# Patient Record
Sex: MALE | Race: WHITE | NOT HISPANIC OR LATINO | Employment: OTHER | URBAN - NONMETROPOLITAN AREA
[De-identification: names, ages, dates, MRNs, and addresses within clinical notes are randomized per-mention and may not be internally consistent; named-entity substitution may affect disease eponyms.]

---

## 2017-02-07 ENCOUNTER — ALLSCRIPTS OFFICE VISIT (OUTPATIENT)
Dept: FAMILY MEDICINE CLINIC | Facility: CLINIC | Age: 52
End: 2017-02-07
Payer: MEDICARE

## 2017-02-07 DIAGNOSIS — E11.65 TYPE 2 DIABETES MELLITUS WITH HYPERGLYCEMIA (HCC): ICD-10-CM

## 2017-02-07 DIAGNOSIS — E11.40 TYPE 2 DIABETES MELLITUS WITH DIABETIC NEUROPATHY (HCC): ICD-10-CM

## 2017-02-07 DIAGNOSIS — T78.3XXA ANGIONEUROTIC EDEMA: ICD-10-CM

## 2017-02-07 LAB — HBA1C MFR BLD HPLC: 10.2 %

## 2017-05-06 ENCOUNTER — HOSPITAL ENCOUNTER (EMERGENCY)
Facility: HOSPITAL | Age: 52
Discharge: HOME | End: 2017-05-07
Attending: EMERGENCY MEDICINE | Admitting: EMERGENCY MEDICINE
Payer: MEDICARE

## 2017-05-06 DIAGNOSIS — L02.211 ABSCESS OF ABDOMINAL WALL: Primary | ICD-10-CM

## 2017-05-06 LAB
ACETONE SERPL-MCNC: NEGATIVE MG/DL
ALBUMIN SERPL BCP-MCNC: 3.3 G/DL (ref 3.5–5)
ALP SERPL-CCNC: 76 U/L (ref 46–116)
ALT SERPL W P-5'-P-CCNC: 30 U/L (ref 12–78)
ANION GAP SERPL CALCULATED.3IONS-SCNC: 0 MMOL/L (ref 4–13)
APTT PPP: 26 SECONDS (ref 23–35)
AST SERPL W P-5'-P-CCNC: 14 U/L (ref 5–45)
BASOPHILS # BLD AUTO: 0.06 THOUSANDS/ΜL (ref 0–0.1)
BASOPHILS NFR BLD AUTO: 1 % (ref 0–1)
BILIRUB SERPL-MCNC: 0.3 MG/DL (ref 0.2–1)
BUN SERPL-MCNC: 17 MG/DL (ref 5–25)
CALCIUM SERPL-MCNC: 8.9 MG/DL (ref 8.3–10.1)
CHLORIDE SERPL-SCNC: 94 MMOL/L (ref 100–108)
CO2 SERPL-SCNC: 29 MMOL/L (ref 21–32)
CREAT SERPL-MCNC: 1.1 MG/DL (ref 0.6–1.3)
EOSINOPHIL # BLD AUTO: 0.17 THOUSAND/ΜL (ref 0–0.61)
EOSINOPHIL NFR BLD AUTO: 2 % (ref 0–6)
ERYTHROCYTE [DISTWIDTH] IN BLOOD BY AUTOMATED COUNT: 13 % (ref 11.6–15.1)
GFR SERPL CREATININE-BSD FRML MDRD: >60 ML/MIN/1.73SQ M
GLUCOSE SERPL-MCNC: 238 MG/DL (ref 65–140)
HCT VFR BLD AUTO: 42.7 % (ref 36.5–49.3)
HGB BLD-MCNC: 14.6 G/DL (ref 12–17)
INR PPP: 0.85 (ref 0.86–1.16)
LACTATE SERPL-SCNC: 1.8 MMOL/L (ref 0.5–2)
LYMPHOCYTES # BLD AUTO: 3.25 THOUSANDS/ΜL (ref 0.6–4.47)
LYMPHOCYTES NFR BLD AUTO: 28 % (ref 14–44)
MAGNESIUM SERPL-MCNC: 1.8 MG/DL (ref 1.6–2.6)
MCH RBC QN AUTO: 29.6 PG (ref 26.8–34.3)
MCHC RBC AUTO-ENTMCNC: 34.2 G/DL (ref 31.4–37.4)
MCV RBC AUTO: 86 FL (ref 82–98)
MONOCYTES # BLD AUTO: 1.12 THOUSAND/ΜL (ref 0.17–1.22)
MONOCYTES NFR BLD AUTO: 10 % (ref 4–12)
NEUTROPHILS # BLD AUTO: 7.11 THOUSANDS/ΜL (ref 1.85–7.62)
NEUTS SEG NFR BLD AUTO: 59 % (ref 43–75)
PH BLDV: 7.41 [PH] (ref 7.3–7.4)
PLATELET # BLD AUTO: 286 THOUSANDS/UL (ref 149–390)
PMV BLD AUTO: 9.9 FL (ref 8.9–12.7)
POTASSIUM SERPL-SCNC: 3.6 MMOL/L (ref 3.5–5.3)
PROT SERPL-MCNC: 7.4 G/DL (ref 6.4–8.2)
PROTHROMBIN TIME: 11.5 SECONDS (ref 12.1–14.4)
RBC # BLD AUTO: 4.94 MILLION/UL (ref 3.88–5.62)
SODIUM SERPL-SCNC: 123 MMOL/L (ref 136–145)
WBC # BLD AUTO: 11.71 THOUSAND/UL (ref 4.31–10.16)

## 2017-05-06 PROCEDURE — 85610 PROTHROMBIN TIME: CPT | Performed by: EMERGENCY MEDICINE

## 2017-05-06 PROCEDURE — 80053 COMPREHEN METABOLIC PANEL: CPT | Performed by: EMERGENCY MEDICINE

## 2017-05-06 PROCEDURE — 36415 COLL VENOUS BLD VENIPUNCTURE: CPT | Performed by: EMERGENCY MEDICINE

## 2017-05-06 PROCEDURE — 82009 KETONE BODYS QUAL: CPT | Performed by: EMERGENCY MEDICINE

## 2017-05-06 PROCEDURE — 83605 ASSAY OF LACTIC ACID: CPT | Performed by: EMERGENCY MEDICINE

## 2017-05-06 PROCEDURE — 85730 THROMBOPLASTIN TIME PARTIAL: CPT | Performed by: EMERGENCY MEDICINE

## 2017-05-06 PROCEDURE — 82800 BLOOD PH: CPT | Performed by: EMERGENCY MEDICINE

## 2017-05-06 PROCEDURE — 83735 ASSAY OF MAGNESIUM: CPT | Performed by: EMERGENCY MEDICINE

## 2017-05-06 PROCEDURE — 85025 COMPLETE CBC W/AUTO DIFF WBC: CPT | Performed by: EMERGENCY MEDICINE

## 2017-05-06 PROCEDURE — 87040 BLOOD CULTURE FOR BACTERIA: CPT | Performed by: EMERGENCY MEDICINE

## 2017-05-06 RX ORDER — ASPIRIN 81 MG/1
81 TABLET ORAL DAILY
COMMUNITY
End: 2018-02-12 | Stop reason: SDUPTHER

## 2017-05-06 RX ORDER — LIDOCAINE 40 MG/G
CREAM TOPICAL ONCE
Status: DISCONTINUED | OUTPATIENT
Start: 2017-05-06 | End: 2017-05-07 | Stop reason: HOSPADM

## 2017-05-06 RX ORDER — ASCORBIC ACID 500 MG
500 TABLET ORAL DAILY
COMMUNITY
End: 2018-02-12 | Stop reason: SDUPTHER

## 2017-05-07 VITALS
SYSTOLIC BLOOD PRESSURE: 110 MMHG | RESPIRATION RATE: 18 BRPM | BODY MASS INDEX: 41.78 KG/M2 | TEMPERATURE: 98.6 F | HEIGHT: 64 IN | OXYGEN SATURATION: 93 % | WEIGHT: 244.71 LBS | HEART RATE: 100 BPM | DIASTOLIC BLOOD PRESSURE: 78 MMHG

## 2017-05-07 VITALS
TEMPERATURE: 99.1 F | BODY MASS INDEX: 40.97 KG/M2 | OXYGEN SATURATION: 94 % | HEART RATE: 106 BPM | HEIGHT: 64 IN | SYSTOLIC BLOOD PRESSURE: 125 MMHG | DIASTOLIC BLOOD PRESSURE: 85 MMHG | RESPIRATION RATE: 18 BRPM | WEIGHT: 240 LBS

## 2017-05-07 DIAGNOSIS — T78.3XXA ANGIOEDEMA OF LIPS: Primary | ICD-10-CM

## 2017-05-07 DIAGNOSIS — L02.211 ABDOMINAL WALL ABSCESS: ICD-10-CM

## 2017-05-07 LAB
ANION GAP SERPL CALCULATED.3IONS-SCNC: 7 MMOL/L (ref 4–13)
BASOPHILS # BLD AUTO: 0.03 THOUSANDS/ΜL (ref 0–0.1)
BASOPHILS NFR BLD AUTO: 0 % (ref 0–1)
BUN SERPL-MCNC: 15 MG/DL (ref 5–25)
CALCIUM SERPL-MCNC: 8.8 MG/DL (ref 8.3–10.1)
CHLORIDE SERPL-SCNC: 95 MMOL/L (ref 100–108)
CO2 SERPL-SCNC: 32 MMOL/L (ref 21–32)
CREAT SERPL-MCNC: 1.03 MG/DL (ref 0.6–1.3)
EOSINOPHIL # BLD AUTO: 0.24 THOUSAND/ΜL (ref 0–0.61)
EOSINOPHIL NFR BLD AUTO: 2 % (ref 0–6)
ERYTHROCYTE [DISTWIDTH] IN BLOOD BY AUTOMATED COUNT: 13.1 % (ref 11.6–15.1)
GFR SERPL CREATININE-BSD FRML MDRD: >60 ML/MIN/1.73SQ M
GLUCOSE SERPL-MCNC: 263 MG/DL (ref 65–140)
HCT VFR BLD AUTO: 43.8 % (ref 36.5–49.3)
HGB BLD-MCNC: 14.9 G/DL (ref 12–17)
LYMPHOCYTES # BLD AUTO: 2.44 THOUSANDS/ΜL (ref 0.6–4.47)
LYMPHOCYTES NFR BLD AUTO: 22 % (ref 14–44)
MAGNESIUM SERPL-MCNC: 1.8 MG/DL (ref 1.6–2.6)
MCH RBC QN AUTO: 29.4 PG (ref 26.8–34.3)
MCHC RBC AUTO-ENTMCNC: 34 G/DL (ref 31.4–37.4)
MCV RBC AUTO: 87 FL (ref 82–98)
MONOCYTES # BLD AUTO: 1.2 THOUSAND/ΜL (ref 0.17–1.22)
MONOCYTES NFR BLD AUTO: 11 % (ref 4–12)
NEUTROPHILS # BLD AUTO: 7.32 THOUSANDS/ΜL (ref 1.85–7.62)
NEUTS SEG NFR BLD AUTO: 65 % (ref 43–75)
PLATELET # BLD AUTO: 279 THOUSANDS/UL (ref 149–390)
PMV BLD AUTO: 9.6 FL (ref 8.9–12.7)
POTASSIUM SERPL-SCNC: 3.9 MMOL/L (ref 3.5–5.3)
RBC # BLD AUTO: 5.06 MILLION/UL (ref 3.88–5.62)
SODIUM SERPL-SCNC: 134 MMOL/L (ref 136–145)
WBC # BLD AUTO: 11.23 THOUSAND/UL (ref 4.31–10.16)

## 2017-05-07 PROCEDURE — 80048 BASIC METABOLIC PNL TOTAL CA: CPT | Performed by: EMERGENCY MEDICINE

## 2017-05-07 PROCEDURE — 96372 THER/PROPH/DIAG INJ SC/IM: CPT

## 2017-05-07 PROCEDURE — 96375 TX/PRO/DX INJ NEW DRUG ADDON: CPT

## 2017-05-07 PROCEDURE — 36415 COLL VENOUS BLD VENIPUNCTURE: CPT | Performed by: EMERGENCY MEDICINE

## 2017-05-07 PROCEDURE — 99283 EMERGENCY DEPT VISIT LOW MDM: CPT

## 2017-05-07 PROCEDURE — 85025 COMPLETE CBC W/AUTO DIFF WBC: CPT | Performed by: EMERGENCY MEDICINE

## 2017-05-07 PROCEDURE — 96374 THER/PROPH/DIAG INJ IV PUSH: CPT

## 2017-05-07 PROCEDURE — 83735 ASSAY OF MAGNESIUM: CPT | Performed by: EMERGENCY MEDICINE

## 2017-05-07 RX ORDER — EPINEPHRINE 0.3 MG/.3ML
INJECTION SUBCUTANEOUS
Qty: 0.6 ML | Refills: 1 | Status: SHIPPED | OUTPATIENT
Start: 2017-05-07 | End: 2018-02-12 | Stop reason: SDUPTHER

## 2017-05-07 RX ORDER — PREDNISONE 10 MG/1
50 TABLET ORAL DAILY
Qty: 20 TABLET | Refills: 0 | Status: SHIPPED | OUTPATIENT
Start: 2017-05-07 | End: 2017-05-11

## 2017-05-07 RX ORDER — METHYLPREDNISOLONE SODIUM SUCCINATE 125 MG/2ML
125 INJECTION, POWDER, LYOPHILIZED, FOR SOLUTION INTRAMUSCULAR; INTRAVENOUS ONCE
Status: COMPLETED | OUTPATIENT
Start: 2017-05-07 | End: 2017-05-07

## 2017-05-07 RX ORDER — CLINDAMYCIN PHOSPHATE 150 MG/ML
600 INJECTION, SOLUTION INTRAVENOUS EVERY 8 HOURS
Status: DISCONTINUED | OUTPATIENT
Start: 2017-05-07 | End: 2017-05-07 | Stop reason: HOSPADM

## 2017-05-07 RX ORDER — CLINDAMYCIN HYDROCHLORIDE 150 MG/1
450 CAPSULE ORAL 3 TIMES DAILY
Qty: 90 CAPSULE | Refills: 0 | Status: SHIPPED | OUTPATIENT
Start: 2017-05-07 | End: 2017-05-07

## 2017-05-07 RX ORDER — DIPHENHYDRAMINE HYDROCHLORIDE 50 MG/ML
50 INJECTION INTRAMUSCULAR; INTRAVENOUS ONCE
Status: COMPLETED | OUTPATIENT
Start: 2017-05-07 | End: 2017-05-07

## 2017-05-07 RX ORDER — SULFAMETHOXAZOLE AND TRIMETHOPRIM 800; 160 MG/1; MG/1
1 TABLET ORAL 2 TIMES DAILY
Qty: 14 TABLET | Refills: 0 | Status: SHIPPED | OUTPATIENT
Start: 2017-05-07 | End: 2017-05-14

## 2017-05-07 RX ORDER — OXYCODONE HYDROCHLORIDE 5 MG/1
2.5 TABLET ORAL EVERY 4 HOURS PRN
Qty: 8 TABLET | Refills: 0 | Status: SHIPPED | OUTPATIENT
Start: 2017-05-07 | End: 2018-02-12 | Stop reason: SDUPTHER

## 2017-05-07 RX ADMIN — METHYLPREDNISOLONE SODIUM SUCCINATE 125 MG: 125 INJECTION, POWDER, FOR SOLUTION INTRAMUSCULAR; INTRAVENOUS at 09:00

## 2017-05-07 RX ADMIN — DIPHENHYDRAMINE HYDROCHLORIDE 50 MG: 50 INJECTION, SOLUTION INTRAMUSCULAR; INTRAVENOUS at 09:02

## 2017-05-07 RX ADMIN — CLINDAMYCIN PHOSPHATE 600 MG: 150 INJECTION, SOLUTION INTRAMUSCULAR; INTRAVENOUS at 00:21

## 2017-05-07 RX ADMIN — EPINEPHRINE 0.3 MG: 1 INJECTION, SOLUTION INTRAMUSCULAR; SUBCUTANEOUS at 08:58

## 2017-05-07 RX ADMIN — FAMOTIDINE 20 MG: 10 INJECTION, SOLUTION INTRAVENOUS at 09:04

## 2017-05-12 LAB
BACTERIA BLD CULT: NORMAL
BACTERIA BLD CULT: NORMAL

## 2017-10-24 ENCOUNTER — ALLSCRIPTS OFFICE VISIT (OUTPATIENT)
Dept: FAMILY MEDICINE CLINIC | Facility: CLINIC | Age: 52
End: 2017-10-24
Payer: MEDICARE

## 2017-10-24 DIAGNOSIS — E11.65 TYPE 2 DIABETES MELLITUS WITH HYPERGLYCEMIA (HCC): ICD-10-CM

## 2017-10-24 DIAGNOSIS — I10 ESSENTIAL (PRIMARY) HYPERTENSION: ICD-10-CM

## 2017-10-24 DIAGNOSIS — E11.40 TYPE 2 DIABETES MELLITUS WITH DIABETIC NEUROPATHY (HCC): ICD-10-CM

## 2017-10-24 DIAGNOSIS — Z12.5 ENCOUNTER FOR SCREENING FOR MALIGNANT NEOPLASM OF PROSTATE: ICD-10-CM

## 2017-10-24 DIAGNOSIS — M25.50 PAIN IN JOINT: ICD-10-CM

## 2017-10-24 PROCEDURE — 83036 HEMOGLOBIN GLYCOSYLATED A1C: CPT | Performed by: FAMILY MEDICINE

## 2017-10-24 PROCEDURE — 99213 OFFICE O/P EST LOW 20 MIN: CPT | Performed by: FAMILY MEDICINE

## 2017-10-28 NOTE — PROGRESS NOTES
Assessment  1  Hypertension (401 9) (I10)   2  Screening PSA (prostate specific antigen) (V76 44) (Z12 5)   3  Type 2 diabetes, uncontrolled, with neuropathy (250 62,357 2) (E11 40,E11 65)   4  Special screening for malignant neoplasm of colon (V76 51) (Z12 11)   5  COPD, mild (496) (J44 9)   6  Flu vaccine need (V04 81) (Z23)    Plan   COPD, mild    · Albuterol Sulfate (2 5 MG/3ML) 0 083% Inhalation Nebulization Solution; USE 1  UNIT DOSE VIA NEBULIZER  4 TIMES A DAY AS NEEDED  Flu vaccine need    · Flulaval Quadrivalent 0 5 ML Intramuscular Suspension Prefilled Syringe  Type 2 diabetes, uncontrolled, with neuropathy    · Januvia 50 MG Oral Tablet; TAKE ONE TABLET BY MOUTH ONCE DAILY    Nebulizer Supplies; Status:Complete;   Done: 83VLN0150  Perform:Not Applicable; DYX:[id number];VFZRKJX; For:COPD, mild; Ordered By:Dilma Varma; Respiratory Equipment Nebulizer; Status:Complete;   Done: 57XOW4492  Perform:Not Applicable; YHE:82YLH9699;ZIXWSXV; For:COPD, mild; Ordered By:Dilma Varma; Discussion/Summary    Follow up in 3 months  The patient was counseled regarding instructions for management,-- importance of compliance with treatment  total time of encounter was 20 minutes-- and-- 10 minutes was spent counseling  Possible side effects of new medications were reviewed with the patient/guardian today  The treatment plan was reviewed with the patient/guardian  The patient/guardian understands and agrees with the treatment plan      Chief Complaint  pt is here for a diabetes check up   Patient is here today for follow up of chronic conditions described in HPI  History of Present Illness  Patient is a 47year old male who presents for diabetes check up  Patient is currently taking Metformin BID and 30 units of Lantus at bedtime  His A1C is 10 2 today  Patients reports neuropathy pain comes and goes  Patient states that he is going to begin dieting  Patient's last eye exam was 4 years ago  Review of Systems    Constitutional: No fever or chills, feels well, no tiredness, no recent weight gain or weight loss  Eyes: No complaints of eye pain, no red eyes, no discharge from eyes, no itchy eyes  ENT: sinus congestion, but-- no complaints of earache, no hearing loss, no nosebleeds, no nasal discharge, no sore throat, no hoarseness  Cardiovascular: No complaints of slow heart rate, no fast heart rate, no chest pain, no palpitations, no leg claudication, no lower extremity  Respiratory: cough, but-- No complaints of shortness of breath, no wheezing, no cough, no SOB on exertion, no orthopnea or PND  Gastrointestinal: No complaints of abdominal pain, no constipation, no nausea or vomiting, no diarrhea or bloody stools  Genitourinary: No complaints of dysuria, no incontinence, no hesitancy, no nocturia, no genital lesion, no testicular pain  Musculoskeletal: No complaints of arthralgia, no myalgias, no joint swelling or stiffness, no limb pain or swelling  Integumentary: No complaints of skin rash or skin lesions, no itching, no skin wound, no dry skin  Neurological: No compliants of headache, no confusion, no convulsions, no numbness or tingling, no dizziness or fainting, no limb weakness, no difficulty walking  Psychiatric: Is not suicidal, no sleep disturbances, no anxiety or depression, no change in personality, no emotional problems  Hematologic/Lymphatic: No complaints of swollen glands, no swollen glands in the neck, does not bleed easily, no easy bruising  Active Problems  1  Abdominal pain (789 00) (R10 9)   2  Abnormal weight gain (783 1) (R63 5)   3  Acute exacerbation of chronic low back pain (724 2,338 19,338 29) (M54 5,G89 29)   4  Acute sinusitis (461 9) (J01 90)   5  Allergic rhinitis (477 9) (J30 9)   6  Angioedema (995 1) (T78 3XXA)   7  Arthritis (716 90) (M19 90)   8  Chronic back pain (724 5,338 29) (M54 9,G89 29)   9  Chronic sinusitis (473 9) (J32 9)   10  Cigarette smoker (305 1) (F17 210)   11  COPD, mild (496) (J44 9)   12  Depression with anxiety (300 4) (F41 8)   13  Hyperlipidemia (272 4) (E78 5)   14  Hypertension (401 9) (I10)   15  Hypothyroidism (244 9) (E03 9)   16  Left ankle pain (719 47) (M25 572)   17  Morbid obesity (278 01) (E66 01)   18  Right knee pain (719 46) (M25 561)   19  Sleep apnea (780 57) (G47 30)   20  Special screening for malignant neoplasm of colon (V76 51) (Z12 11)   21  Tinnitus, left (388 30) (H93 12)   22  Type 2 diabetes, uncontrolled, with neuropathy (250 62,357 2) (E11 40,E11 65)    Past Medical History  1  History of Ankle fracture, left (824 8) (S82 892A)   2  History of Facial fracture (802 8) (S02 92XA)   3  History of Femur fracture, left (821 00) (S72 92XA)   4  History of Injury of back of thorax (959 11) (S29 9XXA)    The active problems and past medical history were reviewed and updated today  Surgical History  1  History of Knee Surgery   2  History of Sinus Surgery    The surgical history was reviewed and updated today  Family History  Mother    1  Family history of Thyroid cancer   2  Family history of Type 2 diabetes mellitus  Father    3  Family history of Esophageal cancer   4  Family history of Type 2 diabetes mellitus  Brother    5  Family history of Cancer of kidney   6  Family history of Type 2 diabetes mellitus  Maternal Grandmother    7  Family history of Type 2 diabetes mellitus  Uncle    8  Family history of Type 2 diabetes mellitus  Multiple Family Members    5  Family history of Diabetes (250 00) (E11 9)    The family history was reviewed and updated today  Social History   · Cigarette smoker (305 1) (F17 210)   · Current every day smoker (305 1) (F17 200)   · No alcohol use   · No drug use   · Occasional caffeine consumption  The social history was reviewed and updated today  Current Meds   1   Advair Diskus 250-50 MCG/DOSE Inhalation Aerosol Powder Breath Activated; INHALE 1   PUFF TWICE DAILY; Therapy: 71LXW4608 to (Last Rx:07Feb2017)  Requested for: 06OCQ7382 Ordered   2  Atorvastatin Calcium 10 MG Oral Tablet; take 1 tablet by mouth every evening for   cholesterol; Therapy: 61GIP2166 to (Aletha Fontanez)  Requested for: 23UUH8456; Last   Rx:23Jun2017 Ordered   3  DULoxetine HCl - 60 MG Oral Capsule Delayed Release Particles; TAKE 1 CAPSULE   Daily; Therapy: 09DIX4485 to (Evaluate:41Cao4750)  Requested for: 03Apr2017; Last   Rx:03Apr2017 Ordered   4  Fexofenadine HCl - 180 MG Oral Tablet; TAKE 1 TABLET DAILY AS NEEDED FOR   ALLERGIES; Therapy: 57EJS6531 to (Last Rx:11Oct2017)  Requested for: 22DAW7225 Ordered   5  Gabapentin 300 MG Oral Capsule; TAKE ONE CAPSULE BY MOUTH THREE TIMES   DAILY; Therapy: 71TSJ4991 to (Evaluate:10Nov2017)  Requested for: 84GIF7631; Last   Rx:11Oct2017 Ordered   6  HydroCHLOROthiazide 25 MG Oral Tablet; TAKE ONE TABLET BY MOUTH ONCE DAILY; Therapy: 24LKS6527 to (Evaluate:10Oct2017)  Requested for: 12Jun2017; Last   Rx:12Jun2017 Ordered   7  Lantus SoloStar 100 UNIT/ML Subcutaneous Solution Pen-injector; INJECT 40 UNIT   Bedtime; Therapy: 75ODD8915 to (Last Rx:07Feb2017)  Requested for: 54TZQ3705 Ordered   8  Levothyroxine Sodium 150 MCG Oral Tablet; TAKE ONE TABLET BY MOUTH EVERY   MORNING ON AN EMPTY STOMACH [address]; Therapy: 57WJP6351 to (Adolph Callaway)  Requested for: 30DFD5369; Last   Rx:10Oct2017 Ordered   9  Lyrica 75 MG Oral Capsule; TAKE ONE CAPSULE BY MOUTH TWICE DAILY; Therapy: 96KTA3803 to (Evaluate:10Oct2017)  Requested for: 12Jun2017; Last   Rx:12Jun2017 Ordered   10  MetFORMIN HCl - 1000 MG Oral Tablet; Take 1 tablet twice daily  Requested for:    12Jun2017; Last Rx:12Jun2017 Ordered   11  Pen Needles 3/16 31G X 5 MM Miscellaneous; USE AS DIRECTED  ONCE DAILY WITH    INSULIN PEN; Therapy: 88CCA9719 to (Evaluate:65Duo9011); Last Rx:26Jun2015 Ordered   12   TraZODone HCl - 150 MG Oral Tablet; TAKE ONE TABLET BY MOUTH AT BEDTIME; Therapy: 65BTA4366 to (Evaluate:38Qux9914)  Requested for: 12Jun2017; Last    Rx:12Jun2017 Ordered   13  Ventolin  (90 Base) MCG/ACT Inhalation Aerosol Solution; INHALE 2 PUFFS    EVERY 4 HOURS AS NEEDED; Therapy: 49Pob1534 to (Last Rx:44Gat5712)  Requested for: 04OQY2601 Ordered    The medication list was reviewed and updated today  Allergies  1  No Known Drug Allergies    Vitals  Vital Signs    Recorded: 07RNI1976 12:29PM   Temperature 96 9 F   Heart Rate 105   Respiration 20   Systolic 221   Diastolic 96   Height 5 ft 4 in   Weight 249 lb    BMI Calculated 42 74   BSA Calculated 2 15   O2 Saturation 94     Physical Exam    Constitutional   General appearance: No acute distress, well appearing and well nourished  Eyes   Pupils and irises: Equal, round and reactive to light  Ears, Nose, Mouth, and Throat   External inspection of ears and nose: Normal     Otoscopic examination: Tympanic membrance translucent with normal light reflex  Canals patent without erythema  Oropharynx: Normal with no erythema, edema, exudate or lesions  Pulmonary   Auscultation of lungs: Abnormal   diffuse rhonchi bilaterally  Cardiovascular   Auscultation of heart: Normal rate and rhythm, normal S1 and S2, without murmurs  Lymphatic   Palpation of lymph nodes in neck: No lymphadenopathy  Psychiatric   Orientation to person, place and time: Normal     Mood and affect: Normal          Results/Data  PHQ-9 Adult Depression Screening 90NAJ4696 12:52PM Brittany Newsome     Test Name Result Flag Reference   PHQ-9 Adult Depression Score 5     Over the last two weeks, how often have you been bothered by any of the following problems?   Little interest or pleasure in doing things: Several days - 1  Feeling down, depressed, or hopeless: Several days - 1  Trouble falling or staying asleep, or sleeping too much: Several days - 1  Feeling tired or having little energy: Several days - 1  Poor appetite or over eating: Several days - 1  Feeling bad about yourself - or that you are a failure or have let yourself or your family down: Not at all - 0  Trouble concentrating on things, such as reading the newspaper or watching television: Not at all - 0  Moving or speaking so slowly that other people could have noticed  Or the opposite -  being so fidgety or restless that you have been moving around a lot more than usual: Not at all - 0  Thoughts that you would be better off dead, or of hurting yourself in some way: Not at all - 0   PHQ-9 Adult Depression Screening Negative     PHQ-9 Difficulty Level Not difficult at all     PHQ-9 Severity Mild Depression         Future Appointments    Date/Time Provider Specialty Site   01/24/2018 11:30 AM Nj Carranza, [address] [place] [address]   11/07/2017 01:10 PM DA Wilson  Orthopedic Surgery  [place] [facility]      Signatures   Electronically signed by :  [facility] MARLEEN HOOVER; Oct 24 2017  1:22PM EST                       (Author)    Electronically signed by : Ade Crenshaw DO; Oct 27 2017  9:14AM EST                       (Author)

## 2018-01-12 VITALS
HEART RATE: 105 BPM | WEIGHT: 249 LBS | SYSTOLIC BLOOD PRESSURE: 130 MMHG | BODY MASS INDEX: 42.51 KG/M2 | TEMPERATURE: 96.9 F | DIASTOLIC BLOOD PRESSURE: 96 MMHG | RESPIRATION RATE: 20 BRPM | OXYGEN SATURATION: 94 % | HEIGHT: 64 IN

## 2018-01-13 VITALS
SYSTOLIC BLOOD PRESSURE: 122 MMHG | OXYGEN SATURATION: 92 % | HEART RATE: 109 BPM | DIASTOLIC BLOOD PRESSURE: 78 MMHG | BODY MASS INDEX: 41.32 KG/M2 | TEMPERATURE: 97.8 F | HEIGHT: 64 IN | RESPIRATION RATE: 20 BRPM | WEIGHT: 242 LBS

## 2018-02-11 RX ORDER — LEVOTHYROXINE SODIUM 0.15 MG/1
TABLET ORAL
Qty: 30 TABLET | Refills: 0 | Status: CANCELLED | OUTPATIENT
Start: 2018-02-11

## 2018-02-12 DIAGNOSIS — E11.65 TYPE 2 DIABETES MELLITUS WITH HYPERGLYCEMIA (HCC): ICD-10-CM

## 2018-02-12 DIAGNOSIS — T78.3XXD ANGIOEDEMA, SUBSEQUENT ENCOUNTER: Primary | ICD-10-CM

## 2018-02-12 DIAGNOSIS — I10 ESSENTIAL (PRIMARY) HYPERTENSION: ICD-10-CM

## 2018-02-12 DIAGNOSIS — Z12.5 ENCOUNTER FOR SCREENING FOR MALIGNANT NEOPLASM OF PROSTATE: ICD-10-CM

## 2018-02-12 DIAGNOSIS — E11.49 OTHER DIABETIC NEUROLOGICAL COMPLICATION ASSOCIATED WITH TYPE 2 DIABETES MELLITUS (HCC): ICD-10-CM

## 2018-02-12 DIAGNOSIS — E11.40 TYPE 2 DIABETES MELLITUS WITH DIABETIC NEUROPATHY (HCC): ICD-10-CM

## 2018-02-12 LAB
ALBUMIN SERPL BCP-MCNC: 3.5 G/DL (ref 3.5–5)
ALP SERPL-CCNC: 75 U/L (ref 46–116)
ALT SERPL W P-5'-P-CCNC: 37 U/L (ref 12–78)
ANION GAP SERPL CALCULATED.3IONS-SCNC: 7 MMOL/L (ref 4–13)
AST SERPL W P-5'-P-CCNC: 16 U/L (ref 5–45)
BASOPHILS # BLD AUTO: 0.04 THOUSANDS/ΜL (ref 0–0.1)
BASOPHILS NFR BLD AUTO: 1 % (ref 0–1)
BILIRUB SERPL-MCNC: 0.3 MG/DL (ref 0.2–1)
BUN SERPL-MCNC: 13 MG/DL (ref 5–25)
CALCIUM SERPL-MCNC: 8.6 MG/DL (ref 8.3–10.1)
CHLORIDE SERPL-SCNC: 93 MMOL/L (ref 100–108)
CHOLEST SERPL-MCNC: 244 MG/DL (ref 50–200)
CO2 SERPL-SCNC: 31 MMOL/L (ref 21–32)
CREAT SERPL-MCNC: 1.03 MG/DL (ref 0.6–1.3)
CREAT UR-MCNC: 33.3 MG/DL
EOSINOPHIL # BLD AUTO: 0.19 THOUSAND/ΜL (ref 0–0.61)
EOSINOPHIL NFR BLD AUTO: 2 % (ref 0–6)
ERYTHROCYTE [DISTWIDTH] IN BLOOD BY AUTOMATED COUNT: 13.2 % (ref 11.6–15.1)
GFR SERPL CREATININE-BSD FRML MDRD: 83 ML/MIN/1.73SQ M
GLUCOSE P FAST SERPL-MCNC: 272 MG/DL (ref 65–99)
HCT VFR BLD AUTO: 41.7 % (ref 36.5–49.3)
HDLC SERPL-MCNC: 30 MG/DL (ref 40–60)
HGB BLD-MCNC: 14.3 G/DL (ref 12–17)
LDLC SERPL DIRECT ASSAY-MCNC: 125 MG/DL (ref 0–100)
LYMPHOCYTES # BLD AUTO: 2.53 THOUSANDS/ΜL (ref 0.6–4.47)
LYMPHOCYTES NFR BLD AUTO: 29 % (ref 14–44)
MCH RBC QN AUTO: 29.7 PG (ref 26.8–34.3)
MCHC RBC AUTO-ENTMCNC: 34.3 G/DL (ref 31.4–37.4)
MCV RBC AUTO: 87 FL (ref 82–98)
MICROALBUMIN UR-MCNC: 440 MG/L (ref 0–20)
MICROALBUMIN/CREAT 24H UR: 1321 MG/G CREATININE (ref 0–30)
MONOCYTES # BLD AUTO: 1.03 THOUSAND/ΜL (ref 0.17–1.22)
MONOCYTES NFR BLD AUTO: 12 % (ref 4–12)
NEUTROPHILS # BLD AUTO: 4.93 THOUSANDS/ΜL (ref 1.85–7.62)
NEUTS SEG NFR BLD AUTO: 56 % (ref 43–75)
NRBC BLD AUTO-RTO: 0 /100 WBCS
PLATELET # BLD AUTO: 261 THOUSANDS/UL (ref 149–390)
PMV BLD AUTO: 10.2 FL (ref 8.9–12.7)
POTASSIUM SERPL-SCNC: 4.1 MMOL/L (ref 3.5–5.3)
PROT SERPL-MCNC: 7.2 G/DL (ref 6.4–8.2)
PSA SERPL-MCNC: 0.4 NG/ML (ref 0–4)
RBC # BLD AUTO: 4.82 MILLION/UL (ref 3.88–5.62)
SODIUM SERPL-SCNC: 131 MMOL/L (ref 136–145)
TRIGL SERPL-MCNC: 492 MG/DL
TSH SERPL DL<=0.05 MIU/L-ACNC: 10.4 UIU/ML (ref 0.36–3.74)
WBC # BLD AUTO: 8.75 THOUSAND/UL (ref 4.31–10.16)

## 2018-02-12 PROCEDURE — 83721 ASSAY OF BLOOD LIPOPROTEIN: CPT

## 2018-02-12 PROCEDURE — 82785 ASSAY OF IGE: CPT | Performed by: FAMILY MEDICINE

## 2018-02-12 PROCEDURE — 85025 COMPLETE CBC W/AUTO DIFF WBC: CPT | Performed by: FAMILY MEDICINE

## 2018-02-12 PROCEDURE — 86003 ALLG SPEC IGE CRUDE XTRC EA: CPT | Performed by: FAMILY MEDICINE

## 2018-02-12 PROCEDURE — G0103 PSA SCREENING: HCPCS | Performed by: NURSE PRACTITIONER

## 2018-02-12 PROCEDURE — 82570 ASSAY OF URINE CREATININE: CPT | Performed by: NURSE PRACTITIONER

## 2018-02-12 PROCEDURE — 80053 COMPREHEN METABOLIC PANEL: CPT | Performed by: NURSE PRACTITIONER

## 2018-02-12 PROCEDURE — 80061 LIPID PANEL: CPT | Performed by: NURSE PRACTITIONER

## 2018-02-12 PROCEDURE — 82043 UR ALBUMIN QUANTITATIVE: CPT | Performed by: NURSE PRACTITIONER

## 2018-02-12 PROCEDURE — 84443 ASSAY THYROID STIM HORMONE: CPT | Performed by: NURSE PRACTITIONER

## 2018-02-12 RX ORDER — DULOXETIN HYDROCHLORIDE 60 MG/1
1 CAPSULE, DELAYED RELEASE ORAL DAILY
COMMUNITY
Start: 2015-02-04 | End: 2018-02-27

## 2018-02-12 RX ORDER — ALBUTEROL SULFATE 2.5 MG/3ML
1 SOLUTION RESPIRATORY (INHALATION) 4 TIMES DAILY PRN
COMMUNITY
Start: 2017-10-24 | End: 2019-07-10 | Stop reason: SDUPTHER

## 2018-02-12 RX ORDER — PEN NEEDLE, DIABETIC 30 GX5/16"
NEEDLE, DISPOSABLE MISCELLANEOUS
COMMUNITY
Start: 2015-06-24 | End: 2019-11-27 | Stop reason: SDUPTHER

## 2018-02-12 RX ORDER — ATORVASTATIN CALCIUM 10 MG/1
1 TABLET, FILM COATED ORAL EVERY EVENING
COMMUNITY
Start: 2017-06-23 | End: 2018-02-22 | Stop reason: DRUGHIGH

## 2018-02-13 ENCOUNTER — OFFICE VISIT (OUTPATIENT)
Dept: FAMILY MEDICINE CLINIC | Facility: CLINIC | Age: 53
End: 2018-02-13
Payer: MEDICARE

## 2018-02-13 ENCOUNTER — HOSPITAL ENCOUNTER (OUTPATIENT)
Dept: ULTRASOUND IMAGING | Facility: HOSPITAL | Age: 53
Discharge: HOME | End: 2018-02-13
Payer: MEDICARE

## 2018-02-13 VITALS
DIASTOLIC BLOOD PRESSURE: 91 MMHG | WEIGHT: 260 LBS | HEIGHT: 64 IN | OXYGEN SATURATION: 99 % | BODY MASS INDEX: 44.39 KG/M2 | HEART RATE: 108 BPM | SYSTOLIC BLOOD PRESSURE: 141 MMHG

## 2018-02-13 DIAGNOSIS — Z79.4 TYPE 2 DIABETES MELLITUS WITH COMPLICATION, WITH LONG-TERM CURRENT USE OF INSULIN (HCC): ICD-10-CM

## 2018-02-13 DIAGNOSIS — E11.8 TYPE 2 DIABETES MELLITUS WITH COMPLICATION, WITH LONG-TERM CURRENT USE OF INSULIN (HCC): ICD-10-CM

## 2018-02-13 DIAGNOSIS — E03.9 HYPOTHYROIDISM, UNSPECIFIED TYPE: ICD-10-CM

## 2018-02-13 DIAGNOSIS — R60.0 LEG EDEMA, LEFT: Primary | ICD-10-CM

## 2018-02-13 DIAGNOSIS — R60.0 LEG EDEMA, LEFT: ICD-10-CM

## 2018-02-13 DIAGNOSIS — E78.5 HYPERLIPIDEMIA, UNSPECIFIED HYPERLIPIDEMIA TYPE: ICD-10-CM

## 2018-02-13 DIAGNOSIS — L03.116 CELLULITIS OF LEFT LEG WITHOUT FOOT: Primary | ICD-10-CM

## 2018-02-13 PROBLEM — J44.9 COPD, MILD (HCC): Status: ACTIVE | Noted: 2017-02-07

## 2018-02-13 PROBLEM — T78.3XXA ANGIOEDEMA: Status: ACTIVE | Noted: 2017-02-07

## 2018-02-13 LAB
A ALTERNATA IGE QN: <0.1 KUA/I
A FUMIGATUS IGE QN: <0.1 KUA/I
ALLERGEN COMMENT: ABNORMAL
BERMUDA GRASS IGE QN: <0.1 KUA/I
BOXELDER IGE QN: <0.1 KUA/I
C HERBARUM IGE QN: <0.1 KUA/I
CAT DANDER IGE QN: <0.1 KUA/I
CMN PIGWEED IGE QN: <0.1 KUA/I
COMMON RAGWEED IGE QN: <0.1 KUA/I
COTTONWOOD IGE QN: <0.1 KUA/I
D FARINAE IGE QN: <0.1 KUA/I
D PTERONYSS IGE QN: <0.1 KUA/I
DOG DANDER IGE QN: <0.1 KUA/I
LONDON PLANE IGE QN: <0.1 KUA/I
MOUSE URINE PROT IGE QN: <0.1 KUA/I
MT JUNIPER IGE QN: <0.1 KUA/I
MUGWORT IGE QN: <0.1 KUA/I
P NOTATUM IGE QN: <0.1 KUA/I
ROACH IGE QN: <0.1 KUA/I
SHEEP SORREL IGE QN: <0.1 KUA/I
SILVER BIRCH IGE QN: <0.1 KUA/I
SL AMB POCT HEMOGLOBIN AIC: 11.5
TIMOTHY IGE QN: <0.1 KUA/I
TOTAL IGE SMQN RAST: 172 KU/L (ref 0–113)
WALNUT IGE QN: <0.1 KUA/I
WHITE ASH IGE QN: <0.1 KUA/I
WHITE ELM IGE QN: <0.1 KUA/I
WHITE MULBERRY IGE QN: <0.1 KUA/I
WHITE OAK IGE QN: <0.1 KUA/I

## 2018-02-13 PROCEDURE — 83036 HEMOGLOBIN GLYCOSYLATED A1C: CPT | Performed by: FAMILY MEDICINE

## 2018-02-13 PROCEDURE — 93971 EXTREMITY STUDY: CPT

## 2018-02-13 PROCEDURE — 99213 OFFICE O/P EST LOW 20 MIN: CPT | Performed by: FAMILY MEDICINE

## 2018-02-13 RX ORDER — SULFAMETHOXAZOLE AND TRIMETHOPRIM 800; 160 MG/1; MG/1
1 TABLET ORAL EVERY 12 HOURS SCHEDULED
Qty: 20 TABLET | Refills: 0 | Status: SHIPPED | OUTPATIENT
Start: 2018-02-13 | End: 2018-02-23

## 2018-02-13 RX ORDER — LEVOTHYROXINE SODIUM 0.2 MG/1
200 TABLET ORAL
Qty: 30 TABLET | Refills: 1 | Status: SHIPPED | OUTPATIENT
Start: 2018-02-13 | End: 2018-04-16 | Stop reason: SDUPTHER

## 2018-02-13 RX ORDER — OMEGA-3-ACID ETHYL ESTERS 1 G/1
1 CAPSULE, LIQUID FILLED ORAL 2 TIMES DAILY
Qty: 60 CAPSULE | Refills: 2 | Status: SHIPPED | OUTPATIENT
Start: 2018-02-13

## 2018-02-13 NOTE — PROGRESS NOTES
Left leg edema/erythema:  - Duplex negative for DVT  - Bactrim sent to pharmacy for left leg cellulitis  Recent labs:   TSH - 10 4  - Levothyroxine increased from 150 mcg to 200 mcg  - Recheck TSH/T4 in 6 weeks  Cholesterol - Total (244) LDL direct (125) HDL (30) TG (492)  - Omega 3 sent to pharmacy  - Recheck in 3-6 months  A1C - 11 5   - Pt states he is not taking Januvia, it was too expensive

## 2018-02-13 NOTE — PROGRESS NOTES
Assessment/Plan:    No problem-specific Assessment & Plan notes found for this encounter  Diagnoses and all orders for this visit:    Leg edema, left  Comments:  STAT duplex scheduled to r/o acute DVT  Will call abx for cellulitis if duplex normal  Orders:  -     VAS lower limb venous duplex study, unilateral/limited; Future    Type 2 diabetes mellitus with complication, with long-term current use of insulin (HCC)  Comments:  A1C increased from 10 8 to 11 5  Review bloodwork and adjust medication as needed  Orders:  -     POCT hemoglobin A1c          Subjective:      Patient ID: Tiffanie Blackman is a 47 y o  male  Pt presents today for f/u  Patient reports leg leg and foot swelling, erythema, pain x 5 days  Patient also reports right foot swelling  Patient states pain is constant, nothing makes better/worse  Patient states skin has been very dry also  Patient denies chest pain, SOB, calf pain  Patient has uncontrolled type 2 Dm, A1C- 11 5 today  Patient also had blood work done  The following portions of the patient's history were reviewed and updated as appropriate: allergies, current medications, past family history, past medical history, past social history, past surgical history and problem list     Review of Systems   Constitutional: Negative  Respiratory: Negative  Cardiovascular: Negative for chest pain and palpitations  Musculoskeletal: Positive for arthralgias  Skin: Positive for color change  Neurological: Negative for dizziness, syncope and light-headedness  Psychiatric/Behavioral: Positive for sleep disturbance  Objective:    Vitals:    02/13/18 0932   BP: 141/91   Pulse: (!) 108   SpO2: 99%        Physical Exam   Constitutional: He is oriented to person, place, and time  He appears well-developed and well-nourished  No distress  Eyes: Pupils are equal, round, and reactive to light  Neck: Neck supple     Cardiovascular: Regular rhythm and normal heart sounds  Tachycardia present  Pulmonary/Chest: Effort normal and breath sounds normal    Musculoskeletal: He exhibits edema and tenderness  Edema and tenderness of left lower leg   Neurological: He is alert and oriented to person, place, and time     Skin:        Psychiatric: His behavior is normal    Flat affect

## 2018-02-16 PROCEDURE — 93971 EXTREMITY STUDY: CPT | Performed by: SURGERY

## 2018-02-22 ENCOUNTER — OFFICE VISIT (OUTPATIENT)
Dept: FAMILY MEDICINE CLINIC | Facility: CLINIC | Age: 53
End: 2018-02-22
Payer: MEDICARE

## 2018-02-22 VITALS
HEIGHT: 64 IN | BODY MASS INDEX: 44.39 KG/M2 | HEART RATE: 99 BPM | SYSTOLIC BLOOD PRESSURE: 128 MMHG | OXYGEN SATURATION: 98 % | TEMPERATURE: 96.6 F | WEIGHT: 260 LBS | RESPIRATION RATE: 18 BRPM | DIASTOLIC BLOOD PRESSURE: 82 MMHG

## 2018-02-22 DIAGNOSIS — I10 ESSENTIAL HYPERTENSION: ICD-10-CM

## 2018-02-22 DIAGNOSIS — E03.9 ACQUIRED HYPOTHYROIDISM: ICD-10-CM

## 2018-02-22 DIAGNOSIS — L03.116 LEFT LEG CELLULITIS: ICD-10-CM

## 2018-02-22 DIAGNOSIS — IMO0002 TYPE 2 DIABETES, UNCONTROLLED, WITH NEUROPATHY: ICD-10-CM

## 2018-02-22 DIAGNOSIS — E78.00 PURE HYPERCHOLESTEROLEMIA: Primary | ICD-10-CM

## 2018-02-22 PROCEDURE — 99213 OFFICE O/P EST LOW 20 MIN: CPT | Performed by: FAMILY MEDICINE

## 2018-02-22 RX ORDER — LEVOFLOXACIN 500 MG/1
500 TABLET, FILM COATED ORAL EVERY 24 HOURS
Qty: 7 TABLET | Refills: 0 | Status: SHIPPED | OUTPATIENT
Start: 2018-02-22 | End: 2018-02-28

## 2018-02-22 RX ORDER — ATORVASTATIN CALCIUM 20 MG/1
20 TABLET, FILM COATED ORAL DAILY
Qty: 30 TABLET | Refills: 1 | Status: SHIPPED | OUTPATIENT
Start: 2018-02-22 | End: 2019-06-17 | Stop reason: SDUPTHER

## 2018-02-22 RX ORDER — FENOFIBRATE 145 MG/1
145 TABLET, COATED ORAL DAILY
Qty: 30 TABLET | Refills: 1 | Status: SHIPPED | OUTPATIENT
Start: 2018-02-22 | End: 2019-07-10

## 2018-02-22 RX ORDER — PIOGLITAZONEHYDROCHLORIDE 15 MG/1
15 TABLET ORAL DAILY
Qty: 30 TABLET | Refills: 1 | Status: SHIPPED | OUTPATIENT
Start: 2018-02-22 | End: 2019-07-10

## 2018-02-22 NOTE — PROGRESS NOTES
OFFICE VISIT  Amy Pennington 47 y o  male MRN: [id number]      Assessment / Plan:  Diagnoses and all orders for this visit:    Pure hypercholesterolemia  -     fenofibrate (TRICOR) 145 mg tablet; Take 1 tablet (145 mg total) by mouth daily  -     atorvastatin (LIPITOR) 20 mg tablet; Take 1 tablet (20 mg total) by mouth daily    Essential hypertension    Acquired hypothyroidism  -     TSH, 3rd generation with T4 reflex; Future    Type 2 diabetes, uncontrolled, with neuropathy (HCC)  -     pioglitazone (ACTOS) 15 mg tablet; Take 1 tablet (15 mg total) by mouth daily    Left leg cellulitis  -     levofloxacin (LEVAQUIN) 500 mg tablet; Take 1 tablet (500 mg total) by mouth every 24 hours for 6 days    Allergy to clindamycin  Follow up tsh   Reason For Visit / Chief Complaint  Chief Complaint   Patient presents with    Follow-up     He states his feet keep swelling and are real red  HPI:  Amy Pennington is a 47 y o  male presents today for follow up  He was seen in the office one week ago, he reports having swelling into both feet, he had a duplex doppler in which was negative  He reports today the swelling has extending into thigh  He remains with swelling to both feet  He was given bactrim  He has known type 2 diabetes, htn, copd, hypothyroidism, hyperlipemia, uncontrolled  He has increased levothyroxine to 200mcg, his AIC was over 11  He is taking lantus and glucophage, he reports increase thirsts and eating more  He reports not taking his lipitor  He started fish oil       Historical Information   Past Medical History:   Diagnosis Date    Diabetes mellitus ([address] )     Disease of thyroid gland     Hyperlipidemia     Hypertension      Past Surgical History:   Procedure Laterality Date    KNEE SURGERY      SINUS SURGERY       Social History   History   Alcohol Use No     History   Drug Use No     History   Smoking Status    Current Every Day Smoker    Packs/day: 2 00   Smokeless Tobacco    Never Used Family History   Problem Relation Age of Onset    Thyroid cancer Mother     Diabetes type II Mother     Esophageal cancer Father     Diabetes type II Father     Kidney cancer Brother     Diabetes type II Brother     Diabetes type II Maternal Grandmother        Meds/Allergies   Allergies   Allergen Reactions    Ace Inhibitors Swelling     Angioedema    Clindamycin      Had angioedema episode approx 5 hr after IM administration of clindamycin  Unclear if there is definitive causal relationship         Meds:    Current Outpatient Prescriptions:     albuterol (2 5 mg/3 mL) 0 083 % nebulizer solution, Inhale 1 each 4 (four) times a day as needed, Disp: , Rfl:     atorvastatin (LIPITOR) 20 mg tablet, Take 1 tablet (20 mg total) by mouth daily, Disp: 30 tablet, Rfl: 1    DULoxetine (CYMBALTA) 60 mg delayed release capsule, Take 1 capsule by mouth daily, Disp: , Rfl:     fenofibrate (TRICOR) 145 mg tablet, Take 1 tablet (145 mg total) by mouth daily, Disp: 30 tablet, Rfl: 1    fluticasone-salmeterol (ADVAIR DISKUS) 250-50 mcg/dose inhaler, Inhale 1 puff 2 (two) times a day, Disp: , Rfl:     gabapentin (NEURONTIN) 300 mg capsule, Take 300 mg by mouth 3 (three) times a day, Disp: , Rfl:     hydrochlorothiazide (HYDRODIURIL) 25 mg tablet, Take 25 mg by mouth daily, Disp: , Rfl:     insulin glargine (LANTUS) 100 units/mL subcutaneous injection, Inject 20 Units under the skin daily at bedtime, Disp: , Rfl:     Insulin Pen Needle (PEN NEEDLES 3/16") 31G X 5 MM MISC, by Does not apply route, Disp: , Rfl:     levofloxacin (LEVAQUIN) 500 mg tablet, Take 1 tablet (500 mg total) by mouth every 24 hours for 6 days, Disp: 7 tablet, Rfl: 0    levothyroxine 200 mcg tablet, Take 1 tablet (200 mcg total) by mouth daily before breakfast, Disp: 30 tablet, Rfl: 1    metFORMIN (GLUCOPHAGE) 1000 MG tablet, Take 1,000 mg by mouth 2 (two) times a day, Disp: , Rfl:     omega-3-acid ethyl esters (LOVAZA) 1 g capsule, Take 1 capsule (1 g total) by mouth 2 (two) times a day for high cholesterol, Disp: 60 capsule, Rfl: 2    pioglitazone (ACTOS) 15 mg tablet, Take 1 tablet (15 mg total) by mouth daily, Disp: 30 tablet, Rfl: 1    pregabalin (LYRICA) 75 mg capsule, Take 75 mg by mouth 2 (two) times a day, Disp: , Rfl:     sitaGLIPtin (JANUVIA) 50 mg tablet, Take 1 tablet by mouth daily, Disp: , Rfl:     sulfamethoxazole-trimethoprim (BACTRIM DS) 800-160 mg per tablet, Take 1 tablet by mouth every 12 (twelve) hours for 10 days With food, Disp: 20 tablet, Rfl: 0    traZODone (DESYREL) 150 mg tablet, Take 150 mg by mouth daily at bedtime, Disp: , Rfl:     VENTOLIN  (90 Base) MCG/ACT inhaler, Take 2 puffs by mouth every 4 (four) hours as needed, Disp: , Rfl:       REVIEW OF SYSTEMS  A comprehensive review of systems was negative except for: Constitutional: positive for fatigue  Respiratory: positive for Symptoms; Respiratory w/o Neg: dyspnea on exertion and wheezing  Cardiovascular: positive for lower extremity edema  Integument/breast: positive for skin color change  Musculoskeletal: positive for  muscle pain and trouble walking      Current Vitals:   Blood Pressure: 128/82 (02/22/18 0904)  Pulse: 99 (02/22/18 0904)  Temperature: (!) 96 6 °F (35 9 °C) (02/22/18 0904)  Respirations: 18 (02/22/18 0904)  Height: 5' 4" (162 6 cm) (02/22/18 0904)  Weight - Scale: 118 kg (260 lb) (02/22/18 0904)  SpO2: 98 % (02/22/18 0904)  [unfilled]    PHYSICAL EXAMS:  General appearance: alert and morbidly obese  Lungs: diminished breath sounds  Heart: regular rate and rhythm, S1, S2 normal, no murmur, click, rub or gallop  Extremities: extremities normal, warm and well-perfused; no cyanosis, clubbing, or edema and left lower extremity hard, red, warm, edematous, extending from foot to knee, no open areas     Pulses: 2+ and symmetric  Skin: erythema - knee(s) left, lower leg(s) left{YES/NO:20        Follow up at this office in 5 days     Counseling / Coordination of Care  Total floor / unit time spent today 25 minutes  Greater than 50% of total time was spent with the patient and / or family counseling and / or coordination of care

## 2018-02-27 ENCOUNTER — OFFICE VISIT (OUTPATIENT)
Dept: FAMILY MEDICINE CLINIC | Facility: CLINIC | Age: 53
End: 2018-02-27
Payer: MEDICARE

## 2018-02-27 VITALS
HEART RATE: 88 BPM | OXYGEN SATURATION: 96 % | BODY MASS INDEX: 44.22 KG/M2 | DIASTOLIC BLOOD PRESSURE: 84 MMHG | HEIGHT: 64 IN | WEIGHT: 259 LBS | RESPIRATION RATE: 20 BRPM | SYSTOLIC BLOOD PRESSURE: 120 MMHG | TEMPERATURE: 96.9 F

## 2018-02-27 DIAGNOSIS — L03.116 LEFT LEG CELLULITIS: Primary | ICD-10-CM

## 2018-02-27 PROCEDURE — 96372 THER/PROPH/DIAG INJ SC/IM: CPT | Performed by: FAMILY MEDICINE

## 2018-02-27 PROCEDURE — 99213 OFFICE O/P EST LOW 20 MIN: CPT | Performed by: FAMILY MEDICINE

## 2018-02-27 RX ORDER — HYDROCHLOROTHIAZIDE 25 MG/1
25 TABLET ORAL 2 TIMES DAILY
Qty: 60 TABLET | Refills: 0 | Status: SHIPPED | OUTPATIENT
Start: 2018-02-27 | End: 2018-04-16 | Stop reason: SDUPTHER

## 2018-02-27 RX ORDER — CEFTRIAXONE 1 G/1
1000 INJECTION, POWDER, FOR SOLUTION INTRAMUSCULAR; INTRAVENOUS EVERY 24 HOURS
Status: DISCONTINUED | OUTPATIENT
Start: 2018-02-27 | End: 2018-02-27

## 2018-02-27 RX ORDER — AMOXICILLIN AND CLAVULANATE POTASSIUM 875; 125 MG/1; MG/1
1 TABLET, FILM COATED ORAL EVERY 12 HOURS SCHEDULED
Qty: 20 TABLET | Refills: 0 | Status: SHIPPED | OUTPATIENT
Start: 2018-02-27 | End: 2018-03-03 | Stop reason: HOSPADM

## 2018-02-27 RX ADMIN — CEFTRIAXONE 1000 MG: 1 INJECTION, POWDER, FOR SOLUTION INTRAMUSCULAR; INTRAVENOUS at 10:39

## 2018-02-27 NOTE — PROGRESS NOTES
OFFICE VISIT  Andrés Wheat 47 y o  male MRN: [id number]      Assessment / Plan:  Diagnoses and all orders for this visit:    Left leg cellulitis  -     hydrochlorothiazide (HYDRODIURIL) 25 mg tablet; Take 1 tablet (25 mg total) by mouth 2 (two) times a day  -     amoxicillin-clavulanate (AUGMENTIN) 875-125 mg per tablet; Take 1 tablet by mouth every 12 (twelve) hours for 10 days          Reason For Visit / Chief Complaint  Chief Complaint   Patient presents with    Cellulitis    Foot Swelling        HPI:  Andrés Wheat is a 47 y o  male presents today for follow up from cellulitis, he was started on levaquin after failed treatment of bactrim  Redness to ankle and thigh with some improvement, less tight, no further extension of redness  No fevers  Known allergy to clindamycin  He reports having no money and does not want to pay ED co pay     Historical Information   Past Medical History:   Diagnosis Date    Diabetes mellitus ([address] )     Disease of thyroid gland     Hyperlipidemia     Hypertension      Past Surgical History:   Procedure Laterality Date    KNEE SURGERY      SINUS SURGERY       Social History   History   Alcohol Use No     History   Drug Use No     History   Smoking Status    Current Every Day Smoker    Packs/day: 2 00   Smokeless Tobacco    Never Used     Family History   Problem Relation Age of Onset    Thyroid cancer Mother     Diabetes type II Mother     Esophageal cancer Father     Diabetes type II Father     Kidney cancer Brother     Diabetes type II Brother     Diabetes type II Maternal Grandmother        Meds/Allergies   Allergies   Allergen Reactions    Ace Inhibitors Swelling     Angioedema    Clindamycin      Had angioedema episode approx 5 hr after IM administration of clindamycin  Unclear if there is definitive causal relationship         Meds:    Current Outpatient Prescriptions:     albuterol (2 5 mg/3 mL) 0 083 % nebulizer solution, Inhale 1 each 4 (four) times a day as needed, Disp: , Rfl:     atorvastatin (LIPITOR) 20 mg tablet, Take 1 tablet (20 mg total) by mouth daily, Disp: 30 tablet, Rfl: 1    fenofibrate (TRICOR) 145 mg tablet, Take 1 tablet (145 mg total) by mouth daily, Disp: 30 tablet, Rfl: 1    fluticasone-salmeterol (ADVAIR DISKUS) 250-50 mcg/dose inhaler, Inhale 1 puff 2 (two) times a day, Disp: , Rfl:     gabapentin (NEURONTIN) 300 mg capsule, Take 300 mg by mouth 3 (three) times a day, Disp: , Rfl:     hydrochlorothiazide (HYDRODIURIL) 25 mg tablet, Take 1 tablet (25 mg total) by mouth 2 (two) times a day, Disp: 60 tablet, Rfl: 0    insulin glargine (LANTUS) 100 units/mL subcutaneous injection, Inject 20 Units under the skin daily at bedtime, Disp: , Rfl:     Insulin Pen Needle (PEN NEEDLES 3/16") 31G X 5 MM MISC, by Does not apply route, Disp: , Rfl:     levofloxacin (LEVAQUIN) 500 mg tablet, Take 1 tablet (500 mg total) by mouth every 24 hours for 6 days, Disp: 7 tablet, Rfl: 0    levothyroxine 200 mcg tablet, Take 1 tablet (200 mcg total) by mouth daily before breakfast, Disp: 30 tablet, Rfl: 1    metFORMIN (GLUCOPHAGE) 1000 MG tablet, Take 1,000 mg by mouth 2 (two) times a day, Disp: , Rfl:     omega-3-acid ethyl esters (LOVAZA) 1 g capsule, Take 1 capsule (1 g total) by mouth 2 (two) times a day for high cholesterol, Disp: 60 capsule, Rfl: 2    pioglitazone (ACTOS) 15 mg tablet, Take 1 tablet (15 mg total) by mouth daily, Disp: 30 tablet, Rfl: 1    pregabalin (LYRICA) 75 mg capsule, Take 75 mg by mouth 2 (two) times a day, Disp: , Rfl:     traZODone (DESYREL) 150 mg tablet, Take 150 mg by mouth daily at bedtime, Disp: , Rfl:     VENTOLIN  (90 Base) MCG/ACT inhaler, Take 2 puffs by mouth every 4 (four) hours as needed, Disp: , Rfl:     amoxicillin-clavulanate (AUGMENTIN) 875-125 mg per tablet, Take 1 tablet by mouth every 12 (twelve) hours for 10 days, Disp: 20 tablet, Rfl: 0      REVIEW OF SYSTEMS  A comprehensive review of systems was negative except for: Integument/breast: positive for skin color change      Current Vitals:   Blood Pressure: 120/84 (02/27/18 0921)  Pulse: 88 (02/27/18 0921)  Temperature: (!) 96 9 °F (36 1 °C) (02/27/18 0921)  Temp Source: Tympanic (02/27/18 0921)  Respirations: 20 (02/27/18 0921)  Height: 5' 4" (162 6 cm) (02/27/18 0921)  Weight - Scale: 117 kg (259 lb) (02/27/18 0921)  SpO2: 96 % (02/27/18 0921)  [unfilled]    PHYSICAL EXAMS:  General appearance: alert and oriented, in no acute distress  Lungs: clear to auscultation bilaterally  Heart: regular rate and rhythm, S1, S2 normal, no murmur, click, rub or gallop  Extremities: extremities normal, warm and well-perfused; no cyanosis, clubbing, or edema and edema, +1  less tom in color  Skin: Skin color, texture, turgor normal  No rashes or lesions or reddness{YES/NO:20        Follow up at this office in Friday     Counseling / Coordination of Care  Total floor / unit time spent today 20 minutes  Greater than 50% of total time was spent with the patient and / or family counseling and / or coordination of care

## 2018-03-02 ENCOUNTER — APPOINTMENT (EMERGENCY)
Dept: RADIOLOGY | Facility: HOSPITAL | Age: 53
DRG: 603 | End: 2018-03-02
Payer: MEDICARE

## 2018-03-02 ENCOUNTER — APPOINTMENT (EMERGENCY)
Dept: ULTRASOUND IMAGING | Facility: HOSPITAL | Age: 53
DRG: 603 | End: 2018-03-02
Payer: MEDICARE

## 2018-03-02 ENCOUNTER — APPOINTMENT (INPATIENT)
Dept: CT IMAGING | Facility: HOSPITAL | Age: 53
DRG: 603 | End: 2018-03-02
Payer: MEDICARE

## 2018-03-02 ENCOUNTER — HOSPITAL ENCOUNTER (INPATIENT)
Facility: HOSPITAL | Age: 53
LOS: 1 days | Discharge: HOME | DRG: 603 | End: 2018-03-03
Attending: INTERNAL MEDICINE | Admitting: INTERNAL MEDICINE
Payer: MEDICARE

## 2018-03-02 DIAGNOSIS — R79.89 ELEVATED LFTS: ICD-10-CM

## 2018-03-02 DIAGNOSIS — R79.89 ELEVATED LACTIC ACID LEVEL: ICD-10-CM

## 2018-03-02 DIAGNOSIS — L03.116 CELLULITIS OF LEFT LOWER LEG: Primary | ICD-10-CM

## 2018-03-02 PROBLEM — Z72.0 TOBACCO ABUSE: Status: ACTIVE | Noted: 2018-03-02

## 2018-03-02 LAB
ALBUMIN SERPL BCP-MCNC: 3.5 G/DL (ref 3.5–5)
ALP SERPL-CCNC: 92 U/L (ref 46–116)
ALT SERPL W P-5'-P-CCNC: 83 U/L (ref 12–78)
ANION GAP SERPL CALCULATED.3IONS-SCNC: 9 MMOL/L (ref 4–13)
APTT PPP: 25 SECONDS (ref 23–35)
AST SERPL W P-5'-P-CCNC: 56 U/L (ref 5–45)
ATRIAL RATE: 97 BPM
BASOPHILS # BLD AUTO: 0.03 THOUSANDS/ΜL (ref 0–0.1)
BASOPHILS NFR BLD AUTO: 0 % (ref 0–1)
BILIRUB SERPL-MCNC: 0.4 MG/DL (ref 0.2–1)
BUN SERPL-MCNC: 16 MG/DL (ref 5–25)
CALCIUM SERPL-MCNC: 9.3 MG/DL (ref 8.3–10.1)
CHLORIDE SERPL-SCNC: 93 MMOL/L (ref 100–108)
CO2 SERPL-SCNC: 32 MMOL/L (ref 21–32)
CREAT SERPL-MCNC: 1.09 MG/DL (ref 0.6–1.3)
EOSINOPHIL # BLD AUTO: 0.13 THOUSAND/ΜL (ref 0–0.61)
EOSINOPHIL NFR BLD AUTO: 2 % (ref 0–6)
ERYTHROCYTE [DISTWIDTH] IN BLOOD BY AUTOMATED COUNT: 13.2 % (ref 11.6–15.1)
EST. AVERAGE GLUCOSE BLD GHB EST-MCNC: 272 MG/DL
GFR SERPL CREATININE-BSD FRML MDRD: 77 ML/MIN/1.73SQ M
GLUCOSE SERPL-MCNC: 145 MG/DL (ref 65–140)
GLUCOSE SERPL-MCNC: 175 MG/DL (ref 65–140)
GLUCOSE SERPL-MCNC: 249 MG/DL (ref 65–140)
HBA1C MFR BLD: 11.1 % (ref 4.2–6.3)
HCT VFR BLD AUTO: 43.1 % (ref 36.5–49.3)
HGB BLD-MCNC: 15.3 G/DL (ref 12–17)
INR PPP: 0.86 (ref 0.86–1.16)
LACTATE SERPL-SCNC: 1.8 MMOL/L (ref 0.5–2)
LACTATE SERPL-SCNC: 2.7 MMOL/L (ref 0.5–2)
LYMPHOCYTES # BLD AUTO: 2.26 THOUSANDS/ΜL (ref 0.6–4.47)
LYMPHOCYTES NFR BLD AUTO: 28 % (ref 14–44)
MCH RBC QN AUTO: 29.9 PG (ref 26.8–34.3)
MCHC RBC AUTO-ENTMCNC: 35.5 G/DL (ref 31.4–37.4)
MCV RBC AUTO: 84 FL (ref 82–98)
MONOCYTES # BLD AUTO: 0.99 THOUSAND/ΜL (ref 0.17–1.22)
MONOCYTES NFR BLD AUTO: 12 % (ref 4–12)
NEUTROPHILS # BLD AUTO: 4.71 THOUSANDS/ΜL (ref 1.85–7.62)
NEUTS SEG NFR BLD AUTO: 58 % (ref 43–75)
NT-PROBNP SERPL-MCNC: 24 PG/ML
P AXIS: 46 DEGREES
PLATELET # BLD AUTO: 263 THOUSANDS/UL (ref 149–390)
PLATELET # BLD AUTO: 275 THOUSANDS/UL (ref 149–390)
PMV BLD AUTO: 9.6 FL (ref 8.9–12.7)
PMV BLD AUTO: 9.7 FL (ref 8.9–12.7)
POTASSIUM SERPL-SCNC: 3.9 MMOL/L (ref 3.5–5.3)
PR INTERVAL: 160 MS
PROT SERPL-MCNC: 7.7 G/DL (ref 6.4–8.2)
PROTHROMBIN TIME: 11.6 SECONDS (ref 12.1–14.4)
QRS AXIS: 21 DEGREES
QRSD INTERVAL: 82 MS
QT INTERVAL: 344 MS
QTC INTERVAL: 436 MS
RBC # BLD AUTO: 5.12 MILLION/UL (ref 3.88–5.62)
SODIUM SERPL-SCNC: 134 MMOL/L (ref 136–145)
T WAVE AXIS: 38 DEGREES
TROPONIN I SERPL-MCNC: <0.02 NG/ML
TSH SERPL DL<=0.05 MIU/L-ACNC: 2.58 UIU/ML (ref 0.36–3.74)
VENTRICULAR RATE: 97 BPM
WBC # BLD AUTO: 8.12 THOUSAND/UL (ref 4.31–10.16)

## 2018-03-02 PROCEDURE — 85025 COMPLETE CBC W/AUTO DIFF WBC: CPT | Performed by: PHYSICIAN ASSISTANT

## 2018-03-02 PROCEDURE — 80053 COMPREHEN METABOLIC PANEL: CPT | Performed by: PHYSICIAN ASSISTANT

## 2018-03-02 PROCEDURE — 93010 ELECTROCARDIOGRAM REPORT: CPT | Performed by: INTERNAL MEDICINE

## 2018-03-02 PROCEDURE — 87040 BLOOD CULTURE FOR BACTERIA: CPT | Performed by: PHYSICIAN ASSISTANT

## 2018-03-02 PROCEDURE — 99285 EMERGENCY DEPT VISIT HI MDM: CPT

## 2018-03-02 PROCEDURE — 96365 THER/PROPH/DIAG IV INF INIT: CPT

## 2018-03-02 PROCEDURE — 93971 EXTREMITY STUDY: CPT | Performed by: SURGERY

## 2018-03-02 PROCEDURE — 84484 ASSAY OF TROPONIN QUANT: CPT | Performed by: PHYSICIAN ASSISTANT

## 2018-03-02 PROCEDURE — 83605 ASSAY OF LACTIC ACID: CPT | Performed by: PHYSICIAN ASSISTANT

## 2018-03-02 PROCEDURE — 85730 THROMBOPLASTIN TIME PARTIAL: CPT | Performed by: PHYSICIAN ASSISTANT

## 2018-03-02 PROCEDURE — 85049 AUTOMATED PLATELET COUNT: CPT | Performed by: PHYSICIAN ASSISTANT

## 2018-03-02 PROCEDURE — 99223 1ST HOSP IP/OBS HIGH 75: CPT | Performed by: INTERNAL MEDICINE

## 2018-03-02 PROCEDURE — 71046 X-RAY EXAM CHEST 2 VIEWS: CPT

## 2018-03-02 PROCEDURE — 83880 ASSAY OF NATRIURETIC PEPTIDE: CPT | Performed by: PHYSICIAN ASSISTANT

## 2018-03-02 PROCEDURE — 96361 HYDRATE IV INFUSION ADD-ON: CPT

## 2018-03-02 PROCEDURE — 93971 EXTREMITY STUDY: CPT

## 2018-03-02 PROCEDURE — 73701 CT LOWER EXTREMITY W/DYE: CPT

## 2018-03-02 PROCEDURE — 93005 ELECTROCARDIOGRAM TRACING: CPT | Performed by: PHYSICIAN ASSISTANT

## 2018-03-02 PROCEDURE — 83036 HEMOGLOBIN GLYCOSYLATED A1C: CPT | Performed by: PHYSICIAN ASSISTANT

## 2018-03-02 PROCEDURE — 84443 ASSAY THYROID STIM HORMONE: CPT | Performed by: PHYSICIAN ASSISTANT

## 2018-03-02 PROCEDURE — 85610 PROTHROMBIN TIME: CPT | Performed by: PHYSICIAN ASSISTANT

## 2018-03-02 PROCEDURE — 94664 DEMO&/EVAL PT USE INHALER: CPT

## 2018-03-02 PROCEDURE — 82948 REAGENT STRIP/BLOOD GLUCOSE: CPT

## 2018-03-02 PROCEDURE — 36415 COLL VENOUS BLD VENIPUNCTURE: CPT | Performed by: PHYSICIAN ASSISTANT

## 2018-03-02 RX ORDER — ONDANSETRON 2 MG/ML
4 INJECTION INTRAMUSCULAR; INTRAVENOUS EVERY 6 HOURS PRN
Status: DISCONTINUED | OUTPATIENT
Start: 2018-03-02 | End: 2018-03-03 | Stop reason: HOSPADM

## 2018-03-02 RX ORDER — ALBUTEROL SULFATE 2.5 MG/3ML
2.5 SOLUTION RESPIRATORY (INHALATION) EVERY 6 HOURS PRN
Status: DISCONTINUED | OUTPATIENT
Start: 2018-03-02 | End: 2018-03-03 | Stop reason: HOSPADM

## 2018-03-02 RX ORDER — LEVOTHYROXINE SODIUM 0.1 MG/1
200 TABLET ORAL
Status: DISCONTINUED | OUTPATIENT
Start: 2018-03-03 | End: 2018-03-03 | Stop reason: HOSPADM

## 2018-03-02 RX ORDER — HYDROCHLOROTHIAZIDE 25 MG/1
25 TABLET ORAL 2 TIMES DAILY
Status: DISCONTINUED | OUTPATIENT
Start: 2018-03-02 | End: 2018-03-03 | Stop reason: HOSPADM

## 2018-03-02 RX ORDER — TRAZODONE HYDROCHLORIDE 50 MG/1
150 TABLET ORAL
Status: DISCONTINUED | OUTPATIENT
Start: 2018-03-02 | End: 2018-03-03 | Stop reason: HOSPADM

## 2018-03-02 RX ORDER — ATORVASTATIN CALCIUM 10 MG/1
20 TABLET, FILM COATED ORAL EVERY EVENING
Status: DISCONTINUED | OUTPATIENT
Start: 2018-03-02 | End: 2018-03-03 | Stop reason: HOSPADM

## 2018-03-02 RX ORDER — GABAPENTIN 300 MG/1
300 CAPSULE ORAL 3 TIMES DAILY
Status: DISCONTINUED | OUTPATIENT
Start: 2018-03-02 | End: 2018-03-03 | Stop reason: HOSPADM

## 2018-03-02 RX ORDER — PREGABALIN 75 MG/1
75 CAPSULE ORAL 2 TIMES DAILY
Status: DISCONTINUED | OUTPATIENT
Start: 2018-03-02 | End: 2018-03-02

## 2018-03-02 RX ORDER — SODIUM CHLORIDE 9 MG/ML
75 INJECTION, SOLUTION INTRAVENOUS CONTINUOUS
Status: DISCONTINUED | OUTPATIENT
Start: 2018-03-02 | End: 2018-03-03 | Stop reason: HOSPADM

## 2018-03-02 RX ORDER — NICOTINE 21 MG/24HR
21 PATCH, TRANSDERMAL 24 HOURS TRANSDERMAL ONCE
Status: COMPLETED | OUTPATIENT
Start: 2018-03-02 | End: 2018-03-03

## 2018-03-02 RX ORDER — INSULIN GLARGINE 100 [IU]/ML
20 INJECTION, SOLUTION SUBCUTANEOUS
Status: DISCONTINUED | OUTPATIENT
Start: 2018-03-02 | End: 2018-03-03 | Stop reason: HOSPADM

## 2018-03-02 RX ORDER — NICOTINE 21 MG/24HR
1 PATCH, TRANSDERMAL 24 HOURS TRANSDERMAL EVERY 24 HOURS
Status: DISCONTINUED | OUTPATIENT
Start: 2018-03-03 | End: 2018-03-03 | Stop reason: HOSPADM

## 2018-03-02 RX ORDER — FENOFIBRATE 145 MG/1
145 TABLET, COATED ORAL DAILY
Status: DISCONTINUED | OUTPATIENT
Start: 2018-03-03 | End: 2018-03-03 | Stop reason: HOSPADM

## 2018-03-02 RX ADMIN — CEFEPIME HYDROCHLORIDE 2000 MG: 2 INJECTION, SOLUTION INTRAVENOUS at 15:45

## 2018-03-02 RX ADMIN — GABAPENTIN 300 MG: 300 CAPSULE ORAL at 17:22

## 2018-03-02 RX ADMIN — VANCOMYCIN HYDROCHLORIDE 1750 MG: 1 INJECTION, POWDER, LYOPHILIZED, FOR SOLUTION INTRAVENOUS at 13:17

## 2018-03-02 RX ADMIN — SODIUM CHLORIDE 2540 ML: 0.9 INJECTION, SOLUTION INTRAVENOUS at 15:45

## 2018-03-02 RX ADMIN — INSULIN GLARGINE 20 UNITS: 100 INJECTION, SOLUTION SUBCUTANEOUS at 21:42

## 2018-03-02 RX ADMIN — SODIUM CHLORIDE 1000 ML: 0.9 INJECTION, SOLUTION INTRAVENOUS at 12:25

## 2018-03-02 RX ADMIN — TRAZODONE HYDROCHLORIDE 150 MG: 50 TABLET ORAL at 21:37

## 2018-03-02 RX ADMIN — ATORVASTATIN CALCIUM 20 MG: 10 TABLET, FILM COATED ORAL at 17:21

## 2018-03-02 RX ADMIN — INSULIN LISPRO 1 UNITS: 100 INJECTION, SOLUTION INTRAVENOUS; SUBCUTANEOUS at 21:40

## 2018-03-02 RX ADMIN — NICOTINE 21 MG: 21 PATCH, EXTENDED RELEASE TRANSDERMAL at 12:25

## 2018-03-02 RX ADMIN — IOHEXOL 100 ML: 350 INJECTION, SOLUTION INTRAVENOUS at 15:38

## 2018-03-02 RX ADMIN — HYDROCHLOROTHIAZIDE 25 MG: 25 TABLET ORAL at 17:21

## 2018-03-02 RX ADMIN — SODIUM CHLORIDE 75 ML/HR: 0.9 INJECTION, SOLUTION INTRAVENOUS at 17:27

## 2018-03-02 NOTE — SEPSIS NOTE
Sepsis Note   Leandro Medellin 47 y o  male MRN: [id number]  Unit/Bed#: RM02 Encounter: [id number]            Initial Sepsis Screening     [address] Name 03/02/18 1255                Is the patient's history suggestive of a new or worsening infection? (!)  Yes (Proceed)  -TB        Suspected source of infection soft tissue  -TB        Are two or more of the following signs & symptoms of infection both present and new to the patient? No  -TB        Indicate SIRS criteria Tachycardia > 90 bpm  -TB        If the answer is yes to both questions, suspicion of sepsis is present          If severe sepsis is present AND tissue hypoperfusion perists in the hour after fluid resuscitation or lactate > 4, the patient meets criteria for SEPTIC SHOCK          Are any of the following organ dysfunction criteria present within 6 hours of suspected infection and SIRS criteria that are NOT considered to be chronic conditions? (!)  Yes  -TB        Organ dysfunction Lactate > 2 0 mmol/L  -TB        Date of presentation of severe sepsis          Time of presentation of severe sepsis          Tissue hypoperfusion persists in the hour after crystalloid fluid administration, evidenced, by either:          Was hypotension present within one hour of the conclusion of crystalloid fluid administration? No  -TB        Date of presentation of septic shock          Time of presentation of septic shock            User Key  (r) = Recorded By, (t) = Taken By, (c) = Cosigned By    Initials Name Provider Type    TB Timi Emanuel PA-C Physician Assistant            Pt with known source- LLE cellulitis  SIRS: tachycardia only (may be chronic), no leukocytosis or other sirs criteria to meet *sepsis*  Pt does have L A  2 7  Will tx and monitor closely as this likely represents early sepsis  Broaden tx per SLIM to include cefipime and 30 ml/kg bolus ivf

## 2018-03-02 NOTE — H&P
History and Physical - [facility] Internal Medicine    Patient Information: Kristal Asencio 47 y o  male MRN: [id number]  Unit/Bed#: 173-74 Encounter: [id number]  Admitting Physician: Rubens Berry PA-C  PCP: Radha Damian  Date of Admission:  03/02/18    Assessment/Plan:    Hospital Problem List:     Principal Problem:    Cellulitis of left lower leg  Active Problems:    COPD, mild ([facility] )    Hypertension    Hypothyroidism    Morbid obesity ([facility] [facility] )    Type 2 diabetes, uncontrolled, with neuropathy (HCC)    Elevated LFTs    Elevated lactic acid level    Tobacco abuse      Plan for the Primary Problem(s):  · Cellulitis of left lower extremity  · The patient has been admitted to med/surg floor  Patient was give IV Cefepime and IV Vancomycin in the ER  Will continue IV Vancomycin given allergy to clindamycin  CT tibia fibula showed diffuse soft tissue swelling about the tibia/fibula without evidence of a fluid collection  No evidence of bony erosion or destruction to suggest osteomyelitis  Repeat labs in am  IV fluids  Plan for Additional Problems:   · Lactic acidosis: the patient was noted to have a lactic acid of 2 7 which resolved with IV fluid  Repeat lactic acid was 1 8  · Elevated LFT's: mildly elevated  Check acute hepatitis panel  Continue to monitor  · Hypothyroidism: check TSH  Continue levothyroxine  · DM type 2: check hemoglobin A1C  Hold metformin and Actos  Continue Lantus 20 units QHS  Insulin sliding scale  · Hypertension: continue HCTZ  · COPD without acute exacerbation: continue Advair  Respiratory protocol  · Tobacco abuse: nicotine patch  Smoking cessation counseling      VTE Prophylaxis: Enoxaparin (Lovenox)  / sequential compression device right leg only  Code Status: full code  POLST: There is no POLST form on file for this patient (pre-hospital)    Anticipated Length of Stay:  Patient will be admitted on an Inpatient basis with an anticipated length of stay of  Greater than 2 midnights  Justification for Hospital Stay: treatment of cellulitis after falling outpatient treatment    Total Time for Visit, including Counseling / Coordination of Care: 45 minutes  Greater than 50% of this total time spent on direct patient counseling and coordination of care  Chief Complaint:   Redness and swelling left leg    History of Present Illness:    Andrés Wheat is a 47 y o  male who presents with a complaint of redness, swelling and pain of left leg  The patient states this has been going on for the last 2 weeks  He's been seen by his PCP and has been on 4 different antibiotics and continues to have redness, swelling and pain  He states he was first started on Bactrim on 2/13/18, then Levaquin on 2/22/18, then given a shot of Ceftriaxone of 2/27/18 and started on Augmentin on 2/27/18  He presented to the ER today since he continues to have pain  He was noted to have a lactic acid of 2 7  Venous duplex negative for DVT  CT negative for abscess collection  He denies fevers/chills  He denies injury or trauma to leg  He denies history of cellulitis  He denies chest pain, abdominal pain, N/V/D  Review of Systems:    Review of Systems   Constitutional: Negative  HENT: Negative  Eyes: Negative  Respiratory: Negative  Cardiovascular: Positive for leg swelling  Gastrointestinal: Negative  Endocrine: Negative  Genitourinary: Negative  Musculoskeletal: Negative  Skin: Positive for color change  Allergic/Immunologic: Negative  Neurological: Negative  Hematological: Negative  Psychiatric/Behavioral: Negative          Past Medical and Surgical History:     Past Medical History:   Diagnosis Date    Diabetes mellitus ([address] )     Disease of thyroid gland     Hyperlipidemia     Hypertension        Past Surgical History:   Procedure Laterality Date    KNEE SURGERY      SINUS SURGERY         Meds/Allergies:    Prior to Admission medications    Medication Sig Start Date End Date Taking?  Authorizing Provider   albuterol (2 5 mg/3 mL) 0 083 % nebulizer solution Inhale 1 each 4 (four) times a day as needed 10/24/17   Historical Provider, MD   amoxicillin-clavulanate (AUGMENTIN) 875-125 mg per tablet Take 1 tablet by mouth every 12 (twelve) hours for 10 days 2/27/18 3/9/18  MARLEEN Benjamin   atorvastatin (LIPITOR) 20 mg tablet Take 1 tablet (20 mg total) by mouth daily 2/22/18   MARLEEN Benjamin   fenofibrate (TRICOR) 145 mg tablet Take 1 tablet (145 mg total) by mouth daily 2/22/18   MARLEEN Benjamin   fluticasone-salmeterol (ADVAIR DISKUS) 250-50 mcg/dose inhaler Inhale 1 puff 2 (two) times a day 2/7/17   Historical Provider, MD   gabapentin (NEURONTIN) 300 mg capsule Take 300 mg by mouth 3 (three) times a day 6/24/15   Historical Provider, MD   hydrochlorothiazide (HYDRODIURIL) 25 mg tablet Take 1 tablet (25 mg total) by mouth 2 (two) times a day 2/27/18   MARLEEN Benjamin   insulin glargine (LANTUS) 100 units/mL subcutaneous injection Inject 35 Units under the skin daily at bedtime   1/2/15   Historical Provider, MD   Insulin Pen Needle (PEN NEEDLES 3/16") 31G X 5 MM MISC by Does not apply route 6/24/15   Historical Provider, MD   levothyroxine 200 mcg tablet Take 1 tablet (200 mcg total) by mouth daily before breakfast 2/13/18   Lara Chin PA-C   metFORMIN (GLUCOPHAGE) 1000 MG tablet Take 1,000 mg by mouth 2 (two) times a day    Historical Provider, MD   omega-3-acid ethyl esters (LOVAZA) 1 g capsule Take 1 capsule (1 g total) by mouth 2 (two) times a day for high cholesterol 2/13/18   Lara Chin PA-C   pioglitazone (ACTOS) 15 mg tablet Take 1 tablet (15 mg total) by mouth daily 2/22/18   MARLEEN Benjamin   pregabalin (LYRICA) 75 mg capsule Take 75 mg by mouth 2 (two) times a day 9/8/16   Historical Provider, MD   traZODone (DESYREL) 150 mg tablet Take 150 mg by mouth daily at bedtime 1/2/15   Historical Provider, MD   VENTOLIN  (90 Base) MCG/ACT inhaler Take 2 puffs by mouth every 4 (four) hours as needed 1/16/18   Historical Provider, MD ARVIZU have reviewed home medications with patient personally  Allergies: Allergies   Allergen Reactions    Ace Inhibitors Swelling     Angioedema    Clindamycin      Had angioedema episode approx 5 hr after IM administration of clindamycin  Unclear if there is definitive causal relationship  Social History:     Marital Status: Single   Occupation: unknown  Patient Pre-hospital Living Situation: lives at home   Patient Pre-hospital Level of Mobility: independent  Patient Pre-hospital Diet Restrictions: diabetic  Substance Use History:   History   Alcohol Use No     History   Smoking Status    Current Every Day Smoker    Packs/day: 2 00   Smokeless Tobacco    Never Used     History   Drug Use No       Family History:    non-contributory    Physical Exam:     Vitals:   Blood Pressure: 143/93 (03/02/18 1620)  Pulse: 95 (03/02/18 1620)  Temperature: 98 °F (36 7 °C) (03/02/18 1620)  Temp Source: Temporal (03/02/18 1620)  Respirations: 20 (03/02/18 1620)  Height: 5' 4" (162 6 cm) (03/02/18 1620)  Weight - Scale: 117 kg (257 lb 15 oz) (03/02/18 1620)  SpO2: 95 % (03/02/18 1620)    Physical Exam   Constitutional: He is oriented to person, place, and time  Vital signs are normal  He appears well-developed and well-nourished  He is active and cooperative  HENT:   Head: Normocephalic and atraumatic  Cardiovascular: Normal rate, regular rhythm and normal heart sounds  Pulmonary/Chest: Effort normal and breath sounds normal  He has no wheezes  He has no rhonchi  He has no rales  Abdominal: Soft  Normal appearance and bowel sounds are normal  There is no tenderness  Neurological: He is alert and oriented to person, place, and time  No cranial nerve deficit  Skin:   Erythema noted on left lower leg from below knee down to ankle  Tenderness to palpation  Edema noted      Psychiatric: He has a normal mood and affect  His speech is normal and behavior is normal  Thought content normal  Cognition and memory are normal    Nursing note and vitals reviewed  Additional Data:     Lab Results: I have personally reviewed pertinent reports  Results from last 7 days  Lab Units 03/02/18  1126   WBC Thousand/uL 8 12   HEMOGLOBIN g/dL 15 3   HEMATOCRIT % 43 1   PLATELETS Thousands/uL 263   NEUTROS PCT % 58   LYMPHS PCT % 28   MONOS PCT % 12   EOS PCT % 2       Results from last 7 days  Lab Units 03/02/18  1126   SODIUM mmol/L 134*   POTASSIUM mmol/L 3 9   CHLORIDE mmol/L 93*   CO2 mmol/L 32   BUN mg/dL 16   CREATININE mg/dL 1 09   CALCIUM mg/dL 9 3   TOTAL PROTEIN g/dL 7 7   BILIRUBIN TOTAL mg/dL 0 40   ALK PHOS U/L 92   ALT U/L 83*   AST U/L 56*   GLUCOSE RANDOM mg/dL 249*       Results from last 7 days  Lab Units 03/02/18  1126   INR  0 86       Imaging: I have personally reviewed pertinent reports  Xr Chest 2 Views    Result Date: 3/2/2018  Narrative: CHEST INDICATION: edema COMPARISON:  September 29, 2016 EXAM PERFORMED/VIEWS:  XR CHEST PA & LATERAL Images: 2 FINDINGS: Cardiomediastinal silhouette appears unremarkable  Linear density at the left lung base is again seen, and appears unchanged  This is adjacent to some pleural thickening, which also appears unchanged  This may represent subpleural fat deposition  Callus formation of the lateral aspect of the right lower left ribs appears old, and well-healed     Impression: No acute process Workstation performed: [id number]     Ct Tibia Fibula Left W Contrast    Result Date: 3/2/2018  Narrative: CT tibia and fibula with contrast HISTORY: Cellulitis   COMPARISON: 2015, July 17 TECHNIQUE: Multiplanar CT of the tibia/fibula with contrast  This examination, like all CT scans performed in the [facility], was performed utilizing techniques to minimize radiation dose exposure, including the use of iterative reconstruction and automated exposure control  FINDINGS: Diffuse soft tissue swelling about the tibia/fibula without evidence of a fluid collection  No evidence of bony erosion or destruction to suggest osteomyelitis  Vessels appear patent although numerous collateral vessels are identified throughout the lower leg  Degenerative changes to the midfoot are unchanged since 2015  Impression: Diffuse soft tissue swelling about the tibia/fibula without evidence of a fluid collection  No evidence of bony erosion or destruction to suggest osteomyelitis  Vessels appear patent although numerous collateral vessels are identified throughout the lower leg  Degenerative changes to the midfoot are unchanged since 2015  Tricompartment mild knee degenerative changes  If there is high clinical suspicion for osteomyelitis, MRI would be more sensitive and specific  Workstation performed: [id number]     Vas Lower Limb Venous Duplex Study, Unilateral/limited    Result Date: 3/2/2018  Narrative:  [facility] REPORT CLINICAL: Indications: Patient has been experiencing left leg swelling for two weeks  Risk Factors: The patient has history of smoking (current) 3 ppd  He has no history of DVT  Clinical: Left Lower Limb There is complaint of pain, edema and tenderness  FINDINGS:  Segment  Right            Left              Impression       Impression       CFV      Normal (Patent)  Normal (Patent)     CONCLUSION: RIGHT LOWER LIMB No evidence of acute or chronic deep vein thrombosis   No evidence of superficial thrombophlebitis noted  Doppler evaluation shows a normal response to augmentation maneuvers  Popliteal, posterior tibial and anterior tibial arterial Doppler waveforms are triphasic  LEFT LOWER LIMB LIMITED Evaluation shows no evidence of thrombus in the common femoral vein  Doppler evaluation shows a normal response to augmentation maneuvers    Technical findings were given to PATSY Fulton    Vas Lower Limb Venous Duplex Study, Unilateral/limited    Result Date: 2/16/2018  Narrative:  [facility] REPORT CLINICAL: Indications: Left leg pain and edema for five days  Risk Factors: The patient has no history of DVT  FINDINGS:  Segment  Right            Left              Impression       Impression       CFV      Normal (Patent)  Normal (Patent)     CONCLUSION: Impression: RIGHT LOWER LIMB LIMITED: NORMAL Evaluation shows no evidence of thrombus in the common femoral vein  Doppler evaluation shows a normal response to augmentation maneuvers  LEFT LOWER LIMB: NORMAL No evidence of acute or chronic deep vein thrombosis No evidence of superficial thrombophlebitis noted  Doppler evaluation shows a normal response to augmentation maneuvers  Popliteal, posterior tibial and anterior tibial arterial Doppler waveforms are triphasic  Technical findings were given to [place]  SIGNATURE: Electronically Signed by: Sheila Alexander MD, RPVI on 2018-02-16 04:00:45 PM      EKG, Pathology, and Other Studies Reviewed on Admission:   · EKG: NSR with rate of 97 bpm    Allscripts / Epic Records Reviewed: Yes     ** Please Note: This note has been constructed using a voice recognition system   **

## 2018-03-02 NOTE — RESPIRATORY THERAPY NOTE
RT Protocol Note  Clive Peterson 47 y o  male MRN: [id number]  Unit/Bed#: 012-74 Encounter: [id number]    Assessment    Principal Problem:    Cellulitis of left lower leg  Active Problems:    COPD, mild (HCC)    Hypertension    Hypothyroidism    Morbid obesity ([facility] )    Type 2 diabetes, uncontrolled, with neuropathy (HCC)    Elevated LFTs    Elevated lactic acid level    Tobacco abuse      Home Pulmonary Medications:  Albuterol Neb  Advair  Ventolin Inhaler    Past Medical History:   Diagnosis Date    Diabetes mellitus ([facility] )     Disease of thyroid gland     Hyperlipidemia     Hypertension      Social History     Social History    Marital status: Single     Spouse name: N/A    Number of children: N/A    Years of education: N/A     Social History Main Topics    Smoking status: Current Every Day Smoker     Packs/day: 2 00    Smokeless tobacco: Never Used    Alcohol use No    Drug use: No    Sexual activity: Not Asked     Other Topics Concern    None     Social History Narrative    None       Subjective    Subjective Data: I do not take the Advair or treatments at home    Objective    Physical Exam:   Assessment Type: Assess only  General Appearance: Alert, Awake  Respiratory Pattern: Normal  Chest Assessment: Chest expansion symmetrical  Bilateral Breath Sounds: Diminished, Clear  Cough: Strong  O2 Device: room air    Vitals:  Blood pressure 143/93, pulse 84, temperature 98 °F (36 7 °C), temperature source Temporal, resp  rate 18, height 5' 4" (1 626 m), weight 117 kg (257 lb 15 oz), SpO2 92 %  Imaging and other studies: I have personally reviewed pertinent reports        O2 Device: room air     Plan    Respiratory Plan: Home Bronchodilator Patient pathway    Albuterol 0 083% Q6PRN shortness of breath and wheeze

## 2018-03-02 NOTE — ED PROVIDER NOTES
History  Chief Complaint   Patient presents with    Leg Swelling     Left lower leg swelling starting last week     67-year-old male past medical history of hypertension, hyperlipidemia, insulin-dependent diabetes presents to emergency department today with worsening left lower extremity cellulitis  Onset of symptoms per records are approximately three weeks ago although patient states he has only been one week  Patient was seen in his PCP office on February 13th, this was shortly after the onset of symptoms, was diagnosed with a left lower extremity cellulitis with edema, had negative Doppler study, was prescribed Bactrim  He was followed in the office on February 22nd, as he was no better after completing the course of Bactrim, his antibiotic was switched to Levaquin 500 daily, as well as the addition of HCTZ 25 mg  He was then seen in follow-up again on February 27th still no better, was switched to Augmentin, patient did also receive Rocephin 1 g x1 IM in the office  Patient had previously been refusing to come to the ED due to not wanting to have a co-pay  Overall, redness, tightness, sensation of cracking skin, that he has been trying to lotion to keep the skin less dry, and severe redness to the entire left shin, calf, foot  There is no open skin wound or sore  There are no wounds between the toes or on the sole of the foot  He has pain that he describes as a tightness  There is no redness above the knee  Patient denies fevers, chills, nausea vomiting diarrhea, cough, chest pain, shortness of breath, palpitations  He does report that at baseline the left lower extremity usually has some muscle atrophy due to a previous fracture surgery when he was much younger, so the amount of swelling he has sexually about twice the size of the usual size of the leg    Over the past two weeks he also notes some less severe but still noticeable swelling and redness to the top of the right foot but nothing on the right calf or shin  At this point, patient does have a circumferential left lower extremity cellulitis including the foot, on greater than two weeks of oral antibiotics, without improvement, and is in fact worsening, will check labs, initiate IV vancomycin, admit patient to medicine floor  We will discuss possibility of additional IV coverage for pseudomonal organisms given patient's diabetic status however there are no open wounds or ulcerations any may be appropriate for vancomycin alone, however Zosyn or Cipro could be added  History provided by:  Patient and medical records      Prior to Admission Medications   Prescriptions Last Dose Informant Patient Reported? Taking?    Insulin Pen Needle (PEN NEEDLES 3/16") 31G X 5 MM MISC   Yes No   Sig: by Does not apply route   VENTOLIN  (90 Base) MCG/ACT inhaler   Yes No   Sig: Take 2 puffs by mouth every 4 (four) hours as needed   albuterol (2 5 mg/3 mL) 0 083 % nebulizer solution   Yes No   Sig: Inhale 1 each 4 (four) times a day as needed   amoxicillin-clavulanate (AUGMENTIN) 875-125 mg per tablet   No No   Sig: Take 1 tablet by mouth every 12 (twelve) hours for 10 days   atorvastatin (LIPITOR) 20 mg tablet   No No   Sig: Take 1 tablet (20 mg total) by mouth daily   fenofibrate (TRICOR) 145 mg tablet   No No   Sig: Take 1 tablet (145 mg total) by mouth daily   fluticasone-salmeterol (ADVAIR DISKUS) 250-50 mcg/dose inhaler   Yes No   Sig: Inhale 1 puff 2 (two) times a day   gabapentin (NEURONTIN) 300 mg capsule   Yes No   Sig: Take 300 mg by mouth 3 (three) times a day   hydrochlorothiazide (HYDRODIURIL) 25 mg tablet   No No   Sig: Take 1 tablet (25 mg total) by mouth 2 (two) times a day   insulin glargine (LANTUS) 100 units/mL subcutaneous injection   Yes No   Sig: Inject 20 Units under the skin daily at bedtime   levothyroxine 200 mcg tablet   No No   Sig: Take 1 tablet (200 mcg total) by mouth daily before breakfast   metFORMIN (GLUCOPHAGE) 1000 MG tablet   Yes No   Sig: Take 1,000 mg by mouth 2 (two) times a day   omega-3-acid ethyl esters (LOVAZA) 1 g capsule   No No   Sig: Take 1 capsule (1 g total) by mouth 2 (two) times a day for high cholesterol   pioglitazone (ACTOS) 15 mg tablet   No No   Sig: Take 1 tablet (15 mg total) by mouth daily   pregabalin (LYRICA) 75 mg capsule   Yes No   Sig: Take 75 mg by mouth 2 (two) times a day   traZODone (DESYREL) 150 mg tablet   Yes No   Sig: Take 150 mg by mouth daily at bedtime      Facility-Administered Medications: None       Past Medical History:   Diagnosis Date    Diabetes mellitus ([facility] [address] )     Disease of thyroid gland     Hyperlipidemia     Hypertension        Past Surgical History:   Procedure Laterality Date    KNEE SURGERY      SINUS SURGERY         Family History   Problem Relation Age of Onset    Thyroid cancer Mother     Diabetes type II Mother     Esophageal cancer Father     Diabetes type II Father     Kidney cancer Brother     Diabetes type II Brother     Diabetes type II Maternal Grandmother      I have reviewed and agree with the history as documented  Social History   Substance Use Topics    Smoking status: Current Every Day Smoker     Packs/day: 2 00    Smokeless tobacco: Never Used    Alcohol use No        Review of Systems   Constitutional: Negative for activity change, appetite change, chills, diaphoresis, fatigue, fever and unexpected weight change  HENT: Negative for congestion, ear pain, rhinorrhea, sinus pressure, sore throat and tinnitus  Eyes: Negative for visual disturbance  Respiratory: Negative for cough, chest tightness, shortness of breath and wheezing  Cardiovascular: Positive for leg swelling  Negative for chest pain and palpitations  Gastrointestinal: Negative for abdominal pain, constipation, diarrhea, nausea and vomiting  Genitourinary: Negative for dysuria, flank pain, frequency, hematuria and urgency     Musculoskeletal: Negative for arthralgias, back pain and myalgias  Skin: Positive for rash  Negative for wound  Neurological: Negative for dizziness, tremors, syncope and headaches  Physical Exam  ED Triage Vitals [03/02/18 1038]   Temperature Pulse Respirations Blood Pressure SpO2   97 7 °F (36 5 °C) (!) 120 20 114/80 96 %      Temp Source Heart Rate Source Patient Position - Orthostatic VS BP Location FiO2 (%)   Temporal Monitor Lying Left arm --      Pain Score       No Pain           Orthostatic Vital Signs  Vitals:    03/02/18 1038 03/02/18 1300   BP: 114/80 161/95   Pulse: (!) 120 101   Patient Position - Orthostatic VS: Lying Lying       Physical Exam   Constitutional: He is oriented to person, place, and time  Vital signs are normal  He appears well-developed and well-nourished  Non-toxic appearance  No distress  obese   HENT:   Head: Normocephalic and atraumatic  Right Ear: Tympanic membrane and external ear normal    Left Ear: Tympanic membrane and external ear normal    Nose: Nose normal  No rhinorrhea  Mouth/Throat: Uvula is midline and oropharynx is clear and moist    Eyes: Conjunctivae, EOM and lids are normal  Pupils are equal, round, and reactive to light  Right eye exhibits no discharge  Left eye exhibits no discharge  Neck: Normal range of motion and full passive range of motion without pain  Neck supple  No JVD present  No thyromegaly present  Cardiovascular: Normal rate, regular rhythm, normal heart sounds and intact distal pulses  No murmur heard  Pulmonary/Chest: Effort normal and breath sounds normal  No accessory muscle usage  No tachypnea  No respiratory distress  He has no wheezes  He has no rales  He exhibits no tenderness  Abdominal: Soft  Normal appearance and bowel sounds are normal  He exhibits no distension  There is no hepatosplenomegaly  There is no tenderness  There is no rebound and no CVA tenderness  No hernia  Musculoskeletal: Normal range of motion   He exhibits edema (mild dorsal foot edema on R  diffuse edema LLE foot to knee) and tenderness  Lymphadenopathy:     He has no cervical adenopathy  Neurological: He is alert and oriented to person, place, and time  No cranial nerve deficit or sensory deficit  Skin: Skin is warm, dry and intact  Capillary refill takes less than 2 seconds  No rash noted  He is not diaphoretic  There is erythema (circumferential cellulitis LLE from dorsal foot up to but not including knee; associated edema, tenderness  FROM knee and ankle  )  No pallor  Psychiatric: He has a normal mood and affect  His speech is normal and behavior is normal    Nursing note and vitals reviewed  ED Medications  Medications   nicotine (NICODERM CQ) 21 mg/24 hr TD 24 hr patch 21 mg (21 mg Transdermal Medication Applied 3/2/18 1225)   cefepime (MAXIPIME) IVPB (premix) 2,000 mg (2,000 mg Intravenous New Bag 3/2/18 1545)   sodium chloride 0 9 % bolus 1,000 mL (0 mL Intravenous Stopped 3/2/18 1519)   vancomycin (VANCOCIN) 1,750 mg in sodium chloride 0 9 % 500 mL IVPB (0 mg/kg × 118 kg Intravenous Stopped 3/2/18 1500)   sodium chloride 0 9 % bolus 2,540 mL (2,540 mL Intravenous New Bag 3/2/18 1545)   iohexol (OMNIPAQUE) 350 MG/ML injection (MULTI-DOSE) 100 mL (100 mL Intravenous Given 3/2/18 1538)       Diagnostic Studies  Results Reviewed     Procedure Component Value Units Date/Time    Lactic acid, plasma [[id number]]  (Normal) Collected:  03/02/18 1513    Lab Status:  Final result Specimen:  Blood from Hand, Left Updated:  03/02/18 1537     LACTIC ACID 1 8 mmol/L     Narrative:         Result may be elevated if tourniquet was used during collection  Blood culture #1 [[id number]] Collected:  03/02/18 1258    Lab Status:   In process Specimen:  Blood from Arm, Left Updated:  03/02/18 1309    Comprehensive metabolic panel [[id number]]  (Abnormal) Collected:  03/02/18 1126    Lab Status:  Final result Specimen:  Blood from Arm, Right Updated:  03/02/18 1218     Sodium 134 (L) mmol/L      Potassium 3 9 mmol/L      Chloride 93 (L) mmol/L      CO2 32 mmol/L      Anion Gap 9 mmol/L      BUN 16 mg/dL      Creatinine 1 09 mg/dL      Glucose 249 (H) mg/dL      Calcium 9 3 mg/dL      AST 56 (H) U/L      ALT 83 (H) U/L      Alkaline Phosphatase 92 U/L      Total Protein 7 7 g/dL      Albumin 3 5 g/dL      Total Bilirubin 0 40 mg/dL      eGFR 77 ml/min/1 73sq m     Narrative:         National Kidney Disease Education Program recommendations are as follows:  GFR calculation is accurate only with a steady state creatinine  Chronic Kidney disease less than 60 ml/min/1 73 sq  meters  Kidney failure less than 15 ml/min/1 73 sq  meters  Protime-INR [[id number]]  (Abnormal) Collected:  03/02/18 1126    Lab Status:  Final result Specimen:  Blood from Arm, Right Updated:  03/02/18 1212     Protime 11 6 (L) seconds      INR 0 86    APTT [[id number]]  (Normal) Collected:  03/02/18 1126    Lab Status:  Final result Specimen:  Blood from Arm, Right Updated:  03/02/18 1211     PTT 25 seconds     Narrative: Therapeutic Heparin Range = 60-90 seconds    B-type natriuretic peptide [[id number]]  (Normal) Collected:  03/02/18 1126    Lab Status:  Final result Specimen:  Blood from Arm, Right Updated:  03/02/18 1156     NT-proBNP 24 pg/mL     Lactic acid, plasma [[id number]]  (Abnormal) Collected:  03/02/18 1126    Lab Status:  Final result Specimen:  Blood from Arm, Right Updated:  03/02/18 1155     LACTIC ACID 2 7 (HH) mmol/L     Narrative:         Result may be elevated if tourniquet was used during collection      Troponin I [[id number]]  (Normal) Collected:  03/02/18 1126    Lab Status:  Final result Specimen:  Blood from Arm, Right Updated:  03/02/18 1153     Troponin I <0 02 ng/mL     Narrative:         [employer] Chemistry analyzer 99% cutoff is > 0 04 ng/mL in network labs    o cTnI 99% cutoff is useful only when applied to patients in the clinical setting of myocardial ischemia  o cTnI 99% cutoff should be interpreted in the context of clinical history, ECG findings and possibly cardiac imaging to establish correct diagnosis  o cTnI 99% cutoff may be suggestive but clearly not indicative of a coronary event without the clinical setting of myocardial ischemia  CBC and differential [[id number]]  (Normal) Collected:  03/02/18 1126    Lab Status:  Final result Specimen:  Blood from Arm, Right Updated:  03/02/18 1134     WBC 8 12 Thousand/uL      RBC 5 12 Million/uL      Hemoglobin 15 3 g/dL      Hematocrit 43 1 %      MCV 84 fL      MCH 29 9 pg      MCHC 35 5 g/dL      RDW 13 2 %      MPV 9 7 fL      Platelets 950 Thousands/uL      Neutrophils Relative 58 %      Lymphocytes Relative 28 %      Monocytes Relative 12 %      Eosinophils Relative 2 %      Basophils Relative 0 %      Neutrophils Absolute 4 71 Thousands/µL      Lymphocytes Absolute 2 26 Thousands/µL      Monocytes Absolute 0 99 Thousand/µL      Eosinophils Absolute 0 13 Thousand/µL      Basophils Absolute 0 03 Thousands/µL     Blood culture #2 [[id number]] Collected:  03/02/18 1126    Lab Status: In process Specimen:  Blood from Arm, Right Updated:  03/02/18 1130                 CT tibia fibula left w contrast   Final Result by Gifty Gonzalez MD (03/02 1556)   Diffuse soft tissue swelling about the tibia/fibula without evidence of a fluid collection  No evidence of bony erosion or destruction to suggest osteomyelitis  Vessels appear patent although numerous collateral vessels are identified throughout the    lower leg  Degenerative changes to the midfoot are unchanged since 2015  Tricompartment mild knee degenerative changes  If there is high clinical suspicion for osteomyelitis, MRI would be more sensitive and specific        Workstation performed: [id number]         XR chest 2 views   Final Result by Eva Medrano MD (03/02 1256)      No acute process            Workstation performed: [id number]         VAS lower limb venous duplex study, unilateral/limited    (Results Pending)              Procedures  ECG 12 Lead Documentation  Date/Time: 3/2/2018 12:28 PM  Performed by: Bertram Cowden  Authorized by: Bertram Cowden     Indications / Diagnosis:  Edema  ECG reviewed by me, the ED Provider: yes    Patient location:  ED  Rate:     ECG rate:  97  Rhythm:     Rhythm: sinus rhythm    Ectopy:     Ectopy: none    QRS:     QRS axis:  Normal  Conduction:     Conduction: normal    ST segments:     ST segments:  Normal  T waves:     T waves: normal             Phone Contacts  ED Phone Contact    ED Course  ED Course as of Mar 02 1557   Fri Mar 02, 2018   1145 WBC: 8 12   1145 Hemoglobin: 15 3   1145 Hematocrit: 43 1   1146 Platelets: 452   1495 Troponin I: <0 02   1155 Lactic acid 2 7 critical received by me from lab pat    1214 Negative for dvt per preliminary verbal from jeyson sonographer    1225 NT-proBNP: 24   1226 PTT: 25   1226 INR: 0 86   1226 Protime: (!) 11 6   1226 Sodium: (!) 134   1226 Potassium: 3 9   1226 CO2: 32   1226 Anion Gap: 9   1226 Creatinine: 1 09   1226 Glucose: (!) 249   1226 AST: (!) 56   1226 ALT: (!) 83   1226 Alkaline Phosphatase: 92   1226 eGFR: 77   1233 ? Add cipro or zosyn in DM foot celulitis    1527 13:50  Pt accepted to [facility] dr frerell   Will add sepsis tx to include cefipime and 30 ml/kg ivf bolus, also ct the leg requested by inpatient team           HEART Risk Score    Flowsheet Row Most Recent Value   History  0 Filed at: 03/02/2018 1526   ECG  0 Filed at: 03/02/2018 1526   Age  1 Filed at: 03/02/2018 1526   Risk Factors  2 Filed at: 03/02/2018 1526   Troponin  0 Filed at: 03/02/2018 1526   Heart Score Risk Calculator   History  0 Filed at: 03/02/2018 1526   ECG  0 Filed at: 03/02/2018 1526   Age  1 Filed at: 03/02/2018 1526   Risk Factors  2 Filed at: 03/02/2018 1526   Troponin  0 Filed at: 03/02/2018 1526   HEART Score  3 Filed at: 03/02/2018 1526   HEART Score  3 Filed at: 03/02/2018 1526                      Initial Sepsis Screening     Row Name 03/02/18 1998                Is the patient's history suggestive of a new or worsening infection? (!)  Yes (Proceed)  -TB        Suspected source of infection soft tissue  -TB        Are two or more of the following signs & symptoms of infection both present and new to the patient? No  -TB        Indicate SIRS criteria Tachycardia > 90 bpm  -TB        If the answer is yes to both questions, suspicion of sepsis is present          If severe sepsis is present AND tissue hypoperfusion perists in the hour after fluid resuscitation or lactate > 4, the patient meets criteria for SEPTIC SHOCK          Are any of the following organ dysfunction criteria present within 6 hours of suspected infection and SIRS criteria that are NOT considered to be chronic conditions? (!)  Yes  -TB        Organ dysfunction Lactate > 2 0 mmol/L  -TB        Date of presentation of severe sepsis          Time of presentation of severe sepsis          Tissue hypoperfusion persists in the hour after crystalloid fluid administration, evidenced, by either:          Was hypotension present within one hour of the conclusion of crystalloid fluid administration?  No  -TB        Date of presentation of septic shock          Time of presentation of septic shock            User Key  (r) = Recorded By, (t) = Taken By, (c) = Cosigned By    Initials Name Provider Type    TB Yumiko Justice PA-C Physician Assistant                  MDM  Number of Diagnoses or Management Options  Cellulitis of left lower leg: established and worsening  Elevated lactic acid level: new and requires workup  Elevated LFTs: new and requires workup     Amount and/or Complexity of Data Reviewed  Clinical lab tests: reviewed and ordered  Tests in the radiology section of CPT®: ordered and reviewed      The patient presented with a condition in which there was a high probability of imminent or life-threatening deterioration, and critical care services (excluding separately billable procedures) totalled 30-74 minutes (early sepsis mgmt)  Disposition  Final diagnoses:   Cellulitis of left lower leg   Elevated lactic acid level   Elevated LFTs     Time reflects when diagnosis was documented in both MDM as applicable and the Disposition within this note     Time User Action Codes Description Comment    3/2/2018  1:01 PM Deboraha Alamin Add [O42 950] Cellulitis of left lower leg     3/2/2018  1:01 PM Deboraha Alamin Add [R79 89] Elevated lactic acid level     3/2/2018  1:01 PM Deboraha Alamin Add [R79 89] Elevated LFTs       ED Disposition     ED Disposition Condition Comment    Admit  Case was discussed with [facility] and the patient's admission status was agreed to be Admission Status: inpatient status to the service of Dr Rodo Yeh   Follow-up Information    None       Patient's Medications   Discharge Prescriptions    No medications on file     No discharge procedures on file      ED Provider  Electronically Signed by           Edith Burrell PA-C  03/02/18 6602

## 2018-03-03 VITALS
WEIGHT: 257.94 LBS | TEMPERATURE: 98.8 F | RESPIRATION RATE: 18 BRPM | HEIGHT: 64 IN | BODY MASS INDEX: 44.04 KG/M2 | SYSTOLIC BLOOD PRESSURE: 128 MMHG | DIASTOLIC BLOOD PRESSURE: 82 MMHG | OXYGEN SATURATION: 94 % | HEART RATE: 91 BPM

## 2018-03-03 DIAGNOSIS — G89.29 CHRONIC BILATERAL LOW BACK PAIN WITHOUT SCIATICA: Primary | ICD-10-CM

## 2018-03-03 DIAGNOSIS — M54.50 CHRONIC BILATERAL LOW BACK PAIN WITHOUT SCIATICA: Primary | ICD-10-CM

## 2018-03-03 LAB
ALBUMIN SERPL BCP-MCNC: 3.2 G/DL (ref 3.5–5)
ALP SERPL-CCNC: 119 U/L (ref 46–116)
ALT SERPL W P-5'-P-CCNC: 172 U/L (ref 12–78)
ANION GAP SERPL CALCULATED.3IONS-SCNC: 8 MMOL/L (ref 4–13)
AST SERPL W P-5'-P-CCNC: 146 U/L (ref 5–45)
BILIRUB SERPL-MCNC: 0.6 MG/DL (ref 0.2–1)
BUN SERPL-MCNC: 11 MG/DL (ref 5–25)
CALCIUM SERPL-MCNC: 8.4 MG/DL (ref 8.3–10.1)
CHLORIDE SERPL-SCNC: 100 MMOL/L (ref 100–108)
CO2 SERPL-SCNC: 30 MMOL/L (ref 21–32)
CREAT SERPL-MCNC: 0.93 MG/DL (ref 0.6–1.3)
ERYTHROCYTE [DISTWIDTH] IN BLOOD BY AUTOMATED COUNT: 13.1 % (ref 11.6–15.1)
GFR SERPL CREATININE-BSD FRML MDRD: 93 ML/MIN/1.73SQ M
GLUCOSE SERPL-MCNC: 139 MG/DL (ref 65–140)
GLUCOSE SERPL-MCNC: 155 MG/DL (ref 65–140)
GLUCOSE SERPL-MCNC: 257 MG/DL (ref 65–140)
HCT VFR BLD AUTO: 41.1 % (ref 36.5–49.3)
HGB BLD-MCNC: 14.5 G/DL (ref 12–17)
MAGNESIUM SERPL-MCNC: 1.8 MG/DL (ref 1.6–2.6)
MCH RBC QN AUTO: 30 PG (ref 26.8–34.3)
MCHC RBC AUTO-ENTMCNC: 35.3 G/DL (ref 31.4–37.4)
MCV RBC AUTO: 85 FL (ref 82–98)
PHOSPHATE SERPL-MCNC: 3.7 MG/DL (ref 2.7–4.5)
PLATELET # BLD AUTO: 256 THOUSANDS/UL (ref 149–390)
PMV BLD AUTO: 9.6 FL (ref 8.9–12.7)
POTASSIUM SERPL-SCNC: 3.7 MMOL/L (ref 3.5–5.3)
PROT SERPL-MCNC: 6.7 G/DL (ref 6.4–8.2)
RBC # BLD AUTO: 4.83 MILLION/UL (ref 3.88–5.62)
SODIUM SERPL-SCNC: 138 MMOL/L (ref 136–145)
WBC # BLD AUTO: 6.98 THOUSAND/UL (ref 4.31–10.16)

## 2018-03-03 PROCEDURE — 80053 COMPREHEN METABOLIC PANEL: CPT | Performed by: PHYSICIAN ASSISTANT

## 2018-03-03 PROCEDURE — 80074 ACUTE HEPATITIS PANEL: CPT | Performed by: PHYSICIAN ASSISTANT

## 2018-03-03 PROCEDURE — 84100 ASSAY OF PHOSPHORUS: CPT | Performed by: PHYSICIAN ASSISTANT

## 2018-03-03 PROCEDURE — 82948 REAGENT STRIP/BLOOD GLUCOSE: CPT

## 2018-03-03 PROCEDURE — 94762 N-INVAS EAR/PLS OXIMTRY CONT: CPT

## 2018-03-03 PROCEDURE — 99238 HOSP IP/OBS DSCHRG MGMT 30/<: CPT | Performed by: PHYSICIAN ASSISTANT

## 2018-03-03 PROCEDURE — 85027 COMPLETE CBC AUTOMATED: CPT | Performed by: PHYSICIAN ASSISTANT

## 2018-03-03 PROCEDURE — 94760 N-INVAS EAR/PLS OXIMETRY 1: CPT

## 2018-03-03 PROCEDURE — 83735 ASSAY OF MAGNESIUM: CPT | Performed by: PHYSICIAN ASSISTANT

## 2018-03-03 RX ORDER — GABAPENTIN 300 MG/1
CAPSULE ORAL
Qty: 90 CAPSULE | Refills: 0 | Status: SHIPPED | OUTPATIENT
Start: 2018-03-03 | End: 2018-03-05 | Stop reason: SDUPTHER

## 2018-03-03 RX ORDER — CEPHALEXIN 500 MG/1
500 CAPSULE ORAL EVERY 12 HOURS SCHEDULED
Qty: 20 CAPSULE | Refills: 0 | Status: SHIPPED | OUTPATIENT
Start: 2018-03-03 | End: 2018-03-13

## 2018-03-03 RX ADMIN — GABAPENTIN 300 MG: 300 CAPSULE ORAL at 01:50

## 2018-03-03 RX ADMIN — GABAPENTIN 300 MG: 300 CAPSULE ORAL at 08:53

## 2018-03-03 RX ADMIN — ENOXAPARIN SODIUM 40 MG: 40 INJECTION SUBCUTANEOUS at 08:54

## 2018-03-03 RX ADMIN — LEVOTHYROXINE SODIUM 200 MCG: 100 TABLET ORAL at 05:10

## 2018-03-03 RX ADMIN — FENOFIBRATE 145 MG: 145 TABLET ORAL at 08:53

## 2018-03-03 RX ADMIN — VANCOMYCIN HYDROCHLORIDE 1750 MG: 1 INJECTION, POWDER, LYOPHILIZED, FOR SOLUTION INTRAVENOUS at 12:47

## 2018-03-03 RX ADMIN — VANCOMYCIN HYDROCHLORIDE 1500 MG: 1 INJECTION, POWDER, LYOPHILIZED, FOR SOLUTION INTRAVENOUS at 00:30

## 2018-03-03 RX ADMIN — SODIUM CHLORIDE 75 ML/HR: 0.9 INJECTION, SOLUTION INTRAVENOUS at 08:04

## 2018-03-03 RX ADMIN — NICOTINE 1 PATCH: 21 PATCH, EXTENDED RELEASE TRANSDERMAL at 12:47

## 2018-03-03 RX ADMIN — HYDROCHLOROTHIAZIDE 25 MG: 25 TABLET ORAL at 08:52

## 2018-03-03 NOTE — DISCHARGE SUMMARY
Discharge Summary - [facility] Internal Medicine    Patient Information: Ovidio Butler 47 y o  male MRN: [id number]  Unit/Bed#: 993-73 Encounter: [id number]    Discharging Physician / Practitioner: Catherine Perry PA-C  PCP: Karson Lloyd  Admission Date: 3/2/2018  Discharge Date: 03/03/18    Disposition:     Home     Reason for Admission: cellulitis of left lower extremity    Discharge Diagnoses:     Principal Problem:    Cellulitis of left lower leg  Active Problems:    COPD, mild ([facility])    Hypertension    Hypothyroidism    Morbid obesity ([address] )    Type 2 diabetes, uncontrolled, with neuropathy ([facility])    Elevated LFTs    Elevated lactic acid level    Tobacco abuse  Resolved Problems:    * No resolved hospital problems  *      Consultations During Hospital Stay:  · none    Procedures Performed:     · Venous duplex:    RIGHT LOWER LIMB:   No evidence of acute or chronic deep vein thrombosis   No evidence of superficial thrombophlebitis noted  Doppler evaluation shows a normal response  to augmentation maneuvers  Popliteal, posterior tibial and anterior tibial arterial Doppler waveforms are   triphasic  LEFT LOWER LIMB LIMITED   Evaluation shows no evidence of thrombus in the common femoral vein  Doppler evaluation shows a normal response to augmentation maneuvers  · Chest x-ray: No acute process  · CT left tibia fibula: Diffuse soft tissue swelling about the tibia/fibula without evidence of a fluid collection  No evidence of bony erosion or destruction to suggest osteomyelitis  Vessels appear patent although numerous collateral vessels are identified throughout the lower leg  Degenerative changes to the midfoot are unchanged since 2015  Tricompartment mild knee degenerative changes  If there is high clinical suspicion for osteomyelitis, MRI would be more sensitive and specific      Significant Findings / Test Results:     · , , Alkaline phosphatase 119, lactic acid 2 7  · Hemoglobin A1C 11 1    Incidental Findings:   · none     Test Results Pending at Discharge (will require follow up): · Acute hepatitis panel: the patient will need to follow up with his PCP regarding these results  Outpatient Tests Requested:  · Echocardiogram, Ultrasound liver, sleep study: the patient will need to follow up with his PCP regarding these tests  Complications:  none    Hospital Course:     John Briones is a 47 y o  male patient who originally presented to the hospital on 3/2/2018 due to a complaint of redness, swelling and pain of left leg  The patient states this has been going on for the last 2 weeks  He's been seen by his PCP and has been on 4 different antibiotics and continues to have redness, swelling and pain  He states he was first started on Bactrim on 2/13/18, then Levaquin on 2/22/18, then given a shot of Ceftriaxone of 2/27/18 and started on Augmentin on 2/27/18  He presented to the ER today since he continues to have pain  He denies fevers/chills  He denies injury or trauma to leg  He denies history of cellulitis  He denies chest pain, abdominal pain, N/V/D  In the ER he was he was noted to have a lactic acid of 2 7  Venous duplex negative for DVT  CT negative for abscess collection  He was given IV Cefepime and IV Vancomycin  On admission he was continued on IV Vancomycin and IV fluids  His lactic acidosis resolved with IV fluids  A nocturnal pulse oximetry was done and the patient qualified for oxygen at night  He will need an outpatient sleep study for likely sleep apnea and an echocardiogram to check for pulmonary hypertension  He was noted to have elevated LFT's on admission which was trending up  An acute hepatitis panel was ordered and pending at discharge  He will need to follow up with his PCP regarding these results as well as continued monitoring of his LFT's and an abdominal ultrasound to better evaluate the liver   The patient's cellulitis improved quicker than expected and he did not require a 2 midnight stay  He was discharged home on Keflex and instructed to follow up with Podiatry to ensure his cellulitis continues to improve  Condition at Discharge: good     Discharge Day Visit / Exam:     * Please refer to separate progress note for these details *    Discussion with Family: n/a      Discharge instructions/Information to patient and family:   See after visit summary for information provided to patient and family  Provisions for Follow-Up Care:  See after visit summary for information related to follow-up care and any pertinent home health orders  Planned Readmission: no    Discharge Statement:  I spent 25 minutes discharging the patient  This time was spent on the day of discharge  I had direct contact with the patient on the day of discharge  Greater than 50% of the total time was spent examining patient, answering all patient questions, arranging and discussing plan of care with patient as well as directly providing post-discharge instructions  Additional time then spent on discharge activities  Discharge Medications:  See after visit summary for reconciled discharge medications provided to patient and family  ** Please Note: This note has been constructed using a voice recognition system   **

## 2018-03-03 NOTE — SOCIAL WORK
Cm met with the patient to assess the  functional status prior to the admission  Cm reviewed the discharge check list with the patient and answered any questions  The patient is aware that if they have any questions related to the d/c plan they can have cm review the plan with them at any time  Cm will continue to follow and  develop a safe plan to address all needs and concerns they the patient may have  The patient is at home alone in an apt with 27 steps to enter the home and then he is all on one floor the patient is independent at home with all adls and he has no dme at home except a nebulizer he will be d/c to home   today and he is agreeable to this plan and he wants me to make his appointments for him  I will call hm on Monday as  He will need podiatry for an ongoing lower extremity problem and he will need a sleep study  He will also need o2 at home and I called [facility] and they are aware that the patient will need o2 at night at home I sent the script and the qualifer to them and they will set up they will call the patient as he only has medicare and he will be responsible for the 20 % they will call him and I did explain this to him  I will contact the patient on Monday and give him the appointment that I made for him

## 2018-03-03 NOTE — CASE MANAGEMENT
Initial Clinical Review    Admission: Date/Time/Statement: 3/2/18 @ 1354     Orders Placed This Encounter   Procedures    Inpatient Admission (expected length of stay for this patient is greater than two midnights)     Standing Status:   Standing     Number of Occurrences:   1     Order Specific Question:   Admitting Physician     Answer:   Main Goldberg     Order Specific Question:   Level of Care     Answer:   Med Surg [16]     Order Specific Question:   Estimated length of stay     Answer:   More than 2 Midnights     Order Specific Question:   Certification     Answer:   I certify that inpatient services are medically necessary for this patient for a duration of greater than two midnights  See H&P and MD Progress Notes for additional information about the patient's course of treatment  ED: Date/Time/Mode of Arrival:   ED Arrival Information     Expected Arrival Acuity Means of Arrival Escorted By Service Admission Type    - 3/2/2018 10:32 Urgent Walk-In Self General Medicine Urgent    Arrival Complaint    Leg Swelling          Chief Complaint:   Chief Complaint   Patient presents with    Leg Swelling     Left lower leg swelling starting last week       History of Illness: 47 y o  male who presents with a complaint of redness, swelling and pain of left leg  The patient states this has been going on for the last 2 weeks  He's been seen by his PCP and has been on 4 different antibiotics and continues to have redness, swelling and pain  He states he was first started on Bactrim on 2/13/18, then Levaquin on 2/22/18, then given a shot of Ceftriaxone of 2/27/18 and started on Augmentin on 2/27/18  He presented to the ER today since he continues to have pain  He was noted to have a lactic acid of 2 7  Venous duplex negative for DVT  CT negative for abscess collection  He denies fevers/chills  He denies injury or trauma to leg  He denies history of cellulitis   He denies chest pain, abdominal pain, N/V/D    ED Vital Signs:   ED Triage Vitals [03/02/18 1038]   Temperature Pulse Respirations Blood Pressure SpO2   97 7 °F (36 5 °C) (!) 120 20 114/80 96 %      Temp Source Heart Rate Source Patient Position - Orthostatic VS BP Location FiO2 (%)   Temporal Monitor Lying Left arm --      Pain Score       No Pain        Wt Readings from Last 1 Encounters:   03/02/18 117 kg (257 lb 15 oz)       Vital Signs (abnormal):  Pulse 120 - 101  Low sat 89% room air    Abnormal Labs/Diagnostic Test Results:   Venous doppler - RIGHT LOWER LIMB  No evidence of acute or chronic deep vein thrombosis   No evidence of superficial thrombophlebitis noted  Doppler evaluation shows a normal response to augmentation maneuvers  Popliteal, posterior tibial and anterior tibial arterial Doppler waveforms are  triphasic      LEFT LOWER LIMB LIMITED  Evaluation shows no evidence of thrombus in the common femoral vein  Doppler evaluation shows a normal response to augmentation maneuvers    Na 134  Cl 93  Glucose 249  ast 56  Alt 83  Lactic acid 2 7  CT left tibia/fibula - Diffuse soft tissue swelling about the tibia/fibula without evidence of a fluid collection   No evidence of bony erosion or destruction to suggest osteomyelitis   Vessels appear patent although numerous collateral vessels are identified throughout the lower leg   Degenerative changes to the midfoot are unchanged since 2015   Tricompartment mild knee degenerative changes   If there is high clinical suspicion for osteomyelitis, MRI would be more sensitive and specific  ED Treatment:  Blood cultures     Medication Administration from 03/02/2018 1032 to 03/02/2018 1615       Date/Time Order Dose Route Action Action by Comments     03/02/2018 1225 nicotine (NICODERM CQ) 21 mg/24 hr TD 24 hr patch 21 mg 21 mg Transdermal Medication Applied Júnior Owen RN      03/02/2018 1519 sodium chloride 0 9 % bolus 1,000 mL 0 mL Intravenous Stopped Alona Lam RN 03/02/2018 1225 sodium chloride 0 9 % bolus 1,000 mL 1,000 mL Intravenous New Bag Gertie Goodell, RN      03/02/2018 1500 vancomycin (VANCOCIN) 1,750 mg in sodium chloride 0 9 % 500 mL IVPB 0 mg/kg Intravenous Stopped Gertie Goodell, RN      03/02/2018 1317 vancomycin (VANCOCIN) 1,750 mg in sodium chloride 0 9 % 500 mL IVPB 1,750 mg Intravenous [address] Gertie Goodell, RN      03/02/2018 1545 sodium chloride 0 9 % bolus 2,540 mL 2,540 mL Intravenous [address] Gertie Goodell, RN      03/02/2018 1545 cefepime (MAXIPIME) IVPB (premix) 2,000 mg 2,000 mg Intravenous [address] Gertie Goodell, RN      03/02/2018 1538 iohexol (OMNIPAQUE) 350 MG/ML injection (MULTI-DOSE) 100 mL 100 mL Intravenous Given Ruchi Monique           Past Medical/Surgical History: Active Ambulatory Problems     Diagnosis Date Noted    Allergic rhinitis 01/02/2015    Angioedema 02/07/2017    Arthritis 01/02/2015    Chronic back pain 06/24/2015    Chronic sinusitis 03/09/2015    COPD, mild ([facility] [address] ) 02/07/2017    Depression with anxiety 01/02/2015    Hyperlipidemia 02/04/2015    Hypertension 01/02/2015    Hypothyroidism 01/02/2015    Morbid obesity ([facility] [address] ) 01/02/2015    Sleep apnea 01/02/2015    Type 2 diabetes, uncontrolled, with neuropathy ([facility] [address] ) 01/02/2015    Vitamin D deficiency 07/16/2013     Resolved Ambulatory Problems     Diagnosis Date Noted    No Resolved Ambulatory Problems     Past Medical History:   Diagnosis Date    Diabetes mellitus ([facility] [address] )     Disease of thyroid gland     Hyperlipidemia     Hypertension        Admitting Diagnosis: Leg swelling [M79 89]  Elevated LFTs [R79 89]  Elevated lactic acid level [R79 89]  Cellulitis of left lower leg [L03 116]    Age/Sex: 47 y o  male    · Assessment/Plan: Cellulitis of left lower extremity  ? The patient has been admitted to med/surg floor  Patient was give IV Cefepime and IV Vancomycin in the ER  Will continue IV Vancomycin given allergy to clindamycin   CT tibia fibula showed diffuse soft tissue swelling about the tibia/fibula without evidence of a fluid collection   No evidence of bony erosion or destruction to suggest osteomyelitis  Repeat labs in am  IV fluids       Plan for Additional Problems:   · Lactic acidosis: the patient was noted to have a lactic acid of 2 7 which resolved with IV fluid  Repeat lactic acid was 1 8  · Elevated LFT's: mildly elevated  Check acute hepatitis panel  Continue to monitor  · Hypothyroidism: check TSH  Continue levothyroxine  · DM type 2: check hemoglobin A1C  Hold metformin and Actos  Continue Lantus 20 units QHS  Insulin sliding scale  · Hypertension: continue HCTZ  · COPD without acute exacerbation: continue Advair  Respiratory protocol  Tobacco abuse: nicotine patch  Smoking cessation counseling      Admission Orders:  3/2/2018  1355 INPATIENT   Scheduled Meds:   Current Facility-Administered Medications:  albuterol 2 5 mg Nebulization Q6H PRN Kristen Moore DO    atorvastatin 20 mg Oral QPM Gonzalez Hernandez PA-C    enoxaparin 40 mg Subcutaneous Daily Kris Boas, PA-C    fenofibrate 145 mg Oral Daily Krisy Boas, PA-C    fluticasone-salmeterol 1 puff Inhalation BID Kris Boas, PA-C    gabapentin 300 mg Oral TID Kris Boas, PA-C    hydrochlorothiazide 25 mg Oral BID Cory Boas, PA-C    insulin glargine 20 Units Subcutaneous HS PATSY Daugherty-C    insulin lispro 1-5 Units Subcutaneous HS PATSY Daugherty-MONIE    insulin lispro 2-12 Units Subcutaneous TID AC PATSY Daugherty-MONIE    levothyroxine 200 mcg Oral Early Morning Kris Boas, PA-C    nicotine 1 patch Transdermal Q24H Gonzalez Hernandez PA-C    ondansetron 4 mg Intravenous Q6H PRN Kris Boas, PA-C    sodium chloride 75 mL/hr Intravenous Continuous Cory Boas, PA-MONIE Last Rate: 75 mL/hr (03/03/18 0804)   traZODone 150 mg Oral HS PATSY Daugherty-MONIE    vancomycin 20 mg/kg (Adjusted) Intravenous Q12H Gonzalez Hernandez PA-C      Continuous Infusions:   sodium chloride 75 mL/hr Last Rate: 75 mL/hr (03/03/18 0804)     PRN Meds:   albuterol    ondansetron    OTHER ORDERS: fingerstick glucose qid  scds  US liver  Pulse oximetry overnight  echo

## 2018-03-03 NOTE — PLAN OF CARE

## 2018-03-04 LAB
HAV IGM SER QL: NORMAL
HBV CORE IGM SER QL: NORMAL
HBV SURFACE AG SER QL: NORMAL
HCV AB SER QL: NORMAL

## 2018-03-05 ENCOUNTER — TELEPHONE (OUTPATIENT)
Dept: SLEEP CENTER | Facility: HOSPITAL | Age: 53
End: 2018-03-05

## 2018-03-05 DIAGNOSIS — G89.29 CHRONIC BILATERAL LOW BACK PAIN WITHOUT SCIATICA: ICD-10-CM

## 2018-03-05 DIAGNOSIS — M54.50 CHRONIC BILATERAL LOW BACK PAIN WITHOUT SCIATICA: ICD-10-CM

## 2018-03-05 RX ORDER — GABAPENTIN 300 MG/1
300 CAPSULE ORAL 3 TIMES DAILY
Qty: 90 CAPSULE | Refills: 0 | Status: SHIPPED | OUTPATIENT
Start: 2018-03-05 | End: 2018-04-16 | Stop reason: SDUPTHER

## 2018-03-05 NOTE — SOCIAL WORK
I called Dr Angelo Kam office and they will call the patient with an appointment for the cellulitis of the leg I did get the patient an order for a sleep study and and Rosangela Salgado will trinidad the patient with the appointment   I got the patient an appointment with the pcp Cristino Comment this wed at 0915 am

## 2018-03-07 LAB
BACTERIA BLD CULT: NORMAL
BACTERIA BLD CULT: NORMAL

## 2018-03-08 ENCOUNTER — TELEPHONE (OUTPATIENT)
Dept: OTOLARYNGOLOGY | Facility: CLINIC | Age: 53
End: 2018-03-08

## 2018-03-08 ENCOUNTER — TRANSCRIBE ORDERS (OUTPATIENT)
Dept: OTOLARYNGOLOGY | Facility: CLINIC | Age: 53
End: 2018-03-08

## 2018-03-08 DIAGNOSIS — G47.33 OBSTRUCTIVE SLEEP APNEA: Primary | ICD-10-CM

## 2018-03-14 DIAGNOSIS — F41.8 DEPRESSION WITH ANXIETY: Primary | ICD-10-CM

## 2018-03-14 RX ORDER — TRAZODONE HYDROCHLORIDE 150 MG/1
TABLET ORAL
Qty: 30 TABLET | Refills: 3 | Status: SHIPPED | OUTPATIENT
Start: 2018-03-14 | End: 2018-08-06 | Stop reason: SDUPTHER

## 2018-03-25 ENCOUNTER — HOSPITAL ENCOUNTER (OUTPATIENT)
Dept: SLEEP CENTER | Facility: HOSPITAL | Age: 53
Discharge: HOME | End: 2018-03-25
Payer: MEDICARE

## 2018-03-25 DIAGNOSIS — G47.33 OBSTRUCTIVE SLEEP APNEA: ICD-10-CM

## 2018-03-25 PROCEDURE — 95810 POLYSOM 6/> YRS 4/> PARAM: CPT

## 2018-03-26 NOTE — PROGRESS NOTES
Sleep Study Documentation    Pre-Sleep Study       Sleep testing procedure explained to patient:YES    Patient napped prior to study:NO    Caffeine:Dayshift worker after 12PM   Caffeine use:NO    Alcohol:Dayshift workers after 5PM: Alcohol use:NO    Typical day for patient:YES       Study Documentation  Diagnostic   Snore:Severe  Supplemental O2: no    O2 flow rate (L/min) range n/a  O2 flow rate (L/min) final n/a  Minimum SaO2 70%  Baseline SaO2 92%      EKG abnormalities: yes: sinus tachycardia throughout the study      EEG abnormalities: no    Study Terminated:no    Patient classification: disabled       Post-Sleep Study    Medication used at bedtime or during sleep study:YES prescription sleep aid    Patient reports time it took to fall asleep:less than 20 minutes    Patient reports waking up during study:3 or more times  Patient reports returning to sleep without difficulty  Patient reports sleeping 4 to 6 hours without dreaming  Patient reports sleep during study:typical    Patient rated sleepiness: Not sleepy or tired    PAP treatment:no

## 2018-03-28 ENCOUNTER — OFFICE VISIT (OUTPATIENT)
Dept: FAMILY MEDICINE CLINIC | Facility: CLINIC | Age: 53
End: 2018-03-28
Payer: MEDICARE

## 2018-03-28 VITALS
BODY MASS INDEX: 42.17 KG/M2 | RESPIRATION RATE: 18 BRPM | TEMPERATURE: 97.1 F | WEIGHT: 247 LBS | HEIGHT: 64 IN | DIASTOLIC BLOOD PRESSURE: 84 MMHG | SYSTOLIC BLOOD PRESSURE: 124 MMHG

## 2018-03-28 DIAGNOSIS — R06.02 SHORTNESS OF BREATH: ICD-10-CM

## 2018-03-28 DIAGNOSIS — I10 ESSENTIAL HYPERTENSION: ICD-10-CM

## 2018-03-28 DIAGNOSIS — R79.89 ELEVATED LFTS: Primary | ICD-10-CM

## 2018-03-28 DIAGNOSIS — R60.0 LOWER EXTREMITY EDEMA: ICD-10-CM

## 2018-03-28 DIAGNOSIS — Z91.19 MEDICALLY NONCOMPLIANT: ICD-10-CM

## 2018-03-28 DIAGNOSIS — G47.33 SEVERE OBSTRUCTIVE SLEEP APNEA: ICD-10-CM

## 2018-03-28 PROCEDURE — 99213 OFFICE O/P EST LOW 20 MIN: CPT | Performed by: FAMILY MEDICINE

## 2018-03-28 RX ORDER — DULOXETIN HYDROCHLORIDE 60 MG/1
CAPSULE, DELAYED RELEASE ORAL
COMMUNITY
Start: 2018-03-14 | End: 2018-04-16 | Stop reason: SDUPTHER

## 2018-03-28 RX ORDER — AMMONIUM LACTATE 12 G/100G
LOTION TOPICAL
COMMUNITY
Start: 2018-03-15 | End: 2019-07-10

## 2018-03-28 NOTE — PROGRESS NOTES
OFFICE VISIT  Ruddy Alvarez 47 y o  male MRN: [id number]      Assessment / Plan:  Diagnoses and all orders for this visit:    Elevated LFTs  -     US liver; Future    Essential hypertension  -     Echo complete with contrast if indicated; Future    Lower extremity edema  -     Echo complete with contrast if indicated; Future    Shortness of breath  -     Echo complete with contrast if indicated; Future    Severe obstructive sleep apnea    Medically noncompliant    Discussed with patient results of testing  He declined follow up with cpap  He declined US of liver  Discussed side effects of uncontrolled medical problems  Reason For Visit / Chief Complaint  Chief Complaint   Patient presents with    Follow-up     Pateint is here for a hospital follow up, he states he had his sleep study done but no results  His AVS says he needs f/u testing  HPI:  Ruddy Alvarez is a 47 y o  male presents today for follow up from Ed  He was admitted for cellulitis to left lower leg  HE was found to have abdnormal labs  Pt has known type 2 diabetes, he has been noncomplaint with his medications  He has recently stopped smoking, sleep study results have been reviewed with the patient  Today, he is only interested in having an echo completed  Had recently restarted duloetine       Historical Information   Past Medical History:   Diagnosis Date    Diabetes mellitus ([address] )     Disease of thyroid gland     Hyperlipidemia     Hypertension      Past Surgical History:   Procedure Laterality Date    KNEE SURGERY      SINUS SURGERY       Social History   History   Alcohol Use No     History   Drug Use No     History   Smoking Status    Current Every Day Smoker    Packs/day: 2 00   Smokeless Tobacco    Never Used     Family History   Problem Relation Age of Onset    Thyroid cancer Mother     Diabetes type II Mother     Esophageal cancer Father     Diabetes type II Father     Kidney cancer Brother     Diabetes type II Brother     Diabetes type II Maternal Grandmother        Meds/Allergies   Allergies   Allergen Reactions    Ace Inhibitors Swelling     Angioedema    Clindamycin      Had angioedema episode approx 5 hr after IM administration of clindamycin  Unclear if there is definitive causal relationship   Other      Pt believes that he is allergic to peanut butter         Meds:    Current Outpatient Prescriptions:     albuterol (2 5 mg/3 mL) 0 083 % nebulizer solution, Inhale 1 each 4 (four) times a day as needed, Disp: , Rfl:     ammonium lactate (LAC-HYDRIN) 12 % lotion, , Disp: , Rfl:     atorvastatin (LIPITOR) 20 mg tablet, Take 1 tablet (20 mg total) by mouth daily, Disp: 30 tablet, Rfl: 1    DULoxetine (CYMBALTA) 60 mg delayed release capsule, , Disp: , Rfl:     fenofibrate (TRICOR) 145 mg tablet, Take 1 tablet (145 mg total) by mouth daily, Disp: 30 tablet, Rfl: 1    fluticasone-salmeterol (ADVAIR DISKUS) 250-50 mcg/dose inhaler, Inhale 1 puff 2 (two) times a day, Disp: , Rfl:     gabapentin (NEURONTIN) 300 mg capsule, Take 1 capsule (300 mg total) by mouth 3 (three) times a day, Disp: 90 capsule, Rfl: 0    hydrochlorothiazide (HYDRODIURIL) 25 mg tablet, Take 1 tablet (25 mg total) by mouth 2 (two) times a day, Disp: 60 tablet, Rfl: 0    insulin glargine (LANTUS) 100 units/mL subcutaneous injection, Inject 35 Units under the skin daily at bedtime  , Disp: , Rfl:     Insulin Pen Needle (PEN NEEDLES 3/16") 31G X 5 MM MISC, by Does not apply route, Disp: , Rfl:     levothyroxine 200 mcg tablet, Take 1 tablet (200 mcg total) by mouth daily before breakfast, Disp: 30 tablet, Rfl: 1    metFORMIN (GLUCOPHAGE) 1000 MG tablet, Take 1,000 mg by mouth 2 (two) times a day, Disp: , Rfl:     omega-3-acid ethyl esters (LOVAZA) 1 g capsule, Take 1 capsule (1 g total) by mouth 2 (two) times a day for high cholesterol, Disp: 60 capsule, Rfl: 2    pioglitazone (ACTOS) 15 mg tablet, Take 1 tablet (15 mg total) by mouth daily, Disp: 30 tablet, Rfl: 1    pregabalin (LYRICA) 75 mg capsule, Take 75 mg by mouth 2 (two) times a day, Disp: , Rfl:     traZODone (DESYREL) 150 mg tablet, TAKE ONE TABLET BY MOUTH AT BEDTIME, Disp: 30 tablet, Rfl: 3    VENTOLIN  (90 Base) MCG/ACT inhaler, Take 2 puffs by mouth every 4 (four) hours as needed, Disp: , Rfl:       REVIEW OF SYSTEMS  A comprehensive review of systems was negative except for: Integument/breast: positive for dry skin       Current Vitals:   Blood Pressure: 124/84 (03/28/18 1105)  Temperature: (!) 97 1 °F (36 2 °C) (03/28/18 1105)  Respirations: 18 (03/28/18 1105)  Height: 5' 4" (162 6 cm) (03/28/18 1105)  Weight - Scale: 112 kg (247 lb) (03/28/18 1105)  [unfilled]    PHYSICAL EXAMS:  General appearance: alert and oriented, in no acute distress  Lungs: diminished breath sounds  Heart: regular rate and rhythm, S1, S2 normal, no murmur, click, rub or gallop  Abdomen: abnormal findings:  obese  Neurologic: Grossly normal{YES/NO:20        Follow up at this office in 1 month    Counseling / Coordination of Care  Total floor / unit time spent today 20 minutes  Greater than 50% of total time was spent with the patient and / or family counseling and / or coordination of care

## 2018-04-11 ENCOUNTER — TELEPHONE (OUTPATIENT)
Dept: SLEEP CENTER | Facility: HOSPITAL | Age: 53
End: 2018-04-11

## 2018-04-16 DIAGNOSIS — F32.9 REACTIVE DEPRESSION: Primary | ICD-10-CM

## 2018-04-16 DIAGNOSIS — IMO0002 UNCONTROLLED TYPE 2 DIABETES WITH NEUROPATHY: Primary | ICD-10-CM

## 2018-04-16 DIAGNOSIS — M54.50 CHRONIC BILATERAL LOW BACK PAIN WITHOUT SCIATICA: ICD-10-CM

## 2018-04-16 DIAGNOSIS — E03.9 HYPOTHYROIDISM, UNSPECIFIED TYPE: ICD-10-CM

## 2018-04-16 DIAGNOSIS — L03.116 LEFT LEG CELLULITIS: ICD-10-CM

## 2018-04-16 DIAGNOSIS — G89.29 CHRONIC BILATERAL LOW BACK PAIN WITHOUT SCIATICA: ICD-10-CM

## 2018-04-16 RX ORDER — GABAPENTIN 300 MG/1
300 CAPSULE ORAL 3 TIMES DAILY
Qty: 90 CAPSULE | Refills: 3 | Status: SHIPPED | OUTPATIENT
Start: 2018-04-16 | End: 2018-11-02 | Stop reason: SDUPTHER

## 2018-04-16 RX ORDER — DULOXETIN HYDROCHLORIDE 60 MG/1
CAPSULE, DELAYED RELEASE ORAL
Qty: 30 CAPSULE | Refills: 3 | Status: SHIPPED | OUTPATIENT
Start: 2018-04-16 | End: 2018-08-16 | Stop reason: SDUPTHER

## 2018-04-16 RX ORDER — PREGABALIN 75 MG/1
75 CAPSULE ORAL 2 TIMES DAILY
Qty: 60 CAPSULE | Refills: 0 | Status: SHIPPED | OUTPATIENT
Start: 2018-04-16 | End: 2018-05-04 | Stop reason: SDUPTHER

## 2018-04-16 RX ORDER — HYDROCHLOROTHIAZIDE 25 MG/1
25 TABLET ORAL 2 TIMES DAILY
Qty: 60 TABLET | Refills: 3 | Status: SHIPPED | OUTPATIENT
Start: 2018-04-16 | End: 2018-08-16 | Stop reason: SDUPTHER

## 2018-04-16 RX ORDER — LEVOTHYROXINE SODIUM 0.2 MG/1
200 TABLET ORAL
Qty: 30 TABLET | Refills: 0 | Status: SHIPPED | OUTPATIENT
Start: 2018-04-16 | End: 2018-05-16 | Stop reason: SDUPTHER

## 2018-05-04 DIAGNOSIS — IMO0002 UNCONTROLLED TYPE 2 DIABETES WITH NEUROPATHY: ICD-10-CM

## 2018-05-04 RX ORDER — PREGABALIN 75 MG/1
75 CAPSULE ORAL 2 TIMES DAILY
Qty: 60 CAPSULE | Refills: 0 | Status: SHIPPED | OUTPATIENT
Start: 2018-05-04 | End: 2018-05-16 | Stop reason: SDUPTHER

## 2018-05-10 ENCOUNTER — TELEPHONE (OUTPATIENT)
Dept: SLEEP CENTER | Facility: CLINIC | Age: 53
End: 2018-05-10

## 2018-05-16 DIAGNOSIS — E03.9 HYPOTHYROIDISM, UNSPECIFIED TYPE: ICD-10-CM

## 2018-05-16 DIAGNOSIS — IMO0002 UNCONTROLLED TYPE 2 DIABETES WITH NEUROPATHY: ICD-10-CM

## 2018-05-16 RX ORDER — PREGABALIN 75 MG/1
75 CAPSULE ORAL 2 TIMES DAILY
Qty: 60 CAPSULE | Refills: 0 | Status: SHIPPED | OUTPATIENT
Start: 2018-05-16 | End: 2018-06-11 | Stop reason: SDUPTHER

## 2018-05-16 RX ORDER — LEVOTHYROXINE SODIUM 0.2 MG/1
200 TABLET ORAL
Qty: 30 TABLET | Refills: 0 | Status: SHIPPED | OUTPATIENT
Start: 2018-05-16 | End: 2018-06-15 | Stop reason: SDUPTHER

## 2018-06-01 DIAGNOSIS — Z79.4 TYPE 2 DIABETES MELLITUS WITH COMPLICATION, WITH LONG-TERM CURRENT USE OF INSULIN (HCC): Primary | ICD-10-CM

## 2018-06-01 DIAGNOSIS — E11.8 TYPE 2 DIABETES MELLITUS WITH COMPLICATION, WITH LONG-TERM CURRENT USE OF INSULIN (HCC): Primary | ICD-10-CM

## 2018-06-01 RX ORDER — INSULIN GLARGINE 100 [IU]/ML
35 INJECTION, SOLUTION SUBCUTANEOUS
Qty: 10 ML | Refills: 3 | Status: SHIPPED | OUTPATIENT
Start: 2018-06-01 | End: 2018-06-04 | Stop reason: SDUPTHER

## 2018-06-04 DIAGNOSIS — E11.8 TYPE 2 DIABETES MELLITUS WITH COMPLICATION, WITH LONG-TERM CURRENT USE OF INSULIN (HCC): ICD-10-CM

## 2018-06-04 DIAGNOSIS — Z79.4 TYPE 2 DIABETES MELLITUS WITH COMPLICATION, WITH LONG-TERM CURRENT USE OF INSULIN (HCC): ICD-10-CM

## 2018-06-04 RX ORDER — INSULIN GLARGINE 100 [IU]/ML
35 INJECTION, SOLUTION SUBCUTANEOUS
Qty: 1000 UNITS | Refills: 0 | Status: CANCELLED | OUTPATIENT
Start: 2018-06-04

## 2018-06-04 RX ORDER — INSULIN GLARGINE 100 [IU]/ML
35 INJECTION, SOLUTION SUBCUTANEOUS
Qty: 3 ML | Refills: 3 | Status: SHIPPED | OUTPATIENT
Start: 2018-06-04 | End: 2018-06-04 | Stop reason: SDUPTHER

## 2018-06-04 RX ORDER — INSULIN GLARGINE 100 [IU]/ML
35 INJECTION, SOLUTION SUBCUTANEOUS
Qty: 3 ML | Refills: 3 | Status: SHIPPED | OUTPATIENT
Start: 2018-06-04 | End: 2018-12-03 | Stop reason: SDUPTHER

## 2018-06-11 DIAGNOSIS — IMO0002 UNCONTROLLED TYPE 2 DIABETES WITH NEUROPATHY: ICD-10-CM

## 2018-06-11 RX ORDER — PREGABALIN 75 MG/1
75 CAPSULE ORAL 2 TIMES DAILY
Qty: 60 CAPSULE | Refills: 0 | Status: SHIPPED | OUTPATIENT
Start: 2018-06-11 | End: 2018-07-16 | Stop reason: SDUPTHER

## 2018-06-15 DIAGNOSIS — E11.8 TYPE 2 DIABETES MELLITUS WITH COMPLICATION, WITH LONG-TERM CURRENT USE OF INSULIN (HCC): Primary | ICD-10-CM

## 2018-06-15 DIAGNOSIS — E03.9 HYPOTHYROIDISM, UNSPECIFIED TYPE: ICD-10-CM

## 2018-06-15 DIAGNOSIS — Z79.4 TYPE 2 DIABETES MELLITUS WITH COMPLICATION, WITH LONG-TERM CURRENT USE OF INSULIN (HCC): Primary | ICD-10-CM

## 2018-06-15 RX ORDER — LEVOTHYROXINE SODIUM 0.2 MG/1
200 TABLET ORAL
Qty: 30 TABLET | Refills: 3 | Status: SHIPPED | OUTPATIENT
Start: 2018-06-15 | End: 2018-10-19 | Stop reason: SDUPTHER

## 2018-06-19 DIAGNOSIS — Z79.4 TYPE 2 DIABETES MELLITUS WITH COMPLICATION, WITH LONG-TERM CURRENT USE OF INSULIN (HCC): ICD-10-CM

## 2018-06-19 DIAGNOSIS — E11.8 TYPE 2 DIABETES MELLITUS WITH COMPLICATION, WITH LONG-TERM CURRENT USE OF INSULIN (HCC): ICD-10-CM

## 2018-07-16 DIAGNOSIS — IMO0002 UNCONTROLLED TYPE 2 DIABETES WITH NEUROPATHY: ICD-10-CM

## 2018-07-16 RX ORDER — PREGABALIN 75 MG/1
75 CAPSULE ORAL 2 TIMES DAILY
Qty: 60 CAPSULE | Refills: 0 | Status: SHIPPED | OUTPATIENT
Start: 2018-07-16 | End: 2018-10-03 | Stop reason: SDUPTHER

## 2018-08-06 DIAGNOSIS — F41.8 DEPRESSION WITH ANXIETY: ICD-10-CM

## 2018-08-06 RX ORDER — TRAZODONE HYDROCHLORIDE 150 MG/1
150 TABLET ORAL
Qty: 30 TABLET | Refills: 3 | Status: SHIPPED | OUTPATIENT
Start: 2018-08-06 | End: 2018-12-03 | Stop reason: SDUPTHER

## 2018-08-16 DIAGNOSIS — L03.116 LEFT LEG CELLULITIS: ICD-10-CM

## 2018-08-16 DIAGNOSIS — F32.9 REACTIVE DEPRESSION: ICD-10-CM

## 2018-08-16 RX ORDER — HYDROCHLOROTHIAZIDE 25 MG/1
25 TABLET ORAL 2 TIMES DAILY
Qty: 60 TABLET | Refills: 3 | Status: SHIPPED | OUTPATIENT
Start: 2018-08-16 | End: 2018-12-18 | Stop reason: SDUPTHER

## 2018-08-16 RX ORDER — DULOXETIN HYDROCHLORIDE 60 MG/1
60 CAPSULE, DELAYED RELEASE ORAL DAILY
Qty: 30 CAPSULE | Refills: 3 | Status: SHIPPED | OUTPATIENT
Start: 2018-08-16 | End: 2018-12-03 | Stop reason: SDUPTHER

## 2018-10-03 DIAGNOSIS — IMO0002 UNCONTROLLED TYPE 2 DIABETES WITH NEUROPATHY: ICD-10-CM

## 2018-10-03 RX ORDER — PREGABALIN 75 MG/1
75 CAPSULE ORAL 2 TIMES DAILY
Qty: 60 CAPSULE | Refills: 0 | Status: SHIPPED | OUTPATIENT
Start: 2018-10-03 | End: 2018-12-03 | Stop reason: SDUPTHER

## 2018-10-19 DIAGNOSIS — Z79.4 TYPE 2 DIABETES MELLITUS WITH COMPLICATION, WITH LONG-TERM CURRENT USE OF INSULIN (HCC): ICD-10-CM

## 2018-10-19 DIAGNOSIS — E11.8 TYPE 2 DIABETES MELLITUS WITH COMPLICATION, WITH LONG-TERM CURRENT USE OF INSULIN (HCC): ICD-10-CM

## 2018-10-19 DIAGNOSIS — E03.9 HYPOTHYROIDISM, UNSPECIFIED TYPE: ICD-10-CM

## 2018-10-21 RX ORDER — LEVOTHYROXINE SODIUM 0.2 MG/1
TABLET ORAL
Qty: 30 TABLET | Refills: 3 | Status: SHIPPED | OUTPATIENT
Start: 2018-10-21 | End: 2019-02-18 | Stop reason: SDUPTHER

## 2018-11-02 DIAGNOSIS — M54.50 CHRONIC BILATERAL LOW BACK PAIN WITHOUT SCIATICA: ICD-10-CM

## 2018-11-02 DIAGNOSIS — G89.29 CHRONIC BILATERAL LOW BACK PAIN WITHOUT SCIATICA: ICD-10-CM

## 2018-11-02 RX ORDER — GABAPENTIN 300 MG/1
300 CAPSULE ORAL 3 TIMES DAILY
Qty: 90 CAPSULE | Refills: 0 | Status: SHIPPED | OUTPATIENT
Start: 2018-11-02 | End: 2019-03-06 | Stop reason: SDUPTHER

## 2018-12-03 DIAGNOSIS — F32.9 REACTIVE DEPRESSION: ICD-10-CM

## 2018-12-03 DIAGNOSIS — Z79.4 TYPE 2 DIABETES MELLITUS WITH COMPLICATION, WITH LONG-TERM CURRENT USE OF INSULIN (HCC): ICD-10-CM

## 2018-12-03 DIAGNOSIS — E11.8 TYPE 2 DIABETES MELLITUS WITH COMPLICATION, WITH LONG-TERM CURRENT USE OF INSULIN (HCC): ICD-10-CM

## 2018-12-03 DIAGNOSIS — IMO0002 UNCONTROLLED TYPE 2 DIABETES WITH NEUROPATHY: ICD-10-CM

## 2018-12-03 DIAGNOSIS — F41.8 DEPRESSION WITH ANXIETY: ICD-10-CM

## 2018-12-03 RX ORDER — PREGABALIN 75 MG/1
75 CAPSULE ORAL 2 TIMES DAILY
Qty: 180 CAPSULE | Refills: 0 | Status: SHIPPED | OUTPATIENT
Start: 2018-12-03 | End: 2019-04-03 | Stop reason: SDUPTHER

## 2018-12-03 RX ORDER — INSULIN GLARGINE 100 [IU]/ML
35 INJECTION, SOLUTION SUBCUTANEOUS
Qty: 15 ML | Refills: 0 | Status: SHIPPED | OUTPATIENT
Start: 2018-12-03 | End: 2019-02-04 | Stop reason: SDUPTHER

## 2018-12-03 RX ORDER — TRAZODONE HYDROCHLORIDE 150 MG/1
150 TABLET ORAL
Qty: 90 TABLET | Refills: 0 | Status: SHIPPED | OUTPATIENT
Start: 2018-12-03 | End: 2019-03-06 | Stop reason: SDUPTHER

## 2018-12-03 RX ORDER — INSULIN GLARGINE 100 [IU]/ML
35 INJECTION, SOLUTION SUBCUTANEOUS
Qty: 3 ML | Refills: 6 | Status: SHIPPED | OUTPATIENT
Start: 2018-12-03 | End: 2018-12-03 | Stop reason: SDUPTHER

## 2018-12-03 RX ORDER — DULOXETIN HYDROCHLORIDE 60 MG/1
60 CAPSULE, DELAYED RELEASE ORAL DAILY
Qty: 90 CAPSULE | Refills: 0 | Status: SHIPPED | OUTPATIENT
Start: 2018-12-03 | End: 2019-03-06 | Stop reason: SDUPTHER

## 2018-12-18 DIAGNOSIS — L03.116 LEFT LEG CELLULITIS: ICD-10-CM

## 2018-12-18 RX ORDER — HYDROCHLOROTHIAZIDE 25 MG/1
TABLET ORAL
Qty: 60 TABLET | Refills: 3 | Status: SHIPPED | OUTPATIENT
Start: 2018-12-18 | End: 2019-02-26

## 2019-02-04 DIAGNOSIS — Z79.4 TYPE 2 DIABETES MELLITUS WITH COMPLICATION, WITH LONG-TERM CURRENT USE OF INSULIN (HCC): ICD-10-CM

## 2019-02-04 DIAGNOSIS — E11.8 TYPE 2 DIABETES MELLITUS WITH COMPLICATION, WITH LONG-TERM CURRENT USE OF INSULIN (HCC): ICD-10-CM

## 2019-02-04 RX ORDER — INSULIN GLARGINE 100 [IU]/ML
35 INJECTION, SOLUTION SUBCUTANEOUS
Qty: 15 ML | Refills: 0 | Status: SHIPPED | OUTPATIENT
Start: 2019-02-04 | End: 2019-04-03 | Stop reason: SDUPTHER

## 2019-02-18 DIAGNOSIS — E03.9 HYPOTHYROIDISM, UNSPECIFIED TYPE: ICD-10-CM

## 2019-02-18 RX ORDER — LEVOTHYROXINE SODIUM 0.2 MG/1
TABLET ORAL
Qty: 30 TABLET | Refills: 0 | Status: SHIPPED | OUTPATIENT
Start: 2019-02-18 | End: 2019-03-18 | Stop reason: SDUPTHER

## 2019-02-25 DIAGNOSIS — E11.8 TYPE 2 DIABETES MELLITUS WITH COMPLICATION, WITH LONG-TERM CURRENT USE OF INSULIN (HCC): ICD-10-CM

## 2019-02-25 DIAGNOSIS — Z79.4 TYPE 2 DIABETES MELLITUS WITH COMPLICATION, WITH LONG-TERM CURRENT USE OF INSULIN (HCC): ICD-10-CM

## 2019-02-26 ENCOUNTER — OFFICE VISIT (OUTPATIENT)
Dept: FAMILY MEDICINE CLINIC | Facility: HOME HEALTHCARE | Age: 54
End: 2019-02-26
Payer: MEDICARE

## 2019-02-26 VITALS
BODY MASS INDEX: 41.32 KG/M2 | RESPIRATION RATE: 18 BRPM | SYSTOLIC BLOOD PRESSURE: 160 MMHG | OXYGEN SATURATION: 95 % | TEMPERATURE: 98.7 F | WEIGHT: 242 LBS | DIASTOLIC BLOOD PRESSURE: 86 MMHG | HEIGHT: 64 IN | HEART RATE: 93 BPM

## 2019-02-26 DIAGNOSIS — E55.9 VITAMIN D DEFICIENCY: ICD-10-CM

## 2019-02-26 DIAGNOSIS — E66.01 MORBID (SEVERE) OBESITY DUE TO EXCESS CALORIES (HCC): ICD-10-CM

## 2019-02-26 DIAGNOSIS — IMO0002 TYPE 2 DIABETES, UNCONTROLLED, WITH NEUROPATHY: Primary | ICD-10-CM

## 2019-02-26 DIAGNOSIS — F17.200 TOBACCO USE DISORDER: ICD-10-CM

## 2019-02-26 DIAGNOSIS — E03.9 ACQUIRED HYPOTHYROIDISM: ICD-10-CM

## 2019-02-26 DIAGNOSIS — I10 ESSENTIAL HYPERTENSION: ICD-10-CM

## 2019-02-26 DIAGNOSIS — E11.8 TYPE 2 DIABETES MELLITUS WITH COMPLICATION, WITH LONG-TERM CURRENT USE OF INSULIN (HCC): ICD-10-CM

## 2019-02-26 DIAGNOSIS — J44.9 COPD, MILD (HCC): ICD-10-CM

## 2019-02-26 DIAGNOSIS — Z79.4 TYPE 2 DIABETES MELLITUS WITH COMPLICATION, WITH LONG-TERM CURRENT USE OF INSULIN (HCC): ICD-10-CM

## 2019-02-26 DIAGNOSIS — E78.00 PURE HYPERCHOLESTEROLEMIA: ICD-10-CM

## 2019-02-26 PROCEDURE — 99213 OFFICE O/P EST LOW 20 MIN: CPT | Performed by: FAMILY MEDICINE

## 2019-02-26 RX ORDER — HYDROCHLOROTHIAZIDE 25 MG/1
25 TABLET ORAL 2 TIMES DAILY
Qty: 60 TABLET | Refills: 3 | Status: SHIPPED | OUTPATIENT
Start: 2019-02-26 | End: 2019-04-26 | Stop reason: SDUPTHER

## 2019-02-26 RX ORDER — KETOCONAZOLE 20 MG/G
CREAM TOPICAL
COMMUNITY
End: 2020-04-02 | Stop reason: ALTCHOICE

## 2019-02-26 RX ORDER — NICOTINE 21 MG/24HR
1 PATCH, TRANSDERMAL 24 HOURS TRANSDERMAL EVERY 24 HOURS
Qty: 28 PATCH | Refills: 0 | Status: SHIPPED | OUTPATIENT
Start: 2019-02-26 | End: 2019-07-10

## 2019-02-26 NOTE — PROGRESS NOTES
Assessment/Plan:     Diagnoses and all orders for this visit:    Type 2 diabetes, uncontrolled, with neuropathy (HCC)  -     CBC and differential; Future  -     Comprehensive metabolic panel; Future  -     UA w Reflex to Microscopic w Reflex to Culture -Lab Collect  -     Microalbumin / creatinine urine ratio; Future  -     HEMOGLOBIN A1C W/ EAG ESTIMATION; Future    Acquired hypothyroidism  -     TSH, 3rd generation with Free T4 reflex; Future    COPD, mild (HCC)  -     VENTOLIN  (90 Base) MCG/ACT inhaler; Inhale 2 puffs every 4 (four) hours as needed for wheezing or shortness of breath    Morbid (severe) obesity due to excess calories (HCC)    Essential hypertension  -     hydrochlorothiazide (HYDRODIURIL) 25 mg tablet; Take 1 tablet (25 mg total) by mouth 2 (two) times a day    Vitamin D deficiency  -     Vitamin D Panel; Future    Pure hypercholesterolemia  -     Lipid Panel with Direct LDL reflex; Future    Tobacco use disorder  -     nicotine (NICODERM CQ) 21 mg/24 hr TD 24 hr patch; Place 1 patch on the skin every 24 hours      Discussion/Plan:  Type 2 DM/HTN/Hyperlipidemia - poorly controlled  Discussed with patient he needs to change lifestyle, eating habits, increase exercise, smoking cessation  Schedule yearly eye exam  Schedule foot exam  Monitor blood sugars three times daily  Check labs  COPD - ventolin renewed  Continue advair  Stop smoking- nicoderm patches sent to pharmacy  RTC 1 month for recheck or sooner if needed  Subjective:      Patient ID: Corbin Barber is a 46 y o  male  Chief Complaint   Patient presents with    Follow-up     Needs diabetes labs       Patient is a 46year old male who presents to the office today for follow up  He has not been seen in almost 1 year, is due for labs  He is a Type 2 diabetic, last A1C 11 1 in 3/18  His diet is poor, does not follow diet  He does not exercise, but states he is active   He is not checking sugars regularly, running in the 200s when he does check them  He states he will try to do better  He does not have eye doctor  He is established with podiatry  He did not have colonoscopy, states he will not get one  He is willing to quit smoking  The following portions of the patient's history were reviewed and updated as appropriate: allergies, current medications, past family history, past medical history, past social history, past surgical history and problem list   Patient Active Problem List   Diagnosis    Allergic rhinitis    Angioedema    Arthritis    Chronic back pain    Chronic sinusitis    [facility], [facility]    Depression with anxiety    Hyperlipidemia    Hypertension    Hypothyroidism    Morbid obesity ([facility] )    Sleep apnea    Type 2 diabetes, uncontrolled, with neuropathy ([facility] )    Vitamin D deficiency    Cellulitis of left lower leg    Elevated LFTs    Elevated lactic acid level    Tobacco abuse     Current Outpatient Medications on File Prior to Visit   Medication Sig Dispense Refill    albuterol (2 5 mg/3 mL) 0 083 % nebulizer solution Inhale 1 each 4 (four) times a day as needed      ammonium lactate (LAC-HYDRIN) 12 % lotion       atorvastatin (LIPITOR) 20 mg tablet Take 1 tablet (20 mg total) by mouth daily 30 tablet 1    DULoxetine (CYMBALTA) 60 mg delayed release capsule Take 1 capsule (60 mg total) by mouth daily 90 capsule 0    fenofibrate (TRICOR) 145 mg tablet Take 1 tablet (145 mg total) by mouth daily 30 tablet 1    fluticasone-salmeterol (ADVAIR DISKUS) 250-50 mcg/dose inhaler Inhale 1 puff 2 (two) times a day      gabapentin (NEURONTIN) 300 mg capsule Take 1 capsule (300 mg total) by mouth 3 (three) times a day 90 capsule 0    insulin glargine (LANTUS) 100 units/mL subcutaneous injection Inject 35 Units under the skin daily at bedtime Please provider pen style  Inject 35 units under the skin daily at bedtime   15 mL 0    Insulin Pen Needle (PEN NEEDLES 3/16") 31G X 5 MM MISC by Does not apply route  levothyroxine 200 mcg tablet TAKE 1 TABLET BY MOUTH ONCE DAILY BEFORE BREAKFAST 30 tablet 0    metFORMIN (GLUCOPHAGE) 1000 MG tablet Take 1 tablet (1,000 mg total) by mouth 2 (two) times a day 60 tablet 3    omega-3-acid ethyl esters (LOVAZA) 1 g capsule Take 1 capsule (1 g total) by mouth 2 (two) times a day for high cholesterol 60 capsule 2    pioglitazone (ACTOS) 15 mg tablet Take 1 tablet (15 mg total) by mouth daily 30 tablet 1    pregabalin (LYRICA) 75 mg capsule Take 1 capsule (75 mg total) by mouth 2 (two) times a day 180 capsule 0    traZODone (DESYREL) 150 mg tablet Take 1 tablet (150 mg total) by mouth daily at bedtime 90 tablet 0    [DISCONTINUED] hydrochlorothiazide (HYDRODIURIL) 25 mg tablet TAKE 1 TABLET BY MOUTH TWICE DAILY 60 tablet 3    [DISCONTINUED] VENTOLIN  (90 Base) MCG/ACT inhaler Take 2 puffs by mouth every 4 (four) hours as needed       No current facility-administered medications on file prior to visit  Review of Systems   Constitutional: Negative  Eyes: Negative for photophobia and visual disturbance  Respiratory: Negative  Cardiovascular: Negative  Gastrointestinal: Negative  Neurological: Negative  Objective:      /86   Pulse 93   Temp 98 7 °F (37 1 °C)   Resp 18   Ht 5' 4" (1 626 m)   Wt 110 kg (242 lb)   SpO2 95%   BMI 41 54 kg/m²              Physical Exam   Constitutional: He is oriented to person, place, and time  He appears well-developed and well-nourished  obese   HENT:   Head: Normocephalic and atraumatic  Mouth/Throat: Oropharynx is clear and moist    Eyes: Pupils are equal, round, and reactive to light  Conjunctivae are normal    Neck: Neck supple  Cardiovascular: Normal rate, regular rhythm, normal heart sounds and intact distal pulses  Pulmonary/Chest: Effort normal  He has no decreased breath sounds  He has wheezes  He has no rhonchi  He has no rales  Expiratory wheezes   Abdominal: Soft   Bowel sounds are normal    Musculoskeletal: He exhibits no edema  Neurological: He is alert and oriented to person, place, and time  Skin: Skin is warm and dry

## 2019-03-06 DIAGNOSIS — M54.50 CHRONIC BILATERAL LOW BACK PAIN WITHOUT SCIATICA: ICD-10-CM

## 2019-03-06 DIAGNOSIS — F32.9 REACTIVE DEPRESSION: ICD-10-CM

## 2019-03-06 DIAGNOSIS — F41.8 DEPRESSION WITH ANXIETY: ICD-10-CM

## 2019-03-06 DIAGNOSIS — G89.29 CHRONIC BILATERAL LOW BACK PAIN WITHOUT SCIATICA: ICD-10-CM

## 2019-03-06 RX ORDER — TRAZODONE HYDROCHLORIDE 150 MG/1
150 TABLET ORAL
Qty: 90 TABLET | Refills: 0 | Status: SHIPPED | OUTPATIENT
Start: 2019-03-06 | End: 2019-06-11 | Stop reason: SDUPTHER

## 2019-03-06 RX ORDER — GABAPENTIN 300 MG/1
300 CAPSULE ORAL 3 TIMES DAILY
Qty: 90 CAPSULE | Refills: 0 | Status: SHIPPED | OUTPATIENT
Start: 2019-03-06 | End: 2019-05-10 | Stop reason: SDUPTHER

## 2019-03-06 RX ORDER — DULOXETIN HYDROCHLORIDE 60 MG/1
60 CAPSULE, DELAYED RELEASE ORAL DAILY
Qty: 90 CAPSULE | Refills: 0 | Status: SHIPPED | OUTPATIENT
Start: 2019-03-06 | End: 2019-06-19 | Stop reason: SDUPTHER

## 2019-03-18 DIAGNOSIS — E03.9 HYPOTHYROIDISM, UNSPECIFIED TYPE: ICD-10-CM

## 2019-03-18 RX ORDER — LEVOTHYROXINE SODIUM 0.2 MG/1
TABLET ORAL
Qty: 30 TABLET | Refills: 3 | Status: SHIPPED | OUTPATIENT
Start: 2019-03-18 | End: 2019-05-06

## 2019-04-03 DIAGNOSIS — E11.8 TYPE 2 DIABETES MELLITUS WITH COMPLICATION, WITH LONG-TERM CURRENT USE OF INSULIN (HCC): ICD-10-CM

## 2019-04-03 DIAGNOSIS — Z79.4 TYPE 2 DIABETES MELLITUS WITH COMPLICATION, WITH LONG-TERM CURRENT USE OF INSULIN (HCC): ICD-10-CM

## 2019-04-03 DIAGNOSIS — IMO0002 UNCONTROLLED TYPE 2 DIABETES WITH NEUROPATHY: ICD-10-CM

## 2019-04-03 RX ORDER — PREGABALIN 75 MG/1
75 CAPSULE ORAL 2 TIMES DAILY
Qty: 180 CAPSULE | Refills: 0 | Status: SHIPPED | OUTPATIENT
Start: 2019-04-03 | End: 2019-05-10

## 2019-04-03 RX ORDER — INSULIN GLARGINE 100 [IU]/ML
35 INJECTION, SOLUTION SUBCUTANEOUS
Qty: 15 ML | Refills: 0 | Status: SHIPPED | OUTPATIENT
Start: 2019-04-03 | End: 2019-04-04

## 2019-04-04 DIAGNOSIS — E11.65 TYPE 2 DIABETES MELLITUS WITH HYPERGLYCEMIA, WITH LONG-TERM CURRENT USE OF INSULIN (HCC): Primary | ICD-10-CM

## 2019-04-04 DIAGNOSIS — Z79.4 TYPE 2 DIABETES MELLITUS WITH HYPERGLYCEMIA, WITH LONG-TERM CURRENT USE OF INSULIN (HCC): Primary | ICD-10-CM

## 2019-04-26 DIAGNOSIS — I10 ESSENTIAL HYPERTENSION: ICD-10-CM

## 2019-04-26 RX ORDER — HYDROCHLOROTHIAZIDE 25 MG/1
25 TABLET ORAL 2 TIMES DAILY
Qty: 60 TABLET | Refills: 3 | Status: SHIPPED | OUTPATIENT
Start: 2019-04-26 | End: 2019-08-30 | Stop reason: SDUPTHER

## 2019-04-30 DIAGNOSIS — IMO0002 TYPE 2 DIABETES, UNCONTROLLED, WITH NEUROPATHY: ICD-10-CM

## 2019-04-30 DIAGNOSIS — E55.9 VITAMIN D DEFICIENCY: ICD-10-CM

## 2019-04-30 DIAGNOSIS — E03.9 ACQUIRED HYPOTHYROIDISM: ICD-10-CM

## 2019-04-30 DIAGNOSIS — E78.00 PURE HYPERCHOLESTEROLEMIA: ICD-10-CM

## 2019-04-30 LAB
ALBUMIN SERPL BCP-MCNC: 3.6 G/DL (ref 3.5–5)
ALP SERPL-CCNC: 81 U/L (ref 46–116)
ALT SERPL W P-5'-P-CCNC: 33 U/L (ref 12–78)
ANION GAP SERPL CALCULATED.3IONS-SCNC: 8 MMOL/L (ref 4–13)
AST SERPL W P-5'-P-CCNC: 19 U/L (ref 5–45)
BASOPHILS # BLD AUTO: 0.05 THOUSANDS/ΜL (ref 0–0.1)
BASOPHILS NFR BLD AUTO: 1 % (ref 0–1)
BILIRUB SERPL-MCNC: 0.34 MG/DL (ref 0.2–1)
BUN SERPL-MCNC: 9 MG/DL (ref 5–25)
CALCIUM SERPL-MCNC: 9 MG/DL (ref 8.3–10.1)
CHLORIDE SERPL-SCNC: 100 MMOL/L (ref 100–108)
CHOLEST SERPL-MCNC: 216 MG/DL (ref 50–200)
CO2 SERPL-SCNC: 28 MMOL/L (ref 21–32)
CREAT SERPL-MCNC: 0.98 MG/DL (ref 0.6–1.3)
EOSINOPHIL # BLD AUTO: 0.14 THOUSAND/ΜL (ref 0–0.61)
EOSINOPHIL NFR BLD AUTO: 1 % (ref 0–6)
ERYTHROCYTE [DISTWIDTH] IN BLOOD BY AUTOMATED COUNT: 12.8 % (ref 11.6–15.1)
EST. AVERAGE GLUCOSE BLD GHB EST-MCNC: 206 MG/DL
GFR SERPL CREATININE-BSD FRML MDRD: 87 ML/MIN/1.73SQ M
GLUCOSE P FAST SERPL-MCNC: 174 MG/DL (ref 65–99)
HBA1C MFR BLD: 8.8 % (ref 4.2–6.3)
HCT VFR BLD AUTO: 46 % (ref 36.5–49.3)
HDLC SERPL-MCNC: 32 MG/DL (ref 40–60)
HGB BLD-MCNC: 15.2 G/DL (ref 12–17)
IMM GRANULOCYTES # BLD AUTO: 0.02 THOUSAND/UL (ref 0–0.2)
IMM GRANULOCYTES NFR BLD AUTO: 0 % (ref 0–2)
LDLC SERPL CALC-MCNC: 144 MG/DL (ref 0–100)
LYMPHOCYTES # BLD AUTO: 2.5 THOUSANDS/ΜL (ref 0.6–4.47)
LYMPHOCYTES NFR BLD AUTO: 26 % (ref 14–44)
MCH RBC QN AUTO: 29.2 PG (ref 26.8–34.3)
MCHC RBC AUTO-ENTMCNC: 33 G/DL (ref 31.4–37.4)
MCV RBC AUTO: 88 FL (ref 82–98)
MONOCYTES # BLD AUTO: 0.91 THOUSAND/ΜL (ref 0.17–1.22)
MONOCYTES NFR BLD AUTO: 9 % (ref 4–12)
NEUTROPHILS # BLD AUTO: 6.05 THOUSANDS/ΜL (ref 1.85–7.62)
NEUTS SEG NFR BLD AUTO: 63 % (ref 43–75)
NRBC BLD AUTO-RTO: 0 /100 WBCS
PLATELET # BLD AUTO: 317 THOUSANDS/UL (ref 149–390)
PMV BLD AUTO: 9.7 FL (ref 8.9–12.7)
POTASSIUM SERPL-SCNC: 4.1 MMOL/L (ref 3.5–5.3)
PROT SERPL-MCNC: 7.1 G/DL (ref 6.4–8.2)
RBC # BLD AUTO: 5.21 MILLION/UL (ref 3.88–5.62)
SODIUM SERPL-SCNC: 136 MMOL/L (ref 136–145)
T4 FREE SERPL-MCNC: 1.64 NG/DL (ref 0.76–1.46)
TRIGL SERPL-MCNC: 201 MG/DL
TSH SERPL DL<=0.05 MIU/L-ACNC: 0.04 UIU/ML (ref 0.36–3.74)
WBC # BLD AUTO: 9.67 THOUSAND/UL (ref 4.31–10.16)

## 2019-04-30 PROCEDURE — 83036 HEMOGLOBIN GLYCOSYLATED A1C: CPT | Performed by: FAMILY MEDICINE

## 2019-04-30 PROCEDURE — 84443 ASSAY THYROID STIM HORMONE: CPT | Performed by: FAMILY MEDICINE

## 2019-04-30 PROCEDURE — 80061 LIPID PANEL: CPT | Performed by: FAMILY MEDICINE

## 2019-04-30 PROCEDURE — 80053 COMPREHEN METABOLIC PANEL: CPT | Performed by: FAMILY MEDICINE

## 2019-04-30 PROCEDURE — 82306 VITAMIN D 25 HYDROXY: CPT | Performed by: FAMILY MEDICINE

## 2019-04-30 PROCEDURE — 84439 ASSAY OF FREE THYROXINE: CPT

## 2019-04-30 PROCEDURE — 85025 COMPLETE CBC W/AUTO DIFF WBC: CPT | Performed by: FAMILY MEDICINE

## 2019-05-04 LAB
25(OH)D2 SERPL-MCNC: <1 NG/ML
25(OH)D3 SERPL-MCNC: 25 NG/ML
25(OH)D3+25(OH)D2 SERPL-MCNC: 25 NG/ML

## 2019-05-06 DIAGNOSIS — E03.9 HYPOTHYROIDISM, UNSPECIFIED TYPE: ICD-10-CM

## 2019-05-06 RX ORDER — LEVOTHYROXINE SODIUM 175 UG/1
175 TABLET ORAL DAILY
Qty: 30 TABLET | Refills: 1 | Status: SHIPPED | OUTPATIENT
Start: 2019-05-06 | End: 2019-07-01 | Stop reason: SDUPTHER

## 2019-05-07 ENCOUNTER — TELEPHONE (OUTPATIENT)
Dept: FAMILY MEDICINE CLINIC | Facility: HOME HEALTHCARE | Age: 54
End: 2019-05-07

## 2019-05-10 DIAGNOSIS — M54.50 CHRONIC BILATERAL LOW BACK PAIN WITHOUT SCIATICA: ICD-10-CM

## 2019-05-10 DIAGNOSIS — G89.29 CHRONIC BILATERAL LOW BACK PAIN WITHOUT SCIATICA: ICD-10-CM

## 2019-05-10 RX ORDER — GABAPENTIN 300 MG/1
300 CAPSULE ORAL 3 TIMES DAILY
Qty: 90 CAPSULE | Refills: 0 | Status: SHIPPED | OUTPATIENT
Start: 2019-05-10 | End: 2019-06-19 | Stop reason: SDUPTHER

## 2019-05-10 RX ORDER — GABAPENTIN 300 MG/1
300 CAPSULE ORAL 3 TIMES DAILY
Qty: 90 CAPSULE | Refills: 0 | OUTPATIENT
Start: 2019-05-10

## 2019-06-11 DIAGNOSIS — F41.8 DEPRESSION WITH ANXIETY: ICD-10-CM

## 2019-06-11 RX ORDER — TRAZODONE HYDROCHLORIDE 150 MG/1
150 TABLET ORAL
Qty: 90 TABLET | Refills: 0 | Status: SHIPPED | OUTPATIENT
Start: 2019-06-11 | End: 2019-09-16 | Stop reason: SDUPTHER

## 2019-06-13 DIAGNOSIS — F41.8 DEPRESSION WITH ANXIETY: ICD-10-CM

## 2019-06-13 RX ORDER — TRAZODONE HYDROCHLORIDE 150 MG/1
TABLET ORAL
Qty: 90 TABLET | Refills: 0 | Status: SHIPPED | OUTPATIENT
Start: 2019-06-13 | End: 2019-07-10

## 2019-06-17 DIAGNOSIS — E78.00 PURE HYPERCHOLESTEROLEMIA: ICD-10-CM

## 2019-06-17 RX ORDER — ATORVASTATIN CALCIUM 20 MG/1
20 TABLET, FILM COATED ORAL DAILY
Qty: 30 TABLET | Refills: 5 | Status: SHIPPED | OUTPATIENT
Start: 2019-06-17 | End: 2019-07-10

## 2019-06-19 DIAGNOSIS — M54.50 CHRONIC BILATERAL LOW BACK PAIN WITHOUT SCIATICA: ICD-10-CM

## 2019-06-19 DIAGNOSIS — F32.9 REACTIVE DEPRESSION: ICD-10-CM

## 2019-06-19 DIAGNOSIS — G89.29 CHRONIC BILATERAL LOW BACK PAIN WITHOUT SCIATICA: ICD-10-CM

## 2019-06-19 RX ORDER — DULOXETIN HYDROCHLORIDE 60 MG/1
60 CAPSULE, DELAYED RELEASE ORAL DAILY
Qty: 90 CAPSULE | Refills: 0 | OUTPATIENT
Start: 2019-06-19

## 2019-06-19 RX ORDER — GABAPENTIN 300 MG/1
300 CAPSULE ORAL 3 TIMES DAILY
Qty: 90 CAPSULE | Refills: 0 | OUTPATIENT
Start: 2019-06-19

## 2019-06-19 RX ORDER — GABAPENTIN 300 MG/1
300 CAPSULE ORAL 3 TIMES DAILY
Qty: 90 CAPSULE | Refills: 0 | Status: SHIPPED | OUTPATIENT
Start: 2019-06-19 | End: 2019-08-14

## 2019-06-19 RX ORDER — DULOXETIN HYDROCHLORIDE 60 MG/1
60 CAPSULE, DELAYED RELEASE ORAL DAILY
Qty: 60 CAPSULE | Refills: 0 | Status: SHIPPED | OUTPATIENT
Start: 2019-06-19 | End: 2019-08-30 | Stop reason: SDUPTHER

## 2019-06-28 DIAGNOSIS — Z79.4 TYPE 2 DIABETES MELLITUS WITH COMPLICATION, WITH LONG-TERM CURRENT USE OF INSULIN (HCC): ICD-10-CM

## 2019-06-28 DIAGNOSIS — E11.8 TYPE 2 DIABETES MELLITUS WITH COMPLICATION, WITH LONG-TERM CURRENT USE OF INSULIN (HCC): ICD-10-CM

## 2019-07-01 DIAGNOSIS — E03.9 HYPOTHYROIDISM, UNSPECIFIED TYPE: ICD-10-CM

## 2019-07-02 RX ORDER — LEVOTHYROXINE SODIUM 175 UG/1
TABLET ORAL
Qty: 30 TABLET | Refills: 1 | Status: SHIPPED | OUTPATIENT
Start: 2019-07-02 | End: 2019-10-31 | Stop reason: SDUPTHER

## 2019-07-02 RX ORDER — LEVOTHYROXINE SODIUM 175 UG/1
175 TABLET ORAL DAILY
Qty: 30 TABLET | Refills: 1 | Status: SHIPPED | OUTPATIENT
Start: 2019-07-02 | End: 2019-07-10

## 2019-07-10 ENCOUNTER — TELEPHONE (OUTPATIENT)
Dept: OTHER | Facility: OTHER | Age: 54
End: 2019-07-10

## 2019-07-10 ENCOUNTER — TELEPHONE (OUTPATIENT)
Dept: FAMILY MEDICINE CLINIC | Facility: HOME HEALTHCARE | Age: 54
End: 2019-07-10

## 2019-07-10 ENCOUNTER — OFFICE VISIT (OUTPATIENT)
Dept: FAMILY MEDICINE CLINIC | Facility: HOME HEALTHCARE | Age: 54
End: 2019-07-10
Payer: MEDICARE

## 2019-07-10 VITALS
SYSTOLIC BLOOD PRESSURE: 128 MMHG | TEMPERATURE: 96.4 F | OXYGEN SATURATION: 97 % | RESPIRATION RATE: 16 BRPM | BODY MASS INDEX: 39.44 KG/M2 | WEIGHT: 231 LBS | DIASTOLIC BLOOD PRESSURE: 74 MMHG | HEART RATE: 119 BPM | HEIGHT: 64 IN

## 2019-07-10 DIAGNOSIS — Z79.4 TYPE 2 DIABETES MELLITUS WITH COMPLICATION, WITH LONG-TERM CURRENT USE OF INSULIN (HCC): Primary | ICD-10-CM

## 2019-07-10 DIAGNOSIS — E11.8 TYPE 2 DIABETES MELLITUS WITH COMPLICATION, WITH LONG-TERM CURRENT USE OF INSULIN (HCC): Primary | ICD-10-CM

## 2019-07-10 DIAGNOSIS — J44.9 COPD, MILD (HCC): ICD-10-CM

## 2019-07-10 DIAGNOSIS — I10 ESSENTIAL HYPERTENSION: ICD-10-CM

## 2019-07-10 DIAGNOSIS — H10.13 ALLERGIC CONJUNCTIVITIS OF BOTH EYES: ICD-10-CM

## 2019-07-10 LAB — SL AMB POCT HEMOGLOBIN AIC: 9.9 (ref ?–6.5)

## 2019-07-10 PROCEDURE — 83036 HEMOGLOBIN GLYCOSYLATED A1C: CPT | Performed by: FAMILY MEDICINE

## 2019-07-10 PROCEDURE — 99213 OFFICE O/P EST LOW 20 MIN: CPT | Performed by: FAMILY MEDICINE

## 2019-07-10 RX ORDER — CROMOLYN SODIUM 40 MG/ML
1 SOLUTION/ DROPS OPHTHALMIC 4 TIMES DAILY
Qty: 10 ML | Refills: 0 | Status: SHIPPED | OUTPATIENT
Start: 2019-07-10 | End: 2020-04-02 | Stop reason: ALTCHOICE

## 2019-07-10 RX ORDER — ALBUTEROL SULFATE 2.5 MG/3ML
2.5 SOLUTION RESPIRATORY (INHALATION) EVERY 4 HOURS PRN
Qty: 120 VIAL | Refills: 0 | Status: SHIPPED | OUTPATIENT
Start: 2019-07-10 | End: 2021-01-06 | Stop reason: SDUPTHER

## 2019-07-10 RX ORDER — PREGABALIN 75 MG/1
CAPSULE ORAL
COMMUNITY
End: 2019-10-01 | Stop reason: SDUPTHER

## 2019-07-10 RX ORDER — ATORVASTATIN CALCIUM 20 MG/1
20 TABLET, FILM COATED ORAL DAILY
COMMUNITY
End: 2019-09-05 | Stop reason: SDUPTHER

## 2019-07-10 NOTE — TELEPHONE ENCOUNTER
PT called stating that his Januvia costs $[phone] 10 and he cannot afford this medication  Pt was wondering if there is something else that can be called into pharmacy that is a little bit cheaper

## 2019-07-10 NOTE — PROGRESS NOTES
OFFICE VISIT  Johnson Evans 46 y o  male MRN: [id number]      Assessment / Plan:  Diagnoses and all orders for this visit:    Type 2 diabetes mellitus with complication, with long-term current use of insulin (HCC)  -     POCT hemoglobin A1c  -     sitaGLIPtin (JANUVIA) 50 mg tablet; Take 1 tablet (50 mg total) by mouth daily    Essential hypertension    Allergic conjunctivitis of both eyes  -     cromolyn (OPTICROM) 4 % ophthalmic solution; Administer 1 drop to both eyes 4 (four) times a day    COPD, mild (HCC)  -     albuterol (2 5 mg/3 mL) 0 083 % nebulizer solution; Take 1 vial (2 5 mg total) by nebulization every 4 (four) hours as needed for shortness of breath    Other orders  -     atorvastatin (LIPITOR) 20 mg tablet; Take 20 mg by mouth daily  -     pregabalin (LYRICA) 75 mg capsule; lyrica 75 mg caps      Will add januvia to regimen, discussed better adherence to diabetic diet and medication, check sugars 3x daily  advise to seek eye doctor and podiatry,   HTN- better controlled, continue hctz, smoking cessation advise, cardiac prudent diet  Copd- stable, albuterol renewed, emphasis importance of smoking cessation, declined patches    Reason For Visit / Chief Complaint  Chief Complaint   Patient presents with    Follow-up    Eye Pain        HPI:  Johnson Evans is a 46 y o  male who presents today for diabetes check up, he does not check his sugars at home, he is on oral agent and insulin, he has be off his diet and eating carbs  He has had some weight loss since feb, 10 pounds   He does not recall starting actos which was on his med list  He reports eye discharge over the last week, used otc eye drops with some relief        Historical Information   Past Medical History:   Diagnosis Date    Diabetes mellitus ([address] )     Disease of thyroid gland     Hyperlipidemia     Hypertension      Past Surgical History:   Procedure Laterality Date    KNEE SURGERY      SINUS SURGERY       Social History   Social History     Substance and Sexual Activity   Alcohol Use No     Social History     Substance and Sexual Activity   Drug Use No     Social History     Tobacco Use   Smoking Status Current Every Day Smoker    Packs/day: 2 00   Smokeless Tobacco Never Used     Family History   Problem Relation Age of Onset    Thyroid cancer Mother     Diabetes type II Mother     Esophageal cancer Father     Diabetes type II Father     Kidney cancer Brother     Diabetes type II Brother     Diabetes type II Maternal Grandmother        Meds/Allergies   Allergies   Allergen Reactions    Ace Inhibitors Swelling     Angioedema    Clindamycin      Had angioedema episode approx 5 hr after IM administration of clindamycin  Unclear if there is definitive causal relationship   Other      Pt believes that he is allergic to peanut butter         Meds:    Current Outpatient Medications:     albuterol (2 5 mg/3 mL) 0 083 % nebulizer solution, Take 1 vial (2 5 mg total) by nebulization every 4 (four) hours as needed for shortness of breath, Disp: 120 vial, Rfl: 0    atorvastatin (LIPITOR) 20 mg tablet, Take 20 mg by mouth daily, Disp: , Rfl:     DULoxetine (CYMBALTA) 60 mg delayed release capsule, Take 1 capsule (60 mg total) by mouth daily, Disp: 60 capsule, Rfl: 0    fluticasone-salmeterol (ADVAIR DISKUS) 250-50 mcg/dose inhaler, Inhale 1 puff 2 (two) times a day, Disp: , Rfl:     gabapentin (NEURONTIN) 300 mg capsule, Take 1 capsule (300 mg total) by mouth 3 (three) times a day, Disp: 90 capsule, Rfl: 0    hydrochlorothiazide (HYDRODIURIL) 25 mg tablet, Take 1 tablet (25 mg total) by mouth 2 (two) times a day, Disp: 60 tablet, Rfl: 3    insulin glargine (LANTUS SOLOSTAR) 100 units/mL injection pen, Inject 35 Units under the skin daily, Disp: 5 pen, Rfl: 1    Insulin Pen Needle (PEN NEEDLES 3/16") 31G X 5 MM MISC, by Does not apply route, Disp: , Rfl:     ketoconazole (NIZORAL) 2 % cream, ketoconazole 2 % topical cream  APPLY TO THE AFFECTED AREA(S) BY TOPICAL ROUTE ONCE DAILY, Disp: , Rfl:     levothyroxine 175 mcg tablet, TAKE 1 TABLET BY MOUTH ONCE DAILY BEFORE BREAKFAST, Disp: 30 tablet, Rfl: 1    metFORMIN (GLUCOPHAGE) 1000 MG tablet, Take 1 tablet (1,000 mg total) by mouth 2 (two) times a day, Disp: 60 tablet, Rfl: 3    omega-3-acid ethyl esters (LOVAZA) 1 g capsule, Take 1 capsule (1 g total) by mouth 2 (two) times a day for high cholesterol, Disp: 60 capsule, Rfl: 2    pregabalin (LYRICA) 75 mg capsule, lyrica 75 mg caps, Disp: , Rfl:     traZODone (DESYREL) 150 mg tablet, Take 1 tablet (150 mg total) by mouth daily at bedtime, Disp: 90 tablet, Rfl: 0    VENTOLIN  (90 Base) MCG/ACT inhaler, Inhale 2 puffs every 4 (four) hours as needed for wheezing or shortness of breath, Disp: 1 Inhaler, Rfl: 5    cromolyn (OPTICROM) 4 % ophthalmic solution, Administer 1 drop to both eyes 4 (four) times a day, Disp: 10 mL, Rfl: 0    sitaGLIPtin (JANUVIA) 50 mg tablet, Take 1 tablet (50 mg total) by mouth daily, Disp: 90 tablet, Rfl: 0      REVIEW OF SYSTEMS  Review of Systems   Constitutional: Negative for appetite change, fatigue and fever  HENT: Negative for congestion, ear discharge, ear pain and postnasal drip  Eyes: Positive for discharge  Negative for pain, redness, itching and visual disturbance  Respiratory: Negative for chest tightness, shortness of breath and wheezing  Cardiovascular: Negative for chest pain, palpitations and leg swelling  Gastrointestinal: Negative for abdominal distention, abdominal pain, blood in stool, diarrhea, nausea and vomiting  Endocrine: Negative for cold intolerance, heat intolerance, polydipsia, polyphagia and polyuria  Genitourinary: Negative for decreased urine volume, difficulty urinating, dysuria, frequency, hematuria, testicular pain and urgency  Musculoskeletal: Positive for arthralgias  Negative for back pain, myalgias, neck pain and neck stiffness     Skin: Negative for color change, pallor, rash and wound  Neurological: Negative for dizziness, light-headedness, numbness and headaches  Hematological: Negative for adenopathy  Does not bruise/bleed easily  Psychiatric/Behavioral: Negative for agitation, behavioral problems, self-injury, sleep disturbance and suicidal ideas  The patient is not nervous/anxious  Current Vitals:   Blood Pressure: 128/74 (07/10/19 1325)  Pulse: (!) 119 (07/10/19 1325)  Temperature: (!) 96 4 °F (35 8 °C) (07/10/19 1325)  Temp Source: Temporal (07/10/19 1325)  Respirations: 16 (07/10/19 1325)  Height: 5' 4" (162 6 cm) (07/10/19 1325)  Weight - Scale: 105 kg (231 lb) (07/10/19 1325)  SpO2: 97 % (07/10/19 1325)  [unfilled]    PHYSICAL EXAMS:  Physical Exam   Constitutional: He is oriented to person, place, and time  He appears well-developed and well-nourished  HENT:   Head: Normocephalic and atraumatic  Right Ear: External ear normal    Left Ear: External ear normal    Nose: Nose normal    Mouth/Throat: Oropharynx is clear and moist    Eyes: Pupils are equal, round, and reactive to light  Conjunctivae are normal  Right eye exhibits no discharge  Left eye exhibits no discharge  Neck: Normal range of motion  Neck supple  No thyromegaly present  Cardiovascular: Normal rate, regular rhythm and normal heart sounds  Pulmonary/Chest: Effort normal and breath sounds normal    Abdominal: Soft  Bowel sounds are normal  He exhibits no distension  There is no tenderness  large   Musculoskeletal: Normal range of motion  He exhibits no edema, tenderness or deformity  Neurological: He is alert and oriented to person, place, and time  Skin: Skin is warm and dry  No rash noted  No erythema  Psychiatric: He has a normal mood and affect  His behavior is normal            Follow up at this office in 3 months     Counseling / Coordination of Care  Total floor / unit time spent today 20 minutes    Greater than 50% of total time was spent with the patient and / or family counseling and / or coordination of care

## 2019-07-12 DIAGNOSIS — Z79.4 TYPE 2 DIABETES MELLITUS WITHOUT COMPLICATION, WITH LONG-TERM CURRENT USE OF INSULIN (HCC): Primary | ICD-10-CM

## 2019-07-12 DIAGNOSIS — E11.9 TYPE 2 DIABETES MELLITUS WITHOUT COMPLICATION, WITH LONG-TERM CURRENT USE OF INSULIN (HCC): Primary | ICD-10-CM

## 2019-08-14 DIAGNOSIS — M54.50 CHRONIC BILATERAL LOW BACK PAIN WITHOUT SCIATICA: ICD-10-CM

## 2019-08-14 DIAGNOSIS — G89.29 CHRONIC BILATERAL LOW BACK PAIN WITHOUT SCIATICA: ICD-10-CM

## 2019-08-15 RX ORDER — GABAPENTIN 300 MG/1
300 CAPSULE ORAL 3 TIMES DAILY
Qty: 90 CAPSULE | Refills: 0 | Status: SHIPPED | OUTPATIENT
Start: 2019-08-15 | End: 2019-10-01 | Stop reason: SDUPTHER

## 2019-08-30 DIAGNOSIS — I10 ESSENTIAL HYPERTENSION: ICD-10-CM

## 2019-08-30 DIAGNOSIS — F32.9 REACTIVE DEPRESSION: ICD-10-CM

## 2019-08-30 RX ORDER — DULOXETIN HYDROCHLORIDE 60 MG/1
60 CAPSULE, DELAYED RELEASE ORAL DAILY
Qty: 60 CAPSULE | Refills: 0 | Status: SHIPPED | OUTPATIENT
Start: 2019-08-30 | End: 2019-11-01 | Stop reason: SDUPTHER

## 2019-08-30 RX ORDER — HYDROCHLOROTHIAZIDE 25 MG/1
25 TABLET ORAL 2 TIMES DAILY
Qty: 60 TABLET | Refills: 3 | Status: SHIPPED | OUTPATIENT
Start: 2019-08-30 | End: 2020-01-02 | Stop reason: SDUPTHER

## 2019-09-05 DIAGNOSIS — E78.00 PURE HYPERCHOLESTEROLEMIA: Primary | ICD-10-CM

## 2019-09-05 RX ORDER — ATORVASTATIN CALCIUM 20 MG/1
20 TABLET, FILM COATED ORAL DAILY
Qty: 90 TABLET | Refills: 0 | Status: SHIPPED | OUTPATIENT
Start: 2019-09-05 | End: 2020-02-03 | Stop reason: SDUPTHER

## 2019-09-09 DIAGNOSIS — Z79.4 TYPE 2 DIABETES MELLITUS WITH HYPERGLYCEMIA, WITH LONG-TERM CURRENT USE OF INSULIN (HCC): ICD-10-CM

## 2019-09-09 DIAGNOSIS — E11.65 TYPE 2 DIABETES MELLITUS WITH HYPERGLYCEMIA, WITH LONG-TERM CURRENT USE OF INSULIN (HCC): ICD-10-CM

## 2019-09-16 DIAGNOSIS — F41.8 DEPRESSION WITH ANXIETY: ICD-10-CM

## 2019-09-16 RX ORDER — TRAZODONE HYDROCHLORIDE 150 MG/1
150 TABLET ORAL
Qty: 90 TABLET | Refills: 0 | Status: SHIPPED | OUTPATIENT
Start: 2019-09-16 | End: 2019-12-11 | Stop reason: SDUPTHER

## 2019-09-19 ENCOUNTER — OFFICE VISIT (OUTPATIENT)
Dept: FAMILY MEDICINE CLINIC | Facility: HOME HEALTHCARE | Age: 54
End: 2019-09-19
Payer: MEDICARE

## 2019-09-19 VITALS
WEIGHT: 231 LBS | HEIGHT: 64 IN | DIASTOLIC BLOOD PRESSURE: 92 MMHG | HEART RATE: 105 BPM | SYSTOLIC BLOOD PRESSURE: 130 MMHG | OXYGEN SATURATION: 93 % | RESPIRATION RATE: 17 BRPM | BODY MASS INDEX: 39.44 KG/M2 | TEMPERATURE: 97.8 F

## 2019-09-19 DIAGNOSIS — H10.13 ALLERGIC CONJUNCTIVITIS OF BOTH EYES: Primary | ICD-10-CM

## 2019-09-19 DIAGNOSIS — Z79.4 TYPE 2 DIABETES MELLITUS WITHOUT COMPLICATION, WITH LONG-TERM CURRENT USE OF INSULIN (HCC): ICD-10-CM

## 2019-09-19 DIAGNOSIS — E11.9 TYPE 2 DIABETES MELLITUS WITHOUT COMPLICATION, WITH LONG-TERM CURRENT USE OF INSULIN (HCC): ICD-10-CM

## 2019-09-19 DIAGNOSIS — H10.9 CONJUNCTIVITIS OF LEFT EYE, UNSPECIFIED CONJUNCTIVITIS TYPE: ICD-10-CM

## 2019-09-19 DIAGNOSIS — J30.9 ALLERGIC RHINITIS, UNSPECIFIED SEASONALITY, UNSPECIFIED TRIGGER: ICD-10-CM

## 2019-09-19 PROCEDURE — 99213 OFFICE O/P EST LOW 20 MIN: CPT | Performed by: FAMILY MEDICINE

## 2019-09-19 RX ORDER — OLOPATADINE HYDROCHLORIDE 1 MG/ML
1 SOLUTION/ DROPS OPHTHALMIC 2 TIMES DAILY
Qty: 5 ML | Refills: 2 | Status: SHIPPED | OUTPATIENT
Start: 2019-09-19 | End: 2020-04-02 | Stop reason: ALTCHOICE

## 2019-09-19 RX ORDER — LORATADINE 10 MG/1
10 TABLET ORAL DAILY
Qty: 30 TABLET | Refills: 2 | Status: SHIPPED | OUTPATIENT
Start: 2019-09-19 | End: 2020-01-02 | Stop reason: SDUPTHER

## 2019-09-19 RX ORDER — TOBRAMYCIN AND DEXAMETHASONE 3; 1 MG/ML; MG/ML
1 SUSPENSION/ DROPS OPHTHALMIC
Qty: 5 ML | Refills: 0 | Status: SHIPPED | OUTPATIENT
Start: 2019-09-19 | End: 2020-04-02 | Stop reason: ALTCHOICE

## 2019-09-19 NOTE — PROGRESS NOTES
Naman Garicaluis miguel Baltazar is a 53 y o  male who presents for evaluation of discharge, itching and tearing in Both eyes, with left eye having crusting and yellow drainage  He has noticed the above symptoms for 2 weeks  Onset was gradual  Patient denies pain and photophobia  There is a history of allergies  The following portions of the patient's history were reviewed and updated as appropriate: allergies, current medications, past family history, past medical history, past social history, past surgical history and problem list     Review of Systems  Pertinent items are noted in HPI       Objective     /92 (BP Location: Left arm, Patient Position: Sitting, Cuff Size: Large)   Pulse 105   Temp 97 8 °F (36 6 °C) (Tympanic)   Resp 17   Ht 5' 4" (1 626 m)   Wt 105 kg (231 lb)   SpO2 93%   BMI 39 65 kg/m²        General: alert and oriented, in no acute distress   Eyes:  positive findings: conjunctiva: Injected, crusting noted to lower lash line of left eye  No foreign body noted on visual exam   Vision: Not performed   Fluorescein:  not done     Assessment/Plan      Fabiola Nixon was seen today for conjunctivitis  Diagnoses and all orders for this visit:    Allergic conjunctivitis of both eyes  -     olopatadine (PATANOL) 0 1 % ophthalmic solution; Administer 1 drop to both eyes 2 (two) times a day    Type 2 diabetes mellitus without complication, with long-term [facility]  Comments:  Reports difficulty getting medications  Like to discuss alternatives  Referral to endocrine  Orders:  -     Ambulatory referral to Endocrinology; Future    Allergic rhinitis, unspecified seasonality, unspecified trigger  -     sodium chloride (OCEAN) 0 65 % nasal spray; 1 spray into each nostril as needed for rhinitis  -     loratadine (CLARITIN) 10 mg tablet;  Take 1 tablet (10 mg total) by mouth daily    Conjunctivitis of left eye, unspecified conjunctivitis type  -     tobramycin-dexamethasone (TOBRADEX) ophthalmic suspension; Administer 1 drop into the left eye every 4 (four) hours while awake for 7 days          Acute conjunctivitis  Discussed the diagnosis and proper care of conjunctivitis  Stressed household hygiene  Ophthalmic drops per orders  Antihistamines per orders  Warm compress to eye(s)  Local eye care discussed  Sherre Soulier

## 2019-09-19 NOTE — PATIENT INSTRUCTIONS
Warm/cool compresses to eyes  Avoid rubbing  Loratadine as per order  Thoroughly discussed symptoms requiring immediate follow-up  If not improving please call for appointment/referral to Ophthalmology

## 2019-10-01 DIAGNOSIS — E11.9 DIABETES MELLITUS WITHOUT COMPLICATION (HCC): Primary | ICD-10-CM

## 2019-10-01 DIAGNOSIS — M54.50 CHRONIC BILATERAL LOW BACK PAIN WITHOUT SCIATICA: ICD-10-CM

## 2019-10-01 DIAGNOSIS — G89.29 CHRONIC BILATERAL LOW BACK PAIN WITHOUT SCIATICA: ICD-10-CM

## 2019-10-01 RX ORDER — PREGABALIN 75 MG/1
75 CAPSULE ORAL 2 TIMES DAILY
Qty: 60 CAPSULE | Refills: 0 | Status: SHIPPED | OUTPATIENT
Start: 2019-10-01 | End: 2019-12-06 | Stop reason: SDUPTHER

## 2019-10-01 RX ORDER — GABAPENTIN 300 MG/1
300 CAPSULE ORAL 3 TIMES DAILY
Qty: 90 CAPSULE | Refills: 0 | Status: SHIPPED | OUTPATIENT
Start: 2019-10-01 | End: 2019-12-11 | Stop reason: SDUPTHER

## 2019-10-31 DIAGNOSIS — Z79.4 TYPE 2 DIABETES MELLITUS WITH HYPERGLYCEMIA, WITH LONG-TERM CURRENT USE OF INSULIN (HCC): ICD-10-CM

## 2019-10-31 DIAGNOSIS — E03.9 HYPOTHYROIDISM, UNSPECIFIED TYPE: ICD-10-CM

## 2019-10-31 DIAGNOSIS — Z79.4 TYPE 2 DIABETES MELLITUS WITH COMPLICATION, WITH LONG-TERM CURRENT USE OF INSULIN (HCC): ICD-10-CM

## 2019-10-31 DIAGNOSIS — E11.65 TYPE 2 DIABETES MELLITUS WITH HYPERGLYCEMIA, WITH LONG-TERM CURRENT USE OF INSULIN (HCC): ICD-10-CM

## 2019-10-31 DIAGNOSIS — E11.8 TYPE 2 DIABETES MELLITUS WITH COMPLICATION, WITH LONG-TERM CURRENT USE OF INSULIN (HCC): ICD-10-CM

## 2019-10-31 RX ORDER — LEVOTHYROXINE SODIUM 175 UG/1
175 TABLET ORAL DAILY
Qty: 90 TABLET | Refills: 0 | Status: SHIPPED | OUTPATIENT
Start: 2019-10-31 | End: 2020-02-03 | Stop reason: SDUPTHER

## 2019-11-01 DIAGNOSIS — F32.9 REACTIVE DEPRESSION: ICD-10-CM

## 2019-11-01 RX ORDER — DULOXETIN HYDROCHLORIDE 60 MG/1
CAPSULE, DELAYED RELEASE ORAL
Qty: 60 CAPSULE | Refills: 0 | Status: SHIPPED | OUTPATIENT
Start: 2019-11-01 | End: 2020-01-02 | Stop reason: SDUPTHER

## 2019-11-14 ENCOUNTER — OFFICE VISIT (OUTPATIENT)
Dept: FAMILY MEDICINE CLINIC | Facility: HOME HEALTHCARE | Age: 54
End: 2019-11-14
Payer: MEDICARE

## 2019-11-14 VITALS
BODY MASS INDEX: 40.29 KG/M2 | WEIGHT: 236 LBS | OXYGEN SATURATION: 98 % | HEIGHT: 64 IN | TEMPERATURE: 97.9 F | RESPIRATION RATE: 16 BRPM | HEART RATE: 108 BPM

## 2019-11-14 DIAGNOSIS — T78.40XA ALLERGIC REACTION, INITIAL ENCOUNTER: Primary | ICD-10-CM

## 2019-11-14 DIAGNOSIS — T78.40XS ALLERGIC REACTION, SEQUELA: Primary | ICD-10-CM

## 2019-11-14 DIAGNOSIS — J32.9 CHRONIC SINUSITIS, UNSPECIFIED LOCATION: ICD-10-CM

## 2019-11-14 PROCEDURE — 99213 OFFICE O/P EST LOW 20 MIN: CPT | Performed by: FAMILY MEDICINE

## 2019-11-14 RX ORDER — EPINEPHRINE 0.3 MG/.3ML
0.3 INJECTION SUBCUTANEOUS ONCE
Qty: 0.6 ML | Refills: 0 | Status: SHIPPED | OUTPATIENT
Start: 2019-11-14 | End: 2021-01-17

## 2019-11-14 RX ORDER — DIPHENHYDRAMINE HCL 25 MG
25 CAPSULE ORAL ONCE
Status: COMPLETED | OUTPATIENT
Start: 2019-11-14 | End: 2019-11-14

## 2019-11-14 RX ORDER — FAMOTIDINE 40 MG/1
40 TABLET, FILM COATED ORAL 2 TIMES DAILY
Qty: 6 TABLET | Refills: 0 | Status: SHIPPED | OUTPATIENT
Start: 2019-11-14 | End: 2020-04-02 | Stop reason: ALTCHOICE

## 2019-11-14 RX ADMIN — Medication 25 MG: at 13:13

## 2019-11-14 RX ADMIN — Medication 10 MG: at 12:47

## 2019-11-14 NOTE — PROGRESS NOTES
[address]       NAME: Luann Ayala is a 53 y o  male  : 1965    MRN: [id number]  DATE: 2019  TIME: 1:38 PM    Assessment and Plan   Diagnoses and all orders for this visit:    Allergic reaction, initial encounter  Comments:  Epinephrine sent over, advised if any worsening go immediately to hospital   Advised that if he has to give himself an epinephrine shot he should be seen in er  Orders:  -     famotidine (PEPCID) 40 MG tablet; Take 1 tablet (40 mg total) by mouth 2 (two) times a day for 3 days  -     dexamethasone (DECADRON) injection 10 mg  -     Ambulatory referral to Allergy; Future  -     diphenhydrAMINE (BENADRYL) capsule 25 mg    Chronic sinusitis, unspecified location  -     Ambulatory Referral to Otolaryngology; Future        No problem-specific Assessment & Plan notes found for this encounter  Patient Instructions           Chief Complaint     Chief Complaint   Patient presents with    Allergic Reaction         History of Present Illness       Ed Kortney presents today at window asking for appointment  States that he woke up this morning with allergic reaction symptoms  Does have lip swelling and facial swelling  Denies any throat tightness/scratchiness/difficulty swallowing  Denies any chest tightness shortness breath or wheezing  Has had previous reactions similar to this in the past, and has been scribed epi pens in the past as well  Reports he has never actually given himself and epinephrine injection, however has received in the emergency room setting in the past   He was given Benadryl upon arrival   He denies palpitations  Overall says lip swelling is annoying  Does not feel it has worsened at all since waking  Did decide to give Benadryl, sent prescription over for several days worth of Pepcid, EpiPen prescription sent, and 10 mg of Decadron given in office    Patient advised that if not improving or any worsening in symptoms he will need to go immediately to the emergency room via EMS services  He is agreeable to this and verbalizes understanding  Review of Systems   Review of Systems   Constitutional: Negative for chills, fatigue, fever and unexpected weight change  HENT: Negative for postnasal drip, sinus pressure and sore throat  Lip/face swelling   Eyes: Negative for discharge  Respiratory: Negative for chest tightness, shortness of breath and wheezing  Cardiovascular: Negative for chest pain  Gastrointestinal: Negative for constipation, diarrhea and vomiting  Musculoskeletal: Negative for arthralgias  Skin: Negative for rash  Neurological: Negative for dizziness and syncope           Current Medications       Current Outpatient Medications:     albuterol (2 5 mg/3 mL) 0 083 % nebulizer solution, Take 1 vial (2 5 mg total) by nebulization every 4 (four) hours as needed for shortness of breath, Disp: 120 vial, Rfl: 0    atorvastatin (LIPITOR) 20 mg tablet, Take 1 tablet (20 mg total) by mouth daily, Disp: 90 tablet, Rfl: 0    cromolyn (OPTICROM) 4 % ophthalmic solution, Administer 1 drop to both eyes 4 (four) times a day, Disp: 10 mL, Rfl: 0    DULoxetine (CYMBALTA) 60 mg delayed release capsule, TAKE 1 CAPSULE BY MOUTH ONCE DAILY, Disp: 60 capsule, Rfl: 0    Empagliflozin (JARDIANCE) 25 MG TABS, Take 1 tablet (25 mg total) by mouth every morning, Disp: 90 tablet, Rfl: 0    famotidine (PEPCID) 40 MG tablet, Take 1 tablet (40 mg total) by mouth 2 (two) times a day for 3 days, Disp: 6 tablet, Rfl: 0    fluticasone-salmeterol (ADVAIR DISKUS) 250-50 mcg/dose inhaler, Inhale 1 puff 2 (two) times a day, Disp: , Rfl:     gabapentin (NEURONTIN) 300 mg capsule, Take 1 capsule (300 mg total) by mouth 3 (three) times a day, Disp: 90 capsule, Rfl: 0    hydrochlorothiazide (HYDRODIURIL) 25 mg tablet, Take 1 tablet (25 mg total) by mouth 2 (two) times a day, Disp: 60 tablet, Rfl: 3    insulin glargine (LANTUS SOLOSTAR) 100 units/mL injection pen, Inject 35 Units under the skin daily, Disp: 5 pen, Rfl: 0    Insulin Pen Needle (PEN NEEDLES 3/16") 31G X 5 MM MISC, by Does not apply route, Disp: , Rfl:     ketoconazole (NIZORAL) 2 % cream, ketoconazole 2 % topical cream  APPLY TO THE AFFECTED AREA(S) BY TOPICAL ROUTE ONCE DAILY, Disp: , Rfl:     levothyroxine 175 mcg tablet, Take 1 tablet (175 mcg total) by mouth daily, Disp: 90 tablet, Rfl: 0    loratadine (CLARITIN) 10 mg tablet, Take 1 tablet (10 mg total) by mouth daily, Disp: 30 tablet, Rfl: 2    metFORMIN (GLUCOPHAGE) 1000 MG tablet, Take 1 tablet (1,000 mg total) by mouth 2 (two) times a day, Disp: 60 tablet, Rfl: 3    olopatadine (PATANOL) 0 1 % ophthalmic solution, Administer 1 drop to both eyes 2 (two) times a day, Disp: 5 mL, Rfl: 2    omega-3-acid ethyl esters (LOVAZA) 1 g capsule, Take 1 capsule (1 g total) by mouth 2 (two) times a day for high cholesterol, Disp: 60 capsule, Rfl: 2    pregabalin (LYRICA) 75 mg capsule, Take 1 capsule (75 mg total) by mouth 2 (two) times a day, Disp: 60 capsule, Rfl: 0    sodium chloride (OCEAN) 0 65 % nasal spray, 1 spray into each nostril as needed for rhinitis, Disp: 30 mL, Rfl: 1    tobramycin-dexamethasone (TOBRADEX) ophthalmic suspension, Administer 1 drop into the left eye every 4 (four) hours while awake for 7 days, Disp: 5 mL, Rfl: 0    traZODone (DESYREL) 150 mg tablet, Take 1 tablet (150 mg total) by mouth daily at bedtime, Disp: 90 tablet, Rfl: 0    VENTOLIN  (90 Base) MCG/ACT inhaler, Inhale 2 puffs every 4 (four) hours as needed for wheezing or shortness of breath, Disp: 1 Inhaler, Rfl: 5  No current facility-administered medications for this visit       Current Allergies     Allergies as of 11/14/2019 - Reviewed 11/14/2019   Allergen Reaction Noted    Ace inhibitors Swelling 09/29/2016    Clindamycin  05/07/2017    Other  03/02/2018            The following portions of the patient's history were reviewed and updated as appropriate: allergies, current medications, past family history, past medical history, past social history, past surgical history and problem list      Past Medical History:   Diagnosis Date    Diabetes mellitus ([address] )     Disease of thyroid gland     Hyperlipidemia     Hypertension        Past Surgical History:   Procedure Laterality Date    KNEE SURGERY      SINUS SURGERY         Family History   Problem Relation Age of Onset    Thyroid cancer Mother     Diabetes type II Mother     Esophageal cancer Father     Diabetes type II Father     Kidney cancer Brother     Diabetes type II Brother     Diabetes type II Maternal Grandmother          Medications have been verified  Objective   Pulse (!) 108   Temp 97 9 °F (36 6 °C) (Tympanic)   Resp 16   Ht 5' 4" (1 626 m)   Wt 107 kg (236 lb)   SpO2 98%   BMI 40 51 kg/m²        Physical Exam     Physical Exam   Constitutional: He is oriented to person, place, and time  He appears well-developed  No distress  HENT:   Right Ear: External ear normal    Left Ear: External ear normal    Nose: Nose normal    Mouth/Throat: Oropharynx is clear and moist and mucous membranes are normal  No posterior oropharyngeal edema or tonsillar abscesses  Eyes: Pupils are equal, round, and reactive to light  EOM are normal    Neck: Normal range of motion  Neck supple  Cardiovascular: Normal rate, regular rhythm, normal heart sounds and intact distal pulses  Pulmonary/Chest: Effort normal and breath sounds normal    Abdominal: Soft  Musculoskeletal: Normal range of motion  Lymphadenopathy:     He has no cervical adenopathy  Neurological: He is alert and oriented to person, place, and time  Skin: Skin is warm and dry  Capillary refill takes less than 2 seconds  Psychiatric: He has a normal mood and affect  Nursing note and vitals reviewed

## 2019-11-14 NOTE — PATIENT INSTRUCTIONS
Thoroughly reviewed emergent symptoms requiring immediate follow-up  Benadryl as per package directions  Pepcid as per directions for next couple days  Epinephrine pen prescription sent  Follow-up with allergy testing and ENT

## 2019-11-27 ENCOUNTER — OFFICE VISIT (OUTPATIENT)
Dept: FAMILY MEDICINE CLINIC | Facility: HOME HEALTHCARE | Age: 54
End: 2019-11-27
Payer: MEDICARE

## 2019-11-27 VITALS
HEART RATE: 110 BPM | RESPIRATION RATE: 18 BRPM | HEIGHT: 64 IN | BODY MASS INDEX: 40.12 KG/M2 | OXYGEN SATURATION: 93 % | DIASTOLIC BLOOD PRESSURE: 105 MMHG | WEIGHT: 235 LBS | TEMPERATURE: 98.1 F | SYSTOLIC BLOOD PRESSURE: 173 MMHG

## 2019-11-27 DIAGNOSIS — Z13.228 SCREENING FOR METABOLIC DISORDER: ICD-10-CM

## 2019-11-27 DIAGNOSIS — IMO0002 TYPE 2 DIABETES, UNCONTROLLED, WITH NEUROPATHY: Primary | ICD-10-CM

## 2019-11-27 DIAGNOSIS — I10 HYPERTENSION, UNSPECIFIED TYPE: ICD-10-CM

## 2019-11-27 DIAGNOSIS — E11.65 TYPE 2 DIABETES MELLITUS WITH HYPERGLYCEMIA, WITH LONG-TERM CURRENT USE OF INSULIN (HCC): ICD-10-CM

## 2019-11-27 DIAGNOSIS — Z79.4 TYPE 2 DIABETES MELLITUS WITH HYPERGLYCEMIA, WITH LONG-TERM CURRENT USE OF INSULIN (HCC): ICD-10-CM

## 2019-11-27 LAB — SL AMB POCT HEMOGLOBIN AIC: 11 (ref ?–6.5)

## 2019-11-27 PROCEDURE — 99213 OFFICE O/P EST LOW 20 MIN: CPT | Performed by: FAMILY MEDICINE

## 2019-11-27 PROCEDURE — 83036 HEMOGLOBIN GLYCOSYLATED A1C: CPT | Performed by: FAMILY MEDICINE

## 2019-11-27 RX ORDER — INSULIN LISPRO 100 [IU]/ML
5 INJECTION, SOLUTION INTRAVENOUS; SUBCUTANEOUS
Qty: 2 PEN | Refills: 0 | Status: SHIPPED | OUTPATIENT
Start: 2019-11-27 | End: 2019-11-27

## 2019-11-27 RX ORDER — PEN NEEDLE, DIABETIC 30 GX5/16"
NEEDLE, DISPOSABLE MISCELLANEOUS 4 TIMES DAILY
Qty: 300 EACH | Refills: 3 | Status: SHIPPED | OUTPATIENT
Start: 2019-11-27 | End: 2020-04-03 | Stop reason: SDUPTHER

## 2019-11-27 RX ORDER — PEN NEEDLE, DIABETIC 30 GX5/16"
NEEDLE, DISPOSABLE MISCELLANEOUS 3 TIMES DAILY
Qty: 100 EACH | Refills: 2 | Status: SHIPPED | OUTPATIENT
Start: 2019-11-27 | End: 2019-11-27

## 2019-11-27 RX ORDER — INSULIN LISPRO 100 [IU]/ML
5 INJECTION, SOLUTION INTRAVENOUS; SUBCUTANEOUS
Qty: 2 PEN | Refills: 0 | Status: SHIPPED | OUTPATIENT
Start: 2019-11-27 | End: 2020-04-02

## 2019-11-27 NOTE — PROGRESS NOTES
[address]       NAME: Goldie Lombard is a 53 y o  male  : 1965    MRN: [id number]  DATE: 2019  TIME: 10:57 AM    Assessment and Plan   Diagnoses and all orders for this visit:    Type 2 diabetes, uncontrolled, with neuropathy ([facility] [address] )  Comments:  Increase Lantus to 50 units nightly, start short-acting insulin at 5 units 3 times a day with meals  Check blood sugars, return in 2 weeks with log to assess e  Orders:  -     POCT hemoglobin A1c  -     Discontinue: insulin lispro (HUMALOG KWIKPEN) 100 units/mL injection pen; Inject 5 Units under the skin 3 (three) times a day with meals  -     Discontinue: Insulin Pen Needle (PEN NEEDLES 3/16") 31G X 5 MM MISC; by Does not apply route 3 (three) times a day  -     insulin lispro (HUMALOG KWIKPEN) 100 units/mL injection pen; Inject 5 Units under the skin 3 (three) times a day with meals  -     Insulin Pen Needle (PEN NEEDLES 3/16") 31G X 5 MM MISC; by Other route 4 (four) times a day    Type 2 diabetes mellitus with hyperglycemia, with long-term current use of insulin (HCC)  -     insulin glargine (LANTUS SOLOSTAR) 100 units/mL injection pen; Inject 50 Units under the skin daily    Screening for metabolic disorder  -     CBC and differential; Future  -     Comprehensive metabolic panel; Future  -     Lipid Panel with Direct LDL reflex; Future  -     TSH, 3rd generation with Free T4 reflex; Future    Hypertension, unspecified type  Comments:  Likely related to ingestion of over-the-counter cold medicine containing phenylephrine, strongly discouraged from using this medication in the future  No problem-specific Assessment & Plan notes found for this encounter  Patient Instructions           Chief Complaint     Chief Complaint   Patient presents with    Follow-up    Diabetes         History of Present Illness       Frank Hopkins is here for routine diabetes check up  A1c has increased from previous check, is now 11   Patient admits he does not check his sugars at home, he is on oral agent and insulin  Admits that he has been eating mostly junk food such as cupcakes and pretzels  He reports "I know it to do, I just need to get myself to do it " Declines nutrition referral   Current every day smoker, declines smoking cessation  Blood pressure is elevated this visit, patient reports he took over-the-counter cold medication an hour prior to arrival   Denies any symptoms of chest pain/tightness/headache/changes in vision  Advised patient to discontinue taking over-the-counter medications as they may exacerbate hypertension  Will recheck at next visit in 2 weeks  Review of Systems   Review of Systems   Constitutional: Negative for chills, fatigue, fever and unexpected weight change  HENT: Positive for congestion and postnasal drip  Negative for sinus pressure and sore throat  Eyes: Negative for discharge  Respiratory: Negative for chest tightness, shortness of breath and wheezing  Cardiovascular: Negative for chest pain  Gastrointestinal: Negative for constipation, diarrhea and vomiting  Musculoskeletal: Negative for arthralgias  Skin: Negative for rash  Neurological: Negative for dizziness and syncope  Psychiatric/Behavioral: Negative            Current Medications       Current Outpatient Medications:     albuterol (2 5 mg/3 mL) 0 083 % nebulizer solution, Take 1 vial (2 5 mg total) by nebulization every 4 (four) hours as needed for shortness of breath, Disp: 120 vial, Rfl: 0    atorvastatin (LIPITOR) 20 mg tablet, Take 1 tablet (20 mg total) by mouth daily, Disp: 90 tablet, Rfl: 0    cromolyn (OPTICROM) 4 % ophthalmic solution, Administer 1 drop to both eyes 4 (four) times a day, Disp: 10 mL, Rfl: 0    DULoxetine (CYMBALTA) 60 mg delayed release capsule, TAKE 1 CAPSULE BY MOUTH ONCE DAILY, Disp: 60 capsule, Rfl: 0    EPINEPHrine (EPIPEN) 0 3 mg/0 3 mL SOAJ, Inject 0 3 mL (0 3 mg total) into a muscle once for 1 dose, Disp: 0 6 mL, Rfl: 0    famotidine (PEPCID) 40 MG tablet, Take 1 tablet (40 mg total) by mouth 2 (two) times a day for 3 days, Disp: 6 tablet, Rfl: 0    fluticasone-salmeterol (ADVAIR DISKUS) 250-50 mcg/dose inhaler, Inhale 1 puff 2 (two) times a day, Disp: , Rfl:     gabapentin (NEURONTIN) 300 mg capsule, Take 1 capsule (300 mg total) by mouth 3 (three) times a day, Disp: 90 capsule, Rfl: 0    hydrochlorothiazide (HYDRODIURIL) 25 mg tablet, Take 1 tablet (25 mg total) by mouth 2 (two) times a day, Disp: 60 tablet, Rfl: 3    insulin glargine (LANTUS SOLOSTAR) 100 units/mL injection pen, Inject 50 Units under the skin daily, Disp: 5 pen, Rfl: 0    insulin lispro (HUMALOG KWIKPEN) 100 units/mL injection pen, Inject 5 Units under the skin 3 (three) times a day with meals, Disp: 2 pen, Rfl: 0    Insulin Pen Needle (PEN NEEDLES 3/16") 31G X 5 MM MISC, by Other route 4 (four) times a day, Disp: 300 each, Rfl: 3    ketoconazole (NIZORAL) 2 % cream, ketoconazole 2 % topical cream  APPLY TO THE AFFECTED AREA(S) BY TOPICAL ROUTE ONCE DAILY, Disp: , Rfl:     levothyroxine 175 mcg tablet, Take 1 tablet (175 mcg total) by mouth daily, Disp: 90 tablet, Rfl: 0    loratadine (CLARITIN) 10 mg tablet, Take 1 tablet (10 mg total) by mouth daily, Disp: 30 tablet, Rfl: 2    metFORMIN (GLUCOPHAGE) 1000 MG tablet, Take 1 tablet (1,000 mg total) by mouth 2 (two) times a day, Disp: 60 tablet, Rfl: 3    olopatadine (PATANOL) 0 1 % ophthalmic solution, Administer 1 drop to both eyes 2 (two) times a day, Disp: 5 mL, Rfl: 2    omega-3-acid ethyl esters (LOVAZA) 1 g capsule, Take 1 capsule (1 g total) by mouth 2 (two) times a day for high cholesterol, Disp: 60 capsule, Rfl: 2    pregabalin (LYRICA) 75 mg capsule, Take 1 capsule (75 mg total) by mouth 2 (two) times a day, Disp: 60 capsule, Rfl: 0    sodium chloride (OCEAN) 0 65 % nasal spray, 1 spray into each nostril as needed for rhinitis, Disp: 30 mL, Rfl: 1   tobramycin-dexamethasone (TOBRADEX) ophthalmic suspension, Administer 1 drop into the left eye every 4 (four) hours while awake for 7 days, Disp: 5 mL, Rfl: 0    traZODone (DESYREL) 150 mg tablet, Take 1 tablet (150 mg total) by mouth daily at bedtime, Disp: 90 tablet, Rfl: 0    VENTOLIN  (90 Base) MCG/ACT inhaler, Inhale 2 puffs every 4 (four) hours as needed for wheezing or shortness of breath, Disp: 1 Inhaler, Rfl: 5    Current Allergies     Allergies as of 11/27/2019 - Reviewed 11/27/2019   Allergen Reaction Noted    Ace inhibitors Swelling 09/29/2016    Clindamycin  05/07/2017    Other  03/02/2018            The following portions of the patient's history were reviewed and updated as appropriate: allergies, current medications, past family history, past medical history, past social history, past surgical history and problem list      Past Medical History:   Diagnosis Date    Diabetes mellitus ([facility] [address] )     Disease of thyroid gland     Hyperlipidemia     Hypertension        Past Surgical History:   Procedure Laterality Date    KNEE SURGERY      SINUS SURGERY         Family History   Problem Relation Age of Onset    Thyroid cancer Mother     Diabetes type II Mother     Esophageal cancer Father     Diabetes type II Father     Kidney cancer Brother     Diabetes type II Brother     Diabetes type II Maternal Grandmother          Medications have been verified  Objective   BP (!) 173/105 (BP Location: Left arm, Patient Position: Sitting, Cuff Size: Standard)   Pulse (!) 110   Temp 98 1 °F (36 7 °C) (Temporal)   Resp 18   Ht 5' 4" (1 626 m)   Wt 107 kg (235 lb)   SpO2 93%   BMI 40 34 kg/m²        Physical Exam     Physical Exam   Constitutional: He is oriented to person, place, and time  He appears well-developed  No distress     HENT:   Right Ear: External ear normal    Left Ear: External ear normal    Nose: Nose normal    Mouth/Throat: Oropharynx is clear and moist    Eyes: Pupils are equal, round, and reactive to light  EOM are normal    Neck: Normal range of motion  Neck supple  Cardiovascular: Normal rate, regular rhythm, normal heart sounds and intact distal pulses  Pulmonary/Chest: Effort normal and breath sounds normal    Abdominal: Soft  Musculoskeletal: Normal range of motion  Lymphadenopathy:     He has no cervical adenopathy  Neurological: He is alert and oriented to person, place, and time  Skin: Skin is warm and dry  Capillary refill takes less than 2 seconds  Psychiatric: He has a normal mood and affect  Nursing note and vitals reviewed

## 2019-11-27 NOTE — PATIENT INSTRUCTIONS
Smoking cessation advised  Reduction of personal exposure to occupation dusts, fumes, and gases  Reduction to indoor and outdoor air pollutants  Advised influenza vaccine  Pneumococcal vaccine advised  Where a medical alert identification to carry  Check your feet each day for sores, make sure your socks and shoes fit correctly  Did not trim year nails  Established with a podiatrist   Maintain a healthy weight, this can help you control her diabetes  Follow a proper meal plan  Keep track of her carbohydrates, eat low-fat foods, low-salt, high-fiber foods, limit alcohol  Exercise can help her blood sugar level study, decrease her risk of heart disease, and help you lose weight  Smoking cessation advised if applicable  Good blood pressure control is recommended  A normal blood pressure is 119/78 or lower  In neural blood pressure can help prevent certain complications from diabetes  Recommend yearly eye exams to assess for retinopathy  Consider vaccination against flu, pneumonia, and hepatitis  Check blood pressure regularly  Purchase home monitoring of blood pressure device if insurance does not cover  Eat a healthy diet, reviewed DASH diet  Do not add salt salt to food  Reduce saturated fats  Encourage physical activity, 30 minutes daily  Limit alcohol use  Smoking cessation if applicable  Stress reduction

## 2019-12-06 DIAGNOSIS — E11.9 DIABETES MELLITUS WITHOUT COMPLICATION (HCC): ICD-10-CM

## 2019-12-06 RX ORDER — PREGABALIN 75 MG/1
CAPSULE ORAL
Qty: 60 CAPSULE | Refills: 0 | Status: SHIPPED | OUTPATIENT
Start: 2019-12-06 | End: 2020-02-03 | Stop reason: SDUPTHER

## 2019-12-06 RX ORDER — PREGABALIN 75 MG/1
75 CAPSULE ORAL 2 TIMES DAILY
Qty: 60 CAPSULE | Refills: 0 | Status: SHIPPED | OUTPATIENT
Start: 2019-12-06 | End: 2019-12-06 | Stop reason: SDUPTHER

## 2019-12-11 DIAGNOSIS — M54.50 CHRONIC BILATERAL LOW BACK PAIN WITHOUT SCIATICA: ICD-10-CM

## 2019-12-11 DIAGNOSIS — F41.8 DEPRESSION WITH ANXIETY: ICD-10-CM

## 2019-12-11 DIAGNOSIS — G89.29 CHRONIC BILATERAL LOW BACK PAIN WITHOUT SCIATICA: ICD-10-CM

## 2019-12-11 RX ORDER — TRAZODONE HYDROCHLORIDE 150 MG/1
150 TABLET ORAL
Qty: 90 TABLET | Refills: 0 | Status: SHIPPED | OUTPATIENT
Start: 2019-12-11 | End: 2020-03-16 | Stop reason: SDUPTHER

## 2019-12-11 RX ORDER — GABAPENTIN 300 MG/1
300 CAPSULE ORAL 3 TIMES DAILY
Qty: 90 CAPSULE | Refills: 0 | Status: SHIPPED | OUTPATIENT
Start: 2019-12-11 | End: 2020-02-05 | Stop reason: SDUPTHER

## 2020-01-02 DIAGNOSIS — F32.9 REACTIVE DEPRESSION: ICD-10-CM

## 2020-01-02 DIAGNOSIS — J30.9 ALLERGIC RHINITIS, UNSPECIFIED SEASONALITY, UNSPECIFIED TRIGGER: ICD-10-CM

## 2020-01-02 DIAGNOSIS — I10 ESSENTIAL HYPERTENSION: ICD-10-CM

## 2020-01-02 RX ORDER — DULOXETIN HYDROCHLORIDE 60 MG/1
60 CAPSULE, DELAYED RELEASE ORAL DAILY
Qty: 60 CAPSULE | Refills: 0 | Status: SHIPPED | OUTPATIENT
Start: 2020-01-02 | End: 2020-03-04 | Stop reason: SDUPTHER

## 2020-01-02 RX ORDER — LORATADINE 10 MG/1
10 TABLET ORAL DAILY
Qty: 30 TABLET | Refills: 2 | Status: SHIPPED | OUTPATIENT
Start: 2020-01-02 | End: 2021-01-28 | Stop reason: ALTCHOICE

## 2020-01-02 RX ORDER — HYDROCHLOROTHIAZIDE 25 MG/1
25 TABLET ORAL 2 TIMES DAILY
Qty: 60 TABLET | Refills: 3 | Status: SHIPPED | OUTPATIENT
Start: 2020-01-02 | End: 2020-05-08 | Stop reason: SDUPTHER

## 2020-01-03 DIAGNOSIS — Z79.4 TYPE 2 DIABETES MELLITUS WITH HYPERGLYCEMIA, WITH LONG-TERM CURRENT USE OF INSULIN (HCC): ICD-10-CM

## 2020-01-03 DIAGNOSIS — E11.65 TYPE 2 DIABETES MELLITUS WITH HYPERGLYCEMIA, WITH LONG-TERM CURRENT USE OF INSULIN (HCC): ICD-10-CM

## 2020-01-03 RX ORDER — INSULIN GLARGINE 100 [IU]/ML
INJECTION, SOLUTION SUBCUTANEOUS
Qty: 15 ML | Refills: 0 | Status: SHIPPED | OUTPATIENT
Start: 2020-01-03 | End: 2020-02-05 | Stop reason: SDUPTHER

## 2020-02-03 DIAGNOSIS — E11.9 DIABETES MELLITUS WITHOUT COMPLICATION (HCC): ICD-10-CM

## 2020-02-03 DIAGNOSIS — E78.00 PURE HYPERCHOLESTEROLEMIA: ICD-10-CM

## 2020-02-03 DIAGNOSIS — E03.9 HYPOTHYROIDISM, UNSPECIFIED TYPE: ICD-10-CM

## 2020-02-03 RX ORDER — ATORVASTATIN CALCIUM 20 MG/1
20 TABLET, FILM COATED ORAL DAILY
Qty: 90 TABLET | Refills: 0 | Status: SHIPPED | OUTPATIENT
Start: 2020-02-03 | End: 2020-05-08 | Stop reason: SDUPTHER

## 2020-02-03 RX ORDER — LEVOTHYROXINE SODIUM 175 UG/1
175 TABLET ORAL DAILY
Qty: 90 TABLET | Refills: 0 | Status: SHIPPED | OUTPATIENT
Start: 2020-02-03 | End: 2020-04-20 | Stop reason: SDUPTHER

## 2020-02-03 RX ORDER — PREGABALIN 75 MG/1
75 CAPSULE ORAL DAILY
Qty: 90 CAPSULE | Refills: 0 | Status: SHIPPED | OUTPATIENT
Start: 2020-02-03 | End: 2020-04-02 | Stop reason: SDUPTHER

## 2020-02-03 RX ORDER — ATORVASTATIN CALCIUM 20 MG/1
20 TABLET, FILM COATED ORAL DAILY
Qty: 90 TABLET | Refills: 0 | Status: CANCELLED | OUTPATIENT
Start: 2020-02-03

## 2020-02-05 DIAGNOSIS — Z79.4 TYPE 2 DIABETES MELLITUS WITH HYPERGLYCEMIA, WITH LONG-TERM CURRENT USE OF INSULIN (HCC): ICD-10-CM

## 2020-02-05 DIAGNOSIS — G89.29 CHRONIC BILATERAL LOW BACK PAIN WITHOUT SCIATICA: ICD-10-CM

## 2020-02-05 DIAGNOSIS — E11.65 TYPE 2 DIABETES MELLITUS WITH HYPERGLYCEMIA, WITH LONG-TERM CURRENT USE OF INSULIN (HCC): ICD-10-CM

## 2020-02-05 DIAGNOSIS — M54.50 CHRONIC BILATERAL LOW BACK PAIN WITHOUT SCIATICA: ICD-10-CM

## 2020-02-05 RX ORDER — GABAPENTIN 300 MG/1
300 CAPSULE ORAL 3 TIMES DAILY
Qty: 90 CAPSULE | Refills: 0 | Status: SHIPPED | OUTPATIENT
Start: 2020-02-05 | End: 2020-04-03

## 2020-02-13 ENCOUNTER — TELEPHONE (OUTPATIENT)
Dept: FAMILY MEDICINE CLINIC | Facility: CLINIC | Age: 55
End: 2020-02-13

## 2020-02-13 NOTE — TELEPHONE ENCOUNTER
Left message on patients machine to see if she has an eye doctor   Want to get patients most recent diabetic eye exam

## 2020-03-04 DIAGNOSIS — Z79.4 TYPE 2 DIABETES MELLITUS WITH COMPLICATION, WITH LONG-TERM CURRENT USE OF INSULIN (HCC): ICD-10-CM

## 2020-03-04 DIAGNOSIS — E11.8 TYPE 2 DIABETES MELLITUS WITH COMPLICATION, WITH LONG-TERM CURRENT USE OF INSULIN (HCC): ICD-10-CM

## 2020-03-04 DIAGNOSIS — F32.9 REACTIVE DEPRESSION: ICD-10-CM

## 2020-03-04 RX ORDER — DULOXETIN HYDROCHLORIDE 60 MG/1
60 CAPSULE, DELAYED RELEASE ORAL DAILY
Qty: 60 CAPSULE | Refills: 0 | Status: SHIPPED | OUTPATIENT
Start: 2020-03-04 | End: 2020-05-08 | Stop reason: SDUPTHER

## 2020-03-12 DIAGNOSIS — J44.9 COPD, MILD (HCC): ICD-10-CM

## 2020-03-16 DIAGNOSIS — E11.8 TYPE 2 DIABETES MELLITUS WITH COMPLICATION, WITH LONG-TERM CURRENT USE OF INSULIN (HCC): ICD-10-CM

## 2020-03-16 DIAGNOSIS — F41.8 DEPRESSION WITH ANXIETY: ICD-10-CM

## 2020-03-16 DIAGNOSIS — Z79.4 TYPE 2 DIABETES MELLITUS WITH COMPLICATION, WITH LONG-TERM CURRENT USE OF INSULIN (HCC): ICD-10-CM

## 2020-03-16 DIAGNOSIS — Z11.4 SCREENING FOR HIV WITHOUT PRESENCE OF RISK FACTORS: Primary | ICD-10-CM

## 2020-03-16 DIAGNOSIS — F41.8 DEPRESSION WITH ANXIETY: Primary | ICD-10-CM

## 2020-03-16 RX ORDER — TRAZODONE HYDROCHLORIDE 150 MG/1
150 TABLET ORAL
Qty: 30 TABLET | Refills: 0 | Status: SHIPPED | OUTPATIENT
Start: 2020-03-16 | End: 2020-04-20 | Stop reason: SDUPTHER

## 2020-03-16 NOTE — TELEPHONE ENCOUNTER
Pt asked for 90day supply    He is diabetic, not seen since 11/2019 and his A1c was 11 then    Pt admits doesn't check sugars regularly    I did schedule an appt for him    I asked that he try to get his BW done before this appt so he can review at that visit  Please review labs that are already ordered and add on whatever you would like to add

## 2020-04-02 ENCOUNTER — TELEMEDICINE (OUTPATIENT)
Dept: FAMILY MEDICINE CLINIC | Facility: HOME HEALTHCARE | Age: 55
End: 2020-04-02
Payer: MEDICARE

## 2020-04-02 DIAGNOSIS — E11.9 DIABETES MELLITUS WITHOUT COMPLICATION (HCC): ICD-10-CM

## 2020-04-02 DIAGNOSIS — IMO0002 TYPE 2 DIABETES, UNCONTROLLED, WITH NEUROPATHY: ICD-10-CM

## 2020-04-02 DIAGNOSIS — M25.551 RIGHT HIP PAIN: Primary | ICD-10-CM

## 2020-04-02 DIAGNOSIS — Z79.4 TYPE 2 DIABETES MELLITUS WITH HYPERGLYCEMIA, WITH LONG-TERM CURRENT USE OF INSULIN (HCC): ICD-10-CM

## 2020-04-02 DIAGNOSIS — E11.65 TYPE 2 DIABETES MELLITUS WITH HYPERGLYCEMIA, WITH LONG-TERM CURRENT USE OF INSULIN (HCC): ICD-10-CM

## 2020-04-02 PROCEDURE — G0071 COMM SVCS BY RHC/FQHC 5 MIN: HCPCS | Performed by: FAMILY MEDICINE

## 2020-04-02 RX ORDER — INSULIN LISPRO 100 [IU]/ML
7 INJECTION, SOLUTION INTRAVENOUS; SUBCUTANEOUS
Qty: 3 PEN | Refills: 0 | Status: SHIPPED | OUTPATIENT
Start: 2020-04-02 | End: 2021-04-15 | Stop reason: HOSPADM

## 2020-04-02 RX ORDER — ASPIRIN 81 MG/1
TABLET, CHEWABLE ORAL
COMMUNITY
Start: 2006-03-14

## 2020-04-02 RX ORDER — PREGABALIN 75 MG/1
75 CAPSULE ORAL 2 TIMES DAILY
Qty: 60 CAPSULE | Refills: 2 | Status: SHIPPED | OUTPATIENT
Start: 2020-04-02 | End: 2020-06-23 | Stop reason: SDUPTHER

## 2020-04-03 ENCOUNTER — HOSPITAL ENCOUNTER (OUTPATIENT)
Dept: RADIOLOGY | Facility: HOSPITAL | Age: 55
Discharge: HOME | End: 2020-04-03
Payer: MEDICARE

## 2020-04-03 ENCOUNTER — APPOINTMENT (OUTPATIENT)
Dept: LAB | Facility: HOSPITAL | Age: 55
End: 2020-04-03
Payer: MEDICARE

## 2020-04-03 DIAGNOSIS — M25.551 RIGHT HIP PAIN: ICD-10-CM

## 2020-04-03 DIAGNOSIS — G89.29 CHRONIC BILATERAL LOW BACK PAIN WITHOUT SCIATICA: ICD-10-CM

## 2020-04-03 DIAGNOSIS — M54.50 CHRONIC BILATERAL LOW BACK PAIN WITHOUT SCIATICA: ICD-10-CM

## 2020-04-03 DIAGNOSIS — IMO0002 TYPE 2 DIABETES, UNCONTROLLED, WITH NEUROPATHY: ICD-10-CM

## 2020-04-03 LAB
ALBUMIN SERPL BCP-MCNC: 3.4 G/DL (ref 3.5–5)
ALP SERPL-CCNC: 73 U/L (ref 46–116)
ALT SERPL W P-5'-P-CCNC: 34 U/L (ref 12–78)
ANION GAP SERPL CALCULATED.3IONS-SCNC: 8 MMOL/L (ref 4–13)
AST SERPL W P-5'-P-CCNC: 19 U/L (ref 5–45)
BASOPHILS # BLD AUTO: 0.06 THOUSANDS/ΜL (ref 0–0.1)
BASOPHILS NFR BLD AUTO: 1 % (ref 0–1)
BILIRUB SERPL-MCNC: 0.5 MG/DL (ref 0.2–1)
BUN SERPL-MCNC: 15 MG/DL (ref 5–25)
CALCIUM SERPL-MCNC: 8.7 MG/DL (ref 8.3–10.1)
CHLORIDE SERPL-SCNC: 93 MMOL/L (ref 100–108)
CHOLEST SERPL-MCNC: 159 MG/DL (ref 50–200)
CO2 SERPL-SCNC: 28 MMOL/L (ref 21–32)
CREAT SERPL-MCNC: 1.05 MG/DL (ref 0.6–1.3)
EOSINOPHIL # BLD AUTO: 0.22 THOUSAND/ΜL (ref 0–0.61)
EOSINOPHIL NFR BLD AUTO: 2 % (ref 0–6)
ERYTHROCYTE [DISTWIDTH] IN BLOOD BY AUTOMATED COUNT: 12.9 % (ref 11.6–15.1)
EST. AVERAGE GLUCOSE BLD GHB EST-MCNC: 235 MG/DL
GFR SERPL CREATININE-BSD FRML MDRD: 80 ML/MIN/1.73SQ M
GLUCOSE SERPL-MCNC: 171 MG/DL (ref 65–140)
HBA1C MFR BLD: 9.8 %
HCT VFR BLD AUTO: 42.4 % (ref 36.5–49.3)
HDLC SERPL-MCNC: 35 MG/DL
HGB BLD-MCNC: 14.2 G/DL (ref 12–17)
IMM GRANULOCYTES # BLD AUTO: 0.05 THOUSAND/UL (ref 0–0.2)
IMM GRANULOCYTES NFR BLD AUTO: 1 % (ref 0–2)
LDLC SERPL CALC-MCNC: 98 MG/DL (ref 0–100)
LYMPHOCYTES # BLD AUTO: 2.62 THOUSANDS/ΜL (ref 0.6–4.47)
LYMPHOCYTES NFR BLD AUTO: 24 % (ref 14–44)
MCH RBC QN AUTO: 29.4 PG (ref 26.8–34.3)
MCHC RBC AUTO-ENTMCNC: 33.5 G/DL (ref 31.4–37.4)
MCV RBC AUTO: 88 FL (ref 82–98)
MONOCYTES # BLD AUTO: 1.29 THOUSAND/ΜL (ref 0.17–1.22)
MONOCYTES NFR BLD AUTO: 12 % (ref 4–12)
NEUTROPHILS # BLD AUTO: 6.62 THOUSANDS/ΜL (ref 1.85–7.62)
NEUTS SEG NFR BLD AUTO: 60 % (ref 43–75)
NRBC BLD AUTO-RTO: 0 /100 WBCS
PLATELET # BLD AUTO: 270 THOUSANDS/UL (ref 149–390)
PMV BLD AUTO: 9.3 FL (ref 8.9–12.7)
POTASSIUM SERPL-SCNC: 3.3 MMOL/L (ref 3.5–5.3)
PROT SERPL-MCNC: 7.8 G/DL (ref 6.4–8.2)
RBC # BLD AUTO: 4.83 MILLION/UL (ref 3.88–5.62)
SODIUM SERPL-SCNC: 129 MMOL/L (ref 136–145)
TRIGL SERPL-MCNC: 129 MG/DL
WBC # BLD AUTO: 10.86 THOUSAND/UL (ref 4.31–10.16)

## 2020-04-03 PROCEDURE — 73502 X-RAY EXAM HIP UNI 2-3 VIEWS: CPT

## 2020-04-03 PROCEDURE — 36415 COLL VENOUS BLD VENIPUNCTURE: CPT

## 2020-04-03 PROCEDURE — 83036 HEMOGLOBIN GLYCOSYLATED A1C: CPT

## 2020-04-03 PROCEDURE — 80053 COMPREHEN METABOLIC PANEL: CPT

## 2020-04-03 PROCEDURE — 85025 COMPLETE CBC W/AUTO DIFF WBC: CPT

## 2020-04-03 PROCEDURE — 80061 LIPID PANEL: CPT

## 2020-04-03 RX ORDER — GABAPENTIN 300 MG/1
CAPSULE ORAL
Qty: 90 CAPSULE | Refills: 0 | Status: SHIPPED | OUTPATIENT
Start: 2020-04-03 | End: 2020-05-21

## 2020-04-03 RX ORDER — PEN NEEDLE, DIABETIC 30 GX5/16"
NEEDLE, DISPOSABLE MISCELLANEOUS 4 TIMES DAILY
Qty: 300 EACH | Refills: 3 | Status: SHIPPED | OUTPATIENT
Start: 2020-04-03 | End: 2021-08-12 | Stop reason: SDUPTHER

## 2020-04-06 DIAGNOSIS — Z79.4 TYPE 2 DIABETES MELLITUS WITH HYPERGLYCEMIA, WITH LONG-TERM CURRENT USE OF INSULIN (HCC): ICD-10-CM

## 2020-04-06 DIAGNOSIS — E11.65 TYPE 2 DIABETES MELLITUS WITH HYPERGLYCEMIA, WITH LONG-TERM CURRENT USE OF INSULIN (HCC): ICD-10-CM

## 2020-04-08 ENCOUNTER — TELEPHONE (OUTPATIENT)
Dept: FAMILY MEDICINE CLINIC | Facility: CLINIC | Age: 55
End: 2020-04-08

## 2020-04-13 ENCOUNTER — TELEPHONE (OUTPATIENT)
Dept: FAMILY MEDICINE CLINIC | Facility: CLINIC | Age: 55
End: 2020-04-13

## 2020-04-20 DIAGNOSIS — E03.9 HYPOTHYROIDISM, UNSPECIFIED TYPE: ICD-10-CM

## 2020-04-20 DIAGNOSIS — F41.8 DEPRESSION WITH ANXIETY: ICD-10-CM

## 2020-04-20 RX ORDER — LEVOTHYROXINE SODIUM 175 UG/1
175 TABLET ORAL DAILY
Qty: 90 TABLET | Refills: 0 | Status: SHIPPED | OUTPATIENT
Start: 2020-04-20 | End: 2020-04-28 | Stop reason: SDUPTHER

## 2020-04-20 RX ORDER — TRAZODONE HYDROCHLORIDE 150 MG/1
150 TABLET ORAL
Qty: 90 TABLET | Refills: 0 | Status: SHIPPED | OUTPATIENT
Start: 2020-04-20 | End: 2020-07-24 | Stop reason: SDUPTHER

## 2020-04-28 DIAGNOSIS — E03.9 HYPOTHYROIDISM, UNSPECIFIED TYPE: ICD-10-CM

## 2020-04-28 RX ORDER — LEVOTHYROXINE SODIUM 175 UG/1
175 TABLET ORAL DAILY
Qty: 90 TABLET | Refills: 0 | Status: SHIPPED | OUTPATIENT
Start: 2020-04-28 | End: 2020-07-22 | Stop reason: SDUPTHER

## 2020-04-30 ENCOUNTER — TELEMEDICINE (OUTPATIENT)
Dept: FAMILY MEDICINE CLINIC | Facility: HOME HEALTHCARE | Age: 55
End: 2020-04-30
Payer: MEDICARE

## 2020-04-30 DIAGNOSIS — IMO0002 TYPE 2 DIABETES, UNCONTROLLED, WITH NEUROPATHY: Primary | ICD-10-CM

## 2020-04-30 DIAGNOSIS — Z91.19 NONCOMPLIANCE WITH DIABETES TREATMENT: ICD-10-CM

## 2020-04-30 DIAGNOSIS — M19.90 ARTHRITIS: ICD-10-CM

## 2020-04-30 PROBLEM — Z91.199 NONCOMPLIANCE WITH DIABETES TREATMENT: Status: ACTIVE | Noted: 2020-04-30

## 2020-04-30 PROCEDURE — G0071 COMM SVCS BY RHC/FQHC 5 MIN: HCPCS | Performed by: NURSE PRACTITIONER

## 2020-05-08 DIAGNOSIS — E78.00 PURE HYPERCHOLESTEROLEMIA: ICD-10-CM

## 2020-05-08 DIAGNOSIS — I10 ESSENTIAL HYPERTENSION: ICD-10-CM

## 2020-05-08 DIAGNOSIS — F32.9 REACTIVE DEPRESSION: ICD-10-CM

## 2020-05-08 RX ORDER — DULOXETIN HYDROCHLORIDE 60 MG/1
60 CAPSULE, DELAYED RELEASE ORAL DAILY
Qty: 60 CAPSULE | Refills: 0 | Status: SHIPPED | OUTPATIENT
Start: 2020-05-08 | End: 2020-07-09 | Stop reason: SDUPTHER

## 2020-05-08 RX ORDER — HYDROCHLOROTHIAZIDE 25 MG/1
25 TABLET ORAL 2 TIMES DAILY
Qty: 60 TABLET | Refills: 3 | Status: SHIPPED | OUTPATIENT
Start: 2020-05-08 | End: 2020-09-16 | Stop reason: SDUPTHER

## 2020-05-08 RX ORDER — ATORVASTATIN CALCIUM 20 MG/1
20 TABLET, FILM COATED ORAL DAILY
Qty: 90 TABLET | Refills: 0 | Status: SHIPPED | OUTPATIENT
Start: 2020-05-08 | End: 2020-07-22 | Stop reason: SDUPTHER

## 2020-05-12 DIAGNOSIS — Z79.4 TYPE 2 DIABETES MELLITUS WITH HYPERGLYCEMIA, WITH LONG-TERM CURRENT USE OF INSULIN (HCC): ICD-10-CM

## 2020-05-12 DIAGNOSIS — E11.65 TYPE 2 DIABETES MELLITUS WITH HYPERGLYCEMIA, WITH LONG-TERM CURRENT USE OF INSULIN (HCC): ICD-10-CM

## 2020-05-19 DIAGNOSIS — Z79.4 TYPE 2 DIABETES MELLITUS WITH COMPLICATION, WITH LONG-TERM CURRENT USE OF INSULIN (HCC): ICD-10-CM

## 2020-05-19 DIAGNOSIS — E11.8 TYPE 2 DIABETES MELLITUS WITH COMPLICATION, WITH LONG-TERM CURRENT USE OF INSULIN (HCC): ICD-10-CM

## 2020-05-21 DIAGNOSIS — G89.29 CHRONIC BILATERAL LOW BACK PAIN WITHOUT SCIATICA: ICD-10-CM

## 2020-05-21 DIAGNOSIS — M54.50 CHRONIC BILATERAL LOW BACK PAIN WITHOUT SCIATICA: ICD-10-CM

## 2020-05-21 RX ORDER — GABAPENTIN 300 MG/1
CAPSULE ORAL
Qty: 90 CAPSULE | Refills: 0 | Status: SHIPPED | OUTPATIENT
Start: 2020-05-21 | End: 2020-07-09 | Stop reason: SDUPTHER

## 2020-06-23 DIAGNOSIS — E11.9 DIABETES MELLITUS WITHOUT COMPLICATION (HCC): ICD-10-CM

## 2020-06-23 RX ORDER — PREGABALIN 75 MG/1
75 CAPSULE ORAL 2 TIMES DAILY
Qty: 60 CAPSULE | Refills: 2 | Status: SHIPPED | OUTPATIENT
Start: 2020-06-23 | End: 2020-07-22 | Stop reason: SDUPTHER

## 2020-07-09 DIAGNOSIS — M54.50 CHRONIC BILATERAL LOW BACK PAIN WITHOUT SCIATICA: ICD-10-CM

## 2020-07-09 DIAGNOSIS — G89.29 CHRONIC BILATERAL LOW BACK PAIN WITHOUT SCIATICA: ICD-10-CM

## 2020-07-09 DIAGNOSIS — J30.9 ALLERGIC RHINITIS, UNSPECIFIED SEASONALITY, UNSPECIFIED TRIGGER: ICD-10-CM

## 2020-07-09 DIAGNOSIS — F32.9 REACTIVE DEPRESSION: ICD-10-CM

## 2020-07-10 RX ORDER — GABAPENTIN 300 MG/1
300 CAPSULE ORAL 3 TIMES DAILY
Qty: 90 CAPSULE | Refills: 0 | Status: SHIPPED | OUTPATIENT
Start: 2020-07-10 | End: 2020-08-07 | Stop reason: SDUPTHER

## 2020-07-10 RX ORDER — DULOXETIN HYDROCHLORIDE 60 MG/1
60 CAPSULE, DELAYED RELEASE ORAL DAILY
Qty: 60 CAPSULE | Refills: 0 | Status: SHIPPED | OUTPATIENT
Start: 2020-07-10 | End: 2020-09-10 | Stop reason: SDUPTHER

## 2020-07-22 DIAGNOSIS — E03.9 HYPOTHYROIDISM, UNSPECIFIED TYPE: ICD-10-CM

## 2020-07-22 DIAGNOSIS — E11.9 DIABETES MELLITUS WITHOUT COMPLICATION (HCC): ICD-10-CM

## 2020-07-22 DIAGNOSIS — E78.00 PURE HYPERCHOLESTEROLEMIA: ICD-10-CM

## 2020-07-22 RX ORDER — PREGABALIN 75 MG/1
75 CAPSULE ORAL 2 TIMES DAILY
Qty: 60 CAPSULE | Refills: 2 | Status: SHIPPED | OUTPATIENT
Start: 2020-07-22 | End: 2021-01-04

## 2020-07-22 RX ORDER — ATORVASTATIN CALCIUM 20 MG/1
20 TABLET, FILM COATED ORAL DAILY
Qty: 90 TABLET | Refills: 0 | Status: SHIPPED | OUTPATIENT
Start: 2020-07-22 | End: 2020-11-29

## 2020-07-22 RX ORDER — LEVOTHYROXINE SODIUM 175 UG/1
175 TABLET ORAL DAILY
Qty: 90 TABLET | Refills: 0 | Status: SHIPPED | OUTPATIENT
Start: 2020-07-22 | End: 2020-10-14 | Stop reason: SDUPTHER

## 2020-07-23 DIAGNOSIS — F41.8 DEPRESSION WITH ANXIETY: ICD-10-CM

## 2020-07-24 DIAGNOSIS — E78.00 PURE HYPERCHOLESTEROLEMIA: ICD-10-CM

## 2020-07-24 DIAGNOSIS — F41.8 DEPRESSION WITH ANXIETY: ICD-10-CM

## 2020-07-24 RX ORDER — TRAZODONE HYDROCHLORIDE 150 MG/1
150 TABLET ORAL
Qty: 90 TABLET | Refills: 0 | Status: SHIPPED | OUTPATIENT
Start: 2020-07-24 | End: 2021-01-28 | Stop reason: SDUPTHER

## 2020-07-27 RX ORDER — TRAZODONE HYDROCHLORIDE 150 MG/1
TABLET ORAL
Qty: 90 TABLET | Refills: 0 | Status: SHIPPED | OUTPATIENT
Start: 2020-07-27 | End: 2021-02-03 | Stop reason: SDUPTHER

## 2020-08-07 DIAGNOSIS — Z79.4 TYPE 2 DIABETES MELLITUS WITH HYPERGLYCEMIA, WITH LONG-TERM CURRENT USE OF INSULIN (HCC): ICD-10-CM

## 2020-08-07 DIAGNOSIS — M54.50 CHRONIC BILATERAL LOW BACK PAIN WITHOUT SCIATICA: ICD-10-CM

## 2020-08-07 DIAGNOSIS — E11.65 TYPE 2 DIABETES MELLITUS WITH HYPERGLYCEMIA, WITH LONG-TERM CURRENT USE OF INSULIN (HCC): ICD-10-CM

## 2020-08-07 DIAGNOSIS — G89.29 CHRONIC BILATERAL LOW BACK PAIN WITHOUT SCIATICA: ICD-10-CM

## 2020-08-07 RX ORDER — INSULIN GLARGINE 100 [IU]/ML
60 INJECTION, SOLUTION SUBCUTANEOUS DAILY
Qty: 15 ML | Refills: 2 | Status: SHIPPED | OUTPATIENT
Start: 2020-08-07 | End: 2021-02-04

## 2020-08-07 RX ORDER — GABAPENTIN 300 MG/1
300 CAPSULE ORAL 3 TIMES DAILY
Qty: 90 CAPSULE | Refills: 0 | Status: SHIPPED | OUTPATIENT
Start: 2020-08-07 | End: 2020-09-21

## 2020-09-10 DIAGNOSIS — F32.9 REACTIVE DEPRESSION: ICD-10-CM

## 2020-09-11 RX ORDER — DULOXETIN HYDROCHLORIDE 60 MG/1
60 CAPSULE, DELAYED RELEASE ORAL DAILY
Qty: 60 CAPSULE | Refills: 0 | Status: SHIPPED | OUTPATIENT
Start: 2020-09-11 | End: 2020-11-29

## 2020-09-16 DIAGNOSIS — I10 ESSENTIAL HYPERTENSION: ICD-10-CM

## 2020-09-16 RX ORDER — HYDROCHLOROTHIAZIDE 25 MG/1
25 TABLET ORAL 2 TIMES DAILY
Qty: 60 TABLET | Refills: 3 | Status: SHIPPED | OUTPATIENT
Start: 2020-09-16 | End: 2021-01-21

## 2020-09-21 DIAGNOSIS — G89.29 CHRONIC BILATERAL LOW BACK PAIN WITHOUT SCIATICA: ICD-10-CM

## 2020-09-21 DIAGNOSIS — M54.50 CHRONIC BILATERAL LOW BACK PAIN WITHOUT SCIATICA: ICD-10-CM

## 2020-09-21 RX ORDER — GABAPENTIN 300 MG/1
CAPSULE ORAL
Qty: 90 CAPSULE | Refills: 0 | Status: SHIPPED | OUTPATIENT
Start: 2020-09-21 | End: 2020-12-03

## 2020-09-23 ENCOUNTER — TELEPHONE (OUTPATIENT)
Dept: FAMILY MEDICINE CLINIC | Facility: HOME HEALTHCARE | Age: 55
End: 2020-09-23

## 2020-10-14 DIAGNOSIS — E03.9 HYPOTHYROIDISM, UNSPECIFIED TYPE: ICD-10-CM

## 2020-10-14 DIAGNOSIS — J44.9 COPD, MILD (HCC): ICD-10-CM

## 2020-10-14 RX ORDER — LEVOTHYROXINE SODIUM 175 UG/1
175 TABLET ORAL DAILY
Qty: 90 TABLET | Refills: 0 | Status: SHIPPED | OUTPATIENT
Start: 2020-10-14 | End: 2021-03-25

## 2020-11-29 DIAGNOSIS — E78.00 PURE HYPERCHOLESTEROLEMIA: ICD-10-CM

## 2020-11-29 DIAGNOSIS — F32.9 REACTIVE DEPRESSION: ICD-10-CM

## 2020-11-29 RX ORDER — ATORVASTATIN CALCIUM 20 MG/1
TABLET, FILM COATED ORAL
Qty: 90 TABLET | Refills: 0 | Status: SHIPPED | OUTPATIENT
Start: 2020-11-29 | End: 2021-03-11

## 2020-11-29 RX ORDER — DULOXETIN HYDROCHLORIDE 60 MG/1
CAPSULE, DELAYED RELEASE ORAL
Qty: 60 CAPSULE | Refills: 0 | Status: SHIPPED | OUTPATIENT
Start: 2020-11-29 | End: 2021-02-03 | Stop reason: SDUPTHER

## 2020-11-30 DIAGNOSIS — M54.50 CHRONIC BILATERAL LOW BACK PAIN WITHOUT SCIATICA: ICD-10-CM

## 2020-11-30 DIAGNOSIS — G89.29 CHRONIC BILATERAL LOW BACK PAIN WITHOUT SCIATICA: ICD-10-CM

## 2020-11-30 RX ORDER — GABAPENTIN 300 MG/1
CAPSULE ORAL
Qty: 90 CAPSULE | Refills: 0 | OUTPATIENT
Start: 2020-11-30

## 2020-12-03 DIAGNOSIS — M54.50 CHRONIC BILATERAL LOW BACK PAIN WITHOUT SCIATICA: ICD-10-CM

## 2020-12-03 DIAGNOSIS — G89.29 CHRONIC BILATERAL LOW BACK PAIN WITHOUT SCIATICA: ICD-10-CM

## 2020-12-03 RX ORDER — GABAPENTIN 300 MG/1
CAPSULE ORAL
Qty: 90 CAPSULE | Refills: 0 | Status: SHIPPED | OUTPATIENT
Start: 2020-12-03 | End: 2021-01-04 | Stop reason: ALTCHOICE

## 2020-12-31 DIAGNOSIS — Z79.4 TYPE 2 DIABETES MELLITUS WITH COMPLICATION, WITH LONG-TERM CURRENT USE OF INSULIN (HCC): ICD-10-CM

## 2020-12-31 DIAGNOSIS — E11.9 DIABETES MELLITUS WITHOUT COMPLICATION (HCC): ICD-10-CM

## 2020-12-31 DIAGNOSIS — E11.8 TYPE 2 DIABETES MELLITUS WITH COMPLICATION, WITH LONG-TERM CURRENT USE OF INSULIN (HCC): ICD-10-CM

## 2021-01-04 RX ORDER — PREGABALIN 75 MG/1
CAPSULE ORAL
Qty: 60 CAPSULE | Refills: 0 | Status: SHIPPED | OUTPATIENT
Start: 2021-01-04 | End: 2021-01-28 | Stop reason: ALTCHOICE

## 2021-01-06 DIAGNOSIS — J44.9 COPD, MILD (HCC): ICD-10-CM

## 2021-01-07 RX ORDER — ALBUTEROL SULFATE 2.5 MG/3ML
2.5 SOLUTION RESPIRATORY (INHALATION) EVERY 4 HOURS PRN
Qty: 120 VIAL | Refills: 0 | Status: SHIPPED | OUTPATIENT
Start: 2021-01-07 | End: 2021-01-28 | Stop reason: ALTCHOICE

## 2021-01-15 DIAGNOSIS — T78.40XS ALLERGIC REACTION, SEQUELA: ICD-10-CM

## 2021-01-17 RX ORDER — EPINEPHRINE 0.3 MG/.3ML
INJECTION SUBCUTANEOUS
Qty: 2 EACH | Refills: 0 | Status: SHIPPED | OUTPATIENT
Start: 2021-01-17 | End: 2021-05-12 | Stop reason: SDUPTHER

## 2021-01-20 DIAGNOSIS — I10 ESSENTIAL HYPERTENSION: ICD-10-CM

## 2021-01-21 RX ORDER — HYDROCHLOROTHIAZIDE 25 MG/1
TABLET ORAL
Qty: 60 TABLET | Refills: 0 | Status: SHIPPED | OUTPATIENT
Start: 2021-01-21 | End: 2021-02-24

## 2021-01-28 ENCOUNTER — TELEMEDICINE (OUTPATIENT)
Dept: FAMILY MEDICINE CLINIC | Facility: HOME HEALTHCARE | Age: 56
End: 2021-01-28
Payer: MEDICARE

## 2021-01-28 DIAGNOSIS — F41.8 DEPRESSION WITH ANXIETY: ICD-10-CM

## 2021-01-28 DIAGNOSIS — E11.9 DIABETES MELLITUS WITHOUT COMPLICATION (HCC): ICD-10-CM

## 2021-01-28 DIAGNOSIS — IMO0002 TYPE 2 DIABETES, UNCONTROLLED, WITH NEUROPATHY: Primary | ICD-10-CM

## 2021-01-28 DIAGNOSIS — E11.8 TYPE 2 DIABETES MELLITUS WITH COMPLICATION, WITH LONG-TERM CURRENT USE OF INSULIN (HCC): ICD-10-CM

## 2021-01-28 DIAGNOSIS — E03.9 ACQUIRED HYPOTHYROIDISM: ICD-10-CM

## 2021-01-28 DIAGNOSIS — E55.9 VITAMIN D DEFICIENCY: ICD-10-CM

## 2021-01-28 DIAGNOSIS — Z79.4 TYPE 2 DIABETES MELLITUS WITH COMPLICATION, WITH LONG-TERM CURRENT USE OF INSULIN (HCC): ICD-10-CM

## 2021-01-28 DIAGNOSIS — F32.9 REACTIVE DEPRESSION: ICD-10-CM

## 2021-01-28 DIAGNOSIS — E78.00 PURE HYPERCHOLESTEROLEMIA: ICD-10-CM

## 2021-01-28 PROCEDURE — G0071 COMM SVCS BY RHC/FQHC 5 MIN: HCPCS | Performed by: FAMILY MEDICINE

## 2021-01-28 RX ORDER — GABAPENTIN 300 MG/1
300 CAPSULE ORAL 3 TIMES DAILY
Qty: 90 CAPSULE | Refills: 1 | Status: SHIPPED | OUTPATIENT
Start: 2021-01-28 | End: 2021-06-08

## 2021-01-28 NOTE — PROGRESS NOTES
Virtual Brief Visit    Assessment/Plan:    Problem List Items Addressed This Visit        Endocrine    Hypothyroidism    Relevant Orders    TSH, 3rd generation with Free T4 reflex    Type 2 diabetes, uncontrolled, with neuropathy (HCC) - Primary    Relevant Medications    gabapentin (NEURONTIN) 300 mg capsule    Other Relevant Orders    HEMOGLOBIN A1C W/ EAG ESTIMATION    Microalbumin / creatinine urine ratio    Comprehensive metabolic panel       Other    Hyperlipidemia    Relevant Orders    Lipid Panel with Direct LDL reflex    Vitamin D deficiency    Relevant Orders    Vitamin D 25 hydroxy      Other Visit Diagnoses     Diabetes mellitus without complication ([facility] [address] )            -reviewed in detail with patient he cannot take both lyrica and gabapentin  If he would like to restart gabapentin we can d/c lyrica  There is room for titration on his gabapentin as he is taking 300mg TID  Reviewed this with patient and he is agreeable to trying this regimen  PDMP reviewed, pt last lyrica refill 1/4  Advised he will not receive future refills on this medication  He verbalized understanding  -lab work as ordered  reviewed with patient importance of compliance with DM regimen in control of neuropathy as he is historically noncompliant  Last A1C in April 2020 was 9 8    -patient to complete blood work and RTC for DM follow up/review labs         Reason for visit is   Chief Complaint   Patient presents with    Virtual Brief Visit        Encounter provider Russ Zepeda PA-C    Provider located at [address] [address]  [phone]    Recent Visits  No visits were found meeting these conditions     Showing recent visits within past 7 days and meeting all other requirements     Today's Visits  Date Type Provider Dept   01/28/21 Telemedicine Tyra Hoskins PA-C [facility] [address] today's visits and meeting all other requirements     Future Appointments  No visits were found meeting these conditions  Showing future appointments within next 150 days and meeting all other requirements        After connecting through telephone, the patient was identified by name and date of birth  Lay Cardoso was informed that this is a telemedicine visit and that the visit is being conducted through telephone  My office door was closed  No one else was in the room  He acknowledged consent and understanding of privacy and security of the platform  The patient has agreed to participate and understands he can discontinue the visit at any time  Patient is aware this is a billable service  Subjective    Lay Cardoso is a 63 y o  male who presents via telemedicine to discuss his neuropathy management  Patient states he was previously taking gabapentin 300 mg TID and recently ran out of refills  He is additionally taking lyrica 75mg BID  He has known uncontrolled DM with neuropathy and is historically non complaint with his treatment regimen         HPI     Past Medical History:   Diagnosis Date    Diabetes mellitus ([address] )     Disease of thyroid gland     Hyperlipidemia     Hypertension        Past Surgical History:   Procedure Laterality Date    KNEE SURGERY      SINUS SURGERY         Current Outpatient Medications   Medication Sig Dispense Refill    Ascorbic Acid (VITAMIN C PO) Take 1 tablet by mouth daily      aspirin 81 mg chewable tablet Take one tablet by mouth daily      atorvastatin (LIPITOR) 20 mg tablet Take 1 tablet by mouth once daily 90 tablet 0    CINNAMON PO Take 1 tablet by mouth daily      DULoxetine (CYMBALTA) 60 mg delayed release capsule Take 1 capsule by mouth once daily 60 capsule 0    EPINEPHrine (EPIPEN) 0 3 mg/0 3 mL SOAJ INJECT 0 3ML INTO A MUSCLE ONCE FOR 1 DOSE 2 each 0    hydrochlorothiazide (HYDRODIURIL) 25 mg tablet Take 1 tablet by mouth twice daily 60 tablet 0    insulin glargine (Lantus SoloStar) 100 units/mL injection pen Inject 60 Units under the skin daily 15 mL 2    Insulin Pen Needle (PEN NEEDLES 3/16") 31G X 5 MM MISC by Other route 4 (four) times a day 300 each 3    levothyroxine 175 mcg tablet Take 1 tablet (175 mcg total) by mouth daily 90 tablet 0    metFORMIN (GLUCOPHAGE) 1000 MG tablet Take 1 tablet by mouth twice daily 60 tablet 0    omega-3-acid ethyl esters (LOVAZA) 1 g capsule Take 1 capsule (1 g total) by mouth 2 (two) times a day for high cholesterol 60 capsule 2    other medication, see sig, Medication/product name: Moringa    Strength: 1000mcg    Sig (include dose, route, frequency): daily for joint pains per pt (OTC)      sodium chloride (OCEAN) 0 65 % nasal spray 1 spray into each nostril as needed for rhinitis 30 mL 1    traZODone (DESYREL) 150 mg tablet TAKE 1 TABLET BY MOUTH ONCE DAILY AT BEDTIME 90 tablet 0    Ventolin  (90 Base) MCG/ACT inhaler Inhale 2 puffs every 4 (four) hours as needed for wheezing or shortness of breath 1 Inhaler 5    gabapentin (NEURONTIN) 300 mg capsule Take 1 capsule (300 mg total) by mouth 3 (three) times a day 90 capsule 1    insulin lispro (HumaLOG KwikPen) 100 units/mL injection pen Inject 7 Units under the skin 3 (three) times a day with meals (Patient not taking: Reported on 1/28/2021) 3 pen 0     No current facility-administered medications for this visit  Allergies   Allergen Reactions    Ace Inhibitors Swelling     Angioedema    Clindamycin      Had angioedema episode approx 5 hr after IM administration of clindamycin  Unclear if there is definitive causal relationship  Review of Systems   Constitutional: Negative  HENT: Negative  Eyes: Negative  Respiratory: Negative  Cardiovascular: Negative  Gastrointestinal: Negative  Endocrine: Negative  Genitourinary: Negative  Musculoskeletal: Positive for arthralgias and myalgias  Neuropathy   Skin: Negative  Allergic/Immunologic: Negative      Neurological: Negative  Hematological: Negative  Psychiatric/Behavioral: Negative  There were no vitals filed for this visit  I spent 10 minutes with patient today in which greater than 50% of the time was spent in counseling/coordination of care regarding assessment and plan of care    Bucio Proc  Ying Ceasar 1 acknowledges that he has consented to an online visit or consultation  He understands that the online visit is based solely on information provided by him, and that, in the absence of a face-to-face physical evaluation by the physician, the diagnosis he receives is both limited and provisional in terms of accuracy and completeness  This is not intended to replace a full medical face-to-face evaluation by the physician  Álvaro Miranda understands and accepts these terms  It was my intent to perform this visit via video technology but the patient was not able to do a video connection so the visit was completed via audio telephone only

## 2021-02-03 DIAGNOSIS — E11.8 TYPE 2 DIABETES MELLITUS WITH COMPLICATION, WITH LONG-TERM CURRENT USE OF INSULIN (HCC): ICD-10-CM

## 2021-02-03 DIAGNOSIS — F41.8 DEPRESSION WITH ANXIETY: ICD-10-CM

## 2021-02-03 DIAGNOSIS — Z79.4 TYPE 2 DIABETES MELLITUS WITH COMPLICATION, WITH LONG-TERM CURRENT USE OF INSULIN (HCC): ICD-10-CM

## 2021-02-03 DIAGNOSIS — F32.9 REACTIVE DEPRESSION: ICD-10-CM

## 2021-02-03 RX ORDER — DULOXETIN HYDROCHLORIDE 60 MG/1
60 CAPSULE, DELAYED RELEASE ORAL DAILY
Qty: 60 CAPSULE | Refills: 0 | Status: SHIPPED | OUTPATIENT
Start: 2021-02-03 | End: 2021-03-31

## 2021-02-04 DIAGNOSIS — E11.65 TYPE 2 DIABETES MELLITUS WITH HYPERGLYCEMIA, WITH LONG-TERM CURRENT USE OF INSULIN (HCC): ICD-10-CM

## 2021-02-04 DIAGNOSIS — Z79.4 TYPE 2 DIABETES MELLITUS WITH HYPERGLYCEMIA, WITH LONG-TERM CURRENT USE OF INSULIN (HCC): ICD-10-CM

## 2021-02-04 RX ORDER — TRAZODONE HYDROCHLORIDE 150 MG/1
150 TABLET ORAL
Qty: 90 TABLET | Refills: 0 | Status: SHIPPED | OUTPATIENT
Start: 2021-02-04 | End: 2021-05-17

## 2021-02-04 RX ORDER — INSULIN GLARGINE 100 [IU]/ML
INJECTION, SOLUTION SUBCUTANEOUS
Qty: 15 ML | Refills: 0 | Status: SHIPPED | OUTPATIENT
Start: 2021-02-04 | End: 2021-03-11

## 2021-02-05 DIAGNOSIS — E78.00 PURE HYPERCHOLESTEROLEMIA: ICD-10-CM

## 2021-02-05 DIAGNOSIS — F41.8 DEPRESSION WITH ANXIETY: ICD-10-CM

## 2021-02-05 DIAGNOSIS — IMO0002 TYPE 2 DIABETES, UNCONTROLLED, WITH NEUROPATHY: ICD-10-CM

## 2021-02-05 DIAGNOSIS — E03.9 ACQUIRED HYPOTHYROIDISM: ICD-10-CM

## 2021-02-05 DIAGNOSIS — E55.9 VITAMIN D DEFICIENCY: ICD-10-CM

## 2021-02-05 LAB
25(OH)D3 SERPL-MCNC: 22 NG/ML (ref 30–100)
ALBUMIN SERPL BCP-MCNC: 3.7 G/DL (ref 3.5–5)
ALP SERPL-CCNC: 94 U/L (ref 46–116)
ALT SERPL W P-5'-P-CCNC: 42 U/L (ref 12–78)
ANION GAP SERPL CALCULATED.3IONS-SCNC: 7 MMOL/L (ref 4–13)
AST SERPL W P-5'-P-CCNC: 23 U/L (ref 5–45)
BILIRUB SERPL-MCNC: 0.47 MG/DL (ref 0.2–1)
BUN SERPL-MCNC: 13 MG/DL (ref 5–25)
CALCIUM SERPL-MCNC: 9.2 MG/DL (ref 8.3–10.1)
CHLORIDE SERPL-SCNC: 93 MMOL/L (ref 100–108)
CHOLEST SERPL-MCNC: 169 MG/DL (ref 50–200)
CO2 SERPL-SCNC: 30 MMOL/L (ref 21–32)
CREAT SERPL-MCNC: 1.12 MG/DL (ref 0.6–1.3)
CREAT UR-MCNC: 64.6 MG/DL
EST. AVERAGE GLUCOSE BLD GHB EST-MCNC: 289 MG/DL
GFR SERPL CREATININE-BSD FRML MDRD: 73 ML/MIN/1.73SQ M
GLUCOSE P FAST SERPL-MCNC: 288 MG/DL (ref 65–99)
HBA1C MFR BLD: 11.7 %
HDLC SERPL-MCNC: 32 MG/DL
LDLC SERPL CALC-MCNC: 103 MG/DL (ref 0–100)
MICROALBUMIN UR-MCNC: 505 MG/L (ref 0–20)
MICROALBUMIN/CREAT 24H UR: 782 MG/G CREATININE (ref 0–30)
POTASSIUM SERPL-SCNC: 3.5 MMOL/L (ref 3.5–5.3)
PROT SERPL-MCNC: 7.7 G/DL (ref 6.4–8.2)
SODIUM SERPL-SCNC: 130 MMOL/L (ref 136–145)
T4 FREE SERPL-MCNC: 1.65 NG/DL (ref 0.76–1.46)
TRIGL SERPL-MCNC: 171 MG/DL
TSH SERPL DL<=0.05 MIU/L-ACNC: 0.14 UIU/ML (ref 0.36–3.74)

## 2021-02-05 PROCEDURE — 82570 ASSAY OF URINE CREATININE: CPT | Performed by: PHYSICIAN ASSISTANT

## 2021-02-05 PROCEDURE — 83036 HEMOGLOBIN GLYCOSYLATED A1C: CPT | Performed by: PHYSICIAN ASSISTANT

## 2021-02-05 PROCEDURE — 82306 VITAMIN D 25 HYDROXY: CPT | Performed by: PHYSICIAN ASSISTANT

## 2021-02-05 PROCEDURE — 84439 ASSAY OF FREE THYROXINE: CPT

## 2021-02-05 PROCEDURE — 84443 ASSAY THYROID STIM HORMONE: CPT | Performed by: PHYSICIAN ASSISTANT

## 2021-02-05 PROCEDURE — 80061 LIPID PANEL: CPT | Performed by: PHYSICIAN ASSISTANT

## 2021-02-05 PROCEDURE — 80053 COMPREHEN METABOLIC PANEL: CPT | Performed by: PHYSICIAN ASSISTANT

## 2021-02-05 PROCEDURE — 82043 UR ALBUMIN QUANTITATIVE: CPT | Performed by: PHYSICIAN ASSISTANT

## 2021-02-05 RX ORDER — DULOXETIN HYDROCHLORIDE 60 MG/1
CAPSULE, DELAYED RELEASE ORAL
Qty: 60 CAPSULE | Refills: 0 | OUTPATIENT
Start: 2021-02-05

## 2021-02-05 RX ORDER — TRAZODONE HYDROCHLORIDE 150 MG/1
TABLET ORAL
Qty: 90 TABLET | Refills: 0 | OUTPATIENT
Start: 2021-02-05

## 2021-02-05 RX ORDER — TRAZODONE HYDROCHLORIDE 150 MG/1
150 TABLET ORAL
Qty: 90 TABLET | Refills: 0 | OUTPATIENT
Start: 2021-02-05

## 2021-02-24 DIAGNOSIS — Z79.4 TYPE 2 DIABETES MELLITUS WITH COMPLICATION, WITH LONG-TERM CURRENT USE OF INSULIN (HCC): ICD-10-CM

## 2021-02-24 DIAGNOSIS — I10 ESSENTIAL HYPERTENSION: ICD-10-CM

## 2021-02-24 DIAGNOSIS — E11.8 TYPE 2 DIABETES MELLITUS WITH COMPLICATION, WITH LONG-TERM CURRENT USE OF INSULIN (HCC): ICD-10-CM

## 2021-02-24 RX ORDER — HYDROCHLOROTHIAZIDE 25 MG/1
TABLET ORAL
Qty: 60 TABLET | Refills: 5 | Status: SHIPPED | OUTPATIENT
Start: 2021-02-24 | End: 2021-04-15 | Stop reason: HOSPADM

## 2021-03-11 DIAGNOSIS — E78.00 PURE HYPERCHOLESTEROLEMIA: ICD-10-CM

## 2021-03-11 DIAGNOSIS — Z79.4 TYPE 2 DIABETES MELLITUS WITH HYPERGLYCEMIA, WITH LONG-TERM CURRENT USE OF INSULIN (HCC): ICD-10-CM

## 2021-03-11 DIAGNOSIS — E11.65 TYPE 2 DIABETES MELLITUS WITH HYPERGLYCEMIA, WITH LONG-TERM CURRENT USE OF INSULIN (HCC): ICD-10-CM

## 2021-03-11 RX ORDER — INSULIN GLARGINE 100 [IU]/ML
INJECTION, SOLUTION SUBCUTANEOUS
Qty: 15 ML | Refills: 0 | Status: SHIPPED | OUTPATIENT
Start: 2021-03-11 | End: 2021-04-26

## 2021-03-11 RX ORDER — ATORVASTATIN CALCIUM 20 MG/1
TABLET, FILM COATED ORAL
Qty: 90 TABLET | Refills: 0 | Status: SHIPPED | OUTPATIENT
Start: 2021-03-11 | End: 2021-07-01

## 2021-03-15 DIAGNOSIS — Z13.5 SCREENING FOR DIABETIC RETINOPATHY: Primary | ICD-10-CM

## 2021-03-25 DIAGNOSIS — E03.9 HYPOTHYROIDISM, UNSPECIFIED TYPE: ICD-10-CM

## 2021-03-25 RX ORDER — LEVOTHYROXINE SODIUM 175 UG/1
TABLET ORAL
Qty: 30 TABLET | Refills: 0 | Status: SHIPPED | OUTPATIENT
Start: 2021-03-25 | End: 2021-05-12 | Stop reason: SDUPTHER

## 2021-03-25 NOTE — TELEPHONE ENCOUNTER
Filled for 1 month, however, patient needs an appointment to review recently labs and will need a repeat TSH as his last level was low

## 2021-03-31 DIAGNOSIS — F32.9 REACTIVE DEPRESSION: ICD-10-CM

## 2021-03-31 RX ORDER — DULOXETIN HYDROCHLORIDE 60 MG/1
CAPSULE, DELAYED RELEASE ORAL
Qty: 90 CAPSULE | Refills: 0 | Status: SHIPPED | OUTPATIENT
Start: 2021-03-31 | End: 2021-07-07

## 2021-04-07 ENCOUNTER — HOSPITAL ENCOUNTER (INPATIENT)
Facility: HOSPITAL | Age: 56
LOS: 1 days | DRG: 915 | End: 2021-04-07
Attending: EMERGENCY MEDICINE | Admitting: INTERNAL MEDICINE
Payer: MEDICARE

## 2021-04-07 ENCOUNTER — ANESTHESIA EVENT (INPATIENT)
Dept: ANESTHESIOLOGY | Facility: HOSPITAL | Age: 56
DRG: 915 | End: 2021-04-07
Payer: MEDICARE

## 2021-04-07 ENCOUNTER — ANESTHESIA (INPATIENT)
Dept: PERIOP | Facility: HOSPITAL | Age: 56
DRG: 915 | End: 2021-04-07
Payer: MEDICARE

## 2021-04-07 ENCOUNTER — HOSPITAL ENCOUNTER (INPATIENT)
Facility: HOSPITAL | Age: 56
LOS: 8 days | Discharge: NURSING, REHAB OR LONG-TERM CARE | DRG: 915 | End: 2021-04-15
Attending: ANESTHESIOLOGY | Admitting: ANESTHESIOLOGY
Payer: MEDICARE

## 2021-04-07 ENCOUNTER — APPOINTMENT (INPATIENT)
Dept: RADIOLOGY | Facility: HOSPITAL | Age: 56
DRG: 915 | End: 2021-04-07
Payer: MEDICARE

## 2021-04-07 ENCOUNTER — ANESTHESIA EVENT (INPATIENT)
Dept: PERIOP | Facility: HOSPITAL | Age: 56
DRG: 915 | End: 2021-04-07
Payer: MEDICARE

## 2021-04-07 ENCOUNTER — ANESTHESIA (INPATIENT)
Dept: ANESTHESIOLOGY | Facility: HOSPITAL | Age: 56
DRG: 915 | End: 2021-04-07
Payer: MEDICARE

## 2021-04-07 VITALS
HEIGHT: 64 IN | HEART RATE: 121 BPM | DIASTOLIC BLOOD PRESSURE: 88 MMHG | WEIGHT: 250 LBS | RESPIRATION RATE: 24 BRPM | OXYGEN SATURATION: 95 % | TEMPERATURE: 96.7 F | SYSTOLIC BLOOD PRESSURE: 146 MMHG | BODY MASS INDEX: 42.68 KG/M2

## 2021-04-07 DIAGNOSIS — T78.3XXA ANGIOEDEMA, INITIAL ENCOUNTER: Primary | ICD-10-CM

## 2021-04-07 DIAGNOSIS — T78.3XXA ANGIOEDEMA, INITIAL ENCOUNTER: ICD-10-CM

## 2021-04-07 DIAGNOSIS — G47.33 OBSTRUCTIVE SLEEP APNEA: ICD-10-CM

## 2021-04-07 DIAGNOSIS — IMO0002 TYPE 2 DIABETES, UNCONTROLLED, WITH NEUROPATHY: ICD-10-CM

## 2021-04-07 DIAGNOSIS — K59.00 CONSTIPATION: ICD-10-CM

## 2021-04-07 DIAGNOSIS — I10 HYPERTENSION, UNSPECIFIED TYPE: ICD-10-CM

## 2021-04-07 DIAGNOSIS — R33.9 URINARY RETENTION: ICD-10-CM

## 2021-04-07 DIAGNOSIS — Z72.0 TOBACCO ABUSE: ICD-10-CM

## 2021-04-07 DIAGNOSIS — F41.8 DEPRESSION WITH ANXIETY: Primary | ICD-10-CM

## 2021-04-07 DIAGNOSIS — J96.01 ACUTE RESPIRATORY FAILURE WITH HYPOXIA (HCC): ICD-10-CM

## 2021-04-07 PROBLEM — R25.3 MUSCLE TWITCHING: Status: ACTIVE | Noted: 2021-04-07

## 2021-04-07 PROBLEM — E11.65 TYPE 2 DIABETES MELLITUS WITH HYPERGLYCEMIA, WITH LONG-TERM CURRENT USE OF INSULIN (HCC): Status: ACTIVE | Noted: 2021-04-07

## 2021-04-07 PROBLEM — Z79.4 TYPE 2 DIABETES MELLITUS WITH HYPERGLYCEMIA, WITH LONG-TERM CURRENT USE OF INSULIN (HCC): Status: ACTIVE | Noted: 2021-04-07

## 2021-04-07 PROBLEM — J98.11 ATELECTASIS: Status: ACTIVE | Noted: 2021-04-07

## 2021-04-07 PROBLEM — R04.0 EPISTAXIS: Status: ACTIVE | Noted: 2021-04-07

## 2021-04-07 LAB
ABO GROUP BLD: NORMAL
ALBUMIN SERPL BCP-MCNC: 3.2 G/DL (ref 3.5–5)
ALP SERPL-CCNC: 78 U/L (ref 46–116)
ALT SERPL W P-5'-P-CCNC: 45 U/L (ref 12–78)
ANION GAP SERPL CALCULATED.3IONS-SCNC: 7 MMOL/L (ref 4–13)
ANION GAP SERPL CALCULATED.3IONS-SCNC: 7 MMOL/L (ref 4–13)
ARTERIAL PATENCY WRIST A: YES
ARTERIAL PATENCY WRIST A: YES
AST SERPL W P-5'-P-CCNC: 21 U/L (ref 5–45)
BASE EXCESS BLDA CALC-SCNC: -2.6 MMOL/L
BASE EXCESS BLDA CALC-SCNC: 1.2 MMOL/L
BASOPHILS # BLD AUTO: 0.06 THOUSANDS/ΜL (ref 0–0.1)
BASOPHILS NFR BLD AUTO: 1 % (ref 0–1)
BILIRUB SERPL-MCNC: 0.45 MG/DL (ref 0.2–1)
BLD GP AB SCN SERPL QL: NEGATIVE
BODY TEMPERATURE: 97.6 DEGREES FEHRENHEIT
BUN SERPL-MCNC: 14 MG/DL (ref 5–25)
BUN SERPL-MCNC: 16 MG/DL (ref 5–25)
C4 SERPL-MCNC: 18 MG/DL (ref 10–40)
CALCIUM ALBUM COR SERPL-MCNC: 9.2 MG/DL (ref 8.3–10.1)
CALCIUM SERPL-MCNC: 8.6 MG/DL (ref 8.3–10.1)
CALCIUM SERPL-MCNC: 9.3 MG/DL (ref 8.3–10.1)
CHLORIDE SERPL-SCNC: 94 MMOL/L (ref 100–108)
CHLORIDE SERPL-SCNC: 96 MMOL/L (ref 100–108)
CO2 SERPL-SCNC: 30 MMOL/L (ref 21–32)
CO2 SERPL-SCNC: 31 MMOL/L (ref 21–32)
CREAT SERPL-MCNC: 1.16 MG/DL (ref 0.6–1.3)
CREAT SERPL-MCNC: 1.45 MG/DL (ref 0.6–1.3)
CRP SERPL QL: 4.3 MG/L
EOSINOPHIL # BLD AUTO: 0.15 THOUSAND/ΜL (ref 0–0.61)
EOSINOPHIL NFR BLD AUTO: 2 % (ref 0–6)
ERYTHROCYTE [DISTWIDTH] IN BLOOD BY AUTOMATED COUNT: 13.3 % (ref 11.6–15.1)
ERYTHROCYTE [SEDIMENTATION RATE] IN BLOOD: 27 MM/HOUR (ref 0–19)
FLUAV RNA RESP QL NAA+PROBE: NEGATIVE
FLUBV RNA RESP QL NAA+PROBE: NEGATIVE
GFR SERPL CREATININE-BSD FRML MDRD: 53 ML/MIN/1.73SQ M
GFR SERPL CREATININE-BSD FRML MDRD: 70 ML/MIN/1.73SQ M
GLUCOSE SERPL-MCNC: 173 MG/DL (ref 65–140)
GLUCOSE SERPL-MCNC: 236 MG/DL (ref 65–140)
GLUCOSE SERPL-MCNC: 292 MG/DL (ref 65–140)
GLUCOSE SERPL-MCNC: 320 MG/DL (ref 65–140)
GLUCOSE SERPL-MCNC: 321 MG/DL (ref 65–140)
GLUCOSE SERPL-MCNC: 337 MG/DL (ref 65–140)
GLUCOSE SERPL-MCNC: 350 MG/DL (ref 65–140)
GLUCOSE SERPL-MCNC: 373 MG/DL (ref 65–140)
GLUCOSE SERPL-MCNC: 395 MG/DL (ref 65–140)
HCO3 BLDA-SCNC: 26.6 MMOL/L (ref 22–28)
HCO3 BLDA-SCNC: 28.9 MMOL/L (ref 22–28)
HCT VFR BLD AUTO: 44.5 % (ref 36.5–49.3)
HCT VFR BLD AUTO: 46.9 % (ref 36.5–49.3)
HCT VFR BLD AUTO: 47.4 % (ref 36.5–49.3)
HGB BLD-MCNC: 14.6 G/DL (ref 12–17)
HGB BLD-MCNC: 15.4 G/DL (ref 12–17)
HGB BLD-MCNC: 15.5 G/DL (ref 12–17)
HOROWITZ INDEX BLDA+IHG-RTO: 100 MM[HG]
HOROWITZ INDEX BLDA+IHG-RTO: 100 MM[HG]
I-TIME: 1
IMM GRANULOCYTES # BLD AUTO: 0.04 THOUSAND/UL (ref 0–0.2)
IMM GRANULOCYTES NFR BLD AUTO: 0 % (ref 0–2)
INR PPP: 0.87 (ref 0.84–1.19)
LACTATE SERPL-SCNC: 2.6 MMOL/L (ref 0.5–2)
LACTATE SERPL-SCNC: 2.8 MMOL/L (ref 0.5–2)
LACTATE SERPL-SCNC: 3.3 MMOL/L (ref 0.5–2)
LYMPHOCYTES # BLD AUTO: 2.32 THOUSANDS/ΜL (ref 0.6–4.47)
LYMPHOCYTES NFR BLD AUTO: 25 % (ref 14–44)
MAGNESIUM SERPL-MCNC: 1.9 MG/DL (ref 1.6–2.6)
MAGNESIUM SERPL-MCNC: 1.9 MG/DL (ref 1.6–2.6)
MCH RBC QN AUTO: 28.6 PG (ref 26.8–34.3)
MCHC RBC AUTO-ENTMCNC: 33 G/DL (ref 31.4–37.4)
MCV RBC AUTO: 87 FL (ref 82–98)
MONOCYTES # BLD AUTO: 1.05 THOUSAND/ΜL (ref 0.17–1.22)
MONOCYTES NFR BLD AUTO: 11 % (ref 4–12)
NEUTROPHILS # BLD AUTO: 5.79 THOUSANDS/ΜL (ref 1.85–7.62)
NEUTS SEG NFR BLD AUTO: 61 % (ref 43–75)
NRBC BLD AUTO-RTO: 0 /100 WBCS
O2 CT BLDA-SCNC: 19.7 ML/DL (ref 16–23)
O2 CT BLDA-SCNC: 20.2 ML/DL (ref 16–23)
OXYHGB MFR BLDA: 86.1 % (ref 94–97)
OXYHGB MFR BLDA: 94.5 % (ref 94–97)
PCO2 BLDA: 44.8 MM HG (ref 36–44)
PCO2 BLDA: 83 MM HG (ref 36–44)
PCO2 TEMP ADJ BLDA: 43.6 MM HG (ref 36–44)
PEEP RESPIRATORY: 10 CM[H2O]
PEEP RESPIRATORY: 6 CM[H2O]
PH BLD: 7.4 [PH] (ref 7.35–7.45)
PH BLDA: 7.16 [PH] (ref 7.35–7.45)
PH BLDA: 7.39 [PH] (ref 7.35–7.45)
PHOSPHATE SERPL-MCNC: 2.8 MG/DL (ref 2.7–4.5)
PLATELET # BLD AUTO: 279 THOUSANDS/UL (ref 149–390)
PMV BLD AUTO: 9.3 FL (ref 8.9–12.7)
PO2 BLD: 86.3 MM HG (ref 75–129)
PO2 BLDA: 71.1 MM HG (ref 75–129)
PO2 BLDA: 89.6 MM HG (ref 75–129)
POTASSIUM SERPL-SCNC: 3.9 MMOL/L (ref 3.5–5.3)
POTASSIUM SERPL-SCNC: 5.5 MMOL/L (ref 3.5–5.3)
PROCALCITONIN SERPL-MCNC: 0.06 NG/ML
PROCALCITONIN SERPL-MCNC: 0.06 NG/ML
PROT SERPL-MCNC: 6.8 G/DL (ref 6.4–8.2)
PROTHROMBIN TIME: 11.6 SECONDS (ref 11.6–14.5)
RBC # BLD AUTO: 5.42 MILLION/UL (ref 3.88–5.62)
RH BLD: POSITIVE
RSV RNA RESP QL NAA+PROBE: NEGATIVE
SARS-COV-2 RNA RESP QL NAA+PROBE: NEGATIVE
SODIUM SERPL-SCNC: 132 MMOL/L (ref 136–145)
SODIUM SERPL-SCNC: 133 MMOL/L (ref 136–145)
SPECIMEN EXPIRATION DATE: NORMAL
SPECIMEN SOURCE: ABNORMAL
SPECIMEN SOURCE: ABNORMAL
TROPONIN I SERPL-MCNC: <0.02 NG/ML
TROPONIN I SERPL-MCNC: <0.02 NG/ML
VENT AC: 18
VENT AC: 24
VENT- AC: AC
VENT- AC: AC
VT SETTING VENT: 400 ML
VT SETTING VENT: 480 ML
WBC # BLD AUTO: 9.41 THOUSAND/UL (ref 4.31–10.16)

## 2021-04-07 PROCEDURE — 0CJS8ZZ INSPECTION OF LARYNX, VIA NATURAL OR ARTIFICIAL OPENING ENDOSCOPIC: ICD-10-PCS | Performed by: ANESTHESIOLOGY

## 2021-04-07 PROCEDURE — 87081 CULTURE SCREEN ONLY: CPT | Performed by: NURSE PRACTITIONER

## 2021-04-07 PROCEDURE — 99291 CRITICAL CARE FIRST HOUR: CPT | Performed by: NURSE PRACTITIONER

## 2021-04-07 PROCEDURE — 30233K1 TRANSFUSION OF NONAUTOLOGOUS FROZEN PLASMA INTO PERIPHERAL VEIN, PERCUTANEOUS APPROACH: ICD-10-PCS | Performed by: ANESTHESIOLOGY

## 2021-04-07 PROCEDURE — 86140 C-REACTIVE PROTEIN: CPT | Performed by: NURSE PRACTITIONER

## 2021-04-07 PROCEDURE — 99292 CRITICAL CARE ADDL 30 MIN: CPT | Performed by: ANESTHESIOLOGY

## 2021-04-07 PROCEDURE — 30233K1 TRANSFUSION OF NONAUTOLOGOUS FROZEN PLASMA INTO PERIPHERAL VEIN, PERCUTANEOUS APPROACH: ICD-10-PCS | Performed by: EMERGENCY MEDICINE

## 2021-04-07 PROCEDURE — 83605 ASSAY OF LACTIC ACID: CPT | Performed by: INTERNAL MEDICINE

## 2021-04-07 PROCEDURE — 94760 N-INVAS EAR/PLS OXIMETRY 1: CPT

## 2021-04-07 PROCEDURE — 84484 ASSAY OF TROPONIN QUANT: CPT | Performed by: INTERNAL MEDICINE

## 2021-04-07 PROCEDURE — 99285 EMERGENCY DEPT VISIT HI MDM: CPT | Performed by: EMERGENCY MEDICINE

## 2021-04-07 PROCEDURE — 99223 1ST HOSP IP/OBS HIGH 75: CPT | Performed by: INTERNAL MEDICINE

## 2021-04-07 PROCEDURE — 82948 REAGENT STRIP/BLOOD GLUCOSE: CPT

## 2021-04-07 PROCEDURE — 85652 RBC SED RATE AUTOMATED: CPT | Performed by: NURSE PRACTITIONER

## 2021-04-07 PROCEDURE — 83735 ASSAY OF MAGNESIUM: CPT | Performed by: EMERGENCY MEDICINE

## 2021-04-07 PROCEDURE — 84145 PROCALCITONIN (PCT): CPT | Performed by: INTERNAL MEDICINE

## 2021-04-07 PROCEDURE — 71045 X-RAY EXAM CHEST 1 VIEW: CPT

## 2021-04-07 PROCEDURE — 86901 BLOOD TYPING SEROLOGIC RH(D): CPT | Performed by: EMERGENCY MEDICINE

## 2021-04-07 PROCEDURE — 86901 BLOOD TYPING SEROLOGIC RH(D): CPT | Performed by: NURSE PRACTITIONER

## 2021-04-07 PROCEDURE — 84145 PROCALCITONIN (PCT): CPT | Performed by: NURSE PRACTITIONER

## 2021-04-07 PROCEDURE — 82805 BLOOD GASES W/O2 SATURATION: CPT | Performed by: NURSE PRACTITIONER

## 2021-04-07 PROCEDURE — 36600 WITHDRAWAL OF ARTERIAL BLOOD: CPT

## 2021-04-07 PROCEDURE — 86160 COMPLEMENT ANTIGEN: CPT | Performed by: NURSE PRACTITIONER

## 2021-04-07 PROCEDURE — 84484 ASSAY OF TROPONIN QUANT: CPT | Performed by: NURSE PRACTITIONER

## 2021-04-07 PROCEDURE — 86850 RBC ANTIBODY SCREEN: CPT | Performed by: EMERGENCY MEDICINE

## 2021-04-07 PROCEDURE — 99236 HOSP IP/OBS SAME DATE HI 85: CPT | Performed by: INTERNAL MEDICINE

## 2021-04-07 PROCEDURE — 86900 BLOOD TYPING SEROLOGIC ABO: CPT | Performed by: NURSE PRACTITIONER

## 2021-04-07 PROCEDURE — 94003 VENT MGMT INPAT SUBQ DAY: CPT

## 2021-04-07 PROCEDURE — 85025 COMPLETE CBC W/AUTO DIFF WBC: CPT | Performed by: EMERGENCY MEDICINE

## 2021-04-07 PROCEDURE — 85018 HEMOGLOBIN: CPT | Performed by: INTERNAL MEDICINE

## 2021-04-07 PROCEDURE — 0BH18EZ INSERTION OF ENDOTRACHEAL AIRWAY INTO TRACHEA, VIA NATURAL OR ARTIFICIAL OPENING ENDOSCOPIC: ICD-10-PCS | Performed by: ANESTHESIOLOGY

## 2021-04-07 PROCEDURE — 5A1945Z RESPIRATORY VENTILATION, 24-96 CONSECUTIVE HOURS: ICD-10-PCS | Performed by: ANESTHESIOLOGY

## 2021-04-07 PROCEDURE — 82805 BLOOD GASES W/O2 SATURATION: CPT | Performed by: INTERNAL MEDICINE

## 2021-04-07 PROCEDURE — 85014 HEMATOCRIT: CPT | Performed by: INTERNAL MEDICINE

## 2021-04-07 PROCEDURE — 86900 BLOOD TYPING SEROLOGIC ABO: CPT | Performed by: EMERGENCY MEDICINE

## 2021-04-07 PROCEDURE — 85014 HEMATOCRIT: CPT | Performed by: NURSE PRACTITIONER

## 2021-04-07 PROCEDURE — 94002 VENT MGMT INPAT INIT DAY: CPT

## 2021-04-07 PROCEDURE — 36415 COLL VENOUS BLD VENIPUNCTURE: CPT | Performed by: EMERGENCY MEDICINE

## 2021-04-07 PROCEDURE — 5A1935Z RESPIRATORY VENTILATION, LESS THAN 24 CONSECUTIVE HOURS: ICD-10-PCS | Performed by: EMERGENCY MEDICINE

## 2021-04-07 PROCEDURE — 84100 ASSAY OF PHOSPHORUS: CPT | Performed by: NURSE PRACTITIONER

## 2021-04-07 PROCEDURE — 99291 CRITICAL CARE FIRST HOUR: CPT

## 2021-04-07 PROCEDURE — 80053 COMPREHEN METABOLIC PANEL: CPT | Performed by: NURSE PRACTITIONER

## 2021-04-07 PROCEDURE — 83735 ASSAY OF MAGNESIUM: CPT | Performed by: NURSE PRACTITIONER

## 2021-04-07 PROCEDURE — 83605 ASSAY OF LACTIC ACID: CPT | Performed by: NURSE PRACTITIONER

## 2021-04-07 PROCEDURE — 96374 THER/PROPH/DIAG INJ IV PUSH: CPT

## 2021-04-07 PROCEDURE — 86161 COMPLEMENT/FUNCTION ACTIVITY: CPT | Performed by: NURSE PRACTITIONER

## 2021-04-07 PROCEDURE — 85610 PROTHROMBIN TIME: CPT | Performed by: EMERGENCY MEDICINE

## 2021-04-07 PROCEDURE — 94150 VITAL CAPACITY TEST: CPT

## 2021-04-07 PROCEDURE — 85018 HEMOGLOBIN: CPT | Performed by: NURSE PRACTITIONER

## 2021-04-07 PROCEDURE — P9017 PLASMA 1 DONOR FRZ W/IN 8 HR: HCPCS

## 2021-04-07 PROCEDURE — 80048 BASIC METABOLIC PNL TOTAL CA: CPT | Performed by: EMERGENCY MEDICINE

## 2021-04-07 PROCEDURE — 99291 CRITICAL CARE FIRST HOUR: CPT | Performed by: INTERNAL MEDICINE

## 2021-04-07 PROCEDURE — 0241U HB NFCT DS VIR RESP RNA 4 TRGT: CPT | Performed by: INTERNAL MEDICINE

## 2021-04-07 RX ORDER — METHYLPREDNISOLONE SODIUM SUCCINATE 40 MG/ML
40 INJECTION, POWDER, LYOPHILIZED, FOR SOLUTION INTRAMUSCULAR; INTRAVENOUS EVERY 8 HOURS SCHEDULED
Status: CANCELLED | OUTPATIENT
Start: 2021-04-07

## 2021-04-07 RX ORDER — FENTANYL CITRATE 50 UG/ML
50 INJECTION, SOLUTION INTRAMUSCULAR; INTRAVENOUS EVERY 2 HOUR PRN
Status: DISCONTINUED | OUTPATIENT
Start: 2021-04-07 | End: 2021-04-11

## 2021-04-07 RX ORDER — METHYLPREDNISOLONE SODIUM SUCCINATE 40 MG/ML
40 INJECTION, POWDER, LYOPHILIZED, FOR SOLUTION INTRAMUSCULAR; INTRAVENOUS EVERY 8 HOURS SCHEDULED
Status: DISCONTINUED | OUTPATIENT
Start: 2021-04-07 | End: 2021-04-10

## 2021-04-07 RX ORDER — PROPOFOL 10 MG/ML
5-50 INJECTION, EMULSION INTRAVENOUS
Status: CANCELLED | OUTPATIENT
Start: 2021-04-07

## 2021-04-07 RX ORDER — FENTANYL CITRATE-0.9 % NACL/PF 10 MCG/ML
50 PLASTIC BAG, INJECTION (ML) INTRAVENOUS CONTINUOUS
Status: DISCONTINUED | OUTPATIENT
Start: 2021-04-07 | End: 2021-04-07

## 2021-04-07 RX ORDER — SODIUM CHLORIDE 9 MG/ML
50 INJECTION, SOLUTION INTRAVENOUS CONTINUOUS
Status: DISCONTINUED | OUTPATIENT
Start: 2021-04-07 | End: 2021-04-07

## 2021-04-07 RX ORDER — FENTANYL CITRATE-0.9 % NACL/PF 10 MCG/ML
50 PLASTIC BAG, INJECTION (ML) INTRAVENOUS CONTINUOUS
Status: DISCONTINUED | OUTPATIENT
Start: 2021-04-07 | End: 2021-04-07 | Stop reason: HOSPADM

## 2021-04-07 RX ORDER — CHLORHEXIDINE GLUCONATE 0.12 MG/ML
15 RINSE ORAL EVERY 12 HOURS SCHEDULED
Status: DISCONTINUED | OUTPATIENT
Start: 2021-04-07 | End: 2021-04-07 | Stop reason: SDUPTHER

## 2021-04-07 RX ORDER — FENTANYL CITRATE-0.9 % NACL/PF 10 MCG/ML
PLASTIC BAG, INJECTION (ML) INTRAVENOUS
Status: DISCONTINUED
Start: 2021-04-07 | End: 2021-04-07 | Stop reason: HOSPADM

## 2021-04-07 RX ORDER — METHYLPREDNISOLONE SODIUM SUCCINATE 40 MG/ML
40 INJECTION, POWDER, LYOPHILIZED, FOR SOLUTION INTRAMUSCULAR; INTRAVENOUS EVERY 8 HOURS SCHEDULED
Status: DISCONTINUED | OUTPATIENT
Start: 2021-04-07 | End: 2021-04-07 | Stop reason: HOSPADM

## 2021-04-07 RX ORDER — CHLORHEXIDINE GLUCONATE 0.12 MG/ML
15 RINSE ORAL EVERY 12 HOURS SCHEDULED
Status: DISCONTINUED | OUTPATIENT
Start: 2021-04-07 | End: 2021-04-07 | Stop reason: HOSPADM

## 2021-04-07 RX ORDER — LIDOCAINE HYDROCHLORIDE 40 MG/ML
INJECTION, SOLUTION RETROBULBAR; TOPICAL AS NEEDED
Status: DISCONTINUED | OUTPATIENT
Start: 2021-04-07 | End: 2021-04-07

## 2021-04-07 RX ORDER — SODIUM CHLORIDE 9 MG/ML
50 INJECTION, SOLUTION INTRAVENOUS CONTINUOUS
Status: CANCELLED | OUTPATIENT
Start: 2021-04-07

## 2021-04-07 RX ORDER — DEXMEDETOMIDINE HYDROCHLORIDE 100 UG/ML
INJECTION, SOLUTION INTRAVENOUS AS NEEDED
Status: DISCONTINUED | OUTPATIENT
Start: 2021-04-07 | End: 2021-04-07

## 2021-04-07 RX ORDER — INSULIN GLARGINE 100 [IU]/ML
30 INJECTION, SOLUTION SUBCUTANEOUS EVERY MORNING
Status: DISCONTINUED | OUTPATIENT
Start: 2021-04-08 | End: 2021-04-07

## 2021-04-07 RX ORDER — ROCURONIUM BROMIDE 10 MG/ML
INJECTION, SOLUTION INTRAVENOUS AS NEEDED
Status: DISCONTINUED | OUTPATIENT
Start: 2021-04-07 | End: 2021-04-07

## 2021-04-07 RX ORDER — CHLORHEXIDINE GLUCONATE 0.12 MG/ML
15 RINSE ORAL EVERY 12 HOURS SCHEDULED
Status: DISCONTINUED | OUTPATIENT
Start: 2021-04-07 | End: 2021-04-11

## 2021-04-07 RX ORDER — PROPOFOL 10 MG/ML
INJECTION, EMULSION INTRAVENOUS AS NEEDED
Status: DISCONTINUED | OUTPATIENT
Start: 2021-04-07 | End: 2021-04-07

## 2021-04-07 RX ORDER — FENTANYL CITRATE-0.9 % NACL/PF 10 MCG/ML
50 PLASTIC BAG, INJECTION (ML) INTRAVENOUS CONTINUOUS
Status: CANCELLED | OUTPATIENT
Start: 2021-04-07

## 2021-04-07 RX ORDER — LORAZEPAM 2 MG/ML
2 INJECTION INTRAMUSCULAR EVERY 4 HOURS PRN
Status: DISCONTINUED | OUTPATIENT
Start: 2021-04-07 | End: 2021-04-07 | Stop reason: HOSPADM

## 2021-04-07 RX ORDER — SODIUM CHLORIDE 9 MG/ML
50 INJECTION, SOLUTION INTRAVENOUS CONTINUOUS
Status: DISCONTINUED | OUTPATIENT
Start: 2021-04-07 | End: 2021-04-07 | Stop reason: HOSPADM

## 2021-04-07 RX ORDER — CHLORHEXIDINE GLUCONATE 0.12 MG/ML
15 RINSE ORAL EVERY 12 HOURS SCHEDULED
Status: CANCELLED | OUTPATIENT
Start: 2021-04-07

## 2021-04-07 RX ORDER — SODIUM CHLORIDE, SODIUM LACTATE, POTASSIUM CHLORIDE, CALCIUM CHLORIDE 600; 310; 30; 20 MG/100ML; MG/100ML; MG/100ML; MG/100ML
INJECTION, SOLUTION INTRAVENOUS CONTINUOUS PRN
Status: DISCONTINUED | OUTPATIENT
Start: 2021-04-07 | End: 2021-04-07

## 2021-04-07 RX ORDER — DEXTROSE MONOHYDRATE 25 G/50ML
25 INJECTION, SOLUTION INTRAVENOUS DAILY PRN
Status: CANCELLED | OUTPATIENT
Start: 2021-04-07

## 2021-04-07 RX ORDER — DIPHENHYDRAMINE HYDROCHLORIDE 50 MG/ML
50 INJECTION INTRAMUSCULAR; INTRAVENOUS EVERY 6 HOURS
Status: DISCONTINUED | OUTPATIENT
Start: 2021-04-07 | End: 2021-04-12

## 2021-04-07 RX ORDER — PROPOFOL 10 MG/ML
5-50 INJECTION, EMULSION INTRAVENOUS
Status: DISCONTINUED | OUTPATIENT
Start: 2021-04-07 | End: 2021-04-10

## 2021-04-07 RX ORDER — PROPOFOL 10 MG/ML
5-70 INJECTION, EMULSION INTRAVENOUS
Status: DISCONTINUED | OUTPATIENT
Start: 2021-04-07 | End: 2021-04-07

## 2021-04-07 RX ORDER — MIDAZOLAM HYDROCHLORIDE 2 MG/2ML
INJECTION, SOLUTION INTRAMUSCULAR; INTRAVENOUS AS NEEDED
Status: DISCONTINUED | OUTPATIENT
Start: 2021-04-07 | End: 2021-04-07

## 2021-04-07 RX ORDER — METOPROLOL TARTRATE 5 MG/5ML
INJECTION INTRAVENOUS AS NEEDED
Status: DISCONTINUED | OUTPATIENT
Start: 2021-04-07 | End: 2021-04-07

## 2021-04-07 RX ORDER — DEXTROSE MONOHYDRATE 25 G/50ML
25 INJECTION, SOLUTION INTRAVENOUS DAILY PRN
Status: DISCONTINUED | OUTPATIENT
Start: 2021-04-07 | End: 2021-04-07 | Stop reason: HOSPADM

## 2021-04-07 RX ORDER — INSULIN GLARGINE 100 [IU]/ML
30 INJECTION, SOLUTION SUBCUTANEOUS EVERY MORNING
Status: DISCONTINUED | OUTPATIENT
Start: 2021-04-07 | End: 2021-04-07 | Stop reason: HOSPADM

## 2021-04-07 RX ORDER — PROPOFOL 10 MG/ML
INJECTION, EMULSION INTRAVENOUS
Status: COMPLETED
Start: 2021-04-07 | End: 2021-04-07

## 2021-04-07 RX ORDER — ATORVASTATIN CALCIUM 20 MG/1
20 TABLET, FILM COATED ORAL
Status: DISCONTINUED | OUTPATIENT
Start: 2021-04-07 | End: 2021-04-11

## 2021-04-07 RX ORDER — PROPOFOL 10 MG/ML
5-50 INJECTION, EMULSION INTRAVENOUS
Status: DISCONTINUED | OUTPATIENT
Start: 2021-04-07 | End: 2021-04-07

## 2021-04-07 RX ORDER — LORAZEPAM 2 MG/ML
2 INJECTION INTRAMUSCULAR EVERY 4 HOURS PRN
Status: CANCELLED | OUTPATIENT
Start: 2021-04-07

## 2021-04-07 RX ORDER — LORAZEPAM 2 MG/ML
1 INJECTION INTRAMUSCULAR EVERY 4 HOURS PRN
Status: DISCONTINUED | OUTPATIENT
Start: 2021-04-07 | End: 2021-04-07

## 2021-04-07 RX ORDER — INSULIN GLARGINE 100 [IU]/ML
30 INJECTION, SOLUTION SUBCUTANEOUS EVERY MORNING
Status: CANCELLED | OUTPATIENT
Start: 2021-04-07

## 2021-04-07 RX ORDER — DEXAMETHASONE SODIUM PHOSPHATE 10 MG/ML
10 INJECTION, SOLUTION INTRAMUSCULAR; INTRAVENOUS ONCE
Status: COMPLETED | OUTPATIENT
Start: 2021-04-07 | End: 2021-04-07

## 2021-04-07 RX ORDER — PROPOFOL 10 MG/ML
5-50 INJECTION, EMULSION INTRAVENOUS
Status: DISCONTINUED | OUTPATIENT
Start: 2021-04-07 | End: 2021-04-07 | Stop reason: HOSPADM

## 2021-04-07 RX ADMIN — DEXMEDETOMIDINE HYDROCHLORIDE 0.5 MCG/KG/HR: 100 INJECTION, SOLUTION INTRAVENOUS at 11:54

## 2021-04-07 RX ADMIN — PROPOFOL 50 MCG/KG/MIN: 10 INJECTION, EMULSION INTRAVENOUS at 15:44

## 2021-04-07 RX ADMIN — CHLORHEXIDINE GLUCONATE 0.12% ORAL RINSE 15 ML: 1.2 LIQUID ORAL at 21:29

## 2021-04-07 RX ADMIN — ROCURONIUM BROMIDE 50 MG: 50 INJECTION, SOLUTION INTRAVENOUS at 07:47

## 2021-04-07 RX ADMIN — INSULIN GLARGINE 30 UNITS: 100 INJECTION, SOLUTION SUBCUTANEOUS at 10:15

## 2021-04-07 RX ADMIN — LIDOCAINE HYDROCHLORIDE 3 ML: 40 INJECTION, SOLUTION RETROBULBAR; TOPICAL at 07:21

## 2021-04-07 RX ADMIN — MIDAZOLAM HYDROCHLORIDE 2 MG: 1 INJECTION, SOLUTION INTRAMUSCULAR; INTRAVENOUS at 07:39

## 2021-04-07 RX ADMIN — INSULIN LISPRO 5 UNITS: 100 INJECTION, SOLUTION INTRAVENOUS; SUBCUTANEOUS at 09:15

## 2021-04-07 RX ADMIN — METOPROLOL TARTRATE 2 MG: 1 INJECTION, SOLUTION INTRAVENOUS at 07:48

## 2021-04-07 RX ADMIN — DEXMEDETOMIDINE 8 MCG: 100 INJECTION, SOLUTION, CONCENTRATE INTRAVENOUS at 07:30

## 2021-04-07 RX ADMIN — DIPHENHYDRAMINE HYDROCHLORIDE 50 MG: 50 INJECTION, SOLUTION INTRAMUSCULAR; INTRAVENOUS at 12:05

## 2021-04-07 RX ADMIN — SODIUM CHLORIDE 8 UNITS/HR: 9 INJECTION, SOLUTION INTRAVENOUS at 16:19

## 2021-04-07 RX ADMIN — PROPOFOL 10 MCG/KG/MIN: 10 INJECTION, EMULSION INTRAVENOUS at 08:30

## 2021-04-07 RX ADMIN — TRANEXAMIC ACID 1000 MG: 1 INJECTION, SOLUTION INTRAVENOUS at 12:39

## 2021-04-07 RX ADMIN — SODIUM CHLORIDE 125 ML/HR: 0.9 INJECTION, SOLUTION INTRAVENOUS at 08:55

## 2021-04-07 RX ADMIN — FENTANYL CITRATE 50 MCG/HR: 50 INJECTION, SOLUTION INTRAMUSCULAR; INTRAVENOUS at 09:24

## 2021-04-07 RX ADMIN — PROPOFOL 100 MG: 10 INJECTION, EMULSION INTRAVENOUS at 07:35

## 2021-04-07 RX ADMIN — DEXAMETHASONE SODIUM PHOSPHATE 10 MG: 10 INJECTION, SOLUTION INTRAMUSCULAR; INTRAVENOUS at 06:51

## 2021-04-07 RX ADMIN — METHYLPREDNISOLONE SODIUM SUCCINATE 40 MG: 40 INJECTION, POWDER, FOR SOLUTION INTRAMUSCULAR; INTRAVENOUS at 21:29

## 2021-04-07 RX ADMIN — LORAZEPAM 1 MG: 2 INJECTION, SOLUTION INTRAMUSCULAR; INTRAVENOUS at 08:30

## 2021-04-07 RX ADMIN — PROPOFOL 50 MCG/KG/MIN: 10 INJECTION, EMULSION INTRAVENOUS at 22:27

## 2021-04-07 RX ADMIN — PROPOFOL 50 MCG/KG/MIN: 10 INJECTION, EMULSION INTRAVENOUS at 09:40

## 2021-04-07 RX ADMIN — MIDAZOLAM HYDROCHLORIDE 2 MG: 1 INJECTION, SOLUTION INTRAMUSCULAR; INTRAVENOUS at 07:40

## 2021-04-07 RX ADMIN — LIDOCAINE HYDROCHLORIDE 3 ML: 40 INJECTION, SOLUTION RETROBULBAR; TOPICAL at 07:08

## 2021-04-07 RX ADMIN — FAMOTIDINE 20 MG: 10 INJECTION INTRAVENOUS at 09:15

## 2021-04-07 RX ADMIN — MIDAZOLAM HYDROCHLORIDE 2 MG: 1 INJECTION, SOLUTION INTRAMUSCULAR; INTRAVENOUS at 07:13

## 2021-04-07 RX ADMIN — DEXMEDETOMIDINE HYDROCHLORIDE 0.7 MCG/KG/HR: 100 INJECTION, SOLUTION INTRAVENOUS at 21:29

## 2021-04-07 RX ADMIN — LIDOCAINE HYDROCHLORIDE 3 ML: 40 INJECTION, SOLUTION RETROBULBAR; TOPICAL at 07:24

## 2021-04-07 RX ADMIN — PROPOFOL 50 MG: 10 INJECTION, EMULSION INTRAVENOUS at 08:05

## 2021-04-07 RX ADMIN — PROPOFOL 70 MCG/KG/MIN: 10 INJECTION, EMULSION INTRAVENOUS at 12:05

## 2021-04-07 RX ADMIN — DIPHENHYDRAMINE HYDROCHLORIDE 50 MG: 50 INJECTION, SOLUTION INTRAMUSCULAR; INTRAVENOUS at 18:00

## 2021-04-07 RX ADMIN — PROPOFOL 45 MCG/KG/MIN: 10 INJECTION, EMULSION INTRAVENOUS at 19:18

## 2021-04-07 RX ADMIN — ATORVASTATIN CALCIUM 20 MG: 20 TABLET, FILM COATED ORAL at 16:04

## 2021-04-07 RX ADMIN — SODIUM CHLORIDE, SODIUM LACTATE, POTASSIUM CHLORIDE, AND CALCIUM CHLORIDE: .6; .31; .03; .02 INJECTION, SOLUTION INTRAVENOUS at 07:10

## 2021-04-07 RX ADMIN — PROPOFOL 50 MG: 10 INJECTION, EMULSION INTRAVENOUS at 07:40

## 2021-04-07 RX ADMIN — SODIUM CHLORIDE 50 ML/HR: 0.9 INJECTION, SOLUTION INTRAVENOUS at 12:09

## 2021-04-07 RX ADMIN — PROPOFOL 60 MCG/KG/MIN: 10 INJECTION, EMULSION INTRAVENOUS at 13:20

## 2021-04-07 RX ADMIN — INSULIN LISPRO 6 UNITS: 100 INJECTION, SOLUTION INTRAVENOUS; SUBCUTANEOUS at 13:21

## 2021-04-07 RX ADMIN — METHYLPREDNISOLONE SODIUM SUCCINATE 40 MG: 40 INJECTION, POWDER, FOR SOLUTION INTRAMUSCULAR; INTRAVENOUS at 13:22

## 2021-04-07 RX ADMIN — FENTANYL CITRATE 50 MCG: 50 INJECTION INTRAMUSCULAR; INTRAVENOUS at 12:02

## 2021-04-07 RX ADMIN — ROCURONIUM BROMIDE 20 MG: 50 INJECTION, SOLUTION INTRAVENOUS at 07:40

## 2021-04-07 RX ADMIN — DIPHENHYDRAMINE HYDROCHLORIDE 50 MG: 50 INJECTION, SOLUTION INTRAMUSCULAR; INTRAVENOUS at 23:53

## 2021-04-07 RX ADMIN — DEXMEDETOMIDINE HYDROCHLORIDE 0.7 MCG/KG/HR: 100 INJECTION, SOLUTION INTRAVENOUS at 17:09

## 2021-04-07 RX ADMIN — LIDOCAINE HYDROCHLORIDE 3 ML: 40 INJECTION, SOLUTION RETROBULBAR; TOPICAL at 07:05

## 2021-04-07 RX ADMIN — FAMOTIDINE 20 MG: 10 INJECTION INTRAVENOUS at 21:29

## 2021-04-07 RX ADMIN — ENOXAPARIN SODIUM 40 MG: 40 INJECTION SUBCUTANEOUS at 21:29

## 2021-04-07 RX ADMIN — CHLORHEXIDINE GLUCONATE 0.12% ORAL RINSE 15 ML: 1.2 LIQUID ORAL at 09:15

## 2021-04-07 NOTE — ASSESSMENT & PLAN NOTE
· BP on arrival noted at 103/69 in setting of intubation/sedation medications  · Monitor per unit protocol  · Consider PRN antihypertensives if becomes hypertensive  · Avoid ACE-I given angioedema

## 2021-04-07 NOTE — ASSESSMENT & PLAN NOTE
· Unclear etiology  Initially with angioedema episode 2016 attributed to ACE, but ongoing, intermittent episodes of angioedema in [id number]  Was recommended to follow up with immunology/allergist but was lost to follow-up  · Now presenting this AM with worsening tongue edema since 0300  Self administered Epi-pen and benadryl without relief  Presented to [facility] ED early this AM with worsening tongue edema/protrusion from mouth  · Required intubation and mechanical ventilation in the operating room by Anesthesia for airway protection and hypoxia 4/7  Transferred to [address] [place] [address] for ICU mgt  · Vent settings: 24/480/100%/10  Titrate FiO2 for goal SpO2 >90%  Propofol and precedex with goal RASS -2  · Received 1 Unit of FFP at [facility], 2nd unit ordered   · Will give 1G Tranexamic acid now and monitor for effect  Consider 2nd dose this evening    · Received dexamethasone 10 mg IV x 1 dose in the emergency department  · Continue methylprednisolone 40 mg IV every 8 hours  · Famotidine 20 mg IV ever 12 hours  · Diphenhydramine 50mg q6h  · NSS IV fluids at 50 ml/hr  · Check a C1 esterase inhibitor level, C4, CRP, ESR  · Outpatient Allergy/Immunology evaluation on discharge  · Consult ENT given severity of angioedema

## 2021-04-07 NOTE — ED NOTES
Patient transported to OR at this time with myself and Bryn Swan RN   I called and left a voicemail for patient's emergency contact per his request       Curt Marlow RN  04/07/21 5226

## 2021-04-07 NOTE — ANESTHESIA PREPROCEDURE EVALUATION
Procedure:  TRACHEOSTOMY (N/A Throat)    Relevant Problems   CARDIO   (+) Hyperlipidemia   (+) Hypertension      ENDO   (+) Hypothyroidism   (+) Type 2 diabetes, uncontrolled, with neuropathy (HCC)      MUSCULOSKELETAL   (+) Arthritis   (+) Chronic back pain      NEURO/PSYCH   (+) Chronic back pain   (+) Depression with anxiety      PULMONARY   (+) Acute respiratory failure with hypoxia (HCC)   (+) COPD, mild (HCC)   (+) Obstructive sleep apnea   (+) Sleep apnea             Anesthesia Plan  ASA Score- 4 Emergent    Anesthesia Type- IV sedation with anesthesia with ASA Monitors  Additional Monitors:   Airway Plan:           Plan Factors-    Induction-     Postoperative Plan-     Informed Consent-           Pt in extremis with severe angioedema  No way to obtain history from pt  History as per chart  This will be a high risk intubation  Have surgeon available  Will trynasal itst, then oral   High risk of death, CVA, MI  This pre op form is not really applicable to what we are doing

## 2021-04-07 NOTE — ASSESSMENT & PLAN NOTE
· When patient is extubated, utilize BIPAP QHS and anytime while sleeping  · Needs a follow-up sleep study for CPAP/BIPAP titration after discharge

## 2021-04-07 NOTE — RESPIRATORY THERAPY NOTE
04/07/21 0952   Vent Information   Vent type     Vent Mode AC/VC   SpO2 92 %   AC/VC Settings   Resp Rate (BPM) 24 BPM   Vt (mL) 480 mL   FIO2 (%) 100 %   PEEP (cmH2O) 6 cmH2O   Flow Pattern (LPM) 75 L/min   Trigger Sensitivity Flow (lpm) 3 %   AC/VC Actuals   Resp Rate (BPM) 24 BPM   VT (mL) 532   MV 12 5   MAP (cmH2O) 14 cmH2O   Peak Pressure (cmH2O) 39 cmH2O   I/E Ratio (Obs) 1:2 6   Heater Temperature (Obs) 98 6 °F (37 °C)   AC/VC Alarms   High Peak Pressure (cmH2O) 50   High Resp Rate (BPM) 40 BPM   High MV (L/min) 18 L/min   Low MV (L/min) 8 L/min   Vt High (mL) 1000 mL   Vt Low (mL) 380 mL   AC/VC Apnea Settings   Resp Rate (BPM) 24 BPM   VT (mL) 480 mL   FIO2 (%) 100 %   Apnea Time (s) 20 S   Apnea Flow (L/min) 75 L/min

## 2021-04-07 NOTE — ASSESSMENT & PLAN NOTE
Lab Results   Component Value Date    HGBA1C 11 7 (H) 02/05/2021       Recent Labs     04/07/21  0912   POCGLU 320*       Blood Sugar Average: Last 72 hrs:       · History of non-compliance with diabetic regimen  See above A1c from 2/21  · Takes lantus 60 daily per home dose   Will provide 30u lantus HS given NPO status  · SSI q6h  · Monitor BG

## 2021-04-07 NOTE — ASSESSMENT & PLAN NOTE
· Follow up with nutritional counseling when appropriate   Previously declined  · Likely contributing to MARSHA

## 2021-04-07 NOTE — ASSESSMENT & PLAN NOTE
-unclear etiology  Initial episode 2016 attributed to ACE, but ongoing, intermittent episodes [id number] despite discontinuation  -c/w prednisone 40 mg / d  Standard steroid taper in the outpatient setting 40 mg PO prednisone daily for 3 days, 30 mg x 3 days, 20 mg x 3 days, 10 mg x 3 days  -On Famotidine 20 PO/ d continue   -Diphenhydramine 25 mg PO Q 6 h wean to Q 12 hr    -C1 esterase inhibitor level, C4, CRP, normal  -will need w/u in OP setting once off of steroids w/ allergy panel   -stable for d/c  Patient is stable for D/C from a Pulmonary standpoint  Pulmonary Signing off  Please call with questions, concerns, or need for reevaluation      Patient should follow up within one week of D/C

## 2021-04-07 NOTE — PLAN OF CARE
Problem: RESPIRATORY - ADULT  Goal: Achieves optimal ventilation and oxygenation  Description: INTERVENTIONS:  - Assess for changes in respiratory status  - Assess for changes in mentation and behavior  - Position to facilitate oxygenation and minimize respiratory effort  - Oxygen administered by appropriate delivery if ordered  - Initiate smoking cessation education as indicated    - Assess the need for suctioning and aspirate as needed  - Assess and instruct to report SOB or any respiratory difficulty  - Respiratory Therapy support as indicated  Outcome: Progressing

## 2021-04-07 NOTE — ASSESSMENT & PLAN NOTE
· Required intubation and mechanical ventilation in the operating room by Anesthesia for airway protection and hypoxia  · Was experiencing generalized body twitching prior to being fully sedate  · Utilize an IV propofol drip/infusion as well as an IV fentanyl drip/infusion  · Utilize PRN IV ativan

## 2021-04-07 NOTE — ASSESSMENT & PLAN NOTE
Hx of MARSHA Severe  Historical Non compliance w/ CPAP  Pt reports he is ready and willing to repeat PSG and retry utilization of CPAP

## 2021-04-07 NOTE — ED PROVIDER NOTES
History  Chief Complaint   Patient presents with    Tongue Swelling - Major     Patient woke up at 0300 with swollen tongue  He used his epi pen and took benadryl and went back to sleep  He woke up and swelling was worse  Has hx of same  This 63 year male presents with massive tongue angioedema  He presented from home; he had developed some left-sided tongue swelling at about 0300 this morning for which he took Benadryl  He had been in his normal state of health prior to this  He reports that he then fell asleep and awoke shortly before 0600 with swelling of the entire left side of his tongue  Over the subsequent 1/2 hour, the entirety of his tongue became swollen to the point where it began protruding from his mouth  He is not able to swallow his own saliva  He is barely able to phonate  He administered an EpiPen to himself and came to the emergency department  He has a longstanding history of recurrent angioedema episodes since at least 2016 has been seen in this emergency department several times including by this physician  The patient does not recall any known allergic exposures that would account for symptoms  These episodes were initially thought to be related to an Ace inhibitor which she had been taking during the first episode; it was stopped after that point but he continued to have episodes of angioedema  He has never had episodes until approximately 2016  Ultimately the nature of these recurrent episodes is unclear  He was recommended several times to follow with immunology/allergy but he does not appear to have done so  I was called to the room to see the patient immediately upon arrival  He has massive angioedema with his tongue protruding from his mouth  He is barely able to phonate  His airway is patent at present but is immediately threatened given the severity of his tongue swelling and the fact that has progressively worsened over the past hour   I was able to establish that he does not appear to be having anaphylaxis and that administration of further anaphylaxis treatment medications would not be expected to improve his symptoms  He had already administered himself epinephrine with no improvement  IV access was established  Verbal consent for blood product administration was obtained from the patient  2 units of FFP were emergently prepared  In the interval, I contacted Anesthesia as well for evaluation of the patient at bedside for emergent endotracheal intubation  I was concerned that his airway would occlude prior to FFP taking effect  Anesthesia CRNA and attending evaluated the patient and in joint discussion, it was determined that the most appropriate intervention would be to take the patient to the OR for fiberoptic intubation with subsequent transfer to the ICU  General surgery will be available during this procedure in case the patient needed surgical airway  Patient was transferred from the emergency department expeditiously to accomplish this  I subsequently discussed with Dr Corine Spencer of [facility] via [facility] Text to admit the patient to the ICU post-procedure  History provided by:  Medical records and patient      Prior to Admission Medications   Prescriptions Last Dose Informant Patient Reported? Taking?    Ascorbic Acid (VITAMIN C PO)   Yes No   Sig: Take 1 tablet by mouth daily   CINNAMON PO   Yes No   Sig: Take 1 tablet by mouth daily   DULoxetine (CYMBALTA) 60 mg delayed release capsule   No No   Sig: Take 1 capsule by mouth once daily   EPINEPHrine (EPIPEN) 0 3 mg/0 3 mL SOAJ   No No   Sig: INJECT 0 3ML INTO A MUSCLE ONCE FOR 1 DOSE   Euthyrox 175 MCG tablet   No No   Sig: Take 1 tablet by mouth once daily   Insulin Pen Needle (PEN NEEDLES 3/16") 31G X 5 MM MISC   No No   Sig: by Other route 4 (four) times a day   Lantus SoloStar 100 units/mL injection pen   No No   Sig: INJECT 60 UNITS SUBCUTANEOUSLY ONCE DAILY   Ventolin  (90 Base) MCG/ACT inhaler No No   Sig: Inhale 2 puffs every 4 (four) hours as needed for wheezing or shortness of breath   aspirin 81 mg chewable tablet   Yes No   Sig: Take one tablet by mouth daily   atorvastatin (LIPITOR) 20 mg tablet   No No   Sig: Take 1 tablet by mouth once daily   gabapentin (NEURONTIN) 300 mg capsule   No No   Sig: Take 1 capsule (300 mg total) by mouth 3 (three) times a day   hydrochlorothiazide (HYDRODIURIL) 25 mg tablet   No No   Sig: Take 1 tablet by mouth twice daily   insulin lispro (HumaLOG KwikPen) 100 units/mL injection pen   No No   Sig: Inject 7 Units under the skin 3 (three) times a day with meals   Patient not taking: Reported on 2021   metFORMIN (GLUCOPHAGE) 1000 MG tablet   No No   Sig: Take 1 tablet by mouth twice daily   omega-3-acid ethyl esters (LOVAZA) 1 g capsule   No No   Sig: Take 1 capsule (1 g total) by mouth 2 (two) times a day for high cholesterol   other medication, see sig,   Yes No   Sig: Medication/product name: Moringa    Strength: 1000mcg    Sig (include dose, route, frequency): daily for joint pains per pt (OTC)   sodium chloride (OCEAN) 0 65 % nasal spray   No No   Si spray into each nostril as needed for rhinitis   traZODone (DESYREL) 150 mg tablet   No No   Sig: Take 1 tablet (150 mg total) by mouth daily at bedtime      Facility-Administered Medications: None       Past Medical History:   Diagnosis Date    Diabetes mellitus ([address] )     Disease of thyroid gland     Hyperlipidemia     Hypertension        Past Surgical History:   Procedure Laterality Date    KNEE SURGERY      SINUS SURGERY         Family History   Problem Relation Age of Onset    Thyroid cancer Mother     Diabetes type II Mother     Esophageal cancer Father     Diabetes type II Father     Kidney cancer Brother     Diabetes type II Brother     Diabetes type II Maternal Grandmother      I have reviewed and agree with the history as documented      E-Cigarette/Vaping    E-Cigarette Use Never User E-Cigarette/Vaping Substances    Nicotine No     THC No     CBD No     Flavoring No     Other No     Unknown No      Social History     Tobacco Use    Smoking status: Current Every Day Smoker     Packs/day: 2 00    Smokeless tobacco: Never Used   Substance Use Topics    Alcohol use: No    Drug use: No       Review of Systems   Unable to perform ROS: Other (Extremely limited verbal communication secondary to patient's tongue swelling)       Physical Exam  Physical Exam  Vitals signs and nursing note reviewed  Constitutional:       General: He is awake  He is in acute distress  Appearance: Normal appearance  He is well-developed  HENT:      Head: Normocephalic and atraumatic  Right Ear: Hearing and external ear normal       Left Ear: Hearing and external ear normal       Mouth/Throat:      Comments: The tongue is massively swollen and protruding beyond the lips by approximately 3 cm  Patient is able to phonate partially around the tongue  No intraoral structures are visible apart from the very anterior hard palate when patient is attempting maximal mouth opening  Neck:      Trachea: Trachea and phonation normal       Comments: No stridor  No neck swelling, erythema, or skin changes  Cardiovascular:      Rate and Rhythm: Regular rhythm  Tachycardia present  Pulses:           Radial pulses are 2+ on the right side and 2+ on the left side  Dorsalis pedis pulses are 2+ on the right side and 2+ on the left side  Posterior tibial pulses are 2+ on the right side and 2+ on the left side  Heart sounds: Normal heart sounds, S1 normal and S2 normal  No murmur  No friction rub  No gallop  Pulmonary:      Effort: Pulmonary effort is normal  No respiratory distress  Breath sounds: Normal breath sounds  No stridor  No decreased breath sounds, wheezing, rhonchi or rales  Abdominal:      Tenderness: There is no abdominal tenderness  There is no guarding or rebound  Skin:     General: Skin is warm and dry  Comments: There is no urticaria or rash appreciated   Neurological:      Mental Status: He is alert and oriented to person, place, and time  GCS: GCS eye subscore is 4  GCS verbal subscore is 5  GCS motor subscore is 6  Cranial Nerves: No cranial nerve deficit  Sensory: No sensory deficit  Motor: No abnormal muscle tone  Comments: PERRLA; EOMI  Sensation intact to light touch over face in V1-V3 distribution bilaterally  Facial expressions symmetric  Tongue/uvula midline  Shoulder shrug equal bilaterally  Strength 5/5 in UE/LE bilaterally  Sensation intact to light touch in UE/LE bilaterally           Vital Signs  ED Triage Vitals [04/07/21 0640]   Temperature Pulse Respirations Blood Pressure SpO2   97 5 °F (36 4 °C) (!) 120 20 (!) 175/87 (!) 87 %      Temp Source Heart Rate Source Patient Position - Orthostatic VS BP Location FiO2 (%)   Temporal Monitor Lying Left arm --      Pain Score       --           Vitals:    04/07/21 0640 04/07/21 0729   BP: (!) 175/87 149/78   Pulse: (!) 120 (!) 110   Patient Position - Orthostatic VS: Lying Lying         Visual Acuity      ED Medications  Medications   dexamethasone (PF) (DECADRON) injection 10 mg (10 mg Intravenous Given 4/7/21 0651)       Diagnostic Studies  Results Reviewed     Procedure Component Value Units Date/Time    COVID19, Influenza A/B, RSV PCR, [facility] [[id number]]     Lab Status: No result Specimen: Nasopharyngeal Swab     Basic metabolic panel [[id number]]  (Abnormal) Collected: 04/07/21 0645    Lab Status: Final result Specimen: Blood from Arm, Right Updated: 04/07/21 0711     Sodium 132 mmol/L      Potassium 3 9 mmol/L      Chloride 94 mmol/L      CO2 31 mmol/L      ANION GAP 7 mmol/L      BUN 14 mg/dL      Creatinine 1 16 mg/dL      Glucose 395 mg/dL      Calcium 9 3 mg/dL      eGFR 70 ml/min/1 73sq m     Narrative:      [place] guidelines for Chronic Kidney Disease (CKD):     Stage 1 with normal or high GFR (GFR > 90 mL/min/1 73 square meters)    Stage 2 Mild CKD (GFR = 60-89 mL/min/1 73 square meters)    Stage 3A Moderate CKD (GFR = 45-59 mL/min/1 73 square meters)    Stage 3B Moderate CKD (GFR = 30-44 mL/min/1 73 square meters)    Stage 4 Severe CKD (GFR = 15-29 mL/min/1 73 square meters)    Stage 5 End Stage CKD (GFR <15 mL/min/1 73 square meters)  Note: GFR calculation is accurate only with a steady state creatinine    Magnesium [[id number]]  (Normal) Collected: 04/07/21 0645    Lab Status: Final result Specimen: Blood from Arm, Right Updated: 04/07/21 0711     Magnesium 1 9 mg/dL     Protime-INR [[id number]]  (Normal) Collected: 04/07/21 0645    Lab Status: Final result Specimen: Blood from Arm, Right Updated: 04/07/21 0702     Protime 11 6 seconds      INR 0 87    CBC and differential [[id number]] Collected: 04/07/21 0645    Lab Status: Final result Specimen: Blood from Arm, Right Updated: 04/07/21 0653     WBC 9 41 Thousand/uL      RBC 5 42 Million/uL      Hemoglobin 15 5 g/dL      Hematocrit 46 9 %      MCV 87 fL      MCH 28 6 pg      MCHC 33 0 g/dL      RDW 13 3 %      MPV 9 3 fL      Platelets 333 Thousands/uL      nRBC 0 /100 WBCs      Neutrophils Relative 61 %      Immat GRANS % 0 %      Lymphocytes Relative 25 %      Monocytes Relative 11 %      Eosinophils Relative 2 %      Basophils Relative 1 %      Neutrophils Absolute 5 79 Thousands/µL      Immature Grans Absolute 0 04 Thousand/uL      Lymphocytes Absolute 2 32 Thousands/µL      Monocytes Absolute 1 05 Thousand/µL      Eosinophils Absolute 0 15 Thousand/µL      Basophils Absolute 0 06 Thousands/µL                  No orders to display              Procedures  Procedures         ED Course           MDM    Disposition  Final diagnoses:   Angioedema, initial encounter   Acute respiratory failure with hypoxia ([facility] [address] )     Time reflects when diagnosis was documented in both MDM as applicable and the Disposition within this note     Time User Action Codes Description Comment    4/7/2021  7:21 AM Andrés Tinoco Add [place] Negus  3XXA] Angioedema, initial encounter     4/7/2021  7:21 AM Andrés Tinoco Modify [place] Negus  3XXA] Angioedema, initial encounter     4/7/2021  7:21 AM Andrés Tinoco Add [J96 01] Acute respiratory failure with hypoxia [facility])       ED Disposition     None      Follow-up Information    None         Current Discharge Medication List      CONTINUE these medications which have NOT CHANGED    Details   Ascorbic Acid (VITAMIN C PO) Take 1 tablet by mouth daily      aspirin 81 mg chewable tablet Take one tablet by mouth daily      atorvastatin (LIPITOR) 20 mg tablet Take 1 tablet by mouth once daily  Qty: 90 tablet, Refills: 0    Associated Diagnoses: Pure hypercholesterolemia      CINNAMON PO Take 1 tablet by mouth daily      DULoxetine (CYMBALTA) 60 mg delayed release capsule Take 1 capsule by mouth once daily  Qty: 90 capsule, Refills: 0    Associated Diagnoses: Reactive depression      EPINEPHrine (EPIPEN) 0 3 mg/0 3 mL SOAJ INJECT 0 3ML INTO A MUSCLE ONCE FOR 1 DOSE  Qty: 2 each, Refills: 0    Associated Diagnoses:  Allergic reaction, sequela      Euthyrox 175 MCG tablet Take 1 tablet by mouth once daily  Qty: 30 tablet, Refills: 0    Associated Diagnoses: Hypothyroidism, unspecified type      gabapentin (NEURONTIN) 300 mg capsule Take 1 capsule (300 mg total) by mouth 3 (three) times a day  Qty: 90 capsule, Refills: 1    Associated Diagnoses: Type 2 diabetes, uncontrolled, with neuropathy (HCC)      hydrochlorothiazide (HYDRODIURIL) 25 mg tablet Take 1 tablet by mouth twice daily  Qty: 60 tablet, Refills: 5    Associated Diagnoses: Essential hypertension      insulin lispro (HumaLOG KwikPen) 100 units/mL injection pen Inject 7 Units under the skin 3 (three) times a day with meals  Qty: 3 pen, Refills: 0    Associated Diagnoses: Type 2 diabetes, uncontrolled, with neuropathy (HCC)      Insulin Pen Needle (PEN NEEDLES 3/16") 31G X 5 MM MISC by Other route 4 (four) times a day  Qty: 300 each, Refills: 3    Associated Diagnoses: Type 2 diabetes, uncontrolled, with neuropathy ([facility])      Lantus SoloStar 100 units/mL injection pen INJECT 60 UNITS SUBCUTANEOUSLY ONCE DAILY  Qty: 15 mL, Refills: 0    Associated Diagnoses: Type 2 diabetes mellitus with hyperglycemia, with long-term current use of insulin ([facility])      metFORMIN (GLUCOPHAGE) 1000 MG tablet Take 1 tablet by mouth twice daily  Qty: 60 tablet, Refills: 5    Associated Diagnoses: Type 2 diabetes mellitus with complication, with long-term current use of insulin ([facility])      omega-3-acid ethyl esters (LOVAZA) 1 g capsule Take 1 capsule (1 g total) by mouth 2 (two) times a day for high cholesterol  Qty: 60 capsule, Refills: 2    Associated Diagnoses: Hyperlipidemia, unspecified hyperlipidemia type      other medication, see sig, Medication/product name: Moringa    Strength: 1000mcg    Sig (include dose, route, frequency): daily for joint pains per pt (OTC)      sodium chloride (OCEAN) 0 65 % nasal spray 1 spray into each nostril as needed for rhinitis  Qty: 30 mL, Refills: 1    Associated Diagnoses: Allergic rhinitis, unspecified seasonality, unspecified trigger      traZODone (DESYREL) 150 mg tablet Take 1 tablet (150 mg total) by mouth daily at bedtime  Qty: 90 tablet, Refills: 0    Associated Diagnoses: Depression with anxiety      Ventolin  (90 Base) MCG/ACT inhaler Inhale 2 puffs every 4 (four) hours as needed for wheezing or shortness of breath  Qty: 1 Inhaler, Refills: 5    Comments: Substitution to a formulary equivalent within the same pharmaceutical class is authorized  Associated Diagnoses: COPD, mild ([facility] [address] )           No discharge procedures on file      PDMP Review       Value Time User    PDMP Reviewed  Yes 1/4/2021  2:51 PM Karson Lauren [facility] Provider  Electronically Signed by           Grayson Stephens DO  04/07/21 7807

## 2021-04-07 NOTE — ASSESSMENT & PLAN NOTE
· Admit to ICU level of care  · Required intubation and mechanical ventilation in the operating room by Anesthesia for airway protection and hypoxia  · Transfuse 1 Unit of FFP (fresh frozen plasma)  · Received dexamethasone 10 mg IV x 1 dose in the emergency department  · Treat with methylprednisolone 40 mg IV every 8 hours  · Utilize famotidine 20 mg IV ever 12 hours  · Utilize NSS IV fluids at 50 ml/hr  · Check a C1 esterase inhibitor level  · Outpatient Allergy/Immunology evaluation    The patient requires transfer to a higher level of care with 24-hour Anesthesia coverage as well as ENT coverage in case the patient's loses his current airway  The patient will be transferred to [address] [place] [address] on the service of Dr Vitaliy Gonzales ([phone] Attending Physician)

## 2021-04-07 NOTE — ED NOTES
Seen by anesthesiology along with attending, pt agreeable to intubation to maintain airway  Taken off floor by RN and anesthesiology to OR for procedure        Francesca Garcia RN  04/07/21 3387

## 2021-04-07 NOTE — H&P
[facility] [address]  H&P- Alpa Cruz 1965, 63 y o  male MRN: [id number]  Unit/Bed#: 406-01 Encounter: [id number]  Primary Care Provider: MARLEEN Baeza   Date and time admitted to hospital: 4/7/2021  6:33 AM    * Angioedema  Assessment & Plan  · Admit to ICU level of care  · Required intubation and mechanical ventilation in the operating room by Anesthesia for airway protection and hypoxia  · Transfuse 1 Unit of FFP (fresh frozen plasma)  · Received dexamethasone 10 mg IV x 1 dose in the emergency department  · Treat with methylprednisolone 40 mg IV every 8 hours  · Utilize famotidine 20 mg IV ever 12 hours  · Utilize NSS IV fluids at 50 ml/hr  · Check a C1 esterase inhibitor level  · Outpatient Allergy/Immunology evaluation    The patient requires transfer to a higher level of care with 24-hour Anesthesia coverage as well as ENT coverage in case the patient's loses his current airway  The patient will be transferred to [address] [place] [address] on the service of Dr Camden Holt ([phone] Attending Physician)      Acute respiratory failure with hypoxia (HCC)  Assessment & Plan  · Required intubation and mechanical ventilation in the operating room by Anesthesia for airway protection and hypoxia  · Was experiencing generalized body twitching prior to being fully sedate  · Utilize an IV propofol drip/infusion as well as an IV fentanyl drip/infusion  · Utilize PRN IV ativan    Type 2 diabetes mellitus with hyperglycemia, with long-term current use of insulin [facility])  Assessment & Plan  Lab Results   Component Value Date    HGBA1C 11 7 (H) 02/05/2021       Recent Labs     04/07/21  0912   POCGLU 320*       Blood Sugar Average: Last 72 hrs:  (P) 320     · Utilize lantus 30 Units SQ Qdaily, which is a decreased dose for the patient while he is NPO  · Hypoglycemia protocol  · Insulin sliding scale with blood glucose monitoring every 6 hours    Muscle twitching  Assessment & Plan  · Concerning for hypoxia  · Possible hypoxic brain injury  · Will require a Neurology work-up after transfer    Obstructive sleep apnea  Assessment & Plan  · When patient is extubated, utilize BIPAP QHS and anytime while sleeping  · Needs a follow-up sleep study for CPAP/BIPAP titration after discharge    Epistaxis  Assessment & Plan  · Consult ENT after transfer to evaluate the epistaxis    Vitamin D deficiency  Assessment & Plan  · Check a vitamin D 25-OH level      VTE Prophylaxis: Enoxaparin (Lovenox) 40 mg SQ every 12 hours based on the patient's BMI to start in the evening if the epistaxis resolves/ sequential compression device   Code Status: Level 1-Full Code    Discussion with family: I attempted to call the patient's emergency contact, Eitan Buckner, multiple times with no answer and left a voicemail requesting a return phone call  Anticipated Length of Stay:  The patient will be admitted on an Inpatient basis with an anticipated length of stay of greater than 2 midnights  Justification for Hospital Stay:  The patient requires mechanical ventilation, IV methylprednisolone treatment, and an FFP (fresh frozen plasma) transfusion  Chief Complaint:  Tongue swelling    History of Present Illness:    Marlene Gordon is a 63 y o  male who presents to the emergency department with the complaint of severe tongue swelling  The patient has a history of angioedema in the past   The patient developed the acute onset of severe tongue swelling around 0300 AM this morning  The tongue swelling worsened to the point were he could not swallow his own saliva  The patient then developed shortness of breath at rest   The symptoms were severe in intensity and constant in nature  The patient took a benadryl and administered an EpiPen to himself prior to presenting to the emergency department  Nothing seemed to improve his symptoms    The patient was taken emergently to the operating room for intubation by Anesthesia  Review of Systems:    Review of Systems:  Per HPI, all other systems have been reviewed and were negative  Past Medical and Surgical History:     Past Medical History:   Diagnosis Date    Diabetes mellitus ([address] )     Disease of thyroid gland     Hyperlipidemia     Hypertension        Past Surgical History:   Procedure Laterality Date    KNEE SURGERY      SINUS SURGERY         Meds/Allergies:    Prior to Admission medications    Medication Sig Start Date End Date Taking?  Authorizing Provider   Ascorbic Acid (VITAMIN C PO) Take 1 tablet by mouth daily    Historical Provider, MD   aspirin 81 mg chewable tablet Take one tablet by mouth daily 3/14/06   Historical Provider, MD   atorvastatin (LIPITOR) 20 mg tablet Take 1 tablet by mouth once daily 3/11/21   Terry Gurrola PA-C   CINNAMON PO Take 1 tablet by mouth daily    Historical Provider, MD   DULoxetine (CYMBALTA) 60 mg delayed release capsule Take 1 capsule by mouth once daily 3/31/21   Terry Gurrola PA-C   EPINEPHrine (EPIPEN) 0 3 mg/0 3 mL SOAJ INJECT 0 3ML INTO A MUSCLE ONCE FOR 1 DOSE 1/17/21   MARLEEN Dobbs   Euthyrox 175 MCG tablet Take 1 tablet by mouth once daily 3/25/21   Terry Gurrola PA-C   gabapentin (NEURONTIN) 300 mg capsule Take 1 capsule (300 mg total) by mouth 3 (three) times a day 1/28/21 2/27/21  Tyra Hoskins PA-C   hydrochlorothiazide (HYDRODIURIL) 25 mg tablet Take 1 tablet by mouth twice daily 2/24/21   Terry Gurrola PA-C   insulin lispro (HumaLOG KwikPen) 100 units/mL injection pen Inject 7 Units under the skin 3 (three) times a day with meals  Patient not taking: Reported on 1/28/2021 4/2/20 1/28/21  MARLEEN Dobbs   Insulin Pen Needle (PEN NEEDLES 3/16") 31G X 5 MM MISC by Other route 4 (four) times a day 4/3/20   MARLEEN Dobbs   Lantus SoloStar 100 units/mL injection pen INJECT 60 UNITS SUBCUTANEOUSLY ONCE DAILY 3/11/21   Terry Gurrola PA-C   metFORMIN (GLUCOPHAGE) 1000 MG tablet Take 1 tablet by mouth twice daily 2/24/21   Arsh Guardado PA-C   omega-3-acid ethyl esters (LOVAZA) 1 g capsule Take 1 capsule (1 g total) by mouth 2 (two) times a day for high cholesterol 2/13/18   Harley Angeles PA-C   other medication, see sig, Medication/product name: Moringa    Strength: 1000mcg    Sig (include dose, route, frequency): daily for joint pains per pt (OTC)    Historical Provider, MD   sodium chloride (OCEAN) 0 65 % nasal spray 1 spray into each nostril as needed for rhinitis 9/19/19   MARLEEN Brown   traZODone (DESYREL) 150 mg tablet Take 1 tablet (150 mg total) by mouth daily at bedtime 2/4/21   Arsh Guardado PA-C   Ventolin  (84 Base) MCG/ACT inhaler Inhale 2 puffs every 4 (four) hours as needed for wheezing or shortness of breath 1/7/21   MARLEEN Brown     I have reviewed home medications with patient personally  Allergies: Allergies   Allergen Reactions    Ace Inhibitors Swelling     Angioedema    Clindamycin      Had angioedema episode approx 5 hr after IM administration of clindamycin  Unclear if there is definitive causal relationship         Social History:     Marital Status: Single     Substance Use History:   Social History     Substance and Sexual Activity   Alcohol Use No     Social History     Tobacco Use   Smoking Status Current Every Day Smoker    Packs/day: 2 00   Smokeless Tobacco Never Used     Social History     Substance and Sexual Activity   Drug Use No       Family History:    non-contributory    Physical Exam:     Vitals:   Blood Pressure: 144/75 (04/07/21 0956)  Pulse: (!) 119 (04/07/21 0956)  Temperature: (!) 96 7 °F (35 9 °C) (04/07/21 0956)  Temp Source: Temporal (04/07/21 0857)  Respirations: (!) 24 (04/07/21 0956)  Height: 5' 4" (162 6 cm) (04/07/21 0857)  Weight - Scale: 113 kg (250 lb) (04/07/21 0857)  SpO2: 95 % (04/07/21 0956)    Physical Exam  General:  Generalized muscle twitching, Sedated and intubated on the mechanical ventilator  HEENT:  NC/AT, mucous membranes moist, Bloody drainage from the OG tube, Massive tongue swelling protruding from his mouth  Neck:  Supple, No JVP elevation  CV:  + S1, + S2, Tachycardic, Regular rhythm  Pulm:  Lung fields are CTA bilaterally  Abd:  Soft, Non-tender, Non-distended  Ext:  No clubbing/cyanosis/edema  Skin:  No rashes  Neuro:  Sedated and intubated on the mechanical ventilator  Psych:  Sedated and intubated on the mechanical ventilator      Additional Data:     Lab Results: I have personally reviewed pertinent reports  Results from last 7 days   Lab Units 04/07/21  0645   WBC Thousand/uL 9 41   HEMOGLOBIN g/dL 15 5   HEMATOCRIT % 46 9   PLATELETS Thousands/uL 279   NEUTROS PCT % 61   LYMPHS PCT % 25   MONOS PCT % 11   EOS PCT % 2     Results from last 7 days   Lab Units 04/07/21  0645   SODIUM mmol/L 132*   POTASSIUM mmol/L 3 9   CHLORIDE mmol/L 94*   CO2 mmol/L 31   BUN mg/dL 14   CREATININE mg/dL 1 16   ANION GAP mmol/L 7   CALCIUM mg/dL 9 3   GLUCOSE RANDOM mg/dL 395*     Results from last 7 days   Lab Units 04/07/21  0645   INR  0 87     Results from last 7 days   Lab Units 04/07/21  0912   POC GLUCOSE mg/dl 320*               Imaging: I have personally reviewed pertinent reports  XR chest portable   Final Result by Migue Reardon MD (04/07 2391)      ET tube 3 cm above the guille      Low lung volumes with bibasilar atelectasis  Workstation performed: [id number]             [facility] Reviewed: Yes     ** Please Note: This note has been constructed using a voice recognition system   **

## 2021-04-07 NOTE — QUICK NOTE
Attempted to call patient's emergency contact Gurpreet Chong for update  Message left on cell phone #[phone] with call back number for update

## 2021-04-07 NOTE — CONSULTS
Consultation - Pulmonary Medicine   David Barrera 63 y o  male MRN: [id number]  Unit/Bed#: 406-01 Encounter: [id number]      Assessment/Plan:    1  Acute hypoxic respiratory failure   1  Requiring emergent intubation for airway protection  2  ABG reviewed reveals respiratory acidosis 7 15/83/71/28- increase mechanical ventilator settings to AC//24/100%/6  3  Maintain SpO2 greater than 88%  4  Pulmonary toilet: suction prn, oral care prn  2  Angioedema   1  Received Decadron 10 mg IV once in the ED and emergently intubation for airway protection  2  Transfuse 1 unit FFP   3  Continue IV fluids per primary team   4  Agree with famotidine 20 mg IV q12hrs and Solumedrol 40 mg IV q8hrs   5  Add H1 blocker after transfer   6  Due to severity of angioedema, recommend transfer to higher tertiary facility with pulm/CC coverage 24/7   3  MARSHA  1  Noted   2  Continue mechanical ventilation for now  4  Epistaxis   1  Recommend ENT evaluation after transfer     History of Present Illness   Physician Requesting Consult: Matheus Prakash DO  Reason for Consult / Principal Problem: angioedema  Hx and PE limited by: n/a  Chief Complaint: tongue swelling  HPI: David Barrera is a 63 y o   male who presented to [facility] with complaints of tongue swelling  Most history of obtained via chart review and discussion with colleagues  Patient's past medical history positive for diabetes mellitus type 2, hypertension, hyperlipidemia and recurrent angioedema dating as far back as 2016  Initially thought to be caused by Lisinopril in 2016 but it has recurred despite abstinence from this mediation  Per chart review, patient was in his normal state of health yesterday  This morning around 3:00 a m  he woke up and noticed left side of his tongue swelling  He took Benadryl and went back to sleep    He awoke again at 6:00 a m  in the morning and notice that his swelling has gotten worse to the point where was involving the entirety of his tongue  He was not able to swallow his own saliva and he could barely phonate  He administered his own EpiPen and came to the emergency department for further evaluation  Upon evaluation, patient's tongue was noted to be massively swollen and protruding beyond his lips  He was noted be hypoxic with SpO2 of 87% improved on 3 L nasal cannula however given severity of angioedema decision was made to intubate in the OR with anesthesia for airway protection  Intubation proved to be difficult with unsuccessful nasopharyngeal video laryngoscopy approaches  Pulmonary was consulted to help manage  At time of evaluation, patient is seen bucking the vent  He has profound tongue swelling and epistaxis present now  ABG obtained and vent setting adjusted   He is being transferred at 10:30 AM     Inpatient consult to Pulmonology  Consult performed by: Cliff Feng PA-C  Consult ordered by: Marissa Martinez DO          Review of Systems   Unable to perform ROS: Intubated       Historical Information   Past Medical History:   Diagnosis Date    Diabetes mellitus ([address] )     Disease of thyroid gland     Hyperlipidemia     Hypertension      Past Surgical History:   Procedure Laterality Date    KNEE SURGERY      SINUS SURGERY       Social History   Social History     Substance and Sexual Activity   Alcohol Use No     Social History     Substance and Sexual Activity   Drug Use No     Social History     Tobacco Use   Smoking Status Current Every Day Smoker    Packs/day: 2 00   Smokeless Tobacco Never Used     E-Cigarette/Vaping    E-Cigarette Use Never User      E-Cigarette/Vaping Substances    Nicotine No     THC No     CBD No     Flavoring No     Other No     Unknown No      Occupational History: unable to obtain    Family History:   Family History   Problem Relation Age of Onset    Thyroid cancer Mother     Diabetes type II Mother     Esophageal cancer Father     Diabetes type II Father     Kidney cancer Brother     Diabetes type II Brother     Diabetes type II Maternal Grandmother        Meds/Allergies   all current active meds have been reviewed, pertinent pulmonary meds have been reviewed and current meds:   Current Facility-Administered Medications   Medication Dose Route Frequency    chlorhexidine (PERIDEX) 0 12 % oral rinse 15 mL  15 mL Swish & Spit Q12H [address]    dextrose 50 % IV solution 25 mL  25 mL Intravenous Daily PRN    enoxaparin (LOVENOX) subcutaneous injection 40 mg  40 mg Subcutaneous Q12H AURE    famotidine (PEPCID) injection 20 mg  20 mg Intravenous Q12H [address]    fentaNYL 1000 mcg in sodium chloride 0 9% 100mL infusion  50 mcg/hr Intravenous Continuous    insulin glargine (LANTUS) subcutaneous injection 30 Units 0 3 mL  30 Units Subcutaneous QAM    insulin lispro (HumaLOG) 100 units/mL subcutaneous injection 1-6 Units  1-6 Units Subcutaneous Q6H [address]    LORazepam (ATIVAN) injection 2 mg  2 mg Intravenous Q4H PRN    methylPREDNISolone sodium succinate (Solu-MEDROL) injection 40 mg  40 mg Intravenous Q8H [address]    propofol (DIPRIVAN) 1000 mg in 100 mL infusion (premix)  5-50 mcg/kg/min Intravenous Titrated    sodium chloride 0 9 % infusion  50 mL/hr Intravenous Continuous       Allergies   Allergen Reactions    Ace Inhibitors Swelling     Angioedema    Clindamycin      Had angioedema episode approx 5 hr after IM administration of clindamycin  Unclear if there is definitive causal relationship  Objective   Vitals: Blood pressure 146/85, pulse (!) 119, temperature (!) 96 9 °F (36 1 °C), resp  rate (!) 24, height 5' 4" (1 626 m), weight 113 kg (250 lb), SpO2 95 %  AC/VC 24/480/100/6,Body mass index is 42 91 kg/m²      Intake/Output Summary (Last 24 hours) at 4/7/2021 1043  Last data filed at 4/7/2021 0850  Gross per 24 hour   Intake 400 ml   Output --   Net 400 ml     Invasive Devices     Peripheral Intravenous Line            Peripheral IV 04/07/21 Left Antecubital less than 1 day    Peripheral IV 04/07/21 Right Antecubital less than 1 day          Airway            ETT  Cuffed;ANNY 7 mm less than 1 day                Physical Exam  Vitals signs and nursing note reviewed  Constitutional:       General: He is not in acute distress  Appearance: Normal appearance  He is obese  HENT:      Head: Normocephalic and atraumatic  Right Ear: External ear normal       Left Ear: External ear normal       Nose:      Right Nostril: Epistaxis present  Left Nostril: Epistaxis present  Mouth/Throat:      Mouth: Mucous membranes are moist  Angioedema present  Pharynx: Oropharynx is clear  Comments: Massive edematous tongue protruding beyond lip approximately 3 cm  Eyes:      Extraocular Movements: Extraocular movements intact  Conjunctiva/sclera: Conjunctivae normal       Pupils: Pupils are equal, round, and reactive to light  Neck:      Musculoskeletal: Normal range of motion and neck supple  No muscular tenderness  Cardiovascular:      Rate and Rhythm: Normal rate and regular rhythm  Pulses: Normal pulses  Heart sounds: No murmur  Pulmonary:      Effort: Pulmonary effort is normal       Breath sounds: No wheezing, rhonchi or rales  Abdominal:      General: Bowel sounds are normal       Palpations: Abdomen is soft  There is no mass  Tenderness: There is no abdominal tenderness  Hernia: No hernia is present  Musculoskeletal: Normal range of motion  General: No tenderness or deformity  Right lower leg: No edema  Left lower leg: No edema  Skin:     General: Skin is warm and dry  Neurological:      General: No focal deficit present  Mental Status: He is alert and oriented to person, place, and time  Mental status is at baseline  Psychiatric:         Mood and Affect: Mood normal          Behavior: Behavior normal          Thought Content:  Thought content normal          Judgment: Judgment normal  Lab Results:   I have personally reviewed pertinent lab results  , ABG:   Lab Results   Component Value Date    PHART 7 159 (LL) 04/07/2021    KIL8YQD 83 0 (HH) 04/07/2021    PO2ART 71 1 (L) 04/07/2021    VEM5UYR 28 9 (H) 04/07/2021    BEART -2 6 04/07/2021    SOURCE Radial, Right 04/07/2021   , BNP: No results found for: BNP, CBC:   Lab Results   Component Value Date    WBC 9 41 04/07/2021    HGB 15 4 04/07/2021    HCT 47 4 04/07/2021    MCV 87 04/07/2021     04/07/2021    MCH 28 6 04/07/2021    MCHC 33 0 04/07/2021    RDW 13 3 04/07/2021    MPV 9 3 04/07/2021    NRBC 0 04/07/2021   , CMP:   Lab Results   Component Value Date    SODIUM 132 (L) 04/07/2021    K 3 9 04/07/2021    CL 94 (L) 04/07/2021    CO2 31 04/07/2021    BUN 14 04/07/2021    CREATININE 1 16 04/07/2021    CALCIUM 9 3 04/07/2021    EGFR 70 04/07/2021   , PT/INR:   Lab Results   Component Value Date    INR 0 87 04/07/2021   , Troponin: No results found for: TROPONINI    Imaging Studies: I have personally reviewed pertinent reports  and I have personally reviewed pertinent films in PACS     CXR   04/07/2021   ETT 3 cm above guille  Low lung volumes with bibasilar atelectasis  EKG, Pathology, and Other Studies: I have personally reviewed pertinent reports  Pulmonary Results (PFTs, PSG): I have personally reviewed pertinent reports  Diagnostic polysomnogram 03/26/2018     IMPRESSION:   1  Severe obstructive sleep apnea   2  Severe oxygen desaturations   3  Sleep-related hypoxia   4  Sleep maintenance insomnia       VTE Prophylaxis: Enoxaparin (Lovenox)    Code Status: Level 1 - Full Code      Portions of the record may have been created with voice recognition software  Occasional wrong word or "sound a like" substitutions may have occurred due to the inherent limitations of voice recognition software  Read the chart carefully and recognize, using context, where substitutions have occurred

## 2021-04-07 NOTE — ED NOTES
Lab made aware of order for FFP  Stated will call when ready as it has to be thawed        Wayne Mascorro RN  04/07/21 9981

## 2021-04-07 NOTE — ASSESSMENT & PLAN NOTE
· Concerning for hypoxia  · Possible hypoxic brain injury  · Will require a Neurology work-up after transfer

## 2021-04-07 NOTE — ASSESSMENT & PLAN NOTE
· Hold antidepressants given tentative respiratory status and IV sedation  · Restart home trazodone and Cymbalta upon extubation

## 2021-04-07 NOTE — ASSESSMENT & PLAN NOTE
· Records show 2PPD cigarette use   Will provide nicotine patch when extubated  · Tobacco cessation upon extubation

## 2021-04-07 NOTE — PLAN OF CARE
Problem: Prexisting or High Potential for Compromised Skin Integrity  Goal: Skin integrity is maintained or improved  Description: INTERVENTIONS:  - Identify patients at risk for skin breakdown  - Assess and monitor skin integrity  - Assess and monitor nutrition and hydration status  - Monitor labs   - Assess for incontinence   - Turn and reposition patient  - Assist with mobility/ambulation  - Relieve pressure over bony prominences  - Avoid friction and shearing  - Provide appropriate hygiene as needed including keeping skin clean and dry  - Evaluate need for skin moisturizer/barrier cream  - Collaborate with interdisciplinary team   - Patient/family teaching  - Consider wound care consult   Outcome: Completed     Problem: CARDIOVASCULAR - ADULT  Goal: Maintains optimal cardiac output and hemodynamic stability  Description: INTERVENTIONS:  - Monitor I/O, vital signs and rhythm  - Monitor for S/S and trends of decreased cardiac output  - Administer and titrate ordered vasoactive medications to optimize hemodynamic stability  - Assess quality of pulses, skin color and temperature  - Assess for signs of decreased coronary artery perfusion  - Instruct patient to report change in severity of symptoms  Outcome: Progressing  Goal: Absence of cardiac dysrhythmias or at baseline rhythm  Description: INTERVENTIONS:  - Continuous cardiac monitoring, vital signs, obtain 12 lead EKG if ordered  - Administer antiarrhythmic and heart rate control medications as ordered  - Monitor electrolytes and administer replacement therapy as ordered  Outcome: Progressing     Problem: RESPIRATORY - ADULT  Goal: Achieves optimal ventilation and oxygenation  Description: INTERVENTIONS:  - Assess for changes in respiratory status  - Assess for changes in mentation and behavior  - Position to facilitate oxygenation and minimize respiratory effort  - Oxygen administered by appropriate delivery if ordered  - Initiate smoking cessation education as indicated  - Encourage broncho-pulmonary hygiene including cough, deep breathe, Incentive Spirometry  - Assess the need for suctioning and aspirate as needed  - Assess and instruct to report SOB or any respiratory difficulty  - Respiratory Therapy support as indicated  Outcome: Progressing     Problem: GASTROINTESTINAL - ADULT  Goal: Minimal or absence of nausea and/or vomiting  Description: INTERVENTIONS:  - Administer IV fluids if ordered to ensure adequate hydration  - Maintain NPO status until nausea and vomiting are resolved  - Nasogastric tube if ordered  - Administer ordered antiemetic medications as needed  - Provide nonpharmacologic comfort measures as appropriate  - Advance diet as tolerated, if ordered  - Consider nutrition services referral to assist patient with adequate nutrition and appropriate food choices  Outcome: Progressing  Goal: Maintains or returns to baseline bowel function  Description: INTERVENTIONS:  - Assess bowel function  - Encourage oral fluids to ensure adequate hydration  - Administer IV fluids if ordered to ensure adequate hydration  - Administer ordered medications as needed  - Encourage mobilization and activity  - Consider nutritional services referral to assist patient with adequate nutrition and appropriate food choices  Outcome: Progressing  Goal: Maintains adequate nutritional intake  Description: INTERVENTIONS:  - Monitor percentage of each meal consumed  - Identify factors contributing to decreased intake, treat as appropriate  - Assist with meals as needed  - Monitor I&O, weight, and lab values if indicated  - Obtain nutrition services referral as needed  Outcome: Progressing  Goal: Establish and maintain optimal ostomy function  Description: INTERVENTIONS:  - Assess bowel function  - Encourage oral fluids to ensure adequate hydration  - Administer IV fluids if ordered to ensure adequate hydration   - Administer ordered medications as needed  - Encourage mobilization and activity  - Nutrition services referral to assist patient with appropriate food choices  - Assess stoma site  - Consider wound care consult   Outcome: Progressing     Problem: GENITOURINARY - ADULT  Goal: Maintains or returns to baseline urinary function  Description: INTERVENTIONS:  - Assess urinary function  - Encourage oral fluids to ensure adequate hydration if ordered  - Administer IV fluids as ordered to ensure adequate hydration  - Administer ordered medications as needed  - Offer frequent toileting  - Follow urinary retention protocol if ordered  Outcome: Progressing  Goal: Absence of urinary retention  Description: INTERVENTIONS:  - Assess patients ability to void and empty bladder  - Monitor I/O  - Bladder scan as needed  - Discuss with physician/AP medications to alleviate retention as needed  - Discuss catheterization for long term situations as appropriate  Outcome: Progressing  Goal: Urinary catheter remains patent  Description: INTERVENTIONS:  - Assess patency of urinary catheter  - If patient has a chronic robertson, consider changing catheter if non-functioning  - Follow guidelines for intermittent irrigation of non-functioning urinary catheter  Outcome: Progressing     Problem: SKIN/TISSUE INTEGRITY - ADULT  Goal: Skin integrity remains intact  Description: INTERVENTIONS  - Identify patients at risk for skin breakdown  - Assess and monitor skin integrity  - Assess and monitor nutrition and hydration status  - Monitor labs (i e  albumin)  - Assess for incontinence   - Turn and reposition patient  - Assist with mobility/ambulation  - Relieve pressure over bony prominences  - Avoid friction and shearing  - Provide appropriate hygiene as needed including keeping skin clean and dry  - Evaluate need for skin moisturizer/barrier cream  - Collaborate with interdisciplinary team (i e  Nutrition, Rehabilitation, etc )   - Patient/family teaching  Outcome: Progressing  Goal: Incision(s), wounds(s) or drain site(s) healing without S/S of infection  Description: INTERVENTIONS  - Assess and document risk factors for skin impairment   - Assess and document dressing, incision, wound bed, drain sites and surrounding tissue  - Consider nutrition services referral as needed  - Oral mucous membranes remain intact  - Provide patient/ family education  Outcome: Progressing  Goal: Oral mucous membranes remain intact  Description: INTERVENTIONS  - Assess oral mucosa and hygiene practices  - Implement preventative oral hygiene regimen  - Implement oral medicated treatments as ordered  - Initiate Nutrition services referral as needed  Outcome: Progressing     Problem: SAFETY,RESTRAINT: NV/NON-SELF DESTRUCTIVE BEHAVIOR  Goal: Remains free of harm/injury (restraint for non violent/non self-detsructive behavior)  Description: INTERVENTIONS:  - Instruct patient/family regarding restraint use   - Assess and monitor physiologic and psychological status   - Provide interventions and comfort measures to meet assessed patient needs   - Identify and implement measures to help patient regain control  - Assess readiness for release of restraint   Outcome: Progressing  Goal: Returns to optimal restraint-free functioning  Description: INTERVENTIONS:  - Assess the patient's behavior and symptoms that indicate continued need for restraint  - Identify and implement measures to help patient regain control  - Assess readiness for release of restraint   Outcome: Progressing

## 2021-04-07 NOTE — ASSESSMENT & PLAN NOTE
· Utilize a nicotine patch and give smoking cessation counseling to the patient when he is extubated

## 2021-04-07 NOTE — EMTALA/ACUTE CARE TRANSFER
[address] CRITICAL CARE UNIT  [address] [facility] [address]  Dept: [phone]      ACUTE CARE TRANSFER CONSENT    NAME Iona Bryant                                         1965                              MRN [id number]    I have been informed of my rights regarding examination, treatment, and transfer   by Dr Eva Thomason DO    Benefits:  Transfer to a higher level of care where Anesthesia coverage is available 24 hours a day and ENT coverage is also available in case the patient loses his airway    Risks:  [employer] transportation risk      Consent for Transfer:  I acknowledge that my medical condition has been evaluated and explained to me by the treating physician or other qualified medical person and/or my attending physician, who has recommended that I be transferred to the service of Dr Primitivo Saucedo ([address] Attending Physician) at [address] [place] [address]  The above potential benefits of such transfer, the potential risks associated with such transfer, and the probable risks of not being transferred have been explained to me, and I fully understand them  The doctor has explained that, in my case, the benefits of transfer outweigh the risks  I agree to be transferred  I authorize the performance of emergency medical procedures and treatments upon me in both transit and upon arrival at the receiving facility  Additionally, I authorize the release of any and all medical records to the receiving facility and request they be transported with me, if possible  I understand that the safest mode of transportation during a medical emergency is an ambulance and that the Hospital advocates the use of this mode of transport   Risks of traveling to the receiving facility by car, including absence of medical control, life sustaining equipment, such as oxygen, and medical personnel has been explained to me and I fully understand them     ([facility])  [  ]  I consent to the stated transfer and to be transported by ambulance/helicopter  [  ]  I consent to the stated transfer, but refuse transportation by ambulance and accept full responsibility for my transportation by car  I understand the risks of non-ambulance transfers and I exonerate the Hospital and its staff from any deterioration in my condition that results from this refusal     X___________________________________________    DATE  21  TIME________  Signature of patient or legally responsible individual signing on patient behalf           RELATIONSHIP TO PATIENT_________________________          Provider Certification    NAME Martha Brooks                                         1965                              MRN [id number]    A medical screening exam was performed on the above named patient    Based on the examination:    Condition Necessitating Transfer:  Severe angioedema with acute hypoxemic respiratory failure requiring transfer to a higher level of care where Anesthesia coverage is available 24 hours a day and ENT coverage is also available in case the patient loses his airway    Patient Condition:  Critical    Reason for Transfer:  Severe angioedema with acute hypoxemic respiratory failure requiring transfer to a higher level of care where Anesthesia coverage is available 24 hours a day and ENT coverage is also available in case the patient loses his airway    Transfer Requirements: Facility-[facility] [address]   · Space available and qualified personnel available for treatment as acknowledged by    · Agreed to accept transfer and to provide appropriate medical treatment as acknowledged by          · Appropriate medical records of the examination and treatment of the patient are provided at the time of transfer   [address] _______  · Transfer will be performed by qualified personnel from    and appropriate transfer equipment as required, including the use of necessary and appropriate life support measures  Provider Certification: I have examined the patient and explained the following risks and benefits of being transferred/refusing transfer to the patient/family:         Based on these reasonable risks and benefits to the patient and/or the unborn child(chata), and based upon the information available at the time of the patients examination, I certify that the medical benefits reasonably to be expected from the provision of appropriate medical treatments at another medical facility outweigh the increasing risks, if any, to the individuals medical condition, and in the case of labor to the unborn child, from effecting the transfer      X____________________________________________ DATE 04/07/21        TIME_______      ORIGINAL - SEND TO MEDICAL RECORDS   COPY - SEND WITH PATIENT DURING TRANSFER

## 2021-04-07 NOTE — ASSESSMENT & PLAN NOTE
Lab Results   Component Value Date    HGBA1C 11 7 (H) 02/05/2021       Recent Labs     04/07/21  0912   POCGLU 320*       Blood Sugar Average: Last 72 hrs:  (P) 320     · Utilize lantus 30 Units SQ Qdaily, which is a decreased dose for the patient while he is NPO  · Hypoglycemia protocol  · Insulin sliding scale with blood glucose monitoring every 6 hours

## 2021-04-07 NOTE — ASSESSMENT & PLAN NOTE
· Prior sleep study showing severe MARSHA  Lost to follow up  · Currently intubated     · Will require sleep study for titration of positive pressure ventilation OP  · Body habitus likely contributing

## 2021-04-07 NOTE — CONSULTS
Consultation - Pulmonary Medicine  Xena Bailey 63 y o  male MRN: [id number]  Unit/Bed#: ICU 11 Encounter: [id number]  Code Status: Level 1 - Full Code    Assessment/Plan:  * Angioedema  Assessment & Plan  -unclear etiology  Initial episode 2016 attributed to ACE, but ongoing, intermittent episodes 5714-4328 despite discontinuation  -Recommended f/u w/immunology/allergist but lost to follow-up  Outpt allergy/immunology evaluation on discharge   -Currently intubated with mechanical ventilation, ICU management  -Vent settings: currently 24/400/80%/10  Titrate FiO2 for goal SpO2 >90%  Propofol and precedex with goal RASS -2  -received 1 Unit FFP at [facility], 2nd unit ordered  -Tranexamic acid 1 G given w/consideration for 2nd dose this evening  -C/w methylprednisolone 40 mg IV q 8 hrs  -On Famotidine 20 mg IV q 12 hours  -Diphenhydramine 50mg q6h  -NSS IV fluids at 50 ml/hr  -C1 esterase inhibitor level, C4, CRP, ESR ordered & pending  -will otherwise need further allergy testing in OP setting  -ENT consulted        Tobacco use  Assessment & Plan  -Records of 2 PPD cigarette use  -Provide nicotine patch & encourage tobacco cessation upon extubation    Obstructive sleep apnea  Assessment & Plan  Hx of MARSHA Severe  Will address issues of f/u and compliance once extubated     Acute respiratory failure with hypoxia (HCC)  Assessment & Plan  2/2 Angioedema v  Anaphylaxis  Currently on 80% 02  ICU team managing and weaning as appropriate  Titrate for 02 sat > 90%  Maintain deeper RASS given high oxygen requirements      Sleep apnea  Assessment & Plan  -Prior sleep study showing severe MARSHA, lost to follow up   -Currently intubated   Will require sleep study and outpt f/u for PAP therapy  -body habitus likely contributor         Thank you for this consultation; we will be happy to follow with you     ______________________________________________________________________      History of Present Illness   Physician Requesting Consult: Mike Mcintyre MD  Reason for Consult / Principal Problem: angioedema and acute respiratory failrue  HX and PE limited by: intubation and sedation    HPI:  Arlen Stanford is a 63 y o  male  has a past medical history of Diabetes mellitus ([address] ), Disease of thyroid gland, Hyperlipidemia, and Hypertension  who presents with angioedema of unknown origin  Per chart, pt awoke 0300 this AM with L sided tongue swelling  Self-administered Epi-pen, took benadryl, went back to sleep  Tongue swelling worse upon awakening around 0600 and continued to worsen over next half hour to point where it protruded from his mouth  He presented to [facility] ED when he was no longer able to swallow his own saliva and barely able to phonate  Due to tongue swelling/angioedema, was intubated in OR at [facility] using fiberoptics w/ 7 0 ETT, and transferred to [facility] for ICU mgt  Currently on ventilator w/propofol and precedex drips  Received dexamethasone and 1 U FFP prior to transfer  Currently on solumedrol 40 mg IV q8h, diphenhydramine 50mg q6h and famotidine 20mg q12h  Per records, Pt has longstanding hx of recurrent angioedema since 2016 and has been seen in ED multiple times for this problem  No known allergic exposures accounting for his symptoms  Initially was thought to be from ACE inhibitor, but episodes persisted despite discontinuation  Alcira Sprinkles remains unclear  Was recommended to f/u with immunology/allergist but doesn't appear to have done so  Pulmonology consulted for angioedema and acute respiratory failure and likelihood of hereditary angioedema and need for close follow up  Smoking history: records show 2 ppd cigarette use  Occupational history: deferred  Environmental History: deferred  Travel history: noncontributory   Respiratory History: hx angioedema, MARSHA   Oxygen Therapy: Currently intubated  Otherwise no known hx of home oxygen therapy     PAP Therapy: hx of MARSHA, lost to follow-up  DME Company: deferred  Rx Insurance: Medicare A and B  PFT: N/A not found on file  PSG: prior sleep study showing severe MARSHA  Lost to follow up    3/26/2018 PSG showed severe sleep apnea  Had AHI of 34 9 events/hour of sleep  The mean oxygen saturation throughout the study was 85 5%  Impression of:   1  Severe obstructive sleep apnea   2  Severe oxygen desaturations   3  Sleep-related hypoxia   4  Sleep maintenance insomnia     Labs Reviewed: yes  Imaging Reviewed:yes  XR chest portable 4/7 post-intubation CXR: ET tube 3 cm above guille  Low lung volumes with bibasilar atelectasis  Echo Reviewed: none on file, records indicate ECHO ordered 3/2018 but not completed  Consults Reviewed: yes  Collaborative Discussion: w/ ICU team     Review of Systems   Unable to perform ROS: Intubated       Past Medical/Surgical History  Past Medical History:   Diagnosis Date    Diabetes mellitus ([address] )     Disease of thyroid gland     Hyperlipidemia     Hypertension      Past Surgical History:   Procedure Laterality Date    KNEE SURGERY      SINUS SURGERY         Social History  Social History     Substance and Sexual Activity   Alcohol Use Not Currently     Social History     Substance and Sexual Activity   Drug Use No     Social History     Tobacco Use   Smoking Status Current Every Day Smoker    Packs/day: 2 00   Smokeless Tobacco Never Used       Family History  Family History   Problem Relation Age of Onset    Thyroid cancer Mother     Diabetes type II Mother     Esophageal cancer Father     Diabetes type II Father     Kidney cancer Brother     Diabetes type II Brother     Diabetes type II Maternal Grandmother        Allergies  Allergies   Allergen Reactions    Ace Inhibitors Swelling     Angioedema    Clindamycin      Had angioedema episode approx 5 hr after IM administration of clindamycin  Unclear if there is definitive causal relationship         Home Meds:   Medications Prior to Admission   Medication Sig Dispense Refill Last Dose    Ascorbic Acid (VITAMIN C PO) Take 1 tablet by mouth daily       aspirin 81 mg chewable tablet Take one tablet by mouth daily       atorvastatin (LIPITOR) 20 mg tablet Take 1 tablet by mouth once daily 90 tablet 0     CINNAMON PO Take 1 tablet by mouth daily       DULoxetine (CYMBALTA) 60 mg delayed release capsule Take 1 capsule by mouth once daily 90 capsule 0     EPINEPHrine (EPIPEN) 0 3 mg/0 3 mL SOAJ INJECT 0 3ML INTO A MUSCLE ONCE FOR 1 DOSE 2 each 0     Euthyrox 175 MCG tablet Take 1 tablet by mouth once daily 30 tablet 0     gabapentin (NEURONTIN) 300 mg capsule Take 1 capsule (300 mg total) by mouth 3 (three) times a day 90 capsule 1     hydrochlorothiazide (HYDRODIURIL) 25 mg tablet Take 1 tablet by mouth twice daily 60 tablet 5     insulin lispro (HumaLOG KwikPen) 100 units/mL injection pen Inject 7 Units under the skin 3 (three) times a day with meals (Patient not taking: Reported on 1/28/2021) 3 pen 0     Insulin Pen Needle (PEN NEEDLES 3/16") 31G X 5 MM MISC by Other route 4 (four) times a day 300 each 3     Lantus SoloStar 100 units/mL injection pen INJECT 60 UNITS SUBCUTANEOUSLY ONCE DAILY 15 mL 0     metFORMIN (GLUCOPHAGE) 1000 MG tablet Take 1 tablet by mouth twice daily 60 tablet 5     omega-3-acid ethyl esters (LOVAZA) 1 g capsule Take 1 capsule (1 g total) by mouth 2 (two) times a day for high cholesterol 60 capsule 2     other medication, see sig, Medication/product name: Moringa    Strength: 1000mcg    Sig (include dose, route, frequency): daily for joint pains per pt (OTC)       sodium chloride (OCEAN) 0 65 % nasal spray 1 spray into each nostril as needed for rhinitis 30 mL 1     traZODone (DESYREL) 150 mg tablet Take 1 tablet (150 mg total) by mouth daily at bedtime 90 tablet 0     Ventolin  (90 Base) MCG/ACT inhaler Inhale 2 puffs every 4 (four) hours as needed for wheezing or shortness of breath 1 Inhaler 5        Current Meds: Scheduled Meds:  Current Facility-Administered Medications   Medication Dose Route Frequency Provider Last Rate    atorvastatin  20 mg Oral Daily With Dinner MARLEEN Holden      chlorhexidine  15 mL Swish & Spit Q12H [facility] MRALEEN Holden      dexmedetomidine  0 1-1 5 mcg/kg/hr Intravenous Titrated MARLEEN Holden 0 5 mcg/kg/hr (21 1154)    diphenhydrAMINE  50 mg Intravenous Q6H MARLEEN Holden      enoxaparin  40 mg Subcutaneous Q12H [facility] MARLEEN Holden      famotidine  20 mg Intravenous Q12H [facility] MARLEEN Holden      fentanyl citrate (PF)  50 mcg Intravenous Q2H PRN MARLEEN Holden      [START ON 2021] insulin glargine  30 Units Subcutaneous QAM MARLEEN Holden      insulin lispro  1-6 Units Subcutaneous Q6H [facility] MARLEEN Holden      [START ON 2021] levothyroxine  175 mcg Oral Daily MARLEEN Holden      methylPREDNISolone sodium succinate  40 mg Intravenous Q8H [facility] MARLEEN Holden      propofol  5-70 mcg/kg/min Intravenous Titrated MARLEEN Holden 50 mcg/kg/min (21 1400)     PRN Meds:fentanyl citrate (PF), 50 mcg, Q2H PRN        ____________________________________________________________________    Objective   Vitals:   Temp:  [96 4 °F (35 8 °C)-97 9 °F (36 6 °C)] 97 5 °F (36 4 °C)  HR:  [] 82  Resp:  [18-34] 24  BP: (103-230)/() 132/85  FiO2 (%):  [100] 100  Weight (last 2 days)     Date/Time   Weight    21 1137   115 (252 43)            Vitals:    21 1300 21 1330 21 1400 21 1430   BP: 118/80 129/85 132/86 132/85   BP Location:       Pulse: 78 78 79 82   Resp: (!) 24 (!) 24 (!) 24 (!) 24   Temp:    97 5 °F (36 4 °C)   TempSrc:    Temporal   SpO2: 97% 98% 97% 97%   Weight:       Height:         Temp (24hrs), Av °F (36 1 °C), Min:96 4 °F (35 8 °C), Max:97 9 °F (36 6 °C)  Current: Temperature: 97 5 °F (36 4 °C)        SpO2: SpO2: 95 %, SpO2 Activity: SpO2 Activity: At Rest, SpO2 Device: O2 Device: Ventilator    IV Infusions:   dexmedetomidine, 0 1-1 5 mcg/kg/hr, Last Rate: 0 5 mcg/kg/hr (04/07/21 1154)  propofol, 5-70 mcg/kg/min, Last Rate: 50 mcg/kg/min (04/07/21 1400)        Nutrition:        Diet Orders   (From admission, onward)             Start     Ordered    04/07/21 1137  Diet NPO  Diet effective now     Question Answer Comment   Diet Type NPO    RD to adjust diet per protocol? No        04/07/21 1136                Ins/Outs:   I/O       04/05 0701 - 04/06 0700 04/06 0701 - 04/07 0700 04/07 0701 - 04/08 0700    I V  (mL/kg)   131 7 (1 2)    IV Piggyback   100    Total Intake(mL/kg)   231 7 (2)    Urine (mL/kg/hr)   575    Total Output   575    Net   -343  3                 Lines/Drains:  Invasive Devices     Peripheral Intravenous Line            Peripheral IV 04/07/21 Left Antecubital less than 1 day    Peripheral IV 04/07/21 Left Forearm less than 1 day    Peripheral IV 04/07/21 Right Antecubital less than 1 day          Drain            NG/OG/Enteral Tube Orogastric 14 Fr Center mouth less than 1 day    Urethral Catheter Latex 16 Fr  less than 1 day          Airway            ETT  Cuffed;ANNY 7 mm less than 1 day              ___________________________________________________________________    Physical Exam  Constitutional:       Appearance: He is well-developed  He is obese  He is ill-appearing  Interventions: He is sedated and intubated  HENT:      Head: Normocephalic and atraumatic  Jaw: Swelling present  Comments: ETT in place, Pt sedated  Nose:      Right Nostril: Epistaxis present  Comments: Dried blood from epistaxis secondary to intubation  Marked angioedema  Tongue swollen, protruding from mouth, unable to visualize pharynx  Mouth/Throat:      Pharynx: No oropharyngeal exudate  Neck:      Musculoskeletal: Normal range of motion and neck supple  Thyroid: No thyromegaly  Vascular: No JVD  Trachea: No tracheal deviation  Cardiovascular:      Heart sounds: No murmur  No friction rub  No gallop  Pulmonary:      Effort: Pulmonary effort is normal  He is intubated  Breath sounds: Normal breath sounds  No stridor  No wheezing or rales  Comments: On AC ventilator  RR 24, tidal volume 400, PEP 10, oxygen 80%  96% spO2   Exam limited to anterior  Chest:      Chest wall: No tenderness  Abdominal:      General: There is no distension  Tenderness: There is no abdominal tenderness  There is no guarding  Genitourinary:     Comments: Mckeon in place, draining clear yellow urine  Musculoskeletal: Normal range of motion  Comments: Chronic venous stasis changes/brawny edema LE b/l   Skin:     General: Skin is warm and dry  Findings: No erythema or rash     Neurological:      Comments: Sedated       ___________________________________________________________________    Invasive/non-invasive ventilation settings   Respiratory    Lab Data (Last 4 hours)      04/07 1218            pH, Arterial       7 391           pCO2, Arterial       44 8           pO2, Arterial       89 6           HCO3, Arterial       26 6           Base Excess, Arterial       1 2                O2/Vent Data     None                Laboratory and Diagnostics:  Results from last 7 days   Lab Units 04/07/21  1227 04/07/21  1023 04/07/21  0645   WBC Thousand/uL  --   --  9 41   HEMOGLOBIN g/dL 14 6 15 4 15 5   HEMATOCRIT % 44 5 47 4 46 9   PLATELETS Thousands/uL  --   --  279   NEUTROS PCT %  --   --  61   MONOS PCT %  --   --  11     Results from last 7 days   Lab Units 04/07/21  1228 04/07/21  0645   SODIUM mmol/L 133* 132*   POTASSIUM mmol/L 5 5* 3 9   CHLORIDE mmol/L 96* 94*   CO2 mmol/L 30 31   ANION GAP mmol/L 7 7   BUN mg/dL 16 14   CREATININE mg/dL 1 45* 1 16   CALCIUM mg/dL 8 6 9 3   GLUCOSE RANDOM mg/dL 373* 395*   ALT U/L 45  --    AST U/L 21  --    ALK PHOS U/L 78  --    ALBUMIN g/dL 3 2*  --    TOTAL BILIRUBIN mg/dL 0 45  --      Results from last 7 days   Lab Units 04/07/21  1228 04/07/21  0645   MAGNESIUM mg/dL 1 9 1 9   PHOSPHORUS mg/dL 2 8  --       Results from last 7 days   Lab Units 04/07/21  0645   INR  0 87      Results from last 7 days   Lab Units 04/07/21  1227 04/07/21  1023   TROPONIN I ng/mL <0 02 <0 02     Results from last 7 days   Lab Units 04/07/21  1227 04/07/21  1023   LACTIC ACID mmol/L 2 8* 3 3*     ABG:  Results from last 7 days   Lab Units 04/07/21  1218   PH ART  7 391   PCO2 ART mm Hg 44 8*   PO2 ART mm Hg 89 6   HCO3 ART mmol/L 26 6   BASE EXC ART mmol/L 1 2   ABG SOURCE  Radial, Right     VBG:  Results from last 7 days   Lab Units 04/07/21  1218   ABG SOURCE  Radial, Right           Micro        Imaging:   XR chest portable ICU    (Results Pending)         Micro:   Lab Results   Component Value Date    BLOODCX No Growth After 5 Days  03/02/2018    BLOODCX No Growth After 5 Days  03/02/2018    BLOODCX No Growth After 5 Days   05/06/2017        ____________________________________________________________________

## 2021-04-07 NOTE — PLAN OF CARE
Problem: RESPIRATORY - ADULT  Goal: Achieves optimal ventilation and oxygenation  Description: INTERVENTIONS:  - Assess for changes in respiratory status  - Assess for changes in mentation and behavior  - Position to facilitate oxygenation and minimize respiratory effort  - Oxygen administered by appropriate delivery if ordered  - Initiate smoking cessation education as indicated  - Encourage broncho-pulmonary hygiene including cough, deep breathe, Incentive Spirometry  - Assess the need for suctioning and aspirate as needed  - Assess and instruct to report SOB or any respiratory difficulty  - Respiratory Therapy support as indicated  Outcome: Progressing     Problem: GENITOURINARY - ADULT  Goal: Maintains or returns to baseline urinary function  Description: INTERVENTIONS:  - Assess urinary function  - Encourage oral fluids to ensure adequate hydration if ordered  - Administer IV fluids as ordered to ensure adequate hydration  - Administer ordered medications as needed  - Offer frequent toileting  - Follow urinary retention protocol if ordered  Outcome: Progressing  Goal: Absence of urinary retention  Description: INTERVENTIONS:  - Assess patients ability to void and empty bladder  - Monitor I/O  - Bladder scan as needed  - Discuss with physician/AP medications to alleviate retention as needed  - Discuss catheterization for long term situations as appropriate  Outcome: Progressing  Goal: Urinary catheter remains patent  Description: INTERVENTIONS:  - Assess patency of urinary catheter  - If patient has a chronic robertson, consider changing catheter if non-functioning  - Follow guidelines for intermittent irrigation of non-functioning urinary catheter  Outcome: Progressing     Problem: METABOLIC, FLUID AND ELECTROLYTES - ADULT  Goal: Electrolytes maintained within normal limits  Description: INTERVENTIONS:  - Monitor labs and assess patient for signs and symptoms of electrolyte imbalances  - Administer electrolyte replacement as ordered  - Monitor response to electrolyte replacements, including repeat lab results as appropriate  - Instruct patient on fluid and nutrition as appropriate  Outcome: Progressing     Problem: SKIN/TISSUE INTEGRITY - ADULT  Goal: Skin integrity remains intact  Description: INTERVENTIONS  - Identify patients at risk for skin breakdown  - Assess and monitor skin integrity  - Assess and monitor nutrition and hydration status  - Monitor labs (i e  albumin)  - Assess for incontinence   - Turn and reposition patient  - Assist with mobility/ambulation  - Relieve pressure over bony prominences  - Avoid friction and shearing  - Provide appropriate hygiene as needed including keeping skin clean and dry  - Evaluate need for skin moisturizer/barrier cream  - Collaborate with interdisciplinary team (i e  Nutrition, Rehabilitation, etc )   - Patient/family teaching  Outcome: Progressing  Goal: Oral mucous membranes remain intact  Description: INTERVENTIONS  - Assess oral mucosa and hygiene practices  - Implement preventative oral hygiene regimen  - Implement oral medicated treatments as ordered  - Initiate Nutrition services referral as needed  Outcome: Progressing     Problem: SAFETY,RESTRAINT: NV/NON-SELF DESTRUCTIVE BEHAVIOR  Goal: Remains free of harm/injury (restraint for non violent/non self-detsructive behavior)  Description: INTERVENTIONS:  - Instruct patient/family regarding restraint use   - Assess and monitor physiologic and psychological status   - Provide interventions and comfort measures to meet assessed patient needs   - Identify and implement measures to help patient regain control  - Assess readiness for release of restraint   Outcome: Progressing  Goal: Returns to optimal restraint-free functioning  Description: INTERVENTIONS:  - Assess the patient's behavior and symptoms that indicate continued need for restraint  - Identify and implement measures to help patient regain control  - Assess readiness for release of restraint   Outcome: Progressing   Care plan ongoing

## 2021-04-07 NOTE — ASSESSMENT & PLAN NOTE
· Required intubation and mechanical ventilation in the operating room by Anesthesia for airway protection and hypoxia  · Transfuse 1 Unit of FFP (fresh frozen plasma)  · Received dexamethasone 10 mg IV x 1 dose in the emergency department  · Treat with methylprednisolone 40 mg IV every 8 hours  · Utilize famotidine 20 mg IV ever 12 hours  · Utilize NSS IV fluids at 50 ml/hr  · Check a C1 esterase inhibitor level  · Outpatient Allergy/Immunology evaluation    The patient requires transfer to a higher level of care with 24-hour Anesthesia coverage as well as ENT coverage in case the patient's loses his current airway  The patient will be transferred to [address] [place] [address] on the service of Dr Julieth Clement ([address] Attending Physician)

## 2021-04-07 NOTE — ANESTHESIA POSTPROCEDURE EVALUATION
Post-Op Assessment Note    CV Status:  Stable           Comments: Successfully intubatd  Pt had no surgery        No complications documented      BP      Temp      Pulse    Resp      SpO2

## 2021-04-07 NOTE — RESPIRATORY THERAPY NOTE
RT Ventilator Management Note  Arlen Stanford 63 y o  male MRN: [id number]  Unit/Bed#: 406-01 Encounter: [id number]      Daily Screen       4/7/2021  0830             Patient safety screen outcome[de-identified]  Failed    Not Ready for Weaning due to[de-identified]  FiO2 >60%; Underline problem not resolved            Physical Exam:   Assessment Type: Assess only  General Appearance: Sedated  Respiratory Pattern: Assisted  Chest Assessment: Chest expansion symmetrical  Bilateral Breath Sounds: Coarse  O2 Device: Ventilator    Patient transported from OR, Intubated  04/07/21 0830   Vent Information   Vent type     Vent Mode AC/VC   $ Vent Charge-INITIAL Yes   Ventilator Start Yes   $ Pulse Oximetry Spot Check Charge Completed   SpO2 95 %   AC/VC Settings   Resp Rate (BPM) 18 BPM   Vt (mL) 400 mL   FIO2 (%) 100 %   PEEP (cmH2O) 6 cmH2O   Flow Pattern (LPM) 60 L/min   Humidification Heater   Heater Temperature (Set) 98 6 °F (37 °C)   AC/VC Actuals   Resp Rate (BPM) 27 BPM   VT (mL) 420   MV 10 6   MAP (cmH2O) 13 cmH2O   Peak Pressure (cmH2O) 33 cmH2O   I/E Ratio (Obs) 1:2   Heater Temperature (Obs) 98 6 °F (37 °C)   Static Compliance (mL/cmH20) 29 mL/cmH2O   Plateau Pressure (cm H2O) 19 cm H2O   AC/VC Alarms   High Peak Pressure (cmH2O) 50   High Resp Rate (BPM) 40 BPM   Low MV (L/min) 5 L/min   Vt High (mL) 1000 mL   Vt Low (mL) 300 mL   AC/VC Apnea Settings   Resp Rate (BPM) 18 BPM   VT (mL) 400 mL   FIO2 (%) 100 %   Apnea Time (s) 20 S   Apnea Flow (L/min) 60 L/min   Maintenance   Resuscitation bag with peep valve at bedside Yes   Daily Screen   Patient safety screen outcome: Failed   Not Ready for Weaning due to: FiO2 >60%; Underline problem not resolved   ETT  Cuffed;ANNY 7 mm   Placement Date/Time: 04/07/21 (c) 0734   Mask Ventilation: (c) Mask ventilation not attempted (0)  Preoxygenated: Yes  Technique: Direct laryngoscopy;awake;bougie  Type: Cuffed;ANNY  Tube Size: 7 mm  Laryngoscope: (c)   Location: Oral  Placement Loc Sood       Secured at (cm) 24   Measured from [facility] by Commercial tube cabrales   HI-LO Suction  Continuous low suction   HI-LO Secretions Blood tinged   HI-LO Intervention Patent

## 2021-04-07 NOTE — H&P
Angiemarilynn 44  H&P- Elvin Rojoan 1965, 63 y o  male MRN: [id number]  Unit/Bed#: ICU 11 Encounter: [id number]  Primary Care Provider: MARLEEN Nunes   Date and time admitted to hospital: 4/7/2021 11:24 AM    * Angioedema  Assessment & Plan  · Unclear etiology  Initially with angioedema episode 2016 attributed to ACE, but ongoing, intermittent episodes of angioedema in [id number]  Was recommended to follow up with immunology/allergist but was lost to follow-up  · Now presenting this AM with worsening tongue edema since 0300  Self administered Epi-pen and benadryl without relief  Presented to [facility] ED early this AM with worsening tongue edema/protrusion from mouth  · Required intubation and mechanical ventilation in the operating room by Anesthesia for airway protection and hypoxia 4/7  Transferred to [address] [place] [address] for ICU mgt  · Vent settings: 24/480/100%/10  Titrate FiO2 for goal SpO2 >90%  Propofol and precedex with goal RASS -2  · Received 1 Unit of FFP at [facility], 2nd unit ordered   · Will give 1G Tranexamic acid now and monitor for effect  Consider 2nd dose this evening  · Received dexamethasone 10 mg IV x 1 dose in the emergency department  · Continue methylprednisolone 40 mg IV every 8 hours  · Famotidine 20 mg IV ever 12 hours  · Diphenhydramine 50mg q6h  · NSS IV fluids at 50 ml/hr  · Check a C1 esterase inhibitor level, C4, CRP, ESR  · Outpatient Allergy/Immunology evaluation on discharge  · Consult ENT given severity of angioedema       Type 2 diabetes, uncontrolled, with neuropathy [facility])  Assessment & Plan  Lab Results   Component Value Date    HGBA1C 11 7 (H) 02/05/2021       Recent Labs     04/07/21  0912   POCGLU 320*       Blood Sugar Average: Last 72 hrs:       · History of non-compliance with diabetic regimen  See above A1c from 2/21  · Takes lantus 60 daily per home dose   Will provide 30u lantus HS given NPO status  · SSI q6h  · Monitor BG    Sleep apnea  Assessment & Plan  · Prior sleep study showing severe MARSHA  Lost to follow up  · Currently intubated  · Will require sleep study for titration of positive pressure ventilation OP  · Body habitus likely contributing    Hypertension  Assessment & Plan  · BP on arrival noted at 103/69 in setting of intubation/sedation medications  · Monitor per unit protocol  · Consider PRN antihypertensives if becomes hypertensive  · Avoid ACE-I given angioedema    Hypothyroidism  Assessment & Plan  · Last TSH 0 139 2/25/21  · Continue levothyroxine 175 mcg daily    Morbid obesity ([address] )  Assessment & Plan  · Follow up with nutritional counseling when appropriate  Previously declined  · Likely contributing to MARSHA    Hyperlipidemia  Assessment & Plan  · Continue home statin    Depression with anxiety  Assessment & Plan  · Hold antidepressants given tentative respiratory status and IV sedation  · Restart home trazodone and Cymbalta upon extubation    Tobacco use  Assessment & Plan  · Records show 2PPD cigarette use  Will provide nicotine patch when extubated  · Tobacco cessation upon extubation    -------------------------------------------------------------------------------------------------------------  Chief Complaint: Tongue swelling    History of Present Illness   HX and PE limited by: Intubation  Linnette Lund is a 63 y o  male who presents with angioedema of unknown origin  Per chart, patient awoke at 0300 this AM with left sided tongue swelling  He self-administered his Epi-pen and took benadryl at that time, then went back to sleep  Tongue swelling was worse upon awakening around 0600 and continued to worsen over the next half hour to the point where it protruded from his mouth  He presented to [facility] ED when he was no longer able to swallow his own saliva and was barely able to phonate       Due to the massive tongue swelling/angioedema, he was intubated in the OR at [facility] using fiberoptics, and was transferred to [facility] for ICU mgt  He is currently maintained on the ventilator with propofol and precedex drips infusing  He received dexamethasone 10mg IV x1, and 1 U FFP prior to transfer  We will continue with a second unit of FFP currently, as well as solumedrol 40mg IV q8h, diphenhydramine 50mg q6h and famotidine 20mg q12h  Will also provide 1G tranexamic acid x1 and monitor over the next 24 hours for resolution of his angioedema  Per records, Mr Elier Parra has a longstanding history of recurrent angioedema since 2016 and has been seen in the ED multiple times for this problem  No allergic exposures accounting for his symptoms  Initially were thought to be from an ACE inhibitor, but episodes persisted despite discontinuation of his ACE-I  The nature of these angioedema episodes remains unclear  Was recommended to f/u with immunology/allergist but does not appear to have done so  History obtained from chart review  -------------------------------------------------------------------------------------------------------------  Dispo: Admit to Critical Care     Code Status: Level 1 - Full Code  --------------------------------------------------------------------------------------------------------------  Review of Systems   Unable to perform ROS: Intubated       Review of systems was unable to be performed secondary to intubation    Physical Exam  Vitals signs and nursing note reviewed  Constitutional:       General: He is not in acute distress  Appearance: He is obese  He is ill-appearing  He is not diaphoretic  Interventions: He is sedated and intubated  Comments: ETT in place, sedated on propofol and precedex   HENT:      Head: Normocephalic and atraumatic  Right Ear: External ear normal       Left Ear: External ear normal       Nose:      Right Nostril: Epistaxis (dried blood noted externally 2/2 attempted nasal intubation  ) present  Mouth/Throat:      Mouth: Mucous membranes are dry        Comments: Tongue protruding from mouth/edematous  Unable to visualize oral cavity 2/2 tongue edema  Eyes:      General: Lids are normal  No scleral icterus  Pupils: Pupils are equal, round, and reactive to light  Cardiovascular:      Rate and Rhythm: Normal rate and regular rhythm  Pulses: Normal pulses  Radial pulses are 2+ on the right side and 2+ on the left side  Dorsalis pedis pulses are 2+ on the right side and 2+ on the left side  Heart sounds: Normal heart sounds  No murmur  Pulmonary:      Effort: Pulmonary effort is normal  No respiratory distress  He is intubated  Breath sounds: No wheezing or rhonchi  Abdominal:      General: Abdomen is protuberant  Palpations: Abdomen is soft  Tenderness: There is no abdominal tenderness  There is no guarding  Genitourinary:     Comments: Clear yellow urine from robertson catheter  Musculoskeletal:      Right lower leg: No edema  Left lower leg: No edema  Skin:     General: Skin is warm and dry  Psychiatric:      Comments: Remains sedated and intubated       --------------------------------------------------------------------------------------------------------------  Vitals:   Vitals:    04/07/21 1100 04/07/21 1137 04/07/21 1200 04/07/21 1230   BP:   103/69 107/72   BP Location:   Left arm    Pulse: 97  104 82   Resp: (!) 24  (!) 24 (!) 24   Temp:   97 9 °F (36 6 °C)    TempSrc:   Temporal    SpO2: 94%  91% 97%   Weight:  115 kg (252 lb 6 8 oz)       Temp  Min: 96 4 °F (35 8 °C)  Max: 97 9 °F (36 6 °C)  IBW: -88 kg     Body mass index is 43 33 kg/m²      Laboratory and Diagnostics:  Results from last 7 days   Lab Units 04/07/21  1227 04/07/21  1023 04/07/21  0645   WBC Thousand/uL  --   --  9 41   HEMOGLOBIN g/dL 14 6 15 4 15 5   HEMATOCRIT % 44 5 47 4 46 9   PLATELETS Thousands/uL  --   --  279   NEUTROS PCT %  --   --  61   MONOS PCT %  --   --  11     Results from last 7 days   Lab Units 04/07/21  0645   SODIUM mmol/L 132* POTASSIUM mmol/L 3 9   CHLORIDE mmol/L 94*   CO2 mmol/L 31   ANION GAP mmol/L 7   BUN mg/dL 14   CREATININE mg/dL 1 16   CALCIUM mg/dL 9 3   GLUCOSE RANDOM mg/dL 395*     Results from last 7 days   Lab Units 04/07/21  0645   MAGNESIUM mg/dL 1 9      Results from last 7 days   Lab Units 04/07/21  0645   INR  0 87      Results from last 7 days   Lab Units 04/07/21  1023   TROPONIN I ng/mL <0 02     Results from last 7 days   Lab Units 04/07/21  1023   LACTIC ACID mmol/L 3 3*     ABG:  Results from last 7 days   Lab Units 04/07/21  1218   PH ART  7 391   PCO2 ART mm Hg 44 8*   PO2 ART mm Hg 89 6   HCO3 ART mmol/L 26 6   BASE EXC ART mmol/L 1 2   ABG SOURCE  Radial, Right     VBG:  Results from last 7 days   Lab Units 04/07/21  1218   ABG SOURCE  Radial, Right           Micro:        EKG: NSR on monitor  Imaging: I have personally reviewed pertinent reports     and I have personally reviewed pertinent films in PACS      Historical Information   Past Medical History:   Diagnosis Date    Diabetes mellitus ([address] )     Disease of thyroid gland     Hyperlipidemia     Hypertension      Past Surgical History:   Procedure Laterality Date    KNEE SURGERY      SINUS SURGERY       Social History   Social History     Substance and Sexual Activity   Alcohol Use No     Social History     Substance and Sexual Activity   Drug Use No     Social History     Tobacco Use   Smoking Status Current Every Day Smoker    Packs/day: 2 00   Smokeless Tobacco Never Used     Exercise History: JACLYN  Family History:   Family History   Problem Relation Age of Onset    Thyroid cancer Mother     Diabetes type II Mother     Esophageal cancer Father     Diabetes type II Father     Kidney cancer Brother     Diabetes type II Brother     Diabetes type II Maternal Grandmother      I have reviewed this patient's family history and commented on sigificant items within the HPI      Medications:  Current Facility-Administered Medications   Medication Dose Route Frequency    atorvastatin (LIPITOR) tablet 20 mg  20 mg Oral Daily With Dinner    chlorhexidine (PERIDEX) 0 12 % oral rinse 15 mL  15 mL Swish & Spit Q12H [facility]    dexmedetomidine (PRECEDEX) 400 mcg in sodium chloride 0 9 % 100 mL infusion  0 1-1 5 mcg/kg/hr Intravenous Titrated    diphenhydrAMINE (BENADRYL) injection 50 mg  50 mg Intravenous Q6H    enoxaparin (LOVENOX) subcutaneous injection 40 mg  40 mg Subcutaneous Q12H [facility]    famotidine (PEPCID) injection 20 mg  20 mg Intravenous Q12H [facility]    fentanyl citrate (PF) 100 MCG/2ML 50 mcg  50 mcg Intravenous Q2H PRN    [START ON 4/8/2021] insulin glargine (LANTUS) subcutaneous injection 30 Units 0 3 mL  30 Units Subcutaneous QAM    insulin lispro (HumaLOG) 100 units/mL subcutaneous injection 1-6 Units  1-6 Units Subcutaneous Q6H [facility]    [START ON 4/8/2021] levothyroxine tablet 175 mcg  175 mcg Oral Daily    methylPREDNISolone sodium succinate (Solu-MEDROL) injection 40 mg  40 mg Intravenous Q8H [facility]    propofol (DIPRIVAN) 1000 mg in 100 mL infusion (premix)  5-70 mcg/kg/min Intravenous Titrated    sodium chloride 0 9 % infusion  50 mL/hr Intravenous Continuous    tranexamic Acid 1,000 mg in sodium chloride 0 9 % 100 mL IVPB Loading Dose  1,000 mg Intravenous Once     Home medications:  Prior to Admission Medications   Prescriptions Last Dose Informant Patient Reported? Taking?    Ascorbic Acid (VITAMIN C PO)   Yes No   Sig: Take 1 tablet by mouth daily   CINNAMON PO   Yes No   Sig: Take 1 tablet by mouth daily   DULoxetine (CYMBALTA) 60 mg delayed release capsule   No No   Sig: Take 1 capsule by mouth once daily   EPINEPHrine (EPIPEN) 0 3 mg/0 3 mL SOAJ   No No   Sig: INJECT 0 3ML INTO A MUSCLE ONCE FOR 1 DOSE   Euthyrox 175 MCG tablet   No No   Sig: Take 1 tablet by mouth once daily   Insulin Pen Needle (PEN NEEDLES 3/16") 31G X 5 MM MISC   No No   Sig: by Other route 4 (four) times a day   Lantus SoloStar 100 units/mL injection pen   No No   Sig: INJECT 60 UNITS SUBCUTANEOUSLY ONCE DAILY   Ventolin  (90 Base) MCG/ACT inhaler   No No   Sig: Inhale 2 puffs every 4 (four) hours as needed for wheezing or shortness of breath   aspirin 81 mg chewable tablet   Yes No   Sig: Take one tablet by mouth daily   atorvastatin (LIPITOR) 20 mg tablet   No No   Sig: Take 1 tablet by mouth once daily   gabapentin (NEURONTIN) 300 mg capsule   No No   Sig: Take 1 capsule (300 mg total) by mouth 3 (three) times a day   hydrochlorothiazide (HYDRODIURIL) 25 mg tablet   No No   Sig: Take 1 tablet by mouth twice daily   insulin lispro (HumaLOG KwikPen) 100 units/mL injection pen   No No   Sig: Inject 7 Units under the skin 3 (three) times a day with meals   Patient not taking: Reported on 2021   metFORMIN (GLUCOPHAGE) 1000 MG tablet   No No   Sig: Take 1 tablet by mouth twice daily   omega-3-acid ethyl esters (LOVAZA) 1 g capsule   No No   Sig: Take 1 capsule (1 g total) by mouth 2 (two) times a day for high cholesterol   other medication, see sig,   Yes No   Sig: Medication/product name: Moringa    Strength: 1000mcg    Sig (include dose, route, frequency): daily for joint pains per pt (OTC)   sodium chloride (OCEAN) 0 65 % nasal spray   No No   Si spray into each nostril as needed for rhinitis   traZODone (DESYREL) 150 mg tablet   No No   Sig: Take 1 tablet (150 mg total) by mouth daily at bedtime      Facility-Administered Medications: None     Allergies: Allergies   Allergen Reactions    Ace Inhibitors Swelling     Angioedema    Clindamycin      Had angioedema episode approx 5 hr after IM administration of clindamycin   Unclear if there is definitive causal relationship        ------------------------------------------------------------------------------------------------------------  Advance Directive and Living Will:      Power of :    POLST: ------------------------------------------------------------------------------------------------------------  Anticipated Length of Stay is > 2 midnights    Care Time Delivered:   Upon my evaluation, this patient had a high probability of imminent or life-threatening deterioration due to severe angioedema, which required my direct attention, intervention, and personal management  I have personally provided 30 minutes (1145 to 1215) of critical care time, exclusive of procedures, teaching, family meetings, and any prior time recorded by providers other than myself  MARLEEN Tripp        Portions of the record may have been created with voice recognition software  Occasional wrong word or "sound a like" substitutions may have occurred due to the inherent limitations of voice recognition software    Read the chart carefully and recognize, using context, where substitutions have occurred

## 2021-04-07 NOTE — ANESTHESIA PROCEDURE NOTES
Emergent Intubation  Date/Time: 4/7/2021 7:34 AM  Urgency: emergent    Difficult airway    General Information and Staff    Patient location during procedure: OR  Anesthesiologist: Loco Vinson MD  Resident/CRNA: Astrid Fang CRNA    Consent for Airway (if performed for an anesthetic, see related documentation for consents)  Patient identity confirmed: arm band and verbally with patient  Consent: The procedure was performed in an emergent situation  Verbal consent not obtained  Written consent not obtained  Risks and benefits: risks, benefits and alternatives were not discussed      Indications and Patient Condition  Indications for airway management: airway protection and respiratory distress (Angioadema)  Spontaneous ventilation: present  Preoxygenated: yes  Patient position: sniffing  MILS maintained throughout  Mask difficulty assessment: 0 - not attempted (Pt sppontaneously breathing throughout Intubation process)    Final Airway Details  Final airway type: endotracheal airway      Successful airway: cuffed, ETT and ANNY tube    Successful intubation technique: awake intubation, bougie and direct laryngoscopy  Facilitating devices/methods: Bougie  Endotracheal tube insertion site: oral  Blade type: Wood 4   Blade size: #4  ETT size (mm): 7 0  Placement verified by: chest auscultation and capnometry   Measured from: lips  Number of attempts at approach: 2  Ventilation between attempts: spontaneous  Number of other approaches attempted: 1    Other Attempts  Unsuccessful attempted airways: nasopharyngeal  Unsuccessful attempted endotracheal techniques: video laryngoscopy    Additional Comments  Attempted nasal intubation with fiberoptic scope by Dr Raquel Quiñonesr  Unsuccessful but could visualize VC moving  Wood 4 placed and able to see VC while pt spontaneously breathing  Bougie was able to be inserted through VC and ETT placed over bougie into trachea    After verification with positive ETCO2 tube secured and pt sedated

## 2021-04-07 NOTE — RESPIRATORY THERAPY NOTE
Portable chest xray performed  ET tube in R mainstem  Tube withdraw to 24 at the "lip"  Tongue too large to get a teeth measurement  Tube secured with commercial tube cabrales

## 2021-04-07 NOTE — NURSING NOTE
Patient received from the OR  Transferred to ICU bed  Connected to ICU monitor  Sinus Tachycardia on monitor  Patient paralyzed  Oral  ETT #7 noted  Large protruding tongue  X-ray done and ETT was withdrawn to 24 cm at the lip line  X-ray repeated and ETT noted by anesthesia to be in proper placement  attempted to place 16 fr OGT without success  Patient started on propofol  At [facility] [address] patient started to wake up  Restless in bed  Nor following commands with jerky movements  Dr Judi De León at bedside Ativan 1 mg was given at 0830 and was instructed to increase dipravan to 50 mcg/kg/min  Fentanyl ordered and pulmonology also saw patient

## 2021-04-07 NOTE — ASSESSMENT & PLAN NOTE
-Prior sleep study showing severe MARSHA, lost to follow up   -Currently intubated   Will require sleep study and outpt f/u for PAP therapy  -body habitus likely contributor

## 2021-04-07 NOTE — RESPIRATORY THERAPY NOTE
Called to the OR to standby for emergent intubation due to angioedema  Patient intubated with size 7 0 tube by Gaye Pinto CRNA  Attempt was made for nasal intubation by Anesthesia however unsuccessful with large amount of bleeding  Tube secured and patient being prepared for transport to unit

## 2021-04-07 NOTE — DISCHARGE SUMMARY
[facility] [address] [place]  Discharge- John Sine 1965, 63 y o  male MRN: [id number]  Unit/Bed#: 406-01 Encounter: [id number]  Primary Care Provider: MARLEEN Manjarrez   Date and time admitted to hospital: 4/7/2021  6:33 AM    * Angioedema  Assessment & Plan  · Required intubation and mechanical ventilation in the operating room by Anesthesia for airway protection and hypoxia  · Transfuse 1 Unit of FFP (fresh frozen plasma)  · Received dexamethasone 10 mg IV x 1 dose in the emergency department  · Treat with methylprednisolone 40 mg IV every 8 hours  · Utilize famotidine 20 mg IV ever 12 hours  · Utilize NSS IV fluids at 50 ml/hr  · Check a C1 esterase inhibitor level  · Outpatient Allergy/Immunology evaluation    The patient requires transfer to a higher level of care with 24-hour Anesthesia coverage as well as ENT coverage in case the patient's loses his current airway  The patient will be transferred to [address] [place] [address] on the service of Dr Acosta Uriostegui ([phone] Attending Physician)      Acute respiratory failure with hypoxia (HCC)  Assessment & Plan  · Required intubation and mechanical ventilation in the operating room by Anesthesia for airway protection and hypoxia  · Was experiencing generalized body twitching prior to being fully sedate  · Utilize an IV propofol drip/infusion as well as an IV fentanyl drip/infusion  · Utilize PRN IV ativan    Muscle twitching  Assessment & Plan  · Concerning for hypoxia  · Possible hypoxic brain injury  · Will require a Neurology work-up after transfer    Epistaxis  Assessment & Plan  · Consult ENT after transfer to evaluate the epistaxis    Atelectasis  Assessment & Plan  · Respiratory protocol  · Incentive spirometry 10 times per hour when extubated    Tobacco use  Assessment & Plan  · Utilize a nicotine patch and give smoking cessation counseling to the patient when he is extubated    Type 2 diabetes mellitus with hyperglycemia, with long-term current use of insulin (HCC)  Assessment & Plan  Lab Results   Component Value Date    HGBA1C 11 7 (H) 02/05/2021       Recent Labs     04/07/21  0912   POCGLU 320*       Blood Sugar Average: Last 72 hrs:  (P) 320     · Utilize lantus 30 Units SQ Qdaily, which is a decreased dose for the patient while he is NPO  · Hypoglycemia protocol  · Insulin sliding scale with blood glucose monitoring every 6 hours    Obstructive sleep apnea  Assessment & Plan  · When patient is extubated, utilize BIPAP QHS and anytime while sleeping  · Needs a follow-up sleep study for CPAP/BIPAP titration after discharge    Vitamin D deficiency  Assessment & Plan  · Check a vitamin D 25-OH level    Discharging Physician / Practitioner: Cipriano Swan DO  PCP: Karson Rivera  Admission Date:   Admission Orders (From admission, onward)     Ordered        04/07/21 0711  Inpatient Admission  Once                   Discharge Date/Transfer Date: 04/07/21    Consultations During Hospital Stay:  · Anesthesia  · Pulmonology/Critical Care Medicine    Procedures Performed:   · Intubation by Anesthesia in the operating room    Significant Findings / Test Results:   Xr Chest Portable    Result Date: 4/7/2021  Impression: ET tube 3 cm above the guille Low lung volumes with bibasilar atelectasis    Workstation performed: [id number]     Results from last 7 days   Lab Units 04/07/21  0645   WBC Thousand/uL 9 41   HEMOGLOBIN g/dL 15 5   HEMATOCRIT % 46 9   PLATELETS Thousands/uL 279     Results from last 7 days   Lab Units 04/07/21  0645   SODIUM mmol/L 132*   POTASSIUM mmol/L 3 9   CHLORIDE mmol/L 94*   CO2 mmol/L 31   BUN mg/dL 14   CREATININE mg/dL 1 16   CALCIUM mg/dL 9 3     Results from last 7 days   Lab Units 04/07/21  0645   MAGNESIUM mg/dL 1 9     Microbiology Results (last 21 days)    Collected Updated Procedure Result Status Patient Facility Result Comment    04/07/2021 7438 04/07/2021 4177 COVID[facility], [facility]/B[id number]    Nasopharyngeal from Nose    Final result [address]  This test has been authorized by FDA under an EUA (Emergency Use Assay) for use by authorized laboratories  Doris Montero caution and judgement should be used with the interpretation of these results with consideration of the clinical impression and other laboratory testing   Testing reported as "Positive" or "Negative" has been proven to be accurate according to standard laboratory validation requirements   All testing is performed with control materials showing appropriate reactivity at standard intervals  Component Value   SARS-COV-2 Negative   INFLU A PCR Negative   INFLU B PCR Negative   RSV PCR Negative                    Complications:  None    Reason for Admission:  Severe angioedema with acute hypoxemic respiratory failure    Hospital Course:     Elvin Eason is a 63 y o  male patient who originally presented to the hospital on 4/7/2021 due to severe tongue swelling  Please see above list of diagnoses and related plan for additional information  Condition at Discharge: critical     Discharge Day Visit / Exam:     * Please refer to separate progress note for these details *    Discussion with Family: I called the patient's emergency contact, Jasmin Sun, multiple times without an answer and left a voicemail requesting a return phone call  Discharge instructions/Information to patient and family:  See after visit summary for information provided to patient and family  Provisions for Follow-Up Care:  See after visit summary for information related to follow-up care and any pertinent home health orders  Disposition:  The patient requires transfer to a higher level of care with 24-hour Anesthesia coverage as well as ENT coverage in case the patient's loses his current airway    The patient will be transferred to [address] [place] [address] on the service of Dr Alireza Neff (Critical Care Attending Physician)  Discharge Statement:  I spent 60 minutes discharging the patient  This time was spent on the day of discharge  I had direct contact with the patient on the day of discharge  Greater than 50% of the total time was spent examining patient, answering all patient questions, arranging and discussing plan of care with patient as well as directly providing post-discharge instructions  Additional time then spent on discharge activities  Discharge Medications:  See after visit summary for reconciled discharge medications provided to patient and family        ** Please Note: This note has been constructed using a voice recognition system **

## 2021-04-07 NOTE — ED NOTES
Patient 87% on room air  Placed on 3L by nasal cannula  Increased saturation  Patients tongue largely extending past lips  Difficult for him to speak  Breathing through his nose        Wm Watson RN  04/07/21 6773

## 2021-04-08 LAB
25(OH)D3 SERPL-MCNC: 21.1 NG/ML (ref 30–100)
ABO GROUP BLD BPU: NORMAL
ABO GROUP BLD BPU: NORMAL
ANION GAP SERPL CALCULATED.3IONS-SCNC: 10 MMOL/L (ref 4–13)
BASOPHILS # BLD AUTO: 0.02 THOUSANDS/ΜL (ref 0–0.1)
BASOPHILS NFR BLD AUTO: 0 % (ref 0–1)
BPU ID: NORMAL
BPU ID: NORMAL
BUN SERPL-MCNC: 17 MG/DL (ref 5–25)
CALCIUM SERPL-MCNC: 8.6 MG/DL (ref 8.3–10.1)
CHLORIDE SERPL-SCNC: 99 MMOL/L (ref 100–108)
CK MB SERPL-MCNC: 1.1 % (ref 0–2.5)
CK MB SERPL-MCNC: 2 NG/ML (ref 0–5)
CK SERPL-CCNC: 175 U/L (ref 39–308)
CO2 SERPL-SCNC: 28 MMOL/L (ref 21–32)
CREAT SERPL-MCNC: 1.19 MG/DL (ref 0.6–1.3)
EOSINOPHIL # BLD AUTO: 0 THOUSAND/ΜL (ref 0–0.61)
EOSINOPHIL NFR BLD AUTO: 0 % (ref 0–6)
ERYTHROCYTE [DISTWIDTH] IN BLOOD BY AUTOMATED COUNT: 13.3 % (ref 11.6–15.1)
GFR SERPL CREATININE-BSD FRML MDRD: 68 ML/MIN/1.73SQ M
GLUCOSE SERPL-MCNC: 109 MG/DL (ref 65–140)
GLUCOSE SERPL-MCNC: 110 MG/DL (ref 65–140)
GLUCOSE SERPL-MCNC: 115 MG/DL (ref 65–140)
GLUCOSE SERPL-MCNC: 115 MG/DL (ref 65–140)
GLUCOSE SERPL-MCNC: 116 MG/DL (ref 65–140)
GLUCOSE SERPL-MCNC: 116 MG/DL (ref 65–140)
GLUCOSE SERPL-MCNC: 146 MG/DL (ref 65–140)
GLUCOSE SERPL-MCNC: 148 MG/DL (ref 65–140)
GLUCOSE SERPL-MCNC: 149 MG/DL (ref 65–140)
GLUCOSE SERPL-MCNC: 149 MG/DL (ref 65–140)
GLUCOSE SERPL-MCNC: 167 MG/DL (ref 65–140)
GLUCOSE SERPL-MCNC: 171 MG/DL (ref 65–140)
GLUCOSE SERPL-MCNC: 205 MG/DL (ref 65–140)
HCT VFR BLD AUTO: 44.4 % (ref 36.5–49.3)
HGB BLD-MCNC: 14.7 G/DL (ref 12–17)
IMM GRANULOCYTES # BLD AUTO: 0.04 THOUSAND/UL (ref 0–0.2)
IMM GRANULOCYTES NFR BLD AUTO: 0 % (ref 0–2)
LACTATE SERPL-SCNC: 1.4 MMOL/L (ref 0.5–2)
LYMPHOCYTES # BLD AUTO: 1.31 THOUSANDS/ΜL (ref 0.6–4.47)
LYMPHOCYTES NFR BLD AUTO: 9 % (ref 14–44)
MAGNESIUM SERPL-MCNC: 2 MG/DL (ref 1.6–2.6)
MCH RBC QN AUTO: 28.5 PG (ref 26.8–34.3)
MCHC RBC AUTO-ENTMCNC: 33.1 G/DL (ref 31.4–37.4)
MCV RBC AUTO: 86 FL (ref 82–98)
MONOCYTES # BLD AUTO: 1.1 THOUSAND/ΜL (ref 0.17–1.22)
MONOCYTES NFR BLD AUTO: 8 % (ref 4–12)
MRSA NOSE QL CULT: NORMAL
NEUTROPHILS # BLD AUTO: 11.45 THOUSANDS/ΜL (ref 1.85–7.62)
NEUTS SEG NFR BLD AUTO: 83 % (ref 43–75)
NRBC BLD AUTO-RTO: 0 /100 WBCS
PHOSPHATE SERPL-MCNC: 4.5 MG/DL (ref 2.7–4.5)
PLATELET # BLD AUTO: 238 THOUSANDS/UL (ref 149–390)
PMV BLD AUTO: 9.5 FL (ref 8.9–12.7)
POTASSIUM SERPL-SCNC: 3.6 MMOL/L (ref 3.5–5.3)
PROCALCITONIN SERPL-MCNC: <0.05 NG/ML
RBC # BLD AUTO: 5.16 MILLION/UL (ref 3.88–5.62)
SODIUM SERPL-SCNC: 137 MMOL/L (ref 136–145)
TROPONIN I SERPL-MCNC: <0.02 NG/ML
UNIT DISPENSE STATUS: NORMAL
UNIT DISPENSE STATUS: NORMAL
UNIT PRODUCT CODE: NORMAL
UNIT PRODUCT CODE: NORMAL
UNIT RH: NORMAL
UNIT RH: NORMAL
WBC # BLD AUTO: 13.92 THOUSAND/UL (ref 4.31–10.16)

## 2021-04-08 PROCEDURE — P9017 PLASMA 1 DONOR FRZ W/IN 8 HR: HCPCS

## 2021-04-08 PROCEDURE — 94150 VITAL CAPACITY TEST: CPT

## 2021-04-08 PROCEDURE — 82553 CREATINE MB FRACTION: CPT | Performed by: NURSE PRACTITIONER

## 2021-04-08 PROCEDURE — 30233K1 TRANSFUSION OF NONAUTOLOGOUS FROZEN PLASMA INTO PERIPHERAL VEIN, PERCUTANEOUS APPROACH: ICD-10-PCS | Performed by: ANESTHESIOLOGY

## 2021-04-08 PROCEDURE — 83520 IMMUNOASSAY QUANT NOS NONAB: CPT | Performed by: ANESTHESIOLOGY

## 2021-04-08 PROCEDURE — 99291 CRITICAL CARE FIRST HOUR: CPT | Performed by: ANESTHESIOLOGY

## 2021-04-08 PROCEDURE — 83735 ASSAY OF MAGNESIUM: CPT | Performed by: NURSE PRACTITIONER

## 2021-04-08 PROCEDURE — 83605 ASSAY OF LACTIC ACID: CPT | Performed by: NURSE PRACTITIONER

## 2021-04-08 PROCEDURE — 80048 BASIC METABOLIC PNL TOTAL CA: CPT | Performed by: NURSE PRACTITIONER

## 2021-04-08 PROCEDURE — 84145 PROCALCITONIN (PCT): CPT | Performed by: NURSE PRACTITIONER

## 2021-04-08 PROCEDURE — 82306 VITAMIN D 25 HYDROXY: CPT | Performed by: NURSE PRACTITIONER

## 2021-04-08 PROCEDURE — 85025 COMPLETE CBC W/AUTO DIFF WBC: CPT | Performed by: NURSE PRACTITIONER

## 2021-04-08 PROCEDURE — 94003 VENT MGMT INPAT SUBQ DAY: CPT

## 2021-04-08 PROCEDURE — 82948 REAGENT STRIP/BLOOD GLUCOSE: CPT

## 2021-04-08 PROCEDURE — 84100 ASSAY OF PHOSPHORUS: CPT | Performed by: NURSE PRACTITIONER

## 2021-04-08 PROCEDURE — 82550 ASSAY OF CK (CPK): CPT | Performed by: NURSE PRACTITIONER

## 2021-04-08 PROCEDURE — 84484 ASSAY OF TROPONIN QUANT: CPT | Performed by: NURSE PRACTITIONER

## 2021-04-08 PROCEDURE — 94760 N-INVAS EAR/PLS OXIMETRY 1: CPT

## 2021-04-08 RX ORDER — POTASSIUM CHLORIDE 20MEQ/15ML
40 LIQUID (ML) ORAL ONCE
Status: COMPLETED | OUTPATIENT
Start: 2021-04-08 | End: 2021-04-08

## 2021-04-08 RX ORDER — FUROSEMIDE 10 MG/ML
20 INJECTION INTRAMUSCULAR; INTRAVENOUS ONCE
Status: COMPLETED | OUTPATIENT
Start: 2021-04-08 | End: 2021-04-08

## 2021-04-08 RX ORDER — MAGNESIUM SULFATE HEPTAHYDRATE 40 MG/ML
2 INJECTION, SOLUTION INTRAVENOUS ONCE
Status: COMPLETED | OUTPATIENT
Start: 2021-04-08 | End: 2021-04-08

## 2021-04-08 RX ADMIN — ENOXAPARIN SODIUM 40 MG: 40 INJECTION SUBCUTANEOUS at 21:44

## 2021-04-08 RX ADMIN — POTASSIUM CHLORIDE 40 MEQ: 20 SOLUTION ORAL at 08:47

## 2021-04-08 RX ADMIN — DEXMEDETOMIDINE HYDROCHLORIDE 0.7 MCG/KG/HR: 100 INJECTION, SOLUTION INTRAVENOUS at 15:49

## 2021-04-08 RX ADMIN — DEXMEDETOMIDINE HYDROCHLORIDE 0.7 MCG/KG/HR: 100 INJECTION, SOLUTION INTRAVENOUS at 21:44

## 2021-04-08 RX ADMIN — ENOXAPARIN SODIUM 40 MG: 40 INJECTION SUBCUTANEOUS at 08:53

## 2021-04-08 RX ADMIN — METHYLPREDNISOLONE SODIUM SUCCINATE 40 MG: 40 INJECTION, POWDER, FOR SOLUTION INTRAMUSCULAR; INTRAVENOUS at 05:07

## 2021-04-08 RX ADMIN — METHYLPREDNISOLONE SODIUM SUCCINATE 40 MG: 40 INJECTION, POWDER, FOR SOLUTION INTRAMUSCULAR; INTRAVENOUS at 14:35

## 2021-04-08 RX ADMIN — PROPOFOL 50 MCG/KG/MIN: 10 INJECTION, EMULSION INTRAVENOUS at 06:34

## 2021-04-08 RX ADMIN — DIPHENHYDRAMINE HYDROCHLORIDE 50 MG: 50 INJECTION, SOLUTION INTRAMUSCULAR; INTRAVENOUS at 05:07

## 2021-04-08 RX ADMIN — MAGNESIUM SULFATE HEPTAHYDRATE 2 G: 40 INJECTION, SOLUTION INTRAVENOUS at 05:10

## 2021-04-08 RX ADMIN — DIPHENHYDRAMINE HYDROCHLORIDE 50 MG: 50 INJECTION, SOLUTION INTRAMUSCULAR; INTRAVENOUS at 23:51

## 2021-04-08 RX ADMIN — PROPOFOL 50 MCG/KG/MIN: 10 INJECTION, EMULSION INTRAVENOUS at 01:14

## 2021-04-08 RX ADMIN — DEXMEDETOMIDINE HYDROCHLORIDE 0.7 MCG/KG/HR: 100 INJECTION, SOLUTION INTRAVENOUS at 09:40

## 2021-04-08 RX ADMIN — PROPOFOL 45 MCG/KG/MIN: 10 INJECTION, EMULSION INTRAVENOUS at 09:41

## 2021-04-08 RX ADMIN — FAMOTIDINE 20 MG: 10 INJECTION INTRAVENOUS at 21:44

## 2021-04-08 RX ADMIN — FUROSEMIDE 20 MG: 10 INJECTION, SOLUTION INTRAMUSCULAR; INTRAVENOUS at 12:35

## 2021-04-08 RX ADMIN — PROPOFOL 50 MCG/KG/MIN: 10 INJECTION, EMULSION INTRAVENOUS at 20:01

## 2021-04-08 RX ADMIN — ATORVASTATIN CALCIUM 20 MG: 20 TABLET, FILM COATED ORAL at 16:37

## 2021-04-08 RX ADMIN — LEVOTHYROXINE SODIUM 175 MCG: 150 TABLET ORAL at 05:07

## 2021-04-08 RX ADMIN — CHLORHEXIDINE GLUCONATE 0.12% ORAL RINSE 15 ML: 1.2 LIQUID ORAL at 08:50

## 2021-04-08 RX ADMIN — DIPHENHYDRAMINE HYDROCHLORIDE 50 MG: 50 INJECTION, SOLUTION INTRAMUSCULAR; INTRAVENOUS at 12:37

## 2021-04-08 RX ADMIN — SODIUM CHLORIDE 4 UNITS/HR: 9 INJECTION, SOLUTION INTRAVENOUS at 04:04

## 2021-04-08 RX ADMIN — DEXMEDETOMIDINE HYDROCHLORIDE 0.7 MCG/KG/HR: 100 INJECTION, SOLUTION INTRAVENOUS at 04:03

## 2021-04-08 RX ADMIN — FENTANYL CITRATE 50 MCG: 50 INJECTION INTRAMUSCULAR; INTRAVENOUS at 03:31

## 2021-04-08 RX ADMIN — METHYLPREDNISOLONE SODIUM SUCCINATE 40 MG: 40 INJECTION, POWDER, FOR SOLUTION INTRAMUSCULAR; INTRAVENOUS at 21:44

## 2021-04-08 RX ADMIN — PROPOFOL 50 MCG/KG/MIN: 10 INJECTION, EMULSION INTRAVENOUS at 22:58

## 2021-04-08 RX ADMIN — CHLORHEXIDINE GLUCONATE 0.12% ORAL RINSE 15 ML: 1.2 LIQUID ORAL at 21:44

## 2021-04-08 RX ADMIN — FAMOTIDINE 20 MG: 10 INJECTION INTRAVENOUS at 08:50

## 2021-04-08 RX ADMIN — PROPOFOL 50 MCG/KG/MIN: 10 INJECTION, EMULSION INTRAVENOUS at 04:02

## 2021-04-08 RX ADMIN — DIPHENHYDRAMINE HYDROCHLORIDE 50 MG: 50 INJECTION, SOLUTION INTRAMUSCULAR; INTRAVENOUS at 18:02

## 2021-04-08 RX ADMIN — PROPOFOL 40 MCG/KG/MIN: 10 INJECTION, EMULSION INTRAVENOUS at 13:54

## 2021-04-08 RX ADMIN — PROPOFOL 40 MCG/KG/MIN: 10 INJECTION, EMULSION INTRAVENOUS at 16:54

## 2021-04-08 NOTE — ASSESSMENT & PLAN NOTE
Lab Results   Component Value Date    HGBA1C 11 7 (H) 02/05/2021       Recent Labs     04/08/21  0601 04/08/21  0758 04/08/21  1005 04/08/21  1155   POCGLU 167* 146* 116 149*       Blood Sugar Average: Last 72 hrs:  (P) 083 [id number]     · History of non-compliance with diabetic regimen  See above A1c from 2/21  · Takes lantus 60 daily per home dose  BG initially elevated to 300's and initiated on insulin drip  · Remains controlled on insulin drip   Will transition to long acting and SSI today  · Monitor BG per protocol

## 2021-04-08 NOTE — ASSESSMENT & PLAN NOTE
· Intubated for airway protection 2/2 severe tongue swelling and angioedema  · Hypoxemia and respiratory acidosis noted on first ABG shortly after intubation  · Repeat ABG 4/7 7 40/40/89/26 on 24/480/100%/10P  FiO2 now down to 70% and maintaining O2 saturations >92%  · Per chart, history of mild COPD though no formal PFT's noted  Add PRN neb tx's if wheezing  None audible on exam    · CXR showing low lung volumes with probable CHF  · IVF's discontinued  Consider dose of lasix today as patient received 4 units FFP  · Wean FiO2 for O2 sat >90%  · Cont   pulmonary hygiene

## 2021-04-08 NOTE — ASSESSMENT & PLAN NOTE
Lab Results   Component Value Date    HGBA1C 11 7 (H) 02/05/2021       Recent Labs     04/07/21  2203 04/08/21  0002 04/08/21  0201 04/08/21  0601   POCGLU 173* 148* 171* 167*       Blood Sugar Average: Last 72 hrs:  (P) 493 [id number]     · History of non-compliance with diabetic regimen  See above A1c from 2/21  · Takes lantus 60 daily per home dose  BG elevated yesterday on 30u lantus and SSI    · Remains on insulin drip  · Transition to long acting and SSI today if stable on drip  · Monitor BG per protocol

## 2021-04-08 NOTE — PROGRESS NOTES
[address]  Progress Note - [place] 1965, 63 y o  male MRN: [id number]  Unit/Bed#: ICU 11 Encounter: [id number]  Primary Care Provider: MARLEEN Dobbs   Date and time admitted to hospital: 4/7/2021 11:24 AM    * Angioedema  Assessment & Plan  · Unclear etiology  Initially with angioedema episode 2016 attributed to ACE, but ongoing, intermittent episodes of angioedema in 0759-6206  Was recommended to follow up with immunology/allergist but was lost to follow-up  · Now presenting this AM with worsening tongue edema since 0300  Self administered Epi-pen and benadryl without relief  Presented to [facility] ED early this AM with worsening tongue edema/protrusion from mouth  · Required intubation and mechanical ventilation in the operating room by Anesthesia for airway protection and hypoxia 4/7  Transferred to [address] [place] [address] for ICU mgt  · Vent settings: 24/480/70%/10  Titrate FiO2 for goal SpO2 >90%  Propofol and precedex with goal RASS -2  · Received 2U FFP 4/7   · Will give 1G Tranexamic acid now and monitor for effect  Consider 2nd dose this evening  · Received dexamethasone 10 mg IV x 1 dose in the emergency department  · Continue methylprednisolone 40 mg IV every 8 hours, famotidine 20mg IV q12, diphenhydramine 50mg q6h  · C4 normal at 18  CRP 4 3, Sed rate 27  · C1 esterase inhibitor level pending  · Will require outpatient Allergy/Immunology evaluation on discharge  · Consult ENT given severity of angioedema       Acute respiratory failure with hypoxia (HCC)  Assessment & Plan  · Intubated for airway protection 2/2 severe tongue swelling and angioedema  · Hypoxemia and respiratory acidosis noted on first ABG shortly after intubation  · Repeat ABG 4/7 7 40/40/89/26 on 24/480/100%/10P  FiO2 now down to 70% and maintaining O2 saturations >92%  · Per chart, history of mild COPD though no formal PFT's noted  Add PRN neb tx's if wheezing   None audible on exam    · CXR showing low lung volumes with probable CHF  · IVF's discontinued  Consider dose of lasix today   · Wean FiO2 for O2 sat >90%  · Cont  pulmonary hygiene    Type 2 diabetes, uncontrolled, with neuropathy [facility])  Assessment & Plan  Lab Results   Component Value Date    HGBA1C 11 7 (H) 02/05/2021       Recent Labs     04/08/21  0601 04/08/21  0758 04/08/21  1005 04/08/21  1155   POCGLU 167* 146* 116 149*       Blood Sugar Average: Last 72 hrs:  (P) 047 [id number]     · History of non-compliance with diabetic regimen  See above A1c from 2/21  · Takes lantus 60 daily per home dose  BG elevated yesterday on 30u lantus and SSI  · Remains on insulin drip  · Transition to long acting and SSI today if stable on drip  · Monitor BG per protocol    Hypertension  Assessment & Plan  · BP on arrival noted at 103/69 in setting of intubation/sedation medications  · Monitor per unit protocol  · Consider PRN antihypertensives if becomes hypertensive  · Avoid ACE-I given angioedema    Hypothyroidism  Assessment & Plan  · Last TSH 0 139 2/25/21  · Continue levothyroxine 175 mcg daily    Morbid obesity ([address] )  Assessment & Plan  · Follow up with nutritional counseling when appropriate  Previously declined  · Likely contributing to MARSHA    Obstructive sleep apnea  Assessment & Plan  · Prior sleep study showing severe MARSHA  Lost to follow up  · Currently intubated   · Will require sleep study for titration of positive pressure ventilation OP  · Body habitus likely contributing    Hyperlipidemia  Assessment & Plan  · Continue home statin    Depression with anxiety  Assessment & Plan  · Hold antidepressants given tentative respiratory status and IV sedation  · Restart home trazodone and Cymbalta upon extubation    Tobacco use  Assessment & Plan  · Records show 2PPD cigarette use   Will provide nicotine patch when extubated  · Tobacco cessation upon extubation      ----------------------------------------------------------------------------------------  HPI/24hr events: Remained HD stable overnight  ETT in place  Monitor showing 1 brief, self limiting run of a wide complex tachycardia for approximately 4 seconds  Unclear if SVT with aberrancy vs  VT  Currently NSR  Electrolytes repleted  Minimal interval improvement in tongue swelling as compared to yesterday  Disposition: Continue Critical Care   Code Status: Level 1 - Full Code  ---------------------------------------------------------------------------------------  SUBJECTIVE  JACLYN    Review of Systems   Unable to perform ROS: Intubated     Review of systems was unable to be performed secondary to ETT  ---------------------------------------------------------------------------------------  OBJECTIVE    Vitals   Vitals:    21 1000 21 1100 21 1137 21 1200   BP: 117/77 119/79     BP Location:       Pulse: 75 73 74    Resp: (!) 24 (!) 25 (!) 24    Temp:    98 9 °F (37 2 °C)   TempSrc:    Temporal   SpO2: 98% 98% 97%    Weight:       Height:         Temp (24hrs), Av 8 °F (36 6 °C), Min:96 6 °F (35 9 °C), Max:98 9 °F (37 2 °C)  Current: Temperature: 98 9 °F (37 2 °C)          Respiratory:  SpO2: SpO2: 97 %, SpO2 Activity: SpO2 Activity: At Rest, SpO2 Device: O2 Device: Ventilator       Invasive/non-invasive ventilation settings   Respiratory    Lab Data (Last 4 hours)    None         O2/Vent Data (Last 4 hours)    None                Physical Exam  Vitals signs and nursing note reviewed  Constitutional:       General: He is not in acute distress  Appearance: He is ill-appearing  He is not diaphoretic  Interventions: He is sedated and intubated  HENT:      Head: Normocephalic and atraumatic        Right Ear: External ear normal       Left Ear: External ear normal       Mouth/Throat:      Mouth: Mucous membranes are moist       Comments: Tongue protruding from mouth, minimal improvement from yesterday  Eyes:      General: Lids are normal  No scleral icterus  Pupils: Pupils are equal, round, and reactive to light  Cardiovascular:      Rate and Rhythm: Normal rate and regular rhythm  Pulses: Normal pulses  Radial pulses are 2+ on the right side and 2+ on the left side  Dorsalis pedis pulses are 2+ on the right side and 2+ on the left side  Heart sounds: Normal heart sounds  No murmur  Pulmonary:      Effort: He is intubated  Breath sounds: Rhonchi present  Comments: Coarse breath sounds throughout, diminished at bases  Abdominal:      General: Bowel sounds are normal       Palpations: Abdomen is soft  Tenderness: There is no guarding  Genitourinary:     Comments: Mckeon catheter in place, draining yellow urine with white/pale pink sediment coating tubing  Musculoskeletal:      Right lower leg: No edema  Left lower leg: No edema  Skin:     General: Skin is warm and dry     Psychiatric:      Comments: Sedated on propofol and precedex with goal RASS -2 given severe angioedema and difficult intubation         Laboratory and Diagnostics:  Results from last 7 days   Lab Units 04/08/21  0434 04/07/21  1227 04/07/21  1023 04/07/21  0645   WBC Thousand/uL 13 92*  --   --  9 41   HEMOGLOBIN g/dL 14 7 14 6 15 4 15 5   HEMATOCRIT % 44 4 44 5 47 4 46 9   PLATELETS Thousands/uL 238  --   --  279   NEUTROS PCT % 83*  --   --  61   MONOS PCT % 8  --   --  11     Results from last 7 days   Lab Units 04/08/21  0434 04/07/21  1228 04/07/21  0645   SODIUM mmol/L 137 133* 132*   POTASSIUM mmol/L 3 6 5 5* 3 9   CHLORIDE mmol/L 99* 96* 94*   CO2 mmol/L 28 30 31   ANION GAP mmol/L 10 7 7   BUN mg/dL 17 16 14   CREATININE mg/dL 1 19 1 45* 1 16   CALCIUM mg/dL 8 6 8 6 9 3   GLUCOSE RANDOM mg/dL 205* 373* 395*   ALT U/L  --  45  --    AST U/L  --  21  --    ALK PHOS U/L  --  78  --    ALBUMIN g/dL  --  3 2*  --    TOTAL BILIRUBIN mg/dL  --  0 45  -- Results from last 7 days   Lab Units 04/08/21  0434 04/07/21  1228 04/07/21  0645   MAGNESIUM mg/dL 2 0 1 9 1 9   PHOSPHORUS mg/dL 4 5 2 8  --       Results from last 7 days   Lab Units 04/07/21  0645   INR  0 87      Results from last 7 days   Lab Units 04/08/21  0434 04/07/21  1227 04/07/21  1023   TROPONIN I ng/mL <0 02 <0 02 <0 02     Results from last 7 days   Lab Units 04/08/21  0434 04/07/21  1507 04/07/21  1227 04/07/21  1023   LACTIC ACID mmol/L 1 4 2 6* 2 8* 3 3*     ABG:  Results from last 7 days   Lab Units 04/07/21  1218   PH ART  7 391   PCO2 ART mm Hg 44 8*   PO2 ART mm Hg 89 6   HCO3 ART mmol/L 26 6   BASE EXC ART mmol/L 1 2   ABG SOURCE  Radial, Right     VBG:  Results from last 7 days   Lab Units 04/07/21  1218   ABG SOURCE  Radial, Right     Results from last 7 days   Lab Units 04/08/21  0434 04/07/21  1228 04/07/21  1023   PROCALCITONIN ng/ml <0 05 0 06 0 06       Micro        EKG: NSR on monitor  1 run self limiting wide complex tachycardia for 4 seconds overnight  Imaging: I have personally reviewed pertinent reports  and I have personally reviewed pertinent films in PACS    Intake and Output  I/O       04/06 0701 - 04/07 0700 04/07 0701 - 04/08 0700 04/08 0701 - 04/09 0700    I V  (mL/kg)  472 2 (4 2)     Blood  336     IV Piggyback  100     Total Intake(mL/kg)  908 2 (8 1)     Urine (mL/kg/hr)  1585     Total Output  1585     Net  -676 8                  Height and Weights   Height: 5' 4" (162 6 cm)  IBW: 59 2 kg  Body mass index is 42 38 kg/m²    Weight (last 2 days)     Date/Time   Weight    04/08/21 0501   112 (246 92)    04/07/21 1137   115 (252 43)                Nutrition       Diet Orders   (From admission, onward)             Start     Ordered    04/08/21 0837  Room Service  Once     Question:  Type of Service  Answer:  Room Service - Appropriate with Assistance    04/08/21 0836    04/07/21 1137  Diet NPO  Diet effective now     Question Answer Comment   Diet Type NPO    RD to adjust diet per protocol? No        04/07/21 1136                  Active Medications  Scheduled Meds:  Current Facility-Administered Medications   Medication Dose Route Frequency Provider Last Rate    atorvastatin  20 mg Oral Daily With Dinner Fernando Scott, MARLEEN      chlorhexidine  15 mL Swish & Spit Q12H [address] Marvin Sleight, BRADYNP      dexmedetomidine  0 1-1 5 mcg/kg/hr Intravenous Titrated Marvin Sleight, CRNP 0 7 mcg/kg/hr (04/08/21 0940)    diphenhydrAMINE  50 mg Intravenous Q6H Marvin Sleight, BRADYNP      enoxaparin  40 mg Subcutaneous Q12H [address] Marvin Sleight, CRNP      famotidine  20 mg Intravenous Q12H [address] Marvin Sleight, BRADYNP      fentanyl citrate (PF)  50 mcg Intravenous Q2H PRN Fernando Alirezaight, MARLEEN      insulin regular (HumuLIN R,NovoLIN R) infusion  0 3-21 Units/hr Intravenous Titrated Marvin Sleight, BRADYNP 6 Units/hr (04/08/21 1204)    levothyroxine  175 mcg Oral Daily Marvin Sleight, MARLEEN      methylPREDNISolone sodium succinate  40 mg Intravenous Q8H [address] Marvin Sleight, BRADYNP      propofol  5-50 mcg/kg/min Intravenous Titrated Marvin Sleight, BRADYNP 40 mcg/kg/min (04/08/21 1007)     Continuous Infusions:  dexmedetomidine, 0 1-1 5 mcg/kg/hr, Last Rate: 0 7 mcg/kg/hr (04/08/21 0940)  insulin regular (HumuLIN R,NovoLIN R) infusion, 0 3-21 Units/hr, Last Rate: 6 Units/hr (04/08/21 1204)  propofol, 5-50 mcg/kg/min, Last Rate: 40 mcg/kg/min (04/08/21 1007)      PRN Meds:   fentanyl citrate (PF), 50 mcg, Q2H PRN        Invasive Devices Review  Invasive Devices     Peripheral Intravenous Line            Peripheral IV 04/07/21 Left Antecubital 1 day    Peripheral IV 04/07/21 Right Antecubital 1 day    Peripheral IV 04/07/21 Left Forearm less than 1 day          Drain            NG/OG/Enteral Tube Orogastric 14 Fr Center mouth 1 day    Urethral Catheter Latex 16 Fr  1 day          Airway            ETT  Cuffed;ANNY 7 mm 1 day                Rationale for remaining devices: PIV's for med administration   ETT for airway protection  Mckeon catheter for close I&O   ---------------------------------------------------------------------------------------  Advance Directive and Living Will:      Power of :    POLST:    ---------------------------------------------------------------------------------------  Care Time Delivered:   No Critical Care time spent       [facility]      Portions of the record may have been created with voice recognition software  Occasional wrong word or "sound a like" substitutions may have occurred due to the inherent limitations of voice recognition software    Read the chart carefully and recognize, using context, where substitutions have occurred

## 2021-04-08 NOTE — QUICK NOTE
Brother Jose Manuel Becerra updated via phone regarding patient's overall medical condition  All questions were sufficiently answered to his satisfaction  Blood consent also obtained via phone by Dr Saman De León MD for FFP

## 2021-04-08 NOTE — PROGRESS NOTES
Recommend TF of Jevity 1 2 at goal rate of 45 mL/hour for a total volume of 1080 mL  This will provide 1296 kcals (2011 kcals with current propofol), 60 grams protein and 872 mL free water  Recommend flushes of 150 mL q 4 hours for a total fluid intake of 1772 mL

## 2021-04-08 NOTE — CASE MANAGEMENT
LOS 1 DAY  RISK OF UNPLANNED READMISSION SCORE 12  30 DAY READMISSION:  NO  Patient was a discharge/re-admit  Per chart review, patient is a transfer from [facility] via [facility] due to requiring higher LOC due to requiring anesthesia and ENT coverage  BUNDLE: No    Patient is currently on ventilator at this time  CM attempted phone call to patient's emergency contact, Kelechi Harris  No answer  CM left  requesting CB to complete assessment  CM will continue to follow

## 2021-04-08 NOTE — ASSESSMENT & PLAN NOTE
· Unclear etiology  Initially with angioedema episode 2016 attributed to ACE, but ongoing, intermittent episodes of angioedema in 7736-0386  Was recommended to follow up with immunology/allergist but was lost to follow-up  · Now presenting this AM with worsening tongue edema since 0300  Self administered Epi-pen and benadryl without relief  Presented to [facility] ED early this AM with worsening tongue edema/protrusion from mouth  · Required intubation and mechanical ventilation in the operating room by Anesthesia for airway protection and hypoxia 4/7  Transferred to [facility] for ICU mgt  · Vent settings: 24/480/70%/10  Titrate FiO2 for goal SpO2 >90%  Propofol and precedex with goal RASS -2  · Received 2U FFP 4/7 and 2U FFP this AM  · Will give 1G Tranexamic acid now and monitor for effect  Consider 2nd dose this evening  · Received dexamethasone 10 mg IV x 1 dose in the emergency department  · Continue methylprednisolone 40 mg IV every 8 hours, famotidine 20mg IV q12, diphenhydramine 50mg q6h  · C4 normal at 18   CRP 4 3, Sed rate 27  · C1 esterase inhibitor level pending  · Will require outpatient Allergy/Immunology evaluation on discharge  · Consult ENT given severity of angioedema

## 2021-04-08 NOTE — ASSESSMENT & PLAN NOTE
· Intubated for airway protection 2/2 severe tongue swelling and angioedema  · Hypoxemia and respiratory acidosis noted on first ABG shortly after intubation  · Repeat ABG 4/7 7 40/40/89/26 on 24/480/100%/10P  FiO2 now down to 70% and maintaining O2 saturations >92%  · Per chart, history of mild COPD though no formal PFT's noted  Add PRN neb tx's if wheezing  None audible on exam    · CXR showing low lung volumes with probable CHF  · IVF's discontinued  Will provide additional lasix 40mg IVP today   · Wean FiO2 for O2 sat >90%  · Cont   pulmonary hygiene

## 2021-04-08 NOTE — ASSESSMENT & PLAN NOTE
· BP on arrival noted at 103/69 in setting of intubation/sedation medications  · Now more stable with few readings in 160's  · Will add daily amlodipine 5mg to start, titrate up daily as needed  · Avoid ACE-I given angioedema

## 2021-04-08 NOTE — ASSESSMENT & PLAN NOTE
· Unclear etiology  Initially with angioedema episode 2016 attributed to ACE, but ongoing, intermittent episodes of angioedema in 4584-2325  Was recommended to follow up with immunology/allergist but was lost to follow-up  · Now presenting this AM with worsening tongue edema since 0300  Self administered Epi-pen and benadryl without relief  Presented to [facility] ED early this AM with worsening tongue edema/protrusion from mouth  · Required intubation and mechanical ventilation in the operating room by Anesthesia for airway protection and hypoxia 4/7  Transferred to [address] [place] [address] for ICU mgt  · Vent settings: 24/480/50%/10  Titrate FiO2 for goal SpO2 >90%  Propofol and precedex with goal RASS -2  · Received 2U FFP 4/7 and another 2u FFP 4/8  · Received 1G Tranexamic acid 4/6 and monitor for effect  · Received dexamethasone 10 mg IV x 1 dose in the emergency department  · Continue methylprednisolone 40 mg IV every 8 hours, famotidine 20mg IV q12, diphenhydramine 50mg q6h  · C4 normal at 18  CRP 4 3, Sed rate 27  · C1 esterase inhibitor level pending  · Will require outpatient Allergy/Immunology evaluation on discharge  · Consult ENT given severity of angioedema   Will also reach out to allergist today given minimal improvement in tongue edema

## 2021-04-09 LAB
ABO GROUP BLD BPU: NORMAL
ABO GROUP BLD BPU: NORMAL
ANION GAP SERPL CALCULATED.3IONS-SCNC: 10 MMOL/L (ref 4–13)
BASOPHILS # BLD AUTO: 0.02 THOUSANDS/ΜL (ref 0–0.1)
BASOPHILS NFR BLD AUTO: 0 % (ref 0–1)
BPU ID: NORMAL
BPU ID: NORMAL
BUN SERPL-MCNC: 25 MG/DL (ref 5–25)
C1INH ACT/NOR SERPL: 95 %MEAN NORMAL
C1INH SERPL-MCNC: 30 MG/DL (ref 21–39)
CALCIUM SERPL-MCNC: 8.1 MG/DL (ref 8.3–10.1)
CHLORIDE SERPL-SCNC: 102 MMOL/L (ref 100–108)
CO2 SERPL-SCNC: 26 MMOL/L (ref 21–32)
CREAT SERPL-MCNC: 1.14 MG/DL (ref 0.6–1.3)
EOSINOPHIL # BLD AUTO: 0 THOUSAND/ΜL (ref 0–0.61)
EOSINOPHIL NFR BLD AUTO: 0 % (ref 0–6)
ERYTHROCYTE [DISTWIDTH] IN BLOOD BY AUTOMATED COUNT: 13.8 % (ref 11.6–15.1)
GFR SERPL CREATININE-BSD FRML MDRD: 71 ML/MIN/1.73SQ M
GLUCOSE SERPL-MCNC: 105 MG/DL (ref 65–140)
GLUCOSE SERPL-MCNC: 116 MG/DL (ref 65–140)
GLUCOSE SERPL-MCNC: 127 MG/DL (ref 65–140)
GLUCOSE SERPL-MCNC: 134 MG/DL (ref 65–140)
GLUCOSE SERPL-MCNC: 138 MG/DL (ref 65–140)
GLUCOSE SERPL-MCNC: 153 MG/DL (ref 65–140)
GLUCOSE SERPL-MCNC: 154 MG/DL (ref 65–140)
GLUCOSE SERPL-MCNC: 165 MG/DL (ref 65–140)
GLUCOSE SERPL-MCNC: 166 MG/DL (ref 65–140)
GLUCOSE SERPL-MCNC: 169 MG/DL (ref 65–140)
GLUCOSE SERPL-MCNC: 188 MG/DL (ref 65–140)
GLUCOSE SERPL-MCNC: 189 MG/DL (ref 65–140)
GLUCOSE SERPL-MCNC: 194 MG/DL (ref 65–140)
HCT VFR BLD AUTO: 44.7 % (ref 36.5–49.3)
HGB BLD-MCNC: 14.6 G/DL (ref 12–17)
IMM GRANULOCYTES # BLD AUTO: 0.05 THOUSAND/UL (ref 0–0.2)
IMM GRANULOCYTES NFR BLD AUTO: 1 % (ref 0–2)
LYMPHOCYTES # BLD AUTO: 1.47 THOUSANDS/ΜL (ref 0.6–4.47)
LYMPHOCYTES NFR BLD AUTO: 14 % (ref 14–44)
MAGNESIUM SERPL-MCNC: 2.4 MG/DL (ref 1.6–2.6)
MCH RBC QN AUTO: 28.9 PG (ref 26.8–34.3)
MCHC RBC AUTO-ENTMCNC: 32.7 G/DL (ref 31.4–37.4)
MCV RBC AUTO: 89 FL (ref 82–98)
MONOCYTES # BLD AUTO: 0.9 THOUSAND/ΜL (ref 0.17–1.22)
MONOCYTES NFR BLD AUTO: 8 % (ref 4–12)
NEUTROPHILS # BLD AUTO: 8.26 THOUSANDS/ΜL (ref 1.85–7.62)
NEUTS SEG NFR BLD AUTO: 77 % (ref 43–75)
NRBC BLD AUTO-RTO: 0 /100 WBCS
PHOSPHATE SERPL-MCNC: 5.2 MG/DL (ref 2.7–4.5)
PLATELET # BLD AUTO: 221 THOUSANDS/UL (ref 149–390)
PMV BLD AUTO: 9.4 FL (ref 8.9–12.7)
POTASSIUM SERPL-SCNC: 3.8 MMOL/L (ref 3.5–5.3)
RBC # BLD AUTO: 5.05 MILLION/UL (ref 3.88–5.62)
SODIUM SERPL-SCNC: 138 MMOL/L (ref 136–145)
UNIT DISPENSE STATUS: NORMAL
UNIT DISPENSE STATUS: NORMAL
UNIT PRODUCT CODE: NORMAL
UNIT PRODUCT CODE: NORMAL
UNIT RH: NORMAL
UNIT RH: NORMAL
WBC # BLD AUTO: 10.7 THOUSAND/UL (ref 4.31–10.16)

## 2021-04-09 PROCEDURE — 94760 N-INVAS EAR/PLS OXIMETRY 1: CPT

## 2021-04-09 PROCEDURE — 99222 1ST HOSP IP/OBS MODERATE 55: CPT | Performed by: OTOLARYNGOLOGY

## 2021-04-09 PROCEDURE — 84100 ASSAY OF PHOSPHORUS: CPT | Performed by: NURSE PRACTITIONER

## 2021-04-09 PROCEDURE — 85025 COMPLETE CBC W/AUTO DIFF WBC: CPT | Performed by: NURSE PRACTITIONER

## 2021-04-09 PROCEDURE — 94003 VENT MGMT INPAT SUBQ DAY: CPT

## 2021-04-09 PROCEDURE — 80048 BASIC METABOLIC PNL TOTAL CA: CPT | Performed by: NURSE PRACTITIONER

## 2021-04-09 PROCEDURE — 82948 REAGENT STRIP/BLOOD GLUCOSE: CPT

## 2021-04-09 PROCEDURE — 83735 ASSAY OF MAGNESIUM: CPT | Performed by: NURSE PRACTITIONER

## 2021-04-09 PROCEDURE — NC001 PR NO CHARGE: Performed by: NURSE PRACTITIONER

## 2021-04-09 PROCEDURE — 99291 CRITICAL CARE FIRST HOUR: CPT | Performed by: ANESTHESIOLOGY

## 2021-04-09 RX ORDER — FUROSEMIDE 10 MG/ML
40 INJECTION INTRAMUSCULAR; INTRAVENOUS ONCE
Status: COMPLETED | OUTPATIENT
Start: 2021-04-09 | End: 2021-04-09

## 2021-04-09 RX ORDER — ICATIBANT 30 MG/3ML
30 INJECTION, SOLUTION SUBCUTANEOUS ONCE
Status: COMPLETED | OUTPATIENT
Start: 2021-04-10 | End: 2021-04-10

## 2021-04-09 RX ORDER — AMLODIPINE BESYLATE 5 MG/1
5 TABLET ORAL DAILY
Status: DISCONTINUED | OUTPATIENT
Start: 2021-04-09 | End: 2021-04-11

## 2021-04-09 RX ORDER — INSULIN GLARGINE 100 [IU]/ML
60 INJECTION, SOLUTION SUBCUTANEOUS EVERY MORNING
Status: DISCONTINUED | OUTPATIENT
Start: 2021-04-09 | End: 2021-04-15 | Stop reason: HOSPADM

## 2021-04-09 RX ORDER — HYDRALAZINE HYDROCHLORIDE 20 MG/ML
5 INJECTION INTRAMUSCULAR; INTRAVENOUS EVERY 8 HOURS PRN
Status: DISCONTINUED | OUTPATIENT
Start: 2021-04-09 | End: 2021-04-15 | Stop reason: HOSPADM

## 2021-04-09 RX ADMIN — PROPOFOL 50 MCG/KG/MIN: 10 INJECTION, EMULSION INTRAVENOUS at 13:45

## 2021-04-09 RX ADMIN — DEXMEDETOMIDINE HYDROCHLORIDE 0.9 MCG/KG/HR: 100 INJECTION, SOLUTION INTRAVENOUS at 14:15

## 2021-04-09 RX ADMIN — DEXMEDETOMIDINE HYDROCHLORIDE 0.9 MCG/KG/HR: 100 INJECTION, SOLUTION INTRAVENOUS at 18:40

## 2021-04-09 RX ADMIN — PROPOFOL 50 MCG/KG/MIN: 10 INJECTION, EMULSION INTRAVENOUS at 18:40

## 2021-04-09 RX ADMIN — FAMOTIDINE 20 MG: 10 INJECTION INTRAVENOUS at 21:37

## 2021-04-09 RX ADMIN — AMLODIPINE BESYLATE 5 MG: 5 TABLET ORAL at 11:15

## 2021-04-09 RX ADMIN — SODIUM CHLORIDE 3 UNITS/HR: 9 INJECTION, SOLUTION INTRAVENOUS at 12:00

## 2021-04-09 RX ADMIN — CHLORHEXIDINE GLUCONATE 0.12% ORAL RINSE 15 ML: 1.2 LIQUID ORAL at 09:18

## 2021-04-09 RX ADMIN — PROPOFOL 50 MCG/KG/MIN: 10 INJECTION, EMULSION INTRAVENOUS at 07:39

## 2021-04-09 RX ADMIN — DIPHENHYDRAMINE HYDROCHLORIDE 50 MG: 50 INJECTION, SOLUTION INTRAMUSCULAR; INTRAVENOUS at 05:51

## 2021-04-09 RX ADMIN — PROPOFOL 50 MCG/KG/MIN: 10 INJECTION, EMULSION INTRAVENOUS at 15:55

## 2021-04-09 RX ADMIN — METHYLPREDNISOLONE SODIUM SUCCINATE 40 MG: 40 INJECTION, POWDER, FOR SOLUTION INTRAMUSCULAR; INTRAVENOUS at 13:55

## 2021-04-09 RX ADMIN — PROPOFOL 50 MCG/KG/MIN: 10 INJECTION, EMULSION INTRAVENOUS at 02:00

## 2021-04-09 RX ADMIN — DEXMEDETOMIDINE HYDROCHLORIDE 0.9 MCG/KG/HR: 100 INJECTION, SOLUTION INTRAVENOUS at 09:56

## 2021-04-09 RX ADMIN — DIPHENHYDRAMINE HYDROCHLORIDE 50 MG: 50 INJECTION, SOLUTION INTRAMUSCULAR; INTRAVENOUS at 23:07

## 2021-04-09 RX ADMIN — ENOXAPARIN SODIUM 40 MG: 40 INJECTION SUBCUTANEOUS at 09:18

## 2021-04-09 RX ADMIN — SODIUM CHLORIDE 3 UNITS/HR: 9 INJECTION, SOLUTION INTRAVENOUS at 10:01

## 2021-04-09 RX ADMIN — LEVOTHYROXINE SODIUM 175 MCG: 150 TABLET ORAL at 05:51

## 2021-04-09 RX ADMIN — DIPHENHYDRAMINE HYDROCHLORIDE 50 MG: 50 INJECTION, SOLUTION INTRAMUSCULAR; INTRAVENOUS at 12:10

## 2021-04-09 RX ADMIN — PROPOFOL 50 MCG/KG/MIN: 10 INJECTION, EMULSION INTRAVENOUS at 21:37

## 2021-04-09 RX ADMIN — DIPHENHYDRAMINE HYDROCHLORIDE 50 MG: 50 INJECTION, SOLUTION INTRAMUSCULAR; INTRAVENOUS at 17:52

## 2021-04-09 RX ADMIN — INSULIN GLARGINE 60 UNITS: 100 INJECTION, SOLUTION SUBCUTANEOUS at 11:10

## 2021-04-09 RX ADMIN — FAMOTIDINE 20 MG: 10 INJECTION INTRAVENOUS at 09:18

## 2021-04-09 RX ADMIN — PROPOFOL 50 MCG/KG/MIN: 10 INJECTION, EMULSION INTRAVENOUS at 04:35

## 2021-04-09 RX ADMIN — DEXMEDETOMIDINE HYDROCHLORIDE 0.9 MCG/KG/HR: 100 INJECTION, SOLUTION INTRAVENOUS at 23:07

## 2021-04-09 RX ADMIN — DEXMEDETOMIDINE HYDROCHLORIDE 0.7 MCG/KG/HR: 100 INJECTION, SOLUTION INTRAVENOUS at 03:46

## 2021-04-09 RX ADMIN — ENOXAPARIN SODIUM 40 MG: 40 INJECTION SUBCUTANEOUS at 21:37

## 2021-04-09 RX ADMIN — PROPOFOL 50 MCG/KG/MIN: 10 INJECTION, EMULSION INTRAVENOUS at 10:31

## 2021-04-09 RX ADMIN — SODIUM CHLORIDE 3 UNITS/HR: 9 INJECTION, SOLUTION INTRAVENOUS at 18:00

## 2021-04-09 RX ADMIN — ATORVASTATIN CALCIUM 20 MG: 20 TABLET, FILM COATED ORAL at 17:52

## 2021-04-09 RX ADMIN — METHYLPREDNISOLONE SODIUM SUCCINATE 40 MG: 40 INJECTION, POWDER, FOR SOLUTION INTRAMUSCULAR; INTRAVENOUS at 21:37

## 2021-04-09 RX ADMIN — CHLORHEXIDINE GLUCONATE 0.12% ORAL RINSE 15 ML: 1.2 LIQUID ORAL at 21:37

## 2021-04-09 RX ADMIN — FUROSEMIDE 40 MG: 10 INJECTION, SOLUTION INTRAMUSCULAR; INTRAVENOUS at 11:11

## 2021-04-09 RX ADMIN — METHYLPREDNISOLONE SODIUM SUCCINATE 40 MG: 40 INJECTION, POWDER, FOR SOLUTION INTRAMUSCULAR; INTRAVENOUS at 05:51

## 2021-04-09 NOTE — PROGRESS NOTES
Consulted with allergist Dr Erasmo Coreas regarding patient's recurrent episodes of angioedema with unknown etiology  Reviewed HPI, past events and past treatments  Discussed patient's significant tongue swelling now >48 hours and remaining on vent/sedation as he is not able to be safely extubated  Also reviewed current medication orders  At this point we have seen minimal improvement in the patient's tongue swelling, which precludes extubation and necessitates further length of stay in the ICU  After consultation, we believe it is prudent to pursue a subcutaneous dose of Icatibant 30mg and monitor for improvement  Will also recommend the patient begin Cetirizine 20mg PO BID upon discharge and follow up as soon as possible with an allergist to further investigate the etiology of his severe angioedema

## 2021-04-09 NOTE — CASE MANAGEMENT
LOS 2 DAYS  RISK OF UNPLANNED READMISSION SCORE 14  30 DAY READMISSION: NO  Patient was a discharge/re-admit  Per chart review, patient is a transfer from [facility] due to requiring higher LOC due to requiring anesthesia and ENT coverage  BUNDLE: NO    CM received phone call from patient's brother regarding patient  Brother reports that patient lives alone in a 2nd floor apartment with 2 full flights of [place] - no elevator access in building  Patient was not using any DME PTA and was independent with most ADLs per brother  No Hx VNA, STR, or MH identified  Brother reports that patient is a current smoker, brother states that patients smokes 1-2 PPD  No other SA identified  PCP: Giovanny Friedman    [facility]: [facility], david barriers identified to obtaining Rx from that location  No POA identified at this time  Brother states that he is the only family left at this time and would make decisions in the event patient would be unable to do so  Brother reports that patient is on SSD and that due to patient's condition, plan would be for patient to move in with him when he is discharged from the hospital  Brother stating that he may be able to provide transportation for patient home when he is discharged from the hospital     CM department will continue to follow

## 2021-04-09 NOTE — ASSESSMENT & PLAN NOTE
· Unclear etiology  Initially with angioedema episode 2016 attributed to ACE, but ongoing, intermittent episodes of angioedema in 1201-8093  Was recommended to follow up with immunology/allergist but was lost to follow-up  · Now presenting this AM with worsening tongue edema since 0300  Self administered Epi-pen and benadryl without relief  Presented to [facility] ED early this AM with worsening tongue edema/protrusion from mouth  · Required intubation and mechanical ventilation in the operating room by Anesthesia for airway protection and hypoxia 4/7  Transferred to [address] [place] [address] for ICU mgt  · Vent settings: 24/480/50%/10  Titrate FiO2 for goal SpO2 >90%  Propofol and precedex with goal RASS -2  · Received 2U FFP 4/7 and another 2u FFP 4/8  · Received 1G Tranexamic acid 4/6 and monitor for effect  · Received dexamethasone 10 mg IV x 1 dose in the emergency department  · Continue methylprednisolone 40 mg IV every 8 hours, famotidine 20mg IV q12, diphenhydramine 50mg q6h  · C4 normal at 18  CRP 4 3, Sed rate 27  · C1 esterase inhibitor level 30  · Will require outpatient Allergy/Immunology evaluation on discharge  · Consult ENT given severity of angioedema   Will also reach out to allergist today given minimal improvement in tongue edema  · Discussing Icatibant administration if angioedema is not improving

## 2021-04-09 NOTE — CONSULTS
Otolaryngology (ENT) Consultation Note     Amy Alert  [id number]  1965       Chief Complaint: Angioedema    History of Present Illness: 13-year-old man who ENT consult and for angioedema  The patient has had recurrent episodes of angioedema in the past   However, this episode has been the most serious  He initially presented to [address] [place] [address],[address] about 2 days ago, with another episode of angioedema  He was intubated for airway protection, and transferred to [address] [place] [address] for further ICU management  Since in the ICU, he is been getting treatment for angioedema including administration of steroids  Although his tongue appears much better today, it is still swollen, and he is not ready for extubation  ENT is consulted is for assistance in airway management  Allergies   Allergen Reactions    Ace Inhibitors Swelling     Angioedema    Clindamycin      Had angioedema episode approx 5 hr after IM administration of clindamycin  Unclear if there is definitive causal relationship         Past Medical History:   Diagnosis Date    Diabetes mellitus ([facility] [address] )     Disease of thyroid gland     Hyperlipidemia     Hypertension        Past Surgical History:   Procedure Laterality Date    KNEE SURGERY      SINUS SURGERY         Social History     Socioeconomic History    Marital status: Single     Spouse name: Not on file    Number of children: Not on file    Years of education: Not on file    Highest education level: Not on file   Occupational History    Not on file   Social Needs    Financial resource strain: Not on file    Food insecurity     Worry: Not on file     Inability: Not on file    Transportation needs     Medical: Not on file     Non-medical: Not on file   Tobacco Use    Smoking status: Current Every Day Smoker     Packs/day: 2 00    Smokeless tobacco: Never Used   Substance and Sexual Activity    Alcohol use: Not Currently    Drug use: No    Sexual activity: Not on file Lifestyle    Physical activity     Days per week: Not on file     Minutes per session: Not on file    Stress: Not on file   Relationships    Social connections     Talks on phone: Not on file     Gets together: Not on file     Attends Taoist service: Not on file     Active member of club or organization: Not on file     Attends meetings of clubs or organizations: Not on file     Relationship status: Not on file    Intimate partner violence     Fear of current or ex partner: Not on file     Emotionally abused: Not on file     Physically abused: Not on file     Forced sexual activity: Not on file   Other Topics Concern    Not on file   Social History Narrative    Not on file       Family History   Problem Relation Age of Onset    Thyroid cancer Mother     Diabetes type II Mother     Esophageal cancer Father     Diabetes type II Father     Kidney cancer Brother     Diabetes type II Brother     Diabetes type II Maternal Grandmother        No current facility-administered medications on file prior to encounter        Current Outpatient Medications on File Prior to Encounter   Medication Sig Dispense Refill    Ascorbic Acid (VITAMIN C PO) Take 1 tablet by mouth daily      aspirin 81 mg chewable tablet Take one tablet by mouth daily      atorvastatin (LIPITOR) 20 mg tablet Take 1 tablet by mouth once daily 90 tablet 0    CINNAMON PO Take 1 tablet by mouth daily      DULoxetine (CYMBALTA) 60 mg delayed release capsule Take 1 capsule by mouth once daily 90 capsule 0    EPINEPHrine (EPIPEN) 0 3 mg/0 3 mL SOAJ INJECT 0 3ML INTO A MUSCLE ONCE FOR 1 DOSE 2 each 0    Euthyrox 175 MCG tablet Take 1 tablet by mouth once daily 30 tablet 0    gabapentin (NEURONTIN) 300 mg capsule Take 1 capsule (300 mg total) by mouth 3 (three) times a day 90 capsule 1    hydrochlorothiazide (HYDRODIURIL) 25 mg tablet Take 1 tablet by mouth twice daily 60 tablet 5    insulin lispro (HumaLOG KwikPen) 100 units/mL injection pen Inject 7 Units under the skin 3 (three) times a day with meals (Patient not taking: Reported on 1/28/2021) 3 pen 0    Insulin Pen Needle (PEN NEEDLES 3/16") 31G X 5 MM MISC by Other route 4 (four) times a day 300 each 3    Lantus SoloStar 100 units/mL injection pen INJECT 60 UNITS SUBCUTANEOUSLY ONCE DAILY 15 mL 0    metFORMIN (GLUCOPHAGE) 1000 MG tablet Take 1 tablet by mouth twice daily 60 tablet 5    omega-3-acid ethyl esters (LOVAZA) 1 g capsule Take 1 capsule (1 g total) by mouth 2 (two) times a day for high cholesterol 60 capsule 2    other medication, see sig, Medication/product name: Moringa    Strength: 1000mcg    Sig (include dose, route, frequency): daily for joint pains per pt (OTC)      sodium chloride (OCEAN) 0 65 % nasal spray 1 spray into each nostril as needed for rhinitis 30 mL 1    traZODone (DESYREL) 150 mg tablet Take 1 tablet (150 mg total) by mouth daily at bedtime 90 tablet 0    Ventolin  (90 Base) MCG/ACT inhaler Inhale 2 puffs every 4 (four) hours as needed for wheezing or shortness of breath 1 Inhaler 5       Review of Systems:  10-point ROS performed  Patient denies fevers or chills  All other systems reviewed and found to be negative unless otherwise noted in the HPI  Vitals:    04/09/21 1520   BP:    Pulse:    Resp:    Temp: 97 8 °F (36 6 °C)   SpO2: 96%       General Appearance: Intubated, sedated    Head: Normocephalic, atraumatic  Face: Symmetric without obvious lesions  Eyes: Conjunctiva clear  Ears: Pinna normal shape and position  Nose: External pyramid midline  Patient with endotracheal tube as well as orogastric tube present  Tongue is swollen, but soft  It is protruding out the mouth somewhat, although I can push it back in  Difficult to see into the posterior oral cavity  Neck: No cervical lymphadenopathy or masses appreciated    Skin: Skin warm and dry  Neurological: Cranial nerves II to XII are intact      Respiratory: No stridor or labored breathing  Cardiovascular: Good perfusion of the upper extremities  No cyanosis of the fingers or hands  Psychiatric: Alert and oriented  Assessment: Angioedema  Plan:  Continue care per critical care team   He is on steroids  They have also contacted allergy  He certainly needs to follow-up with allergy/immunology upon discharge  I did discussed the case with the critical care attending  At this point, it appears that they may be ready for extubation either on Sunday or Monday  ENT is aware of the patient and will offer assistance if needed      Chuck Sharpe MD  Otolaryngology - Head & Neck Surgery  [facility]

## 2021-04-09 NOTE — PROGRESS NOTES
Uriel 44  Progress Note - Gabby Strange 1965, 63 y o  male MRN: [id number]  Unit/Bed#: ICU 11 Encounter: [id number]  Primary Care Provider: MARLEEN Palafox   Date and time admitted to hospital: 4/7/2021 11:24 AM    * Angioedema  Assessment & Plan  · Unclear etiology  Initially with angioedema episode 2016 attributed to ACE, but ongoing, intermittent episodes of angioedema in [id number]  Was recommended to follow up with immunology/allergist but was lost to follow-up  · Now presenting this AM with worsening tongue edema since 0300  Self administered Epi-pen and benadryl without relief  Presented to [facility] ED early this AM with worsening tongue edema/protrusion from mouth  · Required intubation and mechanical ventilation in the operating room by Anesthesia for airway protection and hypoxia 4/7  Transferred to [address] [place] [address] for ICU mgt  · Vent settings: 24/480/50%/10  Titrate FiO2 for goal SpO2 >90%  Propofol and precedex with goal RASS -2  · Received 2U FFP 4/7 and another 2u FFP 4/8  · Received 1G Tranexamic acid 4/6 and monitor for effect  · Received dexamethasone 10 mg IV x 1 dose in the emergency department  · Continue methylprednisolone 40 mg IV every 8 hours, famotidine 20mg IV q12, diphenhydramine 50mg q6h  · C4 normal at 18  CRP 4 3, Sed rate 27  · C1 esterase inhibitor level pending  · Will require outpatient Allergy/Immunology evaluation on discharge  · Consult ENT given severity of angioedema  Will also reach out to allergist today given minimal improvement in tongue edema       Acute respiratory failure with hypoxia (HCC)  Assessment & Plan  · Intubated for airway protection 2/2 severe tongue swelling and angioedema  · Hypoxemia and respiratory acidosis noted on first ABG shortly after intubation  · Repeat ABG 4/7 7 40/40/89/26 on 24/480/100%/10P  FiO2 now down to 70% and maintaining O2 saturations >92%  · Per chart, history of mild COPD though no formal PFT's noted   Add PRN neb tx's if wheezing  None audible on exam    · CXR showing low lung volumes with probable CHF  · IVF's discontinued  Will provide additional lasix 40mg IVP today   · Wean FiO2 for O2 sat >90%  · Cont  pulmonary hygiene    Type 2 diabetes, uncontrolled, with neuropathy [facility])  Assessment & Plan  Lab Results   Component Value Date    HGBA1C 11 7 (H) 02/05/2021       Recent Labs     04/08/21  0601 04/08/21  0758 04/08/21  1005 04/08/21  1155   POCGLU 167* 146* 116 149*       Blood Sugar Average: Last 72 hrs:  (P) 806 [id number]     · History of non-compliance with diabetic regimen  See above A1c from 2/21  · Takes lantus 60 daily per home dose  BG initially elevated to 300's and initiated on insulin drip  · Remains controlled on insulin drip  Will transition to long acting and SSI today  · Monitor BG per protocol    Hypertension  Assessment & Plan  · BP on arrival noted at 103/69 in setting of intubation/sedation medications  · Now more stable with few readings in 160's  · Will add daily amlodipine 5mg to start, titrate up daily as needed  · Avoid ACE-I given angioedema    Hypothyroidism  Assessment & Plan  · Last TSH 0 139 2/25/21  · Continue levothyroxine 175 mcg daily    Morbid obesity ([address] )  Assessment & Plan  · Follow up with nutritional counseling when appropriate  Previously declined  · Likely contributing to MARSHA    Obstructive sleep apnea  Assessment & Plan  · Prior sleep study showing severe MARSHA  Lost to follow up  · Currently intubated   · Will require sleep study for titration of positive pressure ventilation OP  · Body habitus likely contributing    Hyperlipidemia  Assessment & Plan  · Continue home statin    Depression with anxiety  Assessment & Plan  · Hold antidepressants given tentative respiratory status and IV sedation  · Restart home trazodone and Cymbalta upon extubation    Tobacco use  Assessment & Plan  · Records show 2PPD cigarette use   Will provide nicotine patch when extubated  · Tobacco cessation upon extubation      ----------------------------------------------------------------------------------------  HPI/24hr events: Remains sedated on vent for airway protection  Tongue with mild improvement in swelling but still protruding from oral cavity  No other signs of facial swelling  BP mildly hypertensive as compared with yesterday  No other acute changes overnight  Disposition: Continue Critical Care   Code Status: Level 1 - Full Code  ---------------------------------------------------------------------------------------  SUBJECTIVE  JACLYN 2/2 ETT    Review of Systems   Unable to perform ROS: Intubated     Review of systems was unable to be performed secondary to intubation  ---------------------------------------------------------------------------------------  OBJECTIVE    Vitals   Vitals:    21 1000 21 1115 21 1142 21 1156   BP: 151/94 157/96     BP Location:       Pulse: 79      Resp: (!) 27      Temp:    (!) 97 1 °F (36 2 °C)   TempSrc:    Temporal   SpO2: 96%  98%    Weight:       Height:         Temp (24hrs), Av 8 °F (36 6 °C), Min:97 1 °F (36 2 °C), Max:98 4 °F (36 9 °C)  Current: Temperature: (!) 97 1 °F (36 2 °C)          Respiratory:  SpO2: SpO2: 98 %, SpO2 Activity: SpO2 Activity: At Rest, SpO2 Device: O2 Device: Ventilator       Invasive/non-invasive ventilation settings   Respiratory    Lab Data (Last 4 hours)    None         O2/Vent Data (Last 4 hours)    None                Physical Exam  Vitals signs and nursing note reviewed  Constitutional:       General: He is not in acute distress  Appearance: He is obese  He is ill-appearing  He is not diaphoretic  Interventions: He is sedated and intubated  Comments: Sedated and intubated on propofol and precedex   HENT:      Head: Normocephalic        Right Ear: External ear normal       Left Ear: External ear normal       Nose:      Comments: Dried blood noted externally  mild epistaxis post attempted nasal intubation  No dwayne bleeding  Mouth/Throat:      Mouth: Mucous membranes are moist    Eyes:      General: Lids are normal       Pupils: Pupils are equal, round, and reactive to light  Cardiovascular:      Rate and Rhythm: Normal rate and regular rhythm  Pulses: Normal pulses  Radial pulses are 2+ on the right side and 2+ on the left side  Dorsalis pedis pulses are 2+ on the right side and 2+ on the left side  Heart sounds: Normal heart sounds  No murmur  Pulmonary:      Effort: Pulmonary effort is normal  He is intubated  Breath sounds: Rhonchi (suctioned for thin, white secretions in moderate amounts) present  Abdominal:      General: Abdomen is protuberant  Bowel sounds are normal       Palpations: Abdomen is soft  Tenderness: There is no guarding  Comments: OGT in place   Genitourinary:     Comments: Clear yellow urine noted in robertson tubing  Musculoskeletal:      Right lower leg: No edema  Left lower leg: No edema  Skin:     General: Skin is warm and dry  Neurological:      GCS: GCS eye subscore is 1  GCS verbal subscore is 1  GCS motor subscore is 4  Comments: Minimally withdraws/attempts to localize with ETT suctioning    No response to peripheral painful stimulus         Laboratory and Diagnostics:  Results from last 7 days   Lab Units 04/09/21  0521 04/08/21  0434 04/07/21  1227 04/07/21  1023 04/07/21  0645   WBC Thousand/uL 10 70* 13 92*  --   --  9 41   HEMOGLOBIN g/dL 14 6 14 7 14 6 15 4 15 5   HEMATOCRIT % 44 7 44 4 44 5 47 4 46 9   PLATELETS Thousands/uL 221 238  --   --  279   NEUTROS PCT % 77* 83*  --   --  61   MONOS PCT % 8 8  --   --  11     Results from last 7 days   Lab Units 04/09/21  0521 04/08/21  0434 04/07/21  1228 04/07/21  0645   SODIUM mmol/L 138 137 133* 132*   POTASSIUM mmol/L 3 8 3 6 5 5* 3 9   CHLORIDE mmol/L 102 99* 96* 94*   CO2 mmol/L 26 28 30 31   ANION GAP mmol/L 10 10 7 7   BUN mg/dL 25 17 16 14   CREATININE mg/dL 1 14 1 19 1 45* 1 16   CALCIUM mg/dL 8 1* 8 6 8 6 9 3   GLUCOSE RANDOM mg/dL 134 205* 373* 395*   ALT U/L  --   --  45  --    AST U/L  --   --  21  --    ALK PHOS U/L  --   --  78  --    ALBUMIN g/dL  --   --  3 2*  --    TOTAL BILIRUBIN mg/dL  --   --  0 45  --      Results from last 7 days   Lab Units 04/09/21  0521 04/08/21  0434 04/07/21  1228 04/07/21  0645   MAGNESIUM mg/dL 2 4 2 0 1 9 1 9   PHOSPHORUS mg/dL 5 2* 4 5 2 8  --       Results from last 7 days   Lab Units 04/07/21  0645   INR  0 87      Results from last 7 days   Lab Units 04/08/21  0434 04/07/21  1227 04/07/21  1023   TROPONIN I ng/mL <0 02 <0 02 <0 02     Results from last 7 days   Lab Units 04/08/21  0434 04/07/21  1507 04/07/21  1227 04/07/21  1023   LACTIC ACID mmol/L 1 4 2 6* 2 8* 3 3*     ABG:  Results from last 7 days   Lab Units 04/07/21  1218   PH ART  7 391   PCO2 ART mm Hg 44 8*   PO2 ART mm Hg 89 6   HCO3 ART mmol/L 26 6   BASE EXC ART mmol/L 1 2   ABG SOURCE  Radial, Right     VBG:  Results from last 7 days   Lab Units 04/07/21  1218   ABG SOURCE  Radial, Right     Results from last 7 days   Lab Units 04/08/21  0434 04/07/21  1228 04/07/21  1023   PROCALCITONIN ng/ml <0 05 0 06 0 06       Micro  Results from last 7 days   Lab Units 04/07/21  1136   MRSA CULTURE ONLY  No Methicillin Resistant Staphlyococcus aureus (MRSA) isolated       EKG: NSR on monitor  Imaging: I have personally reviewed pertinent reports  I have personally reviewed pertinent films in PACS and no new imaging      Intake and Output  I/O       04/07 0701 - 04/08 0700 04/08 0701 - 04/09 0700 04/09 0701 - 04/10 0700    I V  (mL/kg) 1305 3 (11 7) 1420 8 (12 5)     Blood 336 558     NG/GT 30 60 20    IV Piggyback 100      Total Intake(mL/kg) 1771 3 (15 8) 2038 8 (17 9) 20 (0 2)    Urine (mL/kg/hr) 1585 1215 (0 4) 560 (0 8)    Emesis/NG output 200 145     Total Output 1785 1360 560    Net -13 8 +678 8 -540                 Height and Weights   Height: 5' 4" (162 6 cm)  IBW: 59 2 kg  Body mass index is 43 14 kg/m²  Weight (last 2 days)     Date/Time   Weight    04/09/21 0551   114 (251 32)    04/08/21 0501   112 (246 92)    04/07/21 1137   115 (252 43)                Nutrition       Diet Orders   (From admission, onward)             Start     Ordered    04/09/21 1059  Diet Enteral/Parenteral; Tube Feeding No Oral Diet; Jevity 1 2 Klever; Continuous; 45; 150; Water; Every 4 hours  Diet effective now     Question Answer Comment   Diet Type Enteral/Parenteral    Enteral/Parenteral Tube Feeding No Oral Diet    Tube Feeding Formula: Jevity 1 2 Klever    Bolus/Cyclic/Continuous Continuous    Tube Feeding Goal Rate (mL/hr): 45    Tube Feeding flush (mL): 150    Water Flush type: Water    Water flush frequency: Every 4 hours    RD to adjust diet per protocol?  Yes        04/09/21 1059    04/08/21 0837  Room Service  Once     Question:  Type of Service  Answer:  Room Service - Appropriate with Assistance    04/08/21 0836                  Active Medications  Scheduled Meds:  Current Facility-Administered Medications   Medication Dose Route Frequency Provider Last Rate    amLODIPine  5 mg Oral Daily Luzmaria Jetty, CRNP      atorvastatin  20 mg Oral Daily With Dinner Luzmaria Jetty, CRNP      chlorhexidine  15 mL Swish & Spit Q12H [address] Luzmaria Jetty, CRNP      dexmedetomidine  0 1-1 5 mcg/kg/hr Intravenous Titrated Luzmaria Jetty, CRNP 0 9 mcg/kg/hr (04/09/21 0956)    diphenhydrAMINE  50 mg Intravenous Q6H Luzmaria Jetty, CRNP      enoxaparin  40 mg Subcutaneous Q12H [address] Luzmaria Jetty, CRNP      famotidine  20 mg Intravenous Q12H [address] Luzmaria Jetty, CRNP      fentanyl citrate (PF)  50 mcg Intravenous Q2H PRN Luzmaria Jetty, CRNP      hydrALAZINE  5 mg Intravenous Q8H PRN Luzmaria Jetty, CRNP      insulin glargine  60 Units Subcutaneous QAM Luzmaria Jetty, CRNP      insulin lispro  1-6 Units Subcutaneous Q6H [address] Luzmaria Jetty, CRNP      insulin regular (HumuLIN R,NovoLIN R) infusion  0 3-21 Units/hr Intravenous Titrated MARLEEN Barnard 3 Units/hr (04/09/21 1200)    levothyroxine  175 mcg Oral Daily MARLEEN Barnard      methylPREDNISolone sodium succinate  40 mg Intravenous Q8H [address] Cintia Colin, CRNP      propofol  5-50 mcg/kg/min Intravenous Titrated BRADY BarnardNP 50 mcg/kg/min (04/09/21 1031)     Continuous Infusions:  dexmedetomidine, 0 1-1 5 mcg/kg/hr, Last Rate: 0 9 mcg/kg/hr (04/09/21 0956)  insulin regular (HumuLIN R,NovoLIN R) infusion, 0 3-21 Units/hr, Last Rate: 3 Units/hr (04/09/21 1200)  propofol, 5-50 mcg/kg/min, Last Rate: 50 mcg/kg/min (04/09/21 1031)      PRN Meds:   fentanyl citrate (PF), 50 mcg, Q2H PRN  hydrALAZINE, 5 mg, Q8H PRN        Invasive Devices Review  Invasive Devices     Peripheral Intravenous Line            Peripheral IV 04/07/21 Left Antecubital 2 days    Peripheral IV 04/07/21 Right Antecubital 2 days    Peripheral IV 04/08/21 Right Wrist less than 1 day          Drain            NG/OG/Enteral Tube Orogastric 14 Fr Center mouth 2 days    Urethral Catheter Latex 16 Fr  2 days          Airway            ETT  Cuffed;ANNY 7 mm 2 days                Rationale for remaining devices: Critical illness/airway management/Nutrition  ---------------------------------------------------------------------------------------  Advance Directive and Living Will:      Power of :    POLST:    ---------------------------------------------------------------------------------------  Care Time Delivered:   No Critical Care time spent       [facility]      Portions of the record may have been created with voice recognition software  Occasional wrong word or "sound a like" substitutions may have occurred due to the inherent limitations of voice recognition software    Read the chart carefully and recognize, using context, where substitutions have occurred

## 2021-04-09 NOTE — QUICK NOTE
Updated patient's brother Francoise Armijo regarding overall medical condition  Aware that the patient's swelling is only mildly improved and that he remains sedated on the ventilator currently  All questions were sufficiently answered to his satisfaction

## 2021-04-10 ENCOUNTER — APPOINTMENT (INPATIENT)
Dept: RADIOLOGY | Facility: HOSPITAL | Age: 56
DRG: 915 | End: 2021-04-10
Payer: MEDICARE

## 2021-04-10 LAB
ANION GAP SERPL CALCULATED.3IONS-SCNC: 10 MMOL/L (ref 4–13)
BASOPHILS # BLD AUTO: 0.02 THOUSANDS/ΜL (ref 0–0.1)
BASOPHILS NFR BLD AUTO: 0 % (ref 0–1)
BUN SERPL-MCNC: 29 MG/DL (ref 5–25)
CALCIUM SERPL-MCNC: 8.3 MG/DL (ref 8.3–10.1)
CHLORIDE SERPL-SCNC: 104 MMOL/L (ref 100–108)
CK SERPL-CCNC: 159 U/L (ref 39–308)
CO2 SERPL-SCNC: 28 MMOL/L (ref 21–32)
CREAT SERPL-MCNC: 1.16 MG/DL (ref 0.6–1.3)
EOSINOPHIL # BLD AUTO: 0 THOUSAND/ΜL (ref 0–0.61)
EOSINOPHIL NFR BLD AUTO: 0 % (ref 0–6)
ERYTHROCYTE [DISTWIDTH] IN BLOOD BY AUTOMATED COUNT: 13.8 % (ref 11.6–15.1)
GFR SERPL CREATININE-BSD FRML MDRD: 70 ML/MIN/1.73SQ M
GLUCOSE SERPL-MCNC: 136 MG/DL (ref 65–140)
GLUCOSE SERPL-MCNC: 139 MG/DL (ref 65–140)
GLUCOSE SERPL-MCNC: 146 MG/DL (ref 65–140)
GLUCOSE SERPL-MCNC: 148 MG/DL (ref 65–140)
GLUCOSE SERPL-MCNC: 151 MG/DL (ref 65–140)
GLUCOSE SERPL-MCNC: 165 MG/DL (ref 65–140)
GLUCOSE SERPL-MCNC: 167 MG/DL (ref 65–140)
GLUCOSE SERPL-MCNC: 179 MG/DL (ref 65–140)
GLUCOSE SERPL-MCNC: 189 MG/DL (ref 65–140)
GLUCOSE SERPL-MCNC: 192 MG/DL (ref 65–140)
GLUCOSE SERPL-MCNC: 198 MG/DL (ref 65–140)
GLUCOSE SERPL-MCNC: 208 MG/DL (ref 65–140)
GLUCOSE SERPL-MCNC: 273 MG/DL (ref 65–140)
HCT VFR BLD AUTO: 47.9 % (ref 36.5–49.3)
HGB BLD-MCNC: 15.1 G/DL (ref 12–17)
IMM GRANULOCYTES # BLD AUTO: 0.05 THOUSAND/UL (ref 0–0.2)
IMM GRANULOCYTES NFR BLD AUTO: 0 % (ref 0–2)
LYMPHOCYTES # BLD AUTO: 1.28 THOUSANDS/ΜL (ref 0.6–4.47)
LYMPHOCYTES NFR BLD AUTO: 11 % (ref 14–44)
MAGNESIUM SERPL-MCNC: 2.5 MG/DL (ref 1.6–2.6)
MCH RBC QN AUTO: 28.2 PG (ref 26.8–34.3)
MCHC RBC AUTO-ENTMCNC: 31.5 G/DL (ref 31.4–37.4)
MCV RBC AUTO: 90 FL (ref 82–98)
MONOCYTES # BLD AUTO: 1.51 THOUSAND/ΜL (ref 0.17–1.22)
MONOCYTES NFR BLD AUTO: 13 % (ref 4–12)
NEUTROPHILS # BLD AUTO: 8.41 THOUSANDS/ΜL (ref 1.85–7.62)
NEUTS SEG NFR BLD AUTO: 76 % (ref 43–75)
NRBC BLD AUTO-RTO: 0 /100 WBCS
PHOSPHATE SERPL-MCNC: 4.4 MG/DL (ref 2.7–4.5)
PLATELET # BLD AUTO: 188 THOUSANDS/UL (ref 149–390)
PMV BLD AUTO: 9.6 FL (ref 8.9–12.7)
POTASSIUM SERPL-SCNC: 3.7 MMOL/L (ref 3.5–5.3)
RBC # BLD AUTO: 5.35 MILLION/UL (ref 3.88–5.62)
SODIUM SERPL-SCNC: 142 MMOL/L (ref 136–145)
TRIGL SERPL-MCNC: 388 MG/DL
WBC # BLD AUTO: 11.27 THOUSAND/UL (ref 4.31–10.16)

## 2021-04-10 PROCEDURE — 82550 ASSAY OF CK (CPK): CPT | Performed by: NURSE PRACTITIONER

## 2021-04-10 PROCEDURE — 99291 CRITICAL CARE FIRST HOUR: CPT | Performed by: PHYSICIAN ASSISTANT

## 2021-04-10 PROCEDURE — 94150 VITAL CAPACITY TEST: CPT

## 2021-04-10 PROCEDURE — 80048 BASIC METABOLIC PNL TOTAL CA: CPT | Performed by: NURSE PRACTITIONER

## 2021-04-10 PROCEDURE — 71045 X-RAY EXAM CHEST 1 VIEW: CPT

## 2021-04-10 PROCEDURE — 84100 ASSAY OF PHOSPHORUS: CPT | Performed by: NURSE PRACTITIONER

## 2021-04-10 PROCEDURE — 84478 ASSAY OF TRIGLYCERIDES: CPT | Performed by: NURSE PRACTITIONER

## 2021-04-10 PROCEDURE — 85025 COMPLETE CBC W/AUTO DIFF WBC: CPT | Performed by: NURSE PRACTITIONER

## 2021-04-10 PROCEDURE — 83735 ASSAY OF MAGNESIUM: CPT | Performed by: NURSE PRACTITIONER

## 2021-04-10 PROCEDURE — 94003 VENT MGMT INPAT SUBQ DAY: CPT

## 2021-04-10 PROCEDURE — 94760 N-INVAS EAR/PLS OXIMETRY 1: CPT

## 2021-04-10 PROCEDURE — 82948 REAGENT STRIP/BLOOD GLUCOSE: CPT

## 2021-04-10 PROCEDURE — 99292 CRITICAL CARE ADDL 30 MIN: CPT | Performed by: ANESTHESIOLOGY

## 2021-04-10 RX ORDER — POTASSIUM CHLORIDE 20MEQ/15ML
20 LIQUID (ML) ORAL ONCE
Status: COMPLETED | OUTPATIENT
Start: 2021-04-10 | End: 2021-04-10

## 2021-04-10 RX ORDER — FENTANYL CITRATE-0.9 % NACL/PF 10 MCG/ML
50 PLASTIC BAG, INJECTION (ML) INTRAVENOUS CONTINUOUS
Status: DISCONTINUED | OUTPATIENT
Start: 2021-04-10 | End: 2021-04-11

## 2021-04-10 RX ORDER — PROPOFOL 10 MG/ML
5-50 INJECTION, EMULSION INTRAVENOUS
Status: DISCONTINUED | OUTPATIENT
Start: 2021-04-10 | End: 2021-04-11

## 2021-04-10 RX ORDER — METHYLPREDNISOLONE SODIUM SUCCINATE 40 MG/ML
20 INJECTION, POWDER, LYOPHILIZED, FOR SOLUTION INTRAMUSCULAR; INTRAVENOUS EVERY 8 HOURS SCHEDULED
Status: DISCONTINUED | OUTPATIENT
Start: 2021-04-10 | End: 2021-04-12

## 2021-04-10 RX ADMIN — INSULIN GLARGINE 60 UNITS: 100 INJECTION, SOLUTION SUBCUTANEOUS at 09:10

## 2021-04-10 RX ADMIN — DEXMEDETOMIDINE HYDROCHLORIDE 1.5 MCG/KG/HR: 100 INJECTION, SOLUTION INTRAVENOUS at 17:24

## 2021-04-10 RX ADMIN — PROPOFOL 50 MCG/KG/MIN: 10 INJECTION, EMULSION INTRAVENOUS at 14:17

## 2021-04-10 RX ADMIN — ATORVASTATIN CALCIUM 20 MG: 20 TABLET, FILM COATED ORAL at 17:08

## 2021-04-10 RX ADMIN — DEXMEDETOMIDINE HYDROCHLORIDE 1.5 MCG/KG/HR: 100 INJECTION, SOLUTION INTRAVENOUS at 15:00

## 2021-04-10 RX ADMIN — FAMOTIDINE 20 MG: 10 INJECTION INTRAVENOUS at 09:04

## 2021-04-10 RX ADMIN — CHLORHEXIDINE GLUCONATE 0.12% ORAL RINSE 15 ML: 1.2 LIQUID ORAL at 21:26

## 2021-04-10 RX ADMIN — DEXMEDETOMIDINE HYDROCHLORIDE 1.5 MCG/KG/HR: 100 INJECTION, SOLUTION INTRAVENOUS at 12:43

## 2021-04-10 RX ADMIN — METHYLPREDNISOLONE SODIUM SUCCINATE 40 MG: 40 INJECTION, POWDER, FOR SOLUTION INTRAMUSCULAR; INTRAVENOUS at 05:30

## 2021-04-10 RX ADMIN — FENTANYL CITRATE 50 MCG: 50 INJECTION INTRAMUSCULAR; INTRAVENOUS at 05:13

## 2021-04-10 RX ADMIN — DEXMEDETOMIDINE HYDROCHLORIDE 1.5 MCG/KG/HR: 100 INJECTION, SOLUTION INTRAVENOUS at 07:09

## 2021-04-10 RX ADMIN — LEVOTHYROXINE SODIUM 175 MCG: 150 TABLET ORAL at 05:30

## 2021-04-10 RX ADMIN — PROPOFOL 50 MCG/KG/MIN: 10 INJECTION, EMULSION INTRAVENOUS at 17:08

## 2021-04-10 RX ADMIN — ICATIBANT 3 ML: 10 INJECTION, SOLUTION SUBCUTANEOUS at 12:00

## 2021-04-10 RX ADMIN — HYDRALAZINE HYDROCHLORIDE 5 MG: 20 INJECTION INTRAMUSCULAR; INTRAVENOUS at 09:07

## 2021-04-10 RX ADMIN — PROPOFOL 50 MCG/KG/MIN: 10 INJECTION, EMULSION INTRAVENOUS at 22:49

## 2021-04-10 RX ADMIN — FAMOTIDINE 20 MG: 10 INJECTION INTRAVENOUS at 21:26

## 2021-04-10 RX ADMIN — METHYLPREDNISOLONE SODIUM SUCCINATE 20 MG: 40 INJECTION, POWDER, FOR SOLUTION INTRAMUSCULAR; INTRAVENOUS at 21:27

## 2021-04-10 RX ADMIN — POTASSIUM CHLORIDE 20 MEQ: 20 SOLUTION ORAL at 09:03

## 2021-04-10 RX ADMIN — PROPOFOL 50 MCG/KG/MIN: 10 INJECTION, EMULSION INTRAVENOUS at 00:26

## 2021-04-10 RX ADMIN — DEXMEDETOMIDINE HYDROCHLORIDE 1.5 MCG/KG/HR: 100 INJECTION, SOLUTION INTRAVENOUS at 22:48

## 2021-04-10 RX ADMIN — PROPOFOL 50 MCG/KG/MIN: 10 INJECTION, EMULSION INTRAVENOUS at 11:05

## 2021-04-10 RX ADMIN — ENOXAPARIN SODIUM 40 MG: 40 INJECTION SUBCUTANEOUS at 21:27

## 2021-04-10 RX ADMIN — AMLODIPINE BESYLATE 5 MG: 5 TABLET ORAL at 09:04

## 2021-04-10 RX ADMIN — DEXMEDETOMIDINE HYDROCHLORIDE 1.5 MCG/KG/HR: 100 INJECTION, SOLUTION INTRAVENOUS at 09:52

## 2021-04-10 RX ADMIN — PROPOFOL 50 MCG/KG/MIN: 10 INJECTION, EMULSION INTRAVENOUS at 03:27

## 2021-04-10 RX ADMIN — PROPOFOL 50 MCG/KG/MIN: 10 INJECTION, EMULSION INTRAVENOUS at 05:29

## 2021-04-10 RX ADMIN — Medication 50 MCG/HR: at 17:08

## 2021-04-10 RX ADMIN — PROPOFOL 50 MCG/KG/MIN: 10 INJECTION, EMULSION INTRAVENOUS at 17:25

## 2021-04-10 RX ADMIN — DIPHENHYDRAMINE HYDROCHLORIDE 50 MG: 50 INJECTION, SOLUTION INTRAMUSCULAR; INTRAVENOUS at 17:08

## 2021-04-10 RX ADMIN — PROPOFOL 50 MCG/KG/MIN: 10 INJECTION, EMULSION INTRAVENOUS at 20:14

## 2021-04-10 RX ADMIN — METHYLPREDNISOLONE SODIUM SUCCINATE 40 MG: 40 INJECTION, POWDER, FOR SOLUTION INTRAMUSCULAR; INTRAVENOUS at 13:44

## 2021-04-10 RX ADMIN — CHLORHEXIDINE GLUCONATE 0.12% ORAL RINSE 15 ML: 1.2 LIQUID ORAL at 09:04

## 2021-04-10 RX ADMIN — DEXMEDETOMIDINE HYDROCHLORIDE 0.9 MCG/KG/HR: 100 INJECTION, SOLUTION INTRAVENOUS at 03:26

## 2021-04-10 RX ADMIN — DIPHENHYDRAMINE HYDROCHLORIDE 50 MG: 50 INJECTION, SOLUTION INTRAMUSCULAR; INTRAVENOUS at 05:30

## 2021-04-10 RX ADMIN — PROPOFOL 50 MCG/KG/MIN: 10 INJECTION, EMULSION INTRAVENOUS at 08:40

## 2021-04-10 RX ADMIN — DIPHENHYDRAMINE HYDROCHLORIDE 50 MG: 50 INJECTION, SOLUTION INTRAMUSCULAR; INTRAVENOUS at 23:17

## 2021-04-10 RX ADMIN — DIPHENHYDRAMINE HYDROCHLORIDE 50 MG: 50 INJECTION, SOLUTION INTRAMUSCULAR; INTRAVENOUS at 11:06

## 2021-04-10 RX ADMIN — ENOXAPARIN SODIUM 40 MG: 40 INJECTION SUBCUTANEOUS at 09:04

## 2021-04-10 RX ADMIN — DEXMEDETOMIDINE HYDROCHLORIDE 1.5 MCG/KG/HR: 100 INJECTION, SOLUTION INTRAVENOUS at 20:14

## 2021-04-10 NOTE — ASSESSMENT & PLAN NOTE
· Intubated for airway protection 2/2 severe tongue swelling and angioedema  · Hypoxemia and respiratory acidosis noted on first ABG shortly after intubation  · Repeat ABG 4/7 7 40/40/89/26 on 24/480/100%/10P  FiO2 now down to 70% and maintaining O2 saturations >92%  · Per chart, history of mild COPD though no formal PFT's noted  Add PRN neb tx's if wheezing  None audible on exam    · CXR showing low lung volumes with probable CHF  · Diurese as tolerated  · Wean FiO2 for O2 sat >90%  · Cont   pulmonary hygiene

## 2021-04-10 NOTE — ASSESSMENT & PLAN NOTE
Lab Results   Component Value Date    HGBA1C 11 7 (H) 02/05/2021       Recent Labs     04/09/21  1411 04/09/21  1554 04/09/21  1808 04/09/21 1953   POCGLU 188* 154* 169* 165*       Blood Sugar Average: Last 72 hrs:  (P) 171 [id number]     · History of non-compliance with diabetic regimen  See above A1c from 2/21  · Takes lantus 60 daily per home dose  BG initially elevated to 300's and initiated on insulin drip  · Remains controlled on insulin drip   Will transition to long acting and SSI today  · Monitor BG per protocol

## 2021-04-10 NOTE — PROGRESS NOTES
Patient with a very strong cough  Was able to cough out ETT to the 20cm all  Respiratory and PATSY Strickland paged to bedside  Able to advance ETT back to 23cm all  Tidal volumes now good  Precedex increased to 1 5 to keep sedated and decrease coughing frequency  PRN fentanyl also given

## 2021-04-10 NOTE — PROGRESS NOTES
[address] 44  Progress Note - Martha Half 1965, 63 y o  male MRN: [id number]  Unit/Bed#: ICU 11 Encounter: [id number]  Primary Care Provider: Aliene Lennox, CRNP   Date and time admitted to hospital: 4/7/2021 11:24 AM    * Angioedema  Assessment & Plan  · Unclear etiology  Initially with angioedema episode 2016 attributed to ACE, but ongoing, intermittent episodes of angioedema in [id number]  Was recommended to follow up with immunology/allergist but was lost to follow-up  · Now presenting this AM with worsening tongue edema since 0300  Self administered Epi-pen and benadryl without relief  Presented to [facility] ED early this AM with worsening tongue edema/protrusion from mouth  · Required intubation and mechanical ventilation in the operating room by Anesthesia for airway protection and hypoxia 4/7  Transferred to [address] [place] [address] for ICU mgt  · Vent settings: 24/480/50%/10  Titrate FiO2 for goal SpO2 >90%  Propofol and precedex with goal RASS -2  · Received 2U FFP 4/7 and another 2u FFP 4/8  · Received 1G Tranexamic acid 4/6 and monitor for effect  · Received dexamethasone 10 mg IV x 1 dose in the emergency department  · Continue methylprednisolone 40 mg IV every 8 hours, famotidine 20mg IV q12, diphenhydramine 50mg q6h  · C4 normal at 18  CRP 4 3, Sed rate 27  · C1 esterase inhibitor level 30  · Will require outpatient Allergy/Immunology evaluation on discharge  · Consult ENT given severity of angioedema  Will also reach out to allergist today given minimal improvement in tongue edema  · Discussing Icatibant administration if angioedema is not improving        Acute respiratory failure with hypoxia (HCC)  Assessment & Plan  · Intubated for airway protection 2/2 severe tongue swelling and angioedema  · Hypoxemia and respiratory acidosis noted on first ABG shortly after intubation  · Repeat ABG 4/7 7 40/40/89/26 on 24/480/100%/10P   FiO2 now down to 70% and maintaining O2 saturations >92%  · Per chart, history of mild COPD though no formal PFT's noted  Add PRN neb tx's if wheezing  None audible on exam    · CXR showing low lung volumes with probable CHF  · Diurese as tolerated  · Wean FiO2 for O2 sat >90%  · Cont  pulmonary hygiene    Type 2 diabetes, uncontrolled, with neuropathy [facility])  Assessment & Plan  Lab Results   Component Value Date    HGBA1C 11 7 (H) 02/05/2021       Recent Labs     04/09/21  1411 04/09/21  1554 04/09/21  1808 04/09/21  1953   POCGLU 188* 154* 169* 165*       Blood Sugar Average: Last 72 hrs:  (P) 171 [id number]     · History of non-compliance with diabetic regimen  See above A1c from 2/21  · Takes lantus 60 daily per home dose  BG initially elevated to 300's and initiated on insulin drip  · Remains controlled on insulin drip  Will transition to long acting and SSI today  · Monitor BG per protocol    Hypertension  Assessment & Plan  · BP on arrival noted at 103/69 in setting of intubation/sedation medications  · Now more stable with few readings in 160's  · Will add daily amlodipine 5mg to start, titrate up daily as needed  · Avoid ACE-I given angioedema    Hypothyroidism  Assessment & Plan  · Last TSH 0 139 2/25/21  · Continue levothyroxine 175 mcg daily    Morbid obesity ([address] )  Assessment & Plan  · Follow up with nutritional counseling when appropriate  Previously declined  · Likely contributing to MARSHA    Obstructive sleep apnea  Assessment & Plan  · Prior sleep study showing severe MARSHA  Lost to follow up    · Currently intubated   · Will require sleep study for titration of positive pressure ventilation OP  · Body habitus likely contributing    Hyperlipidemia  Assessment & Plan  · Continue home statin    Depression with anxiety  Assessment & Plan  · Hold antidepressants given tentative respiratory status and IV sedation  · Restart home trazodone and Cymbalta upon extubation    ----------------------------------------------------------------------------------------  HPI/24hr events: 05:00 pt coughing hard and started having low Vt  ETT advanced 3cm and CXR performed after, ETT still remains high situated between the clavicles  Disposition: Continue Critical Care   Code Status: Level 1 - Full Code  ---------------------------------------------------------------------------------------  SUBJECTIVE      Review of Systems   Unable to perform ROS: Intubated     Review of systems was unable to be performed secondary to intubated  ---------------------------------------------------------------------------------------  OBJECTIVE    Vitals   Vitals:    04/10/21 0100 04/10/21 0200 04/10/21 0300 04/10/21 0400   BP: 151/89 145/86 126/79 134/78   Pulse: 72 73 73 73   Resp: (!) 26 (!) 27 (!) 25 (!) 26   Temp:    98 8 °F (37 1 °C)   TempSrc:    Temporal   SpO2: 95% 97% 96% 94%   Weight:       Height:         Temp (24hrs), Av 7 °F (36 5 °C), Min:97 °F (36 1 °C), Max:98 8 °F (37 1 °C)  Current: Temperature: 98 8 °F (37 1 °C)          Respiratory:  SpO2: SpO2: 94 %       Invasive/non-invasive ventilation settings   Respiratory    Lab Data (Last 4 hours)    None         O2/Vent Data (Last 4 hours)    None                Physical Exam  Vitals signs and nursing note reviewed  Constitutional:       General: He is not in acute distress  Appearance: Normal appearance  He is obese  He is not ill-appearing or toxic-appearing  HENT:      Head: Normocephalic and atraumatic  Nose: Nose normal       Mouth/Throat:      Mouth: Mucous membranes are moist       Pharynx: Oropharynx is clear  Comments: ETT  Tongue swollen, does not extend past teeth  Eyes:      Conjunctiva/sclera: Conjunctivae normal       Pupils: Pupils are equal, round, and reactive to light  Neck:      Musculoskeletal: Neck supple  Cardiovascular:      Rate and Rhythm: Normal rate and regular rhythm        Pulses: Normal pulses  Heart sounds: Normal heart sounds  Pulmonary:      Effort: Pulmonary effort is normal  No respiratory distress  Breath sounds: Normal breath sounds  No wheezing  Abdominal:      General: Bowel sounds are normal  There is no distension  Palpations: Abdomen is soft  Tenderness: There is no abdominal tenderness  Musculoskeletal:      Right lower leg: No edema  Left lower leg: No edema  Skin:     General: Skin is warm and dry  Capillary Refill: Capillary refill takes less than 2 seconds  Neurological:      General: No focal deficit present        Comments: Intubated/sedated         Laboratory and Diagnostics:  Results from last 7 days   Lab Units 04/09/21 0521 04/08/21 0434 04/07/21  1227 04/07/21  1023 04/07/21  0645   WBC Thousand/uL 10 70* 13 92*  --   --  9 41   HEMOGLOBIN g/dL 14 6 14 7 14 6 15 4 15 5   HEMATOCRIT % 44 7 44 4 44 5 47 4 46 9   PLATELETS Thousands/uL 221 238  --   --  279   NEUTROS PCT % 77* 83*  --   --  61   MONOS PCT % 8 8  --   --  11     Results from last 7 days   Lab Units 04/09/21 0521 04/08/21 0434 04/07/21  1228 04/07/21  0645   SODIUM mmol/L 138 137 133* 132*   POTASSIUM mmol/L 3 8 3 6 5 5* 3 9   CHLORIDE mmol/L 102 99* 96* 94*   CO2 mmol/L 26 28 30 31   ANION GAP mmol/L 10 10 7 7   BUN mg/dL 25 17 16 14   CREATININE mg/dL 1 14 1 19 1 45* 1 16   CALCIUM mg/dL 8 1* 8 6 8 6 9 3   GLUCOSE RANDOM mg/dL 134 205* 373* 395*   ALT U/L  --   --  45  --    AST U/L  --   --  21  --    ALK PHOS U/L  --   --  78  --    ALBUMIN g/dL  --   --  3 2*  --    TOTAL BILIRUBIN mg/dL  --   --  0 45  --      Results from last 7 days   Lab Units 04/09/21 0521 04/08/21 0434 04/07/21  1228 04/07/21  0645   MAGNESIUM mg/dL 2 4 2 0 1 9 1 9   PHOSPHORUS mg/dL 5 2* 4 5 2 8  --       Results from last 7 days   Lab Units 04/07/21  0645   INR  0 87      Results from last 7 days   Lab Units 04/08/21 0434 04/07/21  1227 04/07/21  1023   TROPONIN I ng/mL <0 02 <0 02 <0 02     Results from last 7 days   Lab Units 04/08/21  0434 04/07/21  1507 04/07/21  1227 04/07/21  1023   LACTIC ACID mmol/L 1 4 2 6* 2 8* 3 3*     ABG:  Results from last 7 days   Lab Units 04/07/21  1218   PH ART  7 391   PCO2 ART mm Hg 44 8*   PO2 ART mm Hg 89 6   HCO3 ART mmol/L 26 6   BASE EXC ART mmol/L 1 2   ABG SOURCE  Radial, Right     VBG:  Results from last 7 days   Lab Units 04/07/21  1218   ABG SOURCE  Radial, Right     Results from last 7 days   Lab Units 04/08/21  0434 04/07/21  1228 04/07/21  1023   PROCALCITONIN ng/ml <0 05 0 06 0 06       Micro  Results from last 7 days   Lab Units 04/07/21  1136   MRSA CULTURE ONLY  No Methicillin Resistant Staphlyococcus aureus (MRSA) isolated       EKG: NSR  Imaging: I have personally reviewed pertinent reports  and I have personally reviewed pertinent films in PACS    Intake and Output  I/O       04/08 0701 - 04/09 0700 04/09 0701 - 04/10 0700    I V  (mL/kg) 1420 8 (12 5) 881 8 (7 7)    Blood 558     NG/GT 60 265    IV Piggyback      Feedings  140    Total Intake(mL/kg) 2038 8 (17 9) 1286 8 (11 3)    Urine (mL/kg/hr) 1215 (0 4) 2250 (1 5)    Emesis/NG output 145 75    Total Output 1360 2325    Net +678 8 -1038 2                Height and Weights   Height: 5' 4" (162 6 cm)  IBW: 59 2 kg  Body mass index is 43 14 kg/m²  Weight (last 2 days)     Date/Time   Weight    04/09/21 0551   114 (251 32)    04/08/21 0501   112 (246 92)                Nutrition       Diet Orders   (From admission, onward)             Start     Ordered    04/09/21 1059  Diet Enteral/Parenteral; Tube Feeding No Oral Diet; Jevity 1 2 Klever; Continuous; 45; 150; Water; Every 4 hours  Diet effective now     Question Answer Comment   Diet Type Enteral/Parenteral    Enteral/Parenteral Tube Feeding No Oral Diet    Tube Feeding Formula: Jevity 1 2 Klever    Bolus/Cyclic/Continuous Continuous    Tube Feeding Goal Rate (mL/hr): 45    Tube Feeding flush (mL): 150    Water Flush type:  Water    Water flush frequency: Every 4 hours    RD to adjust diet per protocol?  Yes        04/09/21 1059    04/08/21 0837  Room Service  Once     Question:  Type of Service  Answer:  Room Service - Appropriate with Assistance    04/08/21 0836                  Active Medications  Scheduled Meds:  Current Facility-Administered Medications   Medication Dose Route Frequency Provider Last Rate    amLODIPine  5 mg Oral Daily Eliverto Forget, CRNP      atorvastatin  20 mg Oral Daily With Dinner Eliverto Forget, CRNP      chlorhexidine  15 mL Swish & Spit Q12H [address] Eliverto Forget, CRNP      dexmedetomidine  0 1-1 5 mcg/kg/hr Intravenous Titrated Eliverto Forget, CRNP 0 9 mcg/kg/hr (04/10/21 0326)    diphenhydrAMINE  50 mg Intravenous Q6H Eliverto Forget, CRNP      enoxaparin  40 mg Subcutaneous Q12H [address] Eliverto Forget, CRNP      famotidine  20 mg Intravenous Q12H [address] Eliverto Forget, CRNP      fentanyl citrate (PF)  50 mcg Intravenous Q2H PRN Eliverto Forget, CRNP      hydrALAZINE  5 mg Intravenous Q8H PRN Eliverto Forget, CRNP      Icatibant Acetate  30 mg Subcutaneous Once Eliverto Forget, CRNP      insulin glargine  60 Units Subcutaneous QAM Eliverto Forget, CRNP      insulin regular (HumuLIN R,NovoLIN R) infusion  0 3-21 Units/hr Intravenous Titrated Eliverto Forget, CRNP 6 Units/hr (04/10/21 0153)    levothyroxine  175 mcg Oral Daily Eliverto Forget, CRNP      methylPREDNISolone sodium succinate  40 mg Intravenous Q8H [address] Eliverto Forget, CRNP      propofol  5-50 mcg/kg/min Intravenous Titrated Eliverto Forget, CRNP 50 mcg/kg/min (04/10/21 0327)     Continuous Infusions:  dexmedetomidine, 0 1-1 5 mcg/kg/hr, Last Rate: 0 9 mcg/kg/hr (04/10/21 0326)  insulin regular (HumuLIN R,NovoLIN R) infusion, 0 3-21 Units/hr, Last Rate: 6 Units/hr (04/10/21 0153)  propofol, 5-50 mcg/kg/min, Last Rate: 50 mcg/kg/min (04/10/21 0327)      PRN Meds:   fentanyl citrate (PF), 50 mcg, Q2H PRN  hydrALAZINE, 5 mg, Q8H PRN        Invasive Devices Review  Invasive Devices     Peripheral Intravenous Line            Peripheral IV 04/07/21 Right Antecubital 2 days    Peripheral IV 04/08/21 Right Wrist 1 day    Peripheral IV 04/09/21 Left; Lower Arm less than 1 day          Drain            NG/OG/Enteral Tube Orogastric 14 Fr Center mouth 2 days    Urethral Catheter Latex 16 Fr  2 days          Airway            ETT  Cuffed;ANNY 7 mm 2 days                Rationale for remaining devices: ailyn d/t inability to participate  ---------------------------------------------------------------------------------------  Advance Directive and Living Will:      Power of :    POLST:    ---------------------------------------------------------------------------------------  Care Time Delivered:   Upon my evaluation, this patient had a high probability of imminent or life-threatening deterioration due to respiratory failure, which required my direct attention, intervention, and personal management  I have personally provided 60 minutes (00:00 to 01:00) of critical care time, exclusive of procedures, teaching, family meetings, and any prior time recorded by providers other than myself  Nuzhat Ray PA-C      Portions of the record may have been created with voice recognition software  Occasional wrong word or "sound a like" substitutions may have occurred due to the inherent limitations of voice recognition software    Read the chart carefully and recognize, using context, where substitutions have occurred

## 2021-04-11 PROBLEM — J96.01 ACUTE RESPIRATORY FAILURE WITH HYPOXIA (HCC): Status: RESOLVED | Noted: 2021-04-07 | Resolved: 2021-04-11

## 2021-04-11 LAB
ANION GAP SERPL CALCULATED.3IONS-SCNC: 8 MMOL/L (ref 4–13)
BUN SERPL-MCNC: 23 MG/DL (ref 5–25)
CALCIUM SERPL-MCNC: 8.1 MG/DL (ref 8.3–10.1)
CHLORIDE SERPL-SCNC: 105 MMOL/L (ref 100–108)
CO2 SERPL-SCNC: 26 MMOL/L (ref 21–32)
CREAT SERPL-MCNC: 0.89 MG/DL (ref 0.6–1.3)
ERYTHROCYTE [DISTWIDTH] IN BLOOD BY AUTOMATED COUNT: 13.7 % (ref 11.6–15.1)
GFR SERPL CREATININE-BSD FRML MDRD: 96 ML/MIN/1.73SQ M
GLUCOSE SERPL-MCNC: 106 MG/DL (ref 65–140)
GLUCOSE SERPL-MCNC: 120 MG/DL (ref 65–140)
GLUCOSE SERPL-MCNC: 132 MG/DL (ref 65–140)
GLUCOSE SERPL-MCNC: 133 MG/DL (ref 65–140)
GLUCOSE SERPL-MCNC: 142 MG/DL (ref 65–140)
GLUCOSE SERPL-MCNC: 156 MG/DL (ref 65–140)
GLUCOSE SERPL-MCNC: 161 MG/DL (ref 65–140)
GLUCOSE SERPL-MCNC: 180 MG/DL (ref 65–140)
GLUCOSE SERPL-MCNC: 181 MG/DL (ref 65–140)
GLUCOSE SERPL-MCNC: 194 MG/DL (ref 65–140)
GLUCOSE SERPL-MCNC: 96 MG/DL (ref 65–140)
GLUCOSE SERPL-MCNC: 98 MG/DL (ref 65–140)
HCT VFR BLD AUTO: 45.2 % (ref 36.5–49.3)
HGB BLD-MCNC: 14.4 G/DL (ref 12–17)
MAGNESIUM SERPL-MCNC: 2.2 MG/DL (ref 1.6–2.6)
MCH RBC QN AUTO: 28.4 PG (ref 26.8–34.3)
MCHC RBC AUTO-ENTMCNC: 31.9 G/DL (ref 31.4–37.4)
MCV RBC AUTO: 89 FL (ref 82–98)
PLATELET # BLD AUTO: 157 THOUSANDS/UL (ref 149–390)
PMV BLD AUTO: 9.5 FL (ref 8.9–12.7)
POTASSIUM SERPL-SCNC: 3.9 MMOL/L (ref 3.5–5.3)
RBC # BLD AUTO: 5.07 MILLION/UL (ref 3.88–5.62)
SODIUM SERPL-SCNC: 139 MMOL/L (ref 136–145)
TRIGL SERPL-MCNC: 284 MG/DL
TRYPTASE SERPL-MCNC: 4.2 UG/L (ref 2.2–13.2)
WBC # BLD AUTO: 12.86 THOUSAND/UL (ref 4.31–10.16)

## 2021-04-11 PROCEDURE — 80048 BASIC METABOLIC PNL TOTAL CA: CPT | Performed by: NURSE PRACTITIONER

## 2021-04-11 PROCEDURE — 83735 ASSAY OF MAGNESIUM: CPT | Performed by: NURSE PRACTITIONER

## 2021-04-11 PROCEDURE — 94760 N-INVAS EAR/PLS OXIMETRY 1: CPT

## 2021-04-11 PROCEDURE — 82948 REAGENT STRIP/BLOOD GLUCOSE: CPT

## 2021-04-11 PROCEDURE — 99291 CRITICAL CARE FIRST HOUR: CPT | Performed by: ANESTHESIOLOGY

## 2021-04-11 PROCEDURE — 85027 COMPLETE CBC AUTOMATED: CPT | Performed by: NURSE PRACTITIONER

## 2021-04-11 PROCEDURE — 94003 VENT MGMT INPAT SUBQ DAY: CPT

## 2021-04-11 PROCEDURE — 84478 ASSAY OF TRIGLYCERIDES: CPT | Performed by: NURSE PRACTITIONER

## 2021-04-11 RX ORDER — NICOTINE 21 MG/24HR
21 PATCH, TRANSDERMAL 24 HOURS TRANSDERMAL DAILY
Status: DISCONTINUED | OUTPATIENT
Start: 2021-04-12 | End: 2021-04-15 | Stop reason: HOSPADM

## 2021-04-11 RX ORDER — DULOXETIN HYDROCHLORIDE 60 MG/1
60 CAPSULE, DELAYED RELEASE ORAL DAILY
Status: DISCONTINUED | OUTPATIENT
Start: 2021-04-12 | End: 2021-04-15 | Stop reason: HOSPADM

## 2021-04-11 RX ORDER — ATORVASTATIN CALCIUM 40 MG/1
40 TABLET, FILM COATED ORAL
Status: DISCONTINUED | OUTPATIENT
Start: 2021-04-11 | End: 2021-04-15 | Stop reason: HOSPADM

## 2021-04-11 RX ORDER — KETOROLAC TROMETHAMINE 30 MG/ML
30 INJECTION, SOLUTION INTRAMUSCULAR; INTRAVENOUS ONCE
Status: COMPLETED | OUTPATIENT
Start: 2021-04-11 | End: 2021-04-11

## 2021-04-11 RX ORDER — FUROSEMIDE 10 MG/ML
40 INJECTION INTRAMUSCULAR; INTRAVENOUS ONCE
Status: COMPLETED | OUTPATIENT
Start: 2021-04-11 | End: 2021-04-11

## 2021-04-11 RX ORDER — PROPOFOL 10 MG/ML
5-50 INJECTION, EMULSION INTRAVENOUS
Status: DISCONTINUED | OUTPATIENT
Start: 2021-04-11 | End: 2021-04-11

## 2021-04-11 RX ADMIN — METHYLPREDNISOLONE SODIUM SUCCINATE 20 MG: 40 INJECTION, POWDER, FOR SOLUTION INTRAMUSCULAR; INTRAVENOUS at 05:51

## 2021-04-11 RX ADMIN — DEXMEDETOMIDINE HYDROCHLORIDE 1.5 MCG/KG/HR: 100 INJECTION, SOLUTION INTRAVENOUS at 04:04

## 2021-04-11 RX ADMIN — FAMOTIDINE 20 MG: 10 INJECTION INTRAVENOUS at 08:09

## 2021-04-11 RX ADMIN — DIPHENHYDRAMINE HYDROCHLORIDE 50 MG: 50 INJECTION, SOLUTION INTRAMUSCULAR; INTRAVENOUS at 23:52

## 2021-04-11 RX ADMIN — KETOROLAC TROMETHAMINE 30 MG: 30 INJECTION, SOLUTION INTRAMUSCULAR at 21:52

## 2021-04-11 RX ADMIN — ENOXAPARIN SODIUM 40 MG: 40 INJECTION SUBCUTANEOUS at 21:52

## 2021-04-11 RX ADMIN — METHYLPREDNISOLONE SODIUM SUCCINATE 20 MG: 40 INJECTION, POWDER, FOR SOLUTION INTRAMUSCULAR; INTRAVENOUS at 21:52

## 2021-04-11 RX ADMIN — DIPHENHYDRAMINE HYDROCHLORIDE 50 MG: 50 INJECTION, SOLUTION INTRAMUSCULAR; INTRAVENOUS at 05:51

## 2021-04-11 RX ADMIN — FUROSEMIDE 40 MG: 10 INJECTION, SOLUTION INTRAMUSCULAR; INTRAVENOUS at 21:52

## 2021-04-11 RX ADMIN — SODIUM CHLORIDE 6 UNITS/HR: 9 INJECTION, SOLUTION INTRAVENOUS at 05:19

## 2021-04-11 RX ADMIN — LEVOTHYROXINE SODIUM 175 MCG: 150 TABLET ORAL at 05:51

## 2021-04-11 RX ADMIN — DEXMEDETOMIDINE HYDROCHLORIDE 1.5 MCG/KG/HR: 100 INJECTION, SOLUTION INTRAVENOUS at 09:46

## 2021-04-11 RX ADMIN — DEXMEDETOMIDINE HYDROCHLORIDE 1.5 MCG/KG/HR: 100 INJECTION, SOLUTION INTRAVENOUS at 06:39

## 2021-04-11 RX ADMIN — DIPHENHYDRAMINE HYDROCHLORIDE 50 MG: 50 INJECTION, SOLUTION INTRAMUSCULAR; INTRAVENOUS at 12:02

## 2021-04-11 RX ADMIN — PROPOFOL 40 MCG/KG/MIN: 10 INJECTION, EMULSION INTRAVENOUS at 07:45

## 2021-04-11 RX ADMIN — DEXMEDETOMIDINE HYDROCHLORIDE 1.5 MCG/KG/HR: 100 INJECTION, SOLUTION INTRAVENOUS at 01:39

## 2021-04-11 RX ADMIN — INSULIN GLARGINE 60 UNITS: 100 INJECTION, SOLUTION SUBCUTANEOUS at 08:02

## 2021-04-11 RX ADMIN — CHLORHEXIDINE GLUCONATE 0.12% ORAL RINSE 15 ML: 1.2 LIQUID ORAL at 08:03

## 2021-04-11 RX ADMIN — PROPOFOL 50 MCG/KG/MIN: 10 INJECTION, EMULSION INTRAVENOUS at 04:48

## 2021-04-11 RX ADMIN — FAMOTIDINE 20 MG: 10 INJECTION INTRAVENOUS at 21:52

## 2021-04-11 RX ADMIN — ENOXAPARIN SODIUM 40 MG: 40 INJECTION SUBCUTANEOUS at 08:17

## 2021-04-11 RX ADMIN — PROPOFOL 50 MCG/KG/MIN: 10 INJECTION, EMULSION INTRAVENOUS at 01:40

## 2021-04-11 RX ADMIN — DIPHENHYDRAMINE HYDROCHLORIDE 50 MG: 50 INJECTION, SOLUTION INTRAMUSCULAR; INTRAVENOUS at 17:50

## 2021-04-11 RX ADMIN — METHYLPREDNISOLONE SODIUM SUCCINATE 20 MG: 40 INJECTION, POWDER, FOR SOLUTION INTRAMUSCULAR; INTRAVENOUS at 14:03

## 2021-04-11 RX ADMIN — AMLODIPINE BESYLATE 5 MG: 5 TABLET ORAL at 08:03

## 2021-04-11 NOTE — PROGRESS NOTES
Uriel 44  Progress Note - Juan Benitez 1965, 63 y o  male MRN: [id number]  Unit/Bed#: ICU 11 Encounter: [id number]  Primary Care Provider: MARLEEN Ha   Date and time admitted to hospital: 4/7/2021 11:24 AM    * Angioedema  Assessment & Plan  · Unclear etiology  Initially with angioedema episode 2016 attributed to ACE, but ongoing, intermittent episodes of angioedema in 0599-7768  Was recommended to follow up with immunology/allergist but was lost to follow-up  · Now presenting this AM with worsening tongue edema since 0300  Self administered Epi-pen and benadryl without relief  Presented to [facility] ED early this AM with worsening tongue edema/protrusion from mouth  · Required intubation and mechanical ventilation in the operating room by Anesthesia for airway protection and hypoxia 4/7  Transferred to [address] [place] [address] for ICU mgt  · Vent settings: 24/480/50%/10  Titrate FiO2 for goal SpO2 >90%  Propofol and precedex with goal RASS -2  · Received 2U FFP 4/7 and another 2u FFP 4/8  · Received 1G Tranexamic acid 4/6 and monitor for effect  · Received dexamethasone 10 mg IV x 1 dose in the emergency department  · Continue methylprednisolone 40 mg IV every 8 hours, famotidine 20mg IV q12, diphenhydramine 50mg q6h  · Given Icatibant x1 on 4/10, consider second dose if swelling persists  · C4 normal at 18  CRP 4 3, Sed rate 27  · C1 esterase inhibitor level 30  · Will require outpatient Allergy/Immunology evaluation on discharge  · Consult ENT given severity of angioedema  Will also reach out to allergist today given minimal improvement in tongue edema       Acute respiratory failure with hypoxia (HCC)  Assessment & Plan  · Intubated for airway protection 2/2 severe tongue swelling and angioedema  · Hypoxemia and respiratory acidosis noted on first ABG shortly after intubation  · Per chart, history of mild COPD though no formal PFT's noted  Add PRN neb tx's if wheezing   None audible on exam    · CXR showing low lung volumes with probable CHF  · Diurese as tolerated  · Continue mechanical ventilation  · Current settings: 18/500/40/8  · Continue to wean as tolerated  · Daily SBT and SAT  · Wean FiO2 for O2 sat >90%  · Cont  pulmonary hygiene  · Sedation:  · Propofol  · Precedex  · Fentanyl  · prns    Hypertension  Assessment & Plan  · BP on arrival noted at 103/69 in setting of intubation/sedation medications  · Now more stable with few readings in 160's  · Continue amlodipine 5mg   · Avoid ACE-I given angioedema    Type 2 diabetes, uncontrolled, with neuropathy [facility])  Assessment & Plan  Lab Results   Component Value Date    HGBA1C 11 7 (H) 02/05/2021       Recent Labs     04/10/21  1449 04/10/21  1544 04/10/21  1827 04/10/21  2012   POCGLU 151* 139 208* 165*       Blood Sugar Average: Last 72 hrs:  (P) 973 [id number]     · History of non-compliance with diabetic regimen  See above A1c from 2/21  · Continue Lantus 60u daily and insulin gtt while on steroid, once stable consider transitioning to sq  · Monitor BG per protocol    Hypothyroidism  Assessment & Plan  · Last TSH 0 139 2/25/21  · Continue levothyroxine 175 mcg daily    Morbid obesity ([address] )  Assessment & Plan  · Follow up with nutritional counseling when appropriate  Previously declined  · Likely contributing to MARSHA    Obstructive sleep apnea  Assessment & Plan  · Prior sleep study showing severe MARSHA  Lost to follow up  · Currently intubated   · Will require sleep study for titration of positive pressure ventilation OP  · Body habitus likely contributing    Depression with anxiety  Assessment & Plan  · Hold antidepressants given tentative respiratory status and IV sedation  · Restart home trazodone and Cymbalta upon extubation      ----------------------------------------------------------------------------------------  HPI/24hr events: no overnight events      Disposition: Continue Critical Care   Code Status: Level 1 - Full Code  ---------------------------------------------------------------------------------------  SUBJECTIVE      Review of Systems   Unable to perform ROS: Intubated     Review of systems was unable to be performed secondary to intubated  ---------------------------------------------------------------------------------------  OBJECTIVE    Vitals   Vitals:    04/10/21 2200 04/10/21 2300 04/10/21 2355 21 0000   BP: 153/89 157/90  132/85   Pulse: 73 76  79   Resp: (!) 27 (!) 27  (!) 40   Temp:    (!) 96 8 °F (36 °C)   TempSrc:    Temporal   SpO2: 93% 93% 96% 96%   Weight:       Height:         Temp (24hrs), Av 3 °F (36 3 °C), Min:96 7 °F (35 9 °C), Max:98 8 °F (37 1 °C)  Current: Temperature: (!) 96 8 °F (36 °C)          Respiratory:  SpO2: SpO2: 96 %       Invasive/non-invasive ventilation settings   Respiratory    Lab Data (Last 4 hours)    None         O2/Vent Data (Last 4 hours)    None                Physical Exam  Vitals signs and nursing note reviewed  Constitutional:       General: He is not in acute distress  Appearance: He is obese  He is not ill-appearing or toxic-appearing  HENT:      Head: Normocephalic and atraumatic  Nose: Nose normal       Mouth/Throat:      Mouth: Mucous membranes are moist       Pharynx: Oropharynx is clear  Comments: ETT  Tongue swelling significantly improved  Eyes:      Conjunctiva/sclera: Conjunctivae normal       Pupils: Pupils are equal, round, and reactive to light  Neck:      Musculoskeletal: Neck supple  Cardiovascular:      Rate and Rhythm: Normal rate and regular rhythm  Pulses: Normal pulses  Heart sounds: Normal heart sounds  Pulmonary:      Effort: Pulmonary effort is normal  No respiratory distress  Breath sounds: Normal breath sounds  No wheezing or rhonchi  Abdominal:      General: Abdomen is flat  Bowel sounds are normal  There is no distension  Palpations: Abdomen is soft  Tenderness:  There is no abdominal tenderness  Musculoskeletal:      Right lower leg: No edema  Left lower leg: No edema  Skin:     General: Skin is warm and dry  Capillary Refill: Capillary refill takes less than 2 seconds  Neurological:      General: No focal deficit present        Comments: intuabted/sedated         Laboratory and Diagnostics:  Results from last 7 days   Lab Units 04/10/21  0529 04/09/21  0521 04/08/21  0434 04/07/21  1227 04/07/21  1023 04/07/21  0645   WBC Thousand/uL 11 27* 10 70* 13 92*  --   --  9 41   HEMOGLOBIN g/dL 15 1 14 6 14 7 14 6 15 4 15 5   HEMATOCRIT % 47 9 44 7 44 4 44 5 47 4 46 9   PLATELETS Thousands/uL 188 221 238  --   --  279   NEUTROS PCT % 76* 77* 83*  --   --  61   MONOS PCT % 13* 8 8  --   --  11     Results from last 7 days   Lab Units 04/10/21  0529 04/09/21  0521 04/08/21  0434 04/07/21  1228 04/07/21  0645   SODIUM mmol/L 142 138 137 133* 132*   POTASSIUM mmol/L 3 7 3 8 3 6 5 5* 3 9   CHLORIDE mmol/L 104 102 99* 96* 94*   CO2 mmol/L 28 26 28 30 31   ANION GAP mmol/L 10 10 10 7 7   BUN mg/dL 29* 25 17 16 14   CREATININE mg/dL 1 16 1 14 1 19 1 45* 1 16   CALCIUM mg/dL 8 3 8 1* 8 6 8 6 9 3   GLUCOSE RANDOM mg/dL 189* 134 205* 373* 395*   ALT U/L  --   --   --  45  --    AST U/L  --   --   --  21  --    ALK PHOS U/L  --   --   --  78  --    ALBUMIN g/dL  --   --   --  3 2*  --    TOTAL BILIRUBIN mg/dL  --   --   --  0 45  --      Results from last 7 days   Lab Units 04/10/21  0529 04/09/21  0521 04/08/21  0434 04/07/21  1228 04/07/21  0645   MAGNESIUM mg/dL 2 5 2 4 2 0 1 9 1 9   PHOSPHORUS mg/dL 4 4 5 2* 4 5 2 8  --       Results from last 7 days   Lab Units 04/07/21  0645   INR  0 87      Results from last 7 days   Lab Units 04/08/21  0434 04/07/21  1227 04/07/21  1023   TROPONIN I ng/mL <0 02 <0 02 <0 02     Results from last 7 days   Lab Units 04/08/21  0434 04/07/21  1507 04/07/21  1227 04/07/21  1023   LACTIC ACID mmol/L 1 4 2 6* 2 8* 3 3*     ABG:  Results from last 7 days   Lab Units 04/07/21  1218   PH ART  7 391   PCO2 ART mm Hg 44 8*   PO2 ART mm Hg 89 6   HCO3 ART mmol/L 26 6   BASE EXC ART mmol/L 1 2   ABG SOURCE  Radial, Right     VBG:  Results from last 7 days   Lab Units 04/07/21  1218   ABG SOURCE  Radial, Right     Results from last 7 days   Lab Units 04/08/21  0434 04/07/21  1228 04/07/21  1023   PROCALCITONIN ng/ml <0 05 0 06 0 06       Micro  Results from last 7 days   Lab Units 04/07/21  1136   MRSA CULTURE ONLY  No Methicillin Resistant Staphlyococcus aureus (MRSA) isolated       EKG: NSR  Imaging: I have personally reviewed pertinent reports  and I have personally reviewed pertinent films in PACS    Intake and Output  I/O       04/09 0701 - 04/10 0700 04/10 0701 - 04/11 0700    I V  (mL/kg) 1662 2 (15 1) 996 3 (9 1)    Blood      NG/ 525    Feedings 665 630    Total Intake(mL/kg) 3042 2 (27 7) 2151 3 (19 6)    Urine (mL/kg/hr) 2825 (1 1) 1250 (0 8)    Emesis/NG output 75     Total Output 2900 1250    Net +142 2 +901 3                Height and Weights   Height: 5' 4" (162 6 cm)  IBW: 59 2 kg  Body mass index is 41 63 kg/m²  Weight (last 2 days)     Date/Time   Weight    04/10/21 0600   110 (242 51)    04/09/21 0551   114 (251 32)                Nutrition       Diet Orders   (From admission, onward)             Start     Ordered    04/09/21 1059  Diet Enteral/Parenteral; Tube Feeding No Oral Diet; Jevity 1 2 Klever; Continuous; 45; 150; Water; Every 4 hours  Diet effective now     Question Answer Comment   Diet Type Enteral/Parenteral    Enteral/Parenteral Tube Feeding No Oral Diet    Tube Feeding Formula: Jevity 1 2 Klever    Bolus/Cyclic/Continuous Continuous    Tube Feeding Goal Rate (mL/hr): 45    Tube Feeding flush (mL): 150    Water Flush type: Water    Water flush frequency: Every 4 hours    RD to adjust diet per protocol?  Yes        04/09/21 1059    04/08/21 0837  Room Service  Once     Question:  Type of Service  Answer:  Room Service - Appropriate with Assistance 04/08/21 0836                  Active Medications  Scheduled Meds:  Current Facility-Administered Medications   Medication Dose Route Frequency Provider Last Rate    amLODIPine  5 mg Oral Daily Sharonkelli Garcia, CRNP      atorvastatin  20 mg Oral Daily With Dinner Bolucikelli Garcia, CRNP      chlorhexidine  15 mL Swish & Spit Q12H [address] Wilelmenia Rincon, CRNP      dexmedetomidine  0 1-1 5 mcg/kg/hr Intravenous Titrated Zay, PA-C 1 5 mcg/kg/hr (04/10/21 2248)    diphenhydrAMINE  50 mg Intravenous Q6H Sharonia Rincon, CRNP      enoxaparin  40 mg Subcutaneous Q12H [address] Wilelmenia Rincon, CRNP      famotidine  20 mg Intravenous Q12H [address] Wilelmenia Rincon, CRNP      fentaNYL  50 mcg/hr Intravenous Continuous Sarah Spironello V, CRNP 50 mcg/hr (04/10/21 1708)    fentanyl citrate (PF)  50 mcg Intravenous Q2H PRN Sharonia Rincon, CRNP      hydrALAZINE  5 mg Intravenous Q8H PRN Sharonia Rincon, CRNP      insulin glargine  60 Units Subcutaneous QAM Juhi Rincon, CRNP      insulin regular (HumuLIN R,NovoLIN R) infusion  0 3-21 Units/hr Intravenous Titrated Sharonkelli Garcia, CRNP 6 Units/hr (04/10/21 2013)    levothyroxine  175 mcg Oral Daily Bolucikelli Garcia, CRNP      methylPREDNISolone sodium succinate  20 mg Intravenous Q8H [address] Sarah Spironello V, CRNP      propofol  5-50 mcg/kg/min Intravenous Titrated Zay, PA-C 50 mcg/kg/min (04/10/21 2249)     Continuous Infusions:  dexmedetomidine, 0 1-1 5 mcg/kg/hr, Last Rate: 1 5 mcg/kg/hr (04/10/21 2248)  fentaNYL, 50 mcg/hr, Last Rate: 50 mcg/hr (04/10/21 1708)  insulin regular (HumuLIN R,NovoLIN R) infusion, 0 3-21 Units/hr, Last Rate: 6 Units/hr (04/10/21 2013)  propofol, 5-50 mcg/kg/min, Last Rate: 50 mcg/kg/min (04/10/21 3750)      PRN Meds:   fentanyl citrate (PF), 50 mcg, Q2H PRN  hydrALAZINE, 5 mg, Q8H PRN        Invasive Devices Review  Invasive Devices     Peripheral Intravenous Line            Peripheral IV 04/07/21 Right Antecubital 3 days Peripheral IV 04/08/21 Right Wrist 2 days    Peripheral IV 04/09/21 Left; Lower Arm 1 day          Drain            NG/OG/Enteral Tube Orogastric 14 Fr Center mouth 3 days    Urethral Catheter Latex 16 Fr  3 days          Airway            ETT  Cuffed;ANNY 7 mm 3 days                Rationale for remaining devices: n/a  ---------------------------------------------------------------------------------------  Advance Directive and Living Will:      Power of :    POLST:    ---------------------------------------------------------------------------------------  Care Time Delivered:   No Critical Care time spent       Yvonne Buck PA-C      Portions of the record may have been created with voice recognition software  Occasional wrong word or "sound a like" substitutions may have occurred due to the inherent limitations of voice recognition software    Read the chart carefully and recognize, using context, where substitutions have occurred

## 2021-04-11 NOTE — ASSESSMENT & PLAN NOTE
· BP on arrival noted at 103/69 in setting of intubation/sedation medications  · Now more stable with few readings in 160's  · Continue amlodipine 5mg   · Avoid ACE-I given angioedema

## 2021-04-11 NOTE — ASSESSMENT & PLAN NOTE
· Unclear etiology  Initially with angioedema episode 2016 attributed to ACE, but ongoing, intermittent episodes of angioedema in 0766-2702  Was recommended to follow up with immunology/allergist but was lost to follow-up  · Now presenting this AM with worsening tongue edema since 0300  Self administered Epi-pen and benadryl without relief  Presented to [facility] ED early this AM with worsening tongue edema/protrusion from mouth  · Required intubation and mechanical ventilation in the operating room by Anesthesia for airway protection and hypoxia 4/7  Transferred to [address] [place] [address] for ICU mgt  · Vent settings: 24/480/50%/10  Titrate FiO2 for goal SpO2 >90%  Propofol and precedex with goal RASS -2  · Received 2U FFP 4/7 and another 2u FFP 4/8  · Received 1G Tranexamic acid 4/6 and monitor for effect  · Received dexamethasone 10 mg IV x 1 dose in the emergency department  · Continue methylprednisolone 40 mg IV every 8 hours, famotidine 20mg IV q12, diphenhydramine 50mg q6h  · Given Icatibant x1 on 4/10, consider second dose if swelling persists  · C4 normal at 18  CRP 4 3, Sed rate 27  · C1 esterase inhibitor level 30  · Will require outpatient Allergy/Immunology evaluation on discharge  · Consult ENT given severity of angioedema   Will also reach out to allergist today given minimal improvement in tongue edema

## 2021-04-11 NOTE — ASSESSMENT & PLAN NOTE
Lab Results   Component Value Date    HGBA1C 11 7 (H) 02/05/2021       Recent Labs     04/10/21  1449 04/10/21  1544 04/10/21  1827 04/10/21  2012   POCGLU 151* 139 208* 165*       Blood Sugar Average: Last 72 hrs:  (P) 388 [id number]     · History of non-compliance with diabetic regimen   See above A1c from 2/21  · Continue Lantus 60u daily and insulin gtt while on steroid, once stable consider transitioning to sq  · Monitor BG per protocol

## 2021-04-11 NOTE — ASSESSMENT & PLAN NOTE
· Intubated for airway protection 2/2 severe tongue swelling and angioedema  · Hypoxemia and respiratory acidosis noted on first ABG shortly after intubation  · Per chart, history of mild COPD though no formal PFT's noted  Add PRN neb tx's if wheezing  None audible on exam    · CXR showing low lung volumes with probable CHF  · Diurese as tolerated  · Continue mechanical ventilation  · Current settings: 18/500/40/8  · Continue to wean as tolerated  · Daily SBT and SAT  · Wean FiO2 for O2 sat >90%  · Cont   pulmonary hygiene  · Sedation:  · Propofol  · Precedex  · Fentanyl  · prns

## 2021-04-12 LAB
ANION GAP SERPL CALCULATED.3IONS-SCNC: 9 MMOL/L (ref 4–13)
BUN SERPL-MCNC: 32 MG/DL (ref 5–25)
CALCIUM SERPL-MCNC: 8.3 MG/DL (ref 8.3–10.1)
CHLORIDE SERPL-SCNC: 106 MMOL/L (ref 100–108)
CO2 SERPL-SCNC: 27 MMOL/L (ref 21–32)
CREAT SERPL-MCNC: 1.03 MG/DL (ref 0.6–1.3)
ERYTHROCYTE [DISTWIDTH] IN BLOOD BY AUTOMATED COUNT: 13.8 % (ref 11.6–15.1)
GFR SERPL CREATININE-BSD FRML MDRD: 81 ML/MIN/1.73SQ M
GLUCOSE SERPL-MCNC: 142 MG/DL (ref 65–140)
GLUCOSE SERPL-MCNC: 163 MG/DL (ref 65–140)
GLUCOSE SERPL-MCNC: 171 MG/DL (ref 65–140)
GLUCOSE SERPL-MCNC: 229 MG/DL (ref 65–140)
HCT VFR BLD AUTO: 46.5 % (ref 36.5–49.3)
HGB BLD-MCNC: 14.8 G/DL (ref 12–17)
MAGNESIUM SERPL-MCNC: 2.6 MG/DL (ref 1.6–2.6)
MCH RBC QN AUTO: 28.2 PG (ref 26.8–34.3)
MCHC RBC AUTO-ENTMCNC: 31.8 G/DL (ref 31.4–37.4)
MCV RBC AUTO: 89 FL (ref 82–98)
PLATELET # BLD AUTO: 180 THOUSANDS/UL (ref 149–390)
PMV BLD AUTO: 9.8 FL (ref 8.9–12.7)
POTASSIUM SERPL-SCNC: 3.7 MMOL/L (ref 3.5–5.3)
RBC # BLD AUTO: 5.24 MILLION/UL (ref 3.88–5.62)
SODIUM SERPL-SCNC: 142 MMOL/L (ref 136–145)
WBC # BLD AUTO: 11.87 THOUSAND/UL (ref 4.31–10.16)

## 2021-04-12 PROCEDURE — 80048 BASIC METABOLIC PNL TOTAL CA: CPT | Performed by: NURSE PRACTITIONER

## 2021-04-12 PROCEDURE — 83735 ASSAY OF MAGNESIUM: CPT | Performed by: NURSE PRACTITIONER

## 2021-04-12 PROCEDURE — 85027 COMPLETE CBC AUTOMATED: CPT | Performed by: NURSE PRACTITIONER

## 2021-04-12 PROCEDURE — 99291 CRITICAL CARE FIRST HOUR: CPT | Performed by: INTERNAL MEDICINE

## 2021-04-12 PROCEDURE — 82948 REAGENT STRIP/BLOOD GLUCOSE: CPT

## 2021-04-12 PROCEDURE — 92610 EVALUATE SWALLOWING FUNCTION: CPT

## 2021-04-12 RX ORDER — POTASSIUM CHLORIDE 14.9 MG/ML
20 INJECTION INTRAVENOUS ONCE
Status: COMPLETED | OUTPATIENT
Start: 2021-04-12 | End: 2021-04-12

## 2021-04-12 RX ORDER — DIPHENHYDRAMINE HCL 25 MG
25 TABLET ORAL EVERY 6 HOURS SCHEDULED
Status: DISCONTINUED | OUTPATIENT
Start: 2021-04-12 | End: 2021-04-13

## 2021-04-12 RX ORDER — CARVEDILOL 6.25 MG/1
6.25 TABLET ORAL 2 TIMES DAILY WITH MEALS
Status: DISCONTINUED | OUTPATIENT
Start: 2021-04-12 | End: 2021-04-15 | Stop reason: HOSPADM

## 2021-04-12 RX ORDER — FAMOTIDINE 20 MG/1
20 TABLET, FILM COATED ORAL 2 TIMES DAILY
Status: DISCONTINUED | OUTPATIENT
Start: 2021-04-12 | End: 2021-04-15 | Stop reason: HOSPADM

## 2021-04-12 RX ORDER — PREDNISONE 20 MG/1
40 TABLET ORAL DAILY
Status: DISCONTINUED | OUTPATIENT
Start: 2021-04-13 | End: 2021-04-15 | Stop reason: HOSPADM

## 2021-04-12 RX ORDER — TAMSULOSIN HYDROCHLORIDE 0.4 MG/1
0.4 CAPSULE ORAL
Status: DISCONTINUED | OUTPATIENT
Start: 2021-04-12 | End: 2021-04-15 | Stop reason: HOSPADM

## 2021-04-12 RX ADMIN — INSULIN LISPRO 1 UNITS: 100 INJECTION, SOLUTION INTRAVENOUS; SUBCUTANEOUS at 06:25

## 2021-04-12 RX ADMIN — METHYLPREDNISOLONE SODIUM SUCCINATE 20 MG: 40 INJECTION, POWDER, FOR SOLUTION INTRAMUSCULAR; INTRAVENOUS at 05:41

## 2021-04-12 RX ADMIN — DIPHENHYDRAMINE HYDROCHLORIDE 50 MG: 50 INJECTION, SOLUTION INTRAMUSCULAR; INTRAVENOUS at 05:41

## 2021-04-12 RX ADMIN — TAMSULOSIN HYDROCHLORIDE 0.4 MG: 0.4 CAPSULE ORAL at 12:22

## 2021-04-12 RX ADMIN — POTASSIUM CHLORIDE 20 MEQ: 14.9 INJECTION, SOLUTION INTRAVENOUS at 09:17

## 2021-04-12 RX ADMIN — DIPHENHYDRAMINE HYDROCHLORIDE 25 MG: 25 TABLET ORAL at 17:04

## 2021-04-12 RX ADMIN — CARVEDILOL 6.25 MG: 6.25 TABLET, FILM COATED ORAL at 12:19

## 2021-04-12 RX ADMIN — FAMOTIDINE 20 MG: 20 TABLET ORAL at 17:07

## 2021-04-12 RX ADMIN — INSULIN GLARGINE 60 UNITS: 100 INJECTION, SOLUTION SUBCUTANEOUS at 10:02

## 2021-04-12 RX ADMIN — ATORVASTATIN CALCIUM 40 MG: 40 TABLET, FILM COATED ORAL at 16:52

## 2021-04-12 RX ADMIN — INSULIN LISPRO 1 UNITS: 100 INJECTION, SOLUTION INTRAVENOUS; SUBCUTANEOUS at 16:51

## 2021-04-12 RX ADMIN — NICOTINE 21 MG: 21 PATCH, EXTENDED RELEASE TRANSDERMAL at 09:21

## 2021-04-12 RX ADMIN — ENOXAPARIN SODIUM 40 MG: 40 INJECTION SUBCUTANEOUS at 09:20

## 2021-04-12 RX ADMIN — ENOXAPARIN SODIUM 40 MG: 40 INJECTION SUBCUTANEOUS at 20:38

## 2021-04-12 RX ADMIN — FAMOTIDINE 20 MG: 10 INJECTION INTRAVENOUS at 08:58

## 2021-04-12 RX ADMIN — DIPHENHYDRAMINE HYDROCHLORIDE 50 MG: 50 INJECTION, SOLUTION INTRAMUSCULAR; INTRAVENOUS at 12:17

## 2021-04-12 RX ADMIN — CARVEDILOL 6.25 MG: 6.25 TABLET, FILM COATED ORAL at 16:52

## 2021-04-12 RX ADMIN — INSULIN LISPRO 2 UNITS: 100 INJECTION, SOLUTION INTRAVENOUS; SUBCUTANEOUS at 12:05

## 2021-04-12 NOTE — ASSESSMENT & PLAN NOTE
· Intubated for airway protection 2/2 severe tongue swelling and angioedema  · Hypoxemia and respiratory acidosis noted on first ABG shortly after intubation  · Per chart, history of mild COPD though no formal PFT's noted  Add PRN neb tx's if wheezing   None audible on exam    · CXR showing low lung volumes with probable CHF  · 4/11 Patient successfully extubated  · Continue supplemental o2  · Currently requiring 6L NC  · Continue to wean as tolerated  · continue diuresis as tolerated

## 2021-04-12 NOTE — ASSESSMENT & PLAN NOTE
Lab Results   Component Value Date    HGBA1C 11 7 (H) 02/05/2021       Recent Labs     04/11/21  1200 04/11/21  1358 04/11/21  1604 04/11/21  1752   POCGLU 120 96 98 133       Blood Sugar Average: Last 72 hrs:  (P) 151  [id number]     · History of non-compliance with diabetic regimen   See above A1c from 2/21  · Continue Lantus 60u daily and iss

## 2021-04-12 NOTE — ASSESSMENT & PLAN NOTE
· Unclear etiology  Initially with angioedema episode 2016 attributed to ACE, but ongoing, intermittent episodes of angioedema in 3625-0243  Was recommended to follow up with immunology/allergist but was lost to follow-up  · Now presenting this AM with worsening tongue edema since 0300  Self administered Epi-pen and benadryl without relief  Presented to [facility] ED early this AM with worsening tongue edema/protrusion from mouth  · Required intubation and mechanical ventilation in the operating room by Anesthesia for airway protection and hypoxia 4/7  Transferred to \[facility]\"" for ICU mgt  · Received 2U FFP 4/7 and another 2u FFP 4/8  · Received 1G Tranexamic acid 4/6 and monitor for effect  · Received dexamethasone 10 mg IV x 1 dose in the emergency department  · Continue methylprednisolone 40 mg IV every 8 hours, famotidine 20mg IV q12, diphenhydramine 50mg q6h  · Given Icatibant x1 on 4/10, consider second dose if swelling persists  · C4 normal at 18   CRP 4 3, Sed rate 27  · C1 esterase inhibitor level 30  · Will require outpatient Allergy/Immunology evaluation on discharge  · ENT follows

## 2021-04-12 NOTE — PROGRESS NOTES
Uriel [address]  Progress Note - Mayito Mata 1965, 63 y o  male MRN: [id number]  Unit/Bed#: ICU 11 Encounter: [id number]  Primary Care Provider: MARLEEN Jerry   Date and time admitted to hospital: 4/7/2021 11:24 AM    * Angioedema  Assessment & Plan  · Unclear etiology  Initially with angioedema episode 2016 attributed to ACE, but ongoing, intermittent episodes of angioedema in 2934-4525  Was recommended to follow up with immunology/allergist but was lost to follow-up  · Now presenting this AM with worsening tongue edema since 0300  Self administered Epi-pen and benadryl without relief  Presented to [facility] ED early this AM with worsening tongue edema/protrusion from mouth  · Required intubation and mechanical ventilation in the operating room by Anesthesia for airway protection and hypoxia 4/7  Transferred to [facility] for ICU mgt  · Received 2U FFP 4/7 and another 2u FFP 4/8  · Received 1G Tranexamic acid 4/6 and monitor for effect  · Received dexamethasone 10 mg IV x 1 dose in the emergency department  · Continue methylprednisolone 40 mg IV every 8 hours, famotidine 20mg IV q12, diphenhydramine 50mg q6h  · Given Icatibant x1 on 4/10, consider second dose if swelling persists  · C4 normal at 18  CRP 4 3, Sed rate 27  · C1 esterase inhibitor level 30  · Will require outpatient Allergy/Immunology evaluation on discharge  · ENT follows       Acute respiratory failure with hypoxia (HCC)  Assessment & Plan  · Intubated for airway protection 2/2 severe tongue swelling and angioedema  · Hypoxemia and respiratory acidosis noted on first ABG shortly after intubation  · Per chart, history of mild COPD though no formal PFT's noted  Add PRN neb tx's if wheezing   None audible on exam    · CXR showing low lung volumes with probable CHF  · 4/11 Patient successfully extubated  · Continue supplemental o2  · Currently requiring 6L NC  · Continue to wean as tolerated  · continue diuresis as tolerated    Hypertension  Assessment & Plan  · BP on arrival noted at 103/69 in setting of intubation/sedation medications  · Now more stable with few readings in 160's  · Continue amlodipine 5mg   · Avoid ACE-I given angioedema    Type 2 diabetes, uncontrolled, with neuropathy [facility])  Assessment & Plan  Lab Results   Component Value Date    HGBA1C 11 7 (H) 02/05/2021       Recent Labs     04/11/21  1200 04/11/21  1358 04/11/21  1604 04/11/21  1752   POCGLU 120 96 98 133       Blood Sugar Average: Last 72 hrs:  (P) 151  [id number]     · History of non-compliance with diabetic regimen  See above A1c from 2/21  · Continue Lantus 60u daily and iss    Hypothyroidism  Assessment & Plan  · Last TSH 0 139 2/25/21  · Continue levothyroxine 175 mcg daily    Morbid obesity ([address] )  Assessment & Plan  · Follow up with nutritional counseling when appropriate  Previously declined  · Likely contributing to MARSHA    Obstructive sleep apnea  Assessment & Plan  · Prior sleep study showing severe MARSHA  Lost to follow up  · Will require sleep study for titration of positive pressure ventilation OP  · Body habitus likely contributing    Hyperlipidemia  Assessment & Plan  · Continue home statin    Depression with anxiety  Assessment & Plan  · Restart home Cymbalta     Tobacco use  Assessment & Plan  · Records show 2PPD cigarette use  · Nicoderm patch  · Tobacco cessation       ----------------------------------------------------------------------------------------  HPI/24hr events: No overnight events, pt remained of 6L NC through the night  Disposition: Continue Stepdown Level 1 level of care   Code Status: Level 1 - Full Code  ---------------------------------------------------------------------------------------  SUBJECTIVE      Review of Systems   Respiratory: Negative for chest tightness and shortness of breath  All other systems reviewed and are negative      Review of systems was reviewed and negative unless stated above in HPI/24-hour events   ---------------------------------------------------------------------------------------  OBJECTIVE    Vitals   Vitals:    21 2200 21 0000 21 0200 21 0400   BP: 159/81 155/77 158/76 146/79   BP Location:       Pulse: 104 95 92 90   Resp: (!) 33 21 (!) 26 (!) 23   Temp:  97 6 °F (36 4 °C)  (!) 97 2 °F (36 2 °C)   TempSrc:  Temporal  Temporal   SpO2: 95% 94% 94% 93%   Weight:       Height:         Temp (24hrs), Av 1 °F (36 7 °C), Min:97 2 °F (36 2 °C), Max:98 8 °F (37 1 °C)  Current: Temperature: (!) 97 2 °F (36 2 °C)          Respiratory:  SpO2: SpO2: 93 %  Nasal Cannula O2 Flow Rate (L/min): 3 L/min    Invasive/non-invasive ventilation settings   Respiratory    Lab Data (Last 4 hours)    None         O2/Vent Data (Last 4 hours)    None                Physical Exam  Vitals signs and nursing note reviewed  Constitutional:       General: He is not in acute distress  Appearance: Normal appearance  He is obese  He is not ill-appearing, toxic-appearing or diaphoretic  HENT:      Head: Normocephalic and atraumatic  Nose: Nose normal       Mouth/Throat:      Mouth: Mucous membranes are moist       Pharynx: Oropharynx is clear  Eyes:      Conjunctiva/sclera: Conjunctivae normal       Pupils: Pupils are equal, round, and reactive to light  Neck:      Musculoskeletal: Neck supple  Cardiovascular:      Rate and Rhythm: Normal rate and regular rhythm  Pulses: Normal pulses  Heart sounds: Normal heart sounds  Pulmonary:      Effort: Pulmonary effort is normal  No respiratory distress  Breath sounds: Normal breath sounds  No stridor  No wheezing or rhonchi  Abdominal:      General: Bowel sounds are normal       Palpations: Abdomen is soft  Genitourinary:     Comments: ailyn  Musculoskeletal:      Right lower leg: No edema  Left lower leg: No edema  Skin:     General: Skin is warm and dry        Capillary Refill: Capillary refill takes less than 2 seconds  Neurological:      General: No focal deficit present  Mental Status: He is alert and oriented to person, place, and time  Mental status is at baseline           Laboratory and Diagnostics:  Results from last 7 days   Lab Units 04/11/21  0523 04/10/21  0529 04/09/21  0521 04/08/21  0434 04/07/21  1227 04/07/21  1023 04/07/21  0645   WBC Thousand/uL 12 86* 11 27* 10 70* 13 92*  --   --  9 41   HEMOGLOBIN g/dL 14 4 15 1 14 6 14 7 14 6 15 4 15 5   HEMATOCRIT % 45 2 47 9 44 7 44 4 44 5 47 4 46 9   PLATELETS Thousands/uL 157 188 221 238  --   --  279   NEUTROS PCT %  --  76* 77* 83*  --   --  61   MONOS PCT %  --  13* 8 8  --   --  11     Results from last 7 days   Lab Units 04/11/21  0523 04/10/21  0529 04/09/21  0521 04/08/21  0434 04/07/21  1228 04/07/21  0645   SODIUM mmol/L 139 142 138 137 133* 132*   POTASSIUM mmol/L 3 9 3 7 3 8 3 6 5 5* 3 9   CHLORIDE mmol/L 105 104 102 99* 96* 94*   CO2 mmol/L 26 28 26 28 30 31   ANION GAP mmol/L 8 10 10 10 7 7   BUN mg/dL 23 29* 25 17 16 14   CREATININE mg/dL 0 89 1 16 1 14 1 19 1 45* 1 16   CALCIUM mg/dL 8 1* 8 3 8 1* 8 6 8 6 9 3   GLUCOSE RANDOM mg/dL 180* 189* 134 205* 373* 395*   ALT U/L  --   --   --   --  45  --    AST U/L  --   --   --   --  21  --    ALK PHOS U/L  --   --   --   --  78  --    ALBUMIN g/dL  --   --   --   --  3 2*  --    TOTAL BILIRUBIN mg/dL  --   --   --   --  0 45  --      Results from last 7 days   Lab Units 04/11/21  0523 04/10/21  0529 04/09/21  0521 04/08/21  0434 04/07/21  1228 04/07/21  0645   MAGNESIUM mg/dL 2 2 2 5 2 4 2 0 1 9 1 9   PHOSPHORUS mg/dL  --  4 4 5 2* 4 5 2 8  --       Results from last 7 days   Lab Units 04/07/21  0645   INR  0 87      Results from last 7 days   Lab Units 04/08/21  0434 04/07/21  1227 04/07/21  1023   TROPONIN I ng/mL <0 02 <0 02 <0 02     Results from last 7 days   Lab Units 04/08/21  0434 04/07/21  1507 04/07/21  1227 04/07/21  1023   LACTIC ACID mmol/L 1 4 2 6* 2 8* 3 3* ABG:  Results from last 7 days   Lab Units 04/07/21  1218   PH ART  7 391   PCO2 ART mm Hg 44 8*   PO2 ART mm Hg 89 6   HCO3 ART mmol/L 26 6   BASE EXC ART mmol/L 1 2   ABG SOURCE  Radial, Right     VBG:  Results from last 7 days   Lab Units 04/07/21  1218   ABG SOURCE  Radial, Right     Results from last 7 days   Lab Units 04/08/21  0434 04/07/21  1228 04/07/21  1023   PROCALCITONIN ng/ml <0 05 0 06 0 06       Micro  Results from last 7 days   Lab Units 04/07/21  1136   MRSA CULTURE ONLY  No Methicillin Resistant Staphlyococcus aureus (MRSA) isolated       EKG: NSR  Imaging: I have personally reviewed pertinent reports  and I have personally reviewed pertinent films in PACS    Intake and Output  I/O       04/10 0701 - 04/11 0700 04/11 0701 - 04/12 0700    I V  (mL/kg) 2053 (18 3) 440 (3 9)    NG/ 95    Feedings 1080 90    Total Intake(mL/kg) 4033 (36) 625 (5 6)    Urine (mL/kg/hr) 1950 (0 7) 750 (0 5)    Emesis/NG output      Stool 0 0    Total Output 1950 750    Net +2083 -125          Unmeasured Stool Occurrence 1 x 1 x          Height and Weights   Height: 5' 4" (162 6 cm)  IBW: 59 2 kg  Body mass index is 42 38 kg/m²  Weight (last 2 days)     Date/Time   Weight    04/11/21 0551   112 (246 92)    04/10/21 0600   110 (242 51)                Nutrition       Diet Orders   (From admission, onward)             Start     Ordered    04/11/21 0844  Diet NPO  Diet effective now     Question Answer Comment   Diet Type NPO    RD to adjust diet per protocol?  No        04/11/21 0843    04/08/21 0837  Room Service  Once     Question:  Type of Service  Answer:  Room Service - Appropriate with Assistance    04/08/21 0836                  Active Medications  Scheduled Meds:  Current Facility-Administered Medications   Medication Dose Route Frequency Provider Last Rate    atorvastatin  40 mg Oral Daily With Zhanzuo MARLEEN BULLARD      diphenhydrAMINE  50 [facility]venous [facility]      DULoxetine  60 mg Oral Daily VERONIKA Collazo      enoxaparin  40 mg Subcutaneous Q12H [facility] MARLEEN Amato      famotidine  20 mg Intravenous Q12H [facility] MARLEEN Amato      hydrALAZINE  5 mg Intravenous Q8H PRN MARLEEN Amato      insulin glargine  60 Units Subcutaneous QAM MARLEEN Amato      insulin lispro  1-6 Units Subcutaneous Q6H [facility] MARLEEN Leonard      levothyroxine  175 mcg Oral Daily MARLEEN Amato      methylPREDNISolone sodium succinate  20 mg Intravenous Q8H [facility] MARLEEN Leonrad      nicotine  21 mg Transdermal Daily VERONIKA Collazo       Continuous Infusions:     PRN Meds:   hydrALAZINE, 5 mg, Q8H PRN        Invasive Devices Review  Invasive Devices     Peripheral Intravenous Line            Peripheral IV 04/08/21 Right Wrist 3 days    Peripheral IV 04/09/21 Left; Lower Arm 2 days          Drain            Urethral Catheter Latex 16 Fr  4 days                Rationale for remaining devices: d/c robertson  ---------------------------------------------------------------------------------------  Advance Directive and Living Will:      Power of :    POLST:    ---------------------------------------------------------------------------------------  Care Time Delivered:   No Critical Care time spent       VERONIKA Collazo      Portions of the record may have been created with voice recognition software  Occasional wrong word or "sound a like" substitutions may have occurred due to the inherent limitations of voice recognition software    Read the chart carefully and recognize, using context, where substitutions have occurred

## 2021-04-12 NOTE — SPEECH THERAPY NOTE
Speech-Language Pathology Bedside Swallow Evaluation      Patient Name: Leandro Medellin    Today's Date: 4/12/2021     Problem List  Principal Problem:    Angioedema  Active Problems:    Depression with anxiety    Hyperlipidemia    Hypertension    Hypothyroidism    Morbid obesity ([facility] [address] )    Type 2 diabetes, uncontrolled, with neuropathy ([address] )    Acute respiratory failure with hypoxia (HCC)    Obstructive sleep apnea    Tobacco use      Past Medical History  Past Medical History:   Diagnosis Date    Diabetes mellitus ([facility] [address] )     Disease of thyroid gland     Hyperlipidemia     Hypertension        Past Surgical History  Past Surgical History:   Procedure Laterality Date    KNEE SURGERY      SINUS SURGERY         Summary   Pt presented with s/s resolving angioedema and no significant dysphonia (despite intubation x4 days) but pt edentulous and without his dentures  He tolerated puree and thick liquids with no s/s dysphagia but evidenced occasional cough with large sips of thin liquid (s/s resolved with small controlled sips)  We discussed available diet levels and pt desired to continue with puree at this time (pt not comfortable attempting any "soft" to chew foods this am)    Recommended Diet: puree/level 1 diet and thin liquids   Recommended Form of Meds: please consider cutting or crushing large pills and placing in puree for maximized safety at this time   Aspiration precautions and swallowing strategies: full upright position and SMALL sips        Current Medical Status  Leandro Medellin is a 62 yo N c a PMH of DM ([facility] [address] ), Disease of thyroid gland, htn  Hld, and obesity who presented to [facility] 4/7 with significant angioedema of unknown origin  He was intubated for airway protection and transferred to [facility] for airway management  He was extubated 4/11  SLP Swallowing Evaluation ordered at this time      Current Precautions:  Fall    Allergies:  No known food allergies (+ACE inhibitor allergy)    Past medical history:  Please see H&P for details    Special Studies:  Most recent CXR (4/10): Life-support tubes as above  Some patchy left basilar density may represent atelectasis or infiltrate  Blunted left costophrenic angle    Social/Education/Vocational Hx:  Pt lives alone in an apt in [address]   Current Risks for Dysphagia & Aspiration: recent intubation  Current Diet: NPO   Baseline Diet: regular diet and thin liquids (has and wears dentures, applies"alot if fixodent otherwise they don't stay in place"        Baseline Assessment   Behavior/Cognition: alert, O to self, place [place] [employer]"), tearful ("This was scary  I don't want that trouble again" and "I'm going to quit smoking")  Support offered  Speech/Language Status: able to participate in basic conversation and able to follow commands  Patient Positioning: upright in bed  Pain Status/Interventions/Response to Interventions: No report of or nonverbal indications of pain  Swallow Mechanism Exam  Facial: symmetrical  Labial: WFL  Lingual: WFL movement (?slightly enlarged)  Velum: symmetrical  Mandible: adequate ROM  Dentition: edentulous and dentures not found in pt's belongings  Vocal quality:clear/adequate   Volitional Cough: strong/productive   Respiratory Status: on 3L O2 with baseline O2 sat of 95%    Consistencies Assessed and Performance   Materials administered included puree, nectar thick and thin liquid    Oral Stage: WFL c limited consistencies  Mastication was adequate with the materials administered today  Bolus formation and transfer were functional with no significant oral residue noted  No overt s/s reduced oral control  Pharyngeal Stage: suspect mild impairment  Swallow Mechanics:  Swallowing initiation appeared prompt  Laryngeal rise was palpated and judged to be within functional limits    No coughing, throat clearing, change in vocal quality or respiratory status noted with applesauce, nectar thick juice of small controlled sips of thin (episodic cough noted on large sips)    Esophageal Concerns: none reported    Strategies and Efficacy: trained small sips and pt able to return demo same  Summary and Recommendations (see above)    Results Reviewed with: patient and RN     Treatment Recommended: yes     Frequency of treatment: 3x weekly    Patient Stated Goal:  "Don't want to have that trouble again (referring to breathing 2* angioedema involving tongue)    Dysphagia LTG  -Patient will demonstrate safe and effective oral intake (without overt s/s significant oral/pharyngeal dysphagia including s/s penetration or aspiration) for the highest appropriate diet level  Short Term Goals:  -Pt will tolerate Dysphagia 1/pureed diet and thin liquid with no significant s/s oral or pharyngeal dysphagia across 24-48 hrs     --Patient will tolerate trials of upgraded food texture with no significant s/s of oral or pharyngeal dysphagia including aspiration across 1-3 diagnostic sessions     -Patient will utilize trained strategies (eg   controlled bite/sip siz, prep set) with 95% accuracy to eliminate overt s/s penetration/a cospiration with the least restrictive food/liquid consistencies    Thank you for this referral   Please do not hesitate to contact me with any questions or concerns      [facility]   Inpatient Speech Pathologist  [place] License [id number]  [place] License [id number]  Available via [facility] Text

## 2021-04-12 NOTE — ASSESSMENT & PLAN NOTE
· Prior sleep study showing severe MARSHA  Lost to follow up    · Will require sleep study for titration of positive pressure ventilation OP  · Body habitus likely contributing

## 2021-04-12 NOTE — PLAN OF CARE
Summary   Pt presented with s/s resolving angioedema and no significant dysphonia (despite intubation x4 days) but pt edentulous and without his dentures  He tolerated puree and thick liquids with no s/s dysphagia but evidenced occasional cough with large sips of thin liquid (s/s resolved with small controlled sips)   We discussed available diet levels and pt desired to continue with puree at this time (pt not comfortable attempting any "soft" to chew foods this am)    Recommended Diet: puree/level 1 diet and thin liquids   Recommended Form of Meds: please consider cutting or crushing large pills and placing in puree for maximized safety at this time   Aspiration precautions and swallowing strategies: full upright position and SMALL sips

## 2021-04-13 PROBLEM — R33.9 URINARY RETENTION: Status: ACTIVE | Noted: 2021-04-13

## 2021-04-13 LAB
ANION GAP SERPL CALCULATED.3IONS-SCNC: 8 MMOL/L (ref 4–13)
ATRIAL RATE: 91 BPM
BUN SERPL-MCNC: 26 MG/DL (ref 5–25)
CALCIUM SERPL-MCNC: 8.3 MG/DL (ref 8.3–10.1)
CHLORIDE SERPL-SCNC: 106 MMOL/L (ref 100–108)
CO2 SERPL-SCNC: 28 MMOL/L (ref 21–32)
CREAT SERPL-MCNC: 0.78 MG/DL (ref 0.6–1.3)
ERYTHROCYTE [DISTWIDTH] IN BLOOD BY AUTOMATED COUNT: 13.6 % (ref 11.6–15.1)
GFR SERPL CREATININE-BSD FRML MDRD: 101 ML/MIN/1.73SQ M
GLUCOSE SERPL-MCNC: 111 MG/DL (ref 65–140)
GLUCOSE SERPL-MCNC: 119 MG/DL (ref 65–140)
GLUCOSE SERPL-MCNC: 134 MG/DL (ref 65–140)
GLUCOSE SERPL-MCNC: 173 MG/DL (ref 65–140)
GLUCOSE SERPL-MCNC: 194 MG/DL (ref 65–140)
HCT VFR BLD AUTO: 47 % (ref 36.5–49.3)
HGB BLD-MCNC: 15 G/DL (ref 12–17)
MAGNESIUM SERPL-MCNC: 2.2 MG/DL (ref 1.6–2.6)
MCH RBC QN AUTO: 28.4 PG (ref 26.8–34.3)
MCHC RBC AUTO-ENTMCNC: 31.9 G/DL (ref 31.4–37.4)
MCV RBC AUTO: 89 FL (ref 82–98)
P AXIS: 56 DEGREES
PLATELET # BLD AUTO: 188 THOUSANDS/UL (ref 149–390)
PMV BLD AUTO: 10.4 FL (ref 8.9–12.7)
POTASSIUM SERPL-SCNC: 3.6 MMOL/L (ref 3.5–5.3)
PR INTERVAL: 154 MS
QRS AXIS: 24 DEGREES
QRSD INTERVAL: 78 MS
QT INTERVAL: 372 MS
QTC INTERVAL: 457 MS
RBC # BLD AUTO: 5.29 MILLION/UL (ref 3.88–5.62)
SODIUM SERPL-SCNC: 142 MMOL/L (ref 136–145)
T WAVE AXIS: 16 DEGREES
VENTRICULAR RATE: 91 BPM
WBC # BLD AUTO: 11.62 THOUSAND/UL (ref 4.31–10.16)

## 2021-04-13 PROCEDURE — 92526 ORAL FUNCTION THERAPY: CPT

## 2021-04-13 PROCEDURE — 99232 SBSQ HOSP IP/OBS MODERATE 35: CPT | Performed by: INTERNAL MEDICINE

## 2021-04-13 PROCEDURE — 85027 COMPLETE CBC AUTOMATED: CPT | Performed by: NURSE PRACTITIONER

## 2021-04-13 PROCEDURE — 93005 ELECTROCARDIOGRAM TRACING: CPT

## 2021-04-13 PROCEDURE — 82948 REAGENT STRIP/BLOOD GLUCOSE: CPT

## 2021-04-13 PROCEDURE — 83735 ASSAY OF MAGNESIUM: CPT | Performed by: NURSE PRACTITIONER

## 2021-04-13 PROCEDURE — 0T9B70Z DRAINAGE OF BLADDER WITH DRAINAGE DEVICE, VIA NATURAL OR ARTIFICIAL OPENING: ICD-10-PCS | Performed by: INTERNAL MEDICINE

## 2021-04-13 PROCEDURE — 93010 ELECTROCARDIOGRAM REPORT: CPT | Performed by: INTERNAL MEDICINE

## 2021-04-13 PROCEDURE — 80048 BASIC METABOLIC PNL TOTAL CA: CPT | Performed by: NURSE PRACTITIONER

## 2021-04-13 RX ORDER — DOCUSATE SODIUM 100 MG/1
100 CAPSULE, LIQUID FILLED ORAL 2 TIMES DAILY
Status: DISCONTINUED | OUTPATIENT
Start: 2021-04-13 | End: 2021-04-15 | Stop reason: HOSPADM

## 2021-04-13 RX ORDER — MAGNESIUM HYDROXIDE/ALUMINUM HYDROXICE/SIMETHICONE 120; 1200; 1200 MG/30ML; MG/30ML; MG/30ML
30 SUSPENSION ORAL EVERY 4 HOURS PRN
Status: DISCONTINUED | OUTPATIENT
Start: 2021-04-13 | End: 2021-04-15 | Stop reason: HOSPADM

## 2021-04-13 RX ORDER — POLYETHYLENE GLYCOL 3350 17 G/17G
17 POWDER, FOR SOLUTION ORAL DAILY
Status: DISCONTINUED | OUTPATIENT
Start: 2021-04-13 | End: 2021-04-15 | Stop reason: HOSPADM

## 2021-04-13 RX ORDER — DIPHENHYDRAMINE HCL 25 MG
25 TABLET ORAL EVERY 8 HOURS SCHEDULED
Status: DISCONTINUED | OUTPATIENT
Start: 2021-04-13 | End: 2021-04-15 | Stop reason: HOSPADM

## 2021-04-13 RX ORDER — SENNOSIDES 8.6 MG
1 TABLET ORAL
Status: DISCONTINUED | OUTPATIENT
Start: 2021-04-13 | End: 2021-04-15 | Stop reason: HOSPADM

## 2021-04-13 RX ORDER — LANOLIN ALCOHOL/MO/W.PET/CERES
3 CREAM (GRAM) TOPICAL
Status: DISCONTINUED | OUTPATIENT
Start: 2021-04-13 | End: 2021-04-15 | Stop reason: HOSPADM

## 2021-04-13 RX ADMIN — ENOXAPARIN SODIUM 40 MG: 40 INJECTION SUBCUTANEOUS at 21:06

## 2021-04-13 RX ADMIN — CARVEDILOL 6.25 MG: 6.25 TABLET, FILM COATED ORAL at 09:22

## 2021-04-13 RX ADMIN — LEVOTHYROXINE SODIUM 175 MCG: 150 TABLET ORAL at 06:41

## 2021-04-13 RX ADMIN — POLYETHYLENE GLYCOL 3350 17 G: 17 POWDER, FOR SOLUTION ORAL at 10:55

## 2021-04-13 RX ADMIN — ENOXAPARIN SODIUM 40 MG: 40 INJECTION SUBCUTANEOUS at 09:22

## 2021-04-13 RX ADMIN — ATORVASTATIN CALCIUM 40 MG: 40 TABLET, FILM COATED ORAL at 16:20

## 2021-04-13 RX ADMIN — TAMSULOSIN HYDROCHLORIDE 0.4 MG: 0.4 CAPSULE ORAL at 16:20

## 2021-04-13 RX ADMIN — SENNOSIDES 8.6 MG: 8.6 TABLET, FILM COATED ORAL at 21:06

## 2021-04-13 RX ADMIN — INSULIN LISPRO 2 UNITS: 100 INJECTION, SOLUTION INTRAVENOUS; SUBCUTANEOUS at 11:27

## 2021-04-13 RX ADMIN — DOCUSATE SODIUM 100 MG: 100 CAPSULE, LIQUID FILLED ORAL at 10:55

## 2021-04-13 RX ADMIN — INSULIN GLARGINE 60 UNITS: 100 INJECTION, SOLUTION SUBCUTANEOUS at 09:21

## 2021-04-13 RX ADMIN — INSULIN LISPRO 1 UNITS: 100 INJECTION, SOLUTION INTRAVENOUS; SUBCUTANEOUS at 16:21

## 2021-04-13 RX ADMIN — DIPHENHYDRAMINE HYDROCHLORIDE 25 MG: 25 TABLET ORAL at 15:03

## 2021-04-13 RX ADMIN — DIPHENHYDRAMINE HYDROCHLORIDE 25 MG: 25 TABLET ORAL at 00:31

## 2021-04-13 RX ADMIN — PREDNISONE 40 MG: 20 TABLET ORAL at 09:22

## 2021-04-13 RX ADMIN — FAMOTIDINE 20 MG: 20 TABLET ORAL at 18:06

## 2021-04-13 RX ADMIN — DIPHENHYDRAMINE HYDROCHLORIDE 25 MG: 25 TABLET ORAL at 06:41

## 2021-04-13 RX ADMIN — NICOTINE 21 MG: 21 PATCH, EXTENDED RELEASE TRANSDERMAL at 09:23

## 2021-04-13 RX ADMIN — CARVEDILOL 6.25 MG: 6.25 TABLET, FILM COATED ORAL at 16:24

## 2021-04-13 RX ADMIN — HYDRALAZINE HYDROCHLORIDE 5 MG: 20 INJECTION INTRAMUSCULAR; INTRAVENOUS at 15:03

## 2021-04-13 RX ADMIN — MELATONIN TAB 3 MG 3 MG: 3 TAB at 22:11

## 2021-04-13 RX ADMIN — DOCUSATE SODIUM 100 MG: 100 CAPSULE, LIQUID FILLED ORAL at 18:06

## 2021-04-13 RX ADMIN — ALUMINA, MAGNESIA, AND SIMETHICONE ORAL SUSPENSION REGULAR STRENGTH 30 ML: 1200; 1200; 120 SUSPENSION ORAL at 04:22

## 2021-04-13 RX ADMIN — DIPHENHYDRAMINE HYDROCHLORIDE 25 MG: 25 TABLET ORAL at 21:06

## 2021-04-13 RX ADMIN — DULOXETINE HYDROCHLORIDE 60 MG: 60 CAPSULE, DELAYED RELEASE ORAL at 09:22

## 2021-04-13 RX ADMIN — FAMOTIDINE 20 MG: 20 TABLET ORAL at 09:22

## 2021-04-13 NOTE — ASSESSMENT & PLAN NOTE
Lab Results   Component Value Date    HGBA1C 11 7 (H) 02/05/2021       Recent Labs     04/12/21  1612 04/12/21  2113 04/13/21  0715 04/13/21  1112   POCGLU 171* 142* 111 194*       Blood Sugar Average: Last 72 hrs:  (P) 159 [id number]   Patient is on Lantus 60 units daily, Humalog sliding scale with Accu-Cheks q a c   And HS  Blood sugars acceptable

## 2021-04-13 NOTE — CASE MANAGEMENT
CM met with patient and discussed dc planning and  The benefits of ST SNF at discharge  Patient was agreeable and was provided with choice  He says he has no preference but would like to be near home and his family  Patient is from [place]  He says CM can speak with his brother  IMM was alos discussed and he verbally states understanding  CM then called patients brother Tigre Mayes [phone] [facility] () and discussed the above  He is also agreeable to  SNF and would like patient to stay in the [address] area  [facility] [address] [facility]  Anticipated dc is tomorrow  CM to follow

## 2021-04-13 NOTE — SPEECH THERAPY NOTE
SLP Progress Note    Patient Name: Jim Nielsen  Today's Date: 4/13/2021     Problem List  Principal Problem:    Angioedema  Active Problems:    Depression with anxiety    Hyperlipidemia    Hypertension    Hypothyroidism    Morbid obesity ([facility] [address] )    Type 2 diabetes, uncontrolled, with neuropathy ([facility] [address] )    Acute respiratory failure with hypoxia (HCC)    Obstructive sleep apnea    Tobacco use    Subjective:  "I just don't have an appetite  And, the thought of some foods makes me feel sick "    Objective:  Pt was seen for diagnostic dysphagia therapy to ensure tolerance of current diet (puree/thin liquid) and to assess potential for effective management of  Upgraded food texture  Pt was alert and positioned upright  Pt was assessed with puree (pureed fruits, mashed potatoes), soft food (peaches, thin well cut turkey with mashed pot), and thin liquid  No dentures available  S/S food AVERSION (c/o inability to chew and swallow both mashed potatoes and soft turkey-->gagged and spit out same but with encouragement, pt demo'd ability to manage both)  Bolus formation and transfer varied (at times effective, at other pt pt c/o "can't" then spit out food)  Swallow initiation was prompt  Laryngeal rise was good by palpation  No coughing, throat clearing, c/o stasis, multiple swallows, change in vocal quality noted c po intake today  Assessment:  No s/s pharyngeal dysphagia  But pt with c/o reduced appetite and demo'd some s/s food aversion but was able to demo oral/pharygneal tolerance of some soft foods and has dentures at home  Plan/Recommendations:  Advance diet as desired  Consider offering supplement  Ok to d/c home on diet as desired (place dentures in for meals)  No additional SLP recommendations at this time  Thank you for this referral   Please do not hesitate to contact me with any questions or concerns      [place] [address] [phone]  Available via [employer]

## 2021-04-13 NOTE — ASSESSMENT & PLAN NOTE
Unclear etiology of angioedema  Patient had initial episode in 2016 attributed to Ace inhibitor use  Patient intermittent episodes in 2016 and 17 despite discontinuation  Patient needed intubation with mechanical ventilation in the operating room by anesthesia for airway protection and hypoxemia  Patient received Decadron 10 milligram IV in the emergency room and was continued on Solu-Medrol 40 milligram q 8 hours along with Pepcid 20 milligram IV q 12 hours  Patient received 2 units of FFP and 4 7 and another 2 units FFP and for a  Patient received 1 gram transient ischemic acid on 04/06  Patient received Icadibant x1 dose on 04/10  C4 level was normal at 18, CRP 4 3, sed rate 27   C1 esterase inhibitor level was 30  Outpatient follow-up with allergy recommended  Patient to be transitioned to prednisone taper

## 2021-04-13 NOTE — PROGRESS NOTES
Uriel 44  Progress Note - Jimmy Villaseñor 1965, 63 y o  male MRN: [id number]  Unit/Bed#: [address] [place] [address] Encounter: [id number]  Primary Care Provider: MARLEEN Lang   Date and time admitted to hospital: 4/7/2021 11:24 AM    * Angioedema  Assessment & Plan  Unclear etiology of angioedema  Patient had initial episode in 2016 attributed to Ace inhibitor use  Patient intermittent episodes in 2016 and 17 despite discontinuation  Patient needed intubation with mechanical ventilation in the operating room by anesthesia for airway protection and hypoxemia  Patient received Decadron 10 milligram IV in the emergency room and was continued on Solu-Medrol 40 milligram q 8 hours along with Pepcid 20 milligram IV q 12 hours  Patient received 2 units of FFP and 4 7 and another 2 units FFP and for a  Patient received 1 gram transient ischemic acid on 04/06  Patient received Icadibant x1 dose on 04/10  C4 level was normal at 18, CRP 4 3, sed rate 27  C1 esterase inhibitor level was 30  Outpatient follow-up with allergy recommended  Patient to be transitioned to prednisone taper    Acute respiratory failure with hypoxia [facility])  Assessment & Plan  Secondary to angioedema  Per chart review patient has history of mild COPD  Patient initially was intubated in the ED and was extubated on 04/11  Patient is currently stable on 2 liters oxygen  Type 2 diabetes, uncontrolled, with neuropathy [facility])  Assessment & Plan  Lab Results   Component Value Date    HGBA1C 11 7 (H) 02/05/2021       Recent Labs     04/12/21  1612 04/12/21  2113 04/13/21  0715 04/13/21  1112   POCGLU 171* 142* 111 194*       Blood Sugar Average: Last 72 hrs:  (P) 159 [id number]   Patient is on Lantus 60 units daily, Humalog sliding scale with Accu-Cheks q a c   And HS  Blood sugars acceptable    Urinary retention  Assessment & Plan  Patient needed straight catheterization x3 following which Mckeon was placed today  Continue Flomax 0 4 milligram p o  Daily  Patient did not have a good bowel movement since admission and patient is started on Colace, MiraLax and Senokot    Tobacco use  Assessment & Plan  Nicotine patch ordered    Obstructive sleep apnea  Assessment & Plan  Prior sleep study showing severe obstructive sleep apnea  Patient will need repeat sleep study as outpatient    Morbid obesity ([facility] )  Assessment & Plan  Diet and lifestyle modification    Hypothyroidism  Assessment & Plan  Continue levothyroxine 175 microgram p o  Daily    Hypertension  Assessment & Plan  Patient is on Coreg 6 25 milligram p o  B i d  Hydrochlorothiazide has been on hold    Hyperlipidemia  Assessment & Plan  Continue Lipitor    Depression with anxiety  Assessment & Plan  Continue Cymbalta      VTE Pharmacologic Prophylaxis:   Pharmacologic: Enoxaparin (Lovenox)  Mechanical VTE Prophylaxis in Place: Yes    Patient Centered Rounds: I have performed bedside rounds with nursing staff today  Discussions with Specialists or Other Care Team Provider: Yes Jl Edmonds    Education and Discussions with Family / Patient: yes    Time Spent for Care: 45 minutes  More than 50% of total time spent on counseling and coordination of care as described above  Current Length of Stay: 6 day(s)    Current Patient Status: Inpatient   Certification Statement: The patient will continue to require additional inpatient hospital stay due to Acute respiratory failure, angioedema    Discharge Plan / Estimated Discharge Date:  Pending course in 1-2 days      Code Status: Level 1 - Full Code      Subjective:   Patient reports some postnasal drip with throat clearing this morning otherwise feeling much better  Patient denies any shortness of breath, chest pain, abdominal pain, nausea or vomiting  Patient did not have a bowel movement in few days  As per RN patient needed straight catheterization x3 due to urinary retention      Objective:     Vitals:   Temp (24hrs), Av 1 °F (36 7 °C), Min:97 8 °F (36 6 °C), Max:98 7 °F (37 1 °C)    Temp:  [97 8 °F (36 6 °C)-98 7 °F (37 1 °C)] 98 °F (36 7 °C)  HR:  [85-99] 92  Resp:  [17-25] 22  BP: (131-189)/(83-96) 189/95  SpO2:  [93 %-96 %] 94 %  Body mass index is 40 53 kg/m²  Input and Output Summary (last 24 hours): Intake/Output Summary (Last 24 hours) at 4/13/2021 1550  Last data filed at 4/13/2021 0101  Gross per 24 hour   Intake 460 ml   Output 950 ml   Net -490 ml       Physical Exam:     Physical Exam  Constitutional:       General: He is not in acute distress  HENT:      Head: Normocephalic and atraumatic  Eyes:      Conjunctiva/sclera: Conjunctivae normal       Pupils: Pupils are equal, round, and reactive to light  Neck:      Musculoskeletal: Normal range of motion and neck supple  Cardiovascular:      Rate and Rhythm: Normal rate and regular rhythm  Heart sounds: Normal heart sounds  Pulmonary:      Effort: Pulmonary effort is normal  No respiratory distress  Breath sounds: No wheezing, rhonchi or rales  Chest:      Chest wall: No tenderness  Abdominal:      General: Bowel sounds are normal  There is no distension  Palpations: Abdomen is soft  Tenderness: There is no abdominal tenderness  There is no guarding or rebound  Skin:     General: Skin is warm and dry  Findings: No rash  Neurological:      Mental Status: He is alert  Cranial Nerves: No cranial nerve deficit  Additional Data:     Labs:    Results from last 7 days   Lab Units 04/13/21  0641  04/10/21  0529   WBC Thousand/uL 11 62*   < > 11 27*   HEMOGLOBIN g/dL 15 0   < > 15 1   HEMATOCRIT % 47 0   < > 47 9   PLATELETS Thousands/uL 188   < > 188   NEUTROS PCT %  --   --  76*   LYMPHS PCT %  --   --  11*   MONOS PCT %  --   --  13*   EOS PCT %  --   --  0    < > = values in this interval not displayed       Results from last 7 days   Lab Units 04/13/21  0641  04/07/21  1228   POTASSIUM mmol/L 3 6   < > 5 5* CHLORIDE mmol/L 106   < > 96*   CO2 mmol/L 28   < > 30   BUN mg/dL 26*   < > 16   CREATININE mg/dL 0 78   < > 1 45*   CALCIUM mg/dL 8 3   < > 8 6   ALK PHOS U/L  --   --  78   ALT U/L  --   --  45   AST U/L  --   --  21    < > = values in this interval not displayed  Results from last 7 days   Lab Units 04/07/21  0645   INR  0 87       * I Have Reviewed All Lab Data Listed Above  * Additional Pertinent Lab Tests Reviewed:  Gin 65 Admission Reviewed        Recent Cultures (last 7 days):           Last 24 Hours Medication List:   Current Facility-Administered Medications   Medication Dose Route Frequency Provider Last Rate    aluminum-magnesium hydroxide-simethicone  30 mL Oral Q4H PRN Cheraw Liane, CRNP      atorvastatin  40 mg Oral Daily With Janace Evans Lesandra KrisMARLEEN      carvedilol  6 25 mg Oral BID With Meals Cheraw Liane, CRNP      diphenhydrAMINE  25 mg Oral [place], [place]      docusate sodium  100 mg Oral BID Tony June MD      DULoxetine  60 mg Oral Daily Cheraw Liane, BRADYNP      enoxaparin  40 mg Subcutaneous Q12H [address] Cheraw Liane, CRNP      famotidine  20 mg Oral BID Cheraw Liane, CRNP      hydrALAZINE  5 mg Intravenous Q8H PRN Cheraw Liane, CRNP      insulin glargine  60 Units Subcutaneous QAM Cheraw Liane, CRNP      insulin lispro  1-6 Units Subcutaneous TID AC Cheraw Liane, CRNP      insulin lispro  1-6 Units Subcutaneous HS Cheraw Liane, CRNP      levothyroxine  175 mcg Oral Daily Cheraw Liane, CRNP      nicotine  21 mg Transdermal Daily Cheraw Liane, CRNP      polyethylene glycol  17 g Oral Daily Tony June MD      predniSONE  40 mg Oral Daily Cheraw Liane, MARLEEN      senna  1 tablet Oral HS Tony June MD      tamsulosin  0 4 mg Oral Daily With Dinner Cheraw Liane, BRADYNP          Today, Patient Was Seen By: Tony June, MD    ** Please Note: Dragon 360 Dictation voice to text software may have been used in the creation of this document   **

## 2021-04-13 NOTE — PROGRESS NOTES
Progress Note - Pulmonary   Xena Crooked 63 y o  male MRN: [id number]  Unit/Bed#: [address] [place] [address] Encounter: [id number]  Code Status: Level 1 - Full Cailin Quiñonez is a 63 y o  Past Medical History:   Diagnosis Date    Angioedema     [facility]     Disease of thyroid gland     Hyperlipidemia     Hypertension     Morbid obesity ([facility] )     MARSHA (obstructive sleep apnea)        Assessment/Plan:   * Angioedema  Assessment & Plan  -unclear etiology  Initial episode 2016 attributed to ACE, but ongoing, intermittent episodes [id number] despite discontinuation  -c/w prednisone 40 mg / d  Standard steroid taper in the outpatient setting 40 mg PO prednisone daily for 3 days, 30 mg x 3 days, 20 mg x 3 days, 10 mg x 3 days  -On Famotidine 20 PO/ d continue   -Diphenhydramine 25 mg PO Q 6 h wean to Q 12 hr    -C1 esterase inhibitor level, C4, CRP, normal  -will need w/u in OP setting once off of steroids w/ allergy panel   -stable for d/c  Patient is stable for D/C from a Pulmonary standpoint  Pulmonary Signing off  Please call with questions, concerns, or need for reevaluation  Patient should follow up within one week of D/C             Tobacco use  Assessment & Plan  Continue NRT into OP setting  Referral to TCP on D/C     Obstructive sleep apnea  Assessment & Plan  Hx of MARSHA Severe  Historical Non compliance w/ CPAP  Pt reports he is ready and willing to repeat PSG and retry utilization of CPAP     Acute respiratory failure with hypoxia ([facility] )  Assessment & Plan  95 % on 2 L NC  Wean off of 02       Morbid obesity ([facility] )  Assessment & Plan  BMI 40 5   Contributor to MARSHA / respiratory failure  Recommend weight loss     Sleep apnea-resolved as of 4/8/2021  Assessment & Plan  -Prior sleep study showing severe MARSHA, lost to follow up   -Currently intubated   Will require sleep study and outpt f/u for PAP therapy  -body habitus likely contributor      _________________________________________________    Subjective: Pt seen and examined at bedside    Chief Complaint: "I think I should try getting on that CPAP again "    Labs Reviewed: yes   Imaging Reviewed: yes  Echo Reviewed: NA   Tele Events:     Vitals:   Temp:  [97 8 °F (36 6 °C)-98 7 °F (37 1 °C)] 98 7 °F (37 1 °C)  HR:  [85-99] 96  Resp:  [17-26] 23  BP: (131-193)/(83-97) 160/95  Weight (last 2 days)     Date/Time   Weight    21 0600   107 (236 11)    21 0550   111 (245 59)    21 0551   112 (246 92)            Vitals:    21 0236 21 0423 21 0600 21 0911   BP: 131/89 156/88  160/95   BP Location: Right arm Left arm  Left arm   Pulse: 95 92  96   Resp: 22 (!) 24  (!) 23   Temp: 98 3 °F (36 8 °C)   98 7 °F (37 1 °C)   TempSrc: Oral   Oral   SpO2: 94% 93%  93%   Weight:   107 kg (236 lb 1 8 oz)    Height:         Temp (24hrs), Av 2 °F (36 8 °C), Min:97 8 °F (36 6 °C), Max:98 7 °F (37 1 °C)  Current: Temperature: 98 7 °F (37 1 °C)        SpO2: SpO2: 93 %, SpO2 Activity: SpO2 Activity: At Rest, SpO2 Device: O2 Device: Nasal cannula      IV Infusions:       Nutrition:        Diet Orders   (From admission, onward)             Start     Ordered    21 0910  Diet Dysphagia/Modified Consistency; Dysphagia 1-Pureed; Thin Liquid; Consistent Carbohydrate Diet Level 2 (5 carb servings/75 grams CHO/meal)  Diet effective now     Question Answer Comment   Diet Type Dysphagia/Modified Consistency    Dysphagia/Modified Consistency Dysphagia 1-Pureed    Liquid Modifier Thin Liquid    Other Restriction(s): Consistent Carbohydrate Diet Level 2 (5 carb servings/75 grams CHO/meal)    RD to adjust diet per protocol?  Yes        21 0910    21 0837  Room Service  Once     Question:  Type of Service  Answer:  Room Service - Appropriate with Assistance    21 0836                Ins/Outs:   I/O       701 -  -  0701 - 04/14 0700    P  O   840     I V  (mL/kg) 560 (5) 100 (0 9)     NG/GT 95      IV Piggyback  100     Feedings 90      Total Intake(mL/kg) 745 (6 7) 1040 (9 7)     Urine (mL/kg/hr) 2600 (1) 1450 (0 6)     Stool 0      Total Output 2600 1450     Net -1855 -410            Unmeasured Stool Occurrence 1 x            Lines/Drains:  Invasive Devices     Peripheral Intravenous Line            Peripheral IV 04/13/21 Right Forearm less than 1 day                 Active medications:  Scheduled Meds:  Current Facility-Administered Medications   Medication Dose Route Frequency Provider Last Rate    aluminum-magnesium hydroxide-simethicone  30 mL Oral Q4H PRN Patricia Almeida, CRNP      atorvastatin  40 mg Oral Daily With Rejeana Rockers Rise Round, CRNP      carvedilol  6 25 mg Oral BID With Meals Patricia Almeida, BRADYNP      diphenhydrAMINE  25 mg Oral Q6H [address] Patricia Almeida, CRNP      docusate sodium  100 mg Oral BID David Nickerson MD      DULoxetine  60 mg Oral Daily Patricia Almeida, CRNP      enoxaparin  40 mg Subcutaneous Q12H [address] Patricia Crocketthal, CRNP      famotidine  20 mg Oral BID Patricia Almeida, CRNP      hydrALAZINE  5 mg Intravenous Q8H PRN Patricia Almeida, CRNP      insulin glargine  60 Units Subcutaneous QAM Jayy Toña Rise Round, CRNP      insulin lispro  1-6 Units Subcutaneous TID AC Patricia Almeida, CRNP      insulin lispro  1-6 Units Subcutaneous HS Patricia Almeida, CRNP      levothyroxine  175 mcg Oral Daily Patricia Almeida, [address]      nicotine  21 mg Transdermal Daily Patricia Almeida, CRNP      polyethylene glycol  17 g Oral Daily David Nickerson MD      predniSONE  40 mg Oral Daily Patricia Almeida, BRADYNP      tamsulosin  0 4 mg Oral Daily With Dinner Jayy Toña Rise Round, CRNP       PRN Meds:aluminum-magnesium hydroxide-simethicone, 30 mL, Q4H PRN  hydrALAZINE, 5 mg, Q8H PRN      ____________________________________________________________________    Physical Exam  Constitutional:       Appearance: He is well-developed  He is morbidly obese  HENT:      Head: Normocephalic and atraumatic  Jaw: No swelling  Eyes:      Conjunctiva/sclera: Conjunctivae normal       Pupils: Pupils are equal, round, and reactive to light  Neck:      Musculoskeletal: Normal range of motion and neck supple  Cardiovascular:      Rate and Rhythm: Normal rate and regular rhythm  Heart sounds: Normal heart sounds  No murmur  No friction rub  No gallop  Pulmonary:      Effort: Pulmonary effort is normal  No respiratory distress  Breath sounds: Normal breath sounds  No wheezing or rales  Comments: Currently on 2 L NC   Chest:      Chest wall: No tenderness  Abdominal:      General: Bowel sounds are normal       Palpations: Abdomen is soft  Musculoskeletal: Normal range of motion  Right lower leg: No edema  Left lower leg: No edema  Skin:     General: Skin is warm and dry  Neurological:      Mental Status: He is alert and oriented to person, place, and time         ____________________________________________________________________    Invasive/non-invasive ventilation settings   Respiratory    Lab Data (Last 4 hours)    None         O2/Vent Data (Last 4 hours)    None                Laboratory and Diagnostics:  Results from last 7 days   Lab Units 04/13/21  0641 04/12/21  0540 04/11/21  0523 04/10/21  0529 04/09/21  0521 04/08/21  0434 04/07/21  1227  04/07/21  0645   WBC Thousand/uL 11 62* 11 87* 12 86* 11 27* 10 70* 13 92*  --   --  9 41   HEMOGLOBIN g/dL 15 0 14 8 14 4 15 1 14 6 14 7 14 6   < > 15 5   HEMATOCRIT % 47 0 46 5 45 2 47 9 44 7 44 4 44 5   < > 46 9   PLATELETS Thousands/uL 188 180 157 188 221 238  --   --  279   NEUTROS PCT %  --   --   --  76* 77* 83*  --   --  61   MONOS PCT %  --   --   --  13* 8 8  --   --  11    < > = values in this interval not displayed       Results from last 7 days   Lab Units 04/13/21  0641 04/12/21  0540 04/11/21  0523 04/10/21  0529 04/09/21  0521 04/08/21  0434 04/07/21  1228   SODIUM mmol/L 142 142 139 142 138 137 133*   POTASSIUM mmol/L 3 6 3 7 3 9 3 7 3 8 3 6 5 5*   CHLORIDE mmol/L 106 106 105 104 102 99* 96*   CO2 mmol/L 28 27 26 28 26 28 30   ANION GAP mmol/L 8 9 8 10 10 10 7   BUN mg/dL 26* 32* 23 29* 25 17 16   CREATININE mg/dL 0 78 1 03 0 89 1 16 1 14 1 19 1 45*   CALCIUM mg/dL 8 3 8 3 8 1* 8 3 8 1* 8 6 8 6   GLUCOSE RANDOM mg/dL 119 163* 180* 189* 134 205* 373*   ALT U/L  --   --   --   --   --   --  45   AST U/L  --   --   --   --   --   --  21   ALK PHOS U/L  --   --   --   --   --   --  78   ALBUMIN g/dL  --   --   --   --   --   --  3 2*   TOTAL BILIRUBIN mg/dL  --   --   --   --   --   --  0 45     Results from last 7 days   Lab Units 04/13/21  0641 04/12/21  0540 04/11/21  0523 04/10/21  0529 04/09/21  0521 04/08/21  0434 04/07/21  1228   MAGNESIUM mg/dL 2 2 2 6 2 2 2 5 2 4 2 0 1 9   PHOSPHORUS mg/dL  --   --   --  4 4 5 2* 4 5 2 8      Results from last 7 days   Lab Units 04/07/21  0645   INR  0 87      Results from last 7 days   Lab Units 04/08/21  0434 04/07/21  1227 04/07/21  1023   TROPONIN I ng/mL <0 02 <0 02 <0 02     Results from last 7 days   Lab Units 04/08/21  0434 04/07/21  1507 04/07/21  1227 04/07/21  1023   LACTIC ACID mmol/L 1 4 2 6* 2 8* 3 3*     ABG:  Results from last 7 days   Lab Units 04/07/21  1218   PH ART  7 391   PCO2 ART mm Hg 44 8*   PO2 ART mm Hg 89 6   HCO3 ART mmol/L 26 6   BASE EXC ART mmol/L 1 2   ABG SOURCE  Radial, Right     VBG:  Results from last 7 days   Lab Units 04/07/21  1218   ABG SOURCE  Radial, Right     Results from last 7 days   Lab Units 04/08/21  0434 04/07/21  1228 04/07/21  1023   PROCALCITONIN ng/ml <0 05 0 06 0 06       Micro  Results from last 7 days   Lab Units 04/07/21  1136   MRSA CULTURE ONLY  No Methicillin Resistant Staphlyococcus aureus (MRSA) isolated Imaging:   XR chest portable ICU   Final Result by Prabhu Ni DO (04/10 2040)      Life-support tubes as above  Some patchy left basilar density may represent atelectasis or infiltrate  Blunted left costophrenic angle  Workstation performed: [id number]         XR chest portable ICU   Final Result by Heriberto Valadez MD (04/10 5618)      The tip of the endotracheal tube projects 4 1 cm above the guille  Improved bilateral lower lung zone opacities, likely atelectasis  Workstation performed: [id number]         XR chest portable ICU   Final Result by Gerry Ruiz MD (04/07 8165)      Low lung volumes with probable CHF  Endotracheal tube is 1 cm above the guille and could be pulled back 1 cm  This was discussed with PATSY Mcclendon in the ICU at 3:00 PM         Workstation performed: [id number]             Micro:   Lab Results   Component Value Date    BLOODCX No Growth After 5 Days  03/02/2018    BLOODCX No Growth After 5 Days  03/02/2018    BLOODCX No Growth After 5 Days   05/06/2017    MRSACULTURE  04/07/2021     No Methicillin Resistant Staphlyococcus aureus (MRSA) isolated            Invalid input(s): Colby Chance

## 2021-04-13 NOTE — ASSESSMENT & PLAN NOTE
Secondary to angioedema  Per chart review patient has history of mild COPD  Patient initially was intubated in the ED and was extubated on 04/11  Patient is currently stable on 2 liters oxygen

## 2021-04-13 NOTE — PLAN OF CARE
Assessment:  No s/s pharyngeal dysphagia  But pt with c/o reduced appetite and demo'd some s/s food aversion but was able to demo oral/pharygneal tolerance of some soft foods and has dentures at home  Plan/Recommendations:  Advance diet as desired  Consider offering supplement  Ok to d/c home on diet as desired (place dentures in for meals)  No additional SLP recommendations at this time

## 2021-04-13 NOTE — ASSESSMENT & PLAN NOTE
Prior sleep study showing severe obstructive sleep apnea  Patient will need repeat sleep study as outpatient

## 2021-04-13 NOTE — PLAN OF CARE
Problem: RESPIRATORY - ADULT  Goal: Achieves optimal ventilation and oxygenation  Description: INTERVENTIONS:  - Assess for changes in respiratory status  - Assess for changes in mentation and behavior  - Position to facilitate oxygenation and minimize respiratory effort  - Oxygen administered by appropriate delivery if ordered  - Initiate smoking cessation education as indicated  - Encourage broncho-pulmonary hygiene including cough, deep breathe, Incentive Spirometry  - Assess the need for suctioning and aspirate as needed  - Assess and instruct to report SOB or any respiratory difficulty  - Respiratory Therapy support as indicated  Outcome: Progressing     Problem: GENITOURINARY - ADULT  Goal: Maintains or returns to baseline urinary function  Description: INTERVENTIONS:  - Assess urinary function  - Encourage oral fluids to ensure adequate hydration if ordered  - Administer IV fluids as ordered to ensure adequate hydration  - Administer ordered medications as needed  - Offer frequent toileting  - Follow urinary retention protocol if ordered  Outcome: Progressing  Goal: Absence of urinary retention  Description: INTERVENTIONS:  - Assess patients ability to void and empty bladder  - Monitor I/O  - Bladder scan as needed  - Discuss with physician/AP medications to alleviate retention as needed  - Discuss catheterization for long term situations as appropriate  Outcome: Progressing     Problem: METABOLIC, FLUID AND ELECTROLYTES - ADULT  Goal: Electrolytes maintained within normal limits  Description: INTERVENTIONS:  - Monitor labs and assess patient for signs and symptoms of electrolyte imbalances  - Administer electrolyte replacement as ordered  - Monitor response to electrolyte replacements, including repeat lab results as appropriate  - Instruct patient on fluid and nutrition as appropriate  Outcome: Progressing     Problem: SKIN/TISSUE INTEGRITY - ADULT  Goal: Skin integrity remains intact  Description: INTERVENTIONS  - Identify patients at risk for skin breakdown  - Assess and monitor skin integrity  - Assess and monitor nutrition and hydration status  - Monitor labs (i e  albumin)  - Assess for incontinence   - Turn and reposition patient  - Assist with mobility/ambulation  - Relieve pressure over bony prominences  - Avoid friction and shearing  - Provide appropriate hygiene as needed including keeping skin clean and dry  - Evaluate need for skin moisturizer/barrier cream  - Collaborate with interdisciplinary team (i e  Nutrition, Rehabilitation, etc )   - Patient/family teaching  Outcome: Progressing  Goal: Oral mucous membranes remain intact  Description: INTERVENTIONS  - Assess oral mucosa and hygiene practices  - Implement preventative oral hygiene regimen  - Implement oral medicated treatments as ordered  - Initiate Nutrition services referral as needed  Outcome: Progressing     Problem: Prexisting or High Potential for Compromised Skin Integrity  Goal: Skin integrity is maintained or improved  Description: INTERVENTIONS:  - Identify patients at risk for skin breakdown  - Assess and monitor skin integrity  - Assess and monitor nutrition and hydration status  - Monitor labs   - Assess for incontinence   - Turn and reposition patient  - Assist with mobility/ambulation  - Relieve pressure over bony prominences  - Avoid friction and shearing  - Provide appropriate hygiene as needed including keeping skin clean and dry  - Evaluate need for skin moisturizer/barrier cream  - Collaborate with interdisciplinary team   - Patient/family teaching  - Consider wound care consult   Outcome: Progressing     Problem: Potential for Falls  Goal: Patient will remain free of falls  Description: INTERVENTIONS:  - Assess patient frequently for physical needs  -  Identify cognitive and physical deficits and behaviors that affect risk of falls    -  [place] fall precautions as indicated by assessment   - Educate patient/family on patient safety including physical limitations  - Instruct patient to call for assistance with activity based on assessment  - Modify environment to reduce risk of injury  - Consider OT/PT consult to assist with strengthening/mobility  Outcome: Progressing     Problem: Nutrition/Hydration-ADULT  Goal: Nutrient/Hydration intake appropriate for improving, restoring or maintaining nutritional needs  Description: Monitor and assess patient's nutrition/hydration status for malnutrition  Collaborate with interdisciplinary team and initiate plan and interventions as ordered  Monitor patient's weight and dietary intake as ordered or per policy  Utilize nutrition screening tool and intervene as necessary  Determine patient's food preferences and provide high-protein, high-caloric foods as appropriate       INTERVENTIONS:  - Monitor oral intake, urinary output, labs, and treatment plans  - Assess nutrition and hydration status and recommend course of action  - Evaluate amount of meals eaten  - Assist patient with eating if necessary   - Allow adequate time for meals  - Recommend/ encourage appropriate diets, oral nutritional supplements, and vitamin/mineral supplements  - Order, calculate, and assess calorie counts as needed  - Recommend, monitor, and adjust tube feedings and TPN/PPN based on assessed needs  - Assess need for intravenous fluids  - Provide specific nutrition/hydration education as appropriate  - Include patient/family/caregiver in decisions related to nutrition  Outcome: Progressing

## 2021-04-13 NOTE — ASSESSMENT & PLAN NOTE
Patient needed straight catheterization x3 following which Mckeon was placed today  Continue Flomax 0 4 milligram p o   Daily  Patient did not have a good bowel movement since admission and patient is started on Colace, MiraLax and Senokot 32

## 2021-04-14 PROBLEM — K59.00 CONSTIPATION: Status: ACTIVE | Noted: 2021-04-14

## 2021-04-14 LAB
ANION GAP SERPL CALCULATED.3IONS-SCNC: 8 MMOL/L (ref 4–13)
BASOPHILS # BLD AUTO: 0.03 THOUSANDS/ΜL (ref 0–0.1)
BASOPHILS NFR BLD AUTO: 0 % (ref 0–1)
BUN SERPL-MCNC: 28 MG/DL (ref 5–25)
CALCIUM SERPL-MCNC: 8.4 MG/DL (ref 8.3–10.1)
CHLORIDE SERPL-SCNC: 104 MMOL/L (ref 100–108)
CO2 SERPL-SCNC: 28 MMOL/L (ref 21–32)
CREAT SERPL-MCNC: 0.87 MG/DL (ref 0.6–1.3)
EOSINOPHIL # BLD AUTO: 0.16 THOUSAND/ΜL (ref 0–0.61)
EOSINOPHIL NFR BLD AUTO: 1 % (ref 0–6)
ERYTHROCYTE [DISTWIDTH] IN BLOOD BY AUTOMATED COUNT: 13.2 % (ref 11.6–15.1)
GFR SERPL CREATININE-BSD FRML MDRD: 96 ML/MIN/1.73SQ M
GLUCOSE SERPL-MCNC: 139 MG/DL (ref 65–140)
GLUCOSE SERPL-MCNC: 159 MG/DL (ref 65–140)
GLUCOSE SERPL-MCNC: 75 MG/DL (ref 65–140)
GLUCOSE SERPL-MCNC: 81 MG/DL (ref 65–140)
GLUCOSE SERPL-MCNC: 97 MG/DL (ref 65–140)
HCT VFR BLD AUTO: 46.3 % (ref 36.5–49.3)
HGB BLD-MCNC: 14.7 G/DL (ref 12–17)
IMM GRANULOCYTES # BLD AUTO: 0.06 THOUSAND/UL (ref 0–0.2)
IMM GRANULOCYTES NFR BLD AUTO: 1 % (ref 0–2)
LYMPHOCYTES # BLD AUTO: 2.73 THOUSANDS/ΜL (ref 0.6–4.47)
LYMPHOCYTES NFR BLD AUTO: 22 % (ref 14–44)
MCH RBC QN AUTO: 28.1 PG (ref 26.8–34.3)
MCHC RBC AUTO-ENTMCNC: 31.7 G/DL (ref 31.4–37.4)
MCV RBC AUTO: 89 FL (ref 82–98)
MONOCYTES # BLD AUTO: 1.54 THOUSAND/ΜL (ref 0.17–1.22)
MONOCYTES NFR BLD AUTO: 12 % (ref 4–12)
NEUTROPHILS # BLD AUTO: 7.9 THOUSANDS/ΜL (ref 1.85–7.62)
NEUTS SEG NFR BLD AUTO: 64 % (ref 43–75)
NRBC BLD AUTO-RTO: 0 /100 WBCS
PLATELET # BLD AUTO: 230 THOUSANDS/UL (ref 149–390)
PMV BLD AUTO: 10 FL (ref 8.9–12.7)
POTASSIUM SERPL-SCNC: 3.6 MMOL/L (ref 3.5–5.3)
RBC # BLD AUTO: 5.23 MILLION/UL (ref 3.88–5.62)
SODIUM SERPL-SCNC: 140 MMOL/L (ref 136–145)
WBC # BLD AUTO: 12.42 THOUSAND/UL (ref 4.31–10.16)

## 2021-04-14 PROCEDURE — 80048 BASIC METABOLIC PNL TOTAL CA: CPT | Performed by: INTERNAL MEDICINE

## 2021-04-14 PROCEDURE — 97167 OT EVAL HIGH COMPLEX 60 MIN: CPT

## 2021-04-14 PROCEDURE — 97110 THERAPEUTIC EXERCISES: CPT

## 2021-04-14 PROCEDURE — 82948 REAGENT STRIP/BLOOD GLUCOSE: CPT

## 2021-04-14 PROCEDURE — 99232 SBSQ HOSP IP/OBS MODERATE 35: CPT | Performed by: INTERNAL MEDICINE

## 2021-04-14 PROCEDURE — 97163 PT EVAL HIGH COMPLEX 45 MIN: CPT

## 2021-04-14 PROCEDURE — 85025 COMPLETE CBC W/AUTO DIFF WBC: CPT | Performed by: INTERNAL MEDICINE

## 2021-04-14 RX ORDER — MAGNESIUM CARB/ALUMINUM HYDROX 105-160MG
148 TABLET,CHEWABLE ORAL ONCE
Status: COMPLETED | OUTPATIENT
Start: 2021-04-14 | End: 2021-04-14

## 2021-04-14 RX ADMIN — DULOXETINE HYDROCHLORIDE 60 MG: 60 CAPSULE, DELAYED RELEASE ORAL at 08:08

## 2021-04-14 RX ADMIN — LEVOTHYROXINE SODIUM 175 MCG: 150 TABLET ORAL at 06:45

## 2021-04-14 RX ADMIN — ATORVASTATIN CALCIUM 40 MG: 40 TABLET, FILM COATED ORAL at 16:37

## 2021-04-14 RX ADMIN — MAGNESIUM CITRATE 148 ML: 1.75 LIQUID ORAL at 12:00

## 2021-04-14 RX ADMIN — CARVEDILOL 6.25 MG: 6.25 TABLET, FILM COATED ORAL at 16:37

## 2021-04-14 RX ADMIN — FAMOTIDINE 20 MG: 20 TABLET ORAL at 17:10

## 2021-04-14 RX ADMIN — DIPHENHYDRAMINE HYDROCHLORIDE 25 MG: 25 TABLET ORAL at 06:44

## 2021-04-14 RX ADMIN — POLYETHYLENE GLYCOL 3350 17 G: 17 POWDER, FOR SOLUTION ORAL at 08:00

## 2021-04-14 RX ADMIN — ENOXAPARIN SODIUM 40 MG: 40 INJECTION SUBCUTANEOUS at 08:08

## 2021-04-14 RX ADMIN — FAMOTIDINE 20 MG: 20 TABLET ORAL at 08:01

## 2021-04-14 RX ADMIN — NICOTINE 21 MG: 21 PATCH, EXTENDED RELEASE TRANSDERMAL at 08:09

## 2021-04-14 RX ADMIN — INSULIN LISPRO 1 UNITS: 100 INJECTION, SOLUTION INTRAVENOUS; SUBCUTANEOUS at 11:45

## 2021-04-14 RX ADMIN — ENOXAPARIN SODIUM 40 MG: 40 INJECTION SUBCUTANEOUS at 21:38

## 2021-04-14 RX ADMIN — DOCUSATE SODIUM 100 MG: 100 CAPSULE, LIQUID FILLED ORAL at 08:08

## 2021-04-14 RX ADMIN — DIPHENHYDRAMINE HYDROCHLORIDE 25 MG: 25 TABLET ORAL at 14:36

## 2021-04-14 RX ADMIN — MELATONIN TAB 3 MG 3 MG: 3 TAB at 21:38

## 2021-04-14 RX ADMIN — DOCUSATE SODIUM 100 MG: 100 CAPSULE, LIQUID FILLED ORAL at 17:10

## 2021-04-14 RX ADMIN — DIPHENHYDRAMINE HYDROCHLORIDE 25 MG: 25 TABLET ORAL at 21:38

## 2021-04-14 RX ADMIN — CARVEDILOL 6.25 MG: 6.25 TABLET, FILM COATED ORAL at 07:54

## 2021-04-14 RX ADMIN — TAMSULOSIN HYDROCHLORIDE 0.4 MG: 0.4 CAPSULE ORAL at 16:37

## 2021-04-14 RX ADMIN — PREDNISONE 40 MG: 20 TABLET ORAL at 08:02

## 2021-04-14 RX ADMIN — INSULIN GLARGINE 60 UNITS: 100 INJECTION, SOLUTION SUBCUTANEOUS at 08:09

## 2021-04-14 NOTE — CASE MANAGEMENT
CM received notice from Lauryn Macario in the rehab department that the PT/OT notes were updated in the system  CM informed Humberto Waller at [facility] via [facility] that the updated clinicals were sent and this point DEWAYNE is awaiting the final determination for placement

## 2021-04-14 NOTE — ASSESSMENT & PLAN NOTE
Secondary to angioedema  Per chart review patient has history of mild COPD  Patient initially was intubated in the ED and was extubated on 04/11  Patient is currently stable on room air

## 2021-04-14 NOTE — PROGRESS NOTES
Uriel 44  Progress Note - [place] 1965, 63 y o  male MRN: [id number]  Unit/Bed#: [address] [place] [address] Encounter: [id number]  Primary Care Provider: MARLEEN Dobbs   Date and time admitted to hospital: 4/7/2021 11:24 AM    * Angioedema  Assessment & Plan  Unclear etiology of angioedema  Patient had initial episode in 2016 attributed to Ace inhibitor use  Patient intermittent episodes in 2016 and 17 despite discontinuation  Patient needed intubation with mechanical ventilation in the operating room by anesthesia for airway protection and hypoxemia  Patient received Decadron 10 milligram IV in the emergency room and was continued on Solu-Medrol 40 milligram q 8 hours along with Pepcid 20 milligram IV q 12 hours  Patient received 2 units of FFP and 4 7 and another 2 units FFP and for a  Patient received 1 gram transexemic acid on 04/06  Patient received Icadibant x1 dose on 04/10  C4 level was normal at 18, CRP 4 3, sed rate 27  C1 esterase inhibitor level was 30  Outpatient follow-up with allergy recommended  Patient is currently on prednisone taper    Acute respiratory failure with hypoxia [facility])  Assessment & Plan  Secondary to angioedema  Per chart review patient has history of mild COPD  Patient initially was intubated in the ED and was extubated on 04/11  Patient is currently stable on room air    Type 2 diabetes, uncontrolled, with neuropathy [facility])  Assessment & Plan  Lab Results   Component Value Date    HGBA1C 11 7 (H) 02/05/2021       Recent Labs     04/13/21  2040 04/14/21  0723 04/14/21  1156 04/14/21  1636   POCGLU 134 97 159* 139       Blood Sugar Average: Last 72 hrs:  (P) 146  3   Patient is on Lantus 60 units daily, Humalog sliding scale with Accu-Cheks q a c  And HS  Blood sugars acceptable    Urinary retention  Assessment & Plan  Patient needed straight catheterization x3 following which Mckeon was placed today  Continue Flomax 0 4 milligram p o   Daily  Patient did not have a good bowel movement since admission and patient is started on Colace, MiraLax and Senokot with which he had good bowel movement  Will discontinue Mckeon in a m  With voiding trial    Constipation  Assessment & Plan  Patient was placed on bowel regimen with Colace, MiraLax and senna yesterday  Patient also received magnesium sulfate and had a large bowel movement today  Continue bowel regimen    Tobacco use  Assessment & Plan  Nicotine patch ordered    Obstructive sleep apnea  Assessment & Plan  Prior sleep study showing severe obstructive sleep apnea  Patient will need repeat sleep study as outpatient    Morbid obesity ([facility] [address] )  Assessment & Plan  Diet and lifestyle modification    Hypothyroidism  Assessment & Plan  Continue levothyroxine 175 microgram p o  Daily    Hypertension  Assessment & Plan  Patient is on Coreg 6 25 milligram p o  B i d  Hydrochlorothiazide has been on hold    Hyperlipidemia  Assessment & Plan  Continue Lipitor    Depression with anxiety  Assessment & Plan  Continue Cymbalta        VTE Pharmacologic Prophylaxis:   Pharmacologic: Enoxaparin (Lovenox)  Mechanical VTE Prophylaxis in Place: Yes    Patient Centered Rounds: I have performed bedside rounds with nursing staff today  Discussions with Specialists or Other Care Team Provider: No    Education and Discussions with Family / Patient: *yes    Time Spent for Care: 45 minutes  More than 50% of total time spent on counseling and coordination of care as described above  Current Length of Stay: 7 day(s)    Current Patient Status: Inpatient   Certification Statement: The patient will continue to require additional inpatient hospital stay due to Angioedema, constipation    Discharge Plan:  Short-term rehabilitation    Code Status: Level 1 - Full Code      Subjective:   Patient denies any shortness of breath, abdominal pain, nausea or vomiting    Patient still did not have a bowel movement this morning but later had a large bowel movement after magnesium citrate  Objective:     Vitals:   Temp (24hrs), Av 8 °F (36 6 °C), Min:97 5 °F (36 4 °C), Max:97 9 °F (36 6 °C)    Temp:  [97 5 °F (36 4 °C)-97 9 °F (36 6 °C)] 97 9 °F (36 6 °C)  HR:  [84-86] 86  Resp:  [18-22] 18  BP: (134-158)/(85-93) 134/90  SpO2:  [93 %-94 %] 93 %  Body mass index is 40 68 kg/m²  Input and Output Summary (last 24 hours): Intake/Output Summary (Last 24 hours) at 2021 1814  Last data filed at 2021 1501  Gross per 24 hour   Intake --   Output 1300 ml   Net -1300 ml       Physical Exam:     Physical Exam  Constitutional:       General: He is not in acute distress  HENT:      Head: Normocephalic and atraumatic  Eyes:      Conjunctiva/sclera: Conjunctivae normal       Pupils: Pupils are equal, round, and reactive to light  Neck:      Musculoskeletal: Normal range of motion and neck supple  Cardiovascular:      Rate and Rhythm: Normal rate and regular rhythm  Heart sounds: Normal heart sounds  Pulmonary:      Effort: Pulmonary effort is normal  No respiratory distress  Breath sounds: No wheezing, rhonchi or rales  Chest:      Chest wall: No tenderness  Abdominal:      General: Bowel sounds are normal  There is no distension  Palpations: Abdomen is soft  Tenderness: There is no abdominal tenderness  There is no guarding or rebound  Skin:     General: Skin is warm and dry  Findings: No rash  Neurological:      Mental Status: He is alert  Cranial Nerves: No cranial nerve deficit           Additional Data:     Labs:    Results from last 7 days   Lab Units 21  0612   WBC Thousand/uL 12 42*   HEMOGLOBIN g/dL 14 7   HEMATOCRIT % 46 3   PLATELETS Thousands/uL 230   NEUTROS PCT % 64   LYMPHS PCT % 22   MONOS PCT % 12   EOS PCT % 1     Results from last 7 days   Lab Units 21  0612   POTASSIUM mmol/L 3 6   CHLORIDE mmol/L 104   CO2 mmol/L 28   BUN mg/dL 28*   CREATININE mg/dL 0 87   CALCIUM mg/dL 8 4 * I Have Reviewed All Lab Data Listed Above  * Additional Pertinent Lab Tests Reviewed: Gin 66 Admission Reviewed        Recent Cultures (last 7 days):           Last 24 Hours Medication List:   Current Facility-Administered Medications   Medication Dose Route Frequency Provider Last Rate    aluminum-magnesium hydroxide-simethicone  30 mL Oral Q4H PRN MARLEEN Baumann      atorvastatin  40 mg Oral Daily With Rheta MARLEEN Walters      carvedilol  6 25 mg Oral BID With Meals MARLEEN Baumann      diphenhydrAMINE  25 mg Oral [facility] [place]      docusate sodium  100 mg Oral BID Greg Hirsch MD      DULoxetine  60 mg Oral Daily MARLEEN Baumann      enoxaparin  40 mg Subcutaneous Q12H [address] MARLEEN Baumann      famotidine  20 mg Oral BID MARLEEN Baumann      hydrALAZINE  5 mg Intravenous Q8H PRN MARLEEN Baumann      insulin glargine  60 Units Subcutaneous QAM MARLEEN Dsouza      insulin lispro  1-6 Units Subcutaneous TID AC MARLEEN Baumann      insulin lispro  1-6 Units Subcutaneous HS MARLEEN Baumann      levothyroxine  175 mcg Oral Daily MARLEEN Baumann      melatonin  3 mg Oral HS MARLEEN Baumann      nicotine  21 mg Transdermal Daily MARLEEN Baumann      polyethylene glycol  17 g Oral Daily Greg Hirsch MD      predniSONE  40 mg Oral Daily MARLEEN Baumann      senna  1 tablet Oral HS Greg Hirsch MD      tamsulosin  0 4 mg Oral Daily With Dinner MARLEEN Baumann          Today, Patient Was Seen By: Greg Hirsch MD    ** Please Note: Dictation voice to text software may have been used in the creation of this document   **

## 2021-04-14 NOTE — ASSESSMENT & PLAN NOTE
Lab Results   Component Value Date    HGBA1C 11 7 (H) 02/05/2021       Recent Labs     04/13/21  2040 04/14/21  0723 04/14/21  1156 04/14/21  1636   POCGLU 134 97 159* 139       Blood Sugar Average: Last 72 hrs:  (P) 146  3   Patient is on Lantus 60 units daily, Humalog sliding scale with Accu-Cheks q a c   And HS  Blood sugars acceptable

## 2021-04-14 NOTE — PHYSICAL THERAPY NOTE
PT EVALUATION     04/14/21 1420   Note Type   Note type Evaluation   Pain Assessment   Pain Assessment Tool Cid-Baker FACES   Cid-Baker FACES Pain Rating 2  (Mckeon catheter site)   Home Living   Type of [address] [facility] [address] level;Stairs to enter with rails  (20 stairs to enter)   Home Equipment Cane   Additional Comments Independent with or without cane prior to admission   Prior Function   Level of [place] Independent with ADLs and functional mobility   Lives With Alone   Receives Help From Family;Friend(s)   ADL Assistance Independent   IADLs Needs assistance   Comments Patient states typically independent with ADLs 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  Patient does admit to limited endurance with activity prior   Restrictions/Precautions   Other Precautions Chair Alarm; Bed Alarm;O2;Fall Risk   General   Additional Pertinent History Chart reviewed, patient admitted with angioedema    Patient now presents as generally weak deconditioned and requiring increased assist with walker for transfers and do very limited distances   Family/Caregiver Present No   Cognition   Overall Cognitive Status WFL   Arousal/Participation Cooperative   Orientation Level Oriented X4   Following Commands Follows multistep commands with increased time or repetition   Comments Patient distracted at times   RLE Assessment   RLE Assessment   (ROM WFL, strength 3+/5)   LLE Assessment   LLE Assessment   (ROM WFL, strength 3+/5)   Coordination   Movements are Fluid and Coordinated 0   Bed Mobility   Supine to Sit 4  Minimal assistance   Additional items Assist x 1;Verbal cues;LE management   Sit to Supine 3  Moderate assistance   Additional items Assist x 1;Verbal cues;LE management   Transfers   Sit to Stand 4  Minimal assistance   Additional items Assist x 1   Stand to Sit 4  Minimal assistance   Additional items Assist x 1;Verbal cues   Ambulation/Elevation   Gait Assistance 3 Moderate assist   Additional items Assist x 1;Verbal cues; Tactile cues   Assistive Device Rolling walker   Distance 5 ft with shuffling type gait pattern loss of balance laterally at times and narrow base of support  Patient also with shortness of breath and fatigue with limited distances requesting sitting resting period   Balance   Static Sitting Fair   Dynamic Sitting Fair -   Static Standing Fair -   Dynamic Standing Poor +   Ambulatory Poor +   Activity Tolerance   Activity Tolerance Patient limited by fatigue   Nurse Made Aware Yes   Assessment   Prognosis Good   Problem List Decreased strength;Decreased range of motion;Decreased endurance; Impaired balance;Decreased mobility; Decreased coordination;Pain   Assessment Patient seen for Physical Therapy evaluation  Patient admitted with Angioedema  Comorbidities affecting patient's physical performance include:COPD, MARSHA (non-compliant with CPAP), class 3 obesity, DM-II, hypothyroidism and recurrent angioedema (first episode in September 2016 at which time his ACE-I was discontinued)   Personal factors affecting patient at time of initial evaluation include: lives in two story house, inability to ambulate household distances, inability to navigate community distances, inability to navigate level surfaces without external assistance, inability to perform dynamic tasks in community, limited home support, inability to perform physical activity, inability to perform ADLS and inability to perform IADLS   Prior to admission, patient was independent with functional mobility with cane, independent with ADLS, requiring assist for IADLS, ambulating household distance and ambulating community distances    Please find objective findings from Physical Therapy assessment regarding body systems outlined above with impairments and limitations including weakness, decreased ROM, impaired balance, decreased endurance, impaired coordination, gait deviations, pain, decreased activity tolerance, decreased functional mobility tolerance, fall risk and SOB upon exertion  The Barthel Index was used as a functional outcome tool presenting with a score of 35 today indicating marked limitations of functional mobility and ADLS  Patient's clinical presentation is currently unstable/unpredictable as seen in patient's presentation of vital sign response, changing level of pain, increased fall risk, new onset of impairment of functional mobility, decreased endurance and new onset of weakness  Pt would benefit from continued Physical Therapy treatment to address deficits as defined above and maximize level of functional mobility  As demonstrated by objective findings, the assigned level of complexity for this evaluation is high  The patient's [facility] Basic Mobility Inpatient Short Form Raw Score is 13, Standardized Score is 33 99  A standardized score less than 42 9 suggests the patient may benefit from discharge to post-acute rehabilitation services  Please also refer to the recommendation of the Physical Therapist for safe discharge planning  Goals   Patient Goals To get stronger and return home   STG Expiration Date 04/21/21   Short Term Goal #1 Transfers and gait with roller walker with Min assist of 1   Short Term Goal #2 Gait endurance to 60 ft, strength bilateral lower extremities 4-/5 all to meet patient goal of improved strength and return home   LTG Expiration Date 04/28/21   Long Term Goal #1 Transfers and gait with roller walker independently   Long Term Goal #2 Gait endurance to functional household distances   Plan   Treatment/Interventions ADL retraining;Functional transfer training;LE strengthening/ROM; Elevations; Therapeutic exercise; Endurance training;Patient/family training;Equipment eval/education; Bed mobility;Gait training; Compensatory technique education   PT Frequency 5x/wk   Recommendation   PT Discharge Recommendation Post acute rehabilitation services   [facility] Basic Mobility Inpatient   Turning in Bed Without Bedrails 3   Lying on Back to Sitting on Edge of Flat Bed 3   Moving Bed to Chair 2   Standing Up From Chair 2   Walk in Room 2   Climb 3-5 Stairs 1   Basic Mobility Inpatient Raw Score 13   Basic Mobility Standardized Score 33 99   Barthel Index   Feeding 10   Bathing 0   Grooming Score 0   Dressing Score 5   Bladder Score 0   Bowels Score 10   Toilet Use Score 5   Transfers (Bed/Chair) Score 5   Mobility (Level Surface) Score 0   Stairs Score 0   Barthel Index Score 28   Licensure   [place] License Number  Vahid Duque PT [id number]     PT TREATMENT  Time In:1405  Time Out:1420  Total Time: 15min      S:  Patient with pain in Mckeon catheter site, nurse aware  O: -bilateral lower extremity exercise completed for strengthening sitting on bed edge without lumbar support:  Long arc quads ankle pumps, hip flexion all 10 repetitions each    All activity completed for core strengthening as well  A:  Patient cooperative motivated and will benefit from continued PT with progression as tolerated  P:  STR  Ceferino Rice, PT

## 2021-04-14 NOTE — CASE MANAGEMENT
CM reviewed the case and noted that out of the 8 facilities that were initiated in [address], 6 have declined to accept the Pt with the exception of [address] and TCU and [address]  CM updated the referral and forwarded the newest clinicals  CM received notice via TT from Paulino Tristan from [facility] informing that PT notes are needed before a final decision can be made  CM outreached Amelia Koenig from PT to inform  CM will con't to F/U through D/C

## 2021-04-14 NOTE — ASSESSMENT & PLAN NOTE
Unclear etiology of angioedema  Patient had initial episode in 2016 attributed to Ace inhibitor use  Patient intermittent episodes in 2016 and 17 despite discontinuation  Patient needed intubation with mechanical ventilation in the operating room by anesthesia for airway protection and hypoxemia  Patient received Decadron 10 milligram IV in the emergency room and was continued on Solu-Medrol 40 milligram q 8 hours along with Pepcid 20 milligram IV q 12 hours  Patient received 2 units of FFP and 4 7 and another 2 units FFP and for a  Patient received 1 gram transexemic acid on 04/06  Patient received Icadibant x1 dose on 04/10  C4 level was normal at 18, CRP 4 3, sed rate 27   C1 esterase inhibitor level was 30  Outpatient follow-up with allergy recommended  Patient is currently on prednisone taper

## 2021-04-14 NOTE — OCCUPATIONAL THERAPY NOTE
OT EVALUATION     04/14/21 1505   Note Type   Note type Evaluation   Restrictions/Precautions   Other Precautions Bed Alarm; Chair Alarm; Fall Risk;O2   Pain Assessment   Pain Assessment Tool Cid-Baker FACES   Cid-Baker FACES Pain Rating 2   Pain Location/Orientation   (catheter site )   Home Living   Type of Home Apartment   Home Layout One level  (20 [address])   Bathroom Shower/Tub Tub/shower unit   Home Equipment Cane   Prior Function   Level of [place] Independent with ADLs and functional mobility   Lives With Alone   Receives Help From Family;Friend(s)   ADL Assistance Independent   IADLs Needs assistance   ADL   Eating Assistance 7  Independent   Grooming Assistance 5  Supervision/Setup   UB Bathing Assistance 5  Supervision/Setup   LB Bathing Assistance 3  Moderate Assistance   UB Dressing Assistance 5  Supervision/Setup   LB Dressing Assistance 3  Moderate Assistance   Toileting Assistance  3  Moderate Assistance   Bed Mobility   Supine to Sit 3  Moderate assistance   Sit to Supine 3  Moderate assistance   Transfers   Sit to Stand 4  Minimal assistance  (per PT pt declined OOB as he was fatigued from PT)   Functional Mobility   Functional Mobility 3  Moderate assistance   Additional Comments per PT with RW few steps, pt declined due to fatigue and catheter pain, PT saw patient right before OT    Additional items Rolling walker   Balance   Static Sitting Fair   Dynamic Sitting Fair -   Static Standing Fair -   Dynamic Standing Poor +   Activity Tolerance   Activity Tolerance Patient limited by fatigue   Nurse Made Aware yes   RUE Assessment   RUE Assessment WFL  (grossly 4-/5 MMT)   LUE Assessment   LUE Assessment WFL  (grossly 4-/5 MMT)   Cognition   Overall Cognitive Status WFL   Arousal/Participation Cooperative   Attention Within functional limits   Orientation Level Oriented X4   Following Commands Follows multistep commands with increased time or repetition   Assessment   Limitation Decreased ADL status; Decreased UE strength;Decreased Safe judgement during ADL;Decreased endurance;Decreased self-care trans;Decreased high-level ADLs  (decresaed balance and mobility )   Prognosis Good   Assessment Patient evaluated by Occupational Therapy  Patient admitted with Angioedema  The patients occupational profile, medical and therapy history includes a extensive additional review of physical, cognitive, or psychosocial history related to current functional performance  Comorbidities affecting functional mobility and ADLS include: COPD, diabetes, hypothyroid and obesity  Prior to admission, patient was independent with functional mobility with cane, independent with ADLS and independent with IADLS  The evaluation identifies the following performance deficits: weakness, impaired balance, decreased endurance, increased fall risk, new onset of impairment of functional mobility, decreased ADLS, decreased IADLS, decreased activity tolerance, decreased safety awareness, impaired judgement, SOB upon exertion and decreased strength, that result in activity limitations and/or participation restrictions  This evaluation requires clinical decision making of high complexity, because the patient presents with comorbidites that affect occupational performance and required significant modification of tasks or assistance with consideration of multiple treatment options  The Barthel Index was used as a functional outcome tool presenting with a score of 35, indicating marked limitations of functional mobility and ADLS  The patient's raw score on the -PAC Daily Activity inpatient short form is 17, standardized score is 37 26, less than 39 4  Patients at this level are likely to benefit from DC to post-acute rehabilitation services  Please refer to the recommendation of the Occupational Therapist for safe DC planning   Patient will benefit from skilled Occupational Therapy services to address above deficits and facilitate a safe return to prior level of function  Goals   Patient Goals to get stronger and go home    STG Time Frame   (1-7 days)   Short Term Goal  Goals established to promote patient goal of going home and getting stronger:  Patient will increase standing tolerance to 3 minutes during ADL task to decrease assistance level and decrease fall risk; Patient will increase bed mobility to min assist in preparation for ADLS and transfers; Patient will increase functional mobility to and from bathroom with rolling walker with min assist to increase performance with ADLS and to use a toilet; Patient will tolerate 10 minutes of UE ROM/strengthening to increase general activity tolerance and performance in ADLS/IADLS; Patient will improve functional activity tolerance to 10 minutes of sustained functional tasks to increase participation in basic self-care and decrease assistance level;  Patient will be able to to verbalize understanding and perform energy conservation/proper body mechanics during ADLS and functional mobility at least 75% of the time with minimal cueing to decrease signs of fatigue and increase stamina to return to prior level of function; Patient will increase dynamic sitting balance to fair to improve the ability to sit at edge of bed or on a chair for ADLS;  Patient will increase dynamic standing balance to fair- to improve postural stability and decrease fall risk during standing ADLS and transfers  LTG Time Frame   (8-14 days)   Long Term Goal Goals established to promote patient goal of going home and getting stronger:  Patient will increase standing tolerance to 6 minutes during ADL task to decrease assistance level and decrease fall risk; Patient will increase bed mobility to supervision in preparation for ADLS and transfers;  Patient will increase functional mobility to and from bathroom with rolling walker with supervision to increase performance with ADLS and to use a toilet; Patient will tolerate 20 minutes of UE ROM/strengthening to increase general activity tolerance and performance in ADLS/IADLS; Patient will improve functional activity tolerance to 20 minutes of sustained functional tasks to increase participation in basic self-care and decrease assistance level;  Patient will be able to to verbalize understanding and perform energy conservation/proper body mechanics during ADLS and functional mobility at least 90% of the time without cueing to decrease signs of fatigue and increase stamina to return to prior level of function; Patient will increase dynamic sitting balance to fair+ to improve the ability to sit at edge of bed or on a chair for ADLS;  Patient will increase dynamic standing balance to fair to improve postural stability and decrease fall risk during standing ADLS and transfers  Functional Transfer Goals   Pt Will Perform All Functional Transfers   (STG min assist LTG supervision )   ADL Goals   Pt Will Perform Grooming Standing at sink  (STG supervision LTG independent )   Pt Will Perform Bathing   (STG min assist LTG supervision )   Pt Will Perform UE Dressing   (STG supervision LTG independent )   Pt Will Perform LE Dressing   (STG min assist LTG supervision )   Pt Will Perform Toileting   (STG min assist LTG supervision )   Plan   Treatment Interventions ADL retraining;Functional transfer training;UE strengthening/ROM; Endurance training;Patient/family training;Equipment evaluation/education; Activityengagement; Compensatory technique education   Goal Expiration Date 04/28/21   OT Frequency 3-5x/wk   Recommendation   OT Discharge Recommendation Post acute rehabilitation services   AM-PAC Daily Activity Inpatient   Lower Body Dressing 2   Bathing 2   Toileting 2   Upper Body Dressing 3   Grooming 4   Eating 4   Daily Activity Raw Score 17   Daily Activity Standardized Score (Calc for Raw Score >=11) 37 26   AM-PAC Applied Cognition Inpatient   Following a Speech/Presentation 4 Understanding Ordinary Conversation 4   Taking Medications 4   Remembering Where Things Are Placed or Put Away 4   Remembering List of 4-5 Errands 4   Taking Care of Complicated Tasks 4   Applied Cognition Raw Score 24   Applied Cognition Standardized Score 62 21   Barthel Index   Feeding 10   Bathing 0   Grooming Score 0   Dressing Score 5   Bladder Score 0   Bowels Score 10   Toilet Use Score 5   Transfers (Bed/Chair) Score 5   Mobility (Level Surface) Score 0   Stairs Score 0   Barthel Index Score 35   Licensure   [place] License Number  Debby Medal Luite Sebastien 86 OTR/L [id number]

## 2021-04-14 NOTE — ASSESSMENT & PLAN NOTE
Patient was placed on bowel regimen with Colace, MiraLax and senna yesterday  Patient also received magnesium sulfate and had a large bowel movement today  Continue bowel regimen

## 2021-04-14 NOTE — ASSESSMENT & PLAN NOTE
Patient needed straight catheterization x3 following which Mckeon was placed today  Continue Flomax 0 4 milligram p o  Daily  Patient did not have a good bowel movement since admission and patient is started on Colace, MiraLax and Senokot with which he had good bowel movement  Will discontinue Mckeon in a m   With voiding trial

## 2021-04-14 NOTE — CASE MANAGEMENT
CM revisited the case and F/U with PT/OT about their assessment  According to Severiano Watson the Pt was assessed and once she processes his assessment she will inform the CM and the completed PT/OT notes will be sent to [facility] which is the Pt's preference facility

## 2021-04-14 NOTE — PLAN OF CARE
Problem: RESPIRATORY - ADULT  Goal: Achieves optimal ventilation and oxygenation  Description: INTERVENTIONS:  - Assess for changes in respiratory status  - Assess for changes in mentation and behavior  - Position to facilitate oxygenation and minimize respiratory effort  - Oxygen administered by appropriate delivery if ordered  - Initiate smoking cessation education as indicated  - Encourage broncho-pulmonary hygiene including cough, deep breathe, Incentive Spirometry  - Assess the need for suctioning and aspirate as needed  - Assess and instruct to report SOB or any respiratory difficulty  - Respiratory Therapy support as indicated  Outcome: Progressing     Problem: GENITOURINARY - ADULT  Goal: Maintains or returns to baseline urinary function  Description: INTERVENTIONS:  - Assess urinary function  - Encourage oral fluids to ensure adequate hydration if ordered  - Administer IV fluids as ordered to ensure adequate hydration  - Administer ordered medications as needed  - Offer frequent toileting  - Follow urinary retention protocol if ordered  Outcome: Progressing  Goal: Absence of urinary retention  Description: INTERVENTIONS:  - Assess patients ability to void and empty bladder  - Monitor I/O  - Bladder scan as needed  - Discuss with physician/AP medications to alleviate retention as needed  - Discuss catheterization for long term situations as appropriate  Outcome: Progressing     Problem: METABOLIC, FLUID AND ELECTROLYTES - ADULT  Goal: Electrolytes maintained within normal limits  Description: INTERVENTIONS:  - Monitor labs and assess patient for signs and symptoms of electrolyte imbalances  - Administer electrolyte replacement as ordered  - Monitor response to electrolyte replacements, including repeat lab results as appropriate  - Instruct patient on fluid and nutrition as appropriate  Outcome: Progressing     Problem: SKIN/TISSUE INTEGRITY - ADULT  Goal: Skin integrity remains intact  Description: INTERVENTIONS  - Identify patients at risk for skin breakdown  - Assess and monitor skin integrity  - Assess and monitor nutrition and hydration status  - Monitor labs (i e  albumin)  - Assess for incontinence   - Turn and reposition patient  - Assist with mobility/ambulation  - Relieve pressure over bony prominences  - Avoid friction and shearing  - Provide appropriate hygiene as needed including keeping skin clean and dry  - Evaluate need for skin moisturizer/barrier cream  - Collaborate with interdisciplinary team (i e  Nutrition, Rehabilitation, etc )   - Patient/family teaching  Outcome: Progressing  Goal: Oral mucous membranes remain intact  Description: INTERVENTIONS  - Assess oral mucosa and hygiene practices  - Implement preventative oral hygiene regimen  - Implement oral medicated treatments as ordered  - Initiate Nutrition services referral as needed  Outcome: Progressing     Problem: Prexisting or High Potential for Compromised Skin Integrity  Goal: Skin integrity is maintained or improved  Description: INTERVENTIONS:  - Identify patients at risk for skin breakdown  - Assess and monitor skin integrity  - Assess and monitor nutrition and hydration status  - Monitor labs   - Assess for incontinence   - Turn and reposition patient  - Assist with mobility/ambulation  - Relieve pressure over bony prominences  - Avoid friction and shearing  - Provide appropriate hygiene as needed including keeping skin clean and dry  - Evaluate need for skin moisturizer/barrier cream  - Collaborate with interdisciplinary team   - Patient/family teaching  - Consider wound care consult   Outcome: Progressing     Problem: Potential for Falls  Goal: Patient will remain free of falls  Description: INTERVENTIONS:  - Assess patient frequently for physical needs  -  Identify cognitive and physical deficits and behaviors that affect risk of falls    -  [place] fall precautions as indicated by assessment   - Educate patient/family on patient safety including physical limitations  - Instruct patient to call for assistance with activity based on assessment  - Modify environment to reduce risk of injury  - Consider OT/PT consult to assist with strengthening/mobility  Outcome: Progressing     Problem: Nutrition/Hydration-ADULT  Goal: Nutrient/Hydration intake appropriate for improving, restoring or maintaining nutritional needs  Description: Monitor and assess patient's nutrition/hydration status for malnutrition  Collaborate with interdisciplinary team and initiate plan and interventions as ordered  Monitor patient's weight and dietary intake as ordered or per policy  Utilize nutrition screening tool and intervene as necessary  Determine patient's food preferences and provide high-protein, high-caloric foods as appropriate       INTERVENTIONS:  - Monitor oral intake, urinary output, labs, and treatment plans  - Assess nutrition and hydration status and recommend course of action  - Evaluate amount of meals eaten  - Assist patient with eating if necessary   - Allow adequate time for meals  - Recommend/ encourage appropriate diets, oral nutritional supplements, and vitamin/mineral supplements  - Order, calculate, and assess calorie counts as needed  - Recommend, monitor, and adjust tube feedings and TPN/PPN based on assessed needs  - Assess need for intravenous fluids  - Provide specific nutrition/hydration education as appropriate  - Include patient/family/caregiver in decisions related to nutrition  Outcome: Progressing

## 2021-04-15 ENCOUNTER — HOSPITAL ENCOUNTER (INPATIENT)
Facility: HOSPITAL | Age: 56
LOS: 6 days | Discharge: HOME | DRG: 189 | End: 2021-04-21
Attending: INTERNAL MEDICINE | Admitting: INTERNAL MEDICINE
Payer: MEDICARE

## 2021-04-15 VITALS
OXYGEN SATURATION: 93 % | DIASTOLIC BLOOD PRESSURE: 80 MMHG | SYSTOLIC BLOOD PRESSURE: 137 MMHG | RESPIRATION RATE: 20 BRPM | HEIGHT: 64 IN | HEART RATE: 89 BPM | WEIGHT: 243.17 LBS | TEMPERATURE: 99 F | BODY MASS INDEX: 41.51 KG/M2

## 2021-04-15 DIAGNOSIS — T78.3XXA ANGIOEDEMA, INITIAL ENCOUNTER: Primary | ICD-10-CM

## 2021-04-15 DIAGNOSIS — J44.9 COPD, MILD (HCC): ICD-10-CM

## 2021-04-15 DIAGNOSIS — T78.3XXD ANGIOEDEMA, SUBSEQUENT ENCOUNTER: ICD-10-CM

## 2021-04-15 LAB
FLUAV RNA RESP QL NAA+PROBE: NEGATIVE
FLUBV RNA RESP QL NAA+PROBE: NEGATIVE
GLUCOSE SERPL-MCNC: 109 MG/DL (ref 65–140)
GLUCOSE SERPL-MCNC: 116 MG/DL (ref 65–140)
GLUCOSE SERPL-MCNC: 120 MG/DL (ref 65–140)
GLUCOSE SERPL-MCNC: 65 MG/DL (ref 65–140)
GLUCOSE SERPL-MCNC: 76 MG/DL (ref 65–140)
RSV RNA RESP QL NAA+PROBE: NEGATIVE
SARS-COV-2 RNA RESP QL NAA+PROBE: NEGATIVE

## 2021-04-15 PROCEDURE — 82948 REAGENT STRIP/BLOOD GLUCOSE: CPT

## 2021-04-15 PROCEDURE — 99239 HOSP IP/OBS DSCHRG MGMT >30: CPT | Performed by: INTERNAL MEDICINE

## 2021-04-15 PROCEDURE — 0241U HB NFCT DS VIR RESP RNA 4 TRGT: CPT | Performed by: INTERNAL MEDICINE

## 2021-04-15 RX ORDER — LANOLIN ALCOHOL/MO/W.PET/CERES
3 CREAM (GRAM) TOPICAL
Refills: 0
Start: 2021-04-15 | End: 2021-05-06 | Stop reason: ALTCHOICE

## 2021-04-15 RX ORDER — INSULIN GLARGINE 100 [IU]/ML
60 INJECTION, SOLUTION SUBCUTANEOUS EVERY MORNING
Status: DISCONTINUED | OUTPATIENT
Start: 2021-04-15 | End: 2021-04-16

## 2021-04-15 RX ORDER — TAMSULOSIN HYDROCHLORIDE 0.4 MG/1
0.4 CAPSULE ORAL
Refills: 0
Start: 2021-04-15 | End: 2021-05-06 | Stop reason: ALTCHOICE

## 2021-04-15 RX ORDER — POLYETHYLENE GLYCOL 3350 17 G/17G
17 POWDER, FOR SOLUTION ORAL DAILY
Refills: 0
Start: 2021-04-16 | End: 2021-05-06 | Stop reason: ALTCHOICE

## 2021-04-15 RX ORDER — NICOTINE 21 MG/24HR
21 PATCH, TRANSDERMAL 24 HOURS TRANSDERMAL DAILY
Status: DISCONTINUED | OUTPATIENT
Start: 2021-04-16 | End: 2021-04-21 | Stop reason: HOSPADM

## 2021-04-15 RX ORDER — SENNOSIDES 8.6 MG
8.6 TABLET ORAL
Refills: 0
Start: 2021-04-15 | End: 2021-05-06 | Stop reason: ALTCHOICE

## 2021-04-15 RX ORDER — HYDRALAZINE HYDROCHLORIDE 20 MG/ML
5 INJECTION INTRAMUSCULAR; INTRAVENOUS EVERY 8 HOURS PRN
Status: DISCONTINUED | OUTPATIENT
Start: 2021-04-15 | End: 2021-04-17

## 2021-04-15 RX ORDER — DOCUSATE SODIUM 100 MG/1
100 CAPSULE, LIQUID FILLED ORAL 2 TIMES DAILY
Qty: 10 CAPSULE | Refills: 0
Start: 2021-04-15 | End: 2021-07-13 | Stop reason: ALTCHOICE

## 2021-04-15 RX ORDER — PREDNISONE 20 MG/1
40 TABLET ORAL DAILY
Status: COMPLETED | OUTPATIENT
Start: 2021-04-16 | End: 2021-04-16

## 2021-04-15 RX ORDER — TAMSULOSIN HYDROCHLORIDE 0.4 MG/1
0.4 CAPSULE ORAL
Status: DISCONTINUED | OUTPATIENT
Start: 2021-04-15 | End: 2021-04-21 | Stop reason: HOSPADM

## 2021-04-15 RX ORDER — DIPHENHYDRAMINE HCL 25 MG
25 TABLET ORAL EVERY 8 HOURS SCHEDULED
Status: DISCONTINUED | OUTPATIENT
Start: 2021-04-15 | End: 2021-04-20

## 2021-04-15 RX ORDER — DULOXETIN HYDROCHLORIDE 60 MG/1
60 CAPSULE, DELAYED RELEASE ORAL DAILY
Status: DISCONTINUED | OUTPATIENT
Start: 2021-04-16 | End: 2021-04-21 | Stop reason: HOSPADM

## 2021-04-15 RX ORDER — MAGNESIUM HYDROXIDE/ALUMINUM HYDROXICE/SIMETHICONE 120; 1200; 1200 MG/30ML; MG/30ML; MG/30ML
30 SUSPENSION ORAL EVERY 4 HOURS PRN
Status: DISCONTINUED | OUTPATIENT
Start: 2021-04-15 | End: 2021-04-21 | Stop reason: HOSPADM

## 2021-04-15 RX ORDER — LANOLIN ALCOHOL/MO/W.PET/CERES
3 CREAM (GRAM) TOPICAL
Status: DISCONTINUED | OUTPATIENT
Start: 2021-04-15 | End: 2021-04-21 | Stop reason: HOSPADM

## 2021-04-15 RX ORDER — POLYETHYLENE GLYCOL 3350 17 G/17G
17 POWDER, FOR SOLUTION ORAL DAILY
Status: DISCONTINUED | OUTPATIENT
Start: 2021-04-16 | End: 2021-04-21 | Stop reason: HOSPADM

## 2021-04-15 RX ORDER — SENNOSIDES 8.6 MG
1 TABLET ORAL
Status: DISCONTINUED | OUTPATIENT
Start: 2021-04-15 | End: 2021-04-21 | Stop reason: HOSPADM

## 2021-04-15 RX ORDER — FAMOTIDINE 20 MG/1
20 TABLET, FILM COATED ORAL 2 TIMES DAILY
Status: DISCONTINUED | OUTPATIENT
Start: 2021-04-15 | End: 2021-04-21 | Stop reason: HOSPADM

## 2021-04-15 RX ORDER — NICOTINE 21 MG/24HR
1 PATCH, TRANSDERMAL 24 HOURS TRANSDERMAL DAILY
Qty: 28 PATCH | Refills: 0
Start: 2021-04-16 | End: 2021-06-16

## 2021-04-15 RX ORDER — ATORVASTATIN CALCIUM 40 MG/1
40 TABLET, FILM COATED ORAL
Status: DISCONTINUED | OUTPATIENT
Start: 2021-04-15 | End: 2021-04-21 | Stop reason: HOSPADM

## 2021-04-15 RX ORDER — DOCUSATE SODIUM 100 MG/1
100 CAPSULE, LIQUID FILLED ORAL 2 TIMES DAILY
Status: DISCONTINUED | OUTPATIENT
Start: 2021-04-15 | End: 2021-04-21 | Stop reason: HOSPADM

## 2021-04-15 RX ORDER — PREDNISONE 10 MG/1
TABLET ORAL
Qty: 18 TABLET | Refills: 0
Start: 2021-04-15 | End: 2021-04-21 | Stop reason: HOSPADM

## 2021-04-15 RX ORDER — FAMOTIDINE 20 MG/1
20 TABLET, FILM COATED ORAL 2 TIMES DAILY
Refills: 0
Start: 2021-04-15 | End: 2021-06-16

## 2021-04-15 RX ORDER — CARVEDILOL 6.25 MG/1
6.25 TABLET ORAL 2 TIMES DAILY WITH MEALS
Status: DISCONTINUED | OUTPATIENT
Start: 2021-04-15 | End: 2021-04-21 | Stop reason: HOSPADM

## 2021-04-15 RX ORDER — CARVEDILOL 6.25 MG/1
6.25 TABLET ORAL 2 TIMES DAILY WITH MEALS
Refills: 0
Start: 2021-04-15 | End: 2021-05-06 | Stop reason: ALTCHOICE

## 2021-04-15 RX ADMIN — DIPHENHYDRAMINE HYDROCHLORIDE 25 MG: 25 TABLET ORAL at 06:00

## 2021-04-15 RX ADMIN — DIPHENHYDRAMINE HCL 25 MG: 25 TABLET ORAL at 14:52

## 2021-04-15 RX ADMIN — NICOTINE 21 MG: 21 PATCH, EXTENDED RELEASE TRANSDERMAL at 08:29

## 2021-04-15 RX ADMIN — FAMOTIDINE 20 MG: 20 TABLET ORAL at 18:00

## 2021-04-15 RX ADMIN — TUBERCULIN PURIFIED PROTEIN DERIVATIVE 5 UNITS: 5 INJECTION INTRADERMAL at 14:56

## 2021-04-15 RX ADMIN — DIPHENHYDRAMINE HCL 25 MG: 25 TABLET ORAL at 22:17

## 2021-04-15 RX ADMIN — SENNOSIDES 8.6 MG: 8.6 TABLET, FILM COATED ORAL at 22:17

## 2021-04-15 RX ADMIN — ENOXAPARIN SODIUM 40 MG: 40 INJECTION SUBCUTANEOUS at 08:29

## 2021-04-15 RX ADMIN — CARVEDILOL 6.25 MG: 6.25 TABLET, FILM COATED ORAL at 15:53

## 2021-04-15 RX ADMIN — ENOXAPARIN SODIUM 40 MG: 40 INJECTION SUBCUTANEOUS at 20:48

## 2021-04-15 RX ADMIN — DULOXETINE HYDROCHLORIDE 60 MG: 60 CAPSULE, DELAYED RELEASE ORAL at 08:29

## 2021-04-15 RX ADMIN — INSULIN GLARGINE 60 UNITS: 100 INJECTION, SOLUTION SUBCUTANEOUS at 14:52

## 2021-04-15 RX ADMIN — PREDNISONE 40 MG: 20 TABLET ORAL at 08:29

## 2021-04-15 RX ADMIN — ATORVASTATIN CALCIUM 40 MG: 40 TABLET, FILM COATED ORAL at 15:53

## 2021-04-15 RX ADMIN — LEVOTHYROXINE SODIUM 175 MCG: 150 TABLET ORAL at 06:00

## 2021-04-15 RX ADMIN — MELATONIN TAB 3 MG 3 MG: 3 TAB at 22:17

## 2021-04-15 RX ADMIN — FAMOTIDINE 20 MG: 20 TABLET ORAL at 08:30

## 2021-04-15 RX ADMIN — DOCUSATE SODIUM 100 MG: 100 CAPSULE, LIQUID FILLED ORAL at 18:00

## 2021-04-15 RX ADMIN — INSULIN GLARGINE 60 UNITS: 100 INJECTION, SOLUTION SUBCUTANEOUS at 08:30

## 2021-04-15 RX ADMIN — TAMSULOSIN HYDROCHLORIDE 0.4 MG: 0.4 CAPSULE ORAL at 15:53

## 2021-04-15 RX ADMIN — CARVEDILOL 6.25 MG: 6.25 TABLET, FILM COATED ORAL at 08:30

## 2021-04-15 RX ADMIN — DOCUSATE SODIUM 100 MG: 100 CAPSULE, LIQUID FILLED ORAL at 08:30

## 2021-04-15 NOTE — NURSING NOTE
IV removed  Bleeding stopped with dressing  Patient aware of transfer status  No questions at this time  Report called into [facility] at [facility]  RN aware that robertson was pulled at 0600  Call back number provided  Blood sugar checked at this time  No coverage needed   Patient transported with stretcher and transport team

## 2021-04-15 NOTE — DISCHARGE INSTRUCTIONS
Patient will need follow-up with an allergist as soon as possible  Angioedema   WHAT YOU NEED TO KNOW:   Angioedema is sudden swelling caused by fluid that collects in deep layers of the skin  Swelling occurs most often on the face, lips, tongue, or throat, but it can happen anywhere on the body  Your risk for angioedema increases if you have food or insect allergies or a family history of angioedema  Emotional stress, autoimmune disorders, and medicines, such as ACE inhibitors, also increase your risk  Your symptoms may be mild, or you may develop anaphylaxis  Anaphylaxis is a sudden, life-threatening reaction that needs immediate treatment  DISCHARGE INSTRUCTIONS:   Call 911 for signs or symptoms of anaphylaxis,  such as trouble breathing, swelling in your mouth or throat, or wheezing  You may also have itching, a rash, hives, or feel like you are going to faint  Return to the emergency department if:   · You have sudden behavior changes or irritability  · You are dizzy and your heart is beating faster than usual     Contact your healthcare provider if:   · Your swelling does not improve, even after you take your medicines  · You have questions or concerns about your condition or care  Medicines:   · Antihistamines  decrease mild symptoms such as itching or a rash  · Epinephrine  is used to treat severe allergic reactions such as anaphylaxis  · Steroids: This medicine may be given to decrease redness, pain, and swelling  · Take your medicine as directed  Contact your healthcare provider if you think your medicine is not helping or if you have side effects  Tell him of her if you are allergic to any medicine  Keep a list of the medicines, vitamins, and herbs you take  Include the amounts, and when and why you take them  Bring the list or the pill bottles to follow-up visits  Carry your medicine list with you in case of an emergency      Steps to take for signs or symptoms of anaphylaxis:   · Immediately  give 1 shot of epinephrine only into the outer thigh muscle  · Leave the shot in place  as directed  Your healthcare provider may recommend you leave it in place for up to 10 seconds before you remove it  This helps make sure all of the epinephrine is delivered  · Call 911 and go to the emergency department,  even if the shot improved symptoms  Do not drive yourself  Bring the used epinephrine shot with you  Safety precautions to take if you are at risk for anaphylaxis:   · Keep 2 shots of epinephrine with you at all times  You may need a second shot, because epinephrine only works for about 20 minutes and symptoms may return  Your healthcare provider can show you and family members how to give the shot  Check the expiration date every month and replace it before it expires  · Create an action plan  Your healthcare provider can help you create a written plan that explains the allergy and an emergency plan to treat a reaction  The plan explains when to give a second epinephrine shot if symptoms return or do not improve after the first  Give copies of the action plan and emergency instructions to family members, work and school staff, and  providers  Show them how to give a shot of epinephrine  · Be careful when you exercise  If you have had exercise-induced anaphylaxis, do not exercise right after you eat  Stop exercising right away if you start to develop any signs or symptoms of anaphylaxis  You may first feel tired, warm, or have itchy skin  Hives, swelling, and severe breathing problems may develop if you continue to exercise  · Carry medical alert identification  Wear medical alert jewelry or carry a card that explains the allergy  Ask your healthcare provider where to get these items  · Keep a symptom diary  Include information about how often your symptoms occur, how long they last, and if they are mild or severe   Also keep information on what you ate, what happened, or which medicines you took before the swelling started  · Avoid triggers  Triggers include foods, medicines, and other things that you know cause symptoms  You may need to see a specialist, such as an allergist or dietitian, to learn what to avoid  Follow up with your healthcare provider as directed:  Write down your questions so you remember to ask them during your visits  © Copyright [address] Information is for End User's use only and may not be sold, redistributed or otherwise used for commercial purposes  All illustrations and images included in CareNotes® are the copyrighted property of [employer]  or [address] [place], [place] [address]   The above information is an  only  It is not intended as medical advice for individual conditions or treatments  Talk to your doctor, nurse or pharmacist before following any medical regimen to see if it is safe and effective for you

## 2021-04-15 NOTE — ASSESSMENT & PLAN NOTE
Unclear etiology of angioedema  Patient had initial episode in 2016 attributed to Ace inhibitor use  Patient intermittent episodes in 2016 and 17 despite discontinuation  Patient needed intubation with mechanical ventilation in the operating room by anesthesia for airway protection and hypoxemia  Patient received Decadron 10 milligram IV in the emergency room and was continued on Solu-Medrol 40 milligram q 8 hours along with Pepcid 20 milligram IV q 12 hours  Patient received 2 units of FFP and 4 7 and another 2 units FFP and for a  Patient received 1 gram transexemic acid on 04/06  Patient received Icadibant x1 dose on 04/10  C4 level was normal at 18, CRP 4 3, sed rate 27   C1 esterase inhibitor level was 30  Outpatient follow-up with allergy recommended  Patient will continue prednisone taper as outpatient

## 2021-04-15 NOTE — ASSESSMENT & PLAN NOTE
Patient needed straight catheterization x3 following which Mckeon was placed today  Continue Flomax 0 4 milligram p o   Daily  Patient did not have a good bowel movement since admission and patient is started on Colace, MiraLax and Senokot with which he had good bowel movement  Mckeon was discontinued this morning and patient did urinate little bit before discharge  Might need to monitor urine output and postvoid residuals at the nursing home

## 2021-04-15 NOTE — CASE MANAGEMENT
CM met with patient and made him aware of dc to [facility] today  IMM was discussed and he is agreeable to dc  He states he can tolerate [facility] transport and is agreeable  CM confirmed with patient nurse Erin Godoy that [facility] transport can be set up  IMM was discussed and patient is agreeable for dc today  Patients brother Karuna So will be dropping off patients dentures at [facility] today

## 2021-04-15 NOTE — DISCHARGE SUMMARY
Uriel 44  Discharge- Bailey Lindsay 1965, 63 y o  male MRN: [id number]  Unit/Bed#: [address] [place] [address] Encounter: [id number]  Primary Care Provider: MARLEEN Cooper   Date and time admitted to hospital: 4/7/2021 11:24 AM    * Angioedema  Assessment & Plan  Unclear etiology of angioedema  Patient had initial episode in 2016 attributed to Ace inhibitor use  Patient intermittent episodes in 2016 and 17 despite discontinuation  Patient needed intubation with mechanical ventilation in the operating room by anesthesia for airway protection and hypoxemia  Patient received Decadron 10 milligram IV in the emergency room and was continued on Solu-Medrol 40 milligram q 8 hours along with Pepcid 20 milligram IV q 12 hours  Patient received 2 units of FFP and 4 7 and another 2 units FFP and for a  Patient received 1 gram transexemic acid on 04/06  Patient received Icadibant x1 dose on 04/10  C4 level was normal at 18, CRP 4 3, sed rate 27  C1 esterase inhibitor level was 30  Outpatient follow-up with allergy recommended  Patient will continue prednisone taper as outpatient    Acute respiratory failure with hypoxia [facility])  Assessment & Plan  Secondary to angioedema  Per chart review patient has history of mild COPD  Patient initially was intubated in the ED and was extubated on 04/11  Patient is currently stable on 2 liters oxygen    Type 2 diabetes, uncontrolled, with neuropathy [facility])  Assessment & Plan  Lab Results   Component Value Date    HGBA1C 11 7 (H) 02/05/2021       Recent Labs     04/14/21  2144 04/15/21  0744 04/15/21  0824 04/15/21  1109   POCGLU 75 65 116 120       Blood Sugar Average: Last 72 hrs:  (P) 137 5   Patient is on Lantus 60 units daily, Humalog sliding scale with Accu-Cheks q a c  And HS  Blood sugars acceptable    Urinary retention  Assessment & Plan  Patient needed straight catheterization x3 following which Mckeon was placed today  Continue Flomax 0 4 milligram p o  Daily  Patient did not have a good bowel movement since admission and patient is started on Colace, MiraLax and Senokot with which he had good bowel movement  Mckeon was discontinued this morning and patient did urinate little bit before discharge  Might need to monitor urine output and postvoid residuals at the nursing home    Constipation  Assessment & Plan  Patient was placed on bowel regimen with Colace, MiraLax and senna yesterday  Patient also received magnesium sulfate and had 2 large bowel movements yesterday  Continue bowel regimen    Tobacco use  Assessment & Plan  Nicotine patch ordered    Obstructive sleep apnea  Assessment & Plan  Prior sleep study showing severe obstructive sleep apnea  Patient will need repeat sleep study as outpatient    Morbid obesity ([facility] [address] )  Assessment & Plan  Diet and lifestyle modification    Hypothyroidism  Assessment & Plan  Continue levothyroxine 175 microgram p o   Daily    Hypertension  Assessment & Plan  Patient is on Coreg 6 25 milligram p o  B i d   Patient will also benefit from adding small dose Ace inhibitor given his history of diabetes  Continue to hold hydrochlorothiazide    Hyperlipidemia  Assessment & Plan  Continue Lipitor      Discharging Physician / Practitioner: Tre Perez MD  PCP: Magnus Vásquez, [address] [place] [address]  Admission Date:   Admission Orders (From admission, onward)     Ordered        04/07/21 1136  Inpatient Admission  Once                   Discharge Date: 04/15/21    Resolved Problems  Date Reviewed: 4/15/2021          Resolved    Sleep apnea 4/8/2021     Resolved by  [facility], [address] [place] [address] Stay:  · Critical care/pulmonology     Outpatient Tests Requested:  · Follow-up with allergy/immunology    Complications:  None    Reason for Admission:  Shortness of breath    Hospital Course:     Amy Pennington is a 63 y o  male patient with past medical history of hypertension, COPD, obstructive sleep apnea, obesity, diabetes, hypothyroidism, recurrent angioedema who originally presented to the hospital on 4/7/2021 to [facility] with tongue swelling  Patient was taking to the operating room and was intubated and was transferred to [place] [address] ICU  Patient was given IV steroids, FFP additional dose of FFP while in 1 hospital   Patient also given 1 gram of TXA  Patient was continued on Solu-Medrol 40 milligram IV q 8 hours  Patient also received  Icatibant during hospitalization  Eventually steroids were tapered patient was extubated  Patient continued to improve  Patient also developed urinary retention during the hospital stay and was started on Flomax  Patient had significant constipation which resolved with bowel regimen  Mckeon was removed and patient could urinate little bit before discharge  Patient was seen by physical therapy and will be discharged to short-term rehabilitation for continued PT  Please see above list of diagnoses and related plan for additional information  Condition at Discharge: stable     Discharge Day Visit / Exam:     Subjective:  Patient complains of pain when he takes a deep breath  Denies any shortness of breath abdominal pain, nausea or vomiting  Patient had 2 large bowel movements yesterday  Mckeon catheter was removed earlier this morning and patient urinated a small amount and bladder scan showed minimal urine  Vitals: Blood Pressure: 137/80 (04/15/21 0827)  Pulse: 89 (04/15/21 0827)  Temperature: 99 °F (37 2 °C) (04/15/21 0827)  Temp Source: Oral (04/15/21 0827)  Respirations: 20 (04/15/21 0827)  Height: 5' 4" (162 6 cm) (04/07/21 1137)  Weight - Scale: 110 kg (243 lb 2 7 oz) (04/15/21 0548)  SpO2: 93 % (04/15/21 0827)  Exam:   Physical Exam  Constitutional:       Appearance: Normal appearance  HENT:      Head: Normocephalic and atraumatic  Eyes:      Extraocular Movements: Extraocular movements intact  Pupils: Pupils are equal, round, and reactive to light     Neck: Musculoskeletal: Normal range of motion and neck supple  Cardiovascular:      Rate and Rhythm: Normal rate and regular rhythm  Heart sounds: No murmur  No gallop  Pulmonary:      Effort: Pulmonary effort is normal       Breath sounds: Normal breath sounds  Abdominal:      General: Bowel sounds are normal       Palpations: Abdomen is soft  Tenderness: There is no abdominal tenderness  Musculoskeletal: Normal range of motion  General: No swelling or deformity  Skin:     General: Skin is warm and dry  Neurological:      General: No focal deficit present  Mental Status: He is alert  Discussion with Family: No    Discharge instructions/Information to patient and family:   See after visit summary for information provided to patient and family  Provisions for Follow-Up Care:  See after visit summary for information related to follow-up care and any pertinent home health orders  Disposition:     [facility] [address]    Planned Readmission: No     Discharge Statement:  I spent 45 minutes discharging the patient  This time was spent on the day of discharge  I had direct contact with the patient on the day of discharge  Greater than 50% of the total time was spent examining patient, answering all patient questions, arranging and discussing plan of care with patient as well as directly providing post-discharge instructions  Additional time then spent on discharge activities  Discharge Medications:  See after visit summary for reconciled discharge medications provided to patient and family        ** Please Note: This note has been constructed using a voice recognition system **

## 2021-04-15 NOTE — ASSESSMENT & PLAN NOTE
Patient is on Coreg 6 25 milligram p o  B i d   Patient will also benefit from adding small dose Ace inhibitor given his history of diabetes  Continue to hold hydrochlorothiazide

## 2021-04-15 NOTE — CASE MANAGEMENT
DEWAYNE spoke with Dr Nany Johnston and patient is for dc today  DEWAYNE spoke with Azucena Mon [phone] at [facility] and patient is accepted at their facility today  They are requesting a repeat COVID test and CM requested MD order  She is requesting transport before 1:30 PM or after 4PM and no later than 6:30 PM     Transport request submitted via [facility]  Cm to follow

## 2021-04-15 NOTE — ASSESSMENT & PLAN NOTE
Lab Results   Component Value Date    HGBA1C 11 7 (H) 02/05/2021       Recent Labs     04/14/21  2144 04/15/21  0744 04/15/21  0824 04/15/21  1109   POCGLU 75 65 116 120       Blood Sugar Average: Last 72 hrs:  (P) 137 5   Patient is on Lantus 60 units daily, Humalog sliding scale with Accu-Cheks q a c   And HS  Blood sugars acceptable

## 2021-04-15 NOTE — NURSING NOTE
Patient arrived to unit 1320 on gurney and 2L O2 NC  Pt able to stand pivot to bed  Pt reported he would like to be OOB in chair  Pt requested to remove NC  Pox done with NC after ambulating 91%  At rest in chair x 10 minutes 93% RA  Reports that brother Camila Thao is coming to p/u his belongings, including cash and c  c  Items listed in navigator  Requests to have "normal food" for meals  Advised pt when brother brings his dentures he can be taken on soft food diet

## 2021-04-15 NOTE — PLAN OF CARE
Problem: RESPIRATORY - ADULT  Goal: Achieves optimal ventilation and oxygenation  Description: INTERVENTIONS:  - Assess for changes in respiratory status  - Assess for changes in mentation and behavior  - Position to facilitate oxygenation and minimize respiratory effort  - Oxygen administered by appropriate delivery if ordered  - Initiate smoking cessation education as indicated  - Encourage broncho-pulmonary hygiene including cough, deep breathe, Incentive Spirometry  - Assess the need for suctioning and aspirate as needed  - Assess and instruct to report SOB or any respiratory difficulty  - Respiratory Therapy support as indicated  Outcome: Progressing     Problem: GENITOURINARY - ADULT  Goal: Maintains or returns to baseline urinary function  Description: INTERVENTIONS:  - Assess urinary function  - Encourage oral fluids to ensure adequate hydration if ordered  - Administer IV fluids as ordered to ensure adequate hydration  - Administer ordered medications as needed  - Offer frequent toileting  - Follow urinary retention protocol if ordered  Outcome: Progressing  Goal: Absence of urinary retention  Description: INTERVENTIONS:  - Assess patients ability to void and empty bladder  - Monitor I/O  - Bladder scan as needed  - Discuss with physician/AP medications to alleviate retention as needed  - Discuss catheterization for long term situations as appropriate  Outcome: Progressing     Problem: METABOLIC, FLUID AND ELECTROLYTES - ADULT  Goal: Electrolytes maintained within normal limits  Description: INTERVENTIONS:  - Monitor labs and assess patient for signs and symptoms of electrolyte imbalances  - Administer electrolyte replacement as ordered  - Monitor response to electrolyte replacements, including repeat lab results as appropriate  - Instruct patient on fluid and nutrition as appropriate  Outcome: Progressing     Problem: SKIN/TISSUE INTEGRITY - ADULT  Goal: Skin integrity remains intact  Description: INTERVENTIONS  - Identify patients at risk for skin breakdown  - Assess and monitor skin integrity  - Assess and monitor nutrition and hydration status  - Monitor labs (i e  albumin)  - Assess for incontinence   - Turn and reposition patient  - Assist with mobility/ambulation  - Relieve pressure over bony prominences  - Avoid friction and shearing  - Provide appropriate hygiene as needed including keeping skin clean and dry  - Evaluate need for skin moisturizer/barrier cream  - Collaborate with interdisciplinary team (i e  Nutrition, Rehabilitation, etc )   - Patient/family teaching  Outcome: Progressing  Goal: Oral mucous membranes remain intact  Description: INTERVENTIONS  - Assess oral mucosa and hygiene practices  - Implement preventative oral hygiene regimen  - Implement oral medicated treatments as ordered  - Initiate Nutrition services referral as needed  Outcome: Progressing     Problem: Prexisting or High Potential for Compromised Skin Integrity  Goal: Skin integrity is maintained or improved  Description: INTERVENTIONS:  - Identify patients at risk for skin breakdown  - Assess and monitor skin integrity  - Assess and monitor nutrition and hydration status  - Monitor labs   - Assess for incontinence   - Turn and reposition patient  - Assist with mobility/ambulation  - Relieve pressure over bony prominences  - Avoid friction and shearing  - Provide appropriate hygiene as needed including keeping skin clean and dry  - Evaluate need for skin moisturizer/barrier cream  - Collaborate with interdisciplinary team   - Patient/family teaching  - Consider wound care consult   Outcome: Progressing     Problem: Potential for Falls  Goal: Patient will remain free of falls  Description: INTERVENTIONS:  - Assess patient frequently for physical needs  -  Identify cognitive and physical deficits and behaviors that affect risk of falls    -  Hannah fall precautions as indicated by assessment   - Educate patient/family on patient safety including physical limitations  - Instruct patient to call for assistance with activity based on assessment  - Modify environment to reduce risk of injury  - Consider OT/PT consult to assist with strengthening/mobility  Outcome: Progressing     Problem: Nutrition/Hydration-ADULT  Goal: Nutrient/Hydration intake appropriate for improving, restoring or maintaining nutritional needs  Description: Monitor and assess patient's nutrition/hydration status for malnutrition  Collaborate with interdisciplinary team and initiate plan and interventions as ordered  Monitor patient's weight and dietary intake as ordered or per policy  Utilize nutrition screening tool and intervene as necessary  Determine patient's food preferences and provide high-protein, high-caloric foods as appropriate       INTERVENTIONS:  - Monitor oral intake, urinary output, labs, and treatment plans  - Assess nutrition and hydration status and recommend course of action  - Evaluate amount of meals eaten  - Assist patient with eating if necessary   - Allow adequate time for meals  - Recommend/ encourage appropriate diets, oral nutritional supplements, and vitamin/mineral supplements  - Order, calculate, and assess calorie counts as needed  - Recommend, monitor, and adjust tube feedings and TPN/PPN based on assessed needs  - Assess need for intravenous fluids  - Provide specific nutrition/hydration education as appropriate  - Include patient/family/caregiver in decisions related to nutrition  Outcome: Progressing

## 2021-04-15 NOTE — PLAN OF CARE
Problem: Prexisting or High Potential for Compromised Skin Integrity  Goal: Skin integrity is maintained or improved  Description: INTERVENTIONS:  - Identify patients at risk for skin breakdown  - Assess and monitor skin integrity  - Assess and monitor nutrition and hydration status  - Monitor labs   - Assess for incontinence   - Turn and reposition patient  - Assist with mobility/ambulation  - Relieve pressure over bony prominences  - Avoid friction and shearing  - Provide appropriate hygiene as needed including keeping skin clean and dry  - Evaluate need for skin moisturizer/barrier cream  - Collaborate with interdisciplinary team   - Patient/family teaching  - Consider wound care consult   Outcome: Progressing     Problem: Potential for Falls  Goal: Patient will remain free of falls  Description: INTERVENTIONS:  - Assess patient frequently for physical needs  -  Identify cognitive and physical deficits and behaviors that affect risk of falls    -  Hawkins fall precautions as indicated by assessment   - Educate patient/family on patient safety including physical limitations  - Instruct patient to call for assistance with activity based on assessment  - Modify environment to reduce risk of injury  - Consider OT/PT consult to assist with strengthening/mobility  Outcome: Progressing

## 2021-04-15 NOTE — ASSESSMENT & PLAN NOTE
Patient was placed on bowel regimen with Colace, MiraLax and senna yesterday  Patient also received magnesium sulfate and had 2 large bowel movements yesterday  Continue bowel regimen

## 2021-04-15 NOTE — CASE MANAGEMENT
SW met with patient to review admission paperwork and assessment  Patient presented as alert during conversation  Patient reported that he lives alone in a 2nd floor apartment with 0 steps to enter the home  Patient reportedly has 20 steps to 2nd floor apartment  Patient reportedly independent with ADLS PTA  Patient denied having hx of [facility] services  Patient's pharmacy of choice is the [facility] in [place]  Patient is aware of [facility] meeting and agreeable with SW speaking to his brother about meeting  SW spoke with patient's brother in regards to [facility] meeting and to confirm dc plan to his home when cleared  Per patient's brother Keegan Choudhury, patient will either dc to his home that is a 2 story home with 1 step to enter the home and a 1st floor set up  Or patient will dc to his nephews home that is also 2 story home with 3 steps to enter and 12 steps to 2nd floor bedroom/bathroom  Keegan Choudhury will be available for meeting on Monday at [address] [place] [address] to schedule meeting  SW will follow

## 2021-04-15 NOTE — CASE MANAGEMENT
CM called and spoke with patients brother Jacqueline Cobb and made him aware of dc today  IMM was discussed and he is agreeable for dc today

## 2021-04-15 NOTE — CASE MANAGEMENT
CM received call back from [facility] at [employer] [facility] [employer] and they cannot take patient via wc due to O2 ands unable to regulate  CM put in a new request for BLS transport  Patient has an 1130 AM  with [facility]  Dr Doris Correa, patient and patients nurse Prince Meraz made aware  CM called patients brother and made him aware as well as his brother Xi Mathis

## 2021-04-16 LAB
GLUCOSE SERPL-MCNC: 203 MG/DL (ref 65–140)
GLUCOSE SERPL-MCNC: 206 MG/DL (ref 65–140)
GLUCOSE SERPL-MCNC: 237 MG/DL (ref 65–140)
GLUCOSE SERPL-MCNC: 48 MG/DL (ref 65–140)
GLUCOSE SERPL-MCNC: 70 MG/DL (ref 65–140)
GLUCOSE SERPL-MCNC: 99 MG/DL (ref 65–140)

## 2021-04-16 PROCEDURE — 82948 REAGENT STRIP/BLOOD GLUCOSE: CPT

## 2021-04-16 PROCEDURE — 97163 PT EVAL HIGH COMPLEX 45 MIN: CPT

## 2021-04-16 PROCEDURE — 97535 SELF CARE MNGMENT TRAINING: CPT

## 2021-04-16 PROCEDURE — 99306 1ST NF CARE HIGH MDM 50: CPT | Performed by: INTERNAL MEDICINE

## 2021-04-16 PROCEDURE — 97116 GAIT TRAINING THERAPY: CPT

## 2021-04-16 PROCEDURE — 97167 OT EVAL HIGH COMPLEX 60 MIN: CPT

## 2021-04-16 RX ORDER — PREDNISONE 10 MG/1
10 TABLET ORAL DAILY
Status: DISCONTINUED | OUTPATIENT
Start: 2021-04-23 | End: 2021-04-21 | Stop reason: HOSPADM

## 2021-04-16 RX ORDER — FLUTICASONE FUROATE AND VILANTEROL 100; 25 UG/1; UG/1
1 POWDER RESPIRATORY (INHALATION) DAILY
Status: DISCONTINUED | OUTPATIENT
Start: 2021-04-17 | End: 2021-04-21 | Stop reason: HOSPADM

## 2021-04-16 RX ORDER — INSULIN GLARGINE 100 [IU]/ML
40 INJECTION, SOLUTION SUBCUTANEOUS
Status: DISCONTINUED | OUTPATIENT
Start: 2021-04-16 | End: 2021-04-21 | Stop reason: HOSPADM

## 2021-04-16 RX ORDER — ALBUTEROL SULFATE 90 UG/1
2 AEROSOL, METERED RESPIRATORY (INHALATION) EVERY 4 HOURS PRN
Status: DISCONTINUED | OUTPATIENT
Start: 2021-04-16 | End: 2021-04-21 | Stop reason: HOSPADM

## 2021-04-16 RX ORDER — PREDNISONE 20 MG/1
20 TABLET ORAL DAILY
Status: DISCONTINUED | OUTPATIENT
Start: 2021-04-20 | End: 2021-04-21 | Stop reason: HOSPADM

## 2021-04-16 RX ORDER — INSULIN GLARGINE 100 [IU]/ML
50 INJECTION, SOLUTION SUBCUTANEOUS
Status: DISCONTINUED | OUTPATIENT
Start: 2021-04-16 | End: 2021-04-16

## 2021-04-16 RX ADMIN — MELATONIN TAB 3 MG 3 MG: 3 TAB at 21:08

## 2021-04-16 RX ADMIN — TAMSULOSIN HYDROCHLORIDE 0.4 MG: 0.4 CAPSULE ORAL at 16:13

## 2021-04-16 RX ADMIN — INSULIN GLARGINE 40 UNITS: 100 INJECTION, SOLUTION SUBCUTANEOUS at 21:07

## 2021-04-16 RX ADMIN — CARVEDILOL 6.25 MG: 6.25 TABLET, FILM COATED ORAL at 08:34

## 2021-04-16 RX ADMIN — ENOXAPARIN SODIUM 40 MG: 40 INJECTION SUBCUTANEOUS at 08:34

## 2021-04-16 RX ADMIN — CARVEDILOL 6.25 MG: 6.25 TABLET, FILM COATED ORAL at 16:13

## 2021-04-16 RX ADMIN — PREDNISONE 40 MG: 20 TABLET ORAL at 08:34

## 2021-04-16 RX ADMIN — NICOTINE 21 MG: 21 PATCH, EXTENDED RELEASE TRANSDERMAL at 08:35

## 2021-04-16 RX ADMIN — DOCUSATE SODIUM 100 MG: 100 CAPSULE, LIQUID FILLED ORAL at 08:34

## 2021-04-16 RX ADMIN — FAMOTIDINE 20 MG: 20 TABLET ORAL at 08:34

## 2021-04-16 RX ADMIN — DULOXETINE 60 MG: 60 CAPSULE, DELAYED RELEASE ORAL at 08:34

## 2021-04-16 RX ADMIN — INSULIN LISPRO 2 UNITS: 100 INJECTION, SOLUTION INTRAVENOUS; SUBCUTANEOUS at 21:08

## 2021-04-16 RX ADMIN — ENOXAPARIN SODIUM 40 MG: 40 INJECTION SUBCUTANEOUS at 21:08

## 2021-04-16 RX ADMIN — DIPHENHYDRAMINE HCL 25 MG: 25 TABLET ORAL at 06:06

## 2021-04-16 RX ADMIN — FAMOTIDINE 20 MG: 20 TABLET ORAL at 17:47

## 2021-04-16 RX ADMIN — DOCUSATE SODIUM 100 MG: 100 CAPSULE, LIQUID FILLED ORAL at 17:47

## 2021-04-16 RX ADMIN — POLYETHYLENE GLYCOL 3350 17 G: 17 POWDER, FOR SOLUTION ORAL at 08:36

## 2021-04-16 RX ADMIN — DIPHENHYDRAMINE HCL 25 MG: 25 TABLET ORAL at 21:08

## 2021-04-16 RX ADMIN — LEVOTHYROXINE SODIUM 175 MCG: 0.15 TABLET ORAL at 06:05

## 2021-04-16 RX ADMIN — DIPHENHYDRAMINE HCL 25 MG: 25 TABLET ORAL at 15:00

## 2021-04-16 RX ADMIN — ATORVASTATIN CALCIUM 40 MG: 40 TABLET, FILM COATED ORAL at 16:13

## 2021-04-16 RX ADMIN — SENNOSIDES 8.6 MG: 8.6 TABLET, FILM COATED ORAL at 21:08

## 2021-04-16 NOTE — PHYSICAL THERAPY NOTE
Physical Therapy Evaluation    Patient's Name: Danielle Petty    Admitting Diagnosis  Acquired angioedema [T78  3XXA]    Problem List  Patient Active Problem List   Diagnosis    Allergic rhinitis    Angioedema    Arthritis    Chronic back pain    Chronic sinusitis    COPD, mild ([facility]    Depression with anxiety    Hyperlipidemia    Hypertension    Hypothyroidism    Morbid obesity ([facility]    Type 2 diabetes, uncontrolled, with neuropathy ([facility] )    Vitamin D deficiency    Cellulitis of left lower leg    Elevated LFTs    Elevated lactic acid level    Tobacco abuse    Noncompliance with diabetes treatment    Acute respiratory failure with hypoxia (HCC)    Epistaxis    Obstructive sleep apnea    Muscle twitching    Type 2 diabetes mellitus with hyperglycemia, with long-term current use of insulin (HCC)    Tobacco use    Atelectasis    Urinary retention    Constipation       Past Medical History  Past Medical History:   Diagnosis Date    Angioedema     Diabetes mellitus ([facility]     Disease of thyroid gland     Hyperlipidemia     Hypertension     Morbid obesity ([facility] )     MARSHA (obstructive sleep apnea)        Past Surgical History  Past Surgical History:   Procedure Laterality Date    KNEE SURGERY      SINUS SURGERY         Recent Imaging  No orders to display       Recent Vital Signs  Vitals:    04/15/21 2333 04/15/21 2335 04/16/21 0716 04/16/21 1531   BP: 137/77  153/84 162/99   BP Location: Left arm  Right arm Right arm   Pulse: 89  87 87   Resp: 20  18 21   Temp: 98 2 °F (36 8 °C)  97 7 °F (36 5 °C) (!) 96 7 °F (35 9 °C)   TempSrc: Temporal  Temporal Temporal   SpO2: (!) 88% 94% 91% 92%   Weight:       Height:           Standardized Assessments  [facility] 6-Click: 23/56    Home Evaluation (virtual) and Family Training  will address as appropriate       04/16/21 1400   PT Last Visit   PT Visit Date 04/16/21   Note Type   Note type Evaluation   Pain Assessment   Pain Assessment Tool Pain Assessment not indicated - pt denies pain  (some discomfort when coughing)   Pain Score No Pain   Cid-Baker FACES Pain Rating 0   Pain Rating: FLACC (Rest) - Face 0   Pain Rating: FLACC (Rest) - Legs 0   Pain Rating: FLACC (Rest) - Activity 0   Pain Rating: FLACC (Rest) - Cry 0   Pain Rating: FLACC (Rest) - Consolability 0   Score: FLACC (Rest) 0   Home Living   Type of Home Apartment   Home Layout One level;Stairs to enter with rails  ([address] floor apartment)   Bathroom Shower/Tub Tub/shower unit   Bathroom Toilet Standard   Bathroom Accessibility Accessible   Home Equipment Cane   Prior Function   Level of [place] Independent with ADLs and functional mobility   Lives With Alone   Receives Help From Family   ADL Assistance Independent   IADLs Needs assistance   Comments ind with ADLs, will be moving out of home soon to move in with nephew   Restrictions/Precautions   Weight Bearing Precautions Per Order No   Other Precautions O2;Fall Risk   General   Family/Caregiver Present No   Cognition   Overall Cognitive Status WFL   Arousal/Participation Cooperative   Attention Within functional limits   Orientation Level Oriented X4  (Simultaneous filing   User may not have seen previous data )   Memory Within functional limits   Following Commands Follows all commands and directions without difficulty   Strength RLE   R Hip Flexion 4/5   R Knee Flexion 4/5   R Knee Extension 4/5   R Ankle Dorsiflexion 4/5   R Ankle Plantar Flexion 3/5   Strength LLE   L Hip Flexion 4/5   L Knee Flexion 4/5   L Knee Extension 4/5   L Ankle Dorsiflexion 4/5   L Ankle Plantar Flexion 3/5   Coordination   Movements are Fluid and Coordinated 1   Sensation WFL   Light Touch   RLE Light Touch Impaired   RLE Light Touch Comments tingling due to neuropathy   LLE Light Touch Impaired   LLE Light Touch Comments tingling due to neuropathy   Bed Mobility   Supine to Sit 5  Supervision   Additional items Increased time required;Verbal cues   Sit to Supine 5  Supervision   Additional items Increased time required;Verbal cues   Transfers   Sit to Stand 5  Supervision   Additional items Increased time required;Verbal cues   Stand to Sit 5  Supervision   Additional items Increased time required;Verbal cues   Ambulation/Elevation   Gait pattern Excessively slow;Decreased foot clearance; Wide LENY   Gait Assistance 5  Supervision   Additional items Verbal cues   Assistive Device None   Distance 20ft   Balance   Static Sitting Good   Dynamic Sitting Fair +   Static Standing Fair   Dynamic Standing Fair   Ambulatory Fair   Endurance Deficit   Endurance Deficit Yes   Endurance Deficit Description SOB/LAMB    Activity Tolerance   Activity Tolerance Patient limited by fatigue   Medical Staff Made Aware spoke to CM   Nurse Made Aware spoke to RN   Assessment   Prognosis Good   Problem List Decreased strength;Decreased range of motion;Decreased endurance; Impaired balance;Decreased mobility; Decreased coordination;Pain   Barriers to Discharge Inaccessible home environment   Goals   Patient Goals to get stronger and go home   STG Expiration Date 04/23/21   Short Term Goal #1 see grid   PT Treatment Day 1   Plan   Treatment/Interventions Functional transfer training;LE strengthening/ROM; Elevations; Therapeutic exercise; Endurance training;Patient/family training;Equipment eval/education; Bed mobility;Gait training;Spoke to nursing;Spoke to case management;OT;ADL retraining   PT Frequency   (5-7x/wk)   Recommendation   PT Discharge Recommendation Home with outpatient rehabilitation  (when rehab goals met)   Equipment Recommended Chris Fontanez [phone]   Turning in Bed Without Bedrails 4   Lying on Back to Sitting on Edge of Flat Bed 3   Moving Bed to Chair 3   Standing Up From Chair 3   Walk in Room 3   Climb 3-5 Stairs 3   Basic Mobility Inpatient Raw Score 19   Basic Mobility Standardized Score 42 48       Assessment Amy Pennington is a 63 y o  male admitted to [facility] [place] on 4/15/2021 for rehabilitation after hospitalization for angioedema  Pt  has a past medical history of Angioedema, Diabetes mellitus ([facility] [facility] ), Disease of thyroid gland, Hyperlipidemia, Hypertension, Morbid obesity ([facility] [facility] ), and MARSHA (obstructive sleep apnea)    PT was consulted and pt was seen on 4/16/2021 for mobility assessment and d/c planning  Pt presents seated in recliner alert and agreeable to therapy  He has weakness in BLE due to deconditioning  Reports decreased sensation to BLE due to neuropathy  Pt resting O2 saturation between 90-94%, with activity he ranges from 88-92% with spot checks  HR also elevated after ambulation/stairs  Does not want to use O2  He does have limited endurance due to SOB/LAMB with household distance ambulation  Utilizes RW for longer distances to conserve energy, goal is to ambulate without RW  Most SOB with completion on stairs  Seated rest breaks required throughout treatment  He reports that he feels off balance, did appear mildly unsteady when moving short distances without a device, fair balance with RW  Pt is currently functioning at a supervision assistance x1 level for bed mobility, supervision assistance x1 level for transfers, supervision assistance x1 level for ambulation with Rolling Walker and no assistive device, and supervision assistance x1 for elevations  Pt will benefit from continued skilled IP PT to address the above mentioned impairments  in order to maximize recovery and increase functional independence when completing mobility and ADLs  Currently PT recommendations for DME include RW when needed for longer distances, possibly trial SPC  At this time PT recommendations for d/c are home with OP PT when rehab goals met        PT GG Scores  Roll left and right:  4) Supervision or touching) verbal cues and/or touching, steadying, contact guard      Sit to Lyin) Supervision or touching) verbal cues and/or touching, steadying, contact guard      Lying to Sit EOB:  4) Supervision or touching) verbal cues and/or touching, steadying, contact guard      Sit to Stand:  4) Supervision or touching) verbal cues and/or touching, steadying, contact guard      Chair/Bed to chair Transfer:  4) Supervision or touching) verbal cues and/or touching, steadying, contact guard      Toilet Transfer:  4) Supervision or touching) verbal cues and/or touching, steadying, contact guard      Walk 10ft 4) Supervision or touching) verbal cues and/or touching, steadying, contact guard      Walk 50ft with 2 turns 4) Supervision or touching) verbal cues and/or touching, steadying, contact guard      Walk 150ft 88) Not attempted due to medical condition or safety concerns     Wheel 50ft with 2 turns 9) Not Applicable) pt did not complete PTA     Wheel [id number] 9) Not Applicable) pt did not complete PTA     Car transfer      Walk 10ft over uneven ground 4) Supervision or touching) verbal cues and/or touching, steadying, contact guard      1 step 4) Supervision or touching) verbal cues and/or touching, steadying, contact guard      4 steps 4) Supervision or touching) verbal cues and/or touching, steadying, contact guard      12 steps 88) Not attempted due to medical condition or safety concerns     Pick object from floor 88) Not attempted due to medical condition or safety concerns         Physical Therapy Treatment                                                           (40 minutes)                              21 1415   Transfers   Sit to Stand 5  Supervision   Additional items Increased time required   Stand to Sit 5  Supervision   Additional items Increased time required   Toilet transfer 5  Supervision   Additional items Increased time required;Verbal cues;Standard toilet   Ambulation/Elevation   Gait pattern Excessively slow;Decreased foot clearance; Wide LENY   Gait Assistance 5  Supervision   Additional items Verbal cues   Assistive Device Rolling walker;None   Distance 20ftx2 none; [id number] with RW; uses RW for long distance due to decreased endurance SOB; 30ft over carpet with RW   Stair Management Assistance 5  Supervision   Additional items Assist x 1;Verbal cues; Increased time required   Stair Management Technique Two rails; Step to pattern; Foreward   Number of Stairs 4       Goals STG achieved within 1 week Performance at Initial Evaluation (4/16/2021) Last date completed     Bed mobility skills including rolling and repositioning to prevent skin breakdown and decrease caregiver burden  independent     supervision assistance x1     4/16/2021       Supine to sit transitions to increase ease of transfer, allow pt to get out of bed, and decrease caregiver burden       independent     supervision assistance x1     4/16/2021     Sit to supine transitions to increase ease of transfer, allow pt to get back into bed, and decrease caregiver burden       independent     supervision assistance x1     4/16/2021     Functional transfers to facilitate safe return to previous living environment      modified independent assistance   supervision assistance x1   4/16/2021     Ambulation with least restrictive AD; including at least 2 turns for safe household distance ambulation       modified independent assistance     supervision assistance x1     4/16/2021     Ascend/descend a full flight of 20 steps with handrail, with device prn, for safe access to previous living environment       modified independent assistance     supervision assistance x1     4/16/2021     Ascend/descend a curb step, using appropriate AD and method, no handrails, for safe access to previous living environment       modified independent assistance           Improve standing functional balance to allow for pt to  object from floor          modified independent assistance James De Anda PT, DPT

## 2021-04-16 NOTE — PROGRESS NOTES
Medication Regimen Review (MRR)    To promote positive health outcomes and reduce adverse consequences the patient's medication therapy has been reviewed by a pharmacist for the following potential problems:   1   documented indication and therapeutic benefits  2   appropriate dose, frequency, route, and duration of therapy  3   medication interactions, side effects, and allergies  4   medication or transcription errors  Medications are also reviewed for appropriate monitoring, duplicate therapy, and dose reduction  Based on the review please see the following recommendations  Patient information:    The patient is 63 y o  admitted for Rehab post extubation of mechanical ventilation  Wt Readings from Last 1 Encounters:   04/15/21 105 kg (232 lb 2 3 oz)       Lab Results   Component Value Date    GLUCOSE 130 01/29/2015    CALCIUM 8 4 04/14/2021     01/29/2015    K 3 6 04/14/2021    CO2 28 04/14/2021     04/14/2021    BUN 28 (H) 04/14/2021    CREATININE 0 87 04/14/2021         Recommendations: There are no recommendations from the pharmacy department at this time      Naseem Esteves, Pharmacist  [phone]

## 2021-04-16 NOTE — PLAN OF CARE
Problem: OCCUPATIONAL THERAPY ADULT  Goal: Performs self-care activities at highest level of function for planned discharge setting  See evaluation for individualized goals  Description: Treatment Interventions: ADL retraining, Functional transfer training, UE strengthening/ROM, Endurance training, Continued evaluation          See flowsheet documentation for full assessment, interventions and recommendations  Note: Limitation: Decreased ADL status, Decreased UE strength, Decreased endurance, Decreased high-level ADLs  Prognosis: Good  Assessment: Pt is a 63 y o  male seen for OT evaluation s/p admit to [facility] on 4/15/2021 w/ angioedema  See above for extensive list of comorbidities affecting Pt's functional performance at time of assessment  Personal factors affecting Pt at time of IE include:limited home support, difficulty performing ADLS, difficulty performing IADLS , health management  and environment  Prior to admission, Pt was living alone, did not use AD, was independent with ADLs  Upon evaluation: Pt requires supervision for transfers including toilet transfer  Pt requires cues for pacing with functional ambulation, tendency to move quickly/impulsively resulting in decreased standing balance  Pt requires Makeda to doff/don shoes, does report he typically sits at EOB and props LEs up on bed to complete this task  The following deficits impact occupational performance: weakness, decreased strength, decreased balance, decreased tolerance and decreased safety awareness  Pt to benefit from continued skilled OT services while in the hospital to address deficits as defined above and maximize level of functional independence w ADL's and functional mobility  Occupational performance areas to address include: grooming, bathing/shower, toilet hygiene, dressing, health maintenance, functional mobility and clothing management   From OT standpoint, recommendation at time of d/c would be return to previous environment with social support         OT Discharge Recommendation: No rehabilitation needs

## 2021-04-16 NOTE — H&P
[facility]    H&P- Buel Car 1965, 63 y o  male MRN: [id number]  Unit/Bed#: -01 Encounter: [id number]  Primary Care Provider: MARLEEN Andrade   Date and time admitted to hospital: 4/15/2021 12:54 PM    Tobacco use  Assessment & Plan  Nicotine patch ordered    Type 2 diabetes mellitus with hyperglycemia, with long-term current use of insulin [facility])  Assessment & Plan    Lab Results   Component Value Date    HGBA1C 11 7 (H) 02/05/2021       Hemoglobin A1c is uncontrolled  Pre-hospital Lantus 60 units hs, metformin as well as sliding scale insulin at home  Will reduce Lantus to 40 units daily due to hypoglycemic episodes and restart Metformin 1000 mg BID  Continue insulin sliding scale    Acute respiratory failure with hypoxia [facility])  Assessment & Plan  Secondary to angioedema  Per chart review patient has history of mild COPD  Patient initially was intubated in the ED and was extubated on 04/11  Patient has been intermittently on 0-2 L and drops O2 when ambulating    Vitamin D deficiency  Assessment & Plan  Current vitamin-D level is 21  Start Vitamin D supplementations 1000 units daily  Hypertension  Assessment & Plan  Patient is on Coreg 6 25 milligram p o  B i d  Depression with anxiety  Assessment & Plan  Continue Cymbalta    Angioedema  Assessment & Plan  Unclear etiology of angioedema  Patient had initial episode in 2016 attributed to Ace inhibitor use  Patient intermittent episodes in 2016 and 17 despite discontinuation  Patient needed intubation with mechanical ventilation in the operating room by anesthesia for airway protection and hypoxemia  Patient received Decadron 10 milligram IV in the emergency room and was continued on Solu-Medrol 40 milligram q 8 hours along with Pepcid 20 milligram IV q 12 hours  C4 level was normal at 18, CRP 4 3, sed rate 27   C1 esterase inhibitor level was 30  · Outpatient follow-up with allergy recommended  · Patient will continue prednisone taper as outpatient  · Will start 30 mg for 3 days then 20 mg for 3 days then 10 mg for 3 days and then stop  * COPD, mild ([address] )  Assessment & Plan  Patient with likely underlying COPD however official PFTs on file per chart review  Patient will continue albuterol inhalers, continue Tessalon [place]  Patient is on steroids taper following angioedema and intubation  Continue to monitor respiratory status  Would recommend home ambulatory test prior to discharge      VTE Prophylaxis: Enoxaparin (Lovenox)  / sequential compression device   Code Status:  Level 1  POLST: POLST form is not discussed and not completed at this time  Discussion with family:  None    Anticipated Length of Stay:  Patient will be admitted on an SNF Short Term Inpatient basis with an anticipated length of stay of more than 2 midnights  Justification for Hospital Stay:  Rehab    Total Time for Visit, including Counseling / Coordination of Care: 45 minutes  Greater than 50% of this total time spent on direct patient counseling and coordination of care  Chief Complaint:   Ambulatory dysfunction    History of Present Illness:    Emi Kirkland is a 63 y o  male with past medical history of hypertension, obstructive sleep apnea, diabetes mellitus, hypothyroidism, Recurrent angioedema, everyday smoker with likely underlying COPD who presents with following a recent admission for tongue swelling and subsequently intubation with transfer to the ICU  He was given intravenous , Solu-Medrol, Icatibant, and was successfully extubated  Steroids were beginning to be tapered off during that hospitalization  He had complication of urinary retention following his hospitalization requiring initiation of Flomax     Following ICU course patient was evaluated by PT and OT and recommended short-term rehabilitation for improving gait and ambulatory dysfunction and increase endurance    Patient currently says that he has been having difficulty breathing at night  He says he gets into coughing fits  He also says his pulse ox dropped when he walks  Review of Systems:    Review of Systems   Constitutional: Positive for fatigue  Negative for chills and fever  HENT: Negative for ear pain and sore throat  Eyes: Negative for pain and visual disturbance  Respiratory: Positive for cough, choking and shortness of breath  Cardiovascular: Negative for chest pain and palpitations  Gastrointestinal: Negative for abdominal pain and vomiting  Genitourinary: Negative for dysuria and hematuria  Musculoskeletal: Positive for gait problem  Negative for arthralgias and back pain  Skin: Negative for color change and rash  Neurological: Positive for weakness  Negative for seizures and syncope  All other systems reviewed and are negative  Past Medical and Surgical History:     Past Medical History:   Diagnosis Date    Angioedema     Diabetes mellitus ([facility] )     Disease of thyroid gland     Hyperlipidemia     Hypertension     Morbid obesity ([facility] )     MARSHA (obstructive sleep apnea)        Past Surgical History:   Procedure Laterality Date    KNEE SURGERY      SINUS SURGERY         Meds/Allergies:    Prior to Admission medications    Medication Sig Start Date End Date Taking?  Authorizing Provider   Ascorbic Acid (VITAMIN C PO) Take 1 tablet by mouth daily   Yes Historical Provider, MD   aspirin 81 mg chewable tablet Take one tablet by mouth daily 3/14/06  Yes Historical Provider, MD   atorvastatin (LIPITOR) 20 mg tablet Take 1 tablet by mouth once daily 3/11/21  Yes Felix Au PA-C   carvedilol (COREG) 6 25 mg tablet Take 1 tablet (6 25 mg total) by mouth 2 (two) times a day with meals 4/15/21  Yes Mikayla Cummings MD   docusate sodium (COLACE) 100 mg capsule Take 1 capsule (100 mg total) by mouth 2 (two) times a day 4/15/21  Yes Mikayla Cummings MD   DULoxetine (CYMBALTA) 60 mg delayed release capsule Take 1 capsule by mouth once daily 3/31/21  Yes Chandler Car PA-C   famotidine (PEPCID) 20 mg tablet Take 1 tablet (20 mg total) by mouth 2 (two) times a day 4/15/21  Yes Lane Noyola MD   insulin lispro (HumaLOG) 100 units/mL injection Inject 1-6 Units under the skin 3 (three) times a day before meals 4/15/21  Yes Lane Noyola MD   insulin lispro (HumaLOG) 100 units/mL injection Inject 1-6 Units under the skin daily at bedtime 4/15/21  Yes Lane Noyola MD   Insulin Pen Needle (PEN NEEDLES 3/16") 31G X 5 MM MISC by Other route 4 (four) times a day 4/3/20  Yes MARLEEN Paulino   Lantus SoloStar 100 units/mL injection pen INJECT 60 UNITS SUBCUTANEOUSLY ONCE DAILY 3/11/21  Yes Chandler Car PA-C   melatonin 3 mg Take 1 tablet (3 mg total) by mouth daily at bedtime 4/15/21  Yes Lane Noyola MD   metFORMIN (GLUCOPHAGE) 1000 MG tablet Take 1 tablet by mouth twice daily 2/24/21  Yes Chandler Car PA-C   omega-3-acid ethyl esters (LOVAZA) 1 g capsule Take 1 capsule (1 g total) by mouth 2 (two) times a day for high cholesterol 2/13/18  Yes Cindy Watson PA-C   polyethylene glycol (MIRALAX) 17 g packet Take 17 g by mouth daily 4/16/21  Yes Lane Noyola MD   predniSONE 10 mg tablet 30 mg by mouth daily for 3 days, then 20 mg by mouth daily for 3 days, then 10 mg by mouth daily for 3 days, then stop 4/15/21  Yes Lane Noyola MD   senna (SENOKOT) 8 6 mg Take 1 tablet (8 6 mg total) by mouth daily at bedtime 4/15/21  Yes Lane Noyola MD   sodium chloride (OCEAN) 0 65 % nasal spray 1 spray into each nostril as needed for rhinitis 9/19/19  Yes MARLEEN Paulino   tamsulosin (FLOMAX) 0 4 mg Take 1 capsule (0 4 mg total) by mouth daily with dinner 4/15/21  Yes Lane Noyola MD   EPINEPHrine (EPIPEN) 0 3 mg/0 3 mL SOAJ INJECT 0 3ML INTO A MUSCLE ONCE FOR 1 DOSE 1/17/21   MARLEEN Paulino   Euthyrox 175 MCG tablet Take 1 tablet by mouth once daily 3/25/21   Azul Alex PA-C   gabapentin (NEURONTIN) 300 mg capsule Take 1 capsule (300 mg total) by mouth 3 (three) times a day 1/28/21 2/27/21  Tyra Hoskins PA-C   nicotine (NICODERM CQ) 21 mg/24 hr TD 24 hr patch Place 1 patch on the skin daily 4/16/21   Lokesh Mcginnis MD   traZODone (DESYREL) 150 mg tablet Take 1 tablet (150 mg total) by mouth daily at bedtime 2/4/21   Azul Alex PA-C     I have reviewed home medications using allscripts  Allergies: Allergies   Allergen Reactions    Ace Inhibitors Swelling     Angioedema    Clindamycin      Had angioedema episode approx 5 hr after IM administration of clindamycin  Unclear if there is definitive causal relationship         Social History:     Marital Status: Single   Occupation: none  Patient Pre-hospital Living Situation:  Lives alone  Patient Pre-hospital Level of Mobility:  Dependent  Patient Pre-hospital Diet Restrictions:  Diabetic  Substance Use History:   Social History     Substance and Sexual Activity   Alcohol Use Not Currently    Alcohol/week: 3 0 standard drinks    Types: 2 Cans of beer, 1 Shots of liquor per week    Frequency: Monthly or less    Drinks per session: 1 or 2    Binge frequency: Less than monthly    Comment: seldom     Social History     Tobacco Use   Smoking Status Current Every Day Smoker    Packs/day: 2 00    Years: 70 00    Pack years: 140 00    Start date: 4/15/1942   Smokeless Tobacco Never Used   Tobacco Comment    states read to quit prior to admit     Social History     Substance and Sexual Activity   Drug Use No       Family History:    Family History   Problem Relation Age of Onset    Thyroid cancer Mother     Diabetes type II Mother     Esophageal cancer Father     Diabetes type II Father     Kidney cancer Brother     Diabetes type II Brother     Diabetes type II Maternal Grandmother        Physical Exam:     Vitals:   Blood Pressure: 152/91 (04/17/21 1542)  Pulse: 85 (04/17/21 1542)  Temperature: 97 9 °F (36 6 °C) (04/17/21 1542)  Temp Source: Temporal (04/17/21 1542)  Respirations: 20 (04/17/21 1542)  Height: 5' 4" (162 6 cm) (04/15/21 1300)  Weight - Scale: 105 kg (232 lb 2 3 oz) (04/15/21 1300)  SpO2: 91 % (04/17/21 1542)    Physical Exam  Constitutional:       General: He is not in acute distress  Appearance: Normal appearance  HENT:      Head: Normocephalic and atraumatic  Eyes:      General:         Right eye: No discharge  Left eye: No discharge  Cardiovascular:      Rate and Rhythm: Normal rate and regular rhythm  Pulses: Normal pulses  Heart sounds: No murmur  Pulmonary:      Effort: Pulmonary effort is normal  No respiratory distress  Breath sounds: Normal breath sounds  No wheezing  Musculoskeletal:      Right lower leg: No edema  Left lower leg: No edema  Skin:     General: Skin is warm and dry  Neurological:      General: No focal deficit present  Mental Status: He is alert and oriented to person, place, and time  Mental status is at baseline  Psychiatric:         Mood and Affect: Mood normal              Additional Data:     Lab Results: I have personally reviewed pertinent reports        Results from last 7 days   Lab Units 04/14/21  0612   WBC Thousand/uL 12 42*   HEMOGLOBIN g/dL 14 7   HEMATOCRIT % 46 3   PLATELETS Thousands/uL 230   NEUTROS PCT % 64   LYMPHS PCT % 22   MONOS PCT % 12   EOS PCT % 1     Results from last 7 days   Lab Units 04/14/21  0612   SODIUM mmol/L 140   POTASSIUM mmol/L 3 6   CHLORIDE mmol/L 104   CO2 mmol/L 28   BUN mg/dL 28*   CREATININE mg/dL 0 87   ANION GAP mmol/L 8   CALCIUM mg/dL 8 4   GLUCOSE RANDOM mg/dL 81         Results from last 7 days   Lab Units 04/17/21  1631 04/17/21  1123 04/17/21  0617 04/16/21  2023 04/16/21  1629 04/16/21  1142 04/16/21  0642 04/16/21  0621 04/16/21  0607 04/15/21  2051 04/15/21  1552 04/15/21  1109   POC GLUCOSE mg/dl 243* 182* 119 203* 237* 206* 99 70 48* 76 109 120               Imaging: I have personally reviewed pertinent reports  No orders to display       EKG, Pathology, and Other Studies Reviewed on Admission:   EKG: none    Allscripts / Epic Records Reviewed: Yes     ** Please Note: This note has been constructed using a voice recognition system   **

## 2021-04-16 NOTE — OCCUPATIONAL THERAPY NOTE
Occupational Therapy Evaluation & Treatment Note      Patient Name: Ruddy Alvarez  [id number] Date: 4/16/2021  Problem List  Active Problems:    * No active hospital problems  *    Past Medical History  Past Medical History:   Diagnosis Date    Angioedema     [facility] mellitus ([facility] )     Disease of thyroid gland     Hyperlipidemia     Hypertension     Morbid obesity ([facility] )     MARSHA (obstructive sleep apnea)      Past Surgical History  Past Surgical History:   Procedure Laterality Date    KNEE SURGERY      SINUS SURGERY             04/16/21 1102   OT Last Visit   OT Visit Date 04/16/21   Note Type   Note type Evaluation   Restrictions/Precautions   Weight Bearing Precautions Per Order No   Other Precautions O2   Pain Assessment   Pain Assessment Tool Pain Assessment not indicated - pt denies pain   Home Living   Type of [address] One level;Stairs to enter with rails  ([address] ([address]nd floor apartment))   Bathroom Shower/Tub Tub/shower unit   Bathroom Toilet Standard   Bathroom Accessibility [place]   Prior Function   Level of [place] Independent with ADLs and functional mobility   Lives With Alone   Receives Help From Family   ADL Assistance Independent   IADLs Needs assistance   Comments Pt reports being independent with ADLs,    Subjective   Subjective "I hope I can go home soon"   ADL   Eating Assistance 7  Independent   Grooming Assistance 7  [address] [facility] 5  Supervision/Setup   LB Bathing Assistance 5  Supervision/Setup   [address] 5  Supervision/Setup   LB Dressing Assistance 4  Minimal Assistance   LB Dressing Deficit Thread RLE into pants; Thread LLE into pants;Pull up over hips;Don/doff R shoe;Don/doff L shoe   Toileting Assistance  5  Supervision/Setup   Toileting Deficit Clothing management up;Clothing management down;Perineal hygiene   Bed Mobility   Additional Comments Pt received OOB in chair      Transfers   Sit to Stand 5  Supervision   Additional items Verbal cues   Stand to Sit 5  Supervision   Additional items Verbal cues   Toilet transfer 5  Supervision   Additional items Verbal cues   Functional Mobility   Functional Mobility 5  Supervision   Additional Comments household distances    Additional items   (no AD )   Balance   Static Sitting Good   Dynamic Sitting Fair +   Static Standing Fair   Dynamic Standing Fair   Ambulatory Fair   Activity Tolerance   Activity Tolerance Patient tolerated treatment well   RUE Assessment   RUE Assessment WFL   LUE Assessment   LUE Assessment WFL   Hand Function   Gross Motor Coordination Functional   Fine Motor Coordination Functional   Sensation   Light Touch No apparent deficits   Proprioception   Proprioception No apparent deficits   Cognition   Overall Cognitive Status WFL   Arousal/Participation Alert; Cooperative   Attention Within functional limits   Orientation Level Oriented X4   Memory Within functional limits   Following Commands Follows all commands and directions without difficulty   Assessment   Limitation Decreased ADL status; Decreased UE strength;Decreased endurance;Decreased high-level ADLs   Prognosis Good   Assessment   Pt is a 63 y o  male seen for OT evaluation s/p admit to [facility] on 4/15/2021 w/ angioedema  See above for extensive list of comorbidities affecting Pt's functional performance at time of assessment  Personal factors affecting Pt at time of IE include:limited home support, difficulty performing ADLS, difficulty performing IADLS , health management  and environment  Prior to admission, Pt was living alone, did not use AD, was independent with ADLs  Upon evaluation: Pt requires supervision for transfers including toilet transfer  Pt requires cues for pacing with functional ambulation, tendency to move quickly/impulsively resulting in decreased standing balance   Pt requires Makeda to doff/don shoes, does report he typically sits at EOB and props LEs up on bed to complete this task  The following deficits impact occupational performance: weakness, decreased strength, decreased balance, decreased tolerance and decreased safety awareness  Pt to benefit from continued skilled OT services while in the hospital to address deficits as defined above and maximize level of functional independence w ADL's and functional mobility  Occupational performance areas to address include: grooming, bathing/shower, toilet hygiene, dressing, health maintenance, functional mobility and clothing management  From OT standpoint, recommendation at time of d/c would be return to previous environment with social support  Goals   STG Time Frame   (1 week)   Plan   Treatment Interventions ADL retraining;Functional transfer training;UE strengthening/ROM; Endurance training;Continued evaluation   Goal Expiration Date 04/23/21   OT Treatment Day 1   OT Frequency   (5-7x/week)   Additional Treatment Session   Start Time 3728   End Time 1200   Treatment Assessment Pt seen for OT txt following initial evaluation  Pt completed ambulation in room with RW and without AD  Pt educated re: pacing with ambulation  Pt completed item retrieval from waist level and without AD, no LOB  Pt is limited by decreased endurance and activity tolerance, educated re: compensatory breathing strategies and encouraged use of incentive spirometer  Pt would benefit from continued OT services to further address deficits with standing balance, activity tolerance, and maximize functional independence with ADLs and IADLs      Recommendation   OT Discharge Recommendation No rehabilitation needs       Goals STG achieved within 2 weeks Performance at Initial Evaluation (04/16) Current Performance (last date completed)   Grooming while standing at sink [place] Supervision standing    ADL transfers  [place]line supervision    Bathroom mobility  [place] supervision    UB ADL  [place] supervision    LB ADL, AE PRN Marline Makeda    Toileting/clothing management and hygiene [place] supervision    Dynamic standing balance Fair+ Fair    Increase standing tolerance for inc'd safety with standing purposeful tasks > 10 minutes ~ 3 minutes    Participate in therex 1-3x/week for inc'd overall stamina/activity tolerance for purposeful tasks To complete     Tub/shower transfer  [place]     Kitchen mobility for inc'd independence and safety negotiating kitchen environment Marline     Bed mobility with HOB flat  Marlien/I

## 2021-04-16 NOTE — NURSING NOTE
Abdomen was firm and distended, patient pivoted to commode and voided 550 ml orange color urine with a small BM at 2350  Bladder scan PVR was 149 ml, abdomen remains large and distended, but is soft now  He is passing flatus  Pulse ox on room air was 88%, placed on 2L nasal O2, then 94%  Does incentive spirometry to 1750 ml level, has an occasional moist non productive cough

## 2021-04-16 NOTE — PLAN OF CARE
Problem: PHYSICAL THERAPY ADULT  Goal: Performs mobility at highest level of function for planned discharge setting  See evaluation for individualized goals  Description: Treatment/Interventions: Functional transfer training, LE strengthening/ROM, Elevations, Therapeutic exercise, Endurance training, Patient/family training, Equipment eval/education, Bed mobility, Gait training, Spoke to nursing, Spoke to case management, OT, ADL retraining  Equipment Recommended: Single Digits       See flowsheet documentation for full assessment, interventions and recommendations  Outcome: Progressing  Note: Prognosis: Good  Problem List: Decreased strength, Decreased range of motion, Decreased endurance, Impaired balance, Decreased mobility, Decreased coordination, Pain     Barriers to Discharge: Inaccessible home environment        PT Discharge Recommendation: Home with outpatient rehabilitation(when rehab goals met)          See flowsheet documentation for full assessment

## 2021-04-17 LAB
GLUCOSE SERPL-MCNC: 119 MG/DL (ref 65–140)
GLUCOSE SERPL-MCNC: 182 MG/DL (ref 65–140)
GLUCOSE SERPL-MCNC: 229 MG/DL (ref 65–140)
GLUCOSE SERPL-MCNC: 243 MG/DL (ref 65–140)

## 2021-04-17 PROCEDURE — 82948 REAGENT STRIP/BLOOD GLUCOSE: CPT

## 2021-04-17 PROCEDURE — 97116 GAIT TRAINING THERAPY: CPT

## 2021-04-17 PROCEDURE — 97110 THERAPEUTIC EXERCISES: CPT

## 2021-04-17 PROCEDURE — 97530 THERAPEUTIC ACTIVITIES: CPT

## 2021-04-17 PROCEDURE — 97535 SELF CARE MNGMENT TRAINING: CPT

## 2021-04-17 RX ORDER — METHOCARBAMOL 500 MG/1
500 TABLET, FILM COATED ORAL EVERY 6 HOURS PRN
Status: DISCONTINUED | OUTPATIENT
Start: 2021-04-17 | End: 2021-04-21 | Stop reason: HOSPADM

## 2021-04-17 RX ORDER — MELATONIN
1000 DAILY
Status: DISCONTINUED | OUTPATIENT
Start: 2021-04-18 | End: 2021-04-21 | Stop reason: HOSPADM

## 2021-04-17 RX ORDER — BENZONATATE 100 MG/1
100 CAPSULE ORAL 3 TIMES DAILY PRN
Status: DISCONTINUED | OUTPATIENT
Start: 2021-04-17 | End: 2021-04-21 | Stop reason: HOSPADM

## 2021-04-17 RX ADMIN — PREDNISONE 30 MG: 20 TABLET ORAL at 08:47

## 2021-04-17 RX ADMIN — METFORMIN HYDROCHLORIDE 1000 MG: 500 TABLET ORAL at 16:58

## 2021-04-17 RX ADMIN — POLYETHYLENE GLYCOL 3350 17 G: 17 POWDER, FOR SOLUTION ORAL at 08:47

## 2021-04-17 RX ADMIN — INSULIN LISPRO 2 UNITS: 100 INJECTION, SOLUTION INTRAVENOUS; SUBCUTANEOUS at 21:18

## 2021-04-17 RX ADMIN — INSULIN GLARGINE 40 UNITS: 100 INJECTION, SOLUTION SUBCUTANEOUS at 21:18

## 2021-04-17 RX ADMIN — DIPHENHYDRAMINE HCL 25 MG: 25 TABLET ORAL at 16:03

## 2021-04-17 RX ADMIN — DULOXETINE 60 MG: 60 CAPSULE, DELAYED RELEASE ORAL at 08:49

## 2021-04-17 RX ADMIN — CARVEDILOL 6.25 MG: 6.25 TABLET, FILM COATED ORAL at 08:46

## 2021-04-17 RX ADMIN — ENOXAPARIN SODIUM 40 MG: 40 INJECTION SUBCUTANEOUS at 21:17

## 2021-04-17 RX ADMIN — FLUTICASONE FUROATE AND VILANTEROL TRIFENATATE 1 PUFF: 100; 25 POWDER RESPIRATORY (INHALATION) at 16:03

## 2021-04-17 RX ADMIN — DIPHENHYDRAMINE HCL 25 MG: 25 TABLET ORAL at 05:33

## 2021-04-17 RX ADMIN — SENNOSIDES 8.6 MG: 8.6 TABLET, FILM COATED ORAL at 21:17

## 2021-04-17 RX ADMIN — DIPHENHYDRAMINE HCL 25 MG: 25 TABLET ORAL at 21:17

## 2021-04-17 RX ADMIN — BENZONATATE 100 MG: 100 CAPSULE ORAL at 00:23

## 2021-04-17 RX ADMIN — ATORVASTATIN CALCIUM 40 MG: 40 TABLET, FILM COATED ORAL at 16:03

## 2021-04-17 RX ADMIN — LEVOTHYROXINE SODIUM 175 MCG: 0.15 TABLET ORAL at 05:33

## 2021-04-17 RX ADMIN — CARVEDILOL 6.25 MG: 6.25 TABLET, FILM COATED ORAL at 16:03

## 2021-04-17 RX ADMIN — DOCUSATE SODIUM 100 MG: 100 CAPSULE, LIQUID FILLED ORAL at 08:46

## 2021-04-17 RX ADMIN — TAMSULOSIN HYDROCHLORIDE 0.4 MG: 0.4 CAPSULE ORAL at 16:03

## 2021-04-17 RX ADMIN — MELATONIN TAB 3 MG 3 MG: 3 TAB at 21:17

## 2021-04-17 RX ADMIN — FAMOTIDINE 20 MG: 20 TABLET ORAL at 08:46

## 2021-04-17 RX ADMIN — FAMOTIDINE 20 MG: 20 TABLET ORAL at 17:28

## 2021-04-17 RX ADMIN — NICOTINE 21 MG: 21 PATCH, EXTENDED RELEASE TRANSDERMAL at 08:47

## 2021-04-17 RX ADMIN — ENOXAPARIN SODIUM 40 MG: 40 INJECTION SUBCUTANEOUS at 08:46

## 2021-04-17 RX ADMIN — DOCUSATE SODIUM 100 MG: 100 CAPSULE, LIQUID FILLED ORAL at 17:28

## 2021-04-17 RX ADMIN — METHOCARBAMOL TABLETS 500 MG: 500 TABLET, COATED ORAL at 00:23

## 2021-04-17 NOTE — NURSING NOTE
4/16/21 2030- awake and alert, sitting on the side of bed, RA sat 93%, slightly SOB on exertion, occasional productive cough noted, with small whitish phlegm noted  Bedtime care done, reminded to call if needed  0002- heard coughing, complained of pain below left breast when coughing, '" I think ribs are broken"  Vital signs taken and recorded  Called PA on call and ordered robaxin and tessalon perles and was given  0040- starting to feel better, 0100- helped went to bed, and fell asleep  Checked at interval, no more complaints of pain, still with on and off pain  Needs attended

## 2021-04-17 NOTE — PLAN OF CARE
Problem: PHYSICAL THERAPY ADULT  Goal: Performs mobility at highest level of function for planned discharge setting  See evaluation for individualized goals  Description: Treatment/Interventions: Functional transfer training, LE strengthening/ROM, Elevations, Therapeutic exercise, Endurance training, Patient/family training, Equipment eval/education, Bed mobility, Gait training, Spoke to nursing, Spoke to case management, OT, ADL retraining  Equipment Recommended: Rocio Sharif       See flowsheet documentation for full assessment, interventions and recommendations  Outcome: Progressing  Note: Prognosis: Good  Problem List: Decreased strength, Decreased range of motion, Decreased endurance, Impaired balance, Decreased coordination, Decreased mobility, Impaired judgement, Decreased safety awareness, Impaired vision, Impaired hearing, Orthopedic restrictions, Decreased skin integrity, Obesity  Assessment: Pt agreeable to participate in theract and gait training; during this afternoon's PT tx session  Upon entering bedroom , pt was seated at EOB on room air  Pt reported that he has not been on Oxygen since yesterday  Initiated elevation training in  stairwell and also practiced curb steps with RW  Pt reported that when he goes home he has no plans to use a RW on the curb steps; therefore trialed curb step without an AD  Pt appeared slightly unsteady without an AD on full flight of steps and curb step  Next visit, pt would benefit from  trialing a cane during all elevation training  Pt reported that he has been ambulating unsupervised  in his bedroom to and from the window, several time a day with no AD  This PT had pt ambulate 50 feet with no AD; then 50 feet with a SPC  Pt appeared steadier with use of SPC for shorter distances and RW for longer distances at this time  Pt reported that he will ring call bell for assistance from now on   Most likely will issue an Independent sign to ambulate to/from bathroom on Monday (4/19/21) if able to safely manage bathroom door  During challenging activities (ie  Elevation training on steps): SPO2 levels with significant tilt into the low 80's  After a few seconds of deep breathing; O2 levels returned to functional levels  Barriers to Discharge: Decreased caregiver support, Inaccessible home environment(Lives alone; [address])        PT Discharge Recommendation: Home with outpatient rehabilitation          See flowsheet documentation for full assessment

## 2021-04-17 NOTE — PLAN OF CARE
Problem: OCCUPATIONAL THERAPY ADULT  Goal: Performs self-care activities at highest level of function for planned discharge setting  See evaluation for individualized goals  Description: Treatment Interventions: ADL retraining, Functional transfer training, UE strengthening/ROM, Endurance training, Continued evaluation          See flowsheet documentation for full assessment, interventions and recommendations  Outcome: Progressing  Note: Limitation: Decreased ADL status, Decreased UE strength, Decreased endurance, Decreased high-level ADLs  Prognosis: Good    Pt making progress toward OT goals  Improvement noted in most areas compared to initial eval  Completed ADL seated on EOB w/ (I)/MI and increased time due to fatigue  SPO2 81% RA  Educated pt on PLB and sats recovered quickly to 93% Ra  Pt not wearing oxygen  States he only had it on briefly last night  Transfers w/ MI without AD  Required (S) for ambulation due to fatigue levels  Tends to reach for furniture and doesn't want to use RW  Reports he plans to stay w/ nephew upon discharge w/ s/u as follows: Room on 2nd fl of house; 4 URBANO; 12 Steps to 2nd fl  Intends on being (I) w/ IADLS  Continue OT to achieve goals and maximize function  Remains in recliner w/ all needs at end of session        OT Discharge Recommendation: No rehabilitation needs

## 2021-04-17 NOTE — ASSESSMENT & PLAN NOTE
Patient with likely underlying COPD however official PFTs on file per chart review  Patient will continue albuterol inhalers, continue TesKolton Nevarez  Patient is on steroids taper following angioedema and intubation  Continue to monitor respiratory status    Would recommend home ambulatory test prior to discharge

## 2021-04-17 NOTE — ASSESSMENT & PLAN NOTE
Lab Results   Component Value Date    HGBA1C 11 7 (H) 02/05/2021       Hemoglobin A1c is uncontrolled  Pre-hospital Lantus 60 units hs, metformin as well as sliding scale insulin at home    Will reduce Lantus to 40 units daily due to hypoglycemic episodes and restart Metformin 1000 mg BID  Continue insulin sliding scale

## 2021-04-17 NOTE — ASSESSMENT & PLAN NOTE
Unclear etiology of angioedema  Patient had initial episode in 2016 attributed to Ace inhibitor use  Patient intermittent episodes in 2016 and 17 despite discontinuation  Patient needed intubation with mechanical ventilation in the operating room by anesthesia for airway protection and hypoxemia  Patient received Decadron 10 milligram IV in the emergency room and was continued on Solu-Medrol 40 milligram q 8 hours along with Pepcid 20 milligram IV q 12 hours  C4 level was normal at 18, CRP 4 3, sed rate 27  C1 esterase inhibitor level was 30  · Outpatient follow-up with allergy recommended  · Patient will continue prednisone taper as outpatient  · Will start 40 mg for 3 days, then 30 mg for 3 days then 20 mg for 3 days then 10 mg for 3 days and then stop

## 2021-04-17 NOTE — ASSESSMENT & PLAN NOTE
Secondary to angioedema  Per chart review patient has history of mild COPD  Patient initially was intubated in the ED and was extubated on 04/11  Patient has been intermittently on 0-2 L

## 2021-04-17 NOTE — PHYSICAL THERAPY NOTE
Physical Therapy Daily Treatment Note       04/17/21  70 minutes (14:04 to 15:14)     PT Last Visit   PT Visit Date 04/17/21   Note Type   Note Type Treatment   Pain Assessment   Pain Assessment Tool Pain Assessment not indicated - pt denies pain   Pain Score No Pain   Restrictions/Precautions   Other Precautions Visual impairment;Hard of hearing; Fall Risk   General   Chart Reviewed Yes   Family/Caregiver Present No   Cognition   Arousal/Participation Cooperative   Attention Attends with cues to redirect   Memory Within functional limits   Following Commands Follows one step commands without difficulty   Bed Mobility   Rolling R 6  Modified independent   Additional items Increased time required   Rolling L 6  Modified independent   Additional items Increased time required   Supine to Sit 6  Modified independent   Additional items Increased time required   Sit to Supine 6  Modified independent   Additional items Increased time required   Additional Comments Bed Mobility: HOB flat, no rail  (Reports sleeping in a recliner at home)   Transfers   Sit to Stand 6  Modified independent  (Occ hand placement cues; but appears steady)   Additional items Increased time required  (Occ hand placement cues when using RW; but appears steady)   Stand to Sit 6  Modified independent  (Occ hand placement cues when using RW; but appears steady)   Additional items Increased time required  (Controlled descent onto all surfaces)   Stand pivot 6  Modified independent  (SPT with no AD, RW, SPC)   Additional items Increased time required  (Occ cues for turn completion with RW)   Additional Comments Transfers: EOB, recliner, unsecured cahir, w/c, high back chair   Ambulation/Elevation   Gait pattern Improper Weight shift  (Slow mandi, no LOB)   Gait Assistance   (See distance section for details)   Additional items   (Decreased arm swing with no AD and SPC)   Assistive Device   (RW vs SPC vs No AD)   Distance Amb with RW for 165 feet, 265 feet-> negotiated 7 turns/obstacles during 265 feet gait trial and Supervision  Ambulated 25 feet with RW, SPC and No AD MOD I   (Amb with SPC and no AD 50 feet; with S)   Stair Management Assistance   (11 + 11 steps (in stairwell)-> Close S)   Additional items Increased time required;Verbal cues  (Cues for step to patterning)   Stair Management Technique Step to pattern; Foreward; One rail R  (Slightly unsteady with descension)   Number of Stairs   (R hand on RHR up; declined 2 hands on 1 rail)   Curbs 1 + 1 steps with RW and S -> cues for procedure   (1 +1 steps with no AD CG A; + unsteadiness)   Balance   Static Sitting Good   Dynamic Sitting Fair +   Static Standing   (F/F+ no AD; F+ w/ SPC, F +/G- with RW)   Dynamic Standing   (F with no AD; F/F+ with SPC,  F +with RW)    F+ w/ RW->picked a paper towel off floor w/ reacher MOD I     Ambulatory   (F with no AD; F/F+ with SPC,  F +with RW)   Endurance Deficit   Endurance Deficit Yes  (Provided with monitored rest breaks prn, between activities)   Endurance Deficit Description + SOB and decreased SPO2 levels with challenging activities   Activity Tolerance   Activity Tolerance Patient limited by fatigue  (Req increased recovery time between activities)   Assessment   Prognosis Good   Problem List Decreased strength;Decreased range of motion;Decreased endurance; Impaired balance;Decreased coordination;Decreased mobility; Impaired judgement;Decreased safety awareness; Impaired vision; Impaired hearing;Orthopedic restrictions;Decreased skin integrity;Obesity   Assessment Pt agreeable to participate in theract and gait training; during this afternoon's PT tx session  Upon entering bedroom , pt was seated at EOB on room air  Pt reported that he has not been on Oxygen since yesterday  Initiated elevation training in  stairwell and also practiced curb steps with RW   Pt reported that when he goes home he has no plans to use a RW on the curb steps; therefore trialed curb step without an AD  Pt appeared slightly unsteady without an AD on full flight of steps and curb step  Next visit, pt would benefit from  trialing a cane during all elevation training  Pt reported that he has been ambulating unsupervised  in his bedroom to and from the window, several time a day with no AD  This PT had pt ambulate 50 feet with no AD; then 50 feet with a SPC  Pt appeared steadier with use of SPC for shorter distances and RW for longer distances at this time  Pt reported that he will ring call bell for assistance from now on  Most likely will issue an Independent sign to ambulate to/from bathroom on Monday (4/19/21) if able to safely manage bathroom door  During challenging activities (ie  Elevation training on steps): SPO2 levels with significant tilt into the low 80's  After a few seconds of deep breathing; O2 levels returned to functional levels  Vitals  Seated at rest (on room air): /96,  HR 86, SPO2 (RA) 94%     After negotiated 11 steps, RHR up, on room air (seated) HR 99, SPO2 (RA) 83%-> increased to 91 % in 20 seconds of pursed lip breathing     After ambulating with a RW for 265 feet on 2 lpm O2 (seated): , SPO2 (2lpm) 94%        Barriers to Discharge Decreased caregiver support; Inaccessible home environment  (Lives alone; 20 [address] apt)   Goals   Patient Goals   (" Go home soon; Monday maybe")   STG Expiration Date 04/23/21   Short Term Goal #1 See grid   PT Treatment Day 2   Plan   Treatment/Interventions ADL retraining;Functional transfer training;LE strengthening/ROM; Elevations; Therapeutic exercise; Endurance training;Cognitive reorientation;Patient/family training;Equipment eval/education; Bed mobility;Gait training; Compensatory technique education;Continued evaluation;Spoke to MD;Spoke to nursing;Spoke to case management;Spoke to advanced practitioner;OT;Family   Progress Progressing toward goals   PT Frequency   (5 to 7x/week)   Recommendation   PT Discharge Recommendation Home with outpatient rehabilitation   Equipment Recommended   (TBD)           Goals STG achieved within 1 week Performance at Initial Evaluation (4/16/2021) Last date completed      Bed mobility skills including rolling and repositioning to prevent skin breakdown and decrease caregiver burden            independent       supervision assistance x1       4/17         Supine to sit transitions to increase ease of transfer, allow pt to get out of bed, and decrease caregiver burden          independent       supervision assistance x1       4/17      Sit to supine transitions to increase ease of transfer, allow pt to get back into bed, and decrease caregiver burden          independent       supervision assistance x1       4/17      Functional transfers to facilitate safe return to previous living environment        modified independent assistance    supervision assistance x1    4/17      Ambulation with least restrictive AD; including at least 2 turns for safe household distance ambulation          modified independent assistance       supervision assistance x1       4/17      Ascend/descend a full flight of 20 steps with handrail, with device prn, for safe access to previous living environment     4/17/21-> Reports Right handrail up on 20 indoor steps             modified independent assistance       supervision assistance x1       4/17      Ascend/descend a curb step, using appropriate AD and method, no handrails, for safe access to previous living environment          modified independent assistance               4/17       Improve standing functional balance to allow for pt to  object from floor            modified independent assistance            4/17

## 2021-04-17 NOTE — OCCUPATIONAL THERAPY NOTE
Occupational Therapy Treatment Note      TIME: 9:05-9:45 (40 mins)  VITALS: SPO2 81% RA; Increased 93% RA w/ PLB and breaks  PAIN: Chest discomfort from coughing; 5/10  COGNITION: WFL  PRECAUTIONS: O2- inconsistent     EXPIRATION DATE:  4/23/21  TREATMENT DAY: 2  DISCHARGE RECOMMENDATION:  No rehab needs  ADDITIONAL COMMENTS:  Pt states he has mucus that loosening and moving around but isn't able to expel       ASSESSMENT: Pt making progress toward OT goals  Improvement noted in most areas compared to initial eval  Completed ADL seated on EOB w/ (I)/MI and increased time due to fatigue  SPO2 81% RA  Educated pt on PLB and sats recovered quickly to 93% Ra  Pt not wearing oxygen  States he only had it on briefly last night  Transfers w/ MI without AD  Required (S) for ambulation due to fatigue levels  Tends to reach for furniture and doesn't want to use RW  Reports he plans to stay w/ nephew upon discharge w/ s/u as follows: Room on 2nd fl of house; 4 URBAN[address]; 12 Steps to 2nd fl  Intends on being (I) w/ IADLS  Continue OT to achieve goals and maximize function  Remains in recliner w/ all needs at end of session         Goals STG achieved within 2 weeks Performance at Initial Evaluation (04/16) Current Performance (last date completed)   Grooming while standing at sink Mar[place] Supervision standing 4/17- (S) brush teeth      ADL transfers  [place] supervision 4/17- MI STS and SPT without AD; (S) amb without AD      Bathroom mobility  [place] supervision 4/17- (S) without AD      UB ADL  Marline supervision  4/17- (I)     LB ADL, AE PRN Marline [employer]  4/17- MI      Toileting/clothing management and hygiene Marline supervision  4/17- MI    Dynamic standing balance Fair+ Fair  4/17- F     Increase standing tolerance for inc'd safety with standing purposeful tasks > 10 minutes ~ 3 minutes  4/17- 5 mins   Participate in therex 1-3x/week for inc'd overall stamina/activity tolerance for purposeful tasks To complete    4/17- UE ROM 2 planes w/ incorporated breathing x 10 reps     Tub/shower transfer  [facility] mobility for inc'd independence and safety negotiating kitchen environment Marline       Bed mobility with HOB flat  Marline/I   4/17- (S) w/ cues not to hold breath       Hungary Ondich MS, OTR/L

## 2021-04-18 LAB
FLUAV RNA RESP QL NAA+PROBE: NEGATIVE
FLUBV RNA RESP QL NAA+PROBE: NEGATIVE
GLUCOSE SERPL-MCNC: 114 MG/DL (ref 65–140)
GLUCOSE SERPL-MCNC: 140 MG/DL (ref 65–140)
GLUCOSE SERPL-MCNC: 209 MG/DL (ref 65–140)
GLUCOSE SERPL-MCNC: 210 MG/DL (ref 65–140)
GLUCOSE SERPL-MCNC: 228 MG/DL (ref 65–140)
RSV RNA RESP QL NAA+PROBE: NEGATIVE
SARS-COV-2 RNA RESP QL NAA+PROBE: NEGATIVE

## 2021-04-18 PROCEDURE — 82948 REAGENT STRIP/BLOOD GLUCOSE: CPT

## 2021-04-18 PROCEDURE — 0241U HB NFCT DS VIR RESP RNA 4 TRGT: CPT | Performed by: INTERNAL MEDICINE

## 2021-04-18 RX ADMIN — MELATONIN TAB 3 MG 3 MG: 3 TAB at 21:26

## 2021-04-18 RX ADMIN — INSULIN GLARGINE 40 UNITS: 100 INJECTION, SOLUTION SUBCUTANEOUS at 21:27

## 2021-04-18 RX ADMIN — METFORMIN HYDROCHLORIDE 1000 MG: 500 TABLET ORAL at 09:13

## 2021-04-18 RX ADMIN — DOCUSATE SODIUM 100 MG: 100 CAPSULE, LIQUID FILLED ORAL at 17:17

## 2021-04-18 RX ADMIN — DIPHENHYDRAMINE HCL 25 MG: 25 TABLET ORAL at 05:37

## 2021-04-18 RX ADMIN — FLUTICASONE FUROATE AND VILANTEROL TRIFENATATE 1 PUFF: 100; 25 POWDER RESPIRATORY (INHALATION) at 09:13

## 2021-04-18 RX ADMIN — DIPHENHYDRAMINE HCL 25 MG: 25 TABLET ORAL at 17:17

## 2021-04-18 RX ADMIN — FAMOTIDINE 20 MG: 20 TABLET ORAL at 17:17

## 2021-04-18 RX ADMIN — METFORMIN HYDROCHLORIDE 1000 MG: 500 TABLET ORAL at 17:17

## 2021-04-18 RX ADMIN — DIPHENHYDRAMINE HCL 25 MG: 25 TABLET ORAL at 21:27

## 2021-04-18 RX ADMIN — PREDNISONE 30 MG: 20 TABLET ORAL at 09:13

## 2021-04-18 RX ADMIN — CHOLECALCIFEROL (VITAMIN D3) 1000 UNITS: 25 TAB ORAL at 09:14

## 2021-04-18 RX ADMIN — CARVEDILOL 6.25 MG: 6.25 TABLET, FILM COATED ORAL at 09:14

## 2021-04-18 RX ADMIN — DOCUSATE SODIUM 100 MG: 100 CAPSULE, LIQUID FILLED ORAL at 09:14

## 2021-04-18 RX ADMIN — TAMSULOSIN HYDROCHLORIDE 0.4 MG: 0.4 CAPSULE ORAL at 17:17

## 2021-04-18 RX ADMIN — NICOTINE 21 MG: 21 PATCH, EXTENDED RELEASE TRANSDERMAL at 09:33

## 2021-04-18 RX ADMIN — ATORVASTATIN CALCIUM 40 MG: 40 TABLET, FILM COATED ORAL at 17:17

## 2021-04-18 RX ADMIN — ENOXAPARIN SODIUM 40 MG: 40 INJECTION SUBCUTANEOUS at 21:26

## 2021-04-18 RX ADMIN — DULOXETINE 60 MG: 60 CAPSULE, DELAYED RELEASE ORAL at 09:14

## 2021-04-18 RX ADMIN — ENOXAPARIN SODIUM 40 MG: 40 INJECTION SUBCUTANEOUS at 09:13

## 2021-04-18 RX ADMIN — CARVEDILOL 6.25 MG: 6.25 TABLET, FILM COATED ORAL at 17:18

## 2021-04-18 RX ADMIN — BENZONATATE 100 MG: 100 CAPSULE ORAL at 21:27

## 2021-04-18 RX ADMIN — INSULIN LISPRO 2 UNITS: 100 INJECTION, SOLUTION INTRAVENOUS; SUBCUTANEOUS at 21:31

## 2021-04-18 RX ADMIN — SENNOSIDES 8.6 MG: 8.6 TABLET, FILM COATED ORAL at 21:27

## 2021-04-18 RX ADMIN — FAMOTIDINE 20 MG: 20 TABLET ORAL at 09:14

## 2021-04-18 RX ADMIN — LEVOTHYROXINE SODIUM 175 MCG: 0.15 TABLET ORAL at 05:37

## 2021-04-18 RX ADMIN — POLYETHYLENE GLYCOL 3350 17 G: 17 POWDER, FOR SOLUTION ORAL at 09:14

## 2021-04-19 LAB
GLUCOSE SERPL-MCNC: 129 MG/DL (ref 65–140)
GLUCOSE SERPL-MCNC: 156 MG/DL (ref 65–140)
GLUCOSE SERPL-MCNC: 200 MG/DL (ref 65–140)
GLUCOSE SERPL-MCNC: 210 MG/DL (ref 65–140)

## 2021-04-19 PROCEDURE — 97530 THERAPEUTIC ACTIVITIES: CPT

## 2021-04-19 PROCEDURE — 97116 GAIT TRAINING THERAPY: CPT

## 2021-04-19 PROCEDURE — 82948 REAGENT STRIP/BLOOD GLUCOSE: CPT

## 2021-04-19 RX ADMIN — NICOTINE 21 MG: 21 PATCH, EXTENDED RELEASE TRANSDERMAL at 08:53

## 2021-04-19 RX ADMIN — METFORMIN HYDROCHLORIDE 1000 MG: 500 TABLET ORAL at 08:51

## 2021-04-19 RX ADMIN — CARVEDILOL 6.25 MG: 6.25 TABLET, FILM COATED ORAL at 16:50

## 2021-04-19 RX ADMIN — FAMOTIDINE 20 MG: 20 TABLET ORAL at 08:51

## 2021-04-19 RX ADMIN — METFORMIN HYDROCHLORIDE 1000 MG: 500 TABLET ORAL at 16:50

## 2021-04-19 RX ADMIN — DIPHENHYDRAMINE HCL 25 MG: 25 TABLET ORAL at 21:42

## 2021-04-19 RX ADMIN — FLUTICASONE FUROATE AND VILANTEROL TRIFENATATE 1 PUFF: 100; 25 POWDER RESPIRATORY (INHALATION) at 08:52

## 2021-04-19 RX ADMIN — TAMSULOSIN HYDROCHLORIDE 0.4 MG: 0.4 CAPSULE ORAL at 16:50

## 2021-04-19 RX ADMIN — ATORVASTATIN CALCIUM 40 MG: 40 TABLET, FILM COATED ORAL at 16:50

## 2021-04-19 RX ADMIN — ENOXAPARIN SODIUM 40 MG: 40 INJECTION SUBCUTANEOUS at 21:36

## 2021-04-19 RX ADMIN — DIPHENHYDRAMINE HCL 25 MG: 25 TABLET ORAL at 15:33

## 2021-04-19 RX ADMIN — CHOLECALCIFEROL (VITAMIN D3) 1000 UNITS: 25 TAB ORAL at 08:51

## 2021-04-19 RX ADMIN — INSULIN LISPRO 2 UNITS: 100 INJECTION, SOLUTION INTRAVENOUS; SUBCUTANEOUS at 21:39

## 2021-04-19 RX ADMIN — LEVOTHYROXINE SODIUM 175 MCG: 0.15 TABLET ORAL at 05:52

## 2021-04-19 RX ADMIN — ENOXAPARIN SODIUM 40 MG: 40 INJECTION SUBCUTANEOUS at 08:50

## 2021-04-19 RX ADMIN — DIPHENHYDRAMINE HCL 25 MG: 25 TABLET ORAL at 05:52

## 2021-04-19 RX ADMIN — MELATONIN TAB 3 MG 3 MG: 3 TAB at 21:42

## 2021-04-19 RX ADMIN — INSULIN LISPRO 2 UNITS: 100 INJECTION, SOLUTION INTRAVENOUS; SUBCUTANEOUS at 16:51

## 2021-04-19 RX ADMIN — DULOXETINE 60 MG: 60 CAPSULE, DELAYED RELEASE ORAL at 08:51

## 2021-04-19 RX ADMIN — DOCUSATE SODIUM 100 MG: 100 CAPSULE, LIQUID FILLED ORAL at 08:51

## 2021-04-19 RX ADMIN — INSULIN GLARGINE 40 UNITS: 100 INJECTION, SOLUTION SUBCUTANEOUS at 21:38

## 2021-04-19 RX ADMIN — CARVEDILOL 6.25 MG: 6.25 TABLET, FILM COATED ORAL at 08:51

## 2021-04-19 RX ADMIN — DOCUSATE SODIUM 100 MG: 100 CAPSULE, LIQUID FILLED ORAL at 16:50

## 2021-04-19 RX ADMIN — PREDNISONE 30 MG: 20 TABLET ORAL at 08:51

## 2021-04-19 RX ADMIN — FAMOTIDINE 20 MG: 20 TABLET ORAL at 16:50

## 2021-04-19 NOTE — PLAN OF CARE
Problem: OCCUPATIONAL THERAPY ADULT  Goal: Performs self-care activities at highest level of function for planned discharge setting  See evaluation for individualized goals  Description: Treatment Interventions: ADL retraining, Functional transfer training, UE strengthening/ROM, Endurance training, Continued evaluation          See flowsheet documentation for full assessment, interventions and recommendations     Outcome: Progressing  Note: Limitation: Decreased ADL status, Decreased UE strength, Decreased endurance, Decreased high-level ADLs  Prognosis: Good  Assessment: see note      OT Discharge Recommendation: No rehabilitation needs

## 2021-04-19 NOTE — PLAN OF CARE
Problem: PHYSICAL THERAPY ADULT  Goal: Performs mobility at highest level of function for planned discharge setting  See evaluation for individualized goals  Description: Treatment/Interventions: Functional transfer training, LE strengthening/ROM, Elevations, Therapeutic exercise, Endurance training, Patient/family training, Equipment eval/education, Bed mobility, Gait training, Spoke to nursing, Spoke to case management, OT, ADL retraining  Equipment Recommended: Viki Burdick       See flowsheet documentation for full assessment, interventions and recommendations  Outcome: Progressing  Note: Prognosis: Good  Problem List: Decreased strength, Decreased endurance, Impaired balance, Decreased mobility, Impaired vision, Obesity, Pain, Orthopedic restrictions  Assessment: Pt agreeable to participate in theract, gait training, and Care Conference; during this morning's PT tx session  Pt with excellent progress in skilled PT  Improvement assessed with bed mobility, functional strength, balance, activity tolerance, gait, all elevations, and SPO2 levels on room air  Pt now advanced from ambulating longer distances with a RW to a  SPC  Initiated elevation training on FF of steps with a SPC  All STG's now achieved  As discussed at [facility], anticipate discharge to home this Wednesday, 4/21/21  Barriers to Discharge: Decreased caregiver support, Inaccessible home environment(Lives alone)        PT Discharge Recommendation: Home with outpatient rehabilitation          See flowsheet documentation for full assessment

## 2021-04-19 NOTE — PHYSICAL THERAPY NOTE
Physical Therapy Daily Treatment Note         04/19/21     71 minutes (9:26 to 10:37)    Treatment Session: 38 minutes (9:26 to 10:04)     Care Conference: 17 minutes (10:04 to 10:21)    Treatment Session: 16  minutes (10:21 to 10:37)             PT Last Visit   PT Visit Date 04/19/21   Note Type   Note Type Treatment   Pain Assessment   Pain Assessment Tool Cid-Baker FACES   Icd-Baker FACES Pain Rating 6   Pain Location/Orientation   (R knee and L ankle)   Pain Onset/Description Frequency: Intermittent; Descriptor: Aching  ("Chronic arthritic pain from the weather")   Restrictions/Precautions   Weight Bearing Precautions Per Order No   Other Precautions Pain   General   Chart Reviewed Yes   Family/Caregiver Present Yes  (Brother on speaker phone for For [address])   Cognition   Overall Cognitive Status WFL   Arousal/Participation Cooperative   Attention Within functional limits   Memory Within functional limits   Following Commands Follows all commands and directions without difficulty   Bed Mobility   Rolling R 7  Independent   Rolling L 7  Independent   Supine to Sit 7  Independent   Sit to Supine 7  Independent   Additional Comments HOB falt, no rail, decreased time and effort   Transfers   Sit to Stand 6  Modified independent   Additional items Increased time required   Stand to Sit 6  Modified independent   Additional items Increased time required   Stand pivot 6  Modified independent  (SPT with and w/o SPC)   Additional items Increased time required  (Safe and steady)   Toilet transfer 6  Modified independent  (Issued I sign to amb to bathoom and in bedroom w/o Ad)   Additional items Standard toilet  (I toileting activities)   Car transfer 6  Modified independent  (Removed foot board on bed to simulate car transfer )   Additional items Increased time required  (Managed BLE's over simulated car wall w/o difficulty)   Additional Comments Transfers: EOB, recliner, toilet, w/c, unsecured chair Ambulation/Elevation   Gait pattern Improper Weight shift; Antalgic  (Limping ( +LLD-> LLE shorter than R))   Gait Assistance 6  Modified independent   Additional items   (Decreased arm swing during all gait trials)   Assistive Device Straight cane;None   Distance Amb with  feet; then 430 feet-> negotiated 8 turns/obstacles in pathway MOD I  (No AD: 25 ' x 3; 76' negotiated 4 turns w/ MOD I )   Stair Management Assistance 6  Modified independent   Additional items Increased time required  (Cues for step to; for up and down)   Stair Management Technique Step to pattern; Alternating pattern; Foreward; With cane; One rail R   Number of Stairs 22  (Step over step (up); step to (down))   Curbs 1 + 1 steps with SPC MOD I   (Safe and steady)   Balance   Static Sitting Normal   Dynamic Sitting Good   Static Standing Good  ( with SPC)   Dynamic Standing   (Good (-) w/ SPC; F + w/o AD)   Ambulatory   (Good (-) w/ SPC; F + w/o AD)   Higher level balance Side stepping  (Good (-) w/ SPC; F + w/o AD)   Higher level balance rep range to the L and to the R  (F +w/o AD: picked objects off floor)   Endurance Deficit   Endurance Deficit Yes  (Provided with monitored rest breaks prn, between activities)   Activity Tolerance   Activity Tolerance Patient tolerated treatment well;Patient limited by fatigue   Assessment   Prognosis Good   Problem List Decreased strength;Decreased endurance; Impaired balance;Decreased mobility; Impaired vision;Obesity;Pain;Orthopedic restrictions   Assessment Pt agreeable to participate in theract, gait training, and Care Conference; during this morning's PT tx session  Pt with excellent progress in skilled PT  Improvement assessed with bed mobility, functional strength, balance, activity tolerance, gait, all elevations, and SPO2 levels on room air  Pt now advanced from ambulating longer distances with a RW to a  SPC  Initiated elevation training on FF of steps with a SPC  All STG's now achieved   As discussed at [facility], anticipate discharge to home this Wednesday, 4/21/21  Barriers to Discharge Decreased caregiver support; Inaccessible home environment  (Lives alone)   Goals   Patient Goals   ("I'd like to go home by Wednesday")   STG Expiration Date 04/23/21   Short Term Goal #1 See grid   PT Treatment Day 3   Plan   Treatment/Interventions ADL retraining;Functional transfer training;LE strengthening/ROM; Elevations; Therapeutic exercise; Endurance training;Cognitive reorientation;Patient/family training;Equipment eval/education; Bed mobility;Gait training; Compensatory technique education;Continued evaluation;Spoke to MD;Spoke to nursing;Family;Spoke to case management;Spoke to advanced practitioner;OT   Progress Improving as expected   PT Frequency   (5 to 7x/week)   Recommendation   PT Discharge Recommendation Home with outpatient rehabilitation   Equipment Recommended   (Appears to have all necessary DME)           Vitals  Seated at rest on Room air: /72, HR 92, SPO2 92%    After negotiating 22 steps with RHR up and SPC (seated): 150/80; , SPO2 (RA) 92%    After ambulating with a SPC for 430 feet (seated): 138/76; , SPO2 (RA) 94%      _______________________________________________________________          Care Conference: 17 minutes (10:04 to 10:21): Pt and his brother (via speaker phone) and SW all in attendance for meeting  Physical Therapy: Reviewed functional status from PT IE , discussed STG's established on PT IE , progression and achievement of these goals in prep for discharge  See flowsheet above for details  Recommend 2 more PT tx sessions for consistent achievement of STG's  Recommend Outpatient therapy at discharge  Anticipated discharge date is this Wednesday, 4/21/21   Brother Lisa Walker can transport at 3:00 PM

## 2021-04-19 NOTE — OCCUPATIONAL THERAPY NOTE
Occupational Therapy Treatment Note    Name:  Alpa Cruz   MRN:   [id number]  Age:     63 y o  Patient Active Problem List   Diagnosis    Allergic rhinitis    Angioedema    Arthritis    Chronic back pain    Chronic sinusitis    COPD, mild ([place] [address] )    Depression with anxiety    Hyperlipidemia    Hypertension    Hypothyroidism    Morbid obesity ([place] [address] )    Type 2 diabetes, uncontrolled, with neuropathy ([place] [address] )    Vitamin D deficiency    Cellulitis of left lower leg    Elevated LFTs    Elevated lactic acid level    Tobacco abuse    Noncompliance with diabetes treatment    Acute respiratory failure with hypoxia ([place] [address] )    Epistaxis    Obstructive sleep apnea    Muscle twitching    Type 2 diabetes mellitus with hyperglycemia, with long-term current use of insulin ([facility])    Tobacco use    Atelectasis    Urinary retention    Constipation     Acquired angioedema [T78  3XXA]      Subjective/Goals:  "get ride of the smoking & a little more active"    Vitals: 142/93 BP, 95HR, 92%O2    Pain: no pain     Treatment Time: (7172-3621) 54 minutes    Cognition: WFL    Precautions: +O2    EVALUATION information:   OT Goal expiration date: 4/23/2021   Treatment day: 3   Discharge recommendation: home with services    HOME SET UP:  o Apartment- 1 level + stairs to enter (20)  o DME: cane  o Tub/shower unit  o Lives alone with assist from family     Patient education: fall prevention, safety and energy conservation     Additional comments: Pt is a 63 y o  male seen for OT evaluation s/p admit to [facility] on 4/15/2021 w/ angioedema  Assessment/Additional session details: Patient seen this date for OT with focus on goals as set by OTR    Patient agreeable to skilled OT session with focus on ADL's (bathing, dressing, toileting), Transfers (STS, SPT), Standing tolerance/balance, Strength/ROM/HEP, Tub/shower transfers, Home management, Activity tolerance, Education on safety, fall prevention and energy conservation techniques, Item retrieval/safe transport with DME ed and Bathroom/kitchen/home mobility  Barriers to treatment include fatigue, shortess of breath, home environment (management in/out or throughout home), decreased strength/coordination, balance , activity tolerance and standing tolerance  Patient educated on safe functional transfers techniques, the appropriate use of AE/DME to improve functional performance, and activity modification techniques for energy conservation  Plans for d/c are home with home OT and family support  Patient is making gains toward OT goals with continued OT recommended at this time to max tolerance, safety and function for appropriate d/c planning    At end of session patient remains in room seated at recliner with all needs within reach        Goals STG achieved within 2 weeks Performance at Initial Evaluation (04/16) Current Performance (last date completed)   Grooming while standing at sink Yoni  4/19 Supervision standing 4/19- (MI)      ADL transfers  Yoni  4/19 supervision 4/19 MI  4/17- MI STS and SPT without AD; (S) amb without AD      Bathroom mobility  Yoni  4/19 supervision 4/19 MI  4/17- (S) without AD      UB ADL  Yoni  4/17, 4/19 supervision  4/19- (I)      LB ADL, AE PRN Yoni  4/17, 4/19 Makeda  4/19- MI       Toileting/clothing management and hygiene Yoni  4/17, 4/19 supervision  4/19- MI    Dynamic standing balance Fair+  4/19 Fair  4/19- F+      Increase standing tolerance for inc'd safety with standing purposeful tasks > 10 minutes  4/19 ~ 3 minutes 4/19 MI   4/17- 5 mins   Participate in therex 1-3x/week for inc'd overall stamina/activity tolerance for purposeful tasks To complete    4/17- UE ROM 2 planes w/ incorporated breathing x 10 reps      Tub/shower transfer  Yoni  4/19 4/19 MI   Kitchen mobility for inc'd independence and safety negotiating kitchen environment Yoni  4/19 4/19 MI   Bed mobility with HOB flat  Yoni/I  4/19 4/19 MI  4/17- (S) w/ cues not to hold breath        Earle Healy  4/19/2021

## 2021-04-20 LAB
GLUCOSE SERPL-MCNC: 207 MG/DL (ref 65–140)
GLUCOSE SERPL-MCNC: 83 MG/DL (ref 65–140)
GLUCOSE SERPL-MCNC: 85 MG/DL (ref 65–140)

## 2021-04-20 PROCEDURE — 82948 REAGENT STRIP/BLOOD GLUCOSE: CPT

## 2021-04-20 PROCEDURE — 97535 SELF CARE MNGMENT TRAINING: CPT

## 2021-04-20 PROCEDURE — 97530 THERAPEUTIC ACTIVITIES: CPT

## 2021-04-20 PROCEDURE — 97116 GAIT TRAINING THERAPY: CPT

## 2021-04-20 PROCEDURE — 97110 THERAPEUTIC EXERCISES: CPT

## 2021-04-20 RX ORDER — EPINEPHRINE 1 MG/ML
INJECTION, SOLUTION, CONCENTRATE INTRAVENOUS
Status: COMPLETED
Start: 2021-04-20 | End: 2021-04-20

## 2021-04-20 RX ORDER — HYDROXYZINE HYDROCHLORIDE 25 MG/1
25 TABLET, FILM COATED ORAL EVERY 6 HOURS PRN
Status: DISCONTINUED | OUTPATIENT
Start: 2021-04-20 | End: 2021-04-21 | Stop reason: HOSPADM

## 2021-04-20 RX ORDER — ACETAMINOPHEN 325 MG/1
650 TABLET ORAL EVERY 4 HOURS PRN
Status: DISCONTINUED | OUTPATIENT
Start: 2021-04-20 | End: 2021-04-21 | Stop reason: HOSPADM

## 2021-04-20 RX ORDER — ACETAMINOPHEN 160 MG/5ML
650 SUSPENSION, ORAL (FINAL DOSE FORM) ORAL EVERY 4 HOURS PRN
Status: DISCONTINUED | OUTPATIENT
Start: 2021-04-20 | End: 2021-04-20 | Stop reason: CLARIF

## 2021-04-20 RX ADMIN — POLYETHYLENE GLYCOL 3350 17 G: 17 POWDER, FOR SOLUTION ORAL at 09:25

## 2021-04-20 RX ADMIN — ENOXAPARIN SODIUM 40 MG: 40 INJECTION SUBCUTANEOUS at 20:04

## 2021-04-20 RX ADMIN — METFORMIN HYDROCHLORIDE 1000 MG: 500 TABLET ORAL at 07:43

## 2021-04-20 RX ADMIN — METFORMIN HYDROCHLORIDE 1000 MG: 500 TABLET ORAL at 16:14

## 2021-04-20 RX ADMIN — DULOXETINE 60 MG: 60 CAPSULE, DELAYED RELEASE ORAL at 09:20

## 2021-04-20 RX ADMIN — METHOCARBAMOL TABLETS 500 MG: 500 TABLET, COATED ORAL at 05:03

## 2021-04-20 RX ADMIN — FAMOTIDINE 20 MG: 20 TABLET ORAL at 17:48

## 2021-04-20 RX ADMIN — CHOLECALCIFEROL (VITAMIN D3) 1000 UNITS: 25 TAB ORAL at 09:20

## 2021-04-20 RX ADMIN — CARVEDILOL 6.25 MG: 6.25 TABLET, FILM COATED ORAL at 07:43

## 2021-04-20 RX ADMIN — CARVEDILOL 6.25 MG: 6.25 TABLET, FILM COATED ORAL at 16:14

## 2021-04-20 RX ADMIN — ATORVASTATIN CALCIUM 40 MG: 40 TABLET, FILM COATED ORAL at 16:13

## 2021-04-20 RX ADMIN — DOCUSATE SODIUM 100 MG: 100 CAPSULE, LIQUID FILLED ORAL at 09:21

## 2021-04-20 RX ADMIN — HYDROXYZINE HYDROCHLORIDE 25 MG: 25 TABLET ORAL at 20:04

## 2021-04-20 RX ADMIN — NICOTINE 21 MG: 21 PATCH, EXTENDED RELEASE TRANSDERMAL at 09:23

## 2021-04-20 RX ADMIN — ACETAMINOPHEN 650 MG: 650 SUSPENSION ORAL at 05:46

## 2021-04-20 RX ADMIN — HYDROXYZINE HYDROCHLORIDE 25 MG: 25 TABLET ORAL at 13:10

## 2021-04-20 RX ADMIN — LEVOTHYROXINE SODIUM 175 MCG: 0.15 TABLET ORAL at 05:03

## 2021-04-20 RX ADMIN — FAMOTIDINE 20 MG: 20 TABLET ORAL at 09:20

## 2021-04-20 RX ADMIN — PREDNISONE 20 MG: 20 TABLET ORAL at 09:20

## 2021-04-20 RX ADMIN — MELATONIN TAB 3 MG 3 MG: 3 TAB at 21:12

## 2021-04-20 RX ADMIN — EPINEPHRINE: 1 INJECTION, SOLUTION, CONCENTRATE INTRAVENOUS at 15:21

## 2021-04-20 RX ADMIN — ENOXAPARIN SODIUM 40 MG: 40 INJECTION SUBCUTANEOUS at 09:22

## 2021-04-20 RX ADMIN — INSULIN GLARGINE 40 UNITS: 100 INJECTION, SOLUTION SUBCUTANEOUS at 21:11

## 2021-04-20 RX ADMIN — FLUTICASONE FUROATE AND VILANTEROL TRIFENATATE 1 PUFF: 100; 25 POWDER RESPIRATORY (INHALATION) at 09:35

## 2021-04-20 RX ADMIN — TAMSULOSIN HYDROCHLORIDE 0.4 MG: 0.4 CAPSULE ORAL at 16:14

## 2021-04-20 NOTE — NURSING NOTE
2160 Patient notified me of swelling to mouth area  Immediately notice Right side of upper and lower lips swelling and right side cheek  Listen to lungs, clear, no SOB, no Resp distress noted  Speech clear, patient has no difficulty of swallowing  Call down to pharmacy for allergic medication  Patient can't take  Benadryl due to red dye  MD ordered PRN Atarx 25 mg PO  Will continue to monitor patient  Notified Doctor, will be up to see patient today

## 2021-04-20 NOTE — OCCUPATIONAL THERAPY NOTE
Occupational Therapy Treatment Note     Name:  David Barrera   MRN:   [id number]  Age:     63 y o  Patient Active Problem List   Diagnosis    Allergic rhinitis    Angioedema    Arthritis    Chronic back pain    Chronic sinusitis    COPD, mild ([place] [address] )    Depression with anxiety    Hyperlipidemia    Hypertension    Hypothyroidism    [facility] )    Type 2 diabetes, uncontrolled, with neuropathy ([place] [address] )    Vitamin D deficiency    Cellulitis of left lower leg    Elevated LFTs    Elevated lactic acid level    Tobacco abuse    Noncompliance with diabetes treatment    Acute respiratory failure with hypoxia ([place] [address] )    Epistaxis    Obstructive sleep apnea    Muscle twitching    Type 2 diabetes mellitus with hyperglycemia, with long-term current use of insulin ([facility])    Tobacco use    Atelectasis    Urinary retention    Constipation      Acquired angioedema [T78  3XXA]        Subjective/Goals:  "I have this swelling now-- I can;t win"     Vitals: 144/86BP, 89HR, 95%O2 on RA     Pain: standard pains as normal        Treatment Time:  (2142-2149) 40 minutes     Cognition: WFL     EVALUATION information:  · OT Goal expiration date: 4/23/2021  · Treatment day: 4  · Discharge recommendation: home with services   · HOME SET UP:  ? Apartment- 1 level + stairs to enter (20)  ? DME: cane  ? Tub/shower unit  ? Lives alone with assist from family      Patient education: fall prevention, safety and energy conservation      Additional comments: Pt is a 55 y  o  male seen for OT evaluation s/p admit to [address] 4/15/2021 w/ angioedema       Assessment/Additional session details:  Patient seen this date for OT with focus on goals as set by OTR    Patient agreeable to skilled OT session with focus on ADL's (bathing, dressing, toileting), Transfers (STS, SPT), Standing tolerance/balance, Strength/ROM/HEP, Tub/shower transfers, Activity tolerance, Education on safety, fall prevention and energy conservation techniques, Item retrieval/safe transport with DME ed and Bathroom/kitchen/home mobility  Barriers to treatment include fatigue, medical complexities, balance  and activity tolerance  Patient educated on safe functional transfers techniques, the appropriate use of AE/DME to improve functional performance, and activity modification techniques for energy conservation  Plans for d/c are home with home OT and family support  At end of session patient remains in room seated at recliner with all needs within reach      · Patient does have an allergic reaction to something at this time and therefore  present to assess during treatment-- nursing did clear for session as tolerated and to just assess for symptoms of SOB, increased swelling etc        Goals STG achieved within 2 weeks Performance at Initial Evaluation (04/16) Current Performance (last date completed)   Grooming while standing at sink Marline  4/19, 4/20 Supervision standing 4/20- (MI)      ADL transfers  Marline  4/19, 4/20 supervision 4/20 MI  4/17- MI STS and SPT without AD; (S) amb without AD      Bathroom mobility  Marline  4/19, 4/20 supervision 4/20 MI  4/17- (S) without AD      UB ADL  Marline  4/17, 4/19, 4/20 supervision  4/20 (I)      LB ADL, AE PRN Marline  4/17, 4/19, 4/20 Makeda  4/20- MI       Toileting/clothing management and hygiene Marline  4/20 4/17, 4/19 supervision  4/20- MI    Dynamic standing balance Fair+  4/19, 4/20 Fair  4/20- F+      Increase standing tolerance for inc'd safety with standing purposeful tasks > 10 minutes  4/19 ~ 3 minutes 4/19 11 25 minutes  4/17- 5 mins   Participate in therex 1-3x/week for inc'd overall stamina/activity tolerance for purposeful tasks To complete  4/17, 4/20 4/20 3# hand weight 2x10   4/17- UE ROM 2 planes w/ incorporated breathing x 10 reps      Tub/shower transfer  Marline  4/19, 4/20 4/20 MI   Kitchen mobility for inc'd independence and safety negotiating kitchen environment Marline  4/19, 4/20 4/20 MI   Bed mobility with HOB flat  Yoni/I  4/19, 4/20 4/20 MI  4/17- (S) w/ cues not to hold breath       Marilin Black  4/20/2021

## 2021-04-20 NOTE — NURSING NOTE
Patient's vitals taken  /82, Resp 20, HR 95, SPO2 96%  No s/s SOB, swelling still present  Patient's speech slurred, swelling continue to be  present

## 2021-04-20 NOTE — PLAN OF CARE
Problem: PHYSICAL THERAPY ADULT  Goal: Performs mobility at highest level of function for planned discharge setting  See evaluation for individualized goals  Description: Treatment/Interventions: Functional transfer training, LE strengthening/ROM, Elevations, Therapeutic exercise, Endurance training, Patient/family training, Equipment eval/education, Bed mobility, Gait training, Spoke to nursing, Spoke to case management, OT, ADL retraining  Equipment Recommended: [employer] Cranker       See flowsheet documentation for full assessment, interventions and recommendations  Outcome: Progressing  Note: Prognosis: Good  Problem List: Decreased strength, Decreased endurance, Impaired balance, Decreased mobility, Impaired vision, Obesity, Pain, Orthopedic restrictions  Assessment: Pt seen for PT treatmetn session  Pt agreeable to PT  Pts gait is steady w/ SPC, and w/o AD  Pt did well w/ all transfers  Also did well on stairs  Pt is meeting goals  Barriers to Discharge: Decreased caregiver support, Inaccessible home environment        PT Discharge Recommendation: Home with outpatient rehabilitation          See flowsheet documentation for full assessment

## 2021-04-20 NOTE — CASE MANAGEMENT
SHAYAN continues to work on [employer]  SHAYAN informed that patient is not cleared for dc at this time due to having another allergic reaction  At this time, belief that patient may be allergic to Zinc  SHAYAN contacted patient's brother to inform him about delay in discharge  Patient's brother expressed his understanding  SHAYAN stated that more updates to come  SHAYAN will follow

## 2021-04-20 NOTE — PHYSICAL THERAPY NOTE
48 minute treatment       04/20/21 0956   PT Last Visit   PT Visit Date 04/19/21   Note Type   Note Type Treatment   Pain Assessment   Pain Assessment Tool 0-10   Pain Score 5   Pain Location/Orientation Orientation: Right;Location: Knee;Orientation: Left; Location: Foot   Pain Onset/Description Onset: Ongoing;Frequency: Constant/Continuous; Descriptor: Aching  (pain from "old injuries")   Restrictions/Precautions   Weight Bearing Precautions Per Order No   Other Precautions Pain   General   Chart Reviewed Yes   Response to Previous Treatment Patient with no complaints from previous session  Family/Caregiver Present No   Cognition   Overall Cognitive Status WFL   Following Commands Follows all commands and directions without difficulty   Bed Mobility   Rolling R 7  Independent   Rolling L 7  Independent   Supine to Sit 7  Independent   Sit to Supine 7  Independent   Additional Comments all w/ flat bed no rail   Transfers   Sit to Stand 6  Modified independent   Stand to Sit 6  Modified independent   Stand pivot 6  Modified independent   Additional items   (w/ and w/o SPC)   Car transfer 6  Modified independent   Additional items   (simulated car transfer)   Ambulation/Elevation   Gait pattern Antalgic; Improper Weight shift   Gait Assistance 6  Modified independent   Assistive Device SPC; None   Distance 150' w/o AD; 450' and 600' w/ [address]   Stair Management Assistance 6  Modified independent   Stair Management Technique Alternating pattern; One rail R   Number of Stairs 22   Curbs 1+1 curb steps w/ SPC   Balance   Ambulatory Good  (w/ SPC)   Endurance Deficit   Endurance Deficit Yes   Endurance Deficit Description SOB present   Activity Tolerance   Activity Tolerance Patient tolerated treatment well   Exercises   Knee AROM Long Arc Quad Sitting;10 reps;AROM; Bilateral   Marching Standing;20 reps;AROM; Bilateral   Balance training  walked w/ SPC across uneven carpeted surface w/ SPC w/ mod I; picked up 2# weight from floor w/ mod I   Assessment   Prognosis Good   Problem List Decreased strength;Decreased endurance; Impaired balance;Decreased mobility; Impaired vision;Obesity;Pain;Orthopedic restrictions   Assessment Pt seen for PT treatmetn session  Pt agreeable to PT  Pts gait is steady w/ SPC, and w/o AD  Pt did well w/ all transfers  Also did well on stairs  Pt is meeting goals  Barriers to Discharge Decreased caregiver support; Inaccessible home environment   Goals   Patient Goals go home   STG Expiration Date 04/23/21   PT Treatment Day 4   Plan   Treatment/Interventions   (COntinue epr plan of care)   Progress Improving as expected   PT Frequency   (5-7x/week)     Goals STG achieved within 1 week Performance at Initial Evaluation (4/16/2021) Last date completed      Bed mobility skills including rolling and repositioning to prevent skin breakdown and decrease caregiver burden            independent       supervision assistance x1       4/20         Supine to sit transitions to increase ease of transfer, allow pt to get out of bed, and decrease caregiver burden          independent       supervision assistance x1       4/20      Sit to supine transitions to increase ease of transfer, allow pt to get back into bed, and decrease caregiver burden          independent       supervision assistance x1       4/20      Functional transfers to facilitate safe return to previous living environment        modified independent assistance    supervision assistance x1    4/20      Ambulation with least restrictive AD; including at least 2 turns for safe household distance ambulation          modified independent assistance       supervision assistance x1       4/20      Ascend/descend a full flight of 20 steps with handrail, with device prn, for safe access to previous living environment     4/17/21-> Reports Right handrail up on 20 indoor steps             modified independent assistance       supervision assistance x1       4/20    Ascend/descend a curb step, using appropriate AD and method, no handrails, for safe access to previous living environment          modified independent assistance               4/20      Improve standing functional balance to allow for pt to  object from floor            modified independent assistance            4/20     Nichelle Jaffe, PTA

## 2021-04-20 NOTE — NURSING NOTE
Rash persists entire back, itchy and red, although patient did not want to take benadryl caplets this morning  He states he likes it in capsule form, checked with pharmacy, only have caplets

## 2021-04-21 VITALS
HEART RATE: 96 BPM | DIASTOLIC BLOOD PRESSURE: 59 MMHG | RESPIRATION RATE: 17 BRPM | BODY MASS INDEX: 39.63 KG/M2 | SYSTOLIC BLOOD PRESSURE: 135 MMHG | WEIGHT: 232.14 LBS | TEMPERATURE: 97.3 F | HEIGHT: 64 IN | OXYGEN SATURATION: 95 %

## 2021-04-21 LAB
GLUCOSE SERPL-MCNC: 101 MG/DL (ref 65–140)
GLUCOSE SERPL-MCNC: 106 MG/DL (ref 65–140)
GLUCOSE SERPL-MCNC: 130 MG/DL (ref 65–140)

## 2021-04-21 PROCEDURE — 99315 NF DSCHRG MGMT 30 MIN/LESS: CPT | Performed by: INTERNAL MEDICINE

## 2021-04-21 PROCEDURE — 97530 THERAPEUTIC ACTIVITIES: CPT

## 2021-04-21 PROCEDURE — 97116 GAIT TRAINING THERAPY: CPT

## 2021-04-21 PROCEDURE — 82948 REAGENT STRIP/BLOOD GLUCOSE: CPT

## 2021-04-21 RX ORDER — FLUTICASONE FUROATE AND VILANTEROL 100; 25 UG/1; UG/1
1 POWDER RESPIRATORY (INHALATION) DAILY
Qty: 1 INHALER | Refills: 0 | Status: SHIPPED | OUTPATIENT
Start: 2021-04-22

## 2021-04-21 RX ORDER — ALBUTEROL SULFATE 90 UG/1
2 AEROSOL, METERED RESPIRATORY (INHALATION) EVERY 4 HOURS PRN
Qty: 1 INHALER | Refills: 0 | Status: SHIPPED | OUTPATIENT
Start: 2021-04-21 | End: 2021-12-06

## 2021-04-21 RX ORDER — HYDROXYZINE HYDROCHLORIDE 25 MG/1
25 TABLET, FILM COATED ORAL EVERY 6 HOURS PRN
Qty: 30 TABLET | Refills: 0 | Status: SHIPPED | OUTPATIENT
Start: 2021-04-21

## 2021-04-21 RX ORDER — PREDNISONE 20 MG/1
20 TABLET ORAL DAILY
Qty: 1 TABLET | Refills: 0 | Status: SHIPPED | OUTPATIENT
Start: 2021-04-22 | End: 2021-04-23

## 2021-04-21 RX ORDER — PREDNISONE 10 MG/1
10 TABLET ORAL DAILY
Qty: 3 TABLET | Refills: 0 | Status: SHIPPED | OUTPATIENT
Start: 2021-04-23 | End: 2021-04-26

## 2021-04-21 RX ADMIN — CHOLECALCIFEROL (VITAMIN D3) 1000 UNITS: 25 TAB ORAL at 08:04

## 2021-04-21 RX ADMIN — ENOXAPARIN SODIUM 40 MG: 40 INJECTION SUBCUTANEOUS at 08:03

## 2021-04-21 RX ADMIN — METFORMIN HYDROCHLORIDE 1000 MG: 500 TABLET ORAL at 08:03

## 2021-04-21 RX ADMIN — CARVEDILOL 6.25 MG: 6.25 TABLET, FILM COATED ORAL at 08:04

## 2021-04-21 RX ADMIN — FLUTICASONE FUROATE AND VILANTEROL TRIFENATATE 1 PUFF: 100; 25 POWDER RESPIRATORY (INHALATION) at 08:04

## 2021-04-21 RX ADMIN — NICOTINE 21 MG: 21 PATCH, EXTENDED RELEASE TRANSDERMAL at 08:05

## 2021-04-21 RX ADMIN — FAMOTIDINE 20 MG: 20 TABLET ORAL at 08:03

## 2021-04-21 RX ADMIN — PREDNISONE 20 MG: 20 TABLET ORAL at 08:07

## 2021-04-21 RX ADMIN — DULOXETINE 60 MG: 60 CAPSULE, DELAYED RELEASE ORAL at 08:04

## 2021-04-21 RX ADMIN — LEVOTHYROXINE SODIUM 175 MCG: 0.15 TABLET ORAL at 05:58

## 2021-04-21 NOTE — OCCUPATIONAL THERAPY NOTE
OCCUPATIONAL THERAPY TREATMENT AND DISCHARGE SUMMARY    TIME: 7257-9149 23 min   VITALS: stable   PAIN: no pain   COGNITION: WFL  PRECAUTIONS: none     EXPIRATION DATE: 4/23/2021  TREATMENT DAY: 5    Per discussion with nursing, Pt completed ADL independently today  ASSESSMENT:    Pt pleasant and cooperative during session  Pt awaiting medical clearance from MD   Simulated daily routine with mobility household distances, tub txfr, multiple STS from household surfaces, and item retrieval  Consistently MI with all transfers and mobility using no AD  At length, discussed progression in OT while on TCF and achievement of POC goals  Pt made significant gains in OT since initial evaluation  Pt educated on  safe functional transfer techniques with appropriate body mechanics, fall prevention and safe discharge planning/ safety at home   Pt ready for d/c when medically able  Pt seated in recliner with call bell in reach to conclude session  DISPOSITION: Home with no needs     AE/DME RECOMMENDATIONS: none needed       DISCHARGE SUMMARY: Pt discharged from OT caseload effective 4/21/2021 with anticipated d/c home today pending medical clearance  Participated in a total of 5 OT sessions  Pt achieved 12/12 goals per POC  Pt is safe to return home  No additional OT needs, Pt denies questions/concerns for OT at this time  No further concerns noted       Goals STG achieved within 2 weeks Performance at Initial Evaluation (04/16) Current Performance (last date completed)   Grooming while standing at sink Marline  MET Supervision standing 4/20- (MI)      ADL transfers  Marline  MET supervision 4/21 MI  4/17- MI STS and SPT without AD; (S) amb without AD      Bathroom mobility  Marline  MET supervision 4/21 MI  4/17- (S) without AD      UB ADL  Marline  MET supervision  4/20 (I)      LB ADL, AE PRN Marline  MET Makeda  4/20- MI       Toileting/clothing management and hygiene Marline  MET supervision  4/20- MI    Dynamic standing balance Fair+  MET Fair 4/21 F+      Increase standing tolerance for inc'd safety with standing purposeful tasks > 10 minutes  MET ~ 3 minutes 4/21 >10 min   4/19 11 25 minutes  4/17- 5 mins   Participate in therex 1-3x/week for inc'd overall stamina/activity tolerance for purposeful tasks To complete  MET   4/20 3# hand weight 2x10   4/17- UE ROM 2 planes w/ incorporated breathing x 10 reps      Tub/shower transfer  Yoni  MET   4/21 MI   Kitchen mobility for inc'd independence and safety negotiating kitchen environment Yoni  MET   4/21 MI   Bed mobility with HOB flat  Yoni/I  MET   4/21 MI  4/17- (S) w/ cues not to hold breath             Dandre Weber, MS, OTR/L

## 2021-04-21 NOTE — DISCHARGE SUMMARY
[facility]  Discharge- Shannon Police 1965, 63 y o  male MRN: [id number]  Unit/Bed#: -01 Encounter: [id number]  Primary Care Provider: MARLEEN Garcia   Date and time admitted to hospital: 4/15/2021 12:54 PM    * COPD, mild [facility])  Assessment & Plan  Patient with likely underlying COPD however official PFTs on file per chart review  Patient will continue albuterol inhalers, continue Tessalon [place]  Patient is on steroids taper following angioedema and intubation  Continue to monitor respiratory status  Progressing well with PT/OT  Tobacco use  Assessment & Plan  Nicotine patch ordered    Type 2 diabetes mellitus with hyperglycemia, with long-term current use of insulin (HCC)  Assessment & Plan    Lab Results   Component Value Date    HGBA1C 11 7 (H) 02/05/2021       Hemoglobin A1c is uncontrolled  Pre-hospital Lantus 60 units hs, metformin as well as sliding scale insulin at home  Adjusted Lantus to 40 units daily due to hypoglycemic episodes and continue Metformin 1000 mg BID  Continue insulin sliding scale    Acute respiratory failure with hypoxia [facility])  Assessment & Plan  Secondary to angioedema  Per chart review patient has history of mild COPD  Patient initially was intubated in the ED and was extubated on 04/11  Resolved  Vitamin D deficiency  Assessment & Plan  Current vitamin-D level is 21  May take OTC vitamin D    Hypertension  Assessment & Plan  Patient is on Coreg 6 25 milligram p o  B i d  To[place] Sou[place]       Depression with anxiety  Assessment & Plan  Continue Cymbalta    Angioedema  Assessment & Plan  Unclear etiology of angioedema  Patient had initial episode in 2016 attributed to Ace inhibitor use    Patient intermittent episodes in 2016 and 17 despite discontinuation  Patient needed intubation with mechanical ventilation in the operating room by anesthesia for airway protection and hypoxemia  Patient received Decadron 10 milligram IV in the emergency room and was continued on Solu-Medrol 40 milligram q 8 hours along with Pepcid 20 milligram IV q 12 hours  C4 level was normal at 18, CRP 4 3, sed rate 27  C1 esterase inhibitor level was 30  · Outpatient follow-up with allergy recommended  · Patient will continue prednisone taper as outpatient  · Will start 40 mg for 3 days, then 30 mg for 3 days then 20 mg for 3 days then 10 mg for 3 days and then stop  · Required Epi IM for lip swelling on 4/20/21 after using Zinc containing denture paste  · Will be referred to Allergy upon discharge  · DC home today with outpatient therapy  Discharging Physician / Practitioner: Tigre Barrios MD  PCP: Stevenson De  Admission Date:   Admission Orders (From admission, onward)     Ordered        04/18/21 0719  Admit Patient to  Once                   Discharge Date: 04/21/21    Resolved Problems  Date Reviewed: 4/21/2021    None          Consultations During Hospital Stay:  · None    Procedures Performed:   · None    Significant Findings / Test Results:   · None    Incidental Findings:   · None     Test Results Pending at Discharge (will require follow up): · None     Outpatient Tests Requested:  · None    Complications:  None    Reason for Admission: Ambulatory dysfunction    Hospital Course:     Karolyn Esparza is a 63 y o  male patient who originally presented to the hospital on 4/15/2021 due to weakness and decrease endurance  He progressed well with skilled therapy PT/OT  He will be discharged home with outpatient therapy  He will be referred to allergy specialist given significant anaphylaxis  All questions and concerns addressed  Please see above list of diagnoses and related plan for additional information  Condition at Discharge: good     Discharge Day Visit / Exam:     Subjective:  Patient seen and examined at bedside  He looks comfortable  Well dressed ready to go home       Vitals: Blood Pressure: 135/59 (04/21/21 0801)  Pulse: 96 (04/21/21 0801)  Temperature: (!) 97 3 °F (36 3 °C) (04/21/21 0801)  Temp Source: Temporal (04/21/21 0801)  Respirations: 17 (04/21/21 0801)  Height: 5' 4" (162 6 cm) (04/15/21 1300)  Weight - Scale: 105 kg (232 lb 2 3 oz) (04/15/21 1300)  SpO2: 95 % (04/21/21 0801)  Exam:   Physical Exam  Vitals signs reviewed  Constitutional:       General: He is not in acute distress  Appearance: He is obese  He is not ill-appearing  HENT:      Nose: No rhinorrhea  Eyes:      General:         Right eye: No discharge  Left eye: No discharge  Cardiovascular:      Rate and Rhythm: Normal rate and regular rhythm  Heart sounds: Normal heart sounds  No murmur  Pulmonary:      Effort: Pulmonary effort is normal       Breath sounds: Normal breath sounds  No wheezing  Abdominal:      General: Bowel sounds are normal  There is no distension  Palpations: Abdomen is soft  Tenderness: There is no abdominal tenderness  Neurological:      Mental Status: He is alert and oriented to person, place, and time  Psychiatric:         Behavior: Behavior normal          Discussion with Family: Discussed with patient  Discharge instructions/Information to patient and family:   See after visit summary for information provided to patient and family  Provisions for Follow-Up Care:  See after visit summary for information related to follow-up care and any pertinent home health orders  Disposition:     Home with VNA Services (Reminder: Complete face to face encounter)    For Discharges to [facility] SNF:   · Not Applicable to this Patient - Not Applicable to this Patient    Planned Readmission: None  Discharge Statement:  I spent 17 minutes discharging the patient  This time was spent on the day of discharge  I had direct contact with the patient on the day of discharge   Greater than 50% of the total time was spent examining patient, answering all patient questions, arranging and discussing plan of care with patient as well as directly providing post-discharge instructions  Additional time then spent on discharge activities  Discharge Medications:  See after visit summary for reconciled discharge medications provided to patient and family        ** Please Note: This note has been constructed using a voice recognition system **

## 2021-04-21 NOTE — ASSESSMENT & PLAN NOTE
Lab Results   Component Value Date    HGBA1C 11 7 (H) 02/05/2021       Hemoglobin A1c is uncontrolled  Pre-hospital Lantus 60 units hs, metformin as well as sliding scale insulin at home    Adjusted Lantus to 40 units daily due to hypoglycemic episodes and continue Metformin 1000 mg BID  Continue insulin sliding scale

## 2021-04-21 NOTE — ASSESSMENT & PLAN NOTE
Unclear etiology of angioedema  Patient had initial episode in 2016 attributed to Ace inhibitor use  Patient intermittent episodes in 2016 and 17 despite discontinuation  Patient needed intubation with mechanical ventilation in the operating room by anesthesia for airway protection and hypoxemia  Patient received Decadron 10 milligram IV in the emergency room and was continued on Solu-Medrol 40 milligram q 8 hours along with Pepcid 20 milligram IV q 12 hours  C4 level was normal at 18, CRP 4 3, sed rate 27  C1 esterase inhibitor level was 30  · Outpatient follow-up with allergy recommended  · Patient will continue prednisone taper as outpatient  · Will start 40 mg for 3 days, then 30 mg for 3 days then 20 mg for 3 days then 10 mg for 3 days and then stop  · Required Epi IM for lip swelling on 4/20/21 after using Zinc containing denture paste  · Will be referred to Allergy upon discharge  · DC home today with outpatient therapy

## 2021-04-21 NOTE — NURSING NOTE
The pt was transferred to the lobby via w/c for  p/u by his brother  All personal possessions taken by pt  The pt denied pain

## 2021-04-21 NOTE — PLAN OF CARE
Problem: OCCUPATIONAL THERAPY ADULT  Goal: Performs self-care activities at highest level of function for planned discharge setting  See evaluation for individualized goals  Description: Treatment Interventions: ADL retraining, Functional transfer training, UE strengthening/ROM, Endurance training, Continued evaluation          See flowsheet documentation for full assessment, interventions and recommendations     Outcome: Completed  Note: Limitation: Decreased ADL status, Decreased UE strength, Decreased endurance, Decreased high-level ADLs  Prognosis: Good  Assessment: see note      OT Discharge Recommendation: No rehabilitation needs

## 2021-04-21 NOTE — ASSESSMENT & PLAN NOTE
Patient with likely underlying COPD however official PFTs on file per chart review  Patient will continue albuterol inhalers, continue Tessalon [place]  Patient is on steroids taper following angioedema and intubation  Continue to monitor respiratory status  Progressing well with PT/OT

## 2021-04-21 NOTE — DISCHARGE INSTRUCTIONS
Meal Planning with Diabetes Exchanges   AMBULATORY CARE:   Diabetes exchanges  are servings of food that contain similar amounts of carbohydrate, fat, protein, and calories within a food group  The exchanges can be used to develop a healthy meal plan that helps to keep your blood sugar within the recommended levels  A meal plan with the right amount of carbohydrates is especially important  Your blood sugar naturally rises after you eat carbohydrates  Too many carbohydrates in 1 meal or snack can raise your blood sugar level  Carbohydrates are found in starches, fruit, milk, yogurt, and sweets  Call your doctor if:   · You have high blood sugar levels during a certain time of day, or almost all of the time  · You often have low blood sugar levels  · You have questions or concerns about your condition or care  Create a meal plan with exchanges:  A dietitian will work with you to develop a healthy meal plan that is right for you  This meal plan will include the amount of exchanges you can have from each food group throughout the day  Follow your meal plan by keeping track of the amount of exchanges you eat for each meal and snack  Your meal plan will be based on your age, weight, blood sugar levels, medicine, and activity level  Starch food group exchanges:  Each exchange below contains about 15 grams of carbohydrate , 3 grams of protein, 1 gram of fat, and 80 calories  · 1 ounce of white, whole wheat or rye bread (1 slice)    · 1 ounce of bagel (about ¼ of a bagel)    · 1 6-inch flour or corn tortilla or 1 4-inch pancake (about ¼ inch thick)    · ?  cup of cooked pasta or rice    · ¾ cup of dry, ready-to-eat cereal with no sugar added     · ½ cup of cooked cereal, such as oatmeal    · 3 sheri cracker squares or 8 animal crackers    · 6 saltine-type crackers or     · 3 cups of popcorn or ¾ ounce of pretzels     · Starchy vegetables and cooked legumes:      ? ½ cup of corn, green peas, sweet potatoes, or mashed potatoes     ? ¼ of a large baked potato     ? 1 cup of acorn, butternut squash, or pumpkin     ? ½ cup of beans, lentils, or peas (such as paz, kidney, or black-eyed)    ? ? cup of lima beans    Fruit group exchanges:  Each exchange contains about 15 grams of carbohydrate  and 60 calories  · 1 small (4 ounce) apple, banana orange, or nectarine    · ½ cup of canned or fresh fruit    · ½ cup (4 ounces) of unsweetened fruit juice    · 2 tablespoons of dried fruit    Milk group exchanges:  Each exchange contains about 12 grams of carbohydrate  and 8 grams of protein  The amount of fat and calories in each serving depends on the type of milk (such as whole, low-fat, or fat-free)  · 1 cup fat-free or low-fat milk    · ¾ cup of plain, nonfat yogurt    · 1 cup fat-free, flavored yogurt with artificial (no calorie) sweetener    Non-starchy vegetable group exchanges:  Each exchange contains about 5 grams of carbohydrate , 2 grams of protein, and 25 calories  Examples include beets, broccoli, cabbage, carrots, cauliflower, cucumber, mushrooms, tomatoes, and zucchini  · ½ cup of cooked vegetables or 1 cup of raw vegetables     · ½ cup of vegetable juice    Meat and meat substitute group exchanges:  Each exchange of a lean meat  listed below contains about 7 grams of protein, 0 to 3 grams of fat, and 45 calories  The meat and meat substitutes food group does not contain any carbohydrates  Medium and high-fat meats have more calories  · 1 ounce of chicken or turkey without skin, or 1 ounce of fish (not breaded or fried)     · 1 ounce of lean beef, pork, or lamb     · 1-inch cube or 1 ounce of low-fat cheese     · 2 egg whites or ¼ cup of egg substitute     · ½ cup of tofu    Sweets, desserts, and other carbohydrate group exchanges:   · Sweets and other desserts:  Each exchange has about 15 grams of carbohydrate   ? 1 ounce of raymundo food cake or 2-inch square cake (unfrosted)    ? 2 small cookies     ?  ½ cup of sugar-free, fat-free ice cream    ? 1 tablespoon of syrup, jam, jelly, table sugar, or honey    · Combination foods:     ? 1 cup of an entrée, such as lasagna, spaghetti with meatballs, macaroni and cheese, and chili with beans (each serving counts as 2 carbohydrate exchanges )     ? 1 cup of tomato or vegetable beef soup (each serving counts as 1 carbohydrate exchange )    Fat group exchanges:  Each exchange contains 5 grams of fat and 45 calories  · 1 teaspoon of oil (such as canola, olive, or corn oil)     · 6 almonds or cashews, 10 peanuts, or 4 pecan halves     · 2 tablespoons of avocado     · ½ tablespoon of peanut butter     · 1 teaspoon of regular margarine or 2 teaspoons of low-fat margarine     · 1 teaspoon of regular butter or 1 tablespoon of low-fat butter     · 1 teaspoon of regular mayonnaise or 1 tablespoon of low-fat mayonnaise     · 1 tablespoon of regular salad dressing or 2 tablespoons of low-fat salad dressing    Free foods: The foods on this list are called free foods because they have very few calories  Free foods usually do not increase your blood sugar if you limit them  · 1 tablespoon of catsup or taco sauce     · ¼ cup of salsa     · 2 tablespoons of sugar-free syrup or 2 teaspoons of light jam or jelly     · 1 tablespoon of fat-free salad dressing     · 4 tablespoons of fat-free margarine or fat-free mayonnaise     · Sugar-free drinks: diet soda, sugar-free drink mixes, or mineral water     · Low-sodium bouillon or fat-free broth     · Mustard     · Seasonings such as spices, herbs, and garlic     · Sugar-free gelatin without added fruit    Other healthy nutrition guidelines:   · Limit drinks with sugar substitutes  Your dietitian or healthcare provider will encourage you to drink water  Water helps your kidneys to function properly  Ask how much water you should drink every day  · Eat more fiber    Choose foods that are good sources of fiber, such as fruits, vegetables, and whole grains  Cereals that contain 5 or more grams of fiber per serving are good sources of fiber  Legumes such as garbanzo, paz beans, kidney beans, and lentils are also good sources  · Limit fat  Ask your dietitian or healthcare provider how much fat you should eat each day  Choose foods low in fat, saturated fat, trans fat, and cholesterol  Examples include turkey or chicken without the skin, fish, lean cuts of meat, and beans  Low-fat dairy foods, such as low-fat or fat-free milk and low-fat yogurt are also good choices  Omega-3 fatty acids are healthy fats that are found in canola oil, soybean oil and fatty fish  [place], albacore tuna, and sardines are good sources of omega 3 fatty acids  Eat 2 servings of these types of fish each week  Do not eat fried fish  · Limit sugar  Sugar and sweets must be counted toward the carbohydrate exchanges that you can have within your meal plan  Limit sugar and sweets because they are usually also high in calories and fat  Eat smaller portions of sweets by sharing a dessert or asking for a child-size portion at a restaurant  · Limit sodium  (salt) to about 2,300 mg per day  You may need to eat even less sodium if you have certain medical conditions  Foods high in sodium include soy sauce, potato chips, and soup  · Limit alcohol  Ask your healthcare provider if it is safe for you to drink alcohol  If alcohol is safe for you to have, eat a meal when you drink alcohol  If you drink alcohol on an empty stomach, your blood sugar may drop to a low level  Women 21 years or older and men 72 years or older should limit alcohol to 1 drink a day  Men aged 24 to 59 years should limit alcohol to 2 drinks a day  A drink of alcohol is 5 ounces of wine, 12 ounces of beer, or 1½ ounces of liquor  Other ways to manage your diabetes:   · Control your blood sugar level  Test your blood sugar level regularly and keep a record of the results   Ask your healthcare provider when and how often to test your blood sugar  You may need to check your blood sugar level at least 3 times each day  · Talk to your healthcare provider about your weight  Ask if you need to lose weight, and how much you need to lose  If you are overweight, you may need to make other changes to lose weight  Ask your healthcare provider to help you create a weight loss program      · Get regular physical activity  Physical activity can help decrease your blood sugar level  It can also help to decrease your risk for heart disease and help you lose weight  Adults should have moderate intensity physical activity for at least 150 minutes every week  Spread the amount of activity over at least 3 days a week  Do not skip more than 2 days in a row  Children should get at least 60 minutes of moderate physical activity on most days of the week  Examples of moderate physical activity include brisk walking, running, and swimming  Do not sit for longer than 30 minutes  Work with your healthcare provider to create a plan for physical activity  © Copyright [employer] Information is for End User's use only and may not be sold, redistributed or otherwise used for commercial purposes  All illustrations and images included in CareNotes® are the copyrighted property of A D A M , Inc  or [facility]  The above information is an  only  It is not intended as medical advice for individual conditions or treatments  Talk to your doctor, nurse or pharmacist before following any medical regimen to see if it is safe and effective for you  Angioedema   AMBULATORY CARE:   Angioedema  is sudden swelling caused by fluid that collects in deep layers of the skin  Swelling occurs most often on the face, lips, tongue, or throat, but it can happen anywhere in the body  Common signs and symptoms:  Skin swelling may be the only symptom  Swelling may be on one or both sides of the affected area   You may also have any of the following:  · Pain and burning in the swollen area     · Hives or an itchy rash    · A cough, wheezing, and shortness of breath    · Irritated eyes and nose    · Abdominal pain    Call 911 for signs or symptoms of anaphylaxis,  such as trouble breathing, swelling in your mouth or throat, or wheezing  You may also have itching, a rash, hives, or feel like you are going to faint  Seek care immediately if:   · You have sudden behavior changes or irritability  · You are dizzy and your heart is beating faster than usual     Contact your healthcare provider if:   · Your swelling does not improve, even after you take your medicines  · You have questions or concerns about your condition or care  Steps to take for signs or symptoms of anaphylaxis:   · Immediately  give 1 shot of epinephrine only into the outer thigh muscle  · Leave the shot in place  as directed  Your healthcare provider may recommend you leave it in place for up to 10 seconds before you remove it  This helps make sure all of the epinephrine is delivered  · Call 911 and go to the emergency department,  even if the shot improved symptoms  Do not drive yourself  Bring the used epinephrine shot with you  Treatment:  Angioedema usually goes away within 3 days without treatment, but it may come back  You may need any of the following:  · Antihistamines  decrease symptoms such as itching or a rash  · Epinephrine  is medicine used to treat severe allergic reactions such as anaphylaxis  · Steroids: This medicine may be given to decrease inflammation  Safety precautions to take if you are at risk for anaphylaxis:   · Keep 2 shots of epinephrine with you at all times  You may need a second shot, because epinephrine only works for about 20 minutes and symptoms may return  Your healthcare provider can show you and family members how to give the shot   Check the expiration date every month and replace it before it expires  · Create an action plan  Your healthcare provider can help you create a written plan that explains the allergy and an emergency plan to treat a reaction  The plan explains when to give a second epinephrine shot if symptoms return or do not improve after the first  Give copies of the action plan and emergency instructions to family members, work and school staff, and  providers  Show them how to give a shot of epinephrine  · Be careful when you exercise  If you have had exercise-induced anaphylaxis, do not exercise right after you eat  Stop exercising right away if you start to develop any signs or symptoms of anaphylaxis  You may first feel tired, warm, or have itchy skin  Hives, swelling, and severe breathing problems may develop if you continue to exercise  · Carry medical alert identification  Wear medical alert jewelry or carry a card that explains the allergy  Ask your healthcare provider where to get these items  · Keep a symptom diary  Include information on how often symptoms occur, how long they last, and if they are mild or severe  Also keep information on what you ate, what happened, or which medicines you took before the swelling started  · Avoid triggers  Triggers include foods, medicines, and other items that you know cause symptoms  You may need to see a specialist, such as an allergist or dietitian, to learn what to avoid  Follow up with your healthcare provider as directed:  Write down your questions so you remember to ask them during your visits  © Copyright [address] Information is for End User's use only and may not be sold, redistributed or otherwise used for commercial purposes  All illustrations and images included in CareNotes® are the copyrighted property of [employer] [facility] [employer]  or [facility] Damian Lindsay   The above information is an  only  It is not intended as medical advice for individual conditions or treatments   Talk to your doctor, nurse or pharmacist before following any medical regimen to see if it is safe and effective for you  Anaphylaxis   WHAT YOU NEED TO KNOW:   Anaphylaxis is a life-threatening allergic reaction that must be treated immediately  Your risk for anaphylaxis increases if you have asthma that is severe or not controlled  Medical conditions such as heart disease can also increase your risk  It is important to be prepared if you are at risk for anaphylaxis  Your symptoms can be worse each time you are exposed to the trigger  DISCHARGE INSTRUCTIONS:   Steps to take for signs or symptoms of anaphylaxis:   · Immediately give 1 shot of epinephrine  only into the outer thigh muscle  Even if your allergic reaction seems mild, it can quickly become anaphylaxis  This may happen even if you had a mild reaction to the allergen in the past  Each exposure can cause a different reaction  Watch for signs and symptoms of anaphylaxis every time you are exposed to a trigger  Be ready to give a shot of epinephrine  It is okay to inject epinephrine through clothing  Just be careful to avoid seams, zippers, or other parts that can prevent the needle from entering your skin  · Leave the shot in place  as directed  Your healthcare provider may recommend you leave it in place for up to 10 seconds before you remove it  This helps make sure all of the epinephrine is delivered  · Call 911 and go to the emergency department,  even if the shot improved symptoms  Do not drive yourself  Bring the used epinephrine shot with you  Call 911 for any of the following:   · You have a skin rash, hives, swelling, or itching  · You have trouble breathing, shortness of breath, wheezing, or coughing  · Your throat tightens or your lips or tongue swell  · You have difficulty swallowing or speaking  · You are dizzy, lightheaded, confused, or feel like you are going to faint      · You have nausea, diarrhea, or abdominal cramps, or you are vomiting  Return to the emergency department if:   · Signs or symptoms of anaphylaxis return  Contact your healthcare provider if:   · You have questions or concerns about your condition or care  Medicines:   · Epinephrine  is medicine used to treat severe allergic reactions such as anaphylaxis  It is given as a shot into the outer thigh muscle  · Medicines  such as antihistamines, steroids, and bronchodilators decrease inflammation, open airways, and make breathing easier  · Take your medicine as directed  Contact your healthcare provider if you think your medicine is not helping or if you have side effects  Tell him or her if you are allergic to any medicine  Keep a list of the medicines, vitamins, and herbs you take  Include the amounts, and when and why you take them  Bring the list or the pill bottles to follow-up visits  Carry your medicine list with you in case of an emergency  Follow up with your healthcare provider as directed: Allergy testing may reveal allergies that can trigger anaphylaxis  Write down your questions so you remember to ask them during your visits  Safety precautions:   · Keep 2 shots of epinephrine with you at all times  You may need a second shot, because epinephrine only works for about 20 minutes and symptoms may return  Your healthcare provider can show you and family members how to give the shot  Check the expiration date every month and replace it before it expires  · Create an action plan  Your healthcare provider can help you create a written plan that explains the allergy and an emergency plan to treat a reaction  The plan explains when to give a second epinephrine shot if symptoms return or do not improve after the first  Give copies of the action plan and emergency instructions to family members, and work or school staff  Show them how to give a shot of epinephrine in case you are not able to give it to yourself      · Be careful when you exercise  If you have had exercise-induced anaphylaxis, do not exercise right after you eat  Stop exercising right away if you start to develop any signs or symptoms of anaphylaxis  You may first feel tired, warm, or have itchy skin  Hives, swelling, and severe breathing problems may develop if you continue to exercise  · Carry medical alert identification  Wear medical alert jewelry or carry a card that explains the allergy  Ask your healthcare provider where to get these items  · Identify and avoid known triggers  Read food labels for ingredients  Look for triggers in your environment  · Ask about treatments to prevent anaphylaxis  You may need allergy shots or other medicines to treat allergies  © Copyright [address] Information is for End User's use only and may not be sold, redistributed or otherwise used for commercial purposes  All illustrations and images included in CareNotes® are the copyrighted property of [employer]  or Jannet Lindsay   The above information is an  only  It is not intended as medical advice for individual conditions or treatments  Talk to your doctor, nurse or pharmacist before following any medical regimen to see if it is safe and effective for you

## 2021-04-21 NOTE — PROGRESS NOTES
RECREATIONAL THERAPY PARTICIPATION LOG      ACTIVITY:    GAMES:        BINGO:        MUSIC STIM:        ARTS & CRAFTS:        EXERCISE:        CLUBS & MEETING:        SOCIALS: Resident received a 1:1 Recreational Therapy greeting in the hallway while he was ambulating without an assistive walker and while he was being escorted through the hallway with our Occupational Therapy staff member  SPIRITUAL:        INDEPENDENT:        1:1:    Resident received a 1:1 Recreational Therapy greeting and visit, telephonically, just prior to lunch  Resident participated in the Preferences for Customary Routine and Activities assessment  Resident shared that he prefers to wear his own clothing while he is here with us  This is important to him  It is very important for him to take care of his personal belongings or things while he is here in the Transitional Care Unit with us  It is very important for resident to choose to bathe by showering and this is what he is looking forward to doing when he is at home again  It is not very important for him to to have snacks available between meals while he is here in our Transitional Care Unit  Resident explained that he has been pushed to have bedtime snacks for the sake of his blood sugar  It is important to resident to have his Brother involved in  Discussions about his care while he is here with us  Resident is looking forward to being D/C today and visiting his Nephew, again, everyday in his Nephew's home and to visit with his Nephew's [employer] dog, whom resident calls his santos  Resident considers phone privacy to be not very important; accommodations that can be made for him if he is ever a patient/resident again after the [place] [place] were explained to him, such as the accommodation of pulling the curtain between him and a future hospital or care facility roommate or having someone escort him to a private room for a telephone conversation        It is important for resident to have a place to lock his things to keep them safe while he is here in the Transitional Care Unit  Resident declined to have the newspaper delivered to him, as he is disgusted with the news after watching it or reading it for only a few seconds  He believes that the news is so one-sided  Resident values the 1:1 Caregiving interactions that he has had with us in the Transitional Care Unit  Resident complimented all of the girls and alll of the nurses as being very nice, very caring, and supportive of him  Resident considers going outside to get fresh air when the weather is good to be very important to him  This is evident in his leisure pursuit of fishing  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  It is very important to him to have fishing as his hobby  He is looking forward to getting outside to go [employer] once he is at home again  Resident communicated that he uses small Night crawlers as worms and spinners as his bait  Resident was encouraged with his sportsman endeavor  Resident identifies with being [facility], although he does not consider himself to be very [place] and he is not interested in having our Staff  contact a [place] for him as an outreach to him; resident learned that the [employer] must have an outreach to help their members at home  JAKY Wise

## 2021-04-21 NOTE — CASE MANAGEMENT
SHAYAN was questioned about patient's dc by team members  SHAYAN then contacted MD to inquire if patient continues further observation from incident yesterday  SHAYAN informed that MD is clearing patient for dc today  No the further monitoring at this time  Patient was informed that he would need to follow up with an allergist as an outpatient  SHAYAN contacted patient's brother to discuss  Brother remains in agreement with picking patient up today at 3pm  SHAYAN will inform team  SW informed MD that outpatient PT/OT script needed for patient's dc  NO DME recommended for dc today  [address] and LTC letter placed in patient's chart and discharge folder  SHAYAN reached out to patient's pharmacy and confirmed that patient co-pay for all new medication will be $89 32  SHAYAN will inform patient  Prior to patient's discharge  SHAYAN informed patient the cost of his medications that are located at [address]  Patient stated his understanding  Patient's brother arrived to transport patient home

## 2021-04-21 NOTE — PHYSICAL THERAPY NOTE
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")   General   Chart Reviewed Yes   Response to Previous Treatment Patient with no complaints from previous session  Family/Caregiver Present No   Cognition   Overall Cognitive Status WFL   Arousal/Participation Cooperative   Attention Within functional limits   Orientation Level Oriented X4   Memory Within functional limits   Following Commands Follows all commands and directions without difficulty   Bed Mobility   Rolling R 7  Independent   Rolling L 7  Independent   Supine to Sit 7  Independent   Sit to Supine 7  Independent   Additional Comments HOB flat, no rail, decreased time and effort   Transfers   Sit to Stand 7  Independent   Additional items   (Decreased time and effort)   Stand to Sit 7  Independent   Additional items   (Decreased time and effort)   Stand pivot   (SPT no AD I, SPT with SPC MOD I)   Additional items   (Safe and steady)   Toilet transfer 7  Independent  (Sit <->stand I, SPT with no AD I)   Additional items Standard toilet  (Safe and Steady)   Car transfer 6  Modified independent  (Simulated on bed)   Additional items   (Managed BLE's over car wall w/o difficulty)   Additional Comments Transfers: EOB, recliner, toilet, unsecured chair  (Performed with and without SPC)   Ambulation/Elevation   Gait pattern Antalgic; Improper Weight shift  (Bilateral feet everted; adequate foot clearance with sneaker)   Gait Assistance 6  Modified independent   Additional items   (Limping ( +LLD -> LLE shorter than R))   Assistive Device SPC; None   Distance Amb with  feet, then 600 feet -> negotiated 8 turns/obstacles in pathway MOD I  (No AD: 25' x 2 50',150', negotiated 4 turns w/ MOD I )    Spoke with interdisciplinary team and determined it was essential for pt to leave bedroom,  to perform elevation training; in prep for safe discharge home alone  Stair Management Assistance 6  Modified independent   Additional items   (Decreased time; + SOB and some fatigue)   Stair Management Technique Alternating pattern; One rail R;Foreward; With cane   Number of Stairs 22  (Chair placed on top step step to rest)   Curbs 1 + 1 steps with; then without SPC MOD I   (Safe and steady)   Balance   Static Sitting Normal   Dynamic Sitting   (Good +)   Static Standing   (G+ w/SPC; G w/ no AD)   Dynamic Standing   (G w/ SPC; G- w/ no AD)   Ambulatory   (G w/ SPC; G- w/ no AD)   Higher level balance Side stepping  (G w/ SPC; G- w/ no AD)   Higher level balance rep range   (G w no AD, G- w/ no AD): picked objects off floor)   Endurance Deficit   Endurance Deficit Yes   Endurance Deficit Description Provided with monitored rest breaks prn, between activities   Activity Tolerance   Activity Tolerance Patient tolerated treatment well   Assessment   Prognosis Good   Problem List Decreased range of motion;Decreased strength;Decreased endurance; Impaired balance;Decreased mobility;Pain;Orthopedic restrictions; Obesity; Impaired sensation; Impaired hearing; Impaired vision   Assessment Pt is discharged from skilled PT caseload effective today, 4/21/21  Pt with initial plans for discharge to home alone today   Plans may now be cancelled; due to having another allergic reaction yesterday; to what is believed may be Zinc  Since  4/16/21 PT Evaluation, pt was seen for 5/5 skilled PT tx sessions for therex, theract, and gait/elevation training  Pt with excellent  progress in skilled PT, this past week  Improvement assessed with: SPO2 levels on room air, functional strength, balance, activity tolerance, safety, bed mobility, transfers, and gait/elevations  All STG's consistently achieved  Please see grid below and flowsheet, for details on pt's functional mobility status at discharge  Barriers to Discharge Decreased caregiver support; Inaccessible home environment  (Lives alone)   Goals   Patient Goals " Take better care of my health"   STG Expiration Date 04/23/21   Short Term Goal #1 See grid   PT Treatment Day 5   Plan   Treatment/Interventions ADL retraining;Functional transfer training;Elevations;LE strengthening/ROM; Therapeutic exercise; Endurance training;Equipment eval/education;Patient/family training;Cognitive reorientation;Gait training;Bed mobility; Compensatory technique education;Continued evaluation;Spoke to MD;Spoke to nursing;OT;Spoke to advanced practitioner;Spoke to case management; Family   Progress Discontinue PT   PT Frequency   (5 to 7x/week)         Goals STG achieved within 1 week Performance at Initial Evaluation (4/16/2021) Last date completed      Bed mobility skills including rolling and repositioning to prevent skin breakdown and decrease caregiver burden            Independent    ACHIEVED       supervision assistance x1       4/21         Supine to sit transitions to increase ease of transfer, allow pt to get out of bed, and decrease caregiver burden          Independent    ACHIEVED       supervision assistance x1       4/21         Sit to supine transitions to increase ease of transfer, allow pt to get back into bed, and decrease caregiver burden          Independent    ACHIEVED       supervision assistance x1       4/21         Functional transfers to facilitate safe return to previous living environment        modified independent assistance    ACHIEVED      supervision assistance x1    4/21      Ambulation with least restrictive AD; including at least 2 turns for safe household distance ambulation          modified independent assistance    ACHIEVED       supervision assistance x1       4/21         Ascend/descend a full flight of 20 steps with handrail, with device prn, for safe access to previous living environment     4/17/21-> Reports Right handrail up on 20 indoor steps             modified independent assistance    ACHIEVED       supervision assistance x1       4/21         Ascend/descend a curb step, using appropriate AD and method, no handrails, for safe access to previous living environment          modified independent assistance    ACHIEVED               4/21      Improve standing functional balance to allow for pt to  object from floor            modified independent assistance    ACHIEVED                4/21            Vitals     After ambulating with a SPC for 150 feet f/b negotiating 22 steps with RHR up and SPC (seated): 160/88; HR 97, SPO2 (RA) 95,     After ambulating with a SPC for 600 feet (seated): 159/85, , SPO2 (RA) 95%

## 2021-04-21 NOTE — ASSESSMENT & PLAN NOTE
Secondary to angioedema  Per chart review patient has history of mild COPD  Patient initially was intubated in the ED and was extubated on 04/11  Resolved

## 2021-04-21 NOTE — PLAN OF CARE
Problem: PHYSICAL THERAPY ADULT  Goal: Performs mobility at highest level of function for planned discharge setting  See evaluation for individualized goals  Description: Treatment/Interventions: Functional transfer training, LE strengthening/ROM, Elevations, Therapeutic exercise, Endurance training, Patient/family training, Equipment eval/education, Bed mobility, Gait training, Spoke to nursing, Spoke to case management, OT, ADL retraining  Equipment Recommended: Анна Daniels       See flowsheet documentation for full assessment, interventions and recommendations  Outcome: Adequate for Discharge  Note: Prognosis: Good  Problem List: Decreased range of motion, Decreased strength, Decreased endurance, Impaired balance, Decreased mobility, Pain, Orthopedic restrictions, Obesity, Impaired sensation, Impaired hearing, Impaired vision  Assessment: Pt is discharged from skilled PT caseload effective today, 4/21/21  Pt with initial plans for discharge to home alone today  Plans may now be cancelled; due to having another allergic reaction yesterday; to what is believed may be Zinc  Since  4/16/21 PT Evaluation, pt was seen for 5/5 skilled PT tx sessions for therex, theract, and gait/elevation training  Pt with excellent  progress in skilled PT, this past week  Improvement assessed with: SPO2 levels on room air, functional strength, balance, activity tolerance, safety, bed mobility, transfers, and gait/elevations  All STG's consistently achieved  Please see grid below and flowsheet, for details on pt's functional mobility status at discharge  Barriers to Discharge: Decreased caregiver support, Inaccessible home environment(Lives alone)        PT Discharge Recommendation: Home with outpatient rehabilitation          See flowsheet documentation for full assessment

## 2021-04-24 DIAGNOSIS — E11.65 TYPE 2 DIABETES MELLITUS WITH HYPERGLYCEMIA, WITH LONG-TERM CURRENT USE OF INSULIN (HCC): ICD-10-CM

## 2021-04-24 DIAGNOSIS — Z79.4 TYPE 2 DIABETES MELLITUS WITH HYPERGLYCEMIA, WITH LONG-TERM CURRENT USE OF INSULIN (HCC): ICD-10-CM

## 2021-04-26 ENCOUNTER — TRANSITIONAL CARE MANAGEMENT (OUTPATIENT)
Dept: FAMILY MEDICINE CLINIC | Facility: HOME HEALTHCARE | Age: 56
End: 2021-04-26

## 2021-04-26 RX ORDER — INSULIN GLARGINE 100 [IU]/ML
INJECTION, SOLUTION SUBCUTANEOUS
Qty: 15 ML | Refills: 0 | Status: SHIPPED | OUTPATIENT
Start: 2021-04-26 | End: 2021-05-26

## 2021-05-01 NOTE — CASE MANAGEMENT
The admission date and time was entered incorrectly  The correct admission date and time is 4/15/21 at 1254

## 2021-05-03 ENCOUNTER — TRANSCRIBE ORDERS (OUTPATIENT)
Dept: ADMINISTRATIVE | Facility: HOSPITAL | Age: 56
End: 2021-05-03

## 2021-05-03 ENCOUNTER — APPOINTMENT (OUTPATIENT)
Dept: LAB | Facility: HOSPITAL | Age: 56
End: 2021-05-03
Payer: MEDICARE

## 2021-05-03 DIAGNOSIS — T78.3XXS GIANT URTICARIA, SEQUELA: Primary | ICD-10-CM

## 2021-05-03 DIAGNOSIS — T78.3XXS GIANT URTICARIA, SEQUELA: ICD-10-CM

## 2021-05-03 LAB
CRP SERPL QL: 22.5 MG/L
ERYTHROCYTE [SEDIMENTATION RATE] IN BLOOD: 61 MM/HOUR (ref 0–19)

## 2021-05-03 PROCEDURE — 86038 ANTINUCLEAR ANTIBODIES: CPT

## 2021-05-03 PROCEDURE — 36415 COLL VENOUS BLD VENIPUNCTURE: CPT

## 2021-05-03 PROCEDURE — 86140 C-REACTIVE PROTEIN: CPT

## 2021-05-03 PROCEDURE — 85652 RBC SED RATE AUTOMATED: CPT

## 2021-05-03 PROCEDURE — 86160 COMPLEMENT ANTIGEN: CPT

## 2021-05-05 LAB — RYE IGE QN: NEGATIVE

## 2021-05-06 ENCOUNTER — EVALUATION (OUTPATIENT)
Dept: PHYSICAL THERAPY | Facility: HOME HEALTHCARE | Age: 56
End: 2021-05-06
Payer: MEDICARE

## 2021-05-06 ENCOUNTER — OFFICE VISIT (OUTPATIENT)
Dept: FAMILY MEDICINE CLINIC | Facility: HOME HEALTHCARE | Age: 56
End: 2021-05-06
Payer: MEDICARE

## 2021-05-06 VITALS
TEMPERATURE: 98.7 F | OXYGEN SATURATION: 93 % | RESPIRATION RATE: 20 BRPM | SYSTOLIC BLOOD PRESSURE: 138 MMHG | WEIGHT: 236.4 LBS | DIASTOLIC BLOOD PRESSURE: 76 MMHG | BODY MASS INDEX: 40.58 KG/M2 | HEART RATE: 111 BPM

## 2021-05-06 DIAGNOSIS — M25.551 CHRONIC PAIN OF RIGHT HIP: ICD-10-CM

## 2021-05-06 DIAGNOSIS — G89.29 CHRONIC BILATERAL LOW BACK PAIN WITHOUT SCIATICA: Primary | ICD-10-CM

## 2021-05-06 DIAGNOSIS — E11.65 TYPE 2 DIABETES MELLITUS WITH HYPERGLYCEMIA, WITH LONG-TERM CURRENT USE OF INSULIN (HCC): Primary | ICD-10-CM

## 2021-05-06 DIAGNOSIS — J44.9 COPD, SEVERITY TO BE DETERMINED (HCC): ICD-10-CM

## 2021-05-06 DIAGNOSIS — M54.50 CHRONIC BILATERAL LOW BACK PAIN WITHOUT SCIATICA: Primary | ICD-10-CM

## 2021-05-06 DIAGNOSIS — M25.551 RIGHT HIP PAIN: ICD-10-CM

## 2021-05-06 DIAGNOSIS — M25.561 RIGHT KNEE PAIN, UNSPECIFIED CHRONICITY: ICD-10-CM

## 2021-05-06 DIAGNOSIS — M25.561 CHRONIC PAIN OF RIGHT KNEE: ICD-10-CM

## 2021-05-06 DIAGNOSIS — G89.29 CHRONIC PAIN OF RIGHT HIP: ICD-10-CM

## 2021-05-06 DIAGNOSIS — F17.210 CONTINUOUS DEPENDENCE ON CIGARETTE SMOKING: ICD-10-CM

## 2021-05-06 DIAGNOSIS — G89.29 CHRONIC PAIN OF RIGHT KNEE: ICD-10-CM

## 2021-05-06 DIAGNOSIS — Z79.4 TYPE 2 DIABETES MELLITUS WITH HYPERGLYCEMIA, WITH LONG-TERM CURRENT USE OF INSULIN (HCC): Primary | ICD-10-CM

## 2021-05-06 DIAGNOSIS — Z91.19 NONCOMPLIANCE WITH DIABETES TREATMENT: ICD-10-CM

## 2021-05-06 DIAGNOSIS — B37.0 ORAL THRUSH: ICD-10-CM

## 2021-05-06 LAB — SL AMB POCT HEMOGLOBIN AIC: 11.6 (ref ?–6.5)

## 2021-05-06 PROCEDURE — 99214 OFFICE O/P EST MOD 30 MIN: CPT | Performed by: FAMILY MEDICINE

## 2021-05-06 PROCEDURE — 97163 PT EVAL HIGH COMPLEX 45 MIN: CPT | Performed by: PHYSICAL THERAPIST

## 2021-05-06 PROCEDURE — 97110 THERAPEUTIC EXERCISES: CPT | Performed by: PHYSICAL THERAPIST

## 2021-05-06 RX ORDER — CLOTRIMAZOLE 10 MG/1
10 LOZENGE ORAL; TOPICAL
Qty: 35 TABLET | Refills: 0 | Status: SHIPPED | OUTPATIENT
Start: 2021-05-06 | End: 2021-05-13

## 2021-05-06 NOTE — PROGRESS NOTES
PT Evaluation     Today's date: 2021  Patient name: John Briones  : 1965  MRN: [id number]  Referring provider: Ilya Driscoll  Dx:   Encounter Diagnosis     ICD-10-CM    1  Chronic bilateral low back pain without sciatica  M54 5     G89 29    2  Right hip pain  M25 551    3  Right knee pain, unspecified chronicity  M25 561                   Assessment  Assessment details: Pt John Briones is a 63 y o  who presents to OPPT with s/s consistent with chronic pain in multiple areas  Pt reporting pain in his lower back, R hip, R knee, L ankle, and rib cage  He presents with mild ROM deficits and strength deficits throughout  Decreased postural awareness, limited soft tissue mobility, and significant decline in endurance also primary contributing factors to pain  Pt self reporting that he is very sedentary recently and knows that sitting around is not helping him right now  He fatigues very quickly with household activities, ambulation, and stair negotiation  Pt would benefit from skilled therapy services to address outlined impairments, work towards goals, and restore pts PLOF  Thank you!    Impairments: abnormal gait, abnormal or restricted ROM, abnormal movement, activity intolerance, difficulty understanding, impaired balance, impaired physical strength, lacks appropriate home exercise program, pain with function, safety issue, weight-bearing intolerance and poor posture   Other impairment: decreased endurance   Understanding of Dx/Px/POC: good   Prognosis: good    Goals  STGs to be achieved in 4 weeks:  -Pt to demonstrate reduced subjective pain rating "at worst" by at least 2-3 points from Initial Eval to allow for reduced pain with ADLs and improved functional activity tolerance    -Pt to demonstrate R LE ROM WFL in order to maximize joint mobility and function and allow for progression of exercise program and achievement of goals    -Pt to demonstrate increased MMT of R LE by at least 1/2 grade in order to improve safety and stability with ADLs and functional mobility  LTGs to be achieved upon discharge:   -Pt will be I with HEP in order to continue to improve quality of life and independence and reduce risk for re-injury    -Pt to demonstrate return to activities of daily living without limiations or restrictions    -Pt will return to ambulation >60 minutes to help facilitate return to community activities independently    -Pt to demonstrate improved function as noted by achieving or exceeding predicted score on FOTO outcomes assessment tool  Plan  Patient would benefit from: skilled physical therapy  Planned modality interventions: cryotherapy  Planned therapy interventions: abdominal trunk stabilization, manual therapy, neuromuscular re-education, patient education, postural training, balance, home exercise program, gait training, functional ROM exercises, flexibility, therapeutic activities and therapeutic exercise  Frequency: 2x week  Duration in weeks: 4  Plan of Care beginning date: 2021  Plan of Care expiration date: 2021  Treatment plan discussed with: patient        Subjective Evaluation    History of Present Illness  Mechanism of injury: Pt reporting that recently he spent 9 days in the hospital due to angio edema  He followed up by going to rehab at [facility] and spent about 6 days there  He followed up with PCP who feels he would benefit from more therapy to address continued pain in the R LE and work on his endurance     Quality of life: good    Pain  At best pain ratin  At worst pain ratin  Quality: dull ache and sharp  Relieving factors: medications  Aggravating factors: sitting and stair climbing    Social Support  Steps to enter house: yes  Lives in: apartment      Diagnostic Tests  X-ray: normal  Treatments  Previous treatment: physical therapy  Current treatment: physical therapy  Discharged from (in last 30 days): inpatient hospitalization  Patient Goals  Patient goals for therapy: increased motion, improved balance, decreased pain, increased strength and independence with ADLs/IADLs          Objective     Palpation   Left   Tenderness of the erector spinae and lumbar paraspinals  Right   Tenderness of the erector spinae and lumbar paraspinals  Tenderness     Right Hip   Tenderness in the sacroiliac joint  No tenderness in the greater trochanter  Neurological Testing     Sensation     Hip   Left Hip   Intact: light touch    Right Hip   Intact: light touch    Knee   Left Knee   Intact: light touch    Right Knee   Intact: light touch     Active Range of Motion     Lumbar   Flexion:  with pain Restriction level: moderate  Extension:  with pain Restriction level: maximal  Left lateral flexion:  with pain Restriction level: moderate  Right lateral flexion:  with pain Restriction level: moderate  Left Hip   Flexion: 100 degrees   Abduction: 30 degrees     Right Hip   Flexion: 90 degrees   Abduction: 20 degrees   Left Knee   Normal active range of motion    Right Knee   Normal active range of motion    Strength/Myotome Testing     Left Hip   Planes of Motion   Flexion: 4  Abduction: 4  Adduction: 4    Right Hip   Planes of Motion   Flexion: 3+  Abduction: 3+  Adduction: 3+    Left Knee   Flexion: 4+  Extension: 4+    Right Knee   Flexion: 4-  Extension: 4-    Tests     Lumbar     Left   Positive passive SLR  Negative crossed SLR  Right   Positive passive SLR  Negative crossed SLR  Ambulation     Ambulation: Level Surfaces     Additional Level Surfaces Ambulation Details  Increased pain with prolonged ambulation     Ambulation: Stairs   Pattern: non-reciprocal  Railings: one rail  Pattern: non-reciprocal  Railings: one rail    Observational Gait   Gait: antalgic and asymmetric   Decreased walking speed and stride length                  Re-eval Date: 6/6/21    Date 5/6       Visit Count 1/10       FOTO Completed             Precautions: SOB Manuals 5/6       *Monitor SpO2  during TE 93%                               Neuro Re-Ed         MTP/LTP        Palloff press         TA         TA + OH lifts                                 Ther Ex        NuStep  L1 10 minutes        UBE retro         Standing hip flex/abd/ext        Standing marches        HR/TR        Squats        Step-ups         PPT        Hip add        SLR        S/L SLR        Clamshells        Bridges                 Ther Activity                        Gait Training                        Modalities

## 2021-05-06 NOTE — PROGRESS NOTES
[address]       NAME: Vesta Love is a 63 y o  male  : 1965    MRN: [id number]  DATE: May 6, 2021  TIME: 12:58 PM    Assessment and Plan   Diagnoses and all orders for this visit:    Type 2 diabetes mellitus with hyperglycemia, with long-term current use of insulin ([address] )  -     POCT hemoglobin A1c  -     Ambulatory referral to Endocrinology; Future  -     insulin lispro (HumaLOG) 100 units/mL injection; Inject 10 Units under the skin 3 (three) times a day with meals    Continuous dependence on cigarette smoking  -     CT lung screening program; Future  -     Complete PFT with post bronchodilator; Future    COPD, severity to be determined ([facility] )  -     Complete PFT with post bronchodilator; Future    Oral thrush  -     clotrimazole (MYCELEX) 10 mg petra; Take 1 tablet (10 mg total) by mouth 5 (five) times a day for 7 days    Chronic pain of right knee  -     Ambulatory referral to Physical Therapy; Future    Chronic pain of right hip  -     Ambulatory referral to Physical Therapy; Future    Noncompliance with diabetes treatment        No problem-specific Assessment & Plan notes found for this encounter  Chief Complaint     Chief Complaint   Patient presents with    Follow-up    Diabetes         History of Present Illness       HPI    58-year-old male presents today for follow-up visit for his diabetes  Today's A1c elevated 11 6  Patient states not watching his diet at all and eating  Box[employer] [place]  Currently on metformin 1000 mg b i d , lispro t i d  with meals was taking 7 units, and Lantus 60 units once daily  Patient has not follow with Endocrinology  patient continues to smoke cigarettes  Was told he had diabetes but however never had any pulmonary function testing  Nazia Erwin Recently hospitalized for anaphylactic reaction/angioedema  Unclear what he was allergic to however is following with allergist in [address]  Patient did develop thrush due to steroids  States was treated however unclear which medication but still having persistent thrush  Patient also complaining of chronic right knee and hip pain  States was supposed to follow-up with orthopedics in the past however failed to do so  Review of Systems   Review of Systems    Positive dyspnea with exertion  Positive chronic right knee and hip pain  Denies any fevers, chills, chest pain, cough, lower extremity edema, calf pain, abdominal pain, nausea, vomiting, diarrhea    All other systems negative    Current Medications       Current Outpatient Medications:     albuterol (PROVENTIL HFA,VENTOLIN HFA) 90 mcg/act inhaler, Inhale 2 puffs every 4 (four) hours as needed for wheezing, Disp: 1 Inhaler, Rfl: 0    Ascorbic Acid (VITAMIN C PO), Take 1 tablet by mouth daily, Disp: , Rfl:     aspirin 81 mg chewable tablet, Take one tablet by mouth daily, Disp: , Rfl:     atorvastatin (LIPITOR) 20 mg tablet, Take 1 tablet by mouth once daily, Disp: 90 tablet, Rfl: 0    docusate sodium (COLACE) 100 mg capsule, Take 1 capsule (100 mg total) by mouth 2 (two) times a day, Disp: 10 capsule, Rfl: 0    DULoxetine (CYMBALTA) 60 mg delayed release capsule, Take 1 capsule by mouth once daily, Disp: 90 capsule, Rfl: 0    EPINEPHrine (EPIPEN) 0 3 mg/0 3 mL SOAJ, INJECT 0 3ML INTO A MUSCLE ONCE FOR 1 DOSE, Disp: 2 each, Rfl: 0    Euthyrox 175 MCG tablet, Take 1 tablet by mouth once daily, Disp: 30 tablet, Rfl: 0    famotidine (PEPCID) 20 mg tablet, Take 1 tablet (20 mg total) by mouth 2 (two) times a day, Disp:  , Rfl: 0    fluticasone-vilanterol (BREO ELLIPTA) 100-25 mcg/inh inhaler, Inhale 1 puff daily Rinse mouth after use , Disp: 1 Inhaler, Rfl: 0    gabapentin (NEURONTIN) 300 mg capsule, Take 1 capsule (300 mg total) by mouth 3 (three) times a day, Disp: 90 capsule, Rfl: 1    hydrOXYzine HCL (ATARAX) 25 mg tablet, Take 1 tablet (25 mg total) by mouth every 6 (six) hours as needed for itching or allergies, Disp: 30 tablet, Rfl: 0    insulin lispro (HumaLOG) 100 units/mL injection, Inject 10 Units under the skin 3 (three) times a day with meals, Disp: 27 mL, Rfl: 0    Insulin Pen Needle (PEN NEEDLES 3/16") 31G X 5 MM MISC, by Other route 4 (four) times a day, Disp: 300 each, Rfl: 3    Lantus SoloStar 100 units/mL injection pen, INJECT 60 UNITS SUBCUTANEOUSLY ONCE DAILY, Disp: 15 mL, Rfl: 0    metFORMIN (GLUCOPHAGE) 1000 MG tablet, Take 1 tablet by mouth twice daily, Disp: 60 tablet, Rfl: 5    nicotine (NICODERM CQ) 21 mg/24 hr TD 24 hr patch, Place 1 patch on the skin daily, Disp: 28 patch, Rfl: 0    omega-3-acid ethyl esters (LOVAZA) 1 g capsule, Take 1 capsule (1 g total) by mouth 2 (two) times a day for high cholesterol, Disp: 60 capsule, Rfl: 2    sodium chloride (OCEAN) 0 65 % nasal spray, 1 spray into each nostril as needed for rhinitis, Disp: 30 mL, Rfl: 1    traZODone (DESYREL) 150 mg tablet, Take 1 tablet (150 mg total) by mouth daily at bedtime, Disp: 90 tablet, Rfl: 0    clotrimazole (MYCELEX) 10 mg petra, Take 1 tablet (10 mg total) by mouth 5 (five) times a day for 7 days, Disp: 35 tablet, Rfl: 0    Current Allergies     Allergies as of 05/06/2021 - Reviewed 05/06/2021   Allergen Reaction Noted    Ace inhibitors Swelling 09/29/2016    Zinc Tongue Swelling 04/20/2021    Clindamycin  05/07/2017            The following portions of the patient's history were reviewed and updated as appropriate: allergies, current medications, past family history, past medical history, past social history, past surgical history and problem list      Past Medical History:   Diagnosis Date    Angioedema     Diabetes mellitus ([address] )     Disease of thyroid gland     Hyperlipidemia     Hypertension     Morbid obesity ([address] )     MARSHA (obstructive sleep apnea)        Past Surgical History:   Procedure Laterality Date    KNEE SURGERY      SINUS SURGERY         Family History   Problem Relation Age of Onset    Thyroid cancer Mother     Diabetes type II Mother     Esophageal cancer Father     Diabetes type II Father     Kidney cancer Brother     Diabetes type II Brother     Diabetes type II Maternal Grandmother          Medications have been verified  Objective   /76   Pulse (!) 111   Temp 98 7 °F (37 1 °C)   Resp 20   Wt 107 kg (236 lb 6 4 oz)   SpO2 93%   BMI 40 58 kg/m²        Physical Exam     Physical Exam  Constitutional:       General: He is not in acute distress  Appearance: He is obese  He is not ill-appearing, toxic-appearing or diaphoretic  HENT:      Mouth/Throat:      Mouth: Mucous membranes are moist       Comments: Positive oral/pharyngeal thrush  Eyes:      General: No scleral icterus  Conjunctiva/sclera: Conjunctivae normal    Cardiovascular:      Rate and Rhythm: Normal rate and regular rhythm  Heart sounds: Normal heart sounds  Pulmonary:      Effort: Pulmonary effort is normal       Comments: Diminished breath sounds throughout  Abdominal:      General: Bowel sounds are normal       Tenderness: There is no abdominal tenderness  Musculoskeletal:      Comments: Trace bilateral lower extremity edema  Neurological:      General: No focal deficit present  Mental Status: He is alert     Psychiatric:         Mood and Affect: Mood normal

## 2021-05-06 NOTE — PROGRESS NOTES
Patient's shoes and socks removed  Right Foot/Ankle   Right Foot Inspection  Skin Exam: skin normal, skin intact, dry skin, callus and callus no warmth, no erythema, no maceration, no abnormal color, no pre-ulcer and no ulcer                          Toe Exam: ROM and strength within normal limits  Sensory       Monofilament testing: intact  Vascular  Capillary refills: < 3 seconds      Left Foot/Ankle  Left Foot Inspection  Skin Exam: skin normal, skin intact, dry skin and callusno warmth, no erythema, no maceration, normal color, no pre-ulcer and no ulcer                         Toe Exam: ROM and strength within normal limits                   Sensory       Monofilament: intact  Vascular  Capillary refills: < 3 seconds    Assign Risk Category:  No deformity present; No loss of protective sensation;  No weak pulses       Risk: 0

## 2021-05-10 ENCOUNTER — OFFICE VISIT (OUTPATIENT)
Dept: PHYSICAL THERAPY | Facility: HOME HEALTHCARE | Age: 56
End: 2021-05-10
Payer: MEDICARE

## 2021-05-10 DIAGNOSIS — G89.29 CHRONIC BILATERAL LOW BACK PAIN WITHOUT SCIATICA: Primary | ICD-10-CM

## 2021-05-10 DIAGNOSIS — M54.50 CHRONIC BILATERAL LOW BACK PAIN WITHOUT SCIATICA: Primary | ICD-10-CM

## 2021-05-10 PROCEDURE — 97110 THERAPEUTIC EXERCISES: CPT

## 2021-05-10 NOTE — PROGRESS NOTES
Daily Note     Today's date: 5/10/2021  Patient name: Shelby Navarro  : 1965  MRN: [id number]  Referring provider: Herson Lugo PA-C  Dx: No diagnosis found  Start Time: 1000          Subjective: I am having a hard time with my breathing and I have a sore throat  My R hip also bothers me today  Objective: See treatment diary below    Assessment: Pt with poor endurance 2* breatihng difficulty  Pt required freq rest and use of inhaler 1 time after standing ex  Pt was able to complete TE in seated position with less difficulty in breathing  VC's for correct form and for proper breathing t/o session  Patient would benefit from continued PT    Plan: Continue per plan of care        Re-eval Date: 21    Date 5/6 5-10-21      Visit Count 1/10 1/10      FOTO Completed             Precautions: SOB        Manuals 5/6 5-10-21      *Monitor SpO2  during TE 93% During TE   Pre ex 95%  After NuStep 96%  During TE  97%  At end  98%                Neuro Re-Ed         MTP/LTP  Seated 1 x 10 ea      Palloff press         TA   Seated 3' 1 x 10      TA + OH lifts   Seated 1 x 15              Ther Ex        NuStep  L1 10 minutes  L 1  10'      UBE retro         Standing hip flex/abd/ext  Flex 1 x 10 Jamie      Standing marches  1 x 10      HR/TR  Declined      Squats  1 x 10      Step-ups         PPT        Hip add  Seated 1 x 10      SLR        S/L SLR        Clamshells        Bridges                 Ther Activity                        Gait Training                        Modalities

## 2021-05-12 DIAGNOSIS — T78.40XS ALLERGIC REACTION, SEQUELA: ICD-10-CM

## 2021-05-12 DIAGNOSIS — E03.9 HYPOTHYROIDISM, UNSPECIFIED TYPE: ICD-10-CM

## 2021-05-12 LAB — MISCELLANEOUS LAB TEST RESULT: NORMAL

## 2021-05-12 RX ORDER — EPINEPHRINE 0.3 MG/.3ML
0.3 INJECTION SUBCUTANEOUS ONCE
Qty: 2 EACH | Refills: 0 | Status: SHIPPED | OUTPATIENT
Start: 2021-05-12 | End: 2021-07-16 | Stop reason: SDUPTHER

## 2021-05-12 RX ORDER — LEVOTHYROXINE SODIUM 175 UG/1
175 TABLET ORAL DAILY
Qty: 30 TABLET | Refills: 0 | Status: SHIPPED | OUTPATIENT
Start: 2021-05-12 | End: 2021-05-26 | Stop reason: SDUPTHER

## 2021-05-13 ENCOUNTER — APPOINTMENT (OUTPATIENT)
Dept: PHYSICAL THERAPY | Facility: HOME HEALTHCARE | Age: 56
End: 2021-05-13
Payer: MEDICARE

## 2021-05-17 ENCOUNTER — OFFICE VISIT (OUTPATIENT)
Dept: PHYSICAL THERAPY | Facility: HOME HEALTHCARE | Age: 56
End: 2021-05-17
Payer: MEDICARE

## 2021-05-17 DIAGNOSIS — F41.8 DEPRESSION WITH ANXIETY: ICD-10-CM

## 2021-05-17 DIAGNOSIS — G89.29 CHRONIC BILATERAL LOW BACK PAIN WITHOUT SCIATICA: Primary | ICD-10-CM

## 2021-05-17 DIAGNOSIS — M54.50 CHRONIC BILATERAL LOW BACK PAIN WITHOUT SCIATICA: Primary | ICD-10-CM

## 2021-05-17 PROCEDURE — 97110 THERAPEUTIC EXERCISES: CPT

## 2021-05-17 RX ORDER — TRAZODONE HYDROCHLORIDE 150 MG/1
TABLET ORAL
Qty: 90 TABLET | Refills: 0 | Status: SHIPPED | OUTPATIENT
Start: 2021-05-17 | End: 2021-08-20 | Stop reason: SDUPTHER

## 2021-05-17 NOTE — PROGRESS NOTES
Daily Note     Today's date: 2021  Patient name: Xena Bailey  : 1965  MRN: [id number]  Referring provider: Karol Nix PA-C  Dx: No diagnosis found  Subjective: I'm ok and felt fine after last visit  Objective: See treatment diary below    Assessment: Tolerated treatment well  Pt did c/o L hip pain during wt bearing ex but able to complete standing ex without the need for a seated rest  Pt with fatigue reported and declined additional TE today  Patient would benefit from continued PT    Plan: Continue per plan of care        Re-eval Date: 21    Date 5/6 5-10-21 5-17-21     Visit Count 1/10 2/10 3/10     FOTO Completed             Precautions: SOB        Manuals 5/6 5-10-21 5-17-21     *Monitor SpO2  during TE 93% During TE   Pre ex 95%  After NuStep 96%  During TE  97%  At end  98%   During TE  Pre ex 96%  After NuStep 96%  During TE 96%  At end 97%             Neuro Re-Ed         MTP/LTP  Seated Green  1 x 10 ea Seated Green  1 x 15 ea     Palloff press    Pt declined   NV       TA   Seated 3" 1 x 10 Seated 3"  1 x 10     TA + OH lifts   Seated 1 x 15 Seated 1 x 15             Ther Ex        NuStep  L1 10 minutes  L 1  10' L 1  10'     UBE retro         Standing hip flex/abd/ext  Flex 1 x 10 Jamie 1 x 10 ea Jamie     Standing marches  1 x 10 1 x 10     HR/TR  Declined      Squats  1 x 10 1 x 10     Step-ups         PPT        Hip add  Seated 1 x 10 Seated 1 x 10     SLR        S/L SLR        Clamshells        Bridges                 Ther Activity                        Gait Training                        Modalities

## 2021-05-18 ENCOUNTER — TELEPHONE (OUTPATIENT)
Dept: FAMILY MEDICINE CLINIC | Facility: HOME HEALTHCARE | Age: 56
End: 2021-05-18

## 2021-05-18 DIAGNOSIS — I10 ESSENTIAL HYPERTENSION: Primary | ICD-10-CM

## 2021-05-18 RX ORDER — HYDROCHLOROTHIAZIDE 25 MG/1
25 TABLET ORAL 2 TIMES DAILY
Qty: 60 TABLET | Refills: 2 | Status: SHIPPED | OUTPATIENT
Start: 2021-05-18 | End: 2021-08-16 | Stop reason: SDUPTHER

## 2021-05-18 NOTE — TELEPHONE ENCOUNTER
Looks like it was accidentally cancelled when he was discharged from the hospital in April  New script sent

## 2021-05-18 NOTE — TELEPHONE ENCOUNTER
Patient called for a refill of the hydrochlorothiazide 20mg, he said that you made a mistake and cancelled it  Please advise

## 2021-05-20 ENCOUNTER — HOSPITAL ENCOUNTER (OUTPATIENT)
Dept: PULMONOLOGY | Facility: HOSPITAL | Age: 56
Discharge: HOME | End: 2021-05-20
Payer: MEDICARE

## 2021-05-20 ENCOUNTER — HOSPITAL ENCOUNTER (OUTPATIENT)
Dept: CT IMAGING | Facility: HOSPITAL | Age: 56
Discharge: HOME | End: 2021-05-20
Payer: MEDICARE

## 2021-05-20 DIAGNOSIS — J44.9 COPD, SEVERITY TO BE DETERMINED (HCC): ICD-10-CM

## 2021-05-20 DIAGNOSIS — F17.210 CONTINUOUS DEPENDENCE ON CIGARETTE SMOKING: ICD-10-CM

## 2021-05-20 PROCEDURE — 71271 CT THORAX LUNG CANCER SCR C-: CPT

## 2021-05-20 PROCEDURE — 94727 GAS DIL/WSHOT DETER LNG VOL: CPT | Performed by: INTERNAL MEDICINE

## 2021-05-20 PROCEDURE — 94760 N-INVAS EAR/PLS OXIMETRY 1: CPT

## 2021-05-20 PROCEDURE — 94729 DIFFUSING CAPACITY: CPT | Performed by: INTERNAL MEDICINE

## 2021-05-20 PROCEDURE — 94727 GAS DIL/WSHOT DETER LNG VOL: CPT

## 2021-05-20 PROCEDURE — 94060 EVALUATION OF WHEEZING: CPT

## 2021-05-20 PROCEDURE — 94060 EVALUATION OF WHEEZING: CPT | Performed by: INTERNAL MEDICINE

## 2021-05-20 PROCEDURE — 94729 DIFFUSING CAPACITY: CPT

## 2021-05-20 RX ORDER — ALBUTEROL SULFATE 2.5 MG/3ML
2.5 SOLUTION RESPIRATORY (INHALATION) ONCE AS NEEDED
Status: COMPLETED | OUTPATIENT
Start: 2021-05-20 | End: 2021-05-20

## 2021-05-20 RX ADMIN — ALBUTEROL SULFATE 2.5 MG: 2.5 SOLUTION RESPIRATORY (INHALATION) at 10:41

## 2021-05-21 ENCOUNTER — APPOINTMENT (OUTPATIENT)
Dept: PHYSICAL THERAPY | Facility: HOME HEALTHCARE | Age: 56
End: 2021-05-21
Payer: MEDICARE

## 2021-05-24 ENCOUNTER — OFFICE VISIT (OUTPATIENT)
Dept: PHYSICAL THERAPY | Facility: HOME HEALTHCARE | Age: 56
End: 2021-05-24
Payer: MEDICARE

## 2021-05-24 DIAGNOSIS — M25.551 RIGHT HIP PAIN: ICD-10-CM

## 2021-05-24 DIAGNOSIS — G89.29 CHRONIC BILATERAL LOW BACK PAIN WITHOUT SCIATICA: Primary | ICD-10-CM

## 2021-05-24 DIAGNOSIS — M54.50 CHRONIC BILATERAL LOW BACK PAIN WITHOUT SCIATICA: Primary | ICD-10-CM

## 2021-05-24 DIAGNOSIS — M25.561 RIGHT KNEE PAIN, UNSPECIFIED CHRONICITY: ICD-10-CM

## 2021-05-24 PROCEDURE — 97110 THERAPEUTIC EXERCISES: CPT

## 2021-05-24 PROCEDURE — 97112 NEUROMUSCULAR REEDUCATION: CPT

## 2021-05-24 NOTE — PROGRESS NOTES
Daily Note     Today's date: 2021  Patient name: Danielle Petty  : 1965  MRN: [id number]  Referring provider: Ovidio Merlin  Dx:   Encounter Diagnosis     ICD-10-CM    1  Chronic bilateral low back pain without sciatica  M54 5     G89 29    2  Right hip pain  M25 551    3  Right knee pain, unspecified chronicity  M25 561               Subjective: Pt reports he has pain all over his body today  Objective: See treatment diary below      Assessment: Tolerated treatment fair  Verbal cues needed t/o exercise to perform correctly  Fatigue noted with exercise  Progressed to palloff press today  No increased pain reported t/o session  Patient would benefit from continued PT      Plan: Continue per plan of care        Re-eval Date: 21    Date 5/6 5-10-21 5-17-21 5/24/21    Visit Count 10 210 3/10 4/10    FOTO Completed             Precautions: SOB        Manuals 5/6 5-10-21 5-17-21 5/24/21    *Monitor SpO2  during TE 93% During TE   Pre ex 95%  After NuStep 96%  During TE  97%  At end  98%   During TE  Pre ex 96%  After NuStep 96%  During TE 96%  At end 97% Pre ex 93%  After Nustep 93%  During TE 94%  At end 95%            Neuro Re-Ed         MTP/LTP  Seated Green  1 x 10 ea Seated Green  1 x 15 ea Seated green   1 x 15 ea     Palloff press    Pt declined   NV   Seated red R/L  5" 1 x 10     TA   Seated 3" 1 x 10 Seated 3"  1 x 10 Seated 3"   1 x 10     TA + OH lifts   Seated 1 x 15 Seated 1 x 15 Seated 1 x 15             Ther Ex        NuStep  L1 10 minutes  L 1  10' L 1  10' L1  10 min     UBE retro         Standing hip flex/abd/ext  Flex 1 x 10 Jamie 1 x 10 ea Jamie 1 x 10 ea Jamie     Standing marches  1 x 10 1 x 10 1 x 10     HR/TR  Declined      Squats  1 x 10 1 x 10 1 x 10     Step-ups     C Fwd 1 x 10 Jamie    PPT        Hip add  Seated 1 x 10 Seated 1 x 10 Seated 1 x 15     SLR        S/L SLR        Clamshells        Bridges                 Ther Activity                        Gait Training Modalities

## 2021-05-26 DIAGNOSIS — E11.65 TYPE 2 DIABETES MELLITUS WITH HYPERGLYCEMIA, WITH LONG-TERM CURRENT USE OF INSULIN (HCC): ICD-10-CM

## 2021-05-26 DIAGNOSIS — Z79.4 TYPE 2 DIABETES MELLITUS WITH HYPERGLYCEMIA, WITH LONG-TERM CURRENT USE OF INSULIN (HCC): ICD-10-CM

## 2021-05-26 DIAGNOSIS — E03.9 HYPOTHYROIDISM, UNSPECIFIED TYPE: ICD-10-CM

## 2021-05-26 RX ORDER — LEVOTHYROXINE SODIUM 175 UG/1
TABLET ORAL
Qty: 30 TABLET | Refills: 0 | Status: SHIPPED | OUTPATIENT
Start: 2021-05-26 | End: 2021-07-07

## 2021-05-26 RX ORDER — INSULIN GLARGINE 100 [IU]/ML
INJECTION, SOLUTION SUBCUTANEOUS
Qty: 15 ML | Refills: 2 | Status: SHIPPED | OUTPATIENT
Start: 2021-05-26 | End: 2021-08-12 | Stop reason: SDUPTHER

## 2021-05-27 ENCOUNTER — OFFICE VISIT (OUTPATIENT)
Dept: PHYSICAL THERAPY | Facility: HOME HEALTHCARE | Age: 56
End: 2021-05-27
Payer: MEDICARE

## 2021-05-27 DIAGNOSIS — M25.561 RIGHT KNEE PAIN, UNSPECIFIED CHRONICITY: ICD-10-CM

## 2021-05-27 DIAGNOSIS — G89.29 CHRONIC BILATERAL LOW BACK PAIN WITHOUT SCIATICA: Primary | ICD-10-CM

## 2021-05-27 DIAGNOSIS — M25.551 RIGHT HIP PAIN: ICD-10-CM

## 2021-05-27 DIAGNOSIS — M54.50 CHRONIC BILATERAL LOW BACK PAIN WITHOUT SCIATICA: Primary | ICD-10-CM

## 2021-05-27 PROCEDURE — 97112 NEUROMUSCULAR REEDUCATION: CPT

## 2021-05-27 PROCEDURE — 97110 THERAPEUTIC EXERCISES: CPT

## 2021-05-27 NOTE — PROGRESS NOTES
Daily Note     Today's date: 2021  Patient name: Martha Brooks  : 1965  MRN: [id number]  Referring provider: Carlos Manuel Broderick  Dx:   Encounter Diagnosis     ICD-10-CM    1  Chronic bilateral low back pain without sciatica  M54 5     G89 29    2  Right hip pain  M25 551    3  Right knee pain, unspecified chronicity  M25 561               Subjective: Pt reports he hurts all over his body today  Objective: See treatment diary below      Assessment: Tolerated treatment fair  Some verbal cues needed to perform exercises correctly  Progressed to increased reps with hip adduction, TA, TA + OH lifts and MTP/LTP  No increased pain reported t/o session  Patient would benefit from continued PT      Plan: Continue per plan of care        Re-eval Date: 21    Date 5/6 5-10-21 5-17-21 5/24/21 5/27/21   Visit Count 10 2/10 310 4/10 5/10   FOTO Completed             Precautions: SOB        Manuals 5/6 5-10-21 5-17-21 5/24/21 5/27/21   *Monitor SpO2  during TE 93% During TE   Pre ex 95%  After NuStep 96%  During TE  97%  At end  98%   During TE  Pre ex 96%  After NuStep 96%  During TE 96%  At end 97% Pre ex 93%  After Nustep 93%  During TE 94%  At end 95% Pre ex 93%  After Nustep 96%  During TE 94%  At end  94%           Neuro Re-Ed         MTP/LTP  Seated Green  1 x 10 ea Seated Green  1 x 15 ea Seated green   1 x 15 ea  Seated green  1 x 15    Palloff press    Pt declined   NV   Seated red R/L  5" 1 x 10  Seated Red R/L  5" 1 x 10    TA   Seated 3" 1 x 10 Seated 3"  1 x 10 Seated 3"   1 x 10  Seated 3"  1 x 20    TA + OH lifts   Seated 1 x 15 Seated 1 x 15 Seated 1 x 15  Seated 1 x 20            Ther Ex        NuStep  L1 10 minutes  L 1  10' L 1  10' L1  10 min  L 1  10 min   UBE retro         Standing hip flex/abd/ext  Flex 1 x 10 Jamie 1 x 10 ea Jamie 1 x 10 ea Jamie  1 x 10 ea Jamie   Standing marches  1 x 10 1 x 10 1 x 10  1 x 10    HR/TR  Declined      Squats  1 x 10 1 x 10 1 x 10  1 x 10    Step-ups C Fwd 1 x 10 Jamie C Fwd 1 x 10 Jamie   PPT        Hip add  Seated 1 x 10 Seated 1 x 10 Seated 1 x 15  Seated 1 x 20   SLR        S/L SLR        [facility]        Bridges                 Ther Activity                        Gait Training                        Modalities

## 2021-06-01 NOTE — RESULT ENCOUNTER NOTE
Results of CT lung screening reviewed  Patient has appointment with me on June 4th and will review results with patient at that time

## 2021-06-01 NOTE — RESULT ENCOUNTER NOTE
Results reviewed  Patient has appointment with me on June 4, 2021  Will review results with patient at that time

## 2021-06-03 ENCOUNTER — APPOINTMENT (OUTPATIENT)
Dept: PHYSICAL THERAPY | Facility: HOME HEALTHCARE | Age: 56
End: 2021-06-03
Payer: MEDICARE

## 2021-06-03 ENCOUNTER — OFFICE VISIT (OUTPATIENT)
Dept: PHYSICAL THERAPY | Facility: HOME HEALTHCARE | Age: 56
End: 2021-06-03
Payer: MEDICARE

## 2021-06-03 DIAGNOSIS — M54.50 CHRONIC BILATERAL LOW BACK PAIN WITHOUT SCIATICA: Primary | ICD-10-CM

## 2021-06-03 DIAGNOSIS — G89.29 CHRONIC BILATERAL LOW BACK PAIN WITHOUT SCIATICA: Primary | ICD-10-CM

## 2021-06-03 DIAGNOSIS — M25.551 RIGHT HIP PAIN: ICD-10-CM

## 2021-06-03 DIAGNOSIS — M25.561 RIGHT KNEE PAIN, UNSPECIFIED CHRONICITY: ICD-10-CM

## 2021-06-03 PROCEDURE — 97112 NEUROMUSCULAR REEDUCATION: CPT | Performed by: PHYSICAL THERAPIST

## 2021-06-03 PROCEDURE — 97110 THERAPEUTIC EXERCISES: CPT | Performed by: PHYSICAL THERAPIST

## 2021-06-03 NOTE — PROGRESS NOTES
Daily Note     Today's date: 6/3/2021  Patient name: Juan Benitez  : 1965  MRN: [id number]  Referring provider: Patricio Swenson  Dx:   Encounter Diagnosis     ICD-10-CM    1  Chronic bilateral low back pain without sciatica  M54 5     G89 29    2  Right hip pain  M25 551    3  Right knee pain, unspecified chronicity  M25 561                   Subjective: Pt reporting that he has been feeling good lately  Objective: See treatment diary below      Assessment: Pt with good tolerance to program; he continues to fatigue with standing activities but SpO2 maintained > 90% throughout session  Lower back pain during standing is primary limiting factor  PT to continue with progression as tolerable  Plan: Continue per plan of care        Re-eval Date: 21    Date 6/3    5/27/21   Visit Count 6/10    5/10   FOTO              Precautions: SOB        Manuals 6/3    5/27/21   *Monitor SpO2 Pre 93%  After NuStep 97%  At end 96%    Pre ex 93%  After Nustep 96%  During TE 94%  At end  94%           Neuro Re-Ed         MTP/LTP Seated Green x 15 ea     Seated green  1 x 15    Palloff press  Seated Red R/L   5" x 10     Seated Red R/L  5" 1 x 10    TA      Seated 3"  1 x 20    TA + OH lifts  Seated x 20     Seated 1 x 20            Ther Ex        NuStep  L2 10 minutes     L 1  10 min   UBE retro         Standing hip flex/abd/ext 1 x 10 ea     1 x 10 ea Jamie   Standing marches 1 x 10     1 x 10    HR/TR        Squats 1 x 10     1 x 10    Step-ups  C Fwd 1 x 15     C Fwd 1 x 10 Jamie   PPT        Hip add Seated 1 x 20     Seated 1 x 20   SLR        S/L SLR        Clamshells        Bridges                 Ther Activity                        Gait Training                        Modalities

## 2021-06-07 ENCOUNTER — OFFICE VISIT (OUTPATIENT)
Dept: PHYSICAL THERAPY | Facility: HOME HEALTHCARE | Age: 56
End: 2021-06-07
Payer: MEDICARE

## 2021-06-07 DIAGNOSIS — M54.50 CHRONIC BILATERAL LOW BACK PAIN WITHOUT SCIATICA: Primary | ICD-10-CM

## 2021-06-07 DIAGNOSIS — G89.29 CHRONIC BILATERAL LOW BACK PAIN WITHOUT SCIATICA: Primary | ICD-10-CM

## 2021-06-07 DIAGNOSIS — M25.561 RIGHT KNEE PAIN, UNSPECIFIED CHRONICITY: ICD-10-CM

## 2021-06-07 DIAGNOSIS — M25.551 RIGHT HIP PAIN: ICD-10-CM

## 2021-06-07 PROCEDURE — 97112 NEUROMUSCULAR REEDUCATION: CPT

## 2021-06-07 PROCEDURE — 97110 THERAPEUTIC EXERCISES: CPT

## 2021-06-07 NOTE — PROGRESS NOTES
Daily Note     Today's date: 2021  Patient name: Alpa Cruz  : 1965  MRN: [id number]  Referring provider: Alan Tony  Dx:   Encounter Diagnosis     ICD-10-CM    1  Chronic bilateral low back pain without sciatica  M54 5     G89 29    2  Right hip pain  M25 551    3  Right knee pain, unspecified chronicity  M25 561                   Subjective: Pt reports he has a lot of pain in his hips today  Objective: See treatment diary below      Assessment: Tolerated treatment fairly well  Some verbal cues needed t/o exercise to perform correctly  Progressed to blue theraband with MTP/LTP and green theraband with palloff press  Increased reps to fifteen with squats, marches and standing SLR three way  Patient would benefit from continued PT      Plan: Continue per plan of care        Re-eval Date: 21    Date 6/3 6/7/21   5/27/21   Visit Count 6/10 7/10   5/10   FOTO              Precautions: SOB        Manuals 6/3 6/7/21   5/27/21   *Monitor SpO2 Pre 93%  After NuStep 97%  At end 96% Pre  95%  After Nustep 93%  At end 94%   Pre ex 93%  After Nustep 96%  During TE 94%  At end  94%           Neuro Re-Ed         MTP/LTP Seated Green x 15 ea  Seated Blue  1 x 15 ea    Seated green  1 x 15    Palloff press  Seated Red R/L   5" x 10  Seated Green  R/L  5" x 10    Seated Red R/L  5" 1 x 10    TA      Seated 3"  1 x 20    TA + OH lifts  Seated x 20  Seated x 20    Seated 1 x 20            Ther Ex        NuStep  L2 10 minutes  L2  10 min    L 1  10 min   UBE retro         Standing hip flex/abd/ext 1 x 10 ea  1 x 15 ea    1 x 10 ea Jamie   Standing marches 1 x 10  1 x 15    1 x 10    HR/TR        Squats 1 x 10   1 x 15    1 x 10    Step-ups  C Fwd 1 x 15  C Fwd 1 x 15    C Fwd 1 x 10 Jamie   PPT        Hip add Seated 1 x 20  Seated 1 x 20    Seated 1 x 20   SLR        S/L SLR        Clamshells        Bridges                 Ther Activity                        Gait Training Modalities

## 2021-06-08 DIAGNOSIS — IMO0002 TYPE 2 DIABETES, UNCONTROLLED, WITH NEUROPATHY: ICD-10-CM

## 2021-06-08 RX ORDER — GABAPENTIN 300 MG/1
CAPSULE ORAL
Qty: 90 CAPSULE | Refills: 0 | Status: SHIPPED | OUTPATIENT
Start: 2021-06-08 | End: 2021-07-13 | Stop reason: SDUPTHER

## 2021-06-10 ENCOUNTER — EVALUATION (OUTPATIENT)
Dept: PHYSICAL THERAPY | Facility: HOME HEALTHCARE | Age: 56
End: 2021-06-10
Payer: MEDICARE

## 2021-06-10 DIAGNOSIS — M25.561 RIGHT KNEE PAIN, UNSPECIFIED CHRONICITY: ICD-10-CM

## 2021-06-10 DIAGNOSIS — IMO0002 TYPE 2 DIABETES, UNCONTROLLED, WITH NEUROPATHY: ICD-10-CM

## 2021-06-10 DIAGNOSIS — G89.29 CHRONIC BILATERAL LOW BACK PAIN WITHOUT SCIATICA: Primary | ICD-10-CM

## 2021-06-10 DIAGNOSIS — M54.50 CHRONIC BILATERAL LOW BACK PAIN WITHOUT SCIATICA: Primary | ICD-10-CM

## 2021-06-10 DIAGNOSIS — M25.551 RIGHT HIP PAIN: ICD-10-CM

## 2021-06-10 PROCEDURE — 97110 THERAPEUTIC EXERCISES: CPT

## 2021-06-10 PROCEDURE — 97112 NEUROMUSCULAR REEDUCATION: CPT

## 2021-06-10 NOTE — PROGRESS NOTES
Daily Note     Today's date: 6/10/2021  Patient name: Stephan Zelaya  : 1965  MRN: [id number]  Referring provider: Angelica Madera  Dx:   Encounter Diagnosis     ICD-10-CM    1  Chronic bilateral low back pain without sciatica  M54 5     G89 29    2  Right hip pain  M25 551    3  Right knee pain, unspecified chronicity  M25 561                   Subjective: Pt reports he hurts all over today  Objective: See treatment diary below      Assessment: Tolerated treatment fair  No increased pain reported t/o session  See DC summary for details and assessment  Plan: Pt to be DC from PT        Re-eval Date: 21    Date 6/3 6/7/21 6/10/21  5/27/21   Visit Count 6/10 7/10 8/10   5/10   FOTO              Precautions: SOB        Manuals 6/3 6/7/21 6/10/21  5/27/21   *Monitor SpO2 Pre 93%  After NuStep 97%  At end 96% Pre  95%  After Nustep 93%  At end 94% Pre 93%  After Nustep 93%  At end 94%    Pre ex 93%  After Nustep 96%  During TE 94%  At end  94%           Neuro Re-Ed         MTP/LTP Seated Green x 15 ea  Seated Blue  1 x 15 ea  Seated Blue   1 x 15 ea   Seated green  1 x 15    Palloff press  Seated Red R/L   5" x 10  Seated Green  R/L  5" x 10  Seated Green   R/L 5" x 10   Seated Red R/L  5" 1 x 10    TA      Seated 3"  1 x 20    TA + OH lifts  Seated x 20  Seated x 20  Seated x 20  Seated 1 x 20            Ther Ex        NuStep  L2 10 minutes  L2  10 min  L2  10 min   L 1  10 min   UBE retro         Standing hip flex/abd/ext 1 x 10 ea  1 x 15 ea  1 x 15 ea   1 x 10 ea Jamie   Standing marches 1 x 10  1 x 15  1 x 15   1 x 10    HR/TR        Squats 1 x 10   1 x 15  1 x 15   1 x 10    Step-ups  C Fwd 1 x 15  C Fwd 1 x 15  C Fwd 1 x 15   C Fwd 1 x 10 Jamie   PPT        Hip add Seated 1 x 20  Seated 1 x 20  Seated 1 x 20   Seated 1 x 20   SLR        S/L SLR        Clamshells        Bridges                 Ther Activity                        Gait Training                        Modalities

## 2021-06-11 NOTE — PROGRESS NOTES
PT Discharge    Today's date: 2021  Patient name: Bailey Lindsay  : 1965  MRN: [id number]  Referring provider: Adelina Perez  Dx:   Encounter Diagnosis     ICD-10-CM    1  Chronic bilateral low back pain without sciatica  M54 5     G89 29    2  Right hip pain  M25 551    3  Right knee pain, unspecified chronicity  M25 561        Start Time: 1045  Stop Time: 1125  Total time in clinic (min): 40 minutes    Assessment  Assessment details: The patient had his PT IE on 21 and he was seen in PT for a total of 8 visits with his last treatment on 6/10/21  He wishes to be D/C from PT  Unable to assess his current status as formal re-eval was not completed at last appointment, refer to his PT IE for his final assessment  Unable to assess progress towards his goals as re-eval was not completed  Unable to keep patient on hold, D/C PT  Impairments: abnormal gait, abnormal or restricted ROM, abnormal movement, activity intolerance, difficulty understanding, impaired balance, impaired physical strength, lacks appropriate home exercise program, pain with function, safety issue, weight-bearing intolerance and poor posture   Other impairment: decreased endurance   Understanding of Dx/Px/POC: good   Prognosis: good    Goals  STGs to be achieved in 4 weeks:  -Pt to demonstrate reduced subjective pain rating "at worst" by at least 2-3 points from Initial Eval to allow for reduced pain with ADLs and improved functional activity tolerance    -Pt to demonstrate R LE ROM WFL in order to maximize joint mobility and function and allow for progression of exercise program and achievement of goals    -Pt to demonstrate increased MMT of R LE by at least 1/2 grade in order to improve safety and stability with ADLs and functional mobility       LTGs to be achieved upon discharge:   -Pt will be I with HEP in order to continue to improve quality of life and independence and reduce risk for re-injury    -Pt to demonstrate return to activities of daily living without limiations or restrictions    -Pt will return to ambulation >60 minutes to help facilitate return to community activities independently    -Pt to demonstrate improved function as noted by achieving or exceeding predicted score on FOTO outcomes assessment tool  Plan  Plan details: Unable to keep patient on hold, D/C PT  Planned modality interventions: cryotherapy  Planned therapy interventions: abdominal trunk stabilization, manual therapy, neuromuscular re-education, patient education, postural training, balance, home exercise program, gait training, functional ROM exercises, flexibility, therapeutic activities and therapeutic exercise        Subjective Evaluation    History of Present Illness  Mechanism of injury: Pt reporting that recently he spent 9 days in the hospital due to angio edema  He followed up by going to rehab at [facility] and spent about 6 days there  He followed up with PCP who feels he would benefit from more therapy to address continued pain in the R LE and work on his endurance  Quality of life: good    Pain  At best pain ratin  At worst pain ratin  Quality: dull ache and sharp  Relieving factors: medications  Aggravating factors: sitting and stair climbing    Social Support  Steps to enter house: yes  Lives in: apartment      Diagnostic Tests  X-ray: normal  Treatments  Previous treatment: physical therapy  Current treatment: physical therapy  Discharged from (in last 30 days): inpatient hospitalization  Patient Goals  Patient goals for therapy: increased motion, improved balance, decreased pain, increased strength and independence with ADLs/IADLs          Objective     Palpation   Left   Tenderness of the erector spinae and lumbar paraspinals  Right   Tenderness of the erector spinae and lumbar paraspinals  Tenderness     Right Hip   Tenderness in the sacroiliac joint  No tenderness in the greater trochanter  Neurological Testing     Sensation     Hip   Left Hip   Intact: light touch    Right Hip   Intact: light touch    Knee   Left Knee   Intact: light touch    Right Knee   Intact: light touch     Active Range of Motion     Lumbar   Flexion:  with pain Restriction level: moderate  Extension:  with pain Restriction level: maximal  Left lateral flexion:  with pain Restriction level: moderate  Right lateral flexion:  with pain Restriction level: moderate  Left Hip   Flexion: 100 degrees   Abduction: 30 degrees     Right Hip   Flexion: 90 degrees   Abduction: 20 degrees   Left Knee   Normal active range of motion    Right Knee   Normal active range of motion    Strength/Myotome Testing     Left Hip   Planes of Motion   Flexion: 4  Abduction: 4  Adduction: 4    Right Hip   Planes of Motion   Flexion: 3+  Abduction: 3+  Adduction: 3+    Left Knee   Flexion: 4+  Extension: 4+    Right Knee   Flexion: 4-  Extension: 4-    Tests     Lumbar     Left   Positive passive SLR  Negative crossed SLR  Right   Positive passive SLR  Negative crossed SLR  Ambulation     Ambulation: Level Surfaces     Additional Level Surfaces Ambulation Details  Increased pain with prolonged ambulation     Ambulation: Stairs   Pattern: non-reciprocal  Railings: one rail  Pattern: non-reciprocal  Railings: one rail    Observational Gait   Gait: antalgic and asymmetric   Decreased walking speed and stride length

## 2021-06-15 ENCOUNTER — APPOINTMENT (OUTPATIENT)
Dept: PHYSICAL THERAPY | Facility: HOME HEALTHCARE | Age: 56
End: 2021-06-15
Payer: MEDICARE

## 2021-06-16 ENCOUNTER — TELEPHONE (OUTPATIENT)
Dept: ENDOCRINOLOGY | Facility: CLINIC | Age: 56
End: 2021-06-16

## 2021-06-16 ENCOUNTER — CONSULT (OUTPATIENT)
Dept: ENDOCRINOLOGY | Facility: CLINIC | Age: 56
End: 2021-06-16
Payer: MEDICARE

## 2021-06-16 VITALS
HEART RATE: 105 BPM | SYSTOLIC BLOOD PRESSURE: 158 MMHG | WEIGHT: 248.4 LBS | DIASTOLIC BLOOD PRESSURE: 70 MMHG | OXYGEN SATURATION: 96 % | HEIGHT: 64 IN | BODY MASS INDEX: 42.41 KG/M2

## 2021-06-16 DIAGNOSIS — I10 ESSENTIAL HYPERTENSION: ICD-10-CM

## 2021-06-16 DIAGNOSIS — E03.9 ACQUIRED HYPOTHYROIDISM: ICD-10-CM

## 2021-06-16 DIAGNOSIS — IMO0002 TYPE 2 DIABETES, UNCONTROLLED, WITH NEUROPATHY: Primary | ICD-10-CM

## 2021-06-16 DIAGNOSIS — Z79.4 TYPE 2 DIABETES MELLITUS WITH HYPERGLYCEMIA, WITH LONG-TERM CURRENT USE OF INSULIN (HCC): ICD-10-CM

## 2021-06-16 DIAGNOSIS — E66.01 MORBID OBESITY (HCC): ICD-10-CM

## 2021-06-16 DIAGNOSIS — E11.65 TYPE 2 DIABETES MELLITUS WITH HYPERGLYCEMIA, WITH LONG-TERM CURRENT USE OF INSULIN (HCC): ICD-10-CM

## 2021-06-16 DIAGNOSIS — E55.9 VITAMIN D DEFICIENCY: ICD-10-CM

## 2021-06-16 DIAGNOSIS — G47.33 OBSTRUCTIVE SLEEP APNEA: ICD-10-CM

## 2021-06-16 DIAGNOSIS — IMO0002 TYPE 2 DIABETES, UNCONTROLLED, WITH NEUROPATHY: ICD-10-CM

## 2021-06-16 PROBLEM — M25.579 ANKLE PAIN: Status: ACTIVE | Noted: 2017-10-17

## 2021-06-16 PROCEDURE — 99204 OFFICE O/P NEW MOD 45 MIN: CPT | Performed by: NURSE PRACTITIONER

## 2021-06-16 NOTE — PROGRESS NOTES
New Patient Progress Note      Chief Complaint   Patient presents with    Diabetes Type 2        History of Present Illness:   Mary Jay is a 63 y o  male with HTN, HLD, hypothyroidism, and a long history of uncontrolled T2DM presenting to the office today to establish care  Component      Latest Ref Rng & Units 4/3/2020 2/5/2021 5/6/2021   Hemoglobin A1C      6 5 9 8 (H) 11 7 (H) 11 6 (A)        Current regimen:   Metformin 1,000 mg BID  Humalog 15-15-15  Lantus 60    Type: T2DM    Onset: approximately 17 years, through routine blood work    + family history, m/p grandmothers, mother    Medications: Insulin    Blood Sugars: Has been checking 3-4x daily  Avg was 150-200    Reports polydipsia, polyuria, and blurred vision with hyperglycemia  Denies hypoglycemia awareness  Meter: Relion    Diet: "My diet is terrible  I will diet for a while then binge "  Sleepwalks and eats  Skips BF all the time "because if I do eat it, I over eat it "     Exercise: Rarely, just finished PT after hospitalization for severe angioedema; Patient has COPD    Influenza vaccine: Last in 2013    Pneumovax: UTD    COVID-19: UTD    Ophthalmology: "Last time was bout 7 years ago"; referral given    Podiatry/Foot care: 6/15/21- in [place]; denies neuropathy; shot of cortisone in L ankle q 2 years    Dentist: Mary Tobar, he is taking 20 mg of atorvastatin daily  He denies myalgias  For HTN, he is taking HCTZ 25 mg BID  He denies HA  For hypothyroidism, he is taking 175 mcg of Euthyrox daily  He reports fatigue  Denies constipation, cold intolerance, and hair loss       Patient Active Problem List   Diagnosis    Allergic rhinitis    Angioedema    Arthritis    Chronic back pain    Chronic sinusitis    COPD, severity to be determined ([facility] [address] [facility]    Depression with anxiety    Hyperlipidemia    Hypertension    Hypothyroidism    Morbid obesity ([facility] [address] [facility]    Type 2 diabetes, uncontrolled, with neuropathy ([facility] [address] [facility]    Vitamin D deficiency    Cellulitis of left lower leg    Elevated LFTs    Elevated lactic acid level    Tobacco abuse    Noncompliance with diabetes treatment    Acute respiratory failure with hypoxia (HCC)    Epistaxis    Obstructive sleep apnea    Muscle twitching    Type 2 diabetes mellitus with hyperglycemia, with long-term current use of insulin (HCC)    Tobacco use    Atelectasis    Urinary retention    Constipation    Continuous dependence on cigarette smoking    Ankle pain      Past Medical History:   Diagnosis Date    Angioedema     Diabetes mellitus ([facility] [address] [facility]     Disease of thyroid gland     Hyperlipidemia     Hypertension     Morbid obesity ([facility] [address] [facility]     MARSHA (obstructive sleep apnea)       Past Surgical History:   Procedure Laterality Date    KNEE SURGERY      SINUS SURGERY        Family History   Problem Relation Age of Onset    Thyroid cancer Mother     Diabetes type II Mother     Esophageal cancer Father     Diabetes type II Father     Kidney cancer Brother     Diabetes type II Brother     Diabetes type II Maternal Grandmother      Social History     Tobacco Use    Smoking status: Current Every Day Smoker     Packs/day: 2 00     Years: 70 00     Pack years: 140 00     Start date: 4/15/1942    Smokeless tobacco: Never Used    Tobacco comment: states read to quit prior to admit   Substance Use Topics    Alcohol use: Not Currently     Alcohol/week: 3 0 standard drinks     Types: 2 Cans of beer, 1 Shots of liquor per week     Comment: seldom     Allergies   Allergen Reactions    Ace Inhibitors Swelling     Angioedema    Zinc Tongue Swelling    Clindamycin      Had angioedema episode approx 5 hr after IM administration of clindamycin  Unclear if there is definitive causal relationship           Current Outpatient Medications:     albuterol (PROVENTIL HFA,VENTOLIN HFA) 90 mcg/act inhaler, Inhale 2 puffs every 4 (four) hours as needed for wheezing, Disp: 1 Inhaler, Rfl: 0    Ascorbic Acid (VITAMIN C PO), Take 1 tablet by mouth daily, Disp: , Rfl:     aspirin 81 mg chewable tablet, Take one tablet by mouth daily, Disp: , Rfl:     atorvastatin (LIPITOR) 20 mg tablet, Take 1 tablet by mouth once daily, Disp: 90 tablet, Rfl: 0    docusate sodium (COLACE) 100 mg capsule, Take 1 capsule (100 mg total) by mouth 2 (two) times a day, Disp: 10 capsule, Rfl: 0    DULoxetine (CYMBALTA) 60 mg delayed release capsule, Take 1 capsule by mouth once daily, Disp: 90 capsule, Rfl: 0    fluticasone-vilanterol (BREO ELLIPTA) 100-25 mcg/inh inhaler, Inhale 1 puff daily Rinse mouth after use , Disp: 1 Inhaler, Rfl: 0    gabapentin (NEURONTIN) 300 mg capsule, TAKE 1 CAPSULE BY MOUTH THREE TIMES DAILY, Disp: 90 capsule, Rfl: 0    hydrochlorothiazide (HYDRODIURIL) 25 mg tablet, Take 1 tablet (25 mg total) by mouth 2 (two) times a day, Disp: 60 tablet, Rfl: 2    hydrOXYzine HCL (ATARAX) 25 mg tablet, Take 1 tablet (25 mg total) by mouth every 6 (six) hours as needed for itching or allergies, Disp: 30 tablet, Rfl: 0    insulin lispro (HumaLOG) 100 units/mL injection, Inject 15 Units under the skin 3 (three) times a day with meals, Disp: 40 5 mL, Rfl: 0    Insulin Pen Needle (PEN NEEDLES 3/16") 31G X 5 MM MISC, by Other route 4 (four) times a day, Disp: 300 each, Rfl: 3    Lantus SoloStar 100 units/mL injection pen, INJECT 60 UNITS SUBCUTANEOUSLY ONCE DAILY, Disp: 15 mL, Rfl: 2    metFORMIN (GLUCOPHAGE) 1000 MG tablet, Take 1 tablet by mouth twice daily, Disp: 60 tablet, Rfl: 5    omega-3-acid ethyl esters (LOVAZA) 1 g capsule, Take 1 capsule (1 g total) by mouth 2 (two) times a day for high cholesterol, Disp: 60 capsule, Rfl: 2    sodium chloride (OCEAN) 0 65 % nasal spray, 1 spray into each nostril as needed for rhinitis, Disp: 30 mL, Rfl: 1    traZODone (DESYREL) 150 mg tablet, TAKE 1 TABLET BY MOUTH ONCE DAILY AT BEDTIME, Disp: 90 tablet, Rfl: 0    EPINEPHrine (EPIPEN) 0 3 mg/0 3 mL SOAJ, Inject 0 3 mL (0 3 mg total) into a muscle once for 1 dose, Disp: 2 each, Rfl: 0    Euthyrox 175 MCG tablet, Take 1 tablet by mouth once daily, Disp: 30 tablet, Rfl: 0    Semaglutide,0 25 or 0 5MG/DOS, 2 MG/1 5ML SOPN, Inject 0 25 mg once weekly for 4 weeks then increase to 0 5 mg once weekly  , Disp: 3 mL, Rfl: 1    Review of Systems   Constitutional: Negative for activity change, appetite change, fatigue and unexpected weight change  HENT: Positive for voice change (s/p intubation, hoarse)  Negative for dental problem, sore throat and trouble swallowing  Eyes: Positive for visual disturbance (with hyperglycemia)  Respiratory: Positive for cough, chest tightness and shortness of breath  Cardiovascular: Negative for chest pain, palpitations and leg swelling  Gastrointestinal: Negative for constipation, diarrhea, nausea and vomiting  Endocrine: Positive for polydipsia and polyuria  Negative for cold intolerance, heat intolerance and polyphagia  Genitourinary: Positive for frequency  Musculoskeletal: Positive for arthralgias and back pain  Negative for gait problem, joint swelling and myalgias  Skin: Negative for wound  Allergic/Immunologic: Positive for environmental allergies  Negative for food allergies  Neurological: Negative for dizziness, weakness, light-headedness, numbness and headaches  Hematological: Does not bruise/bleed easily  Psychiatric/Behavioral: Positive for dysphoric mood and sleep disturbance  Negative for decreased concentration  The patient is nervous/anxious  Physical Exam:  Body mass index is 42 64 kg/m²  /70 (BP Location: Right arm, Cuff Size: Adult)   Pulse 105   Ht 5' 4" (1 626 m)   Wt 113 kg (248 lb 6 4 oz)   SpO2 96%   BMI 42 64 kg/m²    Wt Readings from Last 3 Encounters:   06/16/21 113 kg (248 lb 6 4 oz)   05/06/21 107 kg (236 lb 6 4 oz)   04/15/21 105 kg (232 lb 2 3 oz)       Physical Exam  Vitals reviewed     Constitutional:       General: He is not in acute distress  Appearance: He is well-developed  He is obese  He is not ill-appearing  HENT:      Head: Normocephalic and atraumatic  Comments: Mask in place  Eyes:      Pupils: Pupils are equal, round, and reactive to light  Neck:      Thyroid: No thyromegaly  Cardiovascular:      Rate and Rhythm: Normal rate and regular rhythm  Pulses: Normal pulses  Heart sounds: Normal heart sounds  Pulmonary:      Effort: Pulmonary effort is normal  No respiratory distress  Breath sounds: Wheezing (expiratory) and rhonchi present  Abdominal:      General: Bowel sounds are normal  There is no distension  Palpations: Abdomen is soft  Tenderness: There is no abdominal tenderness  Comments: Central obesity   Musculoskeletal:      Cervical back: Normal range of motion and neck supple  Right lower leg: No edema  Left lower leg: No edema  Lymphadenopathy:      Cervical: No cervical adenopathy  Skin:     General: Skin is warm and dry  Capillary Refill: Capillary refill takes less than 2 seconds  Neurological:      Mental Status: He is alert and oriented to person, place, and time        Gait: Gait normal    Psychiatric:         Mood and Affect: Mood normal          Behavior: Behavior normal          Labs:   Lab Results   Component Value Date    HGBA1C 11 6 (A) 05/06/2021    HGBA1C 11 7 (H) 02/05/2021    HGBA1C 9 8 (H) 04/03/2020     Lab Results   Component Value Date    CREATININE 0 87 04/14/2021    CREATININE 0 78 04/13/2021    CREATININE 1 03 04/12/2021    BUN 28 (H) 04/14/2021     01/29/2015    K 3 6 04/14/2021     04/14/2021    CO2 28 04/14/2021     eGFR   Date Value Ref Range Status   04/14/2021 96 ml/min/1 73sq m Final     Lab Results   Component Value Date    CHOL 250 01/29/2015    HDL 32 (L) 02/05/2021    TRIG 284 (H) 04/11/2021     Lab Results   Component Value Date    ALT 45 04/07/2021    AST 21 04/07/2021    ALKPHOS 78 04/07/2021    BILITOT 0 3 01/29/2015     Lab Results   Component Value Date    ANT1BAVYMWKE 0 139 (L) 02/05/2021    WKL2UQMSNCQC 0 039 (L) 04/30/2019    PYM4GYMUFZHI 2 584 03/02/2018     Lab Results   Component Value Date    FREET4 1 65 (H) 02/05/2021       Impression & Plan:    Problem List Items Addressed This Visit        Endocrine    Hypothyroidism     Check TSH and free T4  Relevant Orders    TSH, 3rd generation Lab Collect    T4, free Lab Collect    Type 2 diabetes, uncontrolled, with neuropathy ([facility] [address] ) - Primary     Patient is severely uncontrolled  Counseled on pathophysiology of diabetes  He is aware that his diet presents a significant barrier to glucose control  Referral for diabetes education given  Strongly encouraged him to make an appointment for MNT  Due to lack of data, no changes made to current insulin regimen  Start ozempic  Patient will significantly benefit from increased glucose control, weight loss, and added CV protection  Reviewed mechanism of action  Reviewed potential side effects including n/v/GI upset  Instructed patient to test and record blood sugars 4x daily  The patient is a candidate for CGM use: Indications: Diabetes Type 2  The patient is treated with 3 or more injections of insulin daily  The patients performs SMBG at least 4 times daily   SMBG data is being used to make adjustments to the insulin regimen      Lab Results   Component Value Date    HGBA1C 11 6 (A) 05/06/2021            Relevant Medications    Semaglutide,0 25 or 0 5MG/DOS, 2 MG/1 5ML SOPN    insulin lispro (HumaLOG) 100 units/mL injection    Other Relevant Orders    Ambulatory referral to Diabetic Education    Ambulatory Referral to Ophthalmology    HEMOGLOBIN A1C W/ EAG ESTIMATION Lab Collect    Lipid panel Lab Collect Lab Collect    Comprehensive metabolic panel Lab Collect    Microalbumin / creatinine urine ratio Lab Collect    Type 2 diabetes mellitus with hyperglycemia, with long-term current use of insulin (HCC)    Relevant Medications    Semaglutide,0 25 or 0 5MG/DOS, 2 MG/1 5ML SOPN    insulin lispro (HumaLOG) 100 units/mL injection       Respiratory    Obstructive sleep apnea     Counseled on the relationship between untreated MARSHA and weight gain, HTN, and hyperglycemia  Recommend sleep study and CPAP  Cardiovascular and Mediastinum    Hypertension     BP elevated at 158/70  Continue current regimen  Focus on diet modification, weight loss, and smoking cessation  Other    Morbid obesity ([address] )     Start ozempic  Meet with RD  Resume healthy diet  Relevant Medications    Semaglutide,0 25 or 0 5MG/DOS, 2 MG/1 5ML SOPN    Vitamin D deficiency     Recommend OTC Vit D 2,000 iu  Orders Placed This Encounter   Procedures    HEMOGLOBIN A1C W/ EAG ESTIMATION Lab Collect     Standing Status:   Future     Standing Expiration Date:   9/16/2022    Lipid panel Lab Collect Lab Collect     This is a patient instruction: This test requires patient fasting for 10-12 hours or longer  Drinking of black coffee or black tea is acceptable  Standing Status:   Future     Standing Expiration Date:   9/16/2022    Comprehensive metabolic panel Lab Collect     This is a patient instruction: Patient fasting for 8 hours or longer recommended  Standing Status:   Future     Standing Expiration Date:   9/16/2022    Microalbumin / creatinine urine ratio Lab Collect     Standing Status:   Future     Standing Expiration Date:   9/16/2022    TSH, 3rd generation Lab Collect     This is a patient instruction: This test is non-fasting  Please drink two glasses of water morning of bloodwork          Standing Status:   Future     Standing Expiration Date:   9/16/2022    T4, free Lab Collect     Standing Status:   Future     Standing Expiration Date:   9/16/2022    Ambulatory referral to Diabetic Education     Standing Status:   Future     Standing Expiration Date:   6/16/2022     Referral Priority:   Routine Referral Type:   Consult - AMB     Referral Reason:   Specialty Services Required     Requested Specialty:   Diabetes Services     Number of Visits Requested:   1     Expiration Date:   6/16/2022    Ambulatory Referral to Ophthalmology     Standing Status:   Future     Standing Expiration Date:   6/16/2022     Referral Priority:   Routine     Referral Type:   Consult - AMB     Referral Reason:   Specialty Services Required     Referred to Provider:   Megan Cordero MD     Requested Specialty:   Ophthalmology     Number of Visits Requested:   1     Expiration Date:   6/16/2022       Patient Instructions   1  Start ozempic; Inject 0 25 mg once weekly for 4 weeks then increase to 0 5 mg once weekly  2  Make an appointment to meet with Ivania hurtado, our dietician  3  Test your blood sugar 4x daily for the next two months and then I can get you a continuous glucose monitor like Freestyle Funmi or a Dexcom  Discussed with the patient and all questioned fully answered  He will call me if any problems arise  Follow-up appointment in 3 months       Counseled patient on diagnostic results, prognosis, risk and benefit of treatment options, instruction for management, importance of treatment compliance, Risk  factor reduction and impressions    MARLEEN Eaton

## 2021-06-16 NOTE — ASSESSMENT & PLAN NOTE
Start ozempic  Meet with RD  Resume healthy diet  Positioning (Leave Blank If You Do Not Want): The patient was placed in a comfortable position exposing the surgical site.

## 2021-06-16 NOTE — TELEPHONE ENCOUNTER
Pt called the office today after his visit and notes the medication is too expensive  Notes cost to him would be $420   Asking for another recommendation  (semaglutide)

## 2021-06-16 NOTE — ASSESSMENT & PLAN NOTE
Counseled on the relationship between untreated MARSHA and weight gain, HTN, and hyperglycemia  Recommend sleep study and CPAP

## 2021-06-16 NOTE — PATIENT INSTRUCTIONS
1  Start ozempic; Inject 0 25 mg once weekly for 4 weeks then increase to 0 5 mg once weekly  2  Make an appointment to meet with Mg Lennon, our dietician  3  Test your blood sugar 4x daily for the next two months and then I can get you a continuous glucose monitor like Freestyle Funmi or a Dexcom

## 2021-06-16 NOTE — ASSESSMENT & PLAN NOTE
Patient is severely uncontrolled  Counseled on pathophysiology of diabetes  He is aware that his diet presents a significant barrier to glucose control  Referral for diabetes education given  Strongly encouraged him to make an appointment for MNT  Due to lack of data, no changes made to current insulin regimen  Start ozempic  Patient will significantly benefit from increased glucose control, weight loss, and added CV protection  Reviewed mechanism of action  Reviewed potential side effects including n/v/GI upset  Instructed patient to test and record blood sugars 4x daily  The patient is a candidate for CGM use: Indications: Diabetes Type 2  The patient is treated with 3 or more injections of insulin daily  The patients performs SMBG at least 4 times daily   SMBG data is being used to make adjustments to the insulin regimen      Lab Results   Component Value Date    HGBA1C 11 6 (A) 05/06/2021

## 2021-06-16 NOTE — ASSESSMENT & PLAN NOTE
BP elevated at 158/70  Continue current regimen  Focus on diet modification, weight loss, and smoking cessation

## 2021-06-17 ENCOUNTER — APPOINTMENT (OUTPATIENT)
Dept: PHYSICAL THERAPY | Facility: HOME HEALTHCARE | Age: 56
End: 2021-06-17
Payer: MEDICARE

## 2021-06-22 ENCOUNTER — APPOINTMENT (OUTPATIENT)
Dept: PHYSICAL THERAPY | Facility: HOME HEALTHCARE | Age: 56
End: 2021-06-22
Payer: MEDICARE

## 2021-06-22 NOTE — TELEPHONE ENCOUNTER
Let's try Jardiance  According to [facility], the generic will be covered  Please remind patient that he must remain well hydrated on this medication  It will help with blood glucose reduction  It has been shown to help a little bit with weight loss  It is a pill he needs to take every day  It doesn't matter whether he takes it in the morning or at night  Thanks

## 2021-06-22 NOTE — TELEPHONE ENCOUNTER
Called PTs prescription benefit plan - talked to [facility] and they stated the following:  Ozempic -  90 day supply would be $608 32 with copay  For quantity of 1 at a retail pharmacy it would be $226 27  Trulicity -   Is a covered medication  90 day supply would be $603 15 with copay  For quantity of 1 at a retail pharmacy it would be $224 35  Victoza -   Is a covered medication  90 day supply would be $725 94 with copay  For quantity of 1 at a retail pharmacy it would be $269 99  Will make the provider aware

## 2021-06-23 RX ORDER — INSULIN LISPRO 100 [IU]/ML
INJECTION, SOLUTION INTRAVENOUS; SUBCUTANEOUS
Qty: 15 ML | Refills: 0 | OUTPATIENT
Start: 2021-06-23

## 2021-06-24 ENCOUNTER — APPOINTMENT (OUTPATIENT)
Dept: PHYSICAL THERAPY | Facility: HOME HEALTHCARE | Age: 56
End: 2021-06-24
Payer: MEDICARE

## 2021-06-29 ENCOUNTER — APPOINTMENT (OUTPATIENT)
Dept: PHYSICAL THERAPY | Facility: HOME HEALTHCARE | Age: 56
End: 2021-06-29
Payer: MEDICARE

## 2021-07-01 DIAGNOSIS — E78.00 PURE HYPERCHOLESTEROLEMIA: ICD-10-CM

## 2021-07-01 RX ORDER — ATORVASTATIN CALCIUM 20 MG/1
TABLET, FILM COATED ORAL
Qty: 90 TABLET | Refills: 0 | Status: SHIPPED | OUTPATIENT
Start: 2021-07-01 | End: 2021-10-01

## 2021-07-07 DIAGNOSIS — F32.9 REACTIVE DEPRESSION: ICD-10-CM

## 2021-07-07 RX ORDER — DULOXETIN HYDROCHLORIDE 60 MG/1
CAPSULE, DELAYED RELEASE ORAL
Qty: 90 CAPSULE | Refills: 0 | Status: SHIPPED | OUTPATIENT
Start: 2021-07-07 | End: 2021-11-08 | Stop reason: SDUPTHER

## 2021-07-13 ENCOUNTER — OFFICE VISIT (OUTPATIENT)
Dept: FAMILY MEDICINE CLINIC | Facility: HOME HEALTHCARE | Age: 56
End: 2021-07-13
Payer: MEDICARE

## 2021-07-13 VITALS
TEMPERATURE: 98.8 F | HEIGHT: 64 IN | DIASTOLIC BLOOD PRESSURE: 82 MMHG | HEART RATE: 100 BPM | RESPIRATION RATE: 18 BRPM | SYSTOLIC BLOOD PRESSURE: 134 MMHG | BODY MASS INDEX: 43.29 KG/M2 | WEIGHT: 253.6 LBS | OXYGEN SATURATION: 96 %

## 2021-07-13 DIAGNOSIS — M25.561 CHRONIC PAIN OF RIGHT KNEE: ICD-10-CM

## 2021-07-13 DIAGNOSIS — F33.9 DEPRESSION, RECURRENT (HCC): ICD-10-CM

## 2021-07-13 DIAGNOSIS — G47.33 OBSTRUCTIVE SLEEP APNEA: ICD-10-CM

## 2021-07-13 DIAGNOSIS — F17.210 CIGARETTE NICOTINE DEPENDENCE WITHOUT COMPLICATION: ICD-10-CM

## 2021-07-13 DIAGNOSIS — Z13.5 SCREENING FOR DIABETIC RETINOPATHY: Primary | ICD-10-CM

## 2021-07-13 DIAGNOSIS — Z23 ENCOUNTER FOR IMMUNIZATION: ICD-10-CM

## 2021-07-13 DIAGNOSIS — F17.200 TOBACCO DEPENDENCY: ICD-10-CM

## 2021-07-13 DIAGNOSIS — Z12.11 SCREENING FOR COLORECTAL CANCER: ICD-10-CM

## 2021-07-13 DIAGNOSIS — M25.551 CHRONIC PAIN OF RIGHT HIP: ICD-10-CM

## 2021-07-13 DIAGNOSIS — G89.29 CHRONIC PAIN OF RIGHT KNEE: ICD-10-CM

## 2021-07-13 DIAGNOSIS — IMO0002 TYPE 2 DIABETES, UNCONTROLLED, WITH NEUROPATHY: ICD-10-CM

## 2021-07-13 DIAGNOSIS — G89.29 CHRONIC PAIN OF RIGHT HIP: ICD-10-CM

## 2021-07-13 DIAGNOSIS — Z11.4 SCREENING FOR HIV (HUMAN IMMUNODEFICIENCY VIRUS): ICD-10-CM

## 2021-07-13 DIAGNOSIS — Z12.12 SCREENING FOR COLORECTAL CANCER: ICD-10-CM

## 2021-07-13 PROCEDURE — 99213 OFFICE O/P EST LOW 20 MIN: CPT | Performed by: FAMILY MEDICINE

## 2021-07-13 RX ORDER — GABAPENTIN 300 MG/1
300 CAPSULE ORAL 3 TIMES DAILY
Qty: 90 CAPSULE | Refills: 5 | Status: SHIPPED | OUTPATIENT
Start: 2021-07-13 | End: 2021-09-10

## 2021-07-13 RX ORDER — INSULIN LISPRO 100 [IU]/ML
15 INJECTION, SOLUTION INTRAVENOUS; SUBCUTANEOUS
COMMUNITY
End: 2021-08-16 | Stop reason: SDUPTHER

## 2021-07-13 RX ORDER — GABAPENTIN 300 MG/1
300 CAPSULE ORAL 3 TIMES DAILY
Qty: 90 CAPSULE | Refills: 0 | Status: SHIPPED | OUTPATIENT
Start: 2021-07-13 | End: 2021-07-13 | Stop reason: SDUPTHER

## 2021-07-13 RX ORDER — VARENICLINE TARTRATE 1 MG/1
1 TABLET, FILM COATED ORAL 2 TIMES DAILY
Qty: 60 TABLET | Refills: 5 | Status: SHIPPED | OUTPATIENT
Start: 2021-07-13 | End: 2022-04-11 | Stop reason: ALTCHOICE

## 2021-07-13 RX ORDER — LORATADINE 10 MG/1
20 TABLET ORAL 2 TIMES DAILY
COMMUNITY
Start: 2021-06-22 | End: 2022-05-03

## 2021-07-13 RX ORDER — FLUTICASONE PROPIONATE 50 MCG
SPRAY, SUSPENSION (ML) NASAL
COMMUNITY
Start: 2021-06-23 | End: 2022-05-03 | Stop reason: SDUPTHER

## 2021-07-13 NOTE — PATIENT INSTRUCTIONS
Medicare Preventive Visit Patient Instructions  Thank you for completing your Welcome to Medicare Visit or Medicare Annual Wellness Visit today  Your next wellness visit will be due in one year (7/14/2022)  The screening/preventive services that you may require over the next 5-10 years are detailed below  Some tests may not apply to you based off risk factors and/or age  Screening tests ordered at today's visit but not completed yet may show as past due  Also, please note that scanned in results may not display below  Preventive Screenings:  Service Recommendations Previous Testing/Comments   Colorectal Cancer Screening  · Colonoscopy    · Fecal Occult Blood Test (FOBT)/Fecal Immunochemical Test (FIT)  · Fecal DNA/Cologuard Test  · Flexible Sigmoidoscopy Age: 54-65 years old   Colonoscopy: every 10 years (May be performed more frequently if at higher risk)  OR  FOBT/FIT: every 1 year  OR  Cologuard: every 3 years  OR  Sigmoidoscopy: every 5 years  Screening may be recommended earlier than age 48 if at higher risk for colorectal cancer  Also, an individualized decision between you and your healthcare provider will decide whether screening between the ages of 74-80 would be appropriate   Colonoscopy: Not on file  FOBT/FIT: Not on file  Cologuard: Not on file  Sigmoidoscopy: Not on file          Prostate Cancer Screening Individualized decision between patient and health care provider in men between ages of 53-78   Medicare will cover every 12 months beginning on the day after your 50th birthday PSA: 0 4 ng/mL           Hepatitis C Screening Once for adults born between 1945 and 1965  More frequently in patients at high risk for Hepatitis C Hep C Antibody: 03/03/2018    Screening Current   Diabetes Screening 1-2 times per year if you're at risk for diabetes or have pre-diabetes Fasting glucose: 288 mg/dL   A1C: 11 6    Screening Not Indicated  History Diabetes   Cholesterol Screening Once every 5 years if you don't have a lipid disorder  May order more often based on risk factors  Lipid panel: 02/05/2021    Screening Not Indicated  History Lipid Disorder      Other Preventive Screenings Covered by Medicare:  1  Abdominal Aortic Aneurysm (AAA) Screening: covered once if your at risk  You're considered to be at risk if you have a family history of AAA or a male between the age of 73-68 who smoking at least 100 cigarettes in your lifetime  2  Lung Cancer Screening: covers low dose CT scan once per year if you meet all of the following conditions: (1) Age 50-69; (2) No signs or symptoms of lung cancer; (3) Current smoker or have quit smoking within the last 15 years; (4) You have a tobacco smoking history of at least 30 pack years (packs per day x number of years you smoked); (5) You get a written order from a healthcare provider  3  Glaucoma Screening: covered annually if you're considered high risk: (1) You have diabetes OR (2) Family history of glaucoma OR (3)  aged 48 and older OR (3)  American aged 72 and older  3  Osteoporosis Screening: covered every 2 years if you meet one of the following conditions: (1) Have a vertebral abnormality; (2) On glucocorticoid therapy for more than 3 months; (3) Have primary hyperparathyroidism; (4) On osteoporosis medications and need to assess response to drug therapy  5  HIV Screening: covered annually if you're between the age of 12-76  Also covered annually if you are younger than 13 and older than 72 with risk factors for HIV infection  For pregnant patients, it is covered up to 3 times per pregnancy      Immunizations:  Immunization Recommendations   Influenza Vaccine Annual influenza vaccination during flu season is recommended for all persons aged >= 6 months who do not have contraindications   Pneumococcal Vaccine (Prevnar and Pneumovax)  * Prevnar = PCV13  * Pneumovax = PPSV23 Adults 25-60 years old: 1-3 doses may be recommended based on certain risk factors  Adults 72 years old: Prevnar (PCV13) vaccine recommended followed by Pneumovax (PPSV23) vaccine  If already received PPSV23 since turning 65, then PCV13 recommended at least one year after PPSV23 dose  Hepatitis B Vaccine 3 dose series if at intermediate or high risk (ex: diabetes, end stage renal disease, liver disease)   Tetanus (Td) Vaccine - COST NOT COVERED BY MEDICARE PART B Following completion of primary series, a booster dose should be given every 10 years to maintain immunity against tetanus  Td may also be given as tetanus wound prophylaxis  Tdap Vaccine - COST NOT COVERED BY MEDICARE PART B Recommended at least once for all adults  For pregnant patients, recommended with each pregnancy  Shingles Vaccine (Shingrix) - COST NOT COVERED BY MEDICARE PART B  2 shot series recommended in those aged 48 and above     Health Maintenance Due:      Topic Date Due    HIV Screening  Never done    Colorectal Cancer Screening  Never done    Lung Cancer Screening  05/20/2022    Hepatitis C Screening  Completed     Immunizations Due:      Topic Date Due    DTaP,Tdap,and Td Vaccines (1 - Tdap) 09/10/1985    Influenza Vaccine (1) 09/01/2021     Advance Directives   What are advance directives? Advance directives are legal documents that state your wishes and plans for medical care  These plans are made ahead of time in case you lose your ability to make decisions for yourself  Advance directives can apply to any medical decision, such as the treatments you want, and if you want to donate organs  What are the types of advance directives? There are many types of advance directives, and each state has rules about how to use them  You may choose a combination of any of the following:  · Living will: This is a written record of the treatment you want  You can also choose which treatments you do not want, which to limit, and which to stop at a certain time   This includes surgery, medicine, IV fluid, and tube feedings  · Durable power of  for healthcare [facility]): This is a written record that states who you want to make healthcare choices for you when you are unable to make them for yourself  This person, called a proxy, is usually a family member or a friend  You may choose more than 1 proxy  · Do not resuscitate (DNR) order:  A DNR order is used in case your heart stops beating or you stop breathing  It is a request not to have certain forms of treatment, such as CPR  A DNR order may be included in other types of advance directives  · Medical directive: This covers the care that you want if you are in a coma, near death, or unable to make decisions for yourself  You can list the treatments you want for each condition  Treatment may include pain medicine, surgery, blood transfusions, dialysis, IV or tube feedings, and a ventilator (breathing machine)  · Values history: This document has questions about your views, beliefs, and how you feel and think about life  This information can help others choose the care that you would choose  Why are advance directives important? An advance directive helps you control your care  Although spoken wishes may be used, it is better to have your wishes written down  Spoken wishes can be misunderstood, or not followed  Treatments may be given even if you do not want them  An advance directive may make it easier for your family to make difficult choices about your care  Cigarette Smoking and Your Health   Risks to your health if you smoke:  Nicotine and other chemicals found in tobacco damage every cell in your body  Even if you are a light smoker, you have an increased risk for cancer, heart disease, and lung disease  If you are pregnant or have diabetes, smoking increases your risk for complications  Benefits to your health if you stop smoking:   · You decrease respiratory symptoms such as coughing, wheezing, and shortness of breath     · You reduce your risk for cancers of the lung, mouth, throat, kidney, bladder, pancreas, stomach, and cervix  If you already have cancer, you increase the benefits of chemotherapy  You also reduce your risk for cancer returning or a second cancer from developing  · You reduce your risk for heart disease, blood clots, heart attack, and stroke  · You reduce your risk for lung infections, and diseases such as pneumonia, asthma, chronic bronchitis, and emphysema  · Your circulation improves  More oxygen can be delivered to your body  If you have diabetes, you lower your risk for complications, such as kidney, artery, and eye diseases  You also lower your risk for nerve damage  Nerve damage can lead to amputations, poor vision, and blindness  · You improve your body's ability to heal and to fight infections  For more information and support to stop smoking:   · [employer]  Phone: 3- 084 - [phone]  Web Address: [place]  Weight Management   Why it is important to manage your weight:  Being overweight increases your risk of health conditions such as heart disease, high blood pressure, type 2 diabetes, and certain types of cancer  It can also increase your risk for osteoarthritis, sleep apnea, and other respiratory problems  Aim for a slow, steady weight loss  Even a small amount of weight loss can lower your risk of health problems  How to lose weight safely:  A safe and healthy way to lose weight is to eat fewer calories and get regular exercise  You can lose up about 1 pound a week by decreasing the number of calories you eat by 500 calories each day  Healthy meal plan for weight management:  A healthy meal plan includes a variety of foods, contains fewer calories, and helps you stay healthy  A healthy meal plan includes the following:  · Eat whole-grain foods more often  A healthy meal plan should contain fiber  Fiber is the part of grains, fruits, and vegetables that is not broken down by your body   Whole-grain foods are healthy and provide extra fiber in your diet  Some examples of whole-grain foods are whole-wheat breads and pastas, oatmeal, brown rice, and bulgur  · Eat a variety of vegetables every day  Include dark, leafy greens such as spinach, kale, terrance greens, and mustard greens  Eat yellow and orange vegetables such as carrots, sweet potatoes, and winter squash  · Eat a variety of fruits every day  Choose fresh or canned fruit (canned in its own juice or light syrup) instead of juice  Fruit juice has very little or no fiber  · Eat low-fat dairy foods  Drink fat-free (skim) milk or 1% milk  Eat fat-free yogurt and low-fat cottage cheese  Try low-fat cheeses such as mozzarella and other reduced-fat cheeses  · Choose meat and other protein foods that are low in fat  Choose beans or other legumes such as split peas or lentils  Choose fish, skinless poultry (chicken or turkey), or lean cuts of red meat (beef or pork)  Before you cook meat or poultry, cut off any visible fat  · Use less fat and oil  Try baking foods instead of frying them  Add less fat, such as margarine, sour cream, regular salad dressing and mayonnaise to foods  Eat fewer high-fat foods  Some examples of high-fat foods include french fries, doughnuts, ice cream, and cakes  · Eat fewer sweets  Limit foods and drinks that are high in sugar  This includes candy, cookies, regular soda, and sweetened drinks  Exercise:  Exercise at least 30 minutes per day on most days of the week  Some examples of exercise include walking, biking, dancing, and swimming  You can also fit in more physical activity by taking the stairs instead of the elevator or parking farther away from stores  Ask your healthcare provider about the best exercise plan for you  © Copyright [employer] 2018 Information is for End User's use only and may not be sold, redistributed or otherwise used for commercial purposes   All illustrations and images included in CareNotes® are the copyrighted property of [employer]  or [address] [place], [place] [address]buddy judi

## 2021-07-13 NOTE — PROGRESS NOTES
Assessment and Plan:     Problem List Items Addressed This Visit        Endocrine    Type 2 diabetes, uncontrolled, with neuropathy (HCC)    Relevant Medications    insulin lispro (HumaLOG KwikPen) 100 units/mL injection pen    gabapentin (NEURONTIN) 300 mg capsule       Respiratory    Obstructive sleep apnea       Other    Depression, recurrent (HCC)    Relevant Medications    varenicline (CHANTIX) 1 mg tablet      Other Visit Diagnoses     Screening for diabetic retinopathy    -  Primary    Screening for HIV (human immunodeficiency virus)        Relevant Orders    HIV 1/2 Antigen/Antibody (4th Generation) w Reflex [facility]    Encounter for immunization        Screening for colorectal cancer        Relevant Orders    Ambulatory referral to Gastroenterology    Chronic pain of right knee        Relevant Orders    Ambulatory referral to Orthopedic Surgery    Chronic pain of right hip        Relevant Orders    Ambulatory referral to Orthopedic Surgery    Cigarette nicotine dependence without complication        Relevant Medications    varenicline (CHANTIX) 1 mg tablet    Tobacco dependency        Relevant Medications    varenicline (CHANTIX) 1 mg tablet        Patient agreeable to starting Chantix for smoking cessation  Patient states he is ready to quit and patient was counseled on medication and side effects  Patient to follow-up with me in 1 month or sooner if needed to evaluate smoking cessation progress  Colorectal screening/colonoscopy referral placed  Patient states his brother did have some type of "colon mass" patient not candidate for Cologuard  Patient with chronic right-sided knee and hip pain for years  Would like referral to orthopedics  Will continue to follow with Endocrinology  Patient has pending lab work ordered from Endocrinology  Patient still with some depression symptoms however declined behavior health referral   Denies any suicidal ideation or thoughts of hurting others      BMI Counseling: Body mass index is 43 53 kg/m²  The BMI is above normal  Nutrition recommendations include decreasing portion sizes, encouraging healthy choices of fruits and vegetables, decreasing fast food intake, consuming healthier snacks, limiting drinks that contain sugar, moderation in carbohydrate intake, increasing intake of lean protein, reducing intake of saturated and trans fat and reducing intake of cholesterol  Exercise recommendations include exercising 3-5 times per week and strength training exercises  No pharmacotherapy was ordered  Tobacco Cessation Counseling: Tobacco cessation counseling was provided  The patient is sincerely urged to quit consumption of tobacco  He is ready to quit tobacco  Medication options and side effects of medication discussed  Patient agreed to medication  Varenicline (chantix) was prescribed  Preventive health issues were discussed with patient, and age appropriate screening tests were ordered as noted in patient's After Visit Summary  Personalized health advice and appropriate referrals for health education or preventive services given if needed, as noted in patient's After Visit Summary       History of Present Illness:     Patient presents for Medicare Annual Wellness visit    Patient Care Team:  Irma Matthew PA-C as PCP - General (Family Medicine)  [facility] Practitioner (Endocrinology)     Problem List:     Patient Active Problem List   Diagnosis    Allergic rhinitis    Angioedema    Arthritis    Chronic back pain    Chronic sinusitis    COPD, severity to be determined ([address] )    Depression with anxiety    Hyperlipidemia    Hypertension    Hypothyroidism    Morbid obesity ([address] )    Type 2 diabetes, uncontrolled, with neuropathy ([address] )    Vitamin D deficiency    Cellulitis of left lower leg    Elevated LFTs    Elevated lactic acid level    Tobacco abuse    Noncompliance with diabetes treatment    Acute respiratory failure with hypoxia (HCC)    Epistaxis    Obstructive sleep apnea    Muscle twitching    Type 2 diabetes mellitus with hyperglycemia, with long-term current use of insulin (HCC)    Tobacco use    Atelectasis    Urinary retention    Constipation    Continuous dependence on cigarette smoking    Ankle pain    Depression, recurrent (HCC)      Past Medical and Surgical History:     Past Medical History:   Diagnosis Date    Angioedema     [facility] mellitus [facility]     Disease of thyroid gland     Hyperlipidemia     Hypertension     Morbid obesity [facility]     MARSHA (obstructive sleep apnea)      Past Surgical History:   Procedure Laterality Date    KNEE SURGERY      SINUS SURGERY        Family History:     Family History   Problem Relation Age of Onset    Thyroid cancer Mother     Diabetes type II Mother     Esophageal cancer Father     Diabetes type II Father     Kidney cancer Brother     Diabetes type II Brother     Diabetes type II Maternal Grandmother       Social History:     Social History     Socioeconomic History    Marital status: Single     Spouse name: None    Number of children: None    Years of education: None    Highest education level: None   Occupational History    None   Tobacco Use    Smoking status: Current Every Day Smoker     Packs/day: 2 00     Years: 70 00     Pack years: 140 00     Start date: 4/15/1942    Smokeless tobacco: Never Used    Tobacco comment: states read to quit prior to admit   Vaping Use    Vaping Use: Never used   Substance and Sexual Activity    Alcohol use: Not Currently     Alcohol/week: 3 0 standard drinks     Types: 2 Cans of beer, 1 Shots of liquor per week     Comment: seldom    Drug use: No    Sexual activity: Not Currently   Other Topics Concern    None   Social History Narrative    None     Social Determinants of Health     Financial Resource Strain:     Difficulty of Paying Living Expenses:    Food Insecurity:     Worried About Running Out of Food in the Last Year:    [address] in the Last Year:    Transportation Needs:     Lack of Transportation (Medical):  Lack of Transportation (Non-Medical):    Physical Activity:     Days of Exercise per Week:     Minutes of Exercise per Session:    Stress:     Feeling of Stress :    Social Connections:     Frequency of Communication with Friends and Family:     Frequency of Social Gatherings with Friends and Family:     Attends Muslim Services:     Active Member of Clubs or Organizations:     Attends Club or Organization Meetings:     Marital Status:    Intimate Partner Violence:     Fear of Current or Ex-Partner:     Emotionally Abused:     Physically Abused:     Sexually Abused:       Medications and Allergies:     Current Outpatient Medications   Medication Sig Dispense Refill    albuterol (PROVENTIL HFA,VENTOLIN HFA) 90 mcg/act inhaler Inhale 2 puffs every 4 (four) hours as needed for wheezing 1 Inhaler 0    Ascorbic Acid (VITAMIN C PO) Take 1 tablet by mouth daily      aspirin 81 mg chewable tablet Take one tablet by mouth daily      atorvastatin (LIPITOR) 20 mg tablet Take 1 tablet by mouth once daily 90 tablet 0    DULoxetine (CYMBALTA) 60 mg delayed release capsule Take 1 capsule by mouth once daily 90 capsule 0    Empagliflozin 25 MG TABS Take 1 tablet (25 mg total) by mouth every morning 30 tablet 2    EPINEPHrine (EPIPEN) 0 3 mg/0 3 mL SOAJ Inject 0 3 mL (0 3 mg total) into a muscle once for 1 dose 2 each 0    Euthyrox 175 MCG tablet Take 1 tablet by mouth once daily 30 tablet 5    fluticasone (FLONASE) 50 mcg/act nasal spray USE 2 SPRAY(S) IN EACH NOSTRIL ONCE DAILY      fluticasone-vilanterol (BREO ELLIPTA) 100-25 mcg/inh inhaler Inhale 1 puff daily Rinse mouth after use   1 Inhaler 0    gabapentin (NEURONTIN) 300 mg capsule Take 1 capsule (300 mg total) by mouth 3 (three) times a day 90 capsule 5    hydrochlorothiazide (HYDRODIURIL) 25 mg tablet Take 1 tablet (25 mg total) by mouth 2 (two) times a day 60 tablet 2    hydrOXYzine HCL (ATARAX) 25 mg tablet Take 1 tablet (25 mg total) by mouth every 6 (six) hours as needed for itching or allergies 30 tablet 0    insulin lispro (HumaLOG KwikPen) 100 units/mL injection pen Inject 15 Units under the skin 3 (three) times a day with meals      Insulin Pen Needle (PEN NEEDLES 3/16") 31G X 5 MM MISC by Other route 4 (four) times a day 300 each 3    Lantus SoloStar 100 units/mL injection pen INJECT 60 UNITS SUBCUTANEOUSLY ONCE DAILY 15 mL 2    loratadine (CLARITIN) 10 mg tablet Take 20 mg by mouth daily      metFORMIN (GLUCOPHAGE) 1000 MG tablet Take 1 tablet by mouth twice daily 60 tablet 5    omega-3-acid ethyl esters (LOVAZA) 1 g capsule Take 1 capsule (1 g total) by mouth 2 (two) times a day for high cholesterol 60 capsule 2    sodium chloride (OCEAN) 0 65 % nasal spray 1 spray into each nostril as needed for rhinitis 30 mL 1    traZODone (DESYREL) 150 mg tablet TAKE 1 TABLET BY MOUTH ONCE DAILY AT BEDTIME 90 tablet 0    insulin lispro (HumaLOG) 100 units/mL injection Inject 15 Units under the skin 3 (three) times a day with meals 40 5 mL 0    varenicline (CHANTIX) 1 mg tablet Take 1 tablet (1 mg total) by mouth 2 (two) times a day 60 tablet 5     No current facility-administered medications for this visit  Allergies   Allergen Reactions    Ace Inhibitors Swelling     Angioedema    Other Anaphylaxis     Pt believes that he is allergic to peanut butter  Also, seasonal allergies    Zinc Tongue Swelling    Clindamycin      Had angioedema episode approx 5 hr after IM administration of clindamycin  Unclear if there is definitive causal relationship        Immunizations:     Immunization History   Administered Date(s) Administered    INFLUENZA 09/16/2011, 10/29/2013    Pneumococcal Polysaccharide PPV23 09/16/2011, 02/27/2019    SARS-CoV-2 / COVID-19 mRNA IM (Moderna) 05/18/2021, 06/15/2021    Td (adult), Unspecified 09/11/2011    Tdap 09/11/2011    Tuberculin Skin Test-PPD Intradermal 04/15/2021      Health Maintenance:         Topic Date Due    HIV Screening  Never done    Colorectal Cancer Screening  Never done    Lung Cancer Screening  05/20/2022    Hepatitis C Screening  Completed         Topic Date Due    DTaP,Tdap,and Td Vaccines (1 - Tdap) 09/10/1985    Influenza Vaccine (1) 09/01/2021      Medicare Health Risk Assessment:     /82   Pulse 100   Temp 98 8 °F (37 1 °C) (Tympanic)   Resp 18   Ht 5' 4" (1 626 m)   Wt 115 kg (253 lb 9 6 oz)   SpO2 96%   BMI 43 53 kg/m²      Last Medicare Wellness visit information reviewed, patient interviewed and updates made to the record today  Health Risk Assessment:   Patient rates overall health as poor  Patient feels that their physical health rating is much worse  Patient is dissatisfied with their life  Eyesight was rated as same  Hearing was rated as same  Patient feels that their emotional and mental health rating is slightly worse  Patients states they are never, rarely angry  Patient states they are always unusually tired/fatigued  Pain experienced in the last 7 days has been a lot  Patient's pain rating has been 8/10  Patient states that he has experienced weight loss or gain in last 6 months  Patient on antidepressants  Patient not interested in seeing behavior health at this time  Patient denies any suicidal ideation or thoughts of hurting others  Depression Screening:   PHQ-2 Score: 6  PHQ-9 Score: 18      Fall Risk Screening: In the past year, patient has experienced: history of falling in past year    Number of falls: 1  Injured during fall?: Yes    Feels unsteady when standing or walking?: Yes    Worried about falling?: Yes      Home Safety:  Patient has trouble with stairs inside or outside of their home   Patient has working smoke alarms Home safety hazards include: household clutter, uneven floors and poor household lighting  Pt unsure if he has a CO monitor  Pt also sleep-walks which has caused injury in the past     Nutrition:   Current diet is Regular  Tries to diet but "it doesn't happen"    Medications:   Patient is currently taking over-the-counter supplements  OTC medications include: see medication list  Patient is able to manage medications  Activities of Daily Living (ADLs)/Instrumental Activities of Daily Living (IADLs):   Walk and transfer into and out of bed and chair?: Yes  Dress and groom yourself?: Yes    Bathe or shower yourself?: Yes    Feed yourself? Yes  Do your laundry/housekeeping?: Yes  Manage your money, pay your bills and track your expenses?: Yes  Make your own meals?: Yes    Do your own shopping?: Yes    Durable Medical Equipment Suppliers  unsure of his DME suppliers name    Previous Hospitalizations:   Any hospitalizations or ED visits within the last 12 months?: Yes    How many hospitalizations have you had in the last year?: 1-2    Advance Care Planning:   Living will: No    Durable POA for healthcare: No    Advanced directive: No    Five wishes given: Yes      PREVENTIVE SCREENINGS      Cardiovascular Screening:    General: Screening Not Indicated and History Lipid Disorder      Diabetes Screening:     General: Screening Not Indicated and History Diabetes      Abdominal Aortic Aneurysm (AAA) Screening:    Risk factors include: tobacco use        Lung Cancer Screening:     General: Screening Current      Hepatitis C Screening:    General: Screening Current    Screening, Brief Intervention, and Referral to Treatment (SBIRT)    Screening  Typical number of drinks in a day: 0  Typical number of drinks in a week: 0  Interpretation: Low risk drinking behavior      Single Item Drug Screening:  How often have you used an illegal drug (including marijuana) or a prescription medication for non-medical reasons in the past year? never    Single Item Drug Screen Score: 0  Interpretation: Negative screen for possible drug use disorder      Debbie Hamlin PA-C

## 2021-07-15 ENCOUNTER — TELEPHONE (OUTPATIENT)
Dept: ENDOCRINOLOGY | Facility: CLINIC | Age: 56
End: 2021-07-15

## 2021-07-15 NOTE — TELEPHONE ENCOUNTER
Received [facility] paperwork from the patient for Ozempic - provider section needed to be filled out  Filled out and faxed back today 7/15/2021

## 2021-07-16 DIAGNOSIS — T78.40XS ALLERGIC REACTION, SEQUELA: ICD-10-CM

## 2021-07-16 RX ORDER — EPINEPHRINE 0.3 MG/.3ML
0.3 INJECTION SUBCUTANEOUS ONCE
Qty: 2 EACH | Refills: 0 | Status: SHIPPED | OUTPATIENT
Start: 2021-07-16 | End: 2021-12-03

## 2021-07-20 ENCOUNTER — TELEPHONE (OUTPATIENT)
Dept: GASTROENTEROLOGY | Facility: CLINIC | Age: 56
End: 2021-07-20

## 2021-07-29 ENCOUNTER — OFFICE VISIT (OUTPATIENT)
Dept: OBGYN CLINIC | Facility: CLINIC | Age: 56
End: 2021-07-29
Payer: MEDICARE

## 2021-07-29 ENCOUNTER — APPOINTMENT (OUTPATIENT)
Dept: RADIOLOGY | Facility: CLINIC | Age: 56
End: 2021-07-29
Payer: MEDICARE

## 2021-07-29 VITALS
HEART RATE: 101 BPM | WEIGHT: 245 LBS | DIASTOLIC BLOOD PRESSURE: 93 MMHG | HEIGHT: 64 IN | BODY MASS INDEX: 41.83 KG/M2 | SYSTOLIC BLOOD PRESSURE: 141 MMHG

## 2021-07-29 DIAGNOSIS — M25.561 CHRONIC PAIN OF RIGHT KNEE: ICD-10-CM

## 2021-07-29 DIAGNOSIS — M25.551 GREATER TROCHANTERIC PAIN SYNDROME OF RIGHT LOWER EXTREMITY: Primary | ICD-10-CM

## 2021-07-29 DIAGNOSIS — G89.29 CHRONIC PAIN OF RIGHT KNEE: ICD-10-CM

## 2021-07-29 DIAGNOSIS — M25.551 CHRONIC PAIN OF RIGHT HIP: ICD-10-CM

## 2021-07-29 DIAGNOSIS — G89.29 CHRONIC PAIN OF RIGHT HIP: ICD-10-CM

## 2021-07-29 DIAGNOSIS — M17.11 PRIMARY OSTEOARTHRITIS OF RIGHT KNEE: ICD-10-CM

## 2021-07-29 PROCEDURE — 73562 X-RAY EXAM OF KNEE 3: CPT

## 2021-07-29 PROCEDURE — 99204 OFFICE O/P NEW MOD 45 MIN: CPT | Performed by: FAMILY MEDICINE

## 2021-07-29 PROCEDURE — 20610 DRAIN/INJ JOINT/BURSA W/O US: CPT | Performed by: FAMILY MEDICINE

## 2021-07-29 RX ORDER — LIDOCAINE HYDROCHLORIDE 10 MG/ML
4 INJECTION, SOLUTION INFILTRATION; PERINEURAL
Status: COMPLETED | OUTPATIENT
Start: 2021-07-29 | End: 2021-07-29

## 2021-07-29 RX ORDER — METHYLPREDNISOLONE ACETATE 40 MG/ML
1 INJECTION, SUSPENSION INTRA-ARTICULAR; INTRALESIONAL; INTRAMUSCULAR; SOFT TISSUE
Status: COMPLETED | OUTPATIENT
Start: 2021-07-29 | End: 2021-07-29

## 2021-07-29 RX ADMIN — METHYLPREDNISOLONE ACETATE 1 ML: 40 INJECTION, SUSPENSION INTRA-ARTICULAR; INTRALESIONAL; INTRAMUSCULAR; SOFT TISSUE at 15:40

## 2021-07-29 RX ADMIN — LIDOCAINE HYDROCHLORIDE 4 ML: 10 INJECTION, SOLUTION INFILTRATION; PERINEURAL at 15:40

## 2021-07-29 NOTE — PROGRESS NOTES
[facility] SPECIAL[facility] [place] [address]  [facility] [address]  [phone]      Chief Complaint:  Chief Complaint   Patient presents with    Right Hip - Pain    Right Knee - Pain       Vitals:  /93   Pulse 101   Ht 5' 4" (1 626 m)   Wt 111 kg (245 lb)   BMI 42 05 kg/m²     The following portions of the patient's history were reviewed and updated as appropriate: allergies, current medications, past family history, past medical history, past social history, past surgical history, and problem list       Subjective:   Patient ID: Brandan Mccauley is a 63 y o  male  Here c/o R hip and R knee pain  R knee pain worse than R hip    R knee pain- pain for about 2 years  Hx of ACL repair 40 years ago  Hurts to walk  Denies swelling/giving out/locking  Constant sharp pain  Taking gabapentin  He is on disability    R hip -fell a few weeks ago on lower back  Pain for months to years  RIGHT HIP     INDICATION:   M25 [id number]: Pain in right hip  History of trauma to the left femur      COMPARISON:  None     VIEWS:  XR HIP/PELV 2-3 VWS RIGHT W PELVIS IF PERFORMED         FINDINGS:     There is no acute fracture or dislocation      There is mild bilateral hip joint space narrowing  There is chronic appearing deformity of the visualized left proximal femoral shaft, likely sequela the patient's reported history of prior trauma      No lytic or blastic osseous lesion      Soft tissues are unremarkable      The visualized lumbar spine is unremarkable      IMPRESSION:     No acute fracture or dislocation identified  Mild bilateral hip joint space narrowing      Chronic deformity of the left proximal femur likely sequelae of the patient's reported history of prior trauma          Review of Systems   Constitutional: Negative for fatigue and fever  Respiratory: Negative for shortness of breath  Cardiovascular: Negative for chest pain     Gastrointestinal: Negative for abdominal pain and nausea  Genitourinary: Negative for dysuria  Musculoskeletal: Positive for arthralgias and gait problem  Skin: Negative for rash and wound  Neurological: Negative for weakness and headaches  Objective:  Right Knee Exam     Tenderness   The patient is experiencing tenderness in the medial joint line  Range of Motion   The patient has normal right knee ROM  Tests   Shana:  Medial - negative Lateral - negative  Varus: negative Valgus: negative    Other   Swelling: none  Effusion: no effusion present      Right Hip Exam     Tenderness   The patient is experiencing tenderness in the greater trochanter and posterior  Range of Motion   The patient has normal right hip ROM  Muscle Strength   The patient has normal right hip strength  Tests   NAHUM: positive          Observations     Right Knee   Negative for effusion  Physical Exam  Vitals and nursing note reviewed  Constitutional:       Appearance: Normal appearance  He is well-developed  HENT:      Head: Normocephalic  Mouth/Throat:      Mouth: Mucous membranes are moist    Eyes:      Extraocular Movements: Extraocular movements intact  Cardiovascular:      Rate and Rhythm: Normal rate and regular rhythm  Heart sounds: Normal heart sounds  Pulmonary:      Effort: Pulmonary effort is normal       Breath sounds: Normal breath sounds  Abdominal:      General: Bowel sounds are normal       Palpations: Abdomen is soft  Musculoskeletal:         General: Tenderness present  Normal range of motion  Cervical back: Normal range of motion  Right knee: No effusion  Instability Tests: Medial Shana test negative and lateral Shana test negative  Skin:     General: Skin is warm and dry  Neurological:      General: No focal deficit present  Mental Status: He is alert and oriented to person, place, and time     Psychiatric:         Mood and Affect: Mood normal  Behavior: Behavior normal          Thought Content: Thought content normal      Large joint arthrocentesis: R knee  Universal Protocol:  Consent: Verbal consent obtained  Risks and benefits: risks, benefits and alternatives were discussed  Consent given by: patient  Time out: Immediately prior to procedure a "time out" was called to verify the correct patient, procedure, equipment, support staff and site/side marked as required  Timeout called at: 7/29/2021 3:55 PM   Site marked: the operative site was marked  Supporting Documentation  Indications: pain   Procedure Details  Location: knee - R knee  Preparation: Patient was prepped and draped in the usual sterile fashion  Needle size: 25 G  Ultrasound guidance: no  Approach: anterolateral  Medications administered: 4 mL lidocaine 1 %; 1 mL methylPREDNISolone acetate 40 mg/mL    Patient tolerance: patient tolerated the procedure well with no immediate complications  Dressing:  Sterile dressing applied            I have personally reviewed pertinent films in PACS and my interpretation is XR- R hip- mild DJD, XR R knee- mod DJD  Assessment/Plan:  Assessment/Plan   Diagnoses and all orders for this visit:    Greater trochanteric pain syndrome of right lower extremity  -     Ambulatory referral to Physical Therapy; Future    Chronic pain of right knee  -     Ambulatory referral to Orthopedic Surgery  -     XR knee 3 vw right non injury; Future  -     Ambulatory referral to Physical Therapy; Future    Chronic pain of right hip  -     Ambulatory referral to Orthopedic Surgery  -     Ambulatory referral to Physical Therapy; Future    Other orders  -     Large joint arthrocentesis: R knee        Return in about 6 weeks (around 9/9/2021) for Artemio Alves MD

## 2021-07-29 NOTE — PATIENT INSTRUCTIONS
F/u 6 wks  Begin physical therapy  Icing/heat/OTC pain meds as needed  Home exercises  Knee Exercises   AMBULATORY CARE:   What you need to know about knee exercises:  Knee exercises help strengthen the muscles around your knee  Strong muscles can help reduce pain and decrease your risk of future injury  Knee exercises also help you heal after an injury or surgery  · Start slow  These are beginning exercises  Ask your healthcare provider if you need to see a physical therapist for more advanced exercises  As you get stronger, you may be able to do more sets of each exercise or add weights  · Stop if you feel pain  It is normal to feel some discomfort at first  Regular exercise will help decrease your discomfort over time  · Do the exercises on both legs  Do this so both knees remain strong  · Warm up before you do knee exercises  Walk or ride a stationary bike for 5 or 10 minutes to warm your muscles  How to perform knee stretches safely:  Always stretch before you do strengthening exercises  Do these stretching exercises again after you do the strengthening exercises  Do these stretches 4 or 5 days a week, or as directed  · Standing calf stretch: Face a wall and place both palms flat on the wall, or hold the back of a chair for balance  Keep a slight bend in your knees  Take a big step backward with one leg  Keep your other leg directly under you  Keep both heels flat and press your hips forward  Hold the stretch for 30 seconds, and then relax for 30 seconds  Switch legs  Repeat 2 or 3 times on each leg  · Standing quadriceps stretch:  Stand and place one hand against a wall or hold the back of a chair for balance  With your weight on one leg, bend your other leg and grab your ankle  Bring your heel toward your buttocks  Hold the stretch for 30 to 60 seconds  Switch legs  Repeat 2 or 3 times on each leg           · Sitting hamstring stretch:  Sit with both legs straight in front of you  Do not point or flex your toes  Place your palms on the floor and slide your hands forward until you feel the stretch  Do not round your back  Hold the stretch for 30 seconds  Repeat 2 or 3 times  How to perform knee strengthening exercises safely:  Do these exercises 4 or 5 days a week, or as directed  · Standing half squats:  Stand with your feet shoulder-width apart  Lean your back against a wall or hold the back of a chair for balance, if needed  Slowly sit down about 10 inches, as if you are going to sit in a chair  Your body weight should be mostly over your heels  Hold the squat for 5 seconds, then rise to a standing position  Do 3 sets of 10 squats to strengthen your buttocks and thighs  · Standing hamstring curls: Face a wall and place both palms flat on the wall, or hold the back of a chair for balance  With your weight on one leg, lift your other foot as close to your buttocks as you can  Hold for 5 seconds and then lower your leg  Do 2 sets of 10 curls on each leg  This exercise strengthens the muscles in the back of your thigh  · Standing calf raises:  Face a wall and place both palms flat on the wall, or hold the back of a chair for balance  Stand up straight, and do not lean  Place all your weight on one leg by lifting the other foot off the floor  Raise the heel of the foot that is on the floor as high as you can and then lower it  Do 2 sets of 10 calf raises on each leg to strengthen your calf muscles  · Straight leg lifts:  Lie on your stomach with straight legs  Fold your arms in front of you and rest your head in your arms  Tighten your leg muscles and raise one leg as high as you can  Hold for 5 seconds, then lower your leg  Do 2 sets of 10 lifts on each leg to strengthen your buttocks  · Sitting leg lifts:  Sit in a chair  Slowly straighten and raise one leg  Squeeze your thigh muscles and hold for 5 seconds  Relax and return your foot to the floor  Do 2 sets of 10 lifts on each leg  This helps strengthen the muscles in the front of your thigh  Contact your healthcare provider if:   · You have new pain or your pain becomes worse  · You have questions or concerns about your condition or care  © Copyright [employer] 2021 Information is for End User's use only and may not be sold, redistributed or otherwise used for commercial purposes  All illustrations and images included in CareNotes® are the copyrighted property of [employer]  or Jannet Hernández  The above information is an  only  It is not intended as medical advice for individual conditions or treatments  Talk to your doctor, nurse or pharmacist before following any medical regimen to see if it is safe and effective for you

## 2021-08-12 DIAGNOSIS — IMO0002 TYPE 2 DIABETES, UNCONTROLLED, WITH NEUROPATHY: ICD-10-CM

## 2021-08-12 DIAGNOSIS — E11.65 TYPE 2 DIABETES MELLITUS WITH HYPERGLYCEMIA, WITH LONG-TERM CURRENT USE OF INSULIN (HCC): ICD-10-CM

## 2021-08-12 DIAGNOSIS — Z79.4 TYPE 2 DIABETES MELLITUS WITH HYPERGLYCEMIA, WITH LONG-TERM CURRENT USE OF INSULIN (HCC): ICD-10-CM

## 2021-08-12 RX ORDER — PEN NEEDLE, DIABETIC 30 GX5/16"
NEEDLE, DISPOSABLE MISCELLANEOUS 4 TIMES DAILY
Qty: 300 EACH | Refills: 3 | Status: SHIPPED | OUTPATIENT
Start: 2021-08-12

## 2021-08-12 RX ORDER — INSULIN GLARGINE 100 [IU]/ML
60 INJECTION, SOLUTION SUBCUTANEOUS DAILY
Qty: 18 ML | Refills: 5 | Status: SHIPPED | OUTPATIENT
Start: 2021-08-12 | End: 2022-02-21 | Stop reason: SDUPTHER

## 2021-08-13 NOTE — TELEPHONE ENCOUNTER
Called and inquired about the status of the patients application  Stated that they need the patient to call and confirm the insurance Medicare that they listed on the application  Phone number for patient to call is [phone]  Will provide the patient with this information

## 2021-08-16 DIAGNOSIS — Z79.4 TYPE 2 DIABETES MELLITUS WITH HYPERGLYCEMIA, WITH LONG-TERM CURRENT USE OF INSULIN (HCC): Primary | ICD-10-CM

## 2021-08-16 DIAGNOSIS — I10 ESSENTIAL HYPERTENSION: ICD-10-CM

## 2021-08-16 DIAGNOSIS — E11.65 TYPE 2 DIABETES MELLITUS WITH HYPERGLYCEMIA, WITH LONG-TERM CURRENT USE OF INSULIN (HCC): Primary | ICD-10-CM

## 2021-08-16 RX ORDER — HYDROCHLOROTHIAZIDE 25 MG/1
25 TABLET ORAL 2 TIMES DAILY
Qty: 60 TABLET | Refills: 2 | Status: SHIPPED | OUTPATIENT
Start: 2021-08-16 | End: 2021-11-22 | Stop reason: SDUPTHER

## 2021-08-16 RX ORDER — INSULIN LISPRO 100 [IU]/ML
15 INJECTION, SOLUTION INTRAVENOUS; SUBCUTANEOUS
Qty: 15 ML | Refills: 2 | Status: SHIPPED | OUTPATIENT
Start: 2021-08-16 | End: 2022-05-02

## 2021-08-20 DIAGNOSIS — F41.8 DEPRESSION WITH ANXIETY: ICD-10-CM

## 2021-08-20 RX ORDER — TRAZODONE HYDROCHLORIDE 150 MG/1
150 TABLET ORAL
Qty: 90 TABLET | Refills: 0 | Status: SHIPPED | OUTPATIENT
Start: 2021-08-20 | End: 2021-11-08 | Stop reason: SDUPTHER

## 2021-09-10 DIAGNOSIS — IMO0002 TYPE 2 DIABETES, UNCONTROLLED, WITH NEUROPATHY: ICD-10-CM

## 2021-09-10 RX ORDER — GABAPENTIN 300 MG/1
CAPSULE ORAL
Qty: 90 CAPSULE | Refills: 0 | Status: SHIPPED | OUTPATIENT
Start: 2021-09-10 | End: 2021-11-08

## 2021-09-15 DIAGNOSIS — E11.8 TYPE 2 DIABETES MELLITUS WITH COMPLICATION, WITH LONG-TERM CURRENT USE OF INSULIN (HCC): ICD-10-CM

## 2021-09-15 DIAGNOSIS — Z79.4 TYPE 2 DIABETES MELLITUS WITH COMPLICATION, WITH LONG-TERM CURRENT USE OF INSULIN (HCC): ICD-10-CM

## 2021-10-01 DIAGNOSIS — E78.00 PURE HYPERCHOLESTEROLEMIA: ICD-10-CM

## 2021-10-01 RX ORDER — ATORVASTATIN CALCIUM 20 MG/1
TABLET, FILM COATED ORAL
Qty: 90 TABLET | Refills: 0 | Status: SHIPPED | OUTPATIENT
Start: 2021-10-01 | End: 2022-01-04 | Stop reason: SDUPTHER

## 2021-11-08 DIAGNOSIS — F32.9 REACTIVE DEPRESSION: ICD-10-CM

## 2021-11-08 DIAGNOSIS — F41.8 DEPRESSION WITH ANXIETY: ICD-10-CM

## 2021-11-08 DIAGNOSIS — IMO0002 TYPE 2 DIABETES, UNCONTROLLED, WITH NEUROPATHY: ICD-10-CM

## 2021-11-08 RX ORDER — GABAPENTIN 300 MG/1
300 CAPSULE ORAL 3 TIMES DAILY
Qty: 90 CAPSULE | Refills: 0 | OUTPATIENT
Start: 2021-11-08

## 2021-11-08 RX ORDER — DULOXETIN HYDROCHLORIDE 60 MG/1
60 CAPSULE, DELAYED RELEASE ORAL DAILY
Qty: 90 CAPSULE | Refills: 1 | Status: SHIPPED | OUTPATIENT
Start: 2021-11-08 | End: 2022-07-05 | Stop reason: SDUPTHER

## 2021-11-08 RX ORDER — TRAZODONE HYDROCHLORIDE 150 MG/1
150 TABLET ORAL
Qty: 90 TABLET | Refills: 1 | Status: SHIPPED | OUTPATIENT
Start: 2021-11-08 | End: 2021-11-22 | Stop reason: SDUPTHER

## 2021-11-22 DIAGNOSIS — F41.8 DEPRESSION WITH ANXIETY: ICD-10-CM

## 2021-11-22 DIAGNOSIS — I10 ESSENTIAL HYPERTENSION: ICD-10-CM

## 2021-11-22 RX ORDER — HYDROCHLOROTHIAZIDE 25 MG/1
25 TABLET ORAL 2 TIMES DAILY
Qty: 60 TABLET | Refills: 2 | Status: SHIPPED | OUTPATIENT
Start: 2021-11-22 | End: 2022-02-14

## 2021-11-22 RX ORDER — TRAZODONE HYDROCHLORIDE 150 MG/1
150 TABLET ORAL
Qty: 90 TABLET | Refills: 1 | Status: SHIPPED | OUTPATIENT
Start: 2021-11-22 | End: 2022-06-06

## 2021-12-03 DIAGNOSIS — T78.40XS ALLERGIC REACTION, SEQUELA: ICD-10-CM

## 2021-12-03 RX ORDER — EPINEPHRINE 0.3 MG/.3ML
INJECTION SUBCUTANEOUS
Qty: 2 EACH | Refills: 0 | Status: SHIPPED | OUTPATIENT
Start: 2021-12-03

## 2021-12-09 ENCOUNTER — OFFICE VISIT (OUTPATIENT)
Dept: FAMILY MEDICINE CLINIC | Facility: HOME HEALTHCARE | Age: 56
End: 2021-12-09
Payer: MEDICARE

## 2021-12-09 ENCOUNTER — HOSPITAL ENCOUNTER (OUTPATIENT)
Dept: NON INVASIVE DIAGNOSTICS | Facility: HOSPITAL | Age: 56
Discharge: HOME | End: 2021-12-09
Payer: MEDICARE

## 2021-12-09 VITALS
WEIGHT: 264.4 LBS | TEMPERATURE: 97.9 F | DIASTOLIC BLOOD PRESSURE: 88 MMHG | RESPIRATION RATE: 16 BRPM | HEART RATE: 104 BPM | BODY MASS INDEX: 45.38 KG/M2 | OXYGEN SATURATION: 93 % | SYSTOLIC BLOOD PRESSURE: 154 MMHG

## 2021-12-09 DIAGNOSIS — L03.116 CELLULITIS OF LEFT LOWER EXTREMITY: ICD-10-CM

## 2021-12-09 DIAGNOSIS — M79.89 SWELLING OF CALF: ICD-10-CM

## 2021-12-09 DIAGNOSIS — M79.662 PAIN OF LEFT CALF: ICD-10-CM

## 2021-12-09 DIAGNOSIS — E11.65 TYPE 2 DIABETES MELLITUS WITH HYPERGLYCEMIA, WITH LONG-TERM CURRENT USE OF INSULIN (HCC): Primary | ICD-10-CM

## 2021-12-09 DIAGNOSIS — Z79.4 TYPE 2 DIABETES MELLITUS WITH HYPERGLYCEMIA, WITH LONG-TERM CURRENT USE OF INSULIN (HCC): Primary | ICD-10-CM

## 2021-12-09 PROCEDURE — 93971 EXTREMITY STUDY: CPT | Performed by: SURGERY

## 2021-12-09 PROCEDURE — 93971 EXTREMITY STUDY: CPT

## 2021-12-09 PROCEDURE — 99213 OFFICE O/P EST LOW 20 MIN: CPT | Performed by: FAMILY MEDICINE

## 2021-12-09 RX ORDER — CEPHALEXIN 500 MG/1
500 CAPSULE ORAL EVERY 6 HOURS SCHEDULED
Qty: 40 CAPSULE | Refills: 0 | Status: SHIPPED | OUTPATIENT
Start: 2021-12-09 | End: 2021-12-19

## 2021-12-16 ENCOUNTER — OFFICE VISIT (OUTPATIENT)
Dept: URGENT CARE | Facility: CLINIC | Age: 56
End: 2021-12-16
Payer: MEDICARE

## 2021-12-16 VITALS
TEMPERATURE: 99 F | WEIGHT: 269 LBS | HEART RATE: 111 BPM | RESPIRATION RATE: 18 BRPM | BODY MASS INDEX: 45.93 KG/M2 | HEIGHT: 64 IN | OXYGEN SATURATION: 94 %

## 2021-12-16 DIAGNOSIS — L03.116 CELLULITIS OF LEFT LOWER EXTREMITY: Primary | ICD-10-CM

## 2021-12-16 DIAGNOSIS — R73.9 HYPERGLYCEMIA: ICD-10-CM

## 2021-12-16 LAB — GLUCOSE SERPL-MCNC: 176 MG/DL (ref 65–140)

## 2021-12-16 PROCEDURE — 99213 OFFICE O/P EST LOW 20 MIN: CPT

## 2021-12-16 PROCEDURE — G0463 HOSPITAL OUTPT CLINIC VISIT: HCPCS

## 2021-12-16 PROCEDURE — 82948 REAGENT STRIP/BLOOD GLUCOSE: CPT

## 2021-12-17 ENCOUNTER — HOSPITAL ENCOUNTER (EMERGENCY)
Facility: HOSPITAL | Age: 56
Discharge: LEFT BEFORE DISCHARGE | End: 2021-12-17
Attending: EMERGENCY MEDICINE | Admitting: EMERGENCY MEDICINE
Payer: MEDICARE

## 2021-12-17 VITALS
OXYGEN SATURATION: 92 % | BODY MASS INDEX: 45.92 KG/M2 | SYSTOLIC BLOOD PRESSURE: 139 MMHG | HEIGHT: 64 IN | TEMPERATURE: 97.9 F | WEIGHT: 268.96 LBS | HEART RATE: 97 BPM | RESPIRATION RATE: 20 BRPM | DIASTOLIC BLOOD PRESSURE: 78 MMHG

## 2021-12-17 DIAGNOSIS — L03.116 LEFT LEG CELLULITIS: Primary | ICD-10-CM

## 2021-12-17 LAB
ALBUMIN SERPL BCP-MCNC: 3.6 G/DL (ref 3.5–5)
ALP SERPL-CCNC: 94 U/L (ref 46–116)
ALT SERPL W P-5'-P-CCNC: 41 U/L (ref 12–78)
ANION GAP SERPL CALCULATED.3IONS-SCNC: 7 MMOL/L (ref 4–13)
AST SERPL W P-5'-P-CCNC: 21 U/L (ref 5–45)
BASOPHILS # BLD AUTO: 0.06 THOUSANDS/ΜL (ref 0–0.1)
BASOPHILS NFR BLD AUTO: 1 % (ref 0–1)
BILIRUB SERPL-MCNC: 0.41 MG/DL (ref 0.2–1)
BUN SERPL-MCNC: 14 MG/DL (ref 5–25)
CALCIUM SERPL-MCNC: 9 MG/DL (ref 8.3–10.1)
CHLORIDE SERPL-SCNC: 92 MMOL/L (ref 100–108)
CO2 SERPL-SCNC: 33 MMOL/L (ref 21–32)
CREAT SERPL-MCNC: 1.13 MG/DL (ref 0.6–1.3)
EOSINOPHIL # BLD AUTO: 0.13 THOUSAND/ΜL (ref 0–0.61)
EOSINOPHIL NFR BLD AUTO: 1 % (ref 0–6)
ERYTHROCYTE [DISTWIDTH] IN BLOOD BY AUTOMATED COUNT: 14.1 % (ref 11.6–15.1)
GFR SERPL CREATININE-BSD FRML MDRD: 71 ML/MIN/1.73SQ M
GLUCOSE SERPL-MCNC: 225 MG/DL (ref 65–140)
HCT VFR BLD AUTO: 43.2 % (ref 36.5–49.3)
HGB BLD-MCNC: 14.2 G/DL (ref 12–17)
IMM GRANULOCYTES # BLD AUTO: 0.04 THOUSAND/UL (ref 0–0.2)
IMM GRANULOCYTES NFR BLD AUTO: 0 % (ref 0–2)
LACTATE SERPL-SCNC: 1.8 MMOL/L (ref 0.5–2)
LACTATE SERPL-SCNC: 2.2 MMOL/L (ref 0.5–2)
LYMPHOCYTES # BLD AUTO: 2.22 THOUSANDS/ΜL (ref 0.6–4.47)
LYMPHOCYTES NFR BLD AUTO: 23 % (ref 14–44)
MCH RBC QN AUTO: 27.6 PG (ref 26.8–34.3)
MCHC RBC AUTO-ENTMCNC: 32.9 G/DL (ref 31.4–37.4)
MCV RBC AUTO: 84 FL (ref 82–98)
MONOCYTES # BLD AUTO: 0.99 THOUSAND/ΜL (ref 0.17–1.22)
MONOCYTES NFR BLD AUTO: 10 % (ref 4–12)
NEUTROPHILS # BLD AUTO: 6.12 THOUSANDS/ΜL (ref 1.85–7.62)
NEUTS SEG NFR BLD AUTO: 65 % (ref 43–75)
NRBC BLD AUTO-RTO: 0 /100 WBCS
PLATELET # BLD AUTO: 343 THOUSANDS/UL (ref 149–390)
PMV BLD AUTO: 9.1 FL (ref 8.9–12.7)
POTASSIUM SERPL-SCNC: 3.6 MMOL/L (ref 3.5–5.3)
PROT SERPL-MCNC: 8.2 G/DL (ref 6.4–8.2)
RBC # BLD AUTO: 5.15 MILLION/UL (ref 3.88–5.62)
SODIUM SERPL-SCNC: 132 MMOL/L (ref 136–145)
WBC # BLD AUTO: 9.56 THOUSAND/UL (ref 4.31–10.16)

## 2021-12-17 PROCEDURE — 80053 COMPREHEN METABOLIC PANEL: CPT | Performed by: PHYSICIAN ASSISTANT

## 2021-12-17 PROCEDURE — 96366 THER/PROPH/DIAG IV INF ADDON: CPT

## 2021-12-17 PROCEDURE — 83605 ASSAY OF LACTIC ACID: CPT | Performed by: PHYSICIAN ASSISTANT

## 2021-12-17 PROCEDURE — 96365 THER/PROPH/DIAG IV INF INIT: CPT

## 2021-12-17 PROCEDURE — 87040 BLOOD CULTURE FOR BACTERIA: CPT | Performed by: PHYSICIAN ASSISTANT

## 2021-12-17 PROCEDURE — 99284 EMERGENCY DEPT VISIT MOD MDM: CPT | Performed by: PHYSICIAN ASSISTANT

## 2021-12-17 PROCEDURE — 99283 EMERGENCY DEPT VISIT LOW MDM: CPT

## 2021-12-17 PROCEDURE — 96367 TX/PROPH/DG ADDL SEQ IV INF: CPT

## 2021-12-17 PROCEDURE — 36415 COLL VENOUS BLD VENIPUNCTURE: CPT | Performed by: PHYSICIAN ASSISTANT

## 2021-12-17 PROCEDURE — 85025 COMPLETE CBC W/AUTO DIFF WBC: CPT | Performed by: PHYSICIAN ASSISTANT

## 2021-12-17 RX ORDER — NICOTINE 21 MG/24HR
21 PATCH, TRANSDERMAL 24 HOURS TRANSDERMAL ONCE
Status: DISCONTINUED | OUTPATIENT
Start: 2021-12-17 | End: 2021-12-17 | Stop reason: HOSPADM

## 2021-12-17 RX ORDER — DOXYCYCLINE 100 MG/1
100 TABLET ORAL 2 TIMES DAILY
Qty: 14 TABLET | Refills: 0 | Status: SHIPPED | OUTPATIENT
Start: 2021-12-17 | End: 2021-12-24

## 2021-12-17 RX ORDER — CEFEPIME HYDROCHLORIDE 2 G/50ML
2000 INJECTION, SOLUTION INTRAVENOUS ONCE
Status: COMPLETED | OUTPATIENT
Start: 2021-12-17 | End: 2021-12-17

## 2021-12-17 RX ADMIN — VANCOMYCIN HYDROCHLORIDE 1750 MG: 1 INJECTION, POWDER, LYOPHILIZED, FOR SOLUTION INTRAVENOUS at 13:26

## 2021-12-17 RX ADMIN — NICOTINE 21 MG: 21 PATCH, EXTENDED RELEASE TRANSDERMAL at 13:47

## 2021-12-17 RX ADMIN — CEFEPIME HYDROCHLORIDE 2000 MG: 2 INJECTION, SOLUTION INTRAVENOUS at 12:31

## 2021-12-22 LAB
BACTERIA BLD CULT: NORMAL
BACTERIA BLD CULT: NORMAL

## 2022-01-03 DIAGNOSIS — E03.9 HYPOTHYROIDISM, UNSPECIFIED TYPE: ICD-10-CM

## 2022-01-03 RX ORDER — LEVOTHYROXINE SODIUM 175 UG/1
175 TABLET ORAL DAILY
Qty: 30 TABLET | Refills: 5 | Status: SHIPPED | OUTPATIENT
Start: 2022-01-03 | End: 2022-06-17

## 2022-01-04 DIAGNOSIS — E78.00 PURE HYPERCHOLESTEROLEMIA: ICD-10-CM

## 2022-01-04 RX ORDER — ATORVASTATIN CALCIUM 20 MG/1
TABLET, FILM COATED ORAL
Qty: 90 TABLET | Refills: 0 | Status: SHIPPED | OUTPATIENT
Start: 2022-01-04 | End: 2022-04-29

## 2022-01-06 DIAGNOSIS — IMO0002 TYPE 2 DIABETES, UNCONTROLLED, WITH NEUROPATHY: ICD-10-CM

## 2022-01-06 RX ORDER — GABAPENTIN 300 MG/1
300 CAPSULE ORAL 3 TIMES DAILY
Qty: 90 CAPSULE | Refills: 1 | Status: SHIPPED | OUTPATIENT
Start: 2022-01-06 | End: 2022-04-11 | Stop reason: SDUPTHER

## 2022-01-10 PROCEDURE — 99285 EMERGENCY DEPT VISIT HI MDM: CPT

## 2022-01-11 ENCOUNTER — APPOINTMENT (INPATIENT)
Dept: NON INVASIVE DIAGNOSTICS | Facility: HOSPITAL | Age: 57
DRG: 638 | End: 2022-01-11
Payer: MEDICARE

## 2022-01-11 ENCOUNTER — APPOINTMENT (EMERGENCY)
Dept: RADIOLOGY | Facility: HOSPITAL | Age: 57
DRG: 638 | End: 2022-01-11
Payer: MEDICARE

## 2022-01-11 ENCOUNTER — HOSPITAL ENCOUNTER (INPATIENT)
Facility: HOSPITAL | Age: 57
LOS: 3 days | Discharge: HOME | DRG: 638 | End: 2022-01-14
Attending: EMERGENCY MEDICINE | Admitting: FAMILY MEDICINE
Payer: MEDICARE

## 2022-01-11 DIAGNOSIS — M25.579 ANKLE PAIN: ICD-10-CM

## 2022-01-11 DIAGNOSIS — L03.116 CELLULITIS OF LEFT LOWER EXTREMITY: Primary | ICD-10-CM

## 2022-01-11 DIAGNOSIS — G47.33 OBSTRUCTIVE SLEEP APNEA: ICD-10-CM

## 2022-01-11 LAB
ALBUMIN SERPL BCP-MCNC: 2.9 G/DL (ref 3.5–5)
ALBUMIN SERPL BCP-MCNC: 3.1 G/DL (ref 3.5–5)
ALP SERPL-CCNC: 74 U/L (ref 46–116)
ALP SERPL-CCNC: 83 U/L (ref 46–116)
ALT SERPL W P-5'-P-CCNC: 30 U/L (ref 12–78)
ALT SERPL W P-5'-P-CCNC: 30 U/L (ref 12–78)
ANION GAP SERPL CALCULATED.3IONS-SCNC: 7 MMOL/L (ref 4–13)
ANION GAP SERPL CALCULATED.3IONS-SCNC: 9 MMOL/L (ref 4–13)
APTT PPP: 28 SECONDS (ref 23–37)
AST SERPL W P-5'-P-CCNC: 22 U/L (ref 5–45)
AST SERPL W P-5'-P-CCNC: 23 U/L (ref 5–45)
BASOPHILS # BLD AUTO: 0.04 THOUSANDS/ΜL (ref 0–0.1)
BASOPHILS # BLD AUTO: 0.05 THOUSANDS/ΜL (ref 0–0.1)
BASOPHILS NFR BLD AUTO: 0 % (ref 0–1)
BASOPHILS NFR BLD AUTO: 1 % (ref 0–1)
BILIRUB SERPL-MCNC: 0.24 MG/DL (ref 0.2–1)
BILIRUB SERPL-MCNC: 0.4 MG/DL (ref 0.2–1)
BUN SERPL-MCNC: 12 MG/DL (ref 5–25)
BUN SERPL-MCNC: 13 MG/DL (ref 5–25)
CALCIUM ALBUM COR SERPL-MCNC: 9.1 MG/DL (ref 8.3–10.1)
CALCIUM ALBUM COR SERPL-MCNC: 9.6 MG/DL (ref 8.3–10.1)
CALCIUM SERPL-MCNC: 8.2 MG/DL (ref 8.3–10.1)
CALCIUM SERPL-MCNC: 8.9 MG/DL (ref 8.3–10.1)
CHLORIDE SERPL-SCNC: 92 MMOL/L (ref 100–108)
CHLORIDE SERPL-SCNC: 93 MMOL/L (ref 100–108)
CO2 SERPL-SCNC: 31 MMOL/L (ref 21–32)
CO2 SERPL-SCNC: 33 MMOL/L (ref 21–32)
CREAT SERPL-MCNC: 1.09 MG/DL (ref 0.6–1.3)
CREAT SERPL-MCNC: 1.28 MG/DL (ref 0.6–1.3)
EOSINOPHIL # BLD AUTO: 0.18 THOUSAND/ΜL (ref 0–0.61)
EOSINOPHIL # BLD AUTO: 0.21 THOUSAND/ΜL (ref 0–0.61)
EOSINOPHIL NFR BLD AUTO: 2 % (ref 0–6)
EOSINOPHIL NFR BLD AUTO: 2 % (ref 0–6)
ERYTHROCYTE [DISTWIDTH] IN BLOOD BY AUTOMATED COUNT: 13.7 % (ref 11.6–15.1)
ERYTHROCYTE [DISTWIDTH] IN BLOOD BY AUTOMATED COUNT: 13.8 % (ref 11.6–15.1)
EST. AVERAGE GLUCOSE BLD GHB EST-MCNC: 226 MG/DL
GFR SERPL CREATININE-BSD FRML MDRD: 61 ML/MIN/1.73SQ M
GFR SERPL CREATININE-BSD FRML MDRD: 74 ML/MIN/1.73SQ M
GLUCOSE SERPL-MCNC: 124 MG/DL (ref 65–140)
GLUCOSE SERPL-MCNC: 138 MG/DL (ref 65–140)
GLUCOSE SERPL-MCNC: 149 MG/DL (ref 65–140)
GLUCOSE SERPL-MCNC: 154 MG/DL (ref 65–140)
GLUCOSE SERPL-MCNC: 205 MG/DL (ref 65–140)
GLUCOSE SERPL-MCNC: 227 MG/DL (ref 65–140)
GLUCOSE SERPL-MCNC: 274 MG/DL (ref 65–140)
HBA1C MFR BLD: 9.5 %
HCT VFR BLD AUTO: 37.9 % (ref 36.5–49.3)
HCT VFR BLD AUTO: 38.8 % (ref 36.5–49.3)
HGB BLD-MCNC: 12.6 G/DL (ref 12–17)
HGB BLD-MCNC: 12.7 G/DL (ref 12–17)
IMM GRANULOCYTES # BLD AUTO: 0.03 THOUSAND/UL (ref 0–0.2)
IMM GRANULOCYTES # BLD AUTO: 0.03 THOUSAND/UL (ref 0–0.2)
IMM GRANULOCYTES NFR BLD AUTO: 0 % (ref 0–2)
IMM GRANULOCYTES NFR BLD AUTO: 0 % (ref 0–2)
INR PPP: 0.86 (ref 0.84–1.19)
LACTATE SERPL-SCNC: 1.8 MMOL/L (ref 0.5–2)
LYMPHOCYTES # BLD AUTO: 1.09 THOUSANDS/ΜL (ref 0.6–4.47)
LYMPHOCYTES # BLD AUTO: 2.08 THOUSANDS/ΜL (ref 0.6–4.47)
LYMPHOCYTES NFR BLD AUTO: 10 % (ref 14–44)
LYMPHOCYTES NFR BLD AUTO: 22 % (ref 14–44)
MAGNESIUM SERPL-MCNC: 1.7 MG/DL (ref 1.6–2.6)
MCH RBC QN AUTO: 27.2 PG (ref 26.8–34.3)
MCH RBC QN AUTO: 28 PG (ref 26.8–34.3)
MCHC RBC AUTO-ENTMCNC: 32.5 G/DL (ref 31.4–37.4)
MCHC RBC AUTO-ENTMCNC: 33.5 G/DL (ref 31.4–37.4)
MCV RBC AUTO: 84 FL (ref 82–98)
MCV RBC AUTO: 84 FL (ref 82–98)
MONOCYTES # BLD AUTO: 0.64 THOUSAND/ΜL (ref 0.17–1.22)
MONOCYTES # BLD AUTO: 0.99 THOUSAND/ΜL (ref 0.17–1.22)
MONOCYTES NFR BLD AUTO: 11 % (ref 4–12)
MONOCYTES NFR BLD AUTO: 6 % (ref 4–12)
NEUTROPHILS # BLD AUTO: 6.08 THOUSANDS/ΜL (ref 1.85–7.62)
NEUTROPHILS # BLD AUTO: 8.57 THOUSANDS/ΜL (ref 1.85–7.62)
NEUTS SEG NFR BLD AUTO: 64 % (ref 43–75)
NEUTS SEG NFR BLD AUTO: 82 % (ref 43–75)
NRBC BLD AUTO-RTO: 0 /100 WBCS
NRBC BLD AUTO-RTO: 0 /100 WBCS
PHOSPHATE SERPL-MCNC: 3.9 MG/DL (ref 2.7–4.5)
PLATELET # BLD AUTO: 332 THOUSANDS/UL (ref 149–390)
PLATELET # BLD AUTO: 345 THOUSANDS/UL (ref 149–390)
PMV BLD AUTO: 8.5 FL (ref 8.9–12.7)
PMV BLD AUTO: 8.5 FL (ref 8.9–12.7)
POTASSIUM SERPL-SCNC: 3.7 MMOL/L (ref 3.5–5.3)
POTASSIUM SERPL-SCNC: 4 MMOL/L (ref 3.5–5.3)
PROCALCITONIN SERPL-MCNC: 0.06 NG/ML
PROT SERPL-MCNC: 7.2 G/DL (ref 6.4–8.2)
PROT SERPL-MCNC: 7.2 G/DL (ref 6.4–8.2)
PROTHROMBIN TIME: 11.3 SECONDS (ref 11.6–14.5)
RBC # BLD AUTO: 4.53 MILLION/UL (ref 3.88–5.62)
RBC # BLD AUTO: 4.63 MILLION/UL (ref 3.88–5.62)
SODIUM SERPL-SCNC: 132 MMOL/L (ref 136–145)
SODIUM SERPL-SCNC: 133 MMOL/L (ref 136–145)
T4 FREE SERPL-MCNC: 1.01 NG/DL (ref 0.76–1.46)
TSH SERPL DL<=0.05 MIU/L-ACNC: 8.98 UIU/ML (ref 0.36–3.74)
WBC # BLD AUTO: 10.55 THOUSAND/UL (ref 4.31–10.16)
WBC # BLD AUTO: 9.44 THOUSAND/UL (ref 4.31–10.16)

## 2022-01-11 PROCEDURE — 85730 THROMBOPLASTIN TIME PARTIAL: CPT | Performed by: EMERGENCY MEDICINE

## 2022-01-11 PROCEDURE — 93971 EXTREMITY STUDY: CPT | Performed by: SURGERY

## 2022-01-11 PROCEDURE — 36415 COLL VENOUS BLD VENIPUNCTURE: CPT | Performed by: FAMILY MEDICINE

## 2022-01-11 PROCEDURE — 83605 ASSAY OF LACTIC ACID: CPT | Performed by: EMERGENCY MEDICINE

## 2022-01-11 PROCEDURE — 84145 PROCALCITONIN (PCT): CPT | Performed by: EMERGENCY MEDICINE

## 2022-01-11 PROCEDURE — 94640 AIRWAY INHALATION TREATMENT: CPT

## 2022-01-11 PROCEDURE — 82948 REAGENT STRIP/BLOOD GLUCOSE: CPT

## 2022-01-11 PROCEDURE — 99222 1ST HOSP IP/OBS MODERATE 55: CPT | Performed by: ORTHOPAEDIC SURGERY

## 2022-01-11 PROCEDURE — 80053 COMPREHEN METABOLIC PANEL: CPT | Performed by: EMERGENCY MEDICINE

## 2022-01-11 PROCEDURE — 93971 EXTREMITY STUDY: CPT

## 2022-01-11 PROCEDURE — 84100 ASSAY OF PHOSPHORUS: CPT | Performed by: FAMILY MEDICINE

## 2022-01-11 PROCEDURE — 87081 CULTURE SCREEN ONLY: CPT | Performed by: FAMILY MEDICINE

## 2022-01-11 PROCEDURE — 97163 PT EVAL HIGH COMPLEX 45 MIN: CPT

## 2022-01-11 PROCEDURE — 85025 COMPLETE CBC W/AUTO DIFF WBC: CPT | Performed by: EMERGENCY MEDICINE

## 2022-01-11 PROCEDURE — 96365 THER/PROPH/DIAG IV INF INIT: CPT

## 2022-01-11 PROCEDURE — 87040 BLOOD CULTURE FOR BACTERIA: CPT | Performed by: EMERGENCY MEDICINE

## 2022-01-11 PROCEDURE — 73610 X-RAY EXAM OF ANKLE: CPT

## 2022-01-11 PROCEDURE — 85025 COMPLETE CBC W/AUTO DIFF WBC: CPT | Performed by: FAMILY MEDICINE

## 2022-01-11 PROCEDURE — 99285 EMERGENCY DEPT VISIT HI MDM: CPT | Performed by: EMERGENCY MEDICINE

## 2022-01-11 PROCEDURE — 80053 COMPREHEN METABOLIC PANEL: CPT | Performed by: FAMILY MEDICINE

## 2022-01-11 PROCEDURE — 84439 ASSAY OF FREE THYROXINE: CPT | Performed by: FAMILY MEDICINE

## 2022-01-11 PROCEDURE — 96375 TX/PRO/DX INJ NEW DRUG ADDON: CPT

## 2022-01-11 PROCEDURE — 83735 ASSAY OF MAGNESIUM: CPT | Performed by: FAMILY MEDICINE

## 2022-01-11 PROCEDURE — 97167 OT EVAL HIGH COMPLEX 60 MIN: CPT

## 2022-01-11 PROCEDURE — 73564 X-RAY EXAM KNEE 4 OR MORE: CPT

## 2022-01-11 PROCEDURE — 85610 PROTHROMBIN TIME: CPT | Performed by: EMERGENCY MEDICINE

## 2022-01-11 PROCEDURE — 99223 1ST HOSP IP/OBS HIGH 75: CPT | Performed by: FAMILY MEDICINE

## 2022-01-11 PROCEDURE — 84443 ASSAY THYROID STIM HORMONE: CPT | Performed by: FAMILY MEDICINE

## 2022-01-11 PROCEDURE — 83036 HEMOGLOBIN GLYCOSYLATED A1C: CPT | Performed by: FAMILY MEDICINE

## 2022-01-11 RX ORDER — DULOXETIN HYDROCHLORIDE 30 MG/1
60 CAPSULE, DELAYED RELEASE ORAL DAILY
Status: DISCONTINUED | OUTPATIENT
Start: 2022-01-11 | End: 2022-01-14 | Stop reason: HOSPADM

## 2022-01-11 RX ORDER — CHLORAL HYDRATE 500 MG
1000 CAPSULE ORAL 2 TIMES DAILY
Status: DISCONTINUED | OUTPATIENT
Start: 2022-01-11 | End: 2022-01-14 | Stop reason: HOSPADM

## 2022-01-11 RX ORDER — ONDANSETRON 2 MG/ML
4 INJECTION INTRAMUSCULAR; INTRAVENOUS EVERY 4 HOURS PRN
Status: DISCONTINUED | OUTPATIENT
Start: 2022-01-11 | End: 2022-01-14 | Stop reason: HOSPADM

## 2022-01-11 RX ORDER — FUROSEMIDE 10 MG/ML
40 INJECTION INTRAMUSCULAR; INTRAVENOUS ONCE
Status: COMPLETED | OUTPATIENT
Start: 2022-01-11 | End: 2022-01-11

## 2022-01-11 RX ORDER — LORATADINE 10 MG/1
20 TABLET ORAL 2 TIMES DAILY
Status: DISCONTINUED | OUTPATIENT
Start: 2022-01-11 | End: 2022-01-11

## 2022-01-11 RX ORDER — CEFTRIAXONE 2 G/50ML
2000 INJECTION, SOLUTION INTRAVENOUS EVERY 24 HOURS
Status: DISCONTINUED | OUTPATIENT
Start: 2022-01-11 | End: 2022-01-14 | Stop reason: HOSPADM

## 2022-01-11 RX ORDER — ALBUTEROL SULFATE 2.5 MG/3ML
2.5 SOLUTION RESPIRATORY (INHALATION) EVERY 4 HOURS PRN
Status: DISCONTINUED | OUTPATIENT
Start: 2022-01-11 | End: 2022-01-11

## 2022-01-11 RX ORDER — HYDROCHLOROTHIAZIDE 25 MG/1
25 TABLET ORAL 2 TIMES DAILY
Status: DISCONTINUED | OUTPATIENT
Start: 2022-01-11 | End: 2022-01-11

## 2022-01-11 RX ORDER — ATORVASTATIN CALCIUM 10 MG/1
20 TABLET, FILM COATED ORAL EVERY EVENING
Status: DISCONTINUED | OUTPATIENT
Start: 2022-01-11 | End: 2022-01-14 | Stop reason: HOSPADM

## 2022-01-11 RX ORDER — NICOTINE 21 MG/24HR
1 PATCH, TRANSDERMAL 24 HOURS TRANSDERMAL DAILY
Status: DISCONTINUED | OUTPATIENT
Start: 2022-01-12 | End: 2022-01-14 | Stop reason: HOSPADM

## 2022-01-11 RX ORDER — HYDROXYZINE HYDROCHLORIDE 25 MG/1
25 TABLET, FILM COATED ORAL EVERY 6 HOURS PRN
Status: DISCONTINUED | OUTPATIENT
Start: 2022-01-11 | End: 2022-01-14 | Stop reason: HOSPADM

## 2022-01-11 RX ORDER — CEFTRIAXONE 1 G/50ML
1000 INJECTION, SOLUTION INTRAVENOUS EVERY 24 HOURS
Status: DISCONTINUED | OUTPATIENT
Start: 2022-01-11 | End: 2022-01-11

## 2022-01-11 RX ORDER — NICOTINE 21 MG/24HR
14 PATCH, TRANSDERMAL 24 HOURS TRANSDERMAL ONCE
Status: COMPLETED | OUTPATIENT
Start: 2022-01-11 | End: 2022-01-12

## 2022-01-11 RX ORDER — HEPARIN SODIUM 5000 [USP'U]/ML
5000 INJECTION, SOLUTION INTRAVENOUS; SUBCUTANEOUS EVERY 8 HOURS SCHEDULED
Status: DISCONTINUED | OUTPATIENT
Start: 2022-01-11 | End: 2022-01-14 | Stop reason: HOSPADM

## 2022-01-11 RX ORDER — LEVOTHYROXINE SODIUM 175 UG/1
175 TABLET ORAL
Status: DISCONTINUED | OUTPATIENT
Start: 2022-01-11 | End: 2022-01-14 | Stop reason: HOSPADM

## 2022-01-11 RX ORDER — CEFTRIAXONE 1 G/50ML
1000 INJECTION, SOLUTION INTRAVENOUS ONCE
Status: COMPLETED | OUTPATIENT
Start: 2022-01-11 | End: 2022-01-11

## 2022-01-11 RX ORDER — INSULIN GLARGINE 100 [IU]/ML
65 INJECTION, SOLUTION SUBCUTANEOUS
Status: DISCONTINUED | OUTPATIENT
Start: 2022-01-11 | End: 2022-01-14 | Stop reason: HOSPADM

## 2022-01-11 RX ORDER — ALBUTEROL SULFATE 90 UG/1
2 AEROSOL, METERED RESPIRATORY (INHALATION) EVERY 4 HOURS PRN
Status: DISCONTINUED | OUTPATIENT
Start: 2022-01-11 | End: 2022-01-14 | Stop reason: HOSPADM

## 2022-01-11 RX ORDER — LORATADINE 10 MG/1
10 TABLET ORAL DAILY
Status: DISCONTINUED | OUTPATIENT
Start: 2022-01-12 | End: 2022-01-14 | Stop reason: HOSPADM

## 2022-01-11 RX ORDER — DOCUSATE SODIUM 100 MG/1
100 CAPSULE, LIQUID FILLED ORAL 2 TIMES DAILY PRN
Status: DISCONTINUED | OUTPATIENT
Start: 2022-01-11 | End: 2022-01-14 | Stop reason: HOSPADM

## 2022-01-11 RX ORDER — MAGNESIUM HYDROXIDE/ALUMINUM HYDROXICE/SIMETHICONE 120; 1200; 1200 MG/30ML; MG/30ML; MG/30ML
30 SUSPENSION ORAL EVERY 6 HOURS PRN
Status: DISCONTINUED | OUTPATIENT
Start: 2022-01-11 | End: 2022-01-14 | Stop reason: HOSPADM

## 2022-01-11 RX ORDER — ACETAMINOPHEN 325 MG/1
650 TABLET ORAL EVERY 6 HOURS PRN
Status: DISCONTINUED | OUTPATIENT
Start: 2022-01-11 | End: 2022-01-14 | Stop reason: HOSPADM

## 2022-01-11 RX ORDER — FLUTICASONE PROPIONATE 50 MCG
2 SPRAY, SUSPENSION (ML) NASAL DAILY
Status: DISCONTINUED | OUTPATIENT
Start: 2022-01-11 | End: 2022-01-14 | Stop reason: HOSPADM

## 2022-01-11 RX ORDER — ASPIRIN 81 MG/1
81 TABLET ORAL DAILY
Status: DISCONTINUED | OUTPATIENT
Start: 2022-01-11 | End: 2022-01-14 | Stop reason: HOSPADM

## 2022-01-11 RX ORDER — TRAZODONE HYDROCHLORIDE 50 MG/1
150 TABLET ORAL
Status: DISCONTINUED | OUTPATIENT
Start: 2022-01-11 | End: 2022-01-14 | Stop reason: HOSPADM

## 2022-01-11 RX ORDER — ECHINACEA PURPUREA EXTRACT 125 MG
1 TABLET ORAL
Status: DISCONTINUED | OUTPATIENT
Start: 2022-01-11 | End: 2022-01-14 | Stop reason: HOSPADM

## 2022-01-11 RX ADMIN — HEPARIN SODIUM 5000 UNITS: 5000 INJECTION INTRAVENOUS; SUBCUTANEOUS at 22:08

## 2022-01-11 RX ADMIN — CEFTRIAXONE 2000 MG: 2 INJECTION, SOLUTION INTRAVENOUS at 22:08

## 2022-01-11 RX ADMIN — FUROSEMIDE 40 MG: 10 INJECTION, SOLUTION INTRAMUSCULAR; INTRAVENOUS at 11:28

## 2022-01-11 RX ADMIN — VANCOMYCIN HYDROCHLORIDE 1250 MG: 500 INJECTION, POWDER, LYOPHILIZED, FOR SOLUTION INTRAVENOUS at 02:13

## 2022-01-11 RX ADMIN — HEPARIN SODIUM 5000 UNITS: 5000 INJECTION INTRAVENOUS; SUBCUTANEOUS at 13:06

## 2022-01-11 RX ADMIN — NICOTINE 14 MG: 14 PATCH, EXTENDED RELEASE TRANSDERMAL at 03:07

## 2022-01-11 RX ADMIN — LEVOTHYROXINE SODIUM 175 MCG: 175 TABLET ORAL at 11:33

## 2022-01-11 RX ADMIN — HEPARIN SODIUM 5000 UNITS: 5000 INJECTION INTRAVENOUS; SUBCUTANEOUS at 07:43

## 2022-01-11 RX ADMIN — SODIUM CHLORIDE 1000 ML: 0.9 INJECTION, SOLUTION INTRAVENOUS at 01:31

## 2022-01-11 RX ADMIN — DULOXETINE HYDROCHLORIDE 60 MG: 30 CAPSULE, DELAYED RELEASE ORAL at 09:30

## 2022-01-11 RX ADMIN — FLUTICASONE PROPIONATE 2 SPRAY: 50 SPRAY, METERED NASAL at 09:30

## 2022-01-11 RX ADMIN — OMEGA-3 FATTY ACIDS CAP 1000 MG 1000 MG: 1000 CAP at 17:24

## 2022-01-11 RX ADMIN — METFORMIN HYDROCHLORIDE 1000 MG: 500 TABLET ORAL at 09:30

## 2022-01-11 RX ADMIN — INSULIN LISPRO 6 UNITS: 100 INJECTION, SOLUTION INTRAVENOUS; SUBCUTANEOUS at 11:28

## 2022-01-11 RX ADMIN — TRAZODONE HYDROCHLORIDE 150 MG: 50 TABLET ORAL at 22:08

## 2022-01-11 RX ADMIN — ALBUTEROL SULFATE 5 MG: 2.5 SOLUTION RESPIRATORY (INHALATION) at 03:08

## 2022-01-11 RX ADMIN — ATORVASTATIN CALCIUM 20 MG: 10 TABLET, FILM COATED ORAL at 17:24

## 2022-01-11 RX ADMIN — INSULIN LISPRO 2 UNITS: 100 INJECTION, SOLUTION INTRAVENOUS; SUBCUTANEOUS at 07:43

## 2022-01-11 RX ADMIN — INSULIN GLARGINE 65 UNITS: 100 INJECTION, SOLUTION SUBCUTANEOUS at 22:09

## 2022-01-11 RX ADMIN — CEFTRIAXONE 1000 MG: 1 INJECTION, SOLUTION INTRAVENOUS at 01:31

## 2022-01-11 RX ADMIN — OMEGA-3 FATTY ACIDS CAP 1000 MG 1000 MG: 1000 CAP at 09:30

## 2022-01-11 RX ADMIN — IPRATROPIUM BROMIDE 0.5 MG: 0.5 SOLUTION RESPIRATORY (INHALATION) at 03:08

## 2022-01-11 RX ADMIN — ASPIRIN 81 MG: 81 TABLET, COATED ORAL at 09:30

## 2022-01-11 NOTE — QUICK NOTE
Patient seen and examined  H&P , [place] reviewed    Will DC HCTZ and utilize IV lasix  Increase rocephin to 2 g daily  Xray reviewed, with possible tibular fracture, will consult ortho for input

## 2022-01-11 NOTE — PLAN OF CARE
Problem: PHYSICAL THERAPY ADULT  Goal: Performs mobility at highest level of function for planned discharge setting  See evaluation for individualized goals  Description: Treatment/Interventions: Functional transfer training,LE strengthening/ROM,Elevations,Therapeutic exercise,Endurance training,Bed mobility,Gait training          See flowsheet documentation for full assessment, interventions and recommendations  Note: Prognosis: Good  Problem List: Decreased strength,Decreased endurance,Impaired balance,Decreased mobility,Pain,Obesity  Assessment: Patient is a 61 y o  male evaluated by Physical Therapy s/p admit to [address] [place] [address],[address] on 1/11/2022 with admitting diagnosis of: Ankle pain, Leg swelling, Cellulitis of left lower extremity, and principal problem of: Cellulitis of left lower leg  PT was consulted to assess patient's functional mobility and discharge needs  Ordered are PT Evaluation and treatment with activity level of: up with assistance  Comorbidities affecting patient's physical performance at time of assessment include: HLD, DM, HTN, hypothyroidism  Personal factors affecting the patient at time of IE include: ambulating with assistive device, step(s) to enter home, inability to navigate community distances, history of fall(s), inability/difficulty performing IADLs and inability/difficulty performing ADLs  Please locate objective findings from PT assessment regarding body systems outlined above  Upon evaluation, pt able to perform all functional mobility with SUP, SPC, and increased time  Pt able to ambulate 125' prior to requiring seated rest break d/t fatigue and SOB  Pt reports recent use of SPC and did use during session, however at times held cane in air instead of using for stability; no LOB experienced  The patient's AM-PAC Basic Mobility Inpatient Short Form Raw Score is 21  A Raw score of greater than 16 suggests the patient may benefit from discharge to home   Please also refer to the recommendation of the Physical Therapist for safe discharge planning  Co treatment with OT secondary to complex medical condition of pt, possible A of 2 required to achieve and maintain transitional movements, requiring the need of skilled therapeutic intervention of 2 therapists to achieve delivery of services  Pt will benefit from continued PT intervention during LOS to address current deficits, increase LOF, and facilitate safe d/c to next level of care when medically appropriate  D/c recommendation at this time is home with home health rehabilitation  PT Discharge Recommendation: Home with home health rehabilitation          See flowsheet documentation for full assessment

## 2022-01-11 NOTE — CASE MANAGEMENT
Case Management Assessment & Discharge Planning Note    Patient name Shannon Police  Location /[facility] 80 MRN [id number]  : 1965 Date 2022       Current Admission Date: 2022  Current Admission Diagnosis:Cellulitis of left lower leg   Patient Active Problem List    Diagnosis Date Noted    Depression, recurrent ([place] [address] ) 2021    Continuous dependence on cigarette smoking     Constipation 2021    Urinary retention 2021    Acute respiratory failure with hypoxia ([place] [address] ) 2021    Epistaxis 2021    Obstructive sleep apnea 2021    Muscle twitching 2021    Type 2 diabetes mellitus with hyperglycemia, with long-term current use of insulin ([place] [address] ) 2021    Tobacco use 2021    Atelectasis 2021    Noncompliance with diabetes treatment 2020    Cellulitis of left lower leg 2018    Elevated LFTs 2018    Elevated lactic acid level 2018    Tobacco abuse 2018    Ankle pain 10/17/2017    Angioedema 2017    COPD, severity to be determined ([place] [address] ) 2017    Chronic back pain 2015    Chronic sinusitis 2015    Mixed hyperlipidemia 2015    Allergic rhinitis 2015    Arthritis 2015    Depression with anxiety 2015    Primary hypertension 2015    Acquired hypothyroidism 2015    Morbid obesity with BMI of 45 0-49 9, adult ([place] [address] ) 2015    Type 2 diabetes, uncontrolled, with neuropathy ([place] [address] ) 2015    Vitamin D deficiency 2013      LOS (days): 0  Geometric Mean LOS (GMLOS) (days):   Days to GMLOS:     OBJECTIVE:    Risk of Unplanned Readmission Score: 17         Current admission status: Inpatient       Preferred Pharmacy:   [facility] - [address] [facility], [address]    [address] [place] [address] Road [facility]  Phone: [phone] Fax: [phone]    Primary Care Provider: Cece Gomez PA-C    Primary Insurance: MEDICARE  Secondary Insurance:     ASSESSMENT:  Active Health Care Agents    There are no active Health Care Agents on file  Advance Directives  Does patient have a [address]?: No  Was patient offered paperwork?: No (declines)  Does patient currently have a Health Care decision maker?: No  Does patient have Advance Directives?: No  Was patient offered paperwork?: No (declines)         Readmission Root Cause  30 Day Readmission: No    Patient Information  Admitted from[de-identified] Home  Mental Status: Alert  During Assessment patient was accompanied by: Not accompanied during assessment  Assessment information provided by[de-identified] Patient  Support Systems: Family members  [place] of Residence: [address] do you live in?: [address] [facility] entry access options   Select all that apply : Stairs  Number of steps to enter home : One Flight (23)  Type of Current Residence: Apartment (in an apartment above a [employer] shop)  Upon entering residence, is there a bedroom on the main floor (no further steps)?: Yes  Upon entering residence, is there a bathroom on the main floor (no further steps)?: Yes  In the last 12 months, was there a time when you were not able to pay the mortgage or rent on time?: No  In the last 12 months, was there a time when you did not have a steady place to sleep or slept in a shelter (including now)?: No  Homeless/housing insecurity resource given?: N/A  Living Arrangements: Lives Alone    Activities of Daily Living Prior to Admission  Functional Status: Independent  Completes ADLs independently?: Yes  Ambulates independently?: Yes  Does patient use assisted devices?: Yes  Assisted Devices (DME) used: Straight Cane  Does patient currently own DME?: Yes  What DME does the patient currently own?: Straight Cane  Does patient have a history of Outpatient Therapy (PT/OT)?: No  Does the patient have a history of Short-Term Rehab?: No  Does patient have a history of HHC?: No  Does patient currently have Priyanka Arteaga?: No         Patient Information Continued  Does patient have prescription coverage?: Yes  Within the past 12 months, you worried that your food would run out before you got the money to buy more : Never true  Within the past 12 months, the food you bought just didnt last and you didnt have money to get more : Never true  Food insecurity resource given?: N/A  Does patient receive dialysis treatments?: No  Does patient have a history of substance abuse?: No  Does patient have a history of Mental Health Diagnosis?: No    PHQ 2/9 Screening   Reviewed PHQ 2/9 Depression Screening Score?: No    Means of Transportation  Means of Transport to Appts[de-identified] Drives Self  In the past 12 months, has lack of transportation kept you from medical appointments or from getting medications?: No  In the past 12 months, has lack of transportation kept you from meetings, work, or from getting things needed for daily living?: No  Was application for public transport provided?: N/A        DISCHARGE DETAILS:    Discharge planning discussed with[de-identified] patient        CM contacted family/caregiver?: No- see comments (pt is alert, independent and declines)  Were Treatment Team discharge recommendations reviewed with patient/caregiver?: Yes  Did patient/caregiver verbalize understanding of patient care needs?: Yes  Were patient/caregiver advised of the risks associated with not following Treatment Team discharge recommendations?: Yes         [address]         Is the patient interested in [place] [address] at discharge?: No    DME Referral Provided  Referral made for DME?: No       Discharge Destination Plan[de-identified] Home  Transport at Discharge : Self (pt has his car here)      Plans at this time are home on dc with OP follow up  Pt to drive himself home on dc  CM will follow and assist in dc planning

## 2022-01-11 NOTE — CONSULTS
Orthopedics   Shannon Police 61 y o  male MRN: [id number]  Unit/Bed#: [facility] 80      Chief Complaint:   left lower extremity pain swelling and redness 1 month    HPI:   61 y  o male complaining of left lower extremity erythema and pain  Patient allowed pain swelling redness in the left calf foot and thigh a month ago  Patient has been taking antibiotics on and off and has been noncompliant diabetic  Patient admitted with increasing swelling pain and redness and Orthopedics consulted  Patient had similar episode 3 years ago which resolved  No history of any trauma  Patient complains of discomfort in the left calf and left thigh      Review Of Systems:   · Skin: Normal  · Neuro: See HPI  · Musculoskeletal: See HPI  · 14 point review of systems negative except as stated above     Past Medical History:   Past Medical History:   Diagnosis Date    Acquired hypothyroidism 1/2/2015    Angioedema     Diabetes mellitus ([facility] )     Disease of thyroid gland     Hyperlipidemia     Hypertension     Mixed hyperlipidemia 2/4/2015    Morbid obesity ([facility] )     Morbid obesity with BMI of 45 0-49 9, adult ([facility] ) 1/2/2015    MARSHA (obstructive sleep apnea)     Primary hypertension 1/2/2015    Transitioned From: Benign essential hypertension       Past Surgical History:   Past Surgical History:   Procedure Laterality Date    KNEE SURGERY      SINUS SURGERY         Family History:  Family history reviewed and non-contributory  Family History   Problem Relation Age of Onset    Thyroid cancer Mother     Diabetes type II Mother     Esophageal cancer Father     Diabetes type II Father     Kidney cancer Brother     Diabetes type II Brother     Diabetes type II Maternal Grandmother        Social History:  Social History     Socioeconomic History    Marital status: Single     Spouse name: None    Number of children: None    Years of education: None    Highest education level: None   Occupational History    None   Tobacco Use  Smoking status: Current Every Day Smoker     Packs/day: 2 00     Years: 70 00     Pack years: 140 00     Start date: 4/15/1942    Smokeless tobacco: Never Used    Tobacco comment: states read to quit prior to admit   Vaping Use    Vaping Use: Never used   Substance and Sexual Activity    Alcohol use: Not Currently     Alcohol/week: 3 0 standard drinks     Types: 2 Cans of beer, 1 Shots of liquor per week     Comment: seldom    Drug use: No    Sexual activity: Not Currently   Other Topics Concern    None   Social History Narrative    None     Social Determinants of Health     Financial Resource Strain: Not on file   Food Insecurity: No Food Insecurity    Worried About Running Out of Food in the Last Year: Never true    Ham of Food in the Last Year: Never true   Transportation Needs: No Transportation Needs    Lack of Transportation (Medical): No    Lack of Transportation (Non-Medical): No   Physical Activity: Not on file   Stress: Not on file   Social Connections: Not on file   Intimate Partner Violence: Not on file   Housing Stability: Unknown    Unable to Pay for Housing in the Last Year: No    Number of Places Lived in the Last Year: Not on file    Unstable Housing in the Last Year: No       Allergies: Allergies   Allergen Reactions    Ace Inhibitors Swelling     Angioedema    Other Anaphylaxis     Pt believes that he is allergic to peanut butter  Also, seasonal allergies    Zinc Tongue Swelling    Clindamycin      Had angioedema episode approx 5 hr after IM administration of clindamycin  Unclear if there is definitive causal relationship             Labs:  0   Lab Value Date/Time    HCT 38 8 01/11/2022 0657    HCT 37 9 01/11/2022 0130    HCT 43 2 12/17/2021 1222    HCT 42 9 01/29/2015 1305    HGB 12 6 01/11/2022 0657    HGB 12 7 01/11/2022 0130    HGB 14 2 12/17/2021 1222    HGB 14 9 01/29/2015 1305    INR 0 86 01/11/2022 0130    WBC 10 55 (H) 01/11/2022 0657    WBC 9 44 01/11/2022 0130    WBC 9 56 12/17/2021 1222    WBC 9 99 01/29/2015 1305    ESR 61 (H) 05/03/2021 0823    CRP 22 5 (H) 05/03/2021 0823       Meds:    Current Facility-Administered Medications:     acetaminophen (TYLENOL) tablet 650 mg, 650 mg, Oral, Q6H PRN, Dyan Castaneda MD    albuterol (PROVENTIL HFA,VENTOLIN HFA) inhaler 2 puff, 2 puff, Inhalation, Q4H PRN, Dyan Castaneda MD    aluminum-magnesium hydroxide-simethicone (MYLANTA) oral suspension 30 mL, 30 mL, Oral, Q6H PRN, Dyan Castaneda MD    aspirin (ECOTRIN LOW STRENGTH) EC tablet 81 mg, 81 mg, Oral, Daily, Dyan Castaneda MD, 81 mg at 01/11/22 0930    atorvastatin (LIPITOR) tablet 20 mg, 20 mg, Oral, QPM, Dyan Castaneda MD    cefTRIAXone (ROCEPHIN) IVPB (premix in dextrose) 2,000 mg 50 mL, 2,000 mg, Intravenous, Q24H, Hazem Sailaja Knowles MD    docusate sodium (COLACE) capsule 100 mg, 100 mg, Oral, BID PRN, Dyan Castaneda MD    DULoxetine (CYMBALTA) delayed release capsule 60 mg, 60 mg, Oral, Daily, Dyan Castaneda MD, 60 mg at 01/11/22 0930    fish oil capsule 1,000 mg, 1,000 mg, Oral, BID, Dyan Castaneda MD, 1,000 mg at 01/11/22 0930    fluticasone (FLONASE) 50 mcg/act nasal spray 2 spray, 2 spray, Each Nare, Daily, Dyan Castaneda MD, 2 spray at 01/11/22 0930    heparin (porcine) subcutaneous injection 5,000 Units, 5,000 Units, Subcutaneous, Q8H [address], Dyan Castaneda MD, 5,000 Units at 01/11/22 1306    hydrOXYzine HCL (ATARAX) tablet 25 mg, 25 mg, Oral, Q6H PRN, Dyan Castaneda MD    insulin glargine (LANTUS) subcutaneous injection 65 Units 0 65 mL, 65 Units, Subcutaneous, HS, Dyan Castaneda MD    insulin lispro (HumaLOG) 100 units/mL subcutaneous injection 2-12 Units, 2-12 Units, Subcutaneous, TID AC, 6 Units at 01/11/22 1128 **AND** Fingerstick Glucose (POCT), , , TID JAMARI, Dyan Castaneda MD    insulin lispro (HumaLOG) 100 units/mL subcutaneous injection 2-12 Units, 2-12 Units, Subcutaneous, HS, Gianna Flores MD    levothyroxine tablet 175 mcg, 175 mcg, Oral, Early Morning, Gianna Flores MD, 175 mcg at 01/11/22 1133    loratadine (CLARITIN) tablet 20 mg, 20 mg, Oral, BID, Gianna Flores MD    [START ON 1/12/2022] nicotine (NICODERM CQ) 14 mg/24hr TD 24 hr patch 1 patch, 1 patch, Transdermal, Daily, Gianna Flores MD    nicotine (NICODERM CQ) 14 mg/24hr TD 24 hr patch 14 mg, 14 mg, Transdermal, Once, Triage Protocol Emergency, MD, 14 mg at 01/11/22 0307    ondansetron (ZOFRAN) injection 4 mg, 4 mg, Intravenous, Q4H PRN, Gianna Flores MD    sodium chloride (OCEAN) 0 65 % nasal spray 1 spray, 1 spray, Each Nare, Q1H PRN, Gianna Flores MD    traZODone (DESYREL) tablet 150 mg, 150 mg, Oral, HS, Gianna Flores MD    Blood Culture:   Lab Results   Component Value Date    BLOODCX Received in Microbiology Lab  Culture in Progress  01/11/2022    BLOODCX Received in Microbiology Lab  Culture in Progress  01/11/2022       Wound Culture:   No results found for: WOUNDCULT    Ins and Outs:  I/O last 24 hours: In: 1050 [IV Piggyback:1050]  Out: 500 [Urine:500]          Physical Exam:   /74 (BP Location: Right arm)   Pulse 64   Temp 97 9 °F (36 6 °C) (Tympanic)   Resp 20   Ht 5' 4" (1 626 m)   Wt 119 kg (262 lb 5 6 oz)   SpO2 91%   BMI 45 03 kg/m²   Gen: Alert and oriented to person, place, time  HEENT: EOMI, eyes clear, moist mucus membranes, hearing intact  Respiratory: Bilateral chest rise  No audible wheezing found  Cardiovascular: Regular Rate and Rhythm  Abdomen: soft nontender/nondistended  · Musculoskeletal: left Lower Extremity  · Skin: Erythema over entire left lower extremity see pictures for details  · Painfree range of motion of of left knee left ankle left hip  · Sensation intact L1-S1  · 5/5 motor to L1-S1                Radiology:   I personally reviewed the films    X-rays of left ankle show no fracture dislocation        Assessment:  61 y  o male with  left lower extremity cellulitis  Plan:   · Cont  abx per primary team  · Nonweight bearing to left lower extremity  · Heat to affected area  · Analgesics for pain  · Body mass index is 45 03 kg/m²  moderately obese  Recommend nutrition    · Dispo: Ortho will follow   · Recommend Infectious Disease and General surgery consult  · Would recommend getting a CT scan of the left lower extremity with contrast      Gadiel Fisher MD

## 2022-01-11 NOTE — ASSESSMENT & PLAN NOTE
Lab Results   Component Value Date    HGBA1C 11 6 (A) 05/06/2021       Recent Labs     01/11/22  0733   POCGLU 154*       Blood Sugar Average: Last 72 hrs:  (P) 154     Poorly controlled, continue basal/bolus regimen and refer to outpatient endo on dc

## 2022-01-11 NOTE — H&P
H&P Exam - Ovidio Butler 61 y o  male MRN: [id number]    Unit/Bed#: RM24 Encounter: [id number]      Assessment/Plan       * Cellulitis of left lower leg  Assessment & Plan  Left leg cellulitis involving almost entire lower extremity  Patient not septic at time of admission  Had US negative for DVT when cellulitis started in December; will repeat given worsening appearance  Vancomycin and ceftriaxone started in ER; will continue same pending clinical course  Type 2 diabetes mellitus with hyperglycemia, with long-term current use of insulin [facility])  Assessment & Plan  Lab Results   Component Value Date    HGBA1C 11 6 (A) 05/06/2021       Recent Labs     01/11/22  0733   POCGLU 154*       Blood Sugar Average: Last 72 hrs:  (P) 154     Last hemoglobin A1c 11 6 in 2020; recheck A1c with morning labs  Treated as outpatient with Lantus 65 units nightly, humalog 15 units with meals, and metformin 1000 mg BID  Hold oral diabetes medications during hospitalization  Initiate insulin protocol  Obstructive sleep apnea  Assessment & Plan  Chronic condition  Patient intolerant of CPAP  Continuous pulse oximetry and supplemental oxygen ordered  Tobacco abuse  Assessment & Plan  Chronic condition  Encouraged cessation; patient declines assistance at this time  Offer nicotine patch during admission  Morbid obesity with BMI of 45 0-49 9, adult (HCC)  Assessment & Plan  BMI 46 2  Consider dietitian consult  Encourage lifestyle modifications  Acquired hypothyroidism  Assessment & Plan  Chronic condition  Continue outpatient medication regimen  Primary hypertension  Assessment & Plan  Chronic condition  Continue outpatient medication regimen  Mixed hyperlipidemia  Assessment & Plan  Chronic condition  Continue outpatient medication regimen  COPD, severity to be determined [facility])  Assessment & Plan  Chronic condition    Patient has multiple medications prescribed, but only uses albuterol occasionally as needed  Patient normally does not use supplemental oxygen as outpatient  No evidence of acute exacerbation  Continue outpatient pulmonary regimen  Allergic rhinitis  Assessment & Plan  Chronic condition  Continue outpatient medication regimen  History of Present Illness   HPI:  Danielle Petty is a 61 y o  male who presents with left leg swelling and redness x1 month  Symptoms started about December 6  Had US negative for DVT on December 9 and started antibiotics for cellulitis  Had partial improvement, but came to ER December 17 due to symptoms worsening again  Left AMA after 1 dose of IV antibiotics, was given course of PO antibiotics  He reports symptoms improved significantly but never fully resolved  Returning to ER again because entire leg is red and swollen, and he has difficulty walking  No fever, chills, sweats, or other acute complaints  PCP: Aric Lennon PA-C    Review of Systems   All other systems reviewed and are negative        Historical Information   Past Medical History:   Diagnosis Date    Angioedema     Diabetes mellitus ([facility]     Disease of thyroid gland     Hyperlipidemia     Hypertension     Morbid obesity ([facility]     MARSHA (obstructive sleep apnea)      Past Surgical History:   Procedure Laterality Date    KNEE SURGERY      SINUS SURGERY       Social History   Social History     Substance and Sexual Activity   Alcohol Use Not Currently    Alcohol/week: 3 0 standard drinks    Types: 2 Cans of beer, 1 Shots of liquor per week    Comment: seldom     Social History     Substance and Sexual Activity   Drug Use No     Social History     Tobacco Use   Smoking Status Current Every Day Smoker    Packs/day: 2 00    Years: 70 00    Pack years: 140 00    Start date: 4/15/1942   Smokeless Tobacco Never Used   Tobacco Comment    states read to quit prior to admit     E-Cigarette/Vaping    E-Cigarette Use Never User     Cartridges/Day 0     Comments 0 E-Cigarette/Vaping Substances    Nicotine No     THC No     CBD No     Flavoring No     Other No     Unknown No      Family History:   Family History   Problem Relation Age of Onset    Thyroid cancer Mother     Diabetes type II Mother     Esophageal cancer Father     Diabetes type II Father     Kidney cancer Brother     Diabetes type II Brother     Diabetes type II Maternal Grandmother        Meds/Allergies   all medications and allergies reviewed  Allergies   Allergen Reactions    Ace Inhibitors Swelling     Angioedema    Other Anaphylaxis     Pt believes that he is allergic to peanut butter  Also, seasonal allergies    Zinc Tongue Swelling    Clindamycin      Had angioedema episode approx 5 hr after IM administration of clindamycin  Unclear if there is definitive causal relationship  Objective   Vitals: Blood pressure 151/74, pulse 64, temperature 97 9 °F (36 6 °C), temperature source Tympanic, resp  rate 20, SpO2 93 %  Intake/Output Summary (Last 24 hours) at 1/11/2022 0443  Last data filed at 1/11/2022 0432  Gross per 24 hour   Intake 1050 ml   Output --   Net 1050 ml       Invasive Devices  Report    Peripheral Intravenous Line            Peripheral IV 01/11/22 Right Forearm <1 day                Physical Exam  Vitals and nursing note reviewed  Constitutional:       General: He is not in acute distress  Appearance: He is well-developed  He is obese  He is ill-appearing  He is not toxic-appearing or diaphoretic  Comments: Morbidly obese, chronically ill-appearing male sitting in bedside chair  Alert and conversant, no significant distress  HENT:      Head: Normocephalic and atraumatic  Right Ear: External ear normal       Left Ear: External ear normal       Nose: Nose normal  No congestion or rhinorrhea  Mouth/Throat:      Mouth: Mucous membranes are moist       Pharynx: Oropharynx is clear  No oropharyngeal exudate or posterior oropharyngeal erythema  Eyes:      General: No scleral icterus  Right eye: No discharge  Left eye: No discharge  Conjunctiva/sclera: Conjunctivae normal       Pupils: Pupils are equal, round, and reactive to light  Neck:      Thyroid: No thyromegaly  Vascular: No JVD  Trachea: No tracheal deviation  Cardiovascular:      Rate and Rhythm: Normal rate and regular rhythm  Pulses: Normal pulses  Heart sounds: Normal heart sounds  No murmur heard  No friction rub  No gallop  Pulmonary:      Effort: Pulmonary effort is normal  No respiratory distress  Breath sounds: Normal breath sounds  No stridor  No decreased breath sounds, wheezing, rhonchi or rales  Chest:      Chest wall: No tenderness  Abdominal:      General: Bowel sounds are normal  There is no distension  Palpations: Abdomen is soft  There is no mass  Tenderness: There is no abdominal tenderness  There is no right CVA tenderness, left CVA tenderness, guarding or rebound  Musculoskeletal:         General: No swelling, tenderness, deformity or signs of injury  Cervical back: Neck supple  No muscular tenderness  Right lower leg: No edema  Left lower leg: No edema  Lymphadenopathy:      Cervical: No cervical adenopathy  Skin:     General: Skin is warm and dry  Capillary Refill: Capillary refill takes less than 2 seconds  Coloration: Skin is not jaundiced or pale  Findings: Erythema present  No bruising, lesion or rash  Comments: Entire left lower extremity erythematous, mildly swollen, indurated, and warm to touch  Neurological:      General: No focal deficit present  Mental Status: He is alert and oriented to person, place, and time  GCS: GCS eye subscore is 4  GCS verbal subscore is 5  GCS motor subscore is 6  Cranial Nerves: No dysarthria or facial asymmetry  Sensory: No sensory deficit  Motor: No weakness, tremor, atrophy or abnormal muscle tone  Psychiatric:         Attention and Perception: Attention and perception normal          Mood and Affect: Mood normal  Affect is labile  Speech: Speech normal          Behavior: Behavior normal          Thought Content: Thought content normal          Cognition and Memory: Cognition and memory normal          Judgment: Judgment is impulsive  Lab Results: I have personally reviewed pertinent reports  Imaging: I have personally reviewed pertinent reports  and I have personally reviewed pertinent films in PACS  EKG, Pathology, and Other Studies: I have personally reviewed pertinent reports  and I have personally reviewed pertinent films in PACS    Code Status: Level 1 - Full Code  Advance Directive and Living Will:      Power of :    POLST:      Counseling / Coordination of Care  Total floor / unit time spent today 50 minutes  Greater than 50% of total time was spent with the patient and / or family counseling and / or coordination of care  A description of the counseling / coordination of care:  Greater than 25 minutes spent with this patient discussing diagnosis, prognosis, and plan of care

## 2022-01-11 NOTE — ED PROVIDER NOTES
History  Chief Complaint   Patient presents with    Leg Swelling     Left leg swelling due to cellulitis, states seen here recently for same and signed out  ongoing x3 weeks  also states he fell yesterday and twisted L ankle  This is a 70-year-old male with history of COPD, diabetes, hypertension, hyperlipidemia who presents with left lower extremity swelling  Patient states that the issue has been ongoing over the past month or so  He was seen in our emergency department on 12/17/21  It was advised that he be admitted for left lower extremity cellulitis  However, patient left against medical advice  He did complete a course of p o  Antibiotics  States that he believes the p o  Antibiotics did help a little bit  However, the swelling and erythema continues to worsen  Patient states that he did have a mechanical fall yesterday onto his bilateral knees  States that he twisted his left ankle  Denies fever/chills, nausea/vomiting, lightheadedness/dizziness, numbness/weakness, headache, change in vision, URI symptoms, neck pain, chest pain, palpitations, shortness of breath, cough, back pain, flank pain, abdominal pain, diarrhea, hematochezia, melena, dysuria, hematuria  On review of records, the patient did have a left lower extremity duplex performed on 12/9/21 which was negative  Prior to Admission Medications   Prescriptions Last Dose Informant Patient Reported? Taking?    Ascorbic Acid (VITAMIN C PO)   Yes No   Sig: Take 1 tablet by mouth daily   DULoxetine (CYMBALTA) 60 mg delayed release capsule   No No   Sig: Take 1 capsule (60 mg total) by mouth daily   EPINEPHrine (EPIPEN) 0 3 mg/0 3 mL SOAJ   No No   Sig: INJECT 0 3ML INTO A MUSCLE ONCE FOR 1 DOSE   Empagliflozin 25 MG TABS   No No   Sig: Take 1 tablet (25 mg total) by mouth every morning   Insulin Pen Needle (Pen Needles 3/16") 31G X 5 MM MISC   No No   Sig: Use 4 (four) times a day   Ventolin  (90 Base) MCG/ACT inhaler   No No Sig:  INHALE 2 PUFFS BY MOUTH EVERY 4 HOURS AS NEEDED FOR WHEEZING OR SHORTNESS OF BREATH   aspirin 81 mg chewable tablet   Yes No   Sig: Take one tablet by mouth daily   atorvastatin (LIPITOR) 20 mg tablet   No No   Sig: Take 1 tablet by mouth once daily   fluticasone (FLONASE) 50 mcg/act nasal spray   Yes No   Sig: USE 2 SPRAY(S) IN EACH NOSTRIL ONCE DAILY   fluticasone-vilanterol (BREO ELLIPTA) 100-25 mcg/inh inhaler   No No   Sig: Inhale 1 puff daily Rinse mouth after use    gabapentin (NEURONTIN) 300 mg capsule   No No   Sig: Take 1 capsule (300 mg total) by mouth 3 (three) times a day   hydrOXYzine HCL (ATARAX) 25 mg tablet   No No   Sig: Take 1 tablet (25 mg total) by mouth every 6 (six) hours as needed for itching or allergies   hydrochlorothiazide (HYDRODIURIL) 25 mg tablet   No No   Sig: Take 1 tablet (25 mg total) by mouth 2 (two) times a day   insulin glargine (Lantus SoloStar) 100 units/mL injection pen   No No   Sig: Inject 60 Units under the skin daily   insulin lispro (HumaLOG KwikPen) 100 units/mL injection pen   No No   Sig: Inject 15 Units under the skin 3 (three) times a day with meals   insulin lispro (HumaLOG) 100 units/mL injection   No No   Sig: Inject 15 Units under the skin 3 (three) times a day with meals   levothyroxine (Euthyrox) 175 mcg tablet   No No   Sig: Take 1 tablet (175 mcg total) by mouth daily   loratadine (CLARITIN) 10 mg tablet   Yes No   Sig: Take 20 mg by mouth daily   metFORMIN (GLUCOPHAGE) 1000 MG tablet   No No   Sig: Take 1 tablet (1,000 mg total) by mouth 2 (two) times a day   omega-3-acid ethyl esters (LOVAZA) 1 g capsule   No No   Sig: Take 1 capsule (1 g total) by mouth 2 (two) times a day for high cholesterol   sodium chloride (OCEAN) 0 65 % nasal spray   No No   Si spray into each nostril as needed for rhinitis   traZODone (DESYREL) 150 mg tablet   No No   Sig: Take 1 tablet (150 mg total) by mouth daily at bedtime   varenicline (CHANTIX) 1 mg tablet   No No Sig: Take 1 tablet (1 mg total) by mouth 2 (two) times a day   Patient not taking: Reported on 12/9/2021       Facility-Administered Medications: None       Past Medical History:   Diagnosis Date    Angioedema     [facility]     Disease of thyroid gland     Hyperlipidemia     Hypertension     [facility]     MARSHA (obstructive sleep apnea)        Past Surgical History:   Procedure Laterality Date    KNEE SURGERY      SINUS SURGERY         Family History   Problem Relation Age of Onset    Thyroid cancer Mother     Diabetes type II Mother     Esophageal cancer Father     Diabetes type II Father     Kidney cancer Brother     Diabetes type II Brother     Diabetes type II Maternal Grandmother      I have reviewed and agree with the history as documented  E-Cigarette/Vaping    E-Cigarette Use Never User     Cartridges/Day 0     Comments 0      E-Cigarette/Vaping Substances    Nicotine No     THC No     CBD No     Flavoring No     Other No     Unknown No      Social History     Tobacco Use    Smoking status: Current Every Day Smoker     Packs/day: 2 00     Years: 70 00     Pack years: 140 00     Start date: 4/15/1942    Smokeless tobacco: Never Used    Tobacco comment: states read to quit prior to admit   Vaping Use    Vaping Use: Never used   Substance Use Topics    Alcohol use: Not Currently     Alcohol/week: 3 0 standard drinks     Types: 2 Cans of beer, 1 Shots of liquor per week     Comment: seldom    Drug use: No       Review of Systems   Constitutional: Negative for chills, fatigue and fever  HENT: Negative for rhinorrhea, sore throat and trouble swallowing  Eyes: Negative for photophobia and visual disturbance  Respiratory: Negative for cough, chest tightness and shortness of breath  Cardiovascular: Negative for chest pain, palpitations and leg swelling  Gastrointestinal: Negative for abdominal pain, blood in stool, diarrhea, nausea and vomiting  Endocrine: Negative for polyuria  Genitourinary: Negative for dysuria, flank pain and hematuria  Musculoskeletal: Positive for arthralgias  Negative for back pain and neck pain  Skin: Negative for color change and rash  Allergic/Immunologic: Negative for immunocompromised state  Neurological: Negative for dizziness, weakness, light-headedness, numbness and headaches  All other systems reviewed and are negative  Physical Exam  Physical Exam  Constitutional:       General: He is not in acute distress  Appearance: Normal appearance  He is well-developed  He is morbidly obese  Comments: Chronically ill-appearing  HENT:      Mouth/Throat:      Pharynx: Uvula midline  Eyes:      General: Lids are normal       Conjunctiva/sclera: Conjunctivae normal       Pupils: Pupils are equal, round, and reactive to light  Neck:      Thyroid: No thyroid mass or thyromegaly  Trachea: Trachea normal    Cardiovascular:      Rate and Rhythm: Regular rhythm  Tachycardia present  Heart sounds: Normal heart sounds  No murmur heard  Pulmonary:      Effort: Pulmonary effort is normal       Breath sounds: Wheezing present  Abdominal:      General: Bowel sounds are normal       Palpations: Abdomen is soft  Tenderness: There is no abdominal tenderness  There is no guarding or rebound  Negative signs include Salter's sign  Musculoskeletal:      Comments: Extensive cellulitis of the left lower extremity from the proximal left thigh into the left foot  No open wounds  Skin:     General: Skin is warm and dry  Neurological:      Mental Status: He is alert  Psychiatric:         Speech: Speech normal          Behavior: Behavior normal  Behavior is cooperative  Thought Content:  Thought content normal          Vital Signs  ED Triage Vitals [01/11/22 0017]   Temperature Pulse Respirations Blood Pressure SpO2   97 9 °F (36 6 °C) (!) 106 20 (!) 178/74 93 %      Temp Source Heart Rate Source Patient Position - Orthostatic VS BP Location FiO2 (%)   Tympanic Monitor Sitting Right arm --      Pain Score       10 - Worst Possible Pain           Vitals:    01/11/22 0017 01/11/22 0100   BP: (!) 178/74 151/74   Pulse: (!) 106 64   Patient Position - Orthostatic VS: Sitting Sitting         Visual Acuity      ED Medications  Medications   sodium chloride 0 9 % bolus 1,000 mL (1,000 mL Intravenous New Bag 1/11/22 0131)   vancomycin (VANCOCIN) 1250 mg in sodium chloride 0 9% 250 mL IVPB (1,250 mg Intravenous New Bag 1/11/22 0213)   nicotine (NICODERM CQ) 14 mg/24hr TD 24 hr patch 14 mg (has no administration in time range)   albuterol inhalation solution 5 mg (has no administration in time range)   ipratropium (ATROVENT) 0 02 % inhalation solution 0 5 mg (has no administration in time range)   cefTRIAXone (ROCEPHIN) IVPB (premix in dextrose) 1,000 mg 50 mL (0 mg Intravenous Stopped 1/11/22 0213)       Diagnostic Studies  Results Reviewed     Procedure Component Value Units Date/Time    Protime-INR [[id number]]  (Abnormal) Collected: 01/11/22 0130    Lab Status: Final result Specimen: Blood from Arm, Right Updated: 01/11/22 0208     Protime 11 3 seconds      INR 0 86    APTT [[id number]]  (Normal) Collected: 01/11/22 0130    Lab Status: Final result Specimen: Blood from Arm, Right Updated: 01/11/22 0208     PTT 28 seconds     Lactic Acid [[id number]]  (Normal) Collected: 01/11/22 0130    Lab Status: Final result Specimen: Blood from Arm, Right Updated: 01/11/22 0159     LACTIC ACID 1 8 mmol/L     Narrative:      Result may be elevated if tourniquet was used during collection      Comprehensive metabolic panel [[id number]]  (Abnormal) Collected: 01/11/22 0130    Lab Status: Final result Specimen: Blood from Arm, Right Updated: 01/11/22 0157     Sodium 132 mmol/L      Potassium 4 0 mmol/L      Chloride 92 mmol/L      CO2 33 mmol/L      ANION GAP 7 mmol/L      BUN 13 mg/dL      Creatinine 1 28 mg/dL      Glucose 227 mg/dL      Calcium 8 9 mg/dL      Corrected Calcium 9 6 mg/dL      AST 22 U/L      ALT 30 U/L      Alkaline Phosphatase 83 U/L      Total Protein 7 2 g/dL      Albumin 3 1 g/dL      Total Bilirubin 0 24 mg/dL      eGFR 61 ml/min/1 73sq m     Narrative:      [place] guidelines for Chronic Kidney Disease (CKD):     Stage 1 with normal or high GFR (GFR > 90 mL/min/1 73 square meters)    Stage 2 Mild CKD (GFR = 60-89 mL/min/1 73 square meters)    Stage 3A Moderate CKD (GFR = 45-59 mL/min/1 73 square meters)    Stage 3B Moderate CKD (GFR = 30-44 mL/min/1 73 square meters)    Stage 4 Severe CKD (GFR = 15-29 mL/min/1 73 square meters)    Stage 5 End Stage CKD (GFR <15 mL/min/1 73 square meters)  Note: GFR calculation is accurate only with a steady state creatinine    CBC and differential [[id number]]  (Abnormal) Collected: 01/11/22 0130    Lab Status: Final result Specimen: Blood from Arm, Right Updated: 01/11/22 0142     WBC 9 44 Thousand/uL      RBC 4 53 Million/uL      Hemoglobin 12 7 g/dL      Hematocrit 37 9 %      MCV 84 fL      MCH 28 0 pg      MCHC 33 5 g/dL      RDW 13 8 %      MPV 8 5 fL      Platelets 936 Thousands/uL      nRBC 0 /100 WBCs      Neutrophils Relative 64 %      Immat GRANS % 0 %      Lymphocytes Relative 22 %      Monocytes Relative 11 %      Eosinophils Relative 2 %      Basophils Relative 1 %      Neutrophils Absolute 6 08 Thousands/µL      Immature Grans Absolute 0 03 Thousand/uL      Lymphocytes Absolute 2 08 Thousands/µL      Monocytes Absolute 0 99 Thousand/µL      Eosinophils Absolute 0 21 Thousand/µL      Basophils Absolute 0 05 Thousands/µL     Blood culture #1 [[id number]] Collected: 01/11/22 0130    Lab Status: In process Specimen: Blood from Arm, Right Updated: 01/11/22 0140    Blood culture #2 [[id number]] Collected: 01/11/22 0130    Lab Status:  In process Specimen: Blood from Arm, Left Updated: 01/11/22 0140    Procalcitonin with AM Reflex [[id number]] Collected: 01/11/22 0130    Lab Status: In process Specimen: Blood from Arm, Right Updated: 01/11/22 0139                 XR ankle 3+ views LEFT   ED Interpretation by Judy Pan MD (01/11 0224)   No acute osseous abnormality as interpreted by myself  XR knee 4+ views left injury   ED Interpretation by Judy Pan MD (01/11 0224)   No acute osseous abnormality as interpreted by myself  XR knee 4+ views Right injury   ED Interpretation by Judy Pan MD (01/11 0224)   No acute osseous abnormality as interpreted by myself  Procedures  Procedures         ED Course                                             MDM  Number of Diagnoses or Management Options  Cellulitis of left lower extremity  Diagnosis management comments: Will check labs, blood cultures  X-rays of the bilateral lower extremities  IV antibiotics  Recommend admission  Patient is agreeable  Disposition  Final diagnoses:   Cellulitis of left lower extremity     Time reflects when diagnosis was documented in both MDM as applicable and the Disposition within this note     Time User Action Codes Description Comment    1/11/2022  2:25 AM Gustavo Jorge Add [[id number]] Cellulitis of left lower extremity       ED Disposition     ED Disposition Condition Date/Time Comment    Admit Stable Tue Jan 11, 2022  2:25 AM         Follow-up Information    None         Patient's Medications   Discharge Prescriptions    No medications on file       No discharge procedures on file      PDMP Review       Value Time User    PDMP Reviewed  Yes 1/4/2021  2:51 PM Karson Crowley          ED Provider  Electronically Signed by           Judy Pan MD  01/11/22 1850

## 2022-01-11 NOTE — ASSESSMENT & PLAN NOTE
Was seen by Orthopedic surgery due to abnormal x-ray  Will obtain CT of the lower extremity  Continue with ceftriaxone  Blood cultures negative for 24 hours  MRSA culture pending

## 2022-01-11 NOTE — ASSESSMENT & PLAN NOTE
Chronic condition  Patient intolerant of CPAP  Continuous pulse oximetry and supplemental oxygen ordered

## 2022-01-11 NOTE — PLAN OF CARE
Problem: Potential for Falls  Goal: Patient will remain free of falls  Description: INTERVENTIONS:  - Educate patient/family on patient safety including physical limitations  - Instruct patient to call for assistance with activity   - Consult OT/PT to assist with strengthening/mobility   - Keep Call bell within reach  - Keep bed low and locked with side rails adjusted as appropriate  - Keep care items and personal belongings within reach  - Initiate and maintain comfort rounds  - Make Fall Risk Sign visible to staff  - Offer Toileting every 1-2 Hours, in advance of need  - Initiate/Maintain bed and chair alarm  - Obtain necessary fall risk management equipment: fall socks  - Apply yellow socks and bracelet for high fall risk patients  - Consider moving patient to room near nurses station  Outcome: Progressing     Problem: PAIN - ADULT  Goal: Verbalizes/displays adequate comfort level or baseline comfort level  Description: Interventions:  - Encourage patient to monitor pain and request assistance  - Assess pain using appropriate pain scale  - Administer analgesics based on type and severity of pain and evaluate response  - Implement non-pharmacological measures as appropriate and evaluate response  - Consider cultural and social influences on pain and pain management  - Notify physician/advanced practitioner if interventions unsuccessful or patient reports new pain  Outcome: Progressing     Problem: INFECTION - ADULT  Goal: Absence or prevention of progression during hospitalization  Description: INTERVENTIONS:  - Assess and monitor for signs and symptoms of infection  - Monitor lab/diagnostic results  - Monitor all insertion sites, i e  indwelling lines, tubes, and drains  - Monitor endotracheal if appropriate and nasal secretions for changes in amount and color  - [place] appropriate cooling/warming therapies per order  - Administer medications as ordered  - Instruct and encourage patient and family to use good hand hygiene technique  - Identify and instruct in appropriate isolation precautions for identified infection/condition  Outcome: Progressing     Problem: SAFETY ADULT  Goal: Patient will remain free of falls  Description: INTERVENTIONS:  - Educate patient/family on patient safety including physical limitations  - Instruct patient to call for assistance with activity   - Consult OT/PT to assist with strengthening/mobility   - Keep Call bell within reach  - Keep bed low and locked with side rails adjusted as appropriate  - Keep care items and personal belongings within reach  - Initiate and maintain comfort rounds  - Make Fall Risk Sign visible to staff  - Offer Toileting every 1-2 Hours, in advance of need  - Initiate/Maintain bed and chair alarm  - Obtain necessary fall risk management equipment: fall socks  - Apply yellow socks and bracelet for high fall risk patients  - Consider moving patient to room near nurses station  Outcome: Progressing  Goal: Maintain or return to baseline ADL function  Description: INTERVENTIONS:  -  Assess patient's ability to carry out ADLs; assess patient's baseline for ADL function and identify physical deficits which impact ability to perform ADLs (bathing, care of mouth/teeth, toileting, grooming, dressing, etc )  - Assess/evaluate cause of self-care deficits   - Assess range of motion  - Assess patient's mobility; develop plan if impaired  - Assess patient's need for assistive devices and provide as appropriate  - Encourage maximum independence but intervene and supervise when necessary  - Involve family in performance of ADLs  - Assess for home care needs following discharge   - Consider OT consult to assist with ADL evaluation and planning for discharge  - Provide patient education as appropriate  Outcome: Progressing  Goal: Maintains/Returns to pre admission functional level  Description: INTERVENTIONS:  - Perform BMAT or MOVE assessment daily    - Set and communicate daily mobility goal to care team and patient/family/caregiver  - Collaborate with rehabilitation services on mobility goals if consulted  - Perform Range of Motion 3-4 times a day  - Reposition patient every 1-2 hours  - Dangle patient 3-4 times a day  - Stand patient 3-4 times a day  - Ambulate patient 3-4 times a day  - Out of bed to chair 3-4 times a day   - Out of bed for meals 3-4 times a day  - Out of bed for toileting  - Record patient progress and toleration of activity level   Outcome: Progressing     Problem: DISCHARGE PLANNING  Goal: Discharge to home or other facility with appropriate resources  Description: INTERVENTIONS:  - Identify barriers to discharge w/patient and caregiver  - Arrange for needed discharge resources and transportation as appropriate  - Identify discharge learning needs (meds, wound care, etc )  - Arrange for interpretive services to assist at discharge as needed  - Refer to Case Management Department for coordinating discharge planning if the patient needs post-hospital services based on physician/advanced practitioner order or complex needs related to functional status, cognitive ability, or social support system  Outcome: Progressing     Problem: Knowledge Deficit  Goal: Patient/family/caregiver demonstrates understanding of disease process, treatment plan, medications, and discharge instructions  Description: Complete learning assessment and assess knowledge base    Interventions:  - Provide teaching at level of understanding  - Provide teaching via preferred learning methods  Outcome: Progressing     Problem: MUSCULOSKELETAL - ADULT  Goal: Maintain or return mobility to safest level of function  Description: INTERVENTIONS:  - Assess patient's ability to carry out ADLs; assess patient's baseline for ADL function and identify physical deficits which impact ability to perform ADLs (bathing, care of mouth/teeth, toileting, grooming, dressing, etc )  - Assess/evaluate cause of self-care deficits   - Assess range of motion  - Assess patient's mobility  - Assess patient's need for assistive devices and provide as appropriate  - Encourage maximum independence but intervene and supervise when necessary  - Involve family in performance of ADLs  - Assess for home care needs following discharge   - Consider OT consult to assist with ADL evaluation and planning for discharge  - Provide patient education as appropriate  Outcome: Progressing  Goal: Maintain proper alignment of affected body part  Description: INTERVENTIONS:  - Support, maintain and protect limb and body alignment  - Provide patient/ family with appropriate education  Outcome: Progressing

## 2022-01-11 NOTE — OCCUPATIONAL THERAPY NOTE
Occupational Therapy Evaluation     Patient Name: Marlene Gordon  Today's Date: 1/11/2022  Problem List  Principal Problem:    Cellulitis of left lower leg  Active Problems:    Allergic rhinitis    COPD, severity to be determined ([facility] )    Mixed hyperlipidemia    Primary hypertension    Acquired hypothyroidism    Morbid obesity with BMI of 45 0-49 9, adult ([facility] )    Tobacco abuse    Obstructive sleep apnea    Type 2 diabetes mellitus with hyperglycemia, with long-term current use of insulin [facility])    Past Medical History  Past Medical History:   Diagnosis Date    Acquired hypothyroidism 1/2/2015    Angioedema     Diabetes mellitus ([place] [address] )     Disease of thyroid gland     Hyperlipidemia     Hypertension     Mixed hyperlipidemia 2/4/2015    Morbid obesity ([place] [address] )     Morbid obesity with BMI of 45 0-49 9, adult ([place] [address] ) 1/2/2015    MARSHA (obstructive sleep apnea)     Primary hypertension 1/2/2015    Transitioned From: Benign essential hypertension     Past Surgical History  Past Surgical History:   Procedure Laterality Date    KNEE SURGERY      SINUS SURGERY               01/11/22 1025   OT Last Visit   OT Visit Date 01/11/22   Note Type   Note type Evaluation   Restrictions/Precautions   Weight Bearing Precautions Per Order No   Pain Assessment   Pain Assessment Tool 0-10   Pain Score 5   Pain Location/Orientation Orientation: Left; Other (Comment)  (ankle)   Home Living   Type of [address] level;Performs ADLs on one level; Able to live on main level with bedroom/bathroom; Other (Comment); Stairs to enter with rails  ([address] HR)   Bathroom Shower/Tub Tub/shower unit   Bathroom Toilet Standard   Bathroom Accessibility Accessible   Home Equipment Cane   Additional Comments pt reports use of SPC at baseline during mobility   Prior Function   Level of Street Independent with ADLs and functional mobility   Lives With Alone   ADL Assistance Independent   IADLs Independent   Falls in the last 6 months 1 to 4   Vocational On disability   Comments pt is (I) with driving    Psychosocial   Psychosocial (WDL) WDL   Subjective   Subjective "I fell, and that was horrible"   ADL   Where Assessed Chair   Grooming Assistance 7  Independent   LB Dressing Assistance 5  Supervision/Setup   LB Dressing Deficit Increased time to complete   Toileting Assistance  5  Supervision/Setup   Toileting Deficit Increased time to complete   Additional Comments pt dons pants seated in chair; increased time and SOB after donning pants   Bed Mobility   Additional Comments pt seated in chair at start and end of session; pt on RA during session with mild SOB after mobility due to endurance deficit   Transfers   Sit to Stand 5  Supervision   Additional items Increased time required;Verbal cues   Stand to Sit 5  Supervision   Additional items Increased time required;Verbal cues   Additional Comments pt performs functional transfers with [employer] during mobility; pt with no significant LOB, mild instability initially; stands at toilet to urinate with no LOB   Functional Mobility   Functional Mobility 5  Supervision   Additional Comments pt performed functinal mobility in hallway ~125ft with no significant LOB; pt with significant endurance deficits   Additional items SPC   Balance   Static Sitting Good   Dynamic Sitting Good   Static Standing Fair   Dynamic Standing Fair   Ambulatory Fair -   Activity Tolerance   Activity Tolerance Patient limited by fatigue   RUE Assessment   RUE Assessment WFL   LUE Assessment   LUE Assessment WFL   Hand Function   Gross Motor Coordination Functional   Fine Motor Coordination Functional   Sensation   Light Touch No apparent deficits   Sharp/Dull No apparent deficits   Cognition   Overall Cognitive Status WFL   Arousal/Participation Alert   Attention Within functional limits   Orientation Level Oriented X4   Memory Within functional limits   Following Commands Follows all commands and directions without difficulty   Assessment   Limitation Decreased ADL status; Decreased UE strength;Decreased Safe judgement during ADL;Decreased endurance;Decreased self-care trans;Decreased high-level ADLs   Assessment Pt is a 61 y o  male seen for OT evaluation s/p admit to [facility] on 1/11/2022 w/ Cellulitis of left lower leg  Comorbidities affecting pt's functional performance at time of assessment include: hyperlipidemia, DM, HTN, thyroid gland, angioedema, morbid obesity  Personal factors affecting pt at time of IE include:steps to enter environment, limited home support, difficulty performing ADLS, difficulty performing IADLS , limited insight into deficits, decreased initiation and engagement  and health management   Prior to admission, pt was (I) with ADLs and IADLs with use of SPC during mobility  Upon evaluation: Pt requires (S) level with SPC during mobiity 2* the following deficits impacting occupational performance: weakness, decreased strength, decreased balance, decreased tolerance, impaired initiation, decreased safety awareness and increased pain  Pt to benefit from continued skilled OT tx while in the hospital to address deficits as defined above and maximize level of functional independence w ADL's and functional mobility  Occupational Performance areas to address include: grooming, bathing/shower, toilet hygiene, dressing, functional mobility, community mobility and clothing management  The patient's raw score on the AM-PAC Daily Activity inpatient short form is 21, standardized score is 44 27, greater than 39 4  Patients at this level are likely to benefit from discharge to home  Please refer to the recommendation of the Occupational Therapist for safe discharge planning  Co treatment with PT secondary to complex medical condition of pt, possible A of 2 required to achieve and maintain transitional movements, requiring the need of skilled therapeutic intervention of 2 therapists to achieve delivery of services  Goals   Patient Goals to go home    Short Term Goal  pt will perform UE strengthening exercises    Long Term Goal #1 pt will demonstrate toilet transfers and hygiene at (I) level    Long Term Goal #2 pt will demonstrate functional mobility with SPC at mod (I) level    Long Term Goal pt will demonstrate UB/LB bathing and grooming tasks at min (A) level    Plan   Treatment Interventions ADL retraining;Functional transfer training;UE strengthening/ROM; Endurance training;Patient/family training;Equipment evaluation/education; Activityengagement   Goal Expiration Date 01/25/22   OT Frequency 3-5x/wk   Recommendation   OT Discharge Recommendation Home with home health rehabilitation   AM-PAC Daily Activity Inpatient   Lower Body Dressing 3   Bathing 3   Toileting 3   Upper Body Dressing 4   Grooming 4   Eating 4   Daily Activity Raw Score 21   Daily Activity Standardized Score (Calc for Raw Score >=11) 44 27   AM-PAC Applied Cognition Inpatient   Following a Speech/Presentation 4   Understanding Ordinary Conversation 4   Taking Medications 4   Remembering Where Things Are Placed or Put Away 3   Remembering List of 4-5 Errands 3   Taking Care of Complicated Tasks 3   Applied Cognition Raw Score 21   Applied Cognition Standardized Score 44 3     Pt will benefit from continued OT services in order to maximize (I) c ADL performance, FM c SPC, and improve overall endurance/strength required to complete functional tasks in preparation for d/c  Pt left seated in chair at end of session; all needs within reach; all lines intact

## 2022-01-11 NOTE — PLAN OF CARE
Problem: OCCUPATIONAL THERAPY ADULT  Goal: Performs self-care activities at highest level of function for planned discharge setting  See evaluation for individualized goals  Description: Treatment Interventions: ADL retraining,Functional transfer training,UE strengthening/ROM,Endurance training,Patient/family training,Equipment evaluation/education,Activityengagement          See flowsheet documentation for full assessment, interventions and recommendations  Note: Limitation: Decreased ADL status,Decreased UE strength,Decreased Safe judgement during ADL,Decreased endurance,Decreased self-care trans,Decreased high-level ADLs     Assessment: Pt is a 61 y o  male seen for OT evaluation s/p admit to [facility] on 1/11/2022 w/ Cellulitis of left lower leg  Comorbidities affecting pt's functional performance at time of assessment include: hyperlipidemia, DM, HTN, thyroid gland, angioedema, morbid obesity  Personal factors affecting pt at time of IE include:steps to enter environment, limited home support, difficulty performing ADLS, difficulty performing IADLS , limited insight into deficits, decreased initiation and engagement  and health management   Prior to admission, pt was (I) with ADLs and IADLs with use of SPC during mobility  Upon evaluation: Pt requires (S) level with SPC during mobiity 2* the following deficits impacting occupational performance: weakness, decreased strength, decreased balance, decreased tolerance, impaired initiation, decreased safety awareness and increased pain  Pt to benefit from continued skilled OT tx while in the hospital to address deficits as defined above and maximize level of functional independence w ADL's and functional mobility  Occupational Performance areas to address include: grooming, bathing/shower, toilet hygiene, dressing, functional mobility, community mobility and clothing management   The patient's raw score on the AM-PAC Daily Activity inpatient short form is 21, standardized score is 44 27, greater than 39 4  Patients at this level are likely to benefit from discharge to home  Please refer to the recommendation of the Occupational Therapist for safe discharge planning  Co treatment with PT secondary to complex medical condition of pt, possible A of 2 required to achieve and maintain transitional movements, requiring the need of skilled therapeutic intervention of 2 therapists to achieve delivery of services        OT Discharge Recommendation: Home with home health rehabilitation

## 2022-01-11 NOTE — RESPIRATORY THERAPY NOTE
A Catheter 6fr 145d Pgtl Crv 110cm 6 Sh Radopq Braid was used  RT Protocol Note  Kristal Asencio 61 y o  male MRN: [id number]  Unit/Bed#: RM24 Encounter: [id number]    Assessment    Principal Problem:    Cellulitis of left lower leg  Active Problems:    Allergic rhinitis    COPD, severity to be determined ([place] [address] )    Mixed hyperlipidemia    Primary hypertension    Acquired hypothyroidism    Morbid obesity with BMI of 45 0-49 9, adult ([place] [address] )    Tobacco abuse    Obstructive sleep apnea    Type 2 diabetes mellitus with hyperglycemia, with long-term current use of insulin ([facility])      Home Pulmonary Medications:  Prn Ventolin  I do not think he uses his BreoEllipta   He does not use oxygen or pap therapy       Past Medical History:   Diagnosis Date    Acquired hypothyroidism 1/2/2015    Angioedema     Diabetes mellitus ([place] [address] )     Disease of thyroid gland     Hyperlipidemia     Hypertension     Mixed hyperlipidemia 2/4/2015    Morbid obesity ([place] [address] )     Morbid obesity with BMI of 45 0-49 9, adult ([place] [address] ) 1/2/2015    MARSHA (obstructive sleep apnea)     Primary hypertension 1/2/2015    Transitioned From: Benign essential hypertension     Social History     Socioeconomic History    Marital status: Single     Spouse name: None    Number of children: None    Years of education: None    Highest education level: None   Occupational History    None   Tobacco Use    Smoking status: Current Every Day Smoker     Packs/day: 2 00     Years: 70 00     Pack years: 140 00     Start date: 4/15/1942    Smokeless tobacco: Never Used    Tobacco comment: states read to quit prior to admit   Vaping Use    Vaping Use: Never used   Substance and Sexual Activity    Alcohol use: Not Currently     Alcohol/week: 3 0 standard drinks     Types: 2 Cans of beer, 1 Shots of liquor per week     Comment: seldom    Drug use: No    Sexual activity: Not Currently   Other Topics Concern    None   Social History Narrative    None     Social Determinants of Health     Financial Resource Strain: Not on file   Food Insecurity: Not on file   Transportation Needs: Not on file   Physical Activity: Not on file   Stress: Not on file   Social Connections: Not on file   Intimate Partner Violence: Not on file   Housing Stability: Not on file       Subjective         Objective    Physical Exam:   Assessment Type: Assess only  General Appearance: Alert,Awake  Respiratory Pattern: Dyspnea with exertion  Chest Assessment: Chest expansion symmetrical,Trachea midline  Bilateral Breath Sounds: Diminished  O2 Device: room air    Vitals:  Blood pressure 151/74, pulse 64, temperature 97 9 °F (36 6 °C), temperature source Tympanic, resp  rate 20, SpO2 91 %  Imaging and other studies: I have personally reviewed pertinent reports  O2 Device: room air     Plan    Respiratory Plan: Home Bronchodilator Patient pathway        Resp Comments: Patient received in his room, patient is walking around on room air, he is not in respiratory distress  Patient does not use oxygen or pap therapy  He did go to get tested for MARSHA but stated he will never go back or wear that mask  He uses prn Ventolin   He is ordered Breo, but I do not believe he uses it

## 2022-01-11 NOTE — PHYSICAL THERAPY NOTE
Physical Therapy Evaluation    Patient Name: Juan Benitez    JTULK'W Date: 1/11/2022     Problem List  Principal Problem:    Cellulitis of left lower leg  Active Problems:    Allergic rhinitis    COPD, severity to be determined ([place] [id number] )    Mixed hyperlipidemia    Primary hypertension    Acquired hypothyroidism    Morbid obesity with BMI of 45 0-49 9, adult ([place] [id number] )    Tobacco abuse    Obstructive sleep apnea    Type 2 diabetes mellitus with hyperglycemia, with long-term current use of insulin [facility])       Past Medical History  Past Medical History:   Diagnosis Date    Acquired hypothyroidism 1/2/2015    Angioedema     Diabetes mellitus ([place] [id number] )     Disease of thyroid gland     Hyperlipidemia     Hypertension     Mixed hyperlipidemia 2/4/2015    Morbid obesity ([place] [id number] )     Morbid obesity with BMI of 45 0-49 9, adult ([place] [id number] ) 1/2/2015    MARSHA (obstructive sleep apnea)     Primary hypertension 1/2/2015    Transitioned From: Benign essential hypertension        Past Surgical History  Past Surgical History:   Procedure Laterality Date    KNEE SURGERY      SINUS SURGERY             01/11/22 1026   PT Last Visit   PT Visit Date 01/11/22   Note Type   Note type Evaluation   Pain Assessment   Pain Assessment Tool 0-10   Pain Score 5   Pain Location/Orientation Orientation: Left; Location: Leg   Restrictions/Precautions   Weight Bearing Precautions Per Order No   Other Precautions Pain; Fall Risk   Home Living   Type of [address] level;Stairs to enter with rails  (20 [address] c HR)   Bathroom Shower/Tub Tub/shower unit   [address]   Additional Comments pt reports recent use of SPC   Prior Function   Level of [place] Independent with ADLs and functional mobility; Needs assistance with IADLs   Lives With Alone   Receives Help From Family   ADL Assistance Independent   IADLs Needs assistance   Falls in the last 6 months 1 to 4   Comments (+) driving   General   Family/Caregiver Present No   Cognition   Overall Cognitive Status WFL   Arousal/Participation Alert   Orientation Level Oriented X4   Following Commands Follows all commands and directions without difficulty   Subjective   Subjective "I had a fall"   RLE Assessment   RLE Assessment WFL   LLE Assessment   LLE Assessment WFL   Bed Mobility   Additional Comments pt OOB at start/end of session   Transfers   Sit to Stand 5  Supervision   Additional items Increased time required;Verbal cues   Stand to Sit 5  Supervision   Additional items Increased time required;Verbal cues   Additional Comments SPC used   Ambulation/Elevation   Gait pattern Short stride;Decreased foot clearance; Wide LENY; Improper Weight shift   Gait Assistance 5  Supervision   Additional items Verbal cues   Assistive Device [facility]   Distance 125'   Balance   Static Sitting Good   Dynamic Sitting Good   Static Standing Fair +   Dynamic Standing Fair   Ambulatory Fair   Endurance Deficit   Endurance Deficit Yes   Endurance Deficit Description pt very fatigued with ambulation   Activity Tolerance   Activity Tolerance Patient limited by pain; Patient limited by fatigue   Assessment   Prognosis Good   Problem List Decreased strength;Decreased endurance; Impaired balance;Decreased mobility;Pain;Obesity   Assessment Patient is a 61 y o  male evaluated by Physical Therapy s/p admit to [address] [place] [address],[address] on 1/11/2022 with admitting diagnosis of: Ankle pain, Leg swelling, Cellulitis of left lower extremity, and principal problem of: Cellulitis of left lower leg  PT was consulted to assess patient's functional mobility and discharge needs  Ordered are PT Evaluation and treatment with activity level of: up with assistance  Comorbidities affecting patient's physical performance at time of assessment include: HLD, DM, HTN, hypothyroidism   Personal factors affecting the patient at time of IE include: ambulating with assistive device, step(s) to enter home, inability to navigate community distances, history of fall(s), inability/difficulty performing IADLs and inability/difficulty performing ADLs  Please locate objective findings from PT assessment regarding body systems outlined above  Upon evaluation, pt able to perform all functional mobility with SUP, SPC, and increased time  Pt able to ambulate 125' prior to requiring seated rest break d/t fatigue and SOB  Pt reports recent use of SPC and did use during session, however at times held cane in air instead of using for stability; no LOB experienced  The patient's AM-PAC Basic Mobility Inpatient Short Form Raw Score is 21  A Raw score of greater than 16 suggests the patient may benefit from discharge to home  Please also refer to the recommendation of the Physical Therapist for safe discharge planning  Co treatment with OT secondary to complex medical condition of pt, possible A of 2 required to achieve and maintain transitional movements, requiring the need of skilled therapeutic intervention of 2 therapists to achieve delivery of services  Pt will benefit from continued PT intervention during LOS to address current deficits, increase LOF, and facilitate safe d/c to next level of care when medically appropriate  D/c recommendation at this time is home with home health rehabilitation  Goals   Patient Goals to have less pain   Short Term Goal #1 Pt will participate in B LE strengthening exercises to facilitate improved functional mobility  Short Term Goal #2 Pt will perform all functional transfers and bed mobility mod(I) with good safety awareness  LTG Expiration Date 01/25/22   Long Term Goal #1 Pt will ambulate 250' mod(I) with SPC while maintaining good functional dynamic balance  Long Term Goal #2 Pt will ascend/descend a FFOS with HR and SUP to reflect the ability to safely enter his apartment     Plan   Treatment/Interventions Functional transfer training;LE strengthening/ROM; Elevations; Therapeutic exercise; Endurance training;Bed mobility;Gait training   PT Frequency 3-5x/wk   Recommendation   PT Discharge Recommendation Home with home health rehabilitation   AM-PAC Basic Mobility Inpatient   Turning in Bed Without Bedrails 3   Lying on Back to Sitting on Edge of Flat Bed 3   Moving Bed to Chair 4   Standing Up From Chair 4   Walk in Room 4   Climb 3-5 Stairs 3   Basic Mobility Inpatient Raw Score 21   Basic Mobility Standardized Score 45 55   Highest Level Of Mobility   -HL Goal 6: Walk 10 steps or more   JH-HL Highest Level of Mobility 7: Walk 25 feet or more   JH-HLM Goal Achieved Yes   End of Consult   Patient Position at End of Consult Bedside chair; All needs within reach

## 2022-01-12 ENCOUNTER — APPOINTMENT (INPATIENT)
Dept: CT IMAGING | Facility: HOSPITAL | Age: 57
DRG: 638 | End: 2022-01-12
Payer: MEDICARE

## 2022-01-12 LAB
ANION GAP SERPL CALCULATED.3IONS-SCNC: 6 MMOL/L (ref 4–13)
BASOPHILS # BLD AUTO: 0.04 THOUSANDS/ΜL (ref 0–0.1)
BASOPHILS NFR BLD AUTO: 1 % (ref 0–1)
BUN SERPL-MCNC: 12 MG/DL (ref 5–25)
CALCIUM SERPL-MCNC: 9.1 MG/DL (ref 8.3–10.1)
CHLORIDE SERPL-SCNC: 94 MMOL/L (ref 100–108)
CO2 SERPL-SCNC: 34 MMOL/L (ref 21–32)
CREAT SERPL-MCNC: 1.03 MG/DL (ref 0.6–1.3)
EOSINOPHIL # BLD AUTO: 0.25 THOUSAND/ΜL (ref 0–0.61)
EOSINOPHIL NFR BLD AUTO: 3 % (ref 0–6)
ERYTHROCYTE [DISTWIDTH] IN BLOOD BY AUTOMATED COUNT: 13.7 % (ref 11.6–15.1)
GFR SERPL CREATININE-BSD FRML MDRD: 80 ML/MIN/1.73SQ M
GLUCOSE SERPL-MCNC: 133 MG/DL (ref 65–140)
GLUCOSE SERPL-MCNC: 179 MG/DL (ref 65–140)
GLUCOSE SERPL-MCNC: 246 MG/DL (ref 65–140)
GLUCOSE SERPL-MCNC: 75 MG/DL (ref 65–140)
GLUCOSE SERPL-MCNC: 81 MG/DL (ref 65–140)
HCT VFR BLD AUTO: 38.7 % (ref 36.5–49.3)
HGB BLD-MCNC: 12.7 G/DL (ref 12–17)
IMM GRANULOCYTES # BLD AUTO: 0.02 THOUSAND/UL (ref 0–0.2)
IMM GRANULOCYTES NFR BLD AUTO: 0 % (ref 0–2)
LYMPHOCYTES # BLD AUTO: 1.73 THOUSANDS/ΜL (ref 0.6–4.47)
LYMPHOCYTES NFR BLD AUTO: 21 % (ref 14–44)
MCH RBC QN AUTO: 27.6 PG (ref 26.8–34.3)
MCHC RBC AUTO-ENTMCNC: 32.8 G/DL (ref 31.4–37.4)
MCV RBC AUTO: 84 FL (ref 82–98)
MONOCYTES # BLD AUTO: 0.79 THOUSAND/ΜL (ref 0.17–1.22)
MONOCYTES NFR BLD AUTO: 10 % (ref 4–12)
NEUTROPHILS # BLD AUTO: 5.25 THOUSANDS/ΜL (ref 1.85–7.62)
NEUTS SEG NFR BLD AUTO: 65 % (ref 43–75)
NRBC BLD AUTO-RTO: 0 /100 WBCS
PLATELET # BLD AUTO: 338 THOUSANDS/UL (ref 149–390)
PMV BLD AUTO: 8.4 FL (ref 8.9–12.7)
POTASSIUM SERPL-SCNC: 3.5 MMOL/L (ref 3.5–5.3)
PROCALCITONIN SERPL-MCNC: 0.14 NG/ML
RBC # BLD AUTO: 4.6 MILLION/UL (ref 3.88–5.62)
SODIUM SERPL-SCNC: 134 MMOL/L (ref 136–145)
WBC # BLD AUTO: 8.08 THOUSAND/UL (ref 4.31–10.16)

## 2022-01-12 PROCEDURE — 85025 COMPLETE CBC W/AUTO DIFF WBC: CPT | Performed by: FAMILY MEDICINE

## 2022-01-12 PROCEDURE — 73701 CT LOWER EXTREMITY W/DYE: CPT

## 2022-01-12 PROCEDURE — G0427 INPT/ED TELECONSULT70: HCPCS | Performed by: INTERNAL MEDICINE

## 2022-01-12 PROCEDURE — 82948 REAGENT STRIP/BLOOD GLUCOSE: CPT

## 2022-01-12 PROCEDURE — 80048 BASIC METABOLIC PNL TOTAL CA: CPT | Performed by: FAMILY MEDICINE

## 2022-01-12 PROCEDURE — NC001 PR NO CHARGE: Performed by: ORTHOPAEDIC SURGERY

## 2022-01-12 PROCEDURE — G1004 CDSM NDSC: HCPCS

## 2022-01-12 PROCEDURE — 84145 PROCALCITONIN (PCT): CPT | Performed by: EMERGENCY MEDICINE

## 2022-01-12 PROCEDURE — 99232 SBSQ HOSP IP/OBS MODERATE 35: CPT | Performed by: FAMILY MEDICINE

## 2022-01-12 RX ORDER — FUROSEMIDE 10 MG/ML
40 INJECTION INTRAMUSCULAR; INTRAVENOUS ONCE
Status: COMPLETED | OUTPATIENT
Start: 2022-01-12 | End: 2022-01-12

## 2022-01-12 RX ADMIN — INSULIN LISPRO 2 UNITS: 100 INJECTION, SOLUTION INTRAVENOUS; SUBCUTANEOUS at 11:51

## 2022-01-12 RX ADMIN — LORATADINE 10 MG: 10 TABLET ORAL at 08:06

## 2022-01-12 RX ADMIN — ATORVASTATIN CALCIUM 20 MG: 10 TABLET, FILM COATED ORAL at 17:18

## 2022-01-12 RX ADMIN — DULOXETINE HYDROCHLORIDE 60 MG: 30 CAPSULE, DELAYED RELEASE ORAL at 08:06

## 2022-01-12 RX ADMIN — NICOTINE 1 PATCH: 14 PATCH, EXTENDED RELEASE TRANSDERMAL at 05:28

## 2022-01-12 RX ADMIN — FUROSEMIDE 40 MG: 10 INJECTION, SOLUTION INTRAVENOUS at 11:51

## 2022-01-12 RX ADMIN — ASPIRIN 81 MG: 81 TABLET, COATED ORAL at 08:06

## 2022-01-12 RX ADMIN — INSULIN GLARGINE 65 UNITS: 100 INJECTION, SOLUTION SUBCUTANEOUS at 21:44

## 2022-01-12 RX ADMIN — OMEGA-3 FATTY ACIDS CAP 1000 MG 1000 MG: 1000 CAP at 08:06

## 2022-01-12 RX ADMIN — LEVOTHYROXINE SODIUM 175 MCG: 175 TABLET ORAL at 05:28

## 2022-01-12 RX ADMIN — HEPARIN SODIUM 5000 UNITS: 5000 INJECTION INTRAVENOUS; SUBCUTANEOUS at 21:44

## 2022-01-12 RX ADMIN — HEPARIN SODIUM 5000 UNITS: 5000 INJECTION INTRAVENOUS; SUBCUTANEOUS at 13:34

## 2022-01-12 RX ADMIN — TRAZODONE HYDROCHLORIDE 150 MG: 50 TABLET ORAL at 21:44

## 2022-01-12 RX ADMIN — INSULIN LISPRO 4 UNITS: 100 INJECTION, SOLUTION INTRAVENOUS; SUBCUTANEOUS at 17:21

## 2022-01-12 RX ADMIN — CEFTRIAXONE 2000 MG: 2 INJECTION, SOLUTION INTRAVENOUS at 21:44

## 2022-01-12 RX ADMIN — HEPARIN SODIUM 5000 UNITS: 5000 INJECTION INTRAVENOUS; SUBCUTANEOUS at 05:28

## 2022-01-12 RX ADMIN — OMEGA-3 FATTY ACIDS CAP 1000 MG 1000 MG: 1000 CAP at 17:18

## 2022-01-12 NOTE — PROGRESS NOTES
[address] [facility] [address]  Progress Note - Bailey Lindsay 1965, 61 y o  male MRN: [id number]  Unit/Bed#:  Encounter: [id number]  Primary Care Provider: Jaymie Carrington PA-C   Date and time admitted to hospital: 1/11/2022 12:48 AM    * Cellulitis of left lower leg  Assessment & Plan  Was seen by Orthopedic surgery due to abnormal x-ray  Will obtain CT of the lower extremity  Continue with ceftriaxone  Blood cultures negative for 24 hours  MRSA culture pending    Type 2 diabetes mellitus with hyperglycemia, with long-term current use of insulin ([facility])  Assessment & Plan  Lab Results   Component Value Date    HGBA1C 11 6 (A) 05/06/2021       Recent Labs     01/11/22  0733   POCGLU 154*       Blood Sugar Average: Last 72 hrs:  (P) 154     Poorly controlled, continue basal/bolus regimen and refer to outpatient endo on dc       Obstructive sleep apnea  Assessment & Plan  Chronic condition  Patient intolerant of CPAP  Continuous pulse oximetry and supplemental oxygen ordered  Tobacco abuse  Assessment & Plan  Chronic condition  Encouraged cessation; patient declines assistance at this time  Offer nicotine patch during admission  Morbid obesity with BMI of 45 0-49 9, adult ([facility])  Assessment & Plan  BMI 46 2  Consider dietitian consult  Encourage lifestyle modifications  Acquired hypothyroidism  Assessment & Plan  Synthroid 175 mcg p o  Daily      Primary hypertension  Assessment & Plan  Chronic condition  Continue outpatient medication regimen  Mixed hyperlipidemia  Assessment & Plan  Lipitor 20 mg HS  Lovaza 1g TID     COPD, severity to be determined [facility])  Assessment & Plan  Not on maintenance inhalers  Outpatient Pulmonary follow-up    Allergic rhinitis  Assessment & Plan  Chronic condition  Continue outpatient medication regimen          DC  TBD   Progress Note - Bailey Lindsay 61 y o  male MRN: [id number]    Unit/Bed#:  Encounter: [id number]        Subjective: Patient seen and examined, feels same about yesterday, however pruritis improved     Objective:     Vitals:   Vitals:    01/12/22 0700   BP: 137/65   Pulse: 86   Resp: 19   Temp: (!) 97 3 °F (36 3 °C)   SpO2: 94%     Body mass index is 45 03 kg/m²      Intake/Output Summary (Last 24 hours) at 1/12/2022 1017  Last data filed at 1/11/2022 1659  Gross per 24 hour   Intake 360 ml   Output --   Net 360 ml       Physical Exam:   /65 (BP Location: Right arm)   Pulse 86   Temp (!) 97 3 °F (36 3 °C) (Temporal)   Resp 19   Ht 5' 4" (1 626 m)   Wt 119 kg (262 lb 5 6 oz)   SpO2 94%   BMI 45 03 kg/m²   General appearance: alert and oriented, in no acute distress  Head: Normocephalic, without obvious abnormality, atraumatic  Lungs: clear to auscultation bilaterally  Heart: regular rate and rhythm, S1, S2 normal, no murmur, click, rub or gallop  Abdomen: soft, non-tender; bowel sounds normal; no masses,  no organomegaly  Extremities: area of erythema shows slight improvement, persistent b/l edema  Pulses: 2+ and symmetric  Skin: Skin color, texture, turgor normal  No rashes or lesions  Neurologic: Grossly normal     Invasive Devices  Report    Peripheral Intravenous Line            Peripheral IV 01/11/22 Right Forearm 1 day                Results from last 7 days   Lab Units 01/12/22  0537 01/11/22  0657 01/11/22  0130   WBC Thousand/uL 8 08 10 55* 9 44   HEMOGLOBIN g/dL 12 7 12 6 12 7   HEMATOCRIT % 38 7 38 8 37 9   PLATELETS Thousands/uL 338 332 345       Results from last 7 days   Lab Units 01/12/22  0537 01/11/22  0657 01/11/22  0130   POTASSIUM mmol/L 3 5 3 7 4 0   CHLORIDE mmol/L 94* 93* 92*   CO2 mmol/L 34* 31 33*   BUN mg/dL 12 12 13   CREATININE mg/dL 1 03 1 09 1 28   CALCIUM mg/dL 9 1 8 2* 8 9   ALK PHOS U/L  --  74 83   ALT U/L  --  30 30   AST U/L  --  23 22       Medication Administration - last 24 hours from 01/11/2022 1017 to 01/12/2022 1017       Date/Time Order Dose Route Action Action by 01/12/2022 0444 nicotine (NICODERM CQ) 14 mg/24hr TD 24 hr patch 14 mg 14 mg Transdermal Patch Removed Richelle Llamas RN     01/12/2022 0806 aspirin (ECOTRIN LOW STRENGTH) EC tablet 81 mg 81 mg Oral Given Richelle Llamas RN     01/11/2022 1724 atorvastatin (LIPITOR) tablet 20 mg 20 mg Oral Given Hector Ireland RN     01/12/2022 0806 DULoxetine (CYMBALTA) delayed release capsule 60 mg 60 mg Oral Given Richelle Llamas RN     01/12/2022 0806 fluticasone (FLONASE) 50 mcg/act nasal spray 2 spray 2 spray Each Nare Refused Richelle Llamas RN     01/12/2022 0528 levothyroxine tablet 175 mcg 175 mcg Oral Given Richelle Llamas RN     01/11/2022 1133 levothyroxine tablet 175 mcg 175 mcg Oral Given Fabiana Berg RN     01/12/2022 0806 fish oil capsule 1,000 mg 1,000 mg Oral Given Richelle Llamas RN     01/11/2022 1724 fish oil capsule 1,000 mg 1,000 mg Oral Given Hector Ireland RN     01/11/2022 2208 traZODone (DESYREL) tablet 150 mg 150 mg Oral Given Nathaniel Harden RN     01/12/2022 0528 nicotine (NICODERM CQ) 14 mg/24hr TD 24 hr patch 1 patch 1 patch Transdermal Medication Applied Richelle Llamas RN     01/12/2022 0528 heparin (porcine) subcutaneous injection 5,000 Units 5,000 Units Subcutaneous Given Richelle Llamas RN     01/11/2022 2208 heparin (porcine) subcutaneous injection 5,000 Units 5,000 Units Subcutaneous Given Nathaniel Harden RN     01/11/2022 1306 heparin (porcine) subcutaneous injection 5,000 Units 5,000 Units Subcutaneous Given Richelle Llamas RN     01/11/2022 2209 insulin glargine (LANTUS) subcutaneous injection 65 Units 0 65 mL 65 Units Subcutaneous Given Nathaniel Harden RN     01/12/2022 0737 insulin lispro (HumaLOG) 100 units/mL subcutaneous injection 2-12 Units 2 Units Subcutaneous Not Given Richelle Llamas RN     01/11/2022 1633 insulin lispro (HumaLOG) 100 units/mL subcutaneous injection 2-12 Units 2 Units Subcutaneous Not Given Hector Ireland RN     01/11/2022 1128 insulin lispro (HumaLOG) 100 units/mL subcutaneous injection 2-12 Units 6 Units Subcutaneous Given Charley Joshi RN     01/11/2022 2208 insulin lispro (HumaLOG) 100 units/mL subcutaneous injection 2-12 Units 2 Units Subcutaneous Not Given Daniel Merino RN     01/11/2022 1128 furosemide (LASIX) injection 40 mg 40 mg Intravenous Given Charley Joshi RN     01/11/2022 2208 cefTRIAXone (ROCEPHIN) IVPB (premix in dextrose) 2,000 mg 50 mL 2,000 mg Intravenous New [facility] Daniel Merino RN     01/12/2022 0806 loratadine (CLARITIN) tablet 10 mg 10 mg Oral Given Carey Cruz RN            Lab, Imaging and other studies: I have personally reviewed pertinent reports      VTE Pharmacologic Prophylaxis: Heparin  VTE Mechanical Prophylaxis: sequential compression device     Kaylin Westfall MD  1/12/2022,10:17 AM

## 2022-01-12 NOTE — PLAN OF CARE
Problem: Potential for Falls  Goal: Patient will remain free of falls  Description: INTERVENTIONS:  - Educate patient/family on patient safety including physical limitations  - Instruct patient to call for assistance with activity   - Consult OT/PT to assist with strengthening/mobility   - Keep Call bell within reach  - Keep bed low and locked with side rails adjusted as appropriate  - Keep care items and personal belongings within reach  - Initiate and maintain comfort rounds  - Make Fall Risk Sign visible to staff  - Offer Toileting every 1-2 Hours, in advance of need  - Initiate/Maintain bed and chair alarm  - Obtain necessary fall risk management equipment: fall socks  - Apply yellow socks and bracelet for high fall risk patients  - Consider moving patient to room near nurses station  Outcome: Progressing     Problem: PAIN - ADULT  Goal: Verbalizes/displays adequate comfort level or baseline comfort level  Description: Interventions:  - Encourage patient to monitor pain and request assistance  - Assess pain using appropriate pain scale  - Administer analgesics based on type and severity of pain and evaluate response  - Implement non-pharmacological measures as appropriate and evaluate response  - Consider cultural and social influences on pain and pain management  - Notify physician/advanced practitioner if interventions unsuccessful or patient reports new pain  Outcome: Progressing     Problem: INFECTION - ADULT  Goal: Absence or prevention of progression during hospitalization  Description: INTERVENTIONS:  - Assess and monitor for signs and symptoms of infection  - Monitor lab/diagnostic results  - Monitor all insertion sites, i e  indwelling lines, tubes, and drains  - Monitor endotracheal if appropriate and nasal secretions for changes in amount and color  - [place] appropriate cooling/warming therapies per order  - Administer medications as ordered  - Instruct and encourage patient and family to use good hand hygiene technique  - Identify and instruct in appropriate isolation precautions for identified infection/condition  Outcome: Progressing     Problem: SAFETY ADULT  Goal: Patient will remain free of falls  Description: INTERVENTIONS:  - Educate patient/family on patient safety including physical limitations  - Instruct patient to call for assistance with activity   - Consult OT/PT to assist with strengthening/mobility   - Keep Call bell within reach  - Keep bed low and locked with side rails adjusted as appropriate  - Keep care items and personal belongings within reach  - Initiate and maintain comfort rounds  - Make Fall Risk Sign visible to staff  - Offer Toileting every 1-2 Hours, in advance of need  - Initiate/Maintain bed and chair alarm  - Obtain necessary fall risk management equipment: fall socks  - Apply yellow socks and bracelet for high fall risk patients  - Consider moving patient to room near nurses station  Outcome: Progressing  Goal: Maintain or return to baseline ADL function  Description: INTERVENTIONS:  -  Assess patient's ability to carry out ADLs; assess patient's baseline for ADL function and identify physical deficits which impact ability to perform ADLs (bathing, care of mouth/teeth, toileting, grooming, dressing, etc )  - Assess/evaluate cause of self-care deficits   - Assess range of motion  - Assess patient's mobility; develop plan if impaired  - Assess patient's need for assistive devices and provide as appropriate  - Encourage maximum independence but intervene and supervise when necessary  - Involve family in performance of ADLs  - Assess for home care needs following discharge   - Consider OT consult to assist with ADL evaluation and planning for discharge  - Provide patient education as appropriate  Outcome: Progressing  Goal: Maintains/Returns to pre admission functional level  Description: INTERVENTIONS:  - Perform BMAT or MOVE assessment daily    - Set and communicate daily mobility goal to care team and patient/family/caregiver  - Collaborate with rehabilitation services on mobility goals if consulted  - Perform Range of Motion 3-4 times a day  - Reposition patient every 1-2 hours  - Dangle patient 3-4 times a day  - Stand patient 3-4 times a day  - Ambulate patient 3-4 times a day  - Out of bed to chair 3-4 times a day   - Out of bed for meals 3-4 times a day  - Out of bed for toileting  - Record patient progress and toleration of activity level   Outcome: Progressing     Problem: DISCHARGE PLANNING  Goal: Discharge to home or other facility with appropriate resources  Description: INTERVENTIONS:  - Identify barriers to discharge w/patient and caregiver  - Arrange for needed discharge resources and transportation as appropriate  - Identify discharge learning needs (meds, wound care, etc )  - Arrange for interpretive services to assist at discharge as needed  - Refer to Case Management Department for coordinating discharge planning if the patient needs post-hospital services based on physician/advanced practitioner order or complex needs related to functional status, cognitive ability, or social support system  Outcome: Progressing     Problem: Knowledge Deficit  Goal: Patient/family/caregiver demonstrates understanding of disease process, treatment plan, medications, and discharge instructions  Description: Complete learning assessment and assess knowledge base    Interventions:  - Provide teaching at level of understanding  - Provide teaching via preferred learning methods  Outcome: Progressing     Problem: MUSCULOSKELETAL - ADULT  Goal: Maintain or return mobility to safest level of function  Description: INTERVENTIONS:  - Assess patient's ability to carry out ADLs; assess patient's baseline for ADL function and identify physical deficits which impact ability to perform ADLs (bathing, care of mouth/teeth, toileting, grooming, dressing, etc )  - Assess/evaluate cause of self-care deficits   - Assess range of motion  - Assess patient's mobility  - Assess patient's need for assistive devices and provide as appropriate  - Encourage maximum independence but intervene and supervise when necessary  - Involve family in performance of ADLs  - Assess for home care needs following discharge   - Consider OT consult to assist with ADL evaluation and planning for discharge  - Provide patient education as appropriate  Outcome: Progressing  Goal: Maintain proper alignment of affected body part  Description: INTERVENTIONS:  - Support, maintain and protect limb and body alignment  - Provide patient/ family with appropriate education  Outcome: Progressing

## 2022-01-12 NOTE — CONSULTS
Consultation - Infectious Disease   Shannon Police 61 y o  male MRN: [id number]  Unit/Bed#: [facility] Encounter: [id number]      Inpatient consult to Infectious Diseases  Consult performed by: Nadja Billy MD  Consult ordered by: Jamison Krabbe, MD            REQUIRED DOCUMENTATION:     1  This service was provided via Telemedicine  2  Provider located at [facility]  3  TeleMed provider: Nadja Billy MD   4  Identify all parties in room with patient during tele consult:RN  5  After connecting through [employer], patient was identified by name and date of birth and assistant checked wristband  Patient was then informed that this was a Telemedicine visit and that the exam was being conducted confidentially over secure lines  My office door was closed  No one else was in the room  Patient acknowledged consent and understanding of privacy and security of the Telemedicine visit, and gave us permission to have the assistant stay in the room in order to assist with the history and to conduct the exam   I informed the patient that I have reviewed their record in Epic and presented the opportunity for them to ask any questions regarding the visit today  The patient agreed to participate  Assessment/Recommendations     1   Left leg cellulitis  - in the setting of poorly controlled diabetes, morbid obesity, tobacco use and possible chronic venous insufficiency  - lack of significant improvement on Keflex and doxycycline as outpatient likely due to underlying venous edema  - no risk factors for MRSA, suspect strep and/or gram negative cellulitis  - afebrile without leukocytosis    · Continue ceftriaxone  · Await CT of the left leg  · FU blood cultures and MRSA nasal culture  · Continue serial extremity exams    Discussed lower extremity skin care, management of venous edema with oral diuretics, compression stockings, limb elevation  Recommend weight loss and cessation of alcohol  Discussed natural history of cellulitis and discussed with patient that he is at risk for recurrent cellulitis if underlying risk factors cannot be modified    2  Recent fall, L ankle pain    · Await CT to rule out associated L ankle fracture  · Additional management per ortho    3  Type 2 diabetes, morbid obesity, tobacco use  - Risk factor for infection    · Recommend strict glycemic control   · Recommend lifestyle modification, weight loss as outpatient  · Recommend tobacco cessation    Will follow  Thank you for involving me in the care of your patient  Please call with questions, change in clinical status or if tests recommended above are abnormal      Discussed with the primary service  History     Reason for Consult:  Cellulitis  HPI: David Barrera is a 61y o  year old male with poorly controlled type 2 diabetes and a previous history of cellulitis  He presented to the ER on 01/11 with over 3 weeks of left leg swelling, redness,  He was treated with oral Keflex on 12/9 a continued to have worsening symptoms and was seen in the ER on 12/17  He was advised hospitalization but he declined and he left AMA and was prescribed oral doxy  Symptoms persisted but he had a mechanical fall on 1/10 and twisted his left ankle that prompted a return to the ER  In the ER, vitals were notable for tachycardia   Labs were notable for l normal white count  EKG noted no acute ST changes  XR foot and duplex was negative  CT leg is pending  He was started on ceftriaxone after receiving a dose of vanc  He has been afebrile without leukocytosis and notes improvement in leg pain and swelling  Denies nausea, vomiting, diarrhea or rash and is tolerating antibiotics well  Infectious disease is being consulted for diagnostic work up and antibiotic management  Review of Systems  Pertinent positives and negatives as noted in HPI  Rest complete 12 point system-based review of systems is otherwise negative      PAST MEDICAL HISTORY:  Past Medical History:   Diagnosis Date    Acquired hypothyroidism 2015    Angioedema     Diabetes mellitus ([facility] )     Disease of thyroid gland     Hyperlipidemia     Hypertension     Mixed hyperlipidemia 2015    Morbid obesity ([facility] )     Morbid obesity with BMI of 45 0-49 9, adult ([facility] ) 2015    MARSHA (obstructive sleep apnea)     Primary hypertension 2015    Transitioned From: Benign essential hypertension     Past Surgical History:   Procedure Laterality Date    KNEE SURGERY      SINUS SURGERY         FAMILY HISTORY:  Non-contributory    SOCIAL HISTORY:  Social History   Single  Social History     Substance and Sexual Activity   Alcohol Use Not Currently    Alcohol/week: 3 0 standard drinks    Types: 2 Cans of beer, 1 Shots of liquor per week    Comment: seldom     Social History     Substance and Sexual Activity   Drug Use No     Social History     Tobacco Use   Smoking Status Current Every Day Smoker    Packs/day: 2 00    Years: 70 00    Pack years: 140 00    Start date: 4[birth year]/1942   Smokeless Tobacco Never Used   Tobacco Comment    states read to quit prior to admit       ALLERGIES:  Allergies   Allergen Reactions    Ace Inhibitors Swelling     Angioedema    Other Anaphylaxis     Pt believes that he is allergic to peanut butter  Also, seasonal allergies    Zinc Tongue Swelling    Clindamycin      Had angioedema episode approx 5 hr after IM administration of clindamycin  Unclear if there is definitive causal relationship  MEDICATIONS:  All current active medications have been reviewed      Physical Exam     Temp:  [97 3 °F (36 3 °C)-98 3 °F (36 8 °C)] 97 3 °F (36 3 °C)  HR:  [] 86  Resp:  [15-19] 19  BP: (133-137)/(53-80) 137/65  SpO2:  [94 %-95 %] 94 %  Temp (24hrs), Av 6 °F (36 4 °C), Min:97 3 °F (36 3 °C), Max:98 3 °F (36 8 °C)  Current: Temperature: (!) 97 3 °F (36 3 °C)    Intake/Output Summary (Last 24 hours) at 2022 1022  Last data filed at 2022 1658  Gross per 24 hour   Intake 360 ml   Output --   Net 360 ml         Physical exam findings reported by bedside and primary medical team staff    General Appearance:  Appearing well, nontoxic, and in no distress, appears stated age   Head:  Normocephalic, without obvious abnormality, atraumatic   Eyes:  PERRL, conjunctiva pink and sclera anicteric, both eyes   Nose: Nares normal, mucosa normal, no drainage   Throat: Oropharynx moist without lesions; lips, mucosa, and tongue normal; teeth and gums normal   Neck: Supple, symmetrical, trachea midline, no adenopathy, no tenderness/mass/nodules   Back:   Symmetric, no curvature, ROM normal, no CVA tenderness   Lungs:   Clear to auscultation bilaterally, no audible wheezes, rhonchi and rales, respirations unlabored   Chest Wall:  No tenderness or deformity   Heart:  Regular rate and rhythm, S1, S2 normal, no murmur, rub or gallop   Abdomen:   Soft, non-tender, non-distended, positive bowel sounds, no masses, no organomegaly    No CVA tenderness   Extremities: Extremities normal, atraumatic, no cyanosis, clubbing or edema   Skin: Skin color, texture, turgor normal, no rashes or lesions  No draining wounds noted  Lymph nodes: Cervical, supraclavicular, and axillary nodes normal   Neurologic: Alert and oriented times 3, extremity strength 5/5 and symmetric       Invasive Devices:   Peripheral IV 01/11/22 Right Forearm (Active)   Site Assessment WDL; Clean;Dry; Intact 01/11/22 0130   Dressing Type Transparent 01/11/22 0130   Line Status Blood return noted; Flushed;Saline locked 01/11/22 0130   Dressing Status Clean;Dry; Intact 01/11/22 0130       Labs, Imaging, & Other Studies     Lab Results:    I have personally reviewed pertinent labs      Results from last 7 days   Lab Units 01/12/22  0537 01/11/22  0657 01/11/22  0130   WBC Thousand/uL 8 08 10 55* 9 44   HEMOGLOBIN g/dL 12 7 12 6 12 7   PLATELETS Thousands/uL 338 332 345     Results from last 7 days   Lab Units 01/12/22  0537 01/11/22  0657 01/11/22  0657 01/11/22  0130 01/11/22  0130   POTASSIUM mmol/L 3 5   < > 3 7   < > 4 0   CHLORIDE mmol/L 94*   < > 93*   < > 92*   CO2 mmol/L 34*   < > 31   < > 33*   BUN mg/dL 12   < > 12   < > 13   CREATININE mg/dL 1 03   < > 1 09   < > 1 28   EGFR ml/min/1 73sq m 80   < > 74   < > 61   CALCIUM mg/dL 9 1   < > 8 2*   < > 8 9   AST U/L  --   --  23  --  22   ALT U/L  --   --  30   < > 30   ALK PHOS U/L  --   --  74   < > 83    < > = values in this interval not displayed  Results from last 7 days   Lab Units 01/11/22  0130   BLOOD CULTURE  No Growth at 24 hrs  No Growth at 24 hrs  Imaging Studies:   I have personally reviewed pertinent imaging study reports and images in PACS  EKG, Pathology, and Other Studies:   I have personally reviewed pertinent reports  Counseling/Coordination of care: Total 70 minutes communication with the patient via telehealth  Labs, medical tests and imaging studies were independently and extensively reviewed by me as noted above in HPI and old records were obtained and summarized as noted above in HPI  My recommendations were discussed with the patient in detail who verbalized understanding

## 2022-01-12 NOTE — PLAN OF CARE
Problem: Potential for Falls  Goal: Patient will remain free of falls  Description: INTERVENTIONS:  - Educate patient/family on patient safety including physical limitations  - Instruct patient to call for assistance with activity   - Consult OT/PT to assist with strengthening/mobility   - Keep Call bell within reach  - Keep bed low and locked with side rails adjusted as appropriate  - Keep care items and personal belongings within reach  - Initiate and maintain comfort rounds  - Make Fall Risk Sign visible to staff  - Offer Toileting every 1-2 Hours, in advance of need  - Initiate/Maintain bed and chair alarm  - Obtain necessary fall risk management equipment: fall socks  - Apply yellow socks and bracelet for high fall risk patients  - Consider moving patient to room near nurses station  Outcome: Progressing     Problem: PAIN - ADULT  Goal: Verbalizes/displays adequate comfort level or baseline comfort level  Description: Interventions:  - Encourage patient to monitor pain and request assistance  - Assess pain using appropriate pain scale  - Administer analgesics based on type and severity of pain and evaluate response  - Implement non-pharmacological measures as appropriate and evaluate response  - Consider cultural and social influences on pain and pain management  - Notify physician/advanced practitioner if interventions unsuccessful or patient reports new pain  Outcome: Progressing     Problem: INFECTION - ADULT  Goal: Absence or prevention of progression during hospitalization  Description: INTERVENTIONS:  - Assess and monitor for signs and symptoms of infection  - Monitor lab/diagnostic results  - Monitor all insertion sites, i e  indwelling lines, tubes, and drains  - Monitor endotracheal if appropriate and nasal secretions for changes in amount and color  - [place] appropriate cooling/warming therapies per order  - Administer medications as ordered  - Instruct and encourage patient and family to use good hand hygiene technique  - Identify and instruct in appropriate isolation precautions for identified infection/condition  Outcome: Progressing     Problem: SAFETY ADULT  Goal: Patient will remain free of falls  Description: INTERVENTIONS:  - Educate patient/family on patient safety including physical limitations  - Instruct patient to call for assistance with activity   - Consult OT/PT to assist with strengthening/mobility   - Keep Call bell within reach  - Keep bed low and locked with side rails adjusted as appropriate  - Keep care items and personal belongings within reach  - Initiate and maintain comfort rounds  - Make Fall Risk Sign visible to staff  - Offer Toileting every 1-2 Hours, in advance of need  - Initiate/Maintain bed and chair alarm  - Obtain necessary fall risk management equipment: fall socks  - Apply yellow socks and bracelet for high fall risk patients  - Consider moving patient to room near nurses station  Outcome: Progressing  Goal: Maintain or return to baseline ADL function  Description: INTERVENTIONS:  -  Assess patient's ability to carry out ADLs; assess patient's baseline for ADL function and identify physical deficits which impact ability to perform ADLs (bathing, care of mouth/teeth, toileting, grooming, dressing, etc )  - Assess/evaluate cause of self-care deficits   - Assess range of motion  - Assess patient's mobility; develop plan if impaired  - Assess patient's need for assistive devices and provide as appropriate  - Encourage maximum independence but intervene and supervise when necessary  - Involve family in performance of ADLs  - Assess for home care needs following discharge   - Consider OT consult to assist with ADL evaluation and planning for discharge  - Provide patient education as appropriate  Outcome: Progressing  Goal: Maintains/Returns to pre admission functional level  Description: INTERVENTIONS:  - Perform BMAT or MOVE assessment daily    - Set and communicate daily mobility goal to care team and patient/family/caregiver  - Collaborate with rehabilitation services on mobility goals if consulted  - Perform Range of Motion 3-4 times a day  - Reposition patient every 1-2 hours  - Dangle patient 3-4 times a day  - Stand patient 3-4 times a day  - Ambulate patient 3-4 times a day  - Out of bed to chair 3-4 times a day   - Out of bed for meals 3-4 times a day  - Out of bed for toileting  - Record patient progress and toleration of activity level   Outcome: Progressing     Problem: DISCHARGE PLANNING  Goal: Discharge to home or other facility with appropriate resources  Description: INTERVENTIONS:  - Identify barriers to discharge w/patient and caregiver  - Arrange for needed discharge resources and transportation as appropriate  - Identify discharge learning needs (meds, wound care, etc )  - Arrange for interpretive services to assist at discharge as needed  - Refer to Case Management Department for coordinating discharge planning if the patient needs post-hospital services based on physician/advanced practitioner order or complex needs related to functional status, cognitive ability, or social support system  Outcome: Progressing     Problem: Knowledge Deficit  Goal: Patient/family/caregiver demonstrates understanding of disease process, treatment plan, medications, and discharge instructions  Description: Complete learning assessment and assess knowledge base    Interventions:  - Provide teaching at level of understanding  - Provide teaching via preferred learning methods  Outcome: Progressing     Problem: MUSCULOSKELETAL - ADULT  Goal: Maintain or return mobility to safest level of function  Description: INTERVENTIONS:  - Assess patient's ability to carry out ADLs; assess patient's baseline for ADL function and identify physical deficits which impact ability to perform ADLs (bathing, care of mouth/teeth, toileting, grooming, dressing, etc )  - Assess/evaluate cause of self-care deficits   - Assess range of motion  - Assess patient's mobility  - Assess patient's need for assistive devices and provide as appropriate  - Encourage maximum independence but intervene and supervise when necessary  - Involve family in performance of ADLs  - Assess for home care needs following discharge   - Consider OT consult to assist with ADL evaluation and planning for discharge  - Provide patient education as appropriate  Outcome: Progressing  Goal: Maintain proper alignment of affected body part  Description: INTERVENTIONS:  - Support, maintain and protect limb and body alignment  - Provide patient/ family with appropriate education  Outcome: Progressing

## 2022-01-12 NOTE — PROGRESS NOTES
CT scan shows only cellulitis no bony pathology or abscess  Will sign off  Pl call if services needed again

## 2022-01-13 LAB
ALBUMIN SERPL BCP-MCNC: 3 G/DL (ref 3.5–5)
ALP SERPL-CCNC: 73 U/L (ref 46–116)
ALT SERPL W P-5'-P-CCNC: 30 U/L (ref 12–78)
ANION GAP SERPL CALCULATED.3IONS-SCNC: 7 MMOL/L (ref 4–13)
AST SERPL W P-5'-P-CCNC: 24 U/L (ref 5–45)
BASOPHILS # BLD AUTO: 0.03 THOUSANDS/ΜL (ref 0–0.1)
BASOPHILS NFR BLD AUTO: 0 % (ref 0–1)
BILIRUB SERPL-MCNC: 0.29 MG/DL (ref 0.2–1)
BUN SERPL-MCNC: 13 MG/DL (ref 5–25)
CALCIUM ALBUM COR SERPL-MCNC: 9.6 MG/DL (ref 8.3–10.1)
CALCIUM SERPL-MCNC: 8.8 MG/DL (ref 8.3–10.1)
CHLORIDE SERPL-SCNC: 97 MMOL/L (ref 100–108)
CO2 SERPL-SCNC: 31 MMOL/L (ref 21–32)
CREAT SERPL-MCNC: 1.1 MG/DL (ref 0.6–1.3)
EOSINOPHIL # BLD AUTO: 0.27 THOUSAND/ΜL (ref 0–0.61)
EOSINOPHIL NFR BLD AUTO: 4 % (ref 0–6)
ERYTHROCYTE [DISTWIDTH] IN BLOOD BY AUTOMATED COUNT: 13.9 % (ref 11.6–15.1)
GFR SERPL CREATININE-BSD FRML MDRD: 74 ML/MIN/1.73SQ M
GLUCOSE SERPL-MCNC: 100 MG/DL (ref 65–140)
GLUCOSE SERPL-MCNC: 169 MG/DL (ref 65–140)
GLUCOSE SERPL-MCNC: 172 MG/DL (ref 65–140)
GLUCOSE SERPL-MCNC: 182 MG/DL (ref 65–140)
GLUCOSE SERPL-MCNC: 99 MG/DL (ref 65–140)
HCT VFR BLD AUTO: 38.2 % (ref 36.5–49.3)
HGB BLD-MCNC: 12.4 G/DL (ref 12–17)
IMM GRANULOCYTES # BLD AUTO: 0.03 THOUSAND/UL (ref 0–0.2)
IMM GRANULOCYTES NFR BLD AUTO: 0 % (ref 0–2)
LYMPHOCYTES # BLD AUTO: 1.95 THOUSANDS/ΜL (ref 0.6–4.47)
LYMPHOCYTES NFR BLD AUTO: 25 % (ref 14–44)
MCH RBC QN AUTO: 27 PG (ref 26.8–34.3)
MCHC RBC AUTO-ENTMCNC: 32.5 G/DL (ref 31.4–37.4)
MCV RBC AUTO: 83 FL (ref 82–98)
MONOCYTES # BLD AUTO: 0.86 THOUSAND/ΜL (ref 0.17–1.22)
MONOCYTES NFR BLD AUTO: 11 % (ref 4–12)
MRSA NOSE QL CULT: NORMAL
NEUTROPHILS # BLD AUTO: 4.55 THOUSANDS/ΜL (ref 1.85–7.62)
NEUTS SEG NFR BLD AUTO: 60 % (ref 43–75)
NRBC BLD AUTO-RTO: 0 /100 WBCS
PLATELET # BLD AUTO: 392 THOUSANDS/UL (ref 149–390)
PMV BLD AUTO: 8.9 FL (ref 8.9–12.7)
POTASSIUM SERPL-SCNC: 3.7 MMOL/L (ref 3.5–5.3)
PROT SERPL-MCNC: 7.4 G/DL (ref 6.4–8.2)
RBC # BLD AUTO: 4.59 MILLION/UL (ref 3.88–5.62)
SODIUM SERPL-SCNC: 135 MMOL/L (ref 136–145)
WBC # BLD AUTO: 7.69 THOUSAND/UL (ref 4.31–10.16)

## 2022-01-13 PROCEDURE — G0408 INPT/TELE FOLLOW UP 35: HCPCS | Performed by: INTERNAL MEDICINE

## 2022-01-13 PROCEDURE — 82948 REAGENT STRIP/BLOOD GLUCOSE: CPT

## 2022-01-13 PROCEDURE — 85025 COMPLETE CBC W/AUTO DIFF WBC: CPT | Performed by: FAMILY MEDICINE

## 2022-01-13 PROCEDURE — 99232 SBSQ HOSP IP/OBS MODERATE 35: CPT | Performed by: FAMILY MEDICINE

## 2022-01-13 PROCEDURE — 80053 COMPREHEN METABOLIC PANEL: CPT | Performed by: FAMILY MEDICINE

## 2022-01-13 RX ORDER — FUROSEMIDE 10 MG/ML
40 INJECTION INTRAMUSCULAR; INTRAVENOUS DAILY
Status: DISCONTINUED | OUTPATIENT
Start: 2022-01-13 | End: 2022-01-14 | Stop reason: HOSPADM

## 2022-01-13 RX ADMIN — INSULIN LISPRO 2 UNITS: 100 INJECTION, SOLUTION INTRAVENOUS; SUBCUTANEOUS at 11:51

## 2022-01-13 RX ADMIN — ATORVASTATIN CALCIUM 20 MG: 10 TABLET, FILM COATED ORAL at 17:50

## 2022-01-13 RX ADMIN — ASPIRIN 81 MG: 81 TABLET, COATED ORAL at 08:10

## 2022-01-13 RX ADMIN — DULOXETINE HYDROCHLORIDE 60 MG: 30 CAPSULE, DELAYED RELEASE ORAL at 08:10

## 2022-01-13 RX ADMIN — ACETAMINOPHEN 650 MG: 325 TABLET, FILM COATED ORAL at 21:55

## 2022-01-13 RX ADMIN — CEFTRIAXONE 2000 MG: 2 INJECTION, SOLUTION INTRAVENOUS at 21:48

## 2022-01-13 RX ADMIN — TRAZODONE HYDROCHLORIDE 150 MG: 50 TABLET ORAL at 21:45

## 2022-01-13 RX ADMIN — OMEGA-3 FATTY ACIDS CAP 1000 MG 1000 MG: 1000 CAP at 08:10

## 2022-01-13 RX ADMIN — LEVOTHYROXINE SODIUM 175 MCG: 175 TABLET ORAL at 05:10

## 2022-01-13 RX ADMIN — HEPARIN SODIUM 5000 UNITS: 5000 INJECTION INTRAVENOUS; SUBCUTANEOUS at 14:10

## 2022-01-13 RX ADMIN — FUROSEMIDE 40 MG: 10 INJECTION, SOLUTION INTRAMUSCULAR; INTRAVENOUS at 12:26

## 2022-01-13 RX ADMIN — INSULIN GLARGINE 65 UNITS: 100 INJECTION, SOLUTION SUBCUTANEOUS at 21:45

## 2022-01-13 RX ADMIN — INSULIN LISPRO 2 UNITS: 100 INJECTION, SOLUTION INTRAVENOUS; SUBCUTANEOUS at 16:08

## 2022-01-13 RX ADMIN — INSULIN LISPRO 2 UNITS: 100 INJECTION, SOLUTION INTRAVENOUS; SUBCUTANEOUS at 21:46

## 2022-01-13 RX ADMIN — NICOTINE 1 PATCH: 14 PATCH, EXTENDED RELEASE TRANSDERMAL at 08:10

## 2022-01-13 RX ADMIN — OMEGA-3 FATTY ACIDS CAP 1000 MG 1000 MG: 1000 CAP at 17:50

## 2022-01-13 RX ADMIN — LORATADINE 10 MG: 10 TABLET ORAL at 08:10

## 2022-01-13 RX ADMIN — HEPARIN SODIUM 5000 UNITS: 5000 INJECTION INTRAVENOUS; SUBCUTANEOUS at 05:11

## 2022-01-13 RX ADMIN — HEPARIN SODIUM 5000 UNITS: 5000 INJECTION INTRAVENOUS; SUBCUTANEOUS at 21:45

## 2022-01-13 NOTE — PLAN OF CARE
Problem: Potential for Falls  Goal: Patient will remain free of falls  Description: INTERVENTIONS:  - Educate patient/family on patient safety including physical limitations  - Instruct patient to call for assistance with activity   - Consult OT/PT to assist with strengthening/mobility   - Keep Call bell within reach  - Keep bed low and locked with side rails adjusted as appropriate  - Keep care items and personal belongings within reach  - Initiate and maintain comfort rounds  - Make Fall Risk Sign visible to staff  - Offer Toileting every 1-2 Hours, in advance of need  - Initiate/Maintain bed and chair alarm  - Obtain necessary fall risk management equipment: fall socks  - Apply yellow socks and bracelet for high fall risk patients  - Consider moving patient to room near nurses station  Outcome: Progressing     Problem: PAIN - ADULT  Goal: Verbalizes/displays adequate comfort level or baseline comfort level  Description: Interventions:  - Encourage patient to monitor pain and request assistance  - Assess pain using appropriate pain scale  - Administer analgesics based on type and severity of pain and evaluate response  - Implement non-pharmacological measures as appropriate and evaluate response  - Consider cultural and social influences on pain and pain management  - Notify physician/advanced practitioner if interventions unsuccessful or patient reports new pain  Outcome: Progressing     Problem: INFECTION - ADULT  Goal: Absence or prevention of progression during hospitalization  Description: INTERVENTIONS:  - Assess and monitor for signs and symptoms of infection  - Monitor lab/diagnostic results  - Monitor all insertion sites, i e  indwelling lines, tubes, and drains  - Monitor endotracheal if appropriate and nasal secretions for changes in amount and color  - [place] appropriate cooling/warming therapies per order  - Administer medications as ordered  - Instruct and encourage patient and family to use good hand hygiene technique  - Identify and instruct in appropriate isolation precautions for identified infection/condition  Outcome: Progressing     Problem: SAFETY ADULT  Goal: Patient will remain free of falls  Description: INTERVENTIONS:  - Educate patient/family on patient safety including physical limitations  - Instruct patient to call for assistance with activity   - Consult OT/PT to assist with strengthening/mobility   - Keep Call bell within reach  - Keep bed low and locked with side rails adjusted as appropriate  - Keep care items and personal belongings within reach  - Initiate and maintain comfort rounds  - Make Fall Risk Sign visible to staff  - Offer Toileting every 1-2 Hours, in advance of need  - Initiate/Maintain bed and chair alarm  - Obtain necessary fall risk management equipment: fall socks  - Apply yellow socks and bracelet for high fall risk patients  - Consider moving patient to room near nurses station  Outcome: Progressing  Goal: Maintain or return to baseline ADL function  Description: INTERVENTIONS:  -  Assess patient's ability to carry out ADLs; assess patient's baseline for ADL function and identify physical deficits which impact ability to perform ADLs (bathing, care of mouth/teeth, toileting, grooming, dressing, etc )  - Assess/evaluate cause of self-care deficits   - Assess range of motion  - Assess patient's mobility; develop plan if impaired  - Assess patient's need for assistive devices and provide as appropriate  - Encourage maximum independence but intervene and supervise when necessary  - Involve family in performance of ADLs  - Assess for home care needs following discharge   - Consider OT consult to assist with ADL evaluation and planning for discharge  - Provide patient education as appropriate  Outcome: Progressing  Goal: Maintains/Returns to pre admission functional level  Description: INTERVENTIONS:  - Perform BMAT or MOVE assessment daily    - Set and communicate daily mobility goal to care team and patient/family/caregiver  - Collaborate with rehabilitation services on mobility goals if consulted  - Perform Range of Motion 3-4 times a day  - Reposition patient every 1-2 hours  - Dangle patient 3-4 times a day  - Stand patient 3-4 times a day  - Ambulate patient 3-4 times a day  - Out of bed to chair 3-4 times a day   - Out of bed for meals 3-4 times a day  - Out of bed for toileting  - Record patient progress and toleration of activity level   Outcome: Progressing     Problem: DISCHARGE PLANNING  Goal: Discharge to home or other facility with appropriate resources  Description: INTERVENTIONS:  - Identify barriers to discharge w/patient and caregiver  - Arrange for needed discharge resources and transportation as appropriate  - Identify discharge learning needs (meds, wound care, etc )  - Arrange for interpretive services to assist at discharge as needed  - Refer to Case Management Department for coordinating discharge planning if the patient needs post-hospital services based on physician/advanced practitioner order or complex needs related to functional status, cognitive ability, or social support system  Outcome: Progressing     Problem: Knowledge Deficit  Goal: Patient/family/caregiver demonstrates understanding of disease process, treatment plan, medications, and discharge instructions  Description: Complete learning assessment and assess knowledge base    Interventions:  - Provide teaching at level of understanding  - Provide teaching via preferred learning methods  Outcome: Progressing     Problem: MUSCULOSKELETAL - ADULT  Goal: Maintain or return mobility to safest level of function  Description: INTERVENTIONS:  - Assess patient's ability to carry out ADLs; assess patient's baseline for ADL function and identify physical deficits which impact ability to perform ADLs (bathing, care of mouth/teeth, toileting, grooming, dressing, etc )  - Assess/evaluate cause of self-care deficits   - Assess range of motion  - Assess patient's mobility  - Assess patient's need for assistive devices and provide as appropriate  - Encourage maximum independence but intervene and supervise when necessary  - Involve family in performance of ADLs  - Assess for home care needs following discharge   - Consider OT consult to assist with ADL evaluation and planning for discharge  - Provide patient education as appropriate  Outcome: Progressing  Goal: Maintain proper alignment of affected body part  Description: INTERVENTIONS:  - Support, maintain and protect limb and body alignment  - Provide patient/ family with appropriate education  Outcome: Progressing     Problem: SKIN/TISSUE INTEGRITY - ADULT  Goal: Skin Integrity remains intact(Skin Breakdown Prevention)  Description: Assess:  -Perform Munir assessment every shift  -Clean and moisturize skin every shift  -Inspect skin when repositioning, toileting, and assisting with ADLS  -Assess under medical devices  -Assess extremities for adequate circulation and sensation     Bed Management:  -Have minimal linens on bed & keep smooth, unwrinkled  -Change linens as needed when moist or perspiring  -Avoid sitting or lying in one position for more than 2 hours while in bed  -Keep HOB at 30 degrees     Toileting:  -Offer bedside commode  -Assess for incontinence every hour  -Use incontinent care products after each incontinent episode    Activity:  -Mobilize patient 3 times a day  -Encourage activity and walks on unit  -Encourage or provide ROM exercises   -Turn and reposition patient every 2 Hours  -Use appropriate equipment to lift or move patient in bed  -Instruct/ Assist with weight shifting every hour when out of bed in chair  -Consider limitation of chair time 2 hour intervals    Skin Care:  -Avoid use of baby powder, tape, friction and shearing, hot water or constrictive clothing  -Relieve pressure over bony prominences  -Do not massage red bony areas    Next Steps:  -Teach patient strategies to minimize risks   -Consider consults to  interdisciplinary teams  Outcome: Progressing

## 2022-01-13 NOTE — ASSESSMENT & PLAN NOTE
Lab Results   Component Value Date    HGBA1C 9 5 (H) 01/11/2022       Recent Labs     01/12/22  1104 01/12/22  1539 01/12/22 2053 01/13/22  0712   POCGLU 179* 246* 133 100       Blood Sugar Average: Last 72 hrs:  (P) 164 5     Poorly controlled, continue basal/bolus regimen and refer to outpatient endo on dc

## 2022-01-13 NOTE — PROGRESS NOTES
[address] [facility]  Progress Note - Corbin Barber 1965, 61 y o  male MRN: [id number]  Unit/Bed#: 402-01 Encounter: [id number]  Primary Care Provider: Jess Kenny PA-C   Date and time admitted to hospital: 1/11/2022 12:48 AM    * Cellulitis of left lower leg  Assessment & Plan  Was seen by Orthopedic surgery due to abnormal x-ray  CT reviewed   Continue with ceftriaxone  Blood cultures negative for 24 hours  MRSA culture pending    Type 2 diabetes mellitus with hyperglycemia, with long-term current use of insulin [facility])  Assessment & Plan  Lab Results   Component Value Date    HGBA1C 9 5 (H) 01/11/2022       Recent Labs     01/12/22  1104 01/12/22  1539 01/12/22 2053 01/13/22  0712   POCGLU 179* 246* 133 100       Blood Sugar Average: Last 72 hrs:  (P) 164 5     Poorly controlled, continue basal/bolus regimen and refer to outpatient endo on dc       Acquired hypothyroidism  Assessment & Plan  Synthroid 175 mcg p o  Daily      Mixed hyperlipidemia  Assessment & Plan  Lipitor 20 mg HS  Lovaza 1g TID     COPD, severity to be determined [facility])  Assessment & Plan  Not on maintenance inhalers  Outpatient Pulmonary follow-up        Progress Note - Corbin Barber 61 y o  male MRN: [id number]    Unit/Bed#: 350-72 Encounter: [id number]        Subjective:     Patient seen and examined at bedside      Objective:     Vitals:   Vitals:    01/13/22 0550   BP: 137/82   Pulse: 94   Resp: 22   Temp: 98 1 °F (36 7 °C)   SpO2: 95%     Body mass index is 45 03 kg/m²      Intake/Output Summary (Last 24 hours) at 1/13/2022 0902  Last data filed at 1/13/2022 0839  Gross per 24 hour   Intake 780 ml   Output --   Net 780 ml       Physical Exam:   /82 (BP Location: Right arm)   Pulse 94   Temp 98 1 °F (36 7 °C) (Oral)   Resp 22   Ht 5' 4" (1 626 m)   Wt 119 kg (262 lb 5 6 oz)   SpO2 95%   BMI 45 03 kg/m²   General appearance: alert and oriented, in no acute distress  Head: Normocephalic, without obvious abnormality, atraumatic  Lungs: clear to auscultation bilaterally  Heart: regular rate and rhythm, S1, S2 normal, no murmur, click, rub or gallop  Abdomen: soft, non-tender; bowel sounds normal; no masses,  no organomegaly  Extremities: erythema improving along with edema  Pulses: 2+ and symmetric  Neurologic: Grossly normal     Invasive Devices  Report    Peripheral Intravenous Line            Peripheral IV 01/12/22 Dorsal (posterior); Proximal;Right Forearm <1 day                Results from last 7 days   Lab Units 01/13/22  0420 01/12/22  0537 01/11/22  0657   WBC Thousand/uL 7 69 8 08 10 55*   HEMOGLOBIN g/dL 12 4 12 7 12 6   HEMATOCRIT % 38 2 38 7 38 8   PLATELETS Thousands/uL 392* 338 332       Results from last 7 days   Lab Units 01/13/22  0420 01/12/22  0537 01/11/22  0657 01/11/22  0130 01/11/22  0130   POTASSIUM mmol/L 3 7 3 5 3 7   < > 4 0   CHLORIDE mmol/L 97* 94* 93*   < > 92*   CO2 mmol/L 31 34* 31   < > 33*   BUN mg/dL 13 12 12   < > 13   CREATININE mg/dL 1 10 1 03 1 09   < > 1 28   CALCIUM mg/dL 8 8 9 1 8 2*   < > 8 9   ALK PHOS U/L 73  --  74  --  83   ALT U/L 30  --  30  --  30   AST U/L 24  --  23  --  22    < > = values in this interval not displayed         Medication Administration - last 24 hours from 01/12/2022 0902 to 01/13/2022 0902       Date/Time Order Dose Route Action Action by     01/13/2022 0810 aspirin (ECOTRIN LOW STRENGTH) EC tablet 81 mg 81 mg Oral Given Juan R Scott, POLLY     02/24/8695 0070 atorvastatin (LIPITOR) tablet 20 mg 20 mg Oral Given Fer Mcgill RN     01/13/2022 0810 DULoxetine (CYMBALTA) delayed release capsule 60 mg 60 mg Oral Given Juan R Scott, SIERRAN     24/85/6753 3709 fluticasone (FLONASE) 50 mcg/act nasal spray 2 spray 2 spray Each Nare Not Given Juan R Scott, POLLY     37/32/9212 3149 levothyroxine tablet 175 mcg 175 mcg Oral Given Merrick Vergara RN     01/13/2022 0810 fish oil capsule 1,000 mg 1,000 mg Oral Given Juan R Scott LPN     60/86/8862 1718 fish oil capsule 1,000 mg 1,000 mg Oral Given Aparna Betts, JESSIE     01/12/2022 2144 traZODone (DESYREL) tablet 150 mg 150 mg Oral Given Juanjose Ramirez RN     01/13/2022 0810 nicotine (NICODERM CQ) 14 mg/24hr TD 24 hr patch 1 patch 1 patch Transdermal Medication Applied [place] Anne, SIERRAN     71/56/0152 0383 nicotine (NICODERM CQ) 14 mg/24hr TD 24 hr patch 1 patch 1 patch Transdermal Patch Removed Juanjose Ramirez, JESSIE     01/13/2022 0511 heparin (porcine) subcutaneous injection 5,000 Units 5,000 Units Subcutaneous Given Juanjose Ramirez, JESSIE     01/12/2022 2144 heparin (porcine) subcutaneous injection 5,000 Units 5,000 Units Subcutaneous Given Juanjose Ramirez, JESSIE     01/12/2022 1334 heparin (porcine) subcutaneous injection 5,000 Units 5,000 Units Subcutaneous Given Godfrey Etienne RN     01/12/2022 2144 insulin glargine (LANTUS) subcutaneous injection 65 Units 0 65 mL 65 Units Subcutaneous Given Juanjose Ramirez RN     01/13/2022 0806 insulin lispro (HumaLOG) 100 units/mL subcutaneous injection 2-12 Units 2 Units Subcutaneous Not Given Shonda Rodríguez, POLLY     14/77/6297 5999 insulin lispro (HumaLOG) 100 units/mL subcutaneous injection 2-12 Units 4 Units Subcutaneous Given Aparna Betts RN     01/12/2022 1151 insulin lispro (HumaLOG) 100 units/mL subcutaneous injection 2-12 Units 2 Units Subcutaneous Given Godfrey Etienne RN     01/12/2022 2137 insulin lispro (HumaLOG) 100 units/mL subcutaneous injection 2-12 Units 2 Units Subcutaneous Not Given Juanjose Ramirez RN     01/12/2022 2144 cefTRIAXone (ROCEPHIN) IVPB (premix in dextrose) 2,000 mg 50 mL 2,000 mg Intravenous New Bag Juanjose Ramirez RN     01/13/2022 0810 loratadine (CLARITIN) tablet 10 mg 10 mg Oral Given Shonda Rodríguez, SIERRAN     61/99/9338 4880 furosemide (LASIX) injection 40 mg 40 mg Intravenous Given Godfrey Etienne RN            Lab, Imaging and other studies: I have personally reviewed pertinent reports      VTE Pharmacologic Prophylaxis: Enoxaparin (Lovenox)  VTE Mechanical Prophylaxis: sequential compression device     Kati Garzon MD  1/13/2022,9:02 AM

## 2022-01-13 NOTE — PROGRESS NOTES
Progress Note - Infectious Disease   Danielle Petty 61 y o  male MRN: [id number]  Unit/Bed#: 639-60 Encounter: [id number]        REQUIRED DOCUMENTATION:     1  This service was provided via Telemedicine  2  Provider located at [facility]  3  TeleMed provider: Kathy Nix MD   4  Identify all parties in room with patient during tele consult:RN  5  After connecting through [employer], patient was identified by name and date of birth and assistant checked wristband  Patient was then informed that this was a Telemedicine visit and that the exam was being conducted confidentially over secure lines  My office door was closed  No one else was in the room  Patient acknowledged consent and understanding of privacy and security of the Telemedicine visit, and gave us permission to have the assistant stay in the room in order to assist with the history and to conduct the exam   I informed the patient that I have reviewed their record in Epic and presented the opportunity for them to ask any questions regarding the visit today  The patient agreed to participate  Assessment/Recommendations     1   Left leg cellulitis  - in the setting of poorly controlled diabetes, morbid obesity, tobacco use and possible chronic venous insufficiency  - lack of significant improvement on Keflex and doxycycline as outpatient likely due to underlying venous edema  - no risk factors for MRSA, suspect strep and/or gram negative cellulitis  - CT without abscess  - Clinically slow to improve due to suspect venous edema, less likely antibiotic failure  - afebrile without leukocytosis     · Continue ceftriaxone  · Continue serial extremity exams    · Recommend trial of diuretics and emphasized importance of coompliance with limb elevation  · Discussed lower extremity skin care, use of compression stockings  · Recommend weight loss and cessation of alcohol  · Discussed natural history of cellulitis and discussed with patient that he is at risk for recurrent cellulitis if underlying risk factors cannot be modified     2  Recent fall, L ankle pain     · Management per ortho     3  Type 2 diabetes- poorly controlled  - Risk factor for infection     · Recommend strict glycemic control     4  Morbid obesity, tobacco use  - Risk factor for infection     · Recommend lifestyle modification, weight loss as outpatient  · Recommend tobacco cessation    Discussed with the primary service  History       Subjective: The patient has no complaints, slept poorly last night, still notes pain and swelling in left leg  Denies fevers, chills, or sweats  Denies nausea, vomiting, or diarrhea  Antibiotics:  Ceftriaxone D4      Physical Exam     Temp:  [98 1 °F (36 7 °C)-98 3 °F (36 8 °C)] 98 1 °F (36 7 °C)  HR:  [94-99] 94  Resp:  [17-22] 22  BP: (137-144)/(82-86) 137/82  SpO2:  [89 %-95 %] 95 %  Temp (24hrs), Av 2 °F (36 8 °C), Min:98 1 °F (36 7 °C), Max:98 3 °F (36 8 °C)  Current: Temperature: 98 1 °F (36 7 °C)    Intake/Output Summary (Last 24 hours) at 2022 3814  Last data filed at 2022 9882  Gross per 24 hour   Intake 780 ml   Output --   Net 780 ml       Physical exam findings reported by bedside and primary medical team staff      General Appearance:  Appearing chronically ill, nontoxic, and in no distress, appears stated age   Throat: Oropharynx moist without lesions; lips, mucosa, and tongue normal; teeth and gums normal   Lungs:   Clear to auscultation bilaterally, no audible wheezes, rhonchi and rales, respirations unlabored   Heart:  Regular rate and rhythm, S1, S2 normal, no murmur, rub or gallop   Abdomen:   Soft, non-tender, non-distended, positive bowel sounds, no masses, no organomegaly    No CVA tenderness   Extremities: LLE 2+ edema   Skin: Persistent erythema left leg   Neurologic: Alert and oriented times 3         Invasive Devices:   Peripheral IV 22 Dorsal (posterior); Proximal;Right Forearm (Active)   Site Assessment WDL 01/13/22 0730   Dressing Type Transparent 01/13/22 0730   Line Status Flushed;Saline locked 01/13/22 0730   Dressing Status Clean;Dry; Intact 01/13/22 0730       Labs, Imaging, & Other Studies     Lab Results:    I have personally reviewed pertinent labs  Results from last 7 days   Lab Units 01/13/22  0420 01/12/22  0537 01/11/22  0657   WBC Thousand/uL 7 69 8 08 10 55*   HEMOGLOBIN g/dL 12 4 12 7 12 6   PLATELETS Thousands/uL 392* 338 332     Results from last 7 days   Lab Units 01/13/22  0420 01/12/22  0537 01/11/22  0657 01/11/22  0130 01/11/22  0130   POTASSIUM mmol/L 3 7   < > 3 7   < > 4 0   CHLORIDE mmol/L 97*   < > 93*   < > 92*   CO2 mmol/L 31   < > 31   < > 33*   BUN mg/dL 13   < > 12   < > 13   CREATININE mg/dL 1 10   < > 1 09   < > 1 28   EGFR ml/min/1 73sq m 74   < > 74   < > 61   CALCIUM mg/dL 8 8   < > 8 2*   < > 8 9   AST U/L 24  --  23  --  22   ALT U/L 30  --  30   < > 30   ALK PHOS U/L 73  --  74   < > 83    < > = values in this interval not displayed  Results from last 7 days   Lab Units 01/11/22  0130   BLOOD CULTURE  No Growth at 48 hrs  No Growth at 48 hrs  Imaging Studies:   I have personally reviewed pertinent imaging study reports and images in PACS  EKG, Pathology, and Other Studies:   I have personally reviewed pertinent reports  Counseling/Coordination of care: Total 35 minutes communication with the patient via telehealth  Labs, medical tests and imaging studies were independently reviewed by me as noted above

## 2022-01-13 NOTE — ASSESSMENT & PLAN NOTE
Was seen by Orthopedic surgery due to abnormal x-ray  CT reviewed   Continue with ceftriaxone  Blood cultures negative for 24 hours  MRSA culture pending

## 2022-01-13 NOTE — PLAN OF CARE
Problem: Potential for Falls  Goal: Patient will remain free of falls  Description: INTERVENTIONS:  - Educate patient/family on patient safety including physical limitations  - Instruct patient to call for assistance with activity   - Consult OT/PT to assist with strengthening/mobility   - Keep Call bell within reach  - Keep bed low and locked with side rails adjusted as appropriate  - Keep care items and personal belongings within reach  - Initiate and maintain comfort rounds  - Make Fall Risk Sign visible to staff  - Offer Toileting every 1-2 Hours, in advance of need  - Initiate/Maintain bed and chair alarm  - Obtain necessary fall risk management equipment: fall socks  - Apply yellow socks and bracelet for high fall risk patients  - Consider moving patient to room near nurses station  Outcome: Progressing     Problem: PAIN - ADULT  Goal: Verbalizes/displays adequate comfort level or baseline comfort level  Description: Interventions:  - Encourage patient to monitor pain and request assistance  - Assess pain using appropriate pain scale  - Administer analgesics based on type and severity of pain and evaluate response  - Implement non-pharmacological measures as appropriate and evaluate response  - Consider cultural and social influences on pain and pain management  - Notify physician/advanced practitioner if interventions unsuccessful or patient reports new pain  Outcome: Progressing     Problem: INFECTION - ADULT  Goal: Absence or prevention of progression during hospitalization  Description: INTERVENTIONS:  - Assess and monitor for signs and symptoms of infection  - Monitor lab/diagnostic results  - Monitor all insertion sites, i e  indwelling lines, tubes, and drains  - Monitor endotracheal if appropriate and nasal secretions for changes in amount and color  - [place] appropriate cooling/warming therapies per order  - Administer medications as ordered  - Instruct and encourage patient and family to use good hand hygiene technique  - Identify and instruct in appropriate isolation precautions for identified infection/condition  Outcome: Progressing     Problem: SAFETY ADULT  Goal: Patient will remain free of falls  Description: INTERVENTIONS:  - Educate patient/family on patient safety including physical limitations  - Instruct patient to call for assistance with activity   - Consult OT/PT to assist with strengthening/mobility   - Keep Call bell within reach  - Keep bed low and locked with side rails adjusted as appropriate  - Keep care items and personal belongings within reach  - Initiate and maintain comfort rounds  - Make Fall Risk Sign visible to staff  - Offer Toileting every 1-2 Hours, in advance of need  - Initiate/Maintain bed and chair alarm  - Obtain necessary fall risk management equipment: fall socks  - Apply yellow socks and bracelet for high fall risk patients  - Consider moving patient to room near nurses station  Outcome: Progressing  Goal: Maintain or return to baseline ADL function  Description: INTERVENTIONS:  -  Assess patient's ability to carry out ADLs; assess patient's baseline for ADL function and identify physical deficits which impact ability to perform ADLs (bathing, care of mouth/teeth, toileting, grooming, dressing, etc )  - Assess/evaluate cause of self-care deficits   - Assess range of motion  - Assess patient's mobility; develop plan if impaired  - Assess patient's need for assistive devices and provide as appropriate  - Encourage maximum independence but intervene and supervise when necessary  - Involve family in performance of ADLs  - Assess for home care needs following discharge   - Consider OT consult to assist with ADL evaluation and planning for discharge  - Provide patient education as appropriate  Outcome: Progressing  Goal: Maintains/Returns to pre admission functional level  Description: INTERVENTIONS:  - Perform BMAT or MOVE assessment daily    - Set and communicate daily mobility goal to care team and patient/family/caregiver  - Collaborate with rehabilitation services on mobility goals if consulted  - Perform Range of Motion 3-4 times a day  - Reposition patient every 1-2 hours  - Dangle patient 3-4 times a day  - Stand patient 3-4 times a day  - Ambulate patient 3-4 times a day  - Out of bed to chair 3-4 times a day   - Out of bed for meals 3-4 times a day  - Out of bed for toileting  - Record patient progress and toleration of activity level   Outcome: Progressing     Problem: DISCHARGE PLANNING  Goal: Discharge to home or other facility with appropriate resources  Description: INTERVENTIONS:  - Identify barriers to discharge w/patient and caregiver  - Arrange for needed discharge resources and transportation as appropriate  - Identify discharge learning needs (meds, wound care, etc )  - Arrange for interpretive services to assist at discharge as needed  - Refer to Case Management Department for coordinating discharge planning if the patient needs post-hospital services based on physician/advanced practitioner order or complex needs related to functional status, cognitive ability, or social support system  Outcome: Progressing     Problem: Knowledge Deficit  Goal: Patient/family/caregiver demonstrates understanding of disease process, treatment plan, medications, and discharge instructions  Description: Complete learning assessment and assess knowledge base    Interventions:  - Provide teaching at level of understanding  - Provide teaching via preferred learning methods  Outcome: Progressing     Problem: MUSCULOSKELETAL - ADULT  Goal: Maintain or return mobility to safest level of function  Description: INTERVENTIONS:  - Assess patient's ability to carry out ADLs; assess patient's baseline for ADL function and identify physical deficits which impact ability to perform ADLs (bathing, care of mouth/teeth, toileting, grooming, dressing, etc )  - Assess/evaluate cause of self-care deficits   - Assess range of motion  - Assess patient's mobility  - Assess patient's need for assistive devices and provide as appropriate  - Encourage maximum independence but intervene and supervise when necessary  - Involve family in performance of ADLs  - Assess for home care needs following discharge   - Consider OT consult to assist with ADL evaluation and planning for discharge  - Provide patient education as appropriate  Outcome: Progressing  Goal: Maintain proper alignment of affected body part  Description: INTERVENTIONS:  - Support, maintain and protect limb and body alignment  - Provide patient/ family with appropriate education  Outcome: Progressing     Problem: SKIN/TISSUE INTEGRITY - ADULT  Goal: Skin Integrity remains intact(Skin Breakdown Prevention)  Description: Assess:  -Perform Munir assessment every shift  -Clean and moisturize skin every shift  -Inspect skin when repositioning, toileting, and assisting with ADLS  -Assess under medical devices such as Masimo every shift  -Assess extremities for adequate circulation and sensation     Bed Management:  -Have minimal linens on bed & keep smooth, unwrinkled  -Change linens as needed when moist or perspiring  -Avoid sitting or lying in one position for more than 2 hours while in bed  -Keep HOB at 30 degrees     Toileting:  -Offer bedside commode  -Assess for incontinence every 2 hours  -Use incontinent care products after each incontinent episode such as foam, wipes    Activity:  -Mobilize patient 3-4 times a day  -Encourage activity and walks on unit  -Encourage or provide ROM exercises   -Turn and reposition patient every 2 Hours  -Use appropriate equipment to lift or move patient in bed  -Instruct/ Assist with weight shifting every 2 when out of bed in chair  -Consider limitation of chair time 2 hour intervals    Skin Care:  -Avoid use of baby powder, tape, friction and shearing, hot water or constrictive clothing  -Relieve pressure over bony prominences using repositioning  -Do not massage red bony areas    Next Steps:  -Teach patient strategies to minimize risks such as repositioning   -Consider consults to  interdisciplinary teams such as PT/OT  Outcome: Progressing

## 2022-01-14 VITALS
BODY MASS INDEX: 44.79 KG/M2 | DIASTOLIC BLOOD PRESSURE: 81 MMHG | WEIGHT: 262.35 LBS | HEART RATE: 92 BPM | RESPIRATION RATE: 18 BRPM | TEMPERATURE: 97.6 F | HEIGHT: 64 IN | SYSTOLIC BLOOD PRESSURE: 141 MMHG | OXYGEN SATURATION: 89 %

## 2022-01-14 LAB
ALBUMIN SERPL BCP-MCNC: 3.1 G/DL (ref 3.5–5)
ALP SERPL-CCNC: 76 U/L (ref 46–116)
ALT SERPL W P-5'-P-CCNC: 34 U/L (ref 12–78)
ANION GAP SERPL CALCULATED.3IONS-SCNC: 6 MMOL/L (ref 4–13)
AST SERPL W P-5'-P-CCNC: 26 U/L (ref 5–45)
BASOPHILS # BLD AUTO: 0.05 THOUSANDS/ΜL (ref 0–0.1)
BASOPHILS NFR BLD AUTO: 1 % (ref 0–1)
BILIRUB SERPL-MCNC: 0.34 MG/DL (ref 0.2–1)
BUN SERPL-MCNC: 15 MG/DL (ref 5–25)
CALCIUM ALBUM COR SERPL-MCNC: 9.3 MG/DL (ref 8.3–10.1)
CALCIUM SERPL-MCNC: 8.6 MG/DL (ref 8.3–10.1)
CHLORIDE SERPL-SCNC: 98 MMOL/L (ref 100–108)
CO2 SERPL-SCNC: 34 MMOL/L (ref 21–32)
CREAT SERPL-MCNC: 1.09 MG/DL (ref 0.6–1.3)
EOSINOPHIL # BLD AUTO: 0.29 THOUSAND/ΜL (ref 0–0.61)
EOSINOPHIL NFR BLD AUTO: 4 % (ref 0–6)
ERYTHROCYTE [DISTWIDTH] IN BLOOD BY AUTOMATED COUNT: 13.6 % (ref 11.6–15.1)
GFR SERPL CREATININE-BSD FRML MDRD: 74 ML/MIN/1.73SQ M
GLUCOSE SERPL-MCNC: 61 MG/DL (ref 65–140)
GLUCOSE SERPL-MCNC: 72 MG/DL (ref 65–140)
HCT VFR BLD AUTO: 40.3 % (ref 36.5–49.3)
HGB BLD-MCNC: 12.9 G/DL (ref 12–17)
IMM GRANULOCYTES # BLD AUTO: 0.03 THOUSAND/UL (ref 0–0.2)
IMM GRANULOCYTES NFR BLD AUTO: 0 % (ref 0–2)
LYMPHOCYTES # BLD AUTO: 2.22 THOUSANDS/ΜL (ref 0.6–4.47)
LYMPHOCYTES NFR BLD AUTO: 30 % (ref 14–44)
MCH RBC QN AUTO: 27.3 PG (ref 26.8–34.3)
MCHC RBC AUTO-ENTMCNC: 32 G/DL (ref 31.4–37.4)
MCV RBC AUTO: 85 FL (ref 82–98)
MONOCYTES # BLD AUTO: 0.98 THOUSAND/ΜL (ref 0.17–1.22)
MONOCYTES NFR BLD AUTO: 13 % (ref 4–12)
NEUTROPHILS # BLD AUTO: 3.96 THOUSANDS/ΜL (ref 1.85–7.62)
NEUTS SEG NFR BLD AUTO: 52 % (ref 43–75)
NRBC BLD AUTO-RTO: 0 /100 WBCS
PLATELET # BLD AUTO: 375 THOUSANDS/UL (ref 149–390)
PMV BLD AUTO: 8.8 FL (ref 8.9–12.7)
POTASSIUM SERPL-SCNC: 3.7 MMOL/L (ref 3.5–5.3)
PROT SERPL-MCNC: 7.4 G/DL (ref 6.4–8.2)
RBC # BLD AUTO: 4.73 MILLION/UL (ref 3.88–5.62)
SODIUM SERPL-SCNC: 138 MMOL/L (ref 136–145)
WBC # BLD AUTO: 7.53 THOUSAND/UL (ref 4.31–10.16)

## 2022-01-14 PROCEDURE — 99239 HOSP IP/OBS DSCHRG MGMT >30: CPT | Performed by: FAMILY MEDICINE

## 2022-01-14 PROCEDURE — 97116 GAIT TRAINING THERAPY: CPT

## 2022-01-14 PROCEDURE — 85025 COMPLETE CBC W/AUTO DIFF WBC: CPT | Performed by: FAMILY MEDICINE

## 2022-01-14 PROCEDURE — 80053 COMPREHEN METABOLIC PANEL: CPT | Performed by: FAMILY MEDICINE

## 2022-01-14 PROCEDURE — 82948 REAGENT STRIP/BLOOD GLUCOSE: CPT

## 2022-01-14 RX ORDER — CEFUROXIME AXETIL 500 MG/1
500 TABLET ORAL EVERY 12 HOURS SCHEDULED
Qty: 10 TABLET | Refills: 0 | Status: SHIPPED | OUTPATIENT
Start: 2022-01-14 | End: 2022-01-19

## 2022-01-14 RX ORDER — AMMONIUM LACTATE 12 G/100G
LOTION TOPICAL 2 TIMES DAILY PRN
Qty: 400 G | Refills: 0 | Status: SHIPPED | OUTPATIENT
Start: 2022-01-14 | End: 2022-04-11 | Stop reason: SDUPTHER

## 2022-01-14 RX ADMIN — LEVOTHYROXINE SODIUM 175 MCG: 175 TABLET ORAL at 05:58

## 2022-01-14 RX ADMIN — FLUTICASONE PROPIONATE 2 SPRAY: 50 SPRAY, METERED NASAL at 09:04

## 2022-01-14 RX ADMIN — FUROSEMIDE 40 MG: 10 INJECTION, SOLUTION INTRAMUSCULAR; INTRAVENOUS at 08:46

## 2022-01-14 RX ADMIN — LORATADINE 10 MG: 10 TABLET ORAL at 08:47

## 2022-01-14 RX ADMIN — DULOXETINE HYDROCHLORIDE 60 MG: 30 CAPSULE, DELAYED RELEASE ORAL at 08:46

## 2022-01-14 RX ADMIN — NICOTINE 1 PATCH: 14 PATCH, EXTENDED RELEASE TRANSDERMAL at 08:46

## 2022-01-14 RX ADMIN — HEPARIN SODIUM 5000 UNITS: 5000 INJECTION INTRAVENOUS; SUBCUTANEOUS at 05:58

## 2022-01-14 RX ADMIN — OMEGA-3 FATTY ACIDS CAP 1000 MG 1000 MG: 1000 CAP at 08:46

## 2022-01-14 RX ADMIN — ASPIRIN 81 MG: 81 TABLET, COATED ORAL at 08:46

## 2022-01-14 NOTE — PLAN OF CARE
Problem: Potential for Falls  Goal: Patient will remain free of falls  Description: INTERVENTIONS:  - Educate patient/family on patient safety including physical limitations  - Instruct patient to call for assistance with activity   - Consult OT/PT to assist with strengthening/mobility   - Keep Call bell within reach  - Keep bed low and locked with side rails adjusted as appropriate  - Keep care items and personal belongings within reach  - Initiate and maintain comfort rounds  - Make Fall Risk Sign visible to staff  - Offer Toileting every 1-2 Hours, in advance of need  - Initiate/Maintain bed and chair alarm  - Obtain necessary fall risk management equipment: fall socks  - Apply yellow socks and bracelet for high fall risk patients  - Consider moving patient to room near nurses station  Outcome: Progressing     Problem: PAIN - ADULT  Goal: Verbalizes/displays adequate comfort level or baseline comfort level  Description: Interventions:  - Encourage patient to monitor pain and request assistance  - Assess pain using appropriate pain scale  - Administer analgesics based on type and severity of pain and evaluate response  - Implement non-pharmacological measures as appropriate and evaluate response  - Consider cultural and social influences on pain and pain management  - Notify physician/advanced practitioner if interventions unsuccessful or patient reports new pain  Outcome: Progressing     Problem: INFECTION - ADULT  Goal: Absence or prevention of progression during hospitalization  Description: INTERVENTIONS:  - Assess and monitor for signs and symptoms of infection  - Monitor lab/diagnostic results  - Monitor all insertion sites, i e  indwelling lines, tubes, and drains  - Monitor endotracheal if appropriate and nasal secretions for changes in amount and color  - [place] appropriate cooling/warming therapies per order  - Administer medications as ordered  - Instruct and encourage patient and family to use good hand hygiene technique  - Identify and instruct in appropriate isolation precautions for identified infection/condition  Outcome: Progressing     Problem: SAFETY ADULT  Goal: Patient will remain free of falls  Description: INTERVENTIONS:  - Educate patient/family on patient safety including physical limitations  - Instruct patient to call for assistance with activity   - Consult OT/PT to assist with strengthening/mobility   - Keep Call bell within reach  - Keep bed low and locked with side rails adjusted as appropriate  - Keep care items and personal belongings within reach  - Initiate and maintain comfort rounds  - Make Fall Risk Sign visible to staff  - Offer Toileting every 1-2 Hours, in advance of need  - Initiate/Maintain bed and chair alarm  - Obtain necessary fall risk management equipment: fall socks  - Apply yellow socks and bracelet for high fall risk patients  - Consider moving patient to room near nurses station  Outcome: Progressing  Goal: Maintain or return to baseline ADL function  Description: INTERVENTIONS:  -  Assess patient's ability to carry out ADLs; assess patient's baseline for ADL function and identify physical deficits which impact ability to perform ADLs (bathing, care of mouth/teeth, toileting, grooming, dressing, etc )  - Assess/evaluate cause of self-care deficits   - Assess range of motion  - Assess patient's mobility; develop plan if impaired  - Assess patient's need for assistive devices and provide as appropriate  - Encourage maximum independence but intervene and supervise when necessary  - Involve family in performance of ADLs  - Assess for home care needs following discharge   - Consider OT consult to assist with ADL evaluation and planning for discharge  - Provide patient education as appropriate  Outcome: Progressing  Goal: Maintains/Returns to pre admission functional level  Description: INTERVENTIONS:  - Perform BMAT or MOVE assessment daily    - Set and communicate daily mobility goal to care team and patient/family/caregiver  - Collaborate with rehabilitation services on mobility goals if consulted  - Perform Range of Motion 3-4 times a day  - Reposition patient every 1-2 hours  - Dangle patient 3-4 times a day  - Stand patient 3-4 times a day  - Ambulate patient 3-4 times a day  - Out of bed to chair 3-4 times a day   - Out of bed for meals 3-4 times a day  - Out of bed for toileting  - Record patient progress and toleration of activity level   Outcome: Progressing     Problem: DISCHARGE PLANNING  Goal: Discharge to home or other facility with appropriate resources  Description: INTERVENTIONS:  - Identify barriers to discharge w/patient and caregiver  - Arrange for needed discharge resources and transportation as appropriate  - Identify discharge learning needs (meds, wound care, etc )  - Arrange for interpretive services to assist at discharge as needed  - Refer to Case Management Department for coordinating discharge planning if the patient needs post-hospital services based on physician/advanced practitioner order or complex needs related to functional status, cognitive ability, or social support system  Outcome: Progressing     Problem: Knowledge Deficit  Goal: Patient/family/caregiver demonstrates understanding of disease process, treatment plan, medications, and discharge instructions  Description: Complete learning assessment and assess knowledge base    Interventions:  - Provide teaching at level of understanding  - Provide teaching via preferred learning methods  Outcome: Progressing     Problem: MUSCULOSKELETAL - ADULT  Goal: Maintain or return mobility to safest level of function  Description: INTERVENTIONS:  - Assess patient's ability to carry out ADLs; assess patient's baseline for ADL function and identify physical deficits which impact ability to perform ADLs (bathing, care of mouth/teeth, toileting, grooming, dressing, etc )  - Assess/evaluate cause of self-care deficits   - Assess range of motion  - Assess patient's mobility  - Assess patient's need for assistive devices and provide as appropriate  - Encourage maximum independence but intervene and supervise when necessary  - Involve family in performance of ADLs  - Assess for home care needs following discharge   - Consider OT consult to assist with ADL evaluation and planning for discharge  - Provide patient education as appropriate  Outcome: Progressing  Goal: Maintain proper alignment of affected body part  Description: INTERVENTIONS:  - Support, maintain and protect limb and body alignment  - Provide patient/ family with appropriate education  Outcome: Progressing     Problem: SKIN/TISSUE INTEGRITY - ADULT  Goal: Skin Integrity remains intact(Skin Breakdown Prevention)  Description: Assess:    -Assess extremities for adequate circulation and sensation     Bed Management:  -Have minimal linens on bed & keep smooth, unwrinkled  -Change linens as needed when moist or perspiring    -Keep HOB at 30 degrees     Toileting:  -Offer bedside commode          Skin Care:  -Avoid use of baby powder, tape, friction and shearing, hot water or constrictive clothing    -Do not massage red bony areas    Outcome: Progressing

## 2022-01-14 NOTE — DISCHARGE SUMMARY
Discharge Summary - Corbin Barber 61 y o  male MRN: [id number]    Unit/Bed#: 104-54 Encounter: [id number]    Admission Date:   Admission Orders (From admission, onward)     Ordered        01/11/22 0227  Inpatient Admission  Once                        Admitting Diagnosis: Ankle pain [M25 579]  Leg swelling [M79 89]  Cellulitis of left lower extremity [L03 116]    HPI: Corbin Barber is a 61 y o  male who presents with left leg swelling and redness x1 month  Symptoms started about December 6  Had US negative for DVT on December 9 and started antibiotics for cellulitis  Had partial improvement, but came to ER December 17 due to symptoms worsening again  Left AMA after 1 dose of IV antibiotics, was given course of PO antibiotics  He reports symptoms improved significantly but never fully resolved  Returning to ER again because entire leg is red and swollen, and he has difficulty walking  No fever, chills, sweats, or other acute complaints  Procedures Performed: No orders of the defined types were placed in this encounter  Summary of Hospital Course:     Left lower leg cellulitis    Patient presents to the emergency room with complaint of left leg swelling and erythema, ultrasound was negative for DVT and he was started on ceftriaxone  He was seen in consultation by Infectious Disease  Received 3 days of IV Lasix to help with lower extremity swelling  Will be discharged on Ceftin to complete 8 days of antibiotics  Will follow-up with his PCP and Podiatry within 1 week of discharge      Suspected obstructive sleep apnea:  Patient had episodes of nocturnal hypoxia, states he thinks he has sleep apnea, refused CPAP in the past   Referral to the Sleep Medicine was placed prior to discharge his PCP was notified    Poorly controlled type 2 diabetes mellitus:  I instructed the patient establish care with endocrinology on discharge    COPD:  Patient continues to smoke, given he has sleep apnea, poorly controlled diabetes I counseled him extensively on the importance of smoke cessation    Significant Findings, Care, Treatment and Services Provided:     CT    Extensive cellulitis throughout the imaged left lower extremity   No drainable abscess  No CT evidence of osteomyelitis or a necrotizing deep soft tissue infection  Complications: none    Discharge Diagnosis: see above    Medical Problems             Resolved Problems  Date Reviewed: 1/11/2022    None                Condition at Discharge: good         Discharge instructions/Information to patient and family:   See after visit summary for information provided to patient and family  Provisions for Follow-Up Care:  See after visit summary for information related to follow-up care and any pertinent home health orders  PCP: Samantha Wilson PA-C    Disposition: Home    Planned Readmission: No      Discharge Statement   I spent 40 minutes discharging the patient  This time was spent on the day of discharge  I had direct contact with the patient on the day of discharge  Additional documentation is required if more than 30 minutes were spent on discharge  Discharge Medications:  See after visit summary for reconciled discharge medications provided to patient and family

## 2022-01-14 NOTE — PHYSICAL THERAPY NOTE
PHYSICAL THERAPY NOTE          Patient Name: Martha Brooks  [id number] Date: 1/14/2022 01/14/22 3793   Note Type   Note Type Treatment   Restrictions/Precautions   Weight Bearing Precautions Per Order No   Other Precautions Pain; Fall Risk   Cognition   Overall Cognitive Status WFL   Following Commands Follows all commands and directions without difficulty   Subjective   Subjective Reports he has burning pain LLE  Transfers   Sit to Stand 7  Independent   Stand to Sit 7  Independent   Stand pivot 7  Independent   Ambulation/Elevation   Gait pattern   (Short stride; Decreased foot clearance; Wide LENY; Improper W)   Gait Assistance 5  Supervision   Distance 100' in room declines geller and stairs   Ambulation/Elevation Additional Comments verbal instruction for stairs as pt refused  Balance   Static Sitting Good   Dynamic Sitting Good   Static Standing Fair   Dynamic Standing Fair   Ambulatory Fair   Endurance Deficit   Endurance Deficit Yes   Activity Tolerance   Activity Tolerance Patient limited by pain   Assessment   Prognosis Good   Problem List Impaired balance;Decreased mobility; Decreased strength;Decreased endurance   Assessment Pt  seen for PT treatment session this date with interventions consisting of transfers and  gait training w/ emphasis on improving pt's ability to ambulate  Pt  Currently performing  tx and ambulation at ( I/SUP) x 1 level of function  Verbal instruction stairs as pt refused  Voiced understanding  Please also refer to physical therapy recommendation for safe DC planning  In comparison to previous session, Pt  With improvements in activity tolerance  Pt is in need of continued activity in PT to improve strength balance endurance mobility transfers and ambulation with return to maximize LOF   From PT/mobility standpoint, recommendation at time of d/c would be home health rehab  in order to promote return to PLOF and independence  The patient's AM-PAC Basic Mobility Inpatient Short Form Raw Score is 22  A Raw score of greater than 16 suggests the patient may benefit from discharge to home  Goals   LTG Expiration Date 01/25/22   Plan   Treatment/Interventions Elevations; Endurance training;Gait training;LE strengthening/ROM   Progress Progressing toward goals   Recommendation   PT Discharge Recommendation Home with home health rehabilitation   AM-PAC Basic Mobility Inpatient   Turning in Bed Without Bedrails 4   Lying on Back to Sitting on Edge of Flat Bed 3   Moving Bed to Chair 4   Standing Up From Chair 4   Walk in Room 4   Climb 3-5 Stairs 3   Basic Mobility Inpatient Raw Score 22   Basic Mobility Standardized Score 47 4   Highest Level Of Mobility   JH-HLM Goal 7: Walk 25 feet or more   JH-HLM Highest Level of Mobility 7: Walk 25 feet or more   JH-HLM Goal Achieved Yes   Education   Education Provided Mobility training   Patient Demonstrates verbal understanding   End of Consult   Patient Position at End of Consult Seated edge of bed; All needs within reach

## 2022-01-14 NOTE — PLAN OF CARE
Problem: PHYSICAL THERAPY ADULT  Goal: Performs mobility at highest level of function for planned discharge setting  See evaluation for individualized goals  Description: Treatment/Interventions: Functional transfer training,LE strengthening/ROM,Elevations,Therapeutic exercise,Endurance training,Bed mobility,Gait training          See flowsheet documentation for full assessment, interventions and recommendations  Outcome: Progressing  Note: Prognosis: Good  Problem List: Impaired balance,Decreased mobility,Decreased strength,Decreased endurance  Assessment: Pt  seen for PT treatment session this date with interventions consisting of transfers and  gait training w/ emphasis on improving pt's ability to ambulate  Pt  Currently performing  tx and ambulation at ( I/SUP) x 1 level of function  Verbal instruction stairs as pt refused  Voiced understanding  Please also refer to physical therapy recommendation for safe DC planning  In comparison to previous session, Pt  With improvements in activity tolerance  Pt is in need of continued activity in PT to improve strength balance endurance mobility transfers and ambulation with return to maximize LOF  From PT/mobility standpoint, recommendation at time of d/c would be home health rehab  in order to promote return to PLOF and independence  The patient's AM-PAC Basic Mobility Inpatient Short Form Raw Score is 22  A Raw score of greater than 16 suggests the patient may benefit from discharge to home  PT Discharge Recommendation: Home with home health rehabilitation          See flowsheet documentation for full assessment

## 2022-01-16 LAB
BACTERIA BLD CULT: NORMAL
BACTERIA BLD CULT: NORMAL

## 2022-02-21 DIAGNOSIS — E11.65 TYPE 2 DIABETES MELLITUS WITH HYPERGLYCEMIA, WITH LONG-TERM CURRENT USE OF INSULIN (HCC): ICD-10-CM

## 2022-02-21 DIAGNOSIS — Z79.4 TYPE 2 DIABETES MELLITUS WITH HYPERGLYCEMIA, WITH LONG-TERM CURRENT USE OF INSULIN (HCC): ICD-10-CM

## 2022-02-21 RX ORDER — INSULIN GLARGINE 100 [IU]/ML
60 INJECTION, SOLUTION SUBCUTANEOUS DAILY
Qty: 18 ML | Refills: 5 | Status: SHIPPED | OUTPATIENT
Start: 2022-02-21 | End: 2022-08-20

## 2022-03-03 ENCOUNTER — APPOINTMENT (EMERGENCY)
Dept: RADIOLOGY | Facility: HOSPITAL | Age: 57
End: 2022-03-03
Payer: MEDICARE

## 2022-03-03 ENCOUNTER — HOSPITAL ENCOUNTER (EMERGENCY)
Facility: HOSPITAL | Age: 57
Discharge: HOME | End: 2022-03-03
Attending: EMERGENCY MEDICINE | Admitting: EMERGENCY MEDICINE
Payer: MEDICARE

## 2022-03-03 ENCOUNTER — APPOINTMENT (EMERGENCY)
Dept: CT IMAGING | Facility: HOSPITAL | Age: 57
End: 2022-03-03
Payer: MEDICARE

## 2022-03-03 VITALS
HEART RATE: 105 BPM | RESPIRATION RATE: 18 BRPM | OXYGEN SATURATION: 94 % | BODY MASS INDEX: 45.94 KG/M2 | WEIGHT: 267.64 LBS | SYSTOLIC BLOOD PRESSURE: 143 MMHG | DIASTOLIC BLOOD PRESSURE: 73 MMHG | TEMPERATURE: 98.3 F

## 2022-03-03 DIAGNOSIS — S29.012A STRAIN OF THORACIC SPINE: ICD-10-CM

## 2022-03-03 DIAGNOSIS — S83.91XA RIGHT KNEE SPRAIN: ICD-10-CM

## 2022-03-03 DIAGNOSIS — S50.311A ABRASION OF RIGHT ELBOW, INITIAL ENCOUNTER: ICD-10-CM
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
## 2022-03-03 DIAGNOSIS — S16.1XXA STRAIN OF NECK MUSCLE, INITIAL ENCOUNTER: ICD-10-CM

## 2022-03-03 DIAGNOSIS — M75.100 ROTATOR CUFF TEAR: ICD-10-CM

## 2022-03-03 DIAGNOSIS — S43.401A SPRAIN OF RIGHT SHOULDER: ICD-10-CM

## 2022-03-03 DIAGNOSIS — E87.1 HYPONATREMIA: ICD-10-CM

## 2022-03-03 LAB
ALBUMIN SERPL BCP-MCNC: 3.4 G/DL (ref 3.5–5)
ALP SERPL-CCNC: 76 U/L (ref 46–116)
ALT SERPL W P-5'-P-CCNC: 26 U/L (ref 12–78)
ANION GAP SERPL CALCULATED.3IONS-SCNC: 6 MMOL/L (ref 4–13)
AST SERPL W P-5'-P-CCNC: 19 U/L (ref 5–45)
BASOPHILS # BLD AUTO: 0.06 THOUSANDS/ΜL (ref 0–0.1)
BASOPHILS NFR BLD AUTO: 1 % (ref 0–1)
BILIRUB SERPL-MCNC: 0.35 MG/DL (ref 0.2–1)
BUN SERPL-MCNC: 16 MG/DL (ref 5–25)
CALCIUM ALBUM COR SERPL-MCNC: 9.2 MG/DL (ref 8.3–10.1)
CALCIUM SERPL-MCNC: 8.7 MG/DL (ref 8.3–10.1)
CHLORIDE SERPL-SCNC: 92 MMOL/L (ref 100–108)
CO2 SERPL-SCNC: 32 MMOL/L (ref 21–32)
CREAT SERPL-MCNC: 1.08 MG/DL (ref 0.6–1.3)
EOSINOPHIL # BLD AUTO: 0.13 THOUSAND/ΜL (ref 0–0.61)
EOSINOPHIL NFR BLD AUTO: 1 % (ref 0–6)
ERYTHROCYTE [DISTWIDTH] IN BLOOD BY AUTOMATED COUNT: 14 % (ref 11.6–15.1)
GFR SERPL CREATININE-BSD FRML MDRD: 75 ML/MIN/1.73SQ M
GLUCOSE SERPL-MCNC: 136 MG/DL (ref 65–140)
HCT VFR BLD AUTO: 36.9 % (ref 36.5–49.3)
HGB BLD-MCNC: 12.3 G/DL (ref 12–17)
IMM GRANULOCYTES # BLD AUTO: 0.03 THOUSAND/UL (ref 0–0.2)
IMM GRANULOCYTES NFR BLD AUTO: 0 % (ref 0–2)
LYMPHOCYTES # BLD AUTO: 2.11 THOUSANDS/ΜL (ref 0.6–4.47)
LYMPHOCYTES NFR BLD AUTO: 19 % (ref 14–44)
MCH RBC QN AUTO: 27.4 PG (ref 26.8–34.3)
MCHC RBC AUTO-ENTMCNC: 33.3 G/DL (ref 31.4–37.4)
MCV RBC AUTO: 82 FL (ref 82–98)
MONOCYTES # BLD AUTO: 1.43 THOUSAND/ΜL (ref 0.17–1.22)
MONOCYTES NFR BLD AUTO: 13 % (ref 4–12)
NEUTROPHILS # BLD AUTO: 7.14 THOUSANDS/ΜL (ref 1.85–7.62)
NEUTS SEG NFR BLD AUTO: 66 % (ref 43–75)
NRBC BLD AUTO-RTO: 0 /100 WBCS
PLATELET # BLD AUTO: 322 THOUSANDS/UL (ref 149–390)
PMV BLD AUTO: 8.6 FL (ref 8.9–12.7)
POTASSIUM SERPL-SCNC: 3.7 MMOL/L (ref 3.5–5.3)
PROT SERPL-MCNC: 7.7 G/DL (ref 6.4–8.2)
RBC # BLD AUTO: 4.49 MILLION/UL (ref 3.88–5.62)
SODIUM SERPL-SCNC: 130 MMOL/L (ref 136–145)
WBC # BLD AUTO: 10.9 THOUSAND/UL (ref 4.31–10.16)

## 2022-03-03 PROCEDURE — 72125 CT NECK SPINE W/O DYE: CPT

## 2022-03-03 PROCEDURE — 36415 COLL VENOUS BLD VENIPUNCTURE: CPT | Performed by: EMERGENCY MEDICINE

## 2022-03-03 PROCEDURE — 72128 CT CHEST SPINE W/O DYE: CPT

## 2022-03-03 PROCEDURE — 73030 X-RAY EXAM OF SHOULDER: CPT

## 2022-03-03 PROCEDURE — 99284 EMERGENCY DEPT VISIT MOD MDM: CPT | Performed by: EMERGENCY MEDICINE

## 2022-03-03 PROCEDURE — 93005 ELECTROCARDIOGRAM TRACING: CPT

## 2022-03-03 PROCEDURE — 73080 X-RAY EXAM OF ELBOW: CPT

## 2022-03-03 PROCEDURE — 73564 X-RAY EXAM KNEE 4 OR MORE: CPT

## 2022-03-03 PROCEDURE — 80053 COMPREHEN METABOLIC PANEL: CPT | Performed by: EMERGENCY MEDICINE

## 2022-03-03 PROCEDURE — 71045 X-RAY EXAM CHEST 1 VIEW: CPT

## 2022-03-03 PROCEDURE — 85025 COMPLETE CBC W/AUTO DIFF WBC: CPT | Performed by: EMERGENCY MEDICINE

## 2022-03-03 PROCEDURE — 99285 EMERGENCY DEPT VISIT HI MDM: CPT

## 2022-03-03 RX ORDER — OXYCODONE HYDROCHLORIDE AND ACETAMINOPHEN 5; 325 MG/1; MG/1
1 TABLET ORAL ONCE
Status: COMPLETED | OUTPATIENT
Start: 2022-03-03 | End: 2022-03-03

## 2022-03-03 RX ADMIN — OXYCODONE HYDROCHLORIDE AND ACETAMINOPHEN 1 TABLET: 5; 325 TABLET ORAL at 14:01

## 2022-03-03 NOTE — ED PROVIDER NOTES
History  Chief Complaint   Patient presents with    Trauma     Fall at home, states "I woke up standing and fell  This happened twice in the past week " pain to R side   Fall     Patient is a 50-year-old male  He frequently wakes up from sleep and finds himself standing somewhere  This happened today  When he woke up he ended up falling  He did not feel weak or dizzy  He denied any chest pain or palpitations  Denies striking his head  He ended up injuring his right knee, his right elbow, in his right shoulder  His tetanus vaccination is up-to-date  He does have some neck pain but he reports that this is from a fall previously  He denies any other injuries  He denies any new abdominal pain or trouble breathing  He denies any other extremity trauma  He denies any focal motor or sensory complaints  He does have chronic low back pain  He also reports having chronic right knee pain from an ACL problem  Prior to Admission Medications   Prescriptions Last Dose Informant Patient Reported? Taking?    Ascorbic Acid (VITAMIN C PO)   Yes No   Sig: Take 1 tablet by mouth daily   DULoxetine (CYMBALTA) 60 mg delayed release capsule   No No   Sig: Take 1 capsule (60 mg total) by mouth daily   EPINEPHrine (EPIPEN) 0 3 mg/0 3 mL SOAJ   No No   Sig: INJECT 0 3ML INTO A MUSCLE ONCE FOR 1 DOSE   Empagliflozin 25 MG TABS   No No   Sig: Take 1 tablet (25 mg total) by mouth every morning   Patient not taking: Reported on 1/11/2022    Insulin Pen Needle (Pen Needles 3/16") 31G X 5 MM MISC   No No   Sig: Use 4 (four) times a day   Ventolin  (90 Base) MCG/ACT inhaler   No No   Sig: INHALE 2 PUFFS BY MOUTH EVERY 4 HOURS AS NEEDED FOR WHEEZING OR SHORTNESS OF BREATH   ammonium lactate (LAC-HYDRIN) 12 % lotion   No No   Sig: Apply topically 2 (two) times a day as needed for dry skin   aspirin 81 mg chewable tablet   Yes No   Sig: Take one tablet by mouth daily   atorvastatin (LIPITOR) 20 mg tablet   No No   Sig: Take 1 tablet by mouth once daily   fluticasone (FLONASE) 50 mcg/act nasal spray   Yes No   Sig: USE 2 SPRAY(S) IN EACH NOSTRIL ONCE DAILY   fluticasone-vilanterol (BREO ELLIPTA) 100-25 mcg/inh inhaler   No No   Sig: Inhale 1 puff daily Rinse mouth after use    gabapentin (NEURONTIN) 300 mg capsule   No No   Sig: Take 1 capsule (300 mg total) by mouth 3 (three) times a day   hydrOXYzine HCL (ATARAX) 25 mg tablet   No No   Sig: Take 1 tablet (25 mg total) by mouth every 6 (six) hours as needed for itching or allergies   hydrochlorothiazide (HYDRODIURIL) 25 mg tablet   No No   Sig: Take 1 tablet by mouth twice daily   insulin glargine (Lantus SoloStar) 100 units/mL injection pen   No No   Sig: Inject 60 Units under the skin daily   insulin lispro (HumaLOG KwikPen) 100 units/mL injection pen   No No   Sig: Inject 15 Units under the skin 3 (three) times a day with meals   insulin lispro (HumaLOG) 100 units/mL injection   No No   Sig: Inject 15 Units under the skin 3 (three) times a day with meals   levothyroxine (Euthyrox) 175 mcg tablet   No No   Sig: Take 1 tablet (175 mcg total) by mouth daily   loratadine (CLARITIN) 10 mg tablet   Yes No   Sig: Take 20 mg by mouth 2 (two) times a day     metFORMIN (GLUCOPHAGE) 1000 MG tablet   No No   Sig: Take 1 tablet (1,000 mg total) by mouth 2 (two) times a day   omega-3-acid ethyl esters (LOVAZA) 1 g capsule   No No   Sig: Take 1 capsule (1 g total) by mouth 2 (two) times a day for high cholesterol   sodium chloride (OCEAN) 0 65 % nasal spray   No No   Si spray into each nostril as needed for rhinitis   traZODone (DESYREL) 150 mg tablet   No No   Sig: Take 1 tablet (150 mg total) by mouth daily at bedtime   varenicline (CHANTIX) 1 mg tablet   No No   Sig: Take 1 tablet (1 mg total) by mouth 2 (two) times a day   Patient not taking: Reported on 2021       Facility-Administered Medications: None       Past Medical History:   Diagnosis Date    Acquired hypothyroidism 1/2/2015    Angioedema     Diabetes mellitus ([facility] )     Disease of thyroid gland     Hyperlipidemia     Hypertension     Mixed hyperlipidemia 2/4/2015    Morbid obesity ([facility] )     Morbid obesity with BMI of 45 0-49 9, adult ([facility] ) 1/2/2015    MARSHA (obstructive sleep apnea)     Primary hypertension 1/2/2015    Transitioned From: Benign essential hypertension       Past Surgical History:   Procedure Laterality Date    KNEE SURGERY      SINUS SURGERY         Family History   Problem Relation Age of Onset    Thyroid cancer Mother     Diabetes type II Mother     Esophageal cancer Father     Diabetes type II Father     Kidney cancer Brother     Diabetes type II Brother     Diabetes type II Maternal Grandmother      I have reviewed and agree with the history as documented  E-Cigarette/Vaping    E-Cigarette Use Never User     Cartridges/Day 0     Comments 0      E-Cigarette/Vaping Substances    Nicotine No     THC No     CBD No     Flavoring No     Other No     Unknown No      Social History     Tobacco Use    Smoking status: Current Every Day Smoker     Packs/day: 2 00     Years: 70 00     Pack years: 140 00     Start date: 4/15/1942    Smokeless tobacco: Never Used    Tobacco comment: states read to quit prior to admit   Vaping Use    Vaping Use: Never used   Substance Use Topics    Alcohol use: Not Currently     Alcohol/week: 3 0 standard drinks     Types: 2 Cans of beer, 1 Shots of liquor per week     Comment: seldom    Drug use: No       Review of Systems   Constitutional: Negative for chills and fever  HENT: Negative for rhinorrhea and sore throat  Eyes: Negative for pain, redness and visual disturbance  Respiratory: Positive for shortness of breath  Negative for cough  Patient has chronic shortness of breath   Cardiovascular: Negative for chest pain and leg swelling  Gastrointestinal: Positive for abdominal pain  Negative for diarrhea and vomiting          Patient has some chronic abdominal pain  Endocrine: Negative for polydipsia and polyuria  Genitourinary: Negative for dysuria, frequency and hematuria  Musculoskeletal: Positive for back pain and neck pain  Skin: Negative for rash and wound  Allergic/Immunologic: Negative for immunocompromised state  Neurological: Negative for weakness, numbness and headaches  Psychiatric/Behavioral: Negative for hallucinations and suicidal ideas  All other systems reviewed and are negative  Physical Exam  Physical Exam  Vitals reviewed  Constitutional:       General: He is not in acute distress  Appearance: He is obese  HENT:      Head: Normocephalic and atraumatic  Mouth/Throat:      Mouth: Mucous membranes are moist    Eyes:      Extraocular Movements: Extraocular movements intact  Pupils: Pupils are equal, round, and reactive to light  Neck:      Comments: There is some lower cervical tenderness  No step-off or crepitus  Cardiovascular:      Rate and Rhythm: Normal rate and regular rhythm  Pulses: Normal pulses  Heart sounds: Normal heart sounds  No murmur heard  No gallop  Pulmonary:      Effort: Pulmonary effort is normal  No respiratory distress  Breath sounds: Normal breath sounds  No stridor  No wheezing, rhonchi or rales  Chest:      Chest wall: No tenderness  Abdominal:      General: Bowel sounds are normal  There is no distension  Palpations: Abdomen is soft  Tenderness: There is no abdominal tenderness  Hernia: A hernia is present  Musculoskeletal:         General: No deformity  Cervical back: Neck supple  Comments: There is tenderness to the right anterior glenohumeral joint  There is some tenderness to the right olecranon  There is an abrasion there  No radial head tenderness  There is some tenderness to the medial joint space of the left knee  No effusion  Otherwise extremities are atraumatic    All extremities are neurovascularly intact  There is an abrasion and some tenderness to the upper thoracic spine  Skin:     General: Skin is warm and dry  Neurological:      Mental Status: He is alert and oriented to person, place, and time  GCS: GCS eye subscore is 4  GCS verbal subscore is 5  GCS motor subscore is 6  Sensory: Sensation is intact  Motor: Motor function is intact     Psychiatric:         Mood and Affect: Mood normal          Behavior: Behavior normal          Vital Signs  ED Triage Vitals [03/03/22 1344]   Temperature Pulse Respirations Blood Pressure SpO2   98 3 °F (36 8 °C) 99 20 167/87 92 %      Temp Source Heart Rate Source Patient Position - Orthostatic VS BP Location FiO2 (%)   Temporal Monitor Lying Left arm --      Pain Score       9           Vitals:    03/03/22 1344 03/03/22 1445   BP: 167/87 143/73   Pulse: 99 105   Patient Position - Orthostatic VS: Lying          Visual Acuity  Visual Acuity      Most Recent Value   L Pupil Size (mm) 3   R Pupil Size (mm) 3          ED Medications  Medications   oxyCODONE-acetaminophen (PERCOCET) 5-325 mg per tablet 1 tablet (1 tablet Oral Given 3/3/22 1401)       Diagnostic Studies  Results Reviewed     Procedure Component Value Units Date/Time    Comprehensive metabolic panel [[id number]]  (Abnormal) Collected: 03/03/22 1443    Lab Status: Final result Specimen: Blood from Arm, Right Updated: 03/03/22 1512     Sodium 130 mmol/L      Potassium 3 7 mmol/L      Chloride 92 mmol/L      CO2 32 mmol/L      ANION GAP 6 mmol/L      BUN 16 mg/dL      Creatinine 1 08 mg/dL      Glucose 136 mg/dL      Calcium 8 7 mg/dL      Corrected Calcium 9 2 mg/dL      AST 19 U/L      ALT 26 U/L      Alkaline Phosphatase 76 U/L      Total Protein 7 7 g/dL      Albumin 3 4 g/dL      Total Bilirubin 0 35 mg/dL      eGFR 75 ml/min/1 73sq m     Narrative:      Mare guidelines for Chronic Kidney Disease (CKD):     Stage 1 with normal or high GFR (GFR > 90 mL/min/1 73 square meters)    Stage 2 Mild CKD (GFR = 60-89 mL/min/1 73 square meters)    Stage 3A Moderate CKD (GFR = 45-59 mL/min/1 73 square meters)    Stage 3B Moderate CKD (GFR = 30-44 mL/min/1 73 square meters)    Stage 4 Severe CKD (GFR = 15-29 mL/min/1 73 square meters)    Stage 5 End Stage CKD (GFR <15 mL/min/1 73 square meters)  Note: GFR calculation is accurate only with a steady state creatinine    CBC and differential [[id number]]  (Abnormal) Collected: 03/03/22 1443    Lab Status: Final result Specimen: Blood from Arm, Right Updated: 03/03/22 1450     WBC 10 90 Thousand/uL      RBC 4 49 Million/uL      Hemoglobin 12 3 g/dL      Hematocrit 36 9 %      MCV 82 fL      MCH 27 4 pg      MCHC 33 3 g/dL      RDW 14 0 %      MPV 8 6 fL      Platelets 460 Thousands/uL      nRBC 0 /100 WBCs      Neutrophils Relative 66 %      Immat GRANS % 0 %      Lymphocytes Relative 19 %      Monocytes Relative 13 %      Eosinophils Relative 1 %      Basophils Relative 1 %      Neutrophils Absolute 7 14 Thousands/µL      Immature Grans Absolute 0 03 Thousand/uL      Lymphocytes Absolute 2 11 Thousands/µL      Monocytes Absolute 1 43 Thousand/µL      Eosinophils Absolute 0 13 Thousand/µL      Basophils Absolute 0 06 Thousands/µL                  XR knee 4+ views Right injury   ED Interpretation by Mariya Prather MD (03/03 1508)   No fracture or dislocation  DJD  XR shoulder 2+ views RIGHT   ED Interpretation by Mariya Prather MD (03/03 1508)   No fracture or dislocation  DJD      XR chest 1 view portable   ED Interpretation by Mariya Prather MD (03/03 1507)   Chronic interstitial changes      XR elbow 3+ vw right   ED Interpretation by Mariya Prather MD (03/03 1512)   No fracture or dislocation      CT cervical spine without contrast   Final Result by Gene Jalene Collet, DO (03/03 1444)      No acute osseous abnormality  Cervical degenerative change including moderate left foraminal narrowing at C3-4  Workstation performed: [id number]         CT thoracic spine without contrast   Final Result by Jarett Soto DO (03/03 1446)      No acute osseous abnormality  Anterior and lateral endplate osteophyte formation within the mid thoracic spine without encroachment upon the canal or neural foramen  Workstation performed: [id number]                    Procedures  ECG 12 Lead Documentation Only    Date/Time: 3/3/2022 2:05 PM  Performed by: Vasu Bingham MD  Authorized by: Vasu Bingham MD     ECG reviewed by me, the ED Provider: yes    Patient location:  ED  Comments:      Sinus tachycardia at 107 beats per minute  No acute ischemic ST or T-wave changes  No ectopy  ED Course                               SBIRT 22yo+      Most Recent Value   SBIRT (22 yo +)    In order to provide better care to our patients, we are screening all of our patients for alcohol and drug use  Would it be okay to ask you these screening questions? Yes Filed at: 03/03/2022 1454   Initial Alcohol Screen: US AUDIT-C     1  How often do you have a drink containing alcohol? 0 Filed at: 03/03/2022 1454   2  How many drinks containing alcohol do you have on a typical day you are drinking? 0 Filed at: 03/03/2022 1454   3a  Male UNDER 65: How often do you have five or more drinks on one occasion? 0 Filed at: 03/03/2022 1454   3b  FEMALE Any Age, or MALE 65+: How often do you have 4 or more drinks on one occassion? 0 Filed at: 03/03/2022 1454   Audit-C Score 0 Filed at: 03/03/2022 1454   SHIVANI: How many times in the past year have you    Used an illegal drug or used a prescription medication for non-medical reasons? Never Filed at: 03/03/2022 1454                    MDM  Number of Diagnoses or Management Options  Diagnosis management comments: Imaging was negative for acute traumatic injuries  Patient was tachycardic in the emergency room  He was tachycardic during his prior ED visit    There is no fever  Patient denies any signs or symptoms of no infection  No new cough  No increase in shortness of breath  Patient does have chronic shortness of breath  He does have chronic erythema and dryness to his left shin  It is not any worse than usual   I do not feel patient is septic at this time  It did not see any pneumonia on his chest x-ray  Patient is appropriate for discharge and outpatient management  Amount and/or Complexity of Data Reviewed  Clinical lab tests: ordered and reviewed  Tests in the radiology section of CPT®: reviewed and ordered  Independent visualization of images, tracings, or specimens: yes        Disposition  Final diagnoses:   Fall from standing, initial encounter   Strain of neck muscle, initial encounter   Strain of thoracic spine   Sprain of right shoulder   Abrasion of right elbow, initial encounter   Right knee sprain   Hyponatremia     Time reflects when diagnosis was documented in both MDM as applicable and the Disposition within this note     Time User Action Codes Description Comment    3/3/2022  3:14 PM Vashti Franc Add [C61  XXXA] Fall from standing, initial encounter     3/3/2022  3:14 PM Vashti Franc Add [S16  1XXA] Strain of neck muscle, initial encounter     3/3/2022  3:15 PM Vashti Franc Add [P00 440S] Strain of thoracic region, initial encounter     3/3/2022  3:15 PM Toya Waite [L31 322P] Strain of thoracic region, initial encounter     3/3/2022  3:15 PM Vashti Franc Add [S07 511A] Strain of thoracic spine     3/3/2022  3:15 PM Vashti Franc Add [T54 555J] Sprain of right shoulder     3/3/2022  3:15 PM Vashti Franc Add [S50 311A] Abrasion of right elbow, initial encounter     3/3/2022  3:15 PM Vashti Franc Add [S90 15JL] Right knee sprain     3/3/2022  3:15 PM Vashti Franc Add [E87 1] Hyponatremia       ED Disposition     ED Disposition Condition Date/Time Comment    Discharge Stable Thu Mar 3, 2022  3:14 PM Rudi Welch Curvin Cousin discharge to home/self care  Follow-up Information     Follow up With Specialties Details Why Contact Info Additional [address], PA-C Physician Assistant, Family Medicine In 2 days  [facility] [address]  [address] [facility] [address]  [address] [place] [address] Road [id number] [phone]       [address] [facility] [address] [facility] In 1 week  [address] [id number] [address] [facility] Yifan Felicita [address] [facility], [address] [place] [address], [address]          Patient's Medications   Discharge Prescriptions    No medications on file       No discharge procedures on file      PDMP Review       Value Time User    PDMP Reviewed  Yes 1/4/2021  2:51 PM Lachelle Galan, [facility] Provider  Electronically Signed by           Ana María Hewitt MD  03/03/22 6007

## 2022-03-04 LAB
ATRIAL RATE: 107 BPM
P AXIS: 58 DEGREES
PR INTERVAL: 160 MS
QRS AXIS: 60 DEGREES
QRSD INTERVAL: 82 MS
QT INTERVAL: 340 MS
QTC INTERVAL: 453 MS
T WAVE AXIS: 39 DEGREES
VENTRICULAR RATE: 107 BPM

## 2022-03-04 PROCEDURE — 93010 ELECTROCARDIOGRAM REPORT: CPT | Performed by: INTERNAL MEDICINE

## 2022-03-05 NOTE — TELEPHONE ENCOUNTER
LMOM for pt   Pt is due for routine follow up RE: DM  RT Inhaler-Nebulizer Bronchodilator Protocol Note    There is a bronchodilator order in the chart from a provider indicating to follow the RT Bronchodilator Protocol and there is an Initiate RT Inhaler-Nebulizer Bronchodilator Protocol order as well (see protocol at bottom of note). CXR Findings:  No results found. The findings from the last RT Protocol Assessment were as follows:   History Pulmonary Disease: Chronic pulmonary disease  Respiratory Pattern: Regular pattern and RR 12-20 bpm  Breath Sounds: Clear breath sounds  Cough: Strong, spontaneous, non-productive  Indication for Bronchodilator Therapy: On home bronchodilators  Bronchodilator Assessment Score: 2    Aerosolized bronchodilator medication orders have been revised according to the RT Inhaler-Nebulizer Bronchodilator Protocol below. Respiratory Therapist to perform RT Therapy Protocol Assessment initially then follow the protocol. Repeat RT Therapy Protocol Assessment PRN for score 0-3 or on second treatment, BID, and PRN for scores above 3. No Indications - adjust the frequency to every 6 hours PRN wheezing or bronchospasm, if no treatments needed after 48 hours then discontinue using Per Protocol order mode. If indication present, adjust the RT bronchodilator orders based on the Bronchodilator Assessment Score as indicated below. Use Inhaler orders unless patient has one or more of the following: on home nebulizer, not able to hold breath for 10 seconds, is not alert and oriented, cannot activate and use MDI correctly, or respiratory rate 25 breaths per minute or more, then use the equivalent nebulizer order(s) with same Frequency and PRN reasons based on the score. If a patient is on this medication at home then do not decrease Frequency below that used at home.     0-3 - enter or revise RT bronchodilator order(s) to equivalent RT Bronchodilator order with Frequency of every 4 hours PRN for wheezing or increased work of

## 2022-03-16 DIAGNOSIS — E11.8 TYPE 2 DIABETES MELLITUS WITH COMPLICATION, WITH LONG-TERM CURRENT USE OF INSULIN (HCC): ICD-10-CM

## 2022-03-16 DIAGNOSIS — Z79.4 TYPE 2 DIABETES MELLITUS WITH COMPLICATION, WITH LONG-TERM CURRENT USE OF INSULIN (HCC): ICD-10-CM

## 2022-04-11 ENCOUNTER — OFFICE VISIT (OUTPATIENT)
Dept: FAMILY MEDICINE CLINIC | Facility: HOME HEALTHCARE | Age: 57
End: 2022-04-11
Payer: MEDICARE

## 2022-04-11 VITALS
OXYGEN SATURATION: 91 % | HEIGHT: 64 IN | DIASTOLIC BLOOD PRESSURE: 70 MMHG | BODY MASS INDEX: 44.9 KG/M2 | HEART RATE: 106 BPM | RESPIRATION RATE: 16 BRPM | TEMPERATURE: 96.8 F | SYSTOLIC BLOOD PRESSURE: 144 MMHG | WEIGHT: 263 LBS

## 2022-04-11 DIAGNOSIS — Z79.4 TYPE 2 DIABETES MELLITUS WITH HYPERGLYCEMIA, WITH LONG-TERM CURRENT USE OF INSULIN (HCC): ICD-10-CM

## 2022-04-11 DIAGNOSIS — I73.9 PERIPHERAL VASCULAR DISEASE (HCC): Primary | ICD-10-CM

## 2022-04-11 DIAGNOSIS — D72.829 LEUKOCYTOSIS, UNSPECIFIED TYPE: ICD-10-CM

## 2022-04-11 DIAGNOSIS — G89.4 CHRONIC PAIN SYNDROME: ICD-10-CM

## 2022-04-11 DIAGNOSIS — E87.1 HYPONATREMIA: ICD-10-CM

## 2022-04-11 DIAGNOSIS — L03.116 CELLULITIS OF LEFT LOWER EXTREMITY: ICD-10-CM

## 2022-04-11 DIAGNOSIS — G62.9 NEUROPATHY: ICD-10-CM

## 2022-04-11 DIAGNOSIS — E78.00 PURE HYPERCHOLESTEROLEMIA: ICD-10-CM

## 2022-04-11 DIAGNOSIS — E03.9 HYPOTHYROIDISM, UNSPECIFIED TYPE: ICD-10-CM

## 2022-04-11 DIAGNOSIS — L03.119 CELLULITIS OF LOWER EXTREMITY, UNSPECIFIED LATERALITY: ICD-10-CM

## 2022-04-11 DIAGNOSIS — E11.65 TYPE 2 DIABETES MELLITUS WITH HYPERGLYCEMIA, WITH LONG-TERM CURRENT USE OF INSULIN (HCC): ICD-10-CM

## 2022-04-11 PROCEDURE — 99214 OFFICE O/P EST MOD 30 MIN: CPT | Performed by: FAMILY MEDICINE

## 2022-04-11 RX ORDER — CEPHALEXIN 500 MG/1
500 CAPSULE ORAL EVERY 8 HOURS SCHEDULED
Qty: 21 CAPSULE | Refills: 0 | Status: SHIPPED | OUTPATIENT
Start: 2022-04-11 | End: 2022-04-18

## 2022-04-11 RX ORDER — AMMONIUM LACTATE 12 G/100G
LOTION TOPICAL 2 TIMES DAILY PRN
Qty: 400 G | Refills: 0 | Status: SHIPPED | OUTPATIENT
Start: 2022-04-11

## 2022-04-11 RX ORDER — PREGABALIN 25 MG/1
25 CAPSULE ORAL 2 TIMES DAILY
Qty: 60 CAPSULE | Refills: 5 | Status: SHIPPED | OUTPATIENT
Start: 2022-04-11

## 2022-04-11 NOTE — PROGRESS NOTES
[address]       NAME: Brenda Villarreal is a 61 y o  male  : 1965    MRN: [id number]  DATE: 2022  TIME: 8:39 AM    Assessment and Plan   Diagnoses and all orders for this visit:    Peripheral vascular disease [facility])  -     Ambulatory Referral to Vascular Surgery; Future    Chronic pain syndrome  -     pregabalin (LYRICA) 25 mg capsule; Take 1 capsule (25 mg total) by mouth 2 (two) times a day  -     Ambulatory Referral to Pain Management; Future    Neuropathy  -     pregabalin (LYRICA) 25 mg capsule; Take 1 capsule (25 mg total) by mouth 2 (two) times a day    Hyponatremia  -     Comprehensive metabolic panel    Leukocytosis, unspecified type  -     CBC and differential    Type 2 diabetes mellitus with hyperglycemia, with long-term current use of insulin ([facility] [address] )  -     Ambulatory Referral to Endocrinology; Future    Cellulitis of lower extremity, unspecified laterality  -     cephalexin (KEFLEX) 500 mg capsule; Take 1 capsule (500 mg total) by mouth every 8 (eight) hours for 7 days    Hypothyroidism, unspecified type  -     TSH, 3rd generation with Free T4 reflex; Future    Pure hypercholesterolemia  -     Lipid Panel with Direct LDL reflex; Future    Cellulitis of left lower extremity  -     ammonium lactate (LAC-HYDRIN) 12 % lotion; Apply topically 2 (two) times a day as needed for dry skin        Patient will follow up in 2-3 weeks to review lab work and reassess patient has multiple complaints  Smoking cessation discussed and encouraged  Patient requesting to be placed on Lyrica in place of Neurontin  Thought medication work better for his chronic pain syndrome  pain management referral   Patient agrees to reestablish care with Endocrinology  Patient with venous stasis changes and frequent claudication  Vascular surgery consult    Patient instructed to call me with any questions or concerns    Chief Complaint     Chief Complaint   Patient presents with    Pain     would like to restart lyrica         History of Present Illness       HPI   50-year-old male presents today for complaint of chronic pain and neuropathy  Patient states gabapentin is no longer helping him  Patient states in the past was on Lyrica which seem to help better  Patient would like to be restarted on Lyrica  Patient with chronic venous stasis changes and what appears to be recurrent cellulitis both lower extremities  Will treat with Keflex and referred to vascular surgery  Smoking cessation discussed and encouraged  Patient would like to see pain management for chronic pain of lower extremities shoulders and neuropathy  Patient will follow-up in approximately 2 3 weeks for reassessment of his multiple  Complaints  Review of Systems   Review of Systems   as per HPI  All other systems negative      Current Medications       Current Outpatient Medications:     ammonium lactate (LAC-HYDRIN) 12 % lotion, Apply topically 2 (two) times a day as needed for dry skin, Disp: 400 g, Rfl: 0    Ascorbic Acid (VITAMIN C PO), Take 1 tablet by mouth daily, Disp: , Rfl:     aspirin 81 mg chewable tablet, Take one tablet by mouth daily, Disp: , Rfl:     atorvastatin (LIPITOR) 20 mg tablet, Take 1 tablet by mouth once daily, Disp: 90 tablet, Rfl: 0    DULoxetine (CYMBALTA) 60 mg delayed release capsule, Take 1 capsule (60 mg total) by mouth daily, Disp: 90 capsule, Rfl: 1    EPINEPHrine (EPIPEN) 0 3 mg/0 3 mL SOAJ, INJECT 0 3ML INTO A MUSCLE ONCE FOR 1 DOSE, Disp: 2 each, Rfl: 0    fluticasone (FLONASE) 50 mcg/act nasal spray, USE 2 SPRAY(S) IN EACH NOSTRIL ONCE DAILY, Disp: , Rfl:     fluticasone-vilanterol (BREO ELLIPTA) 100-25 mcg/inh inhaler, Inhale 1 puff daily Rinse mouth after use , Disp: 1 Inhaler, Rfl: 0    hydrochlorothiazide (HYDRODIURIL) 25 mg tablet, Take 1 tablet by mouth twice daily, Disp: 60 tablet, Rfl: 5    hydrOXYzine HCL (ATARAX) 25 mg tablet, Take 1 tablet (25 mg total) by mouth every 6 (six) hours as needed for itching or allergies, Disp: 30 tablet, Rfl: 0    insulin glargine (Lantus SoloStar) 100 units/mL injection pen, Inject 60 Units under the skin daily, Disp: 18 mL, Rfl: 5    insulin lispro (HumaLOG KwikPen) 100 units/mL injection pen, Inject 15 Units under the skin 3 (three) times a day with meals, Disp: 15 mL, Rfl: 2    insulin lispro (HumaLOG) 100 units/mL injection, Inject 15 Units under the skin 3 (three) times a day with meals, Disp: 40 5 mL, Rfl: 0    Insulin Pen Needle (Pen Needles 3/16") 31G X 5 MM MISC, Use 4 (four) times a day, Disp: 300 each, Rfl: 3    levothyroxine (Euthyrox) 175 mcg tablet, Take 1 tablet (175 mcg total) by mouth daily, Disp: 30 tablet, Rfl: 5    loratadine (CLARITIN) 10 mg tablet, Take 20 mg by mouth 2 (two) times a day  , Disp: , Rfl:     metFORMIN (GLUCOPHAGE) 1000 MG tablet, Take 1 tablet by mouth twice daily, Disp: 60 tablet, Rfl: 5    omega-3-acid ethyl esters (LOVAZA) 1 g capsule, Take 1 capsule (1 g total) by mouth 2 (two) times a day for high cholesterol, Disp: 60 capsule, Rfl: 2    sodium chloride (OCEAN) 0 65 % nasal spray, 1 spray into each nostril as needed for rhinitis, Disp: 30 mL, Rfl: 1    traZODone (DESYREL) 150 mg tablet, Take 1 tablet (150 mg total) by mouth daily at bedtime, Disp: 90 tablet, Rfl: 1    Ventolin  (90 Base) MCG/ACT inhaler, INHALE 2 PUFFS BY MOUTH EVERY 4 HOURS AS NEEDED FOR WHEEZING OR SHORTNESS OF BREATH, Disp: 18 g, Rfl: 5    cephalexin (KEFLEX) 500 mg capsule, Take 1 capsule (500 mg total) by mouth every 8 (eight) hours for 7 days, Disp: 21 capsule, Rfl: 0    pregabalin (LYRICA) 25 mg capsule, Take 1 capsule (25 mg total) by mouth 2 (two) times a day, Disp: 60 capsule, Rfl: 5    Current Allergies     Allergies as of 04/11/2022 - Reviewed 04/11/2022   Allergen Reaction Noted    Ace inhibitors Swelling 09/29/2016    Other Anaphylaxis 03/02/2018    Zinc Tongue Swelling 04/20/2021    Clindamycin  05/07/2017 The following portions of the patient's history were reviewed and updated as appropriate: allergies, current medications, past family history, past medical history, past social history, past surgical history and problem list      Past Medical History:   Diagnosis Date    Acquired hypothyroidism 1/2/2015    Angioedema     Diabetes mellitus ([facility] )     Disease of thyroid gland     Hyperlipidemia     Hypertension     Mixed hyperlipidemia 2/4/2015    Morbid obesity ([facility] )     Morbid obesity with BMI of 45 0-49 9, adult ([facility] ) 1/2/2015    MARSHA (obstructive sleep apnea)     Primary hypertension 1/2/2015    Transitioned From: Benign essential hypertension       Past Surgical History:   Procedure Laterality Date    KNEE SURGERY      SINUS SURGERY         Family History   Problem Relation Age of Onset    Thyroid cancer Mother     Diabetes type II Mother     Esophageal cancer Father     Diabetes type II Father     Kidney cancer Brother     Diabetes type II Brother     Diabetes type II Maternal Grandmother          Medications have been verified  Objective   /70   Pulse (!) 106   Temp (!) 96 8 °F (36 °C)   Resp 16   Ht 5' 4" (1 626 m)   Wt 119 kg (263 lb)   SpO2 91%   BMI 45 14 kg/m²        Physical Exam     Physical Exam  Vitals and nursing note reviewed  Constitutional:       General: He is not in acute distress  Appearance: He is not ill-appearing, toxic-appearing or diaphoretic  HENT:      Head: Normocephalic  Mouth/Throat:      Mouth: Mucous membranes are moist       Pharynx: Oropharynx is clear  Eyes:      General: No scleral icterus  Conjunctiva/sclera: Conjunctivae normal    Cardiovascular:      Rate and Rhythm: Regular rhythm  Tachycardia present  Pulmonary:      Effort: Pulmonary effort is normal       Breath sounds: Normal breath sounds  Abdominal:      Palpations: Abdomen is soft  Tenderness: There is no abdominal tenderness  Musculoskeletal:      Cervical back: Neck supple  Right lower leg: Edema present  Left lower leg: Edema present  Lymphadenopathy:      Cervical: No cervical adenopathy  Skin:     Capillary Refill: Capillary refill takes less than 2 seconds  Findings: Erythema ( bilateral lower extremity ankle through mid calf ) present  Neurological:      Mental Status: He is alert and oriented to person, place, and time     Psychiatric:         Mood and Affect: Mood normal

## 2022-04-29 DIAGNOSIS — Z79.4 TYPE 2 DIABETES MELLITUS WITH HYPERGLYCEMIA, WITH LONG-TERM CURRENT USE OF INSULIN (HCC): ICD-10-CM

## 2022-04-29 DIAGNOSIS — E78.00 PURE HYPERCHOLESTEROLEMIA: ICD-10-CM

## 2022-04-29 DIAGNOSIS — E11.65 TYPE 2 DIABETES MELLITUS WITH HYPERGLYCEMIA, WITH LONG-TERM CURRENT USE OF INSULIN (HCC): ICD-10-CM

## 2022-04-29 RX ORDER — ATORVASTATIN CALCIUM 20 MG/1
TABLET, FILM COATED ORAL
Qty: 90 TABLET | Refills: 3 | Status: SHIPPED | OUTPATIENT
Start: 2022-04-29

## 2022-05-02 RX ORDER — INSULIN LISPRO 100 [IU]/ML
INJECTION, SOLUTION INTRAVENOUS; SUBCUTANEOUS
Qty: 15 ML | Refills: 5 | Status: SHIPPED | OUTPATIENT
Start: 2022-05-02

## 2022-05-03 ENCOUNTER — OFFICE VISIT (OUTPATIENT)
Dept: FAMILY MEDICINE CLINIC | Facility: HOME HEALTHCARE | Age: 57
End: 2022-05-03
Payer: MEDICARE

## 2022-05-03 VITALS
WEIGHT: 258 LBS | HEIGHT: 64 IN | BODY MASS INDEX: 44.05 KG/M2 | RESPIRATION RATE: 16 BRPM | TEMPERATURE: 98.6 F | HEART RATE: 104 BPM | OXYGEN SATURATION: 95 % | DIASTOLIC BLOOD PRESSURE: 74 MMHG | SYSTOLIC BLOOD PRESSURE: 128 MMHG

## 2022-05-03 DIAGNOSIS — M50.30 DDD (DEGENERATIVE DISC DISEASE), CERVICAL: ICD-10-CM

## 2022-05-03 DIAGNOSIS — J30.2 SEASONAL ALLERGIES: ICD-10-CM

## 2022-05-03 DIAGNOSIS — R26.89 BALANCE PROBLEMS: ICD-10-CM

## 2022-05-03 DIAGNOSIS — M19.011 PRIMARY OSTEOARTHRITIS OF RIGHT SHOULDER: Primary | ICD-10-CM

## 2022-05-03 DIAGNOSIS — M62.81 MUSCLE WEAKNESS OF LOWER EXTREMITY: ICD-10-CM

## 2022-05-03 LAB
ALBUMIN SERPL BCP-MCNC: 3.3 G/DL (ref 3.5–5)
ALP SERPL-CCNC: 86 U/L (ref 46–116)
ALT SERPL W P-5'-P-CCNC: 27 U/L (ref 12–78)
ANION GAP SERPL CALCULATED.3IONS-SCNC: 1 MMOL/L (ref 4–13)
AST SERPL W P-5'-P-CCNC: 21 U/L (ref 5–45)
BASOPHILS # BLD AUTO: 0.05 THOUSANDS/ΜL (ref 0–0.1)
BASOPHILS NFR BLD AUTO: 1 % (ref 0–1)
BILIRUB SERPL-MCNC: 0.34 MG/DL (ref 0.2–1)
BUN SERPL-MCNC: 15 MG/DL (ref 5–25)
CALCIUM ALBUM COR SERPL-MCNC: 10.1 MG/DL (ref 8.3–10.1)
CALCIUM SERPL-MCNC: 9.5 MG/DL (ref 8.3–10.1)
CHLORIDE SERPL-SCNC: 95 MMOL/L (ref 100–108)
CO2 SERPL-SCNC: 35 MMOL/L (ref 21–32)
CREAT SERPL-MCNC: 1.07 MG/DL (ref 0.6–1.3)
EOSINOPHIL # BLD AUTO: 0.19 THOUSAND/ΜL (ref 0–0.61)
EOSINOPHIL NFR BLD AUTO: 2 % (ref 0–6)
ERYTHROCYTE [DISTWIDTH] IN BLOOD BY AUTOMATED COUNT: 14.8 % (ref 11.6–15.1)
GFR SERPL CREATININE-BSD FRML MDRD: 76 ML/MIN/1.73SQ M
GLUCOSE P FAST SERPL-MCNC: 165 MG/DL (ref 65–99)
HCT VFR BLD AUTO: 41.2 % (ref 36.5–49.3)
HGB BLD-MCNC: 12.9 G/DL (ref 12–17)
IMM GRANULOCYTES # BLD AUTO: 0.05 THOUSAND/UL (ref 0–0.2)
IMM GRANULOCYTES NFR BLD AUTO: 1 % (ref 0–2)
LYMPHOCYTES # BLD AUTO: 2.09 THOUSANDS/ΜL (ref 0.6–4.47)
LYMPHOCYTES NFR BLD AUTO: 21 % (ref 14–44)
MCH RBC QN AUTO: 26 PG (ref 26.8–34.3)
MCHC RBC AUTO-ENTMCNC: 31.3 G/DL (ref 31.4–37.4)
MCV RBC AUTO: 83 FL (ref 82–98)
MONOCYTES # BLD AUTO: 1.27 THOUSAND/ΜL (ref 0.17–1.22)
MONOCYTES NFR BLD AUTO: 13 % (ref 4–12)
NEUTROPHILS # BLD AUTO: 6.16 THOUSANDS/ΜL (ref 1.85–7.62)
NEUTS SEG NFR BLD AUTO: 62 % (ref 43–75)
NRBC BLD AUTO-RTO: 0 /100 WBCS
PLATELET # BLD AUTO: 386 THOUSANDS/UL (ref 149–390)
PMV BLD AUTO: 9.1 FL (ref 8.9–12.7)
POTASSIUM SERPL-SCNC: 4 MMOL/L (ref 3.5–5.3)
PROT SERPL-MCNC: 7.9 G/DL (ref 6.4–8.2)
RBC # BLD AUTO: 4.96 MILLION/UL (ref 3.88–5.62)
SODIUM SERPL-SCNC: 131 MMOL/L (ref 136–145)
WBC # BLD AUTO: 9.81 THOUSAND/UL (ref 4.31–10.16)

## 2022-05-03 PROCEDURE — 85025 COMPLETE CBC W/AUTO DIFF WBC: CPT | Performed by: PHYSICIAN ASSISTANT

## 2022-05-03 PROCEDURE — 80053 COMPREHEN METABOLIC PANEL: CPT | Performed by: PHYSICIAN ASSISTANT

## 2022-05-03 PROCEDURE — 87389 HIV-1 AG W/HIV-1&-2 AB AG IA: CPT | Performed by: PHYSICIAN ASSISTANT

## 2022-05-03 PROCEDURE — 80061 LIPID PANEL: CPT | Performed by: PHYSICIAN ASSISTANT

## 2022-05-03 PROCEDURE — 84443 ASSAY THYROID STIM HORMONE: CPT | Performed by: PHYSICIAN ASSISTANT

## 2022-05-03 PROCEDURE — 99213 OFFICE O/P EST LOW 20 MIN: CPT | Performed by: FAMILY MEDICINE

## 2022-05-03 RX ORDER — LORATADINE 10 MG/1
10 TABLET ORAL DAILY
COMMUNITY

## 2022-05-03 RX ORDER — FLUTICASONE PROPIONATE 50 MCG
2 SPRAY, SUSPENSION (ML) NASAL DAILY
Qty: 16 G | Refills: 11 | Status: SHIPPED | OUTPATIENT
Start: 2022-05-03

## 2022-05-03 NOTE — PROGRESS NOTES
[address]       NAME: Sagar Wheeler is a 61 y o  male  : 1965    MRN: [id number]  DATE: May 3, 2022  TIME: 9:58 AM    Assessment and Plan   Diagnoses and all orders for this visit:    Primary osteoarthritis of right shoulder  -     Ambulatory Referral to Orthopedic Surgery; Future    Seasonal allergies  -     fluticasone (FLONASE) 50 mcg/act nasal spray; 2 sprays into each nostril daily    DDD (degenerative disc disease), cervical  -     Ambulatory Referral to Orthopedic Surgery; Future    Balance problems  -     Ambulatory Referral to Physical Therapy; Future    Muscle weakness of lower extremity  -     Ambulatory Referral to Physical Therapy; Future    Other orders  -     loratadine (CLARITIN) 10 mg tablet; Take 10 mg by mouth daily        Patient here today for right shoulder pain and decreased range of motion  Was evaluated in the emergency room recently with x-ray showing significant arthritis  Emergency department did place referral for orthopedic surgery however patient did not follow-up  Referral for orthopedics once again placed for shoulder arthritis/ pain and cervical degenerative disc disease  With chronic pain  Patient also complaining of significant lower extremity deconditioning and weakness  Patient states has had a couple falls over the past month  Referral to physical therapy for evaluation  Follow-up in 3 months or sooner if needed  Patient instructed to call me with questions concerns  Chief Complaint     Chief Complaint   Patient presents with    Follow-up     patient states that he has no muscle tone,     Allergies         History of Present Illness       HPI    58-year-old male here today to have blood drawn  Will call patient with results  Patient while here for blood drawl states has been having some right shoulder pain which was recently evaluated in ED    An x-ray reviewed showing osteoarthritis and referral was placed to orthopedic surgery however patient did not follow-up CT  Patient also complaining of lower extremity weakness and deconditioning  Review of Systems   Review of Systems   Constitutional: Negative for chills and fever  HENT: Negative  Respiratory: Negative  Cardiovascular: Negative  Gastrointestinal: Negative  Musculoskeletal: Positive for arthralgias and neck pain  Skin: Negative for rash  Neurological: Negative  Hematological: Negative  Psychiatric/Behavioral: Negative  All other systems reviewed and are negative          Current Medications       Current Outpatient Medications:     ammonium lactate (LAC-HYDRIN) 12 % lotion, Apply topically 2 (two) times a day as needed for dry skin, Disp: 400 g, Rfl: 0    Ascorbic Acid (VITAMIN C PO), Take 1 tablet by mouth daily, Disp: , Rfl:     aspirin 81 mg chewable tablet, Take one tablet by mouth daily, Disp: , Rfl:     atorvastatin (LIPITOR) 20 mg tablet, Take 1 tablet by mouth once daily, Disp: 90 tablet, Rfl: 3    DULoxetine (CYMBALTA) 60 mg delayed release capsule, Take 1 capsule (60 mg total) by mouth daily, Disp: 90 capsule, Rfl: 1    EPINEPHrine (EPIPEN) 0 3 mg/0 3 mL SOAJ, INJECT 0 3ML INTO A MUSCLE ONCE FOR 1 DOSE, Disp: 2 each, Rfl: 0    fluticasone (FLONASE) 50 mcg/act nasal spray, 2 sprays into each nostril daily, Disp: 16 g, Rfl: 11    fluticasone-vilanterol (BREO ELLIPTA) 100-25 mcg/inh inhaler, Inhale 1 puff daily Rinse mouth after use , Disp: 1 Inhaler, Rfl: 0    HumaLOG KwikPen 100 units/mL injection pen, INJECT 15 UNITS SUBCUTANEOUSLY THREE TIMES DAILY WITH MEALS, Disp: 15 mL, Rfl: 5    hydrochlorothiazide (HYDRODIURIL) 25 mg tablet, Take 1 tablet by mouth twice daily, Disp: 60 tablet, Rfl: 5    hydrOXYzine HCL (ATARAX) 25 mg tablet, Take 1 tablet (25 mg total) by mouth every 6 (six) hours as needed for itching or allergies, Disp: 30 tablet, Rfl: 0    insulin glargine (Lantus SoloStar) 100 units/mL injection pen, Inject 60 Units under the skin daily, Disp: 18 mL, Rfl: 5    insulin lispro (HumaLOG) 100 units/mL injection, Inject 15 Units under the skin 3 (three) times a day with meals, Disp: 40 5 mL, Rfl: 0    Insulin Pen Needle (Pen Needles 3/16") 31G X 5 MM MISC, Use 4 (four) times a day, Disp: 300 each, Rfl: 3    levothyroxine (Euthyrox) 175 mcg tablet, Take 1 tablet (175 mcg total) by mouth daily, Disp: 30 tablet, Rfl: 5    loratadine (CLARITIN) 10 mg tablet, Take 10 mg by mouth daily, Disp: , Rfl:     metFORMIN (GLUCOPHAGE) 1000 MG tablet, Take 1 tablet by mouth twice daily, Disp: 60 tablet, Rfl: 5    omega-3-acid ethyl esters (LOVAZA) 1 g capsule, Take 1 capsule (1 g total) by mouth 2 (two) times a day for high cholesterol, Disp: 60 capsule, Rfl: 2    pregabalin (LYRICA) 25 mg capsule, Take 1 capsule (25 mg total) by mouth 2 (two) times a day, Disp: 60 capsule, Rfl: 5    sodium chloride (OCEAN) 0 65 % nasal spray, 1 spray into each nostril as needed for rhinitis, Disp: 30 mL, Rfl: 1    traZODone (DESYREL) 150 mg tablet, Take 1 tablet (150 mg total) by mouth daily at bedtime, Disp: 90 tablet, Rfl: 1    Ventolin  (90 Base) MCG/ACT inhaler, INHALE 2 PUFFS BY MOUTH EVERY 4 HOURS AS NEEDED FOR WHEEZING OR SHORTNESS OF BREATH, Disp: 18 g, Rfl: 5    Current Allergies     Allergies as of 05/03/2022 - Reviewed 05/03/2022   Allergen Reaction Noted    Ace inhibitors Swelling 09/29/2016    Other Anaphylaxis 03/02/2018    Zinc Tongue Swelling 04/20/2021    Clindamycin  05/07/2017            The following portions of the patient's history were reviewed and updated as appropriate: allergies, current medications, past family history, past medical history, past social history, past surgical history and problem list      Past Medical History:   Diagnosis Date    Acquired hypothyroidism 1/2/2015    Angioedema     [facility] mellitus ([facility] [address] )     Disease of thyroid gland     Hyperlipidemia     Hypertension     Mixed hyperlipidemia 2/4/2015    Morbid obesity ([facility] [address] )     Morbid obesity with BMI of 45 0-49 9, adult ([facility] ) 1/2/2015    MARSHA (obstructive sleep apnea)     Primary hypertension 1/2/2015    Transitioned From: Benign essential hypertension       Past Surgical History:   Procedure Laterality Date    KNEE SURGERY      SINUS SURGERY         Family History   Problem Relation Age of Onset    Thyroid cancer Mother     Diabetes type II Mother     Esophageal cancer Father     Diabetes type II Father     Kidney cancer Brother     Diabetes type II Brother     Diabetes type II Maternal Grandmother          Medications have been verified  Objective   /74   Pulse 104   Temp 98 6 °F (37 °C)   Resp 16   Ht 5' 4" (1 626 m)   Wt 117 kg (258 lb)   SpO2 95%   BMI 44 29 kg/m²        Physical Exam     Physical Exam  Constitutional:       General: He is not in acute distress  Appearance: He is obese  He is not ill-appearing, toxic-appearing or diaphoretic  Cardiovascular:      Rate and Rhythm: Normal rate and regular rhythm  Heart sounds: Normal heart sounds  Pulmonary:      Effort: Pulmonary effort is normal       Breath sounds: Normal breath sounds  Abdominal:      General: Bowel sounds are normal       Tenderness: There is no abdominal tenderness  Musculoskeletal:      Right shoulder: Decreased range of motion  Comments: Trace bilateral lower extremity edema   Neurological:      Mental Status: He is alert and oriented to person, place, and time     Psychiatric:         Mood and Affect: Mood normal

## 2022-05-04 ENCOUNTER — CONSULT (OUTPATIENT)
Dept: VASCULAR SURGERY | Facility: HOSPITAL | Age: 57
End: 2022-05-04
Payer: MEDICARE

## 2022-05-04 VITALS
WEIGHT: 260 LBS | HEART RATE: 102 BPM | DIASTOLIC BLOOD PRESSURE: 82 MMHG | HEIGHT: 64 IN | TEMPERATURE: 98.5 F | BODY MASS INDEX: 44.39 KG/M2 | SYSTOLIC BLOOD PRESSURE: 142 MMHG

## 2022-05-04 DIAGNOSIS — L03.116 BILATERAL LOWER LEG CELLULITIS: ICD-10-CM

## 2022-05-04 DIAGNOSIS — I73.9 PERIPHERAL VASCULAR DISEASE (HCC): Primary | ICD-10-CM

## 2022-05-04 DIAGNOSIS — L03.115 BILATERAL LOWER LEG CELLULITIS: ICD-10-CM

## 2022-05-04 PROCEDURE — 99203 OFFICE O/P NEW LOW 30 MIN: CPT | Performed by: NURSE PRACTITIONER

## 2022-05-04 RX ORDER — CEPHALEXIN 500 MG/1
500 CAPSULE ORAL EVERY 8 HOURS SCHEDULED
Qty: 21 CAPSULE | Refills: 0 | Status: SHIPPED | OUTPATIENT
Start: 2022-05-04 | End: 2022-05-11

## 2022-05-04 NOTE — PATIENT INSTRUCTIONS
Peripheral Vascular Disease   AMBULATORY CARE:   Peripheral vascular disease (PVD)  is a condition that causes decreased blood flow to your limbs because of blocked blood vessels  The blockage is usually caused by material such as cholesterol or a blood clot that sticks to the blood vessels and makes them narrow  Common symptoms include the following:   · Painful cramps in your hip, thigh, or calf muscles, especially after you walk or climb stairs    · Burning pain in your hands, fingers, feet, or toes    · Shiny, tight, cold skin, and uneven hair growth on your skin    · A change in your skin color    · Sores on your skin that do not heal    · Weakness or numbness of your hands or feet    Call your local emergency number (911 in the [address] [facility]) for any of the following:   · You have any of the following signs of a heart attack:      ? Squeezing, pressure, or pain in your chest    ? You may  also have any of the following:     § Discomfort or pain in your back, neck, jaw, stomach, or arm    § Shortness of breath    § Nausea or vomiting    § Lightheadedness or a sudden cold sweat    · You have any of the following signs of a stroke:      ? Numbness or drooping on one side of your face     ? Weakness in an arm or leg    ? Confusion or difficulty speaking    ? Dizziness, a severe headache, or vision loss    Seek care immediately if:   · Your arm or leg feels warm, tender, and painful  It may look swollen and red  · You have leg pain that does not go away with rest     · You have dark areas on the skin of your legs  · You cannot see out of one or both eyes  Call your doctor if:   · Your signs and symptoms get worse or do not get better, even after treatment  · You have a sore or ulcer that is not healing or gets worse  · You have questions or concerns about your condition or care      Treatment for PVD  may include any of the following:  · Medicines  may be given to help decrease your cholesterol level, open blood vessels, or prevent blood clots  · Procedures  may be used to open blocked blood vessels  Metal or plastic stents (tubes) may be put in where the artery was blocked to keep it open  Surgery may be used to place a new blood vessel near the blocked vessel  Surgery may be used to replace the area of the blood vessel  Manage PVD:   · Do not smoke  Nicotine and other chemicals in cigarettes and cigars can damage your blood vessels  Ask your healthcare provider for information if you currently smoke and need help to quit  E-cigarettes or smokeless tobacco still contain nicotine  Talk to your healthcare provider before you use these products  · Exercise as directed  Your healthcare provider can help you create a safe exercise plan  He or she may recommend walking  Walking is a low-impact way to exercise and increase your blood flow  Stop and rest if you have pain in your legs  · Care for your feet  Look closely at your feet every day  Check for cracks or sores  Wash your feet daily with mild soap and dry them well  Do not walk barefoot in case you step on a hard or sharp object  · Change your sleep position  You may have pain in your legs or feet when you sleep  Raise the head of your bed 4 inches, or use pillows to prop your upper body higher than your legs  This may help more blood go to your feet, decreasing pain  · Protect and cushion your feet and hands  If you have ulcers on your feet, you may need to wear bandages with heel pads  You may also wear foam rubber booties  Hand or foot warmers may decrease pain in your hands or feet  Prevent PVD:   · Eat a variety of healthy foods  Healthy foods include fruits, vegetables, whole-grain breads, low-fat dairy products, beans, lean meats, and fish  Ask if you need to be on a special diet  · Maintain a healthy weight  Ask your healthcare provider what a healthy weight is for you   Ask him or her to help you create a weight loss plan if you are overweight  · Manage diabetes  Keep your blood sugar level in the range recommended by your healthcare provider  Check your blood sugar level as often as directed  Ask your provider if you should make nutrition, exercise, or medicine changes  Follow up with your doctor as directed:  Write down your questions so you remember to ask them during your visits  © Copyright [facility] 2022 Information is for End User's use only and may not be sold, redistributed or otherwise used for commercial purposes  All illustrations and images included in CareNotes® are the copyrighted property of [employer]  or [facility] Damian Lindsay   The above information is an  only  It is not intended as medical advice for individual conditions or treatments  Talk to your doctor, nurse or pharmacist before following any medical regimen to see if it is safe and effective for you

## 2022-05-04 NOTE — ASSESSMENT & PLAN NOTE
· Cellulitis B/L LE  · Keflex 500 mg TID x7 days  · Instructed patient to report to ED if he develops F/C/NS, chest pain, difficulty breathing, and/or SOB

## 2022-05-04 NOTE — ASSESSMENT & PLAN NOTE
61 y o  male patient w/ a PMH significant for HLD, MARSHA, COPD, recurrent LE cellulitis presents to the vascular office today w/ c/o B/L LE pain and swelling  Imaging   LEV duplex (1/11/22) demonstrates no evidence of acute and/or chronic DVT, no evidence of superficial thrombophlebitis  Recommendations   Recurrent cellulitis 2/2 probable venous insufficiency compounded by probable arterial disease (reporting <1 block claudication type symptoms)   Exam is notable for cellulitis of the B/L LE and venous stasis changes as well as diminished B/L LE distal pulses (doppler signals present DP and PT bilaterally)  Extremities are warm to touch, motor/sensory intact   We discussed the pathophysiology of arterial occlusive disease as well as venous disease, available treatment options and indications for treatment   Will order a course of Keflex 500 mg TID x7 days for treatment of his cellulitis   Will order WILLIAM duplex to evaluate for significant arterial disease   Continue medical optimization  Currently on ASA and statin  Goal hgb A1c<7 0, LDL<100, BP <140/90   Recommend walking program, ambulating at least 30 minutes 3-5 times weekly   Recommend moisturization of the lower extremities, avoidance of injury and close observation of bilateral feet for any new wounds   Recommend light compression with Tubigrip (provided in office today) and elevation of the B/L LE to reduce swelling       Follow-up post WILLIAM duplex to review

## 2022-05-04 NOTE — PROGRESS NOTES
Assessment/Plan:    Peripheral vascular disease ([facility] [address] )  61 y o  male patient w/ a PMH significant for HLD, MARSHA, COPD, recurrent LE cellulitis presents to the vascular office today w/ c/o B/L LE pain and swelling  Imaging   LEV duplex (1/11/22) demonstrates no evidence of acute and/or chronic DVT, no evidence of superficial thrombophlebitis  Recommendations   Recurrent cellulitis 2/2 probable venous insufficiency compounded by probable arterial disease (reporting <1 block claudication type symptoms)   Exam is notable for cellulitis of the B/L LE and venous stasis changes as well as diminished B/L LE distal pulses (doppler signals present DP and PT bilaterally)  Extremities are warm to touch, motor/sensory intact   We discussed the pathophysiology of arterial occlusive disease as well as venous disease, available treatment options and indications for treatment   Will order a course of Keflex 500 mg TID x7 days for treatment of his cellulitis   Will order WILLIAM duplex to evaluate for significant arterial disease   Continue medical optimization  Currently on ASA and statin  Goal hgb A1c<7 0, LDL<100, BP <140/90   Recommend walking program, ambulating at least 30 minutes 3-5 times weekly   Recommend moisturization of the lower extremities, avoidance of injury and close observation of bilateral feet for any new wounds   Recommend light compression with Tubigrip (provided in office today) and elevation of the B/L LE to reduce swelling       Follow-up post WILLIAM duplex to review      Bilateral lower leg cellulitis  · Cellulitis B/L LE  · Keflex 500 mg TID x7 days  · Instructed patient to report to ED if he develops F/C/NS, chest pain, difficulty breathing, and/or SOB       Diagnoses and all orders for this visit:    Peripheral vascular disease ([facility] )  -     Ambulatory Referral to Vascular Surgery  -     VAS lower limb arterial duplex, complete bilateral; Future    Bilateral lower leg cellulitis  - cephalexin (KEFLEX) 500 mg capsule; Take 1 capsule (500 mg total) by mouth every 8 (eight) hours for 7 days          Subjective:      Patient ID: Marco A Lara is a 61 y o  male  HPI:     61 y o  male with a past medical history significant for  HLD, MARSHA, COPD, recurrent LE cellulitis presents to the vascular office today w/ c/o B/L LE pain and swelling  Pt presents venous stasis changes/ recurrent cellulitis  Pt reports b/l leg swelling/ discoloration  This is chronic and has happened several times over the last year  Patient also reports claudication type symptoms- he describes burning pain in the B/L LE when ambulating approximately 1 block  He reports symptoms alleviate with sitting  He denies clear rest pain but states that his legs feel "jumpy" overnight  Pt denies wearing compression  Pt denies open wounds or ulcers  Denies F/C/NS  Pt takes ASA and atorvastatin  He smokes 2 ppd  Review of Systems   Constitutional: Negative  HENT: Negative  Eyes: Negative  Respiratory: Negative  Cardiovascular: Positive for leg swelling  Gastrointestinal: Negative  Endocrine: Negative  Genitourinary: Negative  Musculoskeletal: Negative  Skin: Positive for color change  Allergic/Immunologic: Negative  Neurological: Negative  Hematological: Negative  Psychiatric/Behavioral: Negative  The following portions of the patient's history were reviewed and updated as appropriate: allergies, current medications, past family history, past medical history, past social history, past surgical history, and problem list     I have reviewed and made appropriate changes to the review of systems input by the medical assistant      Vitals:    05/04/22 1603   BP: 142/82   BP Location: Right arm   Patient Position: Sitting   Cuff Size: Standard   Pulse: 102   Temp: 98 5 °F (36 9 °C)   TempSrc: Tympanic   Weight: 118 kg (260 lb)   Height: 5' 4" (1 626 m)       Patient Active Problem List Diagnosis    Allergic rhinitis    Angioedema    Arthritis    Chronic back pain    Chronic sinusitis    COPD, severity to be determined ([facility] [address] )    Depression with anxiety    Mixed hyperlipidemia    Primary hypertension    Acquired hypothyroidism    Morbid obesity with BMI of 45 0-49 9, adult (HCC)    Type 2 diabetes, uncontrolled, with neuropathy    Vitamin D deficiency    Bilateral lower leg cellulitis    Elevated LFTs    Elevated lactic acid level    Tobacco abuse    Noncompliance with diabetes treatment    Acute respiratory failure with hypoxia (HCC)    Epistaxis    Obstructive sleep apnea    Muscle twitching    Type 2 diabetes mellitus with hyperglycemia, with long-term current use of insulin (HCC)    Tobacco use    Atelectasis    Urinary retention    Constipation    Continuous dependence on cigarette smoking    Ankle pain    Depression, recurrent (HCC)    Peripheral vascular disease (HCC)       Past Surgical History:   Procedure Laterality Date    KNEE SURGERY      SINUS SURGERY         Family History   Problem Relation Age of Onset    Thyroid cancer Mother     Diabetes type II Mother     Esophageal cancer Father     Diabetes type II Father     Kidney cancer Brother     Diabetes type II Brother     Diabetes type II Maternal Grandmother        Social History     Socioeconomic History    Marital status: Single     Spouse name: Not on file    Number of children: Not on file    Years of education: Not on file    Highest education level: Not on file   Occupational History    Not on file   Tobacco Use    Smoking status: Current Every Day Smoker     Packs/day: 2 00     Years: 70 00     Pack years: 140 00     Start date: 4/15/1942    Smokeless tobacco: Never Used    Tobacco comment: states read to quit prior to admit   Vaping Use    Vaping Use: Never used   Substance and Sexual Activity    Alcohol use: Not Currently     Alcohol/week: 3 0 standard drinks     Types: 2 Cans of beer, 1 Shots of liquor per week     Comment: seldom    Drug use: No    Sexual activity: Not Currently   Other Topics Concern    Not on file   Social History Narrative    Not on file     Social Determinants of Health     Financial Resource Strain: Not on file   Food Insecurity: No Food Insecurity    Worried About Running Out of Food in the Last Year: Never true    Ham of Food in the Last Year: Never true   Transportation Needs: No Transportation Needs    Lack of Transportation (Medical): No    Lack of Transportation (Non-Medical): No   Physical Activity: Not on file   Stress: Not on file   Social Connections: Not on file   Intimate Partner Violence: Not on file   Housing Stability: Unknown    Unable to Pay for Housing in the Last Year: No    Number of Places Lived in the Last Year: Not on file    Unstable Housing in the Last Year: No       Allergies   Allergen Reactions    Ace Inhibitors Swelling     Angioedema    Other Anaphylaxis     Pt believes that he is allergic to peanut butter  Also, seasonal allergies    Zinc Tongue Swelling    Clindamycin      Had angioedema episode approx 5 hr after IM administration of clindamycin  Unclear if there is definitive causal relationship           Current Outpatient Medications:     ammonium lactate (LAC-HYDRIN) 12 % lotion, Apply topically 2 (two) times a day as needed for dry skin, Disp: 400 g, Rfl: 0    Ascorbic Acid (VITAMIN C PO), Take 1 tablet by mouth daily, Disp: , Rfl:     aspirin 81 mg chewable tablet, Take one tablet by mouth daily, Disp: , Rfl:     atorvastatin (LIPITOR) 20 mg tablet, Take 1 tablet by mouth once daily, Disp: 90 tablet, Rfl: 3    EPINEPHrine (EPIPEN) 0 3 mg/0 3 mL SOAJ, INJECT 0 3ML INTO A MUSCLE ONCE FOR 1 DOSE, Disp: 2 each, Rfl: 0    fluticasone (FLONASE) 50 mcg/act nasal spray, 2 sprays into each nostril daily, Disp: 16 g, Rfl: 11    fluticasone-vilanterol (BREO ELLIPTA) 100-25 mcg/inh inhaler, Inhale 1 puff daily Rinse mouth after use , Disp: 1 Inhaler, Rfl: 0    HumaLOG KwikPen 100 units/mL injection pen, INJECT 15 UNITS SUBCUTANEOUSLY THREE TIMES DAILY WITH MEALS, Disp: 15 mL, Rfl: 5    hydrochlorothiazide (HYDRODIURIL) 25 mg tablet, Take 1 tablet by mouth twice daily, Disp: 60 tablet, Rfl: 5    hydrOXYzine HCL (ATARAX) 25 mg tablet, Take 1 tablet (25 mg total) by mouth every 6 (six) hours as needed for itching or allergies, Disp: 30 tablet, Rfl: 0    insulin glargine (Lantus SoloStar) 100 units/mL injection pen, Inject 60 Units under the skin daily, Disp: 18 mL, Rfl: 5    insulin lispro (HumaLOG) 100 units/mL injection, Inject 15 Units under the skin 3 (three) times a day with meals, Disp: 40 5 mL, Rfl: 0    Insulin Pen Needle (Pen Needles 3/16") 31G X 5 MM MISC, Use 4 (four) times a day, Disp: 300 each, Rfl: 3    levothyroxine (Euthyrox) 175 mcg tablet, Take 1 tablet (175 mcg total) by mouth daily, Disp: 30 tablet, Rfl: 5    loratadine (CLARITIN) 10 mg tablet, Take 10 mg by mouth daily, Disp: , Rfl:     metFORMIN (GLUCOPHAGE) 1000 MG tablet, Take 1 tablet by mouth twice daily, Disp: 60 tablet, Rfl: 5    omega-3-acid ethyl esters (LOVAZA) 1 g capsule, Take 1 capsule (1 g total) by mouth 2 (two) times a day for high cholesterol, Disp: 60 capsule, Rfl: 2    pregabalin (LYRICA) 25 mg capsule, Take 1 capsule (25 mg total) by mouth 2 (two) times a day, Disp: 60 capsule, Rfl: 5    sodium chloride (OCEAN) 0 65 % nasal spray, 1 spray into each nostril as needed for rhinitis, Disp: 30 mL, Rfl: 1    traZODone (DESYREL) 150 mg tablet, Take 1 tablet (150 mg total) by mouth daily at bedtime, Disp: 90 tablet, Rfl: 1    Ventolin  (90 Base) MCG/ACT inhaler, INHALE 2 PUFFS BY MOUTH EVERY 4 HOURS AS NEEDED FOR WHEEZING OR SHORTNESS OF BREATH, Disp: 18 g, Rfl: 5    cephalexin (KEFLEX) 500 mg capsule, Take 1 capsule (500 mg total) by mouth every 8 (eight) hours for 7 days, Disp: 21 capsule, Rfl: 0    DULoxetine (CYMBALTA) 60 mg delayed release capsule, Take 1 capsule (60 mg total) by mouth daily (Patient not taking: Reported on 2022 ), Disp: 90 capsule, Rfl: 1      Objective:    /82 (BP Location: Right arm, Patient Position: Sitting, Cuff Size: Standard)   Pulse 102   Temp 98 5 °F (36 9 °C) (Tympanic)   Ht 5' 4" (1 626 m)   Wt 118 kg (260 lb)   BMI 44 63 kg/m²        Physical Exam  Vitals reviewed  Constitutional:       General: He is not in acute distress  Appearance: He is obese  HENT:      Head: Normocephalic  Mouth/Throat:      Mouth: Mucous membranes are moist    Eyes:      Extraocular Movements: Extraocular movements intact  Pupils: Pupils are equal, round, and reactive to light  Cardiovascular:      Rate and Rhythm: Normal rate and regular rhythm  Pulses:           Dorsalis pedis pulses are detected w/ Doppler on the right side and detected w/ Doppler on the left side  Posterior tibial pulses are detected w/ Doppler on the right side and detected w/ Doppler on the left side  Pulmonary:      Effort: Pulmonary effort is normal  No respiratory distress  Musculoskeletal:         General: Normal range of motion  Right lower le+ Pitting Edema present  Left lower le+ Pitting Edema present  Skin:     General: Skin is warm and dry  Capillary Refill: Capillary refill takes less than 2 seconds  Findings: Erythema (Distal B/L LE) present  No lesion, rash or wound  Neurological:      General: No focal deficit present  Mental Status: He is alert and oriented to person, place, and time  Cranial Nerves: Cranial nerves are intact  Motor: Motor function is intact  Gait: Gait is intact     Psychiatric:         Mood and Affect: Mood normal          Behavior: Behavior normal                Zaina Maker, Karson Hallman   Vascular Surgery  2022 4:34 PM

## 2022-05-05 ENCOUNTER — OFFICE VISIT (OUTPATIENT)
Dept: OBGYN CLINIC | Facility: CLINIC | Age: 57
End: 2022-05-05
Payer: MEDICARE

## 2022-05-05 VITALS
HEIGHT: 64 IN | SYSTOLIC BLOOD PRESSURE: 126 MMHG | DIASTOLIC BLOOD PRESSURE: 79 MMHG | WEIGHT: 259 LBS | TEMPERATURE: 98.6 F | HEART RATE: 111 BPM | BODY MASS INDEX: 44.22 KG/M2

## 2022-05-05 DIAGNOSIS — M54.2 CERVICALGIA: ICD-10-CM

## 2022-05-05 DIAGNOSIS — S43.421A SPRAIN OF RIGHT ROTATOR CUFF CAPSULE, INITIAL ENCOUNTER: Primary | ICD-10-CM

## 2022-05-05 DIAGNOSIS — M50.30 DDD (DEGENERATIVE DISC DISEASE), CERVICAL: ICD-10-CM

## 2022-05-05 DIAGNOSIS — M19.011 PRIMARY OSTEOARTHRITIS OF RIGHT SHOULDER: ICD-10-CM

## 2022-05-05 DIAGNOSIS — Z12.11 ENCOUNTER FOR SCREENING COLONOSCOPY: ICD-10-CM

## 2022-05-05 DIAGNOSIS — M54.12 CERVICAL RADICULOPATHY: ICD-10-CM

## 2022-05-05 PROCEDURE — 20611 DRAIN/INJ JOINT/BURSA W/US: CPT | Performed by: FAMILY MEDICINE

## 2022-05-05 PROCEDURE — 99214 OFFICE O/P EST MOD 30 MIN: CPT | Performed by: FAMILY MEDICINE

## 2022-05-05 RX ORDER — LIDOCAINE HYDROCHLORIDE 10 MG/ML
4 INJECTION, SOLUTION INFILTRATION; PERINEURAL
Status: COMPLETED | OUTPATIENT
Start: 2022-05-05 | End: 2022-05-05

## 2022-05-05 RX ORDER — TRIAMCINOLONE ACETONIDE 40 MG/ML
40 INJECTION, SUSPENSION INTRA-ARTICULAR; INTRAMUSCULAR
Status: COMPLETED | OUTPATIENT
Start: 2022-05-05 | End: 2022-05-05

## 2022-05-05 RX ADMIN — LIDOCAINE HYDROCHLORIDE 4 ML: 10 INJECTION, SOLUTION INFILTRATION; PERINEURAL at 16:41

## 2022-05-05 RX ADMIN — TRIAMCINOLONE ACETONIDE 40 MG: 40 INJECTION, SUSPENSION INTRA-ARTICULAR; INTRAMUSCULAR at 16:41

## 2022-05-05 NOTE — PROGRESS NOTES
[facility] [place] [id number]  [facility] [id number] [phone]      Chief Complaint:  Chief Complaint   Patient presents with    Right Shoulder - Pain    Neck - Pain       Vitals:  /79 (BP Location: Left arm, Patient Position: Sitting, Cuff Size: Large)   Pulse (!) 111   Temp 98 6 °F (37 °C) (Tympanic)   Ht 5' 4" (1 626 m)   Wt 117 kg (259 lb)   BMI 44 46 kg/m²     The following portions of the patient's history were reviewed and updated as appropriate: allergies, current medications, past family history, past medical history, past social history, past surgical history, and problem list       Subjective:   Patient ID: Vinod Cosby is a 61 y o  male  Here c/o R shoulder pain/chronic neck pain  1)  R shoulder pain  Cant lift arm above shoulder/across  Stabbing pain  Laweramasha Mares about 2 months  Seen in ER  XR done  Landed on his R shoulder a few weeks after sleep walking  Pain wakes him up  Stabbing pain  Dec ROM      RIGHT SHOULDER     INDICATION:   Trauma      COMPARISON:  None     VIEWS:  XR SHOULDER 2+ VW RIGHT        FINDINGS:     There is no acute fracture or dislocation  High riding humeral head with narrowing of acromiohumeral interval, suggestive of rotator cuff tear      Mild glenohumeral and moderate acromioclavicular osteoarthritis      No lytic or blastic osseous lesion      Soft tissues are unremarkable      IMPRESSION:     No acute osseous abnormality      Mild glenohumeral and moderate acromioclavicular osteoarthritis      High riding humeral head with narrowing of acromiohumeral interval, suggestive of rotator cuff tear  2) chronic neck pain  N/t down R arms intermittent  Review of Systems   Constitutional: Negative for fatigue and fever  Respiratory: Negative for shortness of breath  Cardiovascular: Negative for chest pain  Gastrointestinal: Negative for abdominal pain and nausea     Genitourinary: Negative for dysuria  Musculoskeletal: Positive for arthralgias and neck pain  Skin: Negative for rash and wound  Neurological: Negative for weakness and headaches  Objective:  Back Exam     Tenderness   The patient is experiencing tenderness in the cervical     Range of Motion   The patient has normal back ROM  Comments:  Pain with flex/ext/rot L/lat flex L  Neg spurling      Right Shoulder Exam     Tenderness   The patient is experiencing tenderness in the biceps tendon and acromion  Range of Motion   Active abduction:  120 abnormal   Passive abduction:  160 abnormal   Extension: normal   External rotation: normal   Forward flexion:  130 abnormal   Internal rotation 0 degrees: normal   Internal rotation 90 degrees: normal     Muscle Strength   The patient has normal right shoulder strength  Abduction: 4/5     Tests   Willingham test: positive    Comments:  Pos empty can/push off test              Physical Exam  Vitals and nursing note reviewed  Constitutional:       Appearance: Normal appearance  He is well-developed  HENT:      Head: Normocephalic  Mouth/Throat:      Mouth: Mucous membranes are moist    Eyes:      Extraocular Movements: Extraocular movements intact  Cardiovascular:      Rate and Rhythm: Normal rate and regular rhythm  Heart sounds: Normal heart sounds  Pulmonary:      Effort: Pulmonary effort is normal       Breath sounds: Normal breath sounds  Abdominal:      General: Bowel sounds are normal       Palpations: Abdomen is soft  Musculoskeletal:      Cervical back: Normal range of motion  Skin:     General: Skin is warm and dry  Neurological:      General: No focal deficit present  Mental Status: He is alert and oriented to person, place, and time  Psychiatric:         Mood and Affect: Mood normal          Behavior: Behavior normal          Thought Content:  Thought content normal        Large joint arthrocentesis: R subacromial bursa  [place] Protocol:  Consent: Verbal consent obtained  Risks and benefits: risks, benefits and alternatives were discussed  Consent given by: patient  Time out: Immediately prior to procedure a "time out" was called to verify the correct patient, procedure, equipment, support staff and site/side marked as required  Timeout called at: 5/5/2022 4:36 PM   Site marked: the operative site was marked  Supporting Documentation  Indications: pain   Procedure Details  Location: shoulder - R subacromial bursa  Preparation: Patient was prepped and draped in the usual sterile fashion  Needle size: 25 G  Ultrasound guidance: yes  Approach: posterolateral  Medications administered: 4 mL lidocaine 1 %; 40 mg triamcinolone acetonide 40 mg/mL    Patient tolerance: patient tolerated the procedure well with no immediate complications  Dressing:  Sterile dressing applied        I have personally reviewed pertinent films in PACS and my interpretation is XR-  R shoulder- mod AC joint arthritis  no fx  Assessment/Plan:  Assessment/Plan   Diagnoses and all orders for this visit:    Sprain of right rotator cuff capsule, initial encounter  -     Ambulatory Referral to Physical Therapy; Future    Primary osteoarthritis of right shoulder  -     Ambulatory Referral to Orthopedic Surgery  -     Ambulatory Referral to Physical Therapy; Future    DDD (degenerative disc disease), cervical  -     Ambulatory Referral to Orthopedic Surgery  -     Ambulatory Referral to Physical Therapy; Future    Encounter for screening colonoscopy  -     Ambulatory referral for colon cancer education; Future    Cervicalgia  -     Ambulatory Referral to Physical Therapy; Future    Cervical radiculopathy  -     Ambulatory Referral to Physical Therapy; Future    Other orders  -     Large joint arthrocentesis        Return in about 6 weeks (around 6/16/2022) for Recheck       Brinda Neville MD

## 2022-05-05 NOTE — LETTER
May 5, 2022     [address]  [address]  [address]    Patient: Curt Gongora   YOB: 1965   Date of Visit: 5/5/2022       Dear Dr Casie Navarro: Thank you for referring Curt Gongora to me for evaluation  Below are my notes for this consultation  If you have questions, please do not hesitate to call me  I look forward to following your patient along with you  Sincerely,        Jamil Lewis MD        CC: No Recipients  Jamil Lewis MD  5/5/2022  4:39 PM  Incomplete  [facility] [place]  [address] [address] [facility] [address]  [facility] [address]  [phone]  [phone]      Chief Complaint:  Chief Complaint   Patient presents with    Right Shoulder - Pain    Neck - Pain       Vitals:  /79 (BP Location: Left arm, Patient Position: Sitting, Cuff Size: Large)   Pulse (!) 111   Temp 98 6 °F (37 °C) (Tympanic)   Ht 5' 4" (1 626 m)   Wt 117 kg (259 lb)   BMI 44 46 kg/m²     The following portions of the patient's history were reviewed and updated as appropriate: allergies, current medications, past family history, past medical history, past social history, past surgical history, and problem list       Subjective:   Patient ID: Curt Gongora is a 61 y o  male  Here c/o R shoulder pain/chronic neck pain  1)  R shoulder pain  Cant lift arm above shoulder/across  Stabbing pain  Altamease Conquest about 2 months  Seen in ER  XR done  Landed on his R shoulder a few weeks after sleep walking  Pain wakes him up  Stabbing pain  Dec ROM      RIGHT SHOULDER     INDICATION:   Trauma      COMPARISON:  None     VIEWS:  XR SHOULDER 2+ VW RIGHT        FINDINGS:     There is no acute fracture or dislocation    High riding humeral head with narrowing of acromiohumeral interval, suggestive of rotator cuff tear      Mild glenohumeral and moderate acromioclavicular osteoarthritis      No lytic or blastic osseous lesion      Soft tissues are unremarkable      IMPRESSION:     No acute osseous abnormality      Mild glenohumeral and moderate acromioclavicular osteoarthritis      High riding humeral head with narrowing of acromiohumeral interval, suggestive of rotator cuff tear  2) chronic neck pain  N/t down R arms intermittent  Review of Systems   Constitutional: Negative for fatigue and fever  Respiratory: Negative for shortness of breath  Cardiovascular: Negative for chest pain  Gastrointestinal: Negative for abdominal pain and nausea  Genitourinary: Negative for dysuria  Musculoskeletal: Positive for arthralgias and neck pain  Skin: Negative for rash and wound  Neurological: Negative for weakness and headaches  Objective:  Back Exam     Tenderness   The patient is experiencing tenderness in the cervical     Range of Motion   The patient has normal back ROM  Comments:  Pain with flex/ext/rot L/lat flex L  Neg spurling      Right Shoulder Exam     Tenderness   The patient is experiencing tenderness in the biceps tendon and acromion  Range of Motion   Active abduction:  120 abnormal   Passive abduction:  160 abnormal   Extension: normal   External rotation: normal   Forward flexion:  130 abnormal   Internal rotation 0 degrees: normal   Internal rotation 90 degrees: normal     Muscle Strength   The patient has normal right shoulder strength  Abduction: 4/5     Tests   Willingham test: positive    Comments:  Pos empty can/push off test              Physical Exam  Vitals and nursing note reviewed  Constitutional:       Appearance: Normal appearance  He is well-developed  HENT:      Head: Normocephalic  Mouth/Throat:      Mouth: Mucous membranes are moist    Eyes:      Extraocular Movements: Extraocular movements intact  Cardiovascular:      Rate and Rhythm: Normal rate and regular rhythm  Heart sounds: Normal heart sounds     Pulmonary:      Effort: Pulmonary effort is normal       Breath sounds: Normal breath sounds  Abdominal:      General: Bowel sounds are normal       Palpations: Abdomen is soft  Musculoskeletal:      Cervical back: Normal range of motion  Skin:     General: Skin is warm and dry  Neurological:      General: No focal deficit present  Mental Status: He is alert and oriented to person, place, and time  Psychiatric:         Mood and Affect: Mood normal          Behavior: Behavior normal          Thought Content: Thought content normal        Large joint arthrocentesis: R subacromial bursa  [place] Protocol:  Consent: Verbal consent obtained  Risks and benefits: risks, benefits and alternatives were discussed  Consent given by: patient  Time out: Immediately prior to procedure a "time out" was called to verify the correct patient, procedure, equipment, support staff and site/side marked as required  Timeout called at: 5/5/2022 4:36 PM   Site marked: the operative site was marked  Supporting Documentation  Indications: pain   Procedure Details  Location: shoulder - R subacromial bursa  Preparation: Patient was prepped and draped in the usual sterile fashion  Needle size: 25 G  Ultrasound guidance: yes  Approach: posterolateral  Medications administered: 4 mL lidocaine 1 %; 40 mg triamcinolone acetonide 40 mg/mL    Patient tolerance: patient tolerated the procedure well with no immediate complications  Dressing:  Sterile dressing applied        I have personally reviewed pertinent films in PACS and my interpretation is XR-  R shoulder- mod AC joint arthritis  no fx  Assessment/Plan:  Assessment/Plan   Diagnoses and all orders for this visit:    Encounter for screening colonoscopy  -     Ambulatory referral for colon cancer education;  Future    Sprain of right rotator cuff capsule, initial encounter    Primary osteoarthritis of right shoulder  -     Ambulatory Referral to Orthopedic Surgery    DDD (degenerative disc disease), cervical  -     Ambulatory Referral to Orthopedic Surgery    Other orders  -     Large joint arthrocentesis        No follow-ups on file       Yo Ray MD

## 2022-05-05 NOTE — PATIENT INSTRUCTIONS
F/u 6 wks  Begin physical therapy  Home exercises  Icing/heat/OTC pain meds as needed  Neck Exercises   AMBULATORY CARE:   Neck exercises  help reduce neck pain, and improve neck movement and strength  Neck exercises also help prevent long-term neck problems  What you need to know about neck exercises:   · Do the exercises every day,  or as often as directed by your healthcare provider  · Move slowly, gently, and smoothly  Avoid fast or jerky motions  · Stand and sit the way your healthcare provider shows you  Good posture may reduce your neck pain  Check your posture often, even when you are not doing your neck exercises  How to perform neck exercises safely:   · Exercise position:  You may sit or stand while you do neck exercises  Face forward  Your shoulders should be straight and relaxed, with a good posture  · Head tilts, forward and back:  Gently bow your head and try to touch your chin to your chest  Your healthcare provider may tell you to push on the back of your neck to help bow your head  Raise your chin back to the starting position  Tilt your head back as far as possible so you are looking up at the ceiling  Your healthcare provider may tell you to lift your chin to help tilt your head back  Return your head to the starting position  · Head tilts, side to side:  Tilt your head, bringing your ear toward your shoulder  Then tilt your head toward the other shoulder  · Head turns:  Turn your head to look over your shoulder  Tilt your chin down and try to touch it to your shoulder  Do not raise your shoulder to your chin  Face forward again  Do the same on the other side  · Head rolls:  Slowly bring your chin toward your chest  Next, roll your head to the right  Your ear should be positioned over your shoulder  Hold this position for 5 seconds  Roll your head back toward your chest and to the left into the same position  Hold for 5 seconds   Gently roll your head back and around in a clockwise [place] 3 times  Next, move your head in the reverse direction (counterclockwise) in a [place] 3 times  Do not shrug your shoulders upwards while you do this exercise  Contact your healthcare provider if:   · Your pain does not get better, or gets worse  · You have questions or concerns about your condition, care, or exercise program     © Copyright [employer] 2022 Information is for End User's use only and may not be sold, redistributed or otherwise used for commercial purposes  All illustrations and images included in CareNotes® are the copyrighted property of A D A M , Inc  or [employer]   The above information is an  only  It is not intended as medical advice for individual conditions or treatments  Talk to your doctor, nurse or pharmacist before following any medical regimen to see if it is safe and effective for you  Exercises for Internal and External Shoulder Rotation   WHAT YOU NEED TO KNOW:   Exercises for internal shoulder rotation work the muscles in your chest and front of your shoulder  Exercises for external shoulder rotation work the muscles in the back of your shoulder and upper back  DISCHARGE INSTRUCTIONS:   Contact your healthcare provider if:   · You have sharp or worsening pain during exercise or at rest     · You have questions or concerns about your shoulder exercises  Before you exercise:  Warm up and stretch before you exercise  Walk or ride a stationary bike for 5 to 10 minutes to help you warm up  Stretching helps increase range of motion  It may also decrease muscle soreness and help prevent another injury  Your healthcare provider will tell you which of the following stretches to do:  · Crossover arm stretch:  Relax your shoulders  Hold your upper arm with the opposite hand  Pull your arm across your chest until you feel a stretch  Hold the stretch for 30 seconds  Return to the starting position  · Shoulder flexion stretch:  Stand facing a wall  Slowly walk your fingers up the wall until you feel a stretch  Hold the stretch for 30 seconds  Return to the starting position  · Sleeper stretch:  Lie on your injured side on a firm, flat surface  Bend the elbow of your injured arm 90° with your hand facing up  Use your arm that is not injured to slowly push your injured arm down  Stop when you feel a stretch at the back of your injured shoulder  Hold the stretch for 30 seconds  Slowly return to the starting position  How to exercise with a weight:  Your healthcare provider will tell you how much weight to exercise with  · Shoulder internal rotation:  Sit in a chair  Place a rolled up towel between your elbow and your side  Bend your elbow to 90°  Gently squeeze the towel with your elbow to prevent it from falling out  Hold the weight with your thumb pointing up  Slowly move the weight across your chest  Stop when your hand reaches your opposite arm  Hold this position for as many seconds as directed  Slowly return to the starting position  · Shoulder external rotation:  Lie on your side with your injured shoulder facing up  Bend your elbow 90°  Place a rolled up towel between your elbow and your side  Hold a weight in your hand  Gently squeeze the towel with your elbow to prevent it from falling out  Slowly rotate your arm outward, but keep your elbow bent  Stop when you feel a stretch  Hold this position for 30 seconds or as directed  Slowly return to the starting position  How to exercise with an exercise band:   · Shoulder internal rotation:  Tie one end of the exercise band to a heavy, secure object  Sit in a chair  Place a rolled up towel between your elbow and your side  Bend your elbow to 90°  Gently squeeze the towel with your elbow to prevent it from falling out  Slowly pull the band across your chest  Stop when your hand reaches your opposite arm   Hold this position for as many seconds as directed  Slowly return to the starting position  · Shoulder external rotation:  Hold one end of the exercise band on the side that is not injured  Place a rolled up towel between your elbow and your side  Bend your elbow 90°  Squeeze the towel with your elbow  Grab the end of the band and slowly turn your arm outward, but keep your elbow bent  Stop when you feel a stretch  Hold this position for 30 seconds or as directed  Slowly return to the starting position  Follow up with your physical therapist as directed:  Write down your questions so you remember to ask them at your visits  © Copyright [employer] 2022 Information is for End User's use only and may not be sold, redistributed or otherwise used for commercial purposes  All illustrations and images included in CareNotes® are the copyrighted property of [employer]  or Jannet Hernández  The above information is an  only  It is not intended as medical advice for individual conditions or treatments  Talk to your doctor, nurse or pharmacist before following any medical regimen to see if it is safe and effective for you

## 2022-06-06 DIAGNOSIS — F41.8 DEPRESSION WITH ANXIETY: ICD-10-CM

## 2022-06-06 RX ORDER — TRAZODONE HYDROCHLORIDE 150 MG/1
TABLET ORAL
Qty: 90 TABLET | Refills: 0 | Status: SHIPPED | OUTPATIENT
Start: 2022-06-06

## 2022-06-17 ENCOUNTER — OFFICE VISIT (OUTPATIENT)
Dept: OBGYN CLINIC | Facility: CLINIC | Age: 57
End: 2022-06-17
Payer: MEDICARE

## 2022-06-17 VITALS
SYSTOLIC BLOOD PRESSURE: 161 MMHG | BODY MASS INDEX: 43.36 KG/M2 | HEIGHT: 64 IN | DIASTOLIC BLOOD PRESSURE: 84 MMHG | HEART RATE: 106 BPM | WEIGHT: 254 LBS

## 2022-06-17 DIAGNOSIS — E03.9 HYPOTHYROIDISM, UNSPECIFIED TYPE: ICD-10-CM

## 2022-06-17 DIAGNOSIS — S49.91XD SHOULDER INJURY, RIGHT, SUBSEQUENT ENCOUNTER: Primary | ICD-10-CM

## 2022-06-17 DIAGNOSIS — R29.898 WEAKNESS OF RIGHT UPPER EXTREMITY: ICD-10-CM

## 2022-06-17 PROCEDURE — 99214 OFFICE O/P EST MOD 30 MIN: CPT | Performed by: FAMILY MEDICINE

## 2022-06-17 RX ORDER — LEVOTHYROXINE SODIUM 175 UG/1
TABLET ORAL
Qty: 90 TABLET | Refills: 3 | Status: SHIPPED | OUTPATIENT
Start: 2022-06-17 | End: 2022-06-20 | Stop reason: SDUPTHER

## 2022-06-17 NOTE — PATIENT INSTRUCTIONS
F/u after MRI  MRI-  R shoulder pain/injury/weakness  XR- possible rotator cuff tear  Icing/heat/OTC pain meds as needed

## 2022-06-17 NOTE — PROGRESS NOTES
[facility] [place]  [address]  [facility] [id number] [phone]      Chief Complaint:  Chief Complaint   Patient presents with    Right Shoulder - Follow-up       Vitals:  /84   Pulse (!) 106   Ht 5' 4" (1 626 m)   Wt 115 kg (254 lb)   BMI 43 60 kg/m²     The following portions of the patient's history were reviewed and updated as appropriate: allergies, current medications, past family history, past medical history, past social history, past surgical history, and problem list       Subjective:   Patient ID: Peyton Easton is a 61 y o  male  Here for f/u R shoulder pain/chronic neck pain    1)  R shoulder pain  Cant lift arm above shoulder/across  Stabbing pain  Didn't go to PT  Injection helped- less pain  Stabbing pain  Dec ROM      2) chronic neck pain  N/t down R arms intermittent  Review of Systems   Constitutional: Negative for fatigue and fever  Respiratory: Negative for shortness of breath  Cardiovascular: Negative for chest pain  Gastrointestinal: Negative for abdominal pain and nausea  Genitourinary: Negative for dysuria  Musculoskeletal: Positive for arthralgias and neck pain  Skin: Negative for rash and wound  Neurological: Negative for weakness and headaches  Objective:  Right Shoulder Exam     Range of Motion   Active abduction:  90 abnormal   Passive abduction: abnormal   Extension: normal   External rotation: normal   Forward flexion:  150 abnormal     Muscle Strength   Abduction: 2/5   Internal rotation: 5/5   External rotation: 5/5   Supraspinatus: 2/5   Subscapularis: 5/5   Biceps: 5/5     Tests   Willingham test: positive    Comments:  Pos empty can/push off test            Physical Exam  Constitutional:       Appearance: Normal appearance  He is normal weight  Eyes:      Extraocular Movements: Extraocular movements intact     Pulmonary:      Effort: Pulmonary effort is normal  Musculoskeletal:      Cervical back: Normal range of motion  Skin:     General: Skin is warm and dry  Neurological:      General: No focal deficit present  Mental Status: He is alert and oriented to person, place, and time  Mental status is at baseline  Psychiatric:         Mood and Affect: Mood normal          Behavior: Behavior normal          Thought Content: Thought content normal          Judgment: Judgment normal                Assessment/Plan:  Assessment/Plan   There are no diagnoses linked to this encounter  No follow-ups on file       Neeru Mackay MD

## 2022-06-20 DIAGNOSIS — E03.9 HYPOTHYROIDISM, UNSPECIFIED TYPE: ICD-10-CM

## 2022-06-20 RX ORDER — LEVOTHYROXINE SODIUM 175 UG/1
175 TABLET ORAL DAILY
Qty: 90 TABLET | Refills: 3 | Status: SHIPPED | OUTPATIENT
Start: 2022-06-20

## 2022-06-24 ENCOUNTER — HOSPITAL ENCOUNTER (OUTPATIENT)
Dept: MRI IMAGING | Facility: HOSPITAL | Age: 57
Discharge: HOME | End: 2022-06-24
Attending: FAMILY MEDICINE
Payer: MEDICARE

## 2022-06-24 DIAGNOSIS — S49.91XD SHOULDER INJURY, RIGHT, SUBSEQUENT ENCOUNTER: ICD-10-CM

## 2022-06-24 DIAGNOSIS — R29.898 WEAKNESS OF RIGHT UPPER EXTREMITY: ICD-10-CM

## 2022-06-24 PROCEDURE — G1004 CDSM NDSC: HCPCS

## 2022-06-24 PROCEDURE — 73221 MRI JOINT UPR EXTREM W/O DYE: CPT

## 2022-06-28 DIAGNOSIS — Z12.2 ENCOUNTER FOR SCREENING FOR LUNG CANCER: ICD-10-CM

## 2022-06-28 DIAGNOSIS — F17.210 CONTINUOUS DEPENDENCE ON CIGARETTE SMOKING: Primary | ICD-10-CM

## 2022-07-05 DIAGNOSIS — F32.9 REACTIVE DEPRESSION: ICD-10-CM

## 2022-07-05 NOTE — TELEPHONE ENCOUNTER
Medication Refill Request     Name duloxetine   Dose/Frequency 60mg daily   Quantity 90  Verified pharmacy   [x]  Verified ordering Provider   [x]  Verified enough for 3 days  [x]

## 2022-07-06 RX ORDER — DULOXETIN HYDROCHLORIDE 60 MG/1
60 CAPSULE, DELAYED RELEASE ORAL DAILY
Qty: 90 CAPSULE | Refills: 1 | Status: SHIPPED | OUTPATIENT
Start: 2022-07-06

## 2022-07-14 ENCOUNTER — OFFICE VISIT (OUTPATIENT)
Dept: OBGYN CLINIC | Facility: CLINIC | Age: 57
End: 2022-07-14
Payer: MEDICARE

## 2022-07-14 VITALS
SYSTOLIC BLOOD PRESSURE: 145 MMHG | BODY MASS INDEX: 43.87 KG/M2 | DIASTOLIC BLOOD PRESSURE: 89 MMHG | HEART RATE: 118 BPM | WEIGHT: 257 LBS | HEIGHT: 64 IN

## 2022-07-14 DIAGNOSIS — M75.121 COMPLETE TEAR OF RIGHT ROTATOR CUFF, UNSPECIFIED WHETHER TRAUMATIC: Primary | ICD-10-CM

## 2022-07-14 DIAGNOSIS — S49.91XD SHOULDER INJURY, RIGHT, SUBSEQUENT ENCOUNTER: ICD-10-CM

## 2022-07-14 DIAGNOSIS — S46.211A TRAUMATIC PARTIAL TEAR OF BICEPS TENDON, RIGHT, INITIAL ENCOUNTER: ICD-10-CM

## 2022-07-14 PROCEDURE — 20611 DRAIN/INJ JOINT/BURSA W/US: CPT | Performed by: FAMILY MEDICINE

## 2022-07-14 PROCEDURE — 99214 OFFICE O/P EST MOD 30 MIN: CPT | Performed by: FAMILY MEDICINE

## 2022-07-14 RX ORDER — METHYLPREDNISOLONE ACETATE 40 MG/ML
1 INJECTION, SUSPENSION INTRA-ARTICULAR; INTRALESIONAL; INTRAMUSCULAR; SOFT TISSUE
Status: COMPLETED | OUTPATIENT
Start: 2022-07-14 | End: 2022-07-14

## 2022-07-14 RX ORDER — LIDOCAINE HYDROCHLORIDE 10 MG/ML
4 INJECTION, SOLUTION INFILTRATION; PERINEURAL
Status: COMPLETED | OUTPATIENT
Start: 2022-07-14 | End: 2022-07-14

## 2022-07-14 RX ADMIN — LIDOCAINE HYDROCHLORIDE 4 ML: 10 INJECTION, SOLUTION INFILTRATION; PERINEURAL at 12:48

## 2022-07-14 RX ADMIN — METHYLPREDNISOLONE ACETATE 1 ML: 40 INJECTION, SUSPENSION INTRA-ARTICULAR; INTRALESIONAL; INTRAMUSCULAR; SOFT TISSUE at 12:48

## 2022-07-14 NOTE — PROGRESS NOTES
[facility] [place]  [address]  [facility] [id number] [phone]      Chief Complaint:  Chief Complaint   Patient presents with    Right Shoulder - Follow-up       Vitals:  /89   Pulse (!) 118   Ht 5' 4" (1 626 m)   Wt 117 kg (257 lb)   BMI 44 11 kg/m²     The following portions of the patient's history were reviewed and updated as appropriate: allergies, current medications, past family history, past medical history, past social history, past surgical history, and problem list       Subjective:   Patient ID: Shelby Navarro is a 61 y o  male  Here for f/u   R shoulder pain/MRI  He can lift arm above shoulder/across but with pain  Stabbing pain      MRI SHOULDER RIGHT WO CONTRAST     INDICATION:   S49  [id number]: Unspecified injury of right shoulder and upper arm, subsequent encounter  R29 898: Other symptoms and signs involving the musculoskeletal system      COMPARISON:  Correlation is made with prior radiograph dated 3/3/2022      TECHNIQUE:   The following MR sequences were obtained of the right shoulder: Localizer, axial GRE/PD fat sat, oblique coronal T2 fat sat, oblique sagittal T1/T2 fat sat  Imaging performed on 1 5T MRI   Gadolinium was not used      FINDINGS:     SUBCUTANEOUS TISSUES: Normal     JOINT EFFUSION: There is a small glenohumeral joint effusion      ACROMION PROCESS: Normal      ROTATOR CUFF: Massive full-thickness full width tears of the supraspinatus and infraspinatus tendons are seen with retracted tendon fibers to the level of the glenoid    There is moderate fatty atrophy of the supraspinatus and infraspinatus muscles more   prominent within the infraspinatus muscle      SUBACROMIAL/SUBDELTOID BURSA: Fluid communicates with the glenohumeral joint       LONG HEAD OF BICEPS TENDON: Not visualized and likely ruptured      GLENOID LABRUM: Diffuse labral degeneration      GLENOHUMERAL JOINT: There is superior subluxation of the humeral head with respect to the glenoid secondary to the rotator cuff tear      ACROMIOCLAVICULAR JOINT:  There is moderate osteoarthritis      BONES: Intraosseous ganglia within the greater tuberosity  No acute fracture      IMPRESSION:     Massive rotator cuff tear with full thickness full width tears of the supraspinatus and infraspinatus tendons with retracted tendon fibers to the level of the glenoid      Moderate fatty atrophy of the supraspinatus and infraspinatus muscles as above      Rupture of the long head of the biceps tendon      Diffuse labral degeneration      Moderate acromioclavicular joint osteoarthrosis            2) chronic neck pain  N/t down R arms intermittent  Review of Systems   Constitutional: Negative for fatigue and fever  Respiratory: Negative for shortness of breath  Cardiovascular: Negative for chest pain  Gastrointestinal: Negative for abdominal pain and nausea  Genitourinary: Negative for dysuria  Musculoskeletal: Positive for arthralgias and neck pain  Skin: Negative for rash and wound  Neurological: Negative for weakness and headaches  Objective:  Right Shoulder Exam     Tenderness   The patient is experiencing tenderness in the biceps tendon and acromion  Range of Motion   The patient has normal right shoulder ROM  Muscle Strength   Abduction: 4/5   Internal rotation: 5/5   External rotation: 5/5   Supraspinatus: 4/5   Subscapularis: 5/5   Biceps: 5/5     Tests   Willingham test: positive  Impingement: positive    Comments:  Pos empty can/push off test            Physical Exam  Constitutional:       Appearance: Normal appearance  He is normal weight  Eyes:      Extraocular Movements: Extraocular movements intact  Pulmonary:      Effort: Pulmonary effort is normal    Musculoskeletal:      Cervical back: Normal range of motion  Skin:     General: Skin is warm and dry  Neurological:      General: No focal deficit present        Mental Status: He is alert and oriented to person, place, and time  Mental status is at baseline  Psychiatric:         Mood and Affect: Mood normal          Behavior: Behavior normal          Thought Content: Thought content normal          Judgment: Judgment normal      Large joint arthrocentesis: R subacromial bursa  Universal Protocol:  Consent: Verbal consent obtained  Risks and benefits: risks, benefits and alternatives were discussed  Consent given by: patient  Time out: Immediately prior to procedure a "time out" was called to verify the correct patient, procedure, equipment, support staff and site/side marked as required  Timeout called at: 7/14/2022 12:48 PM   Site marked: the operative site was marked  Supporting Documentation  Indications: pain   Procedure Details  Location: shoulder - R subacromial bursa  Preparation: Patient was prepped and draped in the usual sterile fashion  Needle size: 25 G  Ultrasound guidance: yes  Approach: posterolateral  Medications administered: 4 mL lidocaine 1 %; 1 mL methylPREDNISolone acetate 40 mg/mL    Patient tolerance: patient tolerated the procedure well with no immediate complications  Dressing:  Sterile dressing applied        I have personally reviewed pertinent films in PACS and my interpretation is MRI R shoulder-  full thickness tear supraspinatus/infraspinatus tendon with moderate fatty atrocphy and of muscles  rupture of longe head of biceps         Assessment/Plan:  Assessment/Plan   There are no diagnoses linked to this encounter  No follow-ups on file       Kaitlin Shoemaker MD

## 2022-08-16 DIAGNOSIS — I10 ESSENTIAL HYPERTENSION: ICD-10-CM

## 2022-08-16 RX ORDER — HYDROCHLOROTHIAZIDE 25 MG/1
TABLET ORAL
Qty: 60 TABLET | Refills: 0 | Status: SHIPPED | OUTPATIENT
Start: 2022-08-16 | End: 2022-09-21 | Stop reason: SDUPTHER

## 2022-08-29 DIAGNOSIS — Z79.4 TYPE 2 DIABETES MELLITUS WITH HYPERGLYCEMIA, WITH LONG-TERM CURRENT USE OF INSULIN (HCC): ICD-10-CM

## 2022-08-29 DIAGNOSIS — E11.65 TYPE 2 DIABETES MELLITUS WITH HYPERGLYCEMIA, WITH LONG-TERM CURRENT USE OF INSULIN (HCC): ICD-10-CM

## 2022-08-29 DIAGNOSIS — F41.8 DEPRESSION WITH ANXIETY: ICD-10-CM

## 2022-08-29 RX ORDER — INSULIN GLARGINE 100 [IU]/ML
60 INJECTION, SOLUTION SUBCUTANEOUS DAILY
Qty: 18 ML | Refills: 5 | Status: SHIPPED | OUTPATIENT
Start: 2022-08-29 | End: 2023-02-25

## 2022-08-29 RX ORDER — TRAZODONE HYDROCHLORIDE 150 MG/1
150 TABLET ORAL
Qty: 90 TABLET | Refills: 0 | Status: SHIPPED | OUTPATIENT
Start: 2022-08-29

## 2022-09-21 DIAGNOSIS — Z79.4 TYPE 2 DIABETES MELLITUS WITH COMPLICATION, WITH LONG-TERM CURRENT USE OF INSULIN (HCC): ICD-10-CM

## 2022-09-21 DIAGNOSIS — E11.8 TYPE 2 DIABETES MELLITUS WITH COMPLICATION, WITH LONG-TERM CURRENT USE OF INSULIN (HCC): ICD-10-CM

## 2022-09-21 DIAGNOSIS — I10 ESSENTIAL HYPERTENSION: ICD-10-CM

## 2022-09-21 RX ORDER — HYDROCHLOROTHIAZIDE 25 MG/1
25 TABLET ORAL 2 TIMES DAILY
Qty: 60 TABLET | Refills: 2 | Status: SHIPPED | OUTPATIENT
Start: 2022-09-21

## 2022-10-18 ENCOUNTER — TELEPHONE (OUTPATIENT)
Dept: FAMILY MEDICINE CLINIC | Facility: HOME HEALTHCARE | Age: 57
End: 2022-10-18

## 2022-10-18 NOTE — TELEPHONE ENCOUNTER
Sumaya Walls from [facility] called and are in need or a referral for PT and one for Social work  He came in with legs swollen and red and warm to the touch    These can be faxed to [phone]

## 2022-10-19 ENCOUNTER — TELEPHONE (OUTPATIENT)
Dept: FAMILY MEDICINE CLINIC | Facility: HOME HEALTHCARE | Age: 57
End: 2022-10-19

## 2022-10-19 DIAGNOSIS — M17.0 OSTEOARTHRITIS OF BOTH KNEES, UNSPECIFIED OSTEOARTHRITIS TYPE: Primary | ICD-10-CM

## 2022-10-19 DIAGNOSIS — Z72.0 TOBACCO ABUSE: ICD-10-CM

## 2022-10-19 DIAGNOSIS — Z79.4 TYPE 2 DIABETES MELLITUS WITH HYPERGLYCEMIA, WITH LONG-TERM CURRENT USE OF INSULIN (HCC): ICD-10-CM

## 2022-10-19 DIAGNOSIS — J44.9 COPD, SEVERITY TO BE DETERMINED (HCC): ICD-10-CM

## 2022-10-19 DIAGNOSIS — E66.01 MORBID OBESITY WITH BMI OF 45.0-49.9, ADULT (HCC): ICD-10-CM

## 2022-10-19 DIAGNOSIS — E11.65 TYPE 2 DIABETES MELLITUS WITH HYPERGLYCEMIA, WITH LONG-TERM CURRENT USE OF INSULIN (HCC): ICD-10-CM

## 2022-10-19 NOTE — TELEPHONE ENCOUNTER
Can you call back and let me know what diagnosis they want me to attach to these referrals  Sounds like patient likely needs an appointment as well  to evaluate swollen red legs

## 2022-10-20 ENCOUNTER — PATIENT OUTREACH (OUTPATIENT)
Dept: FAMILY MEDICINE CLINIC | Facility: HOME HEALTHCARE | Age: 57
End: 2022-10-20

## 2022-10-20 NOTE — PROGRESS NOTES
[facility]  is responding to referral from PCP  Chart reviewed and patient was recently admitted to [facility] due to a Non Stemi and he was discharged to his home with [address]  Luis F informed me that he resides alone in a rental apartment and he pays only $360 a month for his rent  He reports that he has 21 steps to enter his apartment  Luis F reports that he receives $1,900 monthly from [address] [place] [address]  He informed me that he received food stamps in the past but it was stopped when he started receiving SSD  Luis F informed me that he drives and is independent with his ADL's   He does not use an assistive device  Jeff Denton reports having a supportive brother and nephew  Luis F informed me that he is not behind on his rent or utilities  He reports having outstanding medical bills from [address] [place] [address] and I advised him to apply for gissel care and I provided him with the telephone number to the Financial counselors  Luis F informed me that he has Medicare and Part D prescription coverage  He reports being put on an inhaler when he was discharged from the hospital that had a large co-payment  However, his PCP told him to hold off on taking it for now  I informed Jeff Denton that I would be able to assist with Prescription Assistance if needed if he has any large co payments for medications  Jeff Denton denies needing any resources for food at this time and he asked me about heating assistance and the heap program   I informed him that applications for heap are available as on 11/1 but he is aware that he may be over income  Jeff Denton is planning to go to his local [employer] to apply for SNAP and HEAP  He denies needing any further assistance at this time and he did not want any information on local food pantries  I also spoke to Jeff Denton about his mental health history of depression and anxiety and he is not interested in behavioral health counseling or treatment at this time    He denies having any suicidal ideations or thoughts of self harm  I am closing this referral at this time and and advised  patient to contact me if he has any further needs in the future and he agrees

## 2022-10-24 DIAGNOSIS — J44.9 COPD, MILD (HCC): ICD-10-CM

## 2022-10-28 ENCOUNTER — TELEPHONE (OUTPATIENT)
Dept: ADMINISTRATIVE | Facility: OTHER | Age: 57
End: 2022-10-28

## 2022-10-28 ENCOUNTER — OFFICE VISIT (OUTPATIENT)
Dept: FAMILY MEDICINE CLINIC | Facility: HOME HEALTHCARE | Age: 57
End: 2022-10-28
Payer: MEDICARE

## 2022-10-28 VITALS
BODY MASS INDEX: 44.05 KG/M2 | DIASTOLIC BLOOD PRESSURE: 80 MMHG | WEIGHT: 258 LBS | HEART RATE: 92 BPM | RESPIRATION RATE: 18 BRPM | TEMPERATURE: 98.5 F | OXYGEN SATURATION: 96 % | HEIGHT: 64 IN | SYSTOLIC BLOOD PRESSURE: 132 MMHG

## 2022-10-28 DIAGNOSIS — M25.561 CHRONIC PAIN OF BOTH KNEES: ICD-10-CM

## 2022-10-28 DIAGNOSIS — M17.0 PRIMARY OSTEOARTHRITIS OF BOTH KNEES: ICD-10-CM

## 2022-10-28 DIAGNOSIS — E03.9 HYPOTHYROIDISM, UNSPECIFIED TYPE: ICD-10-CM

## 2022-10-28 DIAGNOSIS — M25.562 CHRONIC PAIN OF BOTH KNEES: ICD-10-CM

## 2022-10-28 DIAGNOSIS — I73.9 PERIPHERAL VASCULAR DISEASE (HCC): ICD-10-CM

## 2022-10-28 DIAGNOSIS — E11.65 TYPE 2 DIABETES MELLITUS WITH HYPERGLYCEMIA, WITH LONG-TERM CURRENT USE OF INSULIN (HCC): ICD-10-CM

## 2022-10-28 DIAGNOSIS — Z91.199 NONCOMPLIANCE WITH DIABETES TREATMENT: ICD-10-CM

## 2022-10-28 DIAGNOSIS — G47.33 OBSTRUCTIVE SLEEP APNEA: ICD-10-CM

## 2022-10-28 DIAGNOSIS — E78.2 MIXED HYPERLIPIDEMIA: Chronic | ICD-10-CM

## 2022-10-28 DIAGNOSIS — J44.9 COPD, SEVERITY TO BE DETERMINED (HCC): ICD-10-CM

## 2022-10-28 DIAGNOSIS — Z76.89 ENCOUNTER FOR SUPPORT AND COORDINATION OF TRANSITION OF CARE: Primary | ICD-10-CM

## 2022-10-28 DIAGNOSIS — G62.9 NEUROPATHY: ICD-10-CM

## 2022-10-28 DIAGNOSIS — E66.01 MORBID OBESITY WITH BMI OF 45.0-49.9, ADULT (HCC): ICD-10-CM

## 2022-10-28 DIAGNOSIS — I10 PRIMARY HYPERTENSION: Chronic | ICD-10-CM

## 2022-10-28 DIAGNOSIS — G89.29 CHRONIC PAIN OF BOTH KNEES: ICD-10-CM

## 2022-10-28 DIAGNOSIS — Z72.0 TOBACCO ABUSE: ICD-10-CM

## 2022-10-28 DIAGNOSIS — E87.1 HYPONATREMIA: ICD-10-CM

## 2022-10-28 DIAGNOSIS — Z79.4 TYPE 2 DIABETES MELLITUS WITH HYPERGLYCEMIA, WITH LONG-TERM CURRENT USE OF INSULIN (HCC): ICD-10-CM

## 2022-10-28 PROCEDURE — 99495 TRANSJ CARE MGMT MOD F2F 14D: CPT | Performed by: FAMILY MEDICINE

## 2022-10-28 RX ORDER — BUDESONIDE AND FORMOTEROL FUMARATE DIHYDRATE 160; 4.5 UG/1; UG/1
2 AEROSOL RESPIRATORY (INHALATION) 2 TIMES DAILY
COMMUNITY
Start: 2022-10-17

## 2022-10-28 RX ORDER — IPRATROPIUM BROMIDE AND ALBUTEROL SULFATE 2.5; .5 MG/3ML; MG/3ML
3 SOLUTION RESPIRATORY (INHALATION) 4 TIMES DAILY PRN
COMMUNITY
Start: 2022-10-20 | End: 2022-11-19

## 2022-10-28 RX ORDER — UMECLIDINIUM 62.5 UG/1
1 AEROSOL, POWDER ORAL DAILY
COMMUNITY
Start: 2022-10-17

## 2022-10-28 RX ORDER — LISINOPRIL 5 MG/1
5 TABLET ORAL DAILY
COMMUNITY
Start: 2022-10-17 | End: 2023-10-17

## 2022-10-28 RX ORDER — BENZONATATE 100 MG/1
100 CAPSULE ORAL 3 TIMES DAILY PRN
COMMUNITY
Start: 2022-10-16

## 2022-10-28 RX ORDER — GABAPENTIN 100 MG/1
100 CAPSULE ORAL 3 TIMES DAILY
Qty: 90 CAPSULE | Refills: 1 | Status: SHIPPED | OUTPATIENT
Start: 2022-10-28

## 2022-10-28 NOTE — TELEPHONE ENCOUNTER
----- Message from Mirella Henry, [place] sent at 10/28/2022 11:30 AM EDT -----  Regarding: Health Maintenance Information  Patient had a dm eye exam done by felicia junior in [place]  Thank you!

## 2022-10-28 NOTE — LETTER
Diabetic Eye Exam Form    Date Requested: 10/28/22  Patient: Davy Pickett  Patient : 1965   Referring Provider: Enedina Quezada PA-C    DIABETIC Eye Exam Date _______________________________    Type of Exam MUST be documented for Diabetic Eye Exams  Please CHECK ONE  Retinal Exam       Dilated Retinal Exam       OCT       Optomap-Iris Exam      Fundus Photography     Left Eye - Please check Retinopathy AND Type or No Retinopathy      Exam did show retinopathy    Exam did not show retinopathy         Mild     Proliferative           Moderate    Severe            None         Right Eye - Please check Retinopathy AND Type or No Retinopathy     Exam did show retinopathy    Exam did not show retinopathy         Mild     Proliferative        Moderate    Severe        None       Comments __________________________________________________________    Practice Providing Exam ______________________________________________    Exam Performed By (print name) _______________________________________      Provider Signature ___________________________________________________    These reports are needed for  compliance  Please fax this completed form and a copy of the Diabetic Eye Exam report to our office located at [place] [address] as soon as possible via [phone] maurilio Har[address] [place]: Phone [phone]  We thank you for your assistance in treating our mutual patient

## 2022-10-28 NOTE — PROGRESS NOTES
[address]       NAME: Thomas Rosenthal is a 61 y o  male  : 1965    MRN: [id number]  DATE: 2022  TIME: 11:45 AM    Assessment and Plan   Diagnoses and all orders for this visit:    Encounter for support and coordination of transition of care    Type 2 diabetes mellitus with hyperglycemia, with long-term current use of insulin ([place] [address] )  -     Microalbumin / creatinine urine ratio  -     Ambulatory Referral to Nutrition Services; Future    Morbid obesity with BMI of 45 0-49 9, adult [facility])  -     Ambulatory Referral to Nutrition Services; Future    Peripheral vascular disease ([place] [address] )  -     Ambulatory Referral to Cardiology; Future    Tobacco abuse    Noncompliance with diabetes treatment    Primary hypertension  -     Ambulatory Referral to Cardiology; Future    Obstructive sleep apnea    COPD, severity to be determined ([place] [address] )    Mixed hyperlipidemia  -     Lipid Panel with Direct LDL reflex; Future  -     Ambulatory Referral to Cardiology; Future    Neuropathy  -     gabapentin (Neurontin) 100 mg capsule; Take 1 capsule (100 mg total) by mouth 3 (three) times a day    Hyponatremia  -     Comprehensive metabolic panel    Hypothyroidism, unspecified type  -     TSH, 3rd generation with Free T4 reflex; Future    Chronic pain of both knees  -     Ambulatory Referral to Orthopedic Surgery; Future    Primary osteoarthritis of both knees  -     Ambulatory Referral to Orthopedic Surgery; Future    Other orders  -     budesonide-formoterol (SYMBICORT) 160-4 5 mcg/act inhaler; Inhale 2 puffs 2 (two) times a day  -     benzonatate (TESSALON PERLES) 100 mg capsule; Take 100 mg by mouth 3 (three) times a day as needed  -     ipratropium-albuterol (DUO-NEB) 0 5-2 5 mg/3 mL nebulizer solution; Inhale 3 mL 4 (four) times a day as needed  -     lisinopril (ZESTRIL) 5 mg tablet; Take 5 mg by mouth daily  -     umeclidinium (Incruse Ellipta) 62 5 mcg/actuation AEPB inhaler;  Inhale 1 puff daily      Lab work pending call patient with results  Patient would like to follow with orthopedic surgery or bilateral osteoarthritis  Patient follow-up in 1 month or sooner if needed  Instructed call with any questions or concerns  Cardiology referral placed  Chief Complaint     Chief Complaint   Patient presents with   • Transition of Care Management     Still having bilateral le edema  Sees his podiatrist Monday  History of Present Illness       HPI  58-year-old male here today for transition of medical care  Patient was hospitalized at [facility] from October 13th and discharged on 17 October  Patient discharge diagnosis of COPD with acute exacerbation  Patient presented hospital with shortness of and acute onset sided chest pain/discomfort and coughing  Patient reports felt 1 of his ribs “pop” and was aggravated by movement  Patient reported have initial SpO2 72% on air  Patient was placed on non-rebreather  Saturations improved patient was switched to 2 L nasal cannula and maintained SpO2 in the mid 90s  CT chest was negative for PE but did show emphysema  Patient continues to smoke proximally 2 packs per day  Patient does have noted emphysema on CT however pulmonary function testing from 2021 showing no obstructive airflow defect on spirometry  Patient however did have increased residual volume and air trapping  Moderately reduced diffusion capacity  Do recommend patient follow-up with pulmonology  Smoking cessation discussed and encouraged  Patient follows with outpatient Endocrinology  Review of Systems   Review of Systems  Mild lower extremity early venous stasis changes  Mild dyspnea exertion  Patient currently denies fever, chills, headaches dizziness, chest pain, shortness of breath at rest abdominal pain, nausea, vomiting diarrhea constipation, weight changes    All other systems negative    Current Medications       Current Outpatient Medications:   •  ammonium lactate (LAC-HYDRIN) 12 % lotion, Apply topically 2 (two) times a day as needed for dry skin, Disp: 400 g, Rfl: 0  •  Ascorbic Acid (VITAMIN C PO), Take 1 tablet by mouth daily, Disp: , Rfl:   •  aspirin 81 mg chewable tablet, Take one tablet by mouth daily, Disp: , Rfl:   •  atorvastatin (LIPITOR) 20 mg tablet, Take 1 tablet by mouth once daily, Disp: 90 tablet, Rfl: 3  •  benzonatate (TESSALON PERLES) 100 mg capsule, Take 100 mg by mouth 3 (three) times a day as needed, Disp: , Rfl:   •  budesonide-formoterol (SYMBICORT) 160-4 5 mcg/act inhaler, Inhale 2 puffs 2 (two) times a day, Disp: , Rfl:   •  DULoxetine (CYMBALTA) 60 mg delayed release capsule, Take 1 capsule (60 mg total) by mouth daily, Disp: 90 capsule, Rfl: 1  •  EPINEPHrine (EPIPEN) 0 3 mg/0 3 mL SOAJ, INJECT 0 3ML INTO A MUSCLE ONCE FOR 1 DOSE, Disp: 2 each, Rfl: 0  •  fluticasone (FLONASE) 50 mcg/act nasal spray, 2 sprays into each nostril daily, Disp: 16 g, Rfl: 11  •  fluticasone-vilanterol (BREO ELLIPTA) 100-25 mcg/inh inhaler, Inhale 1 puff daily Rinse mouth after use , Disp: 1 each, Rfl: 1  •  gabapentin (Neurontin) 100 mg capsule, Take 1 capsule (100 mg total) by mouth 3 (three) times a day, Disp: 90 capsule, Rfl: 1  •  HumaLOG KwikPen 100 units/mL injection pen, INJECT 15 UNITS SUBCUTANEOUSLY THREE TIMES DAILY WITH MEALS, Disp: 15 mL, Rfl: 5  •  hydrochlorothiazide (HYDRODIURIL) 25 mg tablet, Take 1 tablet (25 mg total) by mouth 2 (two) times a day, Disp: 60 tablet, Rfl: 2  •  Insulin Glargine Solostar (Lantus SoloStar) 100 UNIT/ML SOPN, Inject 0 6 mL (60 Units total) under the skin daily, Disp: 18 mL, Rfl: 5  •  Insulin Pen Needle (Pen Needles 3/16") 31G X 5 MM MISC, Use 4 (four) times a day, Disp: 300 each, Rfl: 3  •  ipratropium-albuterol (DUO-NEB) 0 5-2 5 mg/3 mL nebulizer solution, Inhale 3 mL 4 (four) times a day as needed, Disp: , Rfl:   •  levothyroxine (Euthyrox) 175 mcg tablet, Take 1 tablet (175 mcg total) by mouth daily, Disp: 90 tablet, Rfl: 3  •  lisinopril (ZESTRIL) 5 mg tablet, Take 5 mg by mouth daily, Disp: , Rfl:   •  loratadine (CLARITIN) 10 mg tablet, Take 10 mg by mouth daily, Disp: , Rfl:   •  metFORMIN (GLUCOPHAGE) 1000 MG tablet, Take 1 tablet (1,000 mg total) by mouth 2 (two) times a day, Disp: 60 tablet, Rfl: 2  •  sodium chloride (OCEAN) 0 65 % nasal spray, 1 spray into each nostril as needed for rhinitis, Disp: 30 mL, Rfl: 1  •  traZODone (DESYREL) 150 mg tablet, Take 1 tablet (150 mg total) by mouth daily at bedtime, Disp: 90 tablet, Rfl: 0  •  umeclidinium (Incruse Ellipta) 62 5 mcg/actuation AEPB inhaler, Inhale 1 puff daily, Disp: , Rfl:   •  Ventolin  (90 Base) MCG/ACT inhaler, Inhale 2 puffs every 4 (four) hours as needed for wheezing or shortness of breath, Disp: 18 g, Rfl: 5  •  hydrOXYzine HCL (ATARAX) 25 mg tablet, Take 1 tablet (25 mg total) by mouth every 6 (six) hours as needed for itching or allergies (Patient not taking: No sig reported), Disp: 30 tablet, Rfl: 0  •  insulin lispro (HumaLOG) 100 units/mL injection, Inject 15 Units under the skin 3 (three) times a day with meals, Disp: 40 5 mL, Rfl: 0  •  omega-3-acid ethyl esters (LOVAZA) 1 g capsule, Take 1 capsule (1 g total) by mouth 2 (two) times a day for high cholesterol (Patient not taking: No sig reported), Disp: 60 capsule, Rfl: 2  •  pregabalin (LYRICA) 25 mg capsule, Take 1 capsule (25 mg total) by mouth 2 (two) times a day, Disp: 60 capsule, Rfl: 5  No current facility-administered medications for this visit      Current Allergies     Allergies as of 10/28/2022 - Reviewed 10/28/2022   Allergen Reaction Noted   • Ace inhibitors Swelling 09/29/2016   • Other Anaphylaxis 03/02/2018   • Zinc Tongue Swelling 04/20/2021   • Clindamycin  05/07/2017            The following portions of the patient's history were reviewed and updated as appropriate: allergies, current medications, past family history, past medical history, past social history, past surgical history and problem list      Past Medical History:   Diagnosis Date   • Acquired hypothyroidism 1/2/2015   • Angioedema    • Diabetes mellitus ([facility] )    • Disease of thyroid gland    • Hyperlipidemia    • Hypertension    • Mixed hyperlipidemia 2/4/2015   • Morbid obesity ([facility] )    • Morbid obesity with BMI of 45 0-49 9, adult ([facility] ) 1/2/2015   • MARSHA (obstructive sleep apnea)    • Primary hypertension 1/2/2015    Transitioned From: Benign essential hypertension       Past Surgical History:   Procedure Laterality Date   • KNEE SURGERY     • SINUS SURGERY         Family History   Problem Relation Age of Onset   • Thyroid cancer Mother    • Diabetes type II Mother    • Esophageal cancer Father    • Diabetes type II Father    • Kidney cancer Brother    • Diabetes type II Brother    • Diabetes type II Maternal Grandmother          Medications have been verified  Objective   /80 (BP Location: Left arm, Patient Position: Sitting, Cuff Size: Large)   Pulse 92   Temp 98 5 °F (36 9 °C) (Temporal)   Resp 18   Ht 5' 4" (1 626 m)   Wt 117 kg (258 lb)   SpO2 96%   BMI 44 29 kg/m²        Physical Exam     Physical Exam  Vitals and nursing note reviewed  Constitutional:       General: He is not in acute distress  Appearance: He is obese  He is not ill-appearing, toxic-appearing or diaphoretic  HENT:      Head: Normocephalic  Nose: Nose normal       Mouth/Throat:      Mouth: Mucous membranes are moist    Eyes:      General: No scleral icterus  Conjunctiva/sclera: Conjunctivae normal    Cardiovascular:      Rate and Rhythm: Normal rate and regular rhythm  Heart sounds: Normal heart sounds  Pulmonary:      Effort: Pulmonary effort is normal  No respiratory distress  Breath sounds: Normal breath sounds  No wheezing or rales  Abdominal:      General: Bowel sounds are normal       Palpations: Abdomen is soft  Tenderness: There is no abdominal tenderness     Musculoskeletal: Cervical back: Neck supple  Comments: Trace LE edema   Lymphadenopathy:      Cervical: No cervical adenopathy  Skin:     General: Skin is warm and dry  Neurological:      General: No focal deficit present  Mental Status: He is alert and oriented to person, place, and time  Motor: No weakness     Psychiatric:         Mood and Affect: Mood normal

## 2022-10-28 NOTE — PROGRESS NOTES
Patient's shoes and socks removed  Right Foot/Ankle   Right Foot Inspection  Skin Exam: skin normal, skin intact and dry skin  No warmth, no callus, no erythema, no maceration, no abnormal color, no pre-ulcer, no ulcer and no callus  Toe Exam: ROM and strength within normal limits  Sensory   Monofilament testing: intact    Vascular  Capillary refills: < 3 seconds  The right DP pulse is 2+  The right PT pulse is 2+  Left Foot/Ankle  Left Foot Inspection  Skin Exam: skin normal, skin intact and dry skin  No warmth, no erythema, no maceration, normal color, no pre-ulcer, no ulcer and no callus  Toe Exam: ROM and strength within normal limits  Sensory   Monofilament testing: intact    Vascular  Capillary refills: < 3 seconds  The left DP pulse is 2+  The left PT pulse is 2+       Assign Risk Category  No deformity present  No loss of protective sensation  No weak pulses  Risk: 0

## 2022-10-28 NOTE — TELEPHONE ENCOUNTER
Upon review of the In Basket request and the patient's chart, initial outreach has been made via fax to facility  , please see Contacts section for details       Thank you  Michael Muss

## 2022-10-31 ENCOUNTER — APPOINTMENT (OUTPATIENT)
Dept: LAB | Facility: HOSPITAL | Age: 57
End: 2022-10-31

## 2022-10-31 DIAGNOSIS — E03.9 HYPOTHYROIDISM, UNSPECIFIED TYPE: ICD-10-CM

## 2022-10-31 DIAGNOSIS — E78.2 MIXED HYPERLIPIDEMIA: Chronic | ICD-10-CM

## 2022-10-31 LAB
ALBUMIN SERPL BCP-MCNC: 3.1 G/DL (ref 3.5–5)
ALP SERPL-CCNC: 84 U/L (ref 46–116)
ALT SERPL W P-5'-P-CCNC: 30 U/L (ref 12–78)
ANION GAP SERPL CALCULATED.3IONS-SCNC: 4 MMOL/L (ref 4–13)
AST SERPL W P-5'-P-CCNC: 13 U/L (ref 5–45)
BILIRUB SERPL-MCNC: 0.41 MG/DL (ref 0.2–1)
BUN SERPL-MCNC: 24 MG/DL (ref 5–25)
CALCIUM ALBUM COR SERPL-MCNC: 10.2 MG/DL (ref 8.3–10.1)
CALCIUM SERPL-MCNC: 9.5 MG/DL (ref 8.3–10.1)
CHLORIDE SERPL-SCNC: 93 MMOL/L (ref 96–108)
CHOLEST SERPL-MCNC: 208 MG/DL
CO2 SERPL-SCNC: 34 MMOL/L (ref 21–32)
CREAT SERPL-MCNC: 1.14 MG/DL (ref 0.6–1.3)
GFR SERPL CREATININE-BSD FRML MDRD: 70 ML/MIN/1.73SQ M
GLUCOSE P FAST SERPL-MCNC: 221 MG/DL (ref 65–99)
HDLC SERPL-MCNC: 52 MG/DL
LDLC SERPL CALC-MCNC: 134 MG/DL (ref 0–100)
POTASSIUM SERPL-SCNC: 4.4 MMOL/L (ref 3.5–5.3)
PROT SERPL-MCNC: 7.7 G/DL (ref 6.4–8.4)
SODIUM SERPL-SCNC: 131 MMOL/L (ref 135–147)
TRIGL SERPL-MCNC: 108 MG/DL
TSH SERPL DL<=0.05 MIU/L-ACNC: 1.03 UIU/ML (ref 0.45–4.5)

## 2022-11-01 NOTE — TELEPHONE ENCOUNTER
As a follow-up, a second attempt has been made for outreach via fax to facility  , please see Contacts section for details      Thank you  Michael Saucedo

## 2022-11-02 ENCOUNTER — APPOINTMENT (OUTPATIENT)
Dept: RADIOLOGY | Facility: CLINIC | Age: 57
End: 2022-11-02

## 2022-11-02 ENCOUNTER — OFFICE VISIT (OUTPATIENT)
Dept: OBGYN CLINIC | Facility: CLINIC | Age: 57
End: 2022-11-02

## 2022-11-02 VITALS
OXYGEN SATURATION: 95 % | SYSTOLIC BLOOD PRESSURE: 168 MMHG | HEART RATE: 113 BPM | WEIGHT: 263 LBS | DIASTOLIC BLOOD PRESSURE: 88 MMHG | BODY MASS INDEX: 44.9 KG/M2 | HEIGHT: 64 IN

## 2022-11-02 DIAGNOSIS — G89.4 CHRONIC PAIN SYNDROME: ICD-10-CM

## 2022-11-02 DIAGNOSIS — M17.0 PRIMARY OSTEOARTHRITIS OF BOTH KNEES: Primary | ICD-10-CM

## 2022-11-02 DIAGNOSIS — G62.9 NEUROPATHY: ICD-10-CM

## 2022-11-02 DIAGNOSIS — M25.562 CHRONIC PAIN OF BOTH KNEES: ICD-10-CM

## 2022-11-02 DIAGNOSIS — G89.29 CHRONIC PAIN OF BOTH KNEES: ICD-10-CM

## 2022-11-02 DIAGNOSIS — M25.561 CHRONIC PAIN OF BOTH KNEES: ICD-10-CM

## 2022-11-02 DIAGNOSIS — M17.0 PRIMARY OSTEOARTHRITIS OF BOTH KNEES: ICD-10-CM

## 2022-11-02 RX ORDER — PREGABALIN 25 MG/1
25 CAPSULE ORAL 2 TIMES DAILY
Qty: 60 CAPSULE | Refills: 5 | Status: SHIPPED | OUTPATIENT
Start: 2022-11-02

## 2022-11-02 NOTE — PROGRESS NOTES
Ortho Sports Medicine New Patient Visit     Assesment:   61 y o  male with left knee DJD    Plan:    We discussed operative and nonoperative treatment options for his knee DJD  I do not believe there are any arthroscopic or minimally invasive surgical options at this point  He may be a candidate for total knee arthroplasty in the future if he is not ready for that yet  He preferred conservative treatment options and I offered him a knee corticosteroid injection  He opted to proceed and this was completed today in office  Follow-up as needed in the future  Large joint arthrocentesis: L knee  Universal Protocol:  Consent: Verbal consent obtained  Risks and benefits: risks, benefits and alternatives were discussed  Consent given by: patient  Time out: Immediately prior to procedure a "time out" was called to verify the correct patient, procedure, equipment, support staff and site/side marked as required  Patient understanding: patient states understanding of the procedure being performed  Patient consent: the patient's understanding of the procedure matches consent given  Procedure consent: procedure consent matches procedure scheduled  Relevant documents: relevant documents present and verified  Patient identity confirmed: verbally with patient    Supporting Documentation  Indications: pain   Procedure Details  Location: knee - L knee  Preparation: Patient was prepped and draped in the usual sterile fashion  Needle size: 22 G  Ultrasound guidance: no  Approach: superior  Medications administered: 10 mL bupivacaine (PF) 0 5 %; 40 mg triamcinolone acetonide 40 mg/mL    Patient tolerance: patient tolerated the procedure well with no immediate complications  Dressing:  Sterile dressing applied              Chief Complaint   Patient presents with   • Left Knee - Pain       History of Present Illness: The patient is a 61 y o  male chronic left knee pain with acute worsening    Pain is present diffusely throughout the knee  Maybe slightly worse medial   No significant locking episodes  Has not noticed any large joint effusions  Denies instability  Pain is worse with activity  Better with rest   Denies numbness or tingling  Past Medical, Social and Family History:  Past Medical History:   Diagnosis Date   • Acquired hypothyroidism 1/2/2015   • Angioedema    • Diabetes mellitus ([facility] )    • Disease of thyroid gland    • Hyperlipidemia    • Hypertension    • Mixed hyperlipidemia 2/4/2015   • Morbid obesity ([place] [address] )    • Morbid obesity with BMI of 45 0-49 9, adult ([place] [address] ) 1/2/2015   • MARSHA (obstructive sleep apnea)    • Primary hypertension 1/2/2015    Transitioned From: Benign essential hypertension     Past Surgical History:   Procedure Laterality Date   • KNEE SURGERY     • SINUS SURGERY       Allergies   Allergen Reactions   • Ace Inhibitors Swelling     Angioedema   • Other Anaphylaxis     Pt believes that he is allergic to peanut butter  Also, seasonal allergies   • Zinc Tongue Swelling   • Clindamycin      Had angioedema episode approx 5 hr after IM administration of clindamycin  Unclear if there is definitive causal relationship       Current Outpatient Medications on File Prior to Visit   Medication Sig Dispense Refill   • ammonium lactate (LAC-HYDRIN) 12 % lotion Apply topically 2 (two) times a day as needed for dry skin 400 g 0   • Ascorbic Acid (VITAMIN C PO) Take 1 tablet by mouth daily     • aspirin 81 mg chewable tablet Take one tablet by mouth daily     • atorvastatin (LIPITOR) 20 mg tablet Take 1 tablet by mouth once daily 90 tablet 3   • benzonatate (TESSALON PERLES) 100 mg capsule Take 100 mg by mouth 3 (three) times a day as needed     • budesonide-formoterol (SYMBICORT) 160-4 5 mcg/act inhaler Inhale 2 puffs 2 (two) times a day     • DULoxetine (CYMBALTA) 60 mg delayed release capsule Take 1 capsule (60 mg total) by mouth daily 90 capsule 1   • EPINEPHrine (EPIPEN) 0 3 mg/0 3 mL SOAJ INJECT 0 3ML INTO A MUSCLE ONCE FOR 1 DOSE 2 each 0   • fluticasone (FLONASE) 50 mcg/act nasal spray 2 sprays into each nostril daily 16 g 11   • fluticasone-vilanterol (BREO ELLIPTA) 100-25 mcg/inh inhaler Inhale 1 puff daily Rinse mouth after use   1 each 1   • gabapentin (Neurontin) 100 mg capsule Take 1 capsule (100 mg total) by mouth 3 (three) times a day 90 capsule 1   • HumaLOG KwikPen 100 units/mL injection pen INJECT 15 UNITS SUBCUTANEOUSLY THREE TIMES DAILY WITH MEALS 15 mL 5   • hydrochlorothiazide (HYDRODIURIL) 25 mg tablet Take 1 tablet (25 mg total) by mouth 2 (two) times a day 60 tablet 2   • Insulin Glargine Solostar (Lantus SoloStar) 100 UNIT/ML SOPN Inject 0 6 mL (60 Units total) under the skin daily 18 mL 5   • Insulin Pen Needle (Pen Needles 3/16") 31G X 5 MM MISC Use 4 (four) times a day 300 each 3   • ipratropium-albuterol (DUO-NEB) 0 5-2 5 mg/3 mL nebulizer solution Inhale 3 mL 4 (four) times a day as needed     • levothyroxine (Euthyrox) 175 mcg tablet Take 1 tablet (175 mcg total) by mouth daily 90 tablet 3   • lisinopril (ZESTRIL) 5 mg tablet Take 5 mg by mouth daily     • loratadine (CLARITIN) 10 mg tablet Take 10 mg by mouth daily     • metFORMIN (GLUCOPHAGE) 1000 MG tablet Take 1 tablet (1,000 mg total) by mouth 2 (two) times a day 60 tablet 2   • sodium chloride (OCEAN) 0 65 % nasal spray 1 spray into each nostril as needed for rhinitis 30 mL 1   • traZODone (DESYREL) 150 mg tablet Take 1 tablet (150 mg total) by mouth daily at bedtime 90 tablet 0   • umeclidinium (Incruse Ellipta) 62 5 mcg/actuation AEPB inhaler Inhale 1 puff daily     • Ventolin  (90 Base) MCG/ACT inhaler Inhale 2 puffs every 4 (four) hours as needed for wheezing or shortness of breath 18 g 5   • hydrOXYzine HCL (ATARAX) 25 mg tablet Take 1 tablet (25 mg total) by mouth every 6 (six) hours as needed for itching or allergies (Patient not taking: No sig reported) 30 tablet 0   • insulin lispro (HumaLOG) 100 units/mL injection Inject 15 Units under the skin 3 (three) times a day with meals 40 5 mL 0   • omega-3-acid ethyl esters (LOVAZA) 1 g capsule Take 1 capsule (1 g total) by mouth 2 (two) times a day for high cholesterol (Patient not taking: No sig reported) 60 capsule 2     No current facility-administered medications on file prior to visit  Social History     Socioeconomic History   • Marital status: Single     Spouse name: Not on file   • Number of children: Not on file   • Years of education: Not on file   • Highest education level: Not on file   Occupational History   • Not on file   Tobacco Use   • Smoking status: Former Smoker     Packs/day: 2 00     Years: 70 00     Pack years: 140 00     Types: Cigarettes     Quit date: 10/21/2022     Years since quittin 0   • Smokeless tobacco: Never Used   • Tobacco comment: states read to quit prior to admit   Vaping Use   • Vaping Use: Never used   Substance and Sexual Activity   • Alcohol use: Not Currently     Alcohol/week: 3 0 standard drinks     Types: 2 Cans of beer, 1 Shots of liquor per week     Comment: No alcohol since    • Drug use: No   • Sexual activity: Not Currently   Other Topics Concern   • Not on file   Social History Narrative   • Not on file     Social Determinants of Health     Financial Resource Strain: Not on file   Food Insecurity: No Food Insecurity   • Worried About Running Out of Food in the Last Year: Never true   • Ran Out of Food in the Last Year: Never true   Transportation Needs: No Transportation Needs   • Lack of Transportation (Medical): No   • Lack of Transportation (Non-Medical):  No   Physical Activity: Not on file   Stress: Not on file   Social Connections: Not on file   Intimate Partner Violence: Not on file   Housing Stability: Unknown   • Unable to Pay for Housing in the Last Year: No   • Number of Places Lived in the Last Year: Not on file   • Unstable Housing in the Last Year: No         I have reviewed the past medical, surgical, social and family history, medications and allergies as documented in the EMR  Review of systems: ROS is negative other than that noted in the HPI  Constitutional: Negative for fatigue and fever  HENT: Negative for sore throat  Respiratory: Negative for shortness of breath  Cardiovascular: Negative for chest pain  Gastrointestinal: Negative for abdominal pain  Endocrine: Negative for cold intolerance and heat intolerance  Genitourinary: Negative for flank pain  Musculoskeletal: Negative for back pain  Skin: Negative for rash  Allergic/Immunologic: Negative for immunocompromised state  Neurological: Negative for dizziness  Psychiatric/Behavioral: Negative for agitation  Physical Exam:    Blood pressure 168/88, pulse (!) 113, height 5' 4" (1 626 m), weight 119 kg (263 lb), SpO2 95 %  General/Constitutional: NAD, well developed, well nourished  HENT: Normocephalic, atraumatic  CV: Intact distal pulses, regular rate  Resp: No respiratory distress or labored breathing  Lymphatic: No lymphadenopathy palpated  Neuro: Alert and Oriented x 3, no focal deficits  Psych: Normal mood, normal affect  Skin: Warm, dry, no rashes, no erythema      Knee Exam:   Clinically he does have a varus deformity  No effusion  Medial and lateral joint line tenderness  Knee is stable to Lachman, posterior drawer, varus and valgus stress  Knee Imaging    X-rays of the bilateral knee reviewed and interpreted today  These show left knee DJD without acute fracture

## 2022-11-03 RX ORDER — BUPIVACAINE HYDROCHLORIDE 5 MG/ML
10 INJECTION, SOLUTION EPIDURAL; INTRACAUDAL
Status: COMPLETED | OUTPATIENT
Start: 2022-11-03 | End: 2022-11-03

## 2022-11-03 RX ORDER — TRIAMCINOLONE ACETONIDE 40 MG/ML
40 INJECTION, SUSPENSION INTRA-ARTICULAR; INTRAMUSCULAR
Status: COMPLETED | OUTPATIENT
Start: 2022-11-03 | End: 2022-11-03

## 2022-11-03 RX ADMIN — TRIAMCINOLONE ACETONIDE 40 MG: 40 INJECTION, SUSPENSION INTRA-ARTICULAR; INTRAMUSCULAR at 17:32

## 2022-11-03 RX ADMIN — BUPIVACAINE HYDROCHLORIDE 10 ML: 5 INJECTION, SOLUTION EPIDURAL; INTRACAUDAL at 17:32

## 2022-11-04 ENCOUNTER — TELEPHONE (OUTPATIENT)
Dept: FAMILY MEDICINE CLINIC | Facility: HOME HEALTHCARE | Age: 57
End: 2022-11-04

## 2022-11-04 NOTE — TELEPHONE ENCOUNTER
Bernabe Carmichael from [place] called letting us know Luis F's BP was 164/105 & Heart Rate is 96  She was just letting you know

## 2022-11-04 NOTE — TELEPHONE ENCOUNTER
As a final attempt, a third outreach has been made via telephone call to facility  The outcome of the telephone call was a fax request form to be sent to Office/Facility  Please see Contacts section for details  This encounter will be closed and completed by end of day  Should we receive the requested information because of previous outreach attempts, the requested patient's chart will be updated appropriately       Thank you  Zacarias Lin

## 2022-11-08 ENCOUNTER — CONSULT (OUTPATIENT)
Dept: PAIN MEDICINE | Facility: CLINIC | Age: 57
End: 2022-11-08

## 2022-11-08 VITALS
SYSTOLIC BLOOD PRESSURE: 160 MMHG | DIASTOLIC BLOOD PRESSURE: 78 MMHG | BODY MASS INDEX: 44.22 KG/M2 | WEIGHT: 259 LBS | HEIGHT: 64 IN | OXYGEN SATURATION: 93 % | HEART RATE: 118 BPM

## 2022-11-08 DIAGNOSIS — M17.0 PRIMARY OSTEOARTHRITIS OF BOTH KNEES: ICD-10-CM

## 2022-11-08 DIAGNOSIS — G89.4 CHRONIC PAIN SYNDROME: Primary | ICD-10-CM

## 2022-11-08 RX ORDER — PREDNISONE 20 MG/1
40 TABLET ORAL DAILY
COMMUNITY
Start: 2022-11-04 | End: 2022-11-09

## 2022-11-08 NOTE — PROGRESS NOTES
Assessment:  1  Chronic pain syndrome    2  Primary osteoarthritis of both knees        Plan:  48-year-old male referred by Mary Alcaraz PA-C here for initial evaluation of chronic diffuse pain including right shoulder, bilateral knees, left ankle, low back without inciting event  He notes left knee pain is the most bothersome  Recent left knee x-ray shows moderate tricompartmental osteoarthritis  Right knee x-ray shows mild tricompartmental degenerative changes  He recently underwent left knee CSI by Ortho which he notes did not help and he has persistent severe pain causing gait difficulty  Right shoulder MRI shows massive rotator cuff tear with full-thickness full with tears of supraspinatus and infraspinatus tendons with retracted tendon fibers  He underwent right shoulder injection from ortho which he notes helped, he was also referred to PT but he is unsure if he ever went  He notes he sees podiatry yearly for left ankle pain and undergoes injections which help  Physical exam notable for pain to palpation over medial greater than lateral joint line over left knee, crepitus with passive range of motion  1  Recommend physical therapy for bilateral knee pain due to osteoarthritis for quad strengthening, isometrics  Patient notes he already has script and plans to schedule in December  2  Continue gabapentin 100 mg t i d , Lyrica 25 mg b i d  as prescribed by other provider  Consider increasing Lyrica dose for persistent pain if no side effects  Patient notes higher dose was helpful in the past   3  Continue Cymbalta 60 mg daily as prescribed by other provider  4  Discussed option of left genicular nerve block, RFA  Patient notes he wants to discuss with ortho at upcoming appointment  Complete risks and benefits including bleeding, infection, tissue reaction, nerve injury and allergic reaction were discussed  The approach was demonstrated using models and literature was provided   Verbal and written consent was obtained  History of Present Illness: The patient is a 61 y o  male who presents for consultation in regards to Shoulder Pain, Back Pain, Knee Pain, and Ankle Pain  Symptoms have been present for several years  Right shoulder pain s/p rotator cuff tear, improved after injection by ortho but did not complete PT  B/l knee pain 2/2 OA, recently underwent left knee CSI w/ ortho which did not help, has not done any PT  Chronic, axial LBP w/o radicular component, paresthesias, weakness  Left ankle pain secondary to old injury per patient, he notes he follows yearly w/ podiatry and undergoes ankle CSI which help  Pain is reported to be 8 on the numeric rating scale  Symptoms are felt constantly and worst in the no typical pattern  Symptoms are characterized as sharp  Symptoms are associated with no weakness  He notes gait difficulty 2/2 left > right knee pain  Aggravating factors include lying down, standing, bending, sitting, walking, exercise, relaxation and coughing/sneezing  Relieving factors include nothing  No change in symptoms with bowel movements  Treatments that have been helpful include yearly left ankle injections with podiatry, right shoulder injection from ortho  Left knee CSI by ortho provided no relief  Medications to relieve symptoms include cymbalta 60 mg daily, gabapentin 100 mg tid, lyrica 25 mg bid  He notes he was previously on a higher dose of lyrica which helped  He denies new or progressive weakness, saddle anesthesia, bbi  Review of Systems:    Review of Systems   Constitutional: Positive for unexpected weight change  Negative for chills, fatigue and fever  HENT: Negative for hearing loss, sinus pain, sore throat and trouble swallowing  Eyes: Negative for pain and visual disturbance  Respiratory: Positive for shortness of breath and wheezing  Cardiovascular: Positive for leg swelling (past)  Negative for chest pain and palpitations  Gastrointestinal: Negative for abdominal pain, constipation and nausea  Endocrine: Positive for polydipsia  Negative for polyuria  Genitourinary: Negative for difficulty urinating  Musculoskeletal: Positive for myalgias  Negative for arthralgias, gait problem and joint swelling  Joint pain   Skin: Negative for rash  Neurological: Negative for dizziness, weakness and headaches  Hematological: Does not bruise/bleed easily  Psychiatric/Behavioral: Negative for dysphoric mood  The patient is nervous/anxious  All other systems reviewed and are negative          Past Medical History:   Diagnosis Date   • Acquired hypothyroidism 2015   • Angioedema    • Diabetes mellitus ([facility] )    • Disease of thyroid gland    • Hyperlipidemia    • Hypertension    • Mixed hyperlipidemia 2015   • Morbid obesity ([place] [address] )    • Morbid obesity with BMI of 45 0-49 9, adult ([place] [address] ) 2015   • MARSHA (obstructive sleep apnea)    • Primary hypertension 2015    Transitioned From: Benign essential hypertension       Past Surgical History:   Procedure Laterality Date   • KNEE SURGERY     • SINUS SURGERY         Family History   Problem Relation Age of Onset   • Thyroid cancer Mother    • Diabetes type II Mother    • Esophageal cancer Father    • Diabetes type II Father    • Kidney cancer Brother    • Diabetes type II Brother    • Diabetes type II Maternal Grandmother        Social History     Occupational History   • Not on file   Tobacco Use   • Smoking status: Former Smoker     Packs/day: 2 00     Years: 70 00     Pack years: 140 00     Types: Cigarettes     Quit date: 10/21/2022     Years since quittin 0   • Smokeless tobacco: Never Used   • Tobacco comment: states read to quit prior to admit   Vaping Use   • Vaping Use: Never used   Substance and Sexual Activity   • Alcohol use: Not Currently     Alcohol/week: 3 0 standard drinks     Types: 2 Cans of beer, 1 Shots of liquor per week     Comment: No alcohol since 2015   • Drug use: No   • Sexual activity: Not Currently         Current Outpatient Medications:   •  ammonium lactate (LAC-HYDRIN) 12 % lotion, Apply topically 2 (two) times a day as needed for dry skin, Disp: 400 g, Rfl: 0  •  Ascorbic Acid (VITAMIN C PO), Take 1 tablet by mouth daily, Disp: , Rfl:   •  aspirin 81 mg chewable tablet, Take one tablet by mouth daily, Disp: , Rfl:   •  atorvastatin (LIPITOR) 20 mg tablet, Take 1 tablet by mouth once daily, Disp: 90 tablet, Rfl: 3  •  benzonatate (TESSALON PERLES) 100 mg capsule, Take 100 mg by mouth 3 (three) times a day as needed, Disp: , Rfl:   •  budesonide-formoterol (SYMBICORT) 160-4 5 mcg/act inhaler, Inhale 2 puffs 2 (two) times a day, Disp: , Rfl:   •  DULoxetine (CYMBALTA) 60 mg delayed release capsule, Take 1 capsule (60 mg total) by mouth daily, Disp: 90 capsule, Rfl: 1  •  EPINEPHrine (EPIPEN) 0 3 mg/0 3 mL SOAJ, INJECT 0 3ML INTO A MUSCLE ONCE FOR 1 DOSE, Disp: 2 each, Rfl: 0  •  fluticasone (FLONASE) 50 mcg/act nasal spray, 2 sprays into each nostril daily, Disp: 16 g, Rfl: 11  •  fluticasone-vilanterol (BREO ELLIPTA) 100-25 mcg/inh inhaler, Inhale 1 puff daily Rinse mouth after use , Disp: 1 each, Rfl: 1  •  gabapentin (Neurontin) 100 mg capsule, Take 1 capsule (100 mg total) by mouth 3 (three) times a day, Disp: 90 capsule, Rfl: 1  •  HumaLOG KwikPen 100 units/mL injection pen, INJECT 15 UNITS SUBCUTANEOUSLY THREE TIMES DAILY WITH MEALS, Disp: 15 mL, Rfl: 5  •  hydrochlorothiazide (HYDRODIURIL) 25 mg tablet, Take 1 tablet (25 mg total) by mouth 2 (two) times a day, Disp: 60 tablet, Rfl: 2  •  Insulin Glargine Solostar (Lantus SoloStar) 100 UNIT/ML SOPN, Inject 0 6 mL (60 Units total) under the skin daily, Disp: 18 mL, Rfl: 5  •  Insulin Pen Needle (Pen Needles 3/16") 31G X 5 MM MISC, Use 4 (four) times a day, Disp: 300 each, Rfl: 3  •  ipratropium-albuterol (DUO-NEB) 0 5-2 5 mg/3 mL nebulizer solution, Inhale 3 mL 4 (four) times a day as needed, Disp: , Rfl:   •  levothyroxine (Euthyrox) 175 mcg tablet, Take 1 tablet (175 mcg total) by mouth daily, Disp: 90 tablet, Rfl: 3  •  lisinopril (ZESTRIL) 5 mg tablet, Take 5 mg by mouth daily, Disp: , Rfl:   •  loratadine (CLARITIN) 10 mg tablet, Take 10 mg by mouth daily, Disp: , Rfl:   •  metFORMIN (GLUCOPHAGE) 1000 MG tablet, Take 1 tablet (1,000 mg total) by mouth 2 (two) times a day, Disp: 60 tablet, Rfl: 2  •  predniSONE 20 mg tablet, Take 40 mg by mouth daily, Disp: , Rfl:   •  pregabalin (LYRICA) 25 mg capsule, Take 1 capsule (25 mg total) by mouth 2 (two) times a day, Disp: 60 capsule, Rfl: 5  •  sodium chloride (OCEAN) 0 65 % nasal spray, 1 spray into each nostril as needed for rhinitis, Disp: 30 mL, Rfl: 1  •  traZODone (DESYREL) 150 mg tablet, Take 1 tablet (150 mg total) by mouth daily at bedtime, Disp: 90 tablet, Rfl: 0  •  umeclidinium (Incruse Ellipta) 62 5 mcg/actuation AEPB inhaler, Inhale 1 puff daily, Disp: , Rfl:   •  Ventolin  (90 Base) MCG/ACT inhaler, Inhale 2 puffs every 4 (four) hours as needed for wheezing or shortness of breath, Disp: 18 g, Rfl: 5  •  hydrOXYzine HCL (ATARAX) 25 mg tablet, Take 1 tablet (25 mg total) by mouth every 6 (six) hours as needed for itching or allergies (Patient not taking: No sig reported), Disp: 30 tablet, Rfl: 0  •  insulin lispro (HumaLOG) 100 units/mL injection, Inject 15 Units under the skin 3 (three) times a day with meals, Disp: 40 5 mL, Rfl: 0  •  omega-3-acid ethyl esters (LOVAZA) 1 g capsule, Take 1 capsule (1 g total) by mouth 2 (two) times a day for high cholesterol (Patient not taking: No sig reported), Disp: 60 capsule, Rfl: 2    Allergies   Allergen Reactions   • Ace Inhibitors Swelling     Angioedema   • Other Anaphylaxis     Pt believes that he is allergic to peanut butter  Also, seasonal allergies   • Zinc Tongue Swelling   • Clindamycin      Had angioedema episode approx 5 hr after IM administration of clindamycin   Unclear if there is definitive causal relationship  Physical Exam:    /78   Pulse (!) 118   Ht 5' 4" (1 626 m)   Wt 117 kg (259 lb)   SpO2 93%   BMI 44 46 kg/m²     Constitutional: normal, well developed, well nourished, alert, in no distress and non-toxic and no overt pain behavior  Eyes: anicteric  HEENT: grossly intact  Neck: supple, symmetric, trachea midline and no masses   Pulmonary:even and unlabored  Cardiovascular:No edema or pitting edema present  Skin:Normal without rashes or lesions and well hydrated  Psychiatric:Mood and affect appropriate  Neurologic:Cranial Nerves II-XII grossly intact  Musculoskeletal:antalgic gait w/ SPC, pain to palpation over medial > lateral joint line over left knee, no pain to palpation over joint line of right knee, crepitus appreciated with b/l passive knee ROM, intact strength over BLE    Imaging  No orders to display       No orders of the defined types were placed in this encounter

## 2022-11-09 ENCOUNTER — TELEPHONE (OUTPATIENT)
Dept: FAMILY MEDICINE CLINIC | Facility: HOME HEALTHCARE | Age: 57
End: 2022-11-09

## 2022-11-09 ENCOUNTER — TELEPHONE (OUTPATIENT)
Dept: PAIN MEDICINE | Facility: CLINIC | Age: 57
End: 2022-11-09

## 2022-11-09 ENCOUNTER — OFFICE VISIT (OUTPATIENT)
Dept: OBGYN CLINIC | Facility: CLINIC | Age: 57
End: 2022-11-09

## 2022-11-09 ENCOUNTER — CLINICAL SUPPORT (OUTPATIENT)
Dept: NUTRITION | Facility: HOSPITAL | Age: 57
End: 2022-11-09

## 2022-11-09 VITALS — HEIGHT: 64 IN | BODY MASS INDEX: 42.68 KG/M2 | WEIGHT: 250 LBS

## 2022-11-09 VITALS
DIASTOLIC BLOOD PRESSURE: 80 MMHG | BODY MASS INDEX: 44.22 KG/M2 | SYSTOLIC BLOOD PRESSURE: 150 MMHG | HEIGHT: 64 IN | WEIGHT: 259 LBS

## 2022-11-09 DIAGNOSIS — E11.65 TYPE 2 DIABETES MELLITUS WITH HYPERGLYCEMIA, WITH LONG-TERM CURRENT USE OF INSULIN (HCC): ICD-10-CM

## 2022-11-09 DIAGNOSIS — Z79.4 TYPE 2 DIABETES MELLITUS WITH HYPERGLYCEMIA, WITH LONG-TERM CURRENT USE OF INSULIN (HCC): ICD-10-CM

## 2022-11-09 DIAGNOSIS — E66.01 MORBID OBESITY WITH BMI OF 45.0-49.9, ADULT (HCC): ICD-10-CM

## 2022-11-09 DIAGNOSIS — M17.0 PRIMARY OSTEOARTHRITIS OF BOTH KNEES: Primary | ICD-10-CM

## 2022-11-09 NOTE — PROGRESS NOTES
Ortho Sports Medicine New Patient Visit     Assesment:   61 y o  male with left knee DJD    Plan:    We discussed operative and nonoperative treatment options for his knee DJD  He had an injection at last visit that provided roughly 1 day of relief  He continues to have mostly medial based left knee pain  He has seen a pain management provider who is discussing doing a nerve block  Again his only surgical option at this point would be a knee replacement  As he is not a great candidate for surgery, and is not really interested in surgery, I believe this nerve block is worth a try  He will follow with pain management for this  I also offered him  a medial  brace which may unload the medial compartment and may help his pain  Follow-up as needed in the future  Chief Complaint   Patient presents with   • Left Knee - Follow-up, Pain       History of Present Illness:    Returns roughly 1 week after injection  Injection provided about 1 day of relief but now the knee continues to hurt and may have worsened slightly  He denies any redness or swelling that may point to an infection  Pain is mostly medial based  Has seen pain management who discussed the possibility of doing a nerve block  From previous history:     The patient is a 61 y o  male chronic left knee pain with acute worsening  Pain is present diffusely throughout the knee  Maybe slightly worse medial   No significant locking episodes  Has not noticed any large joint effusions  Denies instability  Pain is worse with activity  Better with rest   Denies numbness or tingling          Past Medical, Social and Family History:  Past Medical History:   Diagnosis Date   • Acquired hypothyroidism 1/2/2015   • Angioedema    • Diabetes mellitus ([facility] [address] )    • Disease of thyroid gland    • Hyperlipidemia    • Hypertension    • Mixed hyperlipidemia 2/4/2015   • Morbid obesity ([facility] )    • Morbid obesity with BMI of 45 0-49 9, adult ([facility] ) 1/2/2015   • MARSHA (obstructive sleep apnea)    • Primary hypertension 1/2/2015    Transitioned From: Benign essential hypertension     Past Surgical History:   Procedure Laterality Date   • KNEE SURGERY     • SINUS SURGERY       Allergies   Allergen Reactions   • Ace Inhibitors Swelling     Angioedema   • Other Anaphylaxis     Pt believes that he is allergic to peanut butter  Also, seasonal allergies   • Zinc Tongue Swelling   • Clindamycin      Had angioedema episode approx 5 hr after IM administration of clindamycin  Unclear if there is definitive causal relationship  Current Outpatient Medications on File Prior to Visit   Medication Sig Dispense Refill   • ammonium lactate (LAC-HYDRIN) 12 % lotion Apply topically 2 (two) times a day as needed for dry skin 400 g 0   • Ascorbic Acid (VITAMIN C PO) Take 1 tablet by mouth daily     • aspirin 81 mg chewable tablet Take one tablet by mouth daily     • atorvastatin (LIPITOR) 20 mg tablet Take 1 tablet by mouth once daily 90 tablet 3   • benzonatate (TESSALON PERLES) 100 mg capsule Take 100 mg by mouth 3 (three) times a day as needed     • budesonide-formoterol (SYMBICORT) 160-4 5 mcg/act inhaler Inhale 2 puffs 2 (two) times a day     • DULoxetine (CYMBALTA) 60 mg delayed release capsule Take 1 capsule (60 mg total) by mouth daily 90 capsule 1   • EPINEPHrine (EPIPEN) 0 3 mg/0 3 mL SOAJ INJECT 0 3ML INTO A MUSCLE ONCE FOR 1 DOSE 2 each 0   • fluticasone (FLONASE) 50 mcg/act nasal spray 2 sprays into each nostril daily 16 g 11   • fluticasone-vilanterol (BREO ELLIPTA) 100-25 mcg/inh inhaler Inhale 1 puff daily Rinse mouth after use   1 each 1   • gabapentin (Neurontin) 100 mg capsule Take 1 capsule (100 mg total) by mouth 3 (three) times a day 90 capsule 1   • HumaLOG KwikPen 100 units/mL injection pen INJECT 15 UNITS SUBCUTANEOUSLY THREE TIMES DAILY WITH MEALS 15 mL 5   • hydrochlorothiazide (HYDRODIURIL) 25 mg tablet Take 1 tablet (25 mg total) by mouth 2 (two) times a day 60 tablet 2   • Insulin Glargine Solostar (Lantus SoloStar) 100 UNIT/ML SOPN Inject 0 6 mL (60 Units total) under the skin daily 18 mL 5   • Insulin Pen Needle (Pen Needles 3/16") 31G X 5 MM MISC Use 4 (four) times a day 300 each 3   • ipratropium-albuterol (DUO-NEB) 0 5-2 5 mg/3 mL nebulizer solution Inhale 3 mL 4 (four) times a day as needed     • levothyroxine (Euthyrox) 175 mcg tablet Take 1 tablet (175 mcg total) by mouth daily 90 tablet 3   • lisinopril (ZESTRIL) 5 mg tablet Take 5 mg by mouth daily     • loratadine (CLARITIN) 10 mg tablet Take 10 mg by mouth daily     • metFORMIN (GLUCOPHAGE) 1000 MG tablet Take 1 tablet (1,000 mg total) by mouth 2 (two) times a day 60 tablet 2   • predniSONE 20 mg tablet Take 40 mg by mouth daily     • pregabalin (LYRICA) 25 mg capsule Take 1 capsule (25 mg total) by mouth 2 (two) times a day 60 capsule 5   • sodium chloride (OCEAN) 0 65 % nasal spray 1 spray into each nostril as needed for rhinitis 30 mL 1   • traZODone (DESYREL) 150 mg tablet Take 1 tablet (150 mg total) by mouth daily at bedtime 90 tablet 0   • umeclidinium (Incruse Ellipta) 62 5 mcg/actuation AEPB inhaler Inhale 1 puff daily     • Ventolin  (90 Base) MCG/ACT inhaler Inhale 2 puffs every 4 (four) hours as needed for wheezing or shortness of breath 18 g 5   • hydrOXYzine HCL (ATARAX) 25 mg tablet Take 1 tablet (25 mg total) by mouth every 6 (six) hours as needed for itching or allergies (Patient not taking: No sig reported) 30 tablet 0   • insulin lispro (HumaLOG) 100 units/mL injection Inject 15 Units under the skin 3 (three) times a day with meals 40 5 mL 0   • omega-3-acid ethyl esters (LOVAZA) 1 g capsule Take 1 capsule (1 g total) by mouth 2 (two) times a day for high cholesterol (Patient not taking: No sig reported) 60 capsule 2     No current facility-administered medications on file prior to visit       Social History     Socioeconomic History   • Marital status: Single     Spouse name: Not on file   • Number of children: Not on file   • Years of education: Not on file   • Highest education level: Not on file   Occupational History   • Not on file   Tobacco Use   • Smoking status: Former Smoker     Packs/day: 2 00     Years: 70 00     Pack years: 140 00     Types: Cigarettes     Quit date: 10/21/2022     Years since quittin 0   • Smokeless tobacco: Never Used   • Tobacco comment: states read to quit prior to admit   Vaping Use   • Vaping Use: Never used   Substance and Sexual Activity   • Alcohol use: Not Currently     Alcohol/week: 3 0 standard drinks     Types: 2 Cans of beer, 1 Shots of liquor per week     Comment: No alcohol since    • Drug use: No   • Sexual activity: Not Currently   Other Topics Concern   • Not on file   Social History Narrative   • Not on file     Social Determinants of Health     Financial Resource Strain: Not on file   Food Insecurity: No Food Insecurity   • Worried About Running Out of Food in the Last Year: Never true   • Ran Out of Food in the Last Year: Never true   Transportation Needs: No Transportation Needs   • Lack of Transportation (Medical): No   • Lack of Transportation (Non-Medical): No   Physical Activity: Not on file   Stress: Not on file   Social Connections: Not on file   Intimate Partner Violence: Not on file   Housing Stability: Unknown   • Unable to Pay for Housing in the Last Year: No   • Number of Places Lived in the Last Year: Not on file   • Unstable Housing in the Last Year: No         I have reviewed the past medical, surgical, social and family history, medications and allergies as documented in the EMR  Review of systems: ROS is negative other than that noted in the HPI  Constitutional: Negative for fatigue and fever  HENT: Negative for sore throat  Respiratory: Negative for shortness of breath  Cardiovascular: Negative for chest pain  Gastrointestinal: Negative for abdominal pain     Endocrine: Negative for cold intolerance and heat intolerance  Genitourinary: Negative for flank pain  Musculoskeletal: Negative for back pain  Skin: Negative for rash  Allergic/Immunologic: Negative for immunocompromised state  Neurological: Negative for dizziness  Psychiatric/Behavioral: Negative for agitation  Physical Exam:    Blood pressure 150/80, height 5' 4" (1 626 m), weight 117 kg (259 lb)  General/Constitutional: NAD, well developed, well nourished  HENT: Normocephalic, atraumatic  CV: Intact distal pulses, regular rate  Resp: No respiratory distress or labored breathing  Lymphatic: No lymphadenopathy palpated  Neuro: Alert and Oriented x 3, no focal deficits  Psych: Normal mood, normal affect  Skin: Warm, dry, no rashes, no erythema      Knee Exam:   Clinically he does have a varus deformity  No effusion  Medial and lateral joint line tenderness  Knee is stable to Lachman, posterior drawer, varus and valgus stress  Knee Imaging    X-rays of the bilateral knee reviewed and interpreted today  These show left knee DJD without acute fracture

## 2022-11-09 NOTE — PROGRESS NOTES
Initial Nutrition Assessment Form    Patient Name: Sagar Wheeler    YOB: 1965    Sex: Male     Assessment Date: 11/9/2022  Start Time: 1 pm Stop Time: 2 pm Total Minutes: 60     Data:  Present at session: self   Parent/Patient Concerns: "I need to lose weight and get my blood sugar in control"   Medical Dx/Reason for Referral: E11 65, Z79 4 (ICD-10-CM) - Type 2 diabetes mellitus with hyperglycemia, with long-term current use of insulin ([facility])   E66 01, Z68 42 (ICD-10-CM) - Morbid obesity with BMI of 45 0-49 9, adult ([facility] )        Past Medical History:   Diagnosis Date   • Acquired hypothyroidism 1/2/2015   • Angioedema    • Diabetes mellitus ([place] [address] )    • Disease of thyroid gland    • Hyperlipidemia    • Hypertension    • Mixed hyperlipidemia 2/4/2015   • Morbid obesity ([place] [address] )    • Morbid obesity with BMI of 45 0-49 9, adult ([place] [address] ) 1/2/2015   • MARSHA (obstructive sleep apnea)    • Primary hypertension 1/2/2015    Transitioned From: Benign essential hypertension       Current Outpatient Medications   Medication Sig Dispense Refill   • ammonium lactate (LAC-HYDRIN) 12 % lotion Apply topically 2 (two) times a day as needed for dry skin 400 g 0   • Ascorbic Acid (VITAMIN C PO) Take 1 tablet by mouth daily     • aspirin 81 mg chewable tablet Take one tablet by mouth daily     • atorvastatin (LIPITOR) 20 mg tablet Take 1 tablet by mouth once daily 90 tablet 3   • benzonatate (TESSALON PERLES) 100 mg capsule Take 100 mg by mouth 3 (three) times a day as needed     • budesonide-formoterol (SYMBICORT) 160-4 5 mcg/act inhaler Inhale 2 puffs 2 (two) times a day     • DULoxetine (CYMBALTA) 60 mg delayed release capsule Take 1 capsule (60 mg total) by mouth daily 90 capsule 1   • EPINEPHrine (EPIPEN) 0 3 mg/0 3 mL SOAJ INJECT 0 3ML INTO A MUSCLE ONCE FOR 1 DOSE 2 each 0   • fluticasone (FLONASE) 50 mcg/act nasal spray 2 sprays into each nostril daily 16 g 11   • fluticasone-vilanterol (BREO ELLIPTA) 100-25 mcg/inh inhaler Inhale 1 puff daily Rinse mouth after use   1 each 1   • gabapentin (Neurontin) 100 mg capsule Take 1 capsule (100 mg total) by mouth 3 (three) times a day 90 capsule 1   • HumaLOG KwikPen 100 units/mL injection pen INJECT 15 UNITS SUBCUTANEOUSLY THREE TIMES DAILY WITH MEALS 15 mL 5   • hydrochlorothiazide (HYDRODIURIL) 25 mg tablet Take 1 tablet (25 mg total) by mouth 2 (two) times a day 60 tablet 2   • hydrOXYzine HCL (ATARAX) 25 mg tablet Take 1 tablet (25 mg total) by mouth every 6 (six) hours as needed for itching or allergies (Patient not taking: No sig reported) 30 tablet 0   • Insulin Glargine Solostar (Lantus SoloStar) 100 UNIT/ML SOPN Inject 0 6 mL (60 Units total) under the skin daily 18 mL 5   • insulin lispro (HumaLOG) 100 units/mL injection Inject 15 Units under the skin 3 (three) times a day with meals 40 5 mL 0   • Insulin Pen Needle (Pen Needles 3/16") 31G X 5 MM MISC Use 4 (four) times a day 300 each 3   • ipratropium-albuterol (DUO-NEB) 0 5-2 5 mg/3 mL nebulizer solution Inhale 3 mL 4 (four) times a day as needed     • levothyroxine (Euthyrox) 175 mcg tablet Take 1 tablet (175 mcg total) by mouth daily 90 tablet 3   • lisinopril (ZESTRIL) 5 mg tablet Take 5 mg by mouth daily     • loratadine (CLARITIN) 10 mg tablet Take 10 mg by mouth daily     • metFORMIN (GLUCOPHAGE) 1000 MG tablet Take 1 tablet (1,000 mg total) by mouth 2 (two) times a day 60 tablet 2   • omega-3-acid ethyl esters (LOVAZA) 1 g capsule Take 1 capsule (1 g total) by mouth 2 (two) times a day for high cholesterol (Patient not taking: No sig reported) 60 capsule 2   • predniSONE 20 mg tablet Take 40 mg by mouth daily     • pregabalin (LYRICA) 25 mg capsule Take 1 capsule (25 mg total) by mouth 2 (two) times a day 60 capsule 5   • sodium chloride (OCEAN) 0 65 % nasal spray 1 spray into each nostril as needed for rhinitis 30 mL 1   • traZODone (DESYREL) 150 mg tablet Take 1 tablet (150 mg total) by mouth daily at bedtime 90 tablet 0 • umeclidinium (Incruse Ellipta) 62 5 mcg/actuation AEPB inhaler Inhale 1 puff daily     • Ventolin  (90 Base) MCG/ACT inhaler Inhale 2 puffs every 4 (four) hours as needed for wheezing or shortness of breath 18 g 5     No current facility-administered medications for this visit  Additional Meds/Supplements: Medications reviewed   Special Learning Needs: none   Height: HC Readings from Last 5 Encounters:   No data found for [facility]       Weight: Wt Readings from Last 10 Encounters:   22 117 kg (259 lb)   22 117 kg (259 lb)   22 119 kg (263 lb)   10/28/22 117 kg (258 lb)   22 117 kg (257 lb)   22 115 kg (254 lb)   22 117 kg (259 lb)   22 118 kg (260 lb)   22 117 kg (258 lb)   22 119 kg (263 lb)     Estimated body mass index is 44 46 kg/m² as calculated from the following:    Height as of an earlier encounter on 22: 5' 4" (1 626 m)  Weight as of an earlier encounter on 22: 117 kg (259 lb)  Recent Weight Change: []Yes     [x]No  Amount: Weight stable per patient      Energy Needs: 1800 calories/225 g carbs per day (30 [place]/kg IBW)   Allergies   Allergen Reactions   • Ace Inhibitors Swelling     Angioedema   • Other Anaphylaxis     Pt believes that he is allergic to peanut butter  Also, seasonal allergies   • Zinc Tongue Swelling   • Clindamycin      Had angioedema episode approx 5 hr after IM administration of clindamycin  Unclear if there is definitive causal relationship         Social History     Substance and Sexual Activity   Alcohol Use Not Currently   • Alcohol/week: 3 0 standard drinks   • Types: 2 Cans of beer, 1 Shots of liquor per week    Comment: No alcohol since        Social History     Tobacco Use   Smoking Status Former Smoker   • Packs/day: 2 00   • Years: 70 00   • Pack years: 140 00   • Types: Cigarettes   • Quit date: 10/21/2022   • Years since quittin 0   Smokeless Tobacco Never Used   Tobacco Comment    states read to quit prior to admit       Who shops? patient   Who cooks? patient   Exercise: Very limited due to bum knee and walking with cane   Prior Counseling?  [x]Yes     []No  When: Years ago     Why: diabetes        Diet Hx:  Breakfast: Diet:  Egg omelet with cheese, meat and vegetables if patient has on hand and whole wheat toast or oatmeal with 1 pack of apple and cinnamon mixed in; coffee,decaf with powder creamer and sugar sub, no juice; if out for doctor's appointment, will eat at [employer]   Lunch: Salad but was really out of control with eating junk food including hogies, pizza, chips, pretzels, chocolate         Dinner: Cooked meal but just recently started; lean protein not breaded, mostly rice but was eating baked potatoes or pasta, vegetable, brussel sprouts, green beans, cauliflower, limits corn; lots of salads recently; water         Snacks:   HS - was big on chocolate, cookies (almost whole pack) but now about 4, sugar free jello        Nutrition Diagnosis:   Excess carbohydrate intake  related to Physiological causes requiring modified carbohydrate intake (i e  diabetes mellitus) as  evidenced by Hemoglobin A1C >6%       Medical Nutrition Therapy Intervention:  [x]Individualized Meal Plan:  Modified carb diet reviewed with patient for improved blood sugars and weight loss [x]Understanding Lab Values:  Reviewed lipid profile and A1c numbers and goal   []Basic Pathophysiology of Disease []Food/Medication Interactions   []Food Diary [x]Exercise: discussed benefit of exercise and encouraged patient to remain as active as possible   [x]Lifestyle/Behavior Modification Techniques:  Encouraged patient to use plate method for portions and consider measuring cereals and snack food per serving size []Medication, Mechanism of Action   [x]Label Reading:  Reviewed label for serving size, calories, total carbs and added sugars [x]Self Blood Glucose Monitoring: patient recently started testing blood sugars on routine basis  He tries to test BID, fasting and at bedtime   [x]Weight/BMI Goals:  1# wt loss per week until goal weight achieved; patient would like to weigh 180# [x]Other - patient quit smoking about 3 weeks ago and is worried about gaining more weight  He is trying to make dietary changes and is making progress  Other Notes/Assessment:       Comprehension: []Excellent  []Very Good  [x]Good  []Fair   []Poor    Receptivity: []Excellent  []Very Good  [x]Good  []Fair   []Poor    Expected Compliance: []Excellent  []Very Good  [x]Good  []Fair   []Poor        Goals:  1  Limit carb intake <225 g per day   2  Weight loss 1# per week   3  Use my plate for dinner meal to gauge portions       No follow-ups on file  RD contact information provided to schedule      Labs:  CMP  Lab Results   Component Value Date     01/29/2015    K 4 4 10/31/2022    CL 93 (L) 10/31/2022    CO2 34 (H) 10/31/2022    ANIONGAP 7 01/29/2015    BUN 24 10/31/2022    CREATININE 1 14 10/31/2022    GLUCOSE 130 01/29/2015    GLUF 221 (H) 10/31/2022    CALCIUM 9 5 10/31/2022    CORRECTEDCA 10 2 (H) 10/31/2022    AST 13 10/31/2022    ALT 30 10/31/2022    ALKPHOS 84 10/31/2022    PROT 7 8 01/29/2015    BILITOT 0 3 01/29/2015    EGFR 70 10/31/2022       BMP  Lab Results   Component Value Date    GLUCOSE 130 01/29/2015    CALCIUM 9 5 10/31/2022     01/29/2015    K 4 4 10/31/2022    CO2 34 (H) 10/31/2022    CL 93 (L) 10/31/2022    BUN 24 10/31/2022    CREATININE 1 14 10/31/2022       Lipids  Lab Results   Component Value Date    CHOL 250 01/29/2015     Lab Results   Component Value Date    HDL 52 10/31/2022    HDL 30 (L) 05/03/2022    HDL 32 (L) 02/05/2021     Lab Results   Component Value Date    LDLCALC 134 (H) 10/31/2022    LDLCALC 123 (H) 05/03/2022    LDLCALC 103 (H) 02/05/2021     Lab Results   Component Value Date    TRIG 108 10/31/2022    TRIG 224 (H) 05/03/2022    TRIG 284 (H) 04/11/2021     No results found for: CHOLHDL    Hemoglobin A1C  Lab Results   Component Value Date    HGBA1C 9 9 (H) 10/14/2022       Fasting Glucose  Lab Results   Component Value Date    GLUF 221 (H) 10/31/2022       Insulin     Thyroid  No results found for: TSH, F1YCTKW, C5ZEPJH, THYROIDAB    Hepatic Function Panel  Lab Results   Component Value Date    ALT 30 10/31/2022    AST 13 10/31/2022    ALKPHOS 84 10/31/2022    BILITOT 0 3 01/29/2015       Celiac Disease Antibody Panel  No results found for: ENDOMYSIAL IGA, GLIADIN IGA, GLIADIN IGG, IGA, TISSUE TRANSGLUT AB, TTG IGA   Iron  No results found for: IRON, TIBC, FERRITIN         Lelo Fonder, [address]  [address] [facility] [address]

## 2022-11-09 NOTE — TELEPHONE ENCOUNTER
Good Morning Dr Peewee Crum,    Mr  Lee Micronesian saw Dr Kali Anderson this morning and he discussed the genicular nerve block with him  The patient would like to proceed with the procedure  Are you okay with me ordering this?     Thanks,    Lannie Meigs

## 2022-11-09 NOTE — TELEPHONE ENCOUNTER
----- Message from Tommy Zheng PA-C sent at 11/7/2022 10:40 AM EST -----   Please have patient come in for blood pressure check and reviews lab work

## 2022-11-10 ENCOUNTER — TELEPHONE (OUTPATIENT)
Dept: FAMILY MEDICINE CLINIC | Facility: HOME HEALTHCARE | Age: 57
End: 2022-11-10

## 2022-11-10 NOTE — TELEPHONE ENCOUNTER
[address] called stated patient's blood pressure is 180/95, heart rate 100  Scheduled him an appointment for 11/14/2022

## 2022-11-14 ENCOUNTER — EVALUATION (OUTPATIENT)
Dept: PHYSICAL THERAPY | Facility: HOME HEALTHCARE | Age: 57
End: 2022-11-14

## 2022-11-14 DIAGNOSIS — M17.0 OSTEOARTHRITIS OF BOTH KNEES, UNSPECIFIED OSTEOARTHRITIS TYPE: Primary | ICD-10-CM

## 2022-11-14 DIAGNOSIS — E66.01 MORBID OBESITY WITH BMI OF 45.0-49.9, ADULT (HCC): ICD-10-CM

## 2022-11-14 NOTE — PROGRESS NOTES
PT Evaluation     Today's date: 2022  Patient name: Marlene Gordon  : 1965  MRN: [id number]  Referring provider: Ignacio Haji  Dx:   Encounter Diagnosis     ICD-10-CM    1  Osteoarthritis of both knees, unspecified osteoarthritis type  M17 0 Ambulatory Referral to Physical Therapy   2  Morbid obesity with BMI of 45 0-49 9, adult ([facility])  E66 01 Ambulatory Referral to Physical Therapy    Z68 42        Start Time: 1435  Stop Time: 1510  Total time in clinic (min): 35 minutes    Assessment  Assessment details: Pt is a 62 y/o male presenting to OPPT with chronic bilateral knee pain, L>R, consistent with diagnosis of bilateral knee OA  Pt is presenting with significant pain levels, ROM deficits, strength deficits, and decreased functional mobility, which is affecting his ambulatory tolerance and ability to perform ADLs  Pt is currently independent for ambulation with a SPC, but demonstrates a significant antalgic gait pattern and gait dysfunction  Pt requires skilled PT in order to improve in pain, strength, ROM, functional mobility, quality of life, and return to PLOF  Thank you! Impairments: abnormal gait, abnormal or restricted ROM, abnormal movement, activity intolerance, impaired physical strength, lacks appropriate home exercise program, pain with function, weight-bearing intolerance and poor body mechanics  Understanding of Dx/Px/POC: good   Prognosis: good    Goals  STG: 3-4 weeks  Pt will be independent with HEP in order to continue to progress outside of PT treatment sessions  Pt will improve in quality of life as demonstrated by worst pain levels of 5/10 or less  Pt will improve in functional mobility as demonstrated by irving knee ROM of 0-120 deg or greater  LT-8 weeks  Pt will improve in quality of life as demonstrated by worst pain levels of 2/10 or less  Pt will improve in functional mobility as demonstrated by strength levels of 4+/5 or greater    Pt will improve in functional mobility as demonstrated by bilateral knee ROM WFL  Pt will improve in functional mobility as demonstrated by ability to perform full ADLs without any limitations due to pain  Plan  Patient would benefit from: skilled physical therapy  Planned modality interventions: cryotherapy  Planned therapy interventions: body mechanics training, flexibility, functional ROM exercises, gait training, home exercise program, joint mobilization, manual therapy, massage, neuromuscular re-education, patient education, postural training, strengthening, stretching, therapeutic activities and therapeutic exercise  Frequency: 2x week  Duration in weeks: 4  Plan of Care beginning date: 2022  Plan of Care expiration date: 2022  Treatment plan discussed with: patient        Subjective Evaluation    History of Present Illness  Mechanism of injury: Pt is presenting to OPPT with chronic anterior/medial bilateral knee pain, L>R  Pt reports that he gets pain in the front of his knee, but also has a lot of pain on the inside of his knee  Pt reports that he has arthritis all over his body  He reports that he received an injection last week, but that it did not help  Pt received an offloading brace to help with the arthritis, but that the brace has been sliding down has not been very helpful  Pt reports that both of his knees grind and pop  Pt reports that he will be receiving a nerve block in his knee next week  Pt currently ambulates independently with SPC    Pain  Current pain ratin  At best pain ratin  At worst pain rating: 10  Quality: sharp (Stabbing)  Relieving factors: relaxation, rest and support  Aggravating factors: standing, walking and stair climbing    Social Support  Steps to enter house: yes  Stairs in house: yes (21)   Lives in: apartment      Diagnostic Tests  X-ray: abnormal (Moderate tricompartmental osteoarthritis of the left knee as evidenced by joint space narrowing, osteophyte formation and subchondral sclerosis  Mild tricompartmental degenerative changes of the right knee )  Treatments  Current treatment: injection treatment  Patient Goals  Patient goals for therapy: decreased pain, increased motion, increased strength and independence with ADLs/IADLs  Patient goal: To get on the bike and try to build some muscle in his knees and loosen them up  Objective     Tenderness   Left Knee   Tenderness in the medial joint line  Right Knee   Tenderness in the medial joint line  Neurological Testing     Sensation     Knee   Left Knee   Intact: light touch    Right Knee   Intact: light touch     Passive Range of Motion   Left Hip   Flexion: 95 degrees     Right Hip   Flexion: 90 degrees   Left Knee   Flexion: 114 degrees with pain  Extension: -1 degrees     Right Knee   Flexion: 117 degrees with pain  Extension: 0 degrees     Additional Passive Range of Motion Details  Jamie glute tightness    Strength/Myotome Testing     Left Hip   Planes of Motion   Flexion: 4+  Abduction: 4  Adduction: 5    Right Hip   Planes of Motion   Flexion: 4+  Abduction: 4  Adduction: 5    Left Knee   Flexion: 4  Extension: 4    Right Knee   Flexion: 4  Extension: 4    Left Ankle/Foot   Dorsiflexion: 4+    Right Ankle/Foot   Dorsiflexion: 4+    Tests     Left Hip   90/90 SLR: Negative  Right Hip   90/90 SLR: Positive  Ambulation     Ambulation: Level Surfaces   Ambulation with assistive device: independent    Additional Level Surfaces Ambulation Details  SPC    Ambulation: Stairs   Ascend stairs: independent  Pattern: non-reciprocal  Railings: one rail  Descend stairs: independent  Pattern: non-reciprocal  Railings: one rail  Curbs: independent    Observational Gait   Gait: antalgic   Decreased walking speed, stride length, left stance time, left swing time, left step length and right step length                Precautions: None    Re-eval Date: 12/14/2022      Manuals 11/14            Jamie knee PROM 8'            Jamie calf, hs, glute stretch                                       Neuro Re-Ed             Balance as appropriate             Amb w/o AD                                       Ther Ex             Nustep             Quad set HEP            Heel slide HEP            LAQ HEP            HS curls             SLR             Bridges HEP            Clamshells             S/L hip abd             Standing hip flex/ext/abd             Squat             Leg press             LF hip abd/add             Ther Activity             Step up/down                          Gait Training                                       Modalities             CP

## 2022-11-15 ENCOUNTER — CONSULT (OUTPATIENT)
Dept: CARDIOLOGY CLINIC | Facility: HOSPITAL | Age: 57
End: 2022-11-15

## 2022-11-15 VITALS
WEIGHT: 260 LBS | BODY MASS INDEX: 44.39 KG/M2 | HEIGHT: 64 IN | DIASTOLIC BLOOD PRESSURE: 70 MMHG | SYSTOLIC BLOOD PRESSURE: 128 MMHG | HEART RATE: 92 BPM

## 2022-11-15 DIAGNOSIS — R06.09 DYSPNEA ON EXERTION: ICD-10-CM

## 2022-11-15 DIAGNOSIS — Z72.0 TOBACCO ABUSE: ICD-10-CM

## 2022-11-15 DIAGNOSIS — E78.5 DYSLIPIDEMIA: ICD-10-CM

## 2022-11-15 DIAGNOSIS — I10 BENIGN ESSENTIAL HYPERTENSION: ICD-10-CM

## 2022-11-15 DIAGNOSIS — E66.01 MORBID OBESITY WITH BMI OF 45.0-49.9, ADULT (HCC): Chronic | ICD-10-CM

## 2022-11-15 DIAGNOSIS — G47.33 OBSTRUCTIVE SLEEP APNEA: ICD-10-CM

## 2022-11-15 DIAGNOSIS — I25.10 CORONARY ARTERY CALCIFICATION SEEN ON CAT SCAN: ICD-10-CM

## 2022-11-15 RX ORDER — ROSUVASTATIN CALCIUM 40 MG/1
40 TABLET, COATED ORAL DAILY
Qty: 90 TABLET | Refills: 3 | Status: SHIPPED | OUTPATIENT
Start: 2022-11-15

## 2022-11-15 NOTE — PROGRESS NOTES
Cardiology Consultation     Xena Bailey  [id number]  1965  [address] [place] [address] [facility] [address] [id number]      1  Dyspnea on exertion  NM myocardial perfusion spect (rx stress and/or rest)   2  Coronary artery calcification seen on CAT scan     3  Benign essential hypertension  Ambulatory Referral to Cardiology   4  Dyslipidemia  rosuvastatin (CRESTOR) 40 MG tablet    Lipid Panel With Direct LDL    Comprehensive metabolic panel   5  Tobacco abuse     6  Obstructive sleep apnea     7  Morbid obesity with BMI of 45 0-49 9, adult [facility])         Discussion/Summary:  Mr Pj Rachel is a pleasant 65-year-old gentleman who presents to the office today for the evaluation of shortness of breath, hypertension and dyslipidemia  He has shortness of breath on exertion  He does have underlying COPD  Nonetheless he has multiple risk factors for coronary artery disease and coronary artery calcifications noted on a CT scan done for noncardiac reasons  I have asked that he undergo a nuclear stress test for further evaluation  He cannot exercise on the treadmill due to issues with his knees hence a pharmacologic test has been requested  He had a recent echocardiogram which was a technically difficult study but unremarkable  His blood pressure is well controlled in the office today on his current antihypertensive medication regimen  Angioedema is listed in his chart from ACE-I inhibitors although he states he had angioedema when he was not taking the medication  He saw an allergist in the past for this issue  He does report elevated blood pressure readings at home  He is due to see his primary care provider in the near future  I have asked that he bring his home monitoring device with him to that visit to ensure it is providing accurate readings    He will report readings to the office for my review in a few weeks  He has known severe obstructive sleep apnea which is untreated and a contributor to his hypertension  He is scheduled for a home sleep study as well  A low-salt diet was reinforced  His most recent lipids reveal suboptimal numbers  He will transition his atorvastatin to rosuvastatin 40 mg daily and I will assess his lipids in a few months  Due to symptoms of claudication he is scheduled to undergo an arterial duplex per our vascular surgery team     Continued smoking cessation is imperative and was discussed  I will see him back in the office in a few months for re-evaluation  History of Present Illness:  Mr Elias Eldridge is a pleasant 79-year-old gentleman who presents to the office today for the evaluation of shortness of breath, hypertension and dyslipidemia  He was recently evaluated at [facility] with shortness of breath, cough and chest pain after coughing  He was hypoxic when EMS arrived necessitating a non-rebreather  He was transitioned to nasal cannula  He was treated for COPD exacerbation with oral steroids, inhalers, antibiotics and nebulizer treatments  He underwent an echocardiogram which was a technically difficult study but ultimately unremarkable  He was discharged on supplemental oxygen and did follow-up with pulmonary upon discharge  He underwent a 6 minute walk test at that visit  He did not qualify for oxygen and this was discontinued  He continues with shortness of breath with exertion  He has 21 steps into his apartment  He does become short of breath with ascending these  He feels his heart racing in doing so  He denies any exertional chest pain or pressure  He denies any signs or symptoms of congestive heart failure including lower extremity edema, paroxysmal nocturnal dyspnea, orthopnea, acute weight gain or increasing abdominal girth  Outside of exertion he denies any palpitations  He denies syncope or presyncope    He does report burning discomfort in his lower extremities with walking about one block  He does have a long history of tobacco abuse  He states that he quit smoking three weeks ago but smoked up to three packs a day for 40 years  He also had a history of alcohol abuse  He states that he would drink 12 beers and shots of liquor on almost a daily basis  He quit drinking about 7 to 8 years ago  He carries a diagnosis of CPAP  He is not compliant with CPAP      Patient Active Problem List   Diagnosis   • Allergic rhinitis   • Angioedema   • Arthritis   • Chronic back pain   • Chronic sinusitis   • COPD, severity to be determined ([place] [address] )   • Depression with anxiety   • Dyslipidemia   • Benign essential hypertension   • Acquired hypothyroidism   • Morbid obesity with BMI of 45 0-49 9, adult ([facility])   • Type 2 diabetes, uncontrolled, with neuropathy   • Vitamin D deficiency   • Bilateral lower leg cellulitis   • Elevated LFTs   • Elevated lactic acid level   • Tobacco abuse   • Noncompliance with diabetes treatment   • Acute respiratory failure with hypoxia ([facility])   • Epistaxis   • Obstructive sleep apnea   • Muscle twitching   • Type 2 diabetes mellitus with hyperglycemia, with long-term current use of insulin ([facility])   • Tobacco use   • Atelectasis   • Urinary retention   • Constipation   • Continuous dependence on cigarette smoking   • Ankle pain   • Depression, recurrent ([facility])   • Peripheral vascular disease ([place] [address] )     Past Medical History:   Diagnosis Date   • Acquired hypothyroidism 1/2/2015   • Angioedema    • Diabetes mellitus ([place] [address] )    • Disease of thyroid gland    • Hyperlipidemia    • Hypertension    • Mixed hyperlipidemia 2/4/2015   • Morbid obesity ([place] [address] )    • Morbid obesity with BMI of 45 0-49 9, adult ([place] [address] ) 1/2/2015   • MARSHA (obstructive sleep apnea)    • Primary hypertension 1/2/2015    Transitioned From: Benign essential hypertension     Social History     Socioeconomic History   • Marital status: Single Spouse name: Not on file   • Number of children: Not on file   • Years of education: Not on file   • Highest education level: Not on file   Occupational History   • Not on file   Tobacco Use   • Smoking status: Former Smoker     Packs/day: 2 00     Years: 70 00     Pack years: 140 00     Types: Cigarettes     Quit date: 10/21/2022     Years since quittin 0   • Smokeless tobacco: Never Used   • Tobacco comment: states read to quit prior to admit   Vaping Use   • Vaping Use: Never used   Substance and Sexual Activity   • Alcohol use: Not Currently     Alcohol/week: 3 0 standard drinks     Types: 2 Cans of beer, 1 Shots of liquor per week     Comment: No alcohol since    • Drug use: No   • Sexual activity: Not Currently   Other Topics Concern   • Not on file   Social History Narrative   • Not on file     Social Determinants of Health     Financial Resource Strain: Not on file   Food Insecurity: No Food Insecurity   • Worried About Running Out of Food in the Last Year: Never true   • Ran Out of Food in the Last Year: Never true   Transportation Needs: No Transportation Needs   • Lack of Transportation (Medical): No   • Lack of Transportation (Non-Medical):  No   Physical Activity: Not on file   Stress: Not on file   Social Connections: Not on file   Intimate Partner Violence: Not on file   Housing Stability: Unknown   • Unable to Pay for Housing in the Last Year: No   • Number of Places Lived in the Last Year: Not on file   • Unstable Housing in the Last Year: No      Family History   Problem Relation Age of Onset   • Thyroid cancer Mother    • Diabetes type II Mother    • Esophageal cancer Father    • Kidney cancer Brother    • Diabetes type II Brother    • Diabetes type II Maternal Grandmother      Past Surgical History:   Procedure Laterality Date   • KNEE SURGERY     • SINUS SURGERY         Current Outpatient Medications:   •  ammonium lactate (LAC-HYDRIN) 12 % lotion, Apply topically 2 (two) times a day as needed for dry skin, Disp: 400 g, Rfl: 0  •  Ascorbic Acid (VITAMIN C PO), Take 1 tablet by mouth daily, Disp: , Rfl:   •  aspirin 81 mg chewable tablet, Take one tablet by mouth daily, Disp: , Rfl:   •  benzonatate (TESSALON PERLES) 100 mg capsule, Take 100 mg by mouth 3 (three) times a day as needed, Disp: , Rfl:   •  DULoxetine (CYMBALTA) 60 mg delayed release capsule, Take 1 capsule (60 mg total) by mouth daily, Disp: 90 capsule, Rfl: 1  •  EPINEPHrine (EPIPEN) 0 3 mg/0 3 mL SOAJ, INJECT 0 3ML INTO A MUSCLE ONCE FOR 1 DOSE, Disp: 2 each, Rfl: 0  •  fluticasone-vilanterol (BREO ELLIPTA) 100-25 mcg/inh inhaler, Inhale 1 puff daily Rinse mouth after use , Disp: 1 each, Rfl: 1  •  gabapentin (Neurontin) 100 mg capsule, Take 1 capsule (100 mg total) by mouth 3 (three) times a day, Disp: 90 capsule, Rfl: 1  •  HumaLOG KwikPen 100 units/mL injection pen, INJECT 15 UNITS SUBCUTANEOUSLY THREE TIMES DAILY WITH MEALS, Disp: 15 mL, Rfl: 5  •  hydrochlorothiazide (HYDRODIURIL) 25 mg tablet, Take 1 tablet (25 mg total) by mouth 2 (two) times a day, Disp: 60 tablet, Rfl: 2  •  Insulin Glargine Solostar (Lantus SoloStar) 100 UNIT/ML SOPN, Inject 0 6 mL (60 Units total) under the skin daily, Disp: 18 mL, Rfl: 5  •  Insulin Pen Needle (Pen Needles 3/16") 31G X 5 MM MISC, Use 4 (four) times a day, Disp: 300 each, Rfl: 3  •  levothyroxine (Euthyrox) 175 mcg tablet, Take 1 tablet (175 mcg total) by mouth daily, Disp: 90 tablet, Rfl: 3  •  lisinopril (ZESTRIL) 5 mg tablet, Take 5 mg by mouth daily, Disp: , Rfl:   •  loratadine (CLARITIN) 10 mg tablet, Take 10 mg by mouth daily, Disp: , Rfl:   •  metFORMIN (GLUCOPHAGE) 1000 MG tablet, Take 1 tablet (1,000 mg total) by mouth 2 (two) times a day, Disp: 60 tablet, Rfl: 2  •  omega-3-acid ethyl esters (LOVAZA) 1 g capsule, Take 1 capsule (1 g total) by mouth 2 (two) times a day for high cholesterol, Disp: 60 capsule, Rfl: 2  •  pregabalin (LYRICA) 25 mg capsule, Take 1 capsule (25 mg total) by mouth 2 (two) times a day, Disp: 60 capsule, Rfl: 5  •  rosuvastatin (CRESTOR) 40 MG tablet, Take 1 tablet (40 mg total) by mouth daily, Disp: 90 tablet, Rfl: 3  •  sodium chloride (OCEAN) 0 65 % nasal spray, 1 spray into each nostril as needed for rhinitis, Disp: 30 mL, Rfl: 1  •  traZODone (DESYREL) 150 mg tablet, Take 1 tablet (150 mg total) by mouth daily at bedtime, Disp: 90 tablet, Rfl: 0  •  Ventolin  (90 Base) MCG/ACT inhaler, Inhale 2 puffs every 4 (four) hours as needed for wheezing or shortness of breath, Disp: 18 g, Rfl: 5  •  budesonide-formoterol (SYMBICORT) 160-4 5 mcg/act inhaler, Inhale 2 puffs 2 (two) times a day (Patient not taking: Reported on 11/15/2022), Disp: , Rfl:   •  fluticasone (FLONASE) 50 mcg/act nasal spray, 2 sprays into each nostril daily (Patient not taking: Reported on 11/15/2022), Disp: 16 g, Rfl: 11  •  hydrOXYzine HCL (ATARAX) 25 mg tablet, Take 1 tablet (25 mg total) by mouth every 6 (six) hours as needed for itching or allergies (Patient not taking: No sig reported), Disp: 30 tablet, Rfl: 0  •  insulin lispro (HumaLOG) 100 units/mL injection, Inject 15 Units under the skin 3 (three) times a day with meals (Patient not taking: Reported on 11/15/2022), Disp: 40 5 mL, Rfl: 0  •  ipratropium-albuterol (DUO-NEB) 0 5-2 5 mg/3 mL nebulizer solution, Inhale 3 mL 4 (four) times a day as needed (Patient not taking: Reported on 11/15/2022), Disp: , Rfl:   •  umeclidinium (Incruse Ellipta) 62 5 mcg/actuation AEPB inhaler, Inhale 1 puff daily (Patient not taking: Reported on 11/15/2022), Disp: , Rfl:   Allergies   Allergen Reactions   • Ace Inhibitors Swelling     Angioedema   • Other Anaphylaxis     Pt believes that he is allergic to peanut butter  Also, seasonal allergies   • Zinc Tongue Swelling   • Clindamycin      Had angioedema episode approx 5 hr after IM administration of clindamycin  Unclear if there is definitive causal relationship         Imaging: XR knee 4+ vw left injury    Result Date: 11/6/2022  Narrative: LEFT KNEE INDICATION:   M25 561: Pain in right knee M25 562: Pain in left knee G89 29: Other chronic pain M17 0: Bilateral primary osteoarthritis of knee  COMPARISON:  Right knee radiograph 7/29/2021, right knee radiograph 3/3/2022, left knee radiograph on 1122 VIEWS:  XR KNEE 4+ VW LEFT INJURY, XR KNEE 4+ VW RIGHT INJURY FINDINGS: There is no acute fracture or dislocation  Stable chronic appearing cortical thickening of the left femoral diaphysis partially imaged  There is no joint effusion  Moderate tricompartmental osteoarthritis of the left knee as evidenced by joint space narrowing, osteophyte formation and subchondral sclerosis  Mild tricompartmental degenerative changes of the right knee  No lytic or blastic osseous lesion  Soft tissues are unremarkable  Impression: No acute osseous abnormality  Degenerative changes as described  Workstation performed: [id number]     XR knee 4+ vw right injury    Result Date: 11/6/2022  Narrative: LEFT KNEE INDICATION:   M25 561: Pain in right knee M25 562: Pain in left knee G89 29: Other chronic pain M17 0: Bilateral primary osteoarthritis of knee  COMPARISON:  Right knee radiograph 7/29/2021, right knee radiograph 3/3/2022, left knee radiograph on 1122 VIEWS:  XR KNEE 4+ VW LEFT INJURY, XR KNEE 4+ VW RIGHT INJURY FINDINGS: There is no acute fracture or dislocation  Stable chronic appearing cortical thickening of the left femoral diaphysis partially imaged  There is no joint effusion  Moderate tricompartmental osteoarthritis of the left knee as evidenced by joint space narrowing, osteophyte formation and subchondral sclerosis  Mild tricompartmental degenerative changes of the right knee  No lytic or blastic osseous lesion  Soft tissues are unremarkable  Impression: No acute osseous abnormality  Degenerative changes as described   Workstation performed: [id number]       Review of Systems:  Review of Systems Respiratory: Positive for shortness of breath and wheezing  Negative for chest tightness  Musculoskeletal: Positive for arthralgias and back pain  All other systems reviewed and are negative          Vitals:    11/15/22 1241 11/15/22 1347   BP: 144/70 128/70   Pulse: 92    Weight: 118 kg (260 lb)    Height: 5' 4" (1 626 m)      Vitals:    11/15/22 1241   Weight: 118 kg (260 lb)     Height: 5' 4" (162 6 cm)     Physical Exam:  General appearance:  Appears stated age, alert, well appearing and in no distress  HEENT:  PERRLA, EOMI, no scleral icterus, no conjunctival pallor  NECK:  Supple, No elevated JVP, no thyromegaly, no carotid bruits  HEART:  Regular rate and rhythm, normal S1/S2, no S3/S4, no murmur or rub  LUNGS:  Coarse breath sounds with scattered an extra [place] wheezes bilaterally  ABDOMEN:  Soft, non-tender, positive bowel sounds, no rebound or guarding, no organomegaly   EXTREMITIES:  No edema with chronic venous stasis changes  VASCULAR:  Diminished posterior tibial pulses bilaterally   SKIN: No lesions or rashes on exposed skin  NEURO:  CN II-XII intact, no focal deficits

## 2022-11-16 ENCOUNTER — HOSPITAL ENCOUNTER (OUTPATIENT)
Dept: NON INVASIVE DIAGNOSTICS | Facility: HOSPITAL | Age: 57
Discharge: HOME | End: 2022-11-16

## 2022-11-16 DIAGNOSIS — I73.9 PERIPHERAL VASCULAR DISEASE (HCC): ICD-10-CM

## 2022-11-18 ENCOUNTER — OFFICE VISIT (OUTPATIENT)
Dept: FAMILY MEDICINE CLINIC | Facility: HOME HEALTHCARE | Age: 57
End: 2022-11-18

## 2022-11-18 VITALS
SYSTOLIC BLOOD PRESSURE: 134 MMHG | RESPIRATION RATE: 18 BRPM | OXYGEN SATURATION: 94 % | HEIGHT: 64 IN | TEMPERATURE: 98.3 F | HEART RATE: 98 BPM | DIASTOLIC BLOOD PRESSURE: 74 MMHG | BODY MASS INDEX: 42.99 KG/M2 | WEIGHT: 251.8 LBS

## 2022-11-18 DIAGNOSIS — R05.9 COUGH, UNSPECIFIED TYPE: ICD-10-CM

## 2022-11-18 DIAGNOSIS — E11.8 TYPE 2 DIABETES MELLITUS WITH COMPLICATION, WITH LONG-TERM CURRENT USE OF INSULIN (HCC): ICD-10-CM

## 2022-11-18 DIAGNOSIS — I10 PRIMARY HYPERTENSION: ICD-10-CM

## 2022-11-18 DIAGNOSIS — Z01.30 BP CHECK: Primary | ICD-10-CM

## 2022-11-18 DIAGNOSIS — Z79.4 TYPE 2 DIABETES MELLITUS WITH COMPLICATION, WITH LONG-TERM CURRENT USE OF INSULIN (HCC): ICD-10-CM

## 2022-11-18 RX ORDER — CHLORAL HYDRATE 500 MG
1000 CAPSULE ORAL 2 TIMES DAILY
COMMUNITY

## 2022-11-18 RX ORDER — BENZONATATE 100 MG/1
100 CAPSULE ORAL 3 TIMES DAILY PRN
Qty: 20 CAPSULE | Refills: 0 | Status: SHIPPED | OUTPATIENT
Start: 2022-11-18

## 2022-11-18 NOTE — PROGRESS NOTES
[address]       NAME: Martha Brooks is a 61 y o  male  : 1965    MRN: [id number]  DATE: 2022  TIME: 1:49 PM    Assessment and Plan   Diagnoses and all orders for this visit:    BP check    Cough, unspecified type  -     benzonatate (TESSALON PERLES) 100 mg capsule; Take 1 capsule (100 mg total) by mouth 3 (three) times a day as needed for cough    Type 2 diabetes mellitus with complication, with long-term current use of insulin (HCC)  -     metFORMIN (GLUCOPHAGE) 1000 MG tablet; Take 1 tablet (1,000 mg total) by mouth 2 (two) times a day    Primary hypertension    Other orders  -     Omega-3 Fatty Acids (fish oil) 1,000 mg; Take 1,000 mg by mouth 2 (two) times a day  -     Glucosamine-Chondroitin 250-200 MG TABS; Take 2 tablets by mouth 2 (two) times a day          History of Present Illness       HPI   79-year-old male here today for blood pressure check  Patient was recently seen by Cardiology and currently on lisinopril and hydrochlorothiazide  Blood pressure has improved  Patient was supposed to bring log of blood pressure checks at home however forgot  Will continue  Lisinopril 5 mg daily as well as hydrochlorothiazide 25 mg  Will continue follow Cardiology  Review of Systems   Review of Systems    Knee pain   currently denies fevers, chills, headache, dizziness, shortness of breath, chest pain, abdominal pain, weakness or dizziness      All other systems 90    Current Medications       Current Outpatient Medications:   •  ammonium lactate (LAC-HYDRIN) 12 % lotion, Apply topically 2 (two) times a day as needed for dry skin, Disp: 400 g, Rfl: 0  •  Ascorbic Acid (VITAMIN C PO), Take 1 tablet by mouth daily, Disp: , Rfl:   •  aspirin 81 mg chewable tablet, Take one tablet by mouth daily, Disp: , Rfl:   •  benzonatate (TESSALON PERLES) 100 mg capsule, Take 1 capsule (100 mg total) by mouth 3 (three) times a day as needed for cough, Disp: 20 capsule, Rfl: 0  •  DULoxetine (CYMBALTA) 60 mg delayed release capsule, Take 1 capsule (60 mg total) by mouth daily, Disp: 90 capsule, Rfl: 1  •  EPINEPHrine (EPIPEN) 0 3 mg/0 3 mL SOAJ, INJECT 0 3ML INTO A MUSCLE ONCE FOR 1 DOSE, Disp: 2 each, Rfl: 0  •  gabapentin (Neurontin) 100 mg capsule, Take 1 capsule (100 mg total) by mouth 3 (three) times a day, Disp: 90 capsule, Rfl: 1  •  Glucosamine-Chondroitin 250-200 MG TABS, Take 2 tablets by mouth 2 (two) times a day, Disp: , Rfl:   •  HumaLOG KwikPen 100 units/mL injection pen, INJECT 15 UNITS SUBCUTANEOUSLY THREE TIMES DAILY WITH MEALS, Disp: 15 mL, Rfl: 5  •  hydrochlorothiazide (HYDRODIURIL) 25 mg tablet, Take 1 tablet (25 mg total) by mouth 2 (two) times a day, Disp: 60 tablet, Rfl: 2  •  Insulin Glargine Solostar (Lantus SoloStar) 100 UNIT/ML SOPN, Inject 0 6 mL (60 Units total) under the skin daily, Disp: 18 mL, Rfl: 5  •  Insulin Pen Needle (Pen Needles 3/16") 31G X 5 MM MISC, Use 4 (four) times a day, Disp: 300 each, Rfl: 3  •  levothyroxine (Euthyrox) 175 mcg tablet, Take 1 tablet (175 mcg total) by mouth daily, Disp: 90 tablet, Rfl: 3  •  lisinopril (ZESTRIL) 5 mg tablet, Take 5 mg by mouth daily, Disp: , Rfl:   •  loratadine (CLARITIN) 10 mg tablet, Take 10 mg by mouth daily, Disp: , Rfl:   •  metFORMIN (GLUCOPHAGE) 1000 MG tablet, Take 1 tablet (1,000 mg total) by mouth 2 (two) times a day, Disp: 180 tablet, Rfl: 0  •  Omega-3 Fatty Acids (fish oil) 1,000 mg, Take 1,000 mg by mouth 2 (two) times a day, Disp: , Rfl:   •  pregabalin (LYRICA) 25 mg capsule, Take 1 capsule (25 mg total) by mouth 2 (two) times a day, Disp: 60 capsule, Rfl: 5  •  rosuvastatin (CRESTOR) 40 MG tablet, Take 1 tablet (40 mg total) by mouth daily, Disp: 90 tablet, Rfl: 3  •  sodium chloride (OCEAN) 0 65 % nasal spray, 1 spray into each nostril as needed for rhinitis, Disp: 30 mL, Rfl: 1  •  traZODone (DESYREL) 150 mg tablet, Take 1 tablet (150 mg total) by mouth daily at bedtime, Disp: 90 tablet, Rfl: 0  • Ventolin  (90 Base) MCG/ACT inhaler, Inhale 2 puffs every 4 (four) hours as needed for wheezing or shortness of breath, Disp: 18 g, Rfl: 5  •  budesonide-formoterol (SYMBICORT) 160-4 5 mcg/act inhaler, Inhale 2 puffs 2 (two) times a day (Patient not taking: Reported on 11/15/2022), Disp: , Rfl:   •  fluticasone (FLONASE) 50 mcg/act nasal spray, 2 sprays into each nostril daily (Patient not taking: Reported on 11/15/2022), Disp: 16 g, Rfl: 11  •  fluticasone-vilanterol (BREO ELLIPTA) 100-25 mcg/inh inhaler, Inhale 1 puff daily Rinse mouth after use , Disp: 1 each, Rfl: 1  •  hydrOXYzine HCL (ATARAX) 25 mg tablet, Take 1 tablet (25 mg total) by mouth every 6 (six) hours as needed for itching or allergies (Patient not taking: No sig reported), Disp: 30 tablet, Rfl: 0  •  insulin lispro (HumaLOG) 100 units/mL injection, Inject 15 Units under the skin 3 (three) times a day with meals (Patient not taking: Reported on 11/15/2022), Disp: 40 5 mL, Rfl: 0  •  ipratropium-albuterol (DUO-NEB) 0 5-2 5 mg/3 mL nebulizer solution, Inhale 3 mL 4 (four) times a day as needed (Patient not taking: Reported on 11/15/2022), Disp: , Rfl:   •  omega-3-acid ethyl esters (LOVAZA) 1 g capsule, Take 1 capsule (1 g total) by mouth 2 (two) times a day for high cholesterol (Patient not taking: Reported on 11/18/2022), Disp: 60 capsule, Rfl: 2  •  umeclidinium (Incruse Ellipta) 62 5 mcg/actuation AEPB inhaler, Inhale 1 puff daily (Patient not taking: Reported on 11/15/2022), Disp: , Rfl:     Current Allergies     Allergies as of 11/18/2022 - Reviewed 11/18/2022   Allergen Reaction Noted   • Ace inhibitors Swelling 09/29/2016   • Other Anaphylaxis 03/02/2018   • Zinc Tongue Swelling 04/20/2021   • Clindamycin Edema 05/07/2017            The following portions of the patient's history were reviewed and updated as appropriate: allergies, current medications, past family history, past medical history, past social history, past surgical history and problem list      Past Medical History:   Diagnosis Date   • Acquired hypothyroidism 1/2/2015   • Angioedema    • Diabetes mellitus ([facility] )    • Disease of thyroid gland    • Hyperlipidemia    • Hypertension    • Mixed hyperlipidemia 2/4/2015   • Morbid obesity ([facility] )    • Morbid obesity with BMI of 45 0-49 9, adult ([facility] ) 1/2/2015   • MARSHA (obstructive sleep apnea)    • Primary hypertension 1/2/2015    Transitioned From: Benign essential hypertension       Past Surgical History:   Procedure Laterality Date   • KNEE SURGERY     • SINUS SURGERY         Family History   Problem Relation Age of Onset   • Thyroid cancer Mother    • Diabetes type II Mother    • Esophageal cancer Father    • Kidney cancer Brother    • Diabetes type II Brother    • Diabetes type II Maternal Grandmother          Medications have been verified  Objective   /74 (BP Location: Left arm, Patient Position: Sitting, Cuff Size: Large)   Pulse 98   Temp 98 3 °F (36 8 °C) (Temporal)   Resp 18   Ht 5' 4" (1 626 m)   Wt 114 kg (251 lb 12 8 oz)   SpO2 94%   BMI 43 22 kg/m²        Physical Exam     Physical Exam  Vitals and nursing note reviewed  Constitutional:       General: He is not in acute distress  Appearance: He is obese  He is not ill-appearing, toxic-appearing or diaphoretic  HENT:      Head: Normocephalic  Cardiovascular:      Rate and Rhythm: Normal rate and regular rhythm  Heart sounds: Normal heart sounds  Pulmonary:      Effort: Pulmonary effort is normal       Breath sounds: Normal breath sounds  Abdominal:      Tenderness: There is no abdominal tenderness  Musculoskeletal:      Right lower leg: Edema present  Left lower leg: Edema present  Neurological:      Mental Status: He is alert and oriented to person, place, and time     Psychiatric:         Mood and Affect: Mood normal

## 2022-11-23 ENCOUNTER — HOSPITAL ENCOUNTER (OUTPATIENT)
Dept: RADIOLOGY | Facility: HOSPITAL | Age: 57
Discharge: HOME | End: 2022-11-23
Attending: STUDENT IN AN ORGANIZED HEALTH CARE EDUCATION/TRAINING PROGRAM

## 2022-11-23 VITALS
TEMPERATURE: 98.7 F | DIASTOLIC BLOOD PRESSURE: 85 MMHG | HEART RATE: 100 BPM | SYSTOLIC BLOOD PRESSURE: 128 MMHG | OXYGEN SATURATION: 95 % | RESPIRATION RATE: 20 BRPM

## 2022-11-23 DIAGNOSIS — M17.0 PRIMARY OSTEOARTHRITIS OF BOTH KNEES: ICD-10-CM

## 2022-11-23 RX ORDER — LIDOCAINE HYDROCHLORIDE 10 MG/ML
3 INJECTION, SOLUTION EPIDURAL; INFILTRATION; INTRACAUDAL; PERINEURAL ONCE
Status: COMPLETED | OUTPATIENT
Start: 2022-11-23 | End: 2022-11-23

## 2022-11-23 RX ORDER — BUPIVACAINE HYDROCHLORIDE 5 MG/ML
2.25 INJECTION, SOLUTION EPIDURAL; INTRACAUDAL ONCE
Status: DISCONTINUED | OUTPATIENT
Start: 2022-11-23 | End: 2022-11-23

## 2022-11-23 RX ADMIN — Medication 1.5 ML: at 09:49

## 2022-11-23 RX ADMIN — LIDOCAINE HYDROCHLORIDE 3 ML: 10 INJECTION, SOLUTION EPIDURAL; INFILTRATION; INTRACAUDAL; PERINEURAL at 09:43

## 2022-11-23 NOTE — DISCHARGE INSTRUCTIONS

## 2022-11-23 NOTE — H&P
History of Present Illness: The patient is a 61 y o  male who presents with complaints of knee pain      Past Medical History:   Diagnosis Date   • Acquired hypothyroidism 1/2/2015   • Angioedema    • Diabetes mellitus ([place] [address] )    • Disease of thyroid gland    • Hyperlipidemia    • Hypertension    • Mixed hyperlipidemia 2/4/2015   • Morbid obesity ([facility] )    • Morbid obesity with BMI of 45 0-49 9, adult ([place] [address] ) 1/2/2015   • MARSHA (obstructive sleep apnea)    • Primary hypertension 1/2/2015    Transitioned From: Benign essential hypertension       Past Surgical History:   Procedure Laterality Date   • KNEE SURGERY     • SINUS SURGERY           Current Outpatient Medications:   •  ammonium lactate (LAC-HYDRIN) 12 % lotion, Apply topically 2 (two) times a day as needed for dry skin, Disp: 400 g, Rfl: 0  •  Ascorbic Acid (VITAMIN C PO), Take 1 tablet by mouth daily, Disp: , Rfl:   •  aspirin 81 mg chewable tablet, Take one tablet by mouth daily, Disp: , Rfl:   •  benzonatate (TESSALON PERLES) 100 mg capsule, Take 1 capsule (100 mg total) by mouth 3 (three) times a day as needed for cough, Disp: 20 capsule, Rfl: 0  •  budesonide-formoterol (SYMBICORT) 160-4 5 mcg/act inhaler, Inhale 2 puffs 2 (two) times a day (Patient not taking: Reported on 11/15/2022), Disp: , Rfl:   •  DULoxetine (CYMBALTA) 60 mg delayed release capsule, Take 1 capsule (60 mg total) by mouth daily, Disp: 90 capsule, Rfl: 1  •  EPINEPHrine (EPIPEN) 0 3 mg/0 3 mL SOAJ, INJECT 0 3ML INTO A MUSCLE ONCE FOR 1 DOSE, Disp: 2 each, Rfl: 0  •  fluticasone (FLONASE) 50 mcg/act nasal spray, 2 sprays into each nostril daily (Patient not taking: Reported on 11/15/2022), Disp: 16 g, Rfl: 11  •  fluticasone-vilanterol (BREO ELLIPTA) 100-25 mcg/inh inhaler, Inhale 1 puff daily Rinse mouth after use , Disp: 1 each, Rfl: 1  •  gabapentin (Neurontin) 100 mg capsule, Take 1 capsule (100 mg total) by mouth 3 (three) times a day, Disp: 90 capsule, Rfl: 1  •  Glucosamine-Chondroitin 250-200 MG TABS, Take 2 tablets by mouth 2 (two) times a day, Disp: , Rfl:   •  HumaLOG KwikPen 100 units/mL injection pen, INJECT 15 UNITS SUBCUTANEOUSLY THREE TIMES DAILY WITH MEALS, Disp: 15 mL, Rfl: 5  •  hydrochlorothiazide (HYDRODIURIL) 25 mg tablet, Take 1 tablet (25 mg total) by mouth 2 (two) times a day, Disp: 60 tablet, Rfl: 2  •  hydrOXYzine HCL (ATARAX) 25 mg tablet, Take 1 tablet (25 mg total) by mouth every 6 (six) hours as needed for itching or allergies (Patient not taking: No sig reported), Disp: 30 tablet, Rfl: 0  •  Insulin Glargine Solostar (Lantus SoloStar) 100 UNIT/ML SOPN, Inject 0 6 mL (60 Units total) under the skin daily, Disp: 18 mL, Rfl: 5  •  insulin lispro (HumaLOG) 100 units/mL injection, Inject 15 Units under the skin 3 (three) times a day with meals (Patient not taking: Reported on 11/15/2022), Disp: 40 5 mL, Rfl: 0  •  Insulin Pen Needle (Pen Needles 3/16") 31G X 5 MM MISC, Use 4 (four) times a day, Disp: 300 each, Rfl: 3  •  levothyroxine (Euthyrox) 175 mcg tablet, Take 1 tablet (175 mcg total) by mouth daily, Disp: 90 tablet, Rfl: 3  •  lisinopril (ZESTRIL) 5 mg tablet, Take 5 mg by mouth daily, Disp: , Rfl:   •  loratadine (CLARITIN) 10 mg tablet, Take 10 mg by mouth daily, Disp: , Rfl:   •  metFORMIN (GLUCOPHAGE) 1000 MG tablet, Take 1 tablet (1,000 mg total) by mouth 2 (two) times a day, Disp: 180 tablet, Rfl: 0  •  Omega-3 Fatty Acids (fish oil) 1,000 mg, Take 1,000 mg by mouth 2 (two) times a day, Disp: , Rfl:   •  omega-3-acid ethyl esters (LOVAZA) 1 g capsule, Take 1 capsule (1 g total) by mouth 2 (two) times a day for high cholesterol (Patient not taking: Reported on 11/18/2022), Disp: 60 capsule, Rfl: 2  •  pregabalin (LYRICA) 25 mg capsule, Take 1 capsule (25 mg total) by mouth 2 (two) times a day, Disp: 60 capsule, Rfl: 5  •  rosuvastatin (CRESTOR) 40 MG tablet, Take 1 tablet (40 mg total) by mouth daily, Disp: 90 tablet, Rfl: 3  •  sodium chloride (OCEAN) 0 65 % nasal spray, 1 spray into each nostril as needed for rhinitis, Disp: 30 mL, Rfl: 1  •  traZODone (DESYREL) 150 mg tablet, Take 1 tablet (150 mg total) by mouth daily at bedtime, Disp: 90 tablet, Rfl: 0  •  umeclidinium (Incruse Ellipta) 62 5 mcg/actuation AEPB inhaler, Inhale 1 puff daily (Patient not taking: Reported on 11/15/2022), Disp: , Rfl:   •  Ventolin  (90 Base) MCG/ACT inhaler, Inhale 2 puffs every 4 (four) hours as needed for wheezing or shortness of breath, Disp: 18 g, Rfl: 5  No current facility-administered medications for this encounter  Allergies   Allergen Reactions   • Ace Inhibitors Swelling     Angioedema    PT STATES THIS IS NOT AN ALLERGY   • Other Anaphylaxis     Pt believes that he is allergic to peanut butter  Also, seasonal allergies   • Zinc Tongue Swelling     PT STATES THIS IS NOT AN ALLERGY   • Clindamycin Edema     Had angioedema episode approx 5 hr after IM administration of clindamycin  Unclear if there is definitive causal relationship  PT STATES THIS IS NOT AN ALLERGY       Physical Exam:   Vitals:    11/23/22 0954   BP: 128/85   Pulse: 100   Resp: 20   Temp:    SpO2: 95%     General: Awake, Alert, Oriented x 3, Mood and affect appropriate  Respiratory: Respirations even and unlabored  Cardiovascular: Peripheral pulses intact; no edema  Musculoskeletal Exam: antalgic gait    ASA Score: 2    Patient/Chart Verification  Patient ID Verified: Verbal  Consents Confirmed: Procedural, To be obtained in the Pre-Procedure area  H&P( within 30 days) Verified: To be obtained in the Pre-Procedure area  Allergies Reviewed: Yes  Anticoag/NSAID held?: NA  Currently on antibiotics?: No    Assessment:   1   Primary osteoarthritis of both knees        Plan: Left Genicular Nerve Block

## 2022-11-25 ENCOUNTER — APPOINTMENT (OUTPATIENT)
Dept: PHYSICAL THERAPY | Facility: HOME HEALTHCARE | Age: 57
End: 2022-11-25

## 2022-11-28 ENCOUNTER — DOCUMENTATION (OUTPATIENT)
Dept: FAMILY MEDICINE CLINIC | Facility: CLINIC | Age: 57
End: 2022-11-28

## 2022-11-28 ENCOUNTER — HOSPITAL ENCOUNTER (OUTPATIENT)
Dept: NUCLEAR MEDICINE | Facility: HOSPITAL | Age: 57
Discharge: HOME | End: 2022-11-28
Attending: INTERNAL MEDICINE

## 2022-11-28 ENCOUNTER — APPOINTMENT (OUTPATIENT)
Dept: PHYSICAL THERAPY | Facility: HOME HEALTHCARE | Age: 57
End: 2022-11-28

## 2022-11-28 DIAGNOSIS — E11.65 TYPE 2 DIABETES MELLITUS WITH HYPERGLYCEMIA, WITH LONG-TERM CURRENT USE OF INSULIN (HCC): ICD-10-CM

## 2022-11-28 DIAGNOSIS — R06.09 DYSPNEA ON EXERTION: ICD-10-CM

## 2022-11-28 DIAGNOSIS — Z79.4 TYPE 2 DIABETES MELLITUS WITH HYPERGLYCEMIA, WITH LONG-TERM CURRENT USE OF INSULIN (HCC): ICD-10-CM

## 2022-11-28 LAB
MAX HR PERCENT: 75 %
MAX HR: 123 BPM
NUC STRESS EJECTION FRACTION: 50 %
RATE PRESSURE PRODUCT: NORMAL
SL CV REST NUCLEAR ISOTOPE DOSE: 11 MCI
SL CV STRESS NUCLEAR ISOTOPE DOSE: 31.1 MCI
STRESS ANGINA INDEX: 0
STRESS BASELINE HR: 102 BPM
STRESS PEAK HR: 123 BPM
STRESS POST ESTIMATED WORKLOAD: 1 METS
STRESS POST PEAK BP: 116 MMHG
STRESS ST DEPRESSION: 0 MM
STRESS/REST PERFUSION RATIO: 1.03

## 2022-11-28 RX ORDER — INSULIN GLARGINE 100 [IU]/ML
60 INJECTION, SOLUTION SUBCUTANEOUS DAILY
Qty: 18 ML | Refills: 5 | Status: SHIPPED | OUTPATIENT
Start: 2022-11-28 | End: 2023-05-27

## 2022-11-28 RX ADMIN — REGADENOSON 0.4 MG: 0.08 INJECTION, SOLUTION INTRAVENOUS at 09:20

## 2022-11-28 NOTE — PROGRESS NOTES
Insurance will not cover patients Insulin Glargin-yfgn 100unit/ml pen injector  They will cover Insulin Degludec, Insulin Degludec flex-touch, Insuline glargine, insulin glargine solostar, lantus, lantus solostar, or levemir flex touch if one of these can be ordered instead? Thanks!

## 2022-11-28 NOTE — PROGRESS NOTES
I sent insulin glargine solostar, which is actually what was on his medication list so I'm not sure what happened there  Either way, it is sent  Thanks!

## 2022-11-29 DIAGNOSIS — F41.8 DEPRESSION WITH ANXIETY: ICD-10-CM

## 2022-11-29 RX ORDER — TRAZODONE HYDROCHLORIDE 150 MG/1
150 TABLET ORAL
Qty: 90 TABLET | Refills: 1 | Status: SHIPPED | OUTPATIENT
Start: 2022-11-29

## 2022-11-30 ENCOUNTER — OFFICE VISIT (OUTPATIENT)
Dept: PHYSICAL THERAPY | Facility: HOME HEALTHCARE | Age: 57
End: 2022-11-30

## 2022-11-30 DIAGNOSIS — M17.0 OSTEOARTHRITIS OF BOTH KNEES, UNSPECIFIED OSTEOARTHRITIS TYPE: Primary | ICD-10-CM

## 2022-11-30 NOTE — PROGRESS NOTES
Daily Note     Today's date: 2022  Patient name: Olga Ruiz  : 1965  MRN: [id number]  Referring provider: Amanda Cutler  Dx:   Encounter Diagnosis     ICD-10-CM    1  Osteoarthritis of both knees, unspecified osteoarthritis type  M17 0                  Subjective: Pt reports he has a lot of pain in his L knee  Pt reports he has some cracking in his R knee  Objective: See treatment diary below      Assessment: Tolerated treatment fair  Verbal cues needed t/o exercise to perform correctly  Pt reports pain L knee t/o exercises  Program limited due to pain L knee  Patient would benefit from continued PT      Plan: Continue per plan of care        Precautions: None    Re-eval Date: 2022      Manuals            Jamie knee PROM 8' 8'           Jamie calf, hs, glute stretch                                       Neuro Re-Ed             Balance as appropriate             Amb w/o AD                                       Ther Ex             Nustep  L1 10'           Quad set HEP 5"x10 Jamie           Heel slide HEP 1x10 Jamie           LAQ HEP 1 x15 Jamie           SAQ  5"x10 Jamie           Marches   seated 1x10 Jamie           HS curls             SLR  1x10 Jamie           Bridges HEP            Clamshells             S/L hip abd             Standing hip flex/ext/abd  1x10 R only flex           Squat             Leg press             LF hip abd/add             Ther Activity             Step up/down                          Gait Training                                       Modalities             CP

## 2022-12-02 ENCOUNTER — OFFICE VISIT (OUTPATIENT)
Dept: PHYSICAL THERAPY | Facility: HOME HEALTHCARE | Age: 57
End: 2022-12-02

## 2022-12-02 DIAGNOSIS — M17.0 OSTEOARTHRITIS OF BOTH KNEES, UNSPECIFIED OSTEOARTHRITIS TYPE: Primary | ICD-10-CM

## 2022-12-02 DIAGNOSIS — E66.01 MORBID OBESITY WITH BMI OF 45.0-49.9, ADULT (HCC): ICD-10-CM

## 2022-12-02 NOTE — PROGRESS NOTES
Daily Note     Today's date: 2022  Patient name: Juanjose Pemberton  : 1965  MRN: [id number]  Referring provider: Arian Kelly  Dx:   Encounter Diagnosis     ICD-10-CM    1  Osteoarthritis of both knees, unspecified osteoarthritis type  M17 0       2  Morbid obesity with BMI of 45 0-49 9, adult ([facility] [place] )  E66 01     Z68 42           Start Time: 1123  Stop Time: 1201  Total time in clinic (min): 38 minutes    Subjective: Pt reports that his knees are in a lot of pain and that they are gradually worsening  Objective: See treatment diary below      Assessment: Pt demonstrated fair tolerance to treatment session  He continues to be very limited in PT participation due to high pain levels  Pt was educated on how to put on his L knee off-loading brace, which did seem to help his symptoms a little  Pt would benefit from continued PT  Plan: Continue per plan of care        Precautions: None    Re-eval Date: 2022      Manuals           Jamie knee PROM 8' 8' nv          Jamie calf, hs, glute stretch                                       Neuro Re-Ed             Balance as appropriate             Amb w/o AD                                       Ther Ex             Nustep  L1 10' L1 10'          Quad set HEP 5"x10 Jamie 5"x10 Jamie          Heel slide HEP 1x10 Jamie 1x10 Jamie          LAQ HEP 1 x15 Jamie 1x15 Jamie          SAQ  5"x10 Jamie nv          Marches   seated 1x10 Jamie Seated 1x10 Jamie          HS curls             SLR  1x10 Jamie 1x10 Jamie          Bridges HEP            Clamshells             S/L hip abd             Standing hip flex/ext/abd  1x10 R only flex           Squat             Leg press             LF hip abd/add             Ther Activity             Step up/down                          Gait Training                                       Modalities             CP

## 2022-12-05 ENCOUNTER — OFFICE VISIT (OUTPATIENT)
Dept: PHYSICAL THERAPY | Facility: HOME HEALTHCARE | Age: 57
End: 2022-12-05

## 2022-12-05 DIAGNOSIS — E66.01 MORBID OBESITY WITH BMI OF 45.0-49.9, ADULT (HCC): ICD-10-CM

## 2022-12-05 DIAGNOSIS — M17.0 OSTEOARTHRITIS OF BOTH KNEES, UNSPECIFIED OSTEOARTHRITIS TYPE: Primary | ICD-10-CM

## 2022-12-05 NOTE — PROGRESS NOTES
Daily Note     Today's date: 2022  Patient name: Vesta Love  : 1965  MRN: [id number]  Referring provider: Marlene Olsen  Dx:   Encounter Diagnosis     ICD-10-CM    1  Osteoarthritis of both knees, unspecified osteoarthritis type  M17 0       2  Morbid obesity with BMI of 45 0-49 9, adult ([facility] )  E66 01     Z68 42           Start Time: 1300  Stop Time: 1340  Total time in clinic (min): 40 minutes    Subjective: Pt reports that he will be seeing an ortho MD to see what else they can do for him  Objective: See treatment diary below      Assessment: Pt demonstrated good tolerance to treatment session and was able to progress with strengthening exercises today  The pt continues to have the most difficulty with weight-bearing exercises, but was able to perform open chain exercises with minimal symptoms  Pt would benefit from continued PT  Plan: Continue per plan of care        Precautions: None    Re-eval Date: 2022      Manuals          Jamie knee PROM 8' 8' nv 8'         Jamie calf, hs, glute stretch                                       Neuro Re-Ed             Balance as appropriate             Amb w/o AD                                       Ther Ex             Nustep  L1 10' L1 10' L1 10'         Quad set HEP 5"x10 Jamie 5"x10 Jamie 5"x15 Jamie         Heel slide HEP 1x10 Jamie 1x10 Jamie 1x15 Jamie         LAQ HEP 1 x15 Jamie 1x15 Jamie 1x15 Jamie         SAQ  5"x10 Jamie nv          Marches   seated 1x10 Jamie Seated 1x10 Jamie          HS curls    Red 1x15 jamie         SLR  1x10 Jamie 1x10 Jamie 1x10 Jamie         Hip add    5"x15         Hip abd    Green 1x15         Bridges HEP            Clamshells             S/L hip abd             Standing hip flex/ext/abd  1x10 R only flex  1x10 ea jamie         Squat             Leg press             LF hip abd/add             Ther Activity             Step up/down                          Gait Training Modalities             CP

## 2022-12-07 ENCOUNTER — OFFICE VISIT (OUTPATIENT)
Dept: PHYSICAL THERAPY | Facility: HOME HEALTHCARE | Age: 57
End: 2022-12-07

## 2022-12-07 DIAGNOSIS — E66.01 MORBID OBESITY WITH BMI OF 45.0-49.9, ADULT (HCC): ICD-10-CM

## 2022-12-07 DIAGNOSIS — M17.0 OSTEOARTHRITIS OF BOTH KNEES, UNSPECIFIED OSTEOARTHRITIS TYPE: Primary | ICD-10-CM

## 2022-12-07 NOTE — PROGRESS NOTES
Daily Note     Today's date: 2022  Patient name: Kristal Asencio  : 1965  MRN: [id number]  Referring provider: Susanna Holman  Dx:   Encounter Diagnosis     ICD-10-CM    1  Osteoarthritis of both knees, unspecified osteoarthritis type  M17 0       2  Morbid obesity with BMI of 45 0-49 9, adult ([facility] )  E66 01     Z68 42                      Subjective: Patient reports increased B/L knee pain  He continues to work on getting a second opinion  Objective: See treatment diary below      Assessment: Tolerated treatment fair  Increased time and effort to complete TE today  Limited active and passive knee mobility noted B/L  Little change in status post session  Progress as able  Plan: Continue per plan of care         Precautions: None    Re-eval Date: 2022      Manuals         Jamie knee PROM 8' 8' nv 8' 8'        Jamie calf, hs, glute stretch                                       Neuro Re-Ed             Balance as appropriate             Amb w/o AD                                       Ther Ex             Nustep  L1 10' L1 10' L1 10' L1 10'        Quad set HEP 5"x10 Jamie 5"x10 Jamie 5"x15 Jamie 5"x15 Jamie        Heel slide HEP 1x10 Jamie 1x10 Jamie 1x15 Jamie 1x15 Jamie        LAQ HEP 1 x15 Jamie 1x15 Jamie 1x15 Jamie 1x15 Jamie        SAQ  5"x10 Jamie nv          Marches   seated 1x10 Jamie Seated 1x10 Jamie          HS curls    Red 1x15 jamie         SLR  1x10 Jamie 1x10 Jamie 1x10 Jamie 1x10 Jamie        Hip add    5"x15 5"x15        Hip abd    Green 1x15 Green 1x15        Bridges HEP            Clamshells             S/L hip abd             Standing hip flex/ext/abd  1x10 R only flex  1x10 ea jamie 1x10 ea jamie        Squat             Leg press             LF hip abd/add             Ther Activity             Step up/down                          Gait Training                                       Modalities             CP

## 2022-12-09 ENCOUNTER — OFFICE VISIT (OUTPATIENT)
Dept: PHYSICAL THERAPY | Facility: HOME HEALTHCARE | Age: 57
End: 2022-12-09

## 2022-12-09 DIAGNOSIS — M17.0 OSTEOARTHRITIS OF BOTH KNEES, UNSPECIFIED OSTEOARTHRITIS TYPE: Primary | ICD-10-CM

## 2022-12-09 NOTE — PROGRESS NOTES
Daily Note     Today's date: 2022  Patient name: Andrés Wheat  : 1965  MRN: [id number]  Referring provider: Chandrakant Dubois PA-C  Dx: No diagnosis found  Start Time:           Subjective: Pain of about 8/10 at L knee today  Objective: See treatment diary below    Assessment: Tolerated treatment fair  Pt with pain at R hip and B knees that limits progression  Pt reports overall fatigue at end of Tx  Patient would benefit from continued PT    Plan: Continue per plan of care        Precautions: None    Re-eval Date: 2022      Manuals  12-9       Jamie knee PROM 8' 8' nv 8' 8' 8'       Jamie calf, hs, glute stretch                                       Neuro Re-Ed             Balance as appropriate             Amb w/o AD                                       Ther Ex             Nustep  L1 10' L1 10' L1 10' L1 10' L1  10'       Quad set HEP 5"x10 Jamie 5"x10 Jamie 5"x15 Jamie 5"x15 Jmaie        Heel slide HEP 1x10 Jamie 1x10 Jamie 1x15 Jamie 1x15 Jamie 1x15 Jamie       LAQ HEP 1 x15 Jamie 1x15 Jamie 1x15 Jamie 1x15 Jamie 1x15 jamie       SAQ  5"x10 Jamie nv          Marches   seated 1x10 Jamie Seated 1x10 Jamie          HS curls    Red 1x15 jamie  Red  1x15       SLR  1x10 Jamie 1x10 Jamie 1x10 Jamie 1x10 Jamie 1x10   jamie       Hip add    5"x15 5"x15 5" x15       Hip abd    Green 1x15 Green 1x15 Green  1x15       Bridges HEP            Clamshells             S/L hip abd             Standing hip flex/ext/abd  1x10 R only flex  1x10 ea jamie 1x10 ea jamie -1x10 fwd B  -1x5 lat B  -1x10 ext B       Squat             Leg press             LF hip abd/add             Ther Activity             Step up/down                          Gait Training                                       Modalities             CP

## 2022-12-12 ENCOUNTER — OFFICE VISIT (OUTPATIENT)
Dept: PHYSICAL THERAPY | Facility: HOME HEALTHCARE | Age: 57
End: 2022-12-12

## 2022-12-12 DIAGNOSIS — M17.0 OSTEOARTHRITIS OF BOTH KNEES, UNSPECIFIED OSTEOARTHRITIS TYPE: Primary | ICD-10-CM

## 2022-12-12 NOTE — PROGRESS NOTES
Daily Note     Today's date: 2022  Patient name: Martha Brooks  : 1965  MRN: [id number]  Referring provider: Carlos Manuel Broderick  Dx:   Encounter Diagnosis     ICD-10-CM    1  Osteoarthritis of both knees, unspecified osteoarthritis type  M17 0                  Subjective: Pt reports pain L knee > R knee  Objective: See treatment diary below      Assessment: Tolerated treatment fair  Pain noted both knees t/o exercise  Some verbal cues needed to perform exercises correctly  Fatigue noted with exercise  Patient would benefit from continued PT      Plan: Continue per plan of care        Precautions: None    Re-eval Date: 2022      Manuals     Jamie knee PROM 8' 8' nv 8' 8' 8' 8'    Jamie calf, hs, glute stretch                                 Neuro Re-Ed           Balance as appropriate           Amb w/o AD                                 Ther Ex           Nustep  L1 10' L1 10' L1 10' L1 10' L1  10' L1 10'     Quad set HEP 5"x10 Jamie 5"x10 Jamie 5"x15 Jamie 5"x15 Jamie      Heel slide HEP 1x10 Jamie 1x10 Jamie 1x15 Jamie 1x15 Jamie 1x15 Jamie 1x15 Jamie    LAQ HEP 1 x15 Jamie 1x15 Jamie 1x15 Jamie 1x15 Jamie 1x15 jamie 1x15 Jamie    SAQ  5"x10 Jamie nv        Marches   seated 1x10 Jamie Seated 1x10 Jamie        HS curls    Red 1x15 jamie  Red  1x15 Red   1x15     SLR  1x10 Jamie 1x10 Jamie 1x10 Jamie 1x10 Jamie 1x10   jamie 1x10 Jamie    Hip add    5"x15 5"x15 5" x15 5"x15     Hip abd    Green 1x15 Green 1x15 Green  1x15 Green  1x15     Bridges HEP          Clamshells           S/L hip abd           Standing hip flex/ext/abd  1x10 R only flex  1x10 ea jamie 1x10 ea jamie -1x10 fwd B  -1x5 lat B  -1x10 ext B 1 x 10 ea Jamie     Squat           Leg press           LF hip abd/add           Ther Activity           Step up/down                      Gait Training                                 Modalities           CP

## 2022-12-14 ENCOUNTER — OFFICE VISIT (OUTPATIENT)
Dept: PHYSICAL THERAPY | Facility: HOME HEALTHCARE | Age: 57
End: 2022-12-14

## 2022-12-14 DIAGNOSIS — M17.0 OSTEOARTHRITIS OF BOTH KNEES, UNSPECIFIED OSTEOARTHRITIS TYPE: Primary | ICD-10-CM

## 2022-12-14 NOTE — PROGRESS NOTES
Daily Note     Today's date: 2022  Patient name: Juan Benitez  : 1965  MRN: [id number]  Referring provider: Reji Bloom PA-C  Dx: No diagnosis found  Start Time:           Subjective: My hips seem to be doing better but I have pain in both knees  L knee 9/10  R knee 3/10     Objective: See treatment diary below    Assessment: Tolerated treatment fair  No advancement with TE today 2* pt with c/o B knee pain L > R  Pt with frequent short rests needed 2* fatigue and pain with ex  Pt remains with strength, endurance and ROM deficits and would benefit from continued PT    Plan: Continue per plan of care        Precautions: None    Re-eval Date: 2022      Manuals    Jamie knee PROM 8' 8' nv 8' 8' 8' 8' 8'   Jamie calf, hs, glute stretch                                 Neuro Re-Ed           Balance as appropriate           Amb w/o AD                                 Ther Ex           Nustep  L1 10' L1 10' L1 10' L1 10' L1  10' L1 10'  L 1  10'   Quad set HEP 5"x10 Jamie 5"x10 Jamie 5"x15 Jamie 5"x15 Jamie      Heel slide HEP 1x10 Jamie 1x10 Jamie 1x15 Jamie 1x15 Jamie 1x15 Jamie 1x15 Jamie 1x15 jamie   LAQ HEP 1 x15 Jamie 1x15 Jamie 1x15 Jamie 1x15 Jamie 1x15 jamie 1x15 Jamie 1x15 jamie   SAQ  5"x10 Jamie nv        Marches   seated 1x10 Jamie Seated 1x10 Jamie        HS curls    Red 1x15 jamie  Red  1x15 Red   1x15  Red  1x15   SLR  1x10 Jamie 1x10 Jamie 1x10 Jamie 1x10 Jamie 1x10   jamie 1x10 Jamie 1x10 jamie   Hip add    5"x15 5"x15 5" x15 5"x15  5" x15   Hip abd    Green 1x15 Green 1x15 Green  1x15 Green  1x15  Green  1x15   Bridges HEP          Clamshells           S/L hip abd           Standing hip flex/ext/abd  1x10 R only flex  1x10 ea jamie 1x10 ea jamie -1x10 fwd B  -1x5 lat B  -1x10 ext B 1 x 10 ea Jamie  1x10 ea jamie   Squat           Leg press           LF hip abd/add           Ther Activity           Step up/down                      Gait Training                                 Modalities CP

## 2022-12-16 ENCOUNTER — APPOINTMENT (OUTPATIENT)
Dept: PHYSICAL THERAPY | Facility: HOME HEALTHCARE | Age: 57
End: 2022-12-16

## 2022-12-19 ENCOUNTER — EVALUATION (OUTPATIENT)
Dept: PHYSICAL THERAPY | Facility: HOME HEALTHCARE | Age: 57
End: 2022-12-19

## 2022-12-19 DIAGNOSIS — M17.0 OSTEOARTHRITIS OF BOTH KNEES, UNSPECIFIED OSTEOARTHRITIS TYPE: Primary | ICD-10-CM

## 2022-12-19 DIAGNOSIS — E66.01 MORBID OBESITY WITH BMI OF 45.0-49.9, ADULT (HCC): ICD-10-CM

## 2022-12-19 NOTE — PROGRESS NOTES
PT Re-Evaluation  and PT Discharge    Today's date: 2022  Patient name: Emmett Christensen  : 1965  MRN: [id number]  Referring provider: Tiffany Martinez  Dx:   Encounter Diagnosis     ICD-10-CM    1  Osteoarthritis of both knees, unspecified osteoarthritis type  M17 0       2  Morbid obesity with BMI of 45 0-49 9, adult ([facility])  E66 01     Z68 [id number]           Start Time: 1120  Stop Time: 1200  Total time in clinic (min): 40 minutes    Assessment  Assessment details: Pt has made some improvements in strength and mobility since beginning PT, however, he continues to present with significant bilateral knee pain, L>R  Pt has met his short-term goals for HEP, however, he is still progressing toward his goals for pain, strength, ROM, and functional mobility  Pt does continue to demonstrate L knee strength deficits and bilateral knee ROM deficits due to pain and stiffness  Pt would benefit from continued PT in order to further progress toward his goals  UPDATE:  Pt was evaluated on 2022 and attended a total of 9 PT visits, with his most recent PT session being on 2022   Pt called after his most recent PT session and reported that he will be going back to the MD for further assessment and that he requested to be D/C to HEP  The pt will be D/C to HEP at this time  Impairments: abnormal gait, abnormal or restricted ROM, abnormal movement, activity intolerance, impaired physical strength, lacks appropriate home exercise program, pain with function, weight-bearing intolerance and poor body mechanics  Understanding of Dx/Px/POC: good   Prognosis: good    Goals  STG: 3-4 weeks  Pt will be independent with HEP in order to continue to progress outside of PT treatment sessions  Met  Pt will improve in quality of life as demonstrated by worst pain levels of 5/10 or less  Pt will improve in functional mobility as demonstrated by irving knee ROM of 0-120 deg or greater    LT-8 weeks  Pt will improve in quality of life as demonstrated by worst pain levels of 2/10 or less  Pt will improve in functional mobility as demonstrated by strength levels of 4+/5 or greater  Pt will improve in functional mobility as demonstrated by bilateral knee ROM WFL  Pt will improve in functional mobility as demonstrated by ability to perform full ADLs without any limitations due to pain  Plan  Plan details: D/C to HEP  Treatment plan discussed with: patient        Subjective Evaluation    History of Present Illness  Mechanism of injury: Pt is presenting to OPPT with chronic anterior/medial bilateral knee pain, L>R  Pt reports that he gets pain in the front of his knee, but also has a lot of pain on the inside of his knee  Pt reports that he has arthritis all over his body  He reports that he received an injection last week, but that it did not help  Pt received an offloading brace to help with the arthritis, but that the brace has been sliding down has not been very helpful  Pt reports that both of his knees grind and pop  Pt reports that he will be receiving a nerve block in his knee next week  Pt [facility] [facility] [place] [facility]  UPDATE 2022:  Pt reports that he has a lot of pain in his knees and calves  Pt reports that he is not sure the PT has been helping his knee pain, but that the PT has been good for exercise  Pt reports that his L knee is a lot worse than his R  Pain  Current pain ratin  At best pain ratin  At worst pain rating: 10  Quality: sharp (Stabbing)  Relieving factors: relaxation, rest and support  Aggravating factors: standing, walking and stair climbing    Social Support  Steps to enter house: yes  Stairs in house: yes (21)   Lives in: apartment      Diagnostic Tests  X-ray: abnormal (Moderate tricompartmental osteoarthritis of the left knee as evidenced by joint space narrowing, osteophyte formation and subchondral sclerosis    Mild tricompartmental degenerative changes of the right knee )  Treatments  Current treatment: injection treatment  Patient Goals  Patient goals for therapy: decreased pain, increased motion, increased strength and independence with ADLs/IADLs  Patient goal: To get on the bike and try to build some muscle in his knees and loosen them up  Objective     Tenderness   Left Knee   Tenderness in the medial joint line  Right Knee   Tenderness in the medial joint line  Neurological Testing     Sensation     Knee   Left Knee   Intact: light touch    Right Knee   Intact: light touch     Passive Range of Motion   Left Hip   Flexion: 95 degrees     Right Hip   Flexion: 90 degrees   Left Knee   Flexion: 114 degrees with pain  Extension: -1 degrees     Right Knee   Flexion: 117 degrees with pain  Extension: 0 degrees     Additional Passive Range of Motion Details  Jamie glute tightness    Strength/Myotome Testing     Left Hip   Planes of Motion   Flexion: 4+  Abduction: 4  Adduction: 5    Right Hip   Planes of Motion   Flexion: 5  Abduction: 4  Adduction: 5    Left Knee   Flexion: 4  Extension: 4    Right Knee   Flexion: 4+  Extension: 4+    Left Ankle/Foot   Dorsiflexion: 4+    Right Ankle/Foot   Dorsiflexion: 4+    Tests     Left Hip   90/90 SLR: Negative  Right Hip   90/90 SLR: Positive  Ambulation     Ambulation: Level Surfaces   Ambulation with assistive device: independent    Additional Level Surfaces Ambulation Details  SPC    Ambulation: Stairs   Ascend stairs: independent  Pattern: non-reciprocal  Railings: one rail  Descend stairs: independent  Pattern: non-reciprocal  Railings: one rail  Curbs: independent    Observational Gait   Gait: antalgic   Decreased walking speed, stride length, left stance time, left swing time, left step length and right step length                Precautions: None    Re-eval Date: 01/19/2022        Manuals 11/14 11/30 12/2 12/5 12/7 12-9 12/12 12-14 12/19   Jamie knee PROM 8' 8' nv 8' 8' 8' 8' 8' 8'   Jamie calf, hs, glute stretch                                    Neuro Re-Ed            Balance as appropriate            Amb w/o AD                                    Ther Ex        12-14    Nustep  L1 10' L1 10' L1 10' L1 10' L1  10' L1 10'  L 1  10' L1 10'   Quad set HEP 5"x10 Jamie 5"x10 Jamie 5"x15 Jamie 5"x15 Jamie       Heel slide HEP 1x10 Jamie 1x10 Jamie 1x15 Jamie 1x15 Jamie 1x15 Jamie 1x15 Jamie 1x15 jamie 1x15 jamie   LAQ HEP 1 x15 Jamie 1x15 Jamie 1x15 Jamie 1x15 Jamie 1x15 jamie 1x15 Jamie 1x15 jamie 1x15 jamie   SAQ  5"x10 Jamie nv         Marches   seated 1x10 Jamie Seated 1x10 Jamie         HS curls    Red 1x15 jamie  Red  1x15 Red   1x15  Red  1x15 Red 1x15   SLR  1x10 Jamie 1x10 Jamie 1x10 Jamie 1x10 Jamie 1x10   jamie 1x10 Jamie 1x10 jamie 1x10 jamie   Hip add    5"x15 5"x15 5" x15 5"x15  5" x15 5"x15   Hip abd    Green 1x15 Green 1x15 Green  1x15 Green  1x15  Green  1x15 Green  1x15   Bridges HEP           Clamshells            S/L hip abd            Standing hip flex/ext/abd  1x10 R only flex  1x10 ea jamie 1x10 ea jamie -1x10 fwd B  -1x5 lat B  -1x10 ext B 1 x 10 ea Jamie  1x10 ea jamie 1x10 ea jamie   Squat            Leg press            LF hip abd/add            Ther Activity            Step up/down                        Gait Training                                    Modalities            CP

## 2022-12-21 ENCOUNTER — APPOINTMENT (OUTPATIENT)
Dept: PHYSICAL THERAPY | Facility: HOME HEALTHCARE | Age: 57
End: 2022-12-21

## 2022-12-22 DIAGNOSIS — Z79.4 TYPE 2 DIABETES MELLITUS WITH HYPERGLYCEMIA, WITH LONG-TERM CURRENT USE OF INSULIN (HCC): ICD-10-CM

## 2022-12-22 DIAGNOSIS — E11.65 TYPE 2 DIABETES MELLITUS WITH HYPERGLYCEMIA, WITH LONG-TERM CURRENT USE OF INSULIN (HCC): ICD-10-CM

## 2022-12-22 RX ORDER — INSULIN GLARGINE 100 [IU]/ML
60 INJECTION, SOLUTION SUBCUTANEOUS DAILY
Qty: 18 ML | Refills: 5 | Status: SHIPPED | OUTPATIENT
Start: 2022-12-22 | End: 2023-06-20

## 2022-12-22 NOTE — TELEPHONE ENCOUNTER
PT CALLED IN REQUESTING A REFILL ON HIS LANTUS SOLOSTAR 100 UNIT/ML  WILL CALL ONCE APPROVED   Kimmy Tian

## 2022-12-23 ENCOUNTER — APPOINTMENT (OUTPATIENT)
Dept: PHYSICAL THERAPY | Facility: HOME HEALTHCARE | Age: 57
End: 2022-12-23

## 2022-12-27 ENCOUNTER — OFFICE VISIT (OUTPATIENT)
Dept: PAIN MEDICINE | Facility: CLINIC | Age: 57
End: 2022-12-27

## 2022-12-27 ENCOUNTER — APPOINTMENT (OUTPATIENT)
Dept: RADIOLOGY | Facility: CLINIC | Age: 57
End: 2022-12-27

## 2022-12-27 VITALS
HEIGHT: 64 IN | WEIGHT: 264 LBS | BODY MASS INDEX: 45.07 KG/M2 | DIASTOLIC BLOOD PRESSURE: 73 MMHG | HEART RATE: 121 BPM | SYSTOLIC BLOOD PRESSURE: 116 MMHG

## 2022-12-27 DIAGNOSIS — M54.50 CHRONIC RIGHT-SIDED LOW BACK PAIN WITHOUT SCIATICA: ICD-10-CM

## 2022-12-27 DIAGNOSIS — G89.29 CHRONIC RIGHT-SIDED LOW BACK PAIN WITHOUT SCIATICA: ICD-10-CM

## 2022-12-27 DIAGNOSIS — G89.29 CHRONIC PAIN OF BOTH KNEES: Primary | ICD-10-CM

## 2022-12-27 DIAGNOSIS — M25.562 CHRONIC PAIN OF BOTH KNEES: Primary | ICD-10-CM

## 2022-12-27 DIAGNOSIS — M25.561 CHRONIC PAIN OF BOTH KNEES: Primary | ICD-10-CM

## 2022-12-27 NOTE — PROGRESS NOTES
Assessment:  1  Chronic pain of both knees    2  Chronic right-sided low back pain without sciatica        Plan:  24-year-old male here for follow-up of left greater than right knee pain, low back pain  Patient notes left knee genicular block on 11/23/2022 did not help significantly  He notes persistent pain, denies new or progressive weakness, saddle anesthesia, bowel/bladder incontinence  He has seen Ortho in the past for the symptoms and has undergone 1 corticosteroid injection to left knee which did not help significantly  1   Recommend physical therapy for core/back strengthening, quad strengthening for bilateral knee OA, address gait/balance  Patient previously attended a few sessions but recently discontinued due to lack of improvement  Discussed restarting this  Patient agrees with plan  2  Discussed optimizing health status, weight loss to help with pain from OA and slow progression, patient expressed understanding  3  Patient previously underwent prior left knee CSI X 1 from other provider w/o significant relief  Will attempt repeat left knee CSI with ultrasound guidance  4   Obtain lumbar spine x-ray for further evaluation of low back pain  Complete risks and benefits including bleeding, infection, tissue reaction, nerve injury and allergic reaction were discussed  The approach was demonstrated using models and literature was provided  Verbal and written consent was obtained  History of Present Illness: The patient is a 61 y o  male who presents for a follow up office visit in regards to Back Pain and Knee Pain  The patient’s current symptoms include left > right knee pain, low back pain  Denies significant changes since last visit  Left knee genicular block on 11/23/2022 did not help significantly  Denies new or progressive weakness, saddle anesthesia, bowel/bladder incontinence  Current pain medications includes: lyrica 25 mg bid, cymbalta 60 mg daily from other provider  The patient reports that this regimen is providing no pain relief  The patient is reporting no side effects from this pain medication regimen  I have personally reviewed and/or updated the patient's past medical history, past surgical history, family history, social history, current medications, allergies, and vital signs today  Review of Systems  Review of Systems   Constitutional: Negative for unexpected weight change  HENT: Negative for hearing loss  Eyes: Negative for visual disturbance  Respiratory: Positive for shortness of breath  Cardiovascular: Negative for leg swelling  Gastrointestinal: Negative for constipation  Endocrine: Negative for polyuria  Genitourinary: Negative for difficulty urinating  Musculoskeletal: Positive for arthralgias, gait problem and myalgias  Negative for joint swelling  Decreased range of motion  Swelling- knee & calf   Skin: Negative for rash  Neurological: Negative for weakness and headaches  Psychiatric/Behavioral: Negative for decreased concentration  All other systems reviewed and are negative          Past Medical History:   Diagnosis Date   • Acquired hypothyroidism 1/2/2015   • Angioedema    • Diabetes mellitus ([facility] )    • Disease of thyroid gland    • Hyperlipidemia    • Hypertension    • Mixed hyperlipidemia 2/4/2015   • Morbid obesity ([facility] )    • Morbid obesity with BMI of 45 0-49 9, adult ([facility] ) 1/2/2015   • MARSHA (obstructive sleep apnea)    • Primary hypertension 1/2/2015    Transitioned From: Benign essential hypertension       Past Surgical History:   Procedure Laterality Date   • KNEE SURGERY     • SINUS SURGERY         Family History   Problem Relation Age of Onset   • Thyroid cancer Mother    • Diabetes type II Mother    • Esophageal cancer Father    • Kidney cancer Brother    • Diabetes type II Brother    • Diabetes type II Maternal Grandmother        Social History     Occupational History   • Not on file   Tobacco Use   • Smoking status: Former     Packs/day: 2 00     Years: 70 00     Pack years: 140 00     Types: Cigarettes     Quit date: 10/21/2022     Years since quittin 1   • Smokeless tobacco: Never   • Tobacco comments:     states read to quit prior to admit   Vaping Use   • Vaping Use: Never used   Substance and Sexual Activity   • Alcohol use: Not Currently     Alcohol/week: 3 0 standard drinks     Types: 2 Cans of beer, 1 Shots of liquor per week     Comment: No alcohol since    • Drug use: No   • Sexual activity: Not Currently         Current Outpatient Medications:   •  ammonium lactate (LAC-HYDRIN) 12 % lotion, Apply topically 2 (two) times a day as needed for dry skin, Disp: 400 g, Rfl: 0  •  Ascorbic Acid (VITAMIN C PO), Take 1 tablet by mouth daily, Disp: , Rfl:   •  aspirin 81 mg chewable tablet, Take one tablet by mouth daily, Disp: , Rfl:   •  benzonatate (TESSALON PERLES) 100 mg capsule, Take 1 capsule (100 mg total) by mouth 3 (three) times a day as needed for cough, Disp: 20 capsule, Rfl: 0  •  budesonide-formoterol (SYMBICORT) 160-4 5 mcg/act inhaler, Inhale 2 puffs 2 (two) times a day (Patient not taking: Reported on 11/15/2022), Disp: , Rfl:   •  DULoxetine (CYMBALTA) 60 mg delayed release capsule, Take 1 capsule (60 mg total) by mouth daily, Disp: 90 capsule, Rfl: 1  •  EPINEPHrine (EPIPEN) 0 3 mg/0 3 mL SOAJ, INJECT 0 3ML INTO A MUSCLE ONCE FOR 1 DOSE, Disp: 2 each, Rfl: 0  •  fluticasone (FLONASE) 50 mcg/act nasal spray, 2 sprays into each nostril daily (Patient not taking: Reported on 11/15/2022), Disp: 16 g, Rfl: 11  •  fluticasone-vilanterol (BREO ELLIPTA) 100-25 mcg/inh inhaler, Inhale 1 puff daily Rinse mouth after use , Disp: 1 each, Rfl: 1  •  gabapentin (Neurontin) 100 mg capsule, Take 1 capsule (100 mg total) by mouth 3 (three) times a day, Disp: 90 capsule, Rfl: 1  •  Glucosamine-Chondroitin 250-200 MG TABS, Take 2 tablets by mouth 2 (two) times a day, Disp: , Rfl:   •  HumaLOG KwikPen 100 units/mL injection pen, INJECT 15 UNITS SUBCUTANEOUSLY THREE TIMES DAILY WITH MEALS, Disp: 15 mL, Rfl: 5  •  hydrochlorothiazide (HYDRODIURIL) 25 mg tablet, Take 1 tablet (25 mg total) by mouth 2 (two) times a day, Disp: 60 tablet, Rfl: 2  •  hydrOXYzine HCL (ATARAX) 25 mg tablet, Take 1 tablet (25 mg total) by mouth every 6 (six) hours as needed for itching or allergies (Patient not taking: Reported on 10/28/2022), Disp: 30 tablet, Rfl: 0  •  Insulin Glargine Solostar (Lantus SoloStar) 100 UNIT/ML SOPN, Inject 0 6 mL (60 Units total) under the skin daily, Disp: 18 mL, Rfl: 5  •  insulin lispro (HumaLOG) 100 units/mL injection, Inject 15 Units under the skin 3 (three) times a day with meals (Patient not taking: Reported on 11/15/2022), Disp: 40 5 mL, Rfl: 0  •  Insulin Pen Needle (Pen Needles 3/16") 31G X 5 MM MISC, Use 4 (four) times a day, Disp: 300 each, Rfl: 3  •  levothyroxine (Euthyrox) 175 mcg tablet, Take 1 tablet (175 mcg total) by mouth daily, Disp: 90 tablet, Rfl: 3  •  lisinopril (ZESTRIL) 5 mg tablet, Take 5 mg by mouth daily, Disp: , Rfl:   •  loratadine (CLARITIN) 10 mg tablet, Take 10 mg by mouth daily, Disp: , Rfl:   •  metFORMIN (GLUCOPHAGE) 1000 MG tablet, Take 1 tablet (1,000 mg total) by mouth 2 (two) times a day, Disp: 180 tablet, Rfl: 0  •  Omega-3 Fatty Acids (fish oil) 1,000 mg, Take 1,000 mg by mouth 2 (two) times a day, Disp: , Rfl:   •  omega-3-acid ethyl esters (LOVAZA) 1 g capsule, Take 1 capsule (1 g total) by mouth 2 (two) times a day for high cholesterol (Patient not taking: Reported on 11/18/2022), Disp: 60 capsule, Rfl: 2  •  pregabalin (LYRICA) 25 mg capsule, Take 1 capsule (25 mg total) by mouth 2 (two) times a day, Disp: 60 capsule, Rfl: 5  •  rosuvastatin (CRESTOR) 40 MG tablet, Take 1 tablet (40 mg total) by mouth daily, Disp: 90 tablet, Rfl: 3  •  sodium chloride (OCEAN) 0 65 % nasal spray, 1 spray into each nostril as needed for rhinitis, Disp: 30 mL, Rfl: 1  •  traZODone (DESYREL) 150 mg tablet, Take 1 tablet (150 mg total) by mouth daily at bedtime, Disp: 90 tablet, Rfl: 1  •  umeclidinium (Incruse Ellipta) 62 5 mcg/actuation AEPB inhaler, Inhale 1 puff daily (Patient not taking: Reported on 11/15/2022), Disp: , Rfl:   •  Ventolin  (90 Base) MCG/ACT inhaler, Inhale 2 puffs every 4 (four) hours as needed for wheezing or shortness of breath, Disp: 18 g, Rfl: 5    Allergies   Allergen Reactions   • Ace Inhibitors Swelling     Angioedema    PT STATES THIS IS NOT AN ALLERGY   • Other Anaphylaxis     Pt believes that he is allergic to peanut butter  Also, seasonal allergies   • Zinc Tongue Swelling     PT STATES THIS IS NOT AN ALLERGY   • Clindamycin Edema     Had angioedema episode approx 5 hr after IM administration of clindamycin  Unclear if there is definitive causal relationship    PT STATES THIS IS NOT AN ALLERGY       Physical Exam:    /73   Pulse (!) 121   Ht 5' 4" (1 626 m)   Wt 120 kg (264 lb)   BMI 45 32 kg/m²     Constitutional: obese, SOB w/ minimal exertion  Eyes:anicteric  HEENT:grossly intact  Neck:supple, symmetric, trachea midline and no masses   Pulmonary:  SOB w/ minimal exertion  Cardiovascular:No edema or pitting edema present  Skin:Normal without rashes or lesions and well hydrated  Psychiatric:Mood and affect appropriate  Neurologic:Cranial Nerves II-XII grossly intact  Musculoskeletal:antalgic gait w/ SPC    Imaging  No orders to display       Orders Placed This Encounter   Procedures   • X-ray lumbar spine complete 4+ views   • Ambulatory referral to Physical Therapy

## 2022-12-28 ENCOUNTER — APPOINTMENT (OUTPATIENT)
Dept: PHYSICAL THERAPY | Facility: HOME HEALTHCARE | Age: 57
End: 2022-12-28

## 2022-12-30 ENCOUNTER — TELEPHONE (OUTPATIENT)
Dept: PAIN MEDICINE | Facility: MEDICAL CENTER | Age: 57
End: 2022-12-30

## 2022-12-30 ENCOUNTER — APPOINTMENT (OUTPATIENT)
Dept: PHYSICAL THERAPY | Facility: HOME HEALTHCARE | Age: 57
End: 2022-12-30

## 2022-12-30 DIAGNOSIS — G62.9 NEUROPATHY: ICD-10-CM

## 2022-12-30 DIAGNOSIS — I10 ESSENTIAL HYPERTENSION: ICD-10-CM

## 2022-12-30 RX ORDER — HYDROCHLOROTHIAZIDE 25 MG/1
25 TABLET ORAL 2 TIMES DAILY
Qty: 60 TABLET | Refills: 2 | Status: SHIPPED | OUTPATIENT
Start: 2022-12-30 | End: 2023-04-12

## 2022-12-30 RX ORDER — GABAPENTIN 100 MG/1
100 CAPSULE ORAL 3 TIMES DAILY
Qty: 90 CAPSULE | Refills: 1 | Status: CANCELLED | OUTPATIENT
Start: 2022-12-30

## 2022-12-30 RX ORDER — HYDROCHLOROTHIAZIDE 25 MG/1
25 TABLET ORAL 2 TIMES DAILY
Qty: 60 TABLET | Refills: 2 | Status: CANCELLED | OUTPATIENT
Start: 2022-12-30

## 2022-12-30 NOTE — TELEPHONE ENCOUNTER
James Frazier MD  [facility]  Xray of lumbar spine shows multilevel vertebral endplate spondylosis consistent with DISH which is like contributing to patient's low back pain and stiffness  He should start physical therapy as recommended during last office visit

## 2022-12-30 NOTE — TELEPHONE ENCOUNTER
Caller: Eliezer Shea( PT)    Doctor: Dr Saman Falk    Reason for call: Hal Mcnair results    Call back#: [phone]

## 2022-12-30 NOTE — TELEPHONE ENCOUNTER
PT CALLED IN REQUESTING REFILLS ON HIS HYDROCHLOROTHIAZIDE 25MG AND GABAPENTIN 100MG  WILL CALL ONCE APPROVED   Jadene Harada

## 2022-12-30 NOTE — TELEPHONE ENCOUNTER
RN s/w pt regarding previous  Pt appreciative of information aware he should start PT  Is having a knee injection with Dr Kenisha Ku on 1/5  Pt would like to know if there is anything that can be offered besides PT for the low back? Any injections? --please advise thank you--  Any injections for his low back pain?

## 2023-01-03 ENCOUNTER — OFFICE VISIT (OUTPATIENT)
Dept: OBGYN CLINIC | Facility: CLINIC | Age: 58
End: 2023-01-03

## 2023-01-03 VITALS
WEIGHT: 264 LBS | HEIGHT: 64 IN | DIASTOLIC BLOOD PRESSURE: 87 MMHG | HEART RATE: 106 BPM | BODY MASS INDEX: 45.07 KG/M2 | SYSTOLIC BLOOD PRESSURE: 135 MMHG

## 2023-01-03 DIAGNOSIS — M17.0 PRIMARY OSTEOARTHRITIS OF BOTH KNEES: Primary | ICD-10-CM

## 2023-01-03 NOTE — PROGRESS NOTES
Assessment:   Diagnosis ICD-10-CM Associated Orders   1  Primary osteoarthritis of both knees  M17 0 Injection Procedure Prior Authorization          Plan:  Reviewed today's physical exam findings and x-ray findings with patient at time of visit  X-Ray of bilateral knee taken on 11/02/2022 were reviewed and showed Moderate tricompartmental osteoarthritis of the left knee as evidenced by joint space narrowing, osteophyte formation and subchondral sclerosis  Mild tricompartmental degenerative changes of the right knee  Visco supplementation ordered for bilateral knees  He will be seen for follow-up after visco supplementation is approved for re-evaluation  Patient expresses understanding and is in agreement with this treatment plan  The patient was given the opportunity to ask questions or present concerns  the patient has degenerative joint disease of his bilateral knees  He is too obese to consider knee replacement surgery  The last cortisone injection lasted only 1 day  Would recommend initiation viscosupplementation  Return back once this is complete  Physical examination shows diffuse medial lateral joint tenderness and patellofemoral crepitation  The tenderness is mostly medially bilaterally  Negative Homans    To do next visit:  Return for Recheck after approval of visco supplementation  The above stated was discussed in layman's terms and the patient expressed understanding  All questions were answered to the patient's satisfaction  Scribe Attestation    I,:  Angela Mosher am acting as a scribe while in the presence of the attending physician :       I,:  Marie Cole DO personally performed the services described in this documentation    as scribed in my presence :             Subjective:   Ruddy Alvarez is a 61 y o  male who presents today for an Initial evaluation of his bilateral knee due to chronic pain, known primary osteoarthritis   The patient was last seen on 11/09/2022 by Dr Nanette Diaz in which he was instructed to follow-up if symptoms worsened  Patient is doing well but reports pain with prolonged sedentary positions and weight-bearing activities especially ambulating stairs  The patient states that He is point tender along the medial and lateral joint line  Left knee worse than right  Patient states that cortisone injections and nerve block give no symptomatic relief  He denies any recent bruising, numbness, tingling, or feelings of instability  He also denies any fevers, chills or shortness of breath  Review of systems negative unless otherwise specified in HPI  Review of Systems   Constitutional: Negative for chills and fever  HENT: Negative for ear pain and sore throat  Eyes: Negative for pain and visual disturbance  Respiratory: Negative for cough and shortness of breath  Cardiovascular: Negative for chest pain and palpitations  Gastrointestinal: Negative for abdominal pain and vomiting  Genitourinary: Negative for dysuria and hematuria  Musculoskeletal: Negative for arthralgias and back pain  Skin: Negative for color change and rash  Neurological: Negative for seizures and syncope  All other systems reviewed and are negative        Past Medical History:   Diagnosis Date   • Acquired hypothyroidism 1/2/2015   • Angioedema    • Diabetes mellitus ([facility] )    • Disease of thyroid gland    • Hyperlipidemia    • Hypertension    • Mixed hyperlipidemia 2/4/2015   • Morbid obesity ([facility] )    • Morbid obesity with BMI of 45 0-49 9, adult ([facility] ) 1/2/2015   • MARSHA (obstructive sleep apnea)    • Primary hypertension 1/2/2015    Transitioned From: Benign essential hypertension       Past Surgical History:   Procedure Laterality Date   • KNEE SURGERY     • SINUS SURGERY         Family History   Problem Relation Age of Onset   • Thyroid cancer Mother    • Diabetes type II Mother    • Esophageal cancer Father    • Kidney cancer Brother    • Diabetes type II Brother    • Diabetes type II Maternal Grandmother        Social History     Occupational History   • Not on file   Tobacco Use   • Smoking status: Former     Packs/day: 2 00     Years: 70 00     Pack years: 140 00     Types: Cigarettes     Quit date: 10/21/2022     Years since quittin 2   • Smokeless tobacco: Never   • Tobacco comments:     states read to quit prior to admit   Vaping Use   • Vaping Use: Never used   Substance and Sexual Activity   • Alcohol use: Not Currently     Alcohol/week: 3 0 standard drinks     Types: 2 Cans of beer, 1 Shots of liquor per week     Comment: No alcohol since    • Drug use: No   • Sexual activity: Not Currently         Current Outpatient Medications:   •  ammonium lactate (LAC-HYDRIN) 12 % lotion, Apply topically 2 (two) times a day as needed for dry skin, Disp: 400 g, Rfl: 0  •  Ascorbic Acid (VITAMIN C PO), Take 1 tablet by mouth daily, Disp: , Rfl:   •  aspirin 81 mg chewable tablet, Take one tablet by mouth daily, Disp: , Rfl:   •  benzonatate (TESSALON PERLES) 100 mg capsule, Take 1 capsule (100 mg total) by mouth 3 (three) times a day as needed for cough, Disp: 20 capsule, Rfl: 0  •  budesonide-formoterol (SYMBICORT) 160-4 5 mcg/act inhaler, Inhale 2 puffs 2 (two) times a day, Disp: , Rfl:   •  DULoxetine (CYMBALTA) 60 mg delayed release capsule, Take 1 capsule (60 mg total) by mouth daily, Disp: 90 capsule, Rfl: 1  •  EPINEPHrine (EPIPEN) 0 3 mg/0 3 mL SOAJ, INJECT 0 3ML INTO A MUSCLE ONCE FOR 1 DOSE, Disp: 2 each, Rfl: 0  •  fluticasone (FLONASE) 50 mcg/act nasal spray, 2 sprays into each nostril daily, Disp: 16 g, Rfl: 11  •  fluticasone-vilanterol (BREO ELLIPTA) 100-25 mcg/inh inhaler, Inhale 1 puff daily Rinse mouth after use , Disp: 1 each, Rfl: 1  •  gabapentin (NEURONTIN) 100 mg capsule, TAKE 1 CAPSULE BY MOUTH THREE TIMES DAILY, Disp: 90 capsule, Rfl: 0  •  Glucosamine-Chondroitin 250-200 MG TABS, Take 2 tablets by mouth 2 (two) times a day, Disp: , Rfl:   •  HumaLOG KwikPen 100 units/mL injection pen, INJECT 15 UNITS SUBCUTANEOUSLY THREE TIMES DAILY WITH MEALS, Disp: 15 mL, Rfl: 5  •  hydrochlorothiazide (HYDRODIURIL) 25 mg tablet, Take 1 tablet (25 mg total) by mouth 2 (two) times a day, Disp: 60 tablet, Rfl: 2  •  Insulin Glargine Solostar (Lantus SoloStar) 100 UNIT/ML SOPN, Inject 0 6 mL (60 Units total) under the skin daily, Disp: 18 mL, Rfl: 5  •  Insulin Pen Needle (Pen Needles 3/16") 31G X 5 MM MISC, Use 4 (four) times a day, Disp: 300 each, Rfl: 3  •  levothyroxine (Euthyrox) 175 mcg tablet, Take 1 tablet (175 mcg total) by mouth daily, Disp: 90 tablet, Rfl: 3  •  lisinopril (ZESTRIL) 5 mg tablet, Take 5 mg by mouth daily, Disp: , Rfl:   •  loratadine (CLARITIN) 10 mg tablet, Take 10 mg by mouth daily, Disp: , Rfl:   •  metFORMIN (GLUCOPHAGE) 1000 MG tablet, Take 1 tablet (1,000 mg total) by mouth 2 (two) times a day, Disp: 180 tablet, Rfl: 0  •  Omega-3 Fatty Acids (fish oil) 1,000 mg, Take 1,000 mg by mouth 2 (two) times a day, Disp: , Rfl:   •  pregabalin (LYRICA) 25 mg capsule, Take 1 capsule (25 mg total) by mouth 2 (two) times a day, Disp: 60 capsule, Rfl: 5  •  rosuvastatin (CRESTOR) 40 MG tablet, Take 1 tablet (40 mg total) by mouth daily, Disp: 90 tablet, Rfl: 3  •  sodium chloride (OCEAN) 0 65 % nasal spray, 1 spray into each nostril as needed for rhinitis, Disp: 30 mL, Rfl: 1  •  traZODone (DESYREL) 150 mg tablet, Take 1 tablet (150 mg total) by mouth daily at bedtime, Disp: 90 tablet, Rfl: 1  •  Ventolin  (90 Base) MCG/ACT inhaler, Inhale 2 puffs every 4 (four) hours as needed for wheezing or shortness of breath, Disp: 18 g, Rfl: 5  •  hydrOXYzine HCL (ATARAX) 25 mg tablet, Take 1 tablet (25 mg total) by mouth every 6 (six) hours as needed for itching or allergies (Patient not taking: Reported on 10/28/2022), Disp: 30 tablet, Rfl: 0  •  insulin lispro (HumaLOG) 100 units/mL injection, Inject 15 Units under the skin 3 (three) times a day with meals (Patient not taking: Reported on 11/15/2022), Disp: 40 5 mL, Rfl: 0  •  omega-3-acid ethyl esters (LOVAZA) 1 g capsule, Take 1 capsule (1 g total) by mouth 2 (two) times a day for high cholesterol (Patient not taking: Reported on 11/18/2022), Disp: 60 capsule, Rfl: 2  •  umeclidinium (Incruse Ellipta) 62 5 mcg/actuation AEPB inhaler, Inhale 1 puff daily (Patient not taking: Reported on 11/15/2022), Disp: , Rfl:     Allergies   Allergen Reactions   • Ace Inhibitors Swelling     Angioedema    PT STATES THIS IS NOT AN ALLERGY   • Other Anaphylaxis     Pt believes that he is allergic to peanut butter  Also, seasonal allergies   • Zinc Tongue Swelling     PT STATES THIS IS NOT AN ALLERGY   • Clindamycin Edema     Had angioedema episode approx 5 hr after IM administration of clindamycin  Unclear if there is definitive causal relationship    PT STATES THIS IS NOT AN ALLERGY          Vitals:    01/03/23 0804   BP: 135/87   Pulse: (!) 106       Objective:                    Ortho Exam  bilateral knee(s) -   Patient ambulates with antalgic gait pattern  Uses No assistive device  Genu Varus anatomical deformity  Skin is warm and dry to touch with no signs of erythema, ecchymosis, or infection  No soft tissue swelling or effusion noted  ROM (5° - 115°)  MMT: 4/5 throughout  TTP over medial joint line, TTP over lateral joint line, TTP over pes anserine bursa, no popliteal fullness appreciated on exam   Flexor and extensor mechanisms are intact   Knee is stable to varus and valgus stress  - Lachman's  - Anterior Drawer, - Posterior Drawer  - Medial Shana's, - Lateral Shana's  - Pivot Shift  Patella tracks centrally with palpable crepitus  Calf compartments are soft and supple  - Herman's sign  2+ DP and PT pulses with brisk capillary refill to the toes  Sural, saphenous, tibial, superficial, and deep peroneal motor and sensory distributions intact  Sensation light touch intact distally    Diagnostics, reviewed and taken today if performed as documented: The attending physician has personally reviewed the pertinent films in PACS and interpretation is as follows:    X-Ray of bilateral knee taken on 11/02/2022 were reviewed and showed Moderate tricompartmental osteoarthritis of the left knee as evidenced by joint space narrowing, osteophyte formation and subchondral sclerosis  Mild tricompartmental degenerative changes of the right knee  Procedures, if performed today:    None performed      Portions of the record may have been created with voice recognition software  Occasional wrong word or "sound a like" substitutions may have occurred due to the inherent limitations of voice recognition software  Read the chart carefully and recognize, using context, where substitutions have occurred

## 2023-01-13 DIAGNOSIS — I10 BENIGN ESSENTIAL HYPERTENSION: Primary | ICD-10-CM

## 2023-01-13 DIAGNOSIS — F32.9 REACTIVE DEPRESSION: ICD-10-CM

## 2023-01-13 RX ORDER — LISINOPRIL 5 MG/1
5 TABLET ORAL DAILY
Qty: 90 TABLET | Refills: 1 | Status: SHIPPED | OUTPATIENT
Start: 2023-01-13 | End: 2024-01-13

## 2023-01-13 RX ORDER — DULOXETIN HYDROCHLORIDE 60 MG/1
60 CAPSULE, DELAYED RELEASE ORAL DAILY
Qty: 90 CAPSULE | Refills: 1 | Status: SHIPPED | OUTPATIENT
Start: 2023-01-13

## 2023-01-17 ENCOUNTER — RA CDI HCC (OUTPATIENT)
Dept: OTHER | Facility: HOSPITAL | Age: 58
End: 2023-01-17

## 2023-01-17 NOTE — PROGRESS NOTES
E11 [id number]  [facility]  coding opportunities          Chart Reviewed number of suggestions sent to Provider: 1     Patients Insurance     Medicare Insurance: [employer]

## 2023-01-24 ENCOUNTER — PROCEDURE VISIT (OUTPATIENT)
Dept: OBGYN CLINIC | Facility: CLINIC | Age: 58
End: 2023-01-24

## 2023-01-24 VITALS
HEIGHT: 64 IN | DIASTOLIC BLOOD PRESSURE: 70 MMHG | WEIGHT: 264 LBS | SYSTOLIC BLOOD PRESSURE: 131 MMHG | HEART RATE: 116 BPM | BODY MASS INDEX: 45.07 KG/M2

## 2023-01-24 DIAGNOSIS — M17.0 PRIMARY OSTEOARTHRITIS OF BOTH KNEES: Primary | ICD-10-CM

## 2023-01-24 RX ORDER — HYALURONATE SODIUM 10 MG/ML
20 SYRINGE (ML) INTRAARTICULAR
Status: COMPLETED | OUTPATIENT
Start: 2023-01-24 | End: 2023-01-24

## 2023-01-24 RX ADMIN — Medication 20 MG: at 10:36

## 2023-01-24 NOTE — PROGRESS NOTES
Assessment:   Diagnosis ICD-10-CM Associated Orders   1  Primary osteoarthritis of both knees  M17 0           Plan:  He was offered, accepted, performed an injection of Euflexxa - 3 Shot Series to his Bilateral knee(s) for symptomatic relief  He tolerated procedure well  Ice and post injection protocol advised  Weightbearing activities as tolerated  Activities as tolerated with modifications to avoid pain  He will return for his second Euflexxa - 3 Shot Series injection in 1 week time  Patient expresses understanding and is in agreement with this treatment plan  The patient was given the opportunity to ask questions or present concerns  under aseptic technique, both knees were injected with his 1st set of Euflexxa  He tolerated procedures quite well  Return back next week for 2nd set of injections  If his condition changes, he will not hesitate to let us know    To do next visit:  Return in about 1 week (around 1/31/2023) for Recheck and Euflexxa #2  The above stated was discussed in layman's terms and the patient expressed understanding  All questions were answered to the patient's satisfaction  Scribe Attestation    I,:  Chriss Murillo am acting as a scribe while in the presence of the attending physician :       I,:  Marli Edmonds, DO personally performed the services described in this documentation    as scribed in my presence :             Subjective:   Osvaldo Frye is a 61 y o  male who presents today for a repeat evaluation of his bilateral knee due to chronic pain, known primary osteoarthritis  Patient is doing well but reports pain with prolonged sedentary positions and weight-bearing activities especially ambulating stairs  Patient is here for his first injection of Euflexxa - 3 Shot Series into the bilateral knee(s)  He denies any recent bruising, numbness, tingling, or feelings of instability  He also denies any fevers, chills or shortness of breath         Review of systems negative unless otherwise specified in HPI  Review of Systems   Constitutional: Negative for chills and fever  HENT: Negative for ear pain and sore throat  Eyes: Negative for pain and visual disturbance  Respiratory: Negative for cough and shortness of breath  Cardiovascular: Negative for chest pain and palpitations  Gastrointestinal: Negative for abdominal pain and vomiting  Genitourinary: Negative for dysuria and hematuria  Musculoskeletal: Negative for arthralgias and back pain  Skin: Negative for color change and rash  Neurological: Negative for seizures and syncope  All other systems reviewed and are negative        Past Medical History:   Diagnosis Date   • Acquired hypothyroidism 2015   • Angioedema    • Diabetes mellitus ([place] [address] )    • Disease of thyroid gland    • Hyperlipidemia    • Hypertension    • Mixed hyperlipidemia 2015   • Morbid obesity ([place] [address] )    • Morbid obesity with BMI of 45 0-49 9, adult ([place] [address] ) 2015   • MARSHA (obstructive sleep apnea)    • Primary hypertension 2015    Transitioned From: Benign essential hypertension       Past Surgical History:   Procedure Laterality Date   • KNEE SURGERY     • SINUS SURGERY         Family History   Problem Relation Age of Onset   • Thyroid cancer Mother    • Diabetes type II Mother    • Esophageal cancer Father    • Kidney cancer Brother    • Diabetes type II Brother    • Diabetes type II Maternal Grandmother        Social History     Occupational History   • Not on file   Tobacco Use   • Smoking status: Former     Packs/day: 2 00     Years: 70 00     Pack years: 140 00     Types: Cigarettes     Quit date: 10/21/2022     Years since quittin 2   • Smokeless tobacco: Never   • Tobacco comments:     states read to quit prior to admit   Vaping Use   • Vaping Use: Never used   Substance and Sexual Activity   • Alcohol use: Not Currently     Alcohol/week: 3 0 standard drinks     Types: 2 Cans of beer, 1 Shots of liquor per week Comment: No alcohol since 2015   • Drug use: No   • Sexual activity: Not Currently         Current Outpatient Medications:   •  ammonium lactate (LAC-HYDRIN) 12 % lotion, Apply topically 2 (two) times a day as needed for dry skin, Disp: 400 g, Rfl: 0  •  Ascorbic Acid (VITAMIN C PO), Take 1 tablet by mouth daily, Disp: , Rfl:   •  aspirin 81 mg chewable tablet, Take one tablet by mouth daily, Disp: , Rfl:   •  benzonatate (TESSALON PERLES) 100 mg capsule, Take 1 capsule (100 mg total) by mouth 3 (three) times a day as needed for cough, Disp: 20 capsule, Rfl: 0  •  budesonide-formoterol (SYMBICORT) 160-4 5 mcg/act inhaler, Inhale 2 puffs 2 (two) times a day, Disp: , Rfl:   •  DULoxetine (CYMBALTA) 60 mg delayed release capsule, Take 1 capsule (60 mg total) by mouth daily, Disp: 90 capsule, Rfl: 1  •  EPINEPHrine (EPIPEN) 0 3 mg/0 3 mL SOAJ, INJECT 0 3ML INTO A MUSCLE ONCE FOR 1 DOSE, Disp: 2 each, Rfl: 0  •  fluticasone (FLONASE) 50 mcg/act nasal spray, 2 sprays into each nostril daily, Disp: 16 g, Rfl: 11  •  fluticasone-vilanterol (BREO ELLIPTA) 100-25 mcg/inh inhaler, Inhale 1 puff daily Rinse mouth after use , Disp: 1 each, Rfl: 1  •  gabapentin (NEURONTIN) 100 mg capsule, TAKE 1 CAPSULE BY MOUTH THREE TIMES DAILY, Disp: 90 capsule, Rfl: 0  •  Glucosamine-Chondroitin 250-200 MG TABS, Take 2 tablets by mouth 2 (two) times a day, Disp: , Rfl:   •  HumaLOG KwikPen 100 units/mL injection pen, INJECT 15 UNITS SUBCUTANEOUSLY THREE TIMES DAILY WITH MEALS, Disp: 15 mL, Rfl: 5  •  hydrochlorothiazide (HYDRODIURIL) 25 mg tablet, Take 1 tablet (25 mg total) by mouth 2 (two) times a day, Disp: 60 tablet, Rfl: 2  •  hydrOXYzine HCL (ATARAX) 25 mg tablet, Take 1 tablet (25 mg total) by mouth every 6 (six) hours as needed for itching or allergies (Patient not taking: Reported on 10/28/2022), Disp: 30 tablet, Rfl: 0  •  Insulin Glargine Solostar (Lantus SoloStar) 100 UNIT/ML SOPN, Inject 0 6 mL (60 Units total) under the skin daily, Disp: 18 mL, Rfl: 5  •  insulin lispro (HumaLOG) 100 units/mL injection, Inject 15 Units under the skin 3 (three) times a day with meals (Patient not taking: Reported on 11/15/2022), Disp: 40 5 mL, Rfl: 0  •  Insulin Pen Needle (Pen Needles 3/16") 31G X 5 MM MISC, Use 4 (four) times a day, Disp: 300 each, Rfl: 3  •  levothyroxine (Euthyrox) 175 mcg tablet, Take 1 tablet (175 mcg total) by mouth daily, Disp: 90 tablet, Rfl: 3  •  lisinopril (ZESTRIL) 5 mg tablet, Take 1 tablet (5 mg total) by mouth daily, Disp: 90 tablet, Rfl: 1  •  loratadine (CLARITIN) 10 mg tablet, Take 10 mg by mouth daily, Disp: , Rfl:   •  metFORMIN (GLUCOPHAGE) 1000 MG tablet, Take 1 tablet (1,000 mg total) by mouth 2 (two) times a day, Disp: 180 tablet, Rfl: 0  •  Omega-3 Fatty Acids (fish oil) 1,000 mg, Take 1,000 mg by mouth 2 (two) times a day, Disp: , Rfl:   •  omega-3-acid ethyl esters (LOVAZA) 1 g capsule, Take 1 capsule (1 g total) by mouth 2 (two) times a day for high cholesterol (Patient not taking: Reported on 11/18/2022), Disp: 60 capsule, Rfl: 2  •  pregabalin (LYRICA) 25 mg capsule, Take 1 capsule (25 mg total) by mouth 2 (two) times a day, Disp: 60 capsule, Rfl: 5  •  rosuvastatin (CRESTOR) 40 MG tablet, Take 1 tablet (40 mg total) by mouth daily, Disp: 90 tablet, Rfl: 3  •  sodium chloride (OCEAN) 0 65 % nasal spray, 1 spray into each nostril as needed for rhinitis, Disp: 30 mL, Rfl: 1  •  traZODone (DESYREL) 150 mg tablet, Take 1 tablet (150 mg total) by mouth daily at bedtime, Disp: 90 tablet, Rfl: 1  •  umeclidinium (Incruse Ellipta) 62 5 mcg/actuation AEPB inhaler, Inhale 1 puff daily (Patient not taking: Reported on 11/15/2022), Disp: , Rfl:   •  Ventolin  (90 Base) MCG/ACT inhaler, Inhale 2 puffs every 4 (four) hours as needed for wheezing or shortness of breath, Disp: 18 g, Rfl: 5    Allergies   Allergen Reactions   • Ace Inhibitors Swelling     Angioedema    PT STATES THIS IS NOT AN ALLERGY   • Other Anaphylaxis     Pt believes that he is allergic to peanut butter  Also, seasonal allergies   • Zinc Tongue Swelling     PT STATES THIS IS NOT AN ALLERGY   • Clindamycin Edema     Had angioedema episode approx 5 hr after IM administration of clindamycin  Unclear if there is definitive causal relationship  PT STATES THIS IS NOT AN ALLERGY        There were no vitals filed for this visit  Objective:                    Ortho Exam  bilateral knee(s) -   Patient ambulates with analgic gait pattern  Uses single-point cane as assistive device  Genu Varus anatomical deformity  Skin is warm and dry to touch with no signs of erythema, ecchymosis, or infection  No soft tissue swelling or effusion noted  ROM (5° - 115°)  MMT: 4/5 throughout  TTP over medial joint line, TTP over lateral joint line, TTP over pes anserine bursa, no popliteal fullness appreciated on exam   Flexor and extensor mechanisms are intact   Knee is stable to varus and valgus stress  - Lachman's  - Anterior Drawer, - Posterior Drawer  - Medial Shana's, - Lateral Shana's  - Pivot Shift  Patella tracks centrally with palpable crepitus  Calf compartments are soft and supple  - Herman's sign  2+ DP and PT pulses with brisk capillary refill to the toes  Sural, saphenous, tibial, superficial, and deep peroneal motor and sensory distributions intact  Sensation light touch intact distally    Diagnostics, reviewed and taken today if performed as documented:    None performed    Procedures, if performed today:    Large joint arthrocentesis: bilateral knee  Universal Protocol:  Consent: Verbal consent obtained  Risks and benefits: risks, benefits and alternatives were discussed  Consent given by: patient  Time out: Immediately prior to procedure a "time out" was called to verify the correct patient, procedure, equipment, support staff and site/side marked as required    Timeout called at: 1/24/2023 10:35 AM   Patient understanding: patient states understanding of the procedure being performed  Site marked: the operative site was marked  Patient identity confirmed: verbally with patient    Supporting Documentation  Indications: pain and diagnostic evaluation   Procedure Details  Location: knee - bilateral knee  Needle size: 22 G  Ultrasound guidance: no  Approach: anterolateral    Medications (Right): 20 mg Sodium Hyaluronate 20 MG/2MLMedications (Left): 20 mg Sodium Hyaluronate 20 MG/2ML   Patient tolerance: patient tolerated the procedure well with no immediate complications  Dressing:  Sterile dressing applied            Portions of the record may have been created with voice recognition software  Occasional wrong word or "sound a like" substitutions may have occurred due to the inherent limitations of voice recognition software  Read the chart carefully and recognize, using context, where substitutions have occurred

## 2023-01-31 ENCOUNTER — PROCEDURE VISIT (OUTPATIENT)
Dept: OBGYN CLINIC | Facility: CLINIC | Age: 58
End: 2023-01-31

## 2023-01-31 VITALS
DIASTOLIC BLOOD PRESSURE: 80 MMHG | HEIGHT: 64 IN | HEART RATE: 112 BPM | BODY MASS INDEX: 45.07 KG/M2 | SYSTOLIC BLOOD PRESSURE: 151 MMHG | WEIGHT: 264 LBS

## 2023-01-31 DIAGNOSIS — M17.0 PRIMARY OSTEOARTHRITIS OF BOTH KNEES: Primary | ICD-10-CM

## 2023-01-31 RX ORDER — HYALURONATE SODIUM 10 MG/ML
20 SYRINGE (ML) INTRAARTICULAR
Status: COMPLETED | OUTPATIENT
Start: 2023-01-31 | End: 2023-01-31

## 2023-01-31 RX ADMIN — Medication 20 MG: at 10:17

## 2023-01-31 NOTE — PROGRESS NOTES
ASSESSMENT/PLAN:    Diagnoses and all orders for this visit:    Primary osteoarthritis of both knees    Other orders  -     Large joint arthrocentesis        Both of the patient's knees were injected with the second set of Euflexxa  He tolerated the injections quite well  He will follow-up with our office next week for his third and final set  The patient is acceptable to this plan  Return in about 1 week (around 2/7/2023)  Under aseptic technique, both knees were injected with the second set of Euflexxa  He tolerated procedure quite well  Return back next week for his final set        _____________________________________________________  CHIEF COMPLAINT:  Chief Complaint   Patient presents with   • Left Knee - Follow-up   • Right Knee - Follow-up         SUBJECTIVE:  Osvaldo Frye is a 61 y o  male who presents to our office for viscosupplementation of both knees  He is here today for his second set of Euflexxa  He tolerated his previous injections without issue  He denies any numbness or tingling  He denies any fever or chills      The following portions of the patient's history were reviewed and updated as appropriate: allergies, current medications, past family history, past medical history, past social history, past surgical history and problem list     PAST MEDICAL HISTORY:  Past Medical History:   Diagnosis Date   • Acquired hypothyroidism 1/2/2015   • Angioedema    • Diabetes mellitus ([facility] [address] )    • Disease of thyroid gland    • Hyperlipidemia    • Hypertension    • Mixed hyperlipidemia 2/4/2015   • Morbid obesity ([address] )    • Morbid obesity with BMI of 45 0-49 9, adult ([facility] [address] ) 1/2/2015   • MARSHA (obstructive sleep apnea)    • Primary hypertension 1/2/2015    Transitioned From: Benign essential hypertension       PAST SURGICAL HISTORY:  Past Surgical History:   Procedure Laterality Date   • KNEE SURGERY     • SINUS SURGERY         FAMILY HISTORY:  Family History   Problem Relation Age of Onset   • Thyroid cancer Mother    • Diabetes type II Mother    • Esophageal cancer Father    • Kidney cancer Brother    • Diabetes type II Brother    • Diabetes type II Maternal Grandmother        SOCIAL HISTORY:  Social History     Tobacco Use   • Smoking status: Former     Packs/day: 2 00     Years: 70 00     Pack years: 140 00     Types: Cigarettes     Quit date: 10/21/2022     Years since quittin 2   • Smokeless tobacco: Never   • Tobacco comments:     states read to quit prior to admit   Vaping Use   • Vaping Use: Never used   Substance Use Topics   • Alcohol use: Not Currently     Alcohol/week: 3 0 standard drinks     Types: 2 Cans of beer, 1 Shots of liquor per week     Comment: No alcohol since    • Drug use: No       MEDICATIONS:    Current Outpatient Medications:   •  ammonium lactate (LAC-HYDRIN) 12 % lotion, Apply topically 2 (two) times a day as needed for dry skin, Disp: 400 g, Rfl: 0  •  Ascorbic Acid (VITAMIN C PO), Take 1 tablet by mouth daily, Disp: , Rfl:   •  aspirin 81 mg chewable tablet, Take one tablet by mouth daily, Disp: , Rfl:   •  benzonatate (TESSALON PERLES) 100 mg capsule, Take 1 capsule (100 mg total) by mouth 3 (three) times a day as needed for cough, Disp: 20 capsule, Rfl: 0  •  budesonide-formoterol (SYMBICORT) 160-4 5 mcg/act inhaler, Inhale 2 puffs 2 (two) times a day, Disp: , Rfl:   •  DULoxetine (CYMBALTA) 60 mg delayed release capsule, Take 1 capsule (60 mg total) by mouth daily, Disp: 90 capsule, Rfl: 1  •  EPINEPHrine (EPIPEN) 0 3 mg/0 3 mL SOAJ, INJECT 0 3ML INTO A MUSCLE ONCE FOR 1 DOSE, Disp: 2 each, Rfl: 0  •  fluticasone (FLONASE) 50 mcg/act nasal spray, 2 sprays into each nostril daily, Disp: 16 g, Rfl: 11  •  fluticasone-vilanterol (BREO ELLIPTA) 100-25 mcg/inh inhaler, Inhale 1 puff daily Rinse mouth after use , Disp: 1 each, Rfl: 1  •  gabapentin (NEURONTIN) 100 mg capsule, TAKE 1 CAPSULE BY MOUTH THREE TIMES DAILY, Disp: 90 capsule, Rfl: 0  • Glucosamine-Chondroitin 250-200 MG TABS, Take 2 tablets by mouth 2 (two) times a day, Disp: , Rfl:   •  HumaLOG KwikPen 100 units/mL injection pen, INJECT 15 UNITS SUBCUTANEOUSLY THREE TIMES DAILY WITH MEALS, Disp: 15 mL, Rfl: 5  •  hydrochlorothiazide (HYDRODIURIL) 25 mg tablet, Take 1 tablet (25 mg total) by mouth 2 (two) times a day, Disp: 60 tablet, Rfl: 2  •  hydrOXYzine HCL (ATARAX) 25 mg tablet, Take 1 tablet (25 mg total) by mouth every 6 (six) hours as needed for itching or allergies, Disp: 30 tablet, Rfl: 0  •  Insulin Glargine Solostar (Lantus SoloStar) 100 UNIT/ML SOPN, Inject 0 6 mL (60 Units total) under the skin daily, Disp: 18 mL, Rfl: 5  •  Insulin Pen Needle (Pen Needles 3/16") 31G X 5 MM MISC, Use 4 (four) times a day, Disp: 300 each, Rfl: 3  •  levothyroxine (Euthyrox) 175 mcg tablet, Take 1 tablet (175 mcg total) by mouth daily, Disp: 90 tablet, Rfl: 3  •  lisinopril (ZESTRIL) 5 mg tablet, Take 1 tablet (5 mg total) by mouth daily, Disp: 90 tablet, Rfl: 1  •  loratadine (CLARITIN) 10 mg tablet, Take 10 mg by mouth daily, Disp: , Rfl:   •  metFORMIN (GLUCOPHAGE) 1000 MG tablet, Take 1 tablet (1,000 mg total) by mouth 2 (two) times a day, Disp: 180 tablet, Rfl: 0  •  Omega-3 Fatty Acids (fish oil) 1,000 mg, Take 1,000 mg by mouth 2 (two) times a day, Disp: , Rfl:   •  omega-3-acid ethyl esters (LOVAZA) 1 g capsule, Take 1 capsule (1 g total) by mouth 2 (two) times a day for high cholesterol, Disp: 60 capsule, Rfl: 2  •  pregabalin (LYRICA) 25 mg capsule, Take 1 capsule (25 mg total) by mouth 2 (two) times a day, Disp: 60 capsule, Rfl: 5  •  rosuvastatin (CRESTOR) 40 MG tablet, Take 1 tablet (40 mg total) by mouth daily, Disp: 90 tablet, Rfl: 3  •  sodium chloride (OCEAN) 0 65 % nasal spray, 1 spray into each nostril as needed for rhinitis, Disp: 30 mL, Rfl: 1  •  traZODone (DESYREL) 150 mg tablet, Take 1 tablet (150 mg total) by mouth daily at bedtime, Disp: 90 tablet, Rfl: 1  •  umeclidinium (Incruse Ellipta) 62 5 mcg/actuation AEPB inhaler, Inhale 1 puff daily, Disp: , Rfl:   •  Ventolin  (90 Base) MCG/ACT inhaler, Inhale 2 puffs every 4 (four) hours as needed for wheezing or shortness of breath, Disp: 18 g, Rfl: 5  •  insulin lispro (HumaLOG) 100 units/mL injection, Inject 15 Units under the skin 3 (three) times a day with meals (Patient not taking: Reported on 11/15/2022), Disp: 40 5 mL, Rfl: 0    ALLERGIES:  Allergies   Allergen Reactions   • Ace Inhibitors Swelling     Angioedema    PT STATES THIS IS NOT AN ALLERGY   • Other Anaphylaxis     Pt believes that he is allergic to peanut butter  Also, seasonal allergies   • Zinc Tongue Swelling     PT STATES THIS IS NOT AN ALLERGY   • Clindamycin Edema     Had angioedema episode approx 5 hr after IM administration of clindamycin  Unclear if there is definitive causal relationship  PT STATES THIS IS NOT AN ALLERGY       ROS:  Review of Systems     Constitutional: Negative for fatigue, fever or loss of appetite  HENT: Negative  Respiratory: Negative for shortness of breath, dyspnea  Cardiovascular: Negative for chest pain/tightness  Gastrointestinal: Negative for abdominal pain, N/V  Endocrine: Negative for cold/heat intolerance, unexplained weight loss/gain  Genitourinary: Negative for flank pain, dysuria, hematuria  Musculoskeletal: Positive for arthralgia   Skin: Negative for rash  Neurological: Negative for numbness or tingling  Psychiatric/Behavioral: Negative for agitation  _____________________________________________________  PHYSICAL EXAMINATION:    Blood pressure 151/80, pulse (!) 112, height 5' 4" (1 626 m), weight 120 kg (264 lb)  Constitutional: Oriented to person, place, and time  Appears well-developed and well-nourished  No distress  HENT:   Head: Normocephalic  Eyes: Conjunctivae are normal  Right eye exhibits no discharge  Left eye exhibits no discharge  No scleral icterus     Cardiovascular: Normal rate     Pulmonary/Chest: Effort normal    Neurological: Alert and oriented to person, place, and time  Skin: Skin is warm and dry  No rash noted  Not diaphoretic  No erythema  No pallor  Psychiatric: Normal mood and affect  Behavior is normal  Judgment and thought content normal       MUSCULOSKELETAL EXAMINATION:   Physical Exam  Ortho Exam    Bilateral lower extremities are neurovascularly intact  Toes are pink and mobile   Compartments are soft  No warmth, erythema or ecchymosis  ROM of knees are from 5-115 degrees  Negative Lachman, drawer or pivot shift  No medial instability  Medial joint line tenderness, slight lateral joint line tenderness  Patellofemoral crepitation  Objective:  BP Readings from Last 1 Encounters:   01/31/23 151/80      Wt Readings from Last 1 Encounters:   01/31/23 120 kg (264 lb)        BMI:   Estimated body mass index is 45 32 kg/m² as calculated from the following:    Height as of this encounter: 5' 4" (1 626 m)  Weight as of this encounter: 120 kg (264 lb)        PROCEDURES PERFORMED:  Large joint arthrocentesis: bilateral knee  Universal Protocol:  Risks and benefits: risks, benefits and alternatives were discussed  Consent given by: patient  Patient understanding: patient states understanding of the procedure being performed  Site marked: the operative site was marked  Supporting Documentation  Indications: pain   Procedure Details  Location: knee - bilateral knee  Preparation: Patient was prepped and draped in the usual sterile fashion  Needle size: 22 G  Ultrasound guidance: no  Approach: lateral    Medications (Right): 20 mg Sodium Hyaluronate 20 MG/2MLMedications (Left): 20 mg Sodium Hyaluronate 20 MG/2ML   Patient tolerance: patient tolerated the procedure well with no immediate complications  Dressing:  Sterile dressing applied            Scribe Attestation    I,:  Velia Mccormick PA-C am acting as a scribe while in the presence of the attending physician : I,:  Terri Nicolas DO personally performed the services described in this documentation    as scribed in my presence :

## 2023-02-02 LAB — HBA1C MFR BLD HPLC: 11.8 %

## 2023-02-07 ENCOUNTER — PROCEDURE VISIT (OUTPATIENT)
Dept: OBGYN CLINIC | Facility: CLINIC | Age: 58
End: 2023-02-07

## 2023-02-07 VITALS
HEIGHT: 64 IN | BODY MASS INDEX: 45.32 KG/M2 | DIASTOLIC BLOOD PRESSURE: 81 MMHG | SYSTOLIC BLOOD PRESSURE: 149 MMHG | HEART RATE: 121 BPM

## 2023-02-07 DIAGNOSIS — M17.0 PRIMARY OSTEOARTHRITIS OF BOTH KNEES: Primary | ICD-10-CM

## 2023-02-07 RX ORDER — HYALURONATE SODIUM 10 MG/ML
20 SYRINGE (ML) INTRAARTICULAR
Status: COMPLETED | OUTPATIENT
Start: 2023-02-07 | End: 2023-02-07

## 2023-02-07 RX ADMIN — Medication 20 MG: at 11:20

## 2023-02-07 NOTE — PROGRESS NOTES
ASSESSMENT/PLAN:    Diagnoses and all orders for this visit:    Primary osteoarthritis of both knees    Other orders  -     Large joint arthrocentesis        Both of the patient's knees were injected with his third and final set of Euflexxa  He tolerated the injections quite well  He will follow-up with our office in 2 months  The patient is acceptable to this plan  Return in about 2 months (around 4/7/2023)  Her aseptic technique, both knees were injected with his third and final set of Euflexxa  He tolerated the procedures quite well  Return back in 3 months evaluation  If his condition changes, he would not hesitate to let us know      _____________________________________________________  CHIEF COMPLAINT:  Chief Complaint   Patient presents with   • Left Knee - Follow-up   • Right Knee - Follow-up         SUBJECTIVE:  Luca Casas is a 61 y o  male who presents to our office for viscosupplementation of both knees  He is here today for his third and final set of Euflexxa  He tolerated his previous injections without issue  He denies any numbness or tingling  He denies any fever or chills      The following portions of the patient's history were reviewed and updated as appropriate: allergies, current medications, past family history, past medical history, past social history, past surgical history and problem list     PAST MEDICAL HISTORY:  Past Medical History:   Diagnosis Date   • Acquired hypothyroidism 1/2/2015   • Angioedema    • Diabetes mellitus ([facility] [address] )    • Disease of thyroid gland    • Hyperlipidemia    • Hypertension    • Mixed hyperlipidemia 2/4/2015   • Morbid obesity ([facility] [address] )    • Morbid obesity with BMI of 45 0-49 9, adult ([facility] [address] ) 1/2/2015   • MARSHA (obstructive sleep apnea)    • Primary hypertension 1/2/2015    Transitioned From: Benign essential hypertension       PAST SURGICAL HISTORY:  Past Surgical History:   Procedure Laterality Date   • KNEE SURGERY     • SINUS SURGERY         FAMILY HISTORY:  Family History   Problem Relation Age of Onset   • Thyroid cancer Mother    • Diabetes type II Mother    • Esophageal cancer Father    • Kidney cancer Brother    • Diabetes type II Brother    • Diabetes type II Maternal Grandmother        SOCIAL HISTORY:  Social History     Tobacco Use   • Smoking status: Former     Packs/day: 2 00     Years: 70 00     Pack years: 140 00     Types: Cigarettes     Quit date: 10/21/2022     Years since quittin 2   • Smokeless tobacco: Never   • Tobacco comments:     states read to quit prior to admit   Vaping Use   • Vaping Use: Never used   Substance Use Topics   • Alcohol use: Not Currently     Alcohol/week: 3 0 standard drinks     Types: 2 Cans of beer, 1 Shots of liquor per week     Comment: No alcohol since    • Drug use: No       MEDICATIONS:    Current Outpatient Medications:   •  ammonium lactate (LAC-HYDRIN) 12 % lotion, Apply topically 2 (two) times a day as needed for dry skin, Disp: 400 g, Rfl: 0  •  Ascorbic Acid (VITAMIN C PO), Take 1 tablet by mouth daily, Disp: , Rfl:   •  aspirin 81 mg chewable tablet, Take one tablet by mouth daily, Disp: , Rfl:   •  benzonatate (TESSALON PERLES) 100 mg capsule, Take 1 capsule (100 mg total) by mouth 3 (three) times a day as needed for cough, Disp: 20 capsule, Rfl: 0  •  budesonide-formoterol (SYMBICORT) 160-4 5 mcg/act inhaler, Inhale 2 puffs 2 (two) times a day, Disp: , Rfl:   •  DULoxetine (CYMBALTA) 60 mg delayed release capsule, Take 1 capsule (60 mg total) by mouth daily, Disp: 90 capsule, Rfl: 1  •  EPINEPHrine (EPIPEN) 0 3 mg/0 3 mL SOAJ, INJECT 0 3ML INTO A MUSCLE ONCE FOR 1 DOSE, Disp: 2 each, Rfl: 0  •  fluticasone (FLONASE) 50 mcg/act nasal spray, 2 sprays into each nostril daily, Disp: 16 g, Rfl: 11  •  fluticasone-vilanterol (BREO ELLIPTA) 100-25 mcg/inh inhaler, Inhale 1 puff daily Rinse mouth after use , Disp: 1 each, Rfl: 1  •  gabapentin (NEURONTIN) 100 mg capsule, TAKE 1 CAPSULE BY MOUTH THREE TIMES DAILY, Disp: 90 capsule, Rfl: 0  •  Glucosamine-Chondroitin 250-200 MG TABS, Take 2 tablets by mouth 2 (two) times a day, Disp: , Rfl:   •  HumaLOG KwikPen 100 units/mL injection pen, INJECT 15 UNITS SUBCUTANEOUSLY THREE TIMES DAILY WITH MEALS, Disp: 15 mL, Rfl: 5  •  hydrochlorothiazide (HYDRODIURIL) 25 mg tablet, Take 1 tablet (25 mg total) by mouth 2 (two) times a day, Disp: 60 tablet, Rfl: 2  •  hydrOXYzine HCL (ATARAX) 25 mg tablet, Take 1 tablet (25 mg total) by mouth every 6 (six) hours as needed for itching or allergies, Disp: 30 tablet, Rfl: 0  •  Insulin Glargine Solostar (Lantus SoloStar) 100 UNIT/ML SOPN, Inject 0 6 mL (60 Units total) under the skin daily, Disp: 18 mL, Rfl: 5  •  Insulin Pen Needle (Pen Needles 3/16") 31G X 5 MM MISC, Use 4 (four) times a day, Disp: 300 each, Rfl: 3  •  levothyroxine (Euthyrox) 175 mcg tablet, Take 1 tablet (175 mcg total) by mouth daily, Disp: 90 tablet, Rfl: 3  •  lisinopril (ZESTRIL) 5 mg tablet, Take 1 tablet (5 mg total) by mouth daily, Disp: 90 tablet, Rfl: 1  •  loratadine (CLARITIN) 10 mg tablet, Take 10 mg by mouth daily, Disp: , Rfl:   •  metFORMIN (GLUCOPHAGE) 1000 MG tablet, Take 1 tablet (1,000 mg total) by mouth 2 (two) times a day, Disp: 180 tablet, Rfl: 0  •  Omega-3 Fatty Acids (fish oil) 1,000 mg, Take 1,000 mg by mouth 2 (two) times a day, Disp: , Rfl:   •  omega-3-acid ethyl esters (LOVAZA) 1 g capsule, Take 1 capsule (1 g total) by mouth 2 (two) times a day for high cholesterol, Disp: 60 capsule, Rfl: 2  •  pregabalin (LYRICA) 25 mg capsule, Take 1 capsule (25 mg total) by mouth 2 (two) times a day, Disp: 60 capsule, Rfl: 5  •  rosuvastatin (CRESTOR) 40 MG tablet, Take 1 tablet (40 mg total) by mouth daily, Disp: 90 tablet, Rfl: 3  •  sodium chloride (OCEAN) 0 65 % nasal spray, 1 spray into each nostril as needed for rhinitis, Disp: 30 mL, Rfl: 1  •  traZODone (DESYREL) 150 mg tablet, Take 1 tablet (150 mg total) by mouth daily at bedtime, Disp: 90 tablet, Rfl: 1  •  umeclidinium (Incruse Ellipta) 62 5 mcg/actuation AEPB inhaler, Inhale 1 puff daily, Disp: , Rfl:   •  Ventolin  (90 Base) MCG/ACT inhaler, Inhale 2 puffs every 4 (four) hours as needed for wheezing or shortness of breath, Disp: 18 g, Rfl: 5  •  insulin lispro (HumaLOG) 100 units/mL injection, Inject 15 Units under the skin 3 (three) times a day with meals (Patient not taking: Reported on 11/15/2022), Disp: 40 5 mL, Rfl: 0    ALLERGIES:  Allergies   Allergen Reactions   • Ace Inhibitors Swelling     Angioedema    PT STATES THIS IS NOT AN ALLERGY   • Other Anaphylaxis     Pt believes that he is allergic to peanut butter  Also, seasonal allergies   • Zinc Tongue Swelling     PT STATES THIS IS NOT AN ALLERGY   • Clindamycin Edema     Had angioedema episode approx 5 hr after IM administration of clindamycin  Unclear if there is definitive causal relationship  PT STATES THIS IS NOT AN ALLERGY       ROS:  Review of Systems     Constitutional: Negative for fatigue, fever or loss of appetite  HENT: Negative  Respiratory: Negative for shortness of breath, dyspnea  Cardiovascular: Negative for chest pain/tightness  Gastrointestinal: Negative for abdominal pain, N/V  Endocrine: Negative for cold/heat intolerance, unexplained weight loss/gain  Genitourinary: Negative for flank pain, dysuria, hematuria  Musculoskeletal: Positive for arthralgia   Skin: Negative for rash  Neurological: Negative for numbness or tingling  Psychiatric/Behavioral: Negative for agitation  _____________________________________________________  PHYSICAL EXAMINATION:    Blood pressure 149/81, pulse (!) 121, height 5' 4" (1 626 m)  Constitutional: Oriented to person, place, and time  Appears well-developed and well-nourished  No distress  HENT:   Head: Normocephalic  Eyes: Conjunctivae are normal  Right eye exhibits no discharge  Left eye exhibits no discharge  No scleral icterus  Cardiovascular: Normal rate  Pulmonary/Chest: Effort normal    Neurological: Alert and oriented to person, place, and time  Skin: Skin is warm and dry  No rash noted  Not diaphoretic  No erythema  No pallor  Psychiatric: Normal mood and affect  Behavior is normal  Judgment and thought content normal       MUSCULOSKELETAL EXAMINATION:   Physical Exam  Ortho Exam    Bilateral lower extremities are neurovascularly intact  Toes are pink and mobile   Compartments are soft  No warmth, erythema or ecchymosis  ROM of knees are from 5-115 degrees  Negative Lachman, drawer or pivot shift  No medial instability  Medial joint line tenderness, slight lateral joint line tenderness  Patellofemoral crepitation  Objective:  BP Readings from Last 1 Encounters:   02/07/23 149/81      Wt Readings from Last 1 Encounters:   01/31/23 120 kg (264 lb)        BMI:   Estimated body mass index is 45 32 kg/m² as calculated from the following:    Height as of this encounter: 5' 4" (1 626 m)  Weight as of 1/31/23: 120 kg (264 lb)        PROCEDURES PERFORMED:  Large joint arthrocentesis: bilateral knee  Universal Protocol:  Risks and benefits: risks, benefits and alternatives were discussed  Consent given by: patient  Patient understanding: patient states understanding of the procedure being performed  Site marked: the operative site was marked  Supporting Documentation  Indications: pain   Procedure Details  Location: knee - bilateral knee  Preparation: Patient was prepped and draped in the usual sterile fashion  Needle size: 22 G  Ultrasound guidance: no  Approach: lateral    Medications (Right): 20 mg Sodium Hyaluronate 20 MG/2MLMedications (Left): 20 mg Sodium Hyaluronate 20 MG/2ML   Patient tolerance: patient tolerated the procedure well with no immediate complications  Dressing:  Sterile dressing applied            Scribe Attestation    I,:  Toya Chairez PA-C am acting as a scribe while in the presence of the attending physician :       I,:  Stoney Courtney DO personally performed the services described in this documentation    as scribed in my presence :

## 2023-02-09 DIAGNOSIS — E11.8 TYPE 2 DIABETES MELLITUS WITH COMPLICATION, WITH LONG-TERM CURRENT USE OF INSULIN (HCC): ICD-10-CM

## 2023-02-09 DIAGNOSIS — Z79.4 TYPE 2 DIABETES MELLITUS WITH COMPLICATION, WITH LONG-TERM CURRENT USE OF INSULIN (HCC): ICD-10-CM

## 2023-02-09 DIAGNOSIS — G62.9 NEUROPATHY: ICD-10-CM

## 2023-02-10 RX ORDER — GABAPENTIN 100 MG/1
CAPSULE ORAL
Qty: 90 CAPSULE | Refills: 0 | Status: SHIPPED | OUTPATIENT
Start: 2023-02-10

## 2023-02-13 DIAGNOSIS — F41.8 DEPRESSION WITH ANXIETY: ICD-10-CM

## 2023-02-13 RX ORDER — TRAZODONE HYDROCHLORIDE 150 MG/1
150 TABLET ORAL
Qty: 90 TABLET | Refills: 1 | OUTPATIENT
Start: 2023-02-13

## 2023-02-14 ENCOUNTER — TELEPHONE (OUTPATIENT)
Dept: FAMILY MEDICINE CLINIC | Facility: HOME HEALTHCARE | Age: 58
End: 2023-02-14

## 2023-02-14 DIAGNOSIS — F41.8 DEPRESSION WITH ANXIETY: ICD-10-CM

## 2023-02-14 RX ORDER — TRAZODONE HYDROCHLORIDE 150 MG/1
150 TABLET ORAL
Qty: 90 TABLET | Refills: 1 | Status: SHIPPED | OUTPATIENT
Start: 2023-02-14

## 2023-02-14 NOTE — TELEPHONE ENCOUNTER
Patient also said he was on a med before  for coughing and is requesting it again  Has cough, sore throat

## 2023-02-14 NOTE — TELEPHONE ENCOUNTER
Please schedule for further evaluation of URI symptoms, patient no-showed his appointment with me 1/24

## 2023-02-15 ENCOUNTER — TELEPHONE (OUTPATIENT)
Dept: ADMINISTRATIVE | Facility: OTHER | Age: 58
End: 2023-02-15

## 2023-02-15 ENCOUNTER — OFFICE VISIT (OUTPATIENT)
Dept: FAMILY MEDICINE CLINIC | Facility: HOME HEALTHCARE | Age: 58
End: 2023-02-15

## 2023-02-15 VITALS
BODY MASS INDEX: 45.07 KG/M2 | DIASTOLIC BLOOD PRESSURE: 80 MMHG | OXYGEN SATURATION: 91 % | RESPIRATION RATE: 24 BRPM | SYSTOLIC BLOOD PRESSURE: 128 MMHG | HEART RATE: 113 BPM | WEIGHT: 264 LBS | HEIGHT: 64 IN | TEMPERATURE: 97.8 F

## 2023-02-15 DIAGNOSIS — J44.9 COPD, SEVERITY TO BE DETERMINED (HCC): Primary | ICD-10-CM

## 2023-02-15 PROBLEM — E78.5 HYPERLIPIDEMIA: Status: ACTIVE | Noted: 2021-04-15

## 2023-02-15 PROBLEM — E11.65 UNCONTROLLED TYPE 2 DIABETES MELLITUS WITH HYPERGLYCEMIA (HCC): Status: RESOLVED | Noted: 2021-04-15 | Resolved: 2023-02-15

## 2023-02-15 PROBLEM — J96.01 ACUTE RESPIRATORY FAILURE WITH HYPOXIA (HCC): Status: RESOLVED | Noted: 2021-04-07 | Resolved: 2023-02-15

## 2023-02-15 PROBLEM — M25.579 ANKLE PAIN: Status: RESOLVED | Noted: 2017-10-17 | Resolved: 2023-02-15

## 2023-02-15 PROBLEM — E11.65 UNCONTROLLED TYPE 2 DIABETES MELLITUS WITH HYPERGLYCEMIA (HCC): Status: ACTIVE | Noted: 2021-04-15

## 2023-02-15 PROBLEM — T78.3XXA ANGIOEDEMA: Status: RESOLVED | Noted: 2017-02-07 | Resolved: 2023-02-15

## 2023-02-15 RX ORDER — INSULIN LISPRO 100 [IU]/ML
20 INJECTION, SOLUTION INTRAVENOUS; SUBCUTANEOUS
COMMUNITY

## 2023-02-15 RX ORDER — BENZONATATE 100 MG/1
100 CAPSULE ORAL 3 TIMES DAILY PRN
Qty: 20 CAPSULE | Refills: 2 | Status: SHIPPED | OUTPATIENT
Start: 2023-02-15

## 2023-02-15 RX ORDER — IPRATROPIUM BROMIDE AND ALBUTEROL SULFATE 2.5; .5 MG/3ML; MG/3ML
3 SOLUTION RESPIRATORY (INHALATION) EVERY 6 HOURS PRN
Qty: 180 ML | Refills: 0 | Status: SHIPPED | OUTPATIENT
Start: 2023-02-15

## 2023-02-15 NOTE — PROGRESS NOTES
Assessment/Plan:     Diagnoses and all orders for this visit:    COPD, severity to be determined ([address] )  -     benzonatate (TESSALON PERLES) 100 mg capsule; Take 1 capsule (100 mg total) by mouth 3 (three) times a day as needed for cough  -     ipratropium-albuterol (DUO-NEB) 0 5-2 5 mg/3 mL nebulizer solution; Take 3 mL by nebulization every 6 (six) hours as needed for wheezing or shortness of breath    Other orders  -     insulin lispro (HumaLOG KwikPen) 100 units/mL injection pen; Inject 20 Units under the skin 3 (three) times a day with meals      - DuoNeb and Tessalon Perles refilled  Continue Claritin OTC  Follow-up with pulmonology as scheduled  Return for [facility] Annual Wellness Visit  Subjective:        Patient ID: Eliza Quiroz is a 61 y o  male  Chief Complaint   Patient presents with   • ANNEL        Janet Burrell is a 19-year-old male with history of COPD, MARSHA, tobacco use, hypertension, hyperlipidemia, hypothyroidism, type 2 diabetes, depression and anxiety, presenting with concern for a cough  Patient endorses a constant "tickle" in his throat and intermittent raspy voice since he reports quitting smoking approximately 4 months ago  He is taking OTC Claritin and using cough drops without relief  Is requesting a refill on Tessalon Perles as well as his albuterol nebulizer solution    Patient follows with [facility] pulmonology for COPD but is not on any maintenance inhalers currently secondary to cost       The following portions of the patient's history were reviewed and updated as appropriate: allergies, current medications, past family history, past medical history, past social history, past surgical history and problem list     Patient Active Problem List   Diagnosis   • Allergic rhinitis   • Arthritis   • Chronic back pain   • Chronic sinusitis   • COPD, severity to be determined [facility])   • Depression with anxiety   • Benign essential hypertension   • Acquired hypothyroidism   • Morbid obesity with BMI of 45 0-49 9, adult ([facility] [address] )   • Type 2 diabetes, uncontrolled, with neuropathy   • Vitamin D deficiency   • Bilateral lower leg cellulitis   • Elevated LFTs   • Elevated lactic acid level   • Tobacco abuse   • Noncompliance with diabetes treatment   • Epistaxis   • Obstructive sleep apnea   • Muscle twitching   • Type 2 diabetes mellitus with hyperglycemia, with long-term current use of insulin ([facility])   • Tobacco use   • Atelectasis   • Urinary retention   • Constipation   • Depression, recurrent ([facility])   • Peripheral vascular disease ([facility])   • Primary osteoarthritis of both knees   • Hyperlipidemia       Current Outpatient Medications   Medication Sig Dispense Refill   • Ascorbic Acid (VITAMIN C PO) Take 1 tablet by mouth daily     • aspirin 81 mg chewable tablet Take one tablet by mouth daily     • benzonatate (TESSALON PERLES) 100 mg capsule Take 1 capsule (100 mg total) by mouth 3 (three) times a day as needed for cough 20 capsule 2   • DULoxetine (CYMBALTA) 60 mg delayed release capsule Take 1 capsule (60 mg total) by mouth daily 90 capsule 1   • EPINEPHrine (EPIPEN) 0 3 mg/0 3 mL SOAJ INJECT 0 3ML INTO A MUSCLE ONCE FOR 1 DOSE 2 each 0   • gabapentin (NEURONTIN) 100 mg capsule TAKE 1 CAPSULE BY MOUTH THREE TIMES DAILY 90 capsule 0   • hydrochlorothiazide (HYDRODIURIL) 25 mg tablet Take 1 tablet (25 mg total) by mouth 2 (two) times a day 60 tablet 2   • Insulin Glargine Solostar (Lantus SoloStar) 100 UNIT/ML SOPN Inject 0 6 mL (60 Units total) under the skin daily 18 mL 5   • Insulin Pen Needle (Pen Needles 3/16") 31G X 5 MM MISC Use 4 (four) times a day 300 each 3   • ipratropium-albuterol (DUO-NEB) 0 5-2 5 mg/3 mL nebulizer solution Take 3 mL by nebulization every 6 (six) hours as needed for wheezing or shortness of breath 180 mL 0   • levothyroxine (Euthyrox) 175 mcg tablet Take 1 tablet (175 mcg total) by mouth daily 90 tablet 3   • lisinopril (ZESTRIL) 5 mg tablet Take 1 tablet (5 mg total) by mouth daily 90 tablet 1   • loratadine (CLARITIN) 10 mg tablet Take 10 mg by mouth daily     • metFORMIN (GLUCOPHAGE) 1000 MG tablet Take 1 tablet by mouth twice daily 180 tablet 0   • Omega-3 Fatty Acids (fish oil) 1,000 mg Take 1,000 mg by mouth 2 (two) times a day     • pregabalin (LYRICA) 25 mg capsule Take 1 capsule (25 mg total) by mouth 2 (two) times a day 60 capsule 5   • rosuvastatin (CRESTOR) 40 MG tablet Take 1 tablet (40 mg total) by mouth daily 90 tablet 3   • sodium chloride (OCEAN) 0 65 % nasal spray 1 spray into each nostril as needed for rhinitis 30 mL 1   • traZODone (DESYREL) 150 mg tablet Take 1 tablet (150 mg total) by mouth daily at bedtime 90 tablet 1   • Ventolin  (90 Base) MCG/ACT inhaler Inhale 2 puffs every 4 (four) hours as needed for wheezing or shortness of breath 18 g 5   • insulin lispro (HumaLOG KwikPen) 100 units/mL injection pen Inject 20 Units under the skin 3 (three) times a day with meals       No current facility-administered medications for this visit          Past Medical History:   Diagnosis Date   • Acquired hypothyroidism 1/2/2015   • Angioedema    • Diabetes mellitus ([place] [address] )    • Disease of thyroid gland    • Hyperlipidemia    • Hypertension    • Mixed hyperlipidemia 2/4/2015   • Morbid obesity ([facility] )    • Morbid obesity with BMI of 45 0-49 9, adult ([place] [address] ) 1/2/2015   • MARSHA (obstructive sleep apnea)    • Primary hypertension 1/2/2015    Transitioned From: Benign essential hypertension        Past Surgical History:   Procedure Laterality Date   • KNEE SURGERY     • SINUS SURGERY          Social History     Socioeconomic History   • Marital status: Single     Spouse name: Not on file   • Number of children: Not on file   • Years of education: Not on file   • Highest education level: Not on file   Occupational History   • Not on file   Tobacco Use   • Smoking status: Former     Packs/day: 2 00     Years: 70 00     Pack years: 140 00     Types: Cigarettes     Quit date: 10/21/2022     Years since quittin 3   • Smokeless tobacco: Never   • Tobacco comments:     states read to quit prior to admit   Vaping Use   • Vaping Use: Never used   Substance and Sexual Activity   • Alcohol use: Not Currently     Alcohol/week: 3 0 standard drinks     Types: 2 Cans of beer, 1 Shots of liquor per week     Comment: No alcohol since    • Drug use: No   • Sexual activity: Not Currently   Other Topics Concern   • Not on file   Social History Narrative   • Not on file     Social Determinants of Health     Financial Resource Strain: Not on file   Food Insecurity: Not on file   Transportation Needs: Not on file   Physical Activity: Not on file   Stress: Not on file   Social Connections: Not on file   Intimate Partner Violence: Not on file   Housing Stability: Not on file        Review of Systems   Constitutional: Negative for chills, diaphoresis and fever  HENT: Positive for congestion, postnasal drip, rhinorrhea and voice change  Negative for ear pain and sore throat  Respiratory: Positive for cough and shortness of breath  Cardiovascular: Negative for chest pain, palpitations and leg swelling  Gastrointestinal: Negative for abdominal pain, constipation, diarrhea, nausea and vomiting  Skin: Negative for rash and wound  Neurological: Negative for dizziness, syncope, weakness, light-headedness and headaches  Objective:      /80 (BP Location: Left arm, Patient Position: Sitting, Cuff Size: Standard)   Pulse (!) 113   Temp 97 8 °F (36 6 °C) (Tympanic)   Resp (!) 24   Ht 5' 4" (1 626 m)   Wt 120 kg (264 lb)   SpO2 91% Comment: COPD  BMI 45 32 kg/m²          Physical Exam  Vitals and nursing note reviewed  Constitutional:       General: He is not in acute distress  Appearance: Normal appearance  He is obese  HENT:      Head: Normocephalic and atraumatic  Eyes:      Extraocular Movements: Extraocular movements intact        Conjunctiva/sclera: Conjunctivae normal       Pupils: Pupils are equal, round, and reactive to light  Cardiovascular:      Rate and Rhythm: Normal rate and regular rhythm  Heart sounds: Normal heart sounds  No murmur heard  Pulmonary:      Effort: Pulmonary effort is normal  No respiratory distress  Breath sounds: Rhonchi present  Skin:     General: Skin is warm and dry  Neurological:      General: No focal deficit present  Mental Status: He is alert and oriented to person, place, and time  Cranial Nerves: No cranial nerve deficit     Psychiatric:         Mood and Affect: Mood normal          Behavior: Behavior normal

## 2023-02-15 NOTE — TELEPHONE ENCOUNTER
----- Message from Danyel Alva PA-C sent at 2/15/2023 10:23 AM EST -----  Regarding: Care Gap Request  02/15/23 10:23 AM    Hello, our patient Tayla Spangler has had Hemoglobin A1c completed/performed  Please assist in updating the patient chart by pulling the Care Everywhere (CE) document  The date of service is 2/2/23       Thank you,  Danyel Alva PA-C  [facility]

## 2023-02-16 NOTE — TELEPHONE ENCOUNTER
Upon review of the In Basket request we were able to locate, review, and update the patient chart as requested for Hemoglobin A1c  Any additional questions or concerns should be emailed to the Practice Liaisons via the appropriate education email address, please do not reply via In Basket      Thank you  Yao Dutta

## 2023-02-18 DIAGNOSIS — T78.40XS ALLERGIC REACTION, SEQUELA: ICD-10-CM

## 2023-02-18 RX ORDER — EPINEPHRINE 0.3 MG/.3ML
INJECTION SUBCUTANEOUS
Qty: 2 EACH | Refills: 0 | Status: SHIPPED | OUTPATIENT
Start: 2023-02-18

## 2023-02-21 ENCOUNTER — TELEPHONE (OUTPATIENT)
Dept: FAMILY MEDICINE CLINIC | Facility: HOME HEALTHCARE | Age: 58
End: 2023-02-21

## 2023-02-23 DIAGNOSIS — J44.9 COPD, SEVERITY TO BE DETERMINED (HCC): Primary | ICD-10-CM

## 2023-02-23 RX ORDER — ALBUTEROL SULFATE 2.5 MG/3ML
2.5 SOLUTION RESPIRATORY (INHALATION) EVERY 6 HOURS PRN
Qty: 180 ML | Refills: 0 | Status: SHIPPED | OUTPATIENT
Start: 2023-02-23

## 2023-03-22 ENCOUNTER — TELEPHONE (OUTPATIENT)
Dept: GASTROENTEROLOGY | Facility: CLINIC | Age: 58
End: 2023-03-22

## 2023-03-29 ENCOUNTER — APPOINTMENT (OUTPATIENT)
Dept: LAB | Facility: HOSPITAL | Age: 58
End: 2023-03-29

## 2023-03-29 DIAGNOSIS — E78.5 DYSLIPIDEMIA: ICD-10-CM

## 2023-03-29 LAB
ALBUMIN SERPL BCP-MCNC: 4.1 G/DL (ref 3.5–5)
ALP SERPL-CCNC: 73 U/L (ref 34–104)
ALT SERPL W P-5'-P-CCNC: 19 U/L (ref 7–52)
ANION GAP SERPL CALCULATED.3IONS-SCNC: 8 MMOL/L (ref 4–13)
AST SERPL W P-5'-P-CCNC: 15 U/L (ref 13–39)
BILIRUB SERPL-MCNC: 0.32 MG/DL (ref 0.2–1)
BUN SERPL-MCNC: 18 MG/DL (ref 5–25)
CALCIUM SERPL-MCNC: 9 MG/DL (ref 8.4–10.2)
CHLORIDE SERPL-SCNC: 89 MMOL/L (ref 96–108)
CHOLEST SERPL-MCNC: 103 MG/DL
CO2 SERPL-SCNC: 35 MMOL/L (ref 21–32)
CREAT SERPL-MCNC: 1.27 MG/DL (ref 0.6–1.3)
CREAT UR-MCNC: 36.1 MG/DL
GFR SERPL CREATININE-BSD FRML MDRD: 61 ML/MIN/1.73SQ M
GLUCOSE P FAST SERPL-MCNC: 245 MG/DL (ref 65–99)
HDLC SERPL-MCNC: 31 MG/DL
LDLC SERPL CALC-MCNC: 43 MG/DL (ref 0–100)
LDLC SERPL DIRECT ASSAY-MCNC: 49 MG/DL (ref 0–100)
MICROALBUMIN UR-MCNC: 764 MG/L (ref 0–20)
MICROALBUMIN/CREAT 24H UR: 2116 MG/G CREATININE (ref 0–30)
NONHDLC SERPL-MCNC: 72 MG/DL
POTASSIUM SERPL-SCNC: 3.7 MMOL/L (ref 3.5–5.3)
PROT SERPL-MCNC: 7.3 G/DL (ref 6.4–8.4)
SODIUM SERPL-SCNC: 132 MMOL/L (ref 135–147)
TRIGL SERPL-MCNC: 147 MG/DL

## 2023-04-25 ENCOUNTER — OFFICE VISIT (OUTPATIENT)
Dept: PHYSICAL THERAPY | Facility: HOME HEALTHCARE | Age: 58
End: 2023-04-25

## 2023-04-25 DIAGNOSIS — M54.50 CHRONIC BILATERAL LOW BACK PAIN WITHOUT SCIATICA: ICD-10-CM

## 2023-04-25 DIAGNOSIS — G89.29 CHRONIC BILATERAL LOW BACK PAIN WITHOUT SCIATICA: ICD-10-CM

## 2023-04-25 DIAGNOSIS — M17.0 PRIMARY OSTEOARTHRITIS OF BOTH KNEES: Primary | ICD-10-CM

## 2023-04-25 NOTE — PROGRESS NOTES
"Daily Note     Today's date: 2023  Patient name: Heriberto Lynne  : 1965  MRN: [id number]  Referring provider: Imtiaz Fry DO  Dx:   Encounter Diagnosis     ICD-10-CM    1  Primary osteoarthritis of both knees  M17 0       2  Chronic bilateral low back pain without sciatica  M54 50     G89 29                      Subjective: Pt reports he has pain in his L knee  Objective: See treatment diary below      Assessment: Tolerated treatment fair  Verbal cues needed t/o exercises to perform correctly  Progressed to quad sets and heelslides  today  Pt reports some pain LB and both knees t/o exercises  NuStep completed for endurance, strengthening LE's  and aerobic conditioning  Fatigue noted with exercise  Tightness B LE with stretching  Patient would benefit from continued PT      Plan: Continue per plan of care        Precautions: None    Re-eval Date: 23  POC ENDS: 23      Manuals -20      Irving calf, hs, glute stretch  Gentle 8' Gently 10'                     Neuro Re-Ed  4-20      Balance as appropriate        PPT  5\" x5      PPT with mario         TA         TA + OH lifts         Heel walks out with TA holds                         L/S mechanics and  spondylosis review  HZ       Ther Ex  4-20      Nustep L1 10 minutes  L1  10' L2 10 min  Spo2 95%   bpm     Quad set   5\"x 10 Irving     Heel slide        LAQ  3\" 1x10 irving 1x10 Irving  5\"     HS curls  Red 1x10 irving Red 1x10 Irving     SLR  AAROM 1x10   irving AROM  1x10 Irving     Bridges  1x10 1x10      Clamshells        S/L hip abd        Standing hip flex/ext/abd  1x5 ea irving 1x10 ea Irving     Squat  1x5 1x 8     Leg press        LF hip abd/add                Ther Activity        Step up/down                Gait Training                        Modalities                             "

## 2023-04-27 ENCOUNTER — OFFICE VISIT (OUTPATIENT)
Dept: PHYSICAL THERAPY | Facility: HOME HEALTHCARE | Age: 58
End: 2023-04-27

## 2023-04-27 DIAGNOSIS — M54.50 CHRONIC BILATERAL LOW BACK PAIN WITHOUT SCIATICA: ICD-10-CM

## 2023-04-27 DIAGNOSIS — G89.29 CHRONIC BILATERAL LOW BACK PAIN WITHOUT SCIATICA: ICD-10-CM

## 2023-04-27 DIAGNOSIS — M17.0 PRIMARY OSTEOARTHRITIS OF BOTH KNEES: Primary | ICD-10-CM

## 2023-04-27 NOTE — PROGRESS NOTES
"Daily Note     Today's date: 2023  Patient name: Joel Hurtado  : 1965  MRN: [id number]  Referring provider: Josiah Khan DO  Dx:   Encounter Diagnosis     ICD-10-CM    1  Primary osteoarthritis of both knees  M17 0       2  Chronic bilateral low back pain without sciatica  M54 50     G89 29                      Subjective: Pt notes that the R side of his lower back and R hip are really bothering him today  Objective: See treatment diary below      Assessment: Pt tolerable to increase in reps this date but overall remains limited due to poor mobility and increase in pain with exercises  Continue with gradual progression to build endurance and strength  Plan: Continue per plan of care        Precautions: None    Re-eval Date: 23  POC ENDS: 23      Manuals -    Irving calf, hs, glute stretch  Gentle 8' Gently 10' Gentle HS   8'                     Neuro Re-Ed  4-20      Balance as appropriate        PPT  5\" x5  5\" x 15     PPT with marchjosselin         TA         TA + OH lifts         Heel walks out with TA holds                         L/S mechanics and  spondylosis review  HZ       Ther Ex  4-20      Nustep L1 10 minutes  L1  10' L2 10 min  Spo2 95%   bpm L3   10 minutes  SpO2 82%  HR  122    Quad set   5\"x 10 Irving 5\" x 10     Heel slide        LAQ  3\" 1x10 irving 1x10 Irving  5\" 5\" x 15 irving     HS curls  Red 1x10 irving Red 1x10 Irving     SLR  AAROM 1x10   irving AROM  1x10 Irving X 15 irving     Hip add     5\" x 15     Bridges  1x10 1x10  X 10     Clamshells        S/L hip abd        Standing hip flex/ext/abd  1x5 ea irving 1x10 ea Irving X 10 ea irving     Squat  1x5 1x 8 X 10     Leg press        LF hip abd/add                Ther Activity        Step up/down    C Fwd   X 10 irving             Gait Training                        Modalities                             "

## 2023-05-01 DIAGNOSIS — G62.9 NEUROPATHY: ICD-10-CM

## 2023-05-01 RX ORDER — GABAPENTIN 100 MG/1
CAPSULE ORAL
Qty: 90 CAPSULE | Refills: 0 | Status: SHIPPED | OUTPATIENT
Start: 2023-05-01

## 2023-05-04 ENCOUNTER — OFFICE VISIT (OUTPATIENT)
Dept: PHYSICAL THERAPY | Facility: HOME HEALTHCARE | Age: 58
End: 2023-05-04

## 2023-05-04 DIAGNOSIS — G89.29 CHRONIC BILATERAL LOW BACK PAIN WITHOUT SCIATICA: ICD-10-CM

## 2023-05-04 DIAGNOSIS — M17.0 PRIMARY OSTEOARTHRITIS OF BOTH KNEES: Primary | ICD-10-CM

## 2023-05-04 DIAGNOSIS — M54.50 CHRONIC BILATERAL LOW BACK PAIN WITHOUT SCIATICA: ICD-10-CM

## 2023-05-04 NOTE — PROGRESS NOTES
"Daily Note     Today's date: 2023  Patient name: Letty Rosales  : 1965  MRN: [id number]  Referring provider: Rehana Preston DO  Dx: No diagnosis found  Start Time: 1345          Subjective: I have muscle soreness in B legs from the ex  Both knees and LB seem about the same  Objective: See treatment diary below    Assessment: Pt with fair edmond to TE but did require short, freq rest 2* overall fatigue with ex and SOB  Pt with vc's needed for correct form and to achieve full available range with TE  Marcelina Laughter Pt declined MT today 2* fatigue  Patient would benefit from continued PT    Plan: Continue per plan of care        Precautions: None    Re-eval Date: 23  POC ENDS: 23      Manuals - 5-4   Irving calf, hs, glute stretch  Gentle 8' Gently 10' Gentle HS   8'  Declined                   Neuro Re-Ed  -   5-4   Balance as appropriate        PPT  5\" x5  5\" x 15  5\" x10   PPT with mario         TA         TA + OH lifts         Heel walks out with TA holds                         L/S mechanics and  spondylosis review  HZ       Ther Ex  -20   5-4   Nustep L1 10 minutes  L1  10' L2 10 min  Spo2 95%   bpm L3   10 minutes  SpO2 82%  HR  122 L1  10'  Spo2 96  HR53   Quad set   5\"x 10 Irving 5\" x 10  5\" x10   Heel slide        LAQ  3\" 1x10 irving 1x10 Irving  5\" 5\" x 15 irving  5\" x15bil   HS curls  Red 1x10 irving Red 1x10 Irving     SLR  AAROM 1x10   irving AROM  1x10 Irving X 15 irving  x15 irving   Hip add     5\" x 15  5\" x15   Bridges  1x10 1x10  X 10  x15   Clamshells        S/L hip abd        Standing hip flex/ext/abd  1x5 ea irving 1x10 ea Irving X 10 ea irving  1x10 ea irving   Squat  1x5 1x 8 X 10  x10   Leg press        LF hip abd/add                Ther Activity        Step up/down    C Fwd   X 10 irving  C fwd  x10 irving           Gait Training                        Modalities                             "

## 2023-05-08 DIAGNOSIS — T78.40XS ALLERGIC REACTION, SEQUELA: ICD-10-CM

## 2023-05-08 RX ORDER — EPINEPHRINE 0.3 MG/.3ML
INJECTION SUBCUTANEOUS
Qty: 2 EACH | Refills: 0 | Status: SHIPPED | OUTPATIENT
Start: 2023-05-08

## 2023-05-09 ENCOUNTER — OFFICE VISIT (OUTPATIENT)
Dept: PHYSICAL THERAPY | Facility: HOME HEALTHCARE | Age: 58
End: 2023-05-09

## 2023-05-09 DIAGNOSIS — G89.29 CHRONIC BILATERAL LOW BACK PAIN WITHOUT SCIATICA: ICD-10-CM

## 2023-05-09 DIAGNOSIS — M54.50 CHRONIC BILATERAL LOW BACK PAIN WITHOUT SCIATICA: ICD-10-CM

## 2023-05-09 DIAGNOSIS — M17.0 PRIMARY OSTEOARTHRITIS OF BOTH KNEES: Primary | ICD-10-CM

## 2023-05-09 NOTE — PROGRESS NOTES
"Daily Note     Today's date: 2023  Patient name: Christina Cleaning  : 1965  MRN: [id number]  Referring provider: Veronica Tomas DO  Dx:   Encounter Diagnosis     ICD-10-CM    1  Primary osteoarthritis of both knees  M17 0       2  Chronic bilateral low back pain without sciatica  M54 50     G89 29                  Subjective: Pt states \" I hurt all over today  \"      Objective: See treatment diary below      Assessment: Tolerated treatment fair  Verbal cues needed to perform exercises correctly  Pt reports pain t/o his body during exercises  Fatigue noted with exercise  NuStep completed for endurance, strengthening LE's and aerobic conditioning  Tightness B LE with stretching  Patient would benefit from continued PT      Plan: Continue per plan of care        Precautions: None    Re-eval Date: 23  POC ENDS: 23      Manuals - 5-4   Jamie calf, hs, glute stretch Gently 8'   HS/calf Jamie Gentle 8' Gently 10' Gentle HS   8'  Declined                   Neuro Re-Ed  -   5-4   Balance as appropriate        PPT 5\"x10 5\" x5  5\" x 15  5\" x10   PPT with marches         TA         TA + OH lifts         Heel walks out with TA holds                         L/S mechanics and  spondylosis review         Ther Ex  -   5-4   Nustep L1 10 minutes   Spo2 94%   bpm L1  10' L2 10 min  Spo2 95%   bpm L3   10 minutes  SpO2 82%  HR  122 L1  10'  Spo2 80  HR53   Quad set 5\"x 10 Jamie  5\"x 10 Jamie 5\" x 10  5\" x10   Heel slide        LAQ 5\" 1 x 15 Jamie 3\" 1x10 jamie 1x10 Jamie  5\" 5\" x 15 jamie  5\" x15bil   HS curls  Red 1x10 jamie Red 1x10 Jamie     SLR X 10 Jamie AAROM 1x10   jamie AROM  1x10 Jamie X 15 jamie  x15 jamie   Hip add  5\"x 15    5\" x 15  5\" x15   Bridges Pt declined  1x10 1x10  X 10  x15   Clamshells        S/L hip abd        Standing hip flex/ext/abd 1x10 ea Jamie 1x5 ea jamie 1x10 ea Jamie X 10 ea jamie  1x10 ea jamie   Squat X 10  1x5 1x 8 X 10  x10   Leg press        LF hip abd/add                Ther " Activity        Step up/down C Fwd x10 Jamie   C Fwd   X 10 jamie  C fwd  x10 jamie           Gait Training                        Modalities

## 2023-05-11 ENCOUNTER — OFFICE VISIT (OUTPATIENT)
Dept: PHYSICAL THERAPY | Facility: HOME HEALTHCARE | Age: 58
End: 2023-05-11

## 2023-05-11 DIAGNOSIS — M17.0 PRIMARY OSTEOARTHRITIS OF BOTH KNEES: Primary | ICD-10-CM

## 2023-05-11 NOTE — PROGRESS NOTES
"Daily Note     Today's date: 2023  Patient name: Nirmal Braden  : 1965  MRN: [id number]  Referring provider: Laquita Gonzales DO  Dx: No diagnosis found  Start Time: 145          Subjective: I have more mobility but it takes me a while to get moving  Objective: See treatment diary below    Assessment:   Pt with short rests needed t/o tx 2* SOB and muscle fatigue  Pt with improved flexibility noted during all transfers today  Pt with reduced reps of L LE step up 2* knee pain and pt also declined supine SLR flex  Pt reports no increase in pain level at end of Tx  Patient would benefit from continued PT    Plan: Continue per plan of care        Precautions: None    Re-eval Date: 23  POC ENDS: 23      Manuals - 5-4   Jamie calf, hs, glute stretch Gently 8'   HS/calf Jamie Gentle 8'  HS/calf Jamie Gently 10' Gentle HS   8'  Declined                   Neuro Re-Ed     5-4   Balance as appropriate        PPT 5\"x10 5\" x10  5\" x 15  5\" x10   PPT with marches         TA         TA + OH lifts         Heel walks out with TA holds                         L/S mechanics and  spondylosis review         Ther Ex     5-4   Nustep  Spo2  HR L1 10 minutes   Spo2 94%   bpm L1  10'  97%  118bpm L2 10 min  Spo2 95%   bpm L3   10 minutes  SpO2 82%  HR  122 L1  10'  Spo2 96  HR53   Quad set 5\"x 10 Jamie 5\" x10 5\"x 10 Jamie 5\" x 10  5\" x10   Heel slide        LAQ 5\" 1 x 15 Jamie 3\" 1x15 jamie 1x10 Jamie  5\" 5\" x 15 jamie  5\" x15bil   HS curls  Red 1x10 jamie Red 1x10 Jamei     SLR X 10 Jamie Declined AROM  1x10 Jamie X 15 jamie  x15 jamie   Hip add  5\"x 15  5\" x15  5\" x 15  5\" x15   Bridges Pt declined  1x10 1x10  X 10  x15   Clamshells        S/L hip abd        Standing hip flex/ext/abd 1x10 ea Jamie 1x10 ea jamie 1x10 ea Jamie X 10 ea jamie  1x10 ea jamie   Squat X 10  1x10 1x 8 X 10  x10   Leg press        LF hip abd/add                Ther Activity        Step up/down C Fwd x10 Jamie C Fwd  1x10 R  1x2 L    " C Fwd   X 10 irving  C fwd  x10 irving           Gait Training                        Modalities

## 2023-05-16 ENCOUNTER — OFFICE VISIT (OUTPATIENT)
Dept: PHYSICAL THERAPY | Facility: HOME HEALTHCARE | Age: 58
End: 2023-05-16

## 2023-05-16 DIAGNOSIS — M17.0 PRIMARY OSTEOARTHRITIS OF BOTH KNEES: Primary | ICD-10-CM

## 2023-05-16 DIAGNOSIS — G89.29 CHRONIC BILATERAL LOW BACK PAIN WITHOUT SCIATICA: ICD-10-CM

## 2023-05-16 DIAGNOSIS — M54.50 CHRONIC BILATERAL LOW BACK PAIN WITHOUT SCIATICA: ICD-10-CM

## 2023-05-16 NOTE — PROGRESS NOTES
"Daily Note     Today's date: 2023  Patient name: Yarelis Flanagan  : 1965  MRN: [id number]  Referring provider: Jazmine Hamilton DO  Dx:   Encounter Diagnosis     ICD-10-CM    1  Primary osteoarthritis of both knees  M17 0       2  Chronic bilateral low back pain without sciatica  M54 50     G89 29                      Subjective: Pt reports 7/10 pain R knee  Objective: See treatment diary below      Assessment: Tolerated treatment fair  Verbal cues needed to perform exercises correctly  Fatigue noted with exercises  Tightness B LE with stretching  No increased pain reported t/o session  Patient would benefit from continued PT to decrease pain and improve ROM, strength, balance and overall functional mobility  Plan: Continue per plan of care        Precautions: None    Re-eval Date: 23  POC ENDS: 23      Manuals - 5-4   Jamie calf, hs, glute stretch Gently 8'   HS/calf Jamie Gentle 8'  HS/calf Jamie Gently 8' HS/calf Jamie  Gentle HS   8'  Declined                   Neuro Re-Ed     5-4   Balance as appropriate        PPT 5\"x10 5\" x10 5\"x10 5\" x 15  5\" x10   PPT with marches         TA         TA + OH lifts         Heel walks out with TA holds                         L/S mechanics and  spondylosis review         Ther Ex     5-4   Nustep  Spo2  HR L1 10 minutes   Spo2 94%   bpm L1  10'  97%  118bpm L2 10 min  Spo2 95%   bpm L3   10 minutes  SpO2 82%  HR  122 L1  10'  Spo2 96  HR53   Quad set 5\"x 10 Jamie 5\" x10 5\"x 10 Jamie 5\" x 10  5\" x10   Heel slide        LAQ 5\" 1 x 15 Jamie 3\" 1x15 jamie 1x15 Jamie  5\" 5\" x 15 jamie  5\" x15bil   HS curls  Red 1x10 jamie Red 1x10 Jamie     SLR X 10 Jamie Declined 1x10 Jamie X 15 jamie  x15 jamie   Hip add  5\"x 15  5\" x15 5\"x15 5\" x 15  5\" x15   Bridges Pt declined  1x10 1x10  X 10  x15   Clamshells        S/L hip abd        Standing hip flex/ext/abd 1x10 ea Jamie 1x10 ea jamie 1x10 ea Jamie X 10 ea jamie  1x10 ea jamie   Squat X 10  1x10 1x 10 X " 10  x10   Leg press        LF hip abd/add                Ther Activity        Step up/down C Fwd x10 Jamie C Fwd  1x10 R  1x2 L   C Fwd   1x10 Jamie C Fwd   X 10 jamie  C fwd  x10 jamie           Gait Training                        Modalities

## 2023-05-18 ENCOUNTER — OFFICE VISIT (OUTPATIENT)
Dept: PHYSICAL THERAPY | Facility: HOME HEALTHCARE | Age: 58
End: 2023-05-18

## 2023-05-18 DIAGNOSIS — E03.9 HYPOTHYROIDISM, UNSPECIFIED TYPE: ICD-10-CM

## 2023-05-18 DIAGNOSIS — M54.50 CHRONIC BILATERAL LOW BACK PAIN WITHOUT SCIATICA: ICD-10-CM

## 2023-05-18 DIAGNOSIS — M17.0 PRIMARY OSTEOARTHRITIS OF BOTH KNEES: Primary | ICD-10-CM

## 2023-05-18 DIAGNOSIS — G89.29 CHRONIC BILATERAL LOW BACK PAIN WITHOUT SCIATICA: ICD-10-CM

## 2023-05-18 RX ORDER — LEVOTHYROXINE SODIUM 175 UG/1
TABLET ORAL
Qty: 90 TABLET | Refills: 0 | Status: SHIPPED | OUTPATIENT
Start: 2023-05-18

## 2023-05-18 NOTE — PROGRESS NOTES
"Daily Note     Today's date: 2023  Patient name: Baltazar Ann  : 1965  MRN: [id number]  Referring provider: Simona Dominguez DO  Dx:   Encounter Diagnosis     ICD-10-CM    1  Primary osteoarthritis of both knees  M17 0       2  Chronic bilateral low back pain without sciatica  M54 50     G89 29                      Subjective: Pt reports he has pain all over his body but  has the most pain in his L knee  Objective: See treatment diary below      Assessment: Tolerated treatment fair  Verbal cues needed t/o exercises to perform correctly  Fatigue noted with exercise  Pt reports pain L knee t/o session  Tightness BLE with stretching  Patient would benefit from continued PT      Plan: Continue per plan of care        Precautions: None    Re-eval Date: 23  POC ENDS: 23      Manuals  5- 5-4   Jamie calf, hs, glute stretch Gently 8'   HS/calf Jamie Gentle 8'  HS/calf Jamie Gently 8' HS/calf Jamie  Gentle HS /calf  8'  Declined                   Neuro Re-Ed     5-4   Balance as appropriate        PPT 5\"x10 5\" x10 5\"x10 5\" x 10 5\" x10   PPT with marches         TA         TA + OH lifts         Heel walks out with TA holds                         L/S mechanics and  spondylosis review         Ther Ex     5-4   Nustep  Spo2  HR L1 10 minutes   Spo2 94%   bpm L1  10'  97%  118bpm L2 10 min  Spo2 95%   bpm L2 10 min  10 minutes  SpO2 93%  HR  120 bpm L1  10'  Spo2 96  HR53   Quad set 5\"x 10 Jamie 5\" x10 5\"x 10 Jamie 5\" x 10  Jamie 5\" x10   Heel slide        LAQ 5\" 1 x 15 Jamie 3\" 1x15 jamie 1x15 Jamie  5\" 5\" x 15 jamie  5\" x15bil   HS curls  Red 1x10 jamie Red 1x10 Jamie Red 1x10 Jamie    SLR X 10 Jamie Declined 1x10 Jamie X 10 jamie  x15 jamie   Hip add  5\"x 15  5\" x15 5\"x15 5\" x 15  5\" x15   Bridges Pt declined  1x10 1x10  X 10  x15   Clamshells        S/L hip abd        Standing hip flex/ext/abd 1x10 ea Jamie 1x10 ea jamie 1x10 ea Jamie X 10 ea jamie  1x10 ea jamie   Squat X 10  1x10 1x 10 X 10  x10 " Leg press        LF hip abd/add                Ther Activity        Step up/down C Fwd x10 Jamie C Fwd  1x10 R  1x2 L   C Fwd   1x10 Jamie C Fwd   X 10 jamie  C fwd  x10 jamie           Gait Training                        Modalities

## 2023-05-23 ENCOUNTER — OFFICE VISIT (OUTPATIENT)
Dept: PHYSICAL THERAPY | Facility: HOME HEALTHCARE | Age: 58
End: 2023-05-23

## 2023-05-23 DIAGNOSIS — M17.0 PRIMARY OSTEOARTHRITIS OF BOTH KNEES: Primary | ICD-10-CM

## 2023-05-23 NOTE — PROGRESS NOTES
"Daily Note     Today's date: 2023  Patient name: Jennifer Jj  : 1965  MRN: [id number]  Referring provider: Steffi Pham DO  Dx: No diagnosis found  Start Time: 1340          Subjective: I am doing a little better  I still have the popping and grinding but there is a little less pain  Objective: See treatment diary below    Assessment: Tolerated treatment well  Pt arrived at clinic not wearing knee brace but reported increased L knee pain after NuStep and put knee brace on for remainder of tx  Pt states he does better and has less discomfort when using the newer NuStep  Pt appeared to have less SOB during todays session  Patient would benefit from continued PT to improve deficits in L knee ROM and overall strength and endurance  Plan: Continue per plan of care        Precautions: None    Re-eval Date: 23  POC ENDS: 23      Manuals    Jamie calf, hs, glute stretch Gently 8'   HS/calf Jamie Gentle 8'  HS/calf Jamie Gently 8' HS/calf Jamie  Gentle HS /calf  8'  Gentle HS/calf  8'                   Neuro Re-Ed     Balance as appropriate        PPT 5\"x10 5\" x10 5\"x10 5\" x 10 5\" x10   PPT with marches         TA         TA + OH lifts         Heel walks out with TA holds                         L/S mechanics and  spondylosis review         Ther Ex     Nustep  Spo2  HR L1 10 minutes   Spo2 94%   bpm L1  10'  97%  118bpm L2 10 min  Spo2 95%   bpm L2 10 min  10 minutes  SpO2 93%  HR  120 bpm L1  10'  Spo2 93%  [id number]   Quad set 5\"x 10 Jamie 5\" x10 5\"x 10 Jamie 5\" x 10  Jamie 5\" x10   Heel slide        LAQ 5\" 1 x 15 Jamie 3\" 1x15 jamie 1x15 Jamie  5\" 5\" x 15 jamie  5\" x15bil   HS curls  Red 1x10 jamie Red 1x10 Jamie Red 1x10 Jamie Red 1x15 ea   SLR X 10 Jamie Declined 1x10 Jamie X 10 jamie  x10 jamie   Hip add  5\"x 15  5\" x15 5\"x15 5\" x 15  5\" x15   Bridges Pt declined  1x10 1x10  X 10  x15   Clamshells        S/L hip abd        Standing hip flex/ext/abd 1x10 " ea Jamie 1x10 ea jamie 1x10 ea Jamie X 10 ea jamie  1x15 ea jamie   Squat X 10  1x10 1x 10 X 10  x10   Leg press        LF hip abd/add                Ther Activity        Step up/down C Fwd x10 Jamie C Fwd  1x10 R  1x2 L   C Fwd   1x10 Jamie C Fwd   X 10 jamie  Declined           Gait Training                        Modalities

## 2023-05-25 ENCOUNTER — OFFICE VISIT (OUTPATIENT)
Dept: PHYSICAL THERAPY | Facility: HOME HEALTHCARE | Age: 58
End: 2023-05-25

## 2023-05-25 DIAGNOSIS — G89.29 CHRONIC BILATERAL LOW BACK PAIN WITHOUT SCIATICA: ICD-10-CM

## 2023-05-25 DIAGNOSIS — M54.50 CHRONIC BILATERAL LOW BACK PAIN WITHOUT SCIATICA: ICD-10-CM

## 2023-05-25 DIAGNOSIS — M17.0 PRIMARY OSTEOARTHRITIS OF BOTH KNEES: Primary | ICD-10-CM

## 2023-05-25 NOTE — PROGRESS NOTES
"Daily Note     Today's date: 2023  Patient name: Mirella Ernst  : 1965  MRN: [id number]  Referring provider: Juliette Tse DO  Dx:   Encounter Diagnosis     ICD-10-CM    1  Primary osteoarthritis of both knees  M17 0       2  Chronic bilateral low back pain without sciatica  M54 50     G89 29                  Subjective: Pt reports he has a lot of pain in his L knee  Objective: See treatment diary below      Assessment: Tolerated treatment fair  Pt reports pain L knee t/o exercises  Progressed to TA with overhead lifts today  Fatigue noted with exercise today  Patient would benefit from continued PT to decrease pain and improve ROM, strength and overall functional mobility  Plan: Continue per plan of care        Precautions: None    Re-eval Date: 23  POC ENDS: 23      Manuals    Jamie calf, hs, glute stretch Gently 8'   HS/calf Jamie Gentle 8'  HS/calf Jamie Gently 8' HS/calf Jamie  Gentle HS /calf  8'  Gentle HS/calf  8'                   Neuro Re-Ed     Balance as appropriate        PPT 5\"x10 5\" x10 5\"x10 5\" x 10 5\" x10   PPT with marches         TA         TA + OH lifts  Seated   1x10        Heel walks out with TA holds                         L/S mechanics and  spondylosis review         Ther Ex     Nustep  Spo2  HR L1 10 minutes   Spo2 94%   bpm L1  10'  97%  118bpm L2 10 min  Spo2 95%   bpm L2 10 min  10 minutes  SpO2 93%  HR  120 bpm L1  10'  Spo2 93%  [id number]   Quad set 5\"x 10 R  5\" x10 5\"x 10 Jamie 5\" x 10  Jamie 5\" x10   Heel slide        LAQ 5\" 1 x 15 Jamie 3\" 1x15 jamie 1x15 Jamie  5\" 5\" x 15 jamie  5\" x15bil   HS curls Red 1x15 ea  Red 1x10 jamie Red 1x10 Jamie Red 1x10 Jamie Red 1x15 ea   SLR X 10 Jamie Declined 1x10 Jamie X 10 jamie  x10 jamie   Hip add  5\"x 15  5\" x15 5\"x15 5\" x 15  5\" x15   Bridges  x 15 1x10 1x10  X 10  x15   Clamshells        S/L hip abd        Standing hip flex/ext/abd 1x15 ea Jamie 1x10 ea jamie 1x10 ea Jamie X 10 " ea jamie  1x15 ea jamie   Squat X 10  1x10 1x 10 X 10  x10   Leg press        LF hip abd/add                Ther Activity        Step up/down Pt declined  C Fwd  1x10 R  1x2 L   C Fwd   1x10 Jamie C Fwd   X 10 jamie  Declined           Gait Training                        Modalities

## 2023-05-28 DIAGNOSIS — E11.65 TYPE 2 DIABETES MELLITUS WITH HYPERGLYCEMIA, WITH LONG-TERM CURRENT USE OF INSULIN (HCC): ICD-10-CM

## 2023-05-28 DIAGNOSIS — Z79.4 TYPE 2 DIABETES MELLITUS WITH HYPERGLYCEMIA, WITH LONG-TERM CURRENT USE OF INSULIN (HCC): ICD-10-CM

## 2023-05-28 RX ORDER — INSULIN LISPRO 100 [IU]/ML
INJECTION, SOLUTION INTRAVENOUS; SUBCUTANEOUS
Qty: 15 ML | Refills: 0 | Status: SHIPPED | OUTPATIENT
Start: 2023-05-28

## 2023-05-30 ENCOUNTER — APPOINTMENT (OUTPATIENT)
Dept: PHYSICAL THERAPY | Facility: HOME HEALTHCARE | Age: 58
End: 2023-05-30
Payer: MEDICARE

## 2023-05-31 NOTE — PROGRESS NOTES
"PT Re-Evaluation     Today's date: 2023  Patient name: Henry Strauss  : 1965  MRN: [id number]  Referring provider: Madison Chand / Dr Terell Mejia   Dx:   Encounter Diagnosis     ICD-10-CM    1  Primary osteoarthritis of both knees  M17 0       2  Chronic bilateral low back pain without sciatica  M54 50     G89 29           Start Time: 1345  Stop Time: 1430  Total time in clinic (min): 45 minutes    Assessment  Assessment details: Pt is a 62 y/o male presenting to OPPT with chronic bilateral knee pain, L>R, consistent with diagnosis of bilateral knee OA, and chronic lower back pain with recent imaging consistent with DISH syndrome  Pt is presenting with significant pain levels, ROM deficits, strength deficits, and decreased functional mobility, which is affecting his ambulatory tolerance and ability to perform ADLs  Pt is currently independent for ambulation with a SPC, but demonstrates a significant antalgic gait pattern and gait dysfunction  Pt requires skilled PT in order to improve in pain, strength, ROM, functional mobility, quality of life, and return to PLOF  Thank you! UPDATE: Pt notes that he feels the therapy is helping some  He continues to have greater c/o pain and weakness in the L knee but feels the back is feeling better with less pain t/o the day  POC remains with slow progression but pt has been agreeable to all activities  PT to continue with POC to work on addressing remaining deficits  Thank you     Impairments: abnormal gait, abnormal or restricted ROM, abnormal movement, activity intolerance, impaired physical strength, lacks appropriate home exercise program, pain with function, weight-bearing intolerance and poor body mechanics  Understanding of Dx/Px/POC: good   Prognosis: good    Goals  STGs to be achieved in 4 weeks: - ongoing  -Pt to demonstrate reduced subjective pain rating \"at worst\" by at least 2-3 points from Initial Eval to allow for reduced pain with ADLs and " improved functional activity tolerance    -Pt to demonstrate ROM improved B knee WFL in order to maximize joint mobility and function and allow for progression of exercise program and achievement of goals    -Pt to demonstrate increased MMT of B LE by at least 1/2 grade in order to improve safety and stability with ADLs and functional mobility  LTGs to be achieved upon discharge:   -Pt will be I with HEP in order to continue to improve quality of life and independence and reduce risk for re-injury    -Pt to demonstrate return to activities of daily living without limiations or restrictions    -Pt will return to ambulation > 1 hour to help facilitate return to community activities independently   -Pt to demonstrate return to work activities without limitations or restrictions    -Pt to demonstrate improved function as noted by achieving or exceeding predicted score on FOTO outcomes assessment tool  Plan  Patient would benefit from: skilled physical therapy  Planned modality interventions: cryotherapy  Planned therapy interventions: body mechanics training, flexibility, functional ROM exercises, gait training, home exercise program, joint mobilization, manual therapy, massage, neuromuscular re-education, patient education, postural training, strengthening, stretching, therapeutic activities and therapeutic exercise  Frequency: 2x week  Duration in weeks: 12  Plan of Care beginning date: 4/18/2023  Plan of Care expiration date: 7/11/2023  Treatment plan discussed with: patient and referring physician        Subjective Evaluation    History of Present Illness  Mechanism of injury: Pt is presenting to OPPT with chronic anterior/medial bilateral knee pain, L>R  Pt reports that he gets pain in the front of his knee, but also has a lot of pain on the inside of his knee  He previously had steroid injections which did not provide a lot of relief   He more recently got hte series of 3 lubricating injections which seem to be helping a little more  Pt received an offloading brace to help with the arthritis, but that the brace has been sliding down has not been very helpful  Pt reports that both of his knees grind and pop  Pt also seeing pain management for chronic lower back pain; recent imaging (+) for spondylosis  Referral to OPPT to manage both conditions at this time  Pt missed follow up appts with both pain management and Orthopedics - he was advised to phone clinics to set up follow up appts  Quality of life: fair    Pain  Current pain ratin  At best pain ratin  At worst pain rating: 10  Quality: sharp, dull ache and tight (Stabbing)  Relieving factors: relaxation, rest and support  Aggravating factors: standing, walking and stair climbing    Social Support  Steps to enter house: yes  Stairs in house: yes (21)   Lives in: apartment      Diagnostic Tests  X-ray: abnormal (Moderate tricompartmental osteoarthritis of the left knee as evidenced by joint space narrowing, osteophyte formation and subchondral sclerosis  Mild tricompartmental degenerative changes of the right knee )  Treatments  Current treatment: injection treatment  Patient Goals  Patient goals for therapy: decreased pain, increased motion, increased strength and independence with ADLs/IADLs  Patient goal: To get on the bike and try to build some muscle in his knees and loosen them up  Objective     Tenderness   Left Knee   Tenderness in the medial joint line  Right Knee   Tenderness in the medial joint line       Neurological Testing     Sensation     Knee   Left Knee   Intact: light touch    Right Knee   Intact: light touch     Active Range of Motion     Lumbar   Flexion:  Restriction level: minimal  Extension:  Restriction level: moderate  Left lateral flexion:  Restriction level: minimal  Right lateral flexion:  Restriction level: minimal    Passive Range of Motion   Left Hip   Flexion: 95 degrees     Right Hip   Flexion: 90 degrees Left Knee   Flexion: 100 degrees with pain  Extension: -6 degrees with pain    Right Knee   Flexion: 110 degrees with pain  Extension: 0 degrees     Additional Passive Range of Motion Details  Jamie glute tightness    Strength/Myotome Testing     Left Hip   Planes of Motion   Flexion: 4+  Abduction: 4  Adduction: 5    Right Hip   Planes of Motion   Flexion: 4+  Abduction: 4  Adduction: 5    Left Knee   Flexion: 4-  Extension: 4-    Right Knee   Flexion: 4  Extension: 4    Left Ankle/Foot   Dorsiflexion: 4+    Right Ankle/Foot   Dorsiflexion: 4+    Tests     Left Hip   90/90 SLR: Negative  Right Hip   90/90 SLR: Positive  Ambulation     Ambulation: Level Surfaces   Ambulation with assistive device: independent    Additional Level Surfaces Ambulation Details  SPC   Tolerance x 15 minutes     Ambulation: Stairs   Ascend stairs: independent  Pattern: non-reciprocal  Railings: one rail  Descend stairs: independent  Pattern: non-reciprocal  Railings: one rail  Curbs: independent    Observational Gait   Gait: antalgic   Decreased walking speed, stride length, left stance time, left swing time, left step length and right step length         Flowsheet Rows    Flowsheet Row Most Recent Value   PT/OT G-Codes    Current Score 33   Projected Score 58

## 2023-06-01 ENCOUNTER — OFFICE VISIT (OUTPATIENT)
Dept: OBGYN CLINIC | Facility: CLINIC | Age: 58
End: 2023-06-01

## 2023-06-01 VITALS
WEIGHT: 264 LBS | SYSTOLIC BLOOD PRESSURE: 121 MMHG | BODY MASS INDEX: 45.07 KG/M2 | DIASTOLIC BLOOD PRESSURE: 69 MMHG | HEIGHT: 64 IN | HEART RATE: 120 BPM

## 2023-06-01 DIAGNOSIS — M25.561 CHRONIC PAIN OF BOTH KNEES: Primary | ICD-10-CM

## 2023-06-01 DIAGNOSIS — M25.562 CHRONIC PAIN OF BOTH KNEES: Primary | ICD-10-CM

## 2023-06-01 DIAGNOSIS — G89.29 CHRONIC PAIN OF BOTH KNEES: Primary | ICD-10-CM

## 2023-06-01 DIAGNOSIS — M17.0 PRIMARY OSTEOARTHRITIS OF BOTH KNEES: ICD-10-CM

## 2023-06-01 RX ORDER — BUPIVACAINE HYDROCHLORIDE 2.5 MG/ML
4 INJECTION, SOLUTION INFILTRATION; PERINEURAL
Status: COMPLETED | OUTPATIENT
Start: 2023-06-01 | End: 2023-06-01

## 2023-06-01 RX ORDER — TRIAMCINOLONE ACETONIDE 40 MG/ML
80 INJECTION, SUSPENSION INTRA-ARTICULAR; INTRAMUSCULAR
Status: COMPLETED | OUTPATIENT
Start: 2023-06-01 | End: 2023-06-01

## 2023-06-01 RX ADMIN — TRIAMCINOLONE ACETONIDE 80 MG: 40 INJECTION, SUSPENSION INTRA-ARTICULAR; INTRAMUSCULAR at 10:45

## 2023-06-01 RX ADMIN — BUPIVACAINE HYDROCHLORIDE 4 ML: 2.5 INJECTION, SOLUTION INFILTRATION; PERINEURAL at 10:45

## 2023-06-01 NOTE — PROGRESS NOTES
Assessment:  1  Chronic pain of both knees        2  Primary osteoarthritis of both knees            Plan:  Bilateral knee osteoarthritis  The patient was provided with bilateral knee steroid injections  The patient tolerated the procedure well  Bilateral knee visco-supplement was ordered as this had provided significant decrease of pain, inflammation and crepitus with last application  The patient has on-going knee pain and stiffness in which interferes with their daily activity and sleep  Examination of knee includes crepitus with range of motion  The patient rates their pain a 10/10 at times  The patient has tried home exercise program learned from past physical therapy  Bracing has not provided relief  The patient has tried oral medications and steroid injections with limited benefit  The patient has been counseled on weight loss  He should follow up after 8/7/2023  The patient has degenerative joint disease of his bilateral knees  Under aseptic technique, both knees were injected with Kenalog and Marcaine  He can start viscosupplementation anytime after August 7  We will see him back after that        To do next visit:  Return in about 3 months (around 9/1/2023) for visco-supplementation  The above stated was discussed in layman's terms and the patient expressed understanding  All questions were answered to the patient's satisfaction  Scribe Attestation    I,:  Georgette Hall am acting as a scribe while in the presence of the attending physician :       I,:  Pierre Coker, DO personally performed the services described in this documentation    as scribed in my presence :             Subjective:   Tracy Albright is a 61 y o  male who presents for for follow up of bilateral knees  He is s/p bilateral Eulfexxa injections with benefit reducing pain level from 10/10 to 1/10  Today he complains of bilateral generalized knee pain    Prolonged weight bearing and repetitive bending aggravates while rest alleviates        Review of systems negative unless otherwise specified in HPI    Past Medical History:   Diagnosis Date   • Acquired hypothyroidism 2015   • Angioedema    • Diabetes mellitus ([facility] )    • Disease of thyroid gland    • Hyperlipidemia    • Hypertension    • Mixed hyperlipidemia 2015   • Morbid obesity ([facility] )    • Morbid obesity with BMI of 45 0-49 9, adult ([facility] ) 2015   • MARSHA (obstructive sleep apnea)    • Primary hypertension 2015    Transitioned From: Benign essential hypertension       Past Surgical History:   Procedure Laterality Date   • KNEE SURGERY     • SINUS SURGERY         Family History   Problem Relation Age of Onset   • Thyroid cancer Mother    • Diabetes type II Mother    • Esophageal cancer Father    • Kidney cancer Brother    • Diabetes type II Brother    • Diabetes type II Maternal Grandmother        Social History     Occupational History   • Not on file   Tobacco Use   • Smoking status: Former     Packs/day: 2 00     Years: 70 00     Total pack years: 140 00     Types: Cigarettes     Quit date: 10/21/2022     Years since quittin 6   • Smokeless tobacco: Never   • Tobacco comments:     states read to quit prior to admit   Vaping Use   • Vaping Use: Never used   Substance and Sexual Activity   • Alcohol use: Not Currently     Alcohol/week: 3 0 standard drinks of alcohol     Types: 2 Cans of beer, 1 Shots of liquor per week     Comment: No alcohol since    • Drug use: No   • Sexual activity: Not Currently         Current Outpatient Medications:   •  albuterol (2 5 mg/3 mL) 0 083 % nebulizer solution, Take 3 mL (2 5 mg total) by nebulization every 6 (six) hours as needed for wheezing or shortness of breath, Disp: 180 mL, Rfl: 0  •  Ascorbic Acid (VITAMIN C PO), Take 1 tablet by mouth daily, Disp: , Rfl:   •  aspirin 81 mg chewable tablet, Take one tablet by mouth daily, Disp: , Rfl:   •  benzonatate (TESSALON PERLES) 100 mg capsule, Take 1 "capsule (100 mg total) by mouth 3 (three) times a day as needed for cough, Disp: 20 capsule, Rfl: 2  •  DULoxetine (CYMBALTA) 60 mg delayed release capsule, Take 1 capsule (60 mg total) by mouth daily, Disp: 90 capsule, Rfl: 1  •  EPINEPHrine (EPIPEN) 0 3 mg/0 3 mL SOAJ, INJECT 0 3MG INTO A MUSCLE ONCE FOR 1 DOSE, Disp: 2 each, Rfl: 0  •  gabapentin (NEURONTIN) 100 mg capsule, TAKE 1 CAPSULE BY MOUTH THREE TIMES DAILY, Disp: 90 capsule, Rfl: 0  •  HumaLOG KwikPen 100 units/mL injection pen, INJECT 20 UNITS UNDER THE SKIN THREE TIMES DAILY WITH MEALS, Disp: 15 mL, Rfl: 0  •  hydrochlorothiazide (HYDRODIURIL) 25 mg tablet, Take 1 tablet by mouth twice daily, Disp: 60 tablet, Rfl: 2  •  Insulin Glargine Solostar (Lantus SoloStar) 100 UNIT/ML SOPN, Inject 0 6 mL (60 Units total) under the skin daily, Disp: 18 mL, Rfl: 5  •  Insulin Pen Needle (Pen Needles 3/16\") 31G X 5 MM MISC, Use 4 (four) times a day, Disp: 300 each, Rfl: 3  •  ipratropium (ATROVENT) 0 02 % nebulizer solution, Take 2 5 mL (0 5 mg total) by nebulization every 6 (six) hours as needed for wheezing or shortness of breath, Disp: 150 mL, Rfl: 0  •  ipratropium-albuterol (DUO-NEB) 0 5-2 5 mg/3 mL nebulizer solution, Take 3 mL by nebulization every 6 (six) hours as needed for wheezing or shortness of breath, Disp: 180 mL, Rfl: 0  •  levothyroxine 175 mcg tablet, Take 1 tablet by mouth once daily, Disp: 90 tablet, Rfl: 0  •  lisinopril (ZESTRIL) 5 mg tablet, Take 1 tablet (5 mg total) by mouth daily, Disp: 90 tablet, Rfl: 1  •  loratadine (CLARITIN) 10 mg tablet, Take 10 mg by mouth daily, Disp: , Rfl:   •  metFORMIN (GLUCOPHAGE) 1000 MG tablet, Take 1 tablet by mouth twice daily, Disp: 180 tablet, Rfl: 0  •  Omega-3 Fatty Acids (fish oil) 1,000 mg, Take 1,000 mg by mouth 2 (two) times a day, Disp: , Rfl:   •  pregabalin (LYRICA) 25 mg capsule, Take 1 capsule by mouth twice daily, Disp: 60 capsule, Rfl: 2  •  rosuvastatin (CRESTOR) 40 MG tablet, Take 1 tablet " "(40 mg total) by mouth daily, Disp: 90 tablet, Rfl: 3  •  sodium chloride (OCEAN) 0 65 % nasal spray, 1 spray into each nostril as needed for rhinitis, Disp: 30 mL, Rfl: 1  •  traZODone (DESYREL) 150 mg tablet, Take 1 tablet (150 mg total) by mouth daily at bedtime, Disp: 90 tablet, Rfl: 1  •  Ventolin  (90 Base) MCG/ACT inhaler, Inhale 2 puffs every 4 (four) hours as needed for wheezing or shortness of breath, Disp: 18 g, Rfl: 5    Allergies   Allergen Reactions   • Ace Inhibitors Swelling     Angioedema    PT STATES THIS IS NOT AN ALLERGY   • Other Anaphylaxis     Pt believes that he is allergic to peanut butter  Also, seasonal allergies   • Zinc Tongue Swelling     PT STATES THIS IS NOT AN ALLERGY   • Clindamycin Edema     Had angioedema episode approx 5 hr after IM administration of clindamycin  Unclear if there is definitive causal relationship  PT STATES THIS IS NOT AN ALLERGY            Vitals:    06/01/23 1030   BP: 121/69   Pulse: (!) 120       Objective:  Physical exam  · General: Awake, Alert, Oriented  · Eyes: Pupils equal, round and reactive to light  · Heart: regular rate and rhythm  · Lungs: No audible wheezing  · Abdomen: soft                    Ortho Exam  Bilateral knees:  No erythema or ecchymosis  No effusion or swelling  Normal strength  Good ROM with crepitus   Calf compartments soft and supple  Sensation intact  Toes are warm sensate and mobile      Diagnostics, reviewed and taken today if performed as documented:    None performed     Procedures, if performed today:    Large joint arthrocentesis: R knee  Universal Protocol:  Consent: Verbal consent obtained  Risks and benefits: risks, benefits and alternatives were discussed  Consent given by: patient  Time out: Immediately prior to procedure a \"time out\" was called to verify the correct patient, procedure, equipment, support staff and site/side marked as required    Timeout called at: 6/1/2023 10:50 AM   Patient understanding: " "patient states understanding of the procedure being performed  Site marked: the operative site was marked  Patient identity confirmed: verbally with patient    Supporting Documentation  Indications: pain   Procedure Details  Location: knee - R knee  Preparation: Patient was prepped and draped in the usual sterile fashion  Needle size: 22 G  Ultrasound guidance: no  Approach: anterolateral  Medications administered: 4 mL bupivacaine 0 25 %; 80 mg triamcinolone acetonide 40 mg/mL    Patient tolerance: patient tolerated the procedure well with no immediate complications  Dressing:  Sterile dressing applied    Large joint arthrocentesis: L knee  Universal Protocol:  Consent: Verbal consent obtained  Risks and benefits: risks, benefits and alternatives were discussed  Consent given by: patient  Time out: Immediately prior to procedure a \"time out\" was called to verify the correct patient, procedure, equipment, support staff and site/side marked as required  Timeout called at: 6/1/2023 10:50 AM   Patient understanding: patient states understanding of the procedure being performed  Site marked: the operative site was marked  Patient identity confirmed: verbally with patient    Supporting Documentation  Indications: pain   Procedure Details  Location: knee - L knee  Preparation: Patient was prepped and draped in the usual sterile fashion  Needle size: 22 G  Ultrasound guidance: no  Approach: anterolateral  Medications administered: 4 mL bupivacaine 0 25 %; 80 mg triamcinolone acetonide 40 mg/mL    Patient tolerance: patient tolerated the procedure well with no immediate complications  Dressing:  Sterile dressing applied            Portions of the record may have been created with voice recognition software  Occasional wrong word or \"sound a like\" substitutions may have occurred due to the inherent limitations of voice recognition software    Read the chart carefully and recognize, using context, where substitutions have " occurred

## 2023-06-02 DIAGNOSIS — G62.9 NEUROPATHY: ICD-10-CM

## 2023-06-02 RX ORDER — GABAPENTIN 100 MG/1
CAPSULE ORAL
Qty: 90 CAPSULE | Refills: 0 | Status: SHIPPED | OUTPATIENT
Start: 2023-06-02

## 2023-06-08 DIAGNOSIS — E11.8 TYPE 2 DIABETES MELLITUS WITH COMPLICATION, WITH LONG-TERM CURRENT USE OF INSULIN (HCC): ICD-10-CM

## 2023-06-08 DIAGNOSIS — Z79.4 TYPE 2 DIABETES MELLITUS WITH COMPLICATION, WITH LONG-TERM CURRENT USE OF INSULIN (HCC): ICD-10-CM

## 2023-07-07 DIAGNOSIS — I10 BENIGN ESSENTIAL HYPERTENSION: ICD-10-CM

## 2023-07-07 DIAGNOSIS — I10 ESSENTIAL HYPERTENSION: ICD-10-CM

## 2023-07-07 RX ORDER — HYDROCHLOROTHIAZIDE 25 MG/1
25 TABLET ORAL 2 TIMES DAILY
Qty: 60 TABLET | Refills: 2 | Status: SHIPPED | OUTPATIENT
Start: 2023-07-07

## 2023-07-07 RX ORDER — LISINOPRIL 5 MG/1
5 TABLET ORAL DAILY
Qty: 90 TABLET | Refills: 1 | Status: SHIPPED | OUTPATIENT
Start: 2023-07-07 | End: 2024-07-06

## 2023-07-17 DIAGNOSIS — G62.9 NEUROPATHY: ICD-10-CM

## 2023-07-17 RX ORDER — GABAPENTIN 100 MG/1
CAPSULE ORAL
Qty: 90 CAPSULE | Refills: 0 | Status: SHIPPED | OUTPATIENT
Start: 2023-07-17

## 2023-07-19 DIAGNOSIS — F32.9 REACTIVE DEPRESSION: ICD-10-CM

## 2023-07-19 LAB
LEFT EYE DIABETIC RETINOPATHY: POSITIVE
RIGHT EYE DIABETIC RETINOPATHY: POSITIVE

## 2023-07-19 RX ORDER — DULOXETIN HYDROCHLORIDE 60 MG/1
CAPSULE, DELAYED RELEASE ORAL
Qty: 90 CAPSULE | Refills: 0 | Status: SHIPPED | OUTPATIENT
Start: 2023-07-19

## 2023-07-21 DIAGNOSIS — F41.8 DEPRESSION WITH ANXIETY: ICD-10-CM

## 2023-07-21 RX ORDER — TRAZODONE HYDROCHLORIDE 150 MG/1
150 TABLET ORAL
Qty: 90 TABLET | Refills: 0 | Status: SHIPPED | OUTPATIENT
Start: 2023-07-21

## 2023-08-06 DIAGNOSIS — G62.9 NEUROPATHY: ICD-10-CM

## 2023-08-06 RX ORDER — GABAPENTIN 100 MG/1
CAPSULE ORAL
Qty: 90 CAPSULE | Refills: 0 | Status: SHIPPED | OUTPATIENT
Start: 2023-08-06

## 2023-08-07 ENCOUNTER — TELEPHONE (OUTPATIENT)
Age: 58
End: 2023-08-07

## 2023-08-07 NOTE — TELEPHONE ENCOUNTER
Caller: August/HANANE    Doctor: Emerson Alejandra    Reason for call: Patient would like to continue w/PT.  Please place an order    Call back#: [phone]

## 2023-08-08 DIAGNOSIS — G89.29 CHRONIC PAIN OF BOTH KNEES: Primary | ICD-10-CM

## 2023-08-08 DIAGNOSIS — E03.9 HYPOTHYROIDISM, UNSPECIFIED TYPE: ICD-10-CM

## 2023-08-08 DIAGNOSIS — M25.562 CHRONIC PAIN OF BOTH KNEES: Primary | ICD-10-CM

## 2023-08-08 DIAGNOSIS — M17.0 PRIMARY OSTEOARTHRITIS OF BOTH KNEES: ICD-10-CM

## 2023-08-08 DIAGNOSIS — M25.561 CHRONIC PAIN OF BOTH KNEES: Primary | ICD-10-CM

## 2023-08-08 RX ORDER — LEVOTHYROXINE SODIUM 175 UG/1
TABLET ORAL
Qty: 90 TABLET | Refills: 0 | Status: SHIPPED | OUTPATIENT
Start: 2023-08-08

## 2023-08-09 ENCOUNTER — EVALUATION (OUTPATIENT)
Dept: PHYSICAL THERAPY | Facility: HOME HEALTHCARE | Age: 58
End: 2023-08-09
Payer: MEDICARE

## 2023-08-09 DIAGNOSIS — M17.0 PRIMARY OSTEOARTHRITIS OF BOTH KNEES: ICD-10-CM

## 2023-08-09 DIAGNOSIS — M25.561 CHRONIC PAIN OF BOTH KNEES: Primary | ICD-10-CM

## 2023-08-09 DIAGNOSIS — M25.562 CHRONIC PAIN OF BOTH KNEES: Primary | ICD-10-CM

## 2023-08-09 DIAGNOSIS — G89.29 CHRONIC PAIN OF BOTH KNEES: Primary | ICD-10-CM

## 2023-08-09 PROCEDURE — 97162 PT EVAL MOD COMPLEX 30 MIN: CPT

## 2023-08-09 PROCEDURE — 97110 THERAPEUTIC EXERCISES: CPT

## 2023-08-09 NOTE — PROGRESS NOTES
PT Evaluation     Today's date: 2023  Patient name: Bing Guy  : 1965  MRN: [id number]  Referring provider: Stephane Guadarrama DO  Dx:   Encounter Diagnosis     ICD-10-CM    1. Chronic pain of both knees  M25.561     M25.562     G89.29       2. Primary osteoarthritis of both knees  M17.0           Start Time: 1100  Stop Time: 1130  Total time in clinic (min): 30 minutes    Assessment  Assessment details: Pt is a 60 y/o male presenting to OPPT with chronic bilateral knee pain, L>R, consistent with diagnosis of bilateral knee OA and overall physical deconditioning. The pt is presenting with significant pain levels, ROM deficits, strength deficits, and decreased functional mobility, which is affecting his ambulatory tolerance and ability to perform ADLs. The pt requires skilled PT in order to improve in pain, strength, ROM, functional mobility, quality of life, and return to PLOF. Thank you. Impairments: abnormal gait, abnormal or restricted ROM, abnormal movement, activity intolerance, impaired physical strength, lacks appropriate home exercise program, pain with function, weight-bearing intolerance and poor body mechanics  Understanding of Dx/Px/POC: good   Prognosis: good    Goals  STG: 3-4 weeks  Pt will be independent with HEP in order to continue to progress outside of PT treatment sessions. Pt will improve in quality of life as demonstrated by worst pain levels of 8/10 or less. Pt will improve in functional mobility as demonstrated by a 1/2 point or greater improvement in LE strength. LT-8 weeks  Pt will improve in quality of life as demonstrated by worst pain levels of 5/10 or less. Pt will improve in functional mobility as demonstrated by strength levels of 4+/5 or greater. Pt will improve in functional mobility as demonstrated by irving knee ROM WFL.   Pt will improve in functional mobility as demonstrated by ability to perform full ADLs without any limitations due to pain, ROM deficits, or weakness. Plan  Patient would benefit from: skilled physical therapy  Planned modality interventions: cryotherapy  Planned therapy interventions: body mechanics training, flexibility, functional ROM exercises, gait training, home exercise program, joint mobilization, manual therapy, neuromuscular re-education, patient education, postural training, strengthening, stretching, therapeutic activities and therapeutic exercise  Frequency: 2x week  Duration in weeks: 12  Plan of Care beginning date: 2023  Plan of Care expiration date: 2023  Treatment plan discussed with: patient        Subjective Evaluation    History of Present Illness  Mechanism of injury: Pt is presenting to OPPT with chronic bilateral knee pain, which has been ongoing for many years. The pt reports that he has lost a lot of muscle, and that he has been having difficulty exercising on his own. Pt reports that it hurts to get up, sit down, etc.  He reports that he has pain in his low back, hips, knees, ankles, and shoulders. He reports that the exercises he was doing last time in PT was good, but that he had to stop because of the pain. He reports that without PT, all he does it sit, so PT at least helps him to stay active. The pt reports that he was told he needs to lose weight in order to get a knee replacement, but reports that he has been having trouble losing weight.   Patient Goals  Patient goals for therapy: decreased pain, increased motion, increased strength, independence with ADLs/IADLs and return to sport/leisure activities  Patient goal: To be able to move, improve strength in his legs  Pain  Current pain ratin  At best pain ratin  At worst pain rating: 10  Quality: dull ache (Stabbing)  Aggravating factors: standing, walking and stair climbing    Social Support  Steps to enter house: no  Stairs in house: yes (21)       Diagnostic Tests  X-ray: abnormal  Treatments  Previous treatment: injection treatment        Objective     Passive Range of Motion   Left Hip   Flexion: 95 degrees     Right Hip   Flexion: 95 degrees   Left Knee   Flexion: 110 degrees with pain  Extension: -4 degrees with pain    Right Knee   Flexion: 120 degrees with pain  Extension: 0 degrees with pain    Strength/Myotome Testing     Left Hip   Planes of Motion   Flexion: 3  Abduction: 3  Adduction: 4    Right Hip   Planes of Motion   Flexion: 4-  Abduction: 3  Adduction: 4    Left Knee   Flexion: 3+  Extension: 3+    Right Knee   Flexion: 3+  Extension: 3+    Left Ankle/Foot   Dorsiflexion: 4+  Plantar flexion: 4+    Right Ankle/Foot   Dorsiflexion: 4+  Plantar flexion: 4+    Ambulation   Weight-Bearing Status   Weight-Bearing Status (Left): weight-bearing as tolerated   Weight-Bearing Status (Right): weight-bearing as tolerated      Ambulation: Level Surfaces   Ambulation without assistive device: independent    Ambulation: Stairs   Ascend stairs: contact guard assist  Pattern: non-reciprocal  Railings: one rail  Descend stairs: contact guard assist  Pattern: non-reciprocal  Railings: one rail  Curbs: independent    Observational Gait   Gait: antalgic       Flowsheet Rows    Flowsheet Row Most Recent Value   PT/OT G-Codes    Current Score 29   Projected Score 50             Precautions: Fall risk    Re-eval Date: 09/09/2023        Manuals 8/9       Jamie calf, hs, glute stretch                        Neuro Re-Ed        Balance as appropriate        PPT        PPT w/ marches                Ther Ex        Nustep  Spo2  HR        Quad set        Heel slide        LAQ        HS curls        SLR        Hip add         Bridges HEP       Clamshells        S/L hip abd        Standing hip flex/ext/abd        Squat        Leg press        LF hip abd/add                Ther Activity        Step up/down        Sit to stands HEP       Gait Training                        Modalities        CP

## 2023-08-14 ENCOUNTER — OFFICE VISIT (OUTPATIENT)
Dept: PHYSICAL THERAPY | Facility: HOME HEALTHCARE | Age: 58
End: 2023-08-14
Payer: MEDICARE

## 2023-08-14 DIAGNOSIS — M25.561 CHRONIC PAIN OF BOTH KNEES: Primary | ICD-10-CM

## 2023-08-14 DIAGNOSIS — M25.562 CHRONIC PAIN OF BOTH KNEES: Primary | ICD-10-CM

## 2023-08-14 DIAGNOSIS — G89.29 CHRONIC PAIN OF BOTH KNEES: Primary | ICD-10-CM

## 2023-08-14 DIAGNOSIS — M17.0 PRIMARY OSTEOARTHRITIS OF BOTH KNEES: ICD-10-CM

## 2023-08-14 PROCEDURE — 97140 MANUAL THERAPY 1/> REGIONS: CPT

## 2023-08-14 PROCEDURE — 97110 THERAPEUTIC EXERCISES: CPT

## 2023-08-14 NOTE — PROGRESS NOTES
Daily Note     Today's date: 2023  Patient name: Kole Hernandez  : 1965  MRN: [id number]  Referring provider: Thais Valenzuela DO  Dx:   Encounter Diagnosis     ICD-10-CM    1. Chronic pain of both knees  M25.561     M25.562     G89.29       2. Primary osteoarthritis of both knees  M17.0                      Subjective: Pt reports he has pain in his both hips and knees today. Objective: See treatment diary below      Assessment: Tolerated treatment fair. Verbal cues needed to perform exercises correctly. Pt fatigued with exercise. Tightness B LE's with stretching. Pt reports pain both knees with standing exercises. Patient would benefit from continued PT      Plan: Continue per plan of care.       Precautions: Fall risk    Re-eval Date: 2023        Manuals       Jamie calf, hs, glute stretch  10'                      Neuro Re-Ed        Balance as appropriate        PPT  5"x10      PPT w/ marches                Ther Ex        Nustep  Spo2  HR  L1  10'  Spo2  93%  HR  102 bpm      Quad set  Pt declined       Heel slide  1x 10 Jamie       LAQ  5" 1 x10 Jamie      HS curls        SLR  1x10 Jamie      Hip add   5" x 15      Bridges HEP 3" 1x10      Clamshells        S/L hip abd        Standing hip flex/ext/abd  1x10 ea Jamie      Squat        Leg press        LF hip abd/add                Ther Activity        Step up/down  C Fwd 1x10 Jamie      Sit to stands HEP       Gait Training                        Modalities        CP

## 2023-08-15 DIAGNOSIS — G89.4 CHRONIC PAIN SYNDROME: ICD-10-CM

## 2023-08-15 DIAGNOSIS — G62.9 NEUROPATHY: ICD-10-CM

## 2023-08-15 RX ORDER — PREGABALIN 25 MG/1
CAPSULE ORAL
Qty: 60 CAPSULE | Refills: 0 | Status: SHIPPED | OUTPATIENT
Start: 2023-08-15

## 2023-08-17 ENCOUNTER — OFFICE VISIT (OUTPATIENT)
Dept: PHYSICAL THERAPY | Facility: HOME HEALTHCARE | Age: 58
End: 2023-08-17
Payer: MEDICARE

## 2023-08-17 DIAGNOSIS — G89.29 CHRONIC PAIN OF BOTH KNEES: Primary | ICD-10-CM

## 2023-08-17 DIAGNOSIS — M25.562 CHRONIC PAIN OF BOTH KNEES: Primary | ICD-10-CM

## 2023-08-17 DIAGNOSIS — M17.0 PRIMARY OSTEOARTHRITIS OF BOTH KNEES: ICD-10-CM

## 2023-08-17 DIAGNOSIS — M25.561 CHRONIC PAIN OF BOTH KNEES: Primary | ICD-10-CM

## 2023-08-17 PROCEDURE — 97140 MANUAL THERAPY 1/> REGIONS: CPT

## 2023-08-17 PROCEDURE — 97110 THERAPEUTIC EXERCISES: CPT

## 2023-08-17 NOTE — PROGRESS NOTES
Daily Note     Today's date: 2023  Patient name: Ty Gill  : 1965  MRN: [id number]  Referring provider: Duy Alva DO  Dx:   Encounter Diagnosis     ICD-10-CM    1. Chronic pain of both knees  M25.561     M25.562     G89.29       2. Primary osteoarthritis of both knees  M17.0                      Subjective: Pt reports he has pain in both knees. Objective: See treatment diary below      Assessment: Tolerated treatment fair. Verbal cues needed t/o exercises to perform correctly. Pt fatigued with exercise. Tightness noted B LE's with stretching. Patient would benefit from continued PT to decrease pain and improve ROM, strength and overall functional mobility. Plan: Continue per plan of care.       Precautions: Fall risk    Re-eval Date: 2023        Manuals      Jamie calf, hs, glute stretch  10' 10'                      Neuro Re-Ed        Balance as appropriate        PPT  5"x10 5"x10     PPT w/ marches                Ther Ex        Nustep  Spo2  HR  L1  10'  Spo2  93%  HR  102 bpm L1 10'  Spo2 92%   bpm       Quad set  Pt declined       Heel slide  1x 10 Jamie  1x10 Jamie      LAQ  5" 1 x10 Jamie 5"1x10 Jamie     HS curls        SLR  1x10 Jamie 1x10 Jamie     Hip add   5" x 15 5"x15     Bridges HEP 3" 1x10 3" 1x10      Clamshells        S/L hip abd        Standing hip flex/ext/abd  1x10 ea Jamie 1x10 ea Jamie     Squat        Leg press        LF hip abd/add                Ther Activity        Step up/down  C Fwd 1x10 Jamie C Fwd 1x10 Jamie     Sit to stands HEP       Gait Training                        Modalities        CP

## 2023-08-18 ENCOUNTER — TELEPHONE (OUTPATIENT)
Dept: FAMILY MEDICINE CLINIC | Facility: CLINIC | Age: 58
End: 2023-08-18

## 2023-08-21 DIAGNOSIS — G62.9 NEUROPATHY: ICD-10-CM

## 2023-08-21 DIAGNOSIS — G89.4 CHRONIC PAIN SYNDROME: ICD-10-CM

## 2023-08-21 RX ORDER — PREGABALIN 25 MG/1
25 CAPSULE ORAL 2 TIMES DAILY
Qty: 60 CAPSULE | Refills: 0 | OUTPATIENT
Start: 2023-08-21

## 2023-08-22 ENCOUNTER — OFFICE VISIT (OUTPATIENT)
Dept: PHYSICAL THERAPY | Facility: HOME HEALTHCARE | Age: 58
End: 2023-08-22
Payer: MEDICARE

## 2023-08-22 DIAGNOSIS — M25.561 CHRONIC PAIN OF BOTH KNEES: Primary | ICD-10-CM

## 2023-08-22 DIAGNOSIS — M25.562 CHRONIC PAIN OF BOTH KNEES: Primary | ICD-10-CM

## 2023-08-22 DIAGNOSIS — G89.29 CHRONIC PAIN OF BOTH KNEES: Primary | ICD-10-CM

## 2023-08-22 DIAGNOSIS — M17.0 PRIMARY OSTEOARTHRITIS OF BOTH KNEES: ICD-10-CM

## 2023-08-22 PROCEDURE — 97140 MANUAL THERAPY 1/> REGIONS: CPT

## 2023-08-22 PROCEDURE — 97110 THERAPEUTIC EXERCISES: CPT

## 2023-08-22 NOTE — PROGRESS NOTES
Daily Note     Today's date: 2023  Patient name: Anil Kuhn  : 1965  MRN: [id number]  Referring provider: Norah Leone DO  Dx: No diagnosis found. Start Time: 1000          Subjective: Pain of 8/10 at L knee and 3/10 at R knee. I haven't noticed any improvement yet. Objective: See treatment diary below    Assessment: Pt with poor edmond to session 2* B knee pain and rests needed for SOB. Pt declined step up with L LE 2* L knee pain. Janine Mcintyre ecptPt with B LE end range calf/HS tightness. Pt reports consistency with HEP as daily ability allows. Patient would benefit from continued PT    Plan: Continue per plan of care.       Precautions: Fall risk    Re-eval Date: 2023        Manuals     Irving calf, hs, glute stretch  10' 10'  10'                    Neuro Re-Ed        Balance as appropriate        PPT  5"x10 5"x10 5" x10    PPT w/ marches                Ther Ex        Nustep  Spo2  HR  L1  10'  Spo2  93%  HR  102 bpm L1 10'  Spo2 92%   bpm   L2 10'  93%  101 bpm      Quad set  Pt declined       Heel slide  1x 10 Irving  1x10 Irving  1x10 Irving    LAQ  5" 1 x10 Irving 5"1x10 Irving 5" x10 irving    HS curls        SLR  1x10 Irving 1x10 Irving 1x10 Irving    Hip add   5" x 15 5"x15 5" x15   seated    Bridges HEP 3" 1x10 3" 1x10  3" 1x10    Clamshells        S/L hip abd        Standing hip flex/ext/abd  1x10 ea Irving 1x10 ea Irving 1x10 ea irving    Squat        Leg press        LF hip abd/add                Ther Activity        Step up/down  C Fwd 1x10 Irving C Fwd 1x10 Irving C Fwd 1x10 R only  Pt decline L    Sit to stands HEP       Gait Training                        Modalities        CP

## 2023-08-25 ENCOUNTER — OFFICE VISIT (OUTPATIENT)
Dept: PHYSICAL THERAPY | Facility: HOME HEALTHCARE | Age: 58
End: 2023-08-25
Payer: MEDICARE

## 2023-08-25 DIAGNOSIS — M25.562 CHRONIC PAIN OF BOTH KNEES: Primary | ICD-10-CM

## 2023-08-25 DIAGNOSIS — M25.561 CHRONIC PAIN OF BOTH KNEES: Primary | ICD-10-CM

## 2023-08-25 DIAGNOSIS — M17.0 PRIMARY OSTEOARTHRITIS OF BOTH KNEES: ICD-10-CM

## 2023-08-25 DIAGNOSIS — G89.29 CHRONIC PAIN OF BOTH KNEES: Primary | ICD-10-CM

## 2023-08-25 PROCEDURE — 97110 THERAPEUTIC EXERCISES: CPT

## 2023-08-25 NOTE — PROGRESS NOTES
Daily Note     Today's date: 2023  Patient name: Velia Floyd  : 1965  MRN: [id number]  Referring provider: Marybeth Watson DO  Dx:   Encounter Diagnosis     ICD-10-CM    1. Chronic pain of both knees  M25.561     M25.562     G89.29       2. Primary osteoarthritis of both knees  M17.0                      Subjective: Pt reports he has pain in both knees, hips, shoulders and wrists today. Objective: See treatment diary below      Assessment: Tolerated treatment poor. Some verbal cues needed t/o exercises to perform correctly. Pt declined step ups due to bilateral knee pain. Program modified today due to Pt request. Pt reported during session  he doesn't  feel so well today. Patient would benefit from continued PT      Plan: Continue per plan of care.       Precautions: Fall risk    Re-eval Date: 2023        Manuals  8-   Jamie calf, hs, glute stretch  10' 10'  10' NP                   Neuro Re-Ed        Balance as appropriate        PPT  5"x10 5"x10 5" x10 NP   PPT w/ marches                Ther Ex        Nustep  Spo2  HR  L1  10'  Spo2  93%  HR  102 bpm L1 10'  Spo2 92%   bpm   L2 10'  93%  101 bpm   L2  10'  93%  113 bpm     Quad set  Pt declined       Heel slide  1x 10 Jamie  1x10 Jamie  1x10 Jamie NP   LAQ  5" 1 x10 Jamie 5"1x10 Jamie 5" x10 jamie 5"x 10 Jamie    HS curls        SLR  1x10 Jamie 1x10 Jamie 1x10 Jamie 1x10 Jamie   Hip add   5" x 15 5"x15 5" x15   seated 5"x 15 seated    Bridges HEP 3" 1x10 3" 1x10  3" 1x10 NP   Clamshells        S/L hip abd        Standing hip flex/ext/abd  1x10 ea Jamie 1x10 ea Jamie 1x10 ea jamie 1x10 ea Jamie    Squat        Leg press        LF hip abd/add                Ther Activity        Step up/down  C Fwd 1x10 Jamie C Fwd 1x10 Jamie C Fwd 1x10 R only  Pt decline L Pt declined    Sit to stands HEP       Gait Training                        Modalities        CP

## 2023-08-29 ENCOUNTER — HOSPITAL ENCOUNTER (INPATIENT)
Facility: HOSPITAL | Age: 58
LOS: 5 days | Discharge: HOME HEALTH | DRG: 189 | End: 2023-09-03
Attending: INTERNAL MEDICINE | Admitting: INTERNAL MEDICINE
Payer: MEDICARE

## 2023-08-29 ENCOUNTER — APPOINTMENT (EMERGENCY)
Dept: RADIOLOGY | Facility: HOSPITAL | Age: 58
DRG: 189 | End: 2023-08-29
Payer: MEDICARE

## 2023-08-29 ENCOUNTER — APPOINTMENT (EMERGENCY)
Dept: CT IMAGING | Facility: HOSPITAL | Age: 58
DRG: 189 | End: 2023-08-29
Payer: MEDICARE

## 2023-08-29 DIAGNOSIS — Z91.199 NONCOMPLIANCE WITH DIABETES TREATMENT: ICD-10-CM

## 2023-08-29 DIAGNOSIS — Z79.4 TYPE 2 DIABETES MELLITUS WITH HYPERGLYCEMIA, WITH LONG-TERM CURRENT USE OF INSULIN (HCC): ICD-10-CM

## 2023-08-29 DIAGNOSIS — Z72.0 TOBACCO ABUSE: ICD-10-CM

## 2023-08-29 DIAGNOSIS — R79.89 ELEVATED LFTS: ICD-10-CM

## 2023-08-29 DIAGNOSIS — J44.1 COPD WITH ACUTE EXACERBATION (HCC): ICD-10-CM

## 2023-08-29 DIAGNOSIS — K76.0 FATTY LIVER DISEASE, NONALCOHOLIC: ICD-10-CM

## 2023-08-29 DIAGNOSIS — J98.4 RESTRICTIVE LUNG DISEASE: ICD-10-CM

## 2023-08-29 DIAGNOSIS — M25.562 LEFT KNEE PAIN: ICD-10-CM

## 2023-08-29 DIAGNOSIS — D62 ABLA (ACUTE BLOOD LOSS ANEMIA): ICD-10-CM

## 2023-08-29 DIAGNOSIS — Z79.4 UNCONTROLLED TYPE 2 DIABETES MELLITUS WITH HYPERGLYCEMIA, WITH LONG-TERM CURRENT USE OF INSULIN (HCC): ICD-10-CM

## 2023-08-29 DIAGNOSIS — E11.65 UNCONTROLLED TYPE 2 DIABETES MELLITUS WITH HYPERGLYCEMIA, WITH LONG-TERM CURRENT USE OF INSULIN (HCC): ICD-10-CM

## 2023-08-29 DIAGNOSIS — D64.9 ANEMIA: ICD-10-CM

## 2023-08-29 DIAGNOSIS — J44.1 COPD EXACERBATION (HCC): ICD-10-CM

## 2023-08-29 DIAGNOSIS — J90 PLEURAL EFFUSION: ICD-10-CM

## 2023-08-29 DIAGNOSIS — R09.02 HYPOXIA: Primary | ICD-10-CM

## 2023-08-29 DIAGNOSIS — G47.33 OSA AND COPD OVERLAP SYNDROME (HCC): ICD-10-CM

## 2023-08-29 DIAGNOSIS — K62.5 BRIGHT RED BLOOD PER RECTUM: ICD-10-CM

## 2023-08-29 DIAGNOSIS — K62.5 BRIGHT RED RECTAL BLEEDING: ICD-10-CM

## 2023-08-29 DIAGNOSIS — E11.65 TYPE 2 DIABETES MELLITUS WITH HYPERGLYCEMIA, WITH LONG-TERM CURRENT USE OF INSULIN (HCC): ICD-10-CM

## 2023-08-29 DIAGNOSIS — J44.9 OSA AND COPD OVERLAP SYNDROME (HCC): ICD-10-CM

## 2023-08-29 PROBLEM — D50.9 MICROCYTIC ANEMIA: Chronic | Status: ACTIVE | Noted: 2023-08-29

## 2023-08-29 PROBLEM — J96.02 ACUTE RESPIRATORY FAILURE WITH HYPOXIA AND HYPERCAPNIA (HCC): Status: ACTIVE | Noted: 2021-04-07

## 2023-08-29 PROBLEM — K42.9 UMBILICAL HERNIA WITHOUT OBSTRUCTION AND WITHOUT GANGRENE: Chronic | Status: ACTIVE | Noted: 2023-08-29

## 2023-08-29 LAB
2HR DELTA HS TROPONIN: -1 NG/L
4HR DELTA HS TROPONIN: 5 NG/L
ALBUMIN SERPL BCP-MCNC: 3.8 G/DL (ref 3.5–5)
ALP SERPL-CCNC: 52 U/L (ref 34–104)
ALT SERPL W P-5'-P-CCNC: 29 U/L (ref 7–52)
ANION GAP SERPL CALCULATED.3IONS-SCNC: 11 MMOL/L
APTT PPP: 27 SECONDS (ref 23–37)
ARTERIAL PATENCY WRIST A: YES
AST SERPL W P-5'-P-CCNC: 34 U/L (ref 13–39)
BASE EXCESS BLDA CALC-SCNC: 3.7 MMOL/L
BASOPHILS # BLD AUTO: 0.04 THOUSANDS/ÂΜL (ref 0–0.1)
BASOPHILS NFR BLD AUTO: 1 % (ref 0–1)
BILIRUB SERPL-MCNC: 0.32 MG/DL (ref 0.2–1)
BNP SERPL-MCNC: 92 PG/ML (ref 0–100)
BUN SERPL-MCNC: 30 MG/DL (ref 5–25)
CALCIUM SERPL-MCNC: 9.3 MG/DL (ref 8.4–10.2)
CARDIAC TROPONIN I PNL SERPL HS: 22 NG/L
CARDIAC TROPONIN I PNL SERPL HS: 23 NG/L
CARDIAC TROPONIN I PNL SERPL HS: 28 NG/L
CHLORIDE SERPL-SCNC: 92 MMOL/L (ref 96–108)
CO2 SERPL-SCNC: 32 MMOL/L (ref 21–32)
CREAT SERPL-MCNC: 1.45 MG/DL (ref 0.6–1.3)
D DIMER PPP FEU-MCNC: 0.95 UG/ML FEU
EOSINOPHIL # BLD AUTO: 0.16 THOUSAND/ÂΜL (ref 0–0.61)
EOSINOPHIL NFR BLD AUTO: 2 % (ref 0–6)
ERYTHROCYTE [DISTWIDTH] IN BLOOD BY AUTOMATED COUNT: 15.8 % (ref 11.6–15.1)
ERYTHROCYTE [DISTWIDTH] IN BLOOD BY AUTOMATED COUNT: 15.9 % (ref 11.6–15.1)
FLUAV RNA RESP QL NAA+PROBE: NEGATIVE
FLUBV RNA RESP QL NAA+PROBE: NEGATIVE
GFR SERPL CREATININE-BSD FRML MDRD: 52 ML/MIN/1.73SQ M
GLUCOSE SERPL-MCNC: 123 MG/DL (ref 65–140)
GLUCOSE SERPL-MCNC: 142 MG/DL (ref 65–140)
HCO3 BLDA-SCNC: 28.9 MMOL/L (ref 22–28)
HCT VFR BLD AUTO: 25.9 % (ref 36.5–49.3)
HCT VFR BLD AUTO: 27.4 % (ref 36.5–49.3)
HGB BLD-MCNC: 8 G/DL (ref 12–17)
HGB BLD-MCNC: 8.4 G/DL (ref 12–17)
IMM GRANULOCYTES # BLD AUTO: 0.04 THOUSAND/UL (ref 0–0.2)
IMM GRANULOCYTES NFR BLD AUTO: 1 % (ref 0–2)
INR PPP: 1.01 (ref 0.84–1.19)
LACTATE SERPL-SCNC: 1.7 MMOL/L (ref 0.5–2)
LYMPHOCYTES # BLD AUTO: 1.5 THOUSANDS/ÂΜL (ref 0.6–4.47)
LYMPHOCYTES NFR BLD AUTO: 17 % (ref 14–44)
MAGNESIUM SERPL-MCNC: 2.1 MG/DL (ref 1.9–2.7)
MCH RBC QN AUTO: 24.6 PG (ref 26.8–34.3)
MCH RBC QN AUTO: 24.9 PG (ref 26.8–34.3)
MCHC RBC AUTO-ENTMCNC: 30.7 G/DL (ref 31.4–37.4)
MCHC RBC AUTO-ENTMCNC: 30.9 G/DL (ref 31.4–37.4)
MCV RBC AUTO: 80 FL (ref 82–98)
MCV RBC AUTO: 81 FL (ref 82–98)
MONOCYTES # BLD AUTO: 1.25 THOUSAND/ÂΜL (ref 0.17–1.22)
MONOCYTES NFR BLD AUTO: 14 % (ref 4–12)
NASAL CANNULA: 4
NEUTROPHILS # BLD AUTO: 5.67 THOUSANDS/ÂΜL (ref 1.85–7.62)
NEUTS SEG NFR BLD AUTO: 65 % (ref 43–75)
NRBC BLD AUTO-RTO: 1 /100 WBCS
O2 CT BLDA-SCNC: 10.5 ML/DL (ref 16–23)
OXYHGB MFR BLDA: 85.1 % (ref 94–97)
PCO2 BLDA: 47.1 MM HG (ref 36–44)
PH BLDA: 7.41 [PH] (ref 7.35–7.45)
PLATELET # BLD AUTO: 301 THOUSANDS/UL (ref 149–390)
PLATELET # BLD AUTO: 336 THOUSANDS/UL (ref 149–390)
PMV BLD AUTO: 8.9 FL (ref 8.9–12.7)
PMV BLD AUTO: 9 FL (ref 8.9–12.7)
PO2 BLDA: 58.6 MM HG (ref 75–129)
POTASSIUM SERPL-SCNC: 3.7 MMOL/L (ref 3.5–5.3)
PROCALCITONIN SERPL-MCNC: 0.17 NG/ML
PROT SERPL-MCNC: 7.2 G/DL (ref 6.4–8.4)
PROTHROMBIN TIME: 13.2 SECONDS (ref 11.6–14.5)
RBC # BLD AUTO: 3.25 MILLION/UL (ref 3.88–5.62)
RBC # BLD AUTO: 3.38 MILLION/UL (ref 3.88–5.62)
RETICS # AUTO: ABNORMAL 10*3/UL (ref 14356–105094)
RETICS # CALC: 2.34 % (ref 0.37–1.87)
RSV RNA RESP QL NAA+PROBE: NEGATIVE
SARS-COV-2 RNA RESP QL NAA+PROBE: NEGATIVE
SODIUM SERPL-SCNC: 135 MMOL/L (ref 135–147)
SPECIMEN SOURCE: ABNORMAL
WBC # BLD AUTO: 7.67 THOUSAND/UL (ref 4.31–10.16)
WBC # BLD AUTO: 8.66 THOUSAND/UL (ref 4.31–10.16)

## 2023-08-29 PROCEDURE — 82728 ASSAY OF FERRITIN: CPT | Performed by: INTERNAL MEDICINE

## 2023-08-29 PROCEDURE — 71275 CT ANGIOGRAPHY CHEST: CPT

## 2023-08-29 PROCEDURE — 99285 EMERGENCY DEPT VISIT HI MDM: CPT

## 2023-08-29 PROCEDURE — 0241U HB NFCT DS VIR RESP RNA 4 TRGT: CPT | Performed by: PHYSICIAN ASSISTANT

## 2023-08-29 PROCEDURE — 94640 AIRWAY INHALATION TREATMENT: CPT

## 2023-08-29 PROCEDURE — 96375 TX/PRO/DX INJ NEW DRUG ADDON: CPT

## 2023-08-29 PROCEDURE — 82805 BLOOD GASES W/O2 SATURATION: CPT | Performed by: PHYSICIAN ASSISTANT

## 2023-08-29 PROCEDURE — 94760 N-INVAS EAR/PLS OXIMETRY 1: CPT

## 2023-08-29 PROCEDURE — 83605 ASSAY OF LACTIC ACID: CPT | Performed by: PHYSICIAN ASSISTANT

## 2023-08-29 PROCEDURE — 99223 1ST HOSP IP/OBS HIGH 75: CPT | Performed by: INTERNAL MEDICINE

## 2023-08-29 PROCEDURE — 82948 REAGENT STRIP/BLOOD GLUCOSE: CPT

## 2023-08-29 PROCEDURE — 36600 WITHDRAWAL OF ARTERIAL BLOOD: CPT

## 2023-08-29 PROCEDURE — 84484 ASSAY OF TROPONIN QUANT: CPT | Performed by: INTERNAL MEDICINE

## 2023-08-29 PROCEDURE — 96367 TX/PROPH/DG ADDL SEQ IV INF: CPT

## 2023-08-29 PROCEDURE — 99291 CRITICAL CARE FIRST HOUR: CPT | Performed by: PHYSICIAN ASSISTANT

## 2023-08-29 PROCEDURE — C9113 INJ PANTOPRAZOLE SODIUM, VIA: HCPCS | Performed by: PHYSICIAN ASSISTANT

## 2023-08-29 PROCEDURE — G1004 CDSM NDSC: HCPCS

## 2023-08-29 PROCEDURE — 85610 PROTHROMBIN TIME: CPT | Performed by: PHYSICIAN ASSISTANT

## 2023-08-29 PROCEDURE — 36415 COLL VENOUS BLD VENIPUNCTURE: CPT | Performed by: PHYSICIAN ASSISTANT

## 2023-08-29 PROCEDURE — 85730 THROMBOPLASTIN TIME PARTIAL: CPT | Performed by: PHYSICIAN ASSISTANT

## 2023-08-29 PROCEDURE — 85027 COMPLETE CBC AUTOMATED: CPT | Performed by: INTERNAL MEDICINE

## 2023-08-29 PROCEDURE — 93005 ELECTROCARDIOGRAM TRACING: CPT

## 2023-08-29 PROCEDURE — 85379 FIBRIN DEGRADATION QUANT: CPT | Performed by: PHYSICIAN ASSISTANT

## 2023-08-29 PROCEDURE — 74177 CT ABD & PELVIS W/CONTRAST: CPT

## 2023-08-29 PROCEDURE — 80053 COMPREHEN METABOLIC PANEL: CPT | Performed by: PHYSICIAN ASSISTANT

## 2023-08-29 PROCEDURE — 96365 THER/PROPH/DIAG IV INF INIT: CPT

## 2023-08-29 PROCEDURE — 84484 ASSAY OF TROPONIN QUANT: CPT | Performed by: PHYSICIAN ASSISTANT

## 2023-08-29 PROCEDURE — 85025 COMPLETE CBC W/AUTO DIFF WBC: CPT | Performed by: PHYSICIAN ASSISTANT

## 2023-08-29 PROCEDURE — 83735 ASSAY OF MAGNESIUM: CPT | Performed by: PHYSICIAN ASSISTANT

## 2023-08-29 PROCEDURE — 71045 X-RAY EXAM CHEST 1 VIEW: CPT

## 2023-08-29 PROCEDURE — 85045 AUTOMATED RETICULOCYTE COUNT: CPT | Performed by: INTERNAL MEDICINE

## 2023-08-29 PROCEDURE — 83880 ASSAY OF NATRIURETIC PEPTIDE: CPT | Performed by: PHYSICIAN ASSISTANT

## 2023-08-29 PROCEDURE — 87040 BLOOD CULTURE FOR BACTERIA: CPT | Performed by: PHYSICIAN ASSISTANT

## 2023-08-29 PROCEDURE — 84145 PROCALCITONIN (PCT): CPT | Performed by: PHYSICIAN ASSISTANT

## 2023-08-29 RX ORDER — IPRATROPIUM BROMIDE AND ALBUTEROL SULFATE 2.5; .5 MG/3ML; MG/3ML
3 SOLUTION RESPIRATORY (INHALATION) ONCE
Status: COMPLETED | OUTPATIENT
Start: 2023-08-29 | End: 2023-08-29

## 2023-08-29 RX ORDER — INSULIN LISPRO 100 [IU]/ML
2-12 INJECTION, SOLUTION INTRAVENOUS; SUBCUTANEOUS
Status: DISCONTINUED | OUTPATIENT
Start: 2023-08-29 | End: 2023-09-03 | Stop reason: HOSPADM

## 2023-08-29 RX ORDER — ALBUTEROL SULFATE 2.5 MG/3ML
2.5 SOLUTION RESPIRATORY (INHALATION) EVERY 4 HOURS PRN
Status: DISCONTINUED | OUTPATIENT
Start: 2023-08-29 | End: 2023-09-03 | Stop reason: HOSPADM

## 2023-08-29 RX ORDER — TRAZODONE HYDROCHLORIDE 50 MG/1
150 TABLET ORAL
Status: DISCONTINUED | OUTPATIENT
Start: 2023-08-29 | End: 2023-09-03 | Stop reason: HOSPADM

## 2023-08-29 RX ORDER — BENZONATATE 100 MG/1
100 CAPSULE ORAL 3 TIMES DAILY PRN
Status: DISCONTINUED | OUTPATIENT
Start: 2023-08-29 | End: 2023-09-03 | Stop reason: HOSPADM

## 2023-08-29 RX ORDER — OMEGA-3S/DHA/EPA/FISH OIL/D3 300MG-1000
1 CAPSULE ORAL DAILY
COMMUNITY

## 2023-08-29 RX ORDER — MAGNESIUM HYDROXIDE/ALUMINUM HYDROXICE/SIMETHICONE 120; 1200; 1200 MG/30ML; MG/30ML; MG/30ML
30 SUSPENSION ORAL EVERY 6 HOURS PRN
Status: DISCONTINUED | OUTPATIENT
Start: 2023-08-29 | End: 2023-09-03 | Stop reason: HOSPADM

## 2023-08-29 RX ORDER — ALPRAZOLAM 0.25 MG/1
0.25 TABLET ORAL 2 TIMES DAILY PRN
Status: DISCONTINUED | OUTPATIENT
Start: 2023-08-29 | End: 2023-09-03 | Stop reason: HOSPADM

## 2023-08-29 RX ORDER — LORATADINE 10 MG/1
10 TABLET ORAL DAILY
Status: DISCONTINUED | OUTPATIENT
Start: 2023-08-30 | End: 2023-09-03 | Stop reason: HOSPADM

## 2023-08-29 RX ORDER — CHLORAL HYDRATE 500 MG
1000 CAPSULE ORAL 2 TIMES DAILY
Status: DISCONTINUED | OUTPATIENT
Start: 2023-08-30 | End: 2023-09-03 | Stop reason: HOSPADM

## 2023-08-29 RX ORDER — IPRATROPIUM BROMIDE AND ALBUTEROL SULFATE 2.5; .5 MG/3ML; MG/3ML
3 SOLUTION RESPIRATORY (INHALATION)
Status: DISCONTINUED | OUTPATIENT
Start: 2023-08-29 | End: 2023-08-29

## 2023-08-29 RX ORDER — GUAIFENESIN 600 MG/1
1200 TABLET, EXTENDED RELEASE ORAL EVERY 12 HOURS SCHEDULED
Status: DISCONTINUED | OUTPATIENT
Start: 2023-08-29 | End: 2023-09-03 | Stop reason: HOSPADM

## 2023-08-29 RX ORDER — VITAMIN E 268 MG
400 CAPSULE ORAL DAILY
COMMUNITY

## 2023-08-29 RX ORDER — ATORVASTATIN CALCIUM 40 MG/1
80 TABLET, FILM COATED ORAL
Status: DISCONTINUED | OUTPATIENT
Start: 2023-08-30 | End: 2023-09-03 | Stop reason: HOSPADM

## 2023-08-29 RX ORDER — FERROUS SULFATE 325(65) MG
325 TABLET ORAL
Status: DISCONTINUED | OUTPATIENT
Start: 2023-08-30 | End: 2023-09-03 | Stop reason: HOSPADM

## 2023-08-29 RX ORDER — DEXAMETHASONE SODIUM PHOSPHATE 10 MG/ML
8 INJECTION, SOLUTION INTRAMUSCULAR; INTRAVENOUS EVERY 24 HOURS
Status: DISCONTINUED | OUTPATIENT
Start: 2023-08-29 | End: 2023-08-30

## 2023-08-29 RX ORDER — CEFTRIAXONE 1 G/50ML
1000 INJECTION, SOLUTION INTRAVENOUS ONCE
Status: COMPLETED | OUTPATIENT
Start: 2023-08-29 | End: 2023-08-29

## 2023-08-29 RX ORDER — PREGABALIN 25 MG/1
25 CAPSULE ORAL 2 TIMES DAILY
Status: DISCONTINUED | OUTPATIENT
Start: 2023-08-30 | End: 2023-09-03 | Stop reason: HOSPADM

## 2023-08-29 RX ORDER — LEVALBUTEROL INHALATION SOLUTION 1.25 MG/3ML
1.25 SOLUTION RESPIRATORY (INHALATION)
Status: DISCONTINUED | OUTPATIENT
Start: 2023-08-29 | End: 2023-09-03 | Stop reason: HOSPADM

## 2023-08-29 RX ORDER — ACETAMINOPHEN 325 MG/1
650 TABLET ORAL EVERY 6 HOURS PRN
Status: DISCONTINUED | OUTPATIENT
Start: 2023-08-29 | End: 2023-09-03 | Stop reason: HOSPADM

## 2023-08-29 RX ORDER — ECHINACEA PURPUREA EXTRACT 125 MG
1 TABLET ORAL
Status: DISCONTINUED | OUTPATIENT
Start: 2023-08-29 | End: 2023-09-03 | Stop reason: HOSPADM

## 2023-08-29 RX ORDER — B-COMPLEX WITH VITAMIN C
250 TABLET ORAL DAILY
COMMUNITY

## 2023-08-29 RX ORDER — METOCLOPRAMIDE HYDROCHLORIDE 5 MG/ML
10 INJECTION INTRAMUSCULAR; INTRAVENOUS EVERY 6 HOURS PRN
Status: DISCONTINUED | OUTPATIENT
Start: 2023-08-29 | End: 2023-09-03 | Stop reason: HOSPADM

## 2023-08-29 RX ORDER — INSULIN LISPRO 100 [IU]/ML
2-12 INJECTION, SOLUTION INTRAVENOUS; SUBCUTANEOUS
Status: DISCONTINUED | OUTPATIENT
Start: 2023-08-30 | End: 2023-09-03 | Stop reason: HOSPADM

## 2023-08-29 RX ORDER — DULOXETIN HYDROCHLORIDE 30 MG/1
60 CAPSULE, DELAYED RELEASE ORAL DAILY
Status: DISCONTINUED | OUTPATIENT
Start: 2023-08-30 | End: 2023-09-03 | Stop reason: HOSPADM

## 2023-08-29 RX ORDER — ASCORBIC ACID 500 MG
500 TABLET ORAL
Status: DISCONTINUED | OUTPATIENT
Start: 2023-08-30 | End: 2023-09-03 | Stop reason: HOSPADM

## 2023-08-29 RX ORDER — PANTOPRAZOLE SODIUM 40 MG/10ML
40 INJECTION, POWDER, LYOPHILIZED, FOR SOLUTION INTRAVENOUS DAILY
Status: DISCONTINUED | OUTPATIENT
Start: 2023-08-30 | End: 2023-08-30

## 2023-08-29 RX ORDER — PANTOPRAZOLE SODIUM 40 MG/10ML
40 INJECTION, POWDER, LYOPHILIZED, FOR SOLUTION INTRAVENOUS ONCE
Status: COMPLETED | OUTPATIENT
Start: 2023-08-29 | End: 2023-08-29

## 2023-08-29 RX ORDER — LEVOTHYROXINE SODIUM 175 UG/1
175 TABLET ORAL
Status: DISCONTINUED | OUTPATIENT
Start: 2023-08-30 | End: 2023-09-03 | Stop reason: HOSPADM

## 2023-08-29 RX ADMIN — CEFTRIAXONE 1000 MG: 1 INJECTION, SOLUTION INTRAVENOUS at 19:03

## 2023-08-29 RX ADMIN — GUAIFENESIN 1200 MG: 600 TABLET, EXTENDED RELEASE ORAL at 22:33

## 2023-08-29 RX ADMIN — AZITHROMYCIN MONOHYDRATE 500 MG: 500 INJECTION, POWDER, LYOPHILIZED, FOR SOLUTION INTRAVENOUS at 19:28

## 2023-08-29 RX ADMIN — AMPICILLIN SODIUM AND SULBACTAM SODIUM 3 G: 2; 1 INJECTION, POWDER, FOR SOLUTION INTRAMUSCULAR; INTRAVENOUS at 22:30

## 2023-08-29 RX ADMIN — LEVALBUTEROL HYDROCHLORIDE 1.25 MG: 1.25 SOLUTION RESPIRATORY (INHALATION) at 22:52

## 2023-08-29 RX ADMIN — IOHEXOL 100 ML: 350 INJECTION, SOLUTION INTRAVENOUS at 20:09

## 2023-08-29 RX ADMIN — IPRATROPIUM BROMIDE AND ALBUTEROL SULFATE 3 ML: 2.5; .5 SOLUTION RESPIRATORY (INHALATION) at 18:37

## 2023-08-29 RX ADMIN — TRAZODONE HYDROCHLORIDE 150 MG: 50 TABLET ORAL at 23:27

## 2023-08-29 RX ADMIN — IPRATROPIUM BROMIDE 0.5 MG: 0.5 SOLUTION RESPIRATORY (INHALATION) at 22:52

## 2023-08-29 RX ADMIN — DEXAMETHASONE SODIUM PHOSPHATE 8 MG: 10 INJECTION, SOLUTION INTRAMUSCULAR; INTRAVENOUS at 22:50

## 2023-08-29 RX ADMIN — PANTOPRAZOLE SODIUM 40 MG: 40 INJECTION, POWDER, FOR SOLUTION INTRAVENOUS at 20:40

## 2023-08-29 RX ADMIN — ALPRAZOLAM 0.25 MG: 0.25 TABLET ORAL at 23:28

## 2023-08-29 NOTE — ED PROVIDER NOTES
History  Chief Complaint   Patient presents with   • Shortness of Breath     Patient reports SOB that has been "on and off forever" but has gotten worse recently. Reports being unable to "get a deep breath in or out", reports difficulty sleeping recently as well. Found to be 77% RA during triage. Is a 53-year-old male with a diagnosis of COPD who presents with shortness of breath. History of obstructive sleep apnea not using CPAP, diabetes morbid obesity. He states that he started feeling short of breath over the last few weeks. Is gotten worse over the last few days he feels that he cannot get a deep breath he feels that he is wheezing he has a cough he has been experiencing diaphoresis as well. He has complaints of chronic left knee pain he is awaiting knee replacement after he is able to lose weight. He has been completing some physical therapy. He presents acutely ill-appearing on room air he is 77% with a rate of 117 bpm from the heart and respirations are 26 bpm.  He was immediately placed on low-flow nasal cannula this abe into the low 90s. He states he does not use home oxygen. Patient also offers complaints of rectal bleeding. He states he is blind red blood per rectum after a hard bowel movement a few days ago he believes as though he has a hemorrhoid. Prior to Admission Medications   Prescriptions Last Dose Informant Patient Reported? Taking?    Ascorbic Acid (VITAMIN C PO)  Self Yes No   Sig: Take 1 tablet by mouth daily   DULoxetine (CYMBALTA) 60 mg delayed release capsule   No No   Sig: Take 1 capsule by mouth once daily   EPINEPHrine (EPIPEN) 0.3 mg/0.3 mL SOAJ   No No   Sig: INJECT 0.3MG INTO A MUSCLE ONCE FOR 1 DOSE   HumaLOG KwikPen 100 units/mL injection pen   No No   Sig: INJECT 20 UNITS UNDER THE SKIN THREE TIMES DAILY WITH MEALS   Insulin Glargine Solostar (Lantus SoloStar) 100 UNIT/ML SOPN   No No   Sig: Inject 0.6 mL (60 Units total) under the skin daily   Insulin Pen Needle (Pen Needles 3/16") 31G X 5 MM MISC  Self No No   Sig: Use 4 (four) times a day   Omega-3 Fatty Acids (fish oil) 1,000 mg   Yes No   Sig: Take 1,000 mg by mouth 2 (two) times a day   Ventolin  (90 Base) MCG/ACT inhaler   No No   Sig: Inhale 2 puffs every 4 (four) hours as needed for wheezing or shortness of breath   albuterol (2.5 mg/3 mL) 0.083 % nebulizer solution   No No   Sig: Take 3 mL (2.5 mg total) by nebulization every 6 (six) hours as needed for wheezing or shortness of breath   aspirin 81 mg chewable tablet  Self Yes No   Sig: Take one tablet by mouth daily   benzonatate (TESSALON PERLES) 100 mg capsule   No No   Sig: Take 1 capsule (100 mg total) by mouth 3 (three) times a day as needed for cough   gabapentin (NEURONTIN) 100 mg capsule   No No   Sig: TAKE 1 CAPSULE BY MOUTH THREE TIMES DAILY   hydrochlorothiazide (HYDRODIURIL) 25 mg tablet   No No   Sig: Take 1 tablet (25 mg total) by mouth 2 (two) times a day   ipratropium (ATROVENT) 0.02 % nebulizer solution   No No   Sig: Take 2.5 mL (0.5 mg total) by nebulization every 6 (six) hours as needed for wheezing or shortness of breath   ipratropium-albuterol (DUO-NEB) 0.5-2.5 mg/3 mL nebulizer solution   No No   Sig: Take 3 mL by nebulization every 6 (six) hours as needed for wheezing or shortness of breath   levothyroxine 175 mcg tablet   No No   Sig: Take 1 tablet by mouth once daily   lisinopril (ZESTRIL) 5 mg tablet   No No   Sig: Take 1 tablet (5 mg total) by mouth daily   loratadine (CLARITIN) 10 mg tablet  Self Yes No   Sig: Take 10 mg by mouth daily   metFORMIN (GLUCOPHAGE) 1000 MG tablet   No No   Sig: Take 1 tablet by mouth twice daily   pregabalin (LYRICA) 25 mg capsule   No No   Sig: Take 1 capsule by mouth twice daily   rosuvastatin (CRESTOR) 40 MG tablet   No No   Sig: Take 1 tablet (40 mg total) by mouth daily   sodium chloride (OCEAN) 0.65 % nasal spray  Self No No   Si spray into each nostril as needed for rhinitis traZODone (DESYREL) 150 mg tablet   No No   Sig: TAKE 1 TABLET BY MOUTH ONCE DAILY AT BEDTIME      Facility-Administered Medications: None       Past Medical History:   Diagnosis Date   • Acquired hypothyroidism 2015   • Angioedema    • Diabetes mellitus ([address])    • Disease of thyroid gland    • Hyperlipidemia    • Hypertension    • Mixed hyperlipidemia 2015   • Morbid obesity ([address])    • Morbid obesity with BMI of 45.0-49.9, adult ([address]) 2015   • MARSHA (obstructive sleep apnea)    • Primary hypertension 2015    Transitioned From: Benign essential hypertension       Past Surgical History:   Procedure Laterality Date   • KNEE SURGERY     • SINUS SURGERY         Family History   Problem Relation Age of Onset   • Thyroid cancer Mother    • Diabetes type II Mother    • Esophageal cancer Father    • Kidney cancer Brother    • Diabetes type II Brother    • Diabetes type II Maternal Grandmother      I have reviewed and agree with the history as documented. E-Cigarette/Vaping   • E-Cigarette Use Never User    • Cartridges/Day 0    • Comments 0      E-Cigarette/Vaping Substances   • Nicotine No    • THC No    • CBD No    • Flavoring No    • Other No    • Unknown No      Social History     Tobacco Use   • Smoking status: Former     Packs/day: 2.00     Years: 70.00     Total pack years: 140.00     Types: Cigarettes     Quit date: 10/21/2022     Years since quittin.8   • Smokeless tobacco: Never   • Tobacco comments:     states read to quit prior to admit   Vaping Use   • Vaping Use: Never used   Substance Use Topics   • Alcohol use: Not Currently     Alcohol/week: 3.0 standard drinks of alcohol     Types: 2 Cans of beer, 1 Shots of liquor per week     Comment: No alcohol since    • Drug use: No       Review of Systems   Constitutional: Negative. Negative for activity change, appetite change, chills, diaphoresis, fatigue, fever and unexpected weight change. HENT: Negative.   Negative for sore throat, trouble swallowing and voice change. Eyes: Negative. Respiratory: Positive for cough and shortness of breath. Negative for chest tightness and wheezing. Cardiovascular: Negative. Negative for chest pain, palpitations and leg swelling. Gastrointestinal: Positive for blood in stool. Negative for abdominal pain, nausea and vomiting. Endocrine: Negative. Genitourinary: Negative. Negative for flank pain and hematuria. Musculoskeletal: Negative. Negative for arthralgias, back pain, gait problem, joint swelling, myalgias, neck pain and neck stiffness. Skin: Negative. Negative for rash and wound. Allergic/Immunologic: Negative. Neurological: Negative. Negative for dizziness, seizures, syncope, weakness, light-headedness and headaches. Hematological: Negative. Psychiatric/Behavioral: Negative. All other systems reviewed and are negative. Physical Exam  Physical Exam  Vitals reviewed. Constitutional:       General: He is in acute distress. Appearance: He is well-developed. He is obese. He is ill-appearing. He is not toxic-appearing or diaphoretic. HENT:      Right Ear: External ear normal. No swelling. Tympanic membrane is not bulging. Left Ear: External ear normal. No swelling. Tympanic membrane is not bulging. Nose: Nose normal.      Mouth/Throat:      Pharynx: No oropharyngeal exudate. Eyes:      General: Lids are normal.      Conjunctiva/sclera: Conjunctivae normal.      Pupils: Pupils are equal, round, and reactive to light. Neck:      Thyroid: No thyromegaly. Vascular: No JVD. Trachea: No tracheal deviation. Cardiovascular:      Rate and Rhythm: Regular rhythm. Tachycardia present. Pulses: Normal pulses. Heart sounds: Normal heart sounds. No murmur heard. No friction rub. No gallop. Pulmonary:      Effort: Pulmonary effort is normal. Tachypnea present. No respiratory distress. Breath sounds: No stridor.  Decreased breath sounds and wheezing present. No rhonchi or rales. Chest:      Chest wall: No tenderness. Abdominal:      General: Bowel sounds are normal. There is no distension. Palpations: Abdomen is soft. There is no mass. Tenderness: There is no abdominal tenderness. There is no guarding or rebound. Negative signs include Salter's sign. Hernia: No hernia is present. Musculoskeletal:         General: No tenderness. Normal range of motion. Cervical back: Normal range of motion and neck supple. No edema. Normal range of motion. Right lower leg: No tenderness. Left lower leg: No tenderness. Lymphadenopathy:      Cervical: No cervical adenopathy. Skin:     General: Skin is warm and dry. Capillary Refill: Capillary refill takes less than 2 seconds. Coloration: Skin is not pale. Findings: No erythema or rash. Neurological:      Mental Status: He is alert and oriented to person, place, and time. GCS: GCS eye subscore is 4. GCS verbal subscore is 5. GCS motor subscore is 6. Cranial Nerves: No cranial nerve deficit. Sensory: No sensory deficit. Deep Tendon Reflexes: Reflexes are normal and symmetric.    Psychiatric:         Speech: Speech normal.         Behavior: Behavior normal.         Vital Signs  ED Triage Vitals [08/29/23 1756]   Temperature Pulse Respirations Blood Pressure SpO2   98.8 °F (37.1 °C) (!) 117 (!) 26 139/80 (!) 77 %      Temp Source Heart Rate Source Patient Position - Orthostatic VS BP Location FiO2 (%)   Temporal Monitor Sitting Right arm --      Pain Score       --           Vitals:    08/29/23 1756 08/29/23 1915 08/29/23 1945   BP: 139/80 138/69 139/71   Pulse: (!) 117 (!) 106 105   Patient Position - Orthostatic VS: Sitting Lying Lying         Visual Acuity      ED Medications  Medications   ipratropium-albuterol (DUO-NEB) 0.5-2.5 mg/3 mL inhalation solution 3 mL (3 mL Nebulization Given 8/29/23 1837)   cefTRIAXone (ROCEPHIN) IVPB (premix in dextrose) 1,000 mg 50 mL (0 mg Intravenous Stopped 8/29/23 1929)   azithromycin (ZITHROMAX) 500 mg in sodium chloride 0.9% 250mL IVPB 500 mg (0 mg Intravenous Stopped 8/29/23 2028)   iohexol (OMNIPAQUE) 350 MG/ML injection (SINGLE-DOSE) 100 mL (100 mL Intravenous Given 8/29/23 2009)   pantoprazole (PROTONIX) injection 40 mg (40 mg Intravenous Given 8/29/23 2040)       Diagnostic Studies  Results Reviewed     Procedure Component Value Units Date/Time    HS Troponin I 2hr [[id number]]  (Normal) Collected: 08/29/23 2055    Lab Status: Final result Specimen: Blood from Arm, Right Updated: 08/29/23 2123     hs TnI 2hr 22 ng/L      Delta 2hr hsTnI -1 ng/L     Blood gas, arterial [[id number]]  (Abnormal) Collected: 08/29/23 2054    Lab Status: Final result Specimen: Blood, Arterial from Radial, Right Updated: 08/29/23 2114     pH, Arterial 7.406     pCO2, Arterial 47.1 mm Hg      pO2, Arterial 58.6 mm Hg      HCO3, Arterial 28.9 mmol/L      Base Excess, Arterial 3.7 mmol/L      O2 Content, Arterial 10.5 mL/dL      O2 HGB,Arterial  85.1 %      SOURCE Radial, Right     BLANCA TEST Yes     Nasal Cannula 4    HS Troponin I 4hr [[id number]]     Lab Status: No result Specimen: Blood     FLU/RSV/COVID - if FLU/RSV clinically relevant [[id number]]  (Normal) Collected: 08/29/23 1842    Lab Status: Final result Specimen: Nares from Nose Updated: 08/29/23 1951     SARS-CoV-2 Negative     INFLUENZA A PCR Negative     INFLUENZA B PCR Negative     RSV PCR Negative    Narrative:      FOR PEDIATRIC PATIENTS - copy/paste COVID Guidelines URL to browser: [email or web address]    SARS-CoV-2 assay is a Nucleic Acid Amplification assay intended for the  qualitative detection of nucleic acid from SARS-CoV-2 in nasopharyngeal  swabs. Results are for the presumptive identification of SARS-CoV-2 RNA.     Positive results are indicative of infection with SARS-CoV-2, the virus  causing COVID-19, but do not rule out bacterial infection or co-infection  with other viruses. Laboratories within the [facility] and its  territories are required to report all positive results to the appropriate  public health authorities. Negative results do not preclude SARS-CoV-2  infection and should not be used as the sole basis for treatment or other  patient management decisions. Negative results must be combined with  clinical observations, patient history, and epidemiological information. This test has not been FDA cleared or approved. This test has been authorized by FDA under an Emergency Use Authorization  (EUA). This test is only authorized for the duration of time the  declaration that circumstances exist justifying the authorization of the  emergency use of an in vitro diagnostic tests for detection of SARS-CoV-2  virus and/or diagnosis of COVID-19 infection under section 564(b)(1) of  the Act, 21 U. S.C. 188RYJ-5(W)(1), unless the authorization is terminated  or revoked sooner. The test has been validated but independent review by FDA  and CLIA is pending. Test performed using Cepheid GeneXpert: This RT-PCR assay targets N2,  a region unique to SARS-CoV-2. A conserved region in the E-gene was chosen  for pan-Sarbecovirus detection which includes SARS-CoV-2. According to CMS-2020-01-R, this platform meets the definition of high-throughput technology.     Procalcitonin [[id number]]  (Normal) Collected: 08/29/23 1837    Lab Status: Final result Specimen: Blood from Arm, Right Updated: 08/29/23 1914     Procalcitonin 0.17 ng/ml     HS Troponin 0hr (reflex protocol) [[id number]]  (Normal) Collected: 08/29/23 1837    Lab Status: Final result Specimen: Blood from Arm, Right Updated: 08/29/23 1911     hs TnI 0hr 23 ng/L     B-Type Natriuretic Peptide(BNP) [[id number]]  (Normal) Collected: 08/29/23 1837    Lab Status: Final result Specimen: Blood from Arm, Right Updated: 08/29/23 1911     BNP 92 pg/mL     Lactic acid, plasma (w/reflex if result > 2.0) [[id number]]  (Normal) Collected: 08/29/23 1837    Lab Status: Final result Specimen: Blood from Arm, Right Updated: 08/29/23 1907     LACTIC ACID 1.7 mmol/L     Narrative:      Result may be elevated if tourniquet was used during collection. Comprehensive metabolic panel [[id number]]  (Abnormal) Collected: 08/29/23 1837    Lab Status: Final result Specimen: Blood from Arm, Right Updated: 08/29/23 1907     Sodium 135 mmol/L      Potassium 3.7 mmol/L      Chloride 92 mmol/L      CO2 32 mmol/L      ANION GAP 11 mmol/L      BUN 30 mg/dL      Creatinine 1.45 mg/dL      Glucose 142 mg/dL      Calcium 9.3 mg/dL      AST 34 U/L      ALT 29 U/L      Alkaline Phosphatase 52 U/L      Total Protein 7.2 g/dL      Albumin 3.8 g/dL      Total Bilirubin 0.32 mg/dL      eGFR 52 ml/min/1.73sq m     Narrative:      [place] guidelines for Chronic Kidney Disease (CKD):   •  Stage 1 with normal or high GFR (GFR > 90 mL/min/1.73 square meters)  •  Stage 2 Mild CKD (GFR = 60-89 mL/min/1.73 square meters)  •  Stage 3A Moderate CKD (GFR = 45-59 mL/min/1.73 square meters)  •  Stage 3B Moderate CKD (GFR = 30-44 mL/min/1.73 square meters)  •  Stage 4 Severe CKD (GFR = 15-29 mL/min/1.73 square meters)  •  Stage 5 End Stage CKD (GFR <15 mL/min/1.73 square meters)  Note: GFR calculation is accurate only with a steady state creatinine    Magnesium [[id number]]  (Normal) Collected: 08/29/23 1837    Lab Status: Final result Specimen: Blood from Arm, Right Updated: 08/29/23 1907     Magnesium 2.1 mg/dL     D-Dimer [[id number]]  (Abnormal) Collected: 08/29/23 1837    Lab Status: Final result Specimen: Blood from Arm, Right Updated: 08/29/23 1902     D-Dimer, Quant 0.95 ug/ml FEU     Narrative:       In the evaluation for possible pulmonary embolism, in the appropriate (Well's Score of 4 or less) patient, the age adjusted d-dimer cutoff for this patient can be calculated as:    Age x 0.01 (in ug/mL) for Age-adjusted D-dimer exclusion threshold for a patient over 50 years. Protime-INR [[id number]]  (Normal) Collected: 08/29/23 1837    Lab Status: Final result Specimen: Blood from Arm, Right Updated: 08/29/23 1859     Protime 13.2 seconds      INR 1.01    APTT [[id number]]  (Normal) Collected: 08/29/23 1837    Lab Status: Final result Specimen: Blood from Arm, Right Updated: 08/29/23 1859     PTT 27 seconds     CBC and differential [[id number]]  (Abnormal) Collected: 08/29/23 1837    Lab Status: Final result Specimen: Blood from Arm, Right Updated: 08/29/23 1847     WBC 8.66 Thousand/uL      RBC 3.38 Million/uL      Hemoglobin 8.4 g/dL      Hematocrit 27.4 %      MCV 81 fL      MCH 24.9 pg      MCHC 30.7 g/dL      RDW 15.9 %      MPV 9.0 fL      Platelets 280 Thousands/uL      nRBC 1 /100 WBCs      Neutrophils Relative 65 %      Immat GRANS % 1 %      Lymphocytes Relative 17 %      Monocytes Relative 14 %      Eosinophils Relative 2 %      Basophils Relative 1 %      Neutrophils Absolute 5.67 Thousands/µL      Immature Grans Absolute 0.04 Thousand/uL      Lymphocytes Absolute 1.50 Thousands/µL      Monocytes Absolute 1.25 Thousand/µL      Eosinophils Absolute 0.16 Thousand/µL      Basophils Absolute 0.04 Thousands/µL     Blood culture #2 [[id number]] Collected: 08/29/23 1837    Lab Status: In process Specimen: Blood from Arm, Right Updated: 08/29/23 1844    Blood culture #1 [[id number]] Collected: 08/29/23 1837    Lab Status: In process Specimen: Blood from Arm, Right Updated: 08/29/23 1844                 PE Study with CT abdomen & pelvis with contrast   Final Result by Star Taylor MD (08/29 1222)      No pulmonary embolism. Emphysema. Small to moderate size left pleural effusion of uncertain etiology. Large liver. Fat-containing umbilical hernia without induration to suggest incarceration.       Minimal skin thickening and subcutaneous changes of the anterior pelvic wall consistent with cellulitis.                            Workstation performed: [id number]         XR chest 1 view portable   ED Interpretation by Barbara Masters PA-C (08/29 1858)   Right lower lobe consolidative process is likely in the setting of hypoxia and tachycardia here                 Procedures  ECG 12 Lead Documentation Only    Date/Time: 8/29/2023 6:38 PM    Performed by: Barbara Masters PA-C  Authorized by: Barbara Masters PA-C    Indications / Diagnosis:  Shortness of breath  ECG reviewed by me, the ED Provider: yes    Patient location:  ED  Previous ECG:     Comparison to cardiac monitor: Yes    Interpretation:     Interpretation: non-specific    Rate:     ECG rate:  107    ECG rate assessment: tachycardic    Rhythm:     Rhythm: sinus tachycardia    Ectopy:     Ectopy: none    QRS:     QRS axis:  Normal    QRS intervals:  Normal  Conduction:     Conduction: normal    ST segments:     ST segments:  Normal  T waves:     T waves: normal      CriticalCare Time    Date/Time: 8/29/2023 9:28 PM    Performed by: Barbara Masters PA-C  Authorized by: Barbara Masters PA-C    Critical care provider statement:     Critical care time (minutes):  35    Critical care was necessary to treat or prevent imminent or life-threatening deterioration of the following conditions:  Respiratory failure    Critical care was time spent personally by me on the following activities:  Obtaining history from patient or surrogate, evaluation of patient's response to treatment, ordering and review of radiographic studies, ordering and review of laboratory studies, ordering and performing treatments and interventions, review of old charts and re-evaluation of patient's condition  Comments:      Patient presents profoundly hypoxic 77% on room air he required immediate nasal cannula intervention to prevent further endorgan damage work-up also noted ABG PO2 level being dangerously low as well which required mid flow nasal cannula at 10 L which I personally placed. Admission to stepdown unit for COPD exacerbation pleural effusion bright red blood per rectum with anemia. ED Course  ED Course as of 08/29/23 2129   Tue Aug 29, 2023   1858 Hemoglobin(!): 8.4  Hemoglobin today 8.4 was last checked a year ago was 12.9.   2005 There is a slow decline in renal function upon review of the CMP however no rapid deterioration. 2016 Digital rectal examination was completed at bedside. There is a small amount of bright red blood noted about the rectum however there is no evidence of active hemorrhage. It certainly tested control positive sample positive on the guaiac test.   2021 Awaiting CT interpretation likely admission for anemia, hypoxia, bright red blood per rectum as well as left-sided pleural effusion   2103 IMPRESSION:     No pulmonary embolism.     Emphysema.     Small to moderate size left pleural effusion of uncertain etiology.     Large liver.     Fat-containing umbilical hernia without induration to suggest incarceration.     Minimal skin thickening and subcutaneous changes of the anterior pelvic wall consistent with cellulitis. 2114 TT sent to [address] [place] [address] Most Recent Value   Heart Score Risk Calculator    History 0 Filed at: 08/29/2023 1839   ECG 0 Filed at: 08/29/2023 1839   Age 1 Filed at: 08/29/2023 1839   Risk Factors 2 Filed at: 08/29/2023 1839   Troponin 0 Filed at: 08/29/2023 1839   HEART Score 3 Filed at: 08/29/2023 1839                        SBIRT 20yo+    Flowsheet Row Most Recent Value   Initial Alcohol Screen: US AUDIT-C     1. How often do you have a drink containing alcohol? 0 Filed at: 08/29/2023 1758   2. How many drinks containing alcohol do you have on a typical day you are drinking? 0 Filed at: 08/29/2023 1758   3a. Male UNDER 65: How often do you have five or more drinks on one occasion? 0 Filed at: 08/29/2023 1758   3b. FEMALE Any Age, or MALE 65+:  How often do you have 4 or more drinks on one occassion? 0 Filed at: 08/29/2023 1758   Audit-C Score 0 Filed at: 08/29/2023 1758   SIHVANI: How many times in the past year have you. .. Used an illegal drug or used a prescription medication for non-medical reasons? Never Filed at: 08/29/2023 179-00 Leonel Greenwood Making  This is a 67-year-old male presenting for shortness of breath he does appear acutely toxic with profound hypoxia and some mild tachycardia upon initial evaluation. He states he has a history of COPD he is obese he is a tobacco user also has a history of obstructive sleep apnea not using CPAP. No home oxygen use he has been utilizing inhalers however. He was immediately placed on oxygen to prevent further endorgan damage he will be given nebulizer worked up for acute coronary syndrome, pneumothorax, pneumonia, COVID, pulmonary embolism, acute renal failure, anemia from GI loss. Work-up is significant for anemia which appears new from a year ago since it was last checked. There is bright red blood per rectum however no active hemorrhage on my bedside rectal examination. CT scan is significant for mild to moderate left-sided pleural effusion and emphysema. No consolidative process no white count no lactic. He has had some hypoxia. He tells me that he is actually been hypoxic for quite some time. He states he has a at home finger probe that has been noting values in the 70s from time to time. I did place him on 10 L mid flow nasal cannula humidified oxygen personally secondary to ABG noting a decreased PO2. He has oxygen levels in the upper 90s with this therapy on. I did discuss the case with on-call internal medicine agreed for stepdown admission he will need pulmonology and GI follow-up. Amount and/or Complexity of Data Reviewed  Labs: ordered. Decision-making details documented in ED Course.   Radiology: ordered and independent interpretation performed. Risk  Prescription drug management. Decision regarding hospitalization. Disposition  Final diagnoses:   Hypoxia   Bright red blood per rectum   Anemia   COPD exacerbation (HCC)   Pleural effusion     Time reflects when diagnosis was documented in both MDM as applicable and the Disposition within this note     Time User Action Codes Description Comment    8/29/2023  6:40 PM Leva Corpus Add [R09.02] Hypoxia     8/29/2023  8:17 PM Eunice Schafer D Add [K62.5] Bright red blood per rectum     8/29/2023  8:17 PM Leva Corpus Add [D64.9] Anemia     8/29/2023  9:15 PM Eunice FORD Add [J44.1] COPD exacerbation ([address])     8/29/2023  9:15 PM Leva Corpus Add [J90] Pleural effusion       ED Disposition     ED Disposition   Admit    Condition   Stable    Date/Time   Tue Aug 29, 2023  9:26 PM    Comment   Case was discussed with Dr. Rustam Solano and the patient's admission status was agreed to be Admission Status: inpatient status to the service of Dr. Rustam Solano . Follow-up Information    None         Patient's Medications   Discharge Prescriptions    No medications on file       No discharge procedures on file.     PDMP Review       Value Time User    PDMP Reviewed  Yes 8/15/2023  9:08 AM Leonel Hyde PA-C          ED Provider  Electronically Signed by           Kemar Reardon PA-C  08/29/23 1497

## 2023-08-30 ENCOUNTER — APPOINTMENT (INPATIENT)
Dept: NON INVASIVE DIAGNOSTICS | Facility: HOSPITAL | Age: 58
DRG: 189 | End: 2023-08-30
Payer: MEDICARE

## 2023-08-30 ENCOUNTER — APPOINTMENT (OUTPATIENT)
Dept: PHYSICAL THERAPY | Facility: HOME HEALTHCARE | Age: 58
End: 2023-08-30
Payer: MEDICARE

## 2023-08-30 LAB
ABO GROUP BLD: NORMAL
ALBUMIN SERPL BCP-MCNC: 3.8 G/DL (ref 3.5–5)
ALP SERPL-CCNC: 55 U/L (ref 34–104)
ALT SERPL W P-5'-P-CCNC: 30 U/L (ref 7–52)
ANION GAP SERPL CALCULATED.3IONS-SCNC: 10 MMOL/L
AORTIC ROOT: 3.6 CM
APICAL FOUR CHAMBER EJECTION FRACTION: 64 %
ARTERIAL PATENCY WRIST A: YES
AST SERPL W P-5'-P-CCNC: 33 U/L (ref 13–39)
ATRIAL RATE: 107 BPM
AV PEAK GRADIENT: 11 MMHG
BASE EXCESS BLDA CALC-SCNC: 5.2 MMOL/L
BASE EXCESS BLDA CALC-SCNC: 5.5 MMOL/L
BASE EXCESS BLDA CALC-SCNC: 6.3 MMOL/L
BASE EXCESS BLDA CALC-SCNC: 6.5 MMOL/L
BILIRUB SERPL-MCNC: 0.39 MG/DL (ref 0.2–1)
BLD GP AB SCN SERPL QL: NEGATIVE
BUN SERPL-MCNC: 26 MG/DL (ref 5–25)
CALCIUM SERPL-MCNC: 8.9 MG/DL (ref 8.4–10.2)
CHLORIDE SERPL-SCNC: 92 MMOL/L (ref 96–108)
CHOLEST SERPL-MCNC: 88 MG/DL
CO2 SERPL-SCNC: 34 MMOL/L (ref 21–32)
CREAT SERPL-MCNC: 1.4 MG/DL (ref 0.6–1.3)
E WAVE DECELERATION TIME: 160 MS
ERYTHROCYTE [DISTWIDTH] IN BLOOD BY AUTOMATED COUNT: 15.7 % (ref 11.6–15.1)
ERYTHROCYTE [DISTWIDTH] IN BLOOD BY AUTOMATED COUNT: 15.8 % (ref 11.6–15.1)
FERRITIN SERPL-MCNC: 19 NG/ML (ref 24–336)
FERRITIN SERPL-MCNC: 20 NG/ML (ref 24–336)
FRACTIONAL SHORTENING: 37 % (ref 28–44)
GFR SERPL CREATININE-BSD FRML MDRD: 54 ML/MIN/1.73SQ M
GLUCOSE SERPL-MCNC: 208 MG/DL (ref 65–140)
GLUCOSE SERPL-MCNC: 213 MG/DL (ref 65–140)
GLUCOSE SERPL-MCNC: 253 MG/DL (ref 65–140)
GLUCOSE SERPL-MCNC: 267 MG/DL (ref 65–140)
GLUCOSE SERPL-MCNC: 325 MG/DL (ref 65–140)
HCO3 BLDA-SCNC: 30.5 MMOL/L (ref 22–28)
HCO3 BLDA-SCNC: 31.7 MMOL/L (ref 22–28)
HCO3 BLDA-SCNC: 32.5 MMOL/L (ref 22–28)
HCO3 BLDA-SCNC: 33.7 MMOL/L (ref 22–28)
HCT VFR BLD AUTO: 27.3 % (ref 36.5–49.3)
HCT VFR BLD AUTO: 27.7 % (ref 36.5–49.3)
HDLC SERPL-MCNC: 30 MG/DL
HFNC FLOW LPM: 20
HFNC FLOW LPM: 40
HGB BLD-MCNC: 8.2 G/DL (ref 12–17)
HGB BLD-MCNC: 8.3 G/DL (ref 12–17)
INR PPP: 1.07 (ref 0.84–1.19)
INTERVENTRICULAR SEPTUM IN DIASTOLE (PARASTERNAL SHORT AXIS VIEW): 1.1 CM
INTERVENTRICULAR SEPTUM: 1.1 CM (ref 0.6–1.1)
IPAP: 12
IPAP: 14
IRON SATN MFR SERPL: 4 % (ref 15–50)
IRON SERPL-MCNC: 16 UG/DL (ref 50–212)
LAAS-AP2: 20.2 CM2
LAAS-AP4: 20.5 CM2
LDLC SERPL CALC-MCNC: 40 MG/DL (ref 0–100)
LEFT ATRIUM SIZE: 3.6 CM
LEFT ATRIUM VOLUME (MOD BIPLANE): 60 ML
LEFT INTERNAL DIMENSION IN SYSTOLE: 3.6 CM (ref 2.1–4)
LEFT VENTRICULAR INTERNAL DIMENSION IN DIASTOLE: 5.7 CM (ref 3.5–6)
LEFT VENTRICULAR POSTERIOR WALL IN END DIASTOLE: 1.2 CM
LEFT VENTRICULAR STROKE VOLUME: 105 ML
LVSV (TEICH): 105 ML
MAGNESIUM SERPL-MCNC: 2.2 MG/DL (ref 1.9–2.7)
MCH RBC QN AUTO: 24.5 PG (ref 26.8–34.3)
MCH RBC QN AUTO: 24.7 PG (ref 26.8–34.3)
MCHC RBC AUTO-ENTMCNC: 30 G/DL (ref 31.4–37.4)
MCHC RBC AUTO-ENTMCNC: 30 G/DL (ref 31.4–37.4)
MCV RBC AUTO: 82 FL (ref 82–98)
MCV RBC AUTO: 82 FL (ref 82–98)
MV E'TISSUE VEL-SEP: 9 CM/S
MV PEAK A VEL: 1.18 M/S
MV PEAK E VEL: 95 CM/S
MV STENOSIS PRESSURE HALF TIME: 46 MS
MV VALVE AREA P 1/2 METHOD: 4.78 CM2
NON VENT HFNC FIO2: 40
NON VENT HFNC FIO2: 75
NON VENT TYPE HFNC: ABNORMAL
NON VENT TYPE HFNC: ABNORMAL
NON VENT- BIPAP: ABNORMAL
NON VENT- BIPAP: ABNORMAL
NONHDLC SERPL-MCNC: 58 MG/DL
O2 CT BLDA-SCNC: 11.9 ML/DL (ref 16–23)
O2 CT BLDA-SCNC: 12.1 ML/DL (ref 16–23)
O2 CT BLDA-SCNC: 12.4 ML/DL (ref 16–23)
O2 CT BLDA-SCNC: 8.5 ML/DL (ref 16–23)
OXYHGB MFR BLDA: 67.6 % (ref 94–97)
OXYHGB MFR BLDA: 92 % (ref 94–97)
OXYHGB MFR BLDA: 94.3 % (ref 94–97)
OXYHGB MFR BLDA: 94.9 % (ref 94–97)
P AXIS: 63 DEGREES
PCO2 BLDA: 48.5 MM HG (ref 36–44)
PCO2 BLDA: 50.9 MM HG (ref 36–44)
PCO2 BLDA: 62.7 MM HG (ref 36–44)
PCO2 BLDA: 65.6 MM HG (ref 36–44)
PEEP MAX SETTING VENT: 6 CM[H2O]
PEEP MAX SETTING VENT: 7 CM[H2O]
PH BLDA: 7.33 [PH] (ref 7.35–7.45)
PH BLDA: 7.33 [PH] (ref 7.35–7.45)
PH BLDA: 7.41 [PH] (ref 7.35–7.45)
PH BLDA: 7.42 [PH] (ref 7.35–7.45)
PHOSPHATE SERPL-MCNC: 5.4 MG/DL (ref 2.7–4.5)
PLATELET # BLD AUTO: 309 THOUSANDS/UL (ref 149–390)
PLATELET # BLD AUTO: 332 THOUSANDS/UL (ref 149–390)
PMV BLD AUTO: 9 FL (ref 8.9–12.7)
PMV BLD AUTO: 9.1 FL (ref 8.9–12.7)
PO2 BLDA: 42.9 MM HG (ref 75–129)
PO2 BLDA: 80.1 MM HG (ref 75–129)
PO2 BLDA: 85 MM HG (ref 75–129)
PO2 BLDA: 90.6 MM HG (ref 75–129)
POTASSIUM SERPL-SCNC: 3.9 MMOL/L (ref 3.5–5.3)
PR INTERVAL: 156 MS
PROT SERPL-MCNC: 7.3 G/DL (ref 6.4–8.4)
PROTHROMBIN TIME: 13.8 SECONDS (ref 11.6–14.5)
QRS AXIS: 55 DEGREES
QRSD INTERVAL: 84 MS
QT INTERVAL: 340 MS
QTC INTERVAL: 453 MS
RA PRESSURE ESTIMATED: 8 MMHG
RBC # BLD AUTO: 3.32 MILLION/UL (ref 3.88–5.62)
RBC # BLD AUTO: 3.39 MILLION/UL (ref 3.88–5.62)
RH BLD: POSITIVE
RIGHT ATRIUM AREA SYSTOLE A4C: 22.8 CM2
RIGHT VENTRICLE ID DIMENSION: 3.8 CM
RV PSP: 41 MMHG
SL CV LEFT ATRIUM LENGTH A2C: 5.4 CM
SL CV LV EF: 60
SL CV PED ECHO LEFT VENTRICLE DIASTOLIC VOLUME (MOD BIPLANE) 2D: 161 ML
SL CV PED ECHO LEFT VENTRICLE SYSTOLIC VOLUME (MOD BIPLANE) 2D: 55 ML
SODIUM SERPL-SCNC: 136 MMOL/L (ref 135–147)
SPECIMEN EXPIRATION DATE: NORMAL
SPECIMEN SOURCE: ABNORMAL
T WAVE AXIS: 58 DEGREES
TIBC SERPL-MCNC: 361 UG/DL (ref 250–450)
TR MAX PG: 33 MMHG
TR PEAK VELOCITY: 2.9 M/S
TRICUSPID ANNULAR PLANE SYSTOLIC EXCURSION: 2.3 CM
TRICUSPID VALVE PEAK REGURGITATION VELOCITY: 2.88 M/S
TRIGL SERPL-MCNC: 88 MG/DL
TSH SERPL DL<=0.05 MIU/L-ACNC: 0.83 UIU/ML (ref 0.45–4.5)
UIBC SERPL-MCNC: 345 UG/DL (ref 155–355)
VENT BIPAP FIO2: 32 %
VENT BIPAP FIO2: 40 %
VENTRICULAR RATE: 107 BPM
WBC # BLD AUTO: 7.95 THOUSAND/UL (ref 4.31–10.16)
WBC # BLD AUTO: 9.26 THOUSAND/UL (ref 4.31–10.16)

## 2023-08-30 PROCEDURE — 94760 N-INVAS EAR/PLS OXIMETRY 1: CPT

## 2023-08-30 PROCEDURE — 93306 TTE W/DOPPLER COMPLETE: CPT | Performed by: INTERNAL MEDICINE

## 2023-08-30 PROCEDURE — 82805 BLOOD GASES W/O2 SATURATION: CPT | Performed by: INTERNAL MEDICINE

## 2023-08-30 PROCEDURE — 86901 BLOOD TYPING SEROLOGIC RH(D): CPT | Performed by: INTERNAL MEDICINE

## 2023-08-30 PROCEDURE — 36600 WITHDRAWAL OF ARTERIAL BLOOD: CPT

## 2023-08-30 PROCEDURE — 87205 SMEAR GRAM STAIN: CPT | Performed by: INTERNAL MEDICINE

## 2023-08-30 PROCEDURE — 99222 1ST HOSP IP/OBS MODERATE 55: CPT | Performed by: STUDENT IN AN ORGANIZED HEALTH CARE EDUCATION/TRAINING PROGRAM

## 2023-08-30 PROCEDURE — 94664 DEMO&/EVAL PT USE INHALER: CPT

## 2023-08-30 PROCEDURE — 85027 COMPLETE CBC AUTOMATED: CPT | Performed by: INTERNAL MEDICINE

## 2023-08-30 PROCEDURE — 82948 REAGENT STRIP/BLOOD GLUCOSE: CPT

## 2023-08-30 PROCEDURE — 83550 IRON BINDING TEST: CPT | Performed by: HOSPITALIST

## 2023-08-30 PROCEDURE — 82728 ASSAY OF FERRITIN: CPT | Performed by: HOSPITALIST

## 2023-08-30 PROCEDURE — 84100 ASSAY OF PHOSPHORUS: CPT | Performed by: INTERNAL MEDICINE

## 2023-08-30 PROCEDURE — 93010 ELECTROCARDIOGRAM REPORT: CPT | Performed by: INTERNAL MEDICINE

## 2023-08-30 PROCEDURE — C9113 INJ PANTOPRAZOLE SODIUM, VIA: HCPCS | Performed by: INTERNAL MEDICINE

## 2023-08-30 PROCEDURE — 82805 BLOOD GASES W/O2 SATURATION: CPT | Performed by: HOSPITALIST

## 2023-08-30 PROCEDURE — 86900 BLOOD TYPING SEROLOGIC ABO: CPT | Performed by: INTERNAL MEDICINE

## 2023-08-30 PROCEDURE — 80053 COMPREHEN METABOLIC PANEL: CPT | Performed by: INTERNAL MEDICINE

## 2023-08-30 PROCEDURE — 85610 PROTHROMBIN TIME: CPT | Performed by: INTERNAL MEDICINE

## 2023-08-30 PROCEDURE — 94002 VENT MGMT INPAT INIT DAY: CPT

## 2023-08-30 PROCEDURE — 93306 TTE W/DOPPLER COMPLETE: CPT

## 2023-08-30 PROCEDURE — 83735 ASSAY OF MAGNESIUM: CPT | Performed by: INTERNAL MEDICINE

## 2023-08-30 PROCEDURE — 80061 LIPID PANEL: CPT | Performed by: INTERNAL MEDICINE

## 2023-08-30 PROCEDURE — 97163 PT EVAL HIGH COMPLEX 45 MIN: CPT

## 2023-08-30 PROCEDURE — 94640 AIRWAY INHALATION TREATMENT: CPT

## 2023-08-30 PROCEDURE — 97167 OT EVAL HIGH COMPLEX 60 MIN: CPT

## 2023-08-30 PROCEDURE — C9113 INJ PANTOPRAZOLE SODIUM, VIA: HCPCS | Performed by: HOSPITALIST

## 2023-08-30 PROCEDURE — 86850 RBC ANTIBODY SCREEN: CPT | Performed by: INTERNAL MEDICINE

## 2023-08-30 PROCEDURE — 99223 1ST HOSP IP/OBS HIGH 75: CPT | Performed by: INTERNAL MEDICINE

## 2023-08-30 PROCEDURE — 87070 CULTURE OTHR SPECIMN AEROBIC: CPT | Performed by: INTERNAL MEDICINE

## 2023-08-30 PROCEDURE — 83540 ASSAY OF IRON: CPT | Performed by: HOSPITALIST

## 2023-08-30 PROCEDURE — 99233 SBSQ HOSP IP/OBS HIGH 50: CPT | Performed by: HOSPITALIST

## 2023-08-30 PROCEDURE — 84443 ASSAY THYROID STIM HORMONE: CPT | Performed by: INTERNAL MEDICINE

## 2023-08-30 RX ORDER — PANTOPRAZOLE SODIUM 40 MG/10ML
40 INJECTION, POWDER, LYOPHILIZED, FOR SOLUTION INTRAVENOUS EVERY 12 HOURS SCHEDULED
Status: DISCONTINUED | OUTPATIENT
Start: 2023-08-30 | End: 2023-09-02

## 2023-08-30 RX ORDER — METHYLPREDNISOLONE SODIUM SUCCINATE 40 MG/ML
40 INJECTION, POWDER, LYOPHILIZED, FOR SOLUTION INTRAMUSCULAR; INTRAVENOUS EVERY 12 HOURS SCHEDULED
Status: DISCONTINUED | OUTPATIENT
Start: 2023-08-30 | End: 2023-09-03

## 2023-08-30 RX ADMIN — ALPRAZOLAM 0.25 MG: 0.25 TABLET ORAL at 21:03

## 2023-08-30 RX ADMIN — OXYCODONE HYDROCHLORIDE AND ACETAMINOPHEN 500 MG: 500 TABLET ORAL at 16:29

## 2023-08-30 RX ADMIN — GUAIFENESIN 1200 MG: 600 TABLET, EXTENDED RELEASE ORAL at 11:47

## 2023-08-30 RX ADMIN — BENZONATATE 100 MG: 100 CAPSULE ORAL at 23:38

## 2023-08-30 RX ADMIN — IRON SUCROSE 200 MG: 20 INJECTION, SOLUTION INTRAVENOUS at 16:08

## 2023-08-30 RX ADMIN — LEVALBUTEROL HYDROCHLORIDE 1.25 MG: 1.25 SOLUTION RESPIRATORY (INHALATION) at 19:26

## 2023-08-30 RX ADMIN — AMPICILLIN SODIUM AND SULBACTAM SODIUM 3 G: 2; 1 INJECTION, POWDER, FOR SOLUTION INTRAMUSCULAR; INTRAVENOUS at 17:41

## 2023-08-30 RX ADMIN — LEVALBUTEROL HYDROCHLORIDE 1.25 MG: 1.25 SOLUTION RESPIRATORY (INHALATION) at 07:53

## 2023-08-30 RX ADMIN — OMEGA-3 FATTY ACIDS CAP 1000 MG 1000 MG: 1000 CAP at 11:47

## 2023-08-30 RX ADMIN — GUAIFENESIN 1200 MG: 600 TABLET, EXTENDED RELEASE ORAL at 21:03

## 2023-08-30 RX ADMIN — AMPICILLIN SODIUM AND SULBACTAM SODIUM 3 G: 2; 1 INJECTION, POWDER, FOR SOLUTION INTRAMUSCULAR; INTRAVENOUS at 23:03

## 2023-08-30 RX ADMIN — IPRATROPIUM BROMIDE 0.5 MG: 0.5 SOLUTION RESPIRATORY (INHALATION) at 14:52

## 2023-08-30 RX ADMIN — PANTOPRAZOLE SODIUM 40 MG: 40 INJECTION, POWDER, FOR SOLUTION INTRAVENOUS at 11:49

## 2023-08-30 RX ADMIN — PREGABALIN 25 MG: 25 CAPSULE ORAL at 17:41

## 2023-08-30 RX ADMIN — PANTOPRAZOLE SODIUM 40 MG: 40 INJECTION, POWDER, FOR SOLUTION INTRAVENOUS at 20:35

## 2023-08-30 RX ADMIN — ATORVASTATIN CALCIUM 80 MG: 40 TABLET, FILM COATED ORAL at 16:29

## 2023-08-30 RX ADMIN — IPRATROPIUM BROMIDE 0.5 MG: 0.5 SOLUTION RESPIRATORY (INHALATION) at 07:53

## 2023-08-30 RX ADMIN — FERROUS SULFATE TAB 325 MG (65 MG ELEMENTAL FE) 325 MG: 325 (65 FE) TAB at 11:47

## 2023-08-30 RX ADMIN — INSULIN LISPRO 4 UNITS: 100 INJECTION, SOLUTION INTRAVENOUS; SUBCUTANEOUS at 08:21

## 2023-08-30 RX ADMIN — INSULIN LISPRO 6 UNITS: 100 INJECTION, SOLUTION INTRAVENOUS; SUBCUTANEOUS at 16:13

## 2023-08-30 RX ADMIN — PANTOPRAZOLE SODIUM 40 MG: 40 INJECTION, POWDER, FOR SOLUTION INTRAVENOUS at 11:47

## 2023-08-30 RX ADMIN — METHYLPREDNISOLONE SODIUM SUCCINATE 40 MG: 40 INJECTION, POWDER, FOR SOLUTION INTRAMUSCULAR; INTRAVENOUS at 11:48

## 2023-08-30 RX ADMIN — INSULIN LISPRO 8 UNITS: 100 INJECTION, SOLUTION INTRAVENOUS; SUBCUTANEOUS at 21:03

## 2023-08-30 RX ADMIN — IPRATROPIUM BROMIDE 0.5 MG: 0.5 SOLUTION RESPIRATORY (INHALATION) at 19:26

## 2023-08-30 RX ADMIN — PREGABALIN 25 MG: 25 CAPSULE ORAL at 11:47

## 2023-08-30 RX ADMIN — AZITHROMYCIN MONOHYDRATE 500 MG: 500 INJECTION, POWDER, LYOPHILIZED, FOR SOLUTION INTRAVENOUS at 18:29

## 2023-08-30 RX ADMIN — OXYCODONE HYDROCHLORIDE AND ACETAMINOPHEN 500 MG: 500 TABLET ORAL at 11:48

## 2023-08-30 RX ADMIN — LEVALBUTEROL HYDROCHLORIDE 1.25 MG: 1.25 SOLUTION RESPIRATORY (INHALATION) at 14:52

## 2023-08-30 RX ADMIN — TRAZODONE HYDROCHLORIDE 150 MG: 50 TABLET ORAL at 21:03

## 2023-08-30 RX ADMIN — DULOXETINE HYDROCHLORIDE 60 MG: 30 CAPSULE, DELAYED RELEASE ORAL at 11:47

## 2023-08-30 RX ADMIN — LORATADINE 10 MG: 10 TABLET ORAL at 11:47

## 2023-08-30 RX ADMIN — INSULIN LISPRO 6 UNITS: 100 INJECTION, SOLUTION INTRAVENOUS; SUBCUTANEOUS at 12:04

## 2023-08-30 RX ADMIN — AMPICILLIN SODIUM AND SULBACTAM SODIUM 3 G: 2; 1 INJECTION, POWDER, FOR SOLUTION INTRAMUSCULAR; INTRAVENOUS at 11:48

## 2023-08-30 RX ADMIN — LEVOTHYROXINE SODIUM 175 MCG: 175 TABLET ORAL at 05:26

## 2023-08-30 RX ADMIN — METHYLPREDNISOLONE SODIUM SUCCINATE 40 MG: 40 INJECTION, POWDER, FOR SOLUTION INTRAMUSCULAR; INTRAVENOUS at 20:35

## 2023-08-30 RX ADMIN — FERROUS SULFATE TAB 325 MG (65 MG ELEMENTAL FE) 325 MG: 325 (65 FE) TAB at 16:29

## 2023-08-30 RX ADMIN — OMEGA-3 FATTY ACIDS CAP 1000 MG 1000 MG: 1000 CAP at 17:41

## 2023-08-30 RX ADMIN — AMPICILLIN SODIUM AND SULBACTAM SODIUM 3 G: 2; 1 INJECTION, POWDER, FOR SOLUTION INTRAMUSCULAR; INTRAVENOUS at 04:51

## 2023-08-30 NOTE — RESPIRATORY THERAPY NOTE
New biapap changes at [facility] AND [facility] [place]    08/30/23 0800   Non-Invasive Information   O2 Interface Device Full face mask   Non-Invasive Ventilation Mode BiPAP   $ Pulse Oximetry Spot Check Charge Completed   Non-Invasive Settings   FiO2 (%) 32   IPAP (cm) 14 cm   EPAP (cm) 7 cm   Rate (Set) 16   Pressure Support (cm H2O) 7   Rise Time 3   Inspiratory Time (Set) 0.95   Non-Invasive Readings   Skin Intervention Skin intact   Total Rate 17   MV (Mech) 8.9   Peak Pressure (Obs) 14   Spontaneous Vt (mL) 459   I/E Ratio (Obs) 1:2.9   Leak (lpm) 0   Non-Invasive Alarms   Insp Pressure High (cm H20) 20   Insp Pressure Low (cm H20) 6   Low Insp Pressure Time (sec) 20 sec   MV Low (L/min) 4   Vt High (mL) 1200   Vt Low (mL) 200   High Resp Rate (BPM) 35 BPM   Low Resp Rate (BPM) 10 BPM   Apnea Interval (sec) 20   Apnea Rate 20

## 2023-08-30 NOTE — RESPIRATORY THERAPY NOTE
RT Protocol Note  Diane Perdomo 61 y.o. male MRN: [id number]  Unit/Bed#: RM11 Encounter: [id number]    Assessment    Active Problems:    COPD with acute exacerbation ([address])    Morbid obesity with BMI of 45.0-49.9, adult (HCC)    Acute respiratory failure with hypoxia and hypercapnia (HCC)    MARSHA and COPD overlap syndrome (HCC)    Pleural effusion, left    Bright red rectal bleeding    Microcytic anemia      Home Pulmonary Medications:  Albuterol inhaler and nebulizer prn  Home Devices/Therapy: (P) Other (Comment) (albuterol inhaler and nebulizer)    Past Medical History:   Diagnosis Date    Acquired hypothyroidism 2015    Angioedema     Diabetes mellitus ([address])     Disease of thyroid gland     Hyperlipidemia     Hypertension     Mixed hyperlipidemia 2015    Morbid obesity ([address])     Morbid obesity with BMI of 45.0-49.9, adult ([address]) 2015    MARSHA (obstructive sleep apnea)     Primary hypertension 2015    Transitioned From: Benign essential hypertension     Social History     Socioeconomic History    Marital status: Single     Spouse name: None    Number of children: None    Years of education: None    Highest education level: None   Occupational History    None   Tobacco Use    Smoking status: Former     Packs/day: 2.00     Years: 70.00     Total pack years: 140.00     Types: Cigarettes     Quit date: 10/21/2022     Years since quittin.8    Smokeless tobacco: Never    Tobacco comments:     states read to quit prior to admit   Vaping Use    Vaping Use: Never used   Substance and Sexual Activity    Alcohol use: Not Currently     Alcohol/week: 3.0 standard drinks of alcohol     Types: 2 Cans of beer, 1 Shots of liquor per week     Comment: No alcohol since     Drug use: No    Sexual activity: Not Currently   Other Topics Concern    None   Social History Narrative    None     Social Determinants of Health     Financial Resource Strain: Not on file   Food Insecurity: No Food Insecurity (2022) Hunger Vital Sign     Worried About Running Out of Food in the Last Year: Never true     Ran Out of Food in the Last Year: Never true   Transportation Needs: No Transportation Needs (1/11/2022)    PRAPARE - Transportation     Lack of Transportation (Medical): No     Lack of Transportation (Non-Medical): No   Physical Activity: Not on file   Stress: Not on file   Social Connections: Not on file   Intimate Partner Violence: Not on file   Housing Stability: Unknown (1/11/2022)    Housing Stability Vital Sign     Unable to Pay for Housing in the Last Year: No     Number of Places Lived in the Last Year: Not on file     Unstable Housing in the Last Year: No       Subjective         Objective    Physical Exam:   Assessment Type: (P) Assess only  General Appearance: (P) Alert, Awake  Respiratory Pattern: (P) Labored  Chest Assessment: (P) Trachea midline  Bilateral Breath Sounds: (P) Diminished, Expiratory wheezes  Cough: (P) Strong, Dry  O2 Device: (P) 10L midlow    Vitals:  Blood pressure 139/71, pulse 105, temperature 98.8 °F (37.1 °C), temperature source Temporal, resp. rate (!) 42, SpO2 96 %. Results from last 7 days   Lab Units 08/29/23 2054   PH ART  7.406   PCO2 ART mm Hg 47.1*   PO2 ART mm Hg 58.6*   HCO3 ART mmol/L 28.9*   BASE EXC ART mmol/L 3.7   O2 CONTENT ART mL/dL 10.5*   O2 HGB, ARTERIAL % 85.1*   ABG SOURCE  Radial, Right   BLANCA TEST  Yes       Imaging and other studies: I have personally reviewed pertinent reports. O2 Device: (P) 10L midlow     Plan    Respiratory Plan: (P) Moderate/Severe Distress pathway, Vent/NIV/HFNC        Resp Comments: (P) Patient placed on 10L midflow following ABG. Patient denies home oxygen. Patient states that they had a sleep study in the past but did not follow up.   Will order xopenex/atrovent TID and place patient on BiPAP as ordered by physician

## 2023-08-30 NOTE — ASSESSMENT & PLAN NOTE
- lifestyle modifications and dietary restrictions discussed with patient on admission  - education upon discharge

## 2023-08-30 NOTE — ASSESSMENT & PLAN NOTE
- due to COPD exacerbation, MARSHA/OHS non-compliant with home NIV  - requiring 10L HFNC O2 on admission, respiratory status declined with worsened ABG  - Now on BiPAP  - f/u ABG improved slightly  - repeat ABG @1000, patient more awake, less dyspneic, clinically improving  - in improved ABG, consider weaning off BIPAP with f/u ABG  - will still likely need BiPAP with sleep while active COPD exacerbation until further improved  - Continue IV Decadron q8h  - continue Azithromycin/Unasyn - procalcitonin 0.17 consider stopping Unasyn in 24 hours if repeat negative and continue axithromycin for anti-inflammatory properties  - sputum culture  - IS  - nebulizers ATC

## 2023-08-30 NOTE — RESPIRATORY THERAPY NOTE
08/30/23 0434   Respiratory Assessment   Resp Comments pt agreeable to try wearing BiPAP following morning ABG   Non-Invasive Information   O2 Interface Device Full face mask   Non-Invasive Ventilation Mode BiPAP   $ Continous NIV Initial   SpO2 97 %   $ Pulse Oximetry Spot Check Charge Completed   Non-Invasive Settings   FiO2 (%) 40   IPAP (cm) 12 cm   EPAP (cm) 6 cm   Rate (Set) 14   Pressure Support (cm H2O) 6   Rise Time 3   Inspiratory Time (Set) 1   Non-Invasive Readings   Skin Intervention Skin intact   Total Rate 21   MV (Mech) 11.4   Peak Pressure (Obs) 12   Spontaneous Vt (mL) 556   I/E Ratio (Obs) 1:2   Leak (lpm) 5   Non-Invasive Alarms   Insp Pressure High (cm H20) 18   Insp Pressure Low (cm H20) 6   Low Insp Pressure Time (sec) 20 sec   MV Low (L/min) 4   Vt High (mL) 1000   Vt Low (mL) 200   High Resp Rate (BPM) 35 BPM   Low Resp Rate (BPM) 10 BPM   Apnea Interval (sec) 20

## 2023-08-30 NOTE — RESPIRATORY THERAPY NOTE
Attempted patient on BiPAP. Patient unable to tolerate, complaining of feeling claustrophobic. Physician made aware. Xanax administered, patient still refusing BiPAP. Placed patient on Vapotherm. Initiated at [facility], 60% to help with SOB, patient stated it was too much pressure. Flow weaned to 30L, pt comfortable at this setting. Will continue to monitor.

## 2023-08-30 NOTE — CASE MANAGEMENT
Case Management Assessment & Discharge Planning Note    Patient name Rock Redman  Location / MRN [id number]  : 1965 Date 2023       Current Admission Date: 2023  Current Admission Diagnosis:Acute respiratory failure with hypoxia and hypercapnia [facility])   Patient Active Problem List    Diagnosis Date Noted   • Pleural effusion, left 2023   • Bright red rectal bleeding 2023   • ABLA (acute blood loss anemia) 2023   • Microcytic anemia 2023   • Fatty liver disease, nonalcoholic    • Umbilical hernia without obstruction and without gangrene 2023   • Primary osteoarthritis of both knees    • Peripheral vascular disease ([address]) 2022   • Depression, recurrent ([address]) 2021   • Hyperlipidemia 04/15/2021   • Constipation 2021   • Urinary retention 2021   • Acute respiratory failure with hypoxia and hypercapnia ([address]) 2021   • Epistaxis 2021   • MARSHA and COPD overlap syndrome ([address]) 2021   • Muscle twitching 2021   • Uncontrolled type 2 diabetes mellitus with hyperglycemia, with long-term current use of insulin ([address]) 2021   • Tobacco use 2021   • Atelectasis 2021   • Noncompliance with diabetes treatment 2020   • Bilateral lower leg cellulitis 2018   • Elevated LFTs 2018   • Elevated lactic acid level 2018   • Tobacco abuse 2018   • COPD with acute exacerbation ([address]) 2017   • Chronic back pain 2015   • Chronic sinusitis 2015   • Allergic rhinitis 2015   • Arthritis 2015   • Depression with anxiety 2015   • Benign essential hypertension 2015   • Morbid obesity with BMI of 45.0-49.9, adult ([address]) 2015   • Type 2 diabetes, uncontrolled, with neuropathy 2015   • Vitamin D deficiency 2013   • Acquired hypothyroidism 2011      LOS (days): 1  Geometric Mean LOS (GMLOS) (days): 3.50  Days to GMLOS:2.8 OBJECTIVE:    Risk of Unplanned Readmission Score: 19.7         Current admission status: Inpatient  Referral Reason:  (discharge planning  post acute needs)    Preferred Pharmacy:   [address] [address]  Phone: [phone] Fax: [phone]    Primary Care Provider: Ravi Marmolejo PA-C    Primary Insurance: MEDICARE  Secondary Insurance:     ASSESSMENT:    CM met with patient at the bedside,baseline information  was obtained. CM discussed the role of CM in helping the patient develop a discharge plan and assist the patient in carry out their plan. Patient stated he lives in 2nd floor apt , patient has 21 step into apt. Patient lives alone, cooks himself or orders out. Patient stated he has glucometer but does not check his blood sugar much and is on a lot insulin. Patient stated twice last week he awoke in soaking sweat. Patient stated he was trying to diet to lose weight to help with leg pain. Patient stated he was going to [facility] physical therapy M/W/F and had to cancel due to SOB. Patient stated he believes he is taking to much medication causing him to feel tired and unable to lose weight. Patient currently not on o2 or bipap at home      [place] Proxies    There are no active Health Care Proxies on file.        Advance Directives  Does patient have a [address]?: No  Was patient offered paperwork?: Yes (declined)  Does patient currently have a Health Care decision maker?: Yes, please see Health Care Proxy section Sean jackson)  Does patient have Advance Directives?: No  Was patient offered paperwork?: Yes (declined)  Primary Contact: Sean Raymond jackson         Readmission Root Cause  30 Day Readmission: No    Patient Information  Admitted from[de-identified] Home  Mental Status: Alert  During Assessment patient was accompanied by: Not accompanied during assessment  Assessment information provided by[de-identified] Patient  Primary Caregiver: Family  Caregiver's Name[de-identified] Jody Chaudhari brother  Arnoldo Vizcaino Relationship to Patient[de-identified] Family Member  Caregiver's Telephone Number[de-identified] [phone]  Support Systems: Family members (brother)  [place]  Residence: [facility] do you live in?: [address] entry access options.  Select all that apply.: Stairs  Number of steps to enter home.:  (21 steps to 2nd floor apt)  Do the steps have railings?: Yes  Type of Current Residence: Apartment  Floor Level: 2  Upon entering residence, is there a bedroom on the main floor (no further steps)?: Yes  Upon entering residence, is there a bathroom on the main floor (no further steps)?: Yes  In the last 12 months, was there a time when you were not able to pay the mortgage or rent on time?: No  In the last 12 months, how many places have you lived?: 1  In the last 12 months, was there a time when you did not have a steady place to sleep or slept in a shelter (including now)?: No  Homeless/housing insecurity resource given?: N/A  Living Arrangements: Lives Alone  Is patient a ?: No    Activities of Daily Living Prior to Admission  Functional Status: Independent  Completes ADLs independently?: Yes  Ambulates independently?: Yes  Does patient use assisted devices?: Yes  Assisted Devices (DME) used:  Estelle, Wheelchair  Does patient currently own DME?: Yes  What DME does the patient currently own?: [employer]rea Ruboi  Does patient have a history of Outpatient Therapy (PT/OT)?: Yes (patient stated he was going to [facility] out patient therapy M-W-F for weight loss /pain legs  and had to cancel recently due to sob admitted to hospital)  Does the patient have a history of Short-Term Rehab?: Yes  Does patient have a history of HHC?: Yes  Does patient currently have [address]?: No         Patient Information Continued  Income Source: Pension/residential  Does patient have prescription coverage?: Yes  Within the past 12 months, you worried that your food would run out before you got the money to buy more.: Never true  Within the past 12 months, the food you bought just didn't last and you didn't have money to get more.: Never true  Food insecurity resource given?: N/A  Does patient receive dialysis treatments?: No  Does patient have a history of substance abuse?: No  Does patient have a history of Mental Health Diagnosis?: No         Means of Transportation  Means of Transport to Appts[de-identified] Drives Self  In the past 12 months, has lack of transportation kept you from medical appointments or from getting medications?: No  In the past 12 months, has lack of transportation kept you from meetings, work, or from getting things needed for daily living?: No  Was application for public transport provided?: N/A        DISCHARGE DETAILS:    Discharge planning discussed with[de-identified] patient           Patient  Stated he did tolerate Bipap last night. Patient stated it was less pressure and worked better.      CM to follow for discharge needs

## 2023-08-30 NOTE — ASSESSMENT & PLAN NOTE
- suspect iron deficiency anemia  - Hgb 8.0, prior baseline 11 in 7/2023  - Iron panel  - ferritin  - Retic high, appropriately  - BRBPR reported intermittent over prior 3 days to admission, associated with straining  - GI consult   - anticipate EGD/Colonoscopy once respiratory status stable   - IV PPI

## 2023-08-30 NOTE — ASSESSMENT & PLAN NOTE
Hold lisinopril and HCTZ due to acute illness, potential GIB and potential for hemodynamic instability, restart as able

## 2023-08-30 NOTE — RESPIRATORY THERAPY NOTE
Night shift last ABG 0624    Latest Reference Range & Units 08/30/23 06:24   BLANCA TEST  Yes   pH, Arterial 7.350 - 7.450  7.333 (L)   pCO2, Arterial 36.0 - 44.0 mm Hg 62.7 (HH)   pO2, Arterial 75.0 - 129.0 mm Hg 80.1   HCO3, Arterial 22.0 - 28.0 mmol/L 32.5 (H)   Base Excess, Arterial mmol/L 5.5   O2 Content, Arterial 16.0 - 23.0 mL/dL 12.4 (L)   O2 HGB,Arterial 94.0 - 97.0 % 92.0 (L)   ABG SOURCE  Radial, Right   Non Vent type BIPAP  BIPAP   IPAP  12   EPAP  6   BIPAP fio2 % 40   (HH): Data is critically high  (L): Data is abnormally low  (H): Data is abnormally high

## 2023-08-30 NOTE — ASSESSMENT & PLAN NOTE
Due to acute COPD exacerbation, with underlying morbid obesity-related OSAHS. Patient visibly dyspneic despite 10 L/min NC O2, at high risk of imminent respiratory compromise & will be admitted to the stepdown unit with BiPAP commenced. Repeat blood gas in a few hours, with intubation for mechanical ventilation if any clinical deterioration or hemodynamic instability. Treat acute COPD exacerbation with IV Decadron, IV Unasyn and Zithromax, frequent DuoNeb treatments, and symptomatic treatment with frequent incentive spirometry and mucolytic. Check sputum c+s. Pulmonology consult in a.m.

## 2023-08-30 NOTE — PLAN OF CARE
Problem: PHYSICAL THERAPY ADULT  Goal: Performs mobility at highest level of function for planned discharge setting. See evaluation for individualized goals. Description: Treatment/Interventions: Functional transfer training, LE strengthening/ROM, Elevations, Therapeutic exercise, Endurance training, Bed mobility, Gait training          See flowsheet documentation for full assessment, interventions and recommendations. Note: Prognosis: Good  Problem List: Decreased strength, Decreased endurance, Impaired balance, Decreased mobility, Obesity  Assessment: Patient is a 61 y.o. male evaluated by Physical Therapy s/p admit to [address] [place] [address] on 8/29/2023 with admitting diagnosis of: Shortness of breath, Bright red blood per rectum, Anemia, Pleural effusion, Hypoxia, COPD exacerbation. PT was consulted to assess patient's functional mobility and discharge needs. Ordered are PT Evaluation and treatment with activity level of: up and OOB as tolerated. Comorbidities affecting patient's physical performance at time of assessment include: HLD, DM, HTN, hypothyroidism. Personal factors affecting the patient at time of IE include: step(s) to enter home, inability to navigate community distances, history of fall(s), inability/difficulty performing IADLs and inability/difficulty performing ADLs. Please locate objective findings from PT assessment regarding body systems outlined above. Upon evaluation, pt able to perform all functional mobility with SUP, increased time, and occasional verbal cuing. Ambulation limited to short functional distance within room d/t restraints of HFNC. Seated rest break taken following 40' of ambulation; all vitals remained WFL throughout. Moderate postural sway demonstrated with mobility but no true LOB experienced. The patient's AM-PAC Basic Mobility Inpatient Short Form Raw Score is 18. A Raw score of greater than 16 suggests the patient may benefit from discharge to home.  Please also refer to the recommendation of the Physical Therapist for safe discharge planning. Co treatment with OT secondary to complex medical condition of pt, possible A of 2 required to achieve and maintain transitional movements, requiring the need of skilled therapeutic intervention of 2 therapists to achieve delivery of services. Pt will benefit from continued PT intervention during LOS to address current deficits, increase LOF, and facilitate safe d/c to next level of care when medically appropriate. D/c recommendation at this time is home with home health rehabilitation services. PT Discharge Recommendation: Home with home health rehabilitation    See flowsheet documentation for full assessment.

## 2023-08-30 NOTE — PROGRESS NOTES
[address]  Progress Note  Name: Bowen Sal  MRN: [id number]  Unit/Bed#:  I Date of Admission: 8/29/2023   Date of Service: 8/30/2023 I Hospital Day: 1    Assessment/Plan   Acute respiratory failure with hypoxia and hypercapnia (HCC)  Assessment & Plan  - due to COPD exacerbation, MARSHA/OHS non-compliant with home NIV  - requiring 10L HFNC O2 on admission, respiratory status declined with worsened ABG  - Now on BiPAP  - f/u ABG improved slightly  - repeat ABG @1000, patient more awake, less dyspneic, clinically improving  - in improved ABG, consider weaning off BIPAP with f/u ABG  - will still likely need BiPAP with sleep while active COPD exacerbation until further improved  - Continue IV Decadron q8h  - continue Azithromycin/Unasyn - procalcitonin 0.17 consider stopping Unasyn in 24 hours if repeat negative and continue axithromycin for anti-inflammatory properties  - sputum culture  - IS  - nebulizers ATC    Pleural effusion, left  Assessment & Plan  - small to moderate left sided effusion on CT  - pulmonary consult for possible thoracentesis  - Echocardiogram completed awaiting final read, prelim EF 64%    MARSHA and COPD overlap syndrome (HCC)  Assessment & Plan  - see management under respiratory failure  - non-compliant with CPAP/BiPAP at home    Microcytic anemia  Assessment & Plan  - suspect iron deficiency anemia  - Hgb 8.0, prior baseline 11 in 7/2023  - Iron panel  - ferritin  - Retic high, appropriately  - BRBPR reported intermittent over prior 3 days to admission, associated with straining  - GI consult   - anticipate EGD/Colonoscopy once respiratory status stable   - IV PPI    Umbilical hernia without obstruction and without gangrene  Assessment & Plan  - incidental finding on CT  - no abd pain, monitor    Fatty liver disease, nonalcoholic  Assessment & Plan  - weight loss recommended    Bright red rectal bleeding  Assessment & Plan  See management under microcytic anemia    Uncontrolled type 2 diabetes mellitus with hyperglycemia, with long-term current use of insulin [facility])  Assessment & Plan  Lab Results   Component Value Date    HGBA1C 11.5 (H) 06/08/2023       Recent Labs     08/29/23  2327 08/30/23  0820   POCGLU 123 208*       Blood Sugar Average: Last 72 hrs:  (P) 165.5     Chronically Uncontrolled, see HgA1C above  ISS    Morbid obesity with BMI of 45.0-49.9, adult ([address])  Assessment & Plan  - lifestyle modifications and dietary restrictions discussed with patient on admission  - education upon discharge    Acquired hypothyroidism  Assessment & Plan  - TSH wnl  - continue levothyroxine    Benign essential hypertension  Assessment & Plan  Hold lisinopril and HCTZ due to acute illness, potential GIB and potential for hemodynamic instability, restart as able    COPD with acute exacerbation ([address])  Assessment & Plan  See management under respiratory failure               VTE Pharmacologic Prophylaxis:   High Risk (Score >/= 5) - Pharmacological DVT Prophylaxis Contraindicated. Sequential Compression Devices Ordered. Patient Centered Rounds: I performed bedside rounds with nursing staff today. Discussions with Specialists or Other Care Team Provider: None    Total Time Spent on Date of Encounter in care of patient: 55 minutes This time was spent on one or more of the following: performing physical exam; counseling and coordination of care; obtaining or reviewing history; documenting in the medical record; reviewing/ordering tests, medications or procedures; communicating with other healthcare professionals and discussing with patient's family/caregivers. Current Length of Stay: 1 day(s)  Current Patient Status: Inpatient   Certification Statement: The patient will continue to require additional inpatient hospital stay due to acute resp failure  Discharge Plan: Anticipate discharge in 48-72 hrs to home.     Code Status: Level 1 - Full Code    Subjective:   Patient frustrated with BiPAP mask, and wants to eat  Denies any acute pain    Objective:     Vitals:   Temp (24hrs), Av °F (36.7 °C), Min:97.3 °F (36.3 °C), Max:98.8 °F (37.1 °C)    Temp:  [97.3 °F (36.3 °C)-98.8 °F (37.1 °C)] 97.3 °F (36.3 °C)  HR:  [] 96  Resp:  [21-42] 22  BP: (135-153)/(69-81) 138/72  SpO2:  [77 %-99 %] 99 %  Body mass index is 46.86 kg/m². Input and Output Summary (last 24 hours): Intake/Output Summary (Last 24 hours) at 2023 0914  Last data filed at 2023 0451  Gross per 24 hour   Intake 780 ml   Output 1950 ml   Net -1170 ml       Physical Exam:   Physical Exam  Vitals and nursing note reviewed. Constitutional:       Appearance: He is well-developed. He is obese. HENT:      Head: Normocephalic and atraumatic. Eyes:      Conjunctiva/sclera: Conjunctivae normal.   Cardiovascular:      Rate and Rhythm: Normal rate and regular rhythm. Heart sounds: No murmur heard. Pulmonary:      Effort: Pulmonary effort is normal.      Comments: Mild decreased b/l but otherwise CTA, no wheeze or ronchi and good airway movement currently on BiPAP  Abdominal:      Palpations: Abdomen is soft. Tenderness: There is no abdominal tenderness. Musculoskeletal:         General: No swelling. Cervical back: Neck supple. Skin:     General: Skin is warm and dry. Capillary Refill: Capillary refill takes less than 2 seconds. Comments: Chronic venous stasis skin change of the LE b/l with visible varicosities/dilated veins   Neurological:      Mental Status: He is alert and oriented to person, place, and time.    Psychiatric:      Comments: Frustrated but cooperative at time of exam            Additional Data:     Labs:  Results from last 7 days   Lab Units 23  0438 23  2250 23  1837   WBC Thousand/uL 9.26   < > 8.66   HEMOGLOBIN g/dL 8.3*   < > 8.4*   HEMATOCRIT % 27.7*   < > 27.4*   PLATELETS Thousands/uL 332   < > 336   NEUTROS PCT %  --   --  65 LYMPHS PCT %  --   --  17   MONOS PCT %  --   --  14*   EOS PCT %  --   --  2    < > = values in this interval not displayed. Results from last 7 days   Lab Units 08/30/23  0438   SODIUM mmol/L 136   POTASSIUM mmol/L 3.9   CHLORIDE mmol/L 92*   CO2 mmol/L 34*   BUN mg/dL 26*   CREATININE mg/dL 1.40*   ANION GAP mmol/L 10   CALCIUM mg/dL 8.9   ALBUMIN g/dL 3.8   TOTAL BILIRUBIN mg/dL 0.39   ALK PHOS U/L 55   ALT U/L 30   AST U/L 33   GLUCOSE RANDOM mg/dL 213*     Results from last 7 days   Lab Units 08/30/23  0438   INR  1.07     Results from last 7 days   Lab Units 08/30/23  0820 08/29/23  2327   POC GLUCOSE mg/dl 208* 123         Results from last 7 days   Lab Units 08/29/23  1837   LACTIC ACID mmol/L 1.7   PROCALCITONIN ng/ml 0.17       Lines/Drains:  Invasive Devices     Peripheral Intravenous Line  Duration           Peripheral IV 01/12/22 Dorsal (posterior); Proximal;Right Forearm 594 days    Peripheral IV 08/29/23 Right Antecubital <1 day                  Telemetry:  Telemetry Orders (From admission, onward)             24 Hour Telemetry Monitoring  Continuous x 24 Hours (Telem)        Question:  Reason for 24 Hour Telemetry  Answer:  Acute respiratory failure on BiPAP                 Telemetry Reviewed: Normal Sinus Rhythm  Indication for Continued Telemetry Use: Acute respiratory failure on Bipap             Imaging: Reviewed radiology reports from this admission including: chest xray, chest CT scan and abdominal/pelvic CT    Recent Cultures (last 7 days):   Results from last 7 days   Lab Units 08/29/23  1837   BLOOD CULTURE  Received in Microbiology Lab. Culture in Progress. Received in Microbiology Lab. Culture in Progress.        Last 24 Hours Medication List:   Current Facility-Administered Medications   Medication Dose Route Frequency Provider Last Rate   • acetaminophen  650 mg Oral Q6H PRN Ramin Bush MD     • albuterol  2.5 mg Nebulization Q4H PRN Ramin Bush MD     • ALPRAZolam 0.25 mg Oral BID PRN Ned Gutierrez MD     • aluminum-magnesium hydroxide-simethicone  30 mL Oral Q6H PRN Ned Gutierrez MD     • ampicillin-sulbactam  3 g Intravenous Q6H Ned Gutierrez MD 3 g (08/30/23 3018)   • ascorbic acid  500 mg Oral BID AC Ned Gutierrez MD     • atorvastatin  80 mg Oral Daily With Dinner Ned Gutierrez MD     • azithromycin  500 mg Intravenous Q24H Ned Gutierrez MD     • benzonatate  100 mg Oral TID PRN Ned Gutierrez MD     • dexamethasone  8 mg Intravenous Q24H Ned Gutierrez MD     • DULoxetine  60 mg Oral Daily Ned Gutierrez MD     • ferrous sulfate  325 mg Oral BID AC Ned Gutierrez MD     • fish oil  1,000 mg Oral BID Ned Gutierrez MD     • guaiFENesin  1,200 mg Oral Q12H [facility] Ned Gutierrez MD     • insulin lispro  2-12 Units Subcutaneous TID AC Ned Gutierrez MD     • insulin lispro  2-12 Units Subcutaneous HS Ned Gutierrez MD     • ipratropium  0.5 mg Nebulization TID Ned Gutierrez MD     • levalbuterol  1.25 mg Nebulization TID Ned Gutierrez MD     • levothyroxine  175 mcg Oral Early Morning Ned Gutierrez MD     • loratadine  10 mg Oral Daily Ned Gutierrez MD     • metoclopramide  10 mg Intravenous Q6H PRN Ned Gutierrez MD     • pantoprazole  40 mg Intravenous Q12H [facility] Mannie Merida DO     • pregabalin  25 mg Oral BID Ned Gutierrez MD     • sodium chloride  1 spray Each Nare Q1H PRN Ned Gutierrez MD     • traZODone  150 mg Oral HS Ned Gutierrez MD          Today, Patient Was Seen By: Elyssa Merida DO    **Please Note: This note may have been constructed using a voice recognition system. **

## 2023-08-30 NOTE — OCCUPATIONAL THERAPY NOTE
Occupational Therapy Evaluation     Patient Name: Judy Stoll  Today's Date: 8/30/2023  Problem List  Active Problems:    COPD with acute exacerbation ([address])    Benign essential hypertension    Acquired hypothyroidism    Morbid obesity with BMI of 45.0-49.9, adult ([address])    Acute respiratory failure with hypoxia and hypercapnia (HCC)    MARSHA and COPD overlap syndrome ([address])    Uncontrolled type 2 diabetes mellitus with hyperglycemia, with long-term current use of insulin (HCC)    Pleural effusion, left    Bright red rectal bleeding    Microcytic anemia    Fatty liver disease, nonalcoholic    Umbilical hernia without obstruction and without gangrene    Past Medical History  Past Medical History:   Diagnosis Date    Acquired hypothyroidism 1/2/2015    Angioedema     Diabetes mellitus ([address])     Disease of thyroid gland     Hyperlipidemia     Hypertension     Mixed hyperlipidemia 2/4/2015    Morbid obesity ([address])     Morbid obesity with BMI of 45.0-49.9, adult ([address]) 1/2/2015    MARSHA (obstructive sleep apnea)     Primary hypertension 1/2/2015    Transitioned From: Benign essential hypertension     Past Surgical History  Past Surgical History:   Procedure Laterality Date    KNEE SURGERY      SINUS SURGERY               08/30/23 1351   OT Last Visit   OT Visit Date 08/30/23   Note Type   Note type Evaluation   Pain Assessment   Pain Score No Pain   Restrictions/Precautions   Weight Bearing Precautions Per Order No   Other Precautions Fall Risk;Multiple lines;O2;Telemetry   Home Living   Type of Home Apartment   Home Layout One level;Performs ADLs on one level; Able to live on main level with bedroom/bathroom;Stairs to enter with rails; Other (Comment)  (19-20 URBANO c HR)   Bathroom Shower/Tub Tub/shower unit   Bathroom Toilet Standard   Bathroom Accessibility Accessible   Home Equipment Cane   Additional Comments pt performs functional mobility at times with [facility]; pt reports in home no use of SPC   Prior Function   Level of [place] Independent with ADLs; Independent with functional mobility; Independent with IADLS   Lives With Alone   IADLs Independent with driving   Falls in the last 6 months 1 to 4   Vocational On disability   Subjective   Subjective "I fell a couple times before coming in here"   ADL   Where Assessed Chair   LB Dressing Assistance 3  Moderate Assistance   LB Dressing Deficit Don/doff R sock; Don/doff L sock; Increased time to complete   Additional Comments pt with significant difficulties due to large pannus and poor flexibility   Bed Mobility   Additional Comments pt seated in chair at start and end of session; pt on HFNC during session and no significant complaints of SOB, however midly SOB after functional mobility   Transfers   Sit to Stand 5  Supervision   Additional items Increased time required;Verbal cues   Stand to Sit 5  Supervision   Additional items Increased time required;Verbal cues   Additional Comments pt performs functional transfers; pt with no LOB or instability; (A) for line management   Functional Mobility   Functional Mobility 5  Supervision   Additional Comments pt performs ~20ft in room; limited by lines and HFNC   Balance   Static Sitting Good   Dynamic Sitting Good   Static Standing Fair   Dynamic Standing Fair -   Ambulatory Fair -   Activity Tolerance   Activity Tolerance Patient limited by fatigue   RUE Assessment   RUE Assessment WFL   LUE Assessment   LUE Assessment WFL   Hand Function   Gross Motor Coordination Functional   Fine Motor Coordination Functional   Sensation   Light Touch No apparent deficits   Sharp/Dull No apparent deficits   Psychosocial   Psychosocial (WDL) X   Patient Behaviors/Mood Flat affect   Cognition   Overall Cognitive Status WFL   Arousal/Participation Alert   Attention Within functional limits   Orientation Level Oriented X4   Memory Within functional limits   Following Commands Follows all commands and directions without difficulty   Assessment   Limitation Decreased ADL status; Decreased UE strength;Decreased Safe judgement during ADL;Decreased endurance;Decreased self-care trans;Decreased high-level ADLs   Assessment Pt is a 61 y.o. male seen for OT evaluation s/p admit to [facility] on [facility]/29/2023 w/ <principal problem not specified>. Comorbidities affecting pt's functional performance at time of assessment include: HLD, DM, HTN, disease of thyroid gland, angioedema, morbid obesity, MARSHA, hypothyroidism, HTN, fatty liver disease, hernia. Personal factors affecting pt at time of IE include:steps to enter environment, limited home support, difficulty performing ADLS, difficulty performing IADLS , decreased initiation and engagement  and health management . Prior to admission, pt was (I) with ADLs and IADLs with use of SPC during functional mobility. Upon evaluation: Pt requires (S)-mod (A) level with use of no device during mobility 2* the following deficits impacting occupational performance: weakness, decreased strength, decreased balance, decreased tolerance, impaired initiation, impaired problem solving, decreased safety awareness, increased pain and impaired interpersonal skills. Pt to benefit from continued skilled OT tx while in the hospital to address deficits as defined above and maximize level of functional independence w ADL's and functional mobility. Occupational Performance areas to address include: grooming, bathing/shower, toilet hygiene, dressing, functional mobility, community mobility and clothing management. The patient's raw score on the -PAC Daily Activity Inpatient Short Form is 19. A raw score of greater than or equal to 19 suggests the patient may benefit from discharge to home. Please refer to the recommendation of the Occupational Therapist for safe discharge planning.   Pt benefited from co-evaluation of skilled OT and PT therapists in order to most appropriately address functional deficits d/t extensive assistance required for safe functional mobility, decreased activity tolerance, and regression from functioning level prior to admission and/or onset of present illness. OT/PT objectives were addressed separately; please see PT note for specific goal areas targeted. Goals   Patient Goals to go home   Short Term Goal  pt will perform UE strengthening exercises   Long Term Goal #1 pt will demonstrate toilet transfers and hygiene at (I) level   Long Term Goal #2 pt will demonstrate functional mobility with SPC at mod (I) level   Long Term Goal pt will perform UB/LB bathing and grooming tasks at (I) level   Plan   Treatment Interventions ADL retraining;Functional transfer training;UE strengthening/ROM; Endurance training;Patient/family training;Equipment evaluation/education; Activityengagement   Goal Expiration Date 09/13/23   OT Frequency 3-5x/wk   Recommendation   OT Discharge Recommendation Home with home health rehabilitation   AM-PAC Daily Activity Inpatient   Lower Body Dressing 2   Bathing 2   Toileting 3   Upper Body Dressing 4   Grooming 4   Eating 4   Daily Activity Raw Score 19   Daily Activity Standardized Score (Calc for Raw Score >=11) 40.22   AM-PAC Applied Cognition Inpatient   Following a Speech/Presentation 4   Understanding Ordinary Conversation 4   Taking Medications 4   Remembering Where Things Are Placed or Put Away 3   Remembering List of 4-5 Errands 3   Taking Care of Complicated Tasks 3   Applied Cognition Raw Score 21   Applied Cognition Standardized Score 44.3

## 2023-08-30 NOTE — RESPIRATORY THERAPY NOTE
08/30/23 1035   Respiratory Assessment   Resp Comments pt states he is breathng labored, I explained high flow and how it will decrease that WOB and I will adjust it for comfort, pt agreed to place on   Oxygen Therapy/Pulse Ox   O2 Device High flow nasal cannula   Nasal Cannula O2 Flow Rate (L/min) 25 L/min   Calculated FIO2 (%) - Nasal Cannula 120   FiO2 (%) 40   SpO2 Activity At Rest

## 2023-08-30 NOTE — CONSULTS
Consultation - Pulmonary Medicine   Monica Nicole 61 y.o. male MRN: [id number]  Unit/Bed#:  Encounter: [id number]      Assessment/Plan:    1. Acute hypoxic respiratory failure  2. Acute on suspected chronic hypercapnic respiratory failure  3. Left pleural effusion  4. Emphysema with mild acute exacerbation  5. Suspected MARSHA/OHS  6. Morbid obesity  7. Type 2 diabetes mellitus with hyperglycemia  8. Anemia  9. BRBPR    Pulmonary recommendations:     · Checked ABG this AM- 7.41/48.5/85.0/30.5 while on BiPAP 14/[id number]%. Removed BiPAP and placed patient on 6L midflow with adequate oxygen saturations visualized  · Titrate FiO2 to maintain SpO2 to maintain saturations greater than or equal to 88%. · Continue BiPAP qHS- will need qualification for outpatient BiPAP prior to discharge. · Check Legionella and Strep pneumoniae urine antigens  · Follow blood cultures and sputum culture  · Currently on Unasyn and azithromycin- discontinue if PCT WNL x2  · Echo pending. · Will discuss with attending regarding need for left sided thoracentesis. · Change decadron 8 mg IV daily to solumedrol 40 mg IV q12 hours. · Continue Xopenex/Atrovent nebs 3 times daily. History of Present Illness   Physician Requesting Consult: Lilian Aguirre Counts, DO  Reason for Consult / Principal Problem: hypoxia  Hx and PE limited by: BiPAP use  Chief Complaint: shortness of breath   HPI: Monica Nicole is a 61 y.o.  male who presented to [address] [place] [address] with complaints of shortness of breath. Patient has a past medical hist positive for insulin-dependent diabetes mellitus type 2, hyperlipidemia, emphysema, morbid obesity, MARSHA intolerant to PAP therapy, former smoker. Patient presented to the ED yesterday with complaints of progressively worsening shortness of breath over the last few days. Also endorses increased weakness, fatigue, cough productive of yellowish phlegm, chest tightness and wheeze.   No fevers chills or sick contacts. Denies chest pain or palpitations. No leg swelling. Had 1 episode of bright red blood per rectum. Patient reports checking his SPO2 at home and noting that it be in the 70s from time to time. On arrival in the ED yesterday he was 77% on room air improving to the mid 90s on low-flow nasal cannula. Laboratory work-up revealed negative flu, COVID, RSV, no leukocytosis and negative procalcitonin. He did have evidence of anemia with hemoglobin 8.4 hyperglycemia, elevated BUN and creatinine. CTA chest PE study negative for PE but did show small to moderate left pleural effusion, mild paraseptal emphysema without acute infiltrate, pneumothorax. In the ED did have worsening hypoxia requiring up titration to mid flow followed by Vapotherm however nothing was improving and his PCO2 was slowly rising prompting BiPAP initiation around 4 AM.  During my evaluation his seen on BiPAP 14/7 in no acute distress. He is able to answer all questions and denies acute complaints presently. Repeat ABG obtained at 9:30 AM showed improvement so BiPAP was removed and patient was placed on 6L midflow. Patient does not follow pulmonologist currently. Last seen by [facility] pulmonary group dated December 2022. Has rescue inhaler at home for which he uses on as-needed basis. Prior to admission he was using it without any relief. Not use supplemental oxygen. Does not use CPAP or BiPAP. Per record review it appears that he had sleep study and was recommended to have titration study but never went. Per records patient is a current smoker. Has history of angioedema multiple times in the past dating back since 2016 and most recently April 2021. He has required intubation for this at that point in time. Inpatient consult to Pulmonology  Consult performed by: Mirna New PA-C  Consult ordered by: Noemi Louis MD          Review of Systems   Constitutional: Positive for fatigue.    Respiratory: Positive for cough, shortness of breath and wheezing. Gastrointestinal: Positive for blood in stool. Musculoskeletal: Positive for arthralgias. Neurological: Positive for weakness. All other systems reviewed and are negative.       Historical Information   Past Medical History:   Diagnosis Date   • Acquired hypothyroidism 2015   • Angioedema    • Diabetes mellitus ([address])    • Disease of thyroid gland    • Hyperlipidemia    • Hypertension    • Mixed hyperlipidemia 2015   • Morbid obesity ([address])    • Morbid obesity with BMI of 45.0-49.9, adult ([address]) 2015   • MARSHA (obstructive sleep apnea)    • Primary hypertension 2015    Transitioned From: Benign essential hypertension     Past Surgical History:   Procedure Laterality Date   • KNEE SURGERY     • SINUS SURGERY       Social History   Social History     Substance and Sexual Activity   Alcohol Use Not Currently   • Alcohol/week: 3.0 standard drinks of alcohol   • Types: 2 Cans of beer, 1 Shots of liquor per week    Comment: No alcohol since      Social History     Substance and Sexual Activity   Drug Use No     Social History     Tobacco Use   Smoking Status Former   • Packs/day: 2.00   • Years: 70.00   • Total pack years: 140.00   • Types: Cigarettes   • Quit date: 10/21/2022   • Years since quittin.8   Smokeless Tobacco Never   Tobacco Comments    states read to quit prior to admit     E-Cigarette/Vaping   • E-Cigarette Use Never User    • Cartridges/Day 0    • Comments 0      E-Cigarette/Vaping Substances   • Nicotine No    • THC No    • CBD No    • Flavoring No    • Other No    • Unknown No      Family History:   Family History   Problem Relation Age of Onset   • Thyroid cancer Mother    • Diabetes type II Mother    • Esophageal cancer Father    • Kidney cancer Brother    • Diabetes type II Brother    • Diabetes type II Maternal Grandmother        Meds/Allergies   all current active meds have been reviewed, pertinent pulmonary meds have been reviewed and current meds:   Current Facility-Administered Medications   Medication Dose Route Frequency   • acetaminophen (TYLENOL) tablet 650 mg  650 mg Oral Q6H PRN   • albuterol inhalation solution 2.5 mg  2.5 mg Nebulization Q4H PRN   • ALPRAZolam (XANAX) tablet 0.25 mg  0.25 mg Oral BID PRN   • aluminum-magnesium hydroxide-simethicone (MAALOX) oral suspension 30 mL  30 mL Oral Q6H PRN   • ampicillin-sulbactam (UNASYN) 3 g in sodium chloride 0.9 % 100 mL IVPB  3 g Intravenous Q6H   • ascorbic acid (VITAMIN C) tablet 500 mg  500 mg Oral BID AC   • atorvastatin (LIPITOR) tablet 80 mg  80 mg Oral Daily With Dinner   • azithromycin (ZITHROMAX) 500 mg in sodium chloride 0.9% 250mL IVPB 500 mg  500 mg Intravenous Q24H   • benzonatate (TESSALON PERLES) capsule 100 mg  100 mg Oral TID PRN   • DULoxetine (CYMBALTA) delayed release capsule 60 mg  60 mg Oral Daily   • ferrous sulfate tablet 325 mg  325 mg Oral BID AC   • fish oil capsule 1,000 mg  1,000 mg Oral BID   • guaiFENesin (MUCINEX) 12 hr tablet 1,200 mg  1,200 mg Oral Q12H AURE   • insulin lispro (HumaLOG) 100 units/mL subcutaneous injection 2-12 Units  2-12 Units Subcutaneous TID AC   • insulin lispro (HumaLOG) 100 units/mL subcutaneous injection 2-12 Units  2-12 Units Subcutaneous HS   • ipratropium (ATROVENT) 0.02 % inhalation solution 0.5 mg  0.5 mg Nebulization TID   • levalbuterol (XOPENEX) inhalation solution 1.25 mg  1.25 mg Nebulization TID   • levothyroxine tablet 175 mcg  175 mcg Oral Early Morning   • loratadine (CLARITIN) tablet 10 mg  10 mg Oral Daily   • methylPREDNISolone sodium succinate (Solu-MEDROL) injection 40 mg  40 mg Intravenous Q12H AURE   • metoclopramide (REGLAN) injection 10 mg  10 mg Intravenous Q6H PRN   • pantoprazole (PROTONIX) injection 40 mg  40 mg Intravenous Q12H AURE   • pregabalin (LYRICA) capsule 25 mg  25 mg Oral BID   • sodium chloride (OCEAN) 0.65 % nasal spray 1 spray  1 spray Each Nare Q1H PRN   • traZODone (DESYREL) tablet 150 mg  150 mg Oral HS       Allergies   Allergen Reactions   • Ace Inhibitors Swelling     Angioedema    PT STATES THIS IS NOT AN ALLERGY   • Other Anaphylaxis     Pt believes that he is allergic to peanut butter. Also, seasonal allergies   • Zinc Tongue Swelling     PT STATES THIS IS NOT AN ALLERGY   • Clindamycin Edema     Had angioedema episode approx 5 hr after IM administration of clindamycin. Unclear if there is definitive causal relationship. PT STATES THIS IS NOT AN ALLERGY       Objective   Vitals: Blood pressure 138/72, pulse 96, temperature (!) 97.3 °F (36.3 °C), temperature source Temporal, resp. rate 22, height 5' 4" (1.626 m), weight 124 kg (273 lb), SpO2 97 %. 6L midflow,Body mass index is 46.86 kg/m². Intake/Output Summary (Last 24 hours) at 8/30/2023 1149  Last data filed at 8/30/2023 0451  Gross per 24 hour   Intake 780 ml   Output 1950 ml   Net -1170 ml     Invasive Devices     Peripheral Intravenous Line  Duration           Peripheral IV 01/12/22 Dorsal (posterior); Proximal;Right Forearm 594 days    Peripheral IV 08/29/23 Right Antecubital <1 day                Physical Exam  Vitals and nursing note reviewed. Constitutional:       General: He is not in acute distress. Appearance: Normal appearance. He is obese. HENT:      Head: Normocephalic and atraumatic. Mouth/Throat:      Mouth: Mucous membranes are moist.      Pharynx: Oropharynx is clear. Cardiovascular:      Rate and Rhythm: Normal rate and regular rhythm. Heart sounds: No murmur heard. Pulmonary:      Breath sounds: Wheezing (upper lung field wheeze) present. Comments: Decreased in bases  Musculoskeletal:      Cervical back: Normal range of motion. Skin:     General: Skin is warm and dry. Neurological:      General: No focal deficit present. Mental Status: He is alert. Mental status is at baseline.    Psychiatric:         Mood and Affect: Mood normal.         Behavior: Behavior normal. Lab Results:   I have personally reviewed pertinent lab results. , ABG:   Lab Results   Component Value Date    PHART 7.416 08/30/2023    FLN2HCD 48.5 (H) 08/30/2023    PO2ART 85.0 08/30/2023    CYY5YAP 30.5 (H) 08/30/2023    BEART 5.2 08/30/2023    SOURCE Radial, Right 08/30/2023   , BNP:   Lab Results   Component Value Date    BNP 92 08/29/2023   , CBC:   Lab Results   Component Value Date    WBC 9.26 08/30/2023    HGB 8.3 (L) 08/30/2023    HCT 27.7 (L) 08/30/2023    MCV 82 08/30/2023     08/30/2023    RBC 3.39 (L) 08/30/2023    MCH 24.5 (L) 08/30/2023    MCHC 30.0 (L) 08/30/2023    RDW 15.8 (H) 08/30/2023    MPV 9.1 08/30/2023    NRBC 1 08/29/2023   , CMP:   Lab Results   Component Value Date    SODIUM 136 08/30/2023    K 3.9 08/30/2023    CL 92 (L) 08/30/2023    CO2 34 (H) 08/30/2023    BUN 26 (H) 08/30/2023    CREATININE 1.40 (H) 08/30/2023    CALCIUM 8.9 08/30/2023    AST 33 08/30/2023    ALT 30 08/30/2023    ALKPHOS 55 08/30/2023    EGFR 54 08/30/2023   , PT/INR:   Lab Results   Component Value Date    INR 1.07 08/30/2023   , Troponin: No results found for: "TROPONINI"    Imaging Studies: I have personally reviewed pertinent reports. and I have personally reviewed pertinent films in PACS     CTA chest PE study abdomen pelvis 8/29/23  IMPRESSION:     No pulmonary embolism.     Emphysema.     Small to moderate size left pleural effusion of uncertain etiology.     Large liver.     Fat-containing umbilical hernia without induration to suggest incarceration.     Minimal skin thickening and subcutaneous changes of the anterior pelvic wall consistent with cellulitis.       EKG, Pathology, and Other Studies: I have personally reviewed pertinent reports. Echo pending    Pulmonary Results (PFTs, PSG): I have personally reviewed pertinent reports.        PFT 5/20/2021  FEV1/FVC ratio 77%  FEV1 67% predicted  FVC 65% predicted  No change with bronchodilators  TLC 91% predicted  % predicted  DLCO 53% predicted  Spirometry favors restriction. No bronchodilator responsiveness. Lung volumes with normal TLC with increased RV indicative of air trapping. Moderately reduced diffusion capacity. VTE Prophylaxis: Sequential compression device (Venodyne)     Code Status: Level 1 - Full Code      Portions of the record may have been created with voice recognition software. Occasional wrong word or "sound a like" substitutions may have occurred due to the inherent limitations of voice recognition software. Read the chart carefully and recognize, using context, where substitutions have occurred.

## 2023-08-30 NOTE — PLAN OF CARE
Problem: PAIN - ADULT  Goal: Verbalizes/displays adequate comfort level or baseline comfort level  Description: Interventions:  - Encourage patient to monitor pain and request assistance  - Assess pain using appropriate pain scale  - Administer analgesics based on type and severity of pain and evaluate response  - Implement non-pharmacological measures as appropriate and evaluate response  - Consider cultural and social influences on pain and pain management  - Notify physician/advanced practitioner if interventions unsuccessful or patient reports new pain  Outcome: Progressing     Problem: INFECTION - ADULT  Goal: Absence or prevention of progression during hospitalization  Description: INTERVENTIONS:  - Assess and monitor for signs and symptoms of infection  - Monitor lab/diagnostic results  - Monitor all insertion sites, i.e. indwelling lines, tubes, and drains  - Monitor endotracheal if appropriate and nasal secretions for changes in amount and color  - [place] appropriate cooling/warming therapies per order  - Administer medications as ordered  - Instruct and encourage patient and family to use good hand hygiene technique  - Identify and instruct in appropriate isolation precautions for identified infection/condition  Outcome: Progressing  Goal: Absence of fever/infection during neutropenic period  Description: INTERVENTIONS:  - Monitor WBC    Outcome: Progressing     Problem: SAFETY ADULT  Goal: Patient will remain free of falls  Description: INTERVENTIONS:  - Educate patient/family on patient safety including physical limitations  - Instruct patient to call for assistance with activity   - Consult OT/PT to assist with strengthening/mobility   - Keep Call bell within reach  - Keep bed low and locked with side rails adjusted as appropriate  - Keep care items and personal belongings within reach  - Initiate and maintain comfort rounds  - Make Fall Risk Sign visible to staff  - Offer Toileting every 2 Hours, in advance of need  - Initiate/Maintain bed/ chair alarm  - Apply yellow socks and bracelet for high fall risk patients  - Consider moving patient to room near nurses station  Outcome: Progressing  Goal: Maintain or return to baseline ADL function  Description: INTERVENTIONS:  -  Assess patient's ability to carry out ADLs; assess patient's baseline for ADL function and identify physical deficits which impact ability to perform ADLs (bathing, care of mouth/teeth, toileting, grooming, dressing, etc.)  - Assess/evaluate cause of self-care deficits   - Assess range of motion  - Assess patient's mobility; develop plan if impaired  - Assess patient's need for assistive devices and provide as appropriate  - Encourage maximum independence but intervene and supervise when necessary  - Involve family in performance of ADLs  - Assess for home care needs following discharge   - Consider OT consult to assist with ADL evaluation and planning for discharge  - Provide patient education as appropriate  Outcome: Progressing  Goal: Maintains/Returns to pre admission functional level  Description: INTERVENTIONS:  - Perform BMAT or MOVE assessment daily.   - Set and communicate daily mobility goal to care team and patient/family/caregiver. - Collaborate with rehabilitation services on mobility goals if consulted  - Perform Range of Motion 3-4 times a day. - Reposition patient every 2 hours.   - Dangle patient 3 times a day  - Stand patient 3 times a day  - Ambulate patient 3 times a day  - Out of bed to chair 3 times a day   - Out of bed for meals 3 times a day  - Out of bed for toileting  - Record patient progress and toleration of activity level   Outcome: Progressing     Problem: DISCHARGE PLANNING  Goal: Discharge to home or other facility with appropriate resources  Description: INTERVENTIONS:  - Identify barriers to discharge w/patient and caregiver  - Arrange for needed discharge resources and transportation as appropriate  - Identify discharge learning needs (meds, wound care, etc.)  - Arrange for interpretive services to assist at discharge as needed  - Refer to Case Management Department for coordinating discharge planning if the patient needs post-hospital services based on physician/advanced practitioner order or complex needs related to functional status, cognitive ability, or social support system  Outcome: Progressing     Problem: Knowledge Deficit  Goal: Patient/family/caregiver demonstrates understanding of disease process, treatment plan, medications, and discharge instructions  Description: Complete learning assessment and assess knowledge base.   Interventions:  - Provide teaching at level of understanding  - Provide teaching via preferred learning methods  Outcome: Progressing     Problem: RESPIRATORY - ADULT  Goal: Achieves optimal ventilation and oxygenation  Description: INTERVENTIONS:  - Assess for changes in respiratory status  - Assess for changes in mentation and behavior  - Position to facilitate oxygenation and minimize respiratory effort  - Oxygen administered by appropriate delivery if ordered  - Initiate smoking cessation education as indicated  - Encourage broncho-pulmonary hygiene including cough, deep breathe, Incentive Spirometry  - Assess the need for suctioning and aspirate as needed  - Assess and instruct to report SOB or any respiratory difficulty  - Respiratory Therapy support as indicated  Outcome: Progressing

## 2023-08-30 NOTE — ASSESSMENT & PLAN NOTE
Check echocardiogram.  Broad-spectrum IV antibiotics. Pulmonology consult regarding L thoracentesis.

## 2023-08-30 NOTE — ASSESSMENT & PLAN NOTE
Lab Results   Component Value Date    HGBA1C 11.5 (H) 06/08/2023       No results for input(s): "POCGLU" in the last 72 hours. Blood Sugar Average: Last 72 hrs:    Currently not in hyperglycemic crisis. Serum glc <200 at the ER. Hold basal insulin with Humalog sliding scale coverage for now.

## 2023-08-30 NOTE — PLAN OF CARE
Problem: PAIN - ADULT  Goal: Verbalizes/displays adequate comfort level or baseline comfort level  Description: Interventions:  - Encourage patient to monitor pain and request assistance  - Assess pain using appropriate pain scale  - Administer analgesics based on type and severity of pain and evaluate response  - Implement non-pharmacological measures as appropriate and evaluate response  - Consider cultural and social influences on pain and pain management  - Notify physician/advanced practitioner if interventions unsuccessful or patient reports new pain  Outcome: Progressing     Problem: INFECTION - ADULT  Goal: Absence or prevention of progression during hospitalization  Description: INTERVENTIONS:  - Assess and monitor for signs and symptoms of infection  - Monitor lab/diagnostic results  - Monitor all insertion sites, i.e. indwelling lines, tubes, and drains  - Monitor endotracheal if appropriate and nasal secretions for changes in amount and color  - [place] appropriate cooling/warming therapies per order  - Administer medications as ordered  - Instruct and encourage patient and family to use good hand hygiene technique  - Identify and instruct in appropriate isolation precautions for identified infection/condition  Outcome: Progressing  Goal: Absence of fever/infection during neutropenic period  Description: INTERVENTIONS:  - Monitor WBC    Outcome: Progressing     Problem: SAFETY ADULT  Goal: Patient will remain free of falls  Description: INTERVENTIONS:  - Educate patient/family on patient safety including physical limitations  - Instruct patient to call for assistance with activity   - Consult OT/PT to assist with strengthening/mobility   - Keep Call bell within reach  - Keep bed low and locked with side rails adjusted as appropriate  - Keep care items and personal belongings within reach  - Initiate and maintain comfort rounds  - Make Fall Risk Sign visible to staff  - Apply yellow socks and bracelet for high fall risk patients  - Consider moving patient to room near nurses station  Outcome: Progressing  Goal: Maintain or return to baseline ADL function  Description: INTERVENTIONS:  -  Assess patient's ability to carry out ADLs; assess patient's baseline for ADL function and identify physical deficits which impact ability to perform ADLs (bathing, care of mouth/teeth, toileting, grooming, dressing, etc.)  - Assess/evaluate cause of self-care deficits   - Assess range of motion  - Assess patient's mobility; develop plan if impaired  - Assess patient's need for assistive devices and provide as appropriate  - Encourage maximum independence but intervene and supervise when necessary  - Involve family in performance of ADLs  - Assess for home care needs following discharge   - Consider OT consult to assist with ADL evaluation and planning for discharge  - Provide patient education as appropriate  Outcome: Progressing  Goal: Maintains/Returns to pre admission functional level  Description: INTERVENTIONS:  - Perform BMAT or MOVE assessment daily.   - Set and communicate daily mobility goal to care team and patient/family/caregiver.    - Collaborate with rehabilitation services on mobility goals if consulted  - Out of bed for toileting  - Record patient progress and toleration of activity level   Outcome: Progressing     Problem: DISCHARGE PLANNING  Goal: Discharge to home or other facility with appropriate resources  Description: INTERVENTIONS:  - Identify barriers to discharge w/patient and caregiver  - Arrange for needed discharge resources and transportation as appropriate  - Identify discharge learning needs (meds, wound care, etc.)  - Arrange for interpretive services to assist at discharge as needed  - Refer to Case Management Department for coordinating discharge planning if the patient needs post-hospital services based on physician/advanced practitioner order or complex needs related to functional status, cognitive ability, or social support system  Outcome: Progressing     Problem: Knowledge Deficit  Goal: Patient/family/caregiver demonstrates understanding of disease process, treatment plan, medications, and discharge instructions  Description: Complete learning assessment and assess knowledge base.   Interventions:  - Provide teaching at level of understanding  - Provide teaching via preferred learning methods  Outcome: Progressing

## 2023-08-30 NOTE — RESPIRATORY THERAPY NOTE
Latest Reference Range & Units 08/30/23 09:38   BLANCA TEST  Yes   pH, Arterial 7.350 - 7.450  7.416   pCO2, Arterial 36.0 - 44.0 mm Hg 48.5 (H)   pO2, Arterial 75.0 - 129.0 mm Hg 85.0   HCO3, Arterial 22.0 - 28.0 mmol/L 30.5 (H)   Base Excess, Arterial mmol/L 5.2   O2 Content, Arterial 16.0 - 23.0 mL/dL 12.1 (L)   O2 HGB,Arterial 94.0 - 97.0 % 94.3   ABG SOURCE  Radial, Right   Non Vent type BIPAP  BIPAP   IPAP  14   EPAP  7   BIPAP fio2 % 32   (H): Data is abnormally high  (L): Data is abnormally low

## 2023-08-30 NOTE — CONSULTS
Consultation - [address] [place] [address] [facility]  [facility] 61 y.o. male MRN: [id number]  Unit/Bed#:  Encounter: [id number]    Inpatient consult to gastroenterology  Consult performed by: Fabiana Vela PA-C  Consult ordered by: Noemi Louis MD        Reason for Consult / Principal Problem:     "BRBPR"    ASSESSMENT AND PLAN:      60 y/o M w/ pmhx sig for COPD, MARSHA not on CPAP, HTN, DM2, hypothyroidism, HLD, obesity. He presented to ER on 08/29/23 with progressive dyspnea, with additional complaints of knee pain and episode of BRBPR on wipe in the setting of constipation several days prior to hospitalization. Eval in the ER included CT CAP which demonstrated emphysema, left pleural effusion, large liver, and fat-containing umbilical hernia. Serologies at time of admission with Hb 8.4 -> 8.0 (previous check 11.2 on 07/2023), MCV 80, platelets 669, reticulocyte count 2.34, BUN 30, creatinine 1.45, transaminases within normal limits. He was admitted iwht acute respiratory failure with hypoxia and hypercapnia, and O2 requirements increased and pt was started on BiPAP, Decadron, Unasyn and Zithromax. GI consulted for BRBPR. No prior colonoscopy on file. BRBPR   Acute normocytic anemia     · Maintain IV access, monitor Hb, transfuse per protocol or for hemodynamic instability. · Episode of BRBPR was in the setting of straining and constipation, thus we reviewed differential diagnosis includes outlet bleeding (hemorrhoid, fissure, anorectal irritation), polyp, proctitis, IBD, AVM, diverticular, malignancy, other etiology. Given stability of vital signs, low index of suspicion for brisk upper GI source of bleeding. · If pt's anemia is consistent with CAMILLE, we also reviewed possible etiologies include occult losses from the GI tract. · Given this, he should have colonoscopy +/- EGD at some point in the future.    · He will need to be optimized from a respiratory standpoint, though once this occurs we would plan for endoscopic evaluation. We will reassess and consider for end of this week or close interval f/u in the outpatient setting to facilitate scopes. · We also reviewed starting on daily miralax to help prevent straining and constipation. We will continue to follow this patient closely. ______________________________________________________________________    HPI: Patient is a 61 y.o. male w/ pmhx sig for COPD, MARSHA not on CPAP, HTN, DM2, hypothyroidism, HLD, obesity. Patient presented to the emergency department on 8/29/2023 with progressive shortness of breath. He was initially hypoxic on presentation with an O2 of 77 on RA. He had also endorsed an episode of BRBPR after hard bowel movement several days ago. Eval in the ER included CT CAP which demonstrated emphysema, left pleural effusion, large liver, and fat-containing umbilical hernia. Serologies at time of admission with Hb 8.4 -> 8.0 (previous check 12.9 on 05/2022), MCV 80, platelets 840, reticulocyte count 2.34, BUN 30, creatinine 1.45, transaminases within normal limits. Patient was admitted with acute respiratory failure with hypoxia and hypercapnia, and was started on BiPAP, Decadron, Unasyn and Zithromax. At bedside evaluation on 08/30/23, patient shares he tends toward constipation and straining at baseline. He has had a couple of episodes of BRBPR on the wipe over the past few months, though no persistent large-volume hematochezia or melenic stools. He denies abdominal pain or rectal pain related to defecation. He uses Senokot as needed for constipation. He denies any significant heartburn, indigestion, nausea, vomiting, dysphagia or odynophagia. He denies any unintentional weight loss in the past 6 months. He denies any family history of colon cancer. He does not take NSAIDs or consume alcohol regularly.     Review of Systems   Otherwise Per HPI    Historical Information   Past Medical History:   Diagnosis Date • Acquired hypothyroidism 2015   • Angioedema    • Diabetes mellitus ([address])    • Disease of thyroid gland    • Hyperlipidemia    • Hypertension    • Mixed hyperlipidemia 2015   • Morbid obesity ([address])    • Morbid obesity with BMI of 45.0-49.9, adult ([address]) 2015   • MARSHA (obstructive sleep apnea)    • Primary hypertension 2015    Transitioned From: Benign essential hypertension     Past Surgical History:   Procedure Laterality Date   • KNEE SURGERY     • SINUS SURGERY       Social History   Social History     Substance and Sexual Activity   Alcohol Use Not Currently   • Alcohol/week: 3.0 standard drinks of alcohol   • Types: 2 Cans of beer, 1 Shots of liquor per week    Comment: No alcohol since      Social History     Substance and Sexual Activity   Drug Use No     Social History     Tobacco Use   Smoking Status Former   • Packs/day: 2.00   • Years: 70.00   • Total pack years: 140.00   • Types: Cigarettes   • Quit date: 10/21/2022   • Years since quittin.8   Smokeless Tobacco Never   Tobacco Comments    states read to quit prior to admit     Family History   Problem Relation Age of Onset   • Thyroid cancer Mother    • Diabetes type II Mother    • Esophageal cancer Father    • Kidney cancer Brother    • Diabetes type II Brother    • Diabetes type II Maternal Grandmother      Meds/Allergies     Medications Prior to Admission   Medication   • albuterol (2.5 mg/3 mL) 0.083 % nebulizer solution   • Ascorbic Acid (VITAMIN C PO)   • aspirin 81 mg chewable tablet   • benzonatate (TESSALON PERLES) 100 mg capsule   • cholecalciferol (VITAMIN D3) 400 units tablet   • DULoxetine (CYMBALTA) 60 mg delayed release capsule   • gabapentin (NEURONTIN) 100 mg capsule   • HumaLOG KwikPen 100 units/mL injection pen   • hydrochlorothiazide (HYDRODIURIL) 25 mg tablet   • Insulin Glargine Solostar (Lantus SoloStar) 100 UNIT/ML SOPN   • ipratropium (ATROVENT) 0.02 % nebulizer solution   • ipratropium-albuterol (DUO-NEB) 0.5-2.5 mg/3 mL nebulizer solution   • levothyroxine 175 mcg tablet   • loratadine (CLARITIN) 10 mg tablet   • pregabalin (LYRICA) 25 mg capsule   • rosuvastatin (CRESTOR) 40 MG tablet   • sodium chloride (OCEAN) 0.65 % nasal spray   • Thiamine HCl (vitamin B-1) 250 MG tablet   • traZODone (DESYREL) 150 mg tablet   • Ventolin  (90 Base) MCG/ACT inhaler   • vitamin E, tocopherol, 400 units capsule   • EPINEPHrine (EPIPEN) 0.3 mg/0.3 mL SOAJ   • Insulin Pen Needle (Pen Needles 3/16") 31G X 5 MM MISC   • lisinopril (ZESTRIL) 5 mg tablet   • metFORMIN (GLUCOPHAGE) 1000 MG tablet   • Omega-3 Fatty Acids (fish oil) 1,000 mg     Current Facility-Administered Medications   Medication Dose Route Frequency   • acetaminophen (TYLENOL) tablet 650 mg  650 mg Oral Q6H PRN   • albuterol inhalation solution 2.5 mg  2.5 mg Nebulization Q4H PRN   • ALPRAZolam (XANAX) tablet 0.25 mg  0.25 mg Oral BID PRN   • aluminum-magnesium hydroxide-simethicone (MAALOX) oral suspension 30 mL  30 mL Oral Q6H PRN   • ampicillin-sulbactam (UNASYN) 3 g in sodium chloride 0.9 % 100 mL IVPB  3 g Intravenous Q6H   • ascorbic acid (VITAMIN C) tablet 500 mg  500 mg Oral BID AC   • atorvastatin (LIPITOR) tablet 80 mg  80 mg Oral Daily With Dinner   • azithromycin (ZITHROMAX) 500 mg in sodium chloride 0.9% 250mL IVPB 500 mg  500 mg Intravenous Q24H   • benzonatate (TESSALON PERLES) capsule 100 mg  100 mg Oral TID PRN   • dexamethasone (PF) (DECADRON) injection 8 mg  8 mg Intravenous Q24H   • DULoxetine (CYMBALTA) delayed release capsule 60 mg  60 mg Oral Daily   • ferrous sulfate tablet 325 mg  325 mg Oral BID AC   • fish oil capsule 1,000 mg  1,000 mg Oral BID   • guaiFENesin (MUCINEX) 12 hr tablet 1,200 mg  1,200 mg Oral Q12H AURE   • insulin lispro (HumaLOG) 100 units/mL subcutaneous injection 2-12 Units  2-12 Units Subcutaneous TID AC   • insulin lispro (HumaLOG) 100 units/mL subcutaneous injection 2-12 Units  2-12 Units Subcutaneous HS • ipratropium (ATROVENT) 0.02 % inhalation solution 0.5 mg  0.5 mg Nebulization TID   • levalbuterol (XOPENEX) inhalation solution 1.25 mg  1.25 mg Nebulization TID   • levothyroxine tablet 175 mcg  175 mcg Oral Early Morning   • loratadine (CLARITIN) tablet 10 mg  10 mg Oral Daily   • metoclopramide (REGLAN) injection 10 mg  10 mg Intravenous Q6H PRN   • pantoprazole (PROTONIX) injection 40 mg  40 mg Intravenous Daily   • pregabalin (LYRICA) capsule 25 mg  25 mg Oral BID   • sodium chloride (OCEAN) 0.65 % nasal spray 1 spray  1 spray Each Nare Q1H PRN   • traZODone (DESYREL) tablet 150 mg  150 mg Oral HS       Allergies   Allergen Reactions   • Ace Inhibitors Swelling     Angioedema    PT STATES THIS IS NOT AN ALLERGY   • Other Anaphylaxis     Pt believes that he is allergic to peanut butter. Also, seasonal allergies   • Zinc Tongue Swelling     PT STATES THIS IS NOT AN ALLERGY   • Clindamycin Edema     Had angioedema episode approx 5 hr after IM administration of clindamycin. Unclear if there is definitive causal relationship. PT STATES THIS IS NOT AN ALLERGY     Objective     Blood pressure 153/74, pulse 105, temperature 97.8 °F (36.6 °C), temperature source Tympanic, resp. rate (!) 25, height 5' 4" (1.626 m), weight 124 kg (273 lb 5.9 oz), SpO2 97 %. Body mass index is 46.92 kg/m². Intake/Output Summary (Last 24 hours) at 8/30/2023 0757  Last data filed at 8/30/2023 0451  Gross per 24 hour   Intake 780 ml   Output 1950 ml   Net -1170 ml     Physical Exam  Vitals and nursing note reviewed. Constitutional:       Appearance: He is obese. HENT:      Head: Normocephalic and atraumatic. Eyes:      Conjunctiva/sclera: Conjunctivae normal.   Cardiovascular:      Rate and Rhythm: Normal rate. Pulmonary:      Breath sounds: Rhonchi present. Comments: Increased work of breathing; wearing BiPAP  Abdominal:      General: Abdomen is protuberant. Bowel sounds are normal. There is no distension. Palpations: Abdomen is soft. Tenderness: There is no abdominal tenderness. There is no guarding. Comments: Umbilical hernia   Genitourinary:     Comments: External hemorrhoids and residual hemorrhoidal tissue noted, brown stool on finger  Skin:     General: Skin is warm and dry. Neurological:      General: No focal deficit present. Mental Status: He is alert and oriented to person, place, and time.    Psychiatric:         Mood and Affect: Mood normal.         Behavior: Behavior normal.        Lab Results:   Admission on 08/29/2023   Component Date Value   • Ventricular Rate 08/29/2023 107    • Atrial Rate 08/29/2023 107    • [place] Interval 08/29/2023 156    • QRSD Interval 08/29/2023 84    • QT Interval 08/29/2023 340    • QTC Interval 08/29/2023 453    • P Axis 08/29/2023 63    • QRS Axis 08/29/2023 55    • T Wave Axis 08/29/2023 58    • WBC 08/29/2023 8.66    • RBC 08/29/2023 3.38 (L)    • Hemoglobin 08/29/2023 8.4 (L)    • Hematocrit 08/29/2023 27.4 (L)    • MCV 08/29/2023 81 (L)    • MCH 08/29/2023 24.9 (L)    • MCHC 08/29/2023 30.7 (L)    • RDW 08/29/2023 15.9 (H)    • MPV 08/29/2023 9.0    • Platelets 09/39/2852 336    • nRBC 08/29/2023 1    • Neutrophils Relative 08/29/2023 65    • Immat GRANS % 08/29/2023 1    • Lymphocytes Relative 08/29/2023 17    • Monocytes Relative 08/29/2023 14 (H)    • Eosinophils Relative 08/29/2023 2    • Basophils Relative 08/29/2023 1    • Neutrophils Absolute 08/29/2023 5.67    • Immature Grans Absolute 08/29/2023 0.04    • Lymphocytes Absolute 08/29/2023 1.50    • Monocytes Absolute 08/29/2023 1.25 (H)    • Eosinophils Absolute 08/29/2023 0.16    • Basophils Absolute 08/29/2023 0.04    • Protime 08/29/2023 13.2    • INR 08/29/2023 1.01    • PTT 08/29/2023 27    • Sodium 08/29/2023 135    • Potassium 08/29/2023 3.7    • Chloride 08/29/2023 92 (L)    • CO2 08/29/2023 32    • ANION GAP 08/29/2023 11    • BUN 08/29/2023 30 (H)    • Creatinine 08/29/2023 1.45 (H)    • Glucose 08/29/2023 142 (H)    • Calcium 08/29/2023 9.3    • AST 08/29/2023 34    • ALT 08/29/2023 29    • Alkaline Phosphatase 08/29/2023 52    • Total Protein 08/29/2023 7.2    • Albumin 08/29/2023 3.8    • Total Bilirubin 08/29/2023 0.32    • eGFR 08/29/2023 52    • Magnesium 08/29/2023 2.1    • LACTIC ACID 08/29/2023 1.7    • D-Dimer, Quant 08/29/2023 0.95 (H)    • hs TnI 0hr 08/29/2023 23    • BNP 08/29/2023 92    • SARS-CoV-2 08/29/2023 Negative    • INFLUENZA A PCR 08/29/2023 Negative    • INFLUENZA B PCR 08/29/2023 Negative    • RSV PCR 08/29/2023 Negative    • Blood Culture 08/29/2023 Received in Microbiology Lab. Culture in Progress. • Blood Culture 08/29/2023 Received in Microbiology Lab. Culture in Progress. • Procalcitonin 08/29/2023 0.17    • hs TnI 2hr 08/29/2023 22    • Delta 2hr hsTnI 08/29/2023 -1    • hs TnI 4hr 08/29/2023 28    • Delta 4hr hsTnI 08/29/2023 5    • pH, Arterial 08/29/2023 7. 406    • pCO2, Arterial 08/29/2023 47.1 (H)    • pO2, Arterial 08/29/2023 58.6 (LL)    • HCO3, Arterial 08/29/2023 28.9 (H)    • Base Excess, Arterial 08/29/2023 3.7    • O2 Content, Arterial 08/29/2023 10.5 (L)    • O2 HGB,Arterial  08/29/2023 85.1 (L)    • SOURCE 08/29/2023 Radial, Right    • BLANCA TEST 08/29/2023 Yes    • Nasal Cannula 08/29/2023 4    • Retic Ct Abs 08/29/2023 76,100    • Retic Ct Pct 08/29/2023 2.34 (H)    • WBC 08/29/2023 7.67    • RBC 08/29/2023 3.25 (L)    • Hemoglobin 08/29/2023 8.0 (L)    • Hematocrit 08/29/2023 25.9 (L)    • MCV 08/29/2023 80 (L)    • MCH 08/29/2023 24.6 (L)    • MCHC 08/29/2023 30.9 (L)    • RDW 08/29/2023 15.8 (H)    • Platelets 02/06/4629 301    • MPV 08/29/2023 8.9    • POC Glucose 08/29/2023 123    • pH, Arterial 08/30/2023 7.329 (L)    • pCO2, Arterial 08/30/2023 65.6 (HH)    • pO2, Arterial 08/30/2023 42.9 (LL)    • HCO3, Arterial 08/30/2023 33.7 (H)    • Base Excess, Arterial 08/30/2023 6.5    • O2 Content, Arterial 08/30/2023 8.5 (L)    • O2 HGB,Arterial 08/30/2023 67.6 (L)    • SOURCE 08/30/2023 Radial, Right    • BLANCA TEST 08/30/2023 Yes    • Non Vent Type- HFNC 08/30/2023 HFNC Flow    • NV HFNC FIO2 08/30/2023 75    • HFNC FLOW LPM 08/30/2023 40    • Cholesterol 08/30/2023 88    • Triglycerides 08/30/2023 88    • HDL, Direct 08/30/2023 30 (L)    • LDL Calculated 08/30/2023 40    • Non-HDL-Chol (CHOL-HDL) 08/30/2023 58    • TSH 3RD GENERATON 08/30/2023 0.833    • Sodium 08/30/2023 136    • Potassium 08/30/2023 3.9    • Chloride 08/30/2023 92 (L)    • CO2 08/30/2023 34 (H)    • ANION GAP 08/30/2023 10    • BUN 08/30/2023 26 (H)    • Creatinine 08/30/2023 1.40 (H)    • Glucose 08/30/2023 213 (H)    • Calcium 08/30/2023 8.9    • AST 08/30/2023 33    • ALT 08/30/2023 30    • Alkaline Phosphatase 08/30/2023 55    • Total Protein 08/30/2023 7.3    • Albumin 08/30/2023 3.8    • Total Bilirubin 08/30/2023 0.39    • eGFR 08/30/2023 54    • Magnesium 08/30/2023 2.2    • Phosphorus 08/30/2023 5.4 (H)    • WBC 08/30/2023 9.26    • RBC 08/30/2023 3.39 (L)    • Hemoglobin 08/30/2023 8.3 (L)    • Hematocrit 08/30/2023 27.7 (L)    • MCV 08/30/2023 82    • MCH 08/30/2023 24.5 (L)    • MCHC 08/30/2023 30.0 (L)    • RDW 08/30/2023 15.8 (H)    • Platelets 75/81/0889 332    • MPV 08/30/2023 9.1    • Protime 08/30/2023 13.8    • INR 08/30/2023 1.07    • ABO Grouping 08/30/2023 A    • Rh Factor 08/30/2023 Positive    • Antibody Screen 08/30/2023 Negative    • Specimen Expiration Date 08/30/2023 [id number]    • pH, Arterial 08/30/2023 7.333 (L)    • pCO2, Arterial 08/30/2023 62.7 (HH)    • pO2, Arterial 08/30/2023 80.1    • HCO3, Arterial 08/30/2023 32.5 (H)    • Base Excess, Arterial 08/30/2023 5.5    • O2 Content, Arterial 08/30/2023 12.4 (L)    • O2 HGB,Arterial  08/30/2023 92.0 (L)    • SOURCE 08/30/2023 Radial, Right    • BLANCA TEST 08/30/2023 Yes    • Non Vent type BIPAP 08/30/2023 BIPAP    • IPAP 08/30/2023 12    • EPAP 08/30/2023 6    • BIPAP fio2 08/30/2023 40      Imaging Studies: I have personally reviewed pertinent imaging studies. Rosenda Garcia PA-C    **Please note:  Dictation voice to text software may have been used in the creation of this record. Occasional wrong word or “sound alike” substitutions may have occurred due to the inherent limitations of voice recognition software. Read the chart carefully and recognize, using context, where substitutions have occurred. **

## 2023-08-30 NOTE — H&P
[address]  H&P  Name: Fernando Corona 61 y.o. male I MRN: [id number]  Unit/Bed#:  I Date of Admission: 8/29/2023   Date of Service: 8/29/2023 I Hospital Day: 0      Assessment/Plan   Acute respiratory failure with hypoxia and hypercapnia (HCC)  Assessment & Plan  Due to acute COPD exacerbation, with underlying morbid obesity-related OSAHS. Patient visibly dyspneic despite 10 L/min NC O2, at high risk of imminent respiratory compromise & will be admitted to the stepdown unit with BiPAP commenced. Repeat blood gas in a few hours, with intubation for mechanical ventilation if any clinical deterioration or hemodynamic instability. Treat acute COPD exacerbation with IV Decadron, IV Unasyn and Zithromax, frequent DuoNeb treatments, and symptomatic treatment with frequent incentive spirometry and mucolytic. Check sputum c+s. Pulmonology consult in a.m. Pleural effusion, left  Assessment & Plan  Check echocardiogram.  Broad-spectrum IV antibiotics. Pulmonology consult regarding L thoracentesis. COPD with acute exacerbation [facility])  Assessment & Plan  See above. MARSHA and COPD overlap syndrome ([address] )  Assessment & Plan  Start BiPAP with repeat blood gas in a few hours. Intubation for mechanical ventilation if CO2 narcosis ensues despite BiPAP. Microcytic anemia  Assessment & Plan  Suspicious for underlying chronic iron deficiency anemia, with suspected acute blood loss anemia due to recent likely lower GI bleed. Serial CBC checks with PRBC transfusion as needed if Hb <8.0. Avoid antiplatelet and anticoagulant medications. DVT prophylaxis with SCDs. Check ferritin level and reticulocyte count. Commence iron supplement accordingly with added vitamin C to aid its absorption. GI consult regarding EGD with biopsy and screening colonoscopy. Patient reports no prior GI endoscopy. IV PPI for GI protection. Bright red rectal bleeding  Assessment & Plan  See above.     Morbid obesity with BMI of 45.0-49.9, adult [facility])  Assessment & Plan  I made patient aware he needs to lose weight through lifestyle modifications especially dietary restrictions and aerobic exercise as tolerated. Umbilical hernia without obstruction and without gangrene  Assessment & Plan  Incidental CT finding. Patient denies abdominal pain or distention, nausea or vomiting. Will monitor for signs of bowel obstruction. Fatty liver disease, nonalcoholic  Assessment & Plan  I emphasized weight reduction to the patient. Uncontrolled type 2 diabetes mellitus with hyperglycemia, with long-term current use of insulin (HCC)  Assessment & Plan  Lab Results   Component Value Date    HGBA1C 11.5 (H) 06/08/2023       No results for input(s): "POCGLU" in the last 72 hours. Blood Sugar Average: Last 72 hrs:    Currently not in hyperglycemic crisis. Serum glc <200 at the ER. Hold basal insulin with Humalog sliding scale coverage for now. Acquired hypothyroidism  Assessment & Plan  Check TSH and dose thyroid replacement accordingly. Benign essential hypertension  Assessment & Plan  Due to potential for hemodynamic instability, will hold antihypertensive medications for now in light of respiratory failure & rectal bleed. VTE Prophylaxis: Pharmacologic VTE Prophylaxis contraindicated due to rectal bleed  / sequential compression device   Code Status: full  POLST: POLST information provided but not completed    Anticipated Length of Stay:  Patient will be admitted on an Inpatient basis with an anticipated length of stay of  > 2 midnights. Justification for Hospital Stay: O2    Total Time for Visit, including Counseling / Coordination of Care: 1 hour. Greater than 50% of this total time spent on direct patient counseling and coordination of care.     Chief Complaint:   Trouble breathing    History of Present Illness:    Lamin Downey is a 61 y.o. male whose past medical history is remarkable for hypertension, IDDM 2, hyperlipidemia, hypothyroidism, COPD, morbid obesity, MARSHA with CPAP noncompliance due to claustrophobia. He used to smoke cigarettes but denies alcohol abuse or illicit drug use. He is not usually on home O2. He presented to this facility's ED this evening complaining of worsening difficulty breathing over the past 1 to 2 weeks associated with a cough productive of yellowish phlegm, chest tightness and audible wheeze. No fever, chills, nausea, vomiting, diarrhea, abdominal pain, chest pain, skin rash, headache, neck stiffness or acute confusion. His vital signs upon ED arrival included heart rate 117, /80, respiratory 30s, temperature 37.1 °C with initial O2 sats on room air 77% improving up to the mid 90s with mid flow nasal cannula at 10 L/min after ABG on 3 L/min reviewed pH 7.46, PCO2 47.1, PO2 58.6, O2 sat 85.1%. Other pertinent ED labs included negative rapid viral screen including influenza and COVID-19, WBC 8.66, hemoglobin 8.4, MCV 81, platelet 889, serum lactate 1.7, procalcitonin 0.17, BNP 92, serial troponin negative, BUN 30, creatinine 1.45, glucose 142, LFTs within normal limits. CTA chest showed small to moderate left pleural effusion, emphysema and no pulmonary embolism. CT abdomen/pelvis with IV contrast was remarkable for large liver, fat-containing umbilical hernia without induration to suggest incarceration, as well as minimal skin thickening and subcutaneous changes of the anterior pelvic wall consistent with cellulitis. Patient of note reports intermittent episodes of bright red blood per rectum that began 3 days ago following straining during defecation after he felt constipated. He denies hematemesis or melena, and does not take any blood thinner besides baby aspirin. He has never had a screening colonoscopy before or EGD.   Patient received IV Protonix at the ED, as well as DuoNeb treatments and IV Rocephin + Zithromax with some improvement of his breathing. Patient hospitalization has been sought for further management of acute hypoxic respiratory failure and rectal bleed with acute blood loss anemia. Review of Systems:    A 10+ point review of systems was obtained & is as above, otherwise negative. Review of Systems    Past Medical and Surgical History:     Past Medical History:   Diagnosis Date   • Acquired hypothyroidism 1/2/2015   • Angioedema    • Diabetes mellitus ([address])    • Disease of thyroid gland    • Hyperlipidemia    • Hypertension    • Mixed hyperlipidemia 2/4/2015   • Morbid obesity ([address])    • Morbid obesity with BMI of 45.0-49.9, adult ([address]) 1/2/2015   • MARSHA (obstructive sleep apnea)    • Primary hypertension 1/2/2015    Transitioned From: Benign essential hypertension       Past Surgical History:   Procedure Laterality Date   • KNEE SURGERY     • SINUS SURGERY         Meds/Allergies:    Prior to Admission medications    Medication Sig Start Date End Date Taking?  Authorizing Provider   albuterol (2.5 mg/3 mL) 0.083 % nebulizer solution Take 3 mL (2.5 mg total) by nebulization every 6 (six) hours as needed for wheezing or shortness of breath 2/23/23   Jesus Mitchell PA-C   Ascorbic Acid (VITAMIN C PO) Take 1 tablet by mouth daily    Historical Provider, MD   aspirin 81 mg chewable tablet Take one tablet by mouth daily 3/14/06   Historical Provider, MD   benzonatate (TESSALON PERLES) 100 mg capsule Take 1 capsule (100 mg total) by mouth 3 (three) times a day as needed for cough 4/14/23   Tyra Hoskins PA-C   DULoxetine (CYMBALTA) 60 mg delayed release capsule Take 1 capsule by mouth once daily 7/19/23   Jesus Mitchell PA-C   EPINEPHrine (EPIPEN) 0.3 mg/0.3 mL SOAJ INJECT 0.3MG INTO A MUSCLE ONCE FOR 1 DOSE 5/8/23   Marli Lakhani PA-C   gabapentin (NEURONTIN) 100 mg capsule TAKE 1 CAPSULE BY MOUTH THREE TIMES DAILY 8/6/23   Jesus Mitchell PA-C   HumaLOG KwikPen 100 units/mL injection pen INJECT 20 UNITS UNDER THE SKIN THREE TIMES DAILY WITH MEALS 5/28/23   Katherine Lillie, VERONIKA   hydrochlorothiazide (HYDRODIURIL) 25 mg tablet Take 1 tablet (25 mg total) by mouth 2 (two) times a day 7/7/23   Tyra Hoskins PA-C   Insulin Glargine Solostar (Lantus SoloStar) 100 UNIT/ML SOPN Inject 0.6 mL (60 Units total) under the skin daily 12/22/22 6/20/23  Tyra Hoskins PA-C   Insulin Pen Needle (Pen Needles 3/16") 31G X 5 MM MISC Use 4 (four) times a day 8/12/21   Aric Begum PA-C   ipratropium (ATROVENT) 0.02 % nebulizer solution Take 2.5 mL (0.5 mg total) by nebulization every 6 (six) hours as needed for wheezing or shortness of breath 2/23/23   Katherine Lillie, VERONIKA   ipratropium-albuterol (DUO-NEB) 0.5-2.5 mg/3 mL nebulizer solution Take 3 mL by nebulization every 6 (six) hours as needed for wheezing or shortness of breath 2/15/23   Katherine Lillie, VERONIKA   levothyroxine 175 mcg tablet Take 1 tablet by mouth once daily 8/8/23   Katherine Lillie, VERONIKA   lisinopril (ZESTRIL) 5 mg tablet Take 1 tablet (5 mg total) by mouth daily 7/7/23 7/6/24  Katherine Lillie, VERONIKA   loratadine (CLARITIN) 10 mg tablet Take 10 mg by mouth daily    Historical Provider, MD   metFORMIN (GLUCOPHAGE) 1000 MG tablet Take 1 tablet by mouth twice daily 6/8/23   Katherine Lillie, VERONIKA   Omega-3 Fatty Acids (fish oil) 1,000 mg Take 1,000 mg by mouth 2 (two) times a day    Historical Provider, MD   pregabalin (LYRICA) 25 mg capsule Take 1 capsule by mouth twice daily 8/15/23   Katherine Lillie, VERONIKA   rosuvastatin (CRESTOR) 40 MG tablet Take 1 tablet (40 mg total) by mouth daily 11/15/22   El Velasquez DO   sodium chloride (OCEAN) 0.65 % nasal spray 1 spray into each nostril as needed for rhinitis 9/19/19   MARLEEN Adam   traZODone (DESYREL) 150 mg tablet TAKE 1 TABLET BY MOUTH ONCE DAILY AT BEDTIME 7/21/23   Katherine Moreira PA-C   Ventolin  (90 Base) MCG/ACT inhaler Inhale 2 puffs every 4 (four) hours as needed for wheezing or shortness of breath 10/24/22   Aric Begum VERONIKA   atorvastatin (LIPITOR) 20 mg tablet Take 1 tablet by mouth once daily 4/29/22 11/15/22  Deepak Bedolla PA-C     I have reviewed home medications with patient personally. Allergies: Allergies   Allergen Reactions   • Ace Inhibitors Swelling     Angioedema    PT STATES THIS IS NOT AN ALLERGY   • Other Anaphylaxis     Pt believes that he is allergic to peanut butter. Also, seasonal allergies   • Zinc Tongue Swelling     PT STATES THIS IS NOT AN ALLERGY   • Clindamycin Edema     Had angioedema episode approx 5 hr after IM administration of clindamycin. Unclear if there is definitive causal relationship.   PT STATES THIS IS NOT AN ALLERGY       Social History:     Marital Status: Single   Patient Pre-hospital Living Situation: home  Patient Pre-hospital Level of Mobility: independent  Substance Use History:   Social History     Substance and Sexual Activity   Alcohol Use Not Currently   • Alcohol/week: 3.0 standard drinks of alcohol   • Types: 2 Cans of beer, 1 Shots of liquor per week    Comment: No alcohol since      Social History     Tobacco Use   Smoking Status Former   • Packs/day: 2.00   • Years: 70.00   • Total pack years: 140.00   • Types: Cigarettes   • Quit date: 10/21/2022   • Years since quittin.8   Smokeless Tobacco Never   Tobacco Comments    states read to quit prior to admit     Social History     Substance and Sexual Activity   Drug Use No       Family History:    non-contributory    Physical Exam:   General: Morbidly obese, in moderate-severe respiratory distress  HEENT: oral mucosa moist, not pale or jaundiced  Neck: supple, no JVD  Resp: Diminished breath sounds due to body habitus, scant expiratory wheeze upper lung zones cardiovascular: S1 S2 audible, tachycardic  GI: soft abdomen, nontender, bowel sounds present  Musculoskeletal: Chronic bilateral lower extremity lymphedema, no cyanosis  Skin: no petechiae or suspicious rash  Psych: appropriately oriented in all spheres, fair insight  Neuro: Awake, alert, verbally responsive and follows commands, moves all extremities equally, essentially nonfocal      Vitals:   Blood Pressure: 139/77 (08/29/23 2229)  Pulse: 100 (08/29/23 2229)  Temperature: 98.8 °F (37.1 °C) (08/29/23 1756)  Temp Source: Temporal (08/29/23 1756)  Respirations: (!) 30 (08/29/23 2229)  Height: 5' 4" (162.6 cm) (08/29/23 2229)  Weight - Scale: 125 kg (274 lb 11.1 oz) (08/29/23 2229)  SpO2: 94 % (08/29/23 2253)      Additional Data:     Lab Results: I have personally reviewed pertinent reports. Results from last 7 days   Lab Units 08/29/23 2250 08/29/23 1837   WBC Thousand/uL 7.67 8.66   HEMOGLOBIN g/dL 8.0* 8.4*   HEMATOCRIT % 25.9* 27.4*   PLATELETS Thousands/uL 301 336   NEUTROS PCT %  --  65   LYMPHS PCT %  --  17   MONOS PCT %  --  14*   EOS PCT %  --  2     Results from last 7 days   Lab Units 08/29/23 1837   POTASSIUM mmol/L 3.7   CHLORIDE mmol/L 92*   CO2 mmol/L 32   BUN mg/dL 30*   CREATININE mg/dL 1.45*   CALCIUM mg/dL 9.3   ALK PHOS U/L 52   ALT U/L 29   AST U/L 34     Results from last 7 days   Lab Units 08/29/23 1837   INR  1.01     Invalid input(s): "TROIP"    Imaging: I have personally reviewed pertinent reports. PE Study with CT abdomen & pelvis with contrast    Result Date: 8/29/2023  Narrative: CT PULMONARY ANGIOGRAM OF THE CHEST AND CT ABDOMEN AND PELVIS WITH INTRAVENOUS CONTRAST INDICATION:   elevated dimer, chest pain sob, hypoxia and tachycardia. Lower abdominal pain. COMPARISON: CT chest from 5/20/2021. TECHNIQUE:  CT examination of the chest, abdomen and pelvis was performed. Thin section CT angiographic technique was used in the chest in order to evaluate for pulmonary embolus and coronal 3D MIP postprocessing was performed on the acquisition scanner. Multiplanar 2D reformatted images were created from the source data.  This examination, like all CT scans performed in the [facility], was performed utilizing techniques to minimize radiation dose exposure, including the use of iterative reconstruction and automated exposure control. Radiation dose length product (DLP) for this visit:  1890.17 mGy-cm IV Contrast:  100 mL of iohexol (OMNIPAQUE) Enteric Contrast:  Enteric contrast was not administered. FINDINGS: CHEST PULMONARY ARTERIAL TREE:  No pulmonary embolus is seen. LUNGS: Background emphysema. Minimal atelectasis at the lung bases left greater than right. No nodule. No endotracheal or endobronchial lesion identified. PLEURA: Small to moderate left pleural effusion. HEART/AORTA: Heart is top normal size. No thoracic aortic aneurysm. MEDIASTINUM AND DINORA:  Unremarkable. CHEST WALL AND LOWER NECK:  Unremarkable. ABDOMEN LIVER/BILIARY TREE: Liver is enlarged. No focal hepatic lesions are noted. Hepatic contours are within normal limits. No biliary dilatation. GALLBLADDER:  No calcified gallstones. No pericholecystic inflammatory change. SPLEEN:  Unremarkable. PANCREAS:  Unremarkable. ADRENAL GLANDS:  Unremarkable. KIDNEYS/URETERS:  Unremarkable. No hydronephrosis. STOMACH AND BOWEL:  Unremarkable. APPENDIX: A normal appendix was visualized. ABDOMINOPELVIC CAVITY:  No ascites. No pneumoperitoneum. No lymphadenopathy. VESSELS:  Unremarkable for patient's age. PELVIS REPRODUCTIVE ORGANS:  Unremarkable for patient's age. URINARY BLADDER:  Unremarkable. ABDOMINAL WALL/INGUINAL REGIONS: Fat-containing umbilical hernia without induration. Minimal cellulitic changes in the anterior pelvic wall. OSSEOUS STRUCTURES: Healing left eighth rib fracture. Multiple old right rib fractures. Spinal degenerative changes. Impression: No pulmonary embolism. Emphysema. Small to moderate size left pleural effusion of uncertain etiology. Large liver. Fat-containing umbilical hernia without induration to suggest incarceration. Minimal skin thickening and subcutaneous changes of the anterior pelvic wall consistent with cellulitis. Workstation performed: [id number]           ** Please Note: Dragon 360 Dictation voice to text software may have been used in the creation of this document.

## 2023-08-30 NOTE — ASSESSMENT & PLAN NOTE
Start BiPAP with repeat blood gas in a few hours. Intubation for mechanical ventilation if CO2 narcosis ensues despite BiPAP.

## 2023-08-30 NOTE — QUICK NOTE
Patient initially refused BiPAP upon arrival to the stepdown unit, stating claustrophobia. Trial of Vapotherm was consequently commenced. After  ~4-5hrs on 40LPM FiO2 0.75, ABG was worse & remarkable for pH 7.329, paCO2 65.6, paO2 42.9, O2sat 67.6. Patient finally agreed to BiPAP started around 0430hrs at 12/6 FiO2 0.40, follow-up ABG in 2hrs ordered.

## 2023-08-30 NOTE — PHYSICAL THERAPY NOTE
Physical Therapy Evaluation    Patient Name: Shola Vizcarra    MYPVF'G Date: 8/30/2023     Problem List  Active Problems:    COPD with acute exacerbation ([address])    Benign essential hypertension    Acquired hypothyroidism    Morbid obesity with BMI of 45.0-49.9, adult ([address])    Acute respiratory failure with hypoxia and hypercapnia (HCC)    MARSHA and COPD overlap syndrome ([address])    Uncontrolled type 2 diabetes mellitus with hyperglycemia, with long-term current use of insulin (HCC)    Pleural effusion, left    Bright red rectal bleeding    Microcytic anemia    Fatty liver disease, nonalcoholic    Umbilical hernia without obstruction and without gangrene       Past Medical History  Past Medical History:   Diagnosis Date    Acquired hypothyroidism 1/2/2015    Angioedema     Diabetes mellitus ([address])     Disease of thyroid gland     Hyperlipidemia     Hypertension     Mixed hyperlipidemia 2/4/2015    Morbid obesity ([address])     Morbid obesity with BMI of 45.0-49.9, adult ([address]) 1/2/2015    MARSHA (obstructive sleep apnea)     Primary hypertension 1/2/2015    Transitioned From: Benign essential hypertension        Past Surgical History  Past Surgical History:   Procedure Laterality Date    KNEE SURGERY      SINUS SURGERY          08/30/23 1350   PT Last Visit   PT Visit Date 08/30/23   Note Type   Note type Evaluation   Pain Assessment   Pain Assessment Tool 0-10   Pain Score No Pain   Restrictions/Precautions   Weight Bearing Precautions Per Order No   Other Precautions Fall Risk;Multiple lines;Telemetry;O2   Home Living   Type of Home Apartment   Home Layout One level;Stairs to enter with rails  (19-21 URBANO c HR)   Bathroom Shower/Tub Tub/shower unit   Bathroom Toilet Standard   Bathroom Accessibility Accessible   Home Equipment Cane   Additional Comments pt reports use of cane at baseline; no AD for short distances   Prior Function   Level of [place] Independent with ADLs;Independent with functional mobility; Needs assistance with IADLS   Lives With Alone   Receives Help From Family   IADLs Independent with driving   Falls in the last 6 months 1 to 4   Vocational Retired   General   Family/Caregiver Present No   Cognition   Overall Cognitive Status WFL   Arousal/Participation Alert   Orientation Level Oriented X4   Following Commands Follows all commands and directions without difficulty   Subjective   Subjective pt reports recently being discharged from OP PT d/t minimal improvement in knee pain   RLE Assessment   RLE Assessment WFL  (assessed functionally)   LLE Assessment   LLE Assessment WFL  (assessed functionally)   Bed Mobility   Additional Comments pt OOB at start/end of session   Transfers   Sit to Stand 5  Supervision   Additional items Armrests; Verbal cues   Stand to Sit 5  Supervision   Additional items Armrests; Verbal cues   Additional Comments no device used   Ambulation/Elevation   Gait pattern Excessively slow; Short stride; Foward flexed;Decreased foot clearance; Wide LENY; Improper Weight shift   Gait Assistance 5  Supervision   Additional items Verbal cues   Assistive Device None   Distance 40'   Balance   Static Sitting Good   Dynamic Sitting Good   Static Standing Fair +   Dynamic Standing Fair +   Ambulatory Fair   Endurance Deficit   Endurance Deficit Yes   Endurance Deficit Description pt requiring high flow O2 to maintain WFL SpO2   Activity Tolerance   Activity Tolerance Patient limited by fatigue   Assessment   Prognosis Good   Problem List Decreased strength;Decreased endurance; Impaired balance;Decreased mobility;Obesity   Assessment Patient is a 61 y.o. male evaluated by Physical Therapy s/p admit to [address] [place] [address] on 8/29/2023 with admitting diagnosis of: Shortness of breath, Bright red blood per rectum, Anemia, Pleural effusion, Hypoxia, COPD exacerbation. PT was consulted to assess patient's functional mobility and discharge needs.  Ordered are PT Evaluation and treatment with activity level of: up and OOB as tolerated. Comorbidities affecting patient's physical performance at time of assessment include: HLD, DM, HTN, hypothyroidism. Personal factors affecting the patient at time of IE include: step(s) to enter home, inability to navigate community distances, history of fall(s), inability/difficulty performing IADLs and inability/difficulty performing ADLs. Please locate objective findings from PT assessment regarding body systems outlined above. Upon evaluation, pt able to perform all functional mobility with SUP, increased time, and occasional verbal cuing. Ambulation limited to short functional distance within room d/t restraints of HFNC. Seated rest break taken following 40' of ambulation; all vitals remained WFL throughout. Moderate postural sway demonstrated with mobility but no true LOB experienced. The patient's AM-PAC Basic Mobility Inpatient Short Form Raw Score is 18. A Raw score of greater than 16 suggests the patient may benefit from discharge to home. Please also refer to the recommendation of the Physical Therapist for safe discharge planning. Co treatment with OT secondary to complex medical condition of pt, possible A of 2 required to achieve and maintain transitional movements, requiring the need of skilled therapeutic intervention of 2 therapists to achieve delivery of services. Pt will benefit from continued PT intervention during LOS to address current deficits, increase LOF, and facilitate safe d/c to next level of care when medically appropriate. D/c recommendation at this time is home with home health rehabilitation services. Goals   Patient Goals to go home   LTG Expiration Date 09/13/23   Long Term Goal #1 Pt will participate in B LE strengthening exercises to facilitate improved functional activity tolerance. Pt will perform all functional transfers and bed mobility mod(I) with good safety awareness.  Pt will ambulate 250' mod(I) with LRAD while maintaining good functional dynamic balance. Pt will ascend/descend a FFOS with HR and SUP to reflect the ability to safely navigate the home. Plan   Treatment/Interventions Functional transfer training;LE strengthening/ROM; Elevations; Therapeutic exercise; Endurance training;Bed mobility;Gait training   PT Frequency 3-5x/wk   Recommendation   PT Discharge Recommendation Home with home health rehabilitation   AM-PAC Basic Mobility Inpatient   Turning in Flat Bed Without Bedrails 3   Lying on Back to Sitting on Edge of Flat Bed Without Bedrails 3   Moving Bed to Chair 3   Standing Up From Chair Using Arms 3   Walk in Room 3   Climb 3-5 Stairs With Railing 3   Basic Mobility Inpatient Raw Score 18   Basic Mobility Standardized Score 41.05   Highest Level Of Mobility   JH-HLM Goal 6: Walk 10 steps or more   JH-HLM Achieved 7: Walk 25 feet or more   End of Consult   Patient Position at End of Consult Bedside chair; All needs within reach

## 2023-08-30 NOTE — ASSESSMENT & PLAN NOTE
Suspicious for underlying chronic iron deficiency anemia, with suspected acute blood loss anemia due to recent likely lower GI bleed. Serial CBC checks with PRBC transfusion as needed if Hb <8.0. Avoid antiplatelet and anticoagulant medications. DVT prophylaxis with SCDs. Check ferritin level and reticulocyte count. Commence iron supplement accordingly with added vitamin C to aid its absorption. GI consult regarding EGD with biopsy and screening colonoscopy. Patient reports no prior GI endoscopy. IV PPI for GI protection.

## 2023-08-30 NOTE — ASSESSMENT & PLAN NOTE
Lab Results   Component Value Date    HGBA1C 11.5 (H) 06/08/2023       Recent Labs     08/29/23  2327 08/30/23  0820   POCGLU 123 208*       Blood Sugar Average: Last 72 hrs:  (P) 165.5     Chronically Uncontrolled, see HgA1C above  ISS

## 2023-08-30 NOTE — ASSESSMENT & PLAN NOTE
I made patient aware he needs to lose weight through lifestyle modifications especially dietary restrictions and aerobic exercise as tolerated.

## 2023-08-30 NOTE — RESPIRATORY THERAPY NOTE
08/30/23 1500   Respiratory Assessment   Resp Comments vapo adjustments   Non-Invasive Information   SpO2 96 %   Non-Invasive Settings   FiO2 (%) 36   Flow (lpm) 25   Temperature (Set) 34   Maintenance   Water bag changed Yes

## 2023-08-30 NOTE — ASSESSMENT & PLAN NOTE
Due to potential for hemodynamic instability, will hold antihypertensive medications for now in light of respiratory failure & rectal bleed.

## 2023-08-30 NOTE — PLAN OF CARE
Problem: OCCUPATIONAL THERAPY ADULT  Goal: Performs self-care activities at highest level of function for planned discharge setting. See evaluation for individualized goals. Description: Treatment Interventions: ADL retraining, Functional transfer training, UE strengthening/ROM, Endurance training, Patient/family training, Equipment evaluation/education, Activityengagement          See flowsheet documentation for full assessment, interventions and recommendations. Note: Limitation: Decreased ADL status, Decreased UE strength, Decreased Safe judgement during ADL, Decreased endurance, Decreased self-care trans, Decreased high-level ADLs     Assessment: Pt is a 61 y.o. male seen for OT evaluation s/p admit to [facility] on 8/29/2023 w/ <principal problem not specified>. Comorbidities affecting pt's functional performance at time of assessment include: HLD, DM, HTN, disease of thyroid gland, angioedema, morbid obesity, MARSHA, hypothyroidism, HTN, fatty liver disease, hernia. Personal factors affecting pt at time of IE include:steps to enter environment, limited home support, difficulty performing ADLS, difficulty performing IADLS , decreased initiation and engagement  and health management . Prior to admission, pt was (I) with ADLs and IADLs with use of SPC during functional mobility. Upon evaluation: Pt requires (S)-mod (A) level with use of no device during mobility 2* the following deficits impacting occupational performance: weakness, decreased strength, decreased balance, decreased tolerance, impaired initiation, impaired problem solving, decreased safety awareness, increased pain and impaired interpersonal skills. Pt to benefit from continued skilled OT tx while in the hospital to address deficits as defined above and maximize level of functional independence w ADL's and functional mobility.  Occupational Performance areas to address include: grooming, bathing/shower, toilet hygiene, dressing, functional mobility, community mobility and clothing management. The patient's raw score on the AM-PAC Daily Activity Inpatient Short Form is 19. A raw score of greater than or equal to 19 suggests the patient may benefit from discharge to home. Please refer to the recommendation of the Occupational Therapist for safe discharge planning. Pt benefited from co-evaluation of skilled OT and PT therapists in order to most appropriately address functional deficits d/t extensive assistance required for safe functional mobility, decreased activity tolerance, and regression from functioning level prior to admission and/or onset of present illness. OT/PT objectives were addressed separately; please see PT note for specific goal areas targeted.      OT Discharge Recommendation: Home with home health rehabilitation

## 2023-08-31 ENCOUNTER — HOME HEALTH ADMISSION (OUTPATIENT)
Dept: HOME HEALTH SERVICES | Facility: HOME HEALTHCARE | Age: 58
End: 2023-08-31

## 2023-08-31 DIAGNOSIS — Z79.4 TYPE 2 DIABETES MELLITUS WITH COMPLICATION, WITH LONG-TERM CURRENT USE OF INSULIN (HCC): ICD-10-CM

## 2023-08-31 DIAGNOSIS — E11.8 TYPE 2 DIABETES MELLITUS WITH COMPLICATION, WITH LONG-TERM CURRENT USE OF INSULIN (HCC): ICD-10-CM

## 2023-08-31 LAB
ALBUMIN SERPL BCP-MCNC: 3.7 G/DL (ref 3.5–5)
ALP SERPL-CCNC: 51 U/L (ref 34–104)
ALT SERPL W P-5'-P-CCNC: 30 U/L (ref 7–52)
ANION GAP SERPL CALCULATED.3IONS-SCNC: 7 MMOL/L
AST SERPL W P-5'-P-CCNC: 29 U/L (ref 13–39)
BASE EX.OXY STD BLDV CALC-SCNC: 92.7 % (ref 60–80)
BASE EXCESS BLDV CALC-SCNC: 6.4 MMOL/L
BILIRUB SERPL-MCNC: 0.32 MG/DL (ref 0.2–1)
BUN SERPL-MCNC: 25 MG/DL (ref 5–25)
CALCIUM SERPL-MCNC: 8.9 MG/DL (ref 8.4–10.2)
CHLORIDE SERPL-SCNC: 94 MMOL/L (ref 96–108)
CO2 SERPL-SCNC: 34 MMOL/L (ref 21–32)
CREAT SERPL-MCNC: 1.25 MG/DL (ref 0.6–1.3)
ERYTHROCYTE [DISTWIDTH] IN BLOOD BY AUTOMATED COUNT: 15.8 % (ref 11.6–15.1)
GFR SERPL CREATININE-BSD FRML MDRD: 63 ML/MIN/1.73SQ M
GLUCOSE SERPL-MCNC: 154 MG/DL (ref 65–140)
GLUCOSE SERPL-MCNC: 162 MG/DL (ref 65–140)
GLUCOSE SERPL-MCNC: 179 MG/DL (ref 65–140)
GLUCOSE SERPL-MCNC: 192 MG/DL (ref 65–140)
GLUCOSE SERPL-MCNC: 208 MG/DL (ref 65–140)
HCO3 BLDV-SCNC: 32.3 MMOL/L (ref 24–30)
HCT VFR BLD AUTO: 25.5 % (ref 36.5–49.3)
HCT VFR BLD AUTO: 27.5 % (ref 36.5–49.3)
HGB BLD-MCNC: 7.7 G/DL (ref 12–17)
HGB BLD-MCNC: 8.2 G/DL (ref 12–17)
IPAP: 14
MCH RBC QN AUTO: 24.2 PG (ref 26.8–34.3)
MCHC RBC AUTO-ENTMCNC: 30.2 G/DL (ref 31.4–37.4)
MCV RBC AUTO: 80 FL (ref 82–98)
NON VENT- BIPAP: ABNORMAL
O2 CT BLDV-SCNC: 11.7 ML/DL
PCO2 BLDV: 54.3 MM HG (ref 42–50)
PEEP MAX SETTING VENT: 7 CM[H2O]
PH BLDV: 7.39 [PH] (ref 7.3–7.4)
PLATELET # BLD AUTO: 305 THOUSANDS/UL (ref 149–390)
PMV BLD AUTO: 8.7 FL (ref 8.9–12.7)
PO2 BLDV: 88.3 MM HG (ref 35–45)
POTASSIUM SERPL-SCNC: 4.4 MMOL/L (ref 3.5–5.3)
PROCALCITONIN SERPL-MCNC: 0.1 NG/ML
PROT SERPL-MCNC: 7 G/DL (ref 6.4–8.4)
RBC # BLD AUTO: 3.18 MILLION/UL (ref 3.88–5.62)
SODIUM SERPL-SCNC: 135 MMOL/L (ref 135–147)
VENT BIPAP FIO2: 32 %
WBC # BLD AUTO: 8.93 THOUSAND/UL (ref 4.31–10.16)

## 2023-08-31 PROCEDURE — 85027 COMPLETE CBC AUTOMATED: CPT | Performed by: HOSPITALIST

## 2023-08-31 PROCEDURE — 80053 COMPREHEN METABOLIC PANEL: CPT | Performed by: HOSPITALIST

## 2023-08-31 PROCEDURE — 94640 AIRWAY INHALATION TREATMENT: CPT

## 2023-08-31 PROCEDURE — 97110 THERAPEUTIC EXERCISES: CPT

## 2023-08-31 PROCEDURE — 82805 BLOOD GASES W/O2 SATURATION: CPT | Performed by: INTERNAL MEDICINE

## 2023-08-31 PROCEDURE — 99232 SBSQ HOSP IP/OBS MODERATE 35: CPT | Performed by: INTERNAL MEDICINE

## 2023-08-31 PROCEDURE — 84145 PROCALCITONIN (PCT): CPT | Performed by: HOSPITALIST

## 2023-08-31 PROCEDURE — 94760 N-INVAS EAR/PLS OXIMETRY 1: CPT

## 2023-08-31 PROCEDURE — 82948 REAGENT STRIP/BLOOD GLUCOSE: CPT

## 2023-08-31 PROCEDURE — 85018 HEMOGLOBIN: CPT

## 2023-08-31 PROCEDURE — 94660 CPAP INITIATION&MGMT: CPT

## 2023-08-31 PROCEDURE — C9113 INJ PANTOPRAZOLE SODIUM, VIA: HCPCS | Performed by: HOSPITALIST

## 2023-08-31 PROCEDURE — 97116 GAIT TRAINING THERAPY: CPT

## 2023-08-31 PROCEDURE — 99233 SBSQ HOSP IP/OBS HIGH 50: CPT | Performed by: HOSPITALIST

## 2023-08-31 PROCEDURE — 85014 HEMATOCRIT: CPT

## 2023-08-31 PROCEDURE — 94664 DEMO&/EVAL PT USE INHALER: CPT

## 2023-08-31 RX ORDER — INSULIN GLARGINE 100 [IU]/ML
30 INJECTION, SOLUTION SUBCUTANEOUS EVERY 12 HOURS SCHEDULED
Status: DISCONTINUED | OUTPATIENT
Start: 2023-08-31 | End: 2023-08-31

## 2023-08-31 RX ORDER — INSULIN GLARGINE 100 [IU]/ML
30 INJECTION, SOLUTION SUBCUTANEOUS
Status: DISCONTINUED | OUTPATIENT
Start: 2023-08-31 | End: 2023-09-03 | Stop reason: HOSPADM

## 2023-08-31 RX ORDER — LIDOCAINE 50 MG/G
1 PATCH TOPICAL DAILY
Status: DISCONTINUED | OUTPATIENT
Start: 2023-08-31 | End: 2023-09-03 | Stop reason: HOSPADM

## 2023-08-31 RX ADMIN — IPRATROPIUM BROMIDE 0.5 MG: 0.5 SOLUTION RESPIRATORY (INHALATION) at 07:20

## 2023-08-31 RX ADMIN — METHYLPREDNISOLONE SODIUM SUCCINATE 40 MG: 40 INJECTION, POWDER, FOR SOLUTION INTRAMUSCULAR; INTRAVENOUS at 21:15

## 2023-08-31 RX ADMIN — INSULIN LISPRO 2 UNITS: 100 INJECTION, SOLUTION INTRAVENOUS; SUBCUTANEOUS at 16:23

## 2023-08-31 RX ADMIN — LIDOCAINE 5% 1 PATCH: 700 PATCH TOPICAL at 10:37

## 2023-08-31 RX ADMIN — PANTOPRAZOLE SODIUM 40 MG: 40 INJECTION, POWDER, FOR SOLUTION INTRAVENOUS at 21:13

## 2023-08-31 RX ADMIN — LEVOTHYROXINE SODIUM 175 MCG: 175 TABLET ORAL at 06:29

## 2023-08-31 RX ADMIN — PREGABALIN 25 MG: 25 CAPSULE ORAL at 08:35

## 2023-08-31 RX ADMIN — LEVALBUTEROL HYDROCHLORIDE 1.25 MG: 1.25 SOLUTION RESPIRATORY (INHALATION) at 19:14

## 2023-08-31 RX ADMIN — PREGABALIN 25 MG: 25 CAPSULE ORAL at 17:00

## 2023-08-31 RX ADMIN — IRON SUCROSE 200 MG: 20 INJECTION, SOLUTION INTRAVENOUS at 08:45

## 2023-08-31 RX ADMIN — OMEGA-3 FATTY ACIDS CAP 1000 MG 1000 MG: 1000 CAP at 17:00

## 2023-08-31 RX ADMIN — IPRATROPIUM BROMIDE 0.5 MG: 0.5 SOLUTION RESPIRATORY (INHALATION) at 13:46

## 2023-08-31 RX ADMIN — OXYCODONE HYDROCHLORIDE AND ACETAMINOPHEN 500 MG: 500 TABLET ORAL at 08:35

## 2023-08-31 RX ADMIN — ATORVASTATIN CALCIUM 80 MG: 40 TABLET, FILM COATED ORAL at 17:00

## 2023-08-31 RX ADMIN — FERROUS SULFATE TAB 325 MG (65 MG ELEMENTAL FE) 325 MG: 325 (65 FE) TAB at 17:00

## 2023-08-31 RX ADMIN — ALPRAZOLAM 0.25 MG: 0.25 TABLET ORAL at 21:15

## 2023-08-31 RX ADMIN — TRAZODONE HYDROCHLORIDE 150 MG: 50 TABLET ORAL at 21:16

## 2023-08-31 RX ADMIN — LORATADINE 10 MG: 10 TABLET ORAL at 08:35

## 2023-08-31 RX ADMIN — IPRATROPIUM BROMIDE 0.5 MG: 0.5 SOLUTION RESPIRATORY (INHALATION) at 19:14

## 2023-08-31 RX ADMIN — FERROUS SULFATE TAB 325 MG (65 MG ELEMENTAL FE) 325 MG: 325 (65 FE) TAB at 08:35

## 2023-08-31 RX ADMIN — METHYLPREDNISOLONE SODIUM SUCCINATE 40 MG: 40 INJECTION, POWDER, FOR SOLUTION INTRAMUSCULAR; INTRAVENOUS at 08:31

## 2023-08-31 RX ADMIN — OXYCODONE HYDROCHLORIDE AND ACETAMINOPHEN 500 MG: 500 TABLET ORAL at 17:00

## 2023-08-31 RX ADMIN — AMPICILLIN SODIUM AND SULBACTAM SODIUM 3 G: 2; 1 INJECTION, POWDER, FOR SOLUTION INTRAMUSCULAR; INTRAVENOUS at 04:58

## 2023-08-31 RX ADMIN — OMEGA-3 FATTY ACIDS CAP 1000 MG 1000 MG: 1000 CAP at 08:35

## 2023-08-31 RX ADMIN — INSULIN LISPRO 2 UNITS: 100 INJECTION, SOLUTION INTRAVENOUS; SUBCUTANEOUS at 12:03

## 2023-08-31 RX ADMIN — GUAIFENESIN 1200 MG: 600 TABLET, EXTENDED RELEASE ORAL at 08:35

## 2023-08-31 RX ADMIN — INSULIN GLARGINE 30 UNITS: 100 INJECTION, SOLUTION SUBCUTANEOUS at 01:01

## 2023-08-31 RX ADMIN — AZITHROMYCIN MONOHYDRATE 500 MG: 500 INJECTION, POWDER, LYOPHILIZED, FOR SOLUTION INTRAVENOUS at 18:47

## 2023-08-31 RX ADMIN — ACETAMINOPHEN 650 MG: 325 TABLET, FILM COATED ORAL at 17:59

## 2023-08-31 RX ADMIN — PANTOPRAZOLE SODIUM 40 MG: 40 INJECTION, POWDER, FOR SOLUTION INTRAVENOUS at 08:31

## 2023-08-31 RX ADMIN — LEVALBUTEROL HYDROCHLORIDE 1.25 MG: 1.25 SOLUTION RESPIRATORY (INHALATION) at 07:20

## 2023-08-31 RX ADMIN — DULOXETINE HYDROCHLORIDE 60 MG: 30 CAPSULE, DELAYED RELEASE ORAL at 08:35

## 2023-08-31 RX ADMIN — INSULIN GLARGINE 30 UNITS: 100 INJECTION, SOLUTION SUBCUTANEOUS at 21:11

## 2023-08-31 RX ADMIN — LEVALBUTEROL HYDROCHLORIDE 1.25 MG: 1.25 SOLUTION RESPIRATORY (INHALATION) at 13:46

## 2023-08-31 RX ADMIN — INSULIN LISPRO 2 UNITS: 100 INJECTION, SOLUTION INTRAVENOUS; SUBCUTANEOUS at 08:34

## 2023-08-31 RX ADMIN — INSULIN LISPRO 2 UNITS: 100 INJECTION, SOLUTION INTRAVENOUS; SUBCUTANEOUS at 21:11

## 2023-08-31 RX ADMIN — GUAIFENESIN 1200 MG: 600 TABLET, EXTENDED RELEASE ORAL at 21:16

## 2023-08-31 NOTE — ASSESSMENT & PLAN NOTE
- suspect iron deficiency anemia  - Hgb 8.0, prior baseline 11 in 7/2023   -Iron 16, ferritin 19, elevated reticulocyte count 2.34  - BRBPR reported intermittent over prior 3 days to admission, associated with straining  - GI consult:in setting of respiratory failure will defer EGD and colonoscopy at this time, will complete when hemodynamically stable  - Iron infusion completed, continue oral ferrous  - Continue IV PPI  - Monitor hemoglobin

## 2023-08-31 NOTE — PROGRESS NOTES
[address]  Progress Note  Name: Dwayne Albright  MRN: [id number]  Unit/Bed#:  I Date of Admission: 8/29/2023   Date of Service: 8/31/2023 I Hospital Day: 2    Assessment/Plan   * COPD with acute exacerbation [facility])  Assessment & Plan  See management under respiratory failure    Acute respiratory failure with hypoxia and hypercapnia (HCC)  Assessment & Plan  - due to COPD exacerbation, MARSHA/OHS non-compliant with home NIV  - requiring 10L HFNC O2 on admission, respiratory status declined with worsened ABG, started on BiPAP  - repeat ABG continue to improve, clinically improving  - Previously on BiPAP, now tolerated high flow nasal cannula  - will still likely need BiPAP with sleep while active COPD exacerbation until further improved  - Continue IV Decadron q8h  - Discontinued Unasyn in setting of normal procalcitonin 0.17    - continue axithromycin for anti-inflammatory properties  - sputum culture  - IS  - nebulizers ATC    Umbilical hernia without obstruction and without gangrene  Assessment & Plan  - incidental finding on CT  - no abd pain, monitor    Fatty liver disease, nonalcoholic  Assessment & Plan  - weight loss recommended    Microcytic anemia  Assessment & Plan  - suspect iron deficiency anemia  - Hgb 8.0, prior baseline 11 in 7/2023   -Iron 16, ferritin 19, elevated reticulocyte count 2.34  - BRBPR reported intermittent over prior 3 days to admission, associated with straining  - GI consult:in setting of respiratory failure will defer EGD and colonoscopy at this time, will complete when hemodynamically stable  - Iron infusion completed, continue oral ferrous  - Continue IV PPI  - Monitor hemoglobin    Bright red rectal bleeding  Assessment & Plan  See management under microcytic anemia    Pleural effusion, left  Assessment & Plan  - small to moderate left sided effusion on CT  - Seen by pulmonary, no thoracentesis at this time, fluid amount not sufficient  - Echocardiogram completed - EF% 60, Diastolic function is mildly abnormal, consistent with grade I      Uncontrolled type 2 diabetes mellitus with hyperglycemia, with long-term current use of insulin [facility])  Assessment & Plan  Lab Results   Component Value Date    HGBA1C 11.5 (H) 06/08/2023       Recent Labs     08/30/23  1612 08/30/23  2101 08/31/23  0740 08/31/23  1133   POCGLU 253* 325* 179* 154*       Blood Sugar Average: Last 72 hrs:  (P) 215.7923764058012802     Chronically Uncontrolled, see HgA1C above  ISS  Diabetic diet    MARSHA and COPD overlap syndrome (HCC)  Assessment & Plan  - see management under respiratory failure  - non-compliant with CPAP/BiPAP at home    Morbid obesity with BMI of 45.0-49.9, adult (HCC)  Assessment & Plan  BMI 46.85  - lifestyle modifications and dietary restrictions discussed with patient on admission  - education upon discharge    Acquired hypothyroidism  Assessment & Plan  - TSH wnl  - continue levothyroxine    Benign essential hypertension  Assessment & Plan  Hold lisinopril and HCTZ due to acute illness, potential GIB and potential for hemodynamic instability, restart as able           VTE Pharmacologic Prophylaxis:   High Risk (Score >/= 5) - Pharmacological DVT Prophylaxis Ordered: enoxaparin (Lovenox). Sequential Compression Devices Ordered. Patient Centered Rounds: I performed bedside rounds with nursing staff today. Discussions with Specialists or Other Care Team Provider: Pulmonary, GI    Total Time Spent on Date of Encounter in care of patient: 35 minutes This time was spent on one or more of the following: performing physical exam; counseling and coordination of care; obtaining or reviewing history; documenting in the medical record; reviewing/ordering tests, medications or procedures; communicating with other healthcare professionals and discussing with patient's family/caregivers.     Current Length of Stay: 2 day(s)  Current Patient Status: Inpatient   Certification Statement: The patient will continue to require additional inpatient hospital stay due to Respiratory failure  Discharge Plan: Anticipate discharge in 24-48 hrs to home. Code Status: Level 1 - Full Code    Subjective:   Patient seen at bedside this morning, sitting up in chair comfortably. Patient states that he is feeling better since receiving breathing treatments. Still complaining of mild shortness of breath when he talks, and a cough. When discussing his COPD, patient believes that the construction dust outside his house caused his acute exacerbation, he always has all his windows open. Patient has chronic knee pain and complaints of left knee pain, states he needs a knee replacement and has scheduled injections. Objective:     Vitals:   Temp (24hrs), Av.9 °F (36.6 °C), Min:97.5 °F (36.4 °C), Max:98.3 °F (36.8 °C)    Temp:  [97.5 °F (36.4 °C)-98.3 °F (36.8 °C)] 98.3 °F (36.8 °C)  HR:  [] 88  Resp:  [17-32] 30  BP: (115-139)/(69-93) 139/74  SpO2:  [87 %-98 %] 93 %  Body mass index is 46.85 kg/m². Input and Output Summary (last 24 hours): Intake/Output Summary (Last 24 hours) at 2023 1556  Last data filed at 2023 1254  Gross per 24 hour   Intake 2420 ml   Output 4750 ml   Net -2330 ml       Physical Exam:   Physical Exam  Constitutional:       General: He is not in acute distress. Appearance: He is obese. He is ill-appearing. HENT:      Head: Normocephalic. Right Ear: External ear normal.      Left Ear: External ear normal.      Nose: No rhinorrhea. Mouth/Throat:      Mouth: Mucous membranes are moist.   Eyes:      General:         Right eye: No discharge. Left eye: No discharge. Cardiovascular:      Rate and Rhythm: Normal rate and regular rhythm. Pulses: Normal pulses. Heart sounds: Normal heart sounds. Pulmonary:      Effort: No respiratory distress. Breath sounds: Rhonchi present.       Comments: Crackles, worse on the right  Increased effort  Abdominal:      General: There is no distension. Palpations: Abdomen is soft. Tenderness: There is no abdominal tenderness. Musculoskeletal:      Cervical back: Normal range of motion. Right lower leg: Edema present. Left lower leg: Edema present. Comments: Right knee lidocaine patch, reduced ROM  No swelling, no erythema   Skin:     General: Skin is warm and dry. Findings: No rash. Neurological:      General: No focal deficit present. Mental Status: He is alert. Mental status is at baseline. Psychiatric:         Mood and Affect: Mood normal.         Behavior: Behavior normal.          Additional Data:     Labs:  Results from last 7 days   Lab Units 08/31/23  1344 08/31/23  0453 08/29/23  2250 08/29/23  1837   WBC Thousand/uL  --  8.93   < > 8.66   HEMOGLOBIN g/dL 8.2* 7.7*   < > 8.4*   HEMATOCRIT % 27.5* 25.5*   < > 27.4*   PLATELETS Thousands/uL  --  305   < > 336   NEUTROS PCT %  --   --   --  65   LYMPHS PCT %  --   --   --  17   MONOS PCT %  --   --   --  14*   EOS PCT %  --   --   --  2    < > = values in this interval not displayed.      Results from last 7 days   Lab Units 08/31/23  0453   SODIUM mmol/L 135   POTASSIUM mmol/L 4.4   CHLORIDE mmol/L 94*   CO2 mmol/L 34*   BUN mg/dL 25   CREATININE mg/dL 1.25   ANION GAP mmol/L 7   CALCIUM mg/dL 8.9   ALBUMIN g/dL 3.7   TOTAL BILIRUBIN mg/dL 0.32   ALK PHOS U/L 51   ALT U/L 30   AST U/L 29   GLUCOSE RANDOM mg/dL 208*     Results from last 7 days   Lab Units 08/30/23  0438   INR  1.07     Results from last 7 days   Lab Units 08/31/23  1133 08/31/23  0740 08/30/23  2101 08/30/23  1612 08/30/23  1204 08/30/23  0820 08/29/23  2327   POC GLUCOSE mg/dl 154* 179* 325* 253* 267* 208* 123         Results from last 7 days   Lab Units 08/31/23  0453 08/29/23  1837   LACTIC ACID mmol/L  --  1.7   PROCALCITONIN ng/ml 0.10 0.17       Lines/Drains:  Invasive Devices     Peripheral Intravenous Line  Duration           Peripheral IV 23 Right Antecubital 1 day                  Telemetry:  Telemetry Orders (From admission, onward)             24 Hour Telemetry Monitoring  Continuous x 24 Hours (Telem)           Question:  Reason for 24 Hour Telemetry  Answer:  Acute respiratory failure on BiPAP                          Imaging: Reviewed radiology reports from this admission including: chest CT scan    Recent Cultures (last 7 days):   Results from last 7 days   Lab Units 23  0441 23  1837   BLOOD CULTURE   --  No Growth at 24 hrs. No Growth at 24 hrs.    SPUTUM CULTURE  Culture too young- will reincubate  --    GRAM STAIN RESULT  1+ Epithelial cells per low power field*  Rare Polys*  2+ Gram positive cocci in pairs and chains*  1+ Gram positive rods*  --        Last 24 Hours Medication List:   Current Facility-Administered Medications   Medication Dose Route Frequency Provider Last Rate   • acetaminophen  650 mg Oral Q6H PRN Ned Gutierrez MD     • albuterol  2.5 mg Nebulization Q4H PRN Ned Gutierrez MD     • ALPRAZolam  0.25 mg Oral BID PRN Ned Gutierrez MD     • aluminum-magnesium hydroxide-simethicone  30 mL Oral Q6H PRN Ned Gutierrez MD     • ascorbic acid  500 mg Oral BID AC Ned Gutierrez MD     • atorvastatin  80 mg Oral Daily With Dinner Ned Gutierrez MD     • azithromycin  500 mg Intravenous Q24H Ned Gutierrez MD     • benzonatate  100 mg Oral TID PRN Ned Gutierrez MD     • DULoxetine  60 mg Oral Daily Ned Gutierrez MD     • ferrous sulfate  325 mg Oral BID AC Ned Gutierrez MD     • fish oil  1,000 mg Oral BID Ned Gutierrez MD     • guaiFENesin  1,200 mg Oral Q12H [facility] Ned Gutierrez MD     • insulin glargine  30 Units Subcutaneous HS Ned Gutierrez MD     • insulin lispro  2-12 Units Subcutaneous TID AC Ned Gutierrez MD     • insulin lispro  2-12 Units Subcutaneous HS Ned Gutierrez MD     • ipratropium  0.5 mg Nebulization TID Ned Gutierrez MD • iron sucrose  200 mg Intravenous Daily Mannie Yadiel Counts, DO 0 mg (08/30/23 1708)   • levalbuterol  1.25 mg Nebulization TID Ld aTnner MD     • levothyroxine  175 mcg Oral Early Morning Ld Tanner MD     • lidocaine  1 patch Topical Daily Mannie Yadiel Counts, DO     • loratadine  10 mg Oral Daily Ld Tanner MD     • methylPREDNISolone sodium succinate  40 mg Intravenous Q12H [address] Dandre Car PA-C     • metoclopramide  10 mg Intravenous Q6H PRN Ld Tanner MD     • pantoprazole  40 mg Intravenous Q12H [address] Mannie Cotto Counts, DO     • pregabalin  25 mg Oral BID Ld Tanner MD     • sodium chloride  1 spray Each Nare Q1H PRN Ld Tanner MD     • traZODone  150 mg Oral HS Ld Tanner MD          Today, Patient Was Seen By: Jeannette Metcalf MD    **Please Note: This note may have been constructed using a voice recognition system. **

## 2023-08-31 NOTE — ASSESSMENT & PLAN NOTE
- small to moderate left sided effusion on CT  - Seen by pulmonary, no thoracentesis at this time, fluid amount not sufficient  - Echocardiogram completed - EF% 60, Diastolic function is mildly abnormal, consistent with grade I

## 2023-08-31 NOTE — PROGRESS NOTES
-- Patient:  -- MRN: [id number]  -- Aidin Request ID: [id number]  -- Level of care reserved: Colten Crum  -- Partner Reserved: [facility]- [facility], [place],  [address] [phone]  -- Clinical needs requested: physical therapy, occupational therapy, skilled nursing, medication management, copd, disease process education, diabetic care & teaching  -- Geography searched: [id number]  -- Start of Service:  -- Request sent: 8:37am EDT on 8/31/2023 by Pranav Fang  -- Partner reserved: 4:23pm EDT on 8/31/2023 by Pranav Fang  -- Choice list shared: 4:05pm EDT on 8/31/2023 by Pranav Fang

## 2023-08-31 NOTE — CASE MANAGEMENT
Case Management Discharge Planning Note    Patient name Melonie Nicolas  Location / MRN [id number]  : 1965 Date 2023       Current Admission Date: 2023  Current Admission Diagnosis:COPD with acute exacerbation [facility])   Patient Active Problem List    Diagnosis Date Noted   • Pleural effusion, left 2023   • Bright red rectal bleeding 2023   • ABLA (acute blood loss anemia) 2023   • Microcytic anemia 2023   • Fatty liver disease, nonalcoholic    • Umbilical hernia without obstruction and without gangrene 2023   • Primary osteoarthritis of both knees    • Peripheral vascular disease ([address]) 2022   • Depression, recurrent ([address]) 2021   • Hyperlipidemia 04/15/2021   • Constipation 2021   • Urinary retention 2021   • Acute respiratory failure with hypoxia and hypercapnia ([address]) 2021   • Epistaxis 2021   • MARSHA and COPD overlap syndrome ([address]) 2021   • Muscle twitching 2021   • Uncontrolled type 2 diabetes mellitus with hyperglycemia, with long-term current use of insulin ([address]) 2021   • Tobacco use 2021   • Atelectasis 2021   • Noncompliance with diabetes treatment 2020   • Bilateral lower leg cellulitis 2018   • Elevated LFTs 2018   • Elevated lactic acid level 2018   • Tobacco abuse 2018   • COPD with acute exacerbation ([address]) 2017   • Chronic back pain 2015   • Chronic sinusitis 2015   • Allergic rhinitis 2015   • Arthritis 2015   • Depression with anxiety 2015   • Benign essential hypertension 2015   • Morbid obesity with BMI of 45.0-49.9, adult ([address]) 2015   • Type 2 diabetes, uncontrolled, with neuropathy 2015   • Vitamin D deficiency 2013   • Acquired hypothyroidism 2011      LOS (days): 2  Geometric Mean LOS (GMLOS) (days): 3.50  Days to GMLOS:1.7     OBJECTIVE:  Risk of Unplanned Readmission Score: 16.16         Current admission status: Inpatient   Preferred Pharmacy:   [facility] [place] - [address], [address] [facility] [place] [address]  Phone: [phone] Fax: [phone]    Primary Care Provider: Deisi Edmonds PA-C    Primary Insurance: MEDICARE  Secondary Insurance:     DISCHARGE DETAILS:      Patient currently lives in second floor appt with 21 steps into home patient has difficulty getting up steps with knees and pulmonary status prior to hospital.      Patient provided with housing options for future will be on waiting list.     Patient provided list of local high rise apartments with phone number to call for applications to be placed on waiting list.     Patient aware he needs to fill out  Application for high rises and then be placed on wait list.    patient provided with application for all [address] [place] [address] facilities. Patient provided with Medical assistance application to start the less than 60 program for assistance in community. Patient provided phone number for [facility] less than 60 program at [phone].       CM to follow

## 2023-08-31 NOTE — ASSESSMENT & PLAN NOTE
- due to COPD exacerbation, MARSHA/OHS non-compliant with home NIV  - requiring 10L HFNC O2 on admission, respiratory status declined with worsened ABG, started on BiPAP  - repeat ABG continue to improve, clinically improving  - Previously on BiPAP, now tolerated high flow nasal cannula  - will still likely need BiPAP with sleep while active COPD exacerbation until further improved  - Continue IV Decadron q8h  - Discontinued Unasyn in setting of normal procalcitonin 0.17    - continue axithromycin for anti-inflammatory properties  - sputum culture  - IS  - nebulizers ATC

## 2023-08-31 NOTE — PROGRESS NOTES
Progress Note - Pulmonary   Shola Vizcarra 61 y.o. male MRN: [id number]  Unit/Bed#:  Encounter: [id number]    Assessment/Plan:    1. Acute hypoxic respiratory failure  2. Acute on suspected chronic hypercapnic respiratory failure  3. Left pleural effusion  4. Emphysema with mild acute exacerbation  5. Suspected MARSHA/OHS  6. Morbid obesity  7. Type 2 diabetes mellitus with hyperglycemia  8. Anemia  9. BRBPR  10. Nicotine dependence in remission     Pulmonary recommendations:      • Improving- weaned from 10L to 9L midflow in my presence. • Titrate FiO2 to maintain SpO2 to maintain saturations greater than or equal to 88%. • Continue BiPAP qHS- will need qualification for outpatient BiPAP prior to discharge. • Follow blood cultures and sputum culture- no grwoth to date but awaiting speciation from sputum culture  • Agree with discontinuing Unasyn. • May continue azithromycin for COPD exacerbation. • Echo with EF 60%, G1DD, mild pulmonary hypertension   • Left pleural effusion not amendable to thoracentesis due to small size and loculation. • Continue Solumedrol 40 mg IV q12 hours. • Continue Xopenex/Atrovent nebs 3 times daily. Chief Complaint:    "My breathing is better."    Subjective:    Patient was seen and examined today. No overnight events reported. He slept on BiPAP 14/7 without issues. States that his breathing is improved today compared to yesterday. Still with off and on wheezing and cough productive of clear/white phlegm. Denies fevers, chills, chest pain, dizziness, N/V/D. Objective:    Vitals: Blood pressure 135/79, pulse 80, temperature 98.3 °F (36.8 °C), temperature source Tympanic, resp. rate 20, height 5' 4" (1.626 m), weight 124 kg (272 lb 14.9 oz), SpO2 96 %. 9L midflow,Body mass index is 46.85 kg/m².       Intake/Output Summary (Last 24 hours) at 8/31/2023 1331  Last data filed at 8/31/2023 1254  Gross per 24 hour   Intake 2420 ml   Output 4750 ml   Net -2330 ml       Invasive Devices     Peripheral Intravenous Line  Duration           Peripheral IV 08/29/23 Right Antecubital 1 day                Physical Exam:     Physical Exam  Vitals and nursing note reviewed. Constitutional:       General: He is not in acute distress. Appearance: Normal appearance. He is obese. HENT:      Head: Normocephalic and atraumatic. Mouth/Throat:      Mouth: Mucous membranes are moist.      Pharynx: Oropharynx is clear. Cardiovascular:      Rate and Rhythm: Normal rate and regular rhythm. Heart sounds: No murmur heard. Pulmonary:      Effort: Pulmonary effort is normal.      Breath sounds: Wheezing present. No rhonchi. Musculoskeletal:      Cervical back: Normal range of motion. Left lower leg: Edema present. Skin:     General: Skin is warm and dry. Neurological:      General: No focal deficit present. Mental Status: He is alert. Mental status is at baseline. Psychiatric:         Mood and Affect: Mood normal.         Behavior: Behavior normal.         Labs: I have personally reviewed pertinent lab results. , ABG: No results found for: "PHART", "NQQ2JLT", "PO2ART", "RXV4OGE", "U6XNKCGV", "BEART", "SOURCE", BNP: No results found for: "BNP", CBC:   Lab Results   Component Value Date    WBC 8.93 08/31/2023    HGB 7.7 (L) 08/31/2023    HCT 25.5 (L) 08/31/2023    MCV 80 (L) 08/31/2023     08/31/2023    RBC 3.18 (L) 08/31/2023    MCH 24.2 (L) 08/31/2023    MCHC 30.2 (L) 08/31/2023    RDW 15.8 (H) 08/31/2023    MPV 8.7 (L) 08/31/2023   , CMP:   Lab Results   Component Value Date    SODIUM 135 08/31/2023    K 4.4 08/31/2023    CL 94 (L) 08/31/2023    CO2 34 (H) 08/31/2023    BUN 25 08/31/2023    CREATININE 1.25 08/31/2023    CALCIUM 8.9 08/31/2023    AST 29 08/31/2023    ALT 30 08/31/2023    ALKPHOS 51 08/31/2023    EGFR 63 08/31/2023   , PT/INR: No results found for: "PT", "INR", Troponin: No results found for: "TROPONINI"      Imaging and other studies: I have personally reviewed pertinent reports.    and I have personally reviewed pertinent films in PACS

## 2023-08-31 NOTE — CASE MANAGEMENT
Case Management Discharge Planning Note    Patient name Yasmeen Garcia  Location / MRN [id number]  : 1965 Date 2023       Current Admission Date: 2023  Current Admission Diagnosis:COPD with acute exacerbation [facility])   Patient Active Problem List    Diagnosis Date Noted   • Pleural effusion, left 2023   • Bright red rectal bleeding 2023   • ABLA (acute blood loss anemia) 2023   • Microcytic anemia 2023   • Fatty liver disease, nonalcoholic 1674   • Umbilical hernia without obstruction and without gangrene 2023   • Primary osteoarthritis of both knees    • Peripheral vascular disease ([address]) 2022   • Depression, recurrent ([address]) 2021   • Hyperlipidemia 04/15/2021   • Constipation 2021   • Urinary retention 2021   • Acute respiratory failure with hypoxia and hypercapnia ([address]) 2021   • Epistaxis 2021   • MARSHA and COPD overlap syndrome ([address]) 2021   • Muscle twitching 2021   • Uncontrolled type 2 diabetes mellitus with hyperglycemia, with long-term current use of insulin ([address]) 2021   • Tobacco use 2021   • Atelectasis 2021   • Noncompliance with diabetes treatment 2020   • Bilateral lower leg cellulitis 2018   • Elevated LFTs 2018   • Elevated lactic acid level 2018   • Tobacco abuse 2018   • COPD with acute exacerbation ([address]) 2017   • Chronic back pain 2015   • Chronic sinusitis 2015   • Allergic rhinitis 2015   • Arthritis 2015   • Depression with anxiety 2015   • Benign essential hypertension 2015   • Morbid obesity with BMI of 45.0-49.9, adult ([address]) 2015   • Type 2 diabetes, uncontrolled, with neuropathy 2015   • Vitamin D deficiency 2013   • Acquired hypothyroidism 2011      LOS (days): 2  Geometric Mean LOS (GMLOS) (days): 3.50  Days to GMLOS:1.7     OBJECTIVE:  Risk of Unplanned Readmission Score: 16.2         Current admission status: Inpatient   Preferred Pharmacy:   [facility] [place] - [address], [address] [facility] [place] [address]  Phone: [phone] Fax: [phone]    Primary Care Provider: Deisi Edmonds PA-C    Primary Insurance: MEDICARE  Secondary Insurance:     DISCHARGE DETAILS:     Patient provided home health choices. His preference is [facility]    [facility] secured in aidin.

## 2023-08-31 NOTE — ASSESSMENT & PLAN NOTE
Lab Results   Component Value Date    HGBA1C 11.5 (H) 06/08/2023       Recent Labs     08/30/23  1612 08/30/23  2101 08/31/23  0740 08/31/23  1133   POCGLU 253* 325* 179* 154*       Blood Sugar Average: Last 72 hrs:  (P) [id number]     Chronically Uncontrolled, see HgA1C above  ISS  Diabetic diet

## 2023-08-31 NOTE — PLAN OF CARE
Problem: PAIN - ADULT  Goal: Verbalizes/displays adequate comfort level or baseline comfort level  Description: Interventions:  - Encourage patient to monitor pain and request assistance  - Assess pain using appropriate pain scale  - Administer analgesics based on type and severity of pain and evaluate response  - Implement non-pharmacological measures as appropriate and evaluate response  - Consider cultural and social influences on pain and pain management  - Notify physician/advanced practitioner if interventions unsuccessful or patient reports new pain  Outcome: Progressing     Problem: INFECTION - ADULT  Goal: Absence or prevention of progression during hospitalization  Description: INTERVENTIONS:  - Assess and monitor for signs and symptoms of infection  - Monitor lab/diagnostic results  - Monitor all insertion sites, i.e. indwelling lines, tubes, and drains  - Monitor endotracheal if appropriate and nasal secretions for changes in amount and color  - [place] appropriate cooling/warming therapies per order  - Administer medications as ordered  - Instruct and encourage patient and family to use good hand hygiene technique  - Identify and instruct in appropriate isolation precautions for identified infection/condition  Outcome: Progressing  Goal: Absence of fever/infection during neutropenic period  Description: INTERVENTIONS:  - Monitor WBC    Outcome: Progressing     Problem: SAFETY ADULT  Goal: Patient will remain free of falls  Description: INTERVENTIONS:  - Educate patient/family on patient safety including physical limitations  - Instruct patient to call for assistance with activity   - Consult OT/PT to assist with strengthening/mobility   - Keep Call bell within reach  - Keep bed low and locked with side rails adjusted as appropriate  - Keep care items and personal belongings within reach  - Initiate and maintain comfort rounds  - Make Fall Risk Sign visible to staff  - Offer Toileting every 2 Hours, in advance of need  - Initiate/Maintain bed/ chair alarm  - Apply yellow socks and bracelet for high fall risk patients  - Consider moving patient to room near nurses station  Outcome: Progressing  Goal: Maintain or return to baseline ADL function  Description: INTERVENTIONS:  -  Assess patient's ability to carry out ADLs; assess patient's baseline for ADL function and identify physical deficits which impact ability to perform ADLs (bathing, care of mouth/teeth, toileting, grooming, dressing, etc.)  - Assess/evaluate cause of self-care deficits   - Assess range of motion  - Assess patient's mobility; develop plan if impaired  - Assess patient's need for assistive devices and provide as appropriate  - Encourage maximum independence but intervene and supervise when necessary  - Involve family in performance of ADLs  - Assess for home care needs following discharge   - Consider OT consult to assist with ADL evaluation and planning for discharge  - Provide patient education as appropriate  Outcome: Progressing  Goal: Maintains/Returns to pre admission functional level  Description: INTERVENTIONS:  - Perform BMAT or MOVE assessment daily.   - Set and communicate daily mobility goal to care team and patient/family/caregiver. - Collaborate with rehabilitation services on mobility goals if consulted  - Perform Range of Motion 3-4 times a day. - Reposition patient every 2 hours.   - Dangle patient 3 times a day  - Stand patient 3 times a day  - Ambulate patient 3 times a day  - Out of bed to chair 3 times a day   - Out of bed for meals 3 times a day  - Out of bed for toileting  - Record patient progress and toleration of activity level   Outcome: Progressing     Problem: DISCHARGE PLANNING  Goal: Discharge to home or other facility with appropriate resources  Description: INTERVENTIONS:  - Identify barriers to discharge w/patient and caregiver  - Arrange for needed discharge resources and transportation as appropriate  - Identify discharge learning needs (meds, wound care, etc.)  - Arrange for interpretive services to assist at discharge as needed  - Refer to Case Management Department for coordinating discharge planning if the patient needs post-hospital services based on physician/advanced practitioner order or complex needs related to functional status, cognitive ability, or social support system  Outcome: Progressing     Problem: Knowledge Deficit  Goal: Patient/family/caregiver demonstrates understanding of disease process, treatment plan, medications, and discharge instructions  Description: Complete learning assessment and assess knowledge base.   Interventions:  - Provide teaching at level of understanding  - Provide teaching via preferred learning methods  Outcome: Progressing     Problem: RESPIRATORY - ADULT  Goal: Achieves optimal ventilation and oxygenation  Description: INTERVENTIONS:  - Assess for changes in respiratory status  - Assess for changes in mentation and behavior  - Position to facilitate oxygenation and minimize respiratory effort  - Oxygen administered by appropriate delivery if ordered  - Initiate smoking cessation education as indicated  - Encourage broncho-pulmonary hygiene including cough, deep breathe, Incentive Spirometry  - Assess the need for suctioning and aspirate as needed  - Assess and instruct to report SOB or any respiratory difficulty  - Respiratory Therapy support as indicated  Outcome: Progressing

## 2023-08-31 NOTE — PLAN OF CARE
Problem: PHYSICAL THERAPY ADULT  Goal: Performs mobility at highest level of function for planned discharge setting. See evaluation for individualized goals. Description: Treatment/Interventions: Functional transfer training, LE strengthening/ROM, Elevations, Therapeutic exercise, Endurance training, Bed mobility, Gait training          See flowsheet documentation for full assessment, interventions and recommendations. Outcome: Progressing  Note: Prognosis: Good  Problem List:  (Decreased strength; Decreased endurance; Impaired balance; Decreased mobility; Obesity)  Assessment: Pt. seen for PT treatment session this date with interventions consisting of  therapeutic exercises, bed mobility, transfers and  gait training w/ emphasis on improving pt's ability to ambulate. Pt. Requiring occasional cues for sequence and safety. In comparison to previous session, Pt. With min improvements in activity tolerance. Used RW due to sore knees and energy conservation. Pt is in need of continued activity in PT to improve strength balance endurance mobility transfers and ambulation with return to maximize LOF. From PT/mobility standpoint, recommendation at time of d/c would be HHPT in order to promote return to PLOF and independence. The patient's AM-PAC Basic Mobility Inpatient Short Form Raw Score is 19. A Raw score of greater than 16 suggests the patient may benefit from discharge to home. Please also refer to physical therapy recommendation for safe DC planning. PT Discharge Recommendation: Home with home health rehabilitation    See flowsheet documentation for full assessment.

## 2023-08-31 NOTE — RESPIRATORY THERAPY NOTE
08/30/23 0732   Respiratory Assessment   Assessment Type Assess only   General Appearance Alert; Awake   Respiratory Pattern Normal   Chest Assessment Chest expansion symmetrical   Cough None   Resp Comments Patient placed on BiPAP for sleeping   Non-Invasive Information   O2 Interface Device Full face mask   Non-Invasive Ventilation Mode BiPAP   SpO2 95 %   $ Pulse Oximetry Spot Check Charge Completed   Non-Invasive Settings   FiO2 (%) 32   IPAP (cm) 14 cm   EPAP (cm) 7 cm   Rate (Set) 16   Pressure Support (cm H2O) 7   Rise Time 3   Inspiratory Time (Set) 0.95   Non-Invasive Readings   Skin Intervention Skin intact   Total Rate 20   MV (Mech) 12.7   Peak Pressure (Obs) 18   Spontaneous Vt (mL) 620   I/E Ratio (Obs) 1:2.9   Leak (lpm) 0   Non-Invasive Alarms   Insp Pressure High (cm H20) 18   Insp Pressure Low (cm H20) 6   MV Low (L/min) 4   Vt High (mL) 1.2   Vt Low (mL) 200   High Resp Rate (BPM) 35 BPM   Low Resp Rate (BPM) 10 BPM   Apnea Interval (sec) 20

## 2023-08-31 NOTE — PHYSICAL THERAPY NOTE
PHYSICAL THERAPY NOTE          Patient Name: Melonie Nicolas  BGMYV'R Date: 8/31/2023 08/31/23 0831   PT Last Visit   PT Visit Date 08/31/23   Note Type   Note Type Treatment   Pain Assessment   Pain Assessment Tool 0-10   Pain Score No Pain   Restrictions/Precautions   Weight Bearing Precautions Per Order No   Other Precautions   (Fall Risk; Multiple lines; O2; Telemetry;midflow)   General   Chart Reviewed Yes   Response to Previous Treatment Patient reporting fatigue but able to participate. Family/Caregiver Present No   Cognition   Overall Cognitive Status WFL   Arousal/Participation Alert; Cooperative   Following Commands Follows all commands and directions without difficulty   Subjective   Subjective "My knees are sore"   Bed Mobility   Supine to Sit 5  Supervision   Additional items HOB elevated; Increased time required;Verbal cues   Additional Comments Increased time to transition to EOB. Sat EOB with fair+ sitting balance. Moderate SOB. SpO2 91% in bed 10 L mid flow   Transfers   Sit to Stand 5  Supervision   Additional items Increased time required;Verbal cues   Stand to Sit 5  Supervision   Additional items Armrests; Increased time required;Verbal cues   Stand pivot 5  Supervision   Additional items Verbal cues   Additional Comments RW used   Ambulation/Elevation   Gait pattern   (Excessively slow; Short stride; Foward flexed; Decreased foot clearance; Wide LENY; Improper Weight shift)   Gait Assistance 5  Supervision   Additional items Verbal cues   Assistive Device Rolling walker   Distance 40' x 2  (seated rest break)   Balance   Static Sitting Good   Dynamic Sitting Good   Static Standing Fair   Dynamic Standing Fair   Ambulatory Fair  (RW)   Endurance Deficit   Endurance Deficit Yes   Endurance Deficit Description SpO2 91-96 % with ambulation 15 l midflow.  Returned to 10 L at rest. (10L not available on tank) 96% sitting at rest with 10 L   Activity Tolerance   Activity Tolerance Patient limited by fatigue   Exercises   Hip Flexion Sitting;10 reps;AROM; Bilateral   Hip Abduction Sitting;15 reps;AROM; Bilateral   Hip Adduction Sitting;15 reps;AROM; Bilateral   Knee AROM Long Arc Quad Sitting;15 reps;AROM; Bilateral   Ankle Pumps Sitting;15 reps;AROM; Bilateral   Assessment   Prognosis Good   Problem List   (Decreased strength; Decreased endurance; Impaired balance; Decreased mobility; Obesity)   Assessment Pt. seen for PT treatment session this date with interventions consisting of  therapeutic exercises, bed mobility, transfers and  gait training w/ emphasis on improving pt's ability to ambulate. Pt. Requiring occasional cues for sequence and safety. In comparison to previous session, Pt. With min improvements in activity tolerance. Used RW due to sore knees and energy conservation. Pt is in need of continued activity in PT to improve strength balance endurance mobility transfers and ambulation with return to maximize LOF. From PT/mobility standpoint, recommendation at time of d/c would be HHPT in order to promote return to PLOF and independence. The patient's AM-PAC Basic Mobility Inpatient Short Form Raw Score is 19. A Raw score of greater than 16 suggests the patient may benefit from discharge to home. Please also refer to physical therapy recommendation for safe DC planning. Goals   LTG Expiration Date 09/13/23   PT Treatment Day 1   Plan   Treatment/Interventions   (Functional transfer training; LE strengthening/ROM; Elevations; Therapeutic exercise;  Endurance training; Bed mobility; Gait training)   Progress Progressing toward goals   PT Frequency 3-5x/wk   Recommendation   PT Discharge Recommendation Home with home health rehabilitation   [address] in Flat Bed Without Bedrails 3   Lying on Back to Sitting on Edge of Flat Bed Without Bedrails 3   Moving Bed to Chair 3   Standing Up From Chair Using Arms 4   Walk in Room 3   Climb 3-5 Stairs With Railing 3   Basic Mobility Inpatient Raw Score 19   Basic Mobility Standardized Score 42.48   Highest Level Of Mobility   JH-HLM Goal 6: Walk 10 steps or more   JH-HLM Achieved 7: Walk 25 feet or more   Education   Education Provided Mobility training   Patient Demonstrates verbal understanding   End of Consult   Patient Position at End of Consult Bedside chair;Bed/Chair alarm activated; All needs within reach   End of Consult Comments discussed POC with PT

## 2023-08-31 NOTE — PLAN OF CARE
Problem: PAIN - ADULT  Goal: Verbalizes/displays adequate comfort level or baseline comfort level  Description: Interventions:  - Encourage patient to monitor pain and request assistance  - Assess pain using appropriate pain scale  - Administer analgesics based on type and severity of pain and evaluate response  - Implement non-pharmacological measures as appropriate and evaluate response  - Consider cultural and social influences on pain and pain management  - Notify physician/advanced practitioner if interventions unsuccessful or patient reports new pain  Outcome: Progressing     Problem: INFECTION - ADULT  Goal: Absence or prevention of progression during hospitalization  Description: INTERVENTIONS:  - Assess and monitor for signs and symptoms of infection  - Monitor lab/diagnostic results  - Monitor all insertion sites, i.e. indwelling lines, tubes, and drains  - Monitor endotracheal if appropriate and nasal secretions for changes in amount and color  - [place] appropriate cooling/warming therapies per order  - Administer medications as ordered  - Instruct and encourage patient and family to use good hand hygiene technique  - Identify and instruct in appropriate isolation precautions for identified infection/condition  Outcome: Progressing  Goal: Absence of fever/infection during neutropenic period  Description: INTERVENTIONS:  - Monitor WBC    Outcome: Progressing     Problem: Knowledge Deficit  Goal: Patient/family/caregiver demonstrates understanding of disease process, treatment plan, medications, and discharge instructions  Description: Complete learning assessment and assess knowledge base.   Interventions:  - Provide teaching at level of understanding  - Provide teaching via preferred learning methods  Outcome: Progressing     Problem: RESPIRATORY - ADULT  Goal: Achieves optimal ventilation and oxygenation  Description: INTERVENTIONS:  - Assess for changes in respiratory status  - Assess for changes in mentation and behavior  - Position to facilitate oxygenation and minimize respiratory effort  - Oxygen administered by appropriate delivery if ordered  - Initiate smoking cessation education as indicated  - Encourage broncho-pulmonary hygiene including cough, deep breathe, Incentive Spirometry  - Assess the need for suctioning and aspirate as needed  - Assess and instruct to report SOB or any respiratory difficulty  - Respiratory Therapy support as indicated  Outcome: Progressing

## 2023-08-31 NOTE — ASSESSMENT & PLAN NOTE
BMI 46.85  - lifestyle modifications and dietary restrictions discussed with patient on admission  - education upon discharge

## 2023-08-31 NOTE — RESPIRATORY THERAPY NOTE
Patient did wear the BiPAP all night, but it was a struggle to get him to wear it. He removes the machine at his own will without telling anybody and is constantly restless. His nurse and I both stressed the importance that he wear the machine for sleeping as per pulmonary request, due to his rising CO2 when he originally came into the ER. I re applied the mask to him several times throughout the night, because he removed it.

## 2023-08-31 NOTE — QUICK NOTE
GI Quick Note:    Given patient's ongoing respiratory issues, we will cancel plans for endoscopy and colonoscopy tomorrow. Case discussed with pulmonology and primary team updated via [facility] text. Will place back on carbohydrate controlled diet. Continue care per pulmonary recommendations. If patient is still admitted next week, we can reevaluate for possible inpatient bidirectional endoscopy depending on his clinical progress. Please contact the GI provider on call with any questions or concerns. Karma Berumen D.O. [address] [facility]  Division of Gastroenterology & Hepatology  Available on [email or web address]

## 2023-09-01 LAB
ALBUMIN SERPL BCP-MCNC: 3.7 G/DL (ref 3.5–5)
ALP SERPL-CCNC: 52 U/L (ref 34–104)
ALT SERPL W P-5'-P-CCNC: 31 U/L (ref 7–52)
ANION GAP SERPL CALCULATED.3IONS-SCNC: 7 MMOL/L
ARTERIAL PATENCY WRIST A: NO
AST SERPL W P-5'-P-CCNC: 31 U/L (ref 13–39)
BACTERIA SPT RESP CULT: ABNORMAL
BASE EX.OXY STD BLDV CALC-SCNC: 93.3 % (ref 60–80)
BASE EXCESS BLDV CALC-SCNC: 7.1 MMOL/L
BASOPHILS # BLD AUTO: 0.04 THOUSANDS/ÂΜL (ref 0–0.1)
BASOPHILS NFR BLD AUTO: 0 % (ref 0–1)
BILIRUB SERPL-MCNC: 0.37 MG/DL (ref 0.2–1)
BODY TEMPERATURE: 98.6 DEGREES FEHRENHEIT
BUN SERPL-MCNC: 20 MG/DL (ref 5–25)
CALCIUM SERPL-MCNC: 9 MG/DL (ref 8.4–10.2)
CHLORIDE SERPL-SCNC: 96 MMOL/L (ref 96–108)
CO2 SERPL-SCNC: 33 MMOL/L (ref 21–32)
CREAT SERPL-MCNC: 1.13 MG/DL (ref 0.6–1.3)
EOSINOPHIL # BLD AUTO: 0.01 THOUSAND/ÂΜL (ref 0–0.61)
EOSINOPHIL NFR BLD AUTO: 0 % (ref 0–6)
ERYTHROCYTE [DISTWIDTH] IN BLOOD BY AUTOMATED COUNT: 15.9 % (ref 11.6–15.1)
GFR SERPL CREATININE-BSD FRML MDRD: 71 ML/MIN/1.73SQ M
GLUCOSE SERPL-MCNC: 127 MG/DL (ref 65–140)
GLUCOSE SERPL-MCNC: 152 MG/DL (ref 65–140)
GLUCOSE SERPL-MCNC: 195 MG/DL (ref 65–140)
GLUCOSE SERPL-MCNC: 233 MG/DL (ref 65–140)
GLUCOSE SERPL-MCNC: 283 MG/DL (ref 65–140)
GRAM STN SPEC: ABNORMAL
HCO3 BLDV-SCNC: 33.4 MMOL/L (ref 24–30)
HCT VFR BLD AUTO: 27 % (ref 36.5–49.3)
HGB BLD-MCNC: 8.1 G/DL (ref 12–17)
IMM GRANULOCYTES # BLD AUTO: 0.13 THOUSAND/UL (ref 0–0.2)
IMM GRANULOCYTES NFR BLD AUTO: 1 % (ref 0–2)
LYMPHOCYTES # BLD AUTO: 1.37 THOUSANDS/ÂΜL (ref 0.6–4.47)
LYMPHOCYTES NFR BLD AUTO: 14 % (ref 14–44)
MCH RBC QN AUTO: 24.7 PG (ref 26.8–34.3)
MCHC RBC AUTO-ENTMCNC: 30 G/DL (ref 31.4–37.4)
MCV RBC AUTO: 82 FL (ref 82–98)
MONOCYTES # BLD AUTO: 0.94 THOUSAND/ÂΜL (ref 0.17–1.22)
MONOCYTES NFR BLD AUTO: 10 % (ref 4–12)
NEUTROPHILS # BLD AUTO: 7.45 THOUSANDS/ÂΜL (ref 1.85–7.62)
NEUTS SEG NFR BLD AUTO: 75 % (ref 43–75)
NON VENT- BIPAP: ABNORMAL
NRBC BLD AUTO-RTO: 1 /100 WBCS
O2 CT BLDV-SCNC: 12.4 ML/DL
PCO2 BLDV: 57.6 MM HG (ref 42–50)
PH BLDV: 7.38 [PH] (ref 7.3–7.4)
PLATELET # BLD AUTO: 330 THOUSANDS/UL (ref 149–390)
PMV BLD AUTO: 8.7 FL (ref 8.9–12.7)
PO2 BLDV: 93.7 MM HG (ref 35–45)
POTASSIUM SERPL-SCNC: 4.6 MMOL/L (ref 3.5–5.3)
PROT SERPL-MCNC: 6.9 G/DL (ref 6.4–8.4)
RBC # BLD AUTO: 3.28 MILLION/UL (ref 3.88–5.62)
SODIUM SERPL-SCNC: 136 MMOL/L (ref 135–147)
WBC # BLD AUTO: 9.94 THOUSAND/UL (ref 4.31–10.16)

## 2023-09-01 PROCEDURE — 82805 BLOOD GASES W/O2 SATURATION: CPT | Performed by: INTERNAL MEDICINE

## 2023-09-01 PROCEDURE — 94660 CPAP INITIATION&MGMT: CPT

## 2023-09-01 PROCEDURE — 80053 COMPREHEN METABOLIC PANEL: CPT

## 2023-09-01 PROCEDURE — 94640 AIRWAY INHALATION TREATMENT: CPT

## 2023-09-01 PROCEDURE — 94664 DEMO&/EVAL PT USE INHALER: CPT

## 2023-09-01 PROCEDURE — 85025 COMPLETE CBC W/AUTO DIFF WBC: CPT

## 2023-09-01 PROCEDURE — 99233 SBSQ HOSP IP/OBS HIGH 50: CPT | Performed by: HOSPITALIST

## 2023-09-01 PROCEDURE — 97110 THERAPEUTIC EXERCISES: CPT

## 2023-09-01 PROCEDURE — 82948 REAGENT STRIP/BLOOD GLUCOSE: CPT

## 2023-09-01 PROCEDURE — 97116 GAIT TRAINING THERAPY: CPT

## 2023-09-01 PROCEDURE — C9113 INJ PANTOPRAZOLE SODIUM, VIA: HCPCS | Performed by: HOSPITALIST

## 2023-09-01 PROCEDURE — 94760 N-INVAS EAR/PLS OXIMETRY 1: CPT

## 2023-09-01 PROCEDURE — 99232 SBSQ HOSP IP/OBS MODERATE 35: CPT | Performed by: PHYSICIAN ASSISTANT

## 2023-09-01 RX ORDER — POLYETHYLENE GLYCOL 3350 17 G/17G
17 POWDER, FOR SOLUTION ORAL DAILY PRN
Status: DISCONTINUED | OUTPATIENT
Start: 2023-09-01 | End: 2023-09-03 | Stop reason: HOSPADM

## 2023-09-01 RX ORDER — POLYETHYLENE GLYCOL 3350 17 G/17G
17 POWDER, FOR SOLUTION ORAL DAILY PRN
Status: DISCONTINUED | OUTPATIENT
Start: 2023-09-01 | End: 2023-09-01

## 2023-09-01 RX ADMIN — OXYCODONE HYDROCHLORIDE AND ACETAMINOPHEN 500 MG: 500 TABLET ORAL at 06:05

## 2023-09-01 RX ADMIN — IPRATROPIUM BROMIDE 0.5 MG: 0.5 SOLUTION RESPIRATORY (INHALATION) at 08:59

## 2023-09-01 RX ADMIN — GUAIFENESIN 1200 MG: 600 TABLET, EXTENDED RELEASE ORAL at 08:48

## 2023-09-01 RX ADMIN — INSULIN LISPRO 2 UNITS: 100 INJECTION, SOLUTION INTRAVENOUS; SUBCUTANEOUS at 12:13

## 2023-09-01 RX ADMIN — LIDOCAINE 5% 1 PATCH: 700 PATCH TOPICAL at 08:48

## 2023-09-01 RX ADMIN — PANTOPRAZOLE SODIUM 40 MG: 40 INJECTION, POWDER, FOR SOLUTION INTRAVENOUS at 08:48

## 2023-09-01 RX ADMIN — ATORVASTATIN CALCIUM 80 MG: 40 TABLET, FILM COATED ORAL at 16:34

## 2023-09-01 RX ADMIN — METHYLPREDNISOLONE SODIUM SUCCINATE 40 MG: 40 INJECTION, POWDER, FOR SOLUTION INTRAMUSCULAR; INTRAVENOUS at 08:48

## 2023-09-01 RX ADMIN — IPRATROPIUM BROMIDE 0.5 MG: 0.5 SOLUTION RESPIRATORY (INHALATION) at 19:52

## 2023-09-01 RX ADMIN — FERROUS SULFATE TAB 325 MG (65 MG ELEMENTAL FE) 325 MG: 325 (65 FE) TAB at 16:34

## 2023-09-01 RX ADMIN — LORATADINE 10 MG: 10 TABLET ORAL at 08:48

## 2023-09-01 RX ADMIN — TRAZODONE HYDROCHLORIDE 150 MG: 50 TABLET ORAL at 21:22

## 2023-09-01 RX ADMIN — LEVALBUTEROL HYDROCHLORIDE 1.25 MG: 1.25 SOLUTION RESPIRATORY (INHALATION) at 08:59

## 2023-09-01 RX ADMIN — PREGABALIN 25 MG: 25 CAPSULE ORAL at 18:00

## 2023-09-01 RX ADMIN — LEVALBUTEROL HYDROCHLORIDE 1.25 MG: 1.25 SOLUTION RESPIRATORY (INHALATION) at 14:25

## 2023-09-01 RX ADMIN — OMEGA-3 FATTY ACIDS CAP 1000 MG 1000 MG: 1000 CAP at 18:00

## 2023-09-01 RX ADMIN — INSULIN GLARGINE 30 UNITS: 100 INJECTION, SOLUTION SUBCUTANEOUS at 21:22

## 2023-09-01 RX ADMIN — IPRATROPIUM BROMIDE 0.5 MG: 0.5 SOLUTION RESPIRATORY (INHALATION) at 14:25

## 2023-09-01 RX ADMIN — IRON SUCROSE 200 MG: 20 INJECTION, SOLUTION INTRAVENOUS at 08:59

## 2023-09-01 RX ADMIN — LEVALBUTEROL HYDROCHLORIDE 1.25 MG: 1.25 SOLUTION RESPIRATORY (INHALATION) at 19:52

## 2023-09-01 RX ADMIN — OMEGA-3 FATTY ACIDS CAP 1000 MG 1000 MG: 1000 CAP at 08:48

## 2023-09-01 RX ADMIN — OXYCODONE HYDROCHLORIDE AND ACETAMINOPHEN 500 MG: 500 TABLET ORAL at 16:34

## 2023-09-01 RX ADMIN — PANTOPRAZOLE SODIUM 40 MG: 40 INJECTION, POWDER, FOR SOLUTION INTRAVENOUS at 21:22

## 2023-09-01 RX ADMIN — GUAIFENESIN 1200 MG: 600 TABLET, EXTENDED RELEASE ORAL at 21:22

## 2023-09-01 RX ADMIN — DULOXETINE HYDROCHLORIDE 60 MG: 30 CAPSULE, DELAYED RELEASE ORAL at 08:48

## 2023-09-01 RX ADMIN — FERROUS SULFATE TAB 325 MG (65 MG ELEMENTAL FE) 325 MG: 325 (65 FE) TAB at 06:05

## 2023-09-01 RX ADMIN — METHYLPREDNISOLONE SODIUM SUCCINATE 40 MG: 40 INJECTION, POWDER, FOR SOLUTION INTRAMUSCULAR; INTRAVENOUS at 21:22

## 2023-09-01 RX ADMIN — AZITHROMYCIN MONOHYDRATE 500 MG: 500 INJECTION, POWDER, LYOPHILIZED, FOR SOLUTION INTRAVENOUS at 18:00

## 2023-09-01 RX ADMIN — PREGABALIN 25 MG: 25 CAPSULE ORAL at 08:48

## 2023-09-01 RX ADMIN — INSULIN LISPRO 6 UNITS: 100 INJECTION, SOLUTION INTRAVENOUS; SUBCUTANEOUS at 16:34

## 2023-09-01 RX ADMIN — LEVOTHYROXINE SODIUM 175 MCG: 175 TABLET ORAL at 06:05

## 2023-09-01 RX ADMIN — INSULIN LISPRO 4 UNITS: 100 INJECTION, SOLUTION INTRAVENOUS; SUBCUTANEOUS at 21:22

## 2023-09-01 NOTE — PHYSICAL THERAPY NOTE
PHYSICAL THERAPY NOTE          Patient Name: Dari KAPADIAW Date: 9/1/2023 09/01/23 0815   PT Last Visit   PT Visit Date 09/01/23   Note Type   Note Type Treatment   Restrictions/Precautions   Weight Bearing Precautions Per Order No   General   Chart Reviewed Yes   Response to Previous Treatment Patient with no complaints from previous session. Family/Caregiver Present No   Cognition   Overall Cognitive Status WFL   Arousal/Participation Alert; Cooperative   Following Commands Follows all commands and directions without difficulty   Subjective   Subjective Reports he is feeling better. Knees are always sore. Bed Mobility   Additional Comments OOB in chair start/end PT session   Transfers   Sit to Stand 5  Supervision   Additional items Increased time required   Stand to Sit 5  Supervision   Additional items Armrests; Increased time required   Stand pivot 5  Supervision   Additional Comments RW used   Ambulation/Elevation   Gait pattern   (Excessively slow; Short stride; Foward flexed; Decreased foot clearance; Wide LENY; Improper Weight shift)   Gait Assistance 5  Supervision   Additional items Verbal cues   Assistive Device Rolling walker   Distance 40' x 1, 120' x 1   Balance   Static Sitting Good   Dynamic Sitting Good   Static Standing Fair   Dynamic Standing Fair   Ambulatory Fair  (RW)   Endurance Deficit   Endurance Deficit Yes   Endurance Deficit Description SpO2 95% first trial and 91% 2nd trial with 4 L, mild SOB. Activity Tolerance   Activity Tolerance Patient limited by fatigue   Exercises   Hip Flexion Sitting;20 reps;AROM; Bilateral   Hip Abduction Sitting;20 reps;AROM; Bilateral   Hip Adduction Sitting;20 reps;AROM; Bilateral   Knee AROM Long Arc Quad Sitting;20 reps;AROM; Bilateral   Ankle Pumps Sitting;20 reps;AROM; Bilateral   Assessment   Prognosis Good   Problem List   (Decreased strength; Decreased endurance; Impaired balance; Decreased mobility; Obesity)   Assessment Pt. seen for PT treatment session this date with interventions consisting of  therapeutic exercises, transfers and  gait training w/ emphasis on improving pt's ability to ambulate. n comparison to previous session, Pt. With improvements in activity tolerance. Decreased O2 requirements. Tolerating 120' with 4L SpO2 91% during ambulation, mild SOB. RT aware. Pt is in need of continued activity in PT to improve strength balance endurance mobility transfers and ambulation with return to maximize LOF. From PT/mobility standpoint, recommendation at time of d/c would be HHPT in order to promote return to PLOF and independence. The patient's AM-PAC Basic Mobility Inpatient Short Form Raw Score is 20. A Raw score of greater than 16 suggests the patient may benefit from discharge to home. Please also refer to physical therapy recommendation for safe DC planning. Goals   LTG Expiration Date 09/13/23   PT Treatment Day 2   Plan   Treatment/Interventions   (Decreased strength; Decreased endurance; Impaired balance; Decreased mobility; Obesity)   Progress Progressing toward goals   PT Frequency 3-5x/wk   Recommendation   PT Discharge Recommendation Home with home health rehabilitation   [address] in Flat Bed Without Bedrails 4   Lying on Back to Sitting on Edge of Flat Bed Without Bedrails 3   Moving Bed to Chair 3   Standing Up From Chair Using Arms 4   Walk in Room 3   Climb 3-5 Stairs With Railing 3   Basic Mobility Inpatient Raw Score 20   Basic Mobility Standardized Score 43.99   Highest Level Of Mobility   JH-HLM Goal 6: Walk 10 steps or more   JH-HLM Achieved 7: Walk 25 feet or more   Education   Education Provided Mobility training   Patient Demonstrates verbal understanding   End of Consult   Patient Position at End of Consult Bedside chair;Bed/Chair alarm activated; All needs within reach   End of Consult Comments discussed POC with PT

## 2023-09-01 NOTE — ASSESSMENT & PLAN NOTE
Due to COPD exacerbation, MARSHA/OHS non-compliant with home NIV  Clinically improving, states breathing is back to normal.  Seen by pulmonary:  Requiring 3L 02 nc   Continue BiPAP qHS- will need qualification for outpatient BiPAP prior to discharge. Plan to do overnight pulse ox overnight tonight on 2L NC and ABG tomorrow AM.   Follow blood cultures and sputum culture  Continue azithromycin to complete 5 day course for COPD exacerbation. Left pleural effusion not amendable to thoracentesis due to small size and loculation. Continue Solumedrol 40 mg IV q12 hours. Start prednisone 40 mg po daily tomorrow. Taper prednisone by 10 mg every 3 days until completion. Continue Xopenex/Atrovent nebs 3 times daily.   At discharge recommend Trelegy 1 puff daily and prn albuterol

## 2023-09-01 NOTE — PLAN OF CARE
Problem: PHYSICAL THERAPY ADULT  Goal: Performs mobility at highest level of function for planned discharge setting. See evaluation for individualized goals. Description: Treatment/Interventions: Functional transfer training, LE strengthening/ROM, Elevations, Therapeutic exercise, Endurance training, Bed mobility, Gait training          See flowsheet documentation for full assessment, interventions and recommendations. Outcome: Progressing  Note: Prognosis: Good  Problem List:  (Decreased strength; Decreased endurance; Impaired balance; Decreased mobility; Obesity)  Assessment: Pt. seen for PT treatment session this date with interventions consisting of  therapeutic exercises, transfers and  gait training w/ emphasis on improving pt's ability to ambulate. n comparison to previous session, Pt. With improvements in activity tolerance. Decreased O2 requirements. Tolerating 120' with 4L SpO2 91% during ambulation, mild SOB. RT aware. Pt is in need of continued activity in PT to improve strength balance endurance mobility transfers and ambulation with return to maximize LOF. From PT/mobility standpoint, recommendation at time of d/c would be HHPT in order to promote return to PLOF and independence. The patient's AM-PAC Basic Mobility Inpatient Short Form Raw Score is 20. A Raw score of greater than 16 suggests the patient may benefit from discharge to home. Please also refer to physical therapy recommendation for safe DC planning. PT Discharge Recommendation: Home with home health rehabilitation    See flowsheet documentation for full assessment.

## 2023-09-01 NOTE — PROGRESS NOTES
Progress Note - Pulmonary   Velia Floyd 61 y.o. male MRN: [id number]  Unit/Bed#:  Encounter: [id number]    Assessment/Plan:    1. Acute hypoxic respiratory failure  2. Acute on suspected chronic hypercapnic respiratory failure  3. Left pleural effusion  4. Emphysema with mild acute exacerbation  5. Suspected MARSHA/OHS  6. Morbid obesity  7. Type 2 diabetes mellitus with hyperglycemia  8. Anemia  9. BRBPR  10. Nicotine dependence in remission     Pulmonary recommendations:      • Improving- currently requiring 3L NC during my presence without desaturations. • Titrate FiO2 to maintain SpO2 to maintain saturations greater than or equal to 88%. • Continue BiPAP qHS- will need qualification for outpatient BiPAP prior to discharge. Plan to do overnight pulse ox overnight tonight on 2L NC and ABG tomorrow AM. Call or TT me on on-call pulmonary attending for guidance after results are obtained. • Follow blood cultures and sputum culture- no growth to date but awaiting speciation from sputum culture  • Continue azithromycin to complete 5 day course for COPD exacerbation. • Echo with EF 60%, G1DD, mild pulmonary hypertension   • Left pleural effusion not amendable to thoracentesis due to small size and loculation. • Continue Solumedrol 40 mg IV q12 hours. Start prednisone 40 mg po daily tomorrow. • Taper prednisone by 10 mg every 3 days until completion. • Continue Xopenex/Atrovent nebs 3 times daily. • At discharge recommend Trelegy 1 puff daily and prn albuterol     Chief Complaint:    "I feel like my breathing is back to normal."    Subjective:    Patient was seen and examined today. Had a hard time keeping mask on last night. He feels like he has returned to baseline. No worsening SOB. Wheeze is stable. Cough is chronic and productive of clear phlegm. No pain. No N/V/D. No fevers. He is still willing to use BiPAP at home if we get him one.      Objective:    Vitals: Blood pressure 127/68, pulse 90, temperature 97.8 °F (36.6 °C), temperature source Temporal, resp. rate (!) 28, height 5' 4" (1.626 m), weight 123 kg (270 lb 8.1 oz), SpO2 92 %. 3L NC,Body mass index is 46.43 kg/m². Intake/Output Summary (Last 24 hours) at 9/1/2023 1150  Last data filed at 9/1/2023 1101  Gross per 24 hour   Intake 3100 ml   Output 3650 ml   Net -550 ml       Invasive Devices     Peripheral Intravenous Line  Duration           Peripheral IV 08/29/23 Right Antecubital 2 days                Physical Exam:     Physical Exam    Labs: I have personally reviewed pertinent lab results. , ABG: No results found for: "PHART", "BIL1ZSG", "PO2ART", "EOL3SKK", "H1ADVVEG", "BEART", "SOURCE", BNP: No results found for: "BNP", CBC:   Lab Results   Component Value Date    WBC 9.94 09/01/2023    HGB 8.1 (L) 09/01/2023    HCT 27.0 (L) 09/01/2023    MCV 82 09/01/2023     09/01/2023    RBC 3.28 (L) 09/01/2023    MCH 24.7 (L) 09/01/2023    MCHC 30.0 (L) 09/01/2023    RDW 15.9 (H) 09/01/2023    MPV 8.7 (L) 09/01/2023    NRBC 1 09/01/2023   , CMP:   Lab Results   Component Value Date    SODIUM 136 09/01/2023    K 4.6 09/01/2023    CL 96 09/01/2023    CO2 33 (H) 09/01/2023    BUN 20 09/01/2023    CREATININE 1.13 09/01/2023    CALCIUM 9.0 09/01/2023    AST 31 09/01/2023    ALT 31 09/01/2023    ALKPHOS 52 09/01/2023    EGFR 71 09/01/2023   , PT/INR: No results found for: "PT", "INR", Troponin: No results found for: "TROPONINI"    Imaging and other studies: I have personally reviewed pertinent reports.    and I have personally reviewed pertinent films in PACS

## 2023-09-01 NOTE — PLAN OF CARE
Problem: PAIN - ADULT  Goal: Verbalizes/displays adequate comfort level or baseline comfort level  Description: Interventions:  - Encourage patient to monitor pain and request assistance  - Assess pain using appropriate pain scale  - Administer analgesics based on type and severity of pain and evaluate response  - Implement non-pharmacological measures as appropriate and evaluate response  - Consider cultural and social influences on pain and pain management  - Notify physician/advanced practitioner if interventions unsuccessful or patient reports new pain  Outcome: Progressing     Problem: INFECTION - ADULT  Goal: Absence or prevention of progression during hospitalization  Description: INTERVENTIONS:  - Assess and monitor for signs and symptoms of infection  - Monitor lab/diagnostic results  - Monitor all insertion sites, i.e. indwelling lines, tubes, and drains  - Monitor endotracheal if appropriate and nasal secretions for changes in amount and color  - [place] appropriate cooling/warming therapies per order  - Administer medications as ordered  - Instruct and encourage patient and family to use good hand hygiene technique  - Identify and instruct in appropriate isolation precautions for identified infection/condition  Outcome: Progressing  Goal: Absence of fever/infection during neutropenic period  Description: INTERVENTIONS:  - Monitor WBC    Outcome: Progressing     Problem: SAFETY ADULT  Goal: Patient will remain free of falls  Description: INTERVENTIONS:  - Educate patient/family on patient safety including physical limitations  - Instruct patient to call for assistance with activity   - Consult OT/PT to assist with strengthening/mobility   - Keep Call bell within reach  - Keep bed low and locked with side rails adjusted as appropriate  - Keep care items and personal belongings within reach  - Initiate and maintain comfort rounds  - Make Fall Risk Sign visible to staff  - Offer Toileting every 2 Hours, in advance of need  - Initiate/Maintain bed/ chair alarm  - Apply yellow socks and bracelet for high fall risk patients  - Consider moving patient to room near nurses station  Outcome: Progressing  Goal: Maintain or return to baseline ADL function  Description: INTERVENTIONS:  -  Assess patient's ability to carry out ADLs; assess patient's baseline for ADL function and identify physical deficits which impact ability to perform ADLs (bathing, care of mouth/teeth, toileting, grooming, dressing, etc.)  - Assess/evaluate cause of self-care deficits   - Assess range of motion  - Assess patient's mobility; develop plan if impaired  - Assess patient's need for assistive devices and provide as appropriate  - Encourage maximum independence but intervene and supervise when necessary  - Involve family in performance of ADLs  - Assess for home care needs following discharge   - Consider OT consult to assist with ADL evaluation and planning for discharge  - Provide patient education as appropriate  Outcome: Progressing  Goal: Maintains/Returns to pre admission functional level  Description: INTERVENTIONS:  - Perform BMAT or MOVE assessment daily.   - Set and communicate daily mobility goal to care team and patient/family/caregiver. - Collaborate with rehabilitation services on mobility goals if consulted  - Perform Range of Motion 3-4 times a day. - Reposition patient every 2 hours.   - Dangle patient 3 times a day  - Stand patient 3 times a day  - Ambulate patient 3 times a day  - Out of bed to chair 3 times a day   - Out of bed for meals 3 times a day  - Out of bed for toileting  - Record patient progress and toleration of activity level   Outcome: Progressing     Problem: DISCHARGE PLANNING  Goal: Discharge to home or other facility with appropriate resources  Description: INTERVENTIONS:  - Identify barriers to discharge w/patient and caregiver  - Arrange for needed discharge resources and transportation as appropriate  - Identify discharge learning needs (meds, wound care, etc.)  - Arrange for interpretive services to assist at discharge as needed  - Refer to Case Management Department for coordinating discharge planning if the patient needs post-hospital services based on physician/advanced practitioner order or complex needs related to functional status, cognitive ability, or social support system  Outcome: Progressing     Problem: Knowledge Deficit  Goal: Patient/family/caregiver demonstrates understanding of disease process, treatment plan, medications, and discharge instructions  Description: Complete learning assessment and assess knowledge base.   Interventions:  - Provide teaching at level of understanding  - Provide teaching via preferred learning methods  Outcome: Progressing     Problem: RESPIRATORY - ADULT  Goal: Achieves optimal ventilation and oxygenation  Description: INTERVENTIONS:  - Assess for changes in respiratory status  - Assess for changes in mentation and behavior  - Position to facilitate oxygenation and minimize respiratory effort  - Oxygen administered by appropriate delivery if ordered  - Initiate smoking cessation education as indicated  - Encourage broncho-pulmonary hygiene including cough, deep breathe, Incentive Spirometry  - Assess the need for suctioning and aspirate as needed  - Assess and instruct to report SOB or any respiratory difficulty  - Respiratory Therapy support as indicated  Outcome: Progressing

## 2023-09-01 NOTE — PROGRESS NOTES
[address]  Progress Note  Name: Bowen Sal  MRN: [id number]  Unit/Bed#:  I Date of Admission: 8/29/2023   Date of Service: 9/1/2023 I Hospital Day: 3    Assessment/Plan   * COPD with acute exacerbation [facility])  Assessment & Plan  See management under respiratory failure    Acute respiratory failure with hypoxia and hypercapnia (HCC)  Assessment & Plan  Due to COPD exacerbation, MARSHA/OHS non-compliant with home NIV  Clinically improving, states breathing is back to normal.  Seen by pulmonary:  Requiring 3L 02 nc   Continue BiPAP qHS- will need qualification for outpatient BiPAP prior to discharge. Plan to do overnight pulse ox overnight tonight on 2L NC and ABG tomorrow AM.   Follow blood cultures and sputum culture  Continue azithromycin to complete 5 day course for COPD exacerbation. Left pleural effusion not amendable to thoracentesis due to small size and loculation. Continue Solumedrol 40 mg IV q12 hours. Start prednisone 40 mg po daily tomorrow. Taper prednisone by 10 mg every 3 days until completion. Continue Xopenex/Atrovent nebs 3 times daily.   At discharge recommend Trelegy 1 puff daily and prn albuterol     Fatty liver disease, nonalcoholic  Assessment & Plan  Weight loss recommended    Microcytic anemia  Assessment & Plan  Hemoglobin 7.7  - suspect iron deficiency anemia  - Hgb 8.1, prior baseline 11 in 7/2023   -Iron 16, ferritin 19, elevated reticulocyte count 2.34  - BRBPR reported intermittent over prior 3 days to admission, no reoccurring symptoms since hospitalized  - GI consult:in setting of respiratory failure will defer EGD and colonoscopy at this time, will complete when hemodynamically stable as outpatient  - Iron infusion completed, continue oral ferrous  - Continue IV PPI  - Monitor hemoglobin    Bright red rectal bleeding  Assessment & Plan  See management under microcytic anemia    Pleural effusion, left  Assessment & Plan  - small to moderate left sided effusion on CT  - Seen by pulmonary, no thoracentesis at this time, fluid amount not sufficient  - Echocardiogram completed - EF% 60, Diastolic function is mildly abnormal, consistent with grade I      Uncontrolled type 2 diabetes mellitus with hyperglycemia, with long-term current use of insulin [facility])  Assessment & Plan  Lab Results   Component Value Date    HGBA1C 11.5 (H) 06/08/2023       Recent Labs     08/31/23  1619 08/31/23  2102 09/01/23  0845 09/01/23  1211   POCGLU 162* 192* 127 195*       Blood Sugar Average: Last 72 hrs:  (P) 016.8064183226425307     Chronically Uncontrolled, see HgA1C above  ISS  Diabetic diet    MARSHA and COPD overlap syndrome ([address])  Assessment & Plan  - see management under respiratory failure  - non-compliant with CPAP/BiPAP at home       Morbid obesity with BMI of 45.0-49.9, adult (HCC)  Assessment & Plan  BMI 46.85  - lifestyle modifications and dietary restrictions discussed with patient on admission  - education upon discharge    Acquired hypothyroidism  Assessment & Plan  - TSH wnl  - continue levothyroxine    Benign essential hypertension  Assessment & Plan  Hold lisinopril and HCTZ due to acute illness, potential GIB and potential for hemodynamic instability, restart as able               VTE Pharmacologic Prophylaxis:   Moderate Risk (Score 3-4) - Pharmacological DVT Prophylaxis Ordered: enoxaparin (Lovenox). Patient Centered Rounds: I performed bedside rounds with nursing staff today. Discussions with Specialists or Other Care Team Provider: Pulmonary    Total Time Spent on Date of Encounter in care of patient: 35 minutes This time was spent on one or more of the following: performing physical exam; counseling and coordination of care; obtaining or reviewing history; documenting in the medical record; reviewing/ordering tests, medications or procedures; communicating with other healthcare professionals and discussing with patient's family/caregivers.     Current Length of Stay: 3 day(s)  Current Patient Status: Inpatient   Certification Statement: The patient will continue to require additional inpatient hospital stay due to Respiratory failure  Discharge Plan: Anticipate discharge in 24-48 hrs to home with home services. Code Status: Level 1 - Full Code    Subjective:   Patient seen at bedside this morning, sitting up in chair comfortably. He states his breathing is " back to normal" and he is wondering when he can get out of here. Patient states that he slept well and had a full breakfast.  Patient denies shortness of breath, difficulty breathing. Patient asked if he will be sent home on oxygen. Pulmonary is planning to do a tolerance test this evening. Otherwise no new complaints. Objective:     Vitals:   Temp (24hrs), Av.5 °F (36.4 °C), Min:97.1 °F (36.2 °C), Max:97.8 °F (36.6 °C)    Temp:  [97.1 °F (36.2 °C)-97.8 °F (36.6 °C)] 97.1 °F (36.2 °C)  HR:  [] 88  Resp:  [13-45] 29  BP: (127-152)/(66-91) 152/78  SpO2:  [85 %-99 %] 91 %  Body mass index is 46.43 kg/m². Input and Output Summary (last 24 hours): Intake/Output Summary (Last 24 hours) at 2023 1600  Last data filed at 2023 1352  Gross per 24 hour   Intake 2980 ml   Output 3800 ml   Net -820 ml       Physical Exam:   Physical Exam  Constitutional:       General: He is not in acute distress. Appearance: He is obese. He is ill-appearing. HENT:      Head: Normocephalic. Right Ear: External ear normal.      Left Ear: External ear normal.      Nose: No rhinorrhea. Mouth/Throat:      Pharynx: No posterior oropharyngeal erythema. Eyes:      General:         Right eye: No discharge. Left eye: No discharge. Cardiovascular:      Rate and Rhythm: Normal rate and regular rhythm. Pulses: Normal pulses. Heart sounds: Normal heart sounds. Pulmonary:      Effort: No respiratory distress. Breath sounds: Rhonchi present.       Comments: Increased effort  Tachypneic  Abdominal:      General: Bowel sounds are normal. There is no distension. Palpations: Abdomen is soft. Tenderness: There is no abdominal tenderness. Musculoskeletal:      Cervical back: Normal range of motion. Neurological:      Mental Status: He is alert.           Additional Data:     Labs:  Results from last 7 days   Lab Units 23  0450   WBC Thousand/uL 9.94   HEMOGLOBIN g/dL 8.1*   HEMATOCRIT % 27.0*   PLATELETS Thousands/uL 330   NEUTROS PCT % 75   LYMPHS PCT % 14   MONOS PCT % 10   EOS PCT % 0     Results from last 7 days   Lab Units 23  0450   SODIUM mmol/L 136   POTASSIUM mmol/L 4.6   CHLORIDE mmol/L 96   CO2 mmol/L 33*   BUN mg/dL 20   CREATININE mg/dL 1.13   ANION GAP mmol/L 7   CALCIUM mg/dL 9.0   ALBUMIN g/dL 3.7   TOTAL BILIRUBIN mg/dL 0.37   ALK PHOS U/L 52   ALT U/L 31   AST U/L 31   GLUCOSE RANDOM mg/dL 152*     Results from last 7 days   Lab Units 23  0438   INR  1.07     Results from last 7 days   Lab Units 23  1556 23  1211 23  0845 23  2102 23  1619 23  1133 23  0740 23  2101 23  1612 23  1204 23  0820 23  2327   POC GLUCOSE mg/dl 283* 195* 127 192* 162* 154* 179* 325* 253* 267* 208* 123         Results from last 7 days   Lab Units 23  0453 23  1837   LACTIC ACID mmol/L  --  1.7   PROCALCITONIN ng/ml 0.10 0.17       Lines/Drains:  Invasive Devices     Peripheral Intravenous Line  Duration           Peripheral IV 23 Right Antecubital 2 days                  Telemetry:  Telemetry Orders (From admission, onward)             24 Hour Telemetry Monitoring  Continuous x 24 Hours (Telem)           Question:  Reason for 24 Hour Telemetry  Answer:  Acute respiratory failure on BiPAP                 Telemetry Reviewed: Normal Sinus Rhythm  Indication for Continued Telemetry Use: Acute respiratory failure on Bipap             Imaging: Reviewed radiology reports from this admission including: xray(s)    Recent Cultures (last 7 days):   Results from last 7 days   Lab Units 08/30/23  0441 08/29/23  1837   BLOOD CULTURE   --  No Growth at 48 hrs. No Growth at 48 hrs.    SPUTUM CULTURE  3+ Growth of  --    GRAM STAIN RESULT  1+ Epithelial cells per low power field*  Rare Polys*  2+ Gram positive cocci in pairs and chains*  1+ Gram positive rods*  --        Last 24 Hours Medication List:   Current Facility-Administered Medications   Medication Dose Route Frequency Provider Last Rate   • acetaminophen  650 mg Oral Q6H PRN Bree Estrella MD     • albuterol  2.5 mg Nebulization Q4H PRN Bree Estrella MD     • ALPRAZolam  0.25 mg Oral BID PRN Bree Estrella MD     • aluminum-magnesium hydroxide-simethicone  30 mL Oral Q6H PRN Bree Estrella MD     • ascorbic acid  500 mg Oral BID AC Bree Estrella MD     • atorvastatin  80 mg Oral Daily With Dinner Bree Estrella MD     • azithromycin  500 mg Intravenous Q24H Bree Estrella MD     • benzonatate  100 mg Oral TID PRN Bree Estrella MD     • DULoxetine  60 mg Oral Daily Bree Estrella MD     • ferrous sulfate  325 mg Oral BID AC Bree Estrella MD     • fish oil  1,000 mg Oral BID Bree Estrella MD     • guaiFENesin  1,200 mg Oral Q12H [facility] Bree Estrella MD     • insulin glargine  30 Units Subcutaneous HS Bree Estrella MD     • insulin lispro  2-12 Units Subcutaneous TID AC Bree Estrella MD     • insulin lispro  2-12 Units Subcutaneous HS Bree Estrella MD     • ipratropium  0.5 mg Nebulization TID Bree Estrella MD     • levalbuterol  1.25 mg Nebulization TID Bree Estrella MD     • levothyroxine  175 mcg Oral Early Morning Bree Estrella MD     • lidocaine  1 patch Topical Daily Mannie Merida DO     • loratadine  10 mg Oral Daily Bree Estrella MD     • methylPREDNISolone sodium succinate  40 mg Intravenous Q12H [facility] Dandre Car PA-C     • metoclopramide  10 mg Intravenous Q6H PRN Regina Manning MD     • pantoprazole  40 mg Intravenous Q12H [facility] Mannie Merida, DO     • polyethylene glycol  17 g Oral Daily PRN MARLEEN Vasquez     • pregabalin  25 mg Oral BID Regina Manning MD     • sodium chloride  1 spray Each Nare Q1H PRN Regina Manning MD     • traZODone  150 mg Oral HS Regina Manning MD          Today, Patient Was Seen By: Ysabel Guzmán MD    **Please Note: This note may have been constructed using a voice recognition system. **

## 2023-09-01 NOTE — ASSESSMENT & PLAN NOTE
Hemoglobin 7.7  - suspect iron deficiency anemia  - Hgb 8.1, prior baseline 11 in 7/2023   -Iron 16, ferritin 19, elevated reticulocyte count 2.34  - BRBPR reported intermittent over prior 3 days to admission, no reoccurring symptoms since hospitalized  - GI consult:in setting of respiratory failure will defer EGD and colonoscopy at this time, will complete when hemodynamically stable as outpatient  - Iron infusion completed, continue oral ferrous  - Continue IV PPI  - Monitor hemoglobin

## 2023-09-01 NOTE — RESPIRATORY THERAPY NOTE
09/01/23 0810   Respiratory Assessment   Resp Comments Patient walked to and from the geller X 2 on 4L with SpO2 remaining 94%. Decreased to 3L NC.    Oxygen Therapy/Pulse Ox   O2 Device Nasal cannula   O2 Therapy Oxygen humidified   Nasal Cannula O2 Flow Rate (L/min) 3 L/min   Calculated FIO2 (%) - Nasal Cannula 32   O2 Flow Rate (L/min) 3 L/min   SpO2 94 %   SpO2 Activity At Rest   $ Pulse Oximetry Spot Check Charge Completed

## 2023-09-01 NOTE — RESPIRATORY THERAPY NOTE
09/01/23 0250   Respiratory Assessment   Assessment Type Assess only   General Appearance Alert; Awake   Respiratory Pattern Normal   Chest Assessment Chest expansion symmetrical   Cough None   Resp Comments (S)  Routine BiPAP check. It has been a difficult time to keep the patient compliant with wearing the BiPAP. The patient removes the mask at his own leisure without telling myself or calling for nursing. Nursing and I have been re applying the mask every 20 minutes to a half hour throughout the duration of the night.    Non-Invasive Information   O2 Interface Device Full face mask   Non-Invasive Ventilation Mode BiPAP   $ Intermittent NIV Yes   SpO2 95 %   $ Pulse Oximetry Spot Check Charge Completed   Non-Invasive Settings   FiO2 (%) 32   IPAP (cm) 14 cm   EPAP (cm) 7 cm   Rate (Set) 16   Pressure Support (cm H2O) 7   Rise Time 3   Inspiratory Time (Set) 0.95   Non-Invasive Readings   Skin Intervention Skin intact   Total Rate 21   MV (Mech) 10.5   Peak Pressure (Obs) 15   Spontaneous Vt (mL) 506   I/E Ratio (Obs) 1:2.9   Leak (lpm) 19   Non-Invasive Alarms   Insp Pressure High (cm H20) 25   Insp Pressure Low (cm H20) 6   MV Low (L/min) 4   Vt High (mL) 1.2   Vt Low (mL) 200   High Resp Rate (BPM) 35 BPM   Low Resp Rate (BPM) 10 BPM   Apnea Interval (sec) 20

## 2023-09-01 NOTE — ASSESSMENT & PLAN NOTE
Lab Results   Component Value Date    HGBA1C 11.5 (H) 06/08/2023       Recent Labs     08/31/23  1619 08/31/23  2102 09/01/23  0845 09/01/23  1211   POCGLU 162* 192* 127 195*       Blood Sugar Average: Last 72 hrs:  (P) [id number]     Chronically Uncontrolled, see HgA1C above  ISS  Diabetic diet

## 2023-09-01 NOTE — PROGRESS NOTES
Progress Note- Yajaira Pyle 61 y.o. male MRN: [id number]    Unit/Bed#:  Encounter: [id number]      Assessment and Plan:    The patient is a 61 y.o. male with a PMH significant for COPD, MARSHA, hypertension, diabetes, hypothyroidism, hyperlipidemia, and obesity. The patient had presented to the emergency room on August 29, 2023 with progressive shortness of breath and hypoxia, which has slowly improved since admission as he is currently 94% on 3 L of nasal cannula oxygen but is still reporting dyspnea on exertion at times. The patient had also subsequently reported a recent occasion where he had significant constipation after eating pistachios and had to strain and push significantly hard to evacuate his bowels and had noted bright red bleeding per his rectum and at least a moderate overall amount when wiping after the bowel movement. The patient continues to have regular bowel movements with his last bowel movement being this morning without any straining, blood, or melena. Serology: (9/1/23) pCO2 57.6, pO2 93.7, HCO3 33.4, O2 HGB 93.3, CO2 33, Glucose 152, RBC 3.28, H/H 8.1/27.0, MCH 24.7, MCHC 30.0, RDW 15.9, MPV 8.7. Exam: Pt was sitting OOB in the chair with 3L of NC oxygen, A&O x 3, pleasant and cooperative. Abdomen is round, obese, distended, non-tender, with hypoactive BS x 4. Skin is non-icteric. +1 non-pitting edema noted of the b/l lower extremities upon exam today. BRBPR   Acute normocytic anemia   Constipation  Hypoxic respiratory failure  Hepatomegaly/Fatty Liver  Improving/Stable    Monitor Hgb and transfuse per protocol. Continue iron supplementation. Continue to monitor stool output closely for any signs of overt bleeding or melena. Continue CCD diet as ordered. Start Miralax PRN for constipation. I encouraged the patient to try using it at least every other day when he is outpatient.    Continue supportive care per primary team.    Continue with pulmonary recommendations regarding further optimization of his respiratory status. Once he is stable from a respiratory standpoint, we will follow up with him outpatient to re-evaluate the possibility of proceeding with a Colonoscopy/EGD in the near future as well as evaluation for his hepatomegaly/fatty liver. We will have the office staff reach out to the patient to schedule him for the outpatient follow up visit to discuss his treatment plan. GI will continue to follow. __________________________________________________________________    Subjective:     Patient is a 61 y.o. male who presented to the emergency department on August 29, 2023 with progressive shortness of breath and was found to be hypoxic with an O2 of 77 on RA. He is currently on 94% on 3L of NC oxygen, but still is reporting dyspnea on exertion, but that it is improving. He had also reported a very large and hard bowel movement after eating pistachios several days ago with a moderate amount of bright red blood upon wiping because he had to significantly strain and push to have the bowel movement. The patient does have a history of intermittent/chronic constipation which he feels he is able to manage relatively well with Senokot as needed. He currently denies any abdominal or rectal pain related to defecation and had a large, relieving bowel movement this morning without any blood or melena noted. He currently denies any nausea, vomiting, or reflux and is currently tolerating his diet with out any issues.      Medication Administration - last 24 hours from 08/31/2023 0857 to 09/01/2023 0857       Date/Time Order Dose Route Action Action by     09/01/2023 0848 EDT guaiFENesin (MUCINEX) 12 hr tablet 1,200 mg 1,200 mg Oral Given Peter Tubbs RN     08/31/2023 2116 EDT guaiFENesin (MUCINEX) 12 hr tablet 1,200 mg 1,200 mg Oral Given Dilma Mendoza RN     08/31/2023 1847 EDT azithromycin (ZITHROMAX) 500 mg in sodium chloride 0.9% 250mL IVPB 500 mg 500 mg Intravenous [address] Gailen Mask, [address]     08/31/2023 1914 EDT levalbuterol [place] Bravo) inhalation solution 1.25 mg 1.25 mg Nebulization Given Yeimi Ovalle, RT     08/31/2023 1346 EDT levalbuterol ([place] Bravo) inhalation solution 1.25 mg 1.25 mg Nebulization Given Alfreda [facility]     08/31/2023 1914 EDT ipratropium (ATROVENT) 0.02 % inhalation solution 0.5 mg 0.5 mg Nebulization Given Yeimi Wonger, RT     08/31/2023 1346 EDT ipratropium (ATROVENT) 0.02 % inhalation solution 0.5 mg 0.5 mg Nebulization Given Alfreda [facility]     09/01/2023 3535 EDT ascorbic acid (VITAMIN C) tablet 500 mg 500 mg Oral Given Dilma Blew, RN     08/31/2023 1700 EDT ascorbic acid (VITAMIN C) tablet 500 mg 500 mg Oral Given Gailen Mask, RN     09/01/2023 5168 EDT ferrous sulfate tablet 325 mg 325 mg Oral Given Dilma Blew, RN     08/31/2023 1700 EDT ferrous sulfate tablet 325 mg 325 mg Oral Given Gailen Mask, RN     09/01/2023 0848 EDT DULoxetine (CYMBALTA) delayed release capsule 60 mg 60 mg Oral Given Taina Dice, RN     09/01/2023 3959 EDT levothyroxine tablet 175 mcg 175 mcg Oral Given Dilma Blew, RN     08/31/2023 2116 EDT traZODone (DESYREL) tablet 150 mg 150 mg Oral Given Dilma Blew, RN     08/31/2023 1700 EDT atorvastatin (LIPITOR) tablet 80 mg 80 mg Oral Given Gailen Mask, RN     09/01/2023 0848 EDT pregabalin (LYRICA) capsule 25 mg 25 mg Oral Given Taina Dice, RN     08/31/2023 1700 EDT pregabalin (LYRICA) capsule 25 mg 25 mg Oral Given Gailen Mask, RN     09/01/2023 0848 EDT fish oil capsule 1,000 mg 1,000 mg Oral Given Taina Dice, RN     08/31/2023 1700 EDT fish oil capsule 1,000 mg 1,000 mg Oral Given Gailen Mask, RN     09/01/2023 0848 EDT loratadine (CLARITIN) tablet 10 mg 10 mg Oral Given Taina Modi RN     08/31/2023 1759 EDT acetaminophen (TYLENOL) tablet 650 mg 650 mg Oral Given Gailen Mask, RN     09/01/2023 8445 EDT insulin lispro (HumaLOG) 100 units/mL subcutaneous injection 2-12 Units -- Subcutaneous Not Given Kami Maya, JESSIE     08/31/2023 1623 EDT insulin lispro (HumaLOG) 100 units/mL subcutaneous injection 2-12 Units 2 Units Subcutaneous Given Sherrie Mcburney, JESSIE     08/31/2023 1203 EDT insulin lispro (HumaLOG) 100 units/mL subcutaneous injection 2-12 Units 2 Units Subcutaneous Given Sherrie Mcburney, JESSIE     08/31/2023 2111 EDT insulin lispro (HumaLOG) 100 units/mL subcutaneous injection 2-12 Units 2 Units Subcutaneous Given Dilma Kelly, RN     08/31/2023 2115 EDT ALPRAZolam Judy Shearing) tablet 0.25 mg 0.25 mg Oral Given Dilma Blew, RN     09/01/2023 0848 EDT pantoprazole (PROTONIX) injection 40 mg 40 mg Intravenous Given Kami Maya, RN     08/31/2023 2113 EDT pantoprazole (PROTONIX) injection 40 mg 40 mg Intravenous Given Dilma Mendoza, RN     09/01/2023 0848 EDT methylPREDNISolone sodium succinate (Solu-MEDROL) injection 40 mg 40 mg Intravenous Given Kami Maya, RN     08/31/2023 2115 EDT methylPREDNISolone sodium succinate (Solu-MEDROL) injection 40 mg 40 mg Intravenous Given Dilma Kelly, RN     08/31/2023 2111 EDT insulin glargine (LANTUS) subcutaneous injection 30 Units 0.3 mL 30 Units Subcutaneous Given Dilma Kelly, RN     09/01/2023 0848 EDT lidocaine (LIDODERM) 5 % patch 1 patch 1 patch Topical Medication Applied Kami Maya, JESSIE     08/31/2023 2242 EDT lidocaine (LIDODERM) 5 % patch 1 patch 1 patch Topical Patch Removed Dilma Bleeric, RN     08/31/2023 1037 EDT lidocaine (LIDODERM) 5 % patch 1 patch 1 patch Topical Medication Applied Sherrie Mcburney, RN          Objective:     Vitals: Blood pressure 129/66, pulse 79, temperature 97.8 °F (36.6 °C), temperature source Tympanic, resp. rate 16, height 5' 4" (1.626 m), weight 123 kg (270 lb 8.1 oz), SpO2 94 %. ,Body mass index is 46.43 kg/m².       Intake/Output Summary (Last 24 hours) at 9/1/2023 0857  Last data filed at 9/1/2023 0501  Gross per 24 hour   Intake 1680 ml   Output 3550 ml   Net -1870 ml       Physical Exam:   General Appearance: Awake and alert, in no acute distress    Invasive Devices     Peripheral Intravenous Line  Duration           Peripheral IV 08/29/23 Right Antecubital 2 days                Lab Results:  No results displayed because visit has over 200 results. Imaging Studies: I have personally reviewed pertinent imaging studies.

## 2023-09-01 NOTE — PLAN OF CARE
Problem: PAIN - ADULT  Goal: Verbalizes/displays adequate comfort level or baseline comfort level  Description: Interventions:  - Encourage patient to monitor pain and request assistance  - Assess pain using appropriate pain scale  - Administer analgesics based on type and severity of pain and evaluate response  - Implement non-pharmacological measures as appropriate and evaluate response  - Consider cultural and social influences on pain and pain management  - Notify physician/advanced practitioner if interventions unsuccessful or patient reports new pain  Outcome: Progressing     Problem: INFECTION - ADULT  Goal: Absence or prevention of progression during hospitalization  Description: INTERVENTIONS:  - Assess and monitor for signs and symptoms of infection  - Monitor lab/diagnostic results  - Monitor all insertion sites, i.e. indwelling lines, tubes, and drains  - Monitor endotracheal if appropriate and nasal secretions for changes in amount and color  - [place] appropriate cooling/warming therapies per order  - Administer medications as ordered  - Instruct and encourage patient and family to use good hand hygiene technique  - Identify and instruct in appropriate isolation precautions for identified infection/condition  Outcome: Progressing  Goal: Absence of fever/infection during neutropenic period  Description: INTERVENTIONS:  - Monitor WBC    Outcome: Progressing     Problem: SAFETY ADULT  Goal: Patient will remain free of falls  Description: INTERVENTIONS:  - Educate patient/family on patient safety including physical limitations  - Instruct patient to call for assistance with activity   - Consult OT/PT to assist with strengthening/mobility   - Keep Call bell within reach  - Keep bed low and locked with side rails adjusted as appropriate  - Keep care items and personal belongings within reach  - Initiate and maintain comfort rounds  - Make Fall Risk Sign visible to staff  - Offer Toileting every 2 Hours, in advance of need  - Initiate/Maintain bed/ chair alarm  - Apply yellow socks and bracelet for high fall risk patients  - Consider moving patient to room near nurses station  Outcome: Progressing  Goal: Maintain or return to baseline ADL function  Description: INTERVENTIONS:  -  Assess patient's ability to carry out ADLs; assess patient's baseline for ADL function and identify physical deficits which impact ability to perform ADLs (bathing, care of mouth/teeth, toileting, grooming, dressing, etc.)  - Assess/evaluate cause of self-care deficits   - Assess range of motion  - Assess patient's mobility; develop plan if impaired  - Assess patient's need for assistive devices and provide as appropriate  - Encourage maximum independence but intervene and supervise when necessary  - Involve family in performance of ADLs  - Assess for home care needs following discharge   - Consider OT consult to assist with ADL evaluation and planning for discharge  - Provide patient education as appropriate  Outcome: Progressing  Goal: Maintains/Returns to pre admission functional level  Description: INTERVENTIONS:  - Perform BMAT or MOVE assessment daily.   - Set and communicate daily mobility goal to care team and patient/family/caregiver. - Collaborate with rehabilitation services on mobility goals if consulted  - Perform Range of Motion 3-4 times a day. - Reposition patient every 2 hours.   - Dangle patient 3 times a day  - Stand patient 3 times a day  - Ambulate patient 3 times a day  - Out of bed to chair 3 times a day   - Out of bed for meals 3 times a day  - Out of bed for toileting  - Record patient progress and toleration of activity level   Outcome: Progressing     Problem: DISCHARGE PLANNING  Goal: Discharge to home or other facility with appropriate resources  Description: INTERVENTIONS:  - Identify barriers to discharge w/patient and caregiver  - Arrange for needed discharge resources and transportation as appropriate  - Identify discharge learning needs (meds, wound care, etc.)  - Arrange for interpretive services to assist at discharge as needed  - Refer to Case Management Department for coordinating discharge planning if the patient needs post-hospital services based on physician/advanced practitioner order or complex needs related to functional status, cognitive ability, or social support system  Outcome: Progressing     Problem: Knowledge Deficit  Goal: Patient/family/caregiver demonstrates understanding of disease process, treatment plan, medications, and discharge instructions  Description: Complete learning assessment and assess knowledge base.   Interventions:  - Provide teaching at level of understanding  - Provide teaching via preferred learning methods  Outcome: Progressing     Problem: RESPIRATORY - ADULT  Goal: Achieves optimal ventilation and oxygenation  Description: INTERVENTIONS:  - Assess for changes in respiratory status  - Assess for changes in mentation and behavior  - Position to facilitate oxygenation and minimize respiratory effort  - Oxygen administered by appropriate delivery if ordered  - Initiate smoking cessation education as indicated  - Encourage broncho-pulmonary hygiene including cough, deep breathe, Incentive Spirometry  - Assess the need for suctioning and aspirate as needed  - Assess and instruct to report SOB or any respiratory difficulty  - Respiratory Therapy support as indicated  Outcome: Progressing

## 2023-09-02 DIAGNOSIS — J98.4 RESTRICTIVE LUNG DISEASE: Primary | ICD-10-CM

## 2023-09-02 LAB
ALBUMIN SERPL BCP-MCNC: 3.9 G/DL (ref 3.5–5)
ALP SERPL-CCNC: 53 U/L (ref 34–104)
ALT SERPL W P-5'-P-CCNC: 32 U/L (ref 7–52)
ANION GAP SERPL CALCULATED.3IONS-SCNC: 6 MMOL/L
AST SERPL W P-5'-P-CCNC: 27 U/L (ref 13–39)
BASE EXCESS BLDA CALC-SCNC: 7.4 MMOL/L
BASOPHILS # BLD AUTO: 0.04 THOUSANDS/ÂΜL (ref 0–0.1)
BASOPHILS NFR BLD AUTO: 0 % (ref 0–1)
BILIRUB SERPL-MCNC: 0.42 MG/DL (ref 0.2–1)
BUN SERPL-MCNC: 21 MG/DL (ref 5–25)
CALCIUM SERPL-MCNC: 9.3 MG/DL (ref 8.4–10.2)
CHLORIDE SERPL-SCNC: 94 MMOL/L (ref 96–108)
CO2 SERPL-SCNC: 35 MMOL/L (ref 21–32)
CREAT SERPL-MCNC: 1.17 MG/DL (ref 0.6–1.3)
DME PARACHUTE DELIVERY DATE EXPECTED: NORMAL
DME PARACHUTE DELIVERY DATE EXPECTED: NORMAL
DME PARACHUTE DELIVERY DATE REQUESTED: NORMAL
DME PARACHUTE DELIVERY DATE REQUESTED: NORMAL
DME PARACHUTE DELIVERY NOTE: NORMAL
DME PARACHUTE ITEM DESCRIPTION: NORMAL
DME PARACHUTE ORDER STATUS: NORMAL
DME PARACHUTE ORDER STATUS: NORMAL
DME PARACHUTE SUPPLIER NAME: NORMAL
DME PARACHUTE SUPPLIER NAME: NORMAL
DME PARACHUTE SUPPLIER PHONE: NORMAL
DME PARACHUTE SUPPLIER PHONE: NORMAL
EOSINOPHIL # BLD AUTO: 0.01 THOUSAND/ÂΜL (ref 0–0.61)
EOSINOPHIL NFR BLD AUTO: 0 % (ref 0–6)
ERYTHROCYTE [DISTWIDTH] IN BLOOD BY AUTOMATED COUNT: 15.9 % (ref 11.6–15.1)
GFR SERPL CREATININE-BSD FRML MDRD: 68 ML/MIN/1.73SQ M
GLUCOSE SERPL-MCNC: 157 MG/DL (ref 65–140)
GLUCOSE SERPL-MCNC: 157 MG/DL (ref 65–140)
GLUCOSE SERPL-MCNC: 174 MG/DL (ref 65–140)
GLUCOSE SERPL-MCNC: 202 MG/DL (ref 65–140)
GLUCOSE SERPL-MCNC: 250 MG/DL (ref 65–140)
HCO3 BLDA-SCNC: 33.1 MMOL/L (ref 22–28)
HCT VFR BLD AUTO: 30.7 % (ref 36.5–49.3)
HGB BLD-MCNC: 9 G/DL (ref 12–17)
IMM GRANULOCYTES # BLD AUTO: 0.26 THOUSAND/UL (ref 0–0.2)
IMM GRANULOCYTES NFR BLD AUTO: 2 % (ref 0–2)
LYMPHOCYTES # BLD AUTO: 1.76 THOUSANDS/ÂΜL (ref 0.6–4.47)
LYMPHOCYTES NFR BLD AUTO: 15 % (ref 14–44)
MCH RBC QN AUTO: 24.3 PG (ref 26.8–34.3)
MCHC RBC AUTO-ENTMCNC: 29.3 G/DL (ref 31.4–37.4)
MCV RBC AUTO: 83 FL (ref 82–98)
MONOCYTES # BLD AUTO: 1.15 THOUSAND/ÂΜL (ref 0.17–1.22)
MONOCYTES NFR BLD AUTO: 10 % (ref 4–12)
NASAL CANNULA: 2
NEUTROPHILS # BLD AUTO: 8.31 THOUSANDS/ÂΜL (ref 1.85–7.62)
NEUTS SEG NFR BLD AUTO: 73 % (ref 43–75)
NRBC BLD AUTO-RTO: 1 /100 WBCS
O2 CT BLDA-SCNC: 12.1 ML/DL (ref 16–23)
OXYHGB MFR BLDA: 89 % (ref 94–97)
PCO2 BLDA: 53.7 MM HG (ref 36–44)
PH BLDA: 7.41 [PH] (ref 7.35–7.45)
PLATELET # BLD AUTO: 397 THOUSANDS/UL (ref 149–390)
PMV BLD AUTO: 8.6 FL (ref 8.9–12.7)
PO2 BLDA: 62.6 MM HG (ref 75–129)
POTASSIUM SERPL-SCNC: 4.3 MMOL/L (ref 3.5–5.3)
PROT SERPL-MCNC: 7.2 G/DL (ref 6.4–8.4)
RBC # BLD AUTO: 3.7 MILLION/UL (ref 3.88–5.62)
SODIUM SERPL-SCNC: 135 MMOL/L (ref 135–147)
SPECIMEN SOURCE: ABNORMAL
WBC # BLD AUTO: 11.53 THOUSAND/UL (ref 4.31–10.16)

## 2023-09-02 PROCEDURE — 36600 WITHDRAWAL OF ARTERIAL BLOOD: CPT

## 2023-09-02 PROCEDURE — 85025 COMPLETE CBC W/AUTO DIFF WBC: CPT

## 2023-09-02 PROCEDURE — 94640 AIRWAY INHALATION TREATMENT: CPT

## 2023-09-02 PROCEDURE — 82805 BLOOD GASES W/O2 SATURATION: CPT | Performed by: PHYSICIAN ASSISTANT

## 2023-09-02 PROCEDURE — C9113 INJ PANTOPRAZOLE SODIUM, VIA: HCPCS | Performed by: HOSPITALIST

## 2023-09-02 PROCEDURE — 94760 N-INVAS EAR/PLS OXIMETRY 1: CPT

## 2023-09-02 PROCEDURE — NC001 PR NO CHARGE: Performed by: INTERNAL MEDICINE

## 2023-09-02 PROCEDURE — 97116 GAIT TRAINING THERAPY: CPT

## 2023-09-02 PROCEDURE — 80053 COMPREHEN METABOLIC PANEL: CPT

## 2023-09-02 PROCEDURE — 82948 REAGENT STRIP/BLOOD GLUCOSE: CPT

## 2023-09-02 PROCEDURE — 94762 N-INVAS EAR/PLS OXIMTRY CONT: CPT

## 2023-09-02 PROCEDURE — 99233 SBSQ HOSP IP/OBS HIGH 50: CPT | Performed by: HOSPITALIST

## 2023-09-02 PROCEDURE — 94761 N-INVAS EAR/PLS OXIMETRY MLT: CPT

## 2023-09-02 RX ORDER — LISINOPRIL 5 MG/1
5 TABLET ORAL DAILY
Status: DISCONTINUED | OUTPATIENT
Start: 2023-09-03 | End: 2023-09-03 | Stop reason: HOSPADM

## 2023-09-02 RX ORDER — PANTOPRAZOLE SODIUM 40 MG/1
40 TABLET, DELAYED RELEASE ORAL
Status: DISCONTINUED | OUTPATIENT
Start: 2023-09-03 | End: 2023-09-03 | Stop reason: HOSPADM

## 2023-09-02 RX ADMIN — ACETAMINOPHEN 650 MG: 325 TABLET, FILM COATED ORAL at 19:36

## 2023-09-02 RX ADMIN — GUAIFENESIN 1200 MG: 600 TABLET, EXTENDED RELEASE ORAL at 21:46

## 2023-09-02 RX ADMIN — TRAZODONE HYDROCHLORIDE 150 MG: 50 TABLET ORAL at 21:46

## 2023-09-02 RX ADMIN — IPRATROPIUM BROMIDE 0.5 MG: 0.5 SOLUTION RESPIRATORY (INHALATION) at 20:18

## 2023-09-02 RX ADMIN — PREGABALIN 25 MG: 25 CAPSULE ORAL at 18:11

## 2023-09-02 RX ADMIN — OXYCODONE HYDROCHLORIDE AND ACETAMINOPHEN 500 MG: 500 TABLET ORAL at 15:38

## 2023-09-02 RX ADMIN — INSULIN LISPRO 4 UNITS: 100 INJECTION, SOLUTION INTRAVENOUS; SUBCUTANEOUS at 16:29

## 2023-09-02 RX ADMIN — LEVOTHYROXINE SODIUM 175 MCG: 175 TABLET ORAL at 05:48

## 2023-09-02 RX ADMIN — OXYCODONE HYDROCHLORIDE AND ACETAMINOPHEN 500 MG: 500 TABLET ORAL at 05:48

## 2023-09-02 RX ADMIN — GUAIFENESIN 1200 MG: 600 TABLET, EXTENDED RELEASE ORAL at 08:15

## 2023-09-02 RX ADMIN — ATORVASTATIN CALCIUM 80 MG: 40 TABLET, FILM COATED ORAL at 15:38

## 2023-09-02 RX ADMIN — FERROUS SULFATE TAB 325 MG (65 MG ELEMENTAL FE) 325 MG: 325 (65 FE) TAB at 15:38

## 2023-09-02 RX ADMIN — LORATADINE 10 MG: 10 TABLET ORAL at 08:15

## 2023-09-02 RX ADMIN — LEVALBUTEROL HYDROCHLORIDE 1.25 MG: 1.25 SOLUTION RESPIRATORY (INHALATION) at 15:32

## 2023-09-02 RX ADMIN — AZITHROMYCIN MONOHYDRATE 500 MG: 500 INJECTION, POWDER, LYOPHILIZED, FOR SOLUTION INTRAVENOUS at 19:03

## 2023-09-02 RX ADMIN — FERROUS SULFATE TAB 325 MG (65 MG ELEMENTAL FE) 325 MG: 325 (65 FE) TAB at 05:48

## 2023-09-02 RX ADMIN — LIDOCAINE 5% 1 PATCH: 700 PATCH TOPICAL at 08:16

## 2023-09-02 RX ADMIN — OMEGA-3 FATTY ACIDS CAP 1000 MG 1000 MG: 1000 CAP at 18:11

## 2023-09-02 RX ADMIN — INSULIN LISPRO 6 UNITS: 100 INJECTION, SOLUTION INTRAVENOUS; SUBCUTANEOUS at 12:24

## 2023-09-02 RX ADMIN — METHYLPREDNISOLONE SODIUM SUCCINATE 40 MG: 40 INJECTION, POWDER, FOR SOLUTION INTRAMUSCULAR; INTRAVENOUS at 08:15

## 2023-09-02 RX ADMIN — INSULIN GLARGINE 30 UNITS: 100 INJECTION, SOLUTION SUBCUTANEOUS at 21:46

## 2023-09-02 RX ADMIN — LEVALBUTEROL HYDROCHLORIDE 1.25 MG: 1.25 SOLUTION RESPIRATORY (INHALATION) at 08:14

## 2023-09-02 RX ADMIN — DULOXETINE HYDROCHLORIDE 60 MG: 30 CAPSULE, DELAYED RELEASE ORAL at 08:15

## 2023-09-02 RX ADMIN — INSULIN LISPRO 2 UNITS: 100 INJECTION, SOLUTION INTRAVENOUS; SUBCUTANEOUS at 21:45

## 2023-09-02 RX ADMIN — IPRATROPIUM BROMIDE 0.5 MG: 0.5 SOLUTION RESPIRATORY (INHALATION) at 15:32

## 2023-09-02 RX ADMIN — PREGABALIN 25 MG: 25 CAPSULE ORAL at 08:15

## 2023-09-02 RX ADMIN — INSULIN LISPRO 2 UNITS: 100 INJECTION, SOLUTION INTRAVENOUS; SUBCUTANEOUS at 08:17

## 2023-09-02 RX ADMIN — METHYLPREDNISOLONE SODIUM SUCCINATE 40 MG: 40 INJECTION, POWDER, FOR SOLUTION INTRAMUSCULAR; INTRAVENOUS at 21:47

## 2023-09-02 RX ADMIN — PANTOPRAZOLE SODIUM 40 MG: 40 INJECTION, POWDER, FOR SOLUTION INTRAVENOUS at 08:15

## 2023-09-02 RX ADMIN — IPRATROPIUM BROMIDE 0.5 MG: 0.5 SOLUTION RESPIRATORY (INHALATION) at 08:14

## 2023-09-02 RX ADMIN — LEVALBUTEROL HYDROCHLORIDE 1.25 MG: 1.25 SOLUTION RESPIRATORY (INHALATION) at 20:18

## 2023-09-02 RX ADMIN — OMEGA-3 FATTY ACIDS CAP 1000 MG 1000 MG: 1000 CAP at 08:15

## 2023-09-02 NOTE — RESPIRATORY THERAPY NOTE
Home Oxygen Qualifying Test     Patient name: Aris Gunter        : 1965   Date of Test:  2023  Diagnosis: COPD with acute exacerbation   Home Oxygen Test:    **Medicare Guidelines require item(s) 1-5 on all ambulatory patients or 1 and 2 on non-ambulatory patients. 1. Baseline SPO2 on Room Air at rest 88 %   a. If <= 88% on Room Air add O2 via NC to obtain SpO2 >=88%. If LPM needed, document LPM  needed to reach =>88%    2. SPO2 during exertion on Room Air 85  %  a. During exertion monitor SPO2. If SPO2 increases >=89%, do not add supplemental oxygen    3. SPO2 on Oxygen at Rest 92 % at 2 LPM 92-93%    4. SPO2 during exertion on Oxygen 91 % at 2 LPM    5. Test performed during exertion activity. Walking 30 ft     [x]  Supplemental Home Oxygen is indicated. []  Client does not qualify for home oxygen.     Respiratory Additional Notes- pt had some increased WOB on room air ambulation, after placed on 2 l/m pt did much better less WOB    Clive Field, RT

## 2023-09-02 NOTE — PLAN OF CARE
Problem: PAIN - ADULT  Goal: Verbalizes/displays adequate comfort level or baseline comfort level  Description: Interventions:  - Encourage patient to monitor pain and request assistance  - Assess pain using appropriate pain scale  - Administer analgesics based on type and severity of pain and evaluate response  - Implement non-pharmacological measures as appropriate and evaluate response  - Consider cultural and social influences on pain and pain management  - Notify physician/advanced practitioner if interventions unsuccessful or patient reports new pain  Outcome: Progressing     Problem: INFECTION - ADULT  Goal: Absence or prevention of progression during hospitalization  Description: INTERVENTIONS:  - Assess and monitor for signs and symptoms of infection  - Monitor lab/diagnostic results  - Monitor all insertion sites, i.e. indwelling lines, tubes, and drains  - Monitor endotracheal if appropriate and nasal secretions for changes in amount and color  - [place] appropriate cooling/warming therapies per order  - Administer medications as ordered  - Instruct and encourage patient and family to use good hand hygiene technique  - Identify and instruct in appropriate isolation precautions for identified infection/condition  Outcome: Progressing  Goal: Absence of fever/infection during neutropenic period  Description: INTERVENTIONS:  - Monitor WBC    Outcome: Progressing     Problem: SAFETY ADULT  Goal: Patient will remain free of falls  Description: INTERVENTIONS:  - Educate patient/family on patient safety including physical limitations  - Instruct patient to call for assistance with activity   - Consult OT/PT to assist with strengthening/mobility   - Keep Call bell within reach  - Keep bed low and locked with side rails adjusted as appropriate  - Keep care items and personal belongings within reach  - Initiate and maintain comfort rounds  - Make Fall Risk Sign visible to staff  - Offer Toileting every 2 Hours, in advance of need  - Initiate/Maintain bed/ chair alarm  - Apply yellow socks and bracelet for high fall risk patients  - Consider moving patient to room near nurses station  Outcome: Progressing  Goal: Maintain or return to baseline ADL function  Description: INTERVENTIONS:  -  Assess patient's ability to carry out ADLs; assess patient's baseline for ADL function and identify physical deficits which impact ability to perform ADLs (bathing, care of mouth/teeth, toileting, grooming, dressing, etc.)  - Assess/evaluate cause of self-care deficits   - Assess range of motion  - Assess patient's mobility; develop plan if impaired  - Assess patient's need for assistive devices and provide as appropriate  - Encourage maximum independence but intervene and supervise when necessary  - Involve family in performance of ADLs  - Assess for home care needs following discharge   - Consider OT consult to assist with ADL evaluation and planning for discharge  - Provide patient education as appropriate  Outcome: Progressing  Goal: Maintains/Returns to pre admission functional level  Description: INTERVENTIONS:  - Perform BMAT or MOVE assessment daily.   - Set and communicate daily mobility goal to care team and patient/family/caregiver. - Collaborate with rehabilitation services on mobility goals if consulted  - Perform Range of Motion 3-4 times a day. - Reposition patient every 2 hours.   - Dangle patient 3 times a day  - Stand patient 3 times a day  - Ambulate patient 3 times a day  - Out of bed to chair 3 times a day   - Out of bed for meals 3 times a day  - Out of bed for toileting  - Record patient progress and toleration of activity level   Outcome: Progressing     Problem: DISCHARGE PLANNING  Goal: Discharge to home or other facility with appropriate resources  Description: INTERVENTIONS:  - Identify barriers to discharge w/patient and caregiver  - Arrange for needed discharge resources and transportation as appropriate  - Identify discharge learning needs (meds, wound care, etc.)  - Arrange for interpretive services to assist at discharge as needed  - Refer to Case Management Department for coordinating discharge planning if the patient needs post-hospital services based on physician/advanced practitioner order or complex needs related to functional status, cognitive ability, or social support system  Outcome: Progressing     Problem: Knowledge Deficit  Goal: Patient/family/caregiver demonstrates understanding of disease process, treatment plan, medications, and discharge instructions  Description: Complete learning assessment and assess knowledge base.   Interventions:  - Provide teaching at level of understanding  - Provide teaching via preferred learning methods  Outcome: Progressing     Problem: RESPIRATORY - ADULT  Goal: Achieves optimal ventilation and oxygenation  Description: INTERVENTIONS:  - Assess for changes in respiratory status  - Assess for changes in mentation and behavior  - Position to facilitate oxygenation and minimize respiratory effort  - Oxygen administered by appropriate delivery if ordered  - Initiate smoking cessation education as indicated  - Encourage broncho-pulmonary hygiene including cough, deep breathe, Incentive Spirometry  - Assess the need for suctioning and aspirate as needed  - Assess and instruct to report SOB or any respiratory difficulty  - Respiratory Therapy support as indicated  Outcome: Progressing

## 2023-09-02 NOTE — ASSESSMENT & PLAN NOTE
Hemoglobin 7.7  - suspect iron deficiency anemia  - Hgb 8.1, prior baseline 11 in 7/2023   -Iron 16, ferritin 19, elevated reticulocyte count 2.34  - BRBPR reported intermittent over prior 3 days to admission, no reoccurring symptoms since hospitalized  - GI consult:in setting of respiratory failure will defer EGD and colonoscopy at this time, hemoglobin has remained stable, can be done as outpatient  - Iron infusion completed, continue oral ferrous  -Transition to p.o.  PPI  - Monitor hemoglobin

## 2023-09-02 NOTE — PROGRESS NOTES
Patient has restrictive lung disease and qualifies for bipap therapy. Cpap is will not be effective.

## 2023-09-02 NOTE — ASSESSMENT & PLAN NOTE
Due to COPD exacerbation, MARSAH/OHS non-compliant with home NIV  Clinically improving, states breathing is back to normal.  Seen by pulmonary:  Requiring 3L 02 nc   Continue BiPAP qHS-qualifies with testing for BiPAP, case management working on getting approved from insurance  Ambulatory pulse ox, patient requires 2 L with ambulation, when at rest, oral sent home on 2 L continuous  Follow blood cultures and sputum culture  Continue azithromycin to complete 5 day course for COPD exacerbation. Left pleural effusion not amendable to thoracentesis due to small size and loculation. prednisone 40 mg po daily  Taper prednisone by 10 mg every 3 days until completion. Continue Xopenex/Atrovent nebs 3 times daily.   At discharge recommend Trelegy 1 puff daily and prn albuterol

## 2023-09-02 NOTE — ASSESSMENT & PLAN NOTE
Lab Results   Component Value Date    HGBA1C 11.5 (H) 06/08/2023       Recent Labs     09/01/23  2110 09/02/23  0712 09/02/23  1107 09/02/23  1607   POCGLU 233* 157* 250* 202*       Blood Sugar Average: Last 72 hrs:  (P) [id number]     Chronically Uncontrolled, see HgA1C above  ISS  Diabetic diet

## 2023-09-02 NOTE — PLAN OF CARE
Problem: PHYSICAL THERAPY ADULT  Goal: Performs mobility at highest level of function for planned discharge setting. See evaluation for individualized goals. Description: Treatment/Interventions: Functional transfer training, LE strengthening/ROM, Elevations, Therapeutic exercise, Endurance training, Bed mobility, Gait training          See flowsheet documentation for full assessment, interventions and recommendations. Outcome: Progressing  Note: Prognosis: Good  Problem List: Decreased strength, Decreased endurance, Impaired balance, Decreased mobility, Decreased safety awareness  Assessment: Pt's O2 sats 88-91% on 1L. Pt requested to trial gait on room air. After 35', sats decreased to 85%. O2 increased to 2L. Pursed lip breathing encouraged. Pt was able to maintain O2 sats at 89-93% with activity. Nsg made aware. Pt gait trained with [facility] today with good safety and only supervision for balance. Will progress steps and monitoring of O2 sats with completion of steps. Will also progress endurance, balance, and I with gait in order to maximize function and decrease risk for falls. The patient's AM-PAC Basic Mobility Inpatient Short Form Raw Score is 20. A Raw score of greater than 16 suggests the patient may benefit from discharge to home. Please also refer to the recommendation of the Physical Therapist for safe discharge planning. Barriers to Discharge: Decreased caregiver support     PT Discharge Recommendation: Home with home health rehabilitation    See flowsheet documentation for full assessment.

## 2023-09-02 NOTE — CASE MANAGEMENT
Case Management Discharge Planning Note    Patient name Anil Kuhn  Location / MRN [id number]  : 1965 Date 2023       Current Admission Date: 2023  Current Admission Diagnosis:COPD with acute exacerbation [facility])   Patient Active Problem List    Diagnosis Date Noted   • Restrictive lung disease 2023   • Pleural effusion, left 2023   • Bright red rectal bleeding 2023   • ABLA (acute blood loss anemia) 2023   • Microcytic anemia 2023   • Fatty liver disease, nonalcoholic    • Umbilical hernia without obstruction and without gangrene 2023   • Primary osteoarthritis of both knees    • Peripheral vascular disease ([address]) 2022   • Depression, recurrent ([address]) 2021   • Hyperlipidemia 04/15/2021   • Constipation 2021   • Urinary retention 2021   • Acute respiratory failure with hypoxia and hypercapnia ([address]) 2021   • Epistaxis 2021   • MARSHA and COPD overlap syndrome ([address]) 2021   • Muscle twitching 2021   • Uncontrolled type 2 diabetes mellitus with hyperglycemia, with long-term current use of insulin ([address]) 2021   • Tobacco use 2021   • Atelectasis 2021   • Noncompliance with diabetes treatment 2020   • Bilateral lower leg cellulitis 2018   • Elevated LFTs 2018   • Elevated lactic acid level 2018   • Tobacco abuse 2018   • COPD with acute exacerbation ([address]) 2017   • Chronic back pain 2015   • Chronic sinusitis 2015   • Allergic rhinitis 2015   • Arthritis 2015   • Depression with anxiety 2015   • Benign essential hypertension 2015   • Morbid obesity with BMI of 45.0-49.9, adult ([address]) 2015   • Type 2 diabetes, uncontrolled, with neuropathy 2015   • Vitamin D deficiency 2013   • Acquired hypothyroidism 2011      LOS (days): 4  Geometric Mean LOS (GMLOS) (days): 3.50  Days to GMLOS:-0.3 OBJECTIVE:  Risk of Unplanned Readmission Score: 15.23         Current admission status: Inpatient   Preferred Pharmacy:   [facility] [place], [place] - [address] [place] [id number]  Phone: [phone] Fax: [phone]    Primary Care Provider: Krystle Yoder PA-C    Primary Insurance: MEDICARE  Secondary Insurance:     DISCHARGE DETAILS:    Discharge planning discussed with[de-identified] patient  Freedom of Choice: Yes  Comments - Freedom of Choice: agreeable to [facility]  CM contacted family/caregiver?: No- see comments (declined)  Were Treatment Team discharge recommendations reviewed with patient/caregiver?: Yes  Did patient/caregiver verbalize understanding of patient care needs?: Yes  Were patient/caregiver advised of the risks associated with not following Treatment Team discharge recommendations?: Yes    Contacts  Contact Method: In Person  Reason/Outcome: Discharge [address]         Is the patient interested in [address] [facility] at discharge?: Yes (slvna)    DME Referral Provided  Referral made for DME?: Yes  DME referral completed for the following items[de-identified] BiPAP, Portable Oxygen concentrator  DME Supplier Name[de-identified] [employer]    Other Referral/Resources/Interventions Provided:  Interventions: [facility]    Would you like to participate in our [address] service program?  : No - Declined    Treatment Team Recommendation: Home with [address] [facility]  Discharge Destination Plan[de-identified] Home with [address] [facility]     IMM Given (Date):: 09/02/23  IMM Given to[de-identified] Patient   pt qualified for oxygen on exertion and bipap at night. Orders placed. Still no response from [facility]. Call placed and spoke with abe. She states they are going in order of orders received and they have not had time to review yet. Spoke with provider and pt. Pt will stay another night inpatient because he does need bipap at night.  Reviewed [facility] POC with pt for anticipated discharge home tomorrow with [facility]. Will continue to follow [facility] to see if order approved.

## 2023-09-03 VITALS
RESPIRATION RATE: 42 BRPM | DIASTOLIC BLOOD PRESSURE: 84 MMHG | WEIGHT: 257.72 LBS | OXYGEN SATURATION: 95 % | HEART RATE: 89 BPM | SYSTOLIC BLOOD PRESSURE: 142 MMHG | BODY MASS INDEX: 44 KG/M2 | TEMPERATURE: 97.6 F | HEIGHT: 64 IN

## 2023-09-03 LAB — GLUCOSE SERPL-MCNC: 166 MG/DL (ref 65–140)

## 2023-09-03 PROCEDURE — 99239 HOSP IP/OBS DSCHRG MGMT >30: CPT | Performed by: HOSPITALIST

## 2023-09-03 PROCEDURE — 94640 AIRWAY INHALATION TREATMENT: CPT

## 2023-09-03 PROCEDURE — 94760 N-INVAS EAR/PLS OXIMETRY 1: CPT

## 2023-09-03 PROCEDURE — 82948 REAGENT STRIP/BLOOD GLUCOSE: CPT

## 2023-09-03 PROCEDURE — 94660 CPAP INITIATION&MGMT: CPT

## 2023-09-03 RX ORDER — VALSARTAN 80 MG/1
80 TABLET ORAL DAILY
COMMUNITY

## 2023-09-03 RX ORDER — PREDNISONE 20 MG/1
40 TABLET ORAL DAILY
Status: DISCONTINUED | OUTPATIENT
Start: 2023-09-03 | End: 2023-09-03 | Stop reason: HOSPADM

## 2023-09-03 RX ORDER — FLUTICASONE FUROATE, UMECLIDINIUM BROMIDE AND VILANTEROL TRIFENATATE 100; 62.5; 25 UG/1; UG/1; UG/1
1 POWDER RESPIRATORY (INHALATION) DAILY
Qty: 60 BLISTER | Refills: 0 | Status: SHIPPED | OUTPATIENT
Start: 2023-09-03 | End: 2023-10-03

## 2023-09-03 RX ORDER — PREDNISONE 20 MG/1
TABLET ORAL
Qty: 15 TABLET | Refills: 0 | Status: SHIPPED | OUTPATIENT
Start: 2023-09-03 | End: 2023-09-15

## 2023-09-03 RX ADMIN — LEVOTHYROXINE SODIUM 175 MCG: 175 TABLET ORAL at 06:06

## 2023-09-03 RX ADMIN — LIDOCAINE 5% 1 PATCH: 700 PATCH TOPICAL at 09:01

## 2023-09-03 RX ADMIN — PREDNISONE 40 MG: 20 TABLET ORAL at 09:00

## 2023-09-03 RX ADMIN — IPRATROPIUM BROMIDE 0.5 MG: 0.5 SOLUTION RESPIRATORY (INHALATION) at 08:23

## 2023-09-03 RX ADMIN — INSULIN LISPRO 2 UNITS: 100 INJECTION, SOLUTION INTRAVENOUS; SUBCUTANEOUS at 09:00

## 2023-09-03 RX ADMIN — DULOXETINE HYDROCHLORIDE 60 MG: 30 CAPSULE, DELAYED RELEASE ORAL at 09:00

## 2023-09-03 RX ADMIN — LISINOPRIL 5 MG: 5 TABLET ORAL at 09:01

## 2023-09-03 RX ADMIN — OMEGA-3 FATTY ACIDS CAP 1000 MG 1000 MG: 1000 CAP at 09:01

## 2023-09-03 RX ADMIN — LORATADINE 10 MG: 10 TABLET ORAL at 09:01

## 2023-09-03 RX ADMIN — OXYCODONE HYDROCHLORIDE AND ACETAMINOPHEN 500 MG: 500 TABLET ORAL at 06:06

## 2023-09-03 RX ADMIN — PANTOPRAZOLE SODIUM 40 MG: 40 TABLET, DELAYED RELEASE ORAL at 06:06

## 2023-09-03 RX ADMIN — PREGABALIN 25 MG: 25 CAPSULE ORAL at 09:01

## 2023-09-03 RX ADMIN — LEVALBUTEROL HYDROCHLORIDE 1.25 MG: 1.25 SOLUTION RESPIRATORY (INHALATION) at 08:23

## 2023-09-03 RX ADMIN — FERROUS SULFATE TAB 325 MG (65 MG ELEMENTAL FE) 325 MG: 325 (65 FE) TAB at 06:06

## 2023-09-03 RX ADMIN — GUAIFENESIN 1200 MG: 600 TABLET, EXTENDED RELEASE ORAL at 09:01

## 2023-09-03 NOTE — ASSESSMENT & PLAN NOTE
BP stable  Given lisinopril 5 mg daily  Hydrochlorothiazide 25 mg was held in setting of elevated creatinine, which resolved. Resume home medications; lisinopril 5 mg, hydrochlorothiazide 25 mg.

## 2023-09-03 NOTE — ASSESSMENT & PLAN NOTE
BP stable  Lisinopril and hydrochlorothiazide were held in the setting of LASHAWN. Lisinopril recently changed to losartan per nephrology. Continue home dose hydrochlorothiazide, continue losartan.

## 2023-09-03 NOTE — NURSING NOTE
Pt refusing to wear heart monitor while trying to sleep. Pt becoming agitated due to wires, pt will wear pulse ox still to let us monitor heart rate and O2 saturation.

## 2023-09-03 NOTE — ASSESSMENT & PLAN NOTE
Encourage weight loss, follow-up with PCP as outpatient for possible referral to weight management  Follow-up with GI as outpatient

## 2023-09-03 NOTE — RESPIRATORY THERAPY NOTE
09/03/23 0030   Respiratory Assessment   Assessment Type Assess only   General Appearance Sleeping;Eyes open/responds to voice   Respiratory Pattern Normal   Chest Assessment Chest expansion symmetrical   Cough None   Resp Comments (S)  Nurse informed me that the patient took the mask off on his own and refused to put it back on. I went into the room and discussed the importance of wearing the BiPAP for sleeping. Unfortunately after multiple attempts to convince the patient, he adamantly refused. I placed him on 2 LPM NC, SPO2 93%. Will continue to monitor. Oxygen Therapy/Pulse Ox   O2 Device Nasal cannula   O2 Therapy Oxygen   Nasal Cannula O2 Flow Rate (L/min) 2 L/min   Calculated FIO2 (%) - Nasal Cannula 28   SpO2 93 %   SpO2 Activity At Rest   $ Pulse Oximetry Spot Check Charge Completed     Patient wore BiPAP for approximately one hour and then removed it. He refused to wear it any longer.

## 2023-09-03 NOTE — ASSESSMENT & PLAN NOTE
Continue weight loss efforts with lifestyle modification  Follow-up outpatient with PCP for possible weight management referral  Follow-up outpatient with endocrine for diabetic nutrition education

## 2023-09-03 NOTE — PLAN OF CARE
Problem: PAIN - ADULT  Goal: Verbalizes/displays adequate comfort level or baseline comfort level  Description: Interventions:  - Encourage patient to monitor pain and request assistance  - Assess pain using appropriate pain scale  - Administer analgesics based on type and severity of pain and evaluate response  - Implement non-pharmacological measures as appropriate and evaluate response  - Consider cultural and social influences on pain and pain management  - Notify physician/advanced practitioner if interventions unsuccessful or patient reports new pain  9/3/2023 0933 by Rabia Luu RN  Outcome: Completed  9/3/2023 0933 by Rabia Luu RN  Outcome: Completed  9/3/2023 0737 by Rabia Luu RN  Outcome: Progressing     Problem: INFECTION - ADULT  Goal: Absence or prevention of progression during hospitalization  Description: INTERVENTIONS:  - Assess and monitor for signs and symptoms of infection  - Monitor lab/diagnostic results  - Monitor all insertion sites, i.e. indwelling lines, tubes, and drains  - Monitor endotracheal if appropriate and nasal secretions for changes in amount and color  - [place] appropriate cooling/warming therapies per order  - Administer medications as ordered  - Instruct and encourage patient and family to use good hand hygiene technique  - Identify and instruct in appropriate isolation precautions for identified infection/condition  9/3/2023 0933 by Rabia Luu RN  Outcome: Completed  9/3/2023 0933 by Rabia Luu RN  Outcome: Completed  9/3/2023 0737 by Rabia Luu RN  Outcome: Progressing  Goal: Absence of fever/infection during neutropenic period  Description: INTERVENTIONS:  - Monitor WBC    9/3/2023 0933 by Rabia Luu RN  Outcome: Completed  9/3/2023 0933 by Rabia Luu RN  Outcome: Completed  9/3/2023 0737 by Rabia Luu RN  Outcome: Progressing     Problem: SAFETY ADULT  Goal: Patient will remain free of falls  Description: INTERVENTIONS:  - Educate patient/family on patient safety including physical limitations  - Instruct patient to call for assistance with activity   - Consult OT/PT to assist with strengthening/mobility   - Keep Call bell within reach  - Keep bed low and locked with side rails adjusted as appropriate  - Keep care items and personal belongings within reach  - Initiate and maintain comfort rounds  - Make Fall Risk Sign visible to staff  - Offer Toileting every 2 Hours, in advance of need  - Initiate/Maintain bed/ chair alarm  - Apply yellow socks and bracelet for high fall risk patients  - Consider moving patient to room near nurses station  9/3/2023 0933 by Manuela Ledezma RN  Outcome: Completed  9/3/2023 0933 by Manuela Ledezma RN  Outcome: Completed  9/3/2023 0737 by Manuela Ledezma RN  Outcome: Progressing  Goal: Maintain or return to baseline ADL function  Description: INTERVENTIONS:  -  Assess patient's ability to carry out ADLs; assess patient's baseline for ADL function and identify physical deficits which impact ability to perform ADLs (bathing, care of mouth/teeth, toileting, grooming, dressing, etc.)  - Assess/evaluate cause of self-care deficits   - Assess range of motion  - Assess patient's mobility; develop plan if impaired  - Assess patient's need for assistive devices and provide as appropriate  - Encourage maximum independence but intervene and supervise when necessary  - Involve family in performance of ADLs  - Assess for home care needs following discharge   - Consider OT consult to assist with ADL evaluation and planning for discharge  - Provide patient education as appropriate  9/3/2023 0933 by Manuela Ledezma RN  Outcome: Completed  9/3/2023 0933 by Manuela Ledezma RN  Outcome: Completed  9/3/2023 0737 by Manuela Ledezma RN  Outcome: Progressing  Goal: Maintains/Returns to pre admission functional level  Description: INTERVENTIONS:  - Perform BMAT or MOVE assessment daily.   - Set and communicate daily mobility goal to care team and patient/family/caregiver. - Collaborate with rehabilitation services on mobility goals if consulted  - Perform Range of Motion 3-4 times a day. - Reposition patient every 2 hours. - Dangle patient 3 times a day  - Stand patient 3 times a day  - Ambulate patient 3 times a day  - Out of bed to chair 3 times a day   - Out of bed for meals 3 times a day  - Out of bed for toileting  - Record patient progress and toleration of activity level   9/3/2023 0933 by Manuela Ledezma RN  Outcome: Completed  9/3/2023 0933 by Manuela Ledezma RN  Outcome: Completed  9/3/2023 0737 by Manuela Ledezma RN  Outcome: Progressing     Problem: DISCHARGE PLANNING  Goal: Discharge to home or other facility with appropriate resources  Description: INTERVENTIONS:  - Identify barriers to discharge w/patient and caregiver  - Arrange for needed discharge resources and transportation as appropriate  - Identify discharge learning needs (meds, wound care, etc.)  - Arrange for interpretive services to assist at discharge as needed  - Refer to Case Management Department for coordinating discharge planning if the patient needs post-hospital services based on physician/advanced practitioner order or complex needs related to functional status, cognitive ability, or social support system  9/3/2023 0933 by Manuela Ledezma RN  Outcome: Completed  9/3/2023 0933 by Manuela Ledezma RN  Outcome: Completed  9/3/2023 0737 by Manuela Ledezma RN  Outcome: Progressing     Problem: Knowledge Deficit  Goal: Patient/family/caregiver demonstrates understanding of disease process, treatment plan, medications, and discharge instructions  Description: Complete learning assessment and assess knowledge base.   Interventions:  - Provide teaching at level of understanding  - Provide teaching via preferred learning methods  9/3/2023 0933 by Manuela Ledezma RN  Outcome: Completed  9/3/2023 0933 by Charley Rock RN  Outcome: Completed  9/3/2023 0737 by Charley Rock RN  Outcome: Progressing     Problem: RESPIRATORY - ADULT  Goal: Achieves optimal ventilation and oxygenation  Description: INTERVENTIONS:  - Assess for changes in respiratory status  - Assess for changes in mentation and behavior  - Position to facilitate oxygenation and minimize respiratory effort  - Oxygen administered by appropriate delivery if ordered  - Initiate smoking cessation education as indicated  - Encourage broncho-pulmonary hygiene including cough, deep breathe, Incentive Spirometry  - Assess the need for suctioning and aspirate as needed  - Assess and instruct to report SOB or any respiratory difficulty  - Respiratory Therapy support as indicated  9/3/2023 0933 by Charley Rock RN  Outcome: Completed  9/3/2023 0933 by Charley Rock RN  Outcome: Completed  9/3/2023 0737 by Charley Rock RN  Outcome: Progressing

## 2023-09-03 NOTE — ASSESSMENT & PLAN NOTE
Acute respiratory failure resolved with COPD exacerbation treatment. ABG returned to baseline. Seen by pulmonary, discharging with 2L 02 at home and BiPAP at night.   Added Trelegy for COPD  Follow-up with pulmonary as outpatient

## 2023-09-03 NOTE — PLAN OF CARE
Problem: PAIN - ADULT  Goal: Verbalizes/displays adequate comfort level or baseline comfort level  Description: Interventions:  - Encourage patient to monitor pain and request assistance  - Assess pain using appropriate pain scale  - Administer analgesics based on type and severity of pain and evaluate response  - Implement non-pharmacological measures as appropriate and evaluate response  - Consider cultural and social influences on pain and pain management  - Notify physician/advanced practitioner if interventions unsuccessful or patient reports new pain  Outcome: Progressing     Problem: INFECTION - ADULT  Goal: Absence or prevention of progression during hospitalization  Description: INTERVENTIONS:  - Assess and monitor for signs and symptoms of infection  - Monitor lab/diagnostic results  - Monitor all insertion sites, i.e. indwelling lines, tubes, and drains  - Monitor endotracheal if appropriate and nasal secretions for changes in amount and color  - [place] appropriate cooling/warming therapies per order  - Administer medications as ordered  - Instruct and encourage patient and family to use good hand hygiene technique  - Identify and instruct in appropriate isolation precautions for identified infection/condition  Outcome: Progressing  Goal: Absence of fever/infection during neutropenic period  Description: INTERVENTIONS:  - Monitor WBC    Outcome: Progressing     Problem: SAFETY ADULT  Goal: Patient will remain free of falls  Description: INTERVENTIONS:  - Educate patient/family on patient safety including physical limitations  - Instruct patient to call for assistance with activity   - Consult OT/PT to assist with strengthening/mobility   - Keep Call bell within reach  - Keep bed low and locked with side rails adjusted as appropriate  - Keep care items and personal belongings within reach  - Initiate and maintain comfort rounds  - Make Fall Risk Sign visible to staff  - Offer Toileting every 2 Hours, in advance of need  - Initiate/Maintain bed/ chair alarm  - Apply yellow socks and bracelet for high fall risk patients  - Consider moving patient to room near nurses station  Outcome: Progressing  Goal: Maintain or return to baseline ADL function  Description: INTERVENTIONS:  -  Assess patient's ability to carry out ADLs; assess patient's baseline for ADL function and identify physical deficits which impact ability to perform ADLs (bathing, care of mouth/teeth, toileting, grooming, dressing, etc.)  - Assess/evaluate cause of self-care deficits   - Assess range of motion  - Assess patient's mobility; develop plan if impaired  - Assess patient's need for assistive devices and provide as appropriate  - Encourage maximum independence but intervene and supervise when necessary  - Involve family in performance of ADLs  - Assess for home care needs following discharge   - Consider OT consult to assist with ADL evaluation and planning for discharge  - Provide patient education as appropriate  Outcome: Progressing  Goal: Maintains/Returns to pre admission functional level  Description: INTERVENTIONS:  - Perform BMAT or MOVE assessment daily.   - Set and communicate daily mobility goal to care team and patient/family/caregiver. - Collaborate with rehabilitation services on mobility goals if consulted  - Perform Range of Motion 3-4 times a day. - Reposition patient every 2 hours.   - Dangle patient 3 times a day  - Stand patient 3 times a day  - Ambulate patient 3 times a day  - Out of bed to chair 3 times a day   - Out of bed for meals 3 times a day  - Out of bed for toileting  - Record patient progress and toleration of activity level   Outcome: Progressing     Problem: DISCHARGE PLANNING  Goal: Discharge to home or other facility with appropriate resources  Description: INTERVENTIONS:  - Identify barriers to discharge w/patient and caregiver  - Arrange for needed discharge resources and transportation as appropriate  - Identify discharge learning needs (meds, wound care, etc.)  - Arrange for interpretive services to assist at discharge as needed  - Refer to Case Management Department for coordinating discharge planning if the patient needs post-hospital services based on physician/advanced practitioner order or complex needs related to functional status, cognitive ability, or social support system  Outcome: Progressing     Problem: Knowledge Deficit  Goal: Patient/family/caregiver demonstrates understanding of disease process, treatment plan, medications, and discharge instructions  Description: Complete learning assessment and assess knowledge base.   Interventions:  - Provide teaching at level of understanding  - Provide teaching via preferred learning methods  Outcome: Progressing     Problem: RESPIRATORY - ADULT  Goal: Achieves optimal ventilation and oxygenation  Description: INTERVENTIONS:  - Assess for changes in respiratory status  - Assess for changes in mentation and behavior  - Position to facilitate oxygenation and minimize respiratory effort  - Oxygen administered by appropriate delivery if ordered  - Initiate smoking cessation education as indicated  - Encourage broncho-pulmonary hygiene including cough, deep breathe, Incentive Spirometry  - Assess the need for suctioning and aspirate as needed  - Assess and instruct to report SOB or any respiratory difficulty  - Respiratory Therapy support as indicated  Outcome: Progressing

## 2023-09-03 NOTE — ASSESSMENT & PLAN NOTE
Incidental CT finding. Patient denies abdominal pain or distention, nausea or vomiting.    Follow-up with PCP for further management

## 2023-09-03 NOTE — PLAN OF CARE
Problem: PAIN - ADULT  Goal: Verbalizes/displays adequate comfort level or baseline comfort level  Description: Interventions:  - Encourage patient to monitor pain and request assistance  - Assess pain using appropriate pain scale  - Administer analgesics based on type and severity of pain and evaluate response  - Implement non-pharmacological measures as appropriate and evaluate response  - Consider cultural and social influences on pain and pain management  - Notify physician/advanced practitioner if interventions unsuccessful or patient reports new pain  Outcome: Progressing     Problem: INFECTION - ADULT  Goal: Absence or prevention of progression during hospitalization  Description: INTERVENTIONS:  - Assess and monitor for signs and symptoms of infection  - Monitor lab/diagnostic results  - Monitor all insertion sites, i.e. indwelling lines, tubes, and drains  - Monitor endotracheal if appropriate and nasal secretions for changes in amount and color  - [place] appropriate cooling/warming therapies per order  - Administer medications as ordered  - Instruct and encourage patient and family to use good hand hygiene technique  - Identify and instruct in appropriate isolation precautions for identified infection/condition  Outcome: Progressing  Goal: Absence of fever/infection during neutropenic period  Description: INTERVENTIONS:  - Monitor WBC    Outcome: Progressing     Problem: SAFETY ADULT  Goal: Patient will remain free of falls  Description: INTERVENTIONS:  - Educate patient/family on patient safety including physical limitations  - Instruct patient to call for assistance with activity   - Consult OT/PT to assist with strengthening/mobility   - Keep Call bell within reach  - Keep bed low and locked with side rails adjusted as appropriate  - Keep care items and personal belongings within reach  - Initiate and maintain comfort rounds  - Make Fall Risk Sign visible to staff  - Offer Toileting every 2 Hours, in advance of need  - Initiate/Maintain bed/ chair alarm  - Apply yellow socks and bracelet for high fall risk patients  - Consider moving patient to room near nurses station  Outcome: Progressing  Goal: Maintain or return to baseline ADL function  Description: INTERVENTIONS:  -  Assess patient's ability to carry out ADLs; assess patient's baseline for ADL function and identify physical deficits which impact ability to perform ADLs (bathing, care of mouth/teeth, toileting, grooming, dressing, etc.)  - Assess/evaluate cause of self-care deficits   - Assess range of motion  - Assess patient's mobility; develop plan if impaired  - Assess patient's need for assistive devices and provide as appropriate  - Encourage maximum independence but intervene and supervise when necessary  - Involve family in performance of ADLs  - Assess for home care needs following discharge   - Consider OT consult to assist with ADL evaluation and planning for discharge  - Provide patient education as appropriate  Outcome: Progressing  Goal: Maintains/Returns to pre admission functional level  Description: INTERVENTIONS:  - Perform BMAT or MOVE assessment daily.   - Set and communicate daily mobility goal to care team and patient/family/caregiver. - Collaborate with rehabilitation services on mobility goals if consulted  - Perform Range of Motion 3-4 times a day. - Reposition patient every 2 hours.   - Dangle patient 3 times a day  - Stand patient 3 times a day  - Ambulate patient 3 times a day  - Out of bed to chair 3 times a day   - Out of bed for meals 3 times a day  - Out of bed for toileting  - Record patient progress and toleration of activity level   Outcome: Progressing     Problem: DISCHARGE PLANNING  Goal: Discharge to home or other facility with appropriate resources  Description: INTERVENTIONS:  - Identify barriers to discharge w/patient and caregiver  - Arrange for needed discharge resources and transportation as appropriate  - Identify discharge learning needs (meds, wound care, etc.)  - Arrange for interpretive services to assist at discharge as needed  - Refer to Case Management Department for coordinating discharge planning if the patient needs post-hospital services based on physician/advanced practitioner order or complex needs related to functional status, cognitive ability, or social support system  Outcome: Progressing     Problem: Knowledge Deficit  Goal: Patient/family/caregiver demonstrates understanding of disease process, treatment plan, medications, and discharge instructions  Description: Complete learning assessment and assess knowledge base.   Interventions:  - Provide teaching at level of understanding  - Provide teaching via preferred learning methods  Outcome: Progressing     Problem: RESPIRATORY - ADULT  Goal: Achieves optimal ventilation and oxygenation  Description: INTERVENTIONS:  - Assess for changes in respiratory status  - Assess for changes in mentation and behavior  - Position to facilitate oxygenation and minimize respiratory effort  - Oxygen administered by appropriate delivery if ordered  - Initiate smoking cessation education as indicated  - Encourage broncho-pulmonary hygiene including cough, deep breathe, Incentive Spirometry  - Assess the need for suctioning and aspirate as needed  - Assess and instruct to report SOB or any respiratory difficulty  - Respiratory Therapy support as indicated  Outcome: Progressing

## 2023-09-03 NOTE — ASSESSMENT & PLAN NOTE
TSH within normal limits  Continue home dose levothyroxine 175mcg daily  Follow-up outpatient with endocrine

## 2023-09-03 NOTE — ASSESSMENT & PLAN NOTE
Microcytic anemia upon admission, in setting of BRBPR  Given iron infusion and PPI  Seen by GI; defer EGD and colonoscopy as outpatient  Outpatient referral to GI placed

## 2023-09-03 NOTE — ASSESSMENT & PLAN NOTE
COPD exacerbation in setting of MARSHA, air pollutants (construction), No air conditioning (windows open). Clinically improving, states breathing is back to normal, Sa02 95%. Blood cultures negative. Sputum culture shows oral addison. Completed 5-day course of azithromycin. Discharge home with home health  Requiring 2L 02 nc - discharged with oxygen, [employer] will deliver oxygen on 9/5/23. Requiring BiPAP qHS - approved by insurance, follow-up with sleep medicine as outpatient. Completed 5 days of Solumedrol 40 mg IV q12 hours, transition to prednisone 40 mg po today, discharge with taper by 10mg every 3 days until completion.   Continue home inhaler regimen -discharged with new Trelegy 1 puff daily.   Follow-up with pulmonary as outpatient

## 2023-09-03 NOTE — CASE MANAGEMENT
Case Management Discharge Planning Note    Patient name Jose Ruth  Location / MRN [id number]  : 1965 Date 9/3/2023       Current Admission Date: 2023  Current Admission Diagnosis:COPD with acute exacerbation [facility])   Patient Active Problem List    Diagnosis Date Noted   • Restrictive lung disease 2023   • Pleural effusion, left 2023   • Bright red rectal bleeding 2023   • ABLA (acute blood loss anemia) 2023   • Microcytic anemia 2023   • Fatty liver disease, nonalcoholic 02/15/9477   • Umbilical hernia without obstruction and without gangrene 2023   • Primary osteoarthritis of both knees    • Peripheral vascular disease ([address]) 2022   • Depression, recurrent ([address]) 2021   • Hyperlipidemia 04/15/2021   • Constipation 2021   • Urinary retention 2021   • Acute respiratory failure with hypoxia and hypercapnia ([address]) 2021   • Epistaxis 2021   • MARSHA and COPD overlap syndrome ([address]) 2021   • Muscle twitching 2021   • Uncontrolled type 2 diabetes mellitus with hyperglycemia, with long-term current use of insulin ([address]) 2021   • Tobacco use 2021   • Atelectasis 2021   • Noncompliance with diabetes treatment 2020   • Bilateral lower leg cellulitis 2018   • Elevated LFTs 2018   • Elevated lactic acid level 2018   • Tobacco abuse 2018   • COPD with acute exacerbation ([address]) 2017   • Chronic back pain 2015   • Chronic sinusitis 2015   • Allergic rhinitis 2015   • Arthritis 2015   • Depression with anxiety 2015   • Benign essential hypertension 2015   • Morbid obesity with BMI of 45.0-49.9, adult ([address]) 2015   • Type 2 diabetes, uncontrolled, with neuropathy 2015   • Vitamin D deficiency 2013   • Acquired hypothyroidism 2011      LOS (days): 5  Geometric Mean LOS (GMLOS) (days): 3.50  Days to GMLOS:-1 OBJECTIVE:  Risk of Unplanned Readmission Score: 15.54         Current admission status: Inpatient   Preferred Pharmacy:   [facility] [place] - [address] [place] [address] [facility] [place] [id number]  Phone: [phone] Fax: [phone]    Primary Care Provider: Roseann Rueda PA-C    Primary Insurance: MEDICARE  Secondary Insurance:     DISCHARGE DETAILS:primary nurse Maira and Dr fox made aware bipap was approved and they will be reaching out to pt. Oxygen approved, delivery is 9/5 so pt provided with [facility] for dc. Nurse to review AVS, all follow up providers listed. [facility] to follow on dc.

## 2023-09-03 NOTE — ASSESSMENT & PLAN NOTE
Lab Results   Component Value Date    HGBA1C 11.5 (H) 06/08/2023       Recent Labs     09/02/23  1107 09/02/23  1607 09/02/23 2128 09/03/23  0718   POCGLU 250* 202* 157* 166*       Blood Sugar Average: Last 72 hrs:  (P) 189     Uncontrolled diabetes  A1c 11.5  Resume home Lantus 60 units before bed and Humalog 20 units with meals  Follow-up with endocrine as outpatient

## 2023-09-03 NOTE — DISCHARGE SUMMARY
[address]  Discharge- Yasmeen Simpsonck 1965, 61 y.o. male MRN: [id number]  Unit/Bed#:  Encounter: [id number]  Primary Care Provider: Dhruv Cuevas PA-C   Date and time admitted to hospital: 8/29/2023  5:51 PM    * COPD with acute exacerbation [facility])  Assessment & Plan  COPD exacerbation in setting of MARSHA, air pollutants (construction), No air conditioning (windows open). Clinically improving, states breathing is back to normal, Sa02 95%. Blood cultures negative. Sputum culture shows oral addison. Completed 5-day course of azithromycin. Discharge home with home health  Requiring 2L 02 nc - discharged with oxygen, [employer] will deliver oxygen on 9/5/23. Requiring BiPAP qHS - approved by insurance, follow-up with sleep medicine as outpatient. Completed 5 days of Solumedrol 40 mg IV q12 hours, transition to prednisone 40 mg po today, discharge with taper by 10mg every 3 days until completion.   Continue home inhaler regimen -discharged with new Trelegy 1 puff daily. Follow-up with pulmonary as outpatient      Acute respiratory failure with hypoxia and hypercapnia (HCC)  Assessment & Plan  Acute respiratory failure resolved with COPD exacerbation treatment. ABG returned to baseline. Seen by pulmonary, discharging with 2L 02 at home and BiPAP at night. Added Trelegy for COPD  Follow-up with pulmonary as outpatient    MARSHA and COPD overlap syndrome ([address])  Assessment & Plan  BiPAP at night. Follow-up with sleep medicine as outpatient    Bright red rectal bleeding  Assessment & Plan  Resolved during hospitalization. Hemoglobin stable  Follow-up with GI as outpatient    Umbilical hernia without obstruction and without gangrene  Assessment & Plan  Incidental CT finding. Patient denies abdominal pain or distention, nausea or vomiting.    Follow-up with PCP for further management    Fatty liver disease, nonalcoholic  Assessment & Plan  Encourage weight loss, follow-up with PCP as outpatient for possible referral to weight management  Follow-up with GI as outpatient    Microcytic anemia  Assessment & Plan  Microcytic anemia upon admission, in setting of BRBPR  Given iron infusion and PPI  Seen by GI; defer EGD and colonoscopy as outpatient  Outpatient referral to GI placed    Pleural effusion, left  Assessment & Plan  Follow-up with pulmonary as outpatient    Uncontrolled type 2 diabetes mellitus with hyperglycemia, with long-term current use of insulin [facility])  Assessment & Plan  Lab Results   Component Value Date    HGBA1C 11.5 (H) 06/08/2023       Recent Labs     09/02/23  1107 09/02/23  1607 09/02/23 2128 09/03/23  0718   POCGLU 250* 202* 157* 166*       Blood Sugar Average: Last 72 hrs:  (P) 189     Uncontrolled diabetes  A1c 11.5  Resume home Lantus 60 units before bed and Humalog 20 units with meals  Follow-up with endocrine as outpatient    Morbid obesity with BMI of 45.0-49.9, adult (HCC)  Assessment & Plan  Continue weight loss efforts with lifestyle modification  Follow-up outpatient with PCP for possible weight management referral  Follow-up outpatient with endocrine for diabetic nutrition education    Acquired hypothyroidism  Assessment & Plan  TSH within normal limits  Continue home dose levothyroxine 175mcg daily  Follow-up outpatient with endocrine    Benign essential hypertension  Assessment & Plan  BP stable  Lisinopril and hydrochlorothiazide were held in the setting of LASHAWN. Lisinopril recently changed to losartan per nephrology. Continue home dose hydrochlorothiazide, continue losartan.         Medical Problems     Resolved Problems  Date Reviewed: 8/29/2023   None       Discharging Physician / Practitioner: Franky Smart MD  PCP: Dai Martinez PA-C  Admission Date:   Admission Orders (From admission, onward)     Ordered        08/29/23 2127  INPATIENT ADMISSION  Once                      Discharge Date: 09/03/23    Consultations During Hospital Stay:  · GI, pulmonary    Procedures Performed:   · none    Significant Findings / Test Results:   Results for orders placed or performed during the hospital encounter of 08/29/23   BiLevel ASV Package   Result Value Ref Range    Supplier Name [employer] - [address]     Supplier Phone Number [phone]     Order Status Ready For Delivery     Delivery Note      Delivery Request Date 09/02/2023     Supplier Name 09/03/2023     Item Description       BiLevel ASV Machine,  Resmed S10 Aircurve BiLevel ASV    Item Description       PAP Mask, Full Face, Fit Upon Setup, N/A, 1 per 3 months    Item Description       PAP Mask Interface Cushion, Full Face, 1 per 1 month    Item Description PAP Headgear, 1 per 6 months     Item Description PAP Humidifier, Heated     Item Description Disposable PAP Filter, 2 per 1 month     Item Description Non-Disposable PAP Filter, 1 per 6 months     Item Description PAP Monitoring Modem     Item Description PAP Machine Tubing, Heated, 1 per 3 months    Home O2 Setup   Result Value Ref Range    Supplier Name [employer] [facility] [employer]     Supplier Phone Number [phone]     Order Status Ready For Delivery     Delivery Note can provide pt with eclipse for dc     Delivery Request Date 09/02/2023     Supplier Name 09/05/2023     Item Description       Home Oxygen Concentrator with Portability, Adult, Standard Liter Flow    Item Description Portable Oxygen Concentrator     Item Description No Portable Contents     Item Description POC with Conserving Device / Pulse Dose    FLU/RSV/COVID - if FLU/RSV clinically relevant    Specimen: Nose; Nares   Result Value Ref Range    SARS-CoV-2 Negative Negative    INFLUENZA A PCR Negative Negative    INFLUENZA B PCR Negative Negative    RSV PCR Negative Negative   Blood culture #1    Specimen: Arm, Right; Blood   Result Value Ref Range    Blood Culture No Growth After 4 Days.     Blood culture #2    Specimen: Arm, Right; Blood   Result Value Ref Range    Blood Culture No Growth After 4 Days.     Sputum culture and Gram stain    Specimen: Expectorated Sputum   Result Value Ref Range    Sputum Culture 3+ Growth of     Gram Stain Result 1+ Epithelial cells per low power field (A)     Gram Stain Result Rare Polys (A)     Gram Stain Result 2+ Gram positive cocci in pairs and chains (A)     Gram Stain Result 1+ Gram positive rods (A)    CBC and differential   Result Value Ref Range    WBC 8.66 4.31 - 10.16 Thousand/uL    RBC 3.38 (L) 3.88 - 5.62 Million/uL    Hemoglobin 8.4 (L) 12.0 - 17.0 g/dL    Hematocrit 27.4 (L) 36.5 - 49.3 %    MCV 81 (L) 82 - 98 fL    MCH 24.9 (L) 26.8 - 34.3 pg    MCHC 30.7 (L) 31.4 - 37.4 g/dL    RDW 15.9 (H) 11.6 - 15.1 %    MPV 9.0 8.9 - 12.7 fL    Platelets 163 162 - 290 Thousands/uL    nRBC 1 /100 WBCs    Neutrophils Relative 65 43 - 75 %    Immat GRANS % 1 0 - 2 %    Lymphocytes Relative 17 14 - 44 %    Monocytes Relative 14 (H) 4 - 12 %    Eosinophils Relative 2 0 - 6 %    Basophils Relative 1 0 - 1 %    Neutrophils Absolute 5.67 1.85 - 7.62 Thousands/µL    Immature Grans Absolute 0.04 0.00 - 0.20 Thousand/uL    Lymphocytes Absolute 1.50 0.60 - 4.47 Thousands/µL    Monocytes Absolute 1.25 (H) 0.17 - 1.22 Thousand/µL    Eosinophils Absolute 0.16 0.00 - 0.61 Thousand/µL    Basophils Absolute 0.04 0.00 - 0.10 Thousands/µL   Protime-INR   Result Value Ref Range    Protime 13.2 11.6 - 14.5 seconds    INR 1.01 0.84 - 1.19   APTT   Result Value Ref Range    PTT 27 23 - 37 seconds   Comprehensive metabolic panel   Result Value Ref Range    Sodium 135 135 - 147 mmol/L    Potassium 3.7 3.5 - 5.3 mmol/L    Chloride 92 (L) 96 - 108 mmol/L    CO2 32 21 - 32 mmol/L    ANION GAP 11 mmol/L    BUN 30 (H) 5 - 25 mg/dL    Creatinine 1.45 (H) 0.60 - 1.30 mg/dL    Glucose 142 (H) 65 - 140 mg/dL    Calcium 9.3 8.4 - 10.2 mg/dL    AST 34 13 - 39 U/L    ALT 29 7 - 52 U/L    Alkaline Phosphatase 52 34 - 104 U/L    Total Protein 7.2 6.4 - 8.4 g/dL Albumin 3.8 3.5 - 5.0 g/dL    Total Bilirubin 0.32 0.20 - 1.00 mg/dL    eGFR 52 ml/min/1.73sq m   Magnesium   Result Value Ref Range    Magnesium 2.1 1.9 - 2.7 mg/dL   Lactic acid, plasma (w/reflex if result > 2.0)   Result Value Ref Range    LACTIC ACID 1.7 0.5 - 2.0 mmol/L   D-Dimer   Result Value Ref Range    D-Dimer, Quant 0.95 (H) <0.50 ug/ml FEU   HS Troponin 0hr (reflex protocol)   Result Value Ref Range    hs TnI 0hr 23 "Refer to ACS Flowchart"- see link ng/L   B-Type Natriuretic Peptide(BNP)   Result Value Ref Range    BNP 92 0 - 100 pg/mL   Procalcitonin   Result Value Ref Range    Procalcitonin 0.17 <=0.25 ng/ml   HS Troponin I 2hr   Result Value Ref Range    hs TnI 2hr 22 "Refer to ACS Flowchart"- see link ng/L    Delta 2hr hsTnI -1 <20 ng/L   HS Troponin I 4hr   Result Value Ref Range    hs TnI 4hr 28 "Refer to ACS Flowchart"- see link ng/L    Delta 4hr hsTnI 5 <20 ng/L   Blood gas, arterial   Result Value Ref Range    pH, Arterial 7.406 7.350 - 7.450    pCO2, Arterial 47.1 (H) 36.0 - 44.0 mm Hg    pO2, Arterial 58.6 (LL) 75.0 - 129.0 mm Hg    HCO3, Arterial 28.9 (H) 22.0 - 28.0 mmol/L    Base Excess, Arterial 3.7 mmol/L    O2 Content, Arterial 10.5 (L) 16.0 - 23.0 mL/dL    O2 HGB,Arterial  85.1 (L) 94.0 - 97.0 %    SOURCE Radial, Right     BLANCA TEST Yes     Nasal Cannula 4    Ferritin   Result Value Ref Range    Ferritin 19 (L) 24 - 336 ng/mL   Retic Count   Result Value Ref Range    Retic Ct Abs 76,100 14,356 - 105,094    Retic Ct Pct 2.34 (H) 0.37 - 1.87 %   CBC and Platelet   Result Value Ref Range    WBC 7.67 4.31 - 10.16 Thousand/uL    RBC 3.25 (L) 3.88 - 5.62 Million/uL    Hemoglobin 8.0 (L) 12.0 - 17.0 g/dL    Hematocrit 25.9 (L) 36.5 - 49.3 %    MCV 80 (L) 82 - 98 fL    MCH 24.6 (L) 26.8 - 34.3 pg    MCHC 30.9 (L) 31.4 - 37.4 g/dL    RDW 15.8 (H) 11.6 - 15.1 %    Platelets 989 665 - 861 Thousands/uL    MPV 8.9 8.9 - 12.7 fL   Blood gas, arterial   Result Value Ref Range    pH, Arterial 7.329 (L) 7.350 - 7.450    pCO2, Arterial 65.6 (HH) 36.0 - 44.0 mm Hg    pO2, Arterial 42.9 (LL) 75.0 - 129.0 mm Hg    HCO3, Arterial 33.7 (H) 22.0 - 28.0 mmol/L    Base Excess, Arterial 6.5 mmol/L    O2 Content, Arterial 8.5 (L) 16.0 - 23.0 mL/dL    O2 HGB,Arterial  67.6 (L) 94.0 - 97.0 %    SOURCE Radial, Right     BLANCA TEST Yes     Non Vent Type- HFNC HFNC Flow     NV HFNC FIO2 75     HFNC FLOW LPM 40    Lipid panel   Result Value Ref Range    Cholesterol 88 See Comment mg/dL    Triglycerides 88 See Comment mg/dL    HDL, Direct 30 (L) >=40 mg/dL    LDL Calculated 40 0 - 100 mg/dL    Non-HDL-Chol (CHOL-HDL) 58 mg/dl   TSH, 3rd generation   Result Value Ref Range    TSH 3RD GENERATON 0.833 0.450 - 4.500 uIU/mL   Comprehensive metabolic panel   Result Value Ref Range    Sodium 136 135 - 147 mmol/L    Potassium 3.9 3.5 - 5.3 mmol/L    Chloride 92 (L) 96 - 108 mmol/L    CO2 34 (H) 21 - 32 mmol/L    ANION GAP 10 mmol/L    BUN 26 (H) 5 - 25 mg/dL    Creatinine 1.40 (H) 0.60 - 1.30 mg/dL    Glucose 213 (H) 65 - 140 mg/dL    Calcium 8.9 8.4 - 10.2 mg/dL    AST 33 13 - 39 U/L    ALT 30 7 - 52 U/L    Alkaline Phosphatase 55 34 - 104 U/L    Total Protein 7.3 6.4 - 8.4 g/dL    Albumin 3.8 3.5 - 5.0 g/dL    Total Bilirubin 0.39 0.20 - 1.00 mg/dL    eGFR 54 ml/min/1.73sq m   Magnesium   Result Value Ref Range    Magnesium 2.2 1.9 - 2.7 mg/dL   Phosphorus   Result Value Ref Range    Phosphorus 5.4 (H) 2.7 - 4.5 mg/dL   CBC (With Platelets)   Result Value Ref Range    WBC 9.26 4.31 - 10.16 Thousand/uL    RBC 3.39 (L) 3.88 - 5.62 Million/uL    Hemoglobin 8.3 (L) 12.0 - 17.0 g/dL    Hematocrit 27.7 (L) 36.5 - 49.3 %    MCV 82 82 - 98 fL    MCH 24.5 (L) 26.8 - 34.3 pg    MCHC 30.0 (L) 31.4 - 37.4 g/dL    RDW 15.8 (H) 11.6 - 15.1 %    Platelets 131 513 - 220 Thousands/uL    MPV 9.1 8.9 - 12.7 fL   Protime-INR   Result Value Ref Range    Protime 13.8 11.6 - 14.5 seconds    INR 1.07 0.84 - 1.19   Blood gas, arterial   Result Value Ref Range pH, Arterial 7.333 (L) 7.350 - 7.450    pCO2, Arterial 62.7 (HH) 36.0 - 44.0 mm Hg    pO2, Arterial 80.1 75.0 - 129.0 mm Hg    HCO3, Arterial 32.5 (H) 22.0 - 28.0 mmol/L    Base Excess, Arterial 5.5 mmol/L    O2 Content, Arterial 12.4 (L) 16.0 - 23.0 mL/dL    O2 HGB,Arterial  92.0 (L) 94.0 - 97.0 %    SOURCE Radial, Right     BLANCA TEST Yes     Non Vent type BIPAP BIPAP     IPAP 12     EPAP 6     BIPAP fio2 40 %   Blood gas, arterial   Result Value Ref Range    pH, Arterial 7.416 7.350 - 7.450    pCO2, Arterial 48.5 (H) 36.0 - 44.0 mm Hg    pO2, Arterial 85.0 75.0 - 129.0 mm Hg    HCO3, Arterial 30.5 (H) 22.0 - 28.0 mmol/L    Base Excess, Arterial 5.2 mmol/L    O2 Content, Arterial 12.1 (L) 16.0 - 23.0 mL/dL    O2 HGB,Arterial  94.3 94.0 - 97.0 %    SOURCE Radial, Right     BLANCA TEST Yes     Non Vent type BIPAP BIPAP     IPAP 14     EPAP 7     BIPAP fio2 32 %   TIBC Panel (incl.  Iron, TIBC, % Iron Saturation)   Result Value Ref Range    Iron Saturation 4 (L) 15 - 50 %    TIBC 361 250 - 450 ug/dL    Iron 16 (L) 50 - 212 ug/dL    UIBC 345 155 - 355 ug/dL   Ferritin   Result Value Ref Range    Ferritin 20 (L) 24 - 336 ng/mL   Blood gas, arterial   Result Value Ref Range    pH, Arterial 7.412 7.350 - 7.450    pCO2, Arterial 50.9 (H) 36.0 - 44.0 mm Hg    pO2, Arterial 90.6 75.0 - 129.0 mm Hg    HCO3, Arterial 31.7 (H) 22.0 - 28.0 mmol/L    Base Excess, Arterial 6.3 mmol/L    O2 Content, Arterial 11.9 (L) 16.0 - 23.0 mL/dL    O2 HGB,Arterial  94.9 94.0 - 97.0 %    SOURCE Radial, Left     BLANCA TEST Yes     Non Vent Type- HFNC HFNC Flow     NV HFNC FIO2 40     HFNC FLOW LPM 20    CBC and Platelet   Result Value Ref Range    WBC 7.95 4.31 - 10.16 Thousand/uL    RBC 3.32 (L) 3.88 - 5.62 Million/uL    Hemoglobin 8.2 (L) 12.0 - 17.0 g/dL    Hematocrit 27.3 (L) 36.5 - 49.3 %    MCV 82 82 - 98 fL    MCH 24.7 (L) 26.8 - 34.3 pg    MCHC 30.0 (L) 31.4 - 37.4 g/dL    RDW 15.7 (H) 11.6 - 15.1 %    Platelets 826 120 - 583 Thousands/uL    MPV 9.0 8.9 - 12.7 fL   Comprehensive metabolic panel   Result Value Ref Range    Sodium 135 135 - 147 mmol/L    Potassium 4.4 3.5 - 5.3 mmol/L    Chloride 94 (L) 96 - 108 mmol/L    CO2 34 (H) 21 - 32 mmol/L    ANION GAP 7 mmol/L    BUN 25 5 - 25 mg/dL    Creatinine 1.25 0.60 - 1.30 mg/dL    Glucose 208 (H) 65 - 140 mg/dL    Calcium 8.9 8.4 - 10.2 mg/dL    AST 29 13 - 39 U/L    ALT 30 7 - 52 U/L    Alkaline Phosphatase 51 34 - 104 U/L    Total Protein 7.0 6.4 - 8.4 g/dL    Albumin 3.7 3.5 - 5.0 g/dL    Total Bilirubin 0.32 0.20 - 1.00 mg/dL    eGFR 63 ml/min/1.73sq m   CBC and Platelet   Result Value Ref Range    WBC 8.93 4.31 - 10.16 Thousand/uL    RBC 3.18 (L) 3.88 - 5.62 Million/uL    Hemoglobin 7.7 (L) 12.0 - 17.0 g/dL    Hematocrit 25.5 (L) 36.5 - 49.3 %    MCV 80 (L) 82 - 98 fL    MCH 24.2 (L) 26.8 - 34.3 pg    MCHC 30.2 (L) 31.4 - 37.4 g/dL    RDW 15.8 (H) 11.6 - 15.1 %    Platelets 420 630 - 919 Thousands/uL    MPV 8.7 (L) 8.9 - 12.7 fL   Procalcitonin   Result Value Ref Range    Procalcitonin 0.10 <=0.25 ng/ml   Blood gas, venous   Result Value Ref Range    pH, [place] 7.392 7.300 - 7.400    pCO2, [place] 54.3 (H) 42.0 - 50.0 mm Hg    pO2, [place] 88.3 (H) 35.0 - 45.0 mm Hg    HCO3, [place] 32.3 (H) 24 - 30 mmol/L    Base Excess, [place] 6.4 mmol/L    O2 Content, [place] 11.7 ml/dL    O2 HGB, VENOUS 92.7 (H) 60.0 - 80.0 %    Non Vent type BIPAP BIPAP     IPAP 14     EPAP 7     BIPAP fio2 32 %   Hemoglobin and hematocrit, blood   Result Value Ref Range    Hemoglobin 8.2 (L) 12.0 - 17.0 g/dL    Hematocrit 27.5 (L) 36.5 - 49.3 %   Blood gas, venous   Result Value Ref Range    pH, [place] 7.381 7.300 - 7.400    pCO2, [place] 57.6 (H) 42.0 - 50.0 mm Hg    pO2, [place] 93.7 (H) 35.0 - 45.0 mm Hg    HCO3, [place] 33.4 (H) 24 - 30 mmol/L    Base Excess, [place] 7.1 mmol/L    O2 Content, [place] 12.4 ml/dL    O2 HGB, VENOUS 93.3 (H) 60.0 - 80.0 %    BLANCA TEST No     Temperature 98.6 Degrees Fehrenheit    Non Vent type BIPAP BIPAP    CBC and differential   Result Value Ref Range    WBC 9.94 4.31 - 10.16 Thousand/uL    RBC 3.28 (L) 3.88 - 5.62 Million/uL    Hemoglobin 8.1 (L) 12.0 - 17.0 g/dL    Hematocrit 27.0 (L) 36.5 - 49.3 %    MCV 82 82 - 98 fL    MCH 24.7 (L) 26.8 - 34.3 pg    MCHC 30.0 (L) 31.4 - 37.4 g/dL    RDW 15.9 (H) 11.6 - 15.1 %    MPV 8.7 (L) 8.9 - 12.7 fL    Platelets 362 867 - 832 Thousands/uL    nRBC 1 /100 WBCs    Neutrophils Relative 75 43 - 75 %    Immat GRANS % 1 0 - 2 %    Lymphocytes Relative 14 14 - 44 %    Monocytes Relative 10 4 - 12 %    Eosinophils Relative 0 0 - 6 %    Basophils Relative 0 0 - 1 %    Neutrophils Absolute 7.45 1.85 - 7.62 Thousands/µL    Immature Grans Absolute 0.13 0.00 - 0.20 Thousand/uL    Lymphocytes Absolute 1.37 0.60 - 4.47 Thousands/µL    Monocytes Absolute 0.94 0.17 - 1.22 Thousand/µL    Eosinophils Absolute 0.01 0.00 - 0.61 Thousand/µL    Basophils Absolute 0.04 0.00 - 0.10 Thousands/µL   Comprehensive metabolic panel   Result Value Ref Range    Sodium 136 135 - 147 mmol/L    Potassium 4.6 3.5 - 5.3 mmol/L    Chloride 96 96 - 108 mmol/L    CO2 33 (H) 21 - 32 mmol/L    ANION GAP 7 mmol/L    BUN 20 5 - 25 mg/dL    Creatinine 1.13 0.60 - 1.30 mg/dL    Glucose 152 (H) 65 - 140 mg/dL    Calcium 9.0 8.4 - 10.2 mg/dL    AST 31 13 - 39 U/L    ALT 31 7 - 52 U/L    Alkaline Phosphatase 52 34 - 104 U/L    Total Protein 6.9 6.4 - 8.4 g/dL    Albumin 3.7 3.5 - 5.0 g/dL    Total Bilirubin 0.37 0.20 - 1.00 mg/dL    eGFR 71 ml/min/1.73sq m   Blood gas, arterial   Result Value Ref Range    pH, Arterial 7.408 7.350 - 7.450    pCO2, Arterial 53.7 (H) 36.0 - 44.0 mm Hg    pO2, Arterial 62.6 (L) 75.0 - 129.0 mm Hg    HCO3, Arterial 33.1 (H) 22.0 - 28.0 mmol/L    Base Excess, Arterial 7.4 mmol/L    O2 Content, Arterial 12.1 (L) 16.0 - 23.0 mL/dL    O2 HGB,Arterial  89.0 (L) 94.0 - 97.0 %    SOURCE Brachial, Right     Nasal Cannula 2    CBC and differential   Result Value Ref Range    WBC 11.53 (H) 4.31 - 10.16 Thousand/uL RBC 3.70 (L) 3.88 - 5.62 Million/uL    Hemoglobin 9.0 (L) 12.0 - 17.0 g/dL    Hematocrit 30.7 (L) 36.5 - 49.3 %    MCV 83 82 - 98 fL    MCH 24.3 (L) 26.8 - 34.3 pg    MCHC 29.3 (L) 31.4 - 37.4 g/dL    RDW 15.9 (H) 11.6 - 15.1 %    MPV 8.6 (L) 8.9 - 12.7 fL    Platelets 381 (H) 092 - 390 Thousands/uL    nRBC 1 /100 WBCs    Neutrophils Relative 73 43 - 75 %    Immat GRANS % 2 0 - 2 %    Lymphocytes Relative 15 14 - 44 %    Monocytes Relative 10 4 - 12 %    Eosinophils Relative 0 0 - 6 %    Basophils Relative 0 0 - 1 %    Neutrophils Absolute 8.31 (H) 1.85 - 7.62 Thousands/µL    Immature Grans Absolute 0.26 (H) 0.00 - 0.20 Thousand/uL    Lymphocytes Absolute 1.76 0.60 - 4.47 Thousands/µL    Monocytes Absolute 1.15 0.17 - 1.22 Thousand/µL    Eosinophils Absolute 0.01 0.00 - 0.61 Thousand/µL    Basophils Absolute 0.04 0.00 - 0.10 Thousands/µL   Comprehensive metabolic panel   Result Value Ref Range    Sodium 135 135 - 147 mmol/L    Potassium 4.3 3.5 - 5.3 mmol/L    Chloride 94 (L) 96 - 108 mmol/L    CO2 35 (H) 21 - 32 mmol/L    ANION GAP 6 mmol/L    BUN 21 5 - 25 mg/dL    Creatinine 1.17 0.60 - 1.30 mg/dL    Glucose 174 (H) 65 - 140 mg/dL    Calcium 9.3 8.4 - 10.2 mg/dL    AST 27 13 - 39 U/L    ALT 32 7 - 52 U/L    Alkaline Phosphatase 53 34 - 104 U/L    Total Protein 7.2 6.4 - 8.4 g/dL    Albumin 3.9 3.5 - 5.0 g/dL    Total Bilirubin 0.42 0.20 - 1.00 mg/dL    eGFR 68 ml/min/1.73sq m   ECG 12 lead   Result Value Ref Range    Ventricular Rate 107 BPM    Atrial Rate 107 BPM    ND Interval 156 ms    QRSD Interval 84 ms    QT Interval 340 ms    QTC Interval 453 ms    P [place] 63 degrees    QRS Axis 55 degrees    T Wave [place] 58 degrees   Echo complete w/ contrast if indicated   Result Value Ref Range    LA size 3.6 cm    Triscuspid Valve Regurgitation Peak Gradient 33.0 mmHg    Tricuspid valve peak regurgitation velocity 2.88 m/s    LVPWd 1.20 cm    Left Atrium Area-systolic Apical Two Chamber 20.2 cm2    Left Atrium Area-systolic Four Chamber 32.4 cm2    MV E' Tissue Velocity Septal 9 cm/s    Tricuspid annular plane systolic excursion 6.63 cm    TR Peak Freddy 2.9 m/s    IVSd 6.39 cm    LV DIASTOLIC VOLUME (MOD BIPLANE) 2D 161 mL    LEFT VENTRICLE SYSTOLIC VOLUME (MOD BIPLANE) 2D 55 mL    Left ventricular stroke volume (2D) 105.00 mL    A4C EF 64 %    LA length (A2C) 5.40 cm    LVIDd 5.70 cm    IVS 1.1 cm    LVIDS 3.60 cm    FS 37 28 - 44 %    LA volume (BP) 60 mL    Ao root 3.60 cm    RVID d 3.8 cm    MV valve area p 1/2 method 4.78 cm2    E wave deceleration time 160 ms    AV peak gradient 11 mmHg    MV Peak E Freddy 95 cm/s    MV Peak A Freddy 1.18 m/s    RAA A4C 22.8 cm2    MV stenosis pressure 1/2 time 46 ms    LVSV, 2D 105 mL    LV EF 60     Est. RA pres 8.0 mmHg    Right Ventricular Peak Systolic Pressure 98.38 mmHg   Type and screen   Result Value Ref Range    ABO Grouping A     Rh Factor Positive     Antibody Screen Negative     Specimen Expiration Date [id number]    Fingerstick Glucose (POCT)   Result Value Ref Range    POC Glucose 123 65 - 140 mg/dl   Fingerstick Glucose (POCT)   Result Value Ref Range    POC Glucose 208 (H) 65 - 140 mg/dl   Fingerstick Glucose (POCT)   Result Value Ref Range    POC Glucose 267 (H) 65 - 140 mg/dl   Fingerstick Glucose (POCT)   Result Value Ref Range    POC Glucose 253 (H) 65 - 140 mg/dl   Fingerstick Glucose (POCT)   Result Value Ref Range    POC Glucose 325 (H) 65 - 140 mg/dl   Fingerstick Glucose (POCT)   Result Value Ref Range    POC Glucose 179 (H) 65 - 140 mg/dl   Fingerstick Glucose (POCT)   Result Value Ref Range    POC Glucose 154 (H) 65 - 140 mg/dl   Fingerstick Glucose (POCT)   Result Value Ref Range    POC Glucose 162 (H) 65 - 140 mg/dl   Fingerstick Glucose (POCT)   Result Value Ref Range    POC Glucose 192 (H) 65 - 140 mg/dl   Fingerstick Glucose (POCT)   Result Value Ref Range    POC Glucose 127 65 - 140 mg/dl   Fingerstick Glucose (POCT)   Result Value Ref Range    POC Glucose 195 (H) 65 - 140 mg/dl   Fingerstick Glucose (POCT)   Result Value Ref Range    POC Glucose 283 (H) 65 - 140 mg/dl   Fingerstick Glucose (POCT)   Result Value Ref Range    POC Glucose 233 (H) 65 - 140 mg/dl   Fingerstick Glucose (POCT)   Result Value Ref Range    POC Glucose 157 (H) 65 - 140 mg/dl   Fingerstick Glucose (POCT)   Result Value Ref Range    POC Glucose 250 (H) 65 - 140 mg/dl   Fingerstick Glucose (POCT)   Result Value Ref Range    POC Glucose 202 (H) 65 - 140 mg/dl   Fingerstick Glucose (POCT)   Result Value Ref Range    POC Glucose 157 (H) 65 - 140 mg/dl   Fingerstick Glucose (POCT)   Result Value Ref Range    POC Glucose 166 (H) 65 - 140 mg/dl       Incidental Findings:   · none    Test Results Pending at Discharge (will require follow up):   · none     Outpatient Tests Requested:  · none    Complications:  none    Reason for Admission: Respiratory failure with hypoxia    Hospital Course:   Yajaira Pyle is a 61 y.o. male patient who originally presented to the hospital on 8/29/2023 due to shortness of breath. 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 During hospitalization patient was diagnosed with MARSHA, requiring BiPAP. Patient also required 2 L of oxygen nasal cannula. Patient seen by pulmonary and treated for COPD exacerbation, recommended BiPAP at night, recommended 2 L oxygen at home. Seen by PT, recommending physical therapy/home health at home. Seen by GI, bright red blood per rectum resolved during hospitalization, GI would like to follow-up with patient as outpatient for possible EGD/colonoscopy. Patient's respiratory status improved, social work had BiPAP and oxygen approved by insurance. Patient being discharged home with home health.   And outpatient follow-up with pulmonology, GI, endocrinology, sleep medicine, PCP. Please see above list of diagnoses and related plan for additional information. Condition at Discharge: fair    Discharge Day Visit / Exam:   Subjective: Patient seen at bedside this morning, sitting up in chair. Patient was fully dressed and states he is ready to go home. Only complaint is that he is very tired, he had difficulty sleeping with the BiPAP machine. Patient states that his breathing is back to normal.  He has spoken with the oxygen and BiPAP supply team and understands that they will be delivering equipment. Vitals: Blood Pressure: 142/84 (09/03/23 0901)  Pulse: 89 (09/03/23 0725)  Temperature: 97.6 °F (36.4 °C) (09/02/23 2200)  Temp Source: Temporal (09/02/23 2200)  Respirations: (!) 42 (09/02/23 2200)  Height: 5' 4" (162.6 cm) (08/30/23 0736)  Weight - Scale: 117 kg (257 lb 11.5 oz) (09/03/23 0600)  SpO2: 95 % (2) (09/03/23 0827)  Exam:   Physical Exam  Constitutional:       General: He is not in acute distress. Appearance: He is obese. HENT:      Head: Normocephalic and atraumatic. Right Ear: External ear normal.      Left Ear: External ear normal.      Nose: No congestion or rhinorrhea. Mouth/Throat:      Mouth: Mucous membranes are moist.   Eyes:      General:         Right eye: No discharge. Left eye: No discharge. Cardiovascular:      Rate and Rhythm: Normal rate and regular rhythm. Pulses: Normal pulses. Heart sounds: Normal heart sounds. Pulmonary:      Comments: Increased effort  Decreased breath sounds throughout  Rhonchi over right side  Abdominal:      General: There is no distension. Palpations: Abdomen is soft. Tenderness: There is no abdominal tenderness. Musculoskeletal:         General: Normal range of motion. Cervical back: Normal range of motion. Right lower leg: No edema. Left lower leg: No edema. Skin:     Findings: No erythema or rash.    Neurological:      General: No focal deficit present. Mental Status: He is alert. Mental status is at baseline. Psychiatric:         Behavior: Behavior normal.      Comments: Appears depressed          Discussion with Family: Updated  (sister in-law, brother) via phone. Discharge instructions/Information to patient and family:   See after visit summary for information provided to patient and family. Provisions for Follow-Up Care:  See after visit summary for information related to follow-up care and any pertinent home health orders. Disposition:   Home with home health    Planned Readmission: none     Discharge Statement:  I spent 35 minutes discharging the patient. This time was spent on the day of discharge. I had direct contact with the patient on the day of discharge. Greater than 50% of the total time was spent examining patient, answering all patient questions, arranging and discussing plan of care with patient as well as directly providing post-discharge instructions. Additional time then spent on discharge activities. Discharge Medications:  See after visit summary for reconciled discharge medications provided to patient and/or family.       **Please Note: This note may have been constructed using a voice recognition system**

## 2023-09-04 LAB
BACTERIA BLD CULT: NORMAL
BACTERIA BLD CULT: NORMAL

## 2023-09-05 ENCOUNTER — HOME CARE VISIT (OUTPATIENT)
Dept: HOME HEALTH SERVICES | Facility: HOME HEALTHCARE | Age: 58
End: 2023-09-05

## 2023-09-05 ENCOUNTER — TRANSITIONAL CARE MANAGEMENT (OUTPATIENT)
Dept: FAMILY MEDICINE CLINIC | Facility: HOME HEALTHCARE | Age: 58
End: 2023-09-05

## 2023-09-05 DIAGNOSIS — Z71.89 COORDINATION OF COMPLEX CARE: Primary | ICD-10-CM

## 2023-09-06 ENCOUNTER — TELEPHONE (OUTPATIENT)
Dept: OBGYN CLINIC | Facility: CLINIC | Age: 58
End: 2023-09-06

## 2023-09-06 LAB

## 2023-09-07 ENCOUNTER — PROCEDURE VISIT (OUTPATIENT)
Dept: OBGYN CLINIC | Facility: CLINIC | Age: 58
End: 2023-09-07
Payer: MEDICARE

## 2023-09-07 VITALS
HEIGHT: 64 IN | WEIGHT: 257 LBS | SYSTOLIC BLOOD PRESSURE: 130 MMHG | HEART RATE: 116 BPM | BODY MASS INDEX: 43.87 KG/M2 | DIASTOLIC BLOOD PRESSURE: 71 MMHG

## 2023-09-07 DIAGNOSIS — M17.0 PRIMARY OSTEOARTHRITIS OF BOTH KNEES: Primary | ICD-10-CM

## 2023-09-07 PROCEDURE — 20610 DRAIN/INJ JOINT/BURSA W/O US: CPT | Performed by: ORTHOPAEDIC SURGERY

## 2023-09-07 RX ORDER — HYALURONATE SODIUM 10 MG/ML
20 SYRINGE (ML) INTRAARTICULAR
Status: COMPLETED | OUTPATIENT
Start: 2023-09-07 | End: 2023-09-07

## 2023-09-07 RX ADMIN — Medication 20 MG: at 10:45

## 2023-09-07 NOTE — PROGRESS NOTES
Assessment/Plan:   Diagnoses and all orders for this visit:    Primary osteoarthritis of both knees  -     Large joint arthrocentesis: bilateral knee         He was offered and accepted Euflexxa - 3 Shot Series. An injection of Euflexxa - 3 Shot Series was performed to his Bilateral knee(s) for symptomatic relief. He tolerated the procedure well. Ice and post injection protocol was advised. He is cleared for weightbearing activities as tolerated. He is also cleared for activities as tolerated, with modifications to avoid pain. He will return for his second Euflexxa - 3 Shot Series injection in 1 week time. Patient expresses understanding and is in agreement with this treatment plan. The patient was given the opportunity to ask questions or present concerns. The patient has degenerative joint disease of his bilateral knees. Under aseptic technique, both knees were injected with his first set of Euflexxa. He tolerated the procedures quite well. Return back next week for second set. If his condition changes, he would not hesitate to let us know      Subjective:   Patient ID: Sulaiman Galvan  1965     HPI  Patient is a 61 y.o. male who presents today for re-evaluation and initiation of Euflexxa viscosupplementation injection series for treatment of primary osteoarthritis of the bilateral knee(s). Patient was last seen in regards to this issue on 6/1/23, at which time he received a CS injection for symptomatic relief. He has experienced relief from previous visco supplementation injections. On today's presentation, he rates his pain level at 7/10. His pain is present with weight bearing activity and deep knee flexion. Patient denies any adverse reaction to previous injections including fever, chills, headache, nausea, dizziness, or malaise.       The following portions of the patient's history were reviewed and updated as appropriate:  Past medical history, past surgical history, Family history, social history, current medications and allergies    Past Medical History:   Diagnosis Date   • Acquired hypothyroidism 2015   • Angioedema    • Diabetes mellitus ([address])    • Disease of thyroid gland    • Hyperlipidemia    • Hypertension    • Mixed hyperlipidemia 2015   • Morbid obesity ([address])    • Morbid obesity with BMI of 45.0-49.9, adult ([address]) 2015   • MARSHA (obstructive sleep apnea)    • Primary hypertension 2015    Transitioned From: Benign essential hypertension       Past Surgical History:   Procedure Laterality Date   • KNEE SURGERY     • SINUS SURGERY         Family History   Problem Relation Age of Onset   • Thyroid cancer Mother    • Diabetes type II Mother    • Esophageal cancer Father    • Kidney cancer Brother    • Diabetes type II Brother    • Diabetes type II Maternal Grandmother        Social History     Socioeconomic History   • Marital status: Single     Spouse name: None   • Number of children: None   • Years of education: None   • Highest education level: None   Occupational History   • None   Tobacco Use   • Smoking status: Former     Packs/day: 2.00     Years: 70.00     Total pack years: 140.00     Types: Cigarettes     Quit date: 10/21/2022     Years since quittin.8   • Smokeless tobacco: Never   • Tobacco comments:     states read to quit prior to admit   Vaping Use   • Vaping Use: Never used   Substance and Sexual Activity   • Alcohol use: Not Currently     Alcohol/week: 3.0 standard drinks of alcohol     Types: 2 Cans of beer, 1 Shots of liquor per week     Comment: No alcohol since    • Drug use: No   • Sexual activity: Not Currently   Other Topics Concern   • None   Social History Narrative   • None     Social Determinants of Health     Financial Resource Strain: Not on file   Food Insecurity: No Food Insecurity (2023)    Hunger Vital Sign    • Worried About Running Out of Food in the Last Year: Never true    • Ran Out of Food in the Last Year: Never true Transportation Needs: No Transportation Needs (8/30/2023)    PRAPARE - Transportation    • Lack of Transportation (Medical): No    • Lack of Transportation (Non-Medical):  No   Physical Activity: Not on file   Stress: Not on file   Social Connections: Not on file   Intimate Partner Violence: Not on file   Housing Stability: Low Risk  (8/30/2023)    Housing Stability Vital Sign    • Unable to Pay for Housing in the Last Year: No    • Number of Places Lived in the Last Year: 1    • Unstable Housing in the Last Year: No         Current Outpatient Medications:   •  albuterol (2.5 mg/3 mL) 0.083 % nebulizer solution, Take 3 mL (2.5 mg total) by nebulization every 6 (six) hours as needed for wheezing or shortness of breath, Disp: 180 mL, Rfl: 0  •  Ascorbic Acid (VITAMIN C PO), Take 1 tablet by mouth daily, Disp: , Rfl:   •  aspirin 81 mg chewable tablet, Take one tablet by mouth daily, Disp: , Rfl:   •  benzonatate (TESSALON PERLES) 100 mg capsule, Take 1 capsule (100 mg total) by mouth 3 (three) times a day as needed for cough, Disp: 20 capsule, Rfl: 2  •  cholecalciferol (VITAMIN D3) 400 units tablet, Take 1 tablet by mouth daily, Disp: , Rfl:   •  DULoxetine (CYMBALTA) 60 mg delayed release capsule, Take 1 capsule by mouth once daily, Disp: 90 capsule, Rfl: 0  •  EPINEPHrine (EPIPEN) 0.3 mg/0.3 mL SOAJ, INJECT 0.3MG INTO A MUSCLE ONCE FOR 1 DOSE, Disp: 2 each, Rfl: 0  •  fluticasone-umeclidinium-vilanterol (Trelegy Ellipta) 100-62.5-25 mcg/actuation inhaler, Inhale 1 puff daily Rinse mouth after use., Disp: 60 blister, Rfl: 0  •  gabapentin (NEURONTIN) 100 mg capsule, TAKE 1 CAPSULE BY MOUTH THREE TIMES DAILY, Disp: 90 capsule, Rfl: 0  •  HumaLOG KwikPen 100 units/mL injection pen, INJECT 20 UNITS UNDER THE SKIN THREE TIMES DAILY WITH MEALS (Patient taking differently: Inject 25 Units under the skin 3 (three) times a day with meals), Disp: 15 mL, Rfl: 0  •  hydrochlorothiazide (HYDRODIURIL) 25 mg tablet, Take 1 tablet (25 mg total) by mouth 2 (two) times a day, Disp: 60 tablet, Rfl: 2  •  ipratropium (ATROVENT) 0.02 % nebulizer solution, Take 2.5 mL (0.5 mg total) by nebulization every 6 (six) hours as needed for wheezing or shortness of breath, Disp: 150 mL, Rfl: 0  •  ipratropium-albuterol (DUO-NEB) 0.5-2.5 mg/3 mL nebulizer solution, Take 3 mL by nebulization every 6 (six) hours as needed for wheezing or shortness of breath, Disp: 180 mL, Rfl: 0  •  levothyroxine 175 mcg tablet, Take 1 tablet by mouth once daily, Disp: 90 tablet, Rfl: 0  •  loratadine (CLARITIN) 10 mg tablet, Take 10 mg by mouth daily, Disp: , Rfl:   •  metFORMIN (GLUCOPHAGE) 1000 MG tablet, Take 1 tablet by mouth twice daily, Disp: 180 tablet, Rfl: 0  •  Omega-3 Fatty Acids (fish oil) 1,000 mg, Take 1,000 mg by mouth 2 (two) times a day, Disp: , Rfl:   •  predniSONE 20 mg tablet, Take 2 tablets (40 mg total) by mouth daily for 3 days, THEN 1.5 tablets (30 mg total) daily for 3 days, THEN 1 tablet (20 mg total) daily for 3 days, THEN 0.5 tablets (10 mg total) daily for 3 days. , Disp: 15 tablet, Rfl: 0  •  Insulin Glargine Solostar (Lantus SoloStar) 100 UNIT/ML SOPN, Inject 0.6 mL (60 Units total) under the skin daily (Patient taking differently: Inject 65 Units under the skin daily Takes at night), Disp: 18 mL, Rfl: 5  •  Insulin Pen Needle (Pen Needles 3/16") 31G X 5 MM MISC, Use 4 (four) times a day (Patient not taking: Reported on 8/29/2023), Disp: 300 each, Rfl: 3  •  pregabalin (LYRICA) 25 mg capsule, Take 1 capsule by mouth twice daily, Disp: 60 capsule, Rfl: 0  •  rosuvastatin (CRESTOR) 40 MG tablet, Take 1 tablet (40 mg total) by mouth daily, Disp: 90 tablet, Rfl: 3  •  sodium chloride (OCEAN) 0.65 % nasal spray, 1 spray into each nostril as needed for rhinitis, Disp: 30 mL, Rfl: 1  •  Thiamine HCl (vitamin B-1) 250 MG tablet, Take 250 mg by mouth daily, Disp: , Rfl:   •  traZODone (DESYREL) 150 mg tablet, TAKE 1 TABLET BY MOUTH ONCE DAILY AT BEDTIME, Disp: 90 tablet, Rfl: 0  •  valsartan (DIOVAN) 80 mg tablet, Take 80 mg by mouth daily, Disp: , Rfl:   •  Ventolin  (90 Base) MCG/ACT inhaler, Inhale 2 puffs every 4 (four) hours as needed for wheezing or shortness of breath, Disp: 18 g, Rfl: 5  •  vitamin E, tocopherol, 400 units capsule, Take 400 Units by mouth daily, Disp: , Rfl:     Allergies   Allergen Reactions   • Ace Inhibitors Swelling     Angioedema    PT STATES THIS IS NOT AN ALLERGY   • Other Anaphylaxis     Pt believes that he is allergic to peanut butter. Also, seasonal allergies   • Zinc Tongue Swelling     PT STATES THIS IS NOT AN ALLERGY   • Clindamycin Edema     Had angioedema episode approx 5 hr after IM administration of clindamycin. Unclear if there is definitive causal relationship. PT STATES THIS IS NOT AN ALLERGY       Review of Systems   Constitutional: Negative for chills and fever. HENT: Negative for ear pain and sore throat. Eyes: Negative for pain and visual disturbance. Respiratory: Negative for cough and shortness of breath. Cardiovascular: Negative for chest pain and palpitations. Gastrointestinal: Negative for abdominal pain and vomiting. Genitourinary: Negative for dysuria and hematuria. Musculoskeletal: Negative for arthralgias and back pain. Skin: Negative for color change and rash. Neurological: Negative for seizures and syncope. All other systems reviewed and are negative.        Objective:  /71   Pulse (!) 116   Ht 5' 4" (1.626 m)   Wt 117 kg (257 lb)   BMI 44.11 kg/m²     Ortho Exam  bilateral knee(s) -   Patient ambulates with analgic gait pattern  Uses single-point cane as assistive device  Genu Varus anatomical deformity  Skin is warm and dry to touch with no signs of erythema, ecchymosis, or infection  No soft tissue swelling or effusion noted  ROM (5° - 115°)  MMT: 4/5 throughout  TTP over medial joint line, TTP over lateral joint line, TTP over pes anserine bursa, no popliteal fullness appreciated on exam   Flexor and extensor mechanisms are intact   Knee is stable to varus and valgus stress  - Lachman's  - Anterior Drawer, - Posterior Drawer  - Medial Shana's, - Lateral Shana's  - Pivot Shift  Patella tracks centrally with palpable crepitus  Calf compartments are soft and supple  - Herman's sign  2+ DP and PT pulses with brisk capillary refill to the toes  Sural, saphenous, tibial, superficial, and deep peroneal motor and sensory distributions intact  Sensation light touch intact distally    Physical Exam  HENT:      Head: Normocephalic and atraumatic. Nose: Nose normal.   Eyes:      Conjunctiva/sclera: Conjunctivae normal.   Cardiovascular:      Rate and Rhythm: Normal rate. Pulmonary:      Effort: Pulmonary effort is normal.   Musculoskeletal:      Cervical back: Neck supple. Skin:     General: Skin is warm and dry. Capillary Refill: Capillary refill takes less than 2 seconds. Neurological:      Mental Status: He is alert and oriented to person, place, and time. Psychiatric:         Mood and Affect: Mood normal.         Behavior: Behavior normal.          Diagnostic Test Review:  No new imaging at time of visit. Large joint arthrocentesis: bilateral knee  Universal Protocol:  Consent: Verbal consent obtained.   Risks and benefits: risks, benefits and alternatives were discussed  Consent given by: patient  Timeout called at: 9/7/2023 11:00 AM.  Patient understanding: patient states understanding of the procedure being performed  Site marked: the operative site was marked  Patient identity confirmed: verbally with patient    Supporting Documentation  Indications: pain and joint swelling   Procedure Details  Location: knee - bilateral knee  Ultrasound guidance: no  Approach: anterolateral    Medications (Right): 20 mg Sodium Hyaluronate (Viscosup) 20 MG/2MLMedications (Left): 20 mg Sodium Hyaluronate (Viscosup) 20 MG/2ML   Patient tolerance: patient tolerated the procedure well with no immediate complications  Dressing:  Sterile dressing applied             Scribe Attestation    I,:  Mercedes Cherry am acting as a scribe while in the presence of the attending physician.:       I,:  Melanie Thayer, DO personally performed the services described in this documentation    as scribed in my presence.:

## 2023-09-08 LAB

## 2023-09-10 DIAGNOSIS — Z79.4 TYPE 2 DIABETES MELLITUS WITH COMPLICATION, WITH LONG-TERM CURRENT USE OF INSULIN (HCC): ICD-10-CM

## 2023-09-10 DIAGNOSIS — E11.8 TYPE 2 DIABETES MELLITUS WITH COMPLICATION, WITH LONG-TERM CURRENT USE OF INSULIN (HCC): ICD-10-CM

## 2023-09-10 DIAGNOSIS — I10 ESSENTIAL HYPERTENSION: ICD-10-CM

## 2023-09-10 RX ORDER — HYDROCHLOROTHIAZIDE 25 MG/1
25 TABLET ORAL 2 TIMES DAILY
Qty: 60 TABLET | Refills: 0 | Status: SHIPPED | OUTPATIENT
Start: 2023-09-10

## 2023-09-11 ENCOUNTER — OFFICE VISIT (OUTPATIENT)
Dept: FAMILY MEDICINE CLINIC | Facility: HOME HEALTHCARE | Age: 58
End: 2023-09-11
Payer: MEDICARE

## 2023-09-11 VITALS
BODY MASS INDEX: 45.93 KG/M2 | SYSTOLIC BLOOD PRESSURE: 118 MMHG | DIASTOLIC BLOOD PRESSURE: 60 MMHG | TEMPERATURE: 97.8 F | OXYGEN SATURATION: 94 % | HEART RATE: 107 BPM | WEIGHT: 269 LBS | RESPIRATION RATE: 20 BRPM | HEIGHT: 64 IN

## 2023-09-11 DIAGNOSIS — B37.9 YEAST INFECTION: ICD-10-CM

## 2023-09-11 DIAGNOSIS — J44.1 COPD WITH ACUTE EXACERBATION (HCC): ICD-10-CM

## 2023-09-11 DIAGNOSIS — J96.01 ACUTE RESPIRATORY FAILURE WITH HYPOXIA AND HYPERCAPNIA (HCC): ICD-10-CM

## 2023-09-11 DIAGNOSIS — G62.9 NEUROPATHY: ICD-10-CM

## 2023-09-11 DIAGNOSIS — I10 ESSENTIAL HYPERTENSION: ICD-10-CM

## 2023-09-11 DIAGNOSIS — D50.9 MICROCYTIC ANEMIA: Chronic | ICD-10-CM

## 2023-09-11 DIAGNOSIS — Z79.4 TYPE 2 DIABETES MELLITUS WITH COMPLICATION, WITH LONG-TERM CURRENT USE OF INSULIN (HCC): ICD-10-CM

## 2023-09-11 DIAGNOSIS — R39.12 WEAK URINARY STREAM: ICD-10-CM

## 2023-09-11 DIAGNOSIS — N52.9 ERECTILE DYSFUNCTION, UNSPECIFIED ERECTILE DYSFUNCTION TYPE: ICD-10-CM

## 2023-09-11 DIAGNOSIS — J96.02 ACUTE RESPIRATORY FAILURE WITH HYPOXIA AND HYPERCAPNIA (HCC): ICD-10-CM

## 2023-09-11 DIAGNOSIS — E11.8 TYPE 2 DIABETES MELLITUS WITH COMPLICATION, WITH LONG-TERM CURRENT USE OF INSULIN (HCC): ICD-10-CM

## 2023-09-11 DIAGNOSIS — K42.9 UMBILICAL HERNIA WITHOUT OBSTRUCTION AND WITHOUT GANGRENE: ICD-10-CM

## 2023-09-11 DIAGNOSIS — Z76.89 ENCOUNTER FOR SUPPORT AND COORDINATION OF TRANSITION OF CARE: Primary | ICD-10-CM

## 2023-09-11 PROBLEM — N18.2 CHRONIC KIDNEY DISEASE, STAGE 2 (MILD): Status: ACTIVE | Noted: 2023-07-13

## 2023-09-11 PROBLEM — E11.22 TYPE 2 DIABETES MELLITUS WITH DIABETIC CHRONIC KIDNEY DISEASE (HCC): Status: ACTIVE | Noted: 2021-04-07

## 2023-09-11 PROBLEM — R80.9 PROTEINURIA: Status: ACTIVE | Noted: 2023-07-13

## 2023-09-11 PROBLEM — E11.21 DIABETIC NEPHROPATHY ASSOCIATED WITH TYPE 2 DIABETES MELLITUS (HCC): Status: ACTIVE | Noted: 2023-06-12

## 2023-09-11 LAB — SL AMB POCT HEMOGLOBIN AIC: 9.4 (ref ?–6.5)

## 2023-09-11 PROCEDURE — 99495 TRANSJ CARE MGMT MOD F2F 14D: CPT | Performed by: PHYSICIAN ASSISTANT

## 2023-09-11 PROCEDURE — 83036 HEMOGLOBIN GLYCOSYLATED A1C: CPT | Performed by: FAMILY MEDICINE

## 2023-09-11 RX ORDER — FLUCONAZOLE 150 MG/1
150 TABLET ORAL ONCE
Qty: 1 TABLET | Refills: 0 | Status: SHIPPED | OUTPATIENT
Start: 2023-09-11 | End: 2023-09-11

## 2023-09-11 RX ORDER — HYDROCHLOROTHIAZIDE 25 MG/1
25 TABLET ORAL 2 TIMES DAILY
Qty: 60 TABLET | Refills: 0 | Status: CANCELLED | OUTPATIENT
Start: 2023-09-11

## 2023-09-11 RX ORDER — GABAPENTIN 100 MG/1
100 CAPSULE ORAL 3 TIMES DAILY
Qty: 90 CAPSULE | Refills: 2 | Status: SHIPPED | OUTPATIENT
Start: 2023-09-11

## 2023-09-11 NOTE — PROGRESS NOTES
Assessment/Plan:     Diagnoses and all orders for this visit:    Encounter for support and coordination of transition of care    COPD with acute exacerbation [facility])  -     Ambulatory Referral to Pulmonology; Future    Acute respiratory failure with hypoxia and hypercapnia ([address] [facility])  -     Ambulatory Referral to Pulmonology; Future    Microcytic anemia    Type 2 diabetes mellitus with complication, with long-term current use of insulin (HCC)  -     POCT hemoglobin A1c  -     metFORMIN (GLUCOPHAGE) 1000 MG tablet; Take 1 tablet (1,000 mg total) by mouth 2 (two) times a day    Neuropathy  -     gabapentin (NEURONTIN) 100 mg capsule; Take 1 capsule (100 mg total) by mouth 3 (three) times a day    Essential hypertension    Weak urinary stream  -     Ambulatory Referral to Urology; Future    Erectile dysfunction, unspecified erectile dysfunction type  -     Ambulatory Referral to Urology; Future    Umbilical hernia without obstruction and without gangrene  -     Ambulatory Referral to General Surgery; Future    Yeast infection  -     fluconazole (DIFLUCAN) 150 mg tablet; Take 1 tablet (150 mg total) by mouth once for 1 dose        - Complete prednisone taper as prescribed. Referral provided to establish with [facility] Pulmonology  - Continue current diabetic regimen and follow up with endocrinology as scheduled  - Follow up with GI as scheduled for further evaluation of anemia  - Referral provided to establish with urology for weak urinary stream and ED concerns  - Referral provided to establish with surgery for further evaluation of umbilical hernia    Return for Medicare Annual Wellness Visit. Subjective:        Patient ID: Shola Vizcarra is a 60 y.o. male.     Chief Complaint   Patient presents with   • Transition of Care Management     TCM Call     Date and time call was made  9/5/2023 11:03 AM    Patient was hospitialized at  [address] [place] [address]    Date of Admission  08/29/23    Date of discharge  09/03/23 Disposition  Home      TCM Call     Scheduled for follow up? Yes           Rustam Figueredo is a 62-year-old male with history of COPD, MARSHA, tobacco use, hypertension, hyperlipidemia, hypothyroidism, type 2 diabetes, CKD, neuropathy, depression and anxiety, presenting for TCM. Patient was hospitalized from 8/29-9/3 with COPD Exacerbation with mixed respiratory failure. Symptoms improved with IV steroids and azithromycin. He did qualify for BiPAP and Home O2 which were arranged on discharge. He was discharged on a steroid taper. Patient was also found to have iron deficiency anemia with a hemoglobin of 9.0 on discharge for which he is scheduled for follow up with GI 10/6. Patient is requesting a referral to establish with urology secondary to several years of ongoing weak urinary stream and difficulty urinating along with ED. He denies hematuria but is concerned today for a yeast infection which he feels came from the antibiotics during his hospitalization. He endorses a white penile discharge with itching. Patient would also like a referral to general surgery to discuss umbilical hernia repair. He has had this for several years and endorses pain if he applies pressure to the area.       The following portions of the patient's history were reviewed and updated as appropriate: allergies, current medications, past family history, past medical history, past social history, past surgical history and problem list.    Patient Active Problem List   Diagnosis   • Allergic rhinitis   • Arthritis   • Chronic back pain   • Chronic sinusitis   • COPD with acute exacerbation ([address])   • Depression with anxiety   • Essential hypertension   • Acquired hypothyroidism   • Morbid obesity with BMI of 45.0-49.9, adult ([address])   • Type 2 diabetes, uncontrolled, with neuropathy   • Vitamin D deficiency   • Bilateral lower leg cellulitis   • Elevated LFTs   • Elevated lactic acid level   • Tobacco abuse   • Noncompliance with diabetes treatment   • Acute respiratory failure with hypoxia and hypercapnia ([facility])   • Epistaxis   • MARSHA and COPD overlap syndrome (HCC)   • Muscle twitching   • Type 2 diabetes mellitus with diabetic chronic kidney disease (HCC)   • Tobacco use   • Atelectasis   • Urinary retention   • Constipation   • Depression, recurrent (HCC)   • Peripheral vascular disease (HCC)   • Primary osteoarthritis of both knees   • Hyperlipidemia   • Pleural effusion, left   • Bright red rectal bleeding   • ABLA (acute blood loss anemia)   • Microcytic anemia   • Fatty liver disease, nonalcoholic   • Umbilical hernia without obstruction and without gangrene   • Restrictive lung disease   • Diabetic nephropathy associated with type 2 diabetes mellitus (HCC)   • Proteinuria   • Chronic kidney disease, stage 2 (mild)   • Neuropathy       Current Outpatient Medications   Medication Sig Dispense Refill   • albuterol (2.5 mg/3 mL) 0.083 % nebulizer solution Take 3 mL (2.5 mg total) by nebulization every 6 (six) hours as needed for wheezing or shortness of breath 180 mL 0   • Ascorbic Acid (VITAMIN C PO) Take 1 tablet by mouth daily     • aspirin 81 mg chewable tablet Take one tablet by mouth daily     • benzonatate (TESSALON PERLES) 100 mg capsule Take 1 capsule (100 mg total) by mouth 3 (three) times a day as needed for cough 20 capsule 2   • cholecalciferol (VITAMIN D3) 400 units tablet Take 1 tablet by mouth daily     • DULoxetine (CYMBALTA) 60 mg delayed release capsule Take 1 capsule by mouth once daily 90 capsule 0   • EPINEPHrine (EPIPEN) 0.3 mg/0.3 mL SOAJ INJECT 0.3MG INTO A MUSCLE ONCE FOR 1 DOSE 2 each 0   • fluconazole (DIFLUCAN) 150 mg tablet Take 1 tablet (150 mg total) by mouth once for 1 dose 1 tablet 0   • fluticasone-umeclidinium-vilanterol (Trelegy Ellipta) 100-62.5-25 mcg/actuation inhaler Inhale 1 puff daily Rinse mouth after use.  60 blister 0   • gabapentin (NEURONTIN) 100 mg capsule Take 1 capsule (100 mg total) by mouth 3 (three) times a day 90 capsule 2   • HumaLOG KwikPen 100 units/mL injection pen INJECT 20 UNITS UNDER THE SKIN THREE TIMES DAILY WITH MEALS (Patient taking differently: Inject 25 Units under the skin 3 (three) times a day with meals) 15 mL 0   • hydrochlorothiazide (HYDRODIURIL) 25 mg tablet Take 1 tablet by mouth twice daily 60 tablet 0   • ipratropium (ATROVENT) 0.02 % nebulizer solution Take 2.5 mL (0.5 mg total) by nebulization every 6 (six) hours as needed for wheezing or shortness of breath 150 mL 0   • ipratropium-albuterol (DUO-NEB) 0.5-2.5 mg/3 mL nebulizer solution Take 3 mL by nebulization every 6 (six) hours as needed for wheezing or shortness of breath 180 mL 0   • levothyroxine 175 mcg tablet Take 1 tablet by mouth once daily 90 tablet 0   • loratadine (CLARITIN) 10 mg tablet Take 10 mg by mouth daily     • metFORMIN (GLUCOPHAGE) 1000 MG tablet Take 1 tablet (1,000 mg total) by mouth 2 (two) times a day 180 tablet 1   • Omega-3 Fatty Acids (fish oil) 1,000 mg Take 1,000 mg by mouth 2 (two) times a day     • predniSONE 20 mg tablet Take 2 tablets (40 mg total) by mouth daily for 3 days, THEN 1.5 tablets (30 mg total) daily for 3 days, THEN 1 tablet (20 mg total) daily for 3 days, THEN 0.5 tablets (10 mg total) daily for 3 days.  15 tablet 0   • pregabalin (LYRICA) 25 mg capsule Take 1 capsule by mouth twice daily 60 capsule 0   • rosuvastatin (CRESTOR) 40 MG tablet Take 1 tablet (40 mg total) by mouth daily 90 tablet 3   • sodium chloride (OCEAN) 0.65 % nasal spray 1 spray into each nostril as needed for rhinitis 30 mL 1   • Thiamine HCl (vitamin B-1) 250 MG tablet Take 250 mg by mouth daily     • traZODone (DESYREL) 150 mg tablet TAKE 1 TABLET BY MOUTH ONCE DAILY AT BEDTIME 90 tablet 0   • valsartan (DIOVAN) 80 mg tablet Take 80 mg by mouth daily     • Ventolin  (90 Base) MCG/ACT inhaler Inhale 2 puffs every 4 (four) hours as needed for wheezing or shortness of breath 18 g 5   • vitamin E, tocopherol, 400 units capsule Take 400 Units by mouth daily     • Insulin Glargine Solostar (Lantus SoloStar) 100 UNIT/ML SOPN Inject 0.6 mL (60 Units total) under the skin daily (Patient taking differently: Inject 65 Units under the skin daily Takes at night) 18 mL 5   • Insulin Pen Needle (Pen Needles 3/16") 31G X 5 MM MISC Use 4 (four) times a day (Patient not taking: Reported on 2023) 300 each 3     No current facility-administered medications for this visit.         Past Medical History:   Diagnosis Date   • Acquired hypothyroidism 2015   • Angioedema    • Diabetes mellitus ([address])    • Disease of thyroid gland    • Hyperlipidemia    • Hypertension    • Mixed hyperlipidemia 2015   • Morbid obesity ([address])    • Morbid obesity with BMI of 45.0-49.9, adult ([address]) 2015   • MARSHA (obstructive sleep apnea)    • Primary hypertension 2015    Transitioned From: Benign essential hypertension        Past Surgical History:   Procedure Laterality Date   • KNEE SURGERY     • SINUS SURGERY          Social History     Socioeconomic History   • Marital status: Single     Spouse name: Not on file   • Number of children: Not on file   • Years of education: Not on file   • Highest education level: Not on file   Occupational History   • Not on file   Tobacco Use   • Smoking status: Former     Packs/day: 2.00     Years: 70.00     Total pack years: 140.00     Types: Cigarettes     Quit date: 10/21/2022     Years since quittin.8   • Smokeless tobacco: Never   • Tobacco comments:     states read to quit prior to admit   Vaping Use   • Vaping Use: Never used   Substance and Sexual Activity   • Alcohol use: Not Currently     Alcohol/week: 3.0 standard drinks of alcohol     Types: 2 Cans of beer, 1 Shots of liquor per week     Comment: No alcohol since    • Drug use: No   • Sexual activity: Not Currently   Other Topics Concern   • Not on file   Social History Narrative   • Not on file     Social Determinants of Health Financial Resource Strain: Not on file   Food Insecurity: No Food Insecurity (8/30/2023)    Hunger Vital Sign    • Worried About Running Out of Food in the Last Year: Never true    • Ran Out of Food in the Last Year: Never true   Transportation Needs: No Transportation Needs (8/30/2023)    PRAPARE - Transportation    • Lack of Transportation (Medical): No    • Lack of Transportation (Non-Medical): No   Physical Activity: Not on file   Stress: Not on file   Social Connections: Not on file   Intimate Partner Violence: Not on file   Housing Stability: Low Risk  (8/30/2023)    Housing Stability Vital Sign    • Unable to Pay for Housing in the Last Year: No    • Number of Places Lived in the Last Year: 1    • Unstable Housing in the Last Year: No        Review of Systems   Constitutional: Negative for chills, diaphoresis and fever. Respiratory: Positive for cough and shortness of breath. Negative for chest tightness and wheezing. Cardiovascular: Positive for leg swelling. Negative for chest pain and palpitations. Gastrointestinal: Positive for abdominal pain. Negative for constipation, diarrhea, nausea and vomiting. Genitourinary: Positive for difficulty urinating and penile discharge. Negative for hematuria. Musculoskeletal: Positive for arthralgias and myalgias. Skin: Negative for rash and wound. Neurological: Negative for dizziness, syncope, light-headedness and headaches. Objective:      /60 (BP Location: Left arm, Patient Position: Sitting, Cuff Size: Large)   Pulse (!) 107   Temp 97.8 °F (36.6 °C) (Temporal)   Resp 20   Ht 5' 4" (1.626 m)   Wt 122 kg (269 lb)   SpO2 94% Comment: pt on 2 liters of oxygen  BMI 46.17 kg/m²          Physical Exam  Vitals and nursing note reviewed. Constitutional:       General: He is not in acute distress. Appearance: He is obese. HENT:      Head: Normocephalic and atraumatic.       Nose:      Comments: Nasal cannula O2 in place  Eyes: Extraocular Movements: Extraocular movements intact. Conjunctiva/sclera: Conjunctivae normal.      Pupils: Pupils are equal, round, and reactive to light. Cardiovascular:      Rate and Rhythm: Regular rhythm. Tachycardia present. Heart sounds: Normal heart sounds. No murmur heard. Pulmonary:      Effort: Pulmonary effort is normal. No respiratory distress. Breath sounds: Rhonchi present. Abdominal:      Hernia: A hernia is present. Hernia is present in the umbilical area. Skin:     General: Skin is warm and dry. Neurological:      General: No focal deficit present. Mental Status: He is alert and oriented to person, place, and time. Cranial Nerves: No cranial nerve deficit.    Psychiatric:         Mood and Affect: Mood normal.         Behavior: Behavior normal.

## 2023-09-14 ENCOUNTER — PROCEDURE VISIT (OUTPATIENT)
Dept: OBGYN CLINIC | Facility: CLINIC | Age: 58
End: 2023-09-14
Payer: MEDICARE

## 2023-09-14 VITALS
BODY MASS INDEX: 45.93 KG/M2 | WEIGHT: 269 LBS | HEIGHT: 64 IN | DIASTOLIC BLOOD PRESSURE: 80 MMHG | HEART RATE: 108 BPM | SYSTOLIC BLOOD PRESSURE: 129 MMHG

## 2023-09-14 DIAGNOSIS — M17.0 PRIMARY OSTEOARTHRITIS OF BOTH KNEES: Primary | ICD-10-CM

## 2023-09-14 PROCEDURE — 20610 DRAIN/INJ JOINT/BURSA W/O US: CPT | Performed by: ORTHOPAEDIC SURGERY

## 2023-09-14 RX ORDER — HYALURONATE SODIUM 10 MG/ML
20 SYRINGE (ML) INTRAARTICULAR
Status: COMPLETED | OUTPATIENT
Start: 2023-09-14 | End: 2023-09-14

## 2023-09-14 RX ADMIN — Medication 20 MG: at 10:15

## 2023-09-14 NOTE — PROGRESS NOTES
Assessment/Plan:   Diagnoses and all orders for this visit:    Primary osteoarthritis of both knees  -     Large joint arthrocentesis: bilateral knee         He was offered and accepted Euflexxa - 3 Shot Series. An injection of Euflexxa - 3 Shot Series was performed to his Bilateral knee(s) for symptomatic relief. He tolerated the procedure well. Ice and post injection protocol was advised. He is cleared for weightbearing activities as tolerated. He is also cleared for activities as tolerated, with modifications to avoid pain. He will return for his third Euflexxa - 3 Shot Series injection in 1 week time. Patient expresses understanding and is in agreement with this treatment plan. The patient was given the opportunity to ask questions or present concerns. The patient has degenerative joint disease of his bilateral knees. Under aseptic technique, both knees were injected with his second set of Euflexxa. He tolerated the procedures quite well. Return back next week for second set. If his condition changes, he would not hesitate to let us know    Under aseptic technique, but were injected with his second set of Euflexxa. He tolerated procedures quite well. Return back next week for his final set      Subjective:   Patient ID: Makeda [facility]  1965     HPI  Patient is a 60 y.o. male who presents today for re-evaluation and injection of 2nd of 3 Euflexxa viscosupplementation injections for treatment of primary osteoarthritis of the bilateral knee(s). Patient was last seen in regards to this issue on 9/7/23, at which time he received the 1st Euflexxa viscosupplementation injection. He has experienced relief from previous visco supplementation injections. On today's presentation, he rates his pain level at 7/10. His pain is present with weight bearing activity and deep knee flexion.  Patient denies any adverse reaction to previous injections including fever, chills, headache, nausea, dizziness, or malaise.       The following portions of the patient's history were reviewed and updated as appropriate:  Past medical history, past surgical history, Family history, social history, current medications and allergies    Past Medical History:   Diagnosis Date   • Acquired hypothyroidism 2015   • Angioedema    • Diabetes mellitus ([address])    • Disease of thyroid gland    • Hyperlipidemia    • Hypertension    • Mixed hyperlipidemia 2015   • Morbid obesity ([address])    • Morbid obesity with BMI of 45.0-49.9, adult ([address]) 2015   • MARSHA (obstructive sleep apnea)    • Primary hypertension 2015    Transitioned From: Benign essential hypertension       Past Surgical History:   Procedure Laterality Date   • KNEE SURGERY     • SINUS SURGERY         Family History   Problem Relation Age of Onset   • Thyroid cancer Mother    • Diabetes type II Mother    • Esophageal cancer Father    • Kidney cancer Brother    • Diabetes type II Brother    • Diabetes type II Maternal Grandmother        Social History     Socioeconomic History   • Marital status: Single     Spouse name: None   • Number of children: None   • Years of education: None   • Highest education level: None   Occupational History   • None   Tobacco Use   • Smoking status: Former     Packs/day: 2.00     Years: 70.00     Total pack years: 140.00     Types: Cigarettes     Quit date: 10/21/2022     Years since quittin.8   • Smokeless tobacco: Never   • Tobacco comments:     states read to quit prior to admit   Vaping Use   • Vaping Use: Never used   Substance and Sexual Activity   • Alcohol use: Not Currently     Alcohol/week: 3.0 standard drinks of alcohol     Types: 2 Cans of beer, 1 Shots of liquor per week     Comment: No alcohol since    • Drug use: No   • Sexual activity: Not Currently   Other Topics Concern   • None   Social History Narrative   • None     Social Determinants of Health     Financial Resource Strain: Not on file   Food Insecurity: No Food Insecurity (8/30/2023)    Hunger Vital Sign    • Worried About Running Out of Food in the Last Year: Never true    • Ran Out of Food in the Last Year: Never true   Transportation Needs: No Transportation Needs (8/30/2023)    PRAPARE - Transportation    • Lack of Transportation (Medical): No    • Lack of Transportation (Non-Medical):  No   Physical Activity: Not on file   Stress: Not on file   Social Connections: Not on file   Intimate Partner Violence: Not on file   Housing Stability: Low Risk  (8/30/2023)    Housing Stability Vital Sign    • Unable to Pay for Housing in the Last Year: No    • Number of Places Lived in the Last Year: 1    • Unstable Housing in the Last Year: No         Current Outpatient Medications:   •  albuterol (2.5 mg/3 mL) 0.083 % nebulizer solution, Take 3 mL (2.5 mg total) by nebulization every 6 (six) hours as needed for wheezing or shortness of breath, Disp: 180 mL, Rfl: 0  •  Ascorbic Acid (VITAMIN C PO), Take 1 tablet by mouth daily, Disp: , Rfl:   •  aspirin 81 mg chewable tablet, Take one tablet by mouth daily, Disp: , Rfl:   •  benzonatate (TESSALON PERLES) 100 mg capsule, Take 1 capsule (100 mg total) by mouth 3 (three) times a day as needed for cough, Disp: 20 capsule, Rfl: 2  •  cholecalciferol (VITAMIN D3) 400 units tablet, Take 1 tablet by mouth daily, Disp: , Rfl:   •  DULoxetine (CYMBALTA) 60 mg delayed release capsule, Take 1 capsule by mouth once daily, Disp: 90 capsule, Rfl: 0  •  EPINEPHrine (EPIPEN) 0.3 mg/0.3 mL SOAJ, INJECT 0.3MG INTO A MUSCLE ONCE FOR 1 DOSE, Disp: 2 each, Rfl: 0  •  fluticasone-umeclidinium-vilanterol (Trelegy Ellipta) 100-62.5-25 mcg/actuation inhaler, Inhale 1 puff daily Rinse mouth after use., Disp: 60 blister, Rfl: 0  •  gabapentin (NEURONTIN) 100 mg capsule, Take 1 capsule (100 mg total) by mouth 3 (three) times a day, Disp: 90 capsule, Rfl: 2  •  HumaLOG KwikPen 100 units/mL injection pen, INJECT 20 UNITS UNDER THE SKIN THREE TIMES DAILY WITH MEALS (Patient taking differently: Inject 25 Units under the skin 3 (three) times a day with meals), Disp: 15 mL, Rfl: 0  •  hydrochlorothiazide (HYDRODIURIL) 25 mg tablet, Take 1 tablet by mouth twice daily, Disp: 60 tablet, Rfl: 0  •  ipratropium (ATROVENT) 0.02 % nebulizer solution, Take 2.5 mL (0.5 mg total) by nebulization every 6 (six) hours as needed for wheezing or shortness of breath, Disp: 150 mL, Rfl: 0  •  ipratropium-albuterol (DUO-NEB) 0.5-2.5 mg/3 mL nebulizer solution, Take 3 mL by nebulization every 6 (six) hours as needed for wheezing or shortness of breath, Disp: 180 mL, Rfl: 0  •  levothyroxine 175 mcg tablet, Take 1 tablet by mouth once daily, Disp: 90 tablet, Rfl: 0  •  loratadine (CLARITIN) 10 mg tablet, Take 10 mg by mouth daily, Disp: , Rfl:   •  metFORMIN (GLUCOPHAGE) 1000 MG tablet, Take 1 tablet (1,000 mg total) by mouth 2 (two) times a day, Disp: 180 tablet, Rfl: 1  •  Omega-3 Fatty Acids (fish oil) 1,000 mg, Take 1,000 mg by mouth 2 (two) times a day, Disp: , Rfl:   •  predniSONE 20 mg tablet, Take 2 tablets (40 mg total) by mouth daily for 3 days, THEN 1.5 tablets (30 mg total) daily for 3 days, THEN 1 tablet (20 mg total) daily for 3 days, THEN 0.5 tablets (10 mg total) daily for 3 days. , Disp: 15 tablet, Rfl: 0  •  pregabalin (LYRICA) 25 mg capsule, Take 1 capsule by mouth twice daily, Disp: 60 capsule, Rfl: 0  •  rosuvastatin (CRESTOR) 40 MG tablet, Take 1 tablet (40 mg total) by mouth daily, Disp: 90 tablet, Rfl: 3  •  sodium chloride (OCEAN) 0.65 % nasal spray, 1 spray into each nostril as needed for rhinitis, Disp: 30 mL, Rfl: 1  •  Thiamine HCl (vitamin B-1) 250 MG tablet, Take 250 mg by mouth daily, Disp: , Rfl:   •  traZODone (DESYREL) 150 mg tablet, TAKE 1 TABLET BY MOUTH ONCE DAILY AT BEDTIME, Disp: 90 tablet, Rfl: 0  •  valsartan (DIOVAN) 80 mg tablet, Take 80 mg by mouth daily, Disp: , Rfl:   •  Ventolin  (90 Base) MCG/ACT inhaler, Inhale 2 puffs every 4 (four) hours as needed for wheezing or shortness of breath, Disp: 18 g, Rfl: 5  •  vitamin E, tocopherol, 400 units capsule, Take 400 Units by mouth daily, Disp: , Rfl:   •  Insulin Glargine Solostar (Lantus SoloStar) 100 UNIT/ML SOPN, Inject 0.6 mL (60 Units total) under the skin daily (Patient taking differently: Inject 65 Units under the skin daily Takes at night), Disp: 18 mL, Rfl: 5  •  Insulin Pen Needle (Pen Needles 3/16") 31G X 5 MM MISC, Use 4 (four) times a day (Patient not taking: Reported on 8/29/2023), Disp: 300 each, Rfl: 3    Allergies   Allergen Reactions   • Ace Inhibitors Swelling     Angioedema    PT STATES THIS IS NOT AN ALLERGY   • Other Anaphylaxis     Pt believes that he is allergic to peanut butter. Also, seasonal allergies   • Zinc Tongue Swelling     PT STATES THIS IS NOT AN ALLERGY   • Clindamycin Edema     Had angioedema episode approx 5 hr after IM administration of clindamycin. Unclear if there is definitive causal relationship. PT STATES THIS IS NOT AN ALLERGY       Review of Systems   Constitutional: Negative for chills and fever. HENT: Negative for ear pain and sore throat. Eyes: Negative for pain and visual disturbance. Respiratory: Negative for cough and shortness of breath. Cardiovascular: Negative for chest pain and palpitations. Gastrointestinal: Negative for abdominal pain and vomiting. Genitourinary: Negative for dysuria and hematuria. Musculoskeletal: Negative for arthralgias and back pain. Skin: Negative for color change and rash. Neurological: Negative for seizures and syncope. All other systems reviewed and are negative.        Objective:  /80 (BP Location: Left arm, Patient Position: Sitting, Cuff Size: Large)   Pulse (!) 108   Ht 5' 4" (1.626 m)   Wt 122 kg (269 lb)   BMI 46.17 kg/m²     Ortho Exam  bilateral knee(s) -   Patient ambulates with analgic gait pattern  Uses single-point cane as assistive device  Genu Varus anatomical deformity  Skin is warm and dry to touch with no signs of erythema, ecchymosis, or infection  No soft tissue swelling or effusion noted  ROM (5° - 115°)  MMT: 4/5 throughout  TTP over medial joint line, TTP over lateral joint line, TTP over pes anserine bursa, no popliteal fullness appreciated on exam   Flexor and extensor mechanisms are intact   Knee is stable to varus and valgus stress  - Lachman's  - Anterior Drawer, - Posterior Drawer  - Medial Shana's, - Lateral Shana's  - Pivot Shift  Patella tracks centrally with palpable crepitus  Calf compartments are soft and supple  - Herman's sign  2+ DP and PT pulses with brisk capillary refill to the toes  Sural, saphenous, tibial, superficial, and deep peroneal motor and sensory distributions intact  Sensation light touch intact distally    Physical Exam  HENT:      Head: Normocephalic and atraumatic. Nose: Nose normal.   Eyes:      Conjunctiva/sclera: Conjunctivae normal.   Cardiovascular:      Rate and Rhythm: Normal rate. Pulmonary:      Effort: Pulmonary effort is normal.   Musculoskeletal:      Cervical back: Neck supple. Skin:     General: Skin is warm and dry. Capillary Refill: Capillary refill takes less than 2 seconds. Neurological:      Mental Status: He is alert and oriented to person, place, and time. Psychiatric:         Mood and Affect: Mood normal.         Behavior: Behavior normal.          Diagnostic Test Review:  No new imaging at time of visit. Large joint arthrocentesis: bilateral knee  Universal Protocol:  Consent: Verbal consent obtained. Risks and benefits: risks, benefits and alternatives were discussed  Consent given by: patient  Time out: Immediately prior to procedure a "time out" was called to verify the correct patient, procedure, equipment, support staff and site/side marked as required.   Patient understanding: patient states understanding of the procedure being performed  Site marked: the operative site was marked  Patient identity confirmed: verbally with patient    Supporting Documentation  Indications: pain   Procedure Details  Location: knee - bilateral knee  Preparation: Patient was prepped and draped in the usual sterile fashion  Needle size: 18 G  Ultrasound guidance: no    Medications (Right): 20 mg Sodium Hyaluronate (Viscosup) 20 MG/2MLMedications (Left): 20 mg Sodium Hyaluronate (Viscosup) 20 MG/2ML   Patient tolerance: patient tolerated the procedure well with no immediate complications  Dressing:  Sterile dressing applied             Scribe Attestation    I,:  Forest Luther am acting as a scribe while in the presence of the attending physician.:       I,:  Grace Saldaña DO personally performed the services described in this documentation    as scribed in my presence.:

## 2023-09-18 ENCOUNTER — PATIENT OUTREACH (OUTPATIENT)
Dept: FAMILY MEDICINE CLINIC | Facility: HOME HEALTHCARE | Age: 58
End: 2023-09-18

## 2023-09-18 NOTE — PROGRESS NOTES
Complex Care Management Note:  Inbasket notification for complex outpatient care management received as identified via HRR report. Chart review completed. ED visits and Hospitalizations:   Patient recently hospitalized at [facility] from 8/29/23 to 9/3/23 for COPD exacerbation, hypercapnic and hypoxic respiratory failure. Patient was cleared to discharge to home with [facility] services and with recommendation to follow up with Pulmonology. Patient discharged to home on 2L O2 via NC. Patient also instructed to follow up with:  - GI as OP for fatty liver disease, possible EGD and colonoscopy for bright red rectal bleeding,   - PCP for umbilical hernia and possible referral to weight management for weight loss. Per chart review patient had an initial consultation with [facility] on 8/4/23  - Endocrine for DM    PMH Includes: COPD, DM with last A1C 11.5 (6/8/23), morbid obesity  Refer to problem list for complete list of medical diagnosis. Admission or ED risk score is 79. Per chart review, patient declined [facility] on 9/5/23. Patient also cancelled PT appointments. Patient had a TCM appointment with PCP on 9/11/23      Future Appointments are:      9/26/23 with Ortho  9/27/23 with Endo in [place]  10/06/23 with Gastro in [place]  10/11/23 with General Surgery  10/16/23 with [facility]  11/10/23 with [facility]    Case meets screening criteria for complex care management. I plan to attempt first outreach within the next 2 days. IB message reminder has been set.

## 2023-09-26 ENCOUNTER — PROCEDURE VISIT (OUTPATIENT)
Dept: OBGYN CLINIC | Facility: CLINIC | Age: 58
End: 2023-09-26
Payer: MEDICARE

## 2023-09-26 VITALS
WEIGHT: 269 LBS | BODY MASS INDEX: 45.93 KG/M2 | HEIGHT: 64 IN | DIASTOLIC BLOOD PRESSURE: 72 MMHG | HEART RATE: 115 BPM | SYSTOLIC BLOOD PRESSURE: 126 MMHG

## 2023-09-26 DIAGNOSIS — M17.0 PRIMARY OSTEOARTHRITIS OF BOTH KNEES: Primary | ICD-10-CM

## 2023-09-26 PROCEDURE — 20610 DRAIN/INJ JOINT/BURSA W/O US: CPT | Performed by: ORTHOPAEDIC SURGERY

## 2023-09-26 RX ORDER — HYALURONATE SODIUM 10 MG/ML
20 SYRINGE (ML) INTRAARTICULAR
Status: COMPLETED | OUTPATIENT
Start: 2023-09-26 | End: 2023-09-26

## 2023-09-26 RX ADMIN — Medication 20 MG: at 13:15

## 2023-09-26 NOTE — PROGRESS NOTES
ASSESSMENT/PLAN:    Diagnoses and all orders for this visit:    Primary osteoarthritis of both knees    Other orders  -     Large joint arthrocentesis        Both of the patient's knees were injected with his third and final set of Euflexxa. He tolerated the injections quite well. Follow-up with our office in 3 months. He is acceptable to this plan. Return in about 3 months (around 12/26/2023). Under aseptic technique, both knees were injected with his final set of Euflexxa. He tolerated the procedures quite well. Return back in 3 months for reevaluation    _____________________________________________________  CHIEF COMPLAINT:  Chief Complaint   Patient presents with   • Left Knee - Follow-up   • Right Knee - Follow-up         SUBJECTIVE:  Elizabeth Sen is a 60 y.o. male who presents to our office for viscosupplementation of both knees. He is here today for his third and final set of Euflexxa. He tolerated his previous injections without issue. He denies any numbness or tingling. He denies any fever or chills.     The following portions of the patient's history were reviewed and updated as appropriate: allergies, current medications, past family history, past medical history, past social history, past surgical history and problem list.    PAST MEDICAL HISTORY:  Past Medical History:   Diagnosis Date   • Acquired hypothyroidism 1/2/2015   • Angioedema    • Diabetes mellitus ([address])    • Disease of thyroid gland    • Hyperlipidemia    • Hypertension    • Mixed hyperlipidemia 2/4/2015   • Morbid obesity ([address])    • Morbid obesity with BMI of 45.0-49.9, adult ([address]) 1/2/2015   • MARSHA (obstructive sleep apnea)    • Primary hypertension 1/2/2015    Transitioned From: Benign essential hypertension       PAST SURGICAL HISTORY:  Past Surgical History:   Procedure Laterality Date   • KNEE SURGERY     • SINUS SURGERY         FAMILY HISTORY:  Family History   Problem Relation Age of Onset   • Thyroid cancer Mother • Diabetes type II Mother    • Esophageal cancer Father    • Kidney cancer Brother    • Diabetes type II Brother    • Diabetes type II Maternal Grandmother        SOCIAL HISTORY:  Social History     Tobacco Use   • Smoking status: Former     Packs/day: 2.00     Years: 70.00     Total pack years: 140.00     Types: Cigarettes     Quit date: 10/21/2022     Years since quittin.9   • Smokeless tobacco: Never   • Tobacco comments:     states read to quit prior to admit   Vaping Use   • Vaping Use: Never used   Substance Use Topics   • Alcohol use: Not Currently     Alcohol/week: 3.0 standard drinks of alcohol     Types: 2 Cans of beer, 1 Shots of liquor per week     Comment: No alcohol since    • Drug use: No       MEDICATIONS:    Current Outpatient Medications:   •  albuterol (2.5 mg/3 mL) 0.083 % nebulizer solution, Take 3 mL (2.5 mg total) by nebulization every 6 (six) hours as needed for wheezing or shortness of breath, Disp: 180 mL, Rfl: 0  •  Ascorbic Acid (VITAMIN C PO), Take 1 tablet by mouth daily, Disp: , Rfl:   •  aspirin 81 mg chewable tablet, Take one tablet by mouth daily, Disp: , Rfl:   •  benzonatate (TESSALON PERLES) 100 mg capsule, Take 1 capsule (100 mg total) by mouth 3 (three) times a day as needed for cough, Disp: 20 capsule, Rfl: 2  •  cholecalciferol (VITAMIN D3) 400 units tablet, Take 1 tablet by mouth daily, Disp: , Rfl:   •  DULoxetine (CYMBALTA) 60 mg delayed release capsule, Take 1 capsule by mouth once daily, Disp: 90 capsule, Rfl: 0  •  EPINEPHrine (EPIPEN) 0.3 mg/0.3 mL SOAJ, INJECT 0.3MG INTO A MUSCLE ONCE FOR 1 DOSE, Disp: 2 each, Rfl: 0  •  fluticasone-umeclidinium-vilanterol (Trelegy Ellipta) 100-62.5-25 mcg/actuation inhaler, Inhale 1 puff daily Rinse mouth after use., Disp: 60 blister, Rfl: 0  •  gabapentin (NEURONTIN) 100 mg capsule, Take 1 capsule (100 mg total) by mouth 3 (three) times a day, Disp: 90 capsule, Rfl: 2  •  HumaLOG KwikPen 100 units/mL injection pen, INJECT 20 UNITS UNDER THE SKIN THREE TIMES DAILY WITH MEALS (Patient taking differently: Inject 25 Units under the skin 3 (three) times a day with meals), Disp: 15 mL, Rfl: 0  •  hydrochlorothiazide (HYDRODIURIL) 25 mg tablet, Take 1 tablet by mouth twice daily, Disp: 60 tablet, Rfl: 0  •  Insulin Glargine Solostar (Lantus SoloStar) 100 UNIT/ML SOPN, Inject 0.6 mL (60 Units total) under the skin daily (Patient taking differently: Inject 65 Units under the skin daily Takes at night), Disp: 18 mL, Rfl: 5  •  ipratropium (ATROVENT) 0.02 % nebulizer solution, Take 2.5 mL (0.5 mg total) by nebulization every 6 (six) hours as needed for wheezing or shortness of breath, Disp: 150 mL, Rfl: 0  •  ipratropium-albuterol (DUO-NEB) 0.5-2.5 mg/3 mL nebulizer solution, Take 3 mL by nebulization every 6 (six) hours as needed for wheezing or shortness of breath, Disp: 180 mL, Rfl: 0  •  levothyroxine 175 mcg tablet, Take 1 tablet by mouth once daily, Disp: 90 tablet, Rfl: 0  •  loratadine (CLARITIN) 10 mg tablet, Take 10 mg by mouth daily, Disp: , Rfl:   •  metFORMIN (GLUCOPHAGE) 1000 MG tablet, Take 1 tablet (1,000 mg total) by mouth 2 (two) times a day, Disp: 180 tablet, Rfl: 1  •  Omega-3 Fatty Acids (fish oil) 1,000 mg, Take 1,000 mg by mouth 2 (two) times a day, Disp: , Rfl:   •  pregabalin (LYRICA) 25 mg capsule, Take 1 capsule by mouth twice daily, Disp: 60 capsule, Rfl: 0  •  rosuvastatin (CRESTOR) 40 MG tablet, Take 1 tablet (40 mg total) by mouth daily, Disp: 90 tablet, Rfl: 3  •  sodium chloride (OCEAN) 0.65 % nasal spray, 1 spray into each nostril as needed for rhinitis, Disp: 30 mL, Rfl: 1  •  Thiamine HCl (vitamin B-1) 250 MG tablet, Take 250 mg by mouth daily, Disp: , Rfl:   •  traZODone (DESYREL) 150 mg tablet, TAKE 1 TABLET BY MOUTH ONCE DAILY AT BEDTIME, Disp: 90 tablet, Rfl: 0  •  valsartan (DIOVAN) 80 mg tablet, Take 80 mg by mouth daily, Disp: , Rfl:   •  Ventolin  (90 Base) MCG/ACT inhaler, Inhale 2 puffs every 4 (four) hours as needed for wheezing or shortness of breath, Disp: 18 g, Rfl: 5  •  vitamin E, tocopherol, 400 units capsule, Take 400 Units by mouth daily, Disp: , Rfl:   •  Insulin Pen Needle (Pen Needles 3/16") 31G X 5 MM MISC, Use 4 (four) times a day (Patient not taking: Reported on 8/29/2023), Disp: 300 each, Rfl: 3    ALLERGIES:  Allergies   Allergen Reactions   • Ace Inhibitors Swelling     Angioedema    PT STATES THIS IS NOT AN ALLERGY   • Other Anaphylaxis     Pt believes that he is allergic to peanut butter. Also, seasonal allergies   • Zinc Tongue Swelling     PT STATES THIS IS NOT AN ALLERGY   • Clindamycin Edema     Had angioedema episode approx 5 hr after IM administration of clindamycin. Unclear if there is definitive causal relationship. PT STATES THIS IS NOT AN ALLERGY       ROS:  Review of Systems     Constitutional: Negative for fatigue, fever or loss of appetite. HENT: Negative. Respiratory: Negative for shortness of breath, dyspnea. Cardiovascular: Negative for chest pain/tightness. Gastrointestinal: Negative for abdominal pain, N/V. Endocrine: Negative for cold/heat intolerance, unexplained weight loss/gain. Genitourinary: Negative for flank pain, dysuria, hematuria. Musculoskeletal: Positive for arthralgia   Skin: Negative for rash. Neurological: Negative for numbness or tingling  Psychiatric/Behavioral: Negative for agitation. _____________________________________________________  PHYSICAL EXAMINATION:    Blood pressure 126/72, pulse (!) 115, height 5' 4" (1.626 m), weight 122 kg (269 lb). Constitutional: Oriented to person, place, and time. Appears well-developed and well-nourished. No distress. HENT:   Head: Normocephalic. Eyes: Conjunctivae are normal. Right eye exhibits no discharge. Left eye exhibits no discharge. No scleral icterus. Cardiovascular: Normal rate.     Pulmonary/Chest: Effort normal.   Neurological: Alert and oriented to person, place, and time.   Skin: Skin is warm and dry. No rash noted. Not diaphoretic. No erythema. No pallor. Psychiatric: Normal mood and affect. Behavior is normal. Judgment and thought content normal.      MUSCULOSKELETAL EXAMINATION:   Physical Exam  Ortho Exam    Bilateral lower extremities are neurovascularly intact  Toes are pink and mobile   Compartments are soft  No warmth, erythema or ecchymosis  ROM of knees are from 5-115 degrees  Negative Lachman, drawer or pivot shift  No medial instability  Medial joint line tenderness, slight lateral joint line tenderness  Patellofemoral crepitation  Objective:  BP Readings from Last 1 Encounters:   09/26/23 126/72      Wt Readings from Last 1 Encounters:   09/26/23 122 kg (269 lb)        BMI:   Estimated body mass index is 46.17 kg/m² as calculated from the following:    Height as of this encounter: 5' 4" (1.626 m). Weight as of this encounter: 122 kg (269 lb).       PROCEDURES PERFORMED:  Large joint arthrocentesis: bilateral knee  Universal Protocol:  Risks and benefits: risks, benefits and alternatives were discussed  Consent given by: patient  Patient understanding: patient states understanding of the procedure being performed  Site marked: the operative site was marked  Supporting Documentation  Indications: pain   Procedure Details  Location: knee - bilateral knee  Preparation: Patient was prepped and draped in the usual sterile fashion  Needle size: 22 G  Ultrasound guidance: no  Approach: lateral    Medications (Right): 20 mg Sodium Hyaluronate (Viscosup) 20 MG/2MLMedications (Left): 20 mg Sodium Hyaluronate (Viscosup) 20 MG/2ML   Patient tolerance: patient tolerated the procedure well with no immediate complications  Dressing:  Sterile dressing applied            Scribe Attestation    I,:  Charity Bower PA-C am acting as a scribe while in the presence of the attending physician.:       I,:  Radha Wayne DO personally performed the services described in this documentation    as scribed in my presence.:

## 2023-09-27 ENCOUNTER — PATIENT OUTREACH (OUTPATIENT)
Dept: FAMILY MEDICINE CLINIC | Facility: HOME HEALTHCARE | Age: 58
End: 2023-09-27

## 2023-09-27 ENCOUNTER — OFFICE VISIT (OUTPATIENT)
Dept: ENDOCRINOLOGY | Facility: CLINIC | Age: 58
End: 2023-09-27
Payer: MEDICARE

## 2023-09-27 VITALS
WEIGHT: 269 LBS | BODY MASS INDEX: 45.93 KG/M2 | RESPIRATION RATE: 20 BRPM | SYSTOLIC BLOOD PRESSURE: 130 MMHG | OXYGEN SATURATION: 94 % | DIASTOLIC BLOOD PRESSURE: 70 MMHG | HEIGHT: 64 IN | HEART RATE: 114 BPM

## 2023-09-27 DIAGNOSIS — J96.01 ACUTE RESPIRATORY FAILURE WITH HYPOXIA AND HYPERCAPNIA (HCC): ICD-10-CM

## 2023-09-27 DIAGNOSIS — J96.02 ACUTE RESPIRATORY FAILURE WITH HYPOXIA AND HYPERCAPNIA (HCC): ICD-10-CM

## 2023-09-27 DIAGNOSIS — N18.30 TYPE 2 DIABETES MELLITUS WITH STAGE 3 CHRONIC KIDNEY DISEASE, WITH LONG-TERM CURRENT USE OF INSULIN, UNSPECIFIED WHETHER STAGE 3A OR 3B CKD (HCC): Primary | ICD-10-CM

## 2023-09-27 DIAGNOSIS — E11.22 TYPE 2 DIABETES MELLITUS WITH STAGE 3 CHRONIC KIDNEY DISEASE, WITH LONG-TERM CURRENT USE OF INSULIN, UNSPECIFIED WHETHER STAGE 3A OR 3B CKD (HCC): Primary | ICD-10-CM

## 2023-09-27 DIAGNOSIS — J44.1 COPD WITH ACUTE EXACERBATION (HCC): ICD-10-CM

## 2023-09-27 DIAGNOSIS — E03.9 ACQUIRED HYPOTHYROIDISM: Chronic | ICD-10-CM

## 2023-09-27 DIAGNOSIS — Z79.4 TYPE 2 DIABETES MELLITUS WITH STAGE 3 CHRONIC KIDNEY DISEASE, WITH LONG-TERM CURRENT USE OF INSULIN, UNSPECIFIED WHETHER STAGE 3A OR 3B CKD (HCC): Primary | ICD-10-CM

## 2023-09-27 DIAGNOSIS — E11.21 DIABETIC NEPHROPATHY ASSOCIATED WITH TYPE 2 DIABETES MELLITUS (HCC): ICD-10-CM

## 2023-09-27 PROCEDURE — 99214 OFFICE O/P EST MOD 30 MIN: CPT | Performed by: NURSE PRACTITIONER

## 2023-09-27 NOTE — PROGRESS NOTES
Established Patient Progress Note      Chief Complaint   Patient presents with   • Diabetes Type 2        Impression & Plan:    Problem List Items Addressed This Visit        Endocrine    Acquired hypothyroidism (Chronic)     Stable. Continue 175 mcg of levothyroxine daily. Repeat thyroid function test prior to next appointment. Type 2 diabetes mellitus with diabetic chronic kidney disease ([address]) - Primary     Patient is uncontrolled with persistent hyperglycemia. Start GLP-1 Ozempic. Patient will benefit from increased glycemic control, weight loss, and cardiovascular protection. Given diabetic kidney disease, consider addition of SGLT2. Patient should have a continuous glucose monitor. The patient is a candidate for CGM use: Indications: Diabetes Type 2  The patient is treated with 3 or more injections of insulin daily  SMBG data is being used to make adjustments to the insulin regimen  Reviewed the importance of reducing carbohydrate intake and increasing physical activity as tolerated. Patient knows to notify me with persistent hyperglycemia or episodes of hypoglycemia. Follow-up in 3 months. Lab Results   Component Value Date    HGBA1C 9.4 (A) 09/11/2023            Relevant Medications    semaglutide, 0.25 or 0.5 mg/dose, (Ozempic, 0.25 or 0.5 MG/DOSE,) 2 mg/3 mL injection pen    Other Relevant Orders    Hemoglobin A1C    Comprehensive metabolic panel    Albumin / creatinine urine ratio    Lipid Panel with Direct LDL reflex    Diabetic nephropathy associated with type 2 diabetes mellitus (HCC)     Check albumin creatinine ratio and CMP prior to next appointment. Consider addition of SGLT2.   Lab Results   Component Value Date    HGBA1C 9.4 (A) 09/11/2023            Relevant Medications    semaglutide, 0.25 or 0.5 mg/dose, (Ozempic, 0.25 or 0.5 MG/DOSE,) 2 mg/3 mL injection pen       Respiratory    COPD with acute exacerbation (HCC)     Continue Trelegy maintenance inhaler as well as as needed Atrovent and albuterol. Acute respiratory failure with hypoxia and hypercapnia (HCC)     Patient wearing chronic O2. Orders Placed This Encounter   Procedures   • Hemoglobin A1C     Standing Status:   Future     Standing Expiration Date:   9/27/2024   • Comprehensive metabolic panel     This is a patient instruction: Patient fasting for 8 hours or longer recommended. Standing Status:   Future     Standing Expiration Date:   9/27/2024   • Albumin / creatinine urine ratio     Standing Status:   Future     Standing Expiration Date:   9/27/2024   • Lipid Panel with Direct LDL reflex     This is a patient instruction: This test requires patient fasting for 10-12 hours or longer. Drinking of black coffee or black tea is acceptable. Standing Status:   Future     Standing Expiration Date:   9/27/2024       History of Present Illness:   Judy Stoll is a 60 y.o. male with hypertension, hyperlipidemia, hypothyroidism, and uncontrolled type 2 diabetes with long term use of insulin since approximately 2004. Complications of diabetes includer neuropathy and CKD with macroalbuminuria. Denies recent severe hypoglycemic or severe hyperglycemic episodes. Denies any issues with his current regimen. home glucose monitoring: are performed sporadically      Patient was initially seen by me two years ago in June 2021. He then followed up with [facility] on 2/2/2023 and 6/12/2023. Ozempic and Januvia were cost prohibitive two years ago. Patient was hospitalized on 8/29/2023 for hypoxia due to COPD exacerbation. Hemoglobin A1C   Latest Ref Rng 6.5    2/2/2023 11.8 (E)   6/8/2023 11.5 (H) (E)   9/11/2023 9.4 ! Legend:  (H) High  ! Abnormal  (E) External lab result     eGFR   Latest Ref Rng ml/min/1.73sq m   9/1/2023 71    9/2/2023 68          Home blood glucose readings: None for review: Pt reports that he had one episodes of hypoglycemia, 60 mg/dL, the other day when he forgot to eat.  He does have symptoms of hypoglycemia including sweating, tremor, and lightheadedness. With hyperglycemia, he only notes increased fatigue. Patient reports that his primary struggle to maintain glycemic control is his diet. He will have several days where he is paying attention to his diet and eating more healthfully but then will have days where he binges on high carbohydrate foods like pretzels and chips. Current regimen:   Lantus 65 units nightly (had been increased to 75 units nightly by last provider however, pt continues with 65 units)  Humalog 25-25-25 (30 units three times daily)  Metformin 1000 mg twice daily    Last Eye Exam: Up-to-date  Last Foot Exam: Up-to-date    For hypertension, he is taking 80 mg of valsartan daily and 25 mg of hydrochlorothiazide twice daily. He denies headache and cough. For hyperlipidemia, he is taking 40 mg of rosuvastatin nightly. He denies myalgias. For hypothyroidism, he is taking 175 mcg of levothyroxine daily.  He reports chronic fatigue, likely multifactorial.    TSH 3RD GENERATON   Latest Ref Rng 0.450 - 4.500 uIU/mL   10/31/2022 1.035    8/30/2023 0.833          Patient Active Problem List   Diagnosis   • Allergic rhinitis   • Arthritis   • Chronic back pain   • Chronic sinusitis   • COPD with acute exacerbation (HCC)   • Depression with anxiety   • Essential hypertension   • Acquired hypothyroidism   • Morbid obesity with BMI of 45.0-49.9, adult ([address])   • Type 2 diabetes, uncontrolled, with neuropathy   • Vitamin D deficiency   • Bilateral lower leg cellulitis   • Elevated LFTs   • Elevated lactic acid level   • Tobacco abuse   • Noncompliance with diabetes treatment   • Acute respiratory failure with hypoxia and hypercapnia (HCC)   • Epistaxis   • MARSHA and COPD overlap syndrome (HCC)   • Muscle twitching   • Type 2 diabetes mellitus with diabetic chronic kidney disease ([address])   • Tobacco use   • Atelectasis   • Urinary retention   • Constipation   • Depression, recurrent ([address])   • Peripheral vascular disease (HCC)   • Primary osteoarthritis of both knees   • Hyperlipidemia   • Pleural effusion, left   • Bright red rectal bleeding   • ABLA (acute blood loss anemia)   • Microcytic anemia   • Fatty liver disease, nonalcoholic   • Umbilical hernia without obstruction and without gangrene   • Restrictive lung disease   • Diabetic nephropathy associated with type 2 diabetes mellitus (HCC)   • Proteinuria   • Chronic kidney disease, stage 2 (mild)   • Neuropathy      Past Medical History:   Diagnosis Date   • Acquired hypothyroidism 2015   • Angioedema    • Diabetes mellitus ([address])    • Disease of thyroid gland    • Hyperlipidemia    • Hypertension    • Mixed hyperlipidemia 2015   • Morbid obesity ([address])    • Morbid obesity with BMI of 45.0-49.9, adult ([address]) 2015   • MARSHA (obstructive sleep apnea)    • Primary hypertension 2015    Transitioned From: Benign essential hypertension      Past Surgical History:   Procedure Laterality Date   • KNEE SURGERY     • SINUS SURGERY        Family History   Problem Relation Age of Onset   • Thyroid cancer Mother    • Diabetes type II Mother    • Esophageal cancer Father    • Kidney cancer Brother    • Diabetes type II Brother    • Diabetes type II Maternal Grandmother      Social History     Tobacco Use   • Smoking status: Former     Packs/day: 2.00     Years: 70.00     Total pack years: 140.00     Types: Cigarettes     Quit date: 10/21/2022     Years since quittin.9   • Smokeless tobacco: Never   • Tobacco comments:     states read to quit prior to admit   Substance Use Topics   • Alcohol use: Not Currently     Alcohol/week: 3.0 standard drinks of alcohol     Types: 2 Cans of beer, 1 Shots of liquor per week     Comment: No alcohol since      Allergies   Allergen Reactions   • Ace Inhibitors Swelling     Angioedema    PT STATES THIS IS NOT AN ALLERGY   • Other Anaphylaxis     Pt believes that he is allergic to peanut butter. Also, seasonal allergies   • Zinc Tongue Swelling     PT STATES THIS IS NOT AN ALLERGY   • Clindamycin Edema     Had angioedema episode approx 5 hr after IM administration of clindamycin. Unclear if there is definitive causal relationship.   PT STATES THIS IS NOT AN ALLERGY         Current Outpatient Medications:   •  albuterol (2.5 mg/3 mL) 0.083 % nebulizer solution, Take 3 mL (2.5 mg total) by nebulization every 6 (six) hours as needed for wheezing or shortness of breath, Disp: 180 mL, Rfl: 0  •  Ascorbic Acid (VITAMIN C PO), Take 1 tablet by mouth daily, Disp: , Rfl:   •  aspirin 81 mg chewable tablet, Take one tablet by mouth daily, Disp: , Rfl:   •  benzonatate (TESSALON PERLES) 100 mg capsule, Take 1 capsule (100 mg total) by mouth 3 (three) times a day as needed for cough, Disp: 20 capsule, Rfl: 2  •  cholecalciferol (VITAMIN D3) 400 units tablet, Take 1 tablet by mouth daily, Disp: , Rfl:   •  DULoxetine (CYMBALTA) 60 mg delayed release capsule, Take 1 capsule by mouth once daily, Disp: 90 capsule, Rfl: 0  •  EPINEPHrine (EPIPEN) 0.3 mg/0.3 mL SOAJ, INJECT 0.3MG INTO A MUSCLE ONCE FOR 1 DOSE, Disp: 2 each, Rfl: 0  •  fluticasone-umeclidinium-vilanterol (Trelegy Ellipta) 100-62.5-25 mcg/actuation inhaler, Inhale 1 puff daily Rinse mouth after use., Disp: 60 blister, Rfl: 0  •  gabapentin (NEURONTIN) 100 mg capsule, Take 1 capsule (100 mg total) by mouth 3 (three) times a day, Disp: 90 capsule, Rfl: 2  •  HumaLOG KwikPen 100 units/mL injection pen, INJECT 20 UNITS UNDER THE SKIN THREE TIMES DAILY WITH MEALS (Patient taking differently: Inject 30 Units under the skin 3 (three) times a day with meals), Disp: 15 mL, Rfl: 0  •  hydrochlorothiazide (HYDRODIURIL) 25 mg tablet, Take 1 tablet by mouth twice daily, Disp: 60 tablet, Rfl: 0  •  Insulin Pen Needle (Pen Needles 3/16") 31G X 5 MM MISC, Use 4 (four) times a day, Disp: 300 each, Rfl: 3  •  ipratropium (ATROVENT) 0.02 % nebulizer solution, Take 2.5 mL (0.5 mg total) by nebulization every 6 (six) hours as needed for wheezing or shortness of breath, Disp: 150 mL, Rfl: 0  •  ipratropium-albuterol (DUO-NEB) 0.5-2.5 mg/3 mL nebulizer solution, Take 3 mL by nebulization every 6 (six) hours as needed for wheezing or shortness of breath, Disp: 180 mL, Rfl: 0  •  levothyroxine 175 mcg tablet, Take 1 tablet by mouth once daily, Disp: 90 tablet, Rfl: 0  •  loratadine (CLARITIN) 10 mg tablet, Take 10 mg by mouth daily, Disp: , Rfl:   •  metFORMIN (GLUCOPHAGE) 1000 MG tablet, Take 1 tablet (1,000 mg total) by mouth 2 (two) times a day, Disp: 180 tablet, Rfl: 1  •  Omega-3 Fatty Acids (fish oil) 1,000 mg, Take 1,000 mg by mouth 2 (two) times a day, Disp: , Rfl:   •  pregabalin (LYRICA) 25 mg capsule, Take 1 capsule by mouth twice daily, Disp: 60 capsule, Rfl: 0  •  rosuvastatin (CRESTOR) 40 MG tablet, Take 1 tablet (40 mg total) by mouth daily, Disp: 90 tablet, Rfl: 3  •  semaglutide, 0.25 or 0.5 mg/dose, (Ozempic, 0.25 or 0.5 MG/DOSE,) 2 mg/3 mL injection pen, Inject 0.25 mg once weekly for 4 weeks then increase to 0.5 mg once weekly. , Disp: 3 mL, Rfl: 2  •  sodium chloride (OCEAN) 0.65 % nasal spray, 1 spray into each nostril as needed for rhinitis, Disp: 30 mL, Rfl: 1  •  Thiamine HCl (vitamin B-1) 250 MG tablet, Take 250 mg by mouth daily, Disp: , Rfl:   •  traZODone (DESYREL) 150 mg tablet, TAKE 1 TABLET BY MOUTH ONCE DAILY AT BEDTIME, Disp: 90 tablet, Rfl: 0  •  valsartan (DIOVAN) 80 mg tablet, Take 80 mg by mouth daily, Disp: , Rfl:   •  Ventolin  (90 Base) MCG/ACT inhaler, Inhale 2 puffs every 4 (four) hours as needed for wheezing or shortness of breath, Disp: 18 g, Rfl: 5  •  vitamin E, tocopherol, 400 units capsule, Take 400 Units by mouth daily, Disp: , Rfl:   •  Insulin Glargine Solostar (Lantus SoloStar) 100 UNIT/ML SOPN, Inject 0.6 mL (60 Units total) under the skin daily (Patient taking differently: Inject 65 Units under the skin daily Takes at night), Disp: 18 mL, Rfl: 5    Review of Systems   Constitutional: Positive for fatigue. Negative for activity change, appetite change and unexpected weight change. HENT: Negative for dental problem, sore throat, trouble swallowing and voice change. Eyes: Negative for visual disturbance. Respiratory: Positive for shortness of breath. Negative for cough and chest tightness. Cardiovascular: Negative for chest pain, palpitations and leg swelling. Gastrointestinal: Negative for constipation, diarrhea, nausea and vomiting. Endocrine: Positive for polydipsia. Negative for polyphagia and polyuria. Genitourinary: Negative for frequency. Musculoskeletal: Positive for arthralgias. Negative for back pain, gait problem and myalgias. Skin: Negative for wound. Allergic/Immunologic: Positive for environmental allergies and food allergies. Neurological: Positive for numbness. Negative for dizziness, weakness, light-headedness and headaches. Psychiatric/Behavioral: Positive for dysphoric mood and sleep disturbance. Negative for decreased concentration. The patient is not nervous/anxious. Physical Exam:  Body mass index is 46.17 kg/m². /70   Pulse (!) 114   Resp 20   Ht 5' 4" (1.626 m)   Wt 122 kg (269 lb)   SpO2 94%   BMI 46.17 kg/m²    Wt Readings from Last 3 Encounters:   09/27/23 122 kg (269 lb)   09/26/23 122 kg (269 lb)   09/14/23 122 kg (269 lb)       Physical Exam  Vitals reviewed. Constitutional:       General: He is not in acute distress. Appearance: He is well-developed. He is obese. He is not ill-appearing. HENT:      Head: Normocephalic and atraumatic. Eyes:      Pupils: Pupils are equal, round, and reactive to light. Neck:      Thyroid: No thyromegaly. Cardiovascular:      Rate and Rhythm: Normal rate and regular rhythm. Pulses: Normal pulses. Heart sounds: Normal heart sounds.    Pulmonary:      Effort: Pulmonary effort is normal.      Breath sounds: Wheezing and rhonchi present. Abdominal:      General: Bowel sounds are normal. There is no distension. Palpations: Abdomen is soft. Tenderness: There is no abdominal tenderness. Musculoskeletal:      Cervical back: Normal range of motion and neck supple. Right lower leg: No edema. Left lower leg: No edema. Lymphadenopathy:      Cervical: No cervical adenopathy. Skin:     General: Skin is warm and dry. Capillary Refill: Capillary refill takes less than 2 seconds. Neurological:      Mental Status: He is alert and oriented to person, place, and time. Gait: Gait normal.   Psychiatric:         Mood and Affect: Mood normal.         Behavior: Behavior normal.           Labs:   Lab Results   Component Value Date    HGBA1C 9.4 (A) 09/11/2023    HGBA1C 11.5 (H) 06/08/2023    HGBA1C 11.8 02/02/2023     Lab Results   Component Value Date    CREATININE 1.17 09/02/2023    CREATININE 1.13 09/01/2023    CREATININE 1.25 08/31/2023    BUN 21 09/02/2023     01/29/2015    K 4.3 09/02/2023    CL 94 (L) 09/02/2023    CO2 35 (H) 09/02/2023     eGFR   Date Value Ref Range Status   09/02/2023 68 ml/min/1.73sq m Final     Lab Results   Component Value Date    CHOL 250 01/29/2015    HDL 30 (L) 08/30/2023    TRIG 88 08/30/2023     Lab Results   Component Value Date    ALT 32 09/02/2023    AST 27 09/02/2023    ALKPHOS 53 09/02/2023    BILITOT 0.3 01/29/2015     Lab Results   Component Value Date    TFA3XIXEHGIG 0.833 08/30/2023    [id number] 1.035 10/31/2022    UQJ5YHSOHKRV 4.300 05/03/2022     Lab Results   Component Value Date    FREET4 1.01 01/11/2022             Discussed with the patient and all questioned fully answered. He will call me if any problems arise. Follow-up appointment in 3 months.      Counseled patient on diagnostic results, prognosis, risk and benefit of treatment options, instruction for management, importance of treatment compliance, Risk  factor reduction and impressions    Patient Instructions   1. We will order you a continuous glucose monitor. 2. Start Ozempic 0.25 mg once weekly for 4 weeks then increase to 0.5 mg once weekly. 3. If the Ozempic is too expensive, let us know and we can try Januvia.        [address] [place] [address],Mikael. [phone]

## 2023-09-27 NOTE — ASSESSMENT & PLAN NOTE
Patient is uncontrolled with persistent hyperglycemia. Start GLP-1 Ozempic. Patient will benefit from increased glycemic control, weight loss, and cardiovascular protection. Given diabetic kidney disease, consider addition of SGLT2. Patient should have a continuous glucose monitor. The patient is a candidate for CGM use: Indications: Diabetes Type 2  The patient is treated with 3 or more injections of insulin daily  SMBG data is being used to make adjustments to the insulin regimen  Reviewed the importance of reducing carbohydrate intake and increasing physical activity as tolerated. Patient knows to notify me with persistent hyperglycemia or episodes of hypoglycemia. Follow-up in 3 months.   Lab Results   Component Value Date    HGBA1C 9.4 (A) 09/11/2023

## 2023-09-27 NOTE — PATIENT INSTRUCTIONS
We will order you a continuous glucose monitor. Start Ozempic 0.25 mg once weekly for 4 weeks then increase to 0.5 mg once weekly. If the Ozempic is too expensive, let us know and we can try Januvia.

## 2023-09-28 NOTE — ASSESSMENT & PLAN NOTE
Continue BiPaP. Patient reports that his mask is ill fitting. Recommended contacting sleep medicine to discuss.

## 2023-09-28 NOTE — ASSESSMENT & PLAN NOTE
Stable. Continue 175 mcg of levothyroxine daily. Repeat thyroid function test prior to next appointment.

## 2023-09-28 NOTE — ASSESSMENT & PLAN NOTE
Check albumin creatinine ratio and CMP prior to next appointment. Consider addition of SGLT2.   Lab Results   Component Value Date    HGBA1C 9.4 (A) 09/11/2023

## 2023-09-29 ENCOUNTER — PATIENT OUTREACH (OUTPATIENT)
Dept: FAMILY MEDICINE CLINIC | Facility: HOME HEALTHCARE | Age: 58
End: 2023-09-29

## 2023-09-29 NOTE — PROGRESS NOTES
Outpatient Care Management Note:    Telephone outreach made to introduce self and role of outpatient care management, follow up on general health, and outreach for any possible medical or psychosocial services needed. Spoke with patient. CM explained OPCM program, that support would be telephonic to provide education about chronic illness, medications and identify any SDOH needs. Opportunity given for patient to ask questions. Patient does not consent to [facility].     Complex Care referral closed and I removed myself from the care team.

## 2023-10-06 ENCOUNTER — OFFICE VISIT (OUTPATIENT)
Age: 58
End: 2023-10-06
Payer: MEDICARE

## 2023-10-06 VITALS
RESPIRATION RATE: 18 BRPM | SYSTOLIC BLOOD PRESSURE: 126 MMHG | HEART RATE: 111 BPM | WEIGHT: 276 LBS | BODY MASS INDEX: 47.12 KG/M2 | HEIGHT: 64 IN | DIASTOLIC BLOOD PRESSURE: 78 MMHG | OXYGEN SATURATION: 96 %

## 2023-10-06 DIAGNOSIS — J96.11 CHRONIC RESPIRATORY FAILURE WITH HYPOXIA, ON HOME OXYGEN THERAPY: ICD-10-CM

## 2023-10-06 DIAGNOSIS — Z12.11 COLON CANCER SCREENING: ICD-10-CM

## 2023-10-06 DIAGNOSIS — G47.33 OSA AND COPD OVERLAP SYNDROME (HCC): ICD-10-CM

## 2023-10-06 DIAGNOSIS — E66.01 MORBID OBESITY WITH BMI OF 45.0-49.9, ADULT (HCC): Chronic | ICD-10-CM

## 2023-10-06 DIAGNOSIS — J44.9 OSA AND COPD OVERLAP SYNDROME (HCC): ICD-10-CM

## 2023-10-06 DIAGNOSIS — K59.04 CHRONIC IDIOPATHIC CONSTIPATION: ICD-10-CM

## 2023-10-06 DIAGNOSIS — D50.9 IRON DEFICIENCY ANEMIA, UNSPECIFIED IRON DEFICIENCY ANEMIA TYPE: Primary | ICD-10-CM

## 2023-10-06 DIAGNOSIS — K62.5 BRIGHT RED RECTAL BLEEDING: ICD-10-CM

## 2023-10-06 DIAGNOSIS — K76.0 FATTY LIVER DISEASE, NONALCOHOLIC: ICD-10-CM

## 2023-10-06 DIAGNOSIS — Z99.81 CHRONIC RESPIRATORY FAILURE WITH HYPOXIA, ON HOME OXYGEN THERAPY: ICD-10-CM

## 2023-10-06 PROCEDURE — 99214 OFFICE O/P EST MOD 30 MIN: CPT | Performed by: STUDENT IN AN ORGANIZED HEALTH CARE EDUCATION/TRAINING PROGRAM

## 2023-10-06 NOTE — PATIENT INSTRUCTIONS
WE WILL REFER YOU TO THE SURGICAL OPTIMIZATION TEAM TO ASSESS YOUR RISK FOR PROCEDURES AND ANESTHESIA  WE WILL ALSO REFER YOU TO OUR WEIGHT MANAGEMENT TEAM IN [place]  WE WILL ALSO ORDER A SPECIAL ULTRASOUND OF YOUR LIVER CALLED ELASTOGRAPHY  GET THIS DONE AT YOUR EARLIEST CONVENIENCE  WE RECOMMEND STARTING MIRALAX ONCE A DAY  YOU CAN GET THE MIRALAX OVER-THE-COUNTER AT THE PHARMACY  CONTINUE TO DRINK PLENTY OF WATER  RECOMMEND FIBER AS WELL WHICH WILL HELP SOFTEN STOOL  YOU CAN USE METAMUCIL OR BENEFIBER WHICH ARE AVAILABLE OVER-THE-COUNTER  WE WILL SEE YOU BACK IN THE OFFICE IN 2 MONTHS AFTER YOU'VE SEEN YOUR LUNG DOCTOR  AT THAT TIME, WE WILL DISCUSS OPTIONS OF SCHEDULING YOU FOR ENDOSCOPY AND COLONOSCOPY

## 2023-10-06 NOTE — PROGRESS NOTES
[facility]  Outpatient Follow-up  Encounter: [id number]    PATIENT INFO     Name: Aleah Tyson  YOB: 1965   Age: 60 y.o. Sex: male   MRN: [id number]    ASSESSMENT & PLAN     Aleah Tyson is a 60 y.o. male with iron deficiency anemia, chronic constipation, need for colon cancer screening, fatty liver disease, morbid obesity, MARSHA/OHS overlap syndrome with chronic respiratory failure on home oxygen therapy and BiPAP at night. Hemoglobin was improving at time of hospital discharge and patient has not had any further overt signs of bleeding. He is iron deficient based on last panel and is in need of endoscopic evaluation to rule out potential GI causes of iron deficiency anemia including peptic ulcer disease versus gastritis versus esophagitis versus AVM versus Dieulafoy lesion versus neoplasm/malignancy versus polyp versus diverticulosis versus outlet bleeding. Has never had colon cancer screening in the past.  No family history of known colon cancer although brother has had colon resection related to complications of diverticulitis. While he is certainly in need of endoscopic evaluation, he is at high risk for periprocedural and anesthesia complications due to his morbid obesity and pulmonary issues. Although he has improved from a pulmonary standpoint since his hospitalization, he has not yet followed up with pulmonology since discharge. Additionally, he expresses concern that he may not have anyone available to accompany him for his outpatient procedures. Additionally, CT imaging did show evidence of hepatic steatosis. 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 He does have other metabolic risk factors for fatty liver disease including morbid obesity, diabetes, hypertension, and dyslipidemia.   Fortunately, most recent liver chemistries were within normal limits and he does not demonstrate any clinical stigmata of chronic liver disease.     • While I would certainly recommend bidirectional endoscopy for evaluation of his anemia as well as for colon cancer screening, further optimization is needed prior to proceeding with sedation and his procedures  • As such, I will place referral to our surgical optimization clinic for their evaluation and recommendations  • Additionally, I would like patient to follow-up with pulmonology as well to ensure that his pulmonary issues are managed and optimized  • He has appointment with pulmonology next month which I advised him to keep  • We will follow-up in 8 weeks after his appointment with his pulmonologist, at which time we will reevaluate and decide on timing for his upper endoscopy and colonoscopy  • Given his concerns for having someone accompany him for outpatient procedures, and in consideration for his comorbidities, may be reasonable to consider direct admission for bowel prep and procedures; I will discuss this further with him at next appointment  • I will also place referral to our weight management team at [facility] as patient is interested in weight loss as it is causing significant impact to his quality of life and adding to his other medical conditions  • He does have an appointment scheduled with a bariatric surgeon in [facility] at [facility], but patient states that this is too far for him to travel  • For the findings of hepatic steatosis, we will check elastography for noninvasive evaluation of fibrosis and steatosis  • If significant fibrosis is noted, would recommend liver biopsy  • Optimize metabolic risk factors per PCP  • Encouraged patient to continue with his sobriety  • For his constipation, I have recommended that he take MiraLAX daily; he will get the MiraLAX over-the-counter  • I have also encouraged him to increase hydration which is important for constipation  • I have also recommended the addition of dietary fiber with either Metamucil or Benefiber which should also help with his constipation    1. Iron deficiency anemia, unspecified iron deficiency anemia type    2. Bright red rectal bleeding    3. Chronic idiopathic constipation    4. Colon cancer screening    5. Fatty liver disease, nonalcoholic    6. Morbid obesity with BMI of 45.0-49.9, adult ([address])    7. MARSHA and COPD overlap syndrome ([address])    8. Chronic respiratory failure with hypoxia, on home oxygen therapy       Orders Placed This Encounter   Procedures   • US elastography/UGAP   • Ambulatory Referral to Weight Management   • Ambulatory Referral to Surgical Optimization       FOLLOW-UP: 8 weeks    HISTORY OF PRESENT ILLNESS       Fe Brothers is a 60 y.o. male who presents to GI office for hospital follow-up. Patient was seen in the hospital when he initially presented with complaints of progressive shortness of breath and was noted to be significantly hypoxic. GI evaluation was requested during his hospitalization for reported episode of bright red blood per rectum and anemia. While bidirectional endoscopy was planned, due to his pulmonary function and hypoxia at that time, inpatient evaluation was deferred. He has since been discharged from the hospital with home oxygen therapy as well as BiPAP at night. Since hospital discharge, patient has been doing well from a GI standpoint. He reports that he does still have some mild constipation for which he uses Senokot as needed. He has not had any further overt bleeding since his hospitalization. He is due for colon cancer screening and has never had CRC screening in the past.  He denies any known family history of colon cancer although he does report that his brother had part of the colon removed due to complications related to diverticulitis. He continues to gain weight which is causing him significant distress.   He is interested in weight loss options and does have an appointment scheduled with a bariatric surgeon at [facility] in [facility] but states that this is too far for him to travel and was reportedly told that he should try other nonsurgical options for weight loss before proceeding with gastric bypass surgery. He complains of knee pain related to his obesity and arthritis. He gets periodic gel injections for this. In regards to his GI symptoms, he denies any dysphagia or odynophagia, nausea, vomiting, abdominal pain. He does have an appointment with the general surgeon next week for evaluation to repair an umbilical hernia. CT imaging during his hospitalization was notable for fatty liver. He does admit to heavy alcohol use in the past but has been sober for the last 8 years. Since hospital discharge, he has reportedly been compliant with his home oxygen and BiPAP use at night.   He has not yet followed up with pulmonologist.     ENDOSCOPIC HISTORY     UPPER ENDOSCOPY: None    COLONOSCOPY: None    REVIEW OF SYSTEMS     CONSTITUTIONAL: Denies any fever, chills, rigors, and weight loss  HEENT: No earache or tinnitus, denies hearing loss or visual disturbances  CARDIOVASCULAR: No chest pain or palpitations  RESPIRATORY: Denies any hemoptysis  GASTROINTESTINAL: As noted in the History of Present Illness  GENITOURINARY: No problems with urination, denies any hematuria or dysuria  NEUROLOGIC: No dizziness or vertigo, denies headaches   MUSCULOSKELETAL: +Bilateral knee pain   SKIN: Denies skin rashes or itching  ENDOCRINE: Denies excessive thirst, denies intolerance to heat or cold  PSYCHOSOCIAL: Denies any recent memory loss     Historical Information   Past Medical History:   Diagnosis Date   • Acquired hypothyroidism 1/2/2015   • Angioedema    • Diabetes mellitus ([address])    • Disease of thyroid gland    • Hyperlipidemia    • Hypertension    • Mixed hyperlipidemia 2/4/2015   • Morbid obesity ([address])    • Morbid obesity with BMI of 45.0-49.9, adult ([address]) 1/2/2015   • MARSHA (obstructive sleep apnea)    • Primary hypertension 1/2/2015 Transitioned From: Benign essential hypertension     Past Surgical History:   Procedure Laterality Date   • KNEE SURGERY     • SINUS SURGERY       Social History   Social History     Substance and Sexual Activity   Alcohol Use Not Currently   • Alcohol/week: 3.0 standard drinks of alcohol   • Types: 2 Cans of beer, 1 Shots of liquor per week    Comment: No alcohol since      Social History     Substance and Sexual Activity   Drug Use No     Social History     Tobacco Use   Smoking Status Former   • Packs/day: 2.00   • Years: 70.00   • Total pack years: 140.00   • Types: Cigarettes   • Quit date: 10/21/2022   • Years since quittin.9   Smokeless Tobacco Never   Tobacco Comments    states read to quit prior to admit     Family History   Problem Relation Age of Onset   • Thyroid cancer Mother    • Diabetes type II Mother    • Esophageal cancer Father    • Kidney cancer Brother    • Diabetes type II Brother    • Diabetes type II Maternal Grandmother        MEDICATIONS & ALLERGIES     Current Outpatient Medications   Medication Instructions   • albuterol 2.5 mg, Nebulization, Every 6 hours PRN   • Ascorbic Acid (VITAMIN C PO) 1 tablet, Oral, Daily   • aspirin 81 mg chewable tablet Take one tablet by mouth daily   • benzonatate (TESSALON PERLES) 100 mg, Oral, 3 times daily PRN   • cholecalciferol (VITAMIN D3) 400 units tablet 1 tablet, Oral, Daily   • DULoxetine (CYMBALTA) 60 mg delayed release capsule Take 1 capsule by mouth once daily   • EPINEPHrine (EPIPEN) 0.3 mg/0.3 mL SOAJ INJECT 0.3MG INTO A MUSCLE ONCE FOR 1 DOSE   • fish oil 1,000 mg, Oral, 2 times daily   • fluticasone-umeclidinium-vilanterol (Trelegy Ellipta) 100-62.5-25 mcg/actuation inhaler 1 puff, Inhalation, Daily, Rinse mouth after use.    • gabapentin (NEURONTIN) 100 mg, Oral, 3 times daily   • HumaLOG KwikPen 100 units/mL injection pen INJECT 20 UNITS UNDER THE SKIN THREE TIMES DAILY WITH MEALS   • hydrochlorothiazide (HYDRODIURIL) 25 mg, Oral, 2 times daily   • Insulin Glargine Solostar (LANTUS SOLOSTAR) 60 Units, Subcutaneous, Daily   • Insulin Pen Needle (Pen Needles 3/16") 31G X 5 MM MISC Other, 4 times daily   • ipratropium (ATROVENT) 0.5 mg, Nebulization, Every 6 hours PRN   • ipratropium-albuterol (DUO-NEB) 0.5-2.5 mg/3 mL nebulizer solution 3 mL, Nebulization, Every 6 hours PRN   • levothyroxine 175 mcg tablet Take 1 tablet by mouth once daily   • loratadine (CLARITIN) 10 mg, Oral, Daily   • metFORMIN (GLUCOPHAGE) 1,000 mg, Oral, 2 times daily   • pregabalin (LYRICA) 25 mg capsule Take 1 capsule by mouth twice daily   • rosuvastatin (CRESTOR) 40 mg, Oral, Daily   • semaglutide, 0.25 or 0.5 mg/dose, (Ozempic, 0.25 or 0.5 MG/DOSE,) 2 mg/3 mL injection pen Inject 0.25 mg once weekly for 4 weeks then increase to 0.5 mg once weekly. • sodium chloride (OCEAN) 0.65 % nasal spray 1 spray, Nasal, As needed   • traZODone (DESYREL) 150 mg, Oral, Daily at bedtime   • valsartan (DIOVAN) 80 mg, Oral, Daily   • Ventolin  (90 Base) MCG/ACT inhaler 2 puffs, Inhalation, Every 4 hours PRN   • vitamin B-1 250 mg, Oral, Daily   • vitamin E (tocopherol) 400 Units, Oral, Daily     Allergies   Allergen Reactions   • Ace Inhibitors Swelling     Angioedema    PT STATES THIS IS NOT AN ALLERGY   • Other Anaphylaxis     Pt believes that he is allergic to peanut butter. Also, seasonal allergies   • Zinc Tongue Swelling     PT STATES THIS IS NOT AN ALLERGY   • Clindamycin Edema     Had angioedema episode approx 5 hr after IM administration of clindamycin. Unclear if there is definitive causal relationship. PT STATES THIS IS NOT AN ALLERGY       PHYSICAL EXAM      Objective   Blood pressure 126/78, pulse (!) 111, resp. rate 18, height 5' 4" (1.626 m), weight 125 kg (276 lb), SpO2 96 %. Body mass index is 47.38 kg/m².     General Appearance:   Alert, cooperative, no distress; chronically ill-appearing   HEENT:   Normocephalic, atraumatic, anicteric     Neck:   Supple, symmetrical, trachea midline   Lungs:   Equal chest rise, biphasic wheezing, on 2L NC   Heart:   Regular rate and rhythm   Abdomen:   Soft, non-tender, non-distended; normal bowel sounds; no masses, no organomegaly; protuberant abdomen, reducible umbilical hernia    Rectal:   Deferred    Extremities:   No cyanosis, clubbing or edema    Neuro: Moves all 4 extremities    Skin:   No jaundice, rashes, or lesions      LABORATORY RESULTS     No visits with results within 1 Day(s) from this visit. Latest known visit with results is:   Office Visit on 09/11/2023   Component Date Value   • Hemoglobin A1C 09/11/2023 9.4 (A)         IMAGING RESULTS     No results found. I have personally reviewed pertinent imaging study reports. Jessica Bojorquez D.O. [address] [facility]  Division of Gastroenterology & Hepatology  Available on [email or web address]    ** Please Note: This note is constructed using a voice recognition dictation system.  **

## 2023-10-08 DIAGNOSIS — I10 ESSENTIAL HYPERTENSION: ICD-10-CM

## 2023-10-08 RX ORDER — HYDROCHLOROTHIAZIDE 25 MG/1
25 TABLET ORAL 2 TIMES DAILY
Qty: 60 TABLET | Refills: 0 | Status: SHIPPED | OUTPATIENT
Start: 2023-10-08

## 2023-10-09 DIAGNOSIS — G89.4 CHRONIC PAIN SYNDROME: ICD-10-CM

## 2023-10-09 DIAGNOSIS — G62.9 NEUROPATHY: ICD-10-CM

## 2023-10-09 DIAGNOSIS — F32.9 REACTIVE DEPRESSION: ICD-10-CM

## 2023-10-09 RX ORDER — PREGABALIN 25 MG/1
25 CAPSULE ORAL 2 TIMES DAILY
Qty: 60 CAPSULE | Refills: 0 | Status: SHIPPED | OUTPATIENT
Start: 2023-10-09

## 2023-10-09 RX ORDER — DULOXETIN HYDROCHLORIDE 60 MG/1
CAPSULE, DELAYED RELEASE ORAL
Qty: 90 CAPSULE | Refills: 0 | Status: SHIPPED | OUTPATIENT
Start: 2023-10-09

## 2023-10-10 ENCOUNTER — TELEPHONE (OUTPATIENT)
Dept: ANESTHESIOLOGY | Facility: CLINIC | Age: 58
End: 2023-10-10

## 2023-10-13 ENCOUNTER — CONSULT (OUTPATIENT)
Dept: SURGERY | Facility: CLINIC | Age: 58
End: 2023-10-13
Payer: MEDICARE

## 2023-10-13 VITALS
TEMPERATURE: 99 F | BODY MASS INDEX: 47.46 KG/M2 | HEART RATE: 108 BPM | HEIGHT: 64 IN | OXYGEN SATURATION: 95 % | WEIGHT: 278 LBS | DIASTOLIC BLOOD PRESSURE: 72 MMHG | SYSTOLIC BLOOD PRESSURE: 142 MMHG

## 2023-10-13 DIAGNOSIS — Z99.81 CHRONIC RESPIRATORY FAILURE WITH HYPOXIA, ON HOME OXYGEN THERAPY: Primary | ICD-10-CM

## 2023-10-13 DIAGNOSIS — J96.11 CHRONIC RESPIRATORY FAILURE WITH HYPOXIA, ON HOME OXYGEN THERAPY: Primary | ICD-10-CM

## 2023-10-13 DIAGNOSIS — J44.1 COPD WITH ACUTE EXACERBATION (HCC): ICD-10-CM

## 2023-10-13 DIAGNOSIS — K42.9 UMBILICAL HERNIA WITHOUT OBSTRUCTION AND WITHOUT GANGRENE: ICD-10-CM

## 2023-10-13 PROCEDURE — 99204 OFFICE O/P NEW MOD 45 MIN: CPT | Performed by: SURGERY

## 2023-10-15 DIAGNOSIS — F41.8 DEPRESSION WITH ANXIETY: ICD-10-CM

## 2023-10-15 RX ORDER — TRAZODONE HYDROCHLORIDE 150 MG/1
150 TABLET ORAL
Qty: 90 TABLET | Refills: 0 | Status: SHIPPED | OUTPATIENT
Start: 2023-10-15

## 2023-10-15 NOTE — ASSESSMENT & PLAN NOTE
Symptomatic umbilical hernia. Reducible. Patient's primary reason for coming to this consultation was to discuss the hernia and the need for repair, pros and cons of repairing versus not repairing. Patient understands he has numerous comorbidities some of which are extremely significant such as a COPD. He is now requiring 24-hour day oxygen supplementation. Given his comorbidities specifically the COPD he would be high risk candidate for any surgical intervention. Patient understands this and does not wish to proceed with any surgical intervention at this time. I think this is reasonable to hold off given the fact that the hernia is minimally symptomatic. However patient was made aware of signs and symptoms of incarceration and strangulation. He is instructed to seek immediate medical attention if any of these signs or symptoms should come to fruition. For now continue observation.   Return as needed

## 2023-10-15 NOTE — ASSESSMENT & PLAN NOTE
Currently requiring oxygen supplementation. Denies any worsening shortness of breath today. Realizes that his severe COPD requiring oxygen supplementation puts him at higher risk for any surgical intervention. Thus we will hold off on any surgical intervention at this time. Continue current units medication such as Trelegy as per his primary provider/pulmonology.

## 2023-10-15 NOTE — PROGRESS NOTES
Assessment/Plan:    Umbilical hernia without obstruction and without gangrene  Symptomatic umbilical hernia. Reducible. Patient's primary reason for coming to this consultation was to discuss the hernia and the need for repair, pros and cons of repairing versus not repairing. Patient understands he has numerous comorbidities some of which are extremely significant such as a COPD. He is now requiring 24-hour day oxygen supplementation. Given his comorbidities specifically the COPD he would be high risk candidate for any surgical intervention. Patient understands this and does not wish to proceed with any surgical intervention at this time. I think this is reasonable to hold off given the fact that the hernia is minimally symptomatic. However patient was made aware of signs and symptoms of incarceration and strangulation. He is instructed to seek immediate medical attention if any of these signs or symptoms should come to fruition. For now continue observation. Return as needed    COPD with acute exacerbation ([address])  Currently requiring oxygen supplementation. Denies any worsening shortness of breath today. Realizes that his severe COPD requiring oxygen supplementation puts him at higher risk for any surgical intervention. Thus we will hold off on any surgical intervention at this time. Continue current units medication such as Trelegy as per his primary provider/pulmonology. Chronic respiratory failure with hypoxia, on home oxygen therapy   Continue oxygen supplementation as necessary. No worsening shortness of breath at this time. Care as per primary medical team/pulmonology.      Diagnoses and all orders for this visit:    Chronic respiratory failure with hypoxia, on home oxygen therapy     Umbilical hernia without obstruction and without gangrene  -     Ambulatory Referral to General Surgery    COPD with acute exacerbation [facility])          Notes:    PCP note dated September 11 2023 was prescribed by me.  In addition, recent GI note dated October 6, 2023 was personally reviewed by me. Imaging:    CT chest including abdomen pelvis dated August 29, 2023 was personally reviewed by me and also reviewed with the patient in the office. Subjective:      Patient ID: Elizabeth Sen is a 60 y.o. male. 58-year-old gentleman with numerous comorbidities including diabetes, thyroid disease, morbid obesity, hypertension, MARSHA, hyperlipidemia, COPD, chronic respiratory failure requiring oxygen supplementation, presents today in consultation for evaluation of a umbilical hernia. Patient states he has had this hernia for a little bit of time now but it is now become more of a nuisance. He states he does notice a bulge. He states he does get mild burning/ache pain in the umbilical region with lifting heavy objects and/or when the umbilicus rubs against a surface such as a counter. Denies any nausea vomiting. No fevers or chills. No changes in bowel bladder habits. Tolerates regular diet. Unfortunately though his respiratory status is worsened with regards to his COPD and his chronic respiratory failure and he now requires oxygen supplementation 24 hours a day. Patient has quit smoking. He presents today for further discussion of the umbilical hernia and the need for repair and the potential risk if he should decide to do so. Patient does understand that he is at higher risk due to his severe comorbidities including COPD. The oxygen supplementation denies any worsening shortness of breath. No chest pain.         The following portions of the patient's history were reviewed and updated as appropriate: He  has a past medical history of Acquired hypothyroidism (1/2/2015), Angioedema, Diabetes mellitus ([address]), Disease of thyroid gland, Hyperlipidemia, Hypertension, Mixed hyperlipidemia (2/4/2015), Morbid obesity ([address]), Morbid obesity with BMI of 45.0-49.9, adult ([address]) (1/2/2015), MARSHA (obstructive sleep apnea), and Primary hypertension (1/2/2015).   He   Patient Active Problem List    Diagnosis Date Noted    Chronic respiratory failure with hypoxia, on home oxygen therapy  10/06/2023    Colon cancer screening 10/06/2023    Neuropathy 09/11/2023    Restrictive lung disease 09/02/2023    Pleural effusion, left 08/29/2023    Bright red rectal bleeding 08/29/2023    ABLA (acute blood loss anemia) 08/29/2023    Microcytic anemia 08/29/2023    Fatty liver disease, nonalcoholic 33/14/3792    Umbilical hernia without obstruction and without gangrene 08/29/2023    Proteinuria 07/13/2023    Chronic kidney disease, stage 2 (mild) 07/13/2023    Diabetic nephropathy associated with type 2 diabetes mellitus ([address]) 06/12/2023    Primary osteoarthritis of both knees     Peripheral vascular disease ([address]) 05/04/2022    Depression, recurrent ([address]) 07/13/2021    Hyperlipidemia 04/15/2021    Constipation 04/14/2021    Urinary retention 04/13/2021    Acute respiratory failure with hypoxia and hypercapnia ([address]) 04/07/2021    Epistaxis 04/07/2021    MARSHA and COPD overlap syndrome ([address]) 04/07/2021    Muscle twitching 04/07/2021    Type 2 diabetes mellitus with diabetic chronic kidney disease ([address]) 04/07/2021    Tobacco use 04/07/2021    Atelectasis 04/07/2021    Noncompliance with diabetes treatment 04/30/2020    Bilateral lower leg cellulitis 03/02/2018    Elevated LFTs 03/02/2018    Elevated lactic acid level 03/02/2018    Tobacco abuse 03/02/2018    COPD with acute exacerbation ([address]) 02/07/2017    Chronic back pain 06/24/2015    Chronic sinusitis 03/09/2015    Allergic rhinitis 01/02/2015    Arthritis 01/02/2015    Depression with anxiety 01/02/2015    Essential hypertension 01/02/2015    Morbid obesity with BMI of 45.0-49.9, adult ([address]) 01/02/2015    Type 2 diabetes, uncontrolled, with neuropathy 01/02/2015    Vitamin D deficiency 07/16/2013    Acquired hypothyroidism 09/16/2011     He  has a past surgical history that includes Knee surgery and Sinus surgery. His family history includes Diabetes type II in his brother, maternal grandmother, and mother; Esophageal cancer in his father; Kidney cancer in his brother; Thyroid cancer in his mother. He  reports that he quit smoking about a year ago. His smoking use included cigarettes. He has a 140.00 pack-year smoking history. He has never used smokeless tobacco. He reports that he does not currently use alcohol after a past usage of about 3.0 standard drinks of alcohol per week. He reports that he does not use drugs. Current Outpatient Medications   Medication Sig Dispense Refill    albuterol (2.5 mg/3 mL) 0.083 % nebulizer solution Take 3 mL (2.5 mg total) by nebulization every 6 (six) hours as needed for wheezing or shortness of breath 180 mL 0    Ascorbic Acid (VITAMIN C PO) Take 1 tablet by mouth daily      aspirin 81 mg chewable tablet Take one tablet by mouth daily      benzonatate (TESSALON PERLES) 100 mg capsule Take 1 capsule (100 mg total) by mouth 3 (three) times a day as needed for cough 20 capsule 2    cholecalciferol (VITAMIN D3) 400 units tablet Take 1 tablet by mouth daily      DULoxetine (CYMBALTA) 60 mg delayed release capsule Take 1 capsule by mouth once daily 90 capsule 0    EPINEPHrine (EPIPEN) 0.3 mg/0.3 mL SOAJ INJECT 0.3MG INTO A MUSCLE ONCE FOR 1 DOSE 2 each 0    fluticasone-umeclidinium-vilanterol (Trelegy Ellipta) 100-62.5-25 mcg/actuation inhaler Inhale 1 puff daily Rinse mouth after use.  60 blister 0    gabapentin (NEURONTIN) 100 mg capsule Take 1 capsule (100 mg total) by mouth 3 (three) times a day 90 capsule 2    HumaLOG KwikPen 100 units/mL injection pen INJECT 20 UNITS UNDER THE SKIN THREE TIMES DAILY WITH MEALS (Patient taking differently: Inject 30 Units under the skin 3 (three) times a day with meals) 15 mL 0    hydrochlorothiazide (HYDRODIURIL) 25 mg tablet Take 1 tablet by mouth twice daily 60 tablet 0    Insulin Glargine Solostar (Lantus SoloStar) 100 UNIT/ML SOPN Inject 0.6 mL (60 Units total) under the skin daily (Patient taking differently: Inject 65 Units under the skin daily Takes at night) 18 mL 5    Insulin Pen Needle (Pen Needles 3/16") 31G X 5 MM MISC Use 4 (four) times a day 300 each 3    ipratropium (ATROVENT) 0.02 % nebulizer solution Take 2.5 mL (0.5 mg total) by nebulization every 6 (six) hours as needed for wheezing or shortness of breath 150 mL 0    ipratropium-albuterol (DUO-NEB) 0.5-2.5 mg/3 mL nebulizer solution Take 3 mL by nebulization every 6 (six) hours as needed for wheezing or shortness of breath 180 mL 0    levothyroxine 175 mcg tablet Take 1 tablet by mouth once daily 90 tablet 0    loratadine (CLARITIN) 10 mg tablet Take 10 mg by mouth daily      metFORMIN (GLUCOPHAGE) 1000 MG tablet Take 1 tablet (1,000 mg total) by mouth 2 (two) times a day 180 tablet 1    Omega-3 Fatty Acids (fish oil) 1,000 mg Take 1,000 mg by mouth 2 (two) times a day      pregabalin (LYRICA) 25 mg capsule Take 1 capsule (25 mg total) by mouth 2 (two) times a day 60 capsule 0    rosuvastatin (CRESTOR) 40 MG tablet Take 1 tablet (40 mg total) by mouth daily 90 tablet 3    semaglutide, 0.25 or 0.5 mg/dose, (Ozempic, 0.25 or 0.5 MG/DOSE,) 2 mg/3 mL injection pen Inject 0.25 mg once weekly for 4 weeks then increase to 0.5 mg once weekly. 3 mL 2    sodium chloride (OCEAN) 0.65 % nasal spray 1 spray into each nostril as needed for rhinitis 30 mL 1    Thiamine HCl (vitamin B-1) 250 MG tablet Take 250 mg by mouth daily      traZODone (DESYREL) 150 mg tablet TAKE 1 TABLET BY MOUTH ONCE DAILY AT BEDTIME 90 tablet 0    valsartan (DIOVAN) 80 mg tablet Take 80 mg by mouth daily      Ventolin  (90 Base) MCG/ACT inhaler Inhale 2 puffs every 4 (four) hours as needed for wheezing or shortness of breath 18 g 5    vitamin E, tocopherol, 400 units capsule Take 400 Units by mouth daily       No current facility-administered medications for this visit.      He is allergic to ace inhibitors, other, zinc, and clindamycin. .    Review of Systems      Review of systems completed, all negative except as noted by HPI. Objective:      /72 (BP Location: Left arm, Patient Position: Sitting, Cuff Size: Standard)   Pulse (!) 108   Temp 99 °F (37.2 °C) (Temporal)   Ht 5' 4" (1.626 m)   Wt 126 kg (278 lb)   SpO2 95%   BMI 47.72 kg/m²          Physical Exam  Vitals reviewed. Constitutional:       Appearance: Normal appearance. He is obese. He is ill-appearing. He is not toxic-appearing or diaphoretic. HENT:      Head: Normocephalic and atraumatic. Right Ear: External ear normal.      Left Ear: External ear normal.   Eyes:      General: No scleral icterus. Right eye: No discharge. Left eye: No discharge. Cardiovascular:      Rate and Rhythm: Regular rhythm. Tachycardia present. Heart sounds: No friction rub. No gallop. Pulmonary:      Effort: Pulmonary effort is normal. No respiratory distress. Breath sounds: Wheezing (bilateral) present. No rales. Comments: Decreased breath sounds at the bases  Chest:      Chest wall: No tenderness. Abdominal:      General: There is no distension. Palpations: There is no mass. Tenderness: There is abdominal tenderness (at the umbilical hernia). There is no guarding or rebound. Hernia: A hernia (umbilical, reducible) is present. Musculoskeletal:         General: No swelling or deformity. Normal range of motion. Cervical back: Normal range of motion. Skin:     General: Skin is warm. Coloration: Skin is not jaundiced or pale. Findings: No bruising or erythema. Neurological:      General: No focal deficit present. Mental Status: He is alert and oriented to person, place, and time. Cranial Nerves: No cranial nerve deficit. Psychiatric:         Mood and Affect: Mood normal.         Thought Content:  Thought content normal.         Judgment: Judgment normal.

## 2023-10-15 NOTE — ASSESSMENT & PLAN NOTE
Continue oxygen supplementation as necessary. No worsening shortness of breath at this time. Care as per primary medical team/pulmonology.

## 2023-10-19 ENCOUNTER — CONSULT (OUTPATIENT)
Dept: UROLOGY | Facility: CLINIC | Age: 58
End: 2023-10-19
Payer: MEDICARE

## 2023-10-19 VITALS
SYSTOLIC BLOOD PRESSURE: 142 MMHG | DIASTOLIC BLOOD PRESSURE: 88 MMHG | BODY MASS INDEX: 47.72 KG/M2 | WEIGHT: 278 LBS | HEART RATE: 90 BPM

## 2023-10-19 DIAGNOSIS — R39.12 WEAK URINARY STREAM: Primary | ICD-10-CM

## 2023-10-19 DIAGNOSIS — N52.9 ERECTILE DYSFUNCTION, UNSPECIFIED ERECTILE DYSFUNCTION TYPE: ICD-10-CM

## 2023-10-19 DIAGNOSIS — Z12.5 PROSTATE CANCER SCREENING: ICD-10-CM

## 2023-10-19 LAB — POST-VOID RESIDUAL VOLUME, ML POC: 12 ML

## 2023-10-19 PROCEDURE — 99203 OFFICE O/P NEW LOW 30 MIN: CPT

## 2023-10-19 PROCEDURE — 51798 US URINE CAPACITY MEASURE: CPT

## 2023-10-19 RX ORDER — TAMSULOSIN HYDROCHLORIDE 0.4 MG/1
0.4 CAPSULE ORAL
Qty: 30 CAPSULE | Refills: 6 | Status: SHIPPED | OUTPATIENT
Start: 2023-10-19

## 2023-10-19 NOTE — PROGRESS NOTES
10/19/2023    No chief complaint on file. Assessment and Plan    60 y.o. male new patient to office    Weak urinary stream  Symptoms consistent with BPH with weak urinary stream, post void dribbling, sensation of incomplete bladder emptying, and nocturia. MARCIN deferred  Random bladder scan today showed volume of 12 mL. I suspect this is inaccurate as patient voided approximately 45 minutes prior to arrival and had been drinking since. No obvious abdominal distention noted. No complaints of pain. No history of urinary retention. I am recommending a trial of tamsulosin 0.4 mg at bedtime. I will see him back in 3 months. 2.  Prostate cancer screening  Negative family history of prostate cancer  MARCIN deferred  Last PSA 0.4 February 2018  We will check updated PSA prior to next office visit. History of Present Illness  [facility] [employer] is a 60 y.o. male new patient to office with PMHx significant for T2DM, hypothyroidism, COPD, MARSHA, hypertension, peripheral vascular disease, neuropathy and CKD stage II. Here for evaluation of weak urinary stream and difficulty emptying. He reports worsening straining with urination as well post void dribbling, nocturia 2-3 times per night, and weak urinary stream. He recently started using C-pap at night about 2-3 months ago and is sleeping better, but still urinates 2-3 times per night. He reports sensation of incomplete bladder emptying. Random bladder scan was 12 mL with last void about 45 minutes ago. He has been drinking since. He is diabetic and has poorly controlled with Hgb A1c of 11.5 as of 6/8/2023. Denies issues with recurrent UTIs. Negative family history of prostate cancer. Last 0.4 as of 2/12/2018. Review of Systems   Constitutional:  Negative for chills and fever. HENT:  Negative for congestion and sore throat. Respiratory:  Negative for cough and shortness of breath. Cardiovascular:  Negative for chest pain and leg swelling. Gastrointestinal:  Negative for abdominal pain, constipation and diarrhea. Genitourinary:  Positive for difficulty urinating. Negative for dysuria, frequency, hematuria and urgency. Weak urinary stream, sensation of incomplete bladder emptying   Musculoskeletal:  Negative for back pain and gait problem. Skin:  Negative for wound. Allergic/Immunologic: Negative for immunocompromised state. Hematological:  Does not bruise/bleed easily. Vitals  There were no vitals filed for this visit. Physical Exam  Vitals reviewed. Constitutional:       General: He is not in acute distress. Appearance: Normal appearance. He is not ill-appearing or toxic-appearing. HENT:      Head: Normocephalic and atraumatic. Eyes:      General: No scleral icterus. Conjunctiva/sclera: Conjunctivae normal.   Cardiovascular:      Rate and Rhythm: Normal rate. Pulmonary:      Effort: Pulmonary effort is normal. No respiratory distress. Comments: Supplemental O2 via NC  Abdominal:      Tenderness: There is no right CVA tenderness or left CVA tenderness. Hernia: No hernia is present. Musculoskeletal:      Cervical back: Normal range of motion. Right lower leg: No edema. Left lower leg: No edema. Skin:     General: Skin is warm and dry. Coloration: Skin is not jaundiced or pale. Neurological:      General: No focal deficit present. Mental Status: He is alert and oriented to person, place, and time. Mental status is at baseline. Gait: Gait normal.   Psychiatric:         Mood and Affect: Mood normal.         Behavior: Behavior normal.         Thought Content:  Thought content normal.         Judgment: Judgment normal.         Past History  Past Medical History:   Diagnosis Date    Acquired hypothyroidism 1/2/2015    Angioedema     Diabetes mellitus ([address])     Disease of thyroid gland     Hyperlipidemia     Hypertension     Mixed hyperlipidemia 2/4/2015    Morbid obesity ([address] )     Morbid obesity with BMI of 45.0-49.9, adult ([facility]) 2015    MARSHA (obstructive sleep apnea)     Primary hypertension 2015    Transitioned From: Benign essential hypertension     Social History     Socioeconomic History    Marital status: Single     Spouse name: Not on file    Number of children: Not on file    Years of education: Not on file    Highest education level: Not on file   Occupational History    Not on file   Tobacco Use    Smoking status: Former     Packs/day: 2.00     Years: 70.00     Total pack years: 140.00     Types: Cigarettes     Quit date: 10/21/2022     Years since quittin.9    Smokeless tobacco: Never    Tobacco comments:     states read to quit prior to admit   Vaping Use    Vaping Use: Never used   Substance and Sexual Activity    Alcohol use: Not Currently     Alcohol/week: 3.0 standard drinks of alcohol     Types: 2 Cans of beer, 1 Shots of liquor per week     Comment: No alcohol since     Drug use: No    Sexual activity: Not Currently   Other Topics Concern    Not on file   Social History Narrative    Not on file     Social Determinants of Health     Financial Resource Strain: Not on file   Food Insecurity: No Food Insecurity (2023)    Hunger Vital Sign     Worried About Running Out of Food in the Last Year: Never true     Ran Out of Food in the Last Year: Never true   Transportation Needs: No Transportation Needs (2023)    PRAPARE - Transportation     Lack of Transportation (Medical): No     Lack of Transportation (Non-Medical):  No   Physical Activity: Not on file   Stress: Not on file   Social Connections: Not on file   Intimate Partner Violence: Not on file   Housing Stability: Low Risk  (2023)    Housing Stability Vital Sign     Unable to Pay for Housing in the Last Year: No     Number of Places Lived in the Last Year: 1     Unstable Housing in the Last Year: No     Social History     Tobacco Use   Smoking Status Former    Packs/day: 2.00 Years: 70.00    Total pack years: 140.00    Types: Cigarettes    Quit date: 10/21/2022    Years since quittin.9   Smokeless Tobacco Never   Tobacco Comments    states read to quit prior to admit     Family History   Problem Relation Age of Onset    Thyroid cancer Mother     Diabetes type II Mother     Esophageal cancer Father     Kidney cancer Brother     Diabetes type II Brother     Diabetes type II Maternal Grandmother        The following portions of the patient's history were reviewed and updated as appropriate allergies, current medications, past medical history, past social history, past surgical history and problem list    Imaging:    Results  No results found for this or any previous visit (from the past 1 hour(s)).]  Lab Results   Component Value Date    PSA 0.4 2018     Lab Results   Component Value Date    GLUCOSE 130 2015    CALCIUM 9.3 2023     2015    K 4.3 2023    CO2 35 (H) 2023    CL 94 (L) 2023    BUN 21 2023    CREATININE 1.17 2023     Lab Results   Component Value Date    WBC 11.53 (H) 2023    HGB 9.0 (L) 2023    HCT 30.7 (L) 2023    MCV 83 2023     (H) 2023       Please Note:  Voice dictation software has been used to create this document. There may be inadvertent transcriptions errors.      MARLEEN Aiken 10/19/23

## 2023-10-29 DIAGNOSIS — I10 ESSENTIAL HYPERTENSION: ICD-10-CM

## 2023-10-29 RX ORDER — HYDROCHLOROTHIAZIDE 25 MG/1
25 TABLET ORAL 2 TIMES DAILY
Qty: 60 TABLET | Refills: 0 | Status: SHIPPED | OUTPATIENT
Start: 2023-10-29

## 2023-10-31 DIAGNOSIS — E78.5 DYSLIPIDEMIA: ICD-10-CM

## 2023-10-31 RX ORDER — ROSUVASTATIN CALCIUM 40 MG/1
40 TABLET, COATED ORAL DAILY
Qty: 90 TABLET | Refills: 0 | Status: SHIPPED | OUTPATIENT
Start: 2023-10-31

## 2023-11-05 DIAGNOSIS — E03.9 HYPOTHYROIDISM, UNSPECIFIED TYPE: ICD-10-CM

## 2023-11-05 DIAGNOSIS — G62.9 NEUROPATHY: ICD-10-CM

## 2023-11-05 DIAGNOSIS — G89.4 CHRONIC PAIN SYNDROME: ICD-10-CM

## 2023-11-05 RX ORDER — LEVOTHYROXINE SODIUM 175 UG/1
TABLET ORAL
Qty: 90 TABLET | Refills: 0 | Status: SHIPPED | OUTPATIENT
Start: 2023-11-05

## 2023-11-06 RX ORDER — PREGABALIN 25 MG/1
25 CAPSULE ORAL 2 TIMES DAILY
Qty: 60 CAPSULE | Refills: 2 | Status: SHIPPED | OUTPATIENT
Start: 2023-11-06

## 2023-11-07 ENCOUNTER — APPOINTMENT (OUTPATIENT)
Dept: LAB | Facility: HOSPITAL | Age: 58
End: 2023-11-07
Payer: MEDICARE

## 2023-11-07 DIAGNOSIS — Z79.4 TYPE 2 DIABETES MELLITUS WITH STAGE 3 CHRONIC KIDNEY DISEASE, WITH LONG-TERM CURRENT USE OF INSULIN, UNSPECIFIED WHETHER STAGE 3A OR 3B CKD (HCC): ICD-10-CM

## 2023-11-07 DIAGNOSIS — D64.9 ANEMIA, UNSPECIFIED TYPE: ICD-10-CM

## 2023-11-07 DIAGNOSIS — R80.1 PERSISTENT PROTEINURIA: ICD-10-CM

## 2023-11-07 DIAGNOSIS — N18.2 CHRONIC KIDNEY DISEASE (CKD), STAGE II (MILD): ICD-10-CM

## 2023-11-07 DIAGNOSIS — E11.22 TYPE 2 DIABETES MELLITUS WITH STAGE 3 CHRONIC KIDNEY DISEASE, WITH LONG-TERM CURRENT USE OF INSULIN, UNSPECIFIED WHETHER STAGE 3A OR 3B CKD (HCC): ICD-10-CM

## 2023-11-07 DIAGNOSIS — N18.30 TYPE 2 DIABETES MELLITUS WITH STAGE 3 CHRONIC KIDNEY DISEASE, WITH LONG-TERM CURRENT USE OF INSULIN, UNSPECIFIED WHETHER STAGE 3A OR 3B CKD (HCC): ICD-10-CM

## 2023-11-07 LAB
25(OH)D3 SERPL-MCNC: 31.9 NG/ML (ref 30–100)
ALBUMIN SERPL BCP-MCNC: 4.5 G/DL (ref 3.5–5)
ANION GAP SERPL CALCULATED.3IONS-SCNC: 11 MMOL/L
BUN SERPL-MCNC: 20 MG/DL (ref 5–25)
CALCIUM SERPL-MCNC: 9.8 MG/DL (ref 8.4–10.2)
CHLORIDE SERPL-SCNC: 90 MMOL/L (ref 96–108)
CO2 SERPL-SCNC: 30 MMOL/L (ref 21–32)
CREAT SERPL-MCNC: 1.1 MG/DL (ref 0.6–1.3)
CREAT UR-MCNC: 28.1 MG/DL
FERRITIN SERPL-MCNC: 18 NG/ML (ref 24–336)
GFR SERPL CREATININE-BSD FRML MDRD: 73 ML/MIN/1.73SQ M
GLUCOSE SERPL-MCNC: 161 MG/DL (ref 65–140)
IRON SATN MFR SERPL: 9 % (ref 15–50)
IRON SERPL-MCNC: 39 UG/DL (ref 50–212)
MICROALBUMIN UR-MCNC: 301.5 MG/L
MICROALBUMIN/CREAT 24H UR: 1073 MG/G CREATININE (ref 0–30)
PHOSPHATE SERPL-MCNC: 3.1 MG/DL (ref 2.7–4.5)
POTASSIUM SERPL-SCNC: 3.8 MMOL/L (ref 3.5–5.3)
PROT SERPL-MCNC: 7.8 G/DL (ref 6.4–8.4)
PTH-INTACT SERPL-MCNC: 36.6 PG/ML (ref 12–88)
SODIUM SERPL-SCNC: 131 MMOL/L (ref 135–147)
TIBC SERPL-MCNC: 427 UG/DL (ref 250–450)
UIBC SERPL-MCNC: 388 UG/DL (ref 155–355)

## 2023-11-07 PROCEDURE — 82306 VITAMIN D 25 HYDROXY: CPT

## 2023-11-07 PROCEDURE — 82043 UR ALBUMIN QUANTITATIVE: CPT

## 2023-11-07 PROCEDURE — 80069 RENAL FUNCTION PANEL: CPT

## 2023-11-07 PROCEDURE — 36415 COLL VENOUS BLD VENIPUNCTURE: CPT

## 2023-11-07 PROCEDURE — 83550 IRON BINDING TEST: CPT

## 2023-11-07 PROCEDURE — 82728 ASSAY OF FERRITIN: CPT

## 2023-11-07 PROCEDURE — 84155 ASSAY OF PROTEIN SERUM: CPT

## 2023-11-07 PROCEDURE — 83521 IG LIGHT CHAINS FREE EACH: CPT

## 2023-11-07 PROCEDURE — 82570 ASSAY OF URINE CREATININE: CPT

## 2023-11-07 PROCEDURE — 83540 ASSAY OF IRON: CPT

## 2023-11-07 PROCEDURE — 84165 PROTEIN E-PHORESIS SERUM: CPT

## 2023-11-07 PROCEDURE — 83970 ASSAY OF PARATHORMONE: CPT

## 2023-11-08 LAB
KAPPA LC FREE SER-MCNC: 63.6 MG/L (ref 3.3–19.4)
KAPPA LC FREE/LAMBDA FREE SER: 1.82 {RATIO} (ref 0.26–1.65)
LAMBDA LC FREE SERPL-MCNC: 35 MG/L (ref 5.7–26.3)

## 2023-11-09 LAB
ALBUMIN SERPL ELPH-MCNC: 4.03 G/DL (ref 3.2–5.1)
ALBUMIN SERPL ELPH-MCNC: 54.4 % (ref 48–70)
ALPHA1 GLOB SERPL ELPH-MCNC: 0.3 G/DL (ref 0.15–0.47)
ALPHA1 GLOB SERPL ELPH-MCNC: 4.1 % (ref 1.8–7)
ALPHA2 GLOB SERPL ELPH-MCNC: 0.85 G/DL (ref 0.42–1.04)
ALPHA2 GLOB SERPL ELPH-MCNC: 11.5 % (ref 5.9–14.9)
BETA GLOB ABNORMAL SERPL ELPH-MCNC: 0.55 G/DL (ref 0.31–0.57)
BETA1 GLOB SERPL ELPH-MCNC: 7.4 % (ref 4.7–7.7)
BETA2 GLOB SERPL ELPH-MCNC: 5.9 % (ref 3.1–7.9)
BETA2+GAMMA GLOB SERPL ELPH-MCNC: 0.44 G/DL (ref 0.2–0.58)
GAMMA GLOB ABNORMAL SERPL ELPH-MCNC: 1.24 G/DL (ref 0.4–1.66)
GAMMA GLOB SERPL ELPH-MCNC: 16.7 % (ref 6.9–22.3)
IGG/ALB SER: 1.19 {RATIO} (ref 1.1–1.8)
PROT PATTERN SERPL ELPH-IMP: NORMAL
PROT SERPL-MCNC: 7.4 G/DL (ref 6.4–8.2)

## 2023-11-09 PROCEDURE — 84165 PROTEIN E-PHORESIS SERUM: CPT | Performed by: STUDENT IN AN ORGANIZED HEALTH CARE EDUCATION/TRAINING PROGRAM

## 2023-11-09 PROCEDURE — 86335 IMMUNFIX E-PHORSIS/URINE/CSF: CPT | Performed by: STUDENT IN AN ORGANIZED HEALTH CARE EDUCATION/TRAINING PROGRAM

## 2023-11-09 PROCEDURE — 84166 PROTEIN E-PHORESIS/URINE/CSF: CPT | Performed by: STUDENT IN AN ORGANIZED HEALTH CARE EDUCATION/TRAINING PROGRAM

## 2023-11-15 ENCOUNTER — OFFICE VISIT (OUTPATIENT)
Dept: FAMILY MEDICINE CLINIC | Facility: HOME HEALTHCARE | Age: 58
End: 2023-11-15
Payer: MEDICARE

## 2023-11-15 ENCOUNTER — TELEPHONE (OUTPATIENT)
Dept: ADMINISTRATIVE | Facility: OTHER | Age: 58
End: 2023-11-15

## 2023-11-15 VITALS
DIASTOLIC BLOOD PRESSURE: 62 MMHG | RESPIRATION RATE: 18 BRPM | HEIGHT: 64 IN | BODY MASS INDEX: 46.92 KG/M2 | SYSTOLIC BLOOD PRESSURE: 108 MMHG | OXYGEN SATURATION: 91 % | HEART RATE: 113 BPM | WEIGHT: 274.8 LBS | TEMPERATURE: 99.3 F

## 2023-11-15 DIAGNOSIS — G89.29 BILATERAL CHRONIC KNEE PAIN: ICD-10-CM

## 2023-11-15 DIAGNOSIS — Z00.00 MEDICARE ANNUAL WELLNESS VISIT, SUBSEQUENT: Primary | ICD-10-CM

## 2023-11-15 DIAGNOSIS — I10 ESSENTIAL (PRIMARY) HYPERTENSION: ICD-10-CM

## 2023-11-15 DIAGNOSIS — J96.11 CHRONIC RESPIRATORY FAILURE WITH HYPOXIA, ON HOME OXYGEN THERAPY: ICD-10-CM

## 2023-11-15 DIAGNOSIS — M25.562 BILATERAL CHRONIC KNEE PAIN: ICD-10-CM

## 2023-11-15 DIAGNOSIS — Z99.81 CHRONIC RESPIRATORY FAILURE WITH HYPOXIA, ON HOME OXYGEN THERAPY: ICD-10-CM

## 2023-11-15 DIAGNOSIS — M25.561 BILATERAL CHRONIC KNEE PAIN: ICD-10-CM

## 2023-11-15 PROBLEM — N18.2 CHRONIC KIDNEY DISEASE, STAGE 2 (MILD): Chronic | Status: ACTIVE | Noted: 2023-07-13

## 2023-11-15 PROBLEM — R80.9 PROTEINURIA: Chronic | Status: ACTIVE | Noted: 2023-07-13

## 2023-11-15 PROBLEM — R39.11 URINARY HESITANCY DUE TO BENIGN PROSTATIC HYPERPLASIA: Chronic | Status: ACTIVE | Noted: 2023-07-14

## 2023-11-15 PROBLEM — E87.1 HYPO-OSMOLALITY AND HYPONATREMIA: Status: ACTIVE | Noted: 2023-11-10

## 2023-11-15 PROBLEM — N40.1 URINARY HESITANCY DUE TO BENIGN PROSTATIC HYPERPLASIA: Chronic | Status: ACTIVE | Noted: 2023-07-14

## 2023-11-15 PROBLEM — Z12.11 COLON CANCER SCREENING: Status: RESOLVED | Noted: 2023-10-06 | Resolved: 2023-11-15

## 2023-11-15 PROBLEM — E11.22 TYPE 2 DIABETES MELLITUS WITH DIABETIC CHRONIC KIDNEY DISEASE (HCC): Chronic | Status: ACTIVE | Noted: 2021-04-07

## 2023-11-15 PROCEDURE — G0439 PPPS, SUBSEQ VISIT: HCPCS | Performed by: PHYSICIAN ASSISTANT

## 2023-11-15 RX ORDER — VALSARTAN 160 MG/1
160 TABLET ORAL DAILY
COMMUNITY
Start: 2023-11-10

## 2023-11-15 RX ORDER — FERROUS GLUCONATE 324(38)MG
324 TABLET ORAL
COMMUNITY
Start: 2023-11-10 | End: 2024-11-09

## 2023-11-15 NOTE — LETTER
Diabetic Foot Exam Form    Date Requested: 23  Patient: Velia Floyd  Patient : 1965   Referring Provider: Queen Antonio PA-C    Diabetic Foot Exam Performed with shoes and socks removed        Yes         No     Date of Diabetic Foot Exam ______________________________  Risk Score ____________________________________________    Left Foot       Visual Inspection         Monofilament Testing Sensory Exam        Pedal Pulses         Additional Comments         Right Foot      Visual Inspection         Monofilament Testing Sensory Exam       Pedal Pulses         Additional Comments         Comments __________________________________________________________    Practice Providing Exam ______________________________________________    Exam Performed By (print name) _______________________________________      Provider Signature ___________________________________________________      These reports are needed for  compliance. Please fax this completed form and a copy of the Diabetic Foot Exam report to our office located at [address] [place] [address] as soon as possible via Fax [phone] attention Gerline Kappa: Phone [phone]    We thank you for your assistance in treating our mutual patient.

## 2023-11-15 NOTE — PATIENT INSTRUCTIONS
Medicare Preventive Visit Patient Instructions  Thank you for completing your Welcome to Medicare Visit or Medicare Annual Wellness Visit today. Your next wellness visit will be due in one year (11/15/2024). The screening/preventive services that you may require over the next 5-10 years are detailed below. Some tests may not apply to you based off risk factors and/or age. Screening tests ordered at today's visit but not completed yet may show as past due. Also, please note that scanned in results may not display below. Preventive Screenings:  Service Recommendations Previous Testing/Comments   Colorectal Cancer Screening  Colonoscopy    Fecal Occult Blood Test (FOBT)/Fecal Immunochemical Test (FIT)  Fecal DNA/Cologuard Test  Flexible Sigmoidoscopy Age: 43-73 years old   Colonoscopy: every 10 years (May be performed more frequently if at higher risk)  OR  FOBT/FIT: every 1 year  OR  Cologuard: every 3 years  OR  Sigmoidoscopy: every 5 years  Screening may be recommended earlier than age 39 if at higher risk for colorectal cancer. Also, an individualized decision between you and your healthcare provider will decide whether screening between the ages of 77-80 would be appropriate. Colonoscopy: Not on file  FOBT/FIT: Not on file  Cologuard: Not on file  Sigmoidoscopy: Not on file          Prostate Cancer Screening Individualized decision between patient and health care provider in men between ages of 53-66   Medicare will cover every 12 months beginning on the day after your 50th birthday PSA: No results in last 5 years           Hepatitis C Screening Once for adults born between [birth year] and 1965  More frequently in patients at high risk for Hepatitis C Hep C Antibody: 03/03/2018        Diabetes Screening 1-2 times per year if you're at risk for diabetes or have pre-diabetes Fasting glucose: 245 mg/dL (3/29/2023)  A1C: 9.4 (9/11/2023)      Cholesterol Screening Once every 5 years if you don't have a lipid disorder.  May order more often based on risk factors. Lipid panel: 08/30/2023         Other Preventive Screenings Covered by Medicare:  Abdominal Aortic Aneurysm (AAA) Screening: covered once if your at risk. You're considered to be at risk if you have a family history of AAA or a male between the age of 70-76 who smoking at least 100 cigarettes in your lifetime. Lung Cancer Screening: covers low dose CT scan once per year if you meet all of the following conditions: (1) Age 48-67; (2) No signs or symptoms of lung cancer; (3) Current smoker or have quit smoking within the last 15 years; (4) You have a tobacco smoking history of at least 20 pack years (packs per day x number of years you smoked); (5) You get a written order from a healthcare provider. Glaucoma Screening: covered annually if you're considered high risk: (1) You have diabetes OR (2) Family history of glaucoma OR (3)  aged 48 and older OR (3)  American aged 72 and older  Osteoporosis Screening: covered every 2 years if you meet one of the following conditions: (1) Have a vertebral abnormality; (2) On glucocorticoid therapy for more than 3 months; (3) Have primary hyperparathyroidism; (4) On osteoporosis medications and need to assess response to drug therapy. HIV Screening: covered annually if you're between the age of 14-79. Also covered annually if you are younger than 13 and older than 72 with risk factors for HIV infection. For pregnant patients, it is covered up to 3 times per pregnancy.     Immunizations:  Immunization Recommendations   Influenza Vaccine Annual influenza vaccination during flu season is recommended for all persons aged >= 6 months who do not have contraindications   Pneumococcal Vaccine   * Pneumococcal conjugate vaccine = PCV13 (Prevnar 13), PCV15 (Vaxneuvance), PCV20 (Prevnar 20)  * Pneumococcal polysaccharide vaccine = PPSV23 (Pneumovax) Adults 62-59 yo with certain risk factors or if 69+ yo  If never received any pneumonia vaccine: recommend Prevnar 21 (PCV20)  Give PCV20 if previously received 1 dose of PCV13 or PPSV23   Hepatitis B Vaccine 3 dose series if at intermediate or high risk (ex: diabetes, end stage renal disease, liver disease)   Respiratory syncytial virus (RSV) Vaccine - COVERED BY MEDICARE PART D  * RSVPreF3 (Arexvy) CDC recommends that adults 61years of age and older may receive a single dose of RSV vaccine using shared clinical decision-making (SCDM)   Tetanus (Td) Vaccine - COST NOT COVERED BY MEDICARE PART B Following completion of primary series, a booster dose should be given every 10 years to maintain immunity against tetanus. Td may also be given as tetanus wound prophylaxis. Tdap Vaccine - COST NOT COVERED BY MEDICARE PART B Recommended at least once for all adults. For pregnant patients, recommended with each pregnancy. Shingles Vaccine (Shingrix) - COST NOT COVERED BY MEDICARE PART B  2 shot series recommended in those 19 years and older who have or will have weakened immune systems or those 50 years and older     Health Maintenance Due:      Topic Date Due   • Colorectal Cancer Screening  Never done   • Lung Cancer Screening  08/29/2024   • HIV Screening  Completed   • Hepatitis C Screening  Completed     Immunizations Due:      Topic Date Due   • COVID-19 Vaccine (3 - Moderna series) 08/10/2021   • Influenza Vaccine (1) 09/01/2023     Advance Directives   What are advance directives? Advance directives are legal documents that state your wishes and plans for medical care. These plans are made ahead of time in case you lose your ability to make decisions for yourself. Advance directives can apply to any medical decision, such as the treatments you want, and if you want to donate organs. What are the types of advance directives? There are many types of advance directives, and each state has rules about how to use them.  You may choose a combination of any of the following:  Living will: This is a written record of the treatment you want. You can also choose which treatments you do not want, which to limit, and which to stop at a certain time. This includes surgery, medicine, IV fluid, and tube feedings. Durable power of  for [facility]): This is a written record that states who you want to make healthcare choices for you when you are unable to make them for yourself. This person, called a proxy, is usually a family member or a friend. You may choose more than 1 proxy. Do not resuscitate (DNR) order:  A DNR order is used in case your heart stops beating or you stop breathing. It is a request not to have certain forms of treatment, such as CPR. A DNR order may be included in other types of advance directives. Medical directive: This covers the care that you want if you are in a coma, near death, or unable to make decisions for yourself. You can list the treatments you want for each condition. Treatment may include pain medicine, surgery, blood transfusions, dialysis, IV or tube feedings, and a ventilator (breathing machine). Values history: This document has questions about your views, beliefs, and how you feel and think about life. This information can help others choose the care that you would choose. Why are advance directives important? An advance directive helps you control your care. Although spoken wishes may be used, it is better to have your wishes written down. Spoken wishes can be misunderstood, or not followed. Treatments may be given even if you do not want them. An advance directive may make it easier for your family to make difficult choices about your care. Weight Management   Why it is important to manage your weight:  Being overweight increases your risk of health conditions such as heart disease, high blood pressure, type 2 diabetes, and certain types of cancer.  It can also increase your risk for osteoarthritis, sleep apnea, and other respiratory problems. Aim for a slow, steady weight loss. Even a small amount of weight loss can lower your risk of health problems. How to lose weight safely:  A safe and healthy way to lose weight is to eat fewer calories and get regular exercise. You can lose up about 1 pound a week by decreasing the number of calories you eat by 500 calories each day. Healthy meal plan for weight management:  A healthy meal plan includes a variety of foods, contains fewer calories, and helps you stay healthy. A healthy meal plan includes the following:  Eat whole-grain foods more often. A healthy meal plan should contain fiber. Fiber is the part of grains, fruits, and vegetables that is not broken down by your body. Whole-grain foods are healthy and provide extra fiber in your diet. Some examples of whole-grain foods are whole-wheat breads and pastas, oatmeal, brown rice, and bulgur. Eat a variety of vegetables every day. Include dark, leafy greens such as spinach, kale, terrance greens, and mustard greens. Eat yellow and orange vegetables such as carrots, sweet potatoes, and winter squash. Eat a variety of fruits every day. Choose fresh or canned fruit (canned in its own juice or light syrup) instead of juice. Fruit juice has very little or no fiber. Eat low-fat dairy foods. Drink fat-free (skim) milk or 1% milk. Eat fat-free yogurt and low-fat cottage cheese. Try low-fat cheeses such as mozzarella and other reduced-fat cheeses. Choose meat and other protein foods that are low in fat. Choose beans or other legumes such as split peas or lentils. Choose fish, skinless poultry (chicken or turkey), or lean cuts of red meat (beef or pork). Before you cook meat or poultry, cut off any visible fat. Use less fat and oil. Try baking foods instead of frying them. Add less fat, such as margarine, sour cream, regular salad dressing and mayonnaise to foods. Eat fewer high-fat foods.  Some examples of high-fat foods include french fries, doughnuts, ice cream, and cakes. Eat fewer sweets. Limit foods and drinks that are high in sugar. This includes candy, cookies, regular soda, and sweetened drinks. Exercise:  Exercise at least 30 minutes per day on most days of the week. Some examples of exercise include walking, biking, dancing, and swimming. You can also fit in more physical activity by taking the stairs instead of the elevator or parking farther away from stores. Ask your healthcare provider about the best exercise plan for you. © Copyright [employer] 2018 Information is for End User's use only and may not be sold, redistributed or otherwise used for commercial purposes.  All illustrations and images included in CareNotes® are the copyrighted property of [employer].D.A[employer]M., Inc. or  [address]

## 2023-11-15 NOTE — TELEPHONE ENCOUNTER
----- Message from Katherine Moreira PA-C sent at 11/15/2023 10:56 AM EST -----  Regarding: Care Gap Request  11/15/23 10:56 AM    Hello, our patient Aleah Tyson has had Diabetic Eye Exam completed/performed. Please assist in updating the patient chart by making an External outreach to [employer] facility located in [address] [place] [address]. The date of service is 9-10/2023.     Thank you,  Katherine Moreira PA-C  [address]

## 2023-11-15 NOTE — LETTER
Diabetic Eye Exam Form    Date Requested: 11/15/23  Patient: Carolyn Haynes  Patient : 1965   Referring Provider: Keren Metclaf PA-C      DIABETIC Eye Exam Date _______________________________      Type of Exam MUST be documented for Diabetic Eye Exams. Please CHECK ONE. Retinal Exam       Dilated Retinal Exam       OCT       Optomap-Iris Exam      Fundus Photography       Left Eye - Please check Retinopathy or No Retinopathy        Exam did show retinopathy    Exam did not show retinopathy       Right Eye - Please check Retinopathy or No Retinopathy       Exam did show retinopathy    Exam did not show retinopathy       Comments __________________________________________________________    Practice Providing Exam ______________________________________________    Exam Performed By (print name) _______________________________________      Provider Signature ___________________________________________________      These reports are needed for  compliance. Please fax this completed form and a copy of the Diabetic Eye Exam report to our office located at [address] [place] [address] as soon as possible via Fax [phone] attention [place]: Phone [phone]  We thank you for your assistance in treating our mutual patient.

## 2023-11-15 NOTE — TELEPHONE ENCOUNTER
----- Message from Krystle Yoder PA-C sent at 11/15/2023 10:57 AM EST -----  Regarding: Care Gap Request  11/15/23 10:57 AM    Hello, our patient Fe Brothers has had Diabetic Foot Exam completed/performed. Please assist in updating the patient chart by making an External outreach to Dr. Mitzy Durant facility located in [facility]. The date of service is 11/2023.     Thank you,  Krystle Yoder PA-C  [address]

## 2023-11-15 NOTE — TELEPHONE ENCOUNTER
Upon review of the In Basket request and the patient's chart, initial outreach has been made via fax to facility. Please see Contacts section for details.      Thank you  Redgie Ends

## 2023-11-15 NOTE — PROGRESS NOTES
Assessment and Plan:     Problem List Items Addressed This Visit     Essential (primary) hypertension    Relevant Medications    valsartan (DIOVAN) 160 mg tablet    Chronic respiratory failure with hypoxia, on home oxygen therapy    Other Visit Diagnoses     Medicare annual wellness visit, subsequent    -  Primary    Bilateral chronic knee pain        Relevant Orders    Ambulatory Referral to Physical Therapy        - New referral provided to PT for bilateral knee pain. Follow up with orthopedics as scheduled  - Follow up with pulmonology next week as scheduled    BMI Counseling: Body mass index is 47.17 kg/m². The BMI is above normal. Nutrition recommendations include decreasing portion sizes, encouraging healthy choices of fruits and vegetables, decreasing fast food intake, consuming healthier snacks, limiting drinks that contain sugar, moderation in carbohydrate intake, increasing intake of lean protein, reducing intake of saturated and trans fat and reducing intake of cholesterol. Exercise recommendations include moderate physical activity 150 minutes/week, vigorous physical activity 75 minutes/week, exercising 3-5 times per week, obtaining a gym membership and strength training exercises. No pharmacotherapy was ordered. Rationale for BMI follow-up plan is due to patient being overweight or obese. Preventive health issues were discussed with patient, and age appropriate screening tests were ordered as noted in patient's After Visit Summary. Personalized health advice and appropriate referrals for health education or preventive services given if needed, as noted in patient's After Visit Summary. History of Present Illness:     Patient presents for a Medicare Wellness Visit    Zana Rodrigues is a 48-year-old male with history of COPD, MARSHA, tobacco use, hypertension, hyperlipidemia, hypothyroidism, type 2 diabetes, CKD, neuropathy, depression and anxiety, presenting for AWV.     He is requesting a new referral to PT as his last script  in August.       Patient Care Team:  Leonel Hyde PA-C as PCP - General (Family Medicine)  Catalina Jean as PCP - Endocrinology (Endocrinology)  Catalina Jean as Nurse Practitioner (Endocrinology)     Review of Systems:     Review of Systems   Constitutional:  Negative for chills, diaphoresis and fever. HENT:  Positive for voice change. Respiratory:  Positive for cough and shortness of breath. Negative for chest tightness and wheezing. Cardiovascular:  Positive for leg swelling. Negative for chest pain and palpitations. Gastrointestinal:  Negative for abdominal pain, constipation, diarrhea, nausea and vomiting. Musculoskeletal:  Positive for arthralgias and myalgias. Skin:  Negative for rash and wound. Neurological:  Negative for dizziness, syncope, light-headedness and headaches.         Problem List:     Patient Active Problem List   Diagnosis   • Allergic rhinitis   • Arthritis   • Chronic back pain   • Chronic sinusitis   • COPD with acute exacerbation (HCC)   • Depression with anxiety   • Essential (primary) hypertension   • Acquired hypothyroidism   • Morbid obesity with BMI of 45.0-49.9, adult ([address] [facility])   • Type 2 diabetes, uncontrolled, with neuropathy   • Vitamin D deficiency   • Bilateral lower leg cellulitis   • Elevated LFTs   • Elevated lactic acid level   • Tobacco abuse   • Noncompliance with diabetes treatment   • Acute respiratory failure with hypoxia and hypercapnia (HCC)   • Epistaxis   • MARSHA and COPD overlap syndrome (HCC)   • Muscle twitching   • Type 2 diabetes mellitus with diabetic chronic kidney disease (HCC)   • Tobacco use   • Atelectasis   • Urinary retention   • Constipation   • Depression, recurrent (HCC)   • Peripheral vascular disease (HCC)   • Primary osteoarthritis of both knees   • Hyperlipidemia   • Pleural effusion, left   • Bright red rectal bleeding   • ABLA (acute blood loss anemia)   • Microcytic anemia   • Fatty liver disease, nonalcoholic   • Umbilical hernia without obstruction and without gangrene   • Restrictive lung disease   • Diabetic nephropathy associated with type 2 diabetes mellitus (HCC)   • Proteinuria   • Chronic kidney disease, stage 2 (mild)   • Neuropathy   • Chronic respiratory failure with hypoxia, on home oxygen therapy    • Urinary hesitancy due to benign prostatic hyperplasia   • Hypo-osmolality and hyponatremia      Past Medical and Surgical History:     Past Medical History:   Diagnosis Date   • Acquired hypothyroidism 2015   • Angioedema    • Diabetes mellitus ([address])    • Disease of thyroid gland    • Hyperlipidemia    • Hypertension    • Mixed hyperlipidemia 2015   • Morbid obesity ([address])    • Morbid obesity with BMI of 45.0-49.9, adult ([address]) 2015   • MARSHA (obstructive sleep apnea)    • Primary hypertension 2015    Transitioned From: Benign essential hypertension     Past Surgical History:   Procedure Laterality Date   • KNEE SURGERY     • SINUS SURGERY        Family History:     Family History   Problem Relation Age of Onset   • Thyroid cancer Mother    • Diabetes type II Mother    • Esophageal cancer Father    • Kidney cancer Brother    • Diabetes type II Brother    • Diabetes type II Maternal Grandmother       Social History:     Social History     Socioeconomic History   • Marital status: Single     Spouse name: None   • Number of children: None   • Years of education: None   • Highest education level: None   Occupational History   • None   Tobacco Use   • Smoking status: Former     Packs/day: 2.00     Years: 70.00     Total pack years: 140.00     Types: Cigarettes     Quit date: 10/21/2022     Years since quittin.0   • Smokeless tobacco: Never   • Tobacco comments:     states read to quit prior to admit   Vaping Use   • Vaping Use: Never used   Substance and Sexual Activity   • Alcohol use: Not Currently     Alcohol/week: 3.0 standard drinks of alcohol Types: 2 Cans of beer, 1 Shots of liquor per week     Comment: No alcohol since 2015   • Drug use: No   • Sexual activity: Not Currently   Other Topics Concern   • None   Social History Narrative   • None     Social Determinants of Health     Financial Resource Strain: High Risk (11/15/2023)    Overall Financial Resource Strain (CARDIA)    • Difficulty of Paying Living Expenses: Very hard   Food Insecurity: No Food Insecurity (8/30/2023)    Hunger Vital Sign    • Worried About Running Out of Food in the Last Year: Never true    • Ran Out of Food in the Last Year: Never true   Transportation Needs: No Transportation Needs (11/15/2023)    PRAPARE - Transportation    • Lack of Transportation (Medical): No    • Lack of Transportation (Non-Medical):  No   Physical Activity: Not on file   Stress: Not on file   Social Connections: Not on file   Intimate Partner Violence: Not on file   Housing Stability: Low Risk  (8/30/2023)    Housing Stability Vital Sign    • Unable to Pay for Housing in the Last Year: No    • Number of Places Lived in the Last Year: 1    • Unstable Housing in the Last Year: No      Medications and Allergies:     Current Outpatient Medications   Medication Sig Dispense Refill   • albuterol (2.5 mg/3 mL) 0.083 % nebulizer solution Take 3 mL (2.5 mg total) by nebulization every 6 (six) hours as needed for wheezing or shortness of breath 180 mL 0   • Ascorbic Acid (VITAMIN C PO) Take 1 tablet by mouth daily     • aspirin 81 mg chewable tablet Take one tablet by mouth daily     • benzonatate (TESSALON PERLES) 100 mg capsule Take 1 capsule (100 mg total) by mouth 3 (three) times a day as needed for cough 20 capsule 2   • cholecalciferol (VITAMIN D3) 400 units tablet Take 1 tablet by mouth daily     • DULoxetine (CYMBALTA) 60 mg delayed release capsule Take 1 capsule by mouth once daily 90 capsule 0   • EPINEPHrine (EPIPEN) 0.3 mg/0.3 mL SOAJ INJECT 0.3MG INTO A MUSCLE ONCE FOR 1 DOSE 2 each 0   • ferrous gluconate (FERGON) 324 mg tablet Take 324 mg by mouth     • gabapentin (NEURONTIN) 100 mg capsule Take 1 capsule (100 mg total) by mouth 3 (three) times a day 90 capsule 2   • HumaLOG KwikPen 100 units/mL injection pen INJECT 20 UNITS UNDER THE SKIN THREE TIMES DAILY WITH MEALS (Patient taking differently: Inject 30 Units under the skin 3 (three) times a day with meals) 15 mL 0   • hydrochlorothiazide (HYDRODIURIL) 25 mg tablet Take 1 tablet by mouth twice daily 60 tablet 0   • Insulin Pen Needle (Pen Needles 3/16") 31G X 5 MM MISC Use 4 (four) times a day 300 each 3   • ipratropium (ATROVENT) 0.02 % nebulizer solution Take 2.5 mL (0.5 mg total) by nebulization every 6 (six) hours as needed for wheezing or shortness of breath 150 mL 0   • ipratropium-albuterol (DUO-NEB) 0.5-2.5 mg/3 mL nebulizer solution Take 3 mL by nebulization every 6 (six) hours as needed for wheezing or shortness of breath 180 mL 0   • levothyroxine 175 mcg tablet Take 1 tablet by mouth once daily 90 tablet 0   • loratadine (CLARITIN) 10 mg tablet Take 10 mg by mouth daily     • metFORMIN (GLUCOPHAGE) 1000 MG tablet Take 1 tablet (1,000 mg total) by mouth 2 (two) times a day 180 tablet 1   • Omega-3 Fatty Acids (fish oil) 1,000 mg Take 1,000 mg by mouth 2 (two) times a day     • pregabalin (LYRICA) 25 mg capsule Take 1 capsule by mouth twice daily 60 capsule 2   • rosuvastatin (CRESTOR) 40 MG tablet Take 1 tablet by mouth once daily 90 tablet 0   • semaglutide, 0.25 or 0.5 mg/dose, (Ozempic, 0.25 or 0.5 MG/DOSE,) 2 mg/3 mL injection pen Inject 0.25 mg once weekly for 4 weeks then increase to 0.5 mg once weekly.  3 mL 2   • sodium chloride (OCEAN) 0.65 % nasal spray 1 spray into each nostril as needed for rhinitis 30 mL 1   • tamsulosin (FLOMAX) 0.4 mg Take 1 capsule (0.4 mg total) by mouth daily with dinner 30 capsule 6   • Thiamine HCl (vitamin B-1) 250 MG tablet Take 250 mg by mouth daily     • traZODone (DESYREL) 150 mg tablet TAKE 1 TABLET BY MOUTH ONCE DAILY AT BEDTIME 90 tablet 0   • valsartan (DIOVAN) 160 mg tablet Take 160 mg by mouth daily     • valsartan (DIOVAN) 80 mg tablet Take 80 mg by mouth daily     • Ventolin  (90 Base) MCG/ACT inhaler Inhale 2 puffs every 4 (four) hours as needed for wheezing or shortness of breath 18 g 5   • vitamin E, tocopherol, 400 units capsule Take 400 Units by mouth daily     • fluticasone-umeclidinium-vilanterol (Trelegy Ellipta) 100-62.5-25 mcg/actuation inhaler Inhale 1 puff daily Rinse mouth after use. 60 blister 0   • Insulin Glargine Solostar (Lantus SoloStar) 100 UNIT/ML SOPN Inject 0.6 mL (60 Units total) under the skin daily (Patient taking differently: Inject 65 Units under the skin daily Takes at night) 18 mL 5     No current facility-administered medications for this visit. Allergies   Allergen Reactions   • Ace Inhibitors Swelling     Angioedema    PT STATES THIS IS NOT AN ALLERGY   • Other Anaphylaxis     Pt believes that he is allergic to peanut butter. Also, seasonal allergies   • Zinc Tongue Swelling     PT STATES THIS IS NOT AN ALLERGY   • Clindamycin Edema     Had angioedema episode approx 5 hr after IM administration of clindamycin. Unclear if there is definitive causal relationship.   PT STATES THIS IS NOT AN ALLERGY      Immunizations:     Immunization History   Administered Date(s) Administered   • COVID-19 MODERNA VACC 0.5 ML IM 05/18/2021, 06/15/2021   • INFLUENZA 09/16/2011, 10/29/2013   • Pneumococcal Conjugate Vaccine 20-valent (Pcv20), Polysace 06/08/2022   • Pneumococcal Polysaccharide PPV23 09/16/2011, 02/27/2019   • Td (adult), Unspecified 09/11/2011   • Tdap 09/11/2011, 06/16/2023   • Tuberculin Skin Test-PPD Intradermal 04/15/2021   • Zoster Vaccine Recombinant 06/16/2023, 09/23/2023      Health Maintenance:         Topic Date Due   • Colorectal Cancer Screening  Never done   • Lung Cancer Screening  08/29/2024   • HIV Screening  Completed   • Hepatitis C Screening Completed         Topic Date Due   • COVID-19 Vaccine (3 - Moderna series) 08/10/2021      Medicare Screening Tests and Risk Assessments:     Chanel Najera is here for his Subsequent Wellness visit. Health Risk Assessment:   Patient rates overall health as poor. Patient feels that their physical health rating is slightly worse. Patient is dissatisfied with their life. Hearing was rated as same. Patient feels that their emotional and mental health rating is slightly worse. Patients states they are never, rarely angry. Patient states they are always unusually tired/fatigued. Pain experienced in the last 7 days has been a lot. Patient's pain rating has been 8/10. Patient states that he has experienced weight loss or gain in last 6 months. Depression Screening:   PHQ-9 Score: 17      Fall Risk Screening: In the past year, patient has experienced: history of falling in past year    Number of falls: 2 or more  Injured during fall?: Yes    Feels unsteady when standing or walking?: Yes    Worried about falling?: Yes      Home Safety:  Patient has trouble with stairs inside or outside of their home. Patient has working smoke alarms and has no working carbon monoxide detector. Home safety hazards include: uneven floors, unfamiliar surroundings, loose rugs on the floor, household clutter, poor household lighting, not having non-slip bath and/or shower mats, unsecured electrical cords and medications that cause fatigue. Nutrition:   Current diet is Unhealthy and Frequent junk food. Medications:   Patient is not currently taking any over-the-counter supplements. Patient is able to manage medications. Activities of Daily Living (ADLs)/Instrumental Activities of Daily Living (IADLs):   Walk and transfer into and out of bed and chair?: Yes  Dress and groom yourself?: Yes    Bathe or shower yourself?: Yes    Feed yourself?  Yes  Do your laundry/housekeeping?: Yes  Manage your money, pay your bills and track your expenses?: Yes  Make your own meals?: Yes    Do your own shopping?: Yes    Previous Hospitalizations:   Any hospitalizations or ED visits within the last 12 months?: Yes    How many hospitalizations have you had in the last year?: 1-2    Advance Care Planning:   Living will: No    Durable POA for healthcare: No    Advanced directive: No    Advanced directive counseling given: Yes    ACP document given: Yes      PREVENTIVE SCREENINGS      Cardiovascular Screening:    General: History Lipid Disorder and Screening Current      Diabetes Screening:     General: History Diabetes and Screening Current      Colorectal Cancer Screening:     General: Risks and Benefits Discussed    Due for: Colonoscopy - Low Risk      Prostate Cancer Screening:    General: Risks and Benefits Discussed      Osteoporosis Screening:    General: Screening Not Indicated      Abdominal Aortic Aneurysm (AAA) Screening:    Risk factors include: tobacco use        General: Screening Not Indicated      Lung Cancer Screening:     General: Screening Current      Hepatitis C Screening:    General: Screening Current    Screening, Brief Intervention, and Referral to Treatment (SBIRT)    Screening  Typical number of drinks in a day: 0  Typical number of drinks in a week: 0  Interpretation: Low risk drinking behavior. Single Item Drug Screening:  How often have you used an illegal drug (including marijuana) or a prescription medication for non-medical reasons in the past year? never    Single Item Drug Screen Score: 0  Interpretation: Negative screen for possible drug use disorder    No results found. Physical Exam:     /62 (BP Location: Left arm, Patient Position: Sitting, Cuff Size: Large)   Pulse (!) 113   Temp 99.3 °F (37.4 °C)   Resp 18   Ht 5' 4" (1.626 m)   Wt 125 kg (274 lb 12.8 oz)   SpO2 91%   BMI 47.17 kg/m²     Physical Exam  Vitals and nursing note reviewed. Constitutional:       General: He is not in acute distress. Appearance: He is obese. HENT:      Head: Normocephalic and atraumatic. Nose:      Comments: Nasal cannula O2 in place  Eyes:      Extraocular Movements: Extraocular movements intact. Conjunctiva/sclera: Conjunctivae normal.      Pupils: Pupils are equal, round, and reactive to light. Cardiovascular:      Rate and Rhythm: Regular rhythm. Tachycardia present. Heart sounds: Normal heart sounds. No murmur heard. Pulmonary:      Effort: Pulmonary effort is normal. No respiratory distress. Breath sounds: Normal breath sounds. No wheezing. Musculoskeletal:      Right lower leg: Edema present. Left lower leg: Edema present. Comments: Trace bilateral LE edema   Skin:     General: Skin is warm and dry. Neurological:      General: No focal deficit present. Mental Status: He is alert and oriented to person, place, and time.    Psychiatric:         Mood and Affect: Mood normal.         Behavior: Behavior normal.          Danielle Bates PA-C

## 2023-11-15 NOTE — LETTER
Diabetic Foot Exam Form    Date Requested: 11/15/23  Patient: Jessa Thakur  Patient : 1965   Referring Provider: Chana Cannon PA-C    Diabetic Foot Exam Performed with shoes and socks removed        Yes         No     Date of Diabetic Foot Exam ______________________________  Risk Score ____________________________________________    Left Foot       Visual Inspection         Monofilament Testing Sensory Exam        Pedal Pulses         Additional Comments         Right Foot      Visual Inspection         Monofilament Testing Sensory Exam       Pedal Pulses         Additional Comments         Comments __________________________________________________________    Practice Providing Exam ______________________________________________    Exam Performed By (print name) _______________________________________      Provider Signature ___________________________________________________      These reports are needed for  compliance. Please fax this completed form and a copy of the Diabetic Foot Exam report to our office located at [address] [place] [address] as soon as possible via Fax [phone] maurilio Nix: Phone [phone]    We thank you for your assistance in treating our mutual patient.

## 2023-11-16 NOTE — TELEPHONE ENCOUNTER
Upon review of the In Basket request we were able to locate, review, and update the patient chart as requested for Diabetic Eye Exam.    Any additional questions or concerns should be emailed to the Practice Liaisons via the appropriate education email address, please do not reply via In Basket.     Thank you  Tari Foley

## 2023-11-20 NOTE — TELEPHONE ENCOUNTER
As a follow-up, a second attempt has been made for outreach via fax to facility. Please see Contacts section for details.     Dr Domingo Samayoa   [phone]   Thank you  Tari Foley

## 2023-11-22 ENCOUNTER — OFFICE VISIT (OUTPATIENT)
Dept: PULMONOLOGY | Facility: CLINIC | Age: 58
End: 2023-11-22
Payer: MEDICARE

## 2023-11-22 ENCOUNTER — HOSPITAL ENCOUNTER (OUTPATIENT)
Dept: RADIOLOGY | Facility: HOSPITAL | Age: 58
Discharge: HOME | End: 2023-11-22
Payer: MEDICARE

## 2023-11-22 ENCOUNTER — APPOINTMENT (OUTPATIENT)
Dept: LAB | Facility: HOSPITAL | Age: 58
End: 2023-11-22
Payer: MEDICARE

## 2023-11-22 VITALS
HEIGHT: 64 IN | SYSTOLIC BLOOD PRESSURE: 123 MMHG | TEMPERATURE: 98.2 F | WEIGHT: 279.8 LBS | DIASTOLIC BLOOD PRESSURE: 80 MMHG | BODY MASS INDEX: 47.77 KG/M2 | HEART RATE: 106 BPM | RESPIRATION RATE: 18 BRPM | OXYGEN SATURATION: 93 %

## 2023-11-22 DIAGNOSIS — J90 PLEURAL EFFUSION: ICD-10-CM

## 2023-11-22 DIAGNOSIS — J96.01 ACUTE RESPIRATORY FAILURE WITH HYPOXIA AND HYPERCAPNIA (HCC): ICD-10-CM

## 2023-11-22 DIAGNOSIS — J44.1 COPD WITH ACUTE EXACERBATION (HCC): ICD-10-CM

## 2023-11-22 DIAGNOSIS — R00.0 SINUS TACHYCARDIA: ICD-10-CM

## 2023-11-22 DIAGNOSIS — J90 PLEURAL EFFUSION: Primary | ICD-10-CM

## 2023-11-22 DIAGNOSIS — J96.02 ACUTE RESPIRATORY FAILURE WITH HYPOXIA AND HYPERCAPNIA (HCC): ICD-10-CM

## 2023-11-22 PROCEDURE — 93005 ELECTROCARDIOGRAM TRACING: CPT

## 2023-11-22 PROCEDURE — 99215 OFFICE O/P EST HI 40 MIN: CPT | Performed by: INTERNAL MEDICINE

## 2023-11-22 PROCEDURE — 94618 PULMONARY STRESS TESTING: CPT

## 2023-11-22 PROCEDURE — 71046 X-RAY EXAM CHEST 2 VIEWS: CPT

## 2023-11-22 NOTE — PROGRESS NOTES
Pulmonary Follow Up Visit  Grace Mcgarry 60 y.o. male MRN: [id number]  @ Encounter: [id number]      Impressions/Recommendations:   Patient is a 39-year-old male with past medical history significant for morbid obesity, chronic hypoxemic respiratory failure, occupational exposures who presents for pulmonary follow-up. The patient is overall struggling to breathe. He notes that his daily activities have become significantly more difficult. The cause of this worsening is unclear at the current time. It is possibly related to a progression of his pleural effusion which is of unclear etiology. Additionally, the patient does have significant increase in his weight having obesity as a possible cause as well. The patient has been in sinus tachycardia relatively persistently. The previous EKGs all have an heart rate of greater than 100. Discussed with the patient that he would benefit from a visit to the emergency department at the current time. Patient declines. He did prefer to do his workup as an outpatient. Instructed patient that if his symptoms get worse that he should proceed directly to either 911 or the ER. He will have an ECG now as well as his chest x-ray and follow-up in 1 week.     Chronic sinus tachycardia  -  noted to have HR >100 on previous ecg and stress test over past 18mos  -  check ECG    Chronic hypoxemic respiratory failure  -  using 2l NC at all times  -  check POCT oxygen titration   -  stopped testing early; did drop to 88% on room air    Dyspnea on exertion  -  limited by knee pain   -  notes that he needs a replacement  -  sample of Trelegy 100 provided    Pleural effusion  -  check CXR  - unclear etiology    Restrictive lung disease  -  [facility]  -  consider recheck of PFTs    Severe Sleep Apnea  -  f/u compliance   -  [employer] is [employer]   -  56/60 days > 4hrs   -  Leak 95th percentile 48.7  -  would recommend mask fitting   -  can be discussed at next visit  -  has oxygen bled into BiPAP    Morbid obesity  -  started on ozempic  -  has gained about 80lbs in about 4-5years  -  notes minimal exercise    Mild pulmonary hypertension  -  no diuretics  -  chronic venous stasis changes  -  does not weigh self at home    Iron deficiency anemia  -  followed by nephrology  -  given significant and symptomatic anemia would consider IV iron infusion    Patient may follow up in 1 week or sooner as necessary. I have spent a total time of 44 minutes on 11/22/23 in caring for this patient including Diagnostic results, Risks and benefits of tx options, Instructions for management, Impressions, Reviewing / ordering tests, medicine, procedures  , and Obtaining or reviewing history        History of Present Illness   HPI:  Carolyn Haynes is a 60 y.o. male with pmhx sig for occupational exposures, chronic hypoxemic respiratory failure who presents to establish pulmonary care. The patient feels that his breathing is getting worse. He uses 2L NC at all times. He has a pulse oximetry which he notes is around 93%. He does not check with exertion. He has been on oxygen for about 1 year. He notes difficulty with climbing steps or walking. His breathing is worse now as compared to the summer. He denies fevers, chills, nausea or vomiting. He has an albuterol which he has been using over the last couple of days with unclear benefit. He is not using his nebulizer. He does have wheezing. He is unable to take Trelegy as he can't afford it. He is unsure if it provided benefit. He does feel bloated. He is always thirsty and drinks a significant amount of water. He was started on Ozempic about 1 month ago. He notes his breathing is okay at rest.     He is compliant with Bpap. He is unsure who is monitoring his settings. He quit smoking about 1 year ago. He smoked about 2-3 ppd for about 40 years.     He is using nicotine lozenges but has a hoarse throat. He worked in a [employer]. He had a significant amount of smoke and metal dust exposure. He sleeps on his side because of his breathing. He reports his knee pain limits his exercise    Review of systems:  Review of systems was completed and was otherwise negative except as listed in HPI.     Historical Information   Past Medical History:   Diagnosis Date    Acquired hypothyroidism 2015    Angioedema     Diabetes mellitus ([facility])     Disease of thyroid gland     Hyperlipidemia     Hypertension     Mixed hyperlipidemia 2015    Morbid obesity ([facility])     Morbid obesity with BMI of 45.0-49.9, adult ([facility]) 2015    MARSHA (obstructive sleep apnea)     Primary hypertension 2015    Transitioned From: Benign essential hypertension     Past Surgical History:   Procedure Laterality Date    KNEE SURGERY      SINUS SURGERY       Family History   Problem Relation Age of Onset    Thyroid cancer Mother     Diabetes type II Mother     Esophageal cancer Father     Kidney cancer Brother     Diabetes type II Brother     Diabetes type II Maternal Grandmother        Social History     Socioeconomic History    Marital status: Single     Spouse name: None    Number of children: None    Years of education: None    Highest education level: None   Occupational History    None   Tobacco Use    Smoking status: Former     Packs/day: 2.00     Years: 70.00     Total pack years: 140.00     Types: Cigarettes     Quit date: 10/21/2022     Years since quittin.0    Smokeless tobacco: Never    Tobacco comments:     states read to quit prior to admit   Vaping Use    Vaping Use: Never used   Substance and Sexual Activity    Alcohol use: Not Currently     Alcohol/week: 3.0 standard drinks of alcohol     Types: 2 Cans of beer, 1 Shots of liquor per week     Comment: No alcohol since     Drug use: No    Sexual activity: Not Currently   Other Topics Concern    None   Social History Narrative    None Social Determinants of Health     Financial Resource Strain: High Risk (11/15/2023)    Overall Financial Resource Strain (CARDIA)     Difficulty of Paying Living Expenses: Very hard   Food Insecurity: No Food Insecurity (8/30/2023)    Hunger Vital Sign     Worried About Running Out of Food in the Last Year: Never true     Ran Out of Food in the Last Year: Never true   Transportation Needs: No Transportation Needs (11/15/2023)    PRAPARE - Transportation     Lack of Transportation (Medical): No     Lack of Transportation (Non-Medical): No   Physical Activity: Not on file   Stress: Not on file   Social Connections: Not on file   Intimate Partner Violence: Not on file   Housing Stability: Low Risk  (8/30/2023)    Housing Stability Vital Sign     Unable to Pay for Housing in the Last Year: No     Number of Places Lived in the Last Year: 1     Unstable Housing in the Last Year: No       Meds/Allergies   No current facility-administered medications for this visit. (Not in a hospital admission)    Allergies   Allergen Reactions    Ace Inhibitors Swelling     Angioedema    PT STATES THIS IS NOT AN ALLERGY    Other Anaphylaxis     Pt believes that he is allergic to peanut butter. Also, seasonal allergies    Zinc Tongue Swelling     PT STATES THIS IS NOT AN ALLERGY    Clindamycin Edema     Had angioedema episode approx 5 hr after IM administration of clindamycin. Unclear if there is definitive causal relationship. PT STATES THIS IS NOT AN ALLERGY       Vitals: Blood pressure 123/80, pulse (!) 106, temperature 98.2 °F (36.8 °C), temperature source Tympanic, resp. rate 18, height 5' 4" (1.626 m), weight 127 kg (279 lb 12.8 oz), SpO2 93 %. , 2L NC, Body mass index is 48.03 kg/m².     Physical Exam  General: Pleasant, Awake alert and oriented x 3, conversant without conversational dyspnea, NAD, normal affect  HEENT:  PERRL, Sclera noninjected, nonicteric OU, Nares patent, no nasal flaring, no nasal drainage, Mucous membranes, moist, no oral lesions, normal dentition  NECK: Trachea midline, no accessory muscle use, no stridor, no cervical or supraclavicular adenopathy, JVP not elevated  CARDIAC: Reg, single s1/S2, no m/r/g  PULM: decreased breath sounds at left base; no wheezing  CHEST: No gross deformities, equal chest expansion on inspiration bilaterally  ABD: Normoactive bowel sounds, soft nontender, nondistended firm but not tense, no rebound, no rigidity, no guarding  EXT: No cyanosis, no clubbing, no edema, normal capillary refill  SKIN:  No rashes, no lesions; chronic venous stasis changes of lower extremity  NEURO: no focal neurologic deficits, AAOx3, moving all extremities appropriately    Labs: I have personally reviewed pertinent lab results. Lab Results   Component Value Date    SODIUM 131 (L) 11/07/2023    K 3.8 11/07/2023    CL 90 (L) 11/07/2023    CO2 30 11/07/2023    BUN 20 11/07/2023    CREATININE 1.10 11/07/2023    GLUC 161 (H) 11/07/2023    CALCIUM 9.8 11/07/2023     Lab Results   Component Value Date    WBC 11.53 (H) 09/02/2023    HGB 9.0 (L) 09/02/2023    HCT 30.7 (L) 09/02/2023    MCV 83 09/02/2023     (H) 09/02/2023     Imaging and other studies: I have personally reviewed pertinent reports. and I have personally reviewed pertinent films in PACS  CTA 8/29/2023  PULMONARY ARTERIAL TREE:  No pulmonary embolus is seen. LUNGS: Background emphysema. Minimal atelectasis at the lung bases left greater than right. No nodule. No endotracheal or endobronchial lesion identified. PLEURA: Small to moderate left pleural effusion. HEART/AORTA: Heart is top normal size. No thoracic aortic aneurysm. MEDIASTINUM AND DINORA:  Unremarkable. CHEST WALL AND LOWER NECK:  Unremarkable. Pulmonary function testing:  I have personally reviewed pertinent reports.    and I have personally reviewed pertinent films in PACS  5/20/2021:  Borderline restriction with moderate diffusion defect  FEV1/FVC Ratio: 77 %  Forced Vital Capacity: 2.48 L    65 % predicted  FEV1: 1.90 L     67 % predicted     Lung volumes by body plethysmography:   Total Lung Capacity 91 % predicted   Residual volume 121 % predicted     DLCO corrected for patients hemoglobin level: 53 %    Echocardiogram: I have personally reviewed pertinent reports. 8/30/2023:  Left Ventricle Left ventricular cavity size is normal. Wall thickness is normal. The left ventricular ejection fraction is 60%. Systolic function is normal.  Wall motion is normal. Diastolic function is mildly abnormal, consistent with grade I (abnormal) relaxation. Right Ventricle Right ventricular cavity size is normal. Systolic function is normal. Wall thickness is normal.   Left Atrium The atrium is normal in size. Right Atrium The atrium is normal in size. Aortic Valve The aortic valve is trileaflet. The leaflets are mildly thickened. The leaflets are mildly calcified. The leaflets exhibit normal mobility. There is no evidence of regurgitation. There is aortic valve sclerosis. Mitral Valve There is no evidence of regurgitation. There is no evidence of stenosis. The mitral valve has normal structure and normal function. Tricuspid Valve Tricuspid valve structure is normal. There is no evidence of regurgitation. There is no evidence of stenosis. The right ventricular systolic pressure is mildly elevated. The estimated right ventricular systolic pressure is 33.13 mmHg. Pulmonic Valve Pulmonic valve structure is normal. There is no evidence of regurgitation. There is no evidence of stenosis. Ascending Aorta The aortic root is normal in size. IVC/SVC The right atrial pressure is estimated at 8.0 mmHg. The inferior vena cava is normal in size. Respirophasic changes were blunted (less than 50% variation). Pericardium There is no pericardial effusion. The pericardium is normal in appearance. EKG, Pathology, and Other Studies: I have personally reviewed pertinent reports.     EKG 8/29/2023:  Rhythm:     Rhythm: sinus tachycardia    Ectopy:     Ectopy: none    QRS:     QRS axis:  Normal     QRS intervals:  Normal   Conduction:     Conduction: normal    ST segments:     ST segments:  Normal   T waves:     T waves: normal       Referring:  Roseann Rueda PA-C  [address] [place] [address],  [address]  [address]

## 2023-11-23 DIAGNOSIS — I10 ESSENTIAL HYPERTENSION: ICD-10-CM

## 2023-11-24 LAB
ATRIAL RATE: 112 BPM
P AXIS: 48 DEGREES
PR INTERVAL: 156 MS
QRS AXIS: 48 DEGREES
QRSD INTERVAL: 74 MS
QT INTERVAL: 336 MS
QTC INTERVAL: 458 MS
T WAVE AXIS: 2 DEGREES
VENTRICULAR RATE: 112 BPM

## 2023-11-24 NOTE — TELEPHONE ENCOUNTER
As a final attempt, a third outreach has been made via telephone call to facility. Please see Contacts section for details. This encounter will be closed and completed by end of day. Should we receive the requested information because of previous outreach attempts, the requested patient's chart will be updated appropriately.      Thank you  Mary Bauer

## 2023-11-27 ENCOUNTER — EVALUATION (OUTPATIENT)
Dept: PHYSICAL THERAPY | Facility: HOME HEALTHCARE | Age: 58
End: 2023-11-27
Payer: MEDICARE

## 2023-11-27 DIAGNOSIS — M25.561 BILATERAL CHRONIC KNEE PAIN: Primary | ICD-10-CM

## 2023-11-27 DIAGNOSIS — M25.562 BILATERAL CHRONIC KNEE PAIN: Primary | ICD-10-CM

## 2023-11-27 DIAGNOSIS — G89.29 BILATERAL CHRONIC KNEE PAIN: Primary | ICD-10-CM

## 2023-11-27 PROCEDURE — 97162 PT EVAL MOD COMPLEX 30 MIN: CPT

## 2023-11-27 PROCEDURE — 97110 THERAPEUTIC EXERCISES: CPT

## 2023-11-27 RX ORDER — HYDROCHLOROTHIAZIDE 25 MG/1
25 TABLET ORAL 2 TIMES DAILY
Qty: 60 TABLET | Refills: 2 | Status: SHIPPED | OUTPATIENT
Start: 2023-11-27

## 2023-11-27 NOTE — PROGRESS NOTES
PT Evaluation     Today's date: 2023  Patient name: Toniann Goldmann  : 1965  MRN: [id number]  Referring provider: Nilsa Doyle  Dx:   Encounter Diagnosis     ICD-10-CM    1. Bilateral chronic knee pain  M25.561     M25.562     G89.29           Start Time: 1100  Stop Time: 1130  Total time in clinic (min): 30 minutes    Assessment  Assessment details: Pt is a 62 y/o male presenting to OPPT with chronic bilateral knee pain, L>R, consistent with diagnosis of bilateral knee OA and overall physical deconditioning. The pt is presenting with significant pain levels, ROM deficits, strength deficits, and decreased functional mobility, which is affecting his ambulatory tolerance and ability to perform ADLs. The pt requires skilled PT in order to improve in pain, strength, ROM, functional mobility, quality of life, and return to PLOF. Thank you. Impairments: abnormal gait, abnormal or restricted ROM, abnormal movement, activity intolerance, impaired physical strength, lacks appropriate home exercise program, pain with function, weight-bearing intolerance and poor body mechanics  Understanding of Dx/Px/POC: good   Prognosis: good    Goals  STG: 3-4 weeks  Pt will be independent with HEP in order to continue to progress outside of PT treatment sessions. Pt will improve in quality of life as demonstrated by worst pain levels of 8/10 or less. Pt will improve in functional mobility as demonstrated by a 1/2 point or greater improvement in LE strength. LT-8 weeks  Pt will improve in quality of life as demonstrated by worst pain levels of 5/10 or less. Pt will improve in functional mobility as demonstrated by strength levels of 4/5 or greater. Pt will improve in functional mobility as demonstrated by irving knee ROM WFL. Pt will improve in functional mobility as demonstrated by ability to perform full ADLs without any limitations due to pain, ROM deficits, or weakness.     Plan  Patient would benefit from: skilled physical therapy  Planned modality interventions: cryotherapy  Planned therapy interventions: body mechanics training, flexibility, functional ROM exercises, gait training, home exercise program, joint mobilization, manual therapy, neuromuscular re-education, patient education, postural training, strengthening, stretching, therapeutic activities and therapeutic exercise  Frequency: 2x week  Duration in weeks: 12  Plan of Care beginning date: 2023  Plan of Care expiration date: 2024  Treatment plan discussed with: patient        Subjective Evaluation    History of Present Illness  Mechanism of injury: Pt reports that he continues to have significant bilateral anterior/medial knee pain, L>R, which has been ongoing for over a year. He reports that he feels a sharp pain in his L knee when getting up, sitting down, etc.  He reports that he has been getting gel shots in his knees, which have been helping a little for knee mobility/stiffness, but it has not been helping the pain. He reports that he goes for another injection tomorrow. Patient Goals  Patient goals for therapy: decreased pain, increased motion, increased strength, independence with ADLs/IADLs and return to sport/leisure activities  Patient goal: To get moving around  Pain  Current pain ratin  At best pain ratin  At worst pain rating: 10  Quality: dull ache and sharp (Stabbing)  Alleviating factors: Menthol rubs for short-term relief. Aggravating factors: standing, walking and stair climbing    Social Support  Steps to enter house: no  Stairs in house: yes (21)       Diagnostic Tests  X-ray: abnormal  Treatments  Previous treatment: injection treatment        Objective     Tenderness   Left Knee   Tenderness in the medial joint line. Right Knee   Tenderness in the medial joint line.      Passive Range of Motion   Left Hip   Flexion: 95 degrees     Right Hip   Flexion: 95 degrees   Left Knee   Flexion: 110 degrees with pain  Extension: -4 degrees with pain    Right Knee   Flexion: 120 degrees with pain  Extension: -2 degrees with pain    Strength/Myotome Testing     Left Hip   Planes of Motion   Flexion: 3  Abduction: 3  Adduction: 4    Right Hip   Planes of Motion   Flexion: 4-  Abduction: 3  Adduction: 4    Left Knee   Flexion: 3+  Extension: 3+    Right Knee   Flexion: 4  Extension: 4    Left Ankle/Foot   Dorsiflexion: 4+  Plantar flexion: 4+    Right Ankle/Foot   Dorsiflexion: 4+  Plantar flexion: 4+    Tests     Left Hip   SLR: Positive. Right Hip   SLR: Positive. Additional Tests Details  SLR: L: 55, R: 55    Ambulation   Weight-Bearing Status   Weight-Bearing Status (Left): weight-bearing as tolerated   Weight-Bearing Status (Right): weight-bearing as tolerated      Ambulation: Level Surfaces   Ambulation without assistive device: independent    Ambulation: Stairs   Ascend stairs: contact guard assist  Pattern: non-reciprocal  Railings: one rail  Descend stairs: contact guard assist  Pattern: non-reciprocal  Railings: one rail  Curbs: independent    Observational Gait   Gait: antalgic   Decreased walking speed and stride length.                 Precautions: Fall risk    Re-eval Date: 12/27/2023        Manuals 11/27       Jamie calf, hs, glute stretch        Jamie knee PROM                Neuro Re-Ed        Balance as appropriate                                Ther Ex        Nustep        Quad set HEP       Heel slide HEP       LAQ HEP       HS curls        SLR        Hip add         Bridges        Clamshells        S/L hip abd        Standing hip flex/ext/abd        Squat        Leg press        LF hip abd/add                Ther Activity        Step up/down        Sit to stands        Gait Training                        Modalities        CP

## 2023-11-28 ENCOUNTER — OFFICE VISIT (OUTPATIENT)
Dept: OBGYN CLINIC | Facility: CLINIC | Age: 58
End: 2023-11-28
Payer: MEDICARE

## 2023-11-28 VITALS
HEIGHT: 64 IN | BODY MASS INDEX: 47.46 KG/M2 | HEART RATE: 93 BPM | SYSTOLIC BLOOD PRESSURE: 130 MMHG | WEIGHT: 278 LBS | DIASTOLIC BLOOD PRESSURE: 83 MMHG

## 2023-11-28 DIAGNOSIS — M17.0 PRIMARY OSTEOARTHRITIS OF BOTH KNEES: Primary | ICD-10-CM

## 2023-11-28 PROCEDURE — 20610 DRAIN/INJ JOINT/BURSA W/O US: CPT | Performed by: ORTHOPAEDIC SURGERY

## 2023-11-28 PROCEDURE — 99213 OFFICE O/P EST LOW 20 MIN: CPT | Performed by: ORTHOPAEDIC SURGERY

## 2023-11-28 RX ORDER — TRIAMCINOLONE ACETONIDE 40 MG/ML
80 INJECTION, SUSPENSION INTRA-ARTICULAR; INTRAMUSCULAR
Status: COMPLETED | OUTPATIENT
Start: 2023-11-28 | End: 2023-11-28

## 2023-11-28 RX ORDER — BUPIVACAINE HYDROCHLORIDE 2.5 MG/ML
4 INJECTION, SOLUTION INFILTRATION; PERINEURAL
Status: COMPLETED | OUTPATIENT
Start: 2023-11-28 | End: 2023-11-28

## 2023-11-28 RX ADMIN — TRIAMCINOLONE ACETONIDE 80 MG: 40 INJECTION, SUSPENSION INTRA-ARTICULAR; INTRAMUSCULAR at 13:00

## 2023-11-28 RX ADMIN — BUPIVACAINE HYDROCHLORIDE 4 ML: 2.5 INJECTION, SOLUTION INFILTRATION; PERINEURAL at 13:00

## 2023-11-28 NOTE — PROGRESS NOTES
Assessment/Plan:    No problem-specific Assessment & Plan notes found for this encounter. Diagnoses and all orders for this visit:    Primary osteoarthritis of both knees  -     Injection Procedure Prior Authorization; Future          Under aseptic technique, both knees were injected with Kenalog and Marcaine. He tolerated procedures quite well. Can start viscosupplementation anytime after March 26. Will see him soon after that    Patient has reported improvements from previous visco injections. Pain is rated at 7/10. Subjective:      Patient ID: Evette Price is a 60 y.o. male. HPI    The patient has a history of degenerative joint disease of his bilateral knees. He desires another set of corticosteroid injections. He can start viscosupplementation till late March. He denies any numbness or tingling. He denies any fever or chills. He still is using a single-point cane with a nasal cannula    The following portions of the patient's history were reviewed and updated as appropriate: allergies, current medications, past family history, past medical history, past social history, past surgical history, and problem list.    Review of Systems   Constitutional:  Negative for chills, fever and unexpected weight change. HENT:  Negative for hearing loss, nosebleeds and sore throat. Eyes:  Negative for pain, redness and visual disturbance. Respiratory:  Negative for cough, shortness of breath and wheezing. Cardiovascular:  Negative for chest pain, palpitations and leg swelling. Gastrointestinal:  Negative for abdominal pain, nausea and vomiting. Endocrine: Negative for polydipsia and polyuria. Genitourinary:  Negative for dysuria and hematuria. Musculoskeletal:  Positive for arthralgias, gait problem and myalgias. Negative for back pain, joint swelling, neck pain and neck stiffness. As noted in HPI   Skin:  Negative for rash and wound.    Neurological:  Negative for dizziness, numbness and headaches. Psychiatric/Behavioral:  Negative for decreased concentration and suicidal ideas. The patient is not nervous/anxious. Objective:      /83   Pulse 93   Ht 5' 4" (1.626 m)   Wt 126 kg (278 lb)   BMI 47.72 kg/m²          Physical Exam        Bilateral lower extremities are neuro vas intact. Toes are pink and mobile. Compartments are soft. Range of motion both his knees are from 5 to 115 degrees. There is a negative Lachman, drawer, pivot shift test.  There is medial joint tenderness, lateral joint tenderness, and patellofemoral crepitation. There is a painful arc of motion throughout both knees    Large joint arthrocentesis: bilateral knee  Universal Protocol:  Consent: Verbal consent obtained. Written consent not obtained. Risks and benefits: risks, benefits and alternatives were discussed  Consent given by: patient  Time out: Immediately prior to procedure a "time out" was called to verify the correct patient, procedure, equipment, support staff and site/side marked as required. Patient understanding: patient states understanding of the procedure being performed  Test results: test results available and properly labeled  Site marked: the operative site was marked  Radiology Images displayed and confirmed.  If images not available, report reviewed: imaging studies available  Supporting Documentation  Indications: pain   Procedure Details  Location: knee - bilateral knee  Needle size: 22 G  Ultrasound guidance: no  Approach: lateral    Medications (Right): 4 mL bupivacaine 0.25 %; 80 mg triamcinolone acetonide 40 mg/mLMedications (Left): 4 mL bupivacaine 0.25 %; 80 mg triamcinolone acetonide 40 mg/mL   Patient tolerance: patient tolerated the procedure well with no immediate complications  Dressing:  Sterile dressing applied

## 2023-11-30 ENCOUNTER — TELEPHONE (OUTPATIENT)
Dept: PULMONOLOGY | Facility: CLINIC | Age: 58
End: 2023-11-30

## 2023-11-30 ENCOUNTER — OFFICE VISIT (OUTPATIENT)
Dept: PULMONOLOGY | Facility: CLINIC | Age: 58
End: 2023-11-30
Payer: MEDICARE

## 2023-11-30 VITALS
HEIGHT: 64 IN | TEMPERATURE: 98.5 F | HEART RATE: 107 BPM | BODY MASS INDEX: 47.46 KG/M2 | WEIGHT: 278 LBS | SYSTOLIC BLOOD PRESSURE: 150 MMHG | OXYGEN SATURATION: 92 % | DIASTOLIC BLOOD PRESSURE: 83 MMHG

## 2023-11-30 DIAGNOSIS — R06.09 DOE (DYSPNEA ON EXERTION): ICD-10-CM

## 2023-11-30 DIAGNOSIS — J96.11 CHRONIC RESPIRATORY FAILURE WITH HYPOXIA, ON HOME OXYGEN THERAPY: ICD-10-CM

## 2023-11-30 DIAGNOSIS — J98.4 RESTRICTIVE LUNG DISEASE: Primary | ICD-10-CM

## 2023-11-30 DIAGNOSIS — G47.33 OSA TREATED WITH BIPAP: ICD-10-CM

## 2023-11-30 DIAGNOSIS — Z99.81 CHRONIC RESPIRATORY FAILURE WITH HYPOXIA, ON HOME OXYGEN THERAPY: ICD-10-CM

## 2023-11-30 DIAGNOSIS — E66.01 MORBID OBESITY WITH BMI OF 45.0-49.9, ADULT (HCC): Chronic | ICD-10-CM

## 2023-11-30 DIAGNOSIS — F17.201 TOBACCO ABUSE, IN REMISSION: ICD-10-CM

## 2023-11-30 PROCEDURE — 99214 OFFICE O/P EST MOD 30 MIN: CPT

## 2023-11-30 NOTE — PATIENT INSTRUCTIONS
Follow up with weight management  May continue nebulizer every 6 hours as needed. Stop Trelegy  Get PFTs  Continue oxygen, wear Bipap during all hours of sleep, including naps. Follow up in 4 weeks or sooner if needed.

## 2023-12-01 ENCOUNTER — OFFICE VISIT (OUTPATIENT)
Dept: PHYSICAL THERAPY | Facility: HOME HEALTHCARE | Age: 58
End: 2023-12-01
Payer: MEDICARE

## 2023-12-01 DIAGNOSIS — M25.562 BILATERAL CHRONIC KNEE PAIN: Primary | ICD-10-CM

## 2023-12-01 DIAGNOSIS — M25.561 BILATERAL CHRONIC KNEE PAIN: Primary | ICD-10-CM

## 2023-12-01 DIAGNOSIS — J44.9 COPD, MILD (HCC): ICD-10-CM

## 2023-12-01 DIAGNOSIS — G89.29 BILATERAL CHRONIC KNEE PAIN: Primary | ICD-10-CM

## 2023-12-01 LAB
DME PARACHUTE DELIVERY DATE REQUESTED: NORMAL
DME PARACHUTE ITEM DESCRIPTION: NORMAL
DME PARACHUTE ORDER STATUS: NORMAL
DME PARACHUTE SUPPLIER NAME: NORMAL
DME PARACHUTE SUPPLIER PHONE: NORMAL

## 2023-12-01 PROCEDURE — 97110 THERAPEUTIC EXERCISES: CPT

## 2023-12-01 RX ORDER — ALBUTEROL SULFATE 90 UG/1
AEROSOL, METERED RESPIRATORY (INHALATION)
Qty: 18 G | Refills: 0 | Status: SHIPPED | OUTPATIENT
Start: 2023-12-01

## 2023-12-01 NOTE — PROGRESS NOTES
Daily Note     Today's date: 2023  Patient name: Keyonna Mills  : 1965  MRN: [id number]  Referring provider: Dai Martinez PA-C  Dx: No diagnosis found. Stop Time: 111       Subjective: Tuesday I had Cortisone injections in B knees. I notice a little less grinding/popping in my knees. My oxygen isn't working right because I left it in my cold car all night. Objective: See treatment diary below    Assessment: Pt with fair edmond to TE in inclined position for comfort and breathing. No oxygen available during seated/inclined ex. Pt reports and experiences grinding sensation with LAQ and QS. Pt denied increase in pain level at B knees with gentle ex. Pt willing to try NuStep today and was able to complete 5' with use of oxygen  Patient would benefit from continued PT    Plan: Continue per plan of care.       Precautions: Fall risk    Re-eval Date: 2023        Manuals  12-1      Irving calf, hs, glute stretch  declined      Irving knee PROM                Neuro Re-Ed        Balance as appropriate                                Ther Ex  12-1      Nustep  L1  5'      Quad set HEP 5" x15 irving      Heel slide HEP 1x10 irving      LAQ HEP 3" x20 irving      HS curls        SLR        Hip add   5" x15      Bridges        Clamshells        S/L hip abd        Standing hip flex/ext/abd        Squat        Leg press        LF hip abd/add                Ther Activity  12-1      Step up/down        Sit to stands        Gait Training                        Modalities  12-1      CP

## 2023-12-01 NOTE — ASSESSMENT & PLAN NOTE
-Patient reports wearing BiPAP nightly, however needs a new mask as current one has a tear in it and is leaking  -New mask ordered  -He will continue wearing BiPAP during all hours of sleep  -Obtain compliance report at his next office visit

## 2023-12-01 NOTE — ASSESSMENT & PLAN NOTE
-Unclear etiology  -Recent chest x-ray 1 week ago negative for acute cardiopulmonary disease  -Echocardiogram 8/2023 with mildly elevated RVSP, normal EF 60%  -Cardiac stress test 11/2022 normal  -Likely multiple contributing factors including obesity/hypoventilation/restrictive lung disease, deconditioning, iron deficiency anemia  -Will repeat PFTs  -Continue iron supplement  -If continued LAMB, consider checking CPET

## 2023-12-01 NOTE — ASSESSMENT & PLAN NOTE
-PFTs from 12/2022 [facility] with evidence of moderate restriction  -This is likely from his morbid obesity  -Given his continued LAMB, we will update PFTs  -Patient has consult with weight management next week

## 2023-12-01 NOTE — ASSESSMENT & PLAN NOTE
-Significant smoking history, approximately 140-pack-year history, quit 13 months ago  -Due for CT lung cancer screening 8/2024

## 2023-12-01 NOTE — ASSESSMENT & PLAN NOTE
-Patient would benefit from weight loss to improve his restrictive lung disease and LAMB  -Has consult with weight management next week

## 2023-12-04 LAB
DME PARACHUTE DELIVERY DATE ACTUAL: NORMAL
DME PARACHUTE DELIVERY DATE REQUESTED: NORMAL
DME PARACHUTE ITEM DESCRIPTION: NORMAL
DME PARACHUTE ORDER STATUS: NORMAL
DME PARACHUTE SUPPLIER NAME: NORMAL
DME PARACHUTE SUPPLIER PHONE: NORMAL

## 2023-12-05 ENCOUNTER — OFFICE VISIT (OUTPATIENT)
Dept: PHYSICAL THERAPY | Facility: HOME HEALTHCARE | Age: 58
End: 2023-12-05
Payer: MEDICARE

## 2023-12-05 DIAGNOSIS — M25.562 BILATERAL CHRONIC KNEE PAIN: Primary | ICD-10-CM

## 2023-12-05 DIAGNOSIS — G89.29 BILATERAL CHRONIC KNEE PAIN: Primary | ICD-10-CM

## 2023-12-05 DIAGNOSIS — M25.561 BILATERAL CHRONIC KNEE PAIN: Primary | ICD-10-CM

## 2023-12-05 PROCEDURE — 97140 MANUAL THERAPY 1/> REGIONS: CPT

## 2023-12-05 PROCEDURE — 97110 THERAPEUTIC EXERCISES: CPT

## 2023-12-05 NOTE — PROGRESS NOTES
Daily Note     Today's date: 2023  Patient name: Melonie Nicolas  : 1965  MRN: [id number]  Referring provider: Kamran Leyva  Dx:   Encounter Diagnosis     ICD-10-CM    1. Bilateral chronic knee pain  M25.561     M25.562     G89.29           Start Time: 1036  Stop Time: 1873  Total time in clinic (min): 38 minutes    Subjective: Pt reports that his knees are so-so today. Objective: See treatment diary below      Assessment: Pt demonstrated fair tolerance to treatment session. His session continues to be limited due to pain levels, however, he was able to progress from his previous session. The pt does have significant bilateral hamstring tightness, but did improve with passive stretching. He was also able to tolerate knee PROM today, with gentle distraction added with knee flexion. The pt would benefit from continued PT. Plan: Continue per plan of care.       Precautions: Fall risk    Re-eval Date: 2023        Manuals  12-     Jamie calf, hs, glute stretch  declined JA gentle hs     Jamie knee PROM   JA             Neuro Re-Ed        Balance as appropriate                                Ther Ex  12-1      Nustep  L1  5' L1 7'     Quad set HEP 5" x15 jamie 5"x15 jamie     Heel slide HEP 1x10 jamie 1x10 jamie     LAQ HEP 3" x20 jamie 3"x20 jamie     HS curls   Red x15 jamie     SLR        Hip add   5" x15 5"x10     Bridges        Clamshells        S/L hip abd        Standing hip flex/ext/abd        Squat        Leg press        LF hip abd/add                Ther Activity  12-1      Step up/down        Sit to stands        Gait Training                        Modalities  12-1      CP

## 2023-12-06 ENCOUNTER — HOSPITAL ENCOUNTER (OUTPATIENT)
Dept: PULMONOLOGY | Facility: HOSPITAL | Age: 58
Discharge: HOME | End: 2023-12-06
Payer: MEDICARE

## 2023-12-06 DIAGNOSIS — J98.4 RESTRICTIVE LUNG DISEASE: ICD-10-CM

## 2023-12-06 DIAGNOSIS — R06.09 DOE (DYSPNEA ON EXERTION): ICD-10-CM

## 2023-12-06 PROCEDURE — 94729 DIFFUSING CAPACITY: CPT

## 2023-12-06 PROCEDURE — 94761 N-INVAS EAR/PLS OXIMETRY MLT: CPT

## 2023-12-06 PROCEDURE — 94726 PLETHYSMOGRAPHY LUNG VOLUMES: CPT | Performed by: INTERNAL MEDICINE

## 2023-12-06 PROCEDURE — 94729 DIFFUSING CAPACITY: CPT | Performed by: INTERNAL MEDICINE

## 2023-12-06 PROCEDURE — 94726 PLETHYSMOGRAPHY LUNG VOLUMES: CPT

## 2023-12-06 PROCEDURE — 94060 EVALUATION OF WHEEZING: CPT | Performed by: INTERNAL MEDICINE

## 2023-12-06 PROCEDURE — 94618 PULMONARY STRESS TESTING: CPT | Performed by: INTERNAL MEDICINE

## 2023-12-06 PROCEDURE — 94060 EVALUATION OF WHEEZING: CPT

## 2023-12-06 RX ORDER — ALBUTEROL SULFATE 2.5 MG/3ML
2.5 SOLUTION RESPIRATORY (INHALATION) ONCE AS NEEDED
Status: COMPLETED | OUTPATIENT
Start: 2023-12-06 | End: 2023-12-06

## 2023-12-06 RX ADMIN — ALBUTEROL SULFATE 2.5 MG: 2.5 SOLUTION RESPIRATORY (INHALATION) at 17:13

## 2023-12-07 ENCOUNTER — OFFICE VISIT (OUTPATIENT)
Dept: PHYSICAL THERAPY | Facility: HOME HEALTHCARE | Age: 58
End: 2023-12-07
Payer: MEDICARE

## 2023-12-07 DIAGNOSIS — M25.561 BILATERAL CHRONIC KNEE PAIN: Primary | ICD-10-CM

## 2023-12-07 DIAGNOSIS — M25.562 BILATERAL CHRONIC KNEE PAIN: Primary | ICD-10-CM

## 2023-12-07 DIAGNOSIS — G89.29 BILATERAL CHRONIC KNEE PAIN: Primary | ICD-10-CM

## 2023-12-07 PROCEDURE — 97140 MANUAL THERAPY 1/> REGIONS: CPT

## 2023-12-07 PROCEDURE — 97110 THERAPEUTIC EXERCISES: CPT

## 2023-12-07 NOTE — PROGRESS NOTES
Daily Note     Today's date: 2023  Patient name: Carlos Mendes  : 1965  MRN: [id number]  Referring provider: Mitch Alvarez PA-C  Dx: No diagnosis found. Start Time: 1117          Subjective: Pain on average between 6/10 to 8/10 at B knees. The L knee is worse than the R. I didn't sleep well last night because my B-pap machine wasn't working right. Objective: See treatment diary below    Assessment: Pt with fair edmond to session but with fatigue noted t/o tx. Pt declined NuStep to end of tx 2* oxygen machine working improperly. VC's needed t/o B LE TE. Pt needed to stop after 3' on NuStep 2* L knee pain. Pt declined CP to home. Patient would benefit from continued PT    Plan: Continue per plan of care.       Precautions: Fall risk    Re-eval Date: 2023        Manuals  12-    Irving calf, hs, glute stretch  declined JA gentle hs EC gentle    Irving knee PROM   JA EC            Neuro Re-Ed        Balance as appropriate                                Ther Ex      SP02  HR    98%  112    Nustep  L1  5' L1 7'     Quad set HEP 5" x15 irving 5"x15 irving 5" x15 irving    Heel slide HEP 1x10 irving 1x10 irving 1x10 irving    LAQ HEP 3" x20 irving 3"x20 irving 3" x15 irving    HS curls   Red x15 irving Red x15 irving    SLR        Hip add   5" x15 5"x10 5" x10    Bridges        Clamshells        S/L hip abd        Standing hip flex/ext/abd        Squat        Leg press        LF hip abd/add                Ther Activity  12-      Step up/down        Sit to stands        Gait Training                        Modalities    12-     CP    declined

## 2023-12-08 ENCOUNTER — CONSULT (OUTPATIENT)
Dept: BARIATRICS | Facility: CLINIC | Age: 58
End: 2023-12-08
Payer: MEDICARE

## 2023-12-08 VITALS
RESPIRATION RATE: 20 BRPM | HEIGHT: 64 IN | BODY MASS INDEX: 47.43 KG/M2 | SYSTOLIC BLOOD PRESSURE: 136 MMHG | DIASTOLIC BLOOD PRESSURE: 82 MMHG | HEART RATE: 64 BPM | TEMPERATURE: 98 F | WEIGHT: 277.8 LBS | OXYGEN SATURATION: 95 %

## 2023-12-08 DIAGNOSIS — G47.33 OSA TREATED WITH BIPAP: ICD-10-CM

## 2023-12-08 DIAGNOSIS — K76.0 FATTY LIVER DISEASE, NONALCOHOLIC: ICD-10-CM

## 2023-12-08 DIAGNOSIS — E66.01 MORBID OBESITY WITH BMI OF 45.0-49.9, ADULT (HCC): Primary | Chronic | ICD-10-CM

## 2023-12-08 DIAGNOSIS — E11.22 TYPE 2 DIABETES MELLITUS WITH DIABETIC CHRONIC KIDNEY DISEASE (HCC): Chronic | ICD-10-CM

## 2023-12-08 DIAGNOSIS — F41.8 DEPRESSION WITH ANXIETY: ICD-10-CM

## 2023-12-08 DIAGNOSIS — N18.2 CHRONIC KIDNEY DISEASE, STAGE 2 (MILD): Chronic | ICD-10-CM

## 2023-12-08 DIAGNOSIS — E11.21 DIABETIC NEPHROPATHY ASSOCIATED WITH TYPE 2 DIABETES MELLITUS (HCC): ICD-10-CM

## 2023-12-08 DIAGNOSIS — Z79.4 TYPE 2 DIABETES MELLITUS WITH CHRONIC KIDNEY DISEASE, WITH LONG-TERM CURRENT USE OF INSULIN, UNSPECIFIED CKD STAGE (HCC): Primary | Chronic | ICD-10-CM

## 2023-12-08 DIAGNOSIS — E11.22 TYPE 2 DIABETES MELLITUS WITH CHRONIC KIDNEY DISEASE, WITH LONG-TERM CURRENT USE OF INSULIN, UNSPECIFIED CKD STAGE (HCC): Primary | Chronic | ICD-10-CM

## 2023-12-08 LAB
DME PARACHUTE DELIVERY DATE REQUESTED: NORMAL
DME PARACHUTE ITEM DESCRIPTION: NORMAL
DME PARACHUTE ITEM DESCRIPTION: NORMAL
DME PARACHUTE ORDER STATUS: NORMAL
DME PARACHUTE SUPPLIER NAME: NORMAL
DME PARACHUTE SUPPLIER PHONE: NORMAL

## 2023-12-08 PROCEDURE — 99204 OFFICE O/P NEW MOD 45 MIN: CPT | Performed by: PHYSICIAN ASSISTANT

## 2023-12-08 NOTE — PROGRESS NOTES
Assessment/Plan: Morbid obesity with BMI of 45.0-49.9, adult ([address] [facility])  -Discussed options of [facility]-Intensive Lifestyle Intervention Program, Conservative Program, Marilynn-En-Y Gastric Bypass, and Vertical Sleeve Gastrectomy and the role of weight loss medications. Patient with with several medical comorbidities, chronic respiratory failure requiring supplemental oxygen, new onset CAMILLE, unsure if he would be a surgical candidate. Patient wants to focus on improving his nutrition at this time  -Initial weight loss goal of 5-10% weight loss for improved health  -Screening labs: reviewed CMP, Lipid panel, TSH, HgbA1c  -Patient is interested in pursuing conservative program  -Calorie goals, sample menu, portion size guidelines, and food logging reviewed with the patient.    -Currently on Ozempic 0.5 for Diabetes. Suggested increase to 1mg. Will reach out to Endocrine regarding this before making the change  -Patient suffers from Depression. Reports was taking Cymbalta but stopped this and feels his mood has worsened. He would like to resume this. Staff message sent to PCP with update  -encouraged patient to meet with SW. He declines      Type 2 diabetes mellitus with diabetic chronic kidney disease ([address] [facility])    Lab Results   Component Value Date    HGBA1C 9.4 (A) 09/11/2023   -managed by Endo  -On Glargine and Humalog, Ozempic and Metformin  -avoid/limit refined carbohydrates  -Ozempic started in Sept. Has not experienced any weight loss. Consider increase to 1mg dose. Will reach out to Endo    MARSHA treated with BiPAP  -patient reports compliance  -can improve with weight loss  -continue ongoing follow up with pulmonary    Depression with anxiety  -Mood recently worsening since stopping Cymbalta. He would like to resume this  -denies SI        Goals:    Food log (ie.) [email or web address],[email or web address].[email or web address],[employer]. com,etc.   No sugary beverages. At least 64oz of water daily.   Increase physical activity by 10 minutes daily. Gradually increase physical activity to a goal of 5 days per week for 30 minutes of MODERATE intensity PLUS 2 days per week of FULL BODY resistance training  1500 calories per day  5-10 servings of fruits and vegetables per day  25-35 grams of dietary fiber per day    Follow up in approximately 3 months with Non-Surgical Physician/Advanced Practitioner. Diagnoses and all orders for this visit:    Morbid obesity with BMI of 45.0-49.9, adult ([address] [facility])  -     Ambulatory Referral to Weight Management    Fatty liver disease, nonalcoholic  -     Ambulatory Referral to Weight Management    MARSHA treated with BiPAP    Diabetic nephropathy associated with type 2 diabetes mellitus ([address])    Type 2 diabetes mellitus with diabetic chronic kidney disease (HCC)    Chronic kidney disease, stage 2 (mild)    Depression with anxiety          Subjective:   Chief Complaint   Patient presents with    Consult       Patient ID: Elizabeth Sen  is a 60 y.o. male with excess weight/obesity here to pursue weight managment. Past Medical History:   Diagnosis Date    Acquired hypothyroidism 1/2/2015    Angioedema     Diabetes mellitus ([address] [facility])     Disease of thyroid gland     Hyperlipidemia     Hypertension     Mixed hyperlipidemia 2/4/2015    Morbid obesity ([address] [facility])     Morbid obesity with BMI of 45.0-49.9, adult ([address] 1/2/2015    MARSHA (obstructive sleep apnea)     Primary hypertension 1/2/2015    Transitioned From: Benign essential hypertension         HPI:  Obesity/Excess Weight:  Severity: Severe  Onset:  past 2-3 years  Modifiers:  self created diets but has not had any success  Contributing factors: sedentary, eats to take his mind off of things, large portions  Associated symptoms: increased joint pain, increased shortness of breath, and decreased mobility    Goals: lose 100 lbs  Highest: current    Hydration: water 4-5 bottles, coffee + nondairy creamer, rare soda or juice  ETOH: denies  Exercise:  In PT 2x per week for knees  Occupation: unemployed  Sleep: 5-6 hours; wears Bipap  Smoking: denies; uses nicotine lozenges. Quit cigarettes 13 months ago    DM2 for for 20 years  -Stopped cymbalta 1 month ago, was taking for depression  -patient denies mother had thyroid cancer, it was pancreatic    B: skips  L: 3 eggs + 2 slices of toast + spam or sausage  S: pretzels ( large bag)  D: Hamburger or hoagie ( large portions)  S: pretzels Or popsicle       Colonoscopy: never completed; currently following with GI    The following portions of the patient's history were reviewed and updated as appropriate: allergies, current medications, past family history, past medical history, past social history, past surgical history, and problem list.    Review of Systems   Constitutional:  Negative for chills and fever. HENT:  Negative for sore throat. Respiratory:  Positive for cough and shortness of breath. Cardiovascular:  Negative for chest pain and palpitations. Gastrointestinal:  Negative for abdominal pain, nausea and vomiting. Genitourinary:  Negative for dysuria. Musculoskeletal:  Positive for arthralgias. Skin:  Negative for rash. Neurological:  Positive for dizziness. Negative for headaches. Psychiatric/Behavioral:  Positive for dysphoric mood. Negative for suicidal ideas. The patient is nervous/anxious. Objective:    /82 (BP Location: Right arm, Patient Position: Sitting, Cuff Size: Large)   Pulse 64   Temp 98 °F (36.7 °C)   Resp 20   Ht 5' 4" (1.626 m)   Wt 126 kg (277 lb 12.8 oz)   SpO2 95%   BMI 47.68 kg/m²     Physical Exam  Vitals and nursing note reviewed. Constitutional   General appearance: Abnormal.  well developed and morbidly obese. Eyes No conjunctival pallor. Ears, Nose, Mouth, and Throat Oral mucosa moist.   Pulmonary   Respiratory effort: No increased work of breathing or signs of respiratory distress.     Auscultation of lungs: Clear to auscultation, equal breath sounds bilaterally, no wheezes, no rales, no rhonci. Cardiovascular   Auscultation of heart: Normal rate and rhythm, normal S1 and S2, without murmurs. Examination of extremities for edema and/or varicosities: Normal.  no edema. Abdomen   Abdomen: Abnormal.  The abdomen was obese. Bowel sounds were normal. The abdomen was soft and nontender.  Umbilical hernia noted  Musculoskeletal   Gait and station: Normal.    Psychiatric   Orientation to person, place and time: Normal.    Affect: appropriate

## 2023-12-08 NOTE — ASSESSMENT & PLAN NOTE
-patient reports compliance  -can improve with weight loss  -continue ongoing follow up with pulmonary

## 2023-12-08 NOTE — PATIENT INSTRUCTIONS
Goals: Food log (ie.) [email or web address],[employer]. [email or web address],[employer]. com,etc.   No sugary beverages. At least 64oz of water daily. Increase physical activity by 10 minutes daily.  Gradually increase physical activity to a goal of 5 days per week for 30 minutes of MODERATE intensity PLUS 2 days per week of FULL BODY resistance training  1500 calories per day  5-10 servings of fruits and vegetables per day  25-35 grams of dietary fiber per day

## 2023-12-08 NOTE — ASSESSMENT & PLAN NOTE
-Discussed options of [facility]-Intensive Lifestyle Intervention Program, Conservative Program, Marilynn-En-Y Gastric Bypass, and Vertical Sleeve Gastrectomy and the role of weight loss medications. Patient with with several medical comorbidities, chronic respiratory failure requiring supplemental oxygen, new onset CAMILLE, unsure if he would be a surgical candidate. Patient wants to focus on improving his nutrition at this time  -Initial weight loss goal of 5-10% weight loss for improved health  -Screening labs: reviewed CMP, Lipid panel, TSH, HgbA1c  -Patient is interested in pursuing conservative program  -Calorie goals, sample menu, portion size guidelines, and food logging reviewed with the patient.    -Currently on Ozempic 0.5 for Diabetes. Suggested increase to 1mg. Will reach out to Endocrine regarding this before making the change  -Patient suffers from Depression. Reports was taking Cymbalta but stopped this and feels his mood has worsened. He would like to resume this. Staff message sent to PCP with update  -encouraged patient to meet with SW.  He declines

## 2023-12-08 NOTE — ASSESSMENT & PLAN NOTE
Lab Results   Component Value Date    HGBA1C 9.4 (A) 09/11/2023   -managed by Endo  -On Glargine and Humalog, Ozempic and Metformin  -avoid/limit refined carbohydrates  -Ozempic started in Sept. Has not experienced any weight loss. Consider increase to 1mg dose.  Will reach out to Endo

## 2023-12-12 ENCOUNTER — APPOINTMENT (OUTPATIENT)
Dept: PHYSICAL THERAPY | Facility: HOME HEALTHCARE | Age: 58
End: 2023-12-12
Payer: MEDICARE

## 2023-12-14 ENCOUNTER — APPOINTMENT (OUTPATIENT)
Dept: PHYSICAL THERAPY | Facility: HOME HEALTHCARE | Age: 58
End: 2023-12-14
Payer: MEDICARE

## 2023-12-19 ENCOUNTER — OFFICE VISIT (OUTPATIENT)
Dept: PHYSICAL THERAPY | Facility: HOME HEALTHCARE | Age: 58
End: 2023-12-19
Payer: MEDICARE

## 2023-12-19 DIAGNOSIS — M25.562 BILATERAL CHRONIC KNEE PAIN: Primary | ICD-10-CM

## 2023-12-19 DIAGNOSIS — M25.561 BILATERAL CHRONIC KNEE PAIN: Primary | ICD-10-CM

## 2023-12-19 DIAGNOSIS — G89.29 BILATERAL CHRONIC KNEE PAIN: Primary | ICD-10-CM

## 2023-12-19 PROCEDURE — 97112 NEUROMUSCULAR REEDUCATION: CPT

## 2023-12-19 PROCEDURE — 97110 THERAPEUTIC EXERCISES: CPT

## 2023-12-19 NOTE — PROGRESS NOTES
"Daily Note     Today's date: 2023  Patient name: Luis F Velazquez  : 1965  MRN: [id number]  Referring provider: Chuck Holman PA-C  Dx: No diagnosis found.    Start Time: 1100          Subjective: My Dr started me on Ozempic for my Diabetes about 1 month ago and then increased the dose last week. I was sick to my stomach for a few days but I'm not sure if it was the medication or just my stomach. I feel ok today. My knees are bothering me L>R.     Objective: See treatment diary below    Assessment: Pt with improved edmond to NuStep and was able to complete 8' today. Pt was agreeable to added TE as per flow sheet with encouragement. Pt declined B LE MT stating it causes his knees to hurt more. Patient would benefit from continued PT    Plan: Continue per plan of care.      Precautions: Fall risk    Re-eval Date: 2023        Manuals    Irving calf, hs, glute stretch  declined JA gentle hs EC gentle Declined   Irving knee PROM   JA EC declined           Neuro Re-Ed        Balance as appropriate                                Ther Ex     SP02  HR    98%  112 bpm 96%  112 bpm   Nustep  L1  5' L1 7'  L1  8'   Quad set HEP 5\" x15 irving 5\"x15 irving 5\" x15 irving 5\" x15   Heel slide HEP 1x10 irving 1x10 irving 1x10 irving declined   LAQ HEP 3\" x20 irving 3\"x20 irving 3\" x15 irving 3\" x15 irving   HS curls   Red x15 irivng Red x15 irving Red x15 irving   SLR     Gentle assist  1x5 irving   Hip add   5\" x15 5\"x10 5\" x10 5\" x10   Bridges     3\" 10   Clamshells        S/L hip abd        Standing hip flex/ext/abd     Flex/abd  1x10 ea irving   Squat        Leg press        LF hip abd/add                Ther Activity  12-      Step up/down        Sit to stands        Gait Training                        Modalities       CP    declined                       "

## 2023-12-26 LAB
DME PARACHUTE DELIVERY DATE ACTUAL: NORMAL
DME PARACHUTE DELIVERY DATE REQUESTED: NORMAL
DME PARACHUTE ITEM DESCRIPTION: NORMAL
DME PARACHUTE ITEM DESCRIPTION: NORMAL
DME PARACHUTE ORDER STATUS: NORMAL
DME PARACHUTE SUPPLIER NAME: NORMAL
DME PARACHUTE SUPPLIER PHONE: NORMAL

## 2023-12-28 ENCOUNTER — OFFICE VISIT (OUTPATIENT)
Dept: PHYSICAL THERAPY | Facility: HOME HEALTHCARE | Age: 58
End: 2023-12-28
Payer: MEDICARE

## 2023-12-28 DIAGNOSIS — M25.562 BILATERAL CHRONIC KNEE PAIN: Primary | ICD-10-CM

## 2023-12-28 DIAGNOSIS — M25.561 BILATERAL CHRONIC KNEE PAIN: Primary | ICD-10-CM

## 2023-12-28 DIAGNOSIS — G89.29 BILATERAL CHRONIC KNEE PAIN: Primary | ICD-10-CM

## 2023-12-28 PROCEDURE — 97110 THERAPEUTIC EXERCISES: CPT

## 2023-12-28 PROCEDURE — 97112 NEUROMUSCULAR REEDUCATION: CPT

## 2023-12-28 NOTE — PROGRESS NOTES
"Daily Note     Today's date: 2023  Patient name: Luis F Velazquez  : 1965  MRN: [id number]  Referring provider: Chuck Holman PA-C  Dx: No diagnosis found.               Subjective: My knees are bothering me but not to bad today.     Objective: See treatment diary below    Assessment: Pt with good edmond to session and with improved edmond to ex today. Improved sit<>stand transfers noted and able to complete TE with minimal rests needed. Pt did report pain of 4/10 at B knees t/o session.  Patient would benefit from continued PT    Plan: Continue per plan of care.      Precautions: Fall risk    Re-eval Date: 2023        Manuals    Irving calf, hs, glute stretch Declined declined JA gentle hs EC gentle Declined   Irving knee PROM Declined  JA EC declined           Neuro Re-Ed        Balance as appropriate                                Ther Ex    SP02  HR 98%     98%  112 bpm 96%  112 bpm   Nustep L1  10' L1  5' L1 7'  L1  8'   Quad set 5\" x15 irving 5\" x15 irving 5\"x15 irving 5\" x15 irving 5\" x15   Heel slide 1x15 irving 1x10 irving 1x10 irving 1x10 irving declined   LAQ 3\" x15 irving 3\" x20 irving 3\"x20 irving 3\" x15 irving 3\" x15 irving   HS curls Red x15 irving  Red x15 irving Red x15 irving Red x15 irving   SLR     Gentle assist  1x5 irving   Hip add  5\" x10 5\" x15 5\"x10 5\" x10 5\" x10   Bridges 3\" x10    3\" 10   Clamshells        S/L hip abd        Standing hip flex/ext/abd Declined    Flex/abd  1x10 ea irving   Squat        Leg press        LF hip abd/add                Ther Activity  -      Step up/down        Sit to stands        Gait Training                        Modalities       CP    declined                         "

## 2023-12-30 DIAGNOSIS — G62.9 NEUROPATHY: ICD-10-CM

## 2023-12-31 RX ORDER — GABAPENTIN 100 MG/1
100 CAPSULE ORAL 3 TIMES DAILY
Qty: 90 CAPSULE | Refills: 0 | Status: SHIPPED | OUTPATIENT
Start: 2023-12-31

## 2024-01-02 ENCOUNTER — OFFICE VISIT (OUTPATIENT)
Dept: PHYSICAL THERAPY | Facility: HOME HEALTHCARE | Age: 59
End: 2024-01-02
Payer: MEDICARE

## 2024-01-02 DIAGNOSIS — M25.562 BILATERAL CHRONIC KNEE PAIN: Primary | ICD-10-CM

## 2024-01-02 DIAGNOSIS — G89.29 BILATERAL CHRONIC KNEE PAIN: Primary | ICD-10-CM

## 2024-01-02 DIAGNOSIS — M25.561 BILATERAL CHRONIC KNEE PAIN: Primary | ICD-10-CM

## 2024-01-02 PROCEDURE — 97110 THERAPEUTIC EXERCISES: CPT

## 2024-01-02 PROCEDURE — 97140 MANUAL THERAPY 1/> REGIONS: CPT

## 2024-01-02 NOTE — PROGRESS NOTES
"Daily Note     Today's date: 2024  Patient name: Luis F Velazquez  : 1965  MRN: [id number]  Referring provider: Chuck Holman PA-C  Dx:   Encounter Diagnosis     ICD-10-CM    1. Bilateral chronic knee pain  M25.561     M25.562     G89.29                      Subjective: Pt reports he had a lot of pain in his knees over the weekend. Pt reports 7/10 pain both knees today.       Objective: See treatment diary below      Assessment: Tolerated treatment fair. Verbal cues needed to perform exercises correctly. Progressed to increased reps with some exercises today. Pt reports a little increased pain L knee after Nustep.  Fatigue noted with exercise. Patient would benefit from continued PT      Plan: Continue per plan of care.      Precautions: Fall risk    Re-eval Date: 2023        Manuals    Jamie calf, hs, glute stretch Declined JK JA gentle hs EC gentle Declined   Jamie knee PROM Declined JK JA EC declined           Neuro Re-Ed        Balance as appropriate                                Ther Ex    SP02  HR 98%   93%  90 bpm  98%  112 bpm 96%  112 bpm   Nustep L1  10' L1  10'  L1 7'  L1  8'   Quad set 5\" x15 jamie 5\" x20 jamie 5\"x15 jamie 5\" x15 jamie 5\" x15   Heel slide 1x15 jamie 1x20 jamie 1x10 jamie 1x10 jamie declined   LAQ 3\" x15 jamie 3\" x 20 jamie 3\"x20 jamie 3\" x15 jamie 3\" x15 jamie   HS curls Red x15 jamie Red x 20 Jamie Red x15 jamie Red x15 jamie Red x15 jamie   SLR     Gentle assist  1x5 jamie   Hip add  5\" x10 5\" x20 5\"x10 5\" x10 5\" x10   Bridges 3\" x10 Declined    3\" 10   Clamshells        S/L hip abd        Standing hip flex/ext/abd Declined Flex/abd   1x10 ea Jamie    Flex/abd  1x10 ea jamie   Squat        Leg press        LF hip abd/add                Ther Activity        Step up/down        Sit to stands        Gait Training                        Modalities         CP    declined                           "

## 2024-01-03 NOTE — PROGRESS NOTES
Pulmonary Follow Up Note  Luis F Velazquez 58 y.o. male MRN: [id number]  1/4/2024    Assessment:    MARSHA treated with BiPAP  -Reviewed recent BiPAP compliance data with the patient.  He has been wearing nightly with an average use of 5 hours and 7 minutes.  Residual AHI is 7.1.  Average leak 9.4L/min  -Patient feels his mask is too small, offered mask fitting appointment, however he declined stating he does not want to get billed for the appointment  -Order placed for larger size mask  -Review compliance data at his next office visit    Restrictive lung disease  -Recent PFTs from 12/2023 with moderate restrictive airflow defect and normal diffusion capacity.  This is likely secondary to his morbid obesity  -Patient working with bariatrics to reduce weight, on Ozempic and down 6 pounds since his last office visit 1 month ago  -Encouraged to continue weight loss efforts and following up with bariatrics    Chronic respiratory failure with hypoxia, on home oxygen therapy   -Patient will continue wearing 2 L nasal cannula continuously to maintain SpO2 above 88%    Tobacco abuse, in remission  -Extensive smoking history, remains in remission since 10/2022  -Due for CT lung cancer screening 8/2024    LAMB (dyspnea on exertion)  -Patient reports slight improvement  -Etiology likely multifactorial from obesity/hypoventilation/restrictive lung disease, deconditioning  -Previous workup did not reveal underlying lung disease or cardiac etiology  -Recommend continued weight loss efforts to see if LAMB continues to improve as well as increasing physical activity to improve stamina and conditioning  -If symptoms do not continue to improve and or worsen at next office visit, consider checking CPET    Plan:    Diagnoses and all orders for this visit:    Restrictive lung disease    MARSHA treated with BiPAP  -     PAP DME Resupply/Reorder    Chronic respiratory failure with hypoxia, on home oxygen therapy     Tobacco abuse, in  remission    LAMB (dyspnea on exertion)    Other orders  -     PAP Accessories/Supplies Package        Return in about 6 months (around 7/4/2024).      History of Present Illness     Chief Complaint: No chief complaint on file.      Patient ID: Luis F is a 58 y.o. y.o. male has a past medical history of Acquired hypothyroidism (1/2/2015), Angioedema, Diabetes mellitus (HCC), Disease of thyroid gland, Hyperlipidemia, Hypertension, Mixed hyperlipidemia (2/4/2015), Morbid obesity (HCC), Morbid obesity with BMI of 45.0-49.9, adult (HCC) (1/2/2015), MARSHA (obstructive sleep apnea), and Primary hypertension (1/2/2015).      1/4/2024  HPI: Luis F Velazquez is a 58 y.o. male who presents to the office today for a follow-up visit.   Patient has a past medical history positive for insulin-dependent diabetes mellitus type 2, hyperlipidemia, emphysema, morbid obesity, MARSHA on BiPAP, and former smoker. He was previously seen in the office 6 weeks ago.  He has been having difficulty with shortness of breath and decreased activity tolerance.  He was sent for repeat PFTs, maintained on 2 L continuous oxygen and BiPAP nightly.  PFTs showed restrictive pattern with normal diffusion capacity likely secondary to his morbid obesity.  Patient has since established with bariatric and started on Ozempic for weight loss.      Patient reports slight improvement in his shortness of breath since his last office visit.  Using albuterol inhaler on average once daily.  Denies any chest pain, chest tightness, mucus production, or wheezing.  Patient notes chronic postnasal drainage and vocal hoarseness which he has been evaluated previously by ENT.  He has been compliant with his BiPAP nightly, however believes his mask may be too small.  Patient is currently in physical therapy and slowly increasing his physical activity and decreasing his weight.        Review of Systems   Constitutional:  Negative for activity change, chills, fever and unexpected weight  change.   HENT:  Positive for postnasal drip. Negative for congestion, rhinorrhea, sore throat and trouble swallowing.    Respiratory:  Positive for cough and shortness of breath. Negative for chest tightness and wheezing.    Cardiovascular:  Negative for chest pain, palpitations and leg swelling.   Allergic/Immunologic: Negative.        Historical Information   Past Medical History:   Diagnosis Date    Acquired hypothyroidism 1/2/2015    Angioedema     Diabetes mellitus (HCC)     Disease of thyroid gland     Hyperlipidemia     Hypertension     Mixed hyperlipidemia 2/4/2015    Morbid obesity (HCC)     Morbid obesity with BMI of 45.0-49.9, adult (HCC) 1/2/2015    MARSHA (obstructive sleep apnea)     Primary hypertension 1/2/2015    Transitioned From: Benign essential hypertension     Past Surgical History:   Procedure Laterality Date    KNEE SURGERY      SINUS SURGERY       Family History   Problem Relation Age of Onset    Diabetes Mother     Diabetes type II Mother     Esophageal cancer Father     Diabetes Brother     Kidney cancer Brother     Diabetes type II Brother     Diabetes Maternal Grandmother     Diabetes type II Maternal Grandmother     Diabetes Paternal Grandmother     Heart disease Neg Hx        Smoking history: He reports that he quit smoking about 14 months ago. His smoking use included cigarettes. He started smoking about 71 years ago. He has a 140.0 pack-year smoking history. He has never used smokeless tobacco.    Occupational History:     Immunization History   Administered Date(s) Administered    COVID-19 MODERNA VACC 0.5 ML IM 05/18/2021, 06/15/2021    INFLUENZA 09/16/2011, 10/29/2013    Pneumococcal Conjugate Vaccine 20-valent (Pcv20), Polysace 06/08/2022    Pneumococcal Polysaccharide PPV23 09/16/2011, 02/27/2019    Td (adult), Unspecified 09/11/2011    Tdap 09/11/2011, 06/16/2023    Tuberculin Skin Test-PPD Intradermal 04/15/2021    Zoster Vaccine Recombinant 06/16/2023, 09/23/2023  "      Meds/Allergies     Current Outpatient Medications:     albuterol (2.5 mg/3 mL) 0.083 % nebulizer solution, Take 3 mL (2.5 mg total) by nebulization every 6 (six) hours as needed for wheezing or shortness of breath, Disp: 180 mL, Rfl: 0    Ascorbic Acid (VITAMIN C PO), Take 1 tablet by mouth daily, Disp: , Rfl:     aspirin 81 mg chewable tablet, Take one tablet by mouth daily, Disp: , Rfl:     cholecalciferol (VITAMIN D3) 400 units tablet, Take 1 tablet by mouth daily, Disp: , Rfl:     DULoxetine (CYMBALTA) 60 mg delayed release capsule, Take 1 capsule by mouth once daily, Disp: 90 capsule, Rfl: 0    EPINEPHrine (EPIPEN) 0.3 mg/0.3 mL SOAJ, INJECT 0.3MG INTO A MUSCLE ONCE FOR 1 DOSE, Disp: 2 each, Rfl: 0    ferrous gluconate (FERGON) 324 mg tablet, Take 324 mg by mouth, Disp: , Rfl:     gabapentin (NEURONTIN) 100 mg capsule, TAKE 1 CAPSULE BY MOUTH THREE TIMES DAILY, Disp: 90 capsule, Rfl: 0    HumaLOG KwikPen 100 units/mL injection pen, INJECT 20 UNITS UNDER THE SKIN THREE TIMES DAILY WITH MEALS (Patient taking differently: Inject 30 Units under the skin 3 (three) times a day with meals), Disp: 15 mL, Rfl: 0    hydrochlorothiazide (HYDRODIURIL) 25 mg tablet, Take 1 tablet by mouth twice daily, Disp: 60 tablet, Rfl: 2    Insulin Pen Needle (Pen Needles 3/16\") 31G X 5 MM MISC, Use 4 (four) times a day, Disp: 300 each, Rfl: 3    ipratropium (ATROVENT) 0.02 % nebulizer solution, Take 2.5 mL (0.5 mg total) by nebulization every 6 (six) hours as needed for wheezing or shortness of breath, Disp: 150 mL, Rfl: 0    levothyroxine 175 mcg tablet, Take 1 tablet by mouth once daily, Disp: 90 tablet, Rfl: 0    loratadine (CLARITIN) 10 mg tablet, Take 10 mg by mouth daily, Disp: , Rfl:     metFORMIN (GLUCOPHAGE) 1000 MG tablet, Take 1 tablet (1,000 mg total) by mouth 2 (two) times a day, Disp: 180 tablet, Rfl: 1    Omega-3 Fatty Acids (fish oil) 1,000 mg, Take 1,000 mg by mouth 2 (two) times a day, Disp: , Rfl:     " pregabalin (LYRICA) 25 mg capsule, Take 1 capsule by mouth twice daily, Disp: 60 capsule, Rfl: 2    rosuvastatin (CRESTOR) 40 MG tablet, Take 1 tablet by mouth once daily, Disp: 90 tablet, Rfl: 0    semaglutide, 1 mg/dose, (Ozempic, 1 MG/DOSE,) 4 mg/3 mL injection pen, Inject 0.75 mL (1 mg total) under the skin every 7 days, Disp: 3 mL, Rfl: 1    sodium chloride (OCEAN) 0.65 % nasal spray, 1 spray into each nostril as needed for rhinitis, Disp: 30 mL, Rfl: 1    tamsulosin (FLOMAX) 0.4 mg, Take 1 capsule (0.4 mg total) by mouth daily with dinner, Disp: 30 capsule, Rfl: 6    Thiamine HCl (vitamin B-1) 250 MG tablet, Take 250 mg by mouth daily, Disp: , Rfl:     traZODone (DESYREL) 150 mg tablet, TAKE 1 TABLET BY MOUTH ONCE DAILY AT BEDTIME, Disp: 90 tablet, Rfl: 0    valsartan (DIOVAN) 160 mg tablet, Take 160 mg by mouth daily, Disp: , Rfl:     valsartan (DIOVAN) 80 mg tablet, Take 80 mg by mouth daily, Disp: , Rfl:     Ventolin  (90 Base) MCG/ACT inhaler, INHALE 2 PUFFS BY MOUTH EVERY 4 HOURS AS NEEDED FOR WHEEZING FOR SHORTNESS OF BREATH, Disp: 18 g, Rfl: 0    vitamin E, tocopherol, 400 units capsule, Take 400 Units by mouth daily, Disp: , Rfl:     benzonatate (TESSALON PERLES) 100 mg capsule, Take 1 capsule (100 mg total) by mouth 3 (three) times a day as needed for cough, Disp: 20 capsule, Rfl: 2    Insulin Glargine Solostar (Lantus SoloStar) 100 UNIT/ML SOPN, Inject 0.6 mL (60 Units total) under the skin daily (Patient taking differently: Inject 65 Units under the skin daily Takes at night), Disp: 18 mL, Rfl: 5    ipratropium-albuterol (DUO-NEB) 0.5-2.5 mg/3 mL nebulizer solution, Take 3 mL by nebulization every 6 (six) hours as needed for wheezing or shortness of breath (Patient not taking: Reported on 1/4/2024), Disp: 180 mL, Rfl: 0  Allergies:   Allergies   Allergen Reactions    Ace Inhibitors Swelling     Angioedema    PT STATES THIS IS NOT AN ALLERGY    Other Anaphylaxis     Pt believes that he is  "allergic to peanut butter.    Also, seasonal allergies    Zinc Tongue Swelling     PT STATES THIS IS NOT AN ALLERGY    Clindamycin Edema     Had angioedema episode approx 5 hr after IM administration of clindamycin. Unclear if there is definitive causal relationship.  PT STATES THIS IS NOT AN ALLERGY         Vitals:  Vitals:    01/04/24 1522 01/04/24 1526   BP: 107/67    BP Location: Right arm    Patient Position: Sitting    Cuff Size: Standard    Pulse: (!) 120    Temp: (!) 96.5 °F (35.8 °C)    TempSrc: Tympanic    SpO2: 93% 94%   Weight: 123 kg (271 lb)    Height: 5' 4\" (1.626 m)      Oxygen Therapy  SpO2: 94 %  Oxygen Therapy: Supplemental oxygen  O2 Delivery Method: Nasal cannula  O2 Flow Rate (L/min): 2 L/min  .  Wt Readings from Last 3 Encounters:   01/04/24 123 kg (271 lb)   12/08/23 126 kg (277 lb 12.8 oz)   11/30/23 126 kg (278 lb)     Body mass index is 46.52 kg/m².    Physical Exam  Vitals and nursing note reviewed.   Constitutional:       General: He is not in acute distress.     Appearance: Normal appearance. He is well-developed. He is morbidly obese.   Cardiovascular:      Rate and Rhythm: Regular rhythm. Tachycardia present.      Heart sounds: Normal heart sounds. No murmur heard.  Pulmonary:      Effort: Pulmonary effort is normal. No respiratory distress.      Breath sounds: Normal breath sounds. No decreased breath sounds, wheezing, rhonchi or rales.   Musculoskeletal:         General: No swelling.      Right lower leg: No edema.      Left lower leg: No edema.   Psychiatric:         Mood and Affect: Mood and affect normal.         Behavior: Behavior normal. Behavior is cooperative.           Labs: I have personally reviewed pertinent lab results.  Lab Results   Component Value Date    WBC 11.53 (H) 09/02/2023    HGB 9.0 (L) 09/02/2023    HCT 30.7 (L) 09/02/2023    MCV 83 09/02/2023     (H) 09/02/2023     Lab Results   Component Value Date    GLUCOSE 130 01/29/2015    CALCIUM 9.8 11/07/2023 "     01/29/2015    K 3.8 11/07/2023    CO2 30 11/07/2023    CL 90 (L) 11/07/2023    BUN 20 11/07/2023    CREATININE 1.10 11/07/2023     Lab Results   Component Value Date     (H) 02/12/2018     Lab Results   Component Value Date    ALT 32 09/02/2023    AST 27 09/02/2023    ALKPHOS 53 09/02/2023    BILITOT 0.3 01/29/2015       Imaging and other studies: I have personally reviewed pertinent reports and I have personally reviewed pertinent films in PACS     Chest x-ray 11/25/2023  No acute cardiopulmonary disease    CT abdomen and pelvis with contrast 8/29/2023  Lungs-no pulmonary embolism.  Emphysema.  Small to moderate size left pleural effusion of uncertain etiology.    Echo 8/30/2023    Left Ventricle: Left ventricular cavity size is normal. Wall thickness is normal. The left ventricular ejection fraction is 60%. Systolic function is normal. Wall motion is normal. Diastolic function is mildly abnormal, consistent with grade I (abnormal) relaxation.    Right Ventricle: Right ventricular cavity size is normal. Systolic function is normal.    Aortic Valve: There is aortic valve sclerosis.    Tricuspid Valve: The right ventricular systolic pressure is mildly elevated. The estimated right ventricular systolic pressure is 41.00 mmHg.    Pulmonary function testing:     Pulmonary Functions Testing Results: 12/8/2023    FEV1/FVC ratio 73%    FEV1 55% predicted  FVC 61% predicted  (-) response to bronchodilators  TLC 77 % predicted  % predicted  DLCO 80% predicted    Impression: Moderate restrictive airflow defect on spirometry.  No bronchodilator response.  Mildly reduced TLC.  Normal diffusion capacity.  Normal flow volume loops.    6-minute walk test 12/8/2023  Patient ambulated 183 m, kandace SpO2 87%.  Placed on 2 L supplemental oxygen and ambulated 24 m with SpO2 95%

## 2024-01-04 ENCOUNTER — OFFICE VISIT (OUTPATIENT)
Dept: PULMONOLOGY | Facility: CLINIC | Age: 59
End: 2024-01-04
Payer: MEDICARE

## 2024-01-04 ENCOUNTER — OFFICE VISIT (OUTPATIENT)
Dept: PHYSICAL THERAPY | Facility: HOME HEALTHCARE | Age: 59
End: 2024-01-04
Payer: MEDICARE

## 2024-01-04 VITALS
TEMPERATURE: 96.5 F | HEART RATE: 120 BPM | HEIGHT: 64 IN | BODY MASS INDEX: 46.26 KG/M2 | OXYGEN SATURATION: 94 % | SYSTOLIC BLOOD PRESSURE: 107 MMHG | WEIGHT: 271 LBS | DIASTOLIC BLOOD PRESSURE: 67 MMHG

## 2024-01-04 DIAGNOSIS — F17.201 TOBACCO ABUSE, IN REMISSION: ICD-10-CM

## 2024-01-04 DIAGNOSIS — J96.11 CHRONIC RESPIRATORY FAILURE WITH HYPOXIA, ON HOME OXYGEN THERAPY: ICD-10-CM

## 2024-01-04 DIAGNOSIS — G89.29 BILATERAL CHRONIC KNEE PAIN: Primary | ICD-10-CM

## 2024-01-04 DIAGNOSIS — G47.33 OSA TREATED WITH BIPAP: ICD-10-CM

## 2024-01-04 DIAGNOSIS — Z99.81 CHRONIC RESPIRATORY FAILURE WITH HYPOXIA, ON HOME OXYGEN THERAPY: ICD-10-CM

## 2024-01-04 DIAGNOSIS — M25.562 BILATERAL CHRONIC KNEE PAIN: Primary | ICD-10-CM

## 2024-01-04 DIAGNOSIS — M25.561 BILATERAL CHRONIC KNEE PAIN: Primary | ICD-10-CM

## 2024-01-04 DIAGNOSIS — R06.09 DOE (DYSPNEA ON EXERTION): ICD-10-CM

## 2024-01-04 DIAGNOSIS — J98.4 RESTRICTIVE LUNG DISEASE: Primary | ICD-10-CM

## 2024-01-04 PROCEDURE — 99214 OFFICE O/P EST MOD 30 MIN: CPT

## 2024-01-04 PROCEDURE — 97110 THERAPEUTIC EXERCISES: CPT

## 2024-01-04 PROCEDURE — 97140 MANUAL THERAPY 1/> REGIONS: CPT

## 2024-01-04 NOTE — PROGRESS NOTES
"Daily Note     Today's date: 2024  Patient name: Luis F Velazquez  : 1965  MRN: [id number]  Referring provider: Chuck Holman PA-C  Dx:   Encounter Diagnosis     ICD-10-CM    1. Bilateral chronic knee pain  M25.561     M25.562     G89.29                      Subjective: Pt reports he has more pain in his L knee than his R knee today. Pt reports 9/10 pain L knee and 6/10 pain R knee today.       Objective: See treatment diary below      Assessment: Tolerated treatment fair. Verbal cues needed to perform exercises correctly. Pt reports pain level same both knees t/o session.  Fatigue evident with exercise. Patient would benefit from continued PT      Plan: Continue per plan of care.      Precautions: Fall risk    Re-eval Date: 2023        Manuals    Jamie calf, hs, glute stretch Declined JK JK EC gentle Declined   Jamie knee PROM Declined JK JK EC declined           Neuro Re-Ed        Balance as appropriate                                Ther Ex    SP02  HR 98%   93%  90 bpm 93%  115 bpm 98%  112 bpm 96%  112 bpm   Nustep L1  10' L1  10'  L1 10'  L1  8'   Quad set 5\" x15 jamie 5\" x20 jamie 5\"x20 jamie 5\" x15 jamie 5\" x15   Heel slide 1x15 jamie 1x20 jamie 1x20 jamie 1x10 jamie declined   LAQ 3\" x15 jamie 3\" x 20 jamie 3\"x20 jamie 3\" x15 jamie 3\" x15 jamie   HS curls Red x15 jamie Red x 20 Jamie Red x 20 jamie Red x15 jamie Red x15 jamie   SLR     Gentle assist  1x5 jamie   Hip add  5\" x10 5\" x20 5\"x 20 5\" x10 5\" x10   Bridges 3\" x10 Declined    3\" 10   Clamshells        S/L hip abd        Standing hip flex/ext/abd Declined Flex/abd   1x10 ea Jamie  Flex/abd/ext   1x15 ea Jamie   Flex/abd  1x10 ea jamie   Squat        Leg press        LF hip abd/add                Ther Activity        Step up/down        Sit to stands        Gait Training                        Modalities         CP    declined                             "

## 2024-01-04 NOTE — ASSESSMENT & PLAN NOTE
-Patient reports slight improvement  -Etiology likely multifactorial from obesity/hypoventilation/restrictive lung disease, deconditioning  -Previous workup did not reveal underlying lung disease or cardiac etiology  -Recommend continued weight loss efforts to see if LAMB continues to improve as well as increasing physical activity to improve stamina and conditioning  -If symptoms do not continue to improve and or worsen at next office visit, consider checking CPET

## 2024-01-04 NOTE — ASSESSMENT & PLAN NOTE
-Extensive smoking history, remains in remission since 10/2022  -Due for CT lung cancer screening 8/2024

## 2024-01-04 NOTE — PROGRESS NOTES
PT Re-Evaluation     Today's date: 2024  Patient name: Luis F Velazquez  : 1965  MRN: [id number]  Referring provider: Chuck Holman PA-C  Dx:   Encounter Diagnosis     ICD-10-CM    1. Bilateral chronic knee pain  M25.561     M25.562     G89.29                      Assessment  Assessment details: UPDATE: Pt remains with limited B knee ROM and strength. He had significant pain, 9/10 report, day of assessment which is contributing to mobility appearing worse. Pt continues to need non-reciprocal gait for stair negotiation, limited to < 10 minutes ambulation, and reliance of L knee brace. He will return to see MD again in 6 weeks. Continue with POC at this time to address remaining deficits. Thank you.     Pt is a 58 y/o male presenting to OPPT with chronic bilateral knee pain, L>R, consistent with diagnosis of bilateral knee OA and overall physical deconditioning.  The pt is presenting with significant pain levels, ROM deficits, strength deficits, and decreased functional mobility, which is affecting his ambulatory tolerance and ability to perform ADLs.  The pt requires skilled PT in order to improve in pain, strength, ROM, functional mobility, quality of life, and return to PLOF.  Thank you.  Impairments: abnormal gait, abnormal or restricted ROM, abnormal movement, activity intolerance, impaired physical strength, lacks appropriate home exercise program, pain with function, weight-bearing intolerance and poor body mechanics  Understanding of Dx/Px/POC: good   Prognosis: good    Goals  STG: 3-4 weeks - progressing   Pt will be independent with HEP in order to continue to progress outside of PT treatment sessions.  Pt will improve in quality of life as demonstrated by worst pain levels of 8/10 or less.  Pt will improve in functional mobility as demonstrated by a 1/2 point or greater improvement in LE strength.  LT-8 weeks  Pt will improve in quality of life as demonstrated by worst pain levels of 5/10 or  less.  Pt will improve in functional mobility as demonstrated by strength levels of 4/5 or greater.  Pt will improve in functional mobility as demonstrated by irving knee ROM WFL.  Pt will improve in functional mobility as demonstrated by ability to perform full ADLs without any limitations due to pain, ROM deficits, or weakness.    Plan  Patient would benefit from: skilled physical therapy  Planned modality interventions: cryotherapy  Planned therapy interventions: body mechanics training, flexibility, functional ROM exercises, gait training, home exercise program, joint mobilization, manual therapy, neuromuscular re-education, patient education, postural training, strengthening, stretching, therapeutic activities and therapeutic exercise  Frequency: 2x week  Duration in weeks: 12  Plan of Care beginning date: 2023  Plan of Care expiration date: 2024  Treatment plan discussed with: patient        Subjective Evaluation    History of Present Illness  Mechanism of injury: UPDATE: Pt reporting that the pain is constant but overall he does feel that therapy is helping and would like to continue.      Pt reports that he continues to have significant bilateral anterior/medial knee pain, L>R, which has been ongoing for over a year.  He reports that he feels a sharp pain in his L knee when getting up, sitting down, etc.  He reports that he has been getting gel shots in his knees, which have been helping a little for knee mobility/stiffness, but it has not been helping the pain.  He reports that he goes for another injection tomorrow.    Patient Goals  Patient goals for therapy: decreased pain, increased motion, increased strength, independence with ADLs/IADLs and return to sport/leisure activities  Patient goal: To get moving around  Pain  Current pain ratin  At best pain ratin  At worst pain ratin  Quality: dull ache and sharp (Stabbing)  Alleviating factors: Menthol rubs for short-term  relief.  Aggravating factors: standing, walking and stair climbing    Social Support  Steps to enter house: no  Stairs in house: yes (21)       Diagnostic Tests  X-ray: abnormal  Treatments  Previous treatment: injection treatment        Objective     Tenderness   Left Knee   Tenderness in the medial joint line.     Right Knee   Tenderness in the medial joint line.     Active Range of Motion   Left Knee   Flexion: 95 degrees with pain  Extension: -5 degrees with pain    Right Knee   Flexion: 95 degrees with pain  Extension: -5 degrees with pain    Passive Range of Motion   Left Hip   Flexion: 95 degrees     Right Hip   Flexion: 95 degrees   Left Knee   Flexion: 100 degrees with pain  Extension: -4 degrees with pain    Right Knee   Flexion: 105 degrees with pain  Extension: -2 degrees with pain    Strength/Myotome Testing     Left Hip   Planes of Motion   Flexion: 3  Abduction: 3  Adduction: 4    Right Hip   Planes of Motion   Flexion: 4-  Abduction: 3  Adduction: 4    Left Knee   Flexion: 3+  Extension: 3+    Right Knee   Flexion: 4  Extension: 4    Left Ankle/Foot   Dorsiflexion: 4+  Plantar flexion: 4+    Right Ankle/Foot   Dorsiflexion: 4+  Plantar flexion: 4+    Tests     Left Hip   SLR: Positive.     Right Hip   SLR: Positive.     Additional Tests Details  SLR: L: 55, R: 55    Ambulation   Weight-Bearing Status   Weight-Bearing Status (Left): weight-bearing as tolerated   Weight-Bearing Status (Right): weight-bearing as tolerated      Ambulation: Level Surfaces   Ambulation without assistive device: independent    Ambulation: Stairs   Ascend stairs: contact guard assist  Pattern: non-reciprocal  Railings: one rail  Descend stairs: contact guard assist  Pattern: non-reciprocal  Railings: one rail  Curbs: independent    Observational Gait   Gait: antalgic   Decreased walking speed and stride length.

## 2024-01-04 NOTE — ASSESSMENT & PLAN NOTE
-Recent PFTs from 12/2023 with moderate restrictive airflow defect and normal diffusion capacity.  This is likely secondary to his morbid obesity  -Patient working with bariatrics to reduce weight, on Ozempic and down 6 pounds since his last office visit 1 month ago  -Encouraged to continue weight loss efforts and following up with bariatrics

## 2024-01-05 LAB

## 2024-01-09 ENCOUNTER — OFFICE VISIT (OUTPATIENT)
Dept: PHYSICAL THERAPY | Facility: HOME HEALTHCARE | Age: 59
End: 2024-01-09
Payer: MEDICARE

## 2024-01-09 DIAGNOSIS — M25.561 BILATERAL CHRONIC KNEE PAIN: Primary | ICD-10-CM

## 2024-01-09 DIAGNOSIS — G89.29 BILATERAL CHRONIC KNEE PAIN: Primary | ICD-10-CM

## 2024-01-09 DIAGNOSIS — M25.562 BILATERAL CHRONIC KNEE PAIN: Primary | ICD-10-CM

## 2024-01-09 PROCEDURE — 97110 THERAPEUTIC EXERCISES: CPT

## 2024-01-09 NOTE — PROGRESS NOTES
"Daily Note     Today's date: 2024  Patient name: Luis F Velazquez  : 1965  MRN: [id number]  Referring provider: Chuck Holman PA-C  Dx: No diagnosis found.    Start Time: 010          Subjective: I have pain at my tailbone, both hips and at both knees today.     Objective: See treatment diary below    Assessment: Modified session to pt edmond 2* pt with c/o pain at various body regions.  Pt declined heel slides and performes hip add in seated position. Pt reports difficulty breathing in supine with wedge and 2 pillows. Pt declined MT today. Patient would benefit from continued PT    Plan: Continue per plan of care.      Precautions: Fall risk    Re-eval Date: 2023        Manuals    Irving calf, hs, glute stretch Declined JK JK Declined Declined   Irving knee PROM Declined JK JK Declined declined           Neuro Re-Ed        Balance as appropriate                                Ther Ex    SP02  HR 98%   93%  90 bpm 93%  115 bpm 97%  123 bpm 96%  112 bpm   Nustep L1  10' L1  10'  L1 10' L1  10' L1  8'   Quad set 5\" x15 irving 5\" x20 irving 5\"x20 irving 5\" x10 irving 5\" x15   Heel slide 1x15 irving 1x20 irving 1x20 irving declined declined   LAQ 3\" x15 irving 3\" x 20 irving 3\"x20 irving 3\" x15 irving 3\" x15 irving   HS curls Red x15 irving Red x 20 Irving Red x 20 irving Red x10 irving Red x15 irving   SLR     Gentle assist  1x5 irving   Hip add  5\" x10 5\" x20 5\"x 20 Seated   5\" x10 5\" x10   Bridges 3\" x10 Declined    3\" 10   Clamshells        S/L hip abd        Standing hip flex/ext/abd Declined Flex/abd   1x10 ea Irving  Flex/abd/ext   1x15 ea Irving  Flex/abd x10 Flex/abd  1x10 ea irving   Squat        Leg press        LF hip abd/add                Ther Activity        Step up/down        Sit to stands        Gait Training                        Modalities    1-9    CP    declined                               "

## 2024-01-11 ENCOUNTER — OFFICE VISIT (OUTPATIENT)
Dept: PHYSICAL THERAPY | Facility: HOME HEALTHCARE | Age: 59
End: 2024-01-11
Payer: MEDICARE

## 2024-01-11 DIAGNOSIS — F32.9 REACTIVE DEPRESSION: ICD-10-CM

## 2024-01-11 DIAGNOSIS — G89.29 BILATERAL CHRONIC KNEE PAIN: Primary | ICD-10-CM

## 2024-01-11 DIAGNOSIS — M25.561 BILATERAL CHRONIC KNEE PAIN: Primary | ICD-10-CM

## 2024-01-11 DIAGNOSIS — M25.562 BILATERAL CHRONIC KNEE PAIN: Primary | ICD-10-CM

## 2024-01-11 DIAGNOSIS — F41.8 DEPRESSION WITH ANXIETY: ICD-10-CM

## 2024-01-11 PROCEDURE — 97110 THERAPEUTIC EXERCISES: CPT

## 2024-01-11 PROCEDURE — 97140 MANUAL THERAPY 1/> REGIONS: CPT

## 2024-01-11 RX ORDER — DULOXETIN HYDROCHLORIDE 60 MG/1
60 CAPSULE, DELAYED RELEASE ORAL DAILY
Qty: 90 CAPSULE | Refills: 1 | Status: SHIPPED | OUTPATIENT
Start: 2024-01-11

## 2024-01-11 RX ORDER — TRAZODONE HYDROCHLORIDE 150 MG/1
150 TABLET ORAL
Qty: 90 TABLET | Refills: 1 | Status: SHIPPED | OUTPATIENT
Start: 2024-01-11

## 2024-01-11 NOTE — PROGRESS NOTES
"Daily Note     Today's date: 2024  Patient name: Luis F Velazquez  : 1965  MRN: [id number]  Referring provider: Chuck Holman PA-C  Dx: No diagnosis found.    Start Time: 1300          Subjective: My LB has been bad and I was wondering about US.     Objective: See treatment diary below    Assessment: Pt with question about US for relief of LBP.  Advised pt about separate Rx for LBP and to discuss with Dr. Pt was able to resume most TE and MT with rests as needed. Pt with HS tightness remaining Jamie HS. Pt declined CP at end today. Patient would benefit from continued PT    Plan: Continue per plan of care.      Precautions: Fall risk    Re-eval Date: 2023        Manuals    Jamie calf, hs, glute stretch Declined JK JK Declined EC   Jamie knee PROM Declined JK JK Declined EC           Neuro Re-Ed        Balance as appropriate                                Ther Ex    SP02  HR 98%   93%  90 bpm 93%  115 bpm 97%  123 bpm 94%  123 bpm   Nustep L1  10' L1  10'  L1 10' L1  10' L2    9.5'   Quad set 5\" x15 jamie 5\" x20 jamie 5\"x20 jamie 5\" x10 jamie 5\" x15   Heel slide 1x15 jamie 1x20 jamie 1x20 jamie declined declined   LAQ 3\" x15 jamie 3\" x 20 jamie 3\"x20 jamie 3\" x15 jamie 3\" x15 jamie   HS curls Red x15 jamie Red x 20 Jamie Red x 20 jamie Red x10 jamie Red x15 jamie   SLR        Hip add  5\" x10 5\" x20 5\"x 20 Seated   5\" x10 5\" x10   Bridges 3\" x10 Declined    1x7   Clamshells        S/L hip abd        Standing hip flex/ext/abd Declined Flex/abd   1x10 ea Jamie  Flex/abd/ext   1x15 ea Jamie  Flex/abd x10 Flex/abd  1x15 ea jamie   Squat        Leg press        LF hip abd/add                Ther Activity        Step up/down        Sit to stands        Gait Training                        Modalities       CP    declined declined                                "

## 2024-01-17 ENCOUNTER — APPOINTMENT (OUTPATIENT)
Dept: PHYSICAL THERAPY | Facility: HOME HEALTHCARE | Age: 59
End: 2024-01-17
Payer: MEDICARE

## 2024-01-18 ENCOUNTER — APPOINTMENT (OUTPATIENT)
Dept: PHYSICAL THERAPY | Facility: HOME HEALTHCARE | Age: 59
End: 2024-01-18
Payer: MEDICARE

## 2024-01-23 ENCOUNTER — OFFICE VISIT (OUTPATIENT)
Dept: PHYSICAL THERAPY | Facility: HOME HEALTHCARE | Age: 59
End: 2024-01-23
Payer: MEDICARE

## 2024-01-23 DIAGNOSIS — M25.561 BILATERAL CHRONIC KNEE PAIN: Primary | ICD-10-CM

## 2024-01-23 DIAGNOSIS — M25.562 BILATERAL CHRONIC KNEE PAIN: Primary | ICD-10-CM

## 2024-01-23 DIAGNOSIS — G89.29 BILATERAL CHRONIC KNEE PAIN: Primary | ICD-10-CM

## 2024-01-23 PROCEDURE — 97110 THERAPEUTIC EXERCISES: CPT

## 2024-01-23 PROCEDURE — 97140 MANUAL THERAPY 1/> REGIONS: CPT

## 2024-01-23 NOTE — PROGRESS NOTES
"Daily Note     Today's date: 2024  Patient name: Luis F Velazquez  : 1965  MRN: [id number]  Referring provider: Chuck Holman PA-C  Dx: No diagnosis found.    Start Time: 1255          Subjective: PT with c/o pain at LB of 8/10. I usually feel a little more flexible after PT session for a while but then the pain returns.     Objective: See treatment diary below    Assessment: Pt remains with strength and ROM deficits with minimal improvements noted in LB or B LE pain since SOC. Pt continues to require short, frequent rests 2* fatigue and SOB. Pt reports grinding sensation at R knee during NuStep. Pt with calf and HS ROM deficits noted with stretching.  Patient would benefit from continued PT    Plan: Continue per plan of care.      Precautions: Fall risk    Re-eval Date: 24      Manuals    Jamie calf, hs, glute stretch  JK JK Declined EC   Jamie knee PROM Declined JK JK Declined EC           Neuro Re-Ed        Balance as appropriate                                Ther Ex    SP02  HR 97%  117 bpm   93%  90 bpm 93%  115 bpm 97%  123 bpm 94%  123 bpm   Nustep L2  10' L1  10'  L1 10' L1  10' L2    9.5'   Quad set 5\" x15 jamie 5\" x20 jamie 5\"x20 jamie 5\" x10 jamie 5\" x15   Heel slide 1x15 jamie 1x20 jamie 1x20 jamie declined declined   LAQ 3\" x15 jamie 3\" x 20 jamie 3\"x20 jamie 3\" x15 jamie 3\" x15 jamie   HS curls Red x15 jamie Red x 20 Jamie Red x 20 jamie Red x10 jamie Red x15 jamie   SLR        Hip add  Declined 2* L medial knee pain.  5\" x20 5\"x 20 Seated   5\" x10 5\" x10   Bridges 3\" x10 Declined    1x7   Clamshells        S/L hip abd        Standing hip flex/ext/abd Flex/abd  1x15 ea jamie  1HHA @ rail Flex/abd   1x10 ea Jamie  Flex/abd/ext   1x15 ea Jamie  Flex/abd x10 Flex/abd  1x15 ea jamie   Squat        Leg press        LF hip abd/add                Ther Activity        Step up/down        Sit to stands        Gait Training                        Modalities 1-23   1-9 1-11   CP    declined declined       " ADMIT

## 2024-01-25 ENCOUNTER — APPOINTMENT (OUTPATIENT)
Dept: PHYSICAL THERAPY | Facility: HOME HEALTHCARE | Age: 59
End: 2024-01-25
Payer: MEDICARE

## 2024-01-29 DIAGNOSIS — G62.9 NEUROPATHY: ICD-10-CM

## 2024-01-30 ENCOUNTER — OFFICE VISIT (OUTPATIENT)
Dept: PHYSICAL THERAPY | Facility: HOME HEALTHCARE | Age: 59
End: 2024-01-30
Payer: MEDICARE

## 2024-01-30 DIAGNOSIS — G89.29 BILATERAL CHRONIC KNEE PAIN: Primary | ICD-10-CM

## 2024-01-30 DIAGNOSIS — M25.561 BILATERAL CHRONIC KNEE PAIN: Primary | ICD-10-CM

## 2024-01-30 DIAGNOSIS — M25.562 BILATERAL CHRONIC KNEE PAIN: Primary | ICD-10-CM

## 2024-01-30 PROCEDURE — 97140 MANUAL THERAPY 1/> REGIONS: CPT

## 2024-01-30 PROCEDURE — 97110 THERAPEUTIC EXERCISES: CPT

## 2024-01-30 RX ORDER — GABAPENTIN 100 MG/1
100 CAPSULE ORAL 3 TIMES DAILY
Qty: 90 CAPSULE | Refills: 5 | Status: SHIPPED | OUTPATIENT
Start: 2024-01-30

## 2024-01-30 NOTE — PROGRESS NOTES
"Daily Note     Today's date: 2024  Patient name: Luis F Velazquez  : 1965  MRN: [id number]  Referring provider: Chuck Holman PA-C  Dx:   Encounter Diagnosis     ICD-10-CM    1. Bilateral chronic knee pain  M25.561     M25.562     G89.29                  Subjective: Pt reports he has pain all over his body.       Objective: See treatment diary below      Assessment: Tolerated treatment fair. Verbal cues needed for correct form with exercises. ROM and strength deficits remain noted both knees. Fatigue evident with exercise. Patient would benefit from continued PT      Plan: Continue per plan of care.      Precautions: Fall risk    Re-eval Date: 24      Manuals    Jamie calf, hs, glute stretch  JK JK Declined EC   Jamie knee PROM Declined JK JK Declined EC           Neuro Re-Ed        Balance as appropriate                                Ther Ex    SP02  HR 97%  117 bpm   94%  107 bpm 93%  115 bpm 97%  123 bpm 94%  123 bpm   Nustep L2  10' L2  10'  L1 10' L1  10' L2    9.5'   Quad set 5\" x15 jamie 5\" x 15 jamie 5\"x20 jamie 5\" x10 jamie 5\" x15   Heel slide 1x15 jamie 1x15 jamie 1x20 jamie declined declined   LAQ 3\" x15 jamie 3\" x 15 jamie 3\"x20 jamie 3\" x15 jamie 3\" x15 jamie   HS curls Red x15 jamie Red x 15 Jamie Red x 20 jamie Red x10 jamie Red x15 jamie   SLR        Hip add  Declined 2* L medial knee pain.  Pt declined  5\"x 20 Seated   5\" x10 5\" x10   Bridges 3\" x10 3\"x 10    1x7   Clamshells        S/L hip abd        Standing hip flex/ext/abd Flex/abd  1x15 ea jamie  1HHA @ rail Flex/abd   1x15 ea Jamie  Flex/abd/ext   1x15 ea Jamie  Flex/abd x10 Flex/abd  1x15 ea jamie   Squat        Leg press        LF hip abd/add                Ther Activity        Step up/down        Sit to stands        Gait Training                        Modalities    CP    declined declined                                    "

## 2024-02-01 ENCOUNTER — OFFICE VISIT (OUTPATIENT)
Dept: PHYSICAL THERAPY | Facility: HOME HEALTHCARE | Age: 59
End: 2024-02-01
Payer: MEDICARE

## 2024-02-01 DIAGNOSIS — G89.29 BILATERAL CHRONIC KNEE PAIN: Primary | ICD-10-CM

## 2024-02-01 DIAGNOSIS — M25.561 BILATERAL CHRONIC KNEE PAIN: Primary | ICD-10-CM

## 2024-02-01 DIAGNOSIS — M25.562 BILATERAL CHRONIC KNEE PAIN: Primary | ICD-10-CM

## 2024-02-01 PROCEDURE — 97110 THERAPEUTIC EXERCISES: CPT

## 2024-02-01 PROCEDURE — 97140 MANUAL THERAPY 1/> REGIONS: CPT

## 2024-02-01 NOTE — PROGRESS NOTES
"Daily Note     Today's date: 2024  Patient name: Luis F Velazquez  : 1965  MRN: [id number]  Referring provider: Chuck Holman PA-C  Dx:   Encounter Diagnosis     ICD-10-CM    1. Bilateral chronic knee pain  M25.561     M25.562     G89.29           Start Time: 1430  Stop Time: 1510  Total time in clinic (min): 40 minutes    Subjective: Pt reports that his knees are no better, no worse.      Objective: See treatment diary below      Assessment: Pt demonstrated good tolerance to treatment session.  The pt does continue to have bilateral knee ROM deficits and hamstring tightness, but did make mild improvements today with PROM and stretching.  The pt would benefit from continued PT.      Plan: Continue per plan of care.      Precautions: Fall risk    Re-eval Date: 24      Manuals    Jamie calf, hs, glute stretch  JK JA Declined EC   Jamie knee PROM Declined JK JA Declined EC           Neuro Re-Ed        Balance as appropriate                                Ther Ex    SP02  HR 97%  117 bpm   94%  107 bpm 91%   115 bpm 97%  123 bpm 94%  123 bpm   Nustep L2  10' L2  10'  L2 10' L1  10' L2    9.5'   Quad set 5\" x15 jamie 5\" x 15 jamie 5\"x15 jamie 5\" x10 jamie 5\" x15   Heel slide 1x15 jamie 1x15 jamie 1x15 jamie declined declined   LAQ 3\" x15 jamie 3\" x 15 jamie 3\"x15 jamie 3\" x15 jamie 3\" x15 jamie   HS curls Red x15 jamie Red x 15 Jamie Red x15 jamie Red x10 jamie Red x15 jamie   SLR        Hip add  Declined 2* L medial knee pain.  Pt declined  Pt declined Seated   5\" x10 5\" x10   Bridges 3\" x10 3\"x 10  3\" x10  1x7   Clamshells        S/L hip abd        Standing hip flex/ext/abd Flex/abd  1x15 ea jamie  1HHA @ rail Flex/abd   1x15 ea Jamie  1x15 ea jamie Flex/abd x10 Flex/abd  1x15 ea jamie   Squat        Leg press        LF hip abd/add                Ther Activity        Step up/down        Sit to stands        Gait Training                        Modalities    CP    declined declined         "

## 2024-02-02 DIAGNOSIS — G62.9 NEUROPATHY: ICD-10-CM

## 2024-02-02 DIAGNOSIS — E78.5 DYSLIPIDEMIA: ICD-10-CM

## 2024-02-02 DIAGNOSIS — G89.4 CHRONIC PAIN SYNDROME: ICD-10-CM

## 2024-02-02 DIAGNOSIS — E11.22 TYPE 2 DIABETES MELLITUS WITH CHRONIC KIDNEY DISEASE, WITH LONG-TERM CURRENT USE OF INSULIN, UNSPECIFIED CKD STAGE (HCC): Chronic | ICD-10-CM

## 2024-02-02 DIAGNOSIS — Z79.4 TYPE 2 DIABETES MELLITUS WITH CHRONIC KIDNEY DISEASE, WITH LONG-TERM CURRENT USE OF INSULIN, UNSPECIFIED CKD STAGE (HCC): Chronic | ICD-10-CM

## 2024-02-02 RX ORDER — SEMAGLUTIDE 1.34 MG/ML
INJECTION, SOLUTION SUBCUTANEOUS
Qty: 3 ML | Refills: 0 | Status: SHIPPED | OUTPATIENT
Start: 2024-02-02

## 2024-02-05 RX ORDER — PREGABALIN 25 MG/1
25 CAPSULE ORAL 2 TIMES DAILY
Qty: 60 CAPSULE | Refills: 2 | Status: SHIPPED | OUTPATIENT
Start: 2024-02-05

## 2024-02-05 RX ORDER — ROSUVASTATIN CALCIUM 40 MG/1
40 TABLET, COATED ORAL DAILY
Qty: 90 TABLET | Refills: 3 | Status: SHIPPED | OUTPATIENT
Start: 2024-02-05

## 2024-02-06 ENCOUNTER — EVALUATION (OUTPATIENT)
Dept: PHYSICAL THERAPY | Facility: HOME HEALTHCARE | Age: 59
End: 2024-02-06
Payer: MEDICARE

## 2024-02-06 DIAGNOSIS — E03.9 HYPOTHYROIDISM, UNSPECIFIED TYPE: ICD-10-CM

## 2024-02-06 DIAGNOSIS — M25.561 BILATERAL CHRONIC KNEE PAIN: Primary | ICD-10-CM

## 2024-02-06 DIAGNOSIS — G89.29 BILATERAL CHRONIC KNEE PAIN: Primary | ICD-10-CM

## 2024-02-06 DIAGNOSIS — M25.562 BILATERAL CHRONIC KNEE PAIN: Primary | ICD-10-CM

## 2024-02-06 PROCEDURE — 97140 MANUAL THERAPY 1/> REGIONS: CPT

## 2024-02-06 PROCEDURE — 97110 THERAPEUTIC EXERCISES: CPT

## 2024-02-06 RX ORDER — LEVOTHYROXINE SODIUM 175 UG/1
TABLET ORAL
Qty: 90 TABLET | Refills: 1 | Status: SHIPPED | OUTPATIENT
Start: 2024-02-06

## 2024-02-06 NOTE — PROGRESS NOTES
PT Re-Evaluation     Today's date: 2024  Patient name: Luis F Velazquez  : 1965  MRN: [id number]  Referring provider: Chuck Holman PA-C  Dx:   Encounter Diagnosis     ICD-10-CM    1. Bilateral chronic knee pain  M25.561     M25.562     G89.29             Start Time: 1300  Stop Time: 1340  Total time in clinic (min): 40 minutes    Assessment  Assessment details: UPDATE:  The pt has made some improvements in strength, ROM, and functional mobility since beginning PT, however, he does continue to have significant bilateral knee pain, L>R.  The pt has met his short-term goals for HEP and strength, however, he is still progressing towards his goals for pain, ROM, and overall functional mobility.  Although improving, the pt does continue to have bilateral knee ROM deficits, with the L knee being most limited.  The pt would benefit from continued PT in order to further improve in pain, strength, ROM, functional mobility, quality of life, and return to PLOF.  Thank you.    Impairments: abnormal gait, abnormal or restricted ROM, abnormal movement, activity intolerance, impaired physical strength, lacks appropriate home exercise program, pain with function, weight-bearing intolerance and poor body mechanics  Understanding of Dx/Px/POC: good   Prognosis: good    Goals  STG: 3-4 weeks - progressing   Pt will be independent with HEP in order to continue to progress outside of PT treatment sessions. Met  Pt will improve in quality of life as demonstrated by worst pain levels of 8/10 or less.  Pt will improve in functional mobility as demonstrated by a 1/2 point or greater improvement in LE strength. Met  LT-8 weeks  Pt will improve in quality of life as demonstrated by worst pain levels of 5/10 or less.  Pt will improve in functional mobility as demonstrated by strength levels of 4/5 or greater.  Pt will improve in functional mobility as demonstrated by irving knee ROM WFL.  Pt will improve in functional mobility as  demonstrated by ability to perform full ADLs without any limitations due to pain, ROM deficits, or weakness.    Plan  Patient would benefit from: skilled physical therapy  Planned modality interventions: cryotherapy  Planned therapy interventions: body mechanics training, flexibility, functional ROM exercises, gait training, home exercise program, joint mobilization, manual therapy, neuromuscular re-education, patient education, postural training, strengthening, stretching, therapeutic activities and therapeutic exercise  Frequency: 2x week  Duration in weeks: 6  Plan of Care beginning date: 2024  Plan of Care expiration date: 3/19/2024  Treatment plan discussed with: patient        Subjective Evaluation    History of Present Illness  Mechanism of injury: UPDATE 2024:  The pt reports that he has more pain today in his L knee and in his tailbone.  The pt reports that he feels stronger from the physical therapy, but that his knee pain is about the same.    UPDATE: Pt reporting that the pain is constant but overall he does feel that therapy is helping and would like to continue.      Pt reports that he continues to have significant bilateral anterior/medial knee pain, L>R, which has been ongoing for over a year.  He reports that he feels a sharp pain in his L knee when getting up, sitting down, etc.  He reports that he has been getting gel shots in his knees, which have been helping a little for knee mobility/stiffness, but it has not been helping the pain.  He reports that he goes for another injection tomorrow.    Patient Goals  Patient goals for therapy: decreased pain, increased motion, increased strength, independence with ADLs/IADLs and return to sport/leisure activities  Patient goal: To get moving around  Pain  Current pain ratin  At best pain ratin  At worst pain ratin  Quality: dull ache and sharp (Stabbing)  Alleviating factors: Menthol rubs for short-term relief.  Aggravating factors:  standing, walking and stair climbing    Social Support  Steps to enter house: no  Stairs in house: yes (21)       Diagnostic Tests  X-ray: abnormal  Treatments  Previous treatment: injection treatment        Objective     Tenderness   Left Knee   Tenderness in the medial joint line.     Right Knee   Tenderness in the medial joint line.     Active Range of Motion   Left Knee   Flexion: 113 degrees with pain  Extension: -5 degrees with pain    Right Knee   Flexion: 116 degrees with pain  Extension: -2 degrees with pain    Passive Range of Motion   Left Hip   Flexion: 95 degrees     Right Hip   Flexion: 95 degrees   Left Knee   Flexion: 100 degrees with pain  Extension: -4 degrees with pain    Right Knee   Flexion: 105 degrees with pain  Extension: -2 degrees with pain    Strength/Myotome Testing     Left Hip   Planes of Motion   Flexion: 3+  Abduction: 3+  Adduction: 4+    Right Hip   Planes of Motion   Flexion: 4  Abduction: 3+  Adduction: 4+    Left Knee   Flexion: 4-  Extension: 4-    Right Knee   Flexion: 4  Extension: 4+    Left Ankle/Foot   Dorsiflexion: 4+  Plantar flexion: 4+    Right Ankle/Foot   Dorsiflexion: 4+  Plantar flexion: 4+    Tests     Left Hip   SLR: Positive.     Right Hip   SLR: Positive.     Additional Tests Details  SLR: L: 60, R: 65    Ambulation   Weight-Bearing Status   Weight-Bearing Status (Left): weight-bearing as tolerated   Weight-Bearing Status (Right): weight-bearing as tolerated      Ambulation: Level Surfaces   Ambulation without assistive device: independent    Ambulation: Stairs   Ascend stairs: contact guard assist  Pattern: non-reciprocal  Railings: one rail  Descend stairs: contact guard assist  Pattern: non-reciprocal  Railings: one rail  Curbs: independent    Observational Gait   Gait: antalgic   Decreased walking speed and stride length.         Manuals 1-23 1/30 2/1 2/6 1-11   Jamie calf, hs, glute stretch  JOSEK LETITIA CONTRERAS   Jamie knee PROM Declined SUZANNE CONTRERAS           Neuro  "Re-Ed        Balance as appropriate                                Ther Ex 1-23    1-11   SP02  HR 97%  117 bpm   94%  107 bpm 91%   115 bpm 92% 127 bpm 94%  123 bpm   Nustep L2  10' L2  10'  L2 10' L2 10' L2    9.5'   Quad set 5\" x15 jamie 5\" x 15 jamie 5\"x15 jamie NV 5\" x15   Heel slide 1x15 jamie 1x15 jamie 1x15 jamie NV declined   LAQ 3\" x15 jamie 3\" x 15 jamie 3\"x15 jamie 3\"x15 jamie 3\" x15 jamie   HS curls Red x15 jamie Red x 15 Jamie Red x15 jamie Red x25 Red x15 jamie   SLR        Hip add  Declined 2* L medial knee pain.  Pt declined  Pt declined  5\" x10   Bridges 3\" x10 3\"x 10  3\" x10 NV 1x7   Clamshells        S/L hip abd        Standing hip flex/ext/abd Flex/abd  1x15 ea jamie  1HHA @ rail Flex/abd   1x15 ea Jamie  1x15 ea jamie NV Flex/abd  1x15 ea jamie   Squat        Leg press        LF hip abd/add                Ther Activity        Step up/down        Sit to stands        Gait Training                        Modalities 1-23    1-11   CP     declined                "

## 2024-02-07 ENCOUNTER — OFFICE VISIT (OUTPATIENT)
Dept: UROLOGY | Facility: CLINIC | Age: 59
End: 2024-02-07
Payer: MEDICARE

## 2024-02-07 VITALS
BODY MASS INDEX: 46.83 KG/M2 | DIASTOLIC BLOOD PRESSURE: 74 MMHG | SYSTOLIC BLOOD PRESSURE: 112 MMHG | HEART RATE: 110 BPM | WEIGHT: 274.3 LBS | HEIGHT: 64 IN | OXYGEN SATURATION: 94 %

## 2024-02-07 DIAGNOSIS — N40.1 BENIGN PROSTATIC HYPERPLASIA WITH WEAK URINARY STREAM: Primary | ICD-10-CM

## 2024-02-07 DIAGNOSIS — Z12.5 PROSTATE CANCER SCREENING: ICD-10-CM

## 2024-02-07 DIAGNOSIS — R39.12 WEAK URINARY STREAM: ICD-10-CM

## 2024-02-07 DIAGNOSIS — R39.12 BENIGN PROSTATIC HYPERPLASIA WITH WEAK URINARY STREAM: Primary | ICD-10-CM

## 2024-02-07 PROCEDURE — 99213 OFFICE O/P EST LOW 20 MIN: CPT

## 2024-02-07 RX ORDER — TAMSULOSIN HYDROCHLORIDE 0.4 MG/1
0.4 CAPSULE ORAL
Qty: 90 CAPSULE | Refills: 3 | Status: SHIPPED | OUTPATIENT
Start: 2024-02-07

## 2024-02-07 NOTE — PROGRESS NOTES
2/7/2024    Chief Complaint   Patient presents with    Follow-up     3 month follow up weak stream: Flomax started states that this has been helping.    Prostate cancer screening.       Assessment and Plan    58 y.o. male     BPH  Doing much better since starting Flomax he will continue indefinitely  Continue improved glycemic control  Avoid bladder irritants.   Follow-up 1 year or sooner as needed    Prostate cancer screening  Update PSA ordered for tomorrow as he has repeat labs for his PCP tomorrow. I will call him with those results  Follow-up 1 year.       Subjective:      He present today reporting significant improvement in his lower urinary tract symptoms since the addition of Flomax. He report a much stronger urinary stream and does not have to strain to urinate. He is only getting up 1-2 times per night. Often times it may be from his sleep apnea and he will urinate because he is already awake. He feels Flomax has made a definite improvement.         History of Present Illness  Luis F Velazquez is a 58 y.o. male here for follow-up evaluation of weak urinary stream, difficulty emptying, and prostate cancer screening.    Recently established patient initially seen by me on 10/19/2023 for evaluation of the above.  He reported worsening straining with urination as well as postvoid dribbling, nocturia 2-3 times per night and weak urinary stream.  He also reported sensation of incomplete bladder emptying.  Random bladder scan was 12 mL with last voided about 45 minutes prior to arrival.  He has been drinking since.  He is diabetic and has poorly controlled diabetes with hemoglobin A1c of 11.5 as of 6/8/2023.  This has improved slightly and is currently 9.4 as of 9/11/2023.  He also has history of sleep apnea and started using his CPAP at night but still reported nocturia.  His symptoms were consistent with BPH and I recommended a trial of Flomax 0.4 mg daily.    Prostate cancer screening: Patient denies any family  "history of prostate cancer.  MARCIN was deferred during last office visit.  Last PSA was 0.4 as of February 2018.  He was recommended to have this updated at time of his last office visit but was never completed.                Review of Systems   Constitutional:  Negative for chills and fever.   HENT:  Negative for congestion and sore throat.    Respiratory:  Negative for cough and shortness of breath.    Cardiovascular:  Negative for chest pain and leg swelling.   Gastrointestinal:  Negative for abdominal pain, constipation and diarrhea.   Genitourinary:  Positive for frequency and urgency. Negative for difficulty urinating, dysuria and hematuria.   Musculoskeletal:  Negative for back pain and gait problem.   Skin:  Negative for wound.   Allergic/Immunologic: Negative for immunocompromised state.   Hematological:  Does not bruise/bleed easily.               Vitals  Vitals:    02/07/24 1407   BP: 112/74   Pulse: (!) 110   SpO2: 94%   Weight: 124 kg (274 lb 4.8 oz)   Height: 5' 4\" (1.626 m)       Physical Exam  Vitals reviewed.   Constitutional:       General: He is not in acute distress.     Appearance: Normal appearance. He is not ill-appearing or toxic-appearing.   HENT:      Head: Normocephalic and atraumatic.   Eyes:      General: No scleral icterus.     Conjunctiva/sclera: Conjunctivae normal.   Cardiovascular:      Rate and Rhythm: Normal rate.   Pulmonary:      Effort: Pulmonary effort is normal. No respiratory distress.   Abdominal:      Tenderness: There is no right CVA tenderness or left CVA tenderness.      Hernia: No hernia is present.   Musculoskeletal:      Cervical back: Normal range of motion.      Right lower leg: No edema.      Left lower leg: No edema.   Skin:     General: Skin is warm and dry.      Coloration: Skin is not jaundiced or pale.   Neurological:      General: No focal deficit present.      Mental Status: He is alert and oriented to person, place, and time. Mental status is at baseline. "      Gait: Gait normal.   Psychiatric:         Mood and Affect: Mood normal.         Behavior: Behavior normal.         Thought Content: Thought content normal.         Judgment: Judgment normal.         Past History  Past Medical History:   Diagnosis Date    Acquired hypothyroidism 2015    Angioedema     Diabetes mellitus (HCC)     Disease of thyroid gland     Hyperlipidemia     Hypertension     Mixed hyperlipidemia 2015    Morbid obesity (HCC)     Morbid obesity with BMI of 45.0-49.9, adult ([facility]) 2015    MARSHA (obstructive sleep apnea)     Primary hypertension 2015    Transitioned From: Benign essential hypertension     Social History     Socioeconomic History    Marital status: Single     Spouse name: None    Number of children: None    Years of education: None    Highest education level: None   Occupational History    None   Tobacco Use    Smoking status: Former     Current packs/day: 0.00     Types: Cigarettes     Quit date: 10/21/2022     Years since quittin.2    Smokeless tobacco: Never    Tobacco comments:     states read to quit prior to admit   Vaping Use    Vaping status: Never Used   Substance and Sexual Activity    Alcohol use: Not Currently     Alcohol/week: 3.0 standard drinks of alcohol     Types: 2 Cans of beer, 1 Shots of liquor per week     Comment: No alcohol since     Drug use: No    Sexual activity: Not Currently   Other Topics Concern    None   Social History Narrative    None     Social Determinants of Health     Financial Resource Strain: High Risk (11/15/2023)    Overall Financial Resource Strain (CARDIA)     Difficulty of Paying Living Expenses: Very hard   Food Insecurity: No Food Insecurity (2023)    Hunger Vital Sign     Worried About Running Out of Food in the Last Year: Never true     Ran Out of Food in the Last Year: Never true   Transportation Needs: No Transportation Needs (11/15/2023)    PRAPARE - Transportation     Lack of Transportation (Medical): No      Lack of Transportation (Non-Medical): No   Physical Activity: Not on file   Stress: Not on file   Social Connections: Not on file   Intimate Partner Violence: Not on file   Housing Stability: Low Risk  (2023)    Housing Stability Vital Sign     Unable to Pay for Housing in the Last Year: No     Number of Places Lived in the Last Year: 1     Unstable Housing in the Last Year: No     Social History     Tobacco Use   Smoking Status Former    Current packs/day: 0.00    Types: Cigarettes    Quit date: 10/21/2022    Years since quittin.2   Smokeless Tobacco Never   Tobacco Comments    states read to quit prior to admit     Family History   Problem Relation Age of Onset    Diabetes Mother     Diabetes type II Mother     Esophageal cancer Father     Diabetes Brother     Kidney cancer Brother     Diabetes type II Brother     Diabetes Maternal Grandmother     Diabetes type II Maternal Grandmother     Diabetes Paternal Grandmother     Heart disease Neg Hx        The following portions of the patient's history were reviewed and updated as appropriate allergies, current medications, past medical history, past social history, past surgical history and problem list    Imaging:    Results  No results found for this or any previous visit (from the past 1 hour(s)).]  Lab Results   Component Value Date    PSA 0.4 2018     Lab Results   Component Value Date    GLUCOSE 130 2015    CALCIUM 9.8 2023     2015    K 3.8 2023    CO2 30 2023    CL 90 (L) 2023    BUN 20 2023    CREATININE 1.10 2023     Lab Results   Component Value Date    WBC 11.53 (H) 2023    HGB 9.0 (L) 2023    HCT 30.7 (L) 2023    MCV 83 2023     (H) 2023       Please Note:  Voice dictation software has been used to create this document. There may be inadvertent transcriptions errors.     MARLEEN Mcfarlane 24

## 2024-02-08 ENCOUNTER — OFFICE VISIT (OUTPATIENT)
Dept: PHYSICAL THERAPY | Facility: HOME HEALTHCARE | Age: 59
End: 2024-02-08
Payer: MEDICARE

## 2024-02-08 DIAGNOSIS — M25.561 BILATERAL CHRONIC KNEE PAIN: Primary | ICD-10-CM

## 2024-02-08 DIAGNOSIS — M25.562 BILATERAL CHRONIC KNEE PAIN: Primary | ICD-10-CM

## 2024-02-08 DIAGNOSIS — G89.29 BILATERAL CHRONIC KNEE PAIN: Primary | ICD-10-CM

## 2024-02-08 PROCEDURE — 97110 THERAPEUTIC EXERCISES: CPT

## 2024-02-08 NOTE — PROGRESS NOTES
"Daily Note     Today's date: 2024  Patient name: Luis F Velazquez  : 1965  MRN: [id number]  Referring provider: Chuck Holman PA-C  Dx:   Encounter Diagnosis     ICD-10-CM    1. Bilateral chronic knee pain  M25.561     M25.562     G89.29           Start Time: 1303  Stop Time: 1341  Total time in clinic (min): 38 minutes    Subjective: Pt reports that he has pain all over and that his back is hurting.       Objective: See treatment diary below      Assessment: Pt demonstrated fair tolerance to treatment session, being able to progress with resistance exercises, but with visible signs of fatigue.  The pt did decline stretching and PROM today due to increased low back pain.  The pt would benefit from continued PT.      Plan: Continue per plan of care.      Precautions: Fall risk    Re-eval Date: 24      Manuals    Irving calf, hs, glute stretch  SUZANNE DONG Declined   perform NV   Irving knee PROM Declined SUZANNE DONG Declined   perform NV           Neuro Re-Ed        Balance as appropriate                                Ther Ex        SP02  HR 97%  117 bpm   94%  107 bpm 91%   115 bpm 92% 127 bpm 97% 131 bpm   Nustep L2  10' L2  10'  L2 10' L2 10' L2 10'   Quad set 5\" x15 irving 5\" x 15 irving 5\"x15 irving NV 5\"x15 irving   Heel slide 1x15 irving 1x15 irving 1x15 irving NV 5\"x15 irving   LAQ 3\" x15 irving 3\" x 15 irving 3\"x15 irving 3\"x15 irving 3\"x18   HS curls Red x15 irving Red x 15 Irving Red x15 irving Red x25 Red x20   SLR        Hip add  Declined 2* L medial knee pain.  Pt declined  Pt declined     Hip abd     Supine Green 5\" 1x15   Bridges 3\" x10 3\"x 10  3\" x10 NV 3\" x10   Clamshells        S/L hip abd        Standing hip flex/ext/abd Flex/abd  1x15 ea irving  1HHA @ rail Flex/abd   1x15 ea Irving  1x15 ea irving NV 1x15 ea irving   Squat        Leg press        LF hip abd/add                Ther Activity        Step up/down        Sit to stands        Gait Training                        Modalities 1-       CP              "

## 2024-02-09 ENCOUNTER — APPOINTMENT (OUTPATIENT)
Dept: LAB | Facility: HOSPITAL | Age: 59
End: 2024-02-09
Payer: MEDICARE

## 2024-02-09 ENCOUNTER — OFFICE VISIT (OUTPATIENT)
Dept: PAIN MEDICINE | Facility: CLINIC | Age: 59
End: 2024-02-09
Payer: MEDICARE

## 2024-02-09 VITALS
HEART RATE: 114 BPM | HEIGHT: 64 IN | SYSTOLIC BLOOD PRESSURE: 133 MMHG | WEIGHT: 274 LBS | DIASTOLIC BLOOD PRESSURE: 80 MMHG | BODY MASS INDEX: 46.78 KG/M2

## 2024-02-09 DIAGNOSIS — G89.4 CHRONIC PAIN SYNDROME: Primary | ICD-10-CM

## 2024-02-09 DIAGNOSIS — Z12.5 PROSTATE CANCER SCREENING: ICD-10-CM

## 2024-02-09 DIAGNOSIS — Z79.4 TYPE 2 DIABETES MELLITUS WITH STAGE 3 CHRONIC KIDNEY DISEASE, WITH LONG-TERM CURRENT USE OF INSULIN, UNSPECIFIED WHETHER STAGE 3A OR 3B CKD (HCC): ICD-10-CM

## 2024-02-09 DIAGNOSIS — G89.29 CHRONIC BILATERAL LOW BACK PAIN WITHOUT SCIATICA: ICD-10-CM

## 2024-02-09 DIAGNOSIS — M79.18 MYOFASCIAL PAIN SYNDROME: ICD-10-CM

## 2024-02-09 DIAGNOSIS — N18.30 TYPE 2 DIABETES MELLITUS WITH STAGE 3 CHRONIC KIDNEY DISEASE, WITH LONG-TERM CURRENT USE OF INSULIN, UNSPECIFIED WHETHER STAGE 3A OR 3B CKD (HCC): ICD-10-CM

## 2024-02-09 DIAGNOSIS — M54.50 CHRONIC BILATERAL LOW BACK PAIN WITHOUT SCIATICA: ICD-10-CM

## 2024-02-09 DIAGNOSIS — E11.22 TYPE 2 DIABETES MELLITUS WITH STAGE 3 CHRONIC KIDNEY DISEASE, WITH LONG-TERM CURRENT USE OF INSULIN, UNSPECIFIED WHETHER STAGE 3A OR 3B CKD (HCC): ICD-10-CM

## 2024-02-09 LAB
CHOLEST SERPL-MCNC: 162 MG/DL
HDLC SERPL-MCNC: 35 MG/DL
LDLC SERPL CALC-MCNC: 96 MG/DL (ref 0–100)
PSA SERPL-MCNC: 0.48 NG/ML (ref 0–4)
TRIGL SERPL-MCNC: 155 MG/DL

## 2024-02-09 PROCEDURE — G0103 PSA SCREENING: HCPCS

## 2024-02-09 PROCEDURE — 80061 LIPID PANEL: CPT

## 2024-02-09 PROCEDURE — 36415 COLL VENOUS BLD VENIPUNCTURE: CPT

## 2024-02-09 PROCEDURE — 99214 OFFICE O/P EST MOD 30 MIN: CPT | Performed by: STUDENT IN AN ORGANIZED HEALTH CARE EDUCATION/TRAINING PROGRAM

## 2024-02-09 NOTE — PROGRESS NOTES
"Assessment:  1. Chronic pain syndrome    2. Myofascial pain syndrome    3. Chronic bilateral low back pain without sciatica        Portions of the record may have been created with voice recognition software. Occasional wrong word or \"sound a like\" substitutions may have occurred due to the inherent limitations of voice recognition software. Read the chart carefully and recognize, using context, where substitutions have occurred. Contact me with any questions.       Plan:  58 y o m here for a follow up visit. Previously seen on 12/27/22 for bilateral knee pain symptoms. Today, he returns to discuss low back pain symptoms. Symptoms are axial, left > right sided. Denies radicular pain, paresthesias, new/progressive weakness, saddle anesthesia, bbi. Notes he has previously tried PT w/o improvement. Symptoms likely multifactorial, in the setting of overall deconditioning, myofascial pain, DISH.    Will schedule for ultrasound guided trigger point injections for symptom management.    Continue physical therapy/home exercise program.    Follow up in 1 month after injection.      Complete risks and benefits including bleeding, infection, tissue reaction, nerve injury and allergic reaction were discussed. The approach was demonstrated using models and literature was provided. Verbal and written consent was obtained.      History of Present Illness:  The patient is a 58 y.o. male who presents for a follow up office visit in regards to Back Pain.   Notes symptoms are gradually worse since last visit, w/o inciting event.    Current pain medications includes: duloxetine 60 mg daily, lyrica 25 mg bid (as prescribed by other provider).  The patient reports that this regimen is providing minimal pain relief.  The patient is reporting no side effects from this pain medication regimen.    I have personally reviewed and/or updated the patient's past medical history, past surgical history, family history, social history, current " medications, allergies, and vital signs today.         Review of Systems  Review of Systems   Constitutional:  Negative for unexpected weight change.   HENT:  Negative for hearing loss.    Eyes:  Negative for visual disturbance.   Respiratory:  Positive for shortness of breath.    Cardiovascular:  Negative for leg swelling.   Gastrointestinal:  Negative for constipation.   Endocrine: Negative for polyuria.   Genitourinary:  Negative for difficulty urinating.   Musculoskeletal:  Positive for gait problem. Negative for joint swelling and myalgias.        Decreased range of motion  Pain in extremity- hip and tail   Skin:  Negative for rash.   Neurological:  Positive for dizziness. Negative for weakness and headaches.   Psychiatric/Behavioral:  Negative for decreased concentration.    All other systems reviewed and are negative.        Past Medical History:   Diagnosis Date    Acquired hypothyroidism 2015    Angioedema     Diabetes mellitus (HCC)     Disease of thyroid gland     Hyperlipidemia     Hypertension     Mixed hyperlipidemia 2015    Morbid obesity (HCC)     Morbid obesity with BMI of 45.0-49.9, adult (HCC) 2015    MARSHA (obstructive sleep apnea)     Primary hypertension 2015    Transitioned From: Benign essential hypertension       Past Surgical History:   Procedure Laterality Date    KNEE SURGERY      SINUS SURGERY         Family History   Problem Relation Age of Onset    Diabetes Mother     Diabetes type II Mother     Esophageal cancer Father     Diabetes Brother     Kidney cancer Brother     Diabetes type II Brother     Diabetes Maternal Grandmother     Diabetes type II Maternal Grandmother     Diabetes Paternal Grandmother     Heart disease Neg Hx        Social History     Occupational History    Not on file   Tobacco Use    Smoking status: Former     Current packs/day: 0.00     Types: Cigarettes     Quit date: 10/21/2022     Years since quittin.3    Smokeless tobacco: Never    Tobacco  "comments:     states read to quit prior to admit   Vaping Use    Vaping status: Never Used   Substance and Sexual Activity    Alcohol use: Not Currently     Alcohol/week: 3.0 standard drinks of alcohol     Types: 2 Cans of beer, 1 Shots of liquor per week     Comment: No alcohol since 2015    Drug use: No    Sexual activity: Not Currently         Current Outpatient Medications:     albuterol (2.5 mg/3 mL) 0.083 % nebulizer solution, Take 3 mL (2.5 mg total) by nebulization every 6 (six) hours as needed for wheezing or shortness of breath, Disp: 180 mL, Rfl: 0    Ascorbic Acid (VITAMIN C PO), Take 1 tablet by mouth daily, Disp: , Rfl:     aspirin 81 mg chewable tablet, Take one tablet by mouth daily, Disp: , Rfl:     benzonatate (TESSALON PERLES) 100 mg capsule, Take 1 capsule (100 mg total) by mouth 3 (three) times a day as needed for cough, Disp: 20 capsule, Rfl: 2    cholecalciferol (VITAMIN D3) 400 units tablet, Take 1 tablet by mouth daily, Disp: , Rfl:     DULoxetine (CYMBALTA) 60 mg delayed release capsule, Take 1 capsule (60 mg total) by mouth daily, Disp: 90 capsule, Rfl: 1    EPINEPHrine (EPIPEN) 0.3 mg/0.3 mL SOAJ, INJECT 0.3MG INTO A MUSCLE ONCE FOR 1 DOSE, Disp: 2 each, Rfl: 0    ferrous gluconate (FERGON) 324 mg tablet, Take 324 mg by mouth, Disp: , Rfl:     gabapentin (NEURONTIN) 100 mg capsule, TAKE 1 CAPSULE BY MOUTH THREE TIMES DAILY, Disp: 90 capsule, Rfl: 5    HumaLOG KwikPen 100 units/mL injection pen, INJECT 20 UNITS UNDER THE SKIN THREE TIMES DAILY WITH MEALS (Patient taking differently: Inject 30 Units under the skin 3 (three) times a day with meals), Disp: 15 mL, Rfl: 0    hydrochlorothiazide (HYDRODIURIL) 25 mg tablet, Take 1 tablet by mouth twice daily, Disp: 60 tablet, Rfl: 2    Insulin Pen Needle (Pen Needles 3/16\") 31G X 5 MM MISC, Use 4 (four) times a day, Disp: 300 each, Rfl: 3    ipratropium (ATROVENT) 0.02 % nebulizer solution, Take 2.5 mL (0.5 mg total) by nebulization every 6 (six) " hours as needed for wheezing or shortness of breath, Disp: 150 mL, Rfl: 0    levothyroxine 175 mcg tablet, Take 1 tablet by mouth once daily, Disp: 90 tablet, Rfl: 1    loratadine (CLARITIN) 10 mg tablet, Take 10 mg by mouth daily, Disp: , Rfl:     metFORMIN (GLUCOPHAGE) 1000 MG tablet, Take 1 tablet (1,000 mg total) by mouth 2 (two) times a day, Disp: 180 tablet, Rfl: 1    Omega-3 Fatty Acids (fish oil) 1,000 mg, Take 1,000 mg by mouth 2 (two) times a day, Disp: , Rfl:     Ozempic, 1 MG/DOSE, 4 MG/3ML injection pen, INJECT 3/4 (THREE-FOURTHS ) ML  ONCE A WEEK, Disp: 3 mL, Rfl: 0    pregabalin (LYRICA) 25 mg capsule, Take 1 capsule by mouth twice daily, Disp: 60 capsule, Rfl: 2    rosuvastatin (CRESTOR) 40 MG tablet, Take 1 tablet by mouth once daily, Disp: 90 tablet, Rfl: 3    sodium chloride (OCEAN) 0.65 % nasal spray, 1 spray into each nostril as needed for rhinitis, Disp: 30 mL, Rfl: 1    tamsulosin (FLOMAX) 0.4 mg, Take 1 capsule (0.4 mg total) by mouth daily with dinner, Disp: 90 capsule, Rfl: 3    Thiamine HCl (vitamin B-1) 250 MG tablet, Take 250 mg by mouth daily, Disp: , Rfl:     traZODone (DESYREL) 150 mg tablet, Take 1 tablet (150 mg total) by mouth daily at bedtime, Disp: 90 tablet, Rfl: 1    valsartan (DIOVAN) 160 mg tablet, Take 160 mg by mouth daily, Disp: , Rfl:     valsartan (DIOVAN) 80 mg tablet, Take 80 mg by mouth daily, Disp: , Rfl:     Ventolin  (90 Base) MCG/ACT inhaler, INHALE 2 PUFFS BY MOUTH EVERY 4 HOURS AS NEEDED FOR WHEEZING FOR SHORTNESS OF BREATH, Disp: 18 g, Rfl: 0    vitamin E, tocopherol, 400 units capsule, Take 400 Units by mouth daily, Disp: , Rfl:     Insulin Glargine Solostar (Lantus SoloStar) 100 UNIT/ML SOPN, Inject 0.6 mL (60 Units total) under the skin daily (Patient taking differently: Inject 65 Units under the skin daily Takes at night), Disp: 18 mL, Rfl: 5    ipratropium-albuterol (DUO-NEB) 0.5-2.5 mg/3 mL nebulizer solution, Take 3 mL by nebulization every 6  "(six) hours as needed for wheezing or shortness of breath (Patient not taking: Reported on 1/4/2024), Disp: 180 mL, Rfl: 0    Allergies   Allergen Reactions    Ace Inhibitors Swelling     Angioedema    PT STATES THIS IS NOT AN ALLERGY    Other Anaphylaxis     Pt believes that he is allergic to peanut butter.    Also, seasonal allergies    Zinc Tongue Swelling     PT STATES THIS IS NOT AN ALLERGY    Clindamycin Edema     Had angioedema episode approx 5 hr after IM administration of clindamycin. Unclear if there is definitive causal relationship.  PT STATES THIS IS NOT AN ALLERGY       Physical Exam:    /80   Pulse (!) 114   Ht 5' 4\" (1.626 m)   Wt 124 kg (274 lb)   BMI 47.03 kg/m²     Constitutional:normal, well developed, well nourished, alert, in no distress and non-toxic and no overt pain behavior.  Eyes:anicteric  HEENT:grossly intact  Neck:supple, symmetric, trachea midline and no masses   Pulmonary:even and unlabored  Cardiovascular:No edema or pitting edema present  Skin:Normal without rashes or lesions and well hydrated  Psychiatric:Mood and affect appropriate  Neurologic:Cranial Nerves II-XII grossly intact  Musculoskeletal:normal gait, pain to palpation over L > R paraspinals musculature, limited lumbar ROM throughout    Imaging      LUMBAR SPINE     INDICATION:   M54.50: Low back pain, unspecified  G89.29: Other chronic pain.     COMPARISON:  None     VIEWS:  XR SPINE LUMBAR MINIMUM 4 VIEWS NON INJURY        FINDINGS:     There are 5 non rib bearing lumbar vertebral bodies.      There is no evidence of acute fracture or destructive osseous lesion.     Alignment is unremarkable.      Multilevel vertebral endplate spondylosis with relative preservation of disc spaces, consistent with diffuse idiopathic skeletal hyperostosis (DISH).       The pedicles appear intact.     There are atherosclerotic calcifications. Soft tissues are otherwise unremarkable.     IMPRESSION:     Findings compatible with " DISH.

## 2024-02-12 ENCOUNTER — PROCEDURE VISIT (OUTPATIENT)
Dept: PAIN MEDICINE | Facility: CLINIC | Age: 59
End: 2024-02-12
Payer: MEDICARE

## 2024-02-12 VITALS
DIASTOLIC BLOOD PRESSURE: 83 MMHG | BODY MASS INDEX: 46.81 KG/M2 | WEIGHT: 274.2 LBS | HEART RATE: 108 BPM | HEIGHT: 64 IN | SYSTOLIC BLOOD PRESSURE: 147 MMHG

## 2024-02-12 DIAGNOSIS — M79.18 MYOFASCIAL PAIN SYNDROME: Primary | ICD-10-CM

## 2024-02-12 PROCEDURE — 76942 ECHO GUIDE FOR BIOPSY: CPT | Performed by: STUDENT IN AN ORGANIZED HEALTH CARE EDUCATION/TRAINING PROGRAM

## 2024-02-12 PROCEDURE — 20553 NJX 1/MLT TRIGGER POINTS 3/>: CPT | Performed by: STUDENT IN AN ORGANIZED HEALTH CARE EDUCATION/TRAINING PROGRAM

## 2024-02-12 NOTE — PATIENT INSTRUCTIONS
Do not apply heat to any area that is numb. If you have discomfort or soreness at the injection site, you may apply ice today, 20 minutes on and 20 minutes off. Tomorrow you may use ice or warm, moist heat. Do not apply ice or heat directly to the skin.  If you experience severe shortness of breath, go to the Emergency Room.  You may have numbness for several hours from the local anesthetic. Please use caution and common sense, especially with weight-bearing activities.  You may have an increase or change in the discomfort for 36-48 hours after your treatment. Apply ice and continue with any pain medicine you have been prescribed.  Do not do anything strenuous today. You may shower, but no tub baths or hot tubs today. You may resume your normal activities tomorrow, but do not “overdo it”. Resume normal activities slowly when you are feeling better.  If you experience redness, drainage or swelling at the injection site, or if you develop a fever above 100 degrees, please call [facility] at [phone] or go to the Emergency Room.  Continue to take all routine medicines prescribed by your primary care physician unless otherwise instructed by our staff. Most blood thinners should be started again according to your regularly scheduled dosing. If you have any questions, please give our office a call.    As no general anesthesia was used in today's procedure, you should not experience any side effects related to anesthesia.       If you have a problem specifically related to your procedure, please call our office at [phone].  Problems not related to your procedure should be directed to your primary care physician.

## 2024-02-12 NOTE — PROGRESS NOTES
" Universal Protocol:  Consent: Verbal consent obtained. Written consent obtained.  Risks and benefits: risks, benefits and alternatives were discussed  Consent given by: patient  Time out: Immediately prior to procedure a \"time out\" was called to verify the correct patient, procedure, equipment, support staff and site/side marked as required.  Patient understanding: patient states understanding of the procedure being performed  Patient consent: the patient's understanding of the procedure matches consent given  Procedure consent: procedure consent matches procedure scheduled  Site marked: the operative site was marked  Patient identity confirmed: verbally with patient  Supporting Documentation  Indications: pain   Trigger Point Injections: multiple trigger points: 3 or more muscle groups    Injection site identified by: ultrasound  Procedure Details  Location(s):    Middle Back: R thoracic paraspinals     Lower Back: R lumbar paraspinals and R iliocostalis lumborum     Prep: patient was prepped and draped in usual sterile fashion  Needle size: 25 G  Patient tolerance: patient tolerated the procedure well with no immediate complications        Meds used:    7 mL of 0.25% Bupivacaine NDC 5235-6031-02, Exp: 05/01/25, Lot: [id number]  "

## 2024-02-14 ENCOUNTER — RA CDI HCC (OUTPATIENT)
Dept: OTHER | Facility: HOSPITAL | Age: 59
End: 2024-02-14

## 2024-02-14 NOTE — PROGRESS NOTES
E11.51  Z79.4  HCC coding opportunities          Chart Reviewed number of suggestions sent to Provider: 2     Patients Insurance     Medicare Insurance: Medicare          
Attending with

## 2024-02-15 ENCOUNTER — OFFICE VISIT (OUTPATIENT)
Dept: PHYSICAL THERAPY | Facility: HOME HEALTHCARE | Age: 59
End: 2024-02-15
Payer: MEDICARE

## 2024-02-15 ENCOUNTER — OFFICE VISIT (OUTPATIENT)
Dept: FAMILY MEDICINE CLINIC | Facility: HOME HEALTHCARE | Age: 59
End: 2024-02-15
Payer: MEDICARE

## 2024-02-15 VITALS
TEMPERATURE: 98.3 F | HEIGHT: 64 IN | HEART RATE: 103 BPM | BODY MASS INDEX: 47.77 KG/M2 | RESPIRATION RATE: 18 BRPM | OXYGEN SATURATION: 97 % | SYSTOLIC BLOOD PRESSURE: 144 MMHG | DIASTOLIC BLOOD PRESSURE: 72 MMHG | WEIGHT: 279.8 LBS

## 2024-02-15 DIAGNOSIS — I10 ESSENTIAL (PRIMARY) HYPERTENSION: Primary | ICD-10-CM

## 2024-02-15 DIAGNOSIS — M25.562 BILATERAL CHRONIC KNEE PAIN: Primary | ICD-10-CM

## 2024-02-15 DIAGNOSIS — J96.11 CHRONIC RESPIRATORY FAILURE WITH HYPOXIA, ON HOME OXYGEN THERAPY: ICD-10-CM

## 2024-02-15 DIAGNOSIS — G89.29 BILATERAL CHRONIC KNEE PAIN: Primary | ICD-10-CM

## 2024-02-15 DIAGNOSIS — Z79.4 TYPE 2 DIABETES MELLITUS WITH COMPLICATION, WITH LONG-TERM CURRENT USE OF INSULIN (HCC): ICD-10-CM

## 2024-02-15 DIAGNOSIS — E11.8 TYPE 2 DIABETES MELLITUS WITH COMPLICATION, WITH LONG-TERM CURRENT USE OF INSULIN (HCC): ICD-10-CM

## 2024-02-15 DIAGNOSIS — Z99.81 CHRONIC RESPIRATORY FAILURE WITH HYPOXIA, ON HOME OXYGEN THERAPY: ICD-10-CM

## 2024-02-15 DIAGNOSIS — E66.01 MORBID OBESITY WITH BMI OF 45.0-49.9, ADULT (HCC): Chronic | ICD-10-CM

## 2024-02-15 DIAGNOSIS — M25.561 BILATERAL CHRONIC KNEE PAIN: Primary | ICD-10-CM

## 2024-02-15 PROCEDURE — 97110 THERAPEUTIC EXERCISES: CPT

## 2024-02-15 PROCEDURE — 97140 MANUAL THERAPY 1/> REGIONS: CPT

## 2024-02-15 PROCEDURE — 99214 OFFICE O/P EST MOD 30 MIN: CPT | Performed by: PHYSICIAN ASSISTANT

## 2024-02-15 NOTE — PROGRESS NOTES
"Daily Note     Today's date: 2/15/2024  Patient name: Luis F Velazquez  : 1965  MRN: [id number]  Referring provider: Chuck Holman PA-C  Dx:   Encounter Diagnosis     ICD-10-CM    1. Bilateral chronic knee pain  M25.561     M25.562     G89.29                  Subjective: Pt reports he has 6/10 pain R knee and 7/10 L knee today.       Objective: See treatment diary below      Assessment: Tolerated treatment fair. Bilateral knee ROM deficits evident. Fatigue noted with exercise. Pt was able  to tolerate PROM and stretching today. Patient would benefit from continued PT      Plan: Continue per plan of care.      Precautions: Fall risk    Re-eval Date: 24      Manuals 2/15 1/30 2/1 2/6 2/8   Irving calf, hs, glute stretch SUZANNE DONG Declined   perform NV   Irving knee PROM SUZANNE DONG Declined   perform NV           Neuro Re-Ed        Balance as appropriate                                Ther Ex        SP02  HR 93%  119 bpm   94%  107 bpm 91%   115 bpm 92% 127 bpm 97% 131 bpm   Nustep L2  10' L2  10'  L2 10' L2 10' L2 10'   Quad set 5\" x15 irving 5\" x 15 irving 5\"x15 irving NV 5\"x15 irving   Heel slide 1x15 irving 1x15 irving 1x15 irving NV 5\"x15 irving   LAQ 3\" x15 irving 3\" x 15 irving 3\"x15 irving 3\"x15 irving 3\"x18   HS curls Red x 20 Red x 15 Irving Red x15 irving Red x25 Red x20   SLR        Hip add   Pt declined  Pt declined     Hip abd Supine Green 5\" 1x15     Supine Green 5\" 1x15   Bridges 3\" x10 3\"x 10  3\" x10 NV 3\" x10   Clamshells        S/L hip abd        Standing hip flex/ext/abd 1x15 ea Irving Flex/abd   1x15 ea Irving  1x15 ea irving NV 1x15 ea irving   Squat        Leg press        LF hip abd/add                Ther Activity        Step up/down        Sit to stands        Gait Training                        Modalities        CP                                               "

## 2024-02-16 ENCOUNTER — OFFICE VISIT (OUTPATIENT)
Dept: ENDOCRINOLOGY | Facility: CLINIC | Age: 59
End: 2024-02-16
Payer: MEDICARE

## 2024-02-16 VITALS
HEIGHT: 64 IN | SYSTOLIC BLOOD PRESSURE: 138 MMHG | OXYGEN SATURATION: 94 % | WEIGHT: 273 LBS | TEMPERATURE: 98 F | BODY MASS INDEX: 46.61 KG/M2 | HEART RATE: 98 BPM | DIASTOLIC BLOOD PRESSURE: 70 MMHG

## 2024-02-16 DIAGNOSIS — Z79.4 TYPE 2 DIABETES MELLITUS WITH HYPERGLYCEMIA, WITH LONG-TERM CURRENT USE OF INSULIN (HCC): Primary | ICD-10-CM

## 2024-02-16 DIAGNOSIS — N18.30 TYPE 2 DIABETES MELLITUS WITH STAGE 3 CHRONIC KIDNEY DISEASE, WITH LONG-TERM CURRENT USE OF INSULIN, UNSPECIFIED WHETHER STAGE 3A OR 3B CKD (HCC): ICD-10-CM

## 2024-02-16 DIAGNOSIS — E03.9 ACQUIRED HYPOTHYROIDISM: Chronic | ICD-10-CM

## 2024-02-16 DIAGNOSIS — E11.65 TYPE 2 DIABETES MELLITUS WITH HYPERGLYCEMIA, WITH LONG-TERM CURRENT USE OF INSULIN (HCC): Primary | ICD-10-CM

## 2024-02-16 DIAGNOSIS — E11.22 TYPE 2 DIABETES MELLITUS WITH STAGE 3 CHRONIC KIDNEY DISEASE, WITH LONG-TERM CURRENT USE OF INSULIN, UNSPECIFIED WHETHER STAGE 3A OR 3B CKD (HCC): ICD-10-CM

## 2024-02-16 DIAGNOSIS — J44.9 OSA AND COPD OVERLAP SYNDROME (HCC): ICD-10-CM

## 2024-02-16 DIAGNOSIS — J44.1 COPD WITH ACUTE EXACERBATION (HCC): ICD-10-CM

## 2024-02-16 DIAGNOSIS — Z79.4 TYPE 2 DIABETES MELLITUS WITH STAGE 3 CHRONIC KIDNEY DISEASE, WITH LONG-TERM CURRENT USE OF INSULIN, UNSPECIFIED WHETHER STAGE 3A OR 3B CKD (HCC): ICD-10-CM

## 2024-02-16 DIAGNOSIS — E11.42 DIABETIC POLYNEUROPATHY ASSOCIATED WITH TYPE 2 DIABETES MELLITUS (HCC): ICD-10-CM

## 2024-02-16 DIAGNOSIS — G47.33 OSA AND COPD OVERLAP SYNDROME (HCC): ICD-10-CM

## 2024-02-16 DIAGNOSIS — E11.21 DIABETIC NEPHROPATHY ASSOCIATED WITH TYPE 2 DIABETES MELLITUS (HCC): ICD-10-CM

## 2024-02-16 PROCEDURE — 95251 CONT GLUC MNTR ANALYSIS I&R: CPT | Performed by: NURSE PRACTITIONER

## 2024-02-16 PROCEDURE — 99214 OFFICE O/P EST MOD 30 MIN: CPT | Performed by: NURSE PRACTITIONER

## 2024-02-16 RX ORDER — INSULIN GLARGINE 100 [IU]/ML
24 INJECTION, SOLUTION SUBCUTANEOUS DAILY
Qty: 30 ML | Refills: 1 | Status: SHIPPED | OUTPATIENT
Start: 2024-02-16 | End: 2024-08-14

## 2024-02-16 NOTE — ASSESSMENT & PLAN NOTE
Cymbalta.  Continue gabapentin.  Diabetic foot exam completed today.  Lab Results   Component Value Date    HGBA1C 7.5 (H) 02/09/2024

## 2024-02-16 NOTE — PROGRESS NOTES
Established Patient Progress Note    Chief Complaint:  Diabetes follow up visit    Impression & Plan:    Problem List Items Addressed This Visit       COPD with acute exacerbation (HCC)     Continues to have dyspnea on exertion.  Wheezes auscultated on exam today.  Continue with maintenance and as needed inhalers.  Patient is now 1 year status post smoking cessation.  Congratulated him on his progress.         Acquired hypothyroidism (Chronic)     Clinically and biochemically euthyroid.  Continue 175 mcg of levothyroxine daily.  Repeat thyroid function test prior to next appointment.         Diabetic polyneuropathy associated with type 2 diabetes mellitus (HCC)     Cymbalta.  Continue gabapentin.  Diabetic foot exam completed today.  Lab Results   Component Value Date    HGBA1C 7.5 (H) 02/09/2024            Relevant Medications    Insulin Glargine Solostar (Lantus SoloStar) 100 UNIT/ML SOPN    semaglutide, 2 mg/dose, (Ozempic, 2 MG/DOSE,) 8 mg/ mL injection pen    MARSHA and COPD overlap syndrome (HCC)     Continue BiPAP.         Type 2 diabetes mellitus with hyperglycemia, with long-term current use of insulin ([facility]) - Primary     Patient's control has improved significantly.  He has reduced his insulin needs by 50%.  Recommend increasing Ozempic to 2 mg once weekly.  Reduce Lantus to 24 units nightly.  Recommend continuing Humalog for now with a range of 10 to 20 units 3 times daily prior to meals.  However, patient may not need any Humalog if he is tolerating the 2 mg of Ozempic well.  He is to notify me with episodes of hypoglycemia so that we can further reduce his insulin if warranted.  Okay to reduce metformin to 1000 mg daily to see if symptoms of fatigue improve.  Follow-up in 5 to 6 months.  Lab Results   Component Value Date    HGBA1C 7.5 (H) 02/09/2024            Relevant Medications    Insulin Glargine Solostar (Lantus SoloStar) 100 UNIT/ML SOPN    semaglutide, 2 mg/dose, (Ozempic, 2 MG/DOSE,) 8 mg/ mL  injection pen    Other Relevant Orders    Hemoglobin A1C    Basic metabolic panel    TSH, 3rd generation with Free T4 reflex    Diabetic nephropathy associated with type 2 diabetes mellitus (HCC)     Microalbuminuria has improved.  SGLT2's are cost prohibitive.  Continue valsartan.  Lab Results   Component Value Date    HGBA1C 7.5 (H) 02/09/2024            Relevant Medications    Insulin Glargine Solostar (Lantus SoloStar) 100 UNIT/ML SOPN    semaglutide, 2 mg/dose, (Ozempic, 2 MG/DOSE,) 8 mg/ mL injection pen       History of Present Illness:   Luis F Velazquez is a 58 y.o. male with Type 2 diabetes and hypothyroidism presenting to the office today for routine follow up.      Hemoglobin A1C   Latest Ref Rng Normal 4.0-5.6%; PreDiabetic 5.7-6.4%; Diabetic >=6.5%; Glycemic control for adults with diabetes <7.0% %   6/8/2023 11.5 (H) (E)   9/11/2023 9.4 !    2/9/2024 7.5 (H)       eAG, EST AVG Glucose   Latest Ref Rng mg/dl   6/8/2023 283 (E)   9/11/2023 2/9/2024 169       Legend:  (H) High  ! Abnormal  (E) External lab result     Current regimen:     Lantus 65 units nightly (30 units)  Humalog 30 units three times daily before meals (20 units)  Metformin 1,000 mg BID  Ozempic 1 mg once weekly    Had been prescribed jardiance but this was cost prohibitive    Luis F Velazquez   Device used style matthew 3  Home use     Indication   Type 2 Diabetes    More than 72 hours of data was reviewed. Report to be scanned to chart.     Date Range: February 3, 2024-February 16, 2024    Analysis of data:   Average Glucose: 134 mg/dL  Coefficient of Variation: 26.3%  Glucose management indicator: 6.5%  Time in Target Range: 89%  Time Above Range: 8% 181 to 250 mg/dL; 1% greater than 250 mg/dL  Time Below Range: 2% 54 to 69 mg/dL; 0% less than 54 mg/dL    Interpretation of data:     Patient's control has improved drastically.     For hypothyroidism, he is taking 175 mcg of levothyroxine daily. Thyroid function is stable.     Patient  Active Problem List   Diagnosis    Allergic rhinitis    Arthritis    Chronic back pain    Chronic sinusitis    COPD with acute exacerbation (HCC)    Depression with anxiety    Essential (primary) hypertension    Acquired hypothyroidism    Morbid obesity with BMI of 45.0-49.9, adult (HCC)    Diabetic polyneuropathy associated with type 2 diabetes mellitus ([facility])    Vitamin D deficiency    Bilateral lower leg cellulitis    Elevated LFTs    Elevated lactic acid level    Tobacco abuse, in remission    Noncompliance with diabetes treatment    Acute respiratory failure with hypoxia and hypercapnia ([facility])    Epistaxis    MARSHA and COPD overlap syndrome ([facility])    Muscle twitching    Type 2 diabetes mellitus with hyperglycemia, with long-term current use of insulin (HCC)    Tobacco use    Atelectasis    Urinary retention    Constipation    Depression, recurrent (HCC)    Peripheral vascular disease ([facility])    Primary osteoarthritis of both knees    Hyperlipidemia    Pleural effusion, left    Bright red rectal bleeding    ABLA (acute blood loss anemia)    Microcytic anemia    Fatty liver disease, nonalcoholic    Umbilical hernia without obstruction and without gangrene    Restrictive lung disease    Diabetic nephropathy associated with type 2 diabetes mellitus ([facility])    Proteinuria    Chronic kidney disease, stage 2 (mild)    Neuropathy    Chronic respiratory failure with hypoxia, on home oxygen therapy     Urinary hesitancy due to benign prostatic hyperplasia    Hypo-osmolality and hyponatremia    LAMB (dyspnea on exertion)    MARSHA treated with BiPAP      Past Medical History:   Diagnosis Date    Acquired hypothyroidism 1/2/2015    Angioedema     Diabetes mellitus (HCC)     Disease of thyroid gland     Hyperlipidemia     Hypertension     Mixed hyperlipidemia 2/4/2015    Morbid obesity (HCC)     Morbid obesity with BMI of 45.0-49.9, adult ([facility]) 1/2/2015    MARSHA (obstructive sleep apnea)     Primary hypertension 1/2/2015    Transitioned  From: Benign essential hypertension      Past Surgical History:   Procedure Laterality Date    KNEE SURGERY      SINUS SURGERY        Family History   Problem Relation Age of Onset    Diabetes Mother     Diabetes type II Mother     Esophageal cancer Father     Diabetes Brother     Kidney cancer Brother     Diabetes type II Brother     Diabetes Maternal Grandmother     Diabetes type II Maternal Grandmother     Diabetes Paternal Grandmother     Heart disease Neg Hx      Social History     Tobacco Use    Smoking status: Former     Current packs/day: 0.00     Types: Cigarettes     Quit date: 10/21/2022     Years since quittin.3    Smokeless tobacco: Never    Tobacco comments:     states read to quit prior to admit   Substance Use Topics    Alcohol use: Not Currently     Alcohol/week: 3.0 standard drinks of alcohol     Types: 2 Cans of beer, 1 Shots of liquor per week     Comment: No alcohol since      Allergies   Allergen Reactions    Ace Inhibitors Swelling     Angioedema    PT STATES THIS IS NOT AN ALLERGY    Other Anaphylaxis     Pt believes that he is allergic to peanut butter.    Also, seasonal allergies    Zinc Tongue Swelling     PT STATES THIS IS NOT AN ALLERGY    Clindamycin Edema     Had angioedema episode approx 5 hr after IM administration of clindamycin. Unclear if there is definitive causal relationship.  PT STATES THIS IS NOT AN ALLERGY         Current Outpatient Medications:     benzonatate (TESSALON PERLES) 100 mg capsule, Take 1 capsule (100 mg total) by mouth 3 (three) times a day as needed for cough, Disp: 20 capsule, Rfl: 2    cholecalciferol (VITAMIN D3) 400 units tablet, Take 1 tablet by mouth daily, Disp: , Rfl:     DULoxetine (CYMBALTA) 60 mg delayed release capsule, Take 1 capsule (60 mg total) by mouth daily, Disp: 90 capsule, Rfl: 1    EPINEPHrine (EPIPEN) 0.3 mg/0.3 mL SOAJ, INJECT 0.3MG INTO A MUSCLE ONCE FOR 1 DOSE, Disp: 2 each, Rfl: 0    ferrous gluconate (FERGON) 324 mg tablet,  "Take 324 mg by mouth, Disp: , Rfl:     gabapentin (NEURONTIN) 100 mg capsule, TAKE 1 CAPSULE BY MOUTH THREE TIMES DAILY, Disp: 90 capsule, Rfl: 5    HumaLOG KwikPen 100 units/mL injection pen, INJECT 20 UNITS UNDER THE SKIN THREE TIMES DAILY WITH MEALS (Patient taking differently: Inject 20 Units under the skin 3 (three) times a day with meals), Disp: 15 mL, Rfl: 0    hydrochlorothiazide (HYDRODIURIL) 25 mg tablet, Take 1 tablet by mouth twice daily, Disp: 60 tablet, Rfl: 2    Insulin Glargine Solostar (Lantus SoloStar) 100 UNIT/ML SOPN, Inject 0.24 mL (24 Units total) under the skin daily, Disp: 30 mL, Rfl: 1    Insulin Pen Needle (Pen Needles 3/16\") 31G X 5 MM MISC, Use 4 (four) times a day, Disp: 300 each, Rfl: 3    levothyroxine 175 mcg tablet, Take 1 tablet by mouth once daily, Disp: 90 tablet, Rfl: 1    loratadine (CLARITIN) 10 mg tablet, Take 10 mg by mouth daily, Disp: , Rfl:     metFORMIN (GLUCOPHAGE) 1000 MG tablet, Take 1 tablet (1,000 mg total) by mouth 2 (two) times a day, Disp: 180 tablet, Rfl: 1    Omega-3 Fatty Acids (fish oil) 1,000 mg, Take 1,000 mg by mouth 2 (two) times a day, Disp: , Rfl:     pregabalin (LYRICA) 25 mg capsule, Take 1 capsule by mouth twice daily, Disp: 60 capsule, Rfl: 2    rosuvastatin (CRESTOR) 40 MG tablet, Take 1 tablet by mouth once daily, Disp: 90 tablet, Rfl: 3    semaglutide, 2 mg/dose, (Ozempic, 2 MG/DOSE,) 8 mg/ mL injection pen, Inject 0.75 mL (2 mg total) under the skin every 7 days, Disp: 3 mL, Rfl: 11    sodium chloride (OCEAN) 0.65 % nasal spray, 1 spray into each nostril as needed for rhinitis, Disp: 30 mL, Rfl: 1    tamsulosin (FLOMAX) 0.4 mg, Take 1 capsule (0.4 mg total) by mouth daily with dinner, Disp: 90 capsule, Rfl: 3    Thiamine HCl (vitamin B-1) 250 MG tablet, Take 250 mg by mouth daily, Disp: , Rfl:     traZODone (DESYREL) 150 mg tablet, Take 1 tablet (150 mg total) by mouth daily at bedtime, Disp: 90 tablet, Rfl: 1    valsartan (DIOVAN) 160 mg tablet, " Take 160 mg by mouth daily, Disp: , Rfl:     Ventolin  (90 Base) MCG/ACT inhaler, INHALE 2 PUFFS BY MOUTH EVERY 4 HOURS AS NEEDED FOR WHEEZING FOR SHORTNESS OF BREATH, Disp: 18 g, Rfl: 0    vitamin E, tocopherol, 400 units capsule, Take 400 Units by mouth daily, Disp: , Rfl:     albuterol (2.5 mg/3 mL) 0.083 % nebulizer solution, Take 3 mL (2.5 mg total) by nebulization every 6 (six) hours as needed for wheezing or shortness of breath, Disp: 180 mL, Rfl: 0    Ascorbic Acid (VITAMIN C PO), Take 1 tablet by mouth daily, Disp: , Rfl:     aspirin 81 mg chewable tablet, Take one tablet by mouth daily, Disp: , Rfl:     ipratropium (ATROVENT) 0.02 % nebulizer solution, Take 2.5 mL (0.5 mg total) by nebulization every 6 (six) hours as needed for wheezing or shortness of breath (Patient not taking: Reported on 2/16/2024), Disp: 150 mL, Rfl: 0    ipratropium-albuterol (DUO-NEB) 0.5-2.5 mg/3 mL nebulizer solution, Take 3 mL by nebulization every 6 (six) hours as needed for wheezing or shortness of breath (Patient not taking: Reported on 2/16/2024), Disp: 180 mL, Rfl: 0    Review of Systems   Constitutional:  Positive for fatigue. Negative for activity change, appetite change and unexpected weight change.   HENT:  Negative for dental problem, sore throat, trouble swallowing and voice change.    Eyes:  Negative for visual disturbance.   Respiratory:  Positive for shortness of breath. Negative for cough and chest tightness.    Cardiovascular:  Negative for chest pain, palpitations and leg swelling.   Gastrointestinal:  Negative for constipation, diarrhea, nausea and vomiting.   Endocrine: Negative for cold intolerance, heat intolerance, polydipsia, polyphagia and polyuria.   Genitourinary:  Negative for frequency.   Musculoskeletal:  Positive for arthralgias, back pain and gait problem. Negative for myalgias.   Skin:  Negative for wound.   Allergic/Immunologic: Positive for environmental allergies. Negative for food  "allergies.   Neurological:  Positive for numbness. Negative for dizziness, weakness, light-headedness and headaches.   Psychiatric/Behavioral:  Negative for decreased concentration, dysphoric mood and sleep disturbance. The patient is not nervous/anxious.        Physical Exam:  Body mass index is 46.86 kg/m².  /70   Pulse 98   Temp 98 °F (36.7 °C)   Ht 5' 4\" (1.626 m)   Wt 124 kg (273 lb)   SpO2 94%   BMI 46.86 kg/m²    Wt Readings from Last 3 Encounters:   02/16/24 124 kg (273 lb)   02/15/24 127 kg (279 lb 12.8 oz)   02/12/24 124 kg (274 lb 3.2 oz)       Physical Exam  Vitals reviewed.   Constitutional:       General: He is not in acute distress.     Appearance: He is well-developed. He is obese. He is not ill-appearing.   HENT:      Head: Normocephalic and atraumatic.   Eyes:      Pupils: Pupils are equal, round, and reactive to light.   Neck:      Thyroid: No thyromegaly.   Cardiovascular:      Rate and Rhythm: Normal rate and regular rhythm.      Pulses: no weak pulses.           Dorsalis pedis pulses are 1+ on the right side and 1+ on the left side.        Posterior tibial pulses are 2+ on the right side and 2+ on the left side.      Heart sounds: Normal heart sounds.   Pulmonary:      Breath sounds: Wheezing and rhonchi present.   Abdominal:      General: Bowel sounds are normal. There is no distension.      Palpations: Abdomen is soft.      Tenderness: There is no abdominal tenderness.   Musculoskeletal:      Cervical back: Normal range of motion and neck supple.      Right lower leg: No edema.      Left lower leg: No edema.        Feet:    Feet:      Right foot:      Skin integrity: No ulcer, skin breakdown, erythema, warmth, callus or dry skin.      Left foot:      Skin integrity: No ulcer, skin breakdown, erythema, warmth, callus or dry skin.   Lymphadenopathy:      Cervical: No cervical adenopathy.   Skin:     General: Skin is warm and dry.      Capillary Refill: Capillary refill takes less " than 2 seconds.   Neurological:      Mental Status: He is alert and oriented to person, place, and time.      Gait: Gait normal.   Psychiatric:         Mood and Affect: Mood normal.         Behavior: Behavior normal.       Patient's shoes and socks removed.    Right Foot/Ankle   Right Foot Inspection  Skin Exam: skin normal and skin intact. No dry skin, no warmth, no callus, no erythema, no maceration, no abnormal color, no pre-ulcer, no ulcer and no callus.     Toe Exam: ROM and strength within normal limits.     Sensory   Vibration: diminished  Proprioception: intact  Monofilament testing: diminished    Vascular  Capillary refills: < 3 seconds  The right DP pulse is 1+. The right PT pulse is 2+.     Left Foot/Ankle  Left Foot Inspection  Skin Exam: skin normal and skin intact. No dry skin, no warmth, no erythema, no maceration, normal color, no pre-ulcer, no ulcer and no callus.     Toe Exam: ROM and strength within normal limits.     Sensory   Vibration: diminished  Proprioception: intact  Monofilament testing: diminished    Vascular  Capillary refills: < 3 seconds  The left DP pulse is 1+. The left PT pulse is 2+.     Assign Risk Category  No deformity present  No loss of protective sensation  No weak pulses  Risk: 1          Labs:   Lab Results   Component Value Date    HGBA1C 7.5 (H) 02/09/2024    HGBA1C 9.4 (A) 09/11/2023    HGBA1C 11.5 (H) 06/08/2023     Lab Results   Component Value Date    CREATININE 1.26 02/09/2024    CREATININE 1.10 11/07/2023    CREATININE 1.17 09/02/2023    BUN 20 02/09/2024     01/29/2015    K 4.0 02/09/2024    CL 89 (L) 02/09/2024    CO2 34 (H) 02/09/2024     eGFRcr   Date Value Ref Range Status   07/11/2023 70 >59 Final     eGFR   Date Value Ref Range Status   02/09/2024 62 ml/min/1.73sq m Final     Lab Results   Component Value Date    CHOL 250 01/29/2015    HDL 35 (L) 02/09/2024    TRIG 155 (H) 02/09/2024     Lab Results   Component Value Date    ALT 21 02/09/2024    AST 18  02/09/2024    ALKPHOS 44 02/09/2024    BILITOT 0.3 01/29/2015     Lab Results   Component Value Date    MJT2LHHLVNJY 2.123 02/09/2024    QOT9MDSKSJRU 0.833 08/30/2023    [id number] 1.035 10/31/2022     Lab Results   Component Value Date    FREET4 1.07 02/09/2024       Discussed with the patient and all questioned fully answered. He will call me if any problems arise.    Follow-up appointment in 6 months.     Counseled patient on diagnostic results, prognosis, risk and benefit of treatment options, instruction for management, importance of treatment compliance, Risk  factor reduction and impressions    MARLEEN Davila

## 2024-02-16 NOTE — ASSESSMENT & PLAN NOTE
Clinically and biochemically euthyroid.  Continue 175 mcg of levothyroxine daily.  Repeat thyroid function test prior to next appointment.

## 2024-02-16 NOTE — ASSESSMENT & PLAN NOTE
Continues to have dyspnea on exertion.  Wheezes auscultated on exam today.  Continue with maintenance and as needed inhalers.  Patient is now 1 year status post smoking cessation.  Congratulated him on his progress.

## 2024-02-16 NOTE — ASSESSMENT & PLAN NOTE
Patient's control has improved significantly.  He has reduced his insulin needs by 50%.  Recommend increasing Ozempic to 2 mg once weekly.  Reduce Lantus to 24 units nightly.  Recommend continuing Humalog for now with a range of 10 to 20 units 3 times daily prior to meals.  However, patient may not need any Humalog if he is tolerating the 2 mg of Ozempic well.  He is to notify me with episodes of hypoglycemia so that we can further reduce his insulin if warranted.  Okay to reduce metformin to 1000 mg daily to see if symptoms of fatigue improve.  Follow-up in 5 to 6 months.  Lab Results   Component Value Date    HGBA1C 7.5 (H) 02/09/2024

## 2024-02-16 NOTE — PATIENT INSTRUCTIONS
Increase Ozempic to 1.5 mg once weekly.   Reduce Lantus to 24 units nightly  Continue to use Humalog on sliding scale.   Reduce metformin to once daily.

## 2024-02-16 NOTE — ASSESSMENT & PLAN NOTE
Microalbuminuria has improved.  SGLT2's are cost prohibitive.  Continue valsartan.  Lab Results   Component Value Date    HGBA1C 7.5 (H) 02/09/2024

## 2024-02-19 ENCOUNTER — TELEPHONE (OUTPATIENT)
Dept: PAIN MEDICINE | Facility: CLINIC | Age: 59
End: 2024-02-19

## 2024-02-20 ENCOUNTER — OFFICE VISIT (OUTPATIENT)
Dept: PHYSICAL THERAPY | Facility: HOME HEALTHCARE | Age: 59
End: 2024-02-20
Payer: MEDICARE

## 2024-02-20 DIAGNOSIS — M25.561 BILATERAL CHRONIC KNEE PAIN: Primary | ICD-10-CM

## 2024-02-20 DIAGNOSIS — G89.29 BILATERAL CHRONIC KNEE PAIN: Primary | ICD-10-CM

## 2024-02-20 DIAGNOSIS — M25.562 BILATERAL CHRONIC KNEE PAIN: Primary | ICD-10-CM

## 2024-02-20 PROCEDURE — 97110 THERAPEUTIC EXERCISES: CPT

## 2024-02-20 PROCEDURE — 97140 MANUAL THERAPY 1/> REGIONS: CPT

## 2024-02-20 NOTE — PROGRESS NOTES
"Daily Note     Today's date: 2024  Patient name: Luis F Velazquez  : 1965  MRN: [id number]  Referring provider: Chuck Holman PA-C  Dx: No diagnosis found.    Start Time: 1300          Subjective: Average pain of 8/10 at B knees today L > R. I have taken my pain meds today.     Objective: See treatment diary below    Assessment: Pt with fair edomnd to TE and was able to complete TE as per flow sheet with vc's provided. Pt with slow progression and required some modification of ex today. Pt with HS tightness remaining. Pt was able to ambulate t/o clinic without use of SPC today.  Patient would benefit from continued PT    Plan: Continue per plan of care.      Precautions: Fall risk    Re-eval Date: 24      Manuals 2/15 2-20    Irving calf, hs, glute stretch JCHASE DONG Declined   perform NV   Irving knee PROM SUZANNE DONG Declined   perform NV           Neuro Re-Ed        Balance as appropriate                                Ther Ex  2-20      SP02  HR 93%  119 bpm   91%  121 bpm 91%   115 bpm 92% 127 bpm 97% 131 bpm   Nustep L2  10' L2  10'  L2 10' L2 10' L2 10'   Quad set 5\" x15 irving 5\" x 15 irving 5\"x15 irving NV 5\"x15 irving   Heel slide 1x15 irving 1x15 irving 1x15 irving NV 5\"x15 irving   LAQ 3\" x15 irving 3\" x 15 irving 3\"x15 irving 3\"x15 irving 3\"x18   HS curls Red x 20 Red x 15 Irving Red x15 irving Red x25 Red x20   SLR        Hip add   Pt declined  Pt declined     Hip abd Supine Green 5\" 1x15  Declined   Supine Green 5\" 1x15   Bridges 3\" x10 3\"x 10  3\" x10 NV 3\" x10   Clamshells        S/L hip abd        Standing hip flex/ext/abd 1x15 ea Irving Flex/abd   1x15 ea Irving   @ rail 1x15 ea irving NV 1x15 ea irving   Squat        Leg press        LF hip abd/add                Ther Activity        Step up/down        Sit to stands        Gait Training                        Modalities        CP                                                 "

## 2024-02-22 ENCOUNTER — OFFICE VISIT (OUTPATIENT)
Dept: PHYSICAL THERAPY | Facility: HOME HEALTHCARE | Age: 59
End: 2024-02-22
Payer: MEDICARE

## 2024-02-22 DIAGNOSIS — G89.29 BILATERAL CHRONIC KNEE PAIN: Primary | ICD-10-CM

## 2024-02-22 DIAGNOSIS — M25.561 BILATERAL CHRONIC KNEE PAIN: Primary | ICD-10-CM

## 2024-02-22 DIAGNOSIS — M25.562 BILATERAL CHRONIC KNEE PAIN: Primary | ICD-10-CM

## 2024-02-22 PROCEDURE — 97110 THERAPEUTIC EXERCISES: CPT

## 2024-02-22 NOTE — PROGRESS NOTES
"Daily Note     Today's date: 2024  Patient name: Luis F Velazquez  : 1965  MRN: [id number]  Referring provider: Chuck Holman PA-C  Dx:   Encounter Diagnosis     ICD-10-CM    1. Bilateral chronic knee pain  M25.561     M25.562     G89.29           Start Time: 1302  Stop Time: 1340  Total time in clinic (min): 38 minutes      Subjective: Patient reports B knee pain is at 7/10 today with L symptoms > R symptoms. Patient notes that he took his pain meds this morning,but still has moderate pain and increased fatigue.    Objective: See treatment diary below    Assessment: Tolerated treatment fair today. Continued to focus on B knee stretching and ROM exercises to reduce symptoms, restore mobility, and promote improvements in B knee functional ability. Patient experienced B knee discomfort on and off throughout most exercises performed today. Patient required verbal cues for proper form and recall of exercises today with fair carryover noted. Patient was unable to ambulate throughout the clinic today without use of SPC. Held remaining standing exercises due to increased fatigue levels today and will resume next visit if able. Muscle fatigue present at the conclusion of session. Patient would benefit from continued PT    Plan: Continue per plan of care.      Precautions: Fall risk    Re-eval Date: 24      Manuals 2/15 2-20 2/22 2/6 2   Jamie calf, hs, glute stretch JK EC Declined   perform NV JA Declined   perform NV   Ajmie knee PROM JK EC Declined   perform NV JA Declined   perform NV           Neuro Re-Ed        Balance as appropriate                                Ther Ex       SP02  HR 93%  119 bpm   91%  121 bpm 90%   95 bpm 92% 127 bpm 97% 131 bpm   Nustep L2  10' L2  10'  L2 10' L2 10' L2 10'   Quad set 5\" x15 jamie 5\" x 15 jamie 5\"x15 jamie NV 5\"x15 jamie   Heel slide 1x15 jamie 1x15 jamie 1x15 jamie NV 5\"x15 jamie   LAQ 3\" x15 jamie 3\" x 15 jamie 3\"x15 jamie 3\"x15 jamie 3\"x18   HS curls Red x 20 Red x 15 Jamie NV " "Red x25 Red x20   SLR        Hip add   Pt declined       Hip abd Supine Green 5\" 1x15  Declined Supine green 5\" 1x15  Supine Green 5\" 1x15   Bridges 3\" x10 3\"x 10  3\" x10 NV 3\" x10   Clamshells        S/L hip abd        Standing hip flex/ext/abd 1x15 ea Jamie Flex/abd   1x15 ea Jamie   @ rail NV NV 1x15 ea jamie   Squat        Leg press        LF hip abd/add                Ther Activity        Step up/down        Sit to stands        Gait Training                        Modalities        CP                                                 "

## 2024-02-27 ENCOUNTER — OFFICE VISIT (OUTPATIENT)
Dept: PHYSICAL THERAPY | Facility: HOME HEALTHCARE | Age: 59
End: 2024-02-27
Payer: MEDICARE

## 2024-02-27 DIAGNOSIS — M25.562 BILATERAL CHRONIC KNEE PAIN: Primary | ICD-10-CM

## 2024-02-27 DIAGNOSIS — G89.29 BILATERAL CHRONIC KNEE PAIN: Primary | ICD-10-CM

## 2024-02-27 DIAGNOSIS — M25.561 BILATERAL CHRONIC KNEE PAIN: Primary | ICD-10-CM

## 2024-02-27 PROCEDURE — 97140 MANUAL THERAPY 1/> REGIONS: CPT

## 2024-02-27 PROCEDURE — 97110 THERAPEUTIC EXERCISES: CPT

## 2024-02-27 NOTE — PROGRESS NOTES
"Daily Note     Today's date: 2024  Patient name: Luis F Velazquez  : 1965  MRN: [id number]  Referring provider: Chuck Holman PA-C  Dx:   Encounter Diagnosis     ICD-10-CM    1. Bilateral chronic knee pain  M25.561     M25.562     G89.29                      Subjective: Pt states \" I have pain everywhere today.\"       Objective: See treatment diary below      Assessment: Tolerated treatment fair. Verbal cues needed for correct form with exercises. Pt reports pain in his knees with exercise.  Pt ambulating  w/o SPC today. Fatigue evident with exercise. Bilateral HS tightness noted with stretching. Patient would benefit from continued PT      Plan: Continue per plan of care.      Precautions: Fall risk    Re-eval Date: 24      Manuals 2/15 2-20 2/22 2/27 2/8   Jamie calf, hs, glute stretch JK EC Declined   perform NV JK Declined   perform NV   Jamie knee PROM JK EC Declined   perform NV JK Declined   perform NV           Neuro Re-Ed        Balance as appropriate                                Ther Ex       SP02  HR 93%  119 bpm   91%  121 bpm 90%   95 bpm 92%  120 bpm 97% 131 bpm   Nustep L2  10' L2  10'  L2 10' L2 10' L2 10'   Quad set 5\" x15 jamie 5\" x 15 jamie 5\"x15 jamie 5\" x 15 Jmaie 5\"x15 jamie   Heel slide 1x15 jamie 1x15 jamie 1x15 jamie 1x15 Jamie 5\"x15 jamie   LAQ 3\" x15 jamie 3\" x 15 jamie 3\"x15 jamie 3\"x15 jamie 3\"x18   HS curls Red x 20 Red x 15 Jamie NV Red x 15 Jamie Red x20   SLR        Hip add   Pt declined       Hip abd Supine Green 5\" 1x15  Declined Supine green 5\" 1x15 Supine Green 5\" 1x15  Supine Green 5\" 1x15   Bridges 3\" x10 3\"x 10  3\" x10 3\"x10 3\" x10   Clamshells        S/L hip abd        Standing hip flex/ext/abd 1x15 ea Jaime Flex/abd   1x15 ea Jamie   @ rail NV 1x15 ea Jamie  1x15 ea jamie   Squat        Leg press        LF hip abd/add                Ther Activity        Step up/down        Sit to stands        Gait Training                        Modalities        CP              "

## 2024-02-29 ENCOUNTER — OFFICE VISIT (OUTPATIENT)
Dept: OBGYN CLINIC | Facility: CLINIC | Age: 59
End: 2024-02-29
Payer: MEDICARE

## 2024-02-29 ENCOUNTER — APPOINTMENT (OUTPATIENT)
Dept: PHYSICAL THERAPY | Facility: HOME HEALTHCARE | Age: 59
End: 2024-02-29
Payer: MEDICARE

## 2024-02-29 VITALS
HEIGHT: 64 IN | DIASTOLIC BLOOD PRESSURE: 69 MMHG | HEART RATE: 120 BPM | WEIGHT: 273 LBS | BODY MASS INDEX: 46.61 KG/M2 | SYSTOLIC BLOOD PRESSURE: 115 MMHG

## 2024-02-29 DIAGNOSIS — M17.0 PRIMARY OSTEOARTHRITIS OF BOTH KNEES: Primary | ICD-10-CM

## 2024-02-29 PROCEDURE — 99213 OFFICE O/P EST LOW 20 MIN: CPT | Performed by: ORTHOPAEDIC SURGERY

## 2024-02-29 PROCEDURE — 20610 DRAIN/INJ JOINT/BURSA W/O US: CPT | Performed by: ORTHOPAEDIC SURGERY

## 2024-02-29 RX ORDER — BUPIVACAINE HYDROCHLORIDE 2.5 MG/ML
4 INJECTION, SOLUTION INFILTRATION; PERINEURAL
Status: COMPLETED | OUTPATIENT
Start: 2024-02-29 | End: 2024-02-29

## 2024-02-29 RX ORDER — TRIAMCINOLONE ACETONIDE 40 MG/ML
80 INJECTION, SUSPENSION INTRA-ARTICULAR; INTRAMUSCULAR
Status: COMPLETED | OUTPATIENT
Start: 2024-02-29 | End: 2024-02-29

## 2024-02-29 RX ADMIN — TRIAMCINOLONE ACETONIDE 80 MG: 40 INJECTION, SUSPENSION INTRA-ARTICULAR; INTRAMUSCULAR at 11:00

## 2024-02-29 RX ADMIN — BUPIVACAINE HYDROCHLORIDE 4 ML: 2.5 INJECTION, SOLUTION INFILTRATION; PERINEURAL at 11:00

## 2024-02-29 NOTE — PROGRESS NOTES
ASSESSMENT/PLAN:    Diagnoses and all orders for this visit:    Primary osteoarthritis of both knees    Other orders  -     Large joint arthrocentesis        Both of the patient's knees were injected with Kenalog and Marcaine.  He tolerated the injections quite well.  He will follow-up with our office in 3 months.  He is acceptable to this plan.    Return in about 3 months (around 5/29/2024).    The patient has arthritis of his bilateral knees.  Under aseptic technique, both knees were injected with Kenalog and Marcaine.  He tolerated procedure quite well.  Return back in 3 months for evaluation    _____________________________________________________  CHIEF COMPLAINT:  Chief Complaint   Patient presents with    Right Knee - Follow-up    Left Knee - Follow-up         SUBJECTIVE:  Luis F Velazquez is a 58 y.o. male who presents to our office complaining of bilateral knee pain.  The patient has a history of primary osteoarthritis of both knees.  He would like corticosteroid injections today, as they have provided him with adequate relief of his symptoms in the past.  Denies any numbness or tingling.  Denies any fever or chills.    The following portions of the patient's history were reviewed and updated as appropriate: allergies, current medications, past family history, past medical history, past social history, past surgical history and problem list.    PAST MEDICAL HISTORY:  Past Medical History:   Diagnosis Date    Acquired hypothyroidism 1/2/2015    Angioedema     Diabetes mellitus (HCC)     Disease of thyroid gland     Hyperlipidemia     Hypertension     Mixed hyperlipidemia 2/4/2015    Morbid obesity (HCC)     Morbid obesity with BMI of 45.0-49.9, adult (HCC) 1/2/2015    MARSHA (obstructive sleep apnea)     Primary hypertension 1/2/2015    Transitioned From: Benign essential hypertension       PAST SURGICAL HISTORY:  Past Surgical History:   Procedure Laterality Date    KNEE SURGERY      SINUS SURGERY         FAMILY  HISTORY:  Family History   Problem Relation Age of Onset    Diabetes Mother     Diabetes type II Mother     Esophageal cancer Father     Diabetes Brother     Kidney cancer Brother     Diabetes type II Brother     Diabetes Maternal Grandmother     Diabetes type II Maternal Grandmother     Diabetes Paternal Grandmother     Heart disease Neg Hx        SOCIAL HISTORY:  Social History     Tobacco Use    Smoking status: Former     Current packs/day: 0.00     Types: Cigarettes     Quit date: 10/21/2022     Years since quittin.3    Smokeless tobacco: Never    Tobacco comments:     states read to quit prior to admit   Vaping Use    Vaping status: Never Used   Substance Use Topics    Alcohol use: Not Currently     Alcohol/week: 3.0 standard drinks of alcohol     Types: 2 Cans of beer, 1 Shots of liquor per week     Comment: No alcohol since     Drug use: No       MEDICATIONS:    Current Outpatient Medications:     albuterol (2.5 mg/3 mL) 0.083 % nebulizer solution, Take 3 mL (2.5 mg total) by nebulization every 6 (six) hours as needed for wheezing or shortness of breath, Disp: 180 mL, Rfl: 0    Ascorbic Acid (VITAMIN C PO), Take 1 tablet by mouth daily, Disp: , Rfl:     aspirin 81 mg chewable tablet, Take one tablet by mouth daily, Disp: , Rfl:     benzonatate (TESSALON PERLES) 100 mg capsule, Take 1 capsule (100 mg total) by mouth 3 (three) times a day as needed for cough, Disp: 20 capsule, Rfl: 2    cholecalciferol (VITAMIN D3) 400 units tablet, Take 1 tablet by mouth daily, Disp: , Rfl:     DULoxetine (CYMBALTA) 60 mg delayed release capsule, Take 1 capsule (60 mg total) by mouth daily, Disp: 90 capsule, Rfl: 1    EPINEPHrine (EPIPEN) 0.3 mg/0.3 mL SOAJ, INJECT 0.3MG INTO A MUSCLE ONCE FOR 1 DOSE, Disp: 2 each, Rfl: 0    ferrous gluconate (FERGON) 324 mg tablet, Take 324 mg by mouth, Disp: , Rfl:     gabapentin (NEURONTIN) 100 mg capsule, TAKE 1 CAPSULE BY MOUTH THREE TIMES DAILY, Disp: 90 capsule, Rfl: 5     "HumaLOG KwikPen 100 units/mL injection pen, INJECT 20 UNITS UNDER THE SKIN THREE TIMES DAILY WITH MEALS (Patient taking differently: Inject 20 Units under the skin 3 (three) times a day with meals), Disp: 15 mL, Rfl: 0    hydrochlorothiazide (HYDRODIURIL) 25 mg tablet, Take 1 tablet by mouth twice daily, Disp: 60 tablet, Rfl: 2    Insulin Glargine Solostar (Lantus SoloStar) 100 UNIT/ML SOPN, Inject 0.24 mL (24 Units total) under the skin daily, Disp: 30 mL, Rfl: 1    Insulin Pen Needle (Pen Needles 3/16\") 31G X 5 MM MISC, Use 4 (four) times a day, Disp: 300 each, Rfl: 3    ipratropium (ATROVENT) 0.02 % nebulizer solution, Take 2.5 mL (0.5 mg total) by nebulization every 6 (six) hours as needed for wheezing or shortness of breath, Disp: 150 mL, Rfl: 0    levothyroxine 175 mcg tablet, Take 1 tablet by mouth once daily, Disp: 90 tablet, Rfl: 1    loratadine (CLARITIN) 10 mg tablet, Take 10 mg by mouth daily, Disp: , Rfl:     metFORMIN (GLUCOPHAGE) 1000 MG tablet, Take 1 tablet (1,000 mg total) by mouth 2 (two) times a day, Disp: 180 tablet, Rfl: 1    Omega-3 Fatty Acids (fish oil) 1,000 mg, Take 1,000 mg by mouth 2 (two) times a day, Disp: , Rfl:     pregabalin (LYRICA) 25 mg capsule, Take 1 capsule by mouth twice daily, Disp: 60 capsule, Rfl: 2    rosuvastatin (CRESTOR) 40 MG tablet, Take 1 tablet by mouth once daily, Disp: 90 tablet, Rfl: 3    semaglutide, 2 mg/dose, (Ozempic, 2 MG/DOSE,) 8 mg/ mL injection pen, Inject 0.75 mL (2 mg total) under the skin every 7 days, Disp: 3 mL, Rfl: 11    sodium chloride (OCEAN) 0.65 % nasal spray, 1 spray into each nostril as needed for rhinitis, Disp: 30 mL, Rfl: 1    tamsulosin (FLOMAX) 0.4 mg, Take 1 capsule (0.4 mg total) by mouth daily with dinner, Disp: 90 capsule, Rfl: 3    Thiamine HCl (vitamin B-1) 250 MG tablet, Take 250 mg by mouth daily, Disp: , Rfl:     traZODone (DESYREL) 150 mg tablet, Take 1 tablet (150 mg total) by mouth daily at bedtime, Disp: 90 tablet, Rfl: 1   " " valsartan (DIOVAN) 160 mg tablet, Take 160 mg by mouth daily, Disp: , Rfl:     Ventolin  (90 Base) MCG/ACT inhaler, INHALE 2 PUFFS BY MOUTH EVERY 4 HOURS AS NEEDED FOR WHEEZING FOR SHORTNESS OF BREATH, Disp: 18 g, Rfl: 0    vitamin E, tocopherol, 400 units capsule, Take 400 Units by mouth daily, Disp: , Rfl:     ipratropium-albuterol (DUO-NEB) 0.5-2.5 mg/3 mL nebulizer solution, Take 3 mL by nebulization every 6 (six) hours as needed for wheezing or shortness of breath (Patient not taking: Reported on 2/16/2024), Disp: 180 mL, Rfl: 0    ALLERGIES:  Allergies   Allergen Reactions    Ace Inhibitors Swelling     Angioedema    PT STATES THIS IS NOT AN ALLERGY    Other Anaphylaxis     Pt believes that he is allergic to peanut butter.    Also, seasonal allergies    Zinc Tongue Swelling     PT STATES THIS IS NOT AN ALLERGY    Clindamycin Edema     Had angioedema episode approx 5 hr after IM administration of clindamycin. Unclear if there is definitive causal relationship.  PT STATES THIS IS NOT AN ALLERGY       ROS:  Review of Systems     Constitutional: Negative for fatigue, fever or loss of appetite.   HENT: Negative.    Respiratory: Negative for shortness of breath, dyspnea.    Cardiovascular: Negative for chest pain/tightness.   Gastrointestinal: Negative for abdominal pain, N/V.   Endocrine: Negative for cold/heat intolerance, unexplained weight loss/gain.   Genitourinary: Negative for flank pain, dysuria, hematuria.   Musculoskeletal: Positive for arthralgia   Skin: Negative for rash.    Neurological: Negative for numbness or tingling  Psychiatric/Behavioral: Negative for agitation.  _____________________________________________________  PHYSICAL EXAMINATION:    Blood pressure 115/69, pulse (!) 120, height 5' 4\" (1.626 m), weight 124 kg (273 lb).    Constitutional: Oriented to person, place, and time. Appears well-developed and well-nourished. No distress.   HENT:   Head: Normocephalic.   Eyes: Conjunctivae " "are normal. Right eye exhibits no discharge. Left eye exhibits no discharge. No scleral icterus.   Cardiovascular: Normal rate.    Pulmonary/Chest: Effort normal.   Neurological: Alert and oriented to person, place, and time.   Skin: Skin is warm and dry. No rash noted. Not diaphoretic. No erythema. No pallor.   Psychiatric: Normal mood and affect. Behavior is normal. Judgment and thought content normal.      MUSCULOSKELETAL EXAMINATION:   Physical Exam  Ortho Exam    Bilateral lower extremities are neurovascularly intact  Toes are pink and mobile   Compartments are soft  No warmth, erythema or ecchymosis  ROM of knees are from 5-115 degrees  Negative Lachman, drawer or pivot shift  No medial instability  Medial joint line tenderness, slight lateral joint line tenderness  Patellofemoral crepitation   Objective:  BP Readings from Last 1 Encounters:   02/29/24 115/69      Wt Readings from Last 1 Encounters:   02/29/24 124 kg (273 lb)        BMI:   Estimated body mass index is 46.86 kg/m² as calculated from the following:    Height as of this encounter: 5' 4\" (1.626 m).    Weight as of this encounter: 124 kg (273 lb).      PROCEDURES PERFORMED:  Large joint arthrocentesis: bilateral knee  Universal Protocol:  Consent: Verbal consent obtained.  Risks and benefits: risks, benefits and alternatives were discussed  Consent given by: patient  Patient understanding: patient states understanding of the procedure being performed  Site marked: the operative site was marked  Supporting Documentation  Indications: pain   Procedure Details  Location: knee - bilateral knee  Preparation: Patient was prepped and draped in the usual sterile fashion  Needle size: 22 G  Ultrasound guidance: no  Approach: lateral    Medications (Right): 4 mL bupivacaine 0.25 %; 80 mg triamcinolone acetonide 40 mg/mLMedications (Left): 4 mL bupivacaine 0.25 %; 80 mg triamcinolone acetonide 40 mg/mL   Patient tolerance: patient tolerated the procedure well " with no immediate complications  Dressing:  Sterile dressing applied            Scribe Attestation      I,:  Tye Stringer PA-C am acting as a scribe while in the presence of the attending physician.:       I,:  Manny Patel DO personally performed the services described in this documentation    as scribed in my presence.:

## 2024-03-01 NOTE — TELEPHONE ENCOUNTER
Caller: eulogio Kerns  Doctor/office: [facility]  CB#: [phone]    % of improvement: 50    Pain Scale (1-10): 7

## 2024-03-05 ENCOUNTER — EVALUATION (OUTPATIENT)
Dept: PHYSICAL THERAPY | Facility: HOME HEALTHCARE | Age: 59
End: 2024-03-05
Payer: MEDICARE

## 2024-03-05 DIAGNOSIS — M25.562 BILATERAL CHRONIC KNEE PAIN: Primary | ICD-10-CM

## 2024-03-05 DIAGNOSIS — M25.561 BILATERAL CHRONIC KNEE PAIN: Primary | ICD-10-CM

## 2024-03-05 DIAGNOSIS — G89.29 BILATERAL CHRONIC KNEE PAIN: Primary | ICD-10-CM

## 2024-03-05 PROCEDURE — 97110 THERAPEUTIC EXERCISES: CPT

## 2024-03-05 NOTE — PROGRESS NOTES
PT Re-Evaluation     Today's date: 3/5/2024  Patient name: Luis F Velazquez  : 1965  MRN: [id number]  Referring provider: Chuck Holman PA-C  Dx:   Encounter Diagnosis     ICD-10-CM    1. Bilateral chronic knee pain  M25.561     M25.562     G89.29             Start Time: 1300  Stop Time: 1326  Total time in clinic (min): 26 minutes    Assessment  Assessment details: The pt has made some improvements in strength, ROM, and functional mobility since beginning PT, however, he continues to remain difficult to progress due to high pain levels, L>R knee.  Since his previous assessment, the pt has made improvements in strength, but has likely reached a plateau for this course of physical therapy.  It was recommended that the patient transition to the the step down wellness program at the clinic, and to continue with his exercises independently.  The pt is scheduled to receive visco injections at the end of March and into early April, so it was suggested that he possibly resume formal PT treatments at that time if recommended by his MD.  The pt is scheduled for one additional PT session, which will likely be his discharge and review of the equipment for wellness.     Impairments: abnormal gait, abnormal or restricted ROM, abnormal movement, activity intolerance, impaired physical strength, pain with function, weight-bearing intolerance and poor body mechanics  Understanding of Dx/Px/POC: good   Prognosis: good    Goals  STG: 3-4 weeks - progressing   Pt will be independent with HEP in order to continue to progress outside of PT treatment sessions. Met  Pt will improve in quality of life as demonstrated by worst pain levels of 8/10 or less.  Pt will improve in functional mobility as demonstrated by a 1/2 point or greater improvement in LE strength. Met  LT-8 weeks  Pt will improve in quality of life as demonstrated by worst pain levels of 5/10 or less.  Pt will improve in functional mobility as demonstrated by  strength levels of 4/5 or greater.  Pt will improve in functional mobility as demonstrated by irving knee ROM WFL.  Pt will improve in functional mobility as demonstrated by ability to perform full ADLs without any limitations due to pain, ROM deficits, or weakness.    Plan  Plan details: D/C from PT NV with possible transition to wellness  Patient would benefit from: skilled physical therapy  Planned modality interventions: cryotherapy  Planned therapy interventions: body mechanics training, flexibility, functional ROM exercises, gait training, home exercise program, joint mobilization, manual therapy, neuromuscular re-education, patient education, postural training, strengthening, stretching, therapeutic activities and therapeutic exercise  Treatment plan discussed with: patient        Subjective Evaluation    History of Present Illness  Mechanism of injury: UPDATE 03/05/2024:  The pt reports that he received an injection in his knees, which did help a little.  The pt reports that his L knee is bothering him today.    UPDATE 02/06/2024:  The pt reports that he has more pain today in his L knee and in his tailbone.  The pt reports that he feels stronger from the physical therapy, but that his knee pain is about the same.    UPDATE: Pt reporting that the pain is constant but overall he does feel that therapy is helping and would like to continue.      Pt reports that he continues to have significant bilateral anterior/medial knee pain, L>R, which has been ongoing for over a year.  He reports that he feels a sharp pain in his L knee when getting up, sitting down, etc.  He reports that he has been getting gel shots in his knees, which have been helping a little for knee mobility/stiffness, but it has not been helping the pain.  He reports that he goes for another injection tomorrow.    Patient Goals  Patient goals for therapy: decreased pain, increased motion, increased strength, independence with ADLs/IADLs and return  to sport/leisure activities  Patient goal: To get moving around  Pain  Current pain ratin  At best pain ratin  At worst pain ratin  Quality: dull ache and sharp (Stabbing)  Alleviating factors: Menthol rubs for short-term relief.  Aggravating factors: standing, walking and stair climbing    Social Support  Steps to enter house: no  Stairs in house: yes (21)       Diagnostic Tests  X-ray: abnormal  Treatments  Previous treatment: injection treatment        Objective     Tenderness   Left Knee   Tenderness in the medial joint line.     Right Knee   Tenderness in the medial joint line.     Active Range of Motion   Left Knee   Flexion: 113 degrees with pain  Extension: -5 degrees with pain    Right Knee   Flexion: 116 degrees with pain  Extension: -2 degrees with pain    Passive Range of Motion   Left Hip   Flexion: 95 degrees     Right Hip   Flexion: 95 degrees   Left Knee   Flexion: 100 degrees with pain  Extension: -4 degrees with pain    Right Knee   Flexion: 105 degrees with pain  Extension: -2 degrees with pain    Strength/Myotome Testing     Left Hip   Planes of Motion   Flexion: 4+  Abduction: 4  Adduction: 4+    Right Hip   Planes of Motion   Flexion: 4+  Abduction: 4  Adduction: 4+    Left Knee   Flexion: 4  Extension: 4    Right Knee   Flexion: 4  Extension: 4+    Left Ankle/Foot   Dorsiflexion: 4+  Plantar flexion: 4+    Right Ankle/Foot   Dorsiflexion: 4+  Plantar flexion: 4+    Tests     Left Hip   SLR: Positive.     Right Hip   SLR: Positive.     Additional Tests Details  SLR: L: 60, R: 65    Ambulation   Weight-Bearing Status   Weight-Bearing Status (Left): weight-bearing as tolerated   Weight-Bearing Status (Right): weight-bearing as tolerated      Ambulation: Level Surfaces   Ambulation without assistive device: independent    Ambulation: Stairs   Ascend stairs: contact guard assist  Pattern: non-reciprocal  Railings: one rail  Descend stairs: contact guard assist  Pattern:  "non-reciprocal  Railings: one rail  Curbs: independent    Observational Gait   Gait: antalgic   Decreased walking speed and stride length.         Manuals 2/15 2-20 2/22 2/27 3/5   Jamie calf, hs, glute stretch JK EC Declined   perform NV JK Declined   Jamie knee PROM JK EC Declined   perform NV JK Declined           Neuro Re-Ed        Balance as appropriate                                Ther Ex  2-20 2-22     SP02  HR 93%  119 bpm   91%  121 bpm 90%   95 bpm 92%  120 bpm 96%  81 bpm   Nustep L2  10' L2  10'  L2 10' L2 10' L2 7'   Quad set 5\" x15 jamie 5\" x 15 jamie 5\"x15 jamie 5\" x 15 Jamie    Heel slide 1x15 jamie 1x15 jamie 1x15 jamie 1x15 Jamie    LAQ 3\" x15 jamie 3\" x 15 jamie 3\"x15 jamie 3\"x15 jamie 3\"x15 jamie   HS curls Red x 20 Red x 15 Jamie NV Red x 15 Jamie Green x15 jamie   SLR        Hip add   Pt declined       Hip abd Supine Green 5\" 1x15  Declined Supine green 5\" 1x15 Supine Green 5\" 1x15  Declined   Bridges 3\" x10 3\"x 10  3\" x10 3\"x10 Declined   Clamshells        S/L hip abd        Standing hip flex/ext/abd 1x15 ea Jamie Flex/abd   1x15 ea Jamie   @ rail NV 1x15 ea Jamie  1x15 ea   Squat        Leg press        LF hip abd/add                Ther Activity        Step up/down        Sit to stands        Gait Training                        Modalities        CP                     "

## 2024-03-07 ENCOUNTER — OFFICE VISIT (OUTPATIENT)
Dept: PHYSICAL THERAPY | Facility: HOME HEALTHCARE | Age: 59
End: 2024-03-07
Payer: MEDICARE

## 2024-03-07 DIAGNOSIS — M25.562 BILATERAL CHRONIC KNEE PAIN: Primary | ICD-10-CM

## 2024-03-07 DIAGNOSIS — G89.29 BILATERAL CHRONIC KNEE PAIN: Primary | ICD-10-CM

## 2024-03-07 DIAGNOSIS — M25.561 BILATERAL CHRONIC KNEE PAIN: Primary | ICD-10-CM

## 2024-03-07 PROCEDURE — 97140 MANUAL THERAPY 1/> REGIONS: CPT

## 2024-03-07 PROCEDURE — 97110 THERAPEUTIC EXERCISES: CPT

## 2024-03-07 NOTE — PROGRESS NOTES
PT Discharge    Today's date: 3/7/2024  Patient name: Luis F Velazquez  : 1965  MRN: [id number]  Referring provider: Chuck Holman PA-C  Dx:   Encounter Diagnosis     ICD-10-CM    1. Bilateral chronic knee pain  M25.561     M25.562     G89.29             Start Time: 1300  Stop Time: 1338  Total time in clinic (min): 38 minutes    Assessment  Assessment details: The pt has made some improvements in strength, ROM, and functional mobility since beginning PT, however, the pt has likely reached a plateau for this course of PT.  The pt has demonstrated independence with his HEP and is confident in his ability to adhere to his HEP and further progress on his own.  The pt did express interest in the wellness program at the clinic, so the equipment in the clinic was reviewed.  The pt will be D/C to his HEP at this time.    Impairments: abnormal gait, abnormal or restricted ROM, activity intolerance, impaired physical strength, pain with function, weight-bearing intolerance and poor body mechanics  Understanding of Dx/Px/POC: good   Prognosis: good    Goals  STG: 3-4 weeks - progressing   Pt will be independent with HEP in order to continue to progress outside of PT treatment sessions. Met  Pt will improve in quality of life as demonstrated by worst pain levels of 8/10 or less.  Pt will improve in functional mobility as demonstrated by a 1/2 point or greater improvement in LE strength. Met  LT-8 weeks  Pt will improve in quality of life as demonstrated by worst pain levels of 5/10 or less.  Pt will improve in functional mobility as demonstrated by strength levels of 4/5 or greater.  Pt will improve in functional mobility as demonstrated by irving knee ROM WFL.  Pt will improve in functional mobility as demonstrated by ability to perform full ADLs without any limitations due to pain, ROM deficits, or weakness.    Plan  Plan details: D/C to HEP  Treatment plan discussed with: patient        Subjective  Evaluation    History of Present Illness  Mechanism of injury: UPDATE 2024:  The pt reports that his knees are not too bad today.  The pt reports that he is okay with today being his last day of PT for now, and will come back after his visco injections if the MD wants him to continue with PT.    UPDATE 2024:  The pt reports that he received an injection in his knees, which did help a little.  The pt reports that his L knee is bothering him today.    UPDATE 2024:  The pt reports that he has more pain today in his L knee and in his tailbone.  The pt reports that he feels stronger from the physical therapy, but that his knee pain is about the same.    UPDATE: Pt reporting that the pain is constant but overall he does feel that therapy is helping and would like to continue.      Pt reports that he continues to have significant bilateral anterior/medial knee pain, L>R, which has been ongoing for over a year.  He reports that he feels a sharp pain in his L knee when getting up, sitting down, etc.  He reports that he has been getting gel shots in his knees, which have been helping a little for knee mobility/stiffness, but it has not been helping the pain.  He reports that he goes for another injection tomorrow.    Patient Goals  Patient goals for therapy: decreased pain, increased motion, increased strength, independence with ADLs/IADLs and return to sport/leisure activities  Patient goal: To get moving around  Pain  Current pain ratin  At best pain ratin  At worst pain ratin  Quality: dull ache and sharp (Stabbing)  Alleviating factors: Menthol rubs for short-term relief.  Aggravating factors: standing, walking and stair climbing    Social Support  Steps to enter house: no  Stairs in house: yes (21)       Diagnostic Tests  X-ray: abnormal  Treatments  Previous treatment: injection treatment        Objective     Tenderness   Left Knee   Tenderness in the medial joint line.     Right Knee  "  Tenderness in the medial joint line.     Active Range of Motion   Left Knee   Flexion: 113 degrees with pain  Extension: WFL and with pain    Right Knee   Flexion: 116 degrees with pain  Extension: WFL and with pain    Passive Range of Motion   Left Hip   Flexion: 95 degrees     Right Hip   Flexion: 95 degrees   Left Knee   Flexion: 100 degrees with pain  Extension: -4 degrees with pain    Right Knee   Flexion: 105 degrees with pain  Extension: -2 degrees with pain    Strength/Myotome Testing     Left Hip   Planes of Motion   Flexion: 4+  Abduction: 4  Adduction: 4+    Right Hip   Planes of Motion   Flexion: 4+  Abduction: 4  Adduction: 4+    Left Knee   Flexion: 4  Extension: 4    Right Knee   Flexion: 4  Extension: 4+    Left Ankle/Foot   Dorsiflexion: 4+  Plantar flexion: 4+    Right Ankle/Foot   Dorsiflexion: 4+  Plantar flexion: 4+    Tests     Left Hip   SLR: Positive.     Right Hip   SLR: Positive.     Additional Tests Details  SLR: L: 60, R: 65    Ambulation   Weight-Bearing Status   Weight-Bearing Status (Left): weight-bearing as tolerated   Weight-Bearing Status (Right): weight-bearing as tolerated      Ambulation: Level Surfaces   Ambulation without assistive device: independent    Ambulation: Stairs   Ascend stairs: contact guard assist  Pattern: non-reciprocal  Railings: one rail  Descend stairs: contact guard assist  Pattern: non-reciprocal  Railings: one rail  Curbs: independent    Observational Gait   Gait: antalgic   Decreased walking speed and stride length.         Manuals 3/7 2-20 2/22 2/27 3/5   Jamie calf, hs, glute stretch JA EC Declined   perform NV JK Declined   Jamie knee PROM JA EC Declined   perform NV JK Declined           Neuro Re-Ed        Balance as appropriate                                Ther Ex  2-20 2-22     SP02  HR  91%  121 bpm 90%   95 bpm 92%  120 bpm 96%  81 bpm   Nustep Held L2  10'  L2 10' L2 10' L2 7'   Quad set 5\"x15 jamie 5\" x 15 jamie 5\"x15 jamie 5\" x 15 Jamie    Heel slide " "1x15 jamie 1x15 jamie 1x15 jamie 1x15 Jamie    LAQ 3\"x15 jamie 3\" x 15 jamie 3\"x15 jamie 3\"x15 jamie 3\"x15 jamie   HS curls Green x20 jamie Red x 15 Jamie NV Red x 15 Jamie Green x15 jamie   SLR        Hip add   Pt declined       Hip abd Supine Green 5\" 1x15 Declined Supine green 5\" 1x15 Supine Green 5\" 1x15  Declined   Bridges 3\"x10 3\"x 10  3\" x10 3\"x10 Declined   Clamshells        S/L hip abd        Standing hip flex/ext/abd 1x15 ea jamie Flex/abd   1x15 ea Jamie   @ rail NV 1x15 ea Jamie  1x15 ea   Squat        Leg press        LF hip abd/add        Reviewed equipment for wellness 5 min               Ther Activity        Step up/down        Sit to stands        Gait Training                        Modalities        CP                     "

## 2024-03-08 ENCOUNTER — OFFICE VISIT (OUTPATIENT)
Dept: BARIATRICS | Facility: CLINIC | Age: 59
End: 2024-03-08
Payer: MEDICARE

## 2024-03-08 VITALS
RESPIRATION RATE: 20 BRPM | HEART RATE: 110 BPM | TEMPERATURE: 98.6 F | DIASTOLIC BLOOD PRESSURE: 82 MMHG | HEIGHT: 64 IN | WEIGHT: 270.2 LBS | BODY MASS INDEX: 46.13 KG/M2 | SYSTOLIC BLOOD PRESSURE: 136 MMHG | OXYGEN SATURATION: 95 %

## 2024-03-08 DIAGNOSIS — G47.33 OSA AND COPD OVERLAP SYNDROME (HCC): ICD-10-CM

## 2024-03-08 DIAGNOSIS — E66.01 MORBID OBESITY WITH BMI OF 45.0-49.9, ADULT (HCC): Primary | Chronic | ICD-10-CM

## 2024-03-08 DIAGNOSIS — E11.65 TYPE 2 DIABETES MELLITUS WITH HYPERGLYCEMIA, WITH LONG-TERM CURRENT USE OF INSULIN (HCC): ICD-10-CM

## 2024-03-08 DIAGNOSIS — Z79.4 TYPE 2 DIABETES MELLITUS WITH HYPERGLYCEMIA, WITH LONG-TERM CURRENT USE OF INSULIN (HCC): ICD-10-CM

## 2024-03-08 DIAGNOSIS — J44.9 OSA AND COPD OVERLAP SYNDROME (HCC): ICD-10-CM

## 2024-03-08 PROCEDURE — 99214 OFFICE O/P EST MOD 30 MIN: CPT | Performed by: PHYSICIAN ASSISTANT

## 2024-03-08 NOTE — ASSESSMENT & PLAN NOTE
Lab Results   Component Value Date    HGBA1C 7.5 (H) 02/09/2024   -managed by Endo. HgbA1c much improved  -On Glargine and Humalog, Ozempic and Metformin ( will be coming off this, down to 1 tab per day). Insulin requirements have been reduced. Ozempic dose to increase to 2mg  -avoid/limit refined carbohydrates

## 2024-03-08 NOTE — ASSESSMENT & PLAN NOTE
-Patient is pursuing Conservative Program  -Initial weight loss goal of 5-10% weight loss for improved health  -Screening labs: up to date  -dietary recall reviewed, suggestions provided. Encouraged continue to reduce refined carbohydrate intake. Has done better with this since last visit. Has a goal to incorporate a veggie with each meal. Advised not to skip meals  -On Ozempic and will be starting 2mg dose soon. Being precribed by Endo  -declines to see SW. Struggles with overeating even when feeling full    Initial: 277. 8 lbs  Current: 270.2 lbs  Change: -7.6 lbs  Goal: lose 100 lbs

## 2024-03-08 NOTE — ASSESSMENT & PLAN NOTE
-On Bipap  -patient reports compliance  -can improve with weight loss  -continue ongoing follow up with pulmonary

## 2024-03-08 NOTE — PATIENT INSTRUCTIONS
Goals:    Food log (ie.) [email or web address],etc.   No sugary beverages. At least 64oz of water daily.  Increase physical activity by 10 minutes daily. Gradually increase physical activity to a goal of 5 days per week for 30 minutes of MODERATE intensity PLUS 2 days per week of FULL BODY resistance training  1500 calories per day  5-10 servings of fruits and vegetables per day  25-35 grams of dietary fiber per day

## 2024-03-08 NOTE — PROGRESS NOTES
Assessment/Plan:    Morbid obesity with BMI of 45.0-49.9, adult ([facility])   -Patient is pursuing Conservative Program  -Initial weight loss goal of 5-10% weight loss for improved health  -Screening labs: up to date  -dietary recall reviewed, suggestions provided. Encouraged continue to reduce refined carbohydrate intake. Has done better with this since last visit. Has a goal to incorporate a veggie with each meal. Advised not to skip meals  -On Ozempic and will be starting 2mg dose soon. Being precribed by Endo  -declines to see SW. Struggles with overeating even when feeling full    Initial: 277. 8 lbs  Current: 270.2 lbs  Change: -7.6 lbs  Goal: lose 100 lbs    Type 2 diabetes mellitus with hyperglycemia, with long-term current use of insulin ([facility])    Lab Results   Component Value Date    HGBA1C 7.5 (H) 02/09/2024   -managed by Endo. HgbA1c much improved  -On Glargine and Humalog, Ozempic and Metformin ( will be coming off this, down to 1 tab per day). Insulin requirements have been reduced. Ozempic dose to increase to 2mg  -avoid/limit refined carbohydrates        MARSHA and COPD overlap syndrome (HCC)  -On Bipap  -patient reports compliance  -can improve with weight loss  -continue ongoing follow up with pulmonary    Goals:    Food log (ie.) [email or web address],etc.   No sugary beverages. At least 64oz of water daily.  Increase physical activity by 10 minutes daily. Gradually increase physical activity to a goal of 5 days per week for 30 minutes of MODERATE intensity PLUS 2 days per week of FULL BODY resistance training  1500 calories per day  5-10 servings of fruits and vegetables per day  25-35 grams of dietary fiber per day      Follow up in approximately 3 months with Non-Surgical Physician/Advanced Practitioner.     Diagnoses and all orders for this visit:    Morbid obesity with BMI of 45.0-49.9, adult ([facility])    Type 2 diabetes mellitus with hyperglycemia, with long-term  "current use of insulin (HCC)    MARSHA and COPD overlap syndrome (HCC)          Subjective:   Chief Complaint   Patient presents with    Follow-up        Patient ID: Luis F Velazquez  is a 58 y.o. male with excess weight/obesity here to pursue weight managment.  Patient is pursuing Conservative Program.     HPI Patient presents for MWM follow up. On Ozempic 1mg and will be increasing 2 mg. Reports he is trying to be more mindful with listening to his hunger/fullness cues. Reports he is back on Cymbalta and he is feeling improved emotionally. Experiencing a lot of pain in his knees which limits his mobility. In PT 2x per week. Trying to stick with whole wheat bread    Hydration: 4-5 bottles of water, Coffee + nondairy creamer, bought lemonade yesterday    Wakes 6am  B(9-10) skips or 3 eggs + 2 slices of wheat toast + 2 links sausage  S: pretzels but has cut back since initial visit. Used to eat large bag  D: chicken noodle soup - cut back on portions  S: beef jerky or celery + salt or green peppers    Wt Readings from Last 10 Encounters:   03/08/24 123 kg (270 lb 3.2 oz)   02/29/24 124 kg (273 lb)   02/16/24 124 kg (273 lb)   02/15/24 127 kg (279 lb 12.8 oz)   02/12/24 124 kg (274 lb 3.2 oz)   02/09/24 124 kg (274 lb)   02/07/24 124 kg (274 lb 4.8 oz)   01/10/24 123 kg (271 lb)   01/04/24 123 kg (271 lb)   12/08/23 126 kg (277 lb 12.8 oz)          The following portions of the patient's history were reviewed and updated as appropriate: allergies, current medications, past family history, past medical history, past social history, past surgical history, and problem list.    Review of Systems   Respiratory:  Positive for cough and shortness of breath.    Cardiovascular:  Negative for chest pain and palpitations.       Objective:    /82 (BP Location: Right arm, Patient Position: Sitting, Cuff Size: Large)   Pulse (!) 110   Temp 98.6 °F (37 °C)   Resp 20   Ht 5' 4\" (1.626 m)   Wt 123 kg (270 lb 3.2 oz)   SpO2 95%  "  BMI 46.38 kg/m²      Physical Exam  Vitals and nursing note reviewed.      Constitutional   General appearance: Abnormal.  well developed and morbidly obese.   Eyes No conjunctival pallor.   Ears, Nose, Mouth, and Throat Oral mucosa moist.   Pulmonary   Respiratory effort: No increased work of breathing or signs of respiratory distress.    Auscultation of lungs: + wheezes, no rales, no rhonci.    Cardiovascular   Auscultation of heart: Normal rate and rhythm, normal S1 and S2, without murmurs.    Examination of extremities for edema and/or varicosities: Normal.  no edema.   Abdomen   Abdomen: Abnormal.  The abdomen was obese. Bowel sounds were normal. The abdomen was soft and nontender.   Musculoskeletal   Gait and station: Normal.    Psychiatric   Orientation to person, place and time: Normal.    Affect: appropriate

## 2024-03-12 DIAGNOSIS — I10 ESSENTIAL HYPERTENSION: ICD-10-CM

## 2024-03-12 RX ORDER — HYDROCHLOROTHIAZIDE 25 MG/1
25 TABLET ORAL 2 TIMES DAILY
Qty: 180 TABLET | Refills: 1 | Status: SHIPPED | OUTPATIENT
Start: 2024-03-12

## 2024-03-13 ENCOUNTER — OFFICE VISIT (OUTPATIENT)
Dept: PAIN MEDICINE | Facility: CLINIC | Age: 59
End: 2024-03-13
Payer: MEDICARE

## 2024-03-13 VITALS
SYSTOLIC BLOOD PRESSURE: 97 MMHG | WEIGHT: 270 LBS | HEIGHT: 64 IN | HEART RATE: 111 BPM | BODY MASS INDEX: 46.1 KG/M2 | DIASTOLIC BLOOD PRESSURE: 67 MMHG

## 2024-03-13 DIAGNOSIS — G89.29 CHRONIC RIGHT-SIDED LOW BACK PAIN WITHOUT SCIATICA: ICD-10-CM

## 2024-03-13 DIAGNOSIS — M53.3 CHRONIC RIGHT SACROILIAC JOINT PAIN: Primary | ICD-10-CM

## 2024-03-13 DIAGNOSIS — G89.29 CHRONIC RIGHT SACROILIAC JOINT PAIN: Primary | ICD-10-CM

## 2024-03-13 DIAGNOSIS — M54.50 CHRONIC RIGHT-SIDED LOW BACK PAIN WITHOUT SCIATICA: ICD-10-CM

## 2024-03-13 DIAGNOSIS — G89.4 CHRONIC PAIN SYNDROME: ICD-10-CM

## 2024-03-13 PROCEDURE — 99214 OFFICE O/P EST MOD 30 MIN: CPT | Performed by: STUDENT IN AN ORGANIZED HEALTH CARE EDUCATION/TRAINING PROGRAM

## 2024-03-13 NOTE — PROGRESS NOTES
"Assessment:  1. Chronic right sacroiliac joint pain    2. Chronic right-sided low back pain without sciatica    3. Chronic pain syndrome        Portions of the record may have been created with voice recognition software. Occasional wrong word or \"sound a like\" substitutions may have occurred due to the inherent limitations of voice recognition software. Read the chart carefully and recognize, using context, where substitutions have occurred. Contact me with any questions.       Plan:  58-year-old male here for follow-up visit with regards to chronic low back pain symptoms.  Since last visit, he underwent trigger point injections and notes persistent, 25% improvement in symptoms with this.  Notes residual symptoms continue to impact function and ambulation.  Chronic low back pain symptoms likely multifactorial in the setting of DISH, sacroiliac joint pain, myofascial pain.    Will schedule for right sacroiliac joint injection.    Recommend continuing home exercise program.    Follow-up in 1 month after injection.        Complete risks and benefits including bleeding, infection, tissue reaction, nerve injury and allergic reaction were discussed. The approach was demonstrated using models and literature was provided. Verbal and written consent was obtained.    My impressions and treatment recommendations were discussed in detail with the patient who verbalized understanding and had no further questions.  Discharge instructions were provided. I personally saw and examined the patient and I agree with the above discussed plan of care.    Orders Placed This Encounter   Procedures    FL spine and pain procedure     Standing Status:   Future     Standing Expiration Date:   3/13/2028     Order Specific Question:   Reason for Exam:     Answer:   right sacroiliac joint injection     Order Specific Question:   Anticoagulant hold needed?     Answer:   no         History of Present Illness:  Luis F Velazquez is a 58 y.o. male who " presents for a follow up office visit in regards to Back Pain and Hip Pain.       I have personally reviewed and/or updated the patient's past medical history, past surgical history, family history, social history, current medications, allergies, and vital signs today.       Review of Systems   Constitutional:  Negative for fever.   HENT:  Negative for sinus pain.    Eyes:  Negative for redness.   Respiratory:  Positive for shortness of breath.    Cardiovascular:  Negative for palpitations.   Gastrointestinal:  Negative for abdominal pain and nausea.   Endocrine: Negative for polydipsia.   Genitourinary:  Negative for difficulty urinating.   Musculoskeletal:  Positive for gait problem. Negative for myalgias.        Decreased ROM   Skin:  Negative for rash.   Neurological:  Positive for dizziness. Negative for seizures and headaches.   Psychiatric/Behavioral:  Negative for decreased concentration. The patient is not nervous/anxious.        Patient Active Problem List   Diagnosis    Allergic rhinitis    Arthritis    Chronic back pain    Chronic sinusitis    COPD with acute exacerbation (HCC)    Depression with anxiety    Essential (primary) hypertension    Acquired hypothyroidism    Morbid obesity with BMI of 45.0-49.9, adult (HCC)    Diabetic polyneuropathy associated with type 2 diabetes mellitus (HCC)    Vitamin D deficiency    Bilateral lower leg cellulitis    Elevated LFTs    Elevated lactic acid level    Tobacco abuse, in remission    Noncompliance with diabetes treatment    Acute respiratory failure with hypoxia and hypercapnia (HCC)    Epistaxis    MARSHA and COPD overlap syndrome (HCC)    Muscle twitching    Type 2 diabetes mellitus with hyperglycemia, with long-term current use of insulin (HCC)    Tobacco use    Atelectasis    Urinary retention    Constipation    Depression, recurrent (HCC)    Peripheral vascular disease (HCC)    Primary osteoarthritis of both knees    Hyperlipidemia    Pleural effusion, left     Bright red rectal bleeding    ABLA (acute blood loss anemia)    Microcytic anemia    Fatty liver disease, nonalcoholic    Umbilical hernia without obstruction and without gangrene    Restrictive lung disease    Diabetic nephropathy associated with type 2 diabetes mellitus (HCC)    Proteinuria    Chronic kidney disease, stage 2 (mild)    Neuropathy    Chronic respiratory failure with hypoxia, on home oxygen therapy     Urinary hesitancy due to benign prostatic hyperplasia    Hypo-osmolality and hyponatremia    LAMB (dyspnea on exertion)    MARSHA treated with BiPAP       Past Medical History:   Diagnosis Date    Acquired hypothyroidism 2015    Angioedema     Diabetes mellitus (HCC)     Disease of thyroid gland     Hyperlipidemia     Hypertension     Mixed hyperlipidemia 2015    Morbid obesity (HCC)     Morbid obesity with BMI of 45.0-49.9, adult ([facility]) 2015    MARSHA (obstructive sleep apnea)     Primary hypertension 2015    Transitioned From: Benign essential hypertension       Past Surgical History:   Procedure Laterality Date    KNEE SURGERY      SINUS SURGERY         Family History   Problem Relation Age of Onset    Diabetes Mother     Diabetes type II Mother     Esophageal cancer Father     Diabetes Brother     Kidney cancer Brother     Diabetes type II Brother     Diabetes Maternal Grandmother     Diabetes type II Maternal Grandmother     Diabetes Paternal Grandmother     Heart disease Neg Hx        Social History     Occupational History    Not on file   Tobacco Use    Smoking status: Former     Current packs/day: 0.00     Types: Cigarettes     Quit date: 10/21/2022     Years since quittin.3    Smokeless tobacco: Never    Tobacco comments:     states read to quit prior to admit   Vaping Use    Vaping status: Never Used   Substance and Sexual Activity    Alcohol use: Not Currently     Alcohol/week: 3.0 standard drinks of alcohol     Types: 2 Cans of beer, 1 Shots of liquor per week     Comment:  "No alcohol since 2015    Drug use: No    Sexual activity: Not Currently       Current Outpatient Medications on File Prior to Visit   Medication Sig    albuterol (2.5 mg/3 mL) 0.083 % nebulizer solution Take 3 mL (2.5 mg total) by nebulization every 6 (six) hours as needed for wheezing or shortness of breath    Ascorbic Acid (VITAMIN C PO) Take 1 tablet by mouth daily    aspirin 81 mg chewable tablet Take one tablet by mouth daily    benzonatate (TESSALON PERLES) 100 mg capsule Take 1 capsule (100 mg total) by mouth 3 (three) times a day as needed for cough    cholecalciferol (VITAMIN D3) 400 units tablet Take 1 tablet by mouth daily    DULoxetine (CYMBALTA) 60 mg delayed release capsule Take 1 capsule (60 mg total) by mouth daily    EPINEPHrine (EPIPEN) 0.3 mg/0.3 mL SOAJ INJECT 0.3MG INTO A MUSCLE ONCE FOR 1 DOSE    ferrous gluconate (FERGON) 324 mg tablet Take 324 mg by mouth    gabapentin (NEURONTIN) 100 mg capsule TAKE 1 CAPSULE BY MOUTH THREE TIMES DAILY    HumaLOG KwikPen 100 units/mL injection pen INJECT 20 UNITS UNDER THE SKIN THREE TIMES DAILY WITH MEALS (Patient taking differently: Inject 20 Units under the skin 3 (three) times a day with meals)    hydroCHLOROthiazide 25 mg tablet Take 1 tablet by mouth twice daily    Insulin Glargine Solostar (Lantus SoloStar) 100 UNIT/ML SOPN Inject 0.24 mL (24 Units total) under the skin daily    Insulin Pen Needle (Pen Needles 3/16\") 31G X 5 MM MISC Use 4 (four) times a day    ipratropium (ATROVENT) 0.02 % nebulizer solution Take 2.5 mL (0.5 mg total) by nebulization every 6 (six) hours as needed for wheezing or shortness of breath    ipratropium-albuterol (DUO-NEB) 0.5-2.5 mg/3 mL nebulizer solution Take 3 mL by nebulization every 6 (six) hours as needed for wheezing or shortness of breath    levothyroxine 175 mcg tablet Take 1 tablet by mouth once daily    loratadine (CLARITIN) 10 mg tablet Take 10 mg by mouth daily    metFORMIN (GLUCOPHAGE) 1000 MG tablet Take 1 " "tablet (1,000 mg total) by mouth 2 (two) times a day    Omega-3 Fatty Acids (fish oil) 1,000 mg Take 1,000 mg by mouth 2 (two) times a day    pregabalin (LYRICA) 25 mg capsule Take 1 capsule by mouth twice daily    rosuvastatin (CRESTOR) 40 MG tablet Take 1 tablet by mouth once daily    semaglutide, 2 mg/dose, (Ozempic, 2 MG/DOSE,) 8 mg/ mL injection pen Inject 0.75 mL (2 mg total) under the skin every 7 days    sodium chloride (OCEAN) 0.65 % nasal spray 1 spray into each nostril as needed for rhinitis    tamsulosin (FLOMAX) 0.4 mg Take 1 capsule (0.4 mg total) by mouth daily with dinner    Thiamine HCl (vitamin B-1) 250 MG tablet Take 250 mg by mouth daily    traZODone (DESYREL) 150 mg tablet Take 1 tablet (150 mg total) by mouth daily at bedtime    valsartan (DIOVAN) 160 mg tablet Take 160 mg by mouth daily    Ventolin  (90 Base) MCG/ACT inhaler INHALE 2 PUFFS BY MOUTH EVERY 4 HOURS AS NEEDED FOR WHEEZING FOR SHORTNESS OF BREATH    vitamin E, tocopherol, 400 units capsule Take 400 Units by mouth daily    [DISCONTINUED] atorvastatin (LIPITOR) 20 mg tablet Take 1 tablet by mouth once daily     No current facility-administered medications on file prior to visit.       Allergies   Allergen Reactions    Ace Inhibitors Swelling     Angioedema    PT STATES THIS IS NOT AN ALLERGY    Other Anaphylaxis     Pt believes that he is allergic to peanut butter.    Also, seasonal allergies    Zinc Tongue Swelling     PT STATES THIS IS NOT AN ALLERGY    Clindamycin Edema     Had angioedema episode approx 5 hr after IM administration of clindamycin. Unclear if there is definitive causal relationship.  PT STATES THIS IS NOT AN ALLERGY       Physical Exam:    BP 97/67   Pulse (!) 111   Ht 5' 4\" (1.626 m)   Wt 122 kg (270 lb)   BMI 46.35 kg/m²     Constitutional:normal, well developed, well nourished, alert, in no distress and non-toxic and no overt pain behavior.  Eyes:anicteric  HEENT:grossly intact  Neck:supple, " symmetric, trachea midline and no masses   Pulmonary:even and unlabored  Cardiovascular:No edema or pitting edema present  Skin:Normal without rashes or lesions and well hydrated  Psychiatric:Mood and affect appropriate  Neurologic:Cranial Nerves II-XII grossly intact  Musculoskeletal:normal gait, grossly intact strength and sensation to light touch over BLE, SIJ maneuvers: + R sacral compression, + R NAHUM, + R thigh thrust    Imaging    LUMBAR SPINE     INDICATION:   M54.50: Low back pain, unspecified  G89.29: Other chronic pain.     COMPARISON:  None     VIEWS:  XR SPINE LUMBAR MINIMUM 4 VIEWS NON INJURY        FINDINGS:     There are 5 non rib bearing lumbar vertebral bodies.      There is no evidence of acute fracture or destructive osseous lesion.     Alignment is unremarkable.      Multilevel vertebral endplate spondylosis with relative preservation of disc spaces, consistent with diffuse idiopathic skeletal hyperostosis (DISH).       The pedicles appear intact.     There are atherosclerotic calcifications. Soft tissues are otherwise unremarkable.     IMPRESSION:     Findings compatible with DISH.

## 2024-03-25 ENCOUNTER — HOSPITAL ENCOUNTER (OUTPATIENT)
Dept: RADIOLOGY | Facility: HOSPITAL | Age: 59
Discharge: HOME | End: 2024-03-25
Attending: STUDENT IN AN ORGANIZED HEALTH CARE EDUCATION/TRAINING PROGRAM
Payer: MEDICARE

## 2024-03-25 VITALS
HEART RATE: 103 BPM | SYSTOLIC BLOOD PRESSURE: 141 MMHG | RESPIRATION RATE: 20 BRPM | TEMPERATURE: 97.1 F | DIASTOLIC BLOOD PRESSURE: 89 MMHG | OXYGEN SATURATION: 94 %

## 2024-03-25 DIAGNOSIS — M53.3 CHRONIC RIGHT SACROILIAC JOINT PAIN: ICD-10-CM

## 2024-03-25 DIAGNOSIS — G89.29 CHRONIC RIGHT SACROILIAC JOINT PAIN: ICD-10-CM

## 2024-03-25 PROCEDURE — 27096 INJECT SACROILIAC JOINT: CPT | Performed by: STUDENT IN AN ORGANIZED HEALTH CARE EDUCATION/TRAINING PROGRAM

## 2024-03-25 RX ORDER — LIDOCAINE HYDROCHLORIDE 10 MG/ML
3 INJECTION, SOLUTION EPIDURAL; INFILTRATION; INTRACAUDAL; PERINEURAL ONCE
Status: COMPLETED | OUTPATIENT
Start: 2024-03-25 | End: 2024-03-25

## 2024-03-25 RX ORDER — METHYLPREDNISOLONE ACETATE 40 MG/ML
40 INJECTION, SUSPENSION INTRA-ARTICULAR; INTRALESIONAL; INTRAMUSCULAR; PARENTERAL; SOFT TISSUE ONCE
Status: COMPLETED | OUTPATIENT
Start: 2024-03-25 | End: 2024-03-25

## 2024-03-25 RX ORDER — ROPIVACAINE HYDROCHLORIDE 2 MG/ML
1 INJECTION, SOLUTION EPIDURAL; INFILTRATION; PERINEURAL ONCE
Status: COMPLETED | OUTPATIENT
Start: 2024-03-25 | End: 2024-03-25

## 2024-03-25 RX ADMIN — LIDOCAINE HYDROCHLORIDE 3 ML: 10 INJECTION, SOLUTION EPIDURAL; INFILTRATION; INTRACAUDAL; PERINEURAL at 09:39

## 2024-03-25 RX ADMIN — METHYLPREDNISOLONE ACETATE 40 MG: 40 INJECTION, SUSPENSION INTRA-ARTICULAR; INTRALESIONAL; INTRAMUSCULAR; PARENTERAL; SOFT TISSUE at 09:40

## 2024-03-25 RX ADMIN — IOHEXOL 0.5 ML: 300 INJECTION, SOLUTION INTRAVENOUS at 09:40

## 2024-03-25 RX ADMIN — ROPIVACAINE HYDROCHLORIDE 1 ML: 2 INJECTION, SOLUTION EPIDURAL; INFILTRATION; PERINEURAL at 09:40

## 2024-03-25 NOTE — H&P
History of Present Illness: The patient is a 58 y.o. male who presents with complaints of low back pain.    Past Medical History:   Diagnosis Date    Acquired hypothyroidism 1/2/2015    Angioedema     Diabetes mellitus (HCC)     Disease of thyroid gland     Hyperlipidemia     Hypertension     Mixed hyperlipidemia 2/4/2015    Morbid obesity (HCC)     Morbid obesity with BMI of 45.0-49.9, adult (HCC) 1/2/2015    MARSHA (obstructive sleep apnea)     Primary hypertension 1/2/2015    Transitioned From: Benign essential hypertension       Past Surgical History:   Procedure Laterality Date    KNEE SURGERY      SINUS SURGERY           Current Outpatient Medications:     albuterol (2.5 mg/3 mL) 0.083 % nebulizer solution, Take 3 mL (2.5 mg total) by nebulization every 6 (six) hours as needed for wheezing or shortness of breath, Disp: 180 mL, Rfl: 0    Ascorbic Acid (VITAMIN C PO), Take 1 tablet by mouth daily, Disp: , Rfl:     aspirin 81 mg chewable tablet, Take one tablet by mouth daily, Disp: , Rfl:     benzonatate (TESSALON PERLES) 100 mg capsule, Take 1 capsule (100 mg total) by mouth 3 (three) times a day as needed for cough, Disp: 20 capsule, Rfl: 2    cholecalciferol (VITAMIN D3) 400 units tablet, Take 1 tablet by mouth daily, Disp: , Rfl:     DULoxetine (CYMBALTA) 60 mg delayed release capsule, Take 1 capsule (60 mg total) by mouth daily, Disp: 90 capsule, Rfl: 1    EPINEPHrine (EPIPEN) 0.3 mg/0.3 mL SOAJ, INJECT 0.3MG INTO A MUSCLE ONCE FOR 1 DOSE, Disp: 2 each, Rfl: 0    ferrous gluconate (FERGON) 324 mg tablet, Take 324 mg by mouth, Disp: , Rfl:     gabapentin (NEURONTIN) 100 mg capsule, TAKE 1 CAPSULE BY MOUTH THREE TIMES DAILY, Disp: 90 capsule, Rfl: 5    HumaLOG KwikPen 100 units/mL injection pen, INJECT 20 UNITS UNDER THE SKIN THREE TIMES DAILY WITH MEALS (Patient taking differently: Inject 20 Units under the skin 3 (three) times a day with meals), Disp: 15 mL, Rfl: 0    hydroCHLOROthiazide 25 mg tablet, Take 1  "tablet by mouth twice daily, Disp: 180 tablet, Rfl: 1    Insulin Glargine Solostar (Lantus SoloStar) 100 UNIT/ML SOPN, Inject 0.24 mL (24 Units total) under the skin daily, Disp: 30 mL, Rfl: 1    Insulin Pen Needle (Pen Needles 3/16\") 31G X 5 MM MISC, Use 4 (four) times a day, Disp: 300 each, Rfl: 3    ipratropium (ATROVENT) 0.02 % nebulizer solution, Take 2.5 mL (0.5 mg total) by nebulization every 6 (six) hours as needed for wheezing or shortness of breath, Disp: 150 mL, Rfl: 0    ipratropium-albuterol (DUO-NEB) 0.5-2.5 mg/3 mL nebulizer solution, Take 3 mL by nebulization every 6 (six) hours as needed for wheezing or shortness of breath, Disp: 180 mL, Rfl: 0    levothyroxine 175 mcg tablet, Take 1 tablet by mouth once daily, Disp: 90 tablet, Rfl: 1    loratadine (CLARITIN) 10 mg tablet, Take 10 mg by mouth daily, Disp: , Rfl:     metFORMIN (GLUCOPHAGE) 1000 MG tablet, Take 1 tablet (1,000 mg total) by mouth 2 (two) times a day, Disp: 180 tablet, Rfl: 1    Omega-3 Fatty Acids (fish oil) 1,000 mg, Take 1,000 mg by mouth 2 (two) times a day, Disp: , Rfl:     pregabalin (LYRICA) 25 mg capsule, Take 1 capsule by mouth twice daily, Disp: 60 capsule, Rfl: 2    rosuvastatin (CRESTOR) 40 MG tablet, Take 1 tablet by mouth once daily, Disp: 90 tablet, Rfl: 3    semaglutide, 2 mg/dose, (Ozempic, 2 MG/DOSE,) 8 mg/ mL injection pen, Inject 0.75 mL (2 mg total) under the skin every 7 days, Disp: 3 mL, Rfl: 11    sodium chloride (OCEAN) 0.65 % nasal spray, 1 spray into each nostril as needed for rhinitis, Disp: 30 mL, Rfl: 1    tamsulosin (FLOMAX) 0.4 mg, Take 1 capsule (0.4 mg total) by mouth daily with dinner, Disp: 90 capsule, Rfl: 3    Thiamine HCl (vitamin B-1) 250 MG tablet, Take 250 mg by mouth daily, Disp: , Rfl:     traZODone (DESYREL) 150 mg tablet, Take 1 tablet (150 mg total) by mouth daily at bedtime, Disp: 90 tablet, Rfl: 1    valsartan (DIOVAN) 160 mg tablet, Take 160 mg by mouth daily, Disp: , Rfl:     Ventolin "  (90 Base) MCG/ACT inhaler, INHALE 2 PUFFS BY MOUTH EVERY 4 HOURS AS NEEDED FOR WHEEZING FOR SHORTNESS OF BREATH, Disp: 18 g, Rfl: 0    vitamin E, tocopherol, 400 units capsule, Take 400 Units by mouth daily, Disp: , Rfl:   No current facility-administered medications for this encounter.    Allergies   Allergen Reactions    Ace Inhibitors Swelling     Angioedema    PT STATES THIS IS NOT AN ALLERGY    Other Anaphylaxis     Pt believes that he is allergic to peanut butter.    Also, seasonal allergies    Zinc Tongue Swelling     PT STATES THIS IS NOT AN ALLERGY    Clindamycin Edema     Had angioedema episode approx 5 hr after IM administration of clindamycin. Unclear if there is definitive causal relationship.  PT STATES THIS IS NOT AN ALLERGY       Physical Exam:   Vitals:    03/25/24 0943   BP: 141/89   Pulse: 103   Resp: 20   Temp:    SpO2: 94%     General: Awake, Alert, Oriented x 3, Mood and affect appropriate  Respiratory: Respirations even and unlabored  Cardiovascular: Peripheral pulses intact; no edema  Musculoskeletal Exam: normal gait    ASA Score: 2    Patient/Chart Verification  Patient ID Verified: Verbal  ID Band Applied: No  Consents Confirmed: Procedural, To be obtained in the Pre-Procedure area  H&P( within 30 days) Verified: To be obtained in the Pre-Procedure area  Interval H&P(within 24 hr) Complete (required for Outpatients and Surgery Admit only): To be obtained in the Pre-Procedure area  Allergies Reviewed: Yes  Anticoag/NSAID held?: NA  Currently on antibiotics?: No  Pregnancy denied?: NA    Assessment:   1. Chronic right sacroiliac joint pain        Plan: right sacroiliac joint injection

## 2024-03-25 NOTE — DISCHARGE INSTR - LAB

## 2024-03-28 ENCOUNTER — PROCEDURE VISIT (OUTPATIENT)
Dept: OBGYN CLINIC | Facility: CLINIC | Age: 59
End: 2024-03-28
Payer: MEDICARE

## 2024-03-28 VITALS
HEART RATE: 93 BPM | HEIGHT: 64 IN | BODY MASS INDEX: 46.1 KG/M2 | SYSTOLIC BLOOD PRESSURE: 139 MMHG | WEIGHT: 270 LBS | DIASTOLIC BLOOD PRESSURE: 90 MMHG

## 2024-03-28 DIAGNOSIS — M17.0 PRIMARY OSTEOARTHRITIS OF BOTH KNEES: Primary | ICD-10-CM

## 2024-03-28 PROCEDURE — 20610 DRAIN/INJ JOINT/BURSA W/O US: CPT | Performed by: ORTHOPAEDIC SURGERY

## 2024-03-28 RX ORDER — HYALURONATE SODIUM 10 MG/ML
20 SYRINGE (ML) INTRAARTICULAR
Status: COMPLETED | OUTPATIENT
Start: 2024-03-28 | End: 2024-03-28

## 2024-03-28 RX ADMIN — Medication 20 MG: at 11:00

## 2024-03-28 NOTE — PROGRESS NOTES
ASSESSMENT/PLAN:    Diagnoses and all orders for this visit:    Primary osteoarthritis of both knees    Other orders  -     Large joint arthrocentesis        Both of the patient's knees were injected with his first set of Euflexxa.  He tolerated the injections quite well.  He will follow-up with our office next week for second set.  The patient is acceptable to this plan.    Return in about 1 week (around 4/4/2024).    The patient has arthritis of his bilateral knees.  Under aseptic technique, both knees were injected with first set of Euflexxa.  He tolerated procedure quite well.  Return back next week for second set      _____________________________________________________  CHIEF COMPLAINT:  Chief Complaint   Patient presents with    Left Knee - Follow-up     Euflexxa #1 Buy and Bill      Right Knee - Follow-up     Euflexxa #1 Buy and Bill         SUBJECTIVE:  Luis F Velazquez is a 58 y.o. male who presents to our office to begin viscosupplementation of both knees.  He was here today to start Euflexxa injections. Patient has failed at least three months of conservative therapy including corticosteroid injections and a home exercise program , patient symptoms include bilateral knee pain and worsening ambulatory dysfunction affecting ADLs, pain is rated at 8/10.  He denies any fever or chills.    The following portions of the patient's history were reviewed and updated as appropriate: allergies, current medications, past family history, past medical history, past social history, past surgical history and problem list.    PAST MEDICAL HISTORY:  Past Medical History:   Diagnosis Date    Acquired hypothyroidism 1/2/2015    Angioedema     Diabetes mellitus (HCC)     Disease of thyroid gland     Hyperlipidemia     Hypertension     Mixed hyperlipidemia 2/4/2015    Morbid obesity (HCC)     Morbid obesity with BMI of 45.0-49.9, adult (HCC) 1/2/2015    MARSHA (obstructive sleep apnea)     Primary hypertension 1/2/2015     Transitioned From: Benign essential hypertension       PAST SURGICAL HISTORY:  Past Surgical History:   Procedure Laterality Date    KNEE SURGERY      SINUS SURGERY         FAMILY HISTORY:  Family History   Problem Relation Age of Onset    Diabetes Mother     Diabetes type II Mother     Esophageal cancer Father     Diabetes Brother     Kidney cancer Brother     Diabetes type II Brother     Diabetes Maternal Grandmother     Diabetes type II Maternal Grandmother     Diabetes Paternal Grandmother     Heart disease Neg Hx        SOCIAL HISTORY:  Social History     Tobacco Use    Smoking status: Former     Current packs/day: 0.00     Types: Cigarettes     Quit date: 10/21/2022     Years since quittin.4    Smokeless tobacco: Never    Tobacco comments:     states read to quit prior to admit   Vaping Use    Vaping status: Never Used   Substance Use Topics    Alcohol use: Not Currently     Alcohol/week: 3.0 standard drinks of alcohol     Types: 2 Cans of beer, 1 Shots of liquor per week     Comment: No alcohol since     Drug use: No       MEDICATIONS:    Current Outpatient Medications:     albuterol (2.5 mg/3 mL) 0.083 % nebulizer solution, Take 3 mL (2.5 mg total) by nebulization every 6 (six) hours as needed for wheezing or shortness of breath, Disp: 180 mL, Rfl: 0    Ascorbic Acid (VITAMIN C PO), Take 1 tablet by mouth daily, Disp: , Rfl:     aspirin 81 mg chewable tablet, Take one tablet by mouth daily, Disp: , Rfl:     benzonatate (TESSALON PERLES) 100 mg capsule, Take 1 capsule (100 mg total) by mouth 3 (three) times a day as needed for cough, Disp: 20 capsule, Rfl: 2    cholecalciferol (VITAMIN D3) 400 units tablet, Take 1 tablet by mouth daily, Disp: , Rfl:     DULoxetine (CYMBALTA) 60 mg delayed release capsule, Take 1 capsule (60 mg total) by mouth daily, Disp: 90 capsule, Rfl: 1    EPINEPHrine (EPIPEN) 0.3 mg/0.3 mL SOAJ, INJECT 0.3MG INTO A MUSCLE ONCE FOR 1 DOSE, Disp: 2 each, Rfl: 0    ferrous gluconate  "(FERGON) 324 mg tablet, Take 324 mg by mouth, Disp: , Rfl:     gabapentin (NEURONTIN) 100 mg capsule, TAKE 1 CAPSULE BY MOUTH THREE TIMES DAILY, Disp: 90 capsule, Rfl: 5    HumaLOG KwikPen 100 units/mL injection pen, INJECT 20 UNITS UNDER THE SKIN THREE TIMES DAILY WITH MEALS (Patient taking differently: Inject 20 Units under the skin 3 (three) times a day with meals), Disp: 15 mL, Rfl: 0    hydroCHLOROthiazide 25 mg tablet, Take 1 tablet by mouth twice daily, Disp: 180 tablet, Rfl: 1    Insulin Glargine Solostar (Lantus SoloStar) 100 UNIT/ML SOPN, Inject 0.24 mL (24 Units total) under the skin daily, Disp: 30 mL, Rfl: 1    Insulin Pen Needle (Pen Needles 3/16\") 31G X 5 MM MISC, Use 4 (four) times a day, Disp: 300 each, Rfl: 3    ipratropium (ATROVENT) 0.02 % nebulizer solution, Take 2.5 mL (0.5 mg total) by nebulization every 6 (six) hours as needed for wheezing or shortness of breath, Disp: 150 mL, Rfl: 0    ipratropium-albuterol (DUO-NEB) 0.5-2.5 mg/3 mL nebulizer solution, Take 3 mL by nebulization every 6 (six) hours as needed for wheezing or shortness of breath, Disp: 180 mL, Rfl: 0    levothyroxine 175 mcg tablet, Take 1 tablet by mouth once daily, Disp: 90 tablet, Rfl: 1    loratadine (CLARITIN) 10 mg tablet, Take 10 mg by mouth daily, Disp: , Rfl:     metFORMIN (GLUCOPHAGE) 1000 MG tablet, Take 1 tablet (1,000 mg total) by mouth 2 (two) times a day, Disp: 180 tablet, Rfl: 1    Omega-3 Fatty Acids (fish oil) 1,000 mg, Take 1,000 mg by mouth 2 (two) times a day, Disp: , Rfl:     pregabalin (LYRICA) 25 mg capsule, Take 1 capsule by mouth twice daily, Disp: 60 capsule, Rfl: 2    rosuvastatin (CRESTOR) 40 MG tablet, Take 1 tablet by mouth once daily, Disp: 90 tablet, Rfl: 3    semaglutide, 2 mg/dose, (Ozempic, 2 MG/DOSE,) 8 mg/ mL injection pen, Inject 0.75 mL (2 mg total) under the skin every 7 days, Disp: 3 mL, Rfl: 11    sodium chloride (OCEAN) 0.65 % nasal spray, 1 spray into each nostril as needed for " "rhinitis, Disp: 30 mL, Rfl: 1    tamsulosin (FLOMAX) 0.4 mg, Take 1 capsule (0.4 mg total) by mouth daily with dinner, Disp: 90 capsule, Rfl: 3    Thiamine HCl (vitamin B-1) 250 MG tablet, Take 250 mg by mouth daily, Disp: , Rfl:     traZODone (DESYREL) 150 mg tablet, Take 1 tablet (150 mg total) by mouth daily at bedtime, Disp: 90 tablet, Rfl: 1    valsartan (DIOVAN) 160 mg tablet, Take 160 mg by mouth daily, Disp: , Rfl:     Ventolin  (90 Base) MCG/ACT inhaler, INHALE 2 PUFFS BY MOUTH EVERY 4 HOURS AS NEEDED FOR WHEEZING FOR SHORTNESS OF BREATH, Disp: 18 g, Rfl: 0    vitamin E, tocopherol, 400 units capsule, Take 400 Units by mouth daily, Disp: , Rfl:     ALLERGIES:  Allergies   Allergen Reactions    Ace Inhibitors Swelling     Angioedema    PT STATES THIS IS NOT AN ALLERGY    Other Anaphylaxis     Pt believes that he is allergic to peanut butter.    Also, seasonal allergies    Zinc Tongue Swelling     PT STATES THIS IS NOT AN ALLERGY    Clindamycin Edema     Had angioedema episode approx 5 hr after IM administration of clindamycin. Unclear if there is definitive causal relationship.  PT STATES THIS IS NOT AN ALLERGY       ROS:  Review of Systems     Constitutional: Negative for fatigue, fever or loss of appetite.   HENT: Negative.    Respiratory: Negative for shortness of breath, dyspnea.    Cardiovascular: Negative for chest pain/tightness.   Gastrointestinal: Negative for abdominal pain, N/V.   Endocrine: Negative for cold/heat intolerance, unexplained weight loss/gain.   Genitourinary: Negative for flank pain, dysuria, hematuria.   Musculoskeletal: Positive for arthralgia   Skin: Negative for rash.    Neurological: Negative for numbness or tingling  Psychiatric/Behavioral: Negative for agitation.  _____________________________________________________  PHYSICAL EXAMINATION:    Blood pressure 139/90, pulse 93, height 5' 4\" (1.626 m), weight 122 kg (270 lb).    Constitutional: Oriented to person, place, " "and time. Appears well-developed and well-nourished. No distress.   HENT:   Head: Normocephalic.   Eyes: Conjunctivae are normal. Right eye exhibits no discharge. Left eye exhibits no discharge. No scleral icterus.   Cardiovascular: Normal rate.    Pulmonary/Chest: Effort normal.   Neurological: Alert and oriented to person, place, and time.   Skin: Skin is warm and dry. No rash noted. Not diaphoretic. No erythema. No pallor.   Psychiatric: Normal mood and affect. Behavior is normal. Judgment and thought content normal.      MUSCULOSKELETAL EXAMINATION:   Physical Exam  Ortho Exam    Bilateral lower extremities are neurovascularly intact  Toes are pink and mobile   Compartments are soft  No warmth, erythema or ecchymosis  ROM of knees are from 5-115 degrees  Negative Lachman, drawer or pivot shift  No medial instability  Medial joint line tenderness, slight lateral joint line tenderness  Patellofemoral crepitation   Objective:  BP Readings from Last 1 Encounters:   03/28/24 139/90      Wt Readings from Last 1 Encounters:   03/28/24 122 kg (270 lb)        BMI:   Estimated body mass index is 46.35 kg/m² as calculated from the following:    Height as of this encounter: 5' 4\" (1.626 m).    Weight as of this encounter: 122 kg (270 lb).      PROCEDURES PERFORMED:  Large joint arthrocentesis: bilateral knee  Universal Protocol:  Consent: Verbal consent obtained.  Risks and benefits: risks, benefits and alternatives were discussed  Consent given by: patient  Patient understanding: patient states understanding of the procedure being performed  Site marked: the operative site was marked  Supporting Documentation  Indications: pain   Procedure Details  Location: knee - bilateral knee  Preparation: Patient was prepped and draped in the usual sterile fashion  Needle size: 22 G  Ultrasound guidance: no  Approach: lateral    Medications (Right): 20 mg Sodium Hyaluronate (Viscosup) 20 MG/2MLMedications (Left): 20 mg Sodium " Hyaluronate (Viscosup) 20 MG/2ML   Patient tolerance: patient tolerated the procedure well with no immediate complications  Dressing:  Sterile dressing applied            Scribe Attestation      I,:  Tye Stringer PA-C am acting as a scribe while in the presence of the attending physician.:       I,:  Manny Patel DO personally performed the services described in this documentation    as scribed in my presence.:

## 2024-04-01 ENCOUNTER — TELEPHONE (OUTPATIENT)
Dept: PAIN MEDICINE | Facility: CLINIC | Age: 59
End: 2024-04-01

## 2024-04-02 ENCOUNTER — TELEPHONE (OUTPATIENT)
Age: 59
End: 2024-04-02

## 2024-04-02 NOTE — TELEPHONE ENCOUNTER
Karen from [facility] calling requesting OV notes from pts 1/4/2024 if we can please fax to [phone]

## 2024-04-04 ENCOUNTER — PROCEDURE VISIT (OUTPATIENT)
Dept: OBGYN CLINIC | Facility: CLINIC | Age: 59
End: 2024-04-04
Payer: MEDICARE

## 2024-04-04 VITALS
DIASTOLIC BLOOD PRESSURE: 80 MMHG | BODY MASS INDEX: 46.1 KG/M2 | WEIGHT: 270 LBS | HEIGHT: 64 IN | HEART RATE: 96 BPM | SYSTOLIC BLOOD PRESSURE: 129 MMHG

## 2024-04-04 DIAGNOSIS — M17.0 PRIMARY OSTEOARTHRITIS OF BOTH KNEES: ICD-10-CM

## 2024-04-04 DIAGNOSIS — G89.29 CHRONIC PAIN OF BOTH KNEES: Primary | ICD-10-CM

## 2024-04-04 DIAGNOSIS — M25.562 CHRONIC PAIN OF BOTH KNEES: Primary | ICD-10-CM

## 2024-04-04 DIAGNOSIS — M25.561 CHRONIC PAIN OF BOTH KNEES: Primary | ICD-10-CM

## 2024-04-04 PROCEDURE — 20610 DRAIN/INJ JOINT/BURSA W/O US: CPT | Performed by: ORTHOPAEDIC SURGERY

## 2024-04-04 RX ORDER — HYALURONATE SODIUM 10 MG/ML
20 SYRINGE (ML) INTRAARTICULAR
Status: COMPLETED | OUTPATIENT
Start: 2024-04-04 | End: 2024-04-04

## 2024-04-04 RX ADMIN — Medication 20 MG: at 11:00

## 2024-04-04 NOTE — PROGRESS NOTES
Assessment:  1. Chronic pain of both knees        2. Primary osteoarthritis of both knees            Plan:  2nd bilateral Euflexxa  The patient was provided with 2nd bilateral Euflexxa injections.  The patient tolerated the procedure well.    The patient should follow up in one week.      The patient has arthritis of his bilateral knees.  Under aseptic technique, both knees were injected with Kenalog and Marcaine.  He tolerated procedures quite well.  Return back next week for his final set of injections    To do next visit:  Return in about 1 week (around 4/11/2024) for 3rd bilateral Euflexxa .    The above stated was discussed in layman's terms and the patient expressed understanding.  All questions were answered to the patient's satisfaction.       Scribe Attestation      I,:  Earle Gomez am acting as a scribe while in the presence of the attending physician.:       I,:  Manny Patel DO personally performed the services described in this documentation    as scribed in my presence.:               Subjective:   Luis F Velazquez is a 58 y.o. male who presents for 2nd bilateral Euflexxa.  Today he complains of bilateral generalized knee pain.  Prolonged weight bearing and repetitive bending aggravates while rest alleviates.        Review of systems negative unless otherwise specified in HPI    Past Medical History:   Diagnosis Date    Acquired hypothyroidism 1/2/2015    Angioedema     Diabetes mellitus (HCC)     Disease of thyroid gland     Hyperlipidemia     Hypertension     Mixed hyperlipidemia 2/4/2015    Morbid obesity (HCC)     Morbid obesity with BMI of 45.0-49.9, adult (HCC) 1/2/2015    MARSHA (obstructive sleep apnea)     Primary hypertension 1/2/2015    Transitioned From: Benign essential hypertension       Past Surgical History:   Procedure Laterality Date    KNEE SURGERY      SINUS SURGERY         Family History   Problem Relation Age of Onset    Diabetes Mother     Diabetes type II Mother     Esophageal  cancer Father     Diabetes Brother     Kidney cancer Brother     Diabetes type II Brother     Diabetes Maternal Grandmother     Diabetes type II Maternal Grandmother     Diabetes Paternal Grandmother     Heart disease Neg Hx        Social History     Occupational History    Not on file   Tobacco Use    Smoking status: Former     Current packs/day: 0.00     Types: Cigarettes     Quit date: 10/21/2022     Years since quittin.4    Smokeless tobacco: Never    Tobacco comments:     states read to quit prior to admit   Vaping Use    Vaping status: Never Used   Substance and Sexual Activity    Alcohol use: Not Currently     Alcohol/week: 3.0 standard drinks of alcohol     Types: 2 Cans of beer, 1 Shots of liquor per week     Comment: No alcohol since     Drug use: No    Sexual activity: Not Currently         Current Outpatient Medications:     albuterol (2.5 mg/3 mL) 0.083 % nebulizer solution, Take 3 mL (2.5 mg total) by nebulization every 6 (six) hours as needed for wheezing or shortness of breath, Disp: 180 mL, Rfl: 0    Ascorbic Acid (VITAMIN C PO), Take 1 tablet by mouth daily, Disp: , Rfl:     aspirin 81 mg chewable tablet, Take one tablet by mouth daily, Disp: , Rfl:     benzonatate (TESSALON PERLES) 100 mg capsule, Take 1 capsule (100 mg total) by mouth 3 (three) times a day as needed for cough, Disp: 20 capsule, Rfl: 2    cholecalciferol (VITAMIN D3) 400 units tablet, Take 1 tablet by mouth daily, Disp: , Rfl:     DULoxetine (CYMBALTA) 60 mg delayed release capsule, Take 1 capsule (60 mg total) by mouth daily, Disp: 90 capsule, Rfl: 1    EPINEPHrine (EPIPEN) 0.3 mg/0.3 mL SOAJ, INJECT 0.3MG INTO A MUSCLE ONCE FOR 1 DOSE, Disp: 2 each, Rfl: 0    ferrous gluconate (FERGON) 324 mg tablet, Take 324 mg by mouth, Disp: , Rfl:     gabapentin (NEURONTIN) 100 mg capsule, TAKE 1 CAPSULE BY MOUTH THREE TIMES DAILY, Disp: 90 capsule, Rfl: 5    HumaLOG KwikPen 100 units/mL injection pen, INJECT 20 UNITS UNDER THE SKIN  "THREE TIMES DAILY WITH MEALS (Patient taking differently: Inject 20 Units under the skin 3 (three) times a day with meals), Disp: 15 mL, Rfl: 0    hydroCHLOROthiazide 25 mg tablet, Take 1 tablet by mouth twice daily, Disp: 180 tablet, Rfl: 1    Insulin Glargine Solostar (Lantus SoloStar) 100 UNIT/ML SOPN, Inject 0.24 mL (24 Units total) under the skin daily, Disp: 30 mL, Rfl: 1    Insulin Pen Needle (Pen Needles 3/16\") 31G X 5 MM MISC, Use 4 (four) times a day, Disp: 300 each, Rfl: 3    ipratropium (ATROVENT) 0.02 % nebulizer solution, Take 2.5 mL (0.5 mg total) by nebulization every 6 (six) hours as needed for wheezing or shortness of breath, Disp: 150 mL, Rfl: 0    ipratropium-albuterol (DUO-NEB) 0.5-2.5 mg/3 mL nebulizer solution, Take 3 mL by nebulization every 6 (six) hours as needed for wheezing or shortness of breath, Disp: 180 mL, Rfl: 0    levothyroxine 175 mcg tablet, Take 1 tablet by mouth once daily, Disp: 90 tablet, Rfl: 1    loratadine (CLARITIN) 10 mg tablet, Take 10 mg by mouth daily, Disp: , Rfl:     metFORMIN (GLUCOPHAGE) 1000 MG tablet, Take 1 tablet (1,000 mg total) by mouth 2 (two) times a day, Disp: 180 tablet, Rfl: 1    Omega-3 Fatty Acids (fish oil) 1,000 mg, Take 1,000 mg by mouth 2 (two) times a day, Disp: , Rfl:     pregabalin (LYRICA) 25 mg capsule, Take 1 capsule by mouth twice daily, Disp: 60 capsule, Rfl: 2    rosuvastatin (CRESTOR) 40 MG tablet, Take 1 tablet by mouth once daily, Disp: 90 tablet, Rfl: 3    semaglutide, 2 mg/dose, (Ozempic, 2 MG/DOSE,) 8 mg/ mL injection pen, Inject 0.75 mL (2 mg total) under the skin every 7 days, Disp: 3 mL, Rfl: 11    sodium chloride (OCEAN) 0.65 % nasal spray, 1 spray into each nostril as needed for rhinitis, Disp: 30 mL, Rfl: 1    tamsulosin (FLOMAX) 0.4 mg, Take 1 capsule (0.4 mg total) by mouth daily with dinner, Disp: 90 capsule, Rfl: 3    Thiamine HCl (vitamin B-1) 250 MG tablet, Take 250 mg by mouth daily, Disp: , Rfl:     traZODone (DESYREL) " "150 mg tablet, Take 1 tablet (150 mg total) by mouth daily at bedtime, Disp: 90 tablet, Rfl: 1    valsartan (DIOVAN) 160 mg tablet, Take 160 mg by mouth daily, Disp: , Rfl:     Ventolin  (90 Base) MCG/ACT inhaler, INHALE 2 PUFFS BY MOUTH EVERY 4 HOURS AS NEEDED FOR WHEEZING FOR SHORTNESS OF BREATH, Disp: 18 g, Rfl: 0    vitamin E, tocopherol, 400 units capsule, Take 400 Units by mouth daily, Disp: , Rfl:     Allergies   Allergen Reactions    Ace Inhibitors Swelling     Angioedema    PT STATES THIS IS NOT AN ALLERGY    Other Anaphylaxis     Pt believes that he is allergic to peanut butter.    Also, seasonal allergies    Zinc Tongue Swelling     PT STATES THIS IS NOT AN ALLERGY    Clindamycin Edema     Had angioedema episode approx 5 hr after IM administration of clindamycin. Unclear if there is definitive causal relationship.  PT STATES THIS IS NOT AN ALLERGY            Vitals:    04/04/24 1110   BP: 129/80   Pulse: 96       Objective:  Physical exam  General: Awake, Alert, Oriented  Eyes: Pupils equal, round and reactive to light  Heart: regular rate and rhythm  Lungs: No audible wheezing  Abdomen: soft                    Ortho Exam  Bilateral knees:  No erythema or ecchymosis  No effusion or swelling  Normal strength  Good ROM with crepitus   Calf compartments soft and supple  Sensation intact  Toes are warm sensate and mobile      Diagnostics, reviewed and taken today if performed as documented:    None performed     Procedures, if performed today:    Large joint arthrocentesis: R knee  Universal Protocol:  Consent: Verbal consent obtained.  Risks and benefits: risks, benefits and alternatives were discussed  Consent given by: patient  Time out: Immediately prior to procedure a \"time out\" was called to verify the correct patient, procedure, equipment, support staff and site/side marked as required.  Timeout called at: 4/4/2024 11:33 AM.  Patient understanding: patient states understanding of the procedure " "being performed  Site marked: the operative site was marked  Patient identity confirmed: verbally with patient  Supporting Documentation  Indications: pain   Procedure Details  Location: knee - R knee  Preparation: Patient was prepped and draped in the usual sterile fashion  Needle size: 22 G  Ultrasound guidance: no  Approach: anterolateral  Medications administered: 20 mg Sodium Hyaluronate (Viscosup) 20 MG/2ML    Patient tolerance: patient tolerated the procedure well with no immediate complications  Dressing:  Sterile dressing applied      Large joint arthrocentesis: L knee  Universal Protocol:  Consent: Verbal consent obtained.  Risks and benefits: risks, benefits and alternatives were discussed  Consent given by: patient  Time out: Immediately prior to procedure a \"time out\" was called to verify the correct patient, procedure, equipment, support staff and site/side marked as required.  Timeout called at: 4/4/2024 11:34 AM.  Patient understanding: patient states understanding of the procedure being performed  Site marked: the operative site was marked  Patient identity confirmed: verbally with patient  Supporting Documentation  Indications: pain   Procedure Details  Location: knee - L knee  Preparation: Patient was prepped and draped in the usual sterile fashion  Needle size: 22 G  Ultrasound guidance: no  Approach: anterolateral  Medications administered: 20 mg Sodium Hyaluronate (Viscosup) 20 MG/2ML    Patient tolerance: patient tolerated the procedure well with no immediate complications  Dressing:  Sterile dressing applied              Portions of the record may have been created with voice recognition software.  Occasional wrong word or \"sound a like\" substitutions may have occurred due to the inherent limitations of voice recognition software.  Read the chart carefully and recognize, using context, where substitutions have occurred.    "

## 2024-04-11 ENCOUNTER — TELEPHONE (OUTPATIENT)
Dept: PAIN MEDICINE | Facility: CLINIC | Age: 59
End: 2024-04-11

## 2024-04-11 ENCOUNTER — PROCEDURE VISIT (OUTPATIENT)
Dept: OBGYN CLINIC | Facility: CLINIC | Age: 59
End: 2024-04-11
Payer: MEDICARE

## 2024-04-11 DIAGNOSIS — M17.0 PRIMARY OSTEOARTHRITIS OF BOTH KNEES: Primary | ICD-10-CM

## 2024-04-11 PROCEDURE — 20610 DRAIN/INJ JOINT/BURSA W/O US: CPT | Performed by: ORTHOPAEDIC SURGERY

## 2024-04-11 RX ORDER — HYALURONATE SODIUM 10 MG/ML
20 SYRINGE (ML) INTRAARTICULAR
Status: COMPLETED | OUTPATIENT
Start: 2024-04-11 | End: 2024-04-11

## 2024-04-11 RX ADMIN — Medication 20 MG: at 11:00

## 2024-04-11 NOTE — PROGRESS NOTES
Assessment/Plan:   Diagnoses and all orders for this visit:    Primary osteoarthritis of both knees  -     Large joint arthrocentesis: bilateral knee         He was offered and accepted Euflexxa visco supplementation injection series. An injection of Euflexxa was performed to his Bilateral knee(s) for symptomatic relief. He tolerated the procedure well.  Ice and post injection protocol was advised. Weightbearing activities, as tolerated. He will return for re-evaluation and consideration for cortisone injection in 3 months time. Patient expresses understanding and is in agreement with this treatment plan. The patient was given the opportunity to ask questions or present concerns.     Under aseptic technique, both knees were injected with his final set of Euflexxa.  He tolerated procedure quite well.  Return back in 3 months for reevaluation      Subjective:   Patient ID: Luis F Velazquez  1965     HPI  Patient is a 58 y.o. male who presents for re-evaluation and continuation of Euflexxa visco supplementation injection series for treatment of primary osteoarthritis of the Bilateral knee(s).  Patient was last seen in regards to this issue on 4/4/2024, at which time he received his second of three injections. On today's presentation he reports having experienced moderate interval improvement. He rates his pain level at 3-4/10. Patient denies any adverse reaction to previous injections including fever, chills, headache, nausea, dizziness, or malaise.      The following portions of the patient's history were reviewed and updated as appropriate:  Past medical history, past surgical history, Family history, social history, current medications and allergies    Past Medical History:   Diagnosis Date    Acquired hypothyroidism 1/2/2015    Angioedema     Diabetes mellitus (HCC)     Disease of thyroid gland     Hyperlipidemia     Hypertension     Mixed hyperlipidemia 2/4/2015    Morbid obesity (HCC)     Morbid obesity with  BMI of 45.0-49.9, adult ([facility]) 2015    MARSHA (obstructive sleep apnea)     Primary hypertension 2015    Transitioned From: Benign essential hypertension       Past Surgical History:   Procedure Laterality Date    KNEE SURGERY      SINUS SURGERY         Family History   Problem Relation Age of Onset    Diabetes Mother     Diabetes type II Mother     Esophageal cancer Father     Diabetes Brother     Kidney cancer Brother     Diabetes type II Brother     Diabetes Maternal Grandmother     Diabetes type II Maternal Grandmother     Diabetes Paternal Grandmother     Heart disease Neg Hx        Social History     Socioeconomic History    Marital status: Single     Spouse name: None    Number of children: None    Years of education: None    Highest education level: None   Occupational History    None   Tobacco Use    Smoking status: Former     Current packs/day: 0.00     Types: Cigarettes     Quit date: 10/21/2022     Years since quittin.4    Smokeless tobacco: Never    Tobacco comments:     states read to quit prior to admit   Vaping Use    Vaping status: Never Used   Substance and Sexual Activity    Alcohol use: Not Currently     Alcohol/week: 3.0 standard drinks of alcohol     Types: 2 Cans of beer, 1 Shots of liquor per week     Comment: No alcohol since     Drug use: No    Sexual activity: Not Currently   Other Topics Concern    None   Social History Narrative    None     Social Determinants of Health     Financial Resource Strain: High Risk (11/15/2023)    Overall Financial Resource Strain (CARDIA)     Difficulty of Paying Living Expenses: Very hard   Food Insecurity: No Food Insecurity (2023)    Hunger Vital Sign     Worried About Running Out of Food in the Last Year: Never true     Ran Out of Food in the Last Year: Never true   Transportation Needs: No Transportation Needs (11/15/2023)    PRAPARE - Transportation     Lack of Transportation (Medical): No     Lack of Transportation (Non-Medical): No  "  Physical Activity: Not on file   Stress: Not on file   Social Connections: Not on file   Intimate Partner Violence: Not on file   Housing Stability: Low Risk  (8/30/2023)    Housing Stability Vital Sign     Unable to Pay for Housing in the Last Year: No     Number of Places Lived in the Last Year: 1     Unstable Housing in the Last Year: No         Current Outpatient Medications:     albuterol (2.5 mg/3 mL) 0.083 % nebulizer solution, Take 3 mL (2.5 mg total) by nebulization every 6 (six) hours as needed for wheezing or shortness of breath, Disp: 180 mL, Rfl: 0    Ascorbic Acid (VITAMIN C PO), Take 1 tablet by mouth daily, Disp: , Rfl:     aspirin 81 mg chewable tablet, Take one tablet by mouth daily, Disp: , Rfl:     benzonatate (TESSALON PERLES) 100 mg capsule, Take 1 capsule (100 mg total) by mouth 3 (three) times a day as needed for cough, Disp: 20 capsule, Rfl: 2    cholecalciferol (VITAMIN D3) 400 units tablet, Take 1 tablet by mouth daily, Disp: , Rfl:     DULoxetine (CYMBALTA) 60 mg delayed release capsule, Take 1 capsule (60 mg total) by mouth daily, Disp: 90 capsule, Rfl: 1    EPINEPHrine (EPIPEN) 0.3 mg/0.3 mL SOAJ, INJECT 0.3MG INTO A MUSCLE ONCE FOR 1 DOSE, Disp: 2 each, Rfl: 0    ferrous gluconate (FERGON) 324 mg tablet, Take 324 mg by mouth, Disp: , Rfl:     gabapentin (NEURONTIN) 100 mg capsule, TAKE 1 CAPSULE BY MOUTH THREE TIMES DAILY, Disp: 90 capsule, Rfl: 5    HumaLOG KwikPen 100 units/mL injection pen, INJECT 20 UNITS UNDER THE SKIN THREE TIMES DAILY WITH MEALS (Patient taking differently: Inject 20 Units under the skin 3 (three) times a day with meals), Disp: 15 mL, Rfl: 0    hydroCHLOROthiazide 25 mg tablet, Take 1 tablet by mouth twice daily, Disp: 180 tablet, Rfl: 1    Insulin Glargine Solostar (Lantus SoloStar) 100 UNIT/ML SOPN, Inject 0.24 mL (24 Units total) under the skin daily, Disp: 30 mL, Rfl: 1    Insulin Pen Needle (Pen Needles 3/16\") 31G X 5 MM MISC, Use 4 (four) times a day, " Disp: 300 each, Rfl: 3    ipratropium (ATROVENT) 0.02 % nebulizer solution, Take 2.5 mL (0.5 mg total) by nebulization every 6 (six) hours as needed for wheezing or shortness of breath, Disp: 150 mL, Rfl: 0    ipratropium-albuterol (DUO-NEB) 0.5-2.5 mg/3 mL nebulizer solution, Take 3 mL by nebulization every 6 (six) hours as needed for wheezing or shortness of breath, Disp: 180 mL, Rfl: 0    levothyroxine 175 mcg tablet, Take 1 tablet by mouth once daily, Disp: 90 tablet, Rfl: 1    loratadine (CLARITIN) 10 mg tablet, Take 10 mg by mouth daily, Disp: , Rfl:     metFORMIN (GLUCOPHAGE) 1000 MG tablet, Take 1 tablet (1,000 mg total) by mouth 2 (two) times a day, Disp: 180 tablet, Rfl: 1    Omega-3 Fatty Acids (fish oil) 1,000 mg, Take 1,000 mg by mouth 2 (two) times a day, Disp: , Rfl:     pregabalin (LYRICA) 25 mg capsule, Take 1 capsule by mouth twice daily, Disp: 60 capsule, Rfl: 2    rosuvastatin (CRESTOR) 40 MG tablet, Take 1 tablet by mouth once daily, Disp: 90 tablet, Rfl: 3    semaglutide, 2 mg/dose, (Ozempic, 2 MG/DOSE,) 8 mg/ mL injection pen, Inject 0.75 mL (2 mg total) under the skin every 7 days, Disp: 3 mL, Rfl: 11    sodium chloride (OCEAN) 0.65 % nasal spray, 1 spray into each nostril as needed for rhinitis, Disp: 30 mL, Rfl: 1    tamsulosin (FLOMAX) 0.4 mg, Take 1 capsule (0.4 mg total) by mouth daily with dinner, Disp: 90 capsule, Rfl: 3    Thiamine HCl (vitamin B-1) 250 MG tablet, Take 250 mg by mouth daily, Disp: , Rfl:     traZODone (DESYREL) 150 mg tablet, Take 1 tablet (150 mg total) by mouth daily at bedtime, Disp: 90 tablet, Rfl: 1    valsartan (DIOVAN) 160 mg tablet, Take 160 mg by mouth daily, Disp: , Rfl:     Ventolin  (90 Base) MCG/ACT inhaler, INHALE 2 PUFFS BY MOUTH EVERY 4 HOURS AS NEEDED FOR WHEEZING FOR SHORTNESS OF BREATH, Disp: 18 g, Rfl: 0    vitamin E, tocopherol, 400 units capsule, Take 400 Units by mouth daily, Disp: , Rfl:     Allergies   Allergen Reactions    Ace  Inhibitors Swelling     Angioedema    PT STATES THIS IS NOT AN ALLERGY    Other Anaphylaxis     Pt believes that he is allergic to peanut butter.    Also, seasonal allergies    Zinc Tongue Swelling     PT STATES THIS IS NOT AN ALLERGY    Clindamycin Edema     Had angioedema episode approx 5 hr after IM administration of clindamycin. Unclear if there is definitive causal relationship.  PT STATES THIS IS NOT AN ALLERGY       Review of Systems   Constitutional:  Negative for chills and fever.   HENT:  Negative for ear pain and sore throat.    Eyes:  Negative for pain and visual disturbance.   Respiratory:  Negative for cough and shortness of breath.    Cardiovascular:  Negative for chest pain and palpitations.   Gastrointestinal:  Negative for abdominal pain and vomiting.   Genitourinary:  Negative for dysuria and hematuria.   Musculoskeletal:  Negative for arthralgias and back pain.   Skin:  Negative for color change and rash.   Neurological:  Negative for seizures and syncope.   All other systems reviewed and are negative.       Objective:  There were no vitals taken for this visit.    Ortho Exam  bilateral knee(s) -   Patient ambulates with antalgic gait pattern  Uses Walker assistive device  Genu Varus anatomical deformity  Skin is warm and dry to touch with no signs of erythema, ecchymosis, or infection   Mild generalized soft tissue swelling or effusion noted  ROM (0° - 115°)   Strength: 5/5 throughout  TTP over medial joint line, TTP over lateral joint line, TTP over pes anserine bursa, no popliteal fullness appreciated on exam   Flexor and extensor mechanisms are intact   Knee is stable to varus and valgus stress  - Lachman's  - Anterior Drawer, - Posterior Drawer  - Shana's  Patella tracks centrally with palpable crepitus  Calf compartments are soft and supple  - Herman's sign  2+ DP and PT pulses with brisk capillary refill to the toes  Sural, saphenous, tibial, superficial, and deep peroneal motor and  sensory distributions intact  Sensation light touch intact distally      Physical Exam  HENT:      Head: Normocephalic and atraumatic.      Nose: Nose normal.   Eyes:      Conjunctiva/sclera: Conjunctivae normal.   Cardiovascular:      Rate and Rhythm: Normal rate.   Pulmonary:      Effort: Pulmonary effort is normal.   Musculoskeletal:      Cervical back: Neck supple.   Skin:     General: Skin is warm and dry.      Capillary Refill: Capillary refill takes less than 2 seconds.   Neurological:      Mental Status: He is alert and oriented to person, place, and time.   Psychiatric:         Mood and Affect: Mood normal.         Behavior: Behavior normal.          Diagnostic Test Review:  No new imaging at time of visit.     Large joint arthrocentesis: bilateral knee  Universal Protocol:  Consent: Verbal consent obtained.  Risks and benefits: risks, benefits and alternatives were discussed  Consent given by: patient  Timeout called at: 4/11/2024 11:01 AM.  Patient understanding: patient states understanding of the procedure being performed  Site marked: the operative site was marked  Patient identity confirmed: verbally with patient  Supporting Documentation  Indications: pain and joint swelling   Procedure Details  Location: knee - bilateral knee  Needle gauge: 21 G.  Ultrasound guidance: no  Approach: anterolateral    Medications (Right): 20 mg Sodium Hyaluronate (Viscosup) 20 MG/2MLMedications (Left): 20 mg Sodium Hyaluronate (Viscosup) 20 MG/2ML   Patient tolerance: patient tolerated the procedure well with no immediate complications  Dressing:  Sterile dressing applied           Scribe Attestation      I,:  Mercedes Cherry am acting as a scribe while in the presence of the attending physician.:       I,:  Manny Patel DO personally performed the services described in this documentation    as scribed in my presence.:

## 2024-04-11 NOTE — TELEPHONE ENCOUNTER
LEFT MESSAGE FOR PATIENT TO CALL BACK TO RESCHEDULE 4/26 APPT WITH DR RUSSO DUE TO SCHEDULE CHANGE

## 2024-04-12 DIAGNOSIS — M48.10 DISH (DIFFUSE IDIOPATHIC SKELETAL HYPEROSTOSIS): ICD-10-CM

## 2024-04-12 DIAGNOSIS — M17.0 PRIMARY OSTEOARTHRITIS OF BOTH KNEES: ICD-10-CM

## 2024-04-12 DIAGNOSIS — M53.3 PAIN OF RIGHT SACROILIAC JOINT: Primary | ICD-10-CM

## 2024-04-12 NOTE — TELEPHONE ENCOUNTER
Caller: eulogio Kerns    Doctor: Gibran    Reason for call: pt would like a script for PT. Pt would like to go to the [facility]   [phone]  Fax # [phone]     Please Advise     Call back#: [phone]

## 2024-04-12 NOTE — TELEPHONE ENCOUNTER
Left a detailed message for pt that Rx was placed and he can called to schedule at his preferred location

## 2024-04-30 ENCOUNTER — OFFICE VISIT (OUTPATIENT)
Dept: PAIN MEDICINE | Facility: CLINIC | Age: 59
End: 2024-04-30
Payer: MEDICARE

## 2024-04-30 VITALS
BODY MASS INDEX: 46.1 KG/M2 | SYSTOLIC BLOOD PRESSURE: 152 MMHG | DIASTOLIC BLOOD PRESSURE: 79 MMHG | HEIGHT: 64 IN | WEIGHT: 270 LBS | HEART RATE: 110 BPM

## 2024-04-30 DIAGNOSIS — M53.3 CHRONIC RIGHT SACROILIAC JOINT PAIN: ICD-10-CM

## 2024-04-30 DIAGNOSIS — M79.18 MYOFASCIAL PAIN SYNDROME: ICD-10-CM

## 2024-04-30 DIAGNOSIS — G89.29 CHRONIC RIGHT SACROILIAC JOINT PAIN: ICD-10-CM

## 2024-04-30 DIAGNOSIS — G89.4 CHRONIC PAIN SYNDROME: Primary | ICD-10-CM

## 2024-04-30 PROCEDURE — 99214 OFFICE O/P EST MOD 30 MIN: CPT | Performed by: STUDENT IN AN ORGANIZED HEALTH CARE EDUCATION/TRAINING PROGRAM

## 2024-04-30 PROCEDURE — G2211 COMPLEX E/M VISIT ADD ON: HCPCS | Performed by: STUDENT IN AN ORGANIZED HEALTH CARE EDUCATION/TRAINING PROGRAM

## 2024-04-30 RX ORDER — METHOCARBAMOL 500 MG/1
500 TABLET, FILM COATED ORAL 3 TIMES DAILY PRN
Qty: 90 TABLET | Refills: 2 | Status: SHIPPED | OUTPATIENT
Start: 2024-04-30 | End: 2024-07-29

## 2024-04-30 NOTE — PROGRESS NOTES
"Assessment:  1. Chronic pain syndrome    2. Myofascial pain syndrome    3. Chronic right sacroiliac joint pain       Portions of the record may have been created with voice recognition software. Occasional wrong word or \"sound a like\" substitutions may have occurred due to the inherent limitations of voice recognition software. Read the chart carefully and recognize, using context, where substitutions have occurred. Contact me with any questions.       Plan:  58-year-old male here for follow-up visit with regards to chronic low back pain symptoms.  Since last visit, he underwent a right sacroiliac joint injection on 3/25/2024 and notes he experienced about 50% improvement in symptoms for a few weeks after the injection but pain is gradually returning to baseline.  Denies new or progressive weakness, saddle anesthesia, bowel bladder incontinence.  Notes he is in the process of starting physical therapy for core strengthening and flexibility.  Reports he is also dealing with other chronic pain issues including bilateral knees and feet for which she follows with orthopedics and podiatry.  Denies new concerns today.    Trial Robaxin 500 mg 3 times daily as needed for residual symptom management.    Encouraged to complete course of physical therapy for optimizing function and ambulation in the setting of chronic pain symptoms.    Follow-up in 2 months or as needed.      History of Present Illness:  The patient is a 58 y.o. male who presents for a follow up office visit in regards to Buttocks Pain and Hip Pain.      He is taking duloxetine 60 mg daily, gabapentin 100 mg tid, lyrica 25 mg bid, as prescribed by PCP.    I have personally reviewed and/or updated the patient's past medical history, past surgical history, family history, social history, current medications, allergies, and vital signs today.       Review of Systems  Review of Systems   Constitutional:  Negative for fever.   HENT:  Negative for sinus pain.  "   Eyes:  Negative for redness.   Respiratory:  Negative for shortness of breath.    Cardiovascular:  Negative for palpitations.   Gastrointestinal:  Negative for abdominal pain and nausea.   Endocrine: Negative for polydipsia.   Genitourinary:  Negative for difficulty urinating.   Musculoskeletal:  Positive for back pain and gait problem. Negative for myalgias.        Decreased range of motion    Pain in extremity   Skin:  Negative for rash.   Neurological:  Negative for seizures and headaches.   Psychiatric/Behavioral:  Negative for decreased concentration. The patient is not nervous/anxious.            Past Medical History:   Diagnosis Date    Acquired hypothyroidism 2015    Angioedema     Diabetes mellitus (HCC)     Disease of thyroid gland     Hyperlipidemia     Hypertension     Mixed hyperlipidemia 2015    Morbid obesity (HCC)     Morbid obesity with BMI of 45.0-49.9, adult (HCC) 2015    MARSHA (obstructive sleep apnea)     Primary hypertension 2015    Transitioned From: Benign essential hypertension       Past Surgical History:   Procedure Laterality Date    KNEE SURGERY      SINUS SURGERY         Family History   Problem Relation Age of Onset    Diabetes Mother     Diabetes type II Mother     Esophageal cancer Father     Diabetes Brother     Kidney cancer Brother     Diabetes type II Brother     Diabetes Maternal Grandmother     Diabetes type II Maternal Grandmother     Diabetes Paternal Grandmother     Heart disease Neg Hx        Social History     Occupational History    Not on file   Tobacco Use    Smoking status: Former     Current packs/day: 0.00     Types: Cigarettes     Quit date: 10/21/2022     Years since quittin.5    Smokeless tobacco: Never    Tobacco comments:     states read to quit prior to admit   Vaping Use    Vaping status: Never Used   Substance and Sexual Activity    Alcohol use: Not Currently     Alcohol/week: 3.0 standard drinks of alcohol     Types: 2 Cans of beer, 1  "Shots of liquor per week     Comment: No alcohol since 2015    Drug use: No    Sexual activity: Not Currently         Current Outpatient Medications:     albuterol (2.5 mg/3 mL) 0.083 % nebulizer solution, Take 3 mL (2.5 mg total) by nebulization every 6 (six) hours as needed for wheezing or shortness of breath, Disp: 180 mL, Rfl: 0    Ascorbic Acid (VITAMIN C PO), Take 1 tablet by mouth daily, Disp: , Rfl:     aspirin 81 mg chewable tablet, Take one tablet by mouth daily, Disp: , Rfl:     benzonatate (TESSALON PERLES) 100 mg capsule, Take 1 capsule (100 mg total) by mouth 3 (three) times a day as needed for cough, Disp: 20 capsule, Rfl: 2    cholecalciferol (VITAMIN D3) 400 units tablet, Take 1 tablet by mouth daily, Disp: , Rfl:     DULoxetine (CYMBALTA) 60 mg delayed release capsule, Take 1 capsule (60 mg total) by mouth daily, Disp: 90 capsule, Rfl: 1    EPINEPHrine (EPIPEN) 0.3 mg/0.3 mL SOAJ, INJECT 0.3MG INTO A MUSCLE ONCE FOR 1 DOSE, Disp: 2 each, Rfl: 0    ferrous gluconate (FERGON) 324 mg tablet, Take 324 mg by mouth, Disp: , Rfl:     gabapentin (NEURONTIN) 100 mg capsule, TAKE 1 CAPSULE BY MOUTH THREE TIMES DAILY, Disp: 90 capsule, Rfl: 5    hydroCHLOROthiazide 25 mg tablet, Take 1 tablet by mouth twice daily, Disp: 180 tablet, Rfl: 1    Insulin Glargine Solostar (Lantus SoloStar) 100 UNIT/ML SOPN, Inject 0.24 mL (24 Units total) under the skin daily, Disp: 30 mL, Rfl: 1    Insulin Pen Needle (Pen Needles 3/16\") 31G X 5 MM MISC, Use 4 (four) times a day, Disp: 300 each, Rfl: 3    ipratropium (ATROVENT) 0.02 % nebulizer solution, Take 2.5 mL (0.5 mg total) by nebulization every 6 (six) hours as needed for wheezing or shortness of breath, Disp: 150 mL, Rfl: 0    ipratropium-albuterol (DUO-NEB) 0.5-2.5 mg/3 mL nebulizer solution, Take 3 mL by nebulization every 6 (six) hours as needed for wheezing or shortness of breath, Disp: 180 mL, Rfl: 0    levothyroxine 175 mcg tablet, Take 1 tablet by mouth once daily, " Disp: 90 tablet, Rfl: 1    loratadine (CLARITIN) 10 mg tablet, Take 10 mg by mouth daily, Disp: , Rfl:     methocarbamol (ROBAXIN) 500 mg tablet, Take 1 tablet (500 mg total) by mouth 3 (three) times a day as needed for muscle spasms, Disp: 90 tablet, Rfl: 2    Omega-3 Fatty Acids (fish oil) 1,000 mg, Take 1,000 mg by mouth 2 (two) times a day, Disp: , Rfl:     pregabalin (LYRICA) 25 mg capsule, Take 1 capsule by mouth twice daily, Disp: 60 capsule, Rfl: 2    rosuvastatin (CRESTOR) 40 MG tablet, Take 1 tablet by mouth once daily, Disp: 90 tablet, Rfl: 3    sodium chloride (OCEAN) 0.65 % nasal spray, 1 spray into each nostril as needed for rhinitis, Disp: 30 mL, Rfl: 1    tamsulosin (FLOMAX) 0.4 mg, Take 1 capsule (0.4 mg total) by mouth daily with dinner, Disp: 90 capsule, Rfl: 3    Thiamine HCl (vitamin B-1) 250 MG tablet, Take 250 mg by mouth daily, Disp: , Rfl:     traZODone (DESYREL) 150 mg tablet, Take 1 tablet (150 mg total) by mouth daily at bedtime, Disp: 90 tablet, Rfl: 1    valsartan (DIOVAN) 160 mg tablet, Take 160 mg by mouth daily, Disp: , Rfl:     Ventolin  (90 Base) MCG/ACT inhaler, INHALE 2 PUFFS BY MOUTH EVERY 4 HOURS AS NEEDED FOR WHEEZING FOR SHORTNESS OF BREATH, Disp: 18 g, Rfl: 0    vitamin E, tocopherol, 400 units capsule, Take 400 Units by mouth daily, Disp: , Rfl:     clotrimazole (LOTRIMIN) 1 % cream, Apply topically 2 (two) times a day, Disp: 60 g, Rfl: 2    HumaLOG KwikPen 100 units/mL injection pen, INJECT 20 UNITS UNDER THE SKIN THREE TIMES DAILY WITH MEALS (Patient taking differently: Inject 20 Units under the skin 3 (three) times a day with meals), Disp: 15 mL, Rfl: 0    ketoconazole (NIZORAL) 2 % shampoo, Apply 1 Application topically 2 (two) times a week To feet in shower, Disp: 120 mL, Rfl: 2    metFORMIN (GLUCOPHAGE) 1000 MG tablet, Take 1 tablet (1,000 mg total) by mouth 2 (two) times a day (Patient not taking: Reported on 4/30/2024), Disp: 180 tablet, Rfl: 1     "semaglutide, 2 mg/dose, (Ozempic, 2 MG/DOSE,) 8 mg/ mL injection pen, Inject 0.75 mL (2 mg total) under the skin every 7 days, Disp: 3 mL, Rfl: 11    Allergies   Allergen Reactions    Ace Inhibitors Swelling     Angioedema    PT STATES THIS IS NOT AN ALLERGY    Other Anaphylaxis     Pt believes that he is allergic to peanut butter.    Also, seasonal allergies    Zinc Tongue Swelling     PT STATES THIS IS NOT AN ALLERGY    Clindamycin Edema     Had angioedema episode approx 5 hr after IM administration of clindamycin. Unclear if there is definitive causal relationship.  PT STATES THIS IS NOT AN ALLERGY       Physical Exam:    /79   Pulse (!) 110   Ht 5' 4\" (1.626 m)   Wt 122 kg (270 lb)   BMI 46.35 kg/m²     Constitutional:normal, well developed, well nourished, alert, in no distress and non-toxic and no overt pain behavior.  Eyes:anicteric  HEENT:grossly intact  Neck:supple, symmetric, trachea midline and no masses   Pulmonary:even and unlabored  Cardiovascular:No edema or pitting edema present  Skin:Normal without rashes or lesions and well hydrated  Psychiatric:Mood and affect appropriate  Neurologic:Cranial Nerves II-XII grossly intact  Musculoskeletal:normal gait    Imaging    No new relevant imaging available for review.  "

## 2024-05-02 ENCOUNTER — OFFICE VISIT (OUTPATIENT)
Dept: PODIATRY | Facility: CLINIC | Age: 59
End: 2024-05-02
Payer: MEDICARE

## 2024-05-02 ENCOUNTER — APPOINTMENT (OUTPATIENT)
Dept: RADIOLOGY | Facility: MEDICAL CENTER | Age: 59
End: 2024-05-02
Payer: MEDICARE

## 2024-05-02 VITALS
SYSTOLIC BLOOD PRESSURE: 130 MMHG | HEART RATE: 102 BPM | DIASTOLIC BLOOD PRESSURE: 80 MMHG | OXYGEN SATURATION: 95 % | BODY MASS INDEX: 46.26 KG/M2 | WEIGHT: 271 LBS | HEIGHT: 64 IN

## 2024-05-02 DIAGNOSIS — E11.42 DIABETIC POLYNEUROPATHY ASSOCIATED WITH TYPE 2 DIABETES MELLITUS (HCC): ICD-10-CM

## 2024-05-02 DIAGNOSIS — M79.671 PAIN IN BOTH FEET: ICD-10-CM

## 2024-05-02 DIAGNOSIS — M79.671 PAIN IN BOTH FEET: Primary | ICD-10-CM

## 2024-05-02 DIAGNOSIS — M79.672 PAIN IN BOTH FEET: ICD-10-CM

## 2024-05-02 DIAGNOSIS — Z79.4 TYPE 2 DIABETES MELLITUS WITH HYPERGLYCEMIA, WITH LONG-TERM CURRENT USE OF INSULIN (HCC): ICD-10-CM

## 2024-05-02 DIAGNOSIS — M19.072 ARTHRITIS OF MIDTARSAL JOINT OF LEFT FOOT: ICD-10-CM

## 2024-05-02 DIAGNOSIS — E11.65 TYPE 2 DIABETES MELLITUS WITH HYPERGLYCEMIA, WITH LONG-TERM CURRENT USE OF INSULIN (HCC): ICD-10-CM

## 2024-05-02 DIAGNOSIS — M79.672 PAIN IN BOTH FEET: Primary | ICD-10-CM

## 2024-05-02 DIAGNOSIS — B35.3 TINEA PEDIS OF BOTH FEET: ICD-10-CM

## 2024-05-02 PROCEDURE — 99203 OFFICE O/P NEW LOW 30 MIN: CPT | Performed by: STUDENT IN AN ORGANIZED HEALTH CARE EDUCATION/TRAINING PROGRAM

## 2024-05-02 PROCEDURE — 73630 X-RAY EXAM OF FOOT: CPT

## 2024-05-02 RX ORDER — CLOTRIMAZOLE 1 %
CREAM (GRAM) TOPICAL 2 TIMES DAILY
Qty: 60 G | Refills: 2 | Status: SHIPPED | OUTPATIENT
Start: 2024-05-02

## 2024-05-02 RX ORDER — KETOCONAZOLE 20 MG/ML
1 SHAMPOO TOPICAL 2 TIMES WEEKLY
Qty: 120 ML | Refills: 2 | Status: SHIPPED | OUTPATIENT
Start: 2024-05-02

## 2024-05-02 NOTE — PROGRESS NOTES
Assessment/Plan:     Diagnoses and all orders for this visit:    Pain in both feet  -     X-ray foot right 3+ views; Future  -     X-ray foot left 3+ views; Future  -     Ambulatory referral to Physical Therapy; Future  -     Diabetic Shoe  -     Diabetic Shoe Inserts    Type 2 diabetes mellitus with hyperglycemia, with long-term current use of insulin (HCC)  -     X-ray foot right 3+ views; Future  -     X-ray foot left 3+ views; Future  -     Diabetic Shoe  -     Diabetic Shoe Inserts    Diabetic polyneuropathy associated with type 2 diabetes mellitus (HCC)  -     X-ray foot right 3+ views; Future  -     X-ray foot left 3+ views; Future  -     Diabetic Shoe  -     Diabetic Shoe Inserts    Arthritis of midtarsal joint of left foot    Tinea pedis of both feet  -     clotrimazole (LOTRIMIN) 1 % cream; Apply topically 2 (two) times a day  -     ketoconazole (NIZORAL) 2 % shampoo; Apply 1 Application topically 2 (two) times a week To feet in shower          Imaging Reviewed at this visit (I personally reviewed/independently interpreted images and reports in PACS)  XR right foot WB 3v: No acute osseous abnormalities noted. HAV, 1st MTPJ arthritic changes, shortened 1st metatarsal. Accessory navicular. Talar bunion  XR left foot WB 3v 5/2/24: tibiotalar, NCJ, lisfranc and midtarsal joint arthritic changes. There does appear to be lateral deformity of metatarsal 2-5 on cuneiforms.        IMPRESSION:  DM w/ PN, A1c 7.5% 2/9/24  B/L foot pain (arch and metatarsals heads). Ddx plantar fasciitis, neuropathy, metatarsalgia.   Left tibiotalar, midtarsal, NCJ arthritis  Back pain with sciatica      PLAN:  I reviewed clinical exam and radiographic imaging (XR) with patient in detail today. I have discussed with the patient the pathophysiology of this diagnosis and reviewed how the examination correlates with this diagnosis.  Pain mngmt note from 3/13/24 reviewed  Bariatric note from 3/8/24 reviewed    Endo note from 2/16/24  "reviewed   DFE as below. Patient [facility]   DM shoes and inserts rx'd with metatarsal pads ( or Garry shoes)  PT order given for foot pain  C/w pain mngmt lyrica/gabapentin for nerve pain  I rx'd topical antifungal cream and wash for tinea pedis  F/u 3 months for recheck.     Subjective:      Patient ID: Luis F Velazquez is a 58 y.o. male.    Luis F presents to clinic today concerning B/L heel/arch pain, plantar metatarsal head present for weeks. Notes he has tingling and burning to feet, worse with WB. Does f/u for RFC at non[facility] podiatrist and does get ankle injects due to arthritis. Denies recent trauma. Notes scaling to feet. H/o broken foot left 43yo.         The following portions of the patient's history were reviewed and updated as appropriate: allergies, current medications, past family history, past medical history, past social history, past surgical history, and problem list.    Review of Systems   Constitutional:  Negative for activity change, chills and fever.   HENT: Negative.     Respiratory:  Positive for shortness of breath. Negative for cough and chest tightness.    Cardiovascular:  Positive for leg swelling. Negative for chest pain.   Endocrine: Negative.    Genitourinary: Negative.    Musculoskeletal:  Positive for arthralgias and gait problem.   Skin:  Negative for wound.   Neurological:         PN   Psychiatric/Behavioral: Negative.  Negative for agitation and behavioral problems.          Objective:      /80   Pulse 102   Ht 5' 4\" (1.626 m)   Wt 123 kg (271 lb)   SpO2 95%   BMI 46.52 kg/m²          Physical Exam  Constitutional:       General: He is not in acute distress.     Appearance: Normal appearance. He is obese. He is ill-appearing.   Cardiovascular:      Pulses: Pulses are weak.           Dorsalis pedis pulses are 1+ on the right side and 1+ on the left side.        Posterior tibial pulses are 1+ on the " right side and 1+ on the left side.      Comments: Pedal hair is absent. Legs to toes warm to cool. Varicosities with moderate LE edema noted.  Pulmonary:      Effort: No respiratory distress.   Musculoskeletal:         General: Tenderness (B/L heels, arch, metatarsal heads) and deformity (equinus. left midfoot arthritic prominence.) present. Normal range of motion.   Feet:      Right foot:      Skin integrity: Dry skin present. No ulcer, skin breakdown, erythema, warmth or callus.      Left foot:      Skin integrity: Dry skin present. No ulcer, skin breakdown, erythema, warmth or callus.   Skin:     Capillary Refill: Capillary refill takes less than 2 seconds.      Findings: No erythema.      Comments: B/L LE skin is atrophic - thin, dry and shiny in appearance. No open wounds noted.   Toenails x10 are elongated, dystrophic, discolored with thickening and subungual debris.  Dry, scaling skin to B/L plantar feet in circumferential manner.    Neurological:      General: No focal deficit present.      Mental Status: He is alert and oriented to person, place, and time.      Comments: Gross sensation to feet intact. Patient endorses numbness, tingling and burning to B/L feet.    Psychiatric:         Mood and Affect: Mood normal.         Behavior: Behavior normal.             Diabetic Foot Exam    Patient's shoes and socks removed.    Right Foot/Ankle   Right Foot Inspection  Skin Exam: skin intact, dry skin and abnormal color. No warmth, no callus, no erythema, no maceration, no pre-ulcer, no ulcer and no callus.     Toe Exam: right toe deformity.     Sensory   Vibration: diminished  Proprioception: diminished  Monofilament testing: intact    Vascular  Capillary refills: < 3 seconds  The right DP pulse is 1+. The right PT pulse is 1+.     Left Foot/Ankle  Left Foot Inspection  Skin Exam: skin intact, dry skin and abnormal color. No warmth, no erythema, no maceration, no pre-ulcer, no ulcer and no callus.     Toe Exam:  left toe deformity.     Sensory   Vibration: diminished  Proprioception: diminished  Monofilament testing: intact    Vascular  Capillary refills: < 3 seconds  The left DP pulse is 1+. The left PT pulse is 1+.     Assign Risk Category  Deformity present  No loss of protective sensation  Weak pulses  Risk: 0

## 2024-05-13 ENCOUNTER — EVALUATION (OUTPATIENT)
Dept: PHYSICAL THERAPY | Facility: HOME HEALTHCARE | Age: 59
End: 2024-05-13
Payer: MEDICARE

## 2024-05-13 DIAGNOSIS — M79.672 PAIN IN BOTH FEET: ICD-10-CM

## 2024-05-13 DIAGNOSIS — M79.671 PAIN IN BOTH FEET: ICD-10-CM

## 2024-05-13 DIAGNOSIS — M48.10 DISH (DIFFUSE IDIOPATHIC SKELETAL HYPEROSTOSIS): Primary | ICD-10-CM

## 2024-05-13 DIAGNOSIS — G89.29 CHRONIC BILATERAL LOW BACK PAIN WITH RIGHT-SIDED SCIATICA: ICD-10-CM

## 2024-05-13 DIAGNOSIS — M54.41 CHRONIC BILATERAL LOW BACK PAIN WITH RIGHT-SIDED SCIATICA: ICD-10-CM

## 2024-05-13 PROCEDURE — 97163 PT EVAL HIGH COMPLEX 45 MIN: CPT | Performed by: PHYSICAL THERAPIST
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
## 2024-05-14 DIAGNOSIS — M79.18 MYOFASCIAL PAIN SYNDROME: Primary | ICD-10-CM

## 2024-05-14 RX ORDER — CYCLOBENZAPRINE HCL 5 MG
5 TABLET ORAL 3 TIMES DAILY PRN
Qty: 90 TABLET | Refills: 0 | Status: SHIPPED | OUTPATIENT
Start: 2024-05-14 | End: 2024-06-13

## 2024-05-15 ENCOUNTER — OFFICE VISIT (OUTPATIENT)
Dept: PHYSICAL THERAPY | Facility: HOME HEALTHCARE | Age: 59
End: 2024-05-15
Payer: MEDICARE

## 2024-05-15 DIAGNOSIS — G62.9 NEUROPATHY: ICD-10-CM

## 2024-05-15 DIAGNOSIS — M79.672 PAIN IN BOTH FEET: Primary | ICD-10-CM

## 2024-05-15 DIAGNOSIS — G89.4 CHRONIC PAIN SYNDROME: ICD-10-CM

## 2024-05-15 DIAGNOSIS — M79.671 PAIN IN BOTH FEET: Primary | ICD-10-CM

## 2024-05-15 DIAGNOSIS — M48.10 DISH (DIFFUSE IDIOPATHIC SKELETAL HYPEROSTOSIS): ICD-10-CM

## 2024-05-15 PROCEDURE — 97110 THERAPEUTIC EXERCISES: CPT

## 2024-05-15 PROCEDURE — 97140 MANUAL THERAPY 1/> REGIONS: CPT

## 2024-05-15 RX ORDER — PREGABALIN 25 MG/1
25 CAPSULE ORAL 2 TIMES DAILY
Qty: 60 CAPSULE | Refills: 0 | Status: SHIPPED | OUTPATIENT
Start: 2024-05-15

## 2024-05-15 NOTE — PROGRESS NOTES
"Daily Note     Today's date: 5/15/2024  Patient name: Luis F Velazquez  : 1965  MRN: [id number]  Referring provider: Carla Price DPM  Dx: No diagnosis found.    Start Time: 1000          Subjective: Most of my pain is at my R LB and into my hip area.      Objective: See treatment diary below    Assessment: Pt with fair edmond to TE as per flow sheet. Pt with c/o LB and R hip pain with most ex and also noted with all transfers. Provided Laser tx in R side lying position. Pt reported a little mild relief of pain at end of tx.  Patient would benefit from continued PT    Plan: Continue per plan of care.      Re-eval Date: 24     Precautions: chronic pain; gait/balance dysfunction        Manuals  5-15      Laser  Chronic  Lumbar radiculopathy  7:36\"  12.5W  5700 [address]  L LB and PSIS area.                               Neuro Re-Ed   5-15      PPT  5\" 1x10      PPT with marches         TA  Seated   3\" x10      TA + OH lifts        MTP/LTP  Green  1x10 ea      Palloff press         Romberg         Tandem                 Ther Ex  5-15      NuStep   L1  4.5\"      Standing hip flex/abd/ext  Flex/abd  1x10 ea irving      Squats         Step-ups         LTR  5\" x10      SKTC  Declined      SLR  Declined      S/L SLR        Clamshells         Heel walk outs         Bridges   Declined                              Ther Activity                        Gait Training                        Modalities                                "

## 2024-05-17 ENCOUNTER — OFFICE VISIT (OUTPATIENT)
Dept: FAMILY MEDICINE CLINIC | Facility: HOME HEALTHCARE | Age: 59
End: 2024-05-17
Payer: COMMERCIAL

## 2024-05-17 VITALS
OXYGEN SATURATION: 95 % | DIASTOLIC BLOOD PRESSURE: 96 MMHG | WEIGHT: 273.8 LBS | HEART RATE: 108 BPM | SYSTOLIC BLOOD PRESSURE: 106 MMHG | TEMPERATURE: 98.6 F | HEIGHT: 64 IN | BODY MASS INDEX: 46.74 KG/M2 | RESPIRATION RATE: 18 BRPM

## 2024-05-17 DIAGNOSIS — Z13.31 POSITIVE SCREENING FOR DEPRESSION ON 9-ITEM PATIENT HEALTH QUESTIONNAIRE (PHQ-9): ICD-10-CM

## 2024-05-17 DIAGNOSIS — J96.11 CHRONIC RESPIRATORY FAILURE WITH HYPOXIA, ON HOME OXYGEN THERAPY  (HCC): ICD-10-CM

## 2024-05-17 DIAGNOSIS — Z79.4 TYPE 2 DIABETES MELLITUS WITH COMPLICATION, WITH LONG-TERM CURRENT USE OF INSULIN (HCC): ICD-10-CM

## 2024-05-17 DIAGNOSIS — Z99.81 CHRONIC RESPIRATORY FAILURE WITH HYPOXIA, ON HOME OXYGEN THERAPY  (HCC): ICD-10-CM

## 2024-05-17 DIAGNOSIS — N18.2 CHRONIC KIDNEY DISEASE, STAGE 2 (MILD): Chronic | ICD-10-CM

## 2024-05-17 DIAGNOSIS — Z60.9 SOCIAL PROBLEM: Primary | ICD-10-CM

## 2024-05-17 DIAGNOSIS — E11.8 TYPE 2 DIABETES MELLITUS WITH COMPLICATION, WITH LONG-TERM CURRENT USE OF INSULIN (HCC): ICD-10-CM

## 2024-05-17 LAB — SL AMB POCT HEMOGLOBIN AIC: 7.4 (ref ?–6.5)

## 2024-05-17 PROCEDURE — T1015 CLINIC SERVICE: HCPCS | Performed by: STUDENT IN AN ORGANIZED HEALTH CARE EDUCATION/TRAINING PROGRAM

## 2024-05-17 RX ORDER — LEVOTHYROXINE SODIUM 0.15 MG/1
TABLET ORAL
COMMUNITY

## 2024-05-17 RX ORDER — OXYCODONE HYDROCHLORIDE 5 MG/1
TABLET ORAL
COMMUNITY

## 2024-05-17 SDOH — SOCIAL STABILITY - SOCIAL INSECURITY: PROBLEM RELATED TO SOCIAL ENVIRONMENT, UNSPECIFIED: Z60.9

## 2024-05-17 NOTE — ASSESSMENT & PLAN NOTE
Symptoms currently well-controlled  Oxygen saturation 95% on room air  Patient stressed because he cannot afford oxygen machine  Patient reports that his insurance cannot cover CPAP machines and now he is getting BiPAP machine   Plan:  Follow-up with pulmonary regarding oxygen supplies  Referral to complex care

## 2024-05-18 PROBLEM — Z79.4 TYPE 2 DIABETES MELLITUS WITH COMPLICATION, WITH LONG-TERM CURRENT USE OF INSULIN (HCC): Status: ACTIVE | Noted: 2024-05-18

## 2024-05-18 PROBLEM — Z13.31 POSITIVE SCREENING FOR DEPRESSION ON 9-ITEM PATIENT HEALTH QUESTIONNAIRE (PHQ-9): Status: ACTIVE | Noted: 2024-05-18

## 2024-05-18 PROBLEM — Z60.9 SOCIAL PROBLEM: Status: ACTIVE | Noted: 2024-05-18

## 2024-05-18 PROBLEM — E11.8 TYPE 2 DIABETES MELLITUS WITH COMPLICATION, WITH LONG-TERM CURRENT USE OF INSULIN (HCC): Status: ACTIVE | Noted: 2024-05-18

## 2024-05-18 NOTE — PROGRESS NOTES
Ambulatory Visit  Name: Luis F Velazquez      : 1965      MRN: [id number]  Encounter Provider: Sandra Hsieh MD  Encounter Date: 2024   Encounter department: [facility]    Assessment & Plan   1. Social problem  Assessment & Plan:  Increasing life stressors  Difficulty affording medication  Difficulty finding food  Needs to move from current apartment, current apartment has 20 steps which is very difficult for him to climb secondary to pain and shortness of breath  Went to welfare yesterday and was told he gets too much money from disability to qualify for for welfare  Referral to social work  Referral to complex care  2. Type 2 diabetes mellitus with complication, with long-term current use of insulin ([facility])  Assessment & Plan:    Lab Results   Component Value Date    HGBA1C 7.4 (A) 2024     Uncontrolled type 2 diabetes  A1c  7.4, previously 7.5.  Concerned because he cannot afford medication  Recently went to welfare office, was told he gets too much money through disability to apply for programs  Plan:  Referral to social work - please call and assist patient with finding  he qualifies for  Referral to complex care - please call and assist patient obtaining medicine and supplies  Continue current medicine regimen  Follow-up in 3 months  Orders:  -     POCT hemoglobin A1c  -     Ambulatory Referral to Social Work Care Management Program; Future  -     Ambulatory Referral to Complex Care Management Program; Future  3. Chronic respiratory failure with hypoxia, on home oxygen therapy  (HCC)  Assessment & Plan:  Symptoms currently well-controlled  Oxygen saturation 95% on room air  Patient stressed because he cannot afford oxygen machine  Patient reports that his insurance cannot cover CPAP machines and now he is getting BiPAP machine   Plan:  Follow-up with pulmonary regarding oxygen supplies  Referral to complex care  Orders:  -     Ambulatory  Referral to Social Work Care Management Program; Future  -     Ambulatory Referral to Complex Care Management Program; Future  4. Chronic kidney disease, stage 2 (mild)  Assessment & Plan:  Lab Results   Component Value Date    EGFR 62 02/09/2024    EGFR 73 11/07/2023    EGFR 68 09/02/2023    CREATININE 1.26 02/09/2024    CREATININE 1.10 11/07/2023    CREATININE 1.17 09/02/2023       Chronic kidney disease in setting of uncontrolled diabetes  Established with nephrology  Patient has multiple complex chronic diseases and sees multiple specialists  Patient receives multiple instructions   Unsure of how       Orders:  -     Ambulatory Referral to Social Work Care Management Program; Future  -     Ambulatory Referral to Complex Care Management Program; Future  5. Positive screening for depression on 9-item Patient Health Questionnaire (PHQ-9)  Assessment & Plan:  pHQ score 14  When discussing the score, patient states he is more angry and frustrated than depressed.  Denies SI/HI.  Patient's current emotions are related to patients medical situation and difficulty affording medications plus daily living expenses  Nonclinical depression  Referral to social work  Referral to complex care       History of Present Illness     Patient is a 58-year-old male with multiple chronic diseases (see problem list).  Patient presents to clinic complaining of multiple social problems.  Patient states that he cannot forward his medications, and he wants to move from his apartment because he has to climb 20 stairs to get inside and feels short of breath.  He scored positive on PHQ questionnaire, when discussing the results, patient reports that he feels frustrated and angry, not depressed.  Denies SI/HI.  He does not know how to navigate the system.  Patient tried to stents, went to welfare office, but was told he can do through disability to qualify for the program.  Patient follows up with multiple specialists, and is unsure of what he  is supposed to do when changes are made.  Today, he continues to struggle with feeling short of breath when walking.  Also struggles with chronic back pain, follows with pain and spine, they recommended that patient be seen in person for evaluation, but patient cannot afford co-pay.        Review of Systems   Constitutional:  Positive for appetite change (Eats a lot) and fatigue. Negative for chills and fever.   HENT:  Negative for ear pain and sore throat.    Eyes:  Negative for pain and visual disturbance.   Respiratory:  Positive for shortness of breath. Negative for cough.    Cardiovascular:  Negative for chest pain and palpitations.   Gastrointestinal:  Negative for abdominal pain and vomiting.   Genitourinary:  Negative for dysuria and hematuria.   Musculoskeletal:  Positive for back pain. Negative for arthralgias.   Skin:  Negative for color change and rash.   Neurological:  Negative for seizures and syncope.   Psychiatric/Behavioral:  Positive for behavioral problems (Irritable) and decreased concentration. Negative for suicidal ideas. The patient is nervous/anxious.    All other systems reviewed and are negative.    Pertinent Medical History     Medical History Reviewed by provider this encounter:       Past Medical History   Past Medical History:   Diagnosis Date    Acquired hypothyroidism 1/2/2015    Angioedema     Diabetes mellitus (HCC)     Disease of thyroid gland     Hyperlipidemia     Hypertension     Mixed hyperlipidemia 2/4/2015    Morbid obesity (HCC)     Morbid obesity with BMI of 45.0-49.9, adult (HCC) 1/2/2015    MARSHA (obstructive sleep apnea)     Primary hypertension 1/2/2015    Transitioned From: Benign essential hypertension     Past Surgical History:   Procedure Laterality Date    KNEE SURGERY      SINUS SURGERY       Family History   Problem Relation Age of Onset    Diabetes Mother     Diabetes type II Mother     Esophageal cancer Father     Diabetes Brother     Kidney cancer Brother      "Diabetes type II Brother     Diabetes Maternal Grandmother     Diabetes type II Maternal Grandmother     Diabetes Paternal Grandmother     Heart disease Neg Hx      Current Outpatient Medications on File Prior to Visit   Medication Sig Dispense Refill    albuterol (2.5 mg/3 mL) 0.083 % nebulizer solution Take 3 mL (2.5 mg total) by nebulization every 6 (six) hours as needed for wheezing or shortness of breath 180 mL 0    Ascorbic Acid (VITAMIN C PO) Take 1 tablet by mouth daily      aspirin 81 mg chewable tablet Take one tablet by mouth daily      cholecalciferol (VITAMIN D3) 400 units tablet Take 1 tablet by mouth daily      clotrimazole (LOTRIMIN) 1 % cream Apply topically 2 (two) times a day 60 g 2    cyclobenzaprine (FLEXERIL) 5 mg tablet Take 1 tablet (5 mg total) by mouth 3 (three) times a day as needed for muscle spasms 90 tablet 0    DULoxetine (CYMBALTA) 60 mg delayed release capsule Take 1 capsule (60 mg total) by mouth daily 90 capsule 1    EPINEPHrine (EPIPEN) 0.3 mg/0.3 mL SOAJ INJECT 0.3MG INTO A MUSCLE ONCE FOR 1 DOSE 2 each 0    ferrous gluconate (FERGON) 324 mg tablet Take 324 mg by mouth      gabapentin (NEURONTIN) 100 mg capsule TAKE 1 CAPSULE BY MOUTH THREE TIMES DAILY 90 capsule 5    HumaLOG KwikPen 100 units/mL injection pen INJECT 20 UNITS UNDER THE SKIN THREE TIMES DAILY WITH MEALS (Patient taking differently: Inject 20 Units under the skin 3 (three) times a day with meals) 15 mL 0    hydroCHLOROthiazide 25 mg tablet Take 1 tablet by mouth twice daily 180 tablet 1    Insulin Glargine Solostar (Lantus SoloStar) 100 UNIT/ML SOPN Inject 0.24 mL (24 Units total) under the skin daily 30 mL 1    Insulin Pen Needle (Pen Needles 3/16\") 31G X 5 MM MISC Use 4 (four) times a day 300 each 3    ipratropium (ATROVENT) 0.02 % nebulizer solution Take 2.5 mL (0.5 mg total) by nebulization every 6 (six) hours as needed for wheezing or shortness of breath 150 mL 0    ipratropium-albuterol (DUO-NEB) 0.5-2.5 mg/3 " mL nebulizer solution Take 3 mL by nebulization every 6 (six) hours as needed for wheezing or shortness of breath 180 mL 0    ketoconazole (NIZORAL) 2 % shampoo Apply 1 Application topically 2 (two) times a week To feet in shower 120 mL 2    loratadine (CLARITIN) 10 mg tablet Take 10 mg by mouth daily      Omega-3 Fatty Acids (fish oil) 1,000 mg Take 1,000 mg by mouth 2 (two) times a day      pregabalin (LYRICA) 25 mg capsule Take 1 capsule by mouth twice daily 60 capsule 0    rosuvastatin (CRESTOR) 40 MG tablet Take 1 tablet by mouth once daily 90 tablet 3    semaglutide, 2 mg/dose, (Ozempic, 2 MG/DOSE,) 8 mg/ mL injection pen Inject 0.75 mL (2 mg total) under the skin every 7 days 3 mL 11    sodium chloride (OCEAN) 0.65 % nasal spray 1 spray into each nostril as needed for rhinitis 30 mL 1    tamsulosin (FLOMAX) 0.4 mg Take 1 capsule (0.4 mg total) by mouth daily with dinner 90 capsule 3    Thiamine HCl (vitamin B-1) 250 MG tablet Take 250 mg by mouth daily      traZODone (DESYREL) 150 mg tablet Take 1 tablet (150 mg total) by mouth daily at bedtime 90 tablet 1    valsartan (DIOVAN) 160 mg tablet Take 160 mg by mouth daily      Ventolin  (90 Base) MCG/ACT inhaler INHALE 2 PUFFS BY MOUTH EVERY 4 HOURS AS NEEDED FOR WHEEZING FOR SHORTNESS OF BREATH 18 g 0    vitamin E, tocopherol, 400 units capsule Take 400 Units by mouth daily      benzonatate (TESSALON PERLES) 100 mg capsule Take 1 capsule (100 mg total) by mouth 3 (three) times a day as needed for cough (Patient not taking: Reported on 5/17/2024) 20 capsule 2    levothyroxine 150 mcg tablet  (Patient not taking: Reported on 5/17/2024)      levothyroxine 175 mcg tablet Take 1 tablet by mouth once daily 90 tablet 1    metFORMIN (GLUCOPHAGE) 1000 MG tablet Take 1 tablet (1,000 mg total) by mouth 2 (two) times a day (Patient not taking: Reported on 4/30/2024) 180 tablet 1    oxyCODONE (ROXICODONE) 5 immediate release tablet  (Patient not taking: Reported on  5/17/2024)      [DISCONTINUED] atorvastatin (LIPITOR) 20 mg tablet Take 1 tablet by mouth once daily 90 tablet 3     No current facility-administered medications on file prior to visit.     Allergies   Allergen Reactions    Ace Inhibitors Swelling     Angioedema    PT STATES THIS IS NOT AN ALLERGY    Other Anaphylaxis     Pt believes that he is allergic to peanut butter.    Also, seasonal allergies    Zinc Tongue Swelling     PT STATES THIS IS NOT AN ALLERGY    Clindamycin Edema     Had angioedema episode approx 5 hr after IM administration of clindamycin. Unclear if there is definitive causal relationship.  PT STATES THIS IS NOT AN ALLERGY      Current Outpatient Medications on File Prior to Visit   Medication Sig Dispense Refill    albuterol (2.5 mg/3 mL) 0.083 % nebulizer solution Take 3 mL (2.5 mg total) by nebulization every 6 (six) hours as needed for wheezing or shortness of breath 180 mL 0    Ascorbic Acid (VITAMIN C PO) Take 1 tablet by mouth daily      aspirin 81 mg chewable tablet Take one tablet by mouth daily      cholecalciferol (VITAMIN D3) 400 units tablet Take 1 tablet by mouth daily      clotrimazole (LOTRIMIN) 1 % cream Apply topically 2 (two) times a day 60 g 2    cyclobenzaprine (FLEXERIL) 5 mg tablet Take 1 tablet (5 mg total) by mouth 3 (three) times a day as needed for muscle spasms 90 tablet 0    DULoxetine (CYMBALTA) 60 mg delayed release capsule Take 1 capsule (60 mg total) by mouth daily 90 capsule 1    EPINEPHrine (EPIPEN) 0.3 mg/0.3 mL SOAJ INJECT 0.3MG INTO A MUSCLE ONCE FOR 1 DOSE 2 each 0    ferrous gluconate (FERGON) 324 mg tablet Take 324 mg by mouth      gabapentin (NEURONTIN) 100 mg capsule TAKE 1 CAPSULE BY MOUTH THREE TIMES DAILY 90 capsule 5    HumaLOG KwikPen 100 units/mL injection pen INJECT 20 UNITS UNDER THE SKIN THREE TIMES DAILY WITH MEALS (Patient taking differently: Inject 20 Units under the skin 3 (three) times a day with meals) 15 mL 0    hydroCHLOROthiazide 25 mg  "tablet Take 1 tablet by mouth twice daily 180 tablet 1    Insulin Glargine Solostar (Lantus SoloStar) 100 UNIT/ML SOPN Inject 0.24 mL (24 Units total) under the skin daily 30 mL 1    Insulin Pen Needle (Pen Needles 3/16\") 31G X 5 MM MISC Use 4 (four) times a day 300 each 3    ipratropium (ATROVENT) 0.02 % nebulizer solution Take 2.5 mL (0.5 mg total) by nebulization every 6 (six) hours as needed for wheezing or shortness of breath 150 mL 0    ipratropium-albuterol (DUO-NEB) 0.5-2.5 mg/3 mL nebulizer solution Take 3 mL by nebulization every 6 (six) hours as needed for wheezing or shortness of breath 180 mL 0    ketoconazole (NIZORAL) 2 % shampoo Apply 1 Application topically 2 (two) times a week To feet in shower 120 mL 2    loratadine (CLARITIN) 10 mg tablet Take 10 mg by mouth daily      Omega-3 Fatty Acids (fish oil) 1,000 mg Take 1,000 mg by mouth 2 (two) times a day      pregabalin (LYRICA) 25 mg capsule Take 1 capsule by mouth twice daily 60 capsule 0    rosuvastatin (CRESTOR) 40 MG tablet Take 1 tablet by mouth once daily 90 tablet 3    semaglutide, 2 mg/dose, (Ozempic, 2 MG/DOSE,) 8 mg/ mL injection pen Inject 0.75 mL (2 mg total) under the skin every 7 days 3 mL 11    sodium chloride (OCEAN) 0.65 % nasal spray 1 spray into each nostril as needed for rhinitis 30 mL 1    tamsulosin (FLOMAX) 0.4 mg Take 1 capsule (0.4 mg total) by mouth daily with dinner 90 capsule 3    Thiamine HCl (vitamin B-1) 250 MG tablet Take 250 mg by mouth daily      traZODone (DESYREL) 150 mg tablet Take 1 tablet (150 mg total) by mouth daily at bedtime 90 tablet 1    valsartan (DIOVAN) 160 mg tablet Take 160 mg by mouth daily      Ventolin  (90 Base) MCG/ACT inhaler INHALE 2 PUFFS BY MOUTH EVERY 4 HOURS AS NEEDED FOR WHEEZING FOR SHORTNESS OF BREATH 18 g 0    vitamin E, tocopherol, 400 units capsule Take 400 Units by mouth daily      benzonatate (TESSALON PERLES) 100 mg capsule Take 1 capsule (100 mg total) by mouth 3 (three) " "times a day as needed for cough (Patient not taking: Reported on 2024) 20 capsule 2    levothyroxine 150 mcg tablet  (Patient not taking: Reported on 2024)      levothyroxine 175 mcg tablet Take 1 tablet by mouth once daily 90 tablet 1    metFORMIN (GLUCOPHAGE) 1000 MG tablet Take 1 tablet (1,000 mg total) by mouth 2 (two) times a day (Patient not taking: Reported on 2024) 180 tablet 1    oxyCODONE (ROXICODONE) 5 immediate release tablet  (Patient not taking: Reported on 2024)      [DISCONTINUED] atorvastatin (LIPITOR) 20 mg tablet Take 1 tablet by mouth once daily 90 tablet 3     No current facility-administered medications on file prior to visit.      Social History     Tobacco Use    Smoking status: Former     Current packs/day: 0.00     Types: Cigarettes     Quit date: 10/21/2022     Years since quittin.5    Smokeless tobacco: Never    Tobacco comments:     states read to quit prior to admit   Vaping Use    Vaping status: Never Used   Substance and Sexual Activity    Alcohol use: Not Currently     Alcohol/week: 3.0 standard drinks of alcohol     Types: 2 Cans of beer, 1 Shots of liquor per week     Comment: No alcohol since     Drug use: No    Sexual activity: Not Currently     Objective     /96 (BP Location: Left arm, Patient Position: Sitting, Cuff Size: Standard)   Pulse (!) 108   Temp 98.6 °F (37 °C)   Resp 18   Ht 5' 4\" (1.626 m)   Wt 124 kg (273 lb 12.8 oz)   SpO2 95%   BMI 47.00 kg/m²     Physical Exam  Constitutional:       General: He is not in acute distress.  HENT:      Head: Normocephalic.      Right Ear: External ear normal.      Left Ear: External ear normal.      Nose: No rhinorrhea.   Neck:      Comments: Stiff, reduced range of motion, which is patient's baseline  Cardiovascular:      Rate and Rhythm: Normal rate and regular rhythm.   Pulmonary:      Effort: No respiratory distress.      Comments: Increased effort  Abdominal:      General: There is " distension.      Tenderness: There is no abdominal tenderness.   Neurological:      Mental Status: He is alert. Mental status is at baseline.   Psychiatric:         Behavior: Behavior normal.      Comments: Irritable       Sandra Hsieh MD

## 2024-05-18 NOTE — ASSESSMENT & PLAN NOTE
Lab Results   Component Value Date    EGFR 62 02/09/2024    EGFR 73 11/07/2023    EGFR 68 09/02/2023    CREATININE 1.26 02/09/2024    CREATININE 1.10 11/07/2023    CREATININE 1.17 09/02/2023       Chronic kidney disease in setting of uncontrolled diabetes  Established with nephrology  Patient has multiple complex chronic diseases and sees multiple specialists  Patient receives multiple instructions   Unsure of how

## 2024-05-18 NOTE — ASSESSMENT & PLAN NOTE
Lab Results   Component Value Date    HGBA1C 7.4 (A) 05/17/2024     Uncontrolled type 2 diabetes  A1c  7.4, previously 7.5.  Concerned because he cannot afford medication  Recently went to welfare office, was told he gets too much money through disability to apply for programs  Plan:  Referral to social work - please call and assist patient with finding  he qualifies for  Referral to complex care - please call and assist patient obtaining medicine and supplies  Continue current medicine regimen  Follow-up in 3 months

## 2024-05-18 NOTE — ASSESSMENT & PLAN NOTE
Increasing life stressors  Difficulty affording medication  Difficulty finding food  Needs to move from current apartment, current apartment has 20 steps which is very difficult for him to climb secondary to pain and shortness of breath  Went to welfare yesterday and was told he gets too much money from disability to qualify for for welfare  Referral to social work  Referral to complex care

## 2024-05-18 NOTE — ASSESSMENT & PLAN NOTE
pHQ score 14  When discussing the score, patient states he is more angry and frustrated than depressed.  Denies SI/HI.  Patient's current emotions are related to patients medical situation and difficulty affording medications plus daily living expenses  Nonclinical depression  Referral to social work  Referral to complex care

## 2024-05-20 ENCOUNTER — OFFICE VISIT (OUTPATIENT)
Dept: PHYSICAL THERAPY | Facility: HOME HEALTHCARE | Age: 59
End: 2024-05-20
Payer: MEDICARE

## 2024-05-20 DIAGNOSIS — M48.10 DISH (DIFFUSE IDIOPATHIC SKELETAL HYPEROSTOSIS): ICD-10-CM

## 2024-05-20 DIAGNOSIS — M54.41 CHRONIC BILATERAL LOW BACK PAIN WITH RIGHT-SIDED SCIATICA: ICD-10-CM

## 2024-05-20 DIAGNOSIS — M79.671 PAIN IN BOTH FEET: Primary | ICD-10-CM

## 2024-05-20 DIAGNOSIS — G89.29 CHRONIC BILATERAL LOW BACK PAIN WITH RIGHT-SIDED SCIATICA: ICD-10-CM

## 2024-05-20 DIAGNOSIS — M79.672 PAIN IN BOTH FEET: Primary | ICD-10-CM

## 2024-05-20 PROCEDURE — 97110 THERAPEUTIC EXERCISES: CPT | Performed by: PHYSICAL THERAPIST

## 2024-05-20 PROCEDURE — 97140 MANUAL THERAPY 1/> REGIONS: CPT | Performed by: PHYSICAL THERAPIST

## 2024-05-20 NOTE — PROGRESS NOTES
"Daily Note     Today's date: 2024  Patient name: Luis F Velazquez  : 1965  MRN: [id number]  Referring provider: Carla Price DPM  Dx:   Encounter Diagnosis     ICD-10-CM    1. Pain in both feet  M79.671     M79.672       2. DISH (diffuse idiopathic skeletal hyperostosis)  M48.10       3. Chronic bilateral low back pain with right-sided sciatica  M54.41     G89.29                      Subjective: Pt reporting that the mm relaxers aren't doing anything for this pain.       Objective: See treatment diary below      Assessment: Limited tolerance to program due to pain, difficulty with transitioning between supine <> sit, and poor endurance. Laser providing some relief to end session. PT to trial IFC next visit for further pain management with progression of core strengthening as pt is agreeable.     Plan: Continue per plan of care.      Re-eval Date: 24     Precautions: chronic pain; gait/balance dysfunction        Manuals  5-15      Laser  Chronic  Lumbar radiculopathy  7:36\"  12.5W  [address]  L LB and PSIS area.  HZ  Trial IFC/MHP this date                             Neuro Re-Ed   5-15      PPT  5\" 1x10 5\" x 10      PPT with marches         TA  Seated   3\" x10      TA + OH lifts        MTP/LTP  Green  1x10 ea Green   2 x 10      Palloff press         Romberg         Tandem                 Ther Ex  5-15      NuStep   L1  4.5\" L1 5'      Standing hip flex/abd/ext  Flex/abd  1x10 ea irving Flex/Abd x 15 ea irving      Squats         Step-ups         LTR  5\" x10 5\" x 10      SKTC  Declined      SLR  Declined      S/L SLR        Clamshells         Heel walk outs         Bridges   Declined                              Ther Activity                        Gait Training                        Modalities                                  "

## 2024-05-21 ENCOUNTER — PATIENT OUTREACH (OUTPATIENT)
Dept: FAMILY MEDICINE CLINIC | Facility: HOME HEALTHCARE | Age: 59
End: 2024-05-21

## 2024-05-21 NOTE — PROGRESS NOTES
SHAYAN BUCHANAN received referral for patient from Sandra Hsieh MD. Per chart review, patient reported that he is having difficulty affording his medications and finding food. Patient reported he would like to move from his current apartment, which has 20 steps to enter and he reports is difficult to climb secondary to pain and shortness of breath. Per chart, patient recently went to the welfare office and was told he receives too much money from disability to qualify for welfare.    SHAYAN BUCHANAN placed initial outreach call to patient and left a voicemail. SHAYAN BUCHANAN to place second outreach call within one week if return call is not received prior.

## 2024-05-22 ENCOUNTER — OFFICE VISIT (OUTPATIENT)
Dept: PHYSICAL THERAPY | Facility: HOME HEALTHCARE | Age: 59
End: 2024-05-22
Payer: MEDICARE

## 2024-05-22 DIAGNOSIS — M79.672 PAIN IN BOTH FEET: Primary | ICD-10-CM

## 2024-05-22 DIAGNOSIS — M48.10 DISH (DIFFUSE IDIOPATHIC SKELETAL HYPEROSTOSIS): ICD-10-CM

## 2024-05-22 DIAGNOSIS — M79.671 PAIN IN BOTH FEET: Primary | ICD-10-CM

## 2024-05-22 DIAGNOSIS — M54.41 CHRONIC BILATERAL LOW BACK PAIN WITH RIGHT-SIDED SCIATICA: ICD-10-CM

## 2024-05-22 DIAGNOSIS — G89.29 CHRONIC BILATERAL LOW BACK PAIN WITH RIGHT-SIDED SCIATICA: ICD-10-CM

## 2024-05-22 PROCEDURE — 97110 THERAPEUTIC EXERCISES: CPT | Performed by: PHYSICAL THERAPIST

## 2024-05-22 PROCEDURE — 97014 ELECTRIC STIMULATION THERAPY: CPT | Performed by: PHYSICAL THERAPIST

## 2024-05-22 NOTE — PROGRESS NOTES
"Daily Note     Today's date: 2024  Patient name: Luis F Velazquez  : 1965  MRN: [id number]  Referring provider: Carla Price DPM  Dx:   Encounter Diagnosis     ICD-10-CM    1. Pain in both feet  M79.671     M79.672       2. DISH (diffuse idiopathic skeletal hyperostosis)  M48.10       3. Chronic bilateral low back pain with right-sided sciatica  M54.41     G89.29                      Subjective: Pt with same c/o pain in the lower back.       Objective: See treatment diary below      Assessment: Pt with fair tolerance to program; limited in participation due to pain and difficulties with mobility. Trial of IFC with MHP vs laser this date to address both pain and soft tissue dysfunction in L/S.     Plan: Continue per plan of care.      Re-eval Date: 24     Precautions: chronic pain; gait/balance dysfunction        Manuals  5-15     Laser  Chronic  Lumbar radiculopathy  7:36\"  12.5W  [address]  L LB and PSIS area.  HZ  Trial IFC/MHP this date                             Neuro Re-Ed   5-15      PPT  5\" 1x10 5\" x 10      PPT with marches         TA  Seated   3\" x10      TA + OH lifts    Seated x 10     MTP/LTP  Green  1x10 ea Green   2 x 10  Green x 20 ea     Palloff press         Romberg         Tandem                 Ther Ex  5-15      NuStep   L1  4.5\" L1 5'  L1 5'     Standing hip flex/abd/ext  Flex/abd  1x10 ea irving Flex/Abd x 15 ea irving      Squats         Step-ups         LTR  5\" x10 5\" x 10      SKTC  Declined      SLR  Declined      S/L SLR        Clamshells         Heel walk outs         Bridges   Declined      Physio ball stretch     Fwd   10\" x 5                     Ther Activity                        Gait Training                        Modalities            IFC/MHP to L/S  X 15 minutes                         "

## 2024-05-23 ENCOUNTER — PATIENT OUTREACH (OUTPATIENT)
Dept: FAMILY MEDICINE CLINIC | Facility: HOME HEALTHCARE | Age: 59
End: 2024-05-23

## 2024-05-23 ENCOUNTER — PATIENT OUTREACH (OUTPATIENT)
Dept: CASE MANAGEMENT | Facility: OTHER | Age: 59
End: 2024-05-23

## 2024-05-23 NOTE — PROGRESS NOTES
SHAYAN BUCHANAN placed second outreach call to patient and left a voicemail. SHAYAN BUCHANAN to send Unable to Reach letter to patient's address listed on chart.    SHAYAN BUCHANAN to close referral due to unable to make contact.

## 2024-05-23 NOTE — LETTER
[address] [facility] [address]  [phone]    Re: Care Coordination   5/23/2024       Dear Luis F,    I would like to talk with you about food and medication.  Please contact me at [phone].    If you have other questions, please do not hesitate to contact me about those as well.  If I do not have an answer I will assist you in finding the appropriate agency or individual who can help.    Sincerely,         Maile Paris

## 2024-05-23 NOTE — PROGRESS NOTES
Referral received via the workqueue. Chart reviewed. Patient has a SWCM. Patient with multiple social issues, can't afford his medication.    I called and left a message on patient's voicemail with my contact information.

## 2024-05-24 ENCOUNTER — PATIENT OUTREACH (OUTPATIENT)
Dept: FAMILY MEDICINE CLINIC | Facility: CLINIC | Age: 59
End: 2024-05-24

## 2024-05-28 ENCOUNTER — PATIENT OUTREACH (OUTPATIENT)
Dept: CASE MANAGEMENT | Facility: OTHER | Age: 59
End: 2024-05-28

## 2024-05-28 NOTE — LETTER
Date: 05/28/24    Dear Luis F Velazquez,   My name is Siomara, I am a registered nurse care manager working with [facility]. I have not been able to reach you and would like to set a time that I can talk with you over the phone.  My work is to help patients that have complex medical conditions get the care they need. This includes patients who may have been in the hospital or emergency room.   Please call me with any questions you may have. I look forward to speaking with you.  Sincerely,  Siomara Bermudez RN  [phone]  Outpatient Care Manager

## 2024-05-30 ENCOUNTER — OFFICE VISIT (OUTPATIENT)
Dept: PHYSICAL THERAPY | Facility: HOME HEALTHCARE | Age: 59
End: 2024-05-30
Payer: MEDICARE

## 2024-05-30 DIAGNOSIS — M54.41 CHRONIC BILATERAL LOW BACK PAIN WITH RIGHT-SIDED SCIATICA: ICD-10-CM

## 2024-05-30 DIAGNOSIS — G89.29 CHRONIC BILATERAL LOW BACK PAIN WITH RIGHT-SIDED SCIATICA: ICD-10-CM

## 2024-05-30 DIAGNOSIS — M79.672 PAIN IN BOTH FEET: Primary | ICD-10-CM

## 2024-05-30 DIAGNOSIS — M48.10 DISH (DIFFUSE IDIOPATHIC SKELETAL HYPEROSTOSIS): ICD-10-CM

## 2024-05-30 DIAGNOSIS — M79.671 PAIN IN BOTH FEET: Primary | ICD-10-CM

## 2024-05-30 PROCEDURE — 97110 THERAPEUTIC EXERCISES: CPT | Performed by: PHYSICAL THERAPIST

## 2024-05-30 PROCEDURE — 97014 ELECTRIC STIMULATION THERAPY: CPT | Performed by: PHYSICAL THERAPIST

## 2024-05-30 NOTE — PROGRESS NOTES
"Daily Note     Today's date: 2024  Patient name: Luis F Velazquez  : 1965  MRN: [id number]  Referring provider: Carla Price DPM  Dx:   Encounter Diagnosis     ICD-10-CM    1. Pain in both feet  M79.671     M79.672       2. DISH (diffuse idiopathic skeletal hyperostosis)  M48.10       3. Chronic bilateral low back pain with right-sided sciatica  M54.41     G89.29                      Subjective: Pt reporting that he isn't feeling too great this morning.       Objective: See treatment diary below      Assessment: Pt willing to participate in progression of activities with greater resistance with TA holds and longer duration completed on NuStep. He remains limited with significant progression due to pain and poor endurance. IFC/MHP to address chronic pain.       Plan: Continue per plan of care.      Re-eval Date: 24     Precautions: chronic pain; gait/balance dysfunction        Manuals  5-15    Laser  Chronic  Lumbar radiculopathy  7:36\"  12.5W  5700 J  L LB and PSIS area.  HZ  Trial IFC/MHP this date                             Neuro Re-Ed   5-15      PPT  5\" 1x10 5\" x 10      PPT with marches         TA  Seated   3\" x10      TA + OH lifts    Seated x 10  4# red med ball  X 10   Seated    MTP/LTP  Green  1x10 ea Green   2 x 10  Green x 20 ea  Green x 20 ea    Palloff press         Romberg         Tandem                 Ther Ex  5-15      NuStep   L1  4.5\" L1 5'  L1 5'  L1 9'    Standing hip flex/abd/ext  Flex/abd  1x10 ea irving Flex/Abd x 15 ea irving   Flex/Abd x 15 ea irving    Squats         Step-ups         LTR  5\" x10 5\" x 10   10\" x 5    SKTC  Declined      SLR  Declined      S/L SLR        Clamshells         Heel walk outs         Bridges   Declined      Physio ball stretch     Fwd   10\" x 5  Fwd   10\" x 5                    Ther Activity                        Gait Training                        Modalities            IFC/MHP to L/S  X 15 minutes  IFC/MHP L/S x 15 minutes          "

## 2024-06-03 ENCOUNTER — OFFICE VISIT (OUTPATIENT)
Dept: PHYSICAL THERAPY | Facility: HOME HEALTHCARE | Age: 59
End: 2024-06-03
Payer: MEDICARE

## 2024-06-03 DIAGNOSIS — M79.672 PAIN IN BOTH FEET: Primary | ICD-10-CM

## 2024-06-03 DIAGNOSIS — G89.29 CHRONIC BILATERAL LOW BACK PAIN WITH RIGHT-SIDED SCIATICA: ICD-10-CM

## 2024-06-03 DIAGNOSIS — M48.10 DISH (DIFFUSE IDIOPATHIC SKELETAL HYPEROSTOSIS): ICD-10-CM

## 2024-06-03 DIAGNOSIS — M54.41 CHRONIC BILATERAL LOW BACK PAIN WITH RIGHT-SIDED SCIATICA: ICD-10-CM

## 2024-06-03 DIAGNOSIS — M79.671 PAIN IN BOTH FEET: Primary | ICD-10-CM

## 2024-06-03 PROCEDURE — 97112 NEUROMUSCULAR REEDUCATION: CPT

## 2024-06-03 PROCEDURE — 97110 THERAPEUTIC EXERCISES: CPT

## 2024-06-03 PROCEDURE — 97014 ELECTRIC STIMULATION THERAPY: CPT

## 2024-06-03 NOTE — PROGRESS NOTES
"Daily Note     Today's date: 6/3/2024  Patient name: Luis F Velazquez  : 1965  MRN: [id number]  Referring provider: Carla Price DPM  Dx:   Encounter Diagnosis     ICD-10-CM    1. Pain in both feet  M79.671     M79.672       2. DISH (diffuse idiopathic skeletal hyperostosis)  M48.10       3. Chronic bilateral low back pain with right-sided sciatica  M54.41     G89.29                      Subjective: Pt reports he has pain R side of his LB greater than L side of his LB.       Objective: See treatment diary below      Assessment: Tolerated treatment fair. Verbal cues needed for correct form with exercises. Pt with poor endurance. Program progression limited due to pain. Pt with good tolerance to IFC with MHP with good skin integrity after modalities. Patient would benefit from continued PT      Plan: Continue per plan of care.      Re-eval Date: 24     Precautions: chronic pain; gait/balance dysfunction        Manuals 6/3 5-15    Laser  Chronic  Lumbar radiculopathy  7:36\"  12.5W  5700 J  L LB and PSIS area.  HZ  Trial IFC/MHP this date                             Neuro Re-Ed   5-15      PPT  5\" 1x10 5\" x 10      PPT with marches         TA Seated   3\" x 10  Seated   3\" x10      TA + OH lifts 4# red med ball x 10 seated    Seated x 10  4# red med ball  X 10   Seated    MTP/LTP Green x 20 ea  Green  1x10 ea Green   2 x 10  Green x 20 ea  Green x 20 ea    Palloff press         Romberg         Tandem                 Ther Ex  5-15      NuStep  L1 10' L1  4.5\" L1 5'  L1 5'  L1 9'    Standing hip flex/abd/ext Flex/abd  X 15 ea Jamie  Flex/abd  1x10 ea jamie Flex/Abd x 15 ea jamie   Flex/Abd x 15 ea jamie    Squats         Step-ups         LTR 10\"x 5  5\" x10 5\" x 10   10\" x 5    SKTC  Declined      SLR  Declined      S/L SLR        Clamshells         Heel walk outs         Bridges   Declined      Physio ball stretch  Fwd  10\"x 5    Fwd   10\" x 5  Fwd   10\" x 5                    Ther Activity              "           Gait Training                        Modalities         IFC/MHP  L/S x 15 min    IFC/MHP to L/S  X 15 minutes  IFC/MHP L/S x 15 minutes

## 2024-06-06 ENCOUNTER — OFFICE VISIT (OUTPATIENT)
Dept: PHYSICAL THERAPY | Facility: HOME HEALTHCARE | Age: 59
End: 2024-06-06
Payer: MEDICARE

## 2024-06-06 DIAGNOSIS — M48.10 DISH (DIFFUSE IDIOPATHIC SKELETAL HYPEROSTOSIS): ICD-10-CM

## 2024-06-06 DIAGNOSIS — M54.41 CHRONIC BILATERAL LOW BACK PAIN WITH RIGHT-SIDED SCIATICA: ICD-10-CM

## 2024-06-06 DIAGNOSIS — M79.671 PAIN IN BOTH FEET: Primary | ICD-10-CM

## 2024-06-06 DIAGNOSIS — M79.672 PAIN IN BOTH FEET: Primary | ICD-10-CM

## 2024-06-06 DIAGNOSIS — G89.29 CHRONIC BILATERAL LOW BACK PAIN WITH RIGHT-SIDED SCIATICA: ICD-10-CM

## 2024-06-06 PROCEDURE — 97014 ELECTRIC STIMULATION THERAPY: CPT | Performed by: PHYSICAL THERAPIST

## 2024-06-06 PROCEDURE — 97110 THERAPEUTIC EXERCISES: CPT | Performed by: PHYSICAL THERAPIST

## 2024-06-06 PROCEDURE — 97112 NEUROMUSCULAR REEDUCATION: CPT | Performed by: PHYSICAL THERAPIST

## 2024-06-10 ENCOUNTER — OFFICE VISIT (OUTPATIENT)
Dept: PHYSICAL THERAPY | Facility: HOME HEALTHCARE | Age: 59
End: 2024-06-10
Payer: MEDICARE

## 2024-06-10 DIAGNOSIS — M79.672 PAIN IN BOTH FEET: ICD-10-CM

## 2024-06-10 DIAGNOSIS — M54.41 CHRONIC BILATERAL LOW BACK PAIN WITH RIGHT-SIDED SCIATICA: Primary | ICD-10-CM

## 2024-06-10 DIAGNOSIS — M79.671 PAIN IN BOTH FEET: ICD-10-CM

## 2024-06-10 DIAGNOSIS — G89.29 CHRONIC BILATERAL LOW BACK PAIN WITH RIGHT-SIDED SCIATICA: Primary | ICD-10-CM

## 2024-06-10 PROCEDURE — 97110 THERAPEUTIC EXERCISES: CPT

## 2024-06-10 PROCEDURE — 97140 MANUAL THERAPY 1/> REGIONS: CPT

## 2024-06-10 PROCEDURE — 97112 NEUROMUSCULAR REEDUCATION: CPT

## 2024-06-10 NOTE — PROGRESS NOTES
"Daily Note     Today's date: 6/10/2024  Patient name: Luis F Velazquez  : 1965  MRN: [id number]  Referring provider: Carla Price DPM  Dx: No diagnosis found.    Start Time: 1345          Subjective: I'm the same. I have been trying to more everyday to maintain mobility.     Objective: See treatment diary below    Assessment: Pt with good edmond to TE as per flow sheet. Pt with reports of R LB to hip pain during seated t ball fwd/diag roll.  Pt with request for Laser tx 2* report of minimal short term pain relief with IFC/MHP.   Pt edmond tx without c/o increased LB pain.  Patient would benefit from continued PT    Plan: Continue per plan of care.      Re-eval Date: 24     Precautions: chronic pain; gait/balance dysfunction        Manuals 6/3 6/6 6-10    Laser  Chronic  Lumbar radiculopathy   Chronic  LB radiculopathy  -L sidelying   Trial IFC/MHP this date                             Neuro Re-Ed    6-10     PPT        PPT with marches         TA Seated   3\" x 10        TA + OH lifts 4# red med ball x 10 seated  Yellow med ball   X 5  Limited 2* shld pain  Yellow med ball  x7 Seated x 10  4# red med ball  X 10   Seated    MTP/LTP Green x 20 ea  Green x 20 ea  Green x20 ea Green x 20 ea  Green x 20 ea    Palloff press   Green   5\" x 10  Green  5\" x10 ea  R/L     Romberg         Tandem                 Ther Ex   6-10     NuStep  L1 10' L2 10'  L2  10' L1 5'  L1 9'    Standing hip flex/abd/ext Flex/abd  X 15 ea Irving     Flex/Abd x 15 ea irving    Squats         Step-ups         LTR 10\"x 5     10\" x 5    SKTC        SLR        S/L SLR        Clamshells         Heel walk outs         Bridges         Physio ball stretch  Fwd  10\"x 5  Fwd   10\" x 5  Fwd  10\" x5 Fwd   10\" x 5  Fwd   10\" x 5                    Ther Activity                        Gait Training                        Modalities         IFC/MHP  L/S x 15 min  IFC/MHP  L/S x 15 min See Laser tx above IFC/MHP to L/S  X 15 minutes  IFC/MHP L/S x 15 " minutes

## 2024-06-11 DIAGNOSIS — G62.9 NEUROPATHY: ICD-10-CM

## 2024-06-11 DIAGNOSIS — G89.4 CHRONIC PAIN SYNDROME: ICD-10-CM

## 2024-06-12 ENCOUNTER — PATIENT OUTREACH (OUTPATIENT)
Dept: CASE MANAGEMENT | Facility: OTHER | Age: 59
End: 2024-06-12

## 2024-06-12 DIAGNOSIS — M79.18 MYOFASCIAL PAIN SYNDROME: ICD-10-CM

## 2024-06-12 RX ORDER — PREGABALIN 25 MG/1
25 CAPSULE ORAL 2 TIMES DAILY
Qty: 60 CAPSULE | Refills: 2 | Status: SHIPPED | OUTPATIENT
Start: 2024-06-12

## 2024-06-12 RX ORDER — CYCLOBENZAPRINE HCL 5 MG
5 TABLET ORAL 3 TIMES DAILY PRN
Qty: 90 TABLET | Refills: 0 | Status: SHIPPED | OUTPATIENT
Start: 2024-06-12

## 2024-06-13 ENCOUNTER — EVALUATION (OUTPATIENT)
Dept: PHYSICAL THERAPY | Facility: HOME HEALTHCARE | Age: 59
End: 2024-06-13
Payer: MEDICARE

## 2024-06-13 ENCOUNTER — OFFICE VISIT (OUTPATIENT)
Dept: BARIATRICS | Facility: CLINIC | Age: 59
End: 2024-06-13
Payer: MEDICARE

## 2024-06-13 VITALS
OXYGEN SATURATION: 94 % | DIASTOLIC BLOOD PRESSURE: 86 MMHG | RESPIRATION RATE: 20 BRPM | SYSTOLIC BLOOD PRESSURE: 136 MMHG | HEIGHT: 64 IN | WEIGHT: 271.4 LBS | BODY MASS INDEX: 46.33 KG/M2 | HEART RATE: 124 BPM | TEMPERATURE: 98.6 F

## 2024-06-13 DIAGNOSIS — M48.10 DISH (DIFFUSE IDIOPATHIC SKELETAL HYPEROSTOSIS): ICD-10-CM

## 2024-06-13 DIAGNOSIS — M54.41 CHRONIC BILATERAL LOW BACK PAIN WITH RIGHT-SIDED SCIATICA: Primary | ICD-10-CM

## 2024-06-13 DIAGNOSIS — Z79.4 TYPE 2 DIABETES MELLITUS WITH COMPLICATION, WITH LONG-TERM CURRENT USE OF INSULIN (HCC): ICD-10-CM

## 2024-06-13 DIAGNOSIS — E66.01 MORBID OBESITY WITH BMI OF 45.0-49.9, ADULT (HCC): Primary | Chronic | ICD-10-CM

## 2024-06-13 DIAGNOSIS — M79.671 PAIN IN BOTH FEET: ICD-10-CM

## 2024-06-13 DIAGNOSIS — G89.29 CHRONIC BILATERAL LOW BACK PAIN WITH RIGHT-SIDED SCIATICA: Primary | ICD-10-CM

## 2024-06-13 DIAGNOSIS — M79.672 PAIN IN BOTH FEET: ICD-10-CM

## 2024-06-13 DIAGNOSIS — E11.8 TYPE 2 DIABETES MELLITUS WITH COMPLICATION, WITH LONG-TERM CURRENT USE OF INSULIN (HCC): ICD-10-CM

## 2024-06-13 PROCEDURE — 97110 THERAPEUTIC EXERCISES: CPT | Performed by: PHYSICAL THERAPIST

## 2024-06-13 PROCEDURE — 97112 NEUROMUSCULAR REEDUCATION: CPT | Performed by: PHYSICAL THERAPIST

## 2024-06-13 PROCEDURE — 99214 OFFICE O/P EST MOD 30 MIN: CPT | Performed by: PHYSICIAN ASSISTANT

## 2024-06-13 RX ORDER — EMPAGLIFLOZIN 10 MG/1
10 TABLET, FILM COATED ORAL EVERY MORNING
COMMUNITY

## 2024-06-13 NOTE — PROGRESS NOTES
Assessment/Plan:    Morbid obesity with BMI of 45.0-49.9, adult ([facility])   -Patient is pursuing Conservative Program  -Initial weight loss goal of 5-10% weight loss for improved health  -Screening labs: up to date  -dietary recall reviewed, suggestions provided. Encouraged continue to reduce refined carbohydrate intake.  Has a goal to incorporate a veggie with each meal. Advised not to skip meals  -No longer on Ozempic due to cost. Recently started on Jardiance by nephrology.   -declines to see SW. Struggles with overeating even when feeling full    Initial: 277. 8 lbs  Current: 271.4 lbs  Change: -6.4 lbs  Goal: lose 100 lbs    Type 2 diabetes mellitus with complication, with long-term current use of insulin ([facility])    Lab Results   Component Value Date    HGBA1C 7.4 (A) 05/17/2024   -managed by Endo. HgbA1c much improved  -On Glargine, Humalog, Metformin and Jardiance. Unable to continue Ozempic due to cost.  -avoid/limit refined carbohydrates        Goals:    Food log (ie.) [email or web address],[email or web address],loseit.[email or web address],calorieking.[email or web address],etc.   No sugary beverages. At least 64oz of water daily.  Increase physical activity by 10 minutes daily. Gradually increase physical activity to a goal of 5 days per week for 30 minutes of MODERATE intensity PLUS 2 days per week of FULL BODY resistance training  1500 calories per day  5-10 servings of fruits and vegetables per day  25-35 grams of dietary fiber per day      Follow up in approximately 3 months with Non-Surgical Physician/Advanced Practitioner.     Diagnoses and all orders for this visit:    Morbid obesity with BMI of 45.0-49.9, adult ([facility])    Type 2 diabetes mellitus with complication, with long-term current use of insulin ([facility])    Other orders  -     Jardiance 10 MG TABS tablet; Take 10 mg by mouth every morning          Subjective:   Chief Complaint   Patient presents with    Follow-up        Patient ID: Luis F Velazquez  is a 58 y.o. male with excess weight/obesity  "here to pursue weight managment.  Patient is pursuing Conservative Program.     HPI Patient presents for [facility] follow up. Reports he is having a lot of pain. Reports he stopped taking the Ozempic due to cost. Still struggles with overeating and ignoring fullness cues. States he was recently started on Jardiance by his nephrologist    Hydration: water meeting goals, Coffee + nondairy creamer, fountain drink from [employer] - unsure if it has sugar In it  Exercise: denies  Food logging: denies    B: skips usually, 2 eggs + sausage + whole wheat toast + butter  S: skips or a couple pretzels  D: venison OR pizza - reports eating large portions  S: pretzels or celery    Wt Readings from Last 10 Encounters:   06/26/24 122 kg (270 lb)   06/13/24 123 kg (271 lb 6.4 oz)   05/17/24 124 kg (273 lb 12.8 oz)   05/02/24 123 kg (271 lb)   04/30/24 122 kg (270 lb)   04/04/24 122 kg (270 lb)   03/28/24 122 kg (270 lb)   03/13/24 122 kg (270 lb)   03/08/24 123 kg (270 lb 3.2 oz)   02/29/24 124 kg (273 lb)        The following portions of the patient's history were reviewed and updated as appropriate: allergies, current medications, past family history, past medical history, past social history, past surgical history, and problem list.    Review of Systems   Respiratory:  Positive for shortness of breath.    Psychiatric/Behavioral:  Positive for dysphoric mood. Negative for suicidal ideas.        Objective:    /86 (BP Location: Right arm, Patient Position: Sitting, Cuff Size: Large)   Pulse (!) 124   Temp 98.6 °F (37 °C)   Resp 20   Ht 5' 4\" (1.626 m)   Wt 123 kg (271 lb 6.4 oz)   SpO2 94%   BMI 46.59 kg/m²      Physical Exam  Vitals and nursing note reviewed.      Constitutional   General appearance: Abnormal.  well developed and morbidly obese.   Eyes No conjunctival pallor.   Ears, Nose, Mouth, and Throat Oral mucosa moist.   Pulmonary   Respiratory effort:  Breath sounds diminished. No wheezes, no rales, no rhonci.  "   Cardiovascular   Auscultation of heart: Normal rate and rhythm, normal S1 and S2, without murmurs.    Examination of extremities for edema and/or varicosities: Normal.  no edema.   Abdomen   Abdomen: Abnormal.  The abdomen was obese. Bowel sounds were normal. The abdomen was soft and nontender.   Musculoskeletal   Gait and station: Normal.    Psychiatric   Orientation to person, place and time: Normal.    Affect: appropriate

## 2024-06-13 NOTE — PATIENT INSTRUCTIONS
Goals:    Food log (ie.) [email or web address],[email or web address],iStorezit.com,Krowder.com,etc.   No sugary beverages. At least 64oz of water daily.  Increase physical activity by 10 minutes daily. Gradually increase physical activity to a goal of 5 days per week for 30 minutes of MODERATE intensity PLUS 2 days per week of FULL BODY resistance training  1500 calories per day  5-10 servings of fruits and vegetables per day  25-35 grams of dietary fiber per day
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
## 2024-06-13 NOTE — ASSESSMENT & PLAN NOTE
-Patient is pursuing Conservative Program  -Initial weight loss goal of 5-10% weight loss for improved health  -Screening labs: up to date  -dietary recall reviewed, suggestions provided. Encouraged continue to reduce refined carbohydrate intake.  Has a goal to incorporate a veggie with each meal. Advised not to skip meals  -No longer on Ozempic due to cost. Recently started on Jardiance by nephrology.   -declines to see SW. Struggles with overeating even when feeling full    Initial: 277. 8 lbs  Current: 271.4 lbs  Change: -6.4 lbs  Goal: lose 100 lbs

## 2024-06-25 NOTE — PROGRESS NOTES
Established Patient Progress Note    Chief Complaint:  Diabetes follow up visit    Impression & Plan:    Problem List Items Addressed This Visit       Acquired hypothyroidism (Chronic)     Continue 175 mcg of levothyroxine daily. Check TFT prior to next appointment.          Relevant Orders    TSH, 3rd generation    T4, free    Morbid obesity with BMI of 45.0-49.9, adult (HCC) (Chronic)     Working with weight management. Ozempic is cost prohibitive.          Diabetic polyneuropathy associated with type 2 diabetes mellitus (HCC)     Patient reports worsening pain in the bottom of his feet, especially overnight when trying to sleep. Currently taking 60 mg of cymbalta daily and 25 mg of lyrica twice daily. Managed by PCP. Will reach out to discuss dose adjustment with PCP.   Lab Results   Component Value Date    HGBA1C 7.4 (A) 05/17/2024            Relevant Medications    metFORMIN (GLUCOPHAGE) 1000 MG tablet    Diabetic nephropathy associated with type 2 diabetes mellitus (HCC)     Continue Jardiance 25 mg daily. Continue valsartan.   Lab Results   Component Value Date    HGBA1C 7.4 (A) 05/17/2024            Relevant Medications    metFORMIN (GLUCOPHAGE) 1000 MG tablet    Type 2 diabetes mellitus with complication, with long-term current use of insulin ([facility]) - Primary     Suboptimally controlled but improving. Ozempic is cost prohibitive. Will discontinue. Resume metformin 1,000 mg daily. Continue Jardiance 25 mg daily. Continue Lantus 24 units nightly. Reviewed how and when to inject humalog. Patient is currently injecting after blood sugar spikes and is also stacking insulin leading to episodes of hypoglycemia. Counseled on the importance of injecting 15-20 minute before eating, even if blood sugars are within normal limits at that time. Continue to focus on healthy diet. Reduce snacking. Patient knows to notify me with persistent hyperglycemia or episodes of hypoglycemia.  F/U in 4 months.   Lab Results    Component Value Date    HGBA1C 7.4 (A) 05/17/2024            Relevant Medications    metFORMIN (GLUCOPHAGE) 1000 MG tablet    Other Relevant Orders    Hemoglobin A1C    Albumin / creatinine urine ratio    Basic metabolic panel       History of Present Illness:   Luis F Velazquez is a 58 y.o. male with Type 2 diabetes and hypothyroidism presenting to the office today for routine follow up.      Hemoglobin A1C   Latest Ref Rng 6.5    9/11/2023 9.4 !    2/9/2024 7.5 (H)    5/17/2024 7.4 !       Legend:  ! Abnormal  (H) High     Current regimen:  Jardiance 25 mg daily  Lantus 24 units nightly  Humalog 20 units 3 times daily prior to meals  Ozempic 2 mg once weekly? (Stopped taking 1 month ago)  Metformin 1,000 mg daily (stopped taking)     Luis F Velazquez   Device used Freestyle matthew 3  Home use     Indication   Type 2 Diabetes    More than 72 hours of data was reviewed. Report to be scanned to chart.     Date Range: June 12, 2024-June 25, 2024    Analysis of data:   Average Glucose: 181 mg/dL  Coefficient of Variation: 30.1%  Glucose management indicator: 7.6%  Time in Target Range: 57%  Time Above Range: 32% 181 to 250 mg/dL; 10% greater than 250 mg/dL  Time Below Range: 1% 54 to 69 mg/dL; 0% less than 54 mg/dL    Interpretation of data: poorly controlled with high variability.       For hypothyroidism, he is taking levothyroxine 175 mcg daily    Patient Active Problem List   Diagnosis    Allergic rhinitis    Arthritis    Chronic back pain    Chronic sinusitis    COPD with acute exacerbation (HCC)    Depression with anxiety    Essential (primary) hypertension    Acquired hypothyroidism    Morbid obesity with BMI of 45.0-49.9, adult (HCC)    Diabetic polyneuropathy associated with type 2 diabetes mellitus (HCC)    Vitamin D deficiency    Bilateral lower leg cellulitis    Elevated LFTs    Elevated lactic acid level    Tobacco abuse, in remission    Noncompliance with diabetes treatment    Acute respiratory failure with  hypoxia and hypercapnia (HCC)    Epistaxis    MARSHA and COPD overlap syndrome (HCC)    Muscle twitching    Type 2 diabetes mellitus with hyperglycemia, with long-term current use of insulin ([facility])    Tobacco use    Atelectasis    Urinary retention    Constipation    Depression, recurrent (HCC)    Peripheral vascular disease ([facility])    Primary osteoarthritis of both knees    Hyperlipidemia    Pleural effusion, left    Bright red rectal bleeding    ABLA (acute blood loss anemia)    Microcytic anemia    Fatty liver disease, nonalcoholic    Umbilical hernia without obstruction and without gangrene    Restrictive lung disease    Diabetic nephropathy associated with type 2 diabetes mellitus (HCC)    Proteinuria    Chronic kidney disease, stage 2 (mild)    Neuropathy    Chronic respiratory failure with hypoxia, on home oxygen therapy  ([facility])    Urinary hesitancy due to benign prostatic hyperplasia    Hypo-osmolality and hyponatremia    LAMB (dyspnea on exertion)    MARSHA treated with BiPAP    Chronic right sacroiliac joint pain    Pain in both feet    Positive screening for depression on 9-item Patient Health Questionnaire (PHQ-9)    Social problem    Type 2 diabetes mellitus with complication, with long-term current use of insulin ([facility])      Past Medical History:   Diagnosis Date    Acquired hypothyroidism 1/2/2015    Angioedema     Diabetes mellitus ([facility])     Disease of thyroid gland     Hyperlipidemia     Hypertension     Mixed hyperlipidemia 2/4/2015    Morbid obesity ([facility])     Morbid obesity with BMI of 45.0-49.9, adult ([facility]) 1/2/2015    MARSHA (obstructive sleep apnea)     Primary hypertension 1/2/2015    Transitioned From: Benign essential hypertension      Past Surgical History:   Procedure Laterality Date    KNEE SURGERY      SINUS SURGERY        Family History   Problem Relation Age of Onset    Diabetes Mother     Diabetes type II Mother     Esophageal cancer Father     Diabetes Brother     Kidney cancer Brother     Diabetes  type II Brother     Diabetes Maternal Grandmother     Diabetes type II Maternal Grandmother     Diabetes Paternal Grandmother     Heart disease Neg Hx      Social History     Tobacco Use    Smoking status: Former     Current packs/day: 0.00     Types: Cigarettes     Quit date: 10/21/2022     Years since quittin.6    Smokeless tobacco: Never    Tobacco comments:     states read to quit prior to admit   Substance Use Topics    Alcohol use: Not Currently     Alcohol/week: 3.0 standard drinks of alcohol     Types: 2 Cans of beer, 1 Shots of liquor per week     Comment: No alcohol since      Allergies   Allergen Reactions    Ace Inhibitors Swelling     Angioedema    PT STATES THIS IS NOT AN ALLERGY    Other Anaphylaxis     Pt believes that he is allergic to peanut butter.    Also, seasonal allergies    Zinc Tongue Swelling     PT STATES THIS IS NOT AN ALLERGY    Clindamycin Edema     Had angioedema episode approx 5 hr after IM administration of clindamycin. Unclear if there is definitive causal relationship.  PT STATES THIS IS NOT AN ALLERGY         Current Outpatient Medications:     albuterol (2.5 mg/3 mL) 0.083 % nebulizer solution, Take 3 mL (2.5 mg total) by nebulization every 6 (six) hours as needed for wheezing or shortness of breath, Disp: 180 mL, Rfl: 0    Ascorbic Acid (VITAMIN C PO), Take 1 tablet by mouth daily, Disp: , Rfl:     aspirin 81 mg chewable tablet, Take one tablet by mouth daily, Disp: , Rfl:     cholecalciferol (VITAMIN D3) 400 units tablet, Take 1 tablet by mouth daily, Disp: , Rfl:     clotrimazole (LOTRIMIN) 1 % cream, Apply topically 2 (two) times a day, Disp: 60 g, Rfl: 2    cyclobenzaprine (FLEXERIL) 5 mg tablet, Take 1 tablet by mouth three times daily as needed for muscle spasm, Disp: 90 tablet, Rfl: 0    DULoxetine (CYMBALTA) 60 mg delayed release capsule, Take 1 capsule (60 mg total) by mouth daily, Disp: 90 capsule, Rfl: 1    EPINEPHrine (EPIPEN) 0.3 mg/0.3 mL SOAJ, INJECT 0.3MG  "INTO A MUSCLE ONCE FOR 1 DOSE, Disp: 2 each, Rfl: 0    ferrous gluconate (FERGON) 324 mg tablet, Take 324 mg by mouth, Disp: , Rfl:     gabapentin (NEURONTIN) 100 mg capsule, TAKE 1 CAPSULE BY MOUTH THREE TIMES DAILY, Disp: 90 capsule, Rfl: 5    HumaLOG KwikPen 100 units/mL injection pen, INJECT 20 UNITS UNDER THE SKIN THREE TIMES DAILY WITH MEALS (Patient taking differently: Inject 20 Units under the skin 3 (three) times a day with meals), Disp: 15 mL, Rfl: 0    hydroCHLOROthiazide 25 mg tablet, Take 1 tablet by mouth twice daily, Disp: 180 tablet, Rfl: 1    Insulin Glargine Solostar (Lantus SoloStar) 100 UNIT/ML SOPN, Inject 0.24 mL (24 Units total) under the skin daily, Disp: 30 mL, Rfl: 1    Insulin Pen Needle (Pen Needles 3/16\") 31G X 5 MM MISC, Use 4 (four) times a day, Disp: 300 each, Rfl: 3    ipratropium (ATROVENT) 0.02 % nebulizer solution, Take 2.5 mL (0.5 mg total) by nebulization every 6 (six) hours as needed for wheezing or shortness of breath, Disp: 150 mL, Rfl: 0    ipratropium-albuterol (DUO-NEB) 0.5-2.5 mg/3 mL nebulizer solution, Take 3 mL by nebulization every 6 (six) hours as needed for wheezing or shortness of breath, Disp: 180 mL, Rfl: 0    Jardiance 10 MG TABS tablet, Take 10 mg by mouth every morning, Disp: , Rfl:     ketoconazole (NIZORAL) 2 % shampoo, Apply 1 Application topically 2 (two) times a week To feet in shower, Disp: 120 mL, Rfl: 2    levothyroxine 175 mcg tablet, Take 1 tablet by mouth once daily, Disp: 90 tablet, Rfl: 1    loratadine (CLARITIN) 10 mg tablet, Take 10 mg by mouth daily, Disp: , Rfl:     metFORMIN (GLUCOPHAGE) 1000 MG tablet, Take 1 tablet (1,000 mg total) by mouth daily with breakfast, Disp: 90 tablet, Rfl: 1    Omega-3 Fatty Acids (fish oil) 1,000 mg, Take 1,000 mg by mouth 2 (two) times a day, Disp: , Rfl:     pregabalin (LYRICA) 25 mg capsule, Take 1 capsule by mouth twice daily, Disp: 60 capsule, Rfl: 2    rosuvastatin (CRESTOR) 40 MG tablet, Take 1 tablet by " mouth once daily, Disp: 90 tablet, Rfl: 3    sodium chloride (OCEAN) 0.65 % nasal spray, 1 spray into each nostril as needed for rhinitis, Disp: 30 mL, Rfl: 1    tamsulosin (FLOMAX) 0.4 mg, Take 1 capsule (0.4 mg total) by mouth daily with dinner, Disp: 90 capsule, Rfl: 3    Thiamine HCl (vitamin B-1) 250 MG tablet, Take 250 mg by mouth daily, Disp: , Rfl:     traZODone (DESYREL) 150 mg tablet, Take 1 tablet (150 mg total) by mouth daily at bedtime, Disp: 90 tablet, Rfl: 1    valsartan (DIOVAN) 160 mg tablet, Take 160 mg by mouth daily, Disp: , Rfl:     Ventolin  (90 Base) MCG/ACT inhaler, INHALE 2 PUFFS BY MOUTH EVERY 4 HOURS AS NEEDED FOR WHEEZING FOR SHORTNESS OF BREATH, Disp: 18 g, Rfl: 0    vitamin E, tocopherol, 400 units capsule, Take 400 Units by mouth daily, Disp: , Rfl:     Review of Systems   Constitutional:  Positive for fatigue. Negative for activity change, appetite change and unexpected weight change.   HENT:  Negative for dental problem, sore throat, trouble swallowing and voice change.    Eyes:  Negative for visual disturbance.   Respiratory:  Positive for shortness of breath. Negative for cough and chest tightness.    Cardiovascular:  Negative for chest pain, palpitations and leg swelling.   Gastrointestinal:  Negative for constipation, diarrhea, nausea and vomiting.   Endocrine: Negative for cold intolerance, heat intolerance, polydipsia, polyphagia and polyuria.   Genitourinary:  Negative for frequency.   Musculoskeletal:  Positive for arthralgias, back pain, gait problem and myalgias.   Skin:  Negative for wound.   Allergic/Immunologic: Positive for environmental allergies. Negative for food allergies.   Neurological:  Positive for weakness and numbness. Negative for dizziness, light-headedness and headaches.   Psychiatric/Behavioral:  Positive for dysphoric mood. Negative for decreased concentration and sleep disturbance. The patient is not nervous/anxious.        Physical Exam:  Body  "mass index is 46.35 kg/m².  /80 (BP Location: Right arm, Patient Position: Sitting, Cuff Size: Adult)   Pulse 98   Temp 98.6 °F (37 °C) (Temporal)   Ht 5' 4\" (1.626 m)   Wt 122 kg (270 lb)   SpO2 96%   BMI 46.35 kg/m²    Wt Readings from Last 3 Encounters:   06/26/24 122 kg (270 lb)   06/13/24 123 kg (271 lb 6.4 oz)   05/17/24 124 kg (273 lb 12.8 oz)       Physical Exam  Vitals reviewed.   Constitutional:       General: He is not in acute distress.     Appearance: He is well-developed. He is obese. He is not ill-appearing.   HENT:      Head: Normocephalic and atraumatic.   Eyes:      Pupils: Pupils are equal, round, and reactive to light.   Neck:      Thyroid: No thyromegaly.   Cardiovascular:      Rate and Rhythm: Normal rate.      Pulses: Normal pulses. no weak pulses.           Dorsalis pedis pulses are 2+ on the right side and 2+ on the left side.        Posterior tibial pulses are 2+ on the right side and 2+ on the left side.   Pulmonary:      Effort: Pulmonary effort is normal.   Musculoskeletal:      Cervical back: Normal range of motion and neck supple.      Right lower leg: No edema.      Left lower leg: No edema.   Feet:      Right foot:      Skin integrity: No ulcer, skin breakdown, erythema, warmth, callus or dry skin.      Left foot:      Skin integrity: No ulcer, skin breakdown, erythema, warmth, callus or dry skin.   Lymphadenopathy:      Cervical: No cervical adenopathy.   Skin:     General: Skin is warm and dry.      Capillary Refill: Capillary refill takes less than 2 seconds.   Neurological:      Mental Status: He is alert and oriented to person, place, and time.      Gait: Gait normal.   Psychiatric:         Mood and Affect: Mood normal.         Behavior: Behavior normal.       Patient's shoes and socks removed.    Right Foot/Ankle   Right Foot Inspection  Skin Exam: skin normal and skin intact. No dry skin, no warmth, no callus, no erythema, no maceration, no abnormal color, no " pre-ulcer, no ulcer and no callus.     Toe Exam: ROM and strength within normal limits.     Sensory   Vibration: intact  Proprioception: intact  Monofilament testing: diminished    Vascular  Capillary refills: < 3 seconds  The right DP pulse is 2+. The right PT pulse is 2+.     Left Foot/Ankle  Left Foot Inspection  Skin Exam: skin normal and skin intact. No dry skin, no warmth, no erythema, no maceration, normal color, no pre-ulcer, no ulcer and no callus.     Toe Exam: ROM and strength within normal limits.     Sensory   Vibration: intact  Proprioception: intact  Monofilament testing: diminished    Vascular  Capillary refills: < 3 seconds  The left DP pulse is 2+. The left PT pulse is 2+.     Assign Risk Category  No deformity present  Loss of protective sensation  No weak pulses  Risk: 1      Labs:   Lab Results   Component Value Date    HGBA1C 7.4 (A) 05/17/2024    HGBA1C 7.5 (H) 02/09/2024    HGBA1C 9.4 (A) 09/11/2023     Lab Results   Component Value Date    CREATININE 1.26 02/09/2024    CREATININE 1.10 11/07/2023    CREATININE 1.17 09/02/2023    BUN 20 02/09/2024     01/29/2015    K 4.0 02/09/2024    CL 89 (L) 02/09/2024    CO2 34 (H) 02/09/2024     eGFRcr   Date Value Ref Range Status   07/11/2023 70 >59 Final     eGFR   Date Value Ref Range Status   02/09/2024 62 ml/min/1.73sq m Final     Lab Results   Component Value Date    CHOL 250 01/29/2015    HDL 35 (L) 02/09/2024    TRIG 155 (H) 02/09/2024     Lab Results   Component Value Date    ALT 21 02/09/2024    AST 18 02/09/2024    ALKPHOS 44 02/09/2024    BILITOT 0.3 01/29/2015     Lab Results   Component Value Date    GTV1QVDOMXWX 2.123 02/09/2024    JKG0PNKXDXVE 0.833 08/30/2023    HZJ6FWMTWPUC 1.035 10/31/2022     Lab Results   Component Value Date    FREET4 1.07 02/09/2024       Discussed with the patient and all questioned fully answered. He will call me if any problems arise.    Follow-up appointment in 4 months.     Counseled patient on  No diagnostic results, prognosis, risk and benefit of treatment options, instruction for management, importance of treatment compliance, Risk  factor reduction and impressions    MARLEEN Davila

## 2024-06-26 ENCOUNTER — OFFICE VISIT (OUTPATIENT)
Dept: ENDOCRINOLOGY | Facility: CLINIC | Age: 59
End: 2024-06-26
Payer: MEDICARE

## 2024-06-26 VITALS
DIASTOLIC BLOOD PRESSURE: 80 MMHG | BODY MASS INDEX: 46.1 KG/M2 | HEART RATE: 98 BPM | HEIGHT: 64 IN | TEMPERATURE: 98.6 F | OXYGEN SATURATION: 96 % | SYSTOLIC BLOOD PRESSURE: 138 MMHG | WEIGHT: 270 LBS

## 2024-06-26 DIAGNOSIS — E03.9 ACQUIRED HYPOTHYROIDISM: Chronic | ICD-10-CM

## 2024-06-26 DIAGNOSIS — Z79.4 TYPE 2 DIABETES MELLITUS WITH COMPLICATION, WITH LONG-TERM CURRENT USE OF INSULIN (HCC): Primary | ICD-10-CM

## 2024-06-26 DIAGNOSIS — E11.8 TYPE 2 DIABETES MELLITUS WITH COMPLICATION, WITH LONG-TERM CURRENT USE OF INSULIN (HCC): Primary | ICD-10-CM

## 2024-06-26 DIAGNOSIS — E66.01 MORBID OBESITY WITH BMI OF 45.0-49.9, ADULT (HCC): Chronic | ICD-10-CM

## 2024-06-26 DIAGNOSIS — E11.42 DIABETIC POLYNEUROPATHY ASSOCIATED WITH TYPE 2 DIABETES MELLITUS (HCC): Primary | ICD-10-CM

## 2024-06-26 DIAGNOSIS — E11.21 DIABETIC NEPHROPATHY ASSOCIATED WITH TYPE 2 DIABETES MELLITUS (HCC): ICD-10-CM

## 2024-06-26 DIAGNOSIS — E11.42 DIABETIC POLYNEUROPATHY ASSOCIATED WITH TYPE 2 DIABETES MELLITUS (HCC): ICD-10-CM

## 2024-06-26 PROCEDURE — 99214 OFFICE O/P EST MOD 30 MIN: CPT | Performed by: NURSE PRACTITIONER

## 2024-06-26 PROCEDURE — 95251 CONT GLUC MNTR ANALYSIS I&R: CPT | Performed by: NURSE PRACTITIONER

## 2024-06-26 RX ORDER — PREGABALIN 50 MG/1
50 CAPSULE ORAL 2 TIMES DAILY
Qty: 60 CAPSULE | Refills: 2 | Status: SHIPPED | OUTPATIENT
Start: 2024-06-26 | End: 2024-09-24

## 2024-06-26 NOTE — ASSESSMENT & PLAN NOTE
Continue Jardiance 25 mg daily. Continue valsartan.   Lab Results   Component Value Date    HGBA1C 7.4 (A) 05/17/2024

## 2024-06-26 NOTE — ASSESSMENT & PLAN NOTE
Patient reports worsening pain in the bottom of his feet, especially overnight when trying to sleep. Currently taking 60 mg of cymbalta daily and 25 mg of lyrica twice daily. Managed by PCP. Will reach out to discuss dose adjustment with PCP.   Lab Results   Component Value Date    HGBA1C 7.4 (A) 05/17/2024

## 2024-06-26 NOTE — ASSESSMENT & PLAN NOTE
Suboptimally controlled but improving. Ozempic is cost prohibitive. Will discontinue. Resume metformin 1,000 mg daily. Continue Jardiance 25 mg daily. Continue Lantus 24 units nightly. Reviewed how and when to inject humalog. Patient is currently injecting after blood sugar spikes and is also stacking insulin leading to episodes of hypoglycemia. Counseled on the importance of injecting 15-20 minute before eating, even if blood sugars are within normal limits at that time. Continue to focus on healthy diet. Reduce snacking. Patient knows to notify me with persistent hyperglycemia or episodes of hypoglycemia.  F/U in 4 months.   Lab Results   Component Value Date    HGBA1C 7.4 (A) 05/17/2024

## 2024-06-26 NOTE — PATIENT INSTRUCTIONS
Continue Lantus 24 units nightly  Continue 20 units three times daily BEFORE you eat. Leave at least 4 hours in between injections of humalog  Resume metformin 1,000 mg daily with breakfast  Continue jardiance 25 mg daily  You must let me know if your blood sugars start to climb or if you are having frequent low blood sugars.

## 2024-06-27 ENCOUNTER — TELEPHONE (OUTPATIENT)
Dept: ENDOCRINOLOGY | Facility: CLINIC | Age: 59
End: 2024-06-27

## 2024-06-27 NOTE — TELEPHONE ENCOUNTER
----- Message from MARLEEN Galo sent at 6/26/2024  6:28 PM EDT -----  Please let patient know that I spoke to his PCP. I will take over the Lyrica prescription and I have increased it to 50 mg twice daily. Thank you.

## 2024-06-27 NOTE — TELEPHONE ENCOUNTER
Called patient and mailbox if full    NURSE VISIT      DIAGNOSIS/PROBLEM:   Hypertension     HISTORY OF PRESENT ILLNESS:   Jaxon Hernandez is a 67 year old male who comes to the office today for a follow-up blood pressure evaluation. When he was seen 02/06/2018, his blood pressure was elevated at 162/94. As a result, Dr. Lewis recommended that he increase enalapril to 20 MG PO QD. When he was seen in follow up his blood pressure improved, but not controlled. He had been using his supply of enalapril 20 MG TABS from 2015, which is not likely at maximum efficacy. So he agreed to take his new supply & come back for repeat check. His BP remained elevated, so his bisoprolol was resumed at 5 MG PO QD, which was most recently increased to 10 MG PO QD. The bisoprolol had previously been discontinued 12/05/2016 by Dr. Lara due to low blood pressure (104/70). Dr. Lara later recommended resuming due to patient having palpitations, but Dr. Lewis deemed it not necessary at the time.     Today, Benigno denies lightheadedness or any other discomfort since increasing the bisoprolol.    ALLERGIES:   Allergen Reactions   • Paxil [Paroxetine Hydrochloride]      Esophageal spasms   • Robitussin Cough/Congestion [Dextromethorphan Hydrochloride]      esophogeal spasms   • Thimerosal Other (See Comments)     Eye redness         Outpatient Prescriptions Marked as Taking for the 4/24/18 encounter (Nurse Only) with ABENA LEWIS RN   Medication Sig Dispense Refill   • prednisoLONE acetate (OMNIPRED) 1 % ophthalmic suspension Place 1 drop in operative eye 4 times daily 10 mL 0   • ketorolac (ACULAR) 0.5 % ophthalmic solution Place 1 drop in operative eye 4 times daily. Continue as instructed. 5 mL 2   • acetaZOLAMIDE (DIAMOX SEQUELS) 500 MG capsule Take 1 capsule by mouth 2 times daily. 14 capsule 1   • ciprofloxacin (CIPRO) 0.3 % ophthalmic solution 1 drop into operative eye 4 times daily for 7 days 5 mL 0   • enalapril (VASOTEC) 20 MG tablet Take 1 tablet by mouth daily. 30 tablet 0    • bisoprolol (ZEBETA) 10 MG tablet Take 1 tablet by mouth daily. 30 tablet 0   • fluconazole (DIFLUCAN) 50 MG tablet Take 1 tablet by mouth daily. 10 tablet 1   • albuterol (VENTOLIN) (2.5 MG/3ML) 0.083% nebulizer solution Take 3 mLs by nebulization every 6 hours as needed for Wheezing. 375 mL 11   • pantoprazole (PROTONIX) 40 MG tablet TAKE 1 TABLET BY MOUTH  DAILY 90 tablet 1   • buPROPion (WELLBUTRIN XL) 300 MG 24 hr tablet TAKE 1 TABLET BY MOUTH  DAILY 90 tablet 1   • insulin glargine (LANTUS SOLOSTAR) 100 UNIT/ML pen-injector Inject up to 15 units per day. 15 mL 0   • insulin lispro (HUMALOG KWIKPEN) 100 UNIT/ML pen-injector Inject up to 30 units per day via sliding scale. 30 mL 1   • Cholecalciferol (VITAMIN D3) 5000 units Tab Take 1 tablet by mouth daily.     • Misc Natural Products (PROSTATE) Cap Take 1 capsule by mouth daily. Prostate supplement from [facility]     • Magnesium 200 MG Tab Take 200 mg by mouth.     • finasteride (PROSCAR) 5 MG tablet TAKE 1 TABLET BY MOUTH  DAILY 90 tablet 1   • tamsulosin (FLOMAX) 0.4 MG Cap TAKE 2 CAPSULES BY MOUTH  DAILY 180 capsule 1   • FLUoxetine (PROZAC) 20 MG capsule TAKE 1 CAPSULE BY MOUTH  DAILY 90 capsule 1   • metformin (GLUCOPHAGE) 1000 MG tablet TAKE 1 TABLET BY MOUTH TWO  TIMES DAILY WITH MEALS 180 tablet 1   • cetirizine (ZYRTEC) 10 MG tablet Take 10 mg by mouth daily.     • fluocinonide (LIDEX) 0.05 % topical solution Apply topically 2 times daily. 60 mL 12   • atorvastatin (LIPITOR) 80 MG tablet Take 1 tablet by mouth daily. 90 tablet 3   • Probiotic Product ([employer] ADV PROBIOTIC GUMMIES) Chew Tab Chew by mouth daily.     • BD PEN NEEDLE JAMEY U/F 32G X 4 MM Misc USE TO INJECT INSULIN 5 TIMES PER  each 1   • PROAIR  (90 BASE) MCG/ACT inhaler INHALE 2 PUFFS BY MOUTH INTO THE LUNGS EVERY 4 HOURS AS NEEDED FOR SHORTNESS OF BREATH OR WHEEZING 8.5 g 5   • aspirin 81 MG chewable tablet Chew 2 tablets by mouth daily. 765 tablet 50   • fluticasone (FLONASE)  50 MCG/ACT nasal spray Spray 1 spray in each nostril daily.     • ONETOUCH DELICA LANCETS 33G Misc Monitor blood sugar 3-4 times a day. 300 each 3   • Multiple Vitamins-Minerals (MULTIVITAMIN PO) Take  by mouth daily.     • LUTEIN PO Take  by mouth daily.     • Vitamin Mixture (ROSENDO-C PO) Take  by mouth daily.     • VITAMIN B COMPLEX CAPS   PO 1 tab daily o 0       PHYSICAL EXAMINATION:   Visit Vitals  /80 (BP Location: RUE, Patient Position: Sitting, Cuff Size: Regular) 128/86 (BP Location: E, Patient Position: Sitting, Cuff Size: Regular)   Pulse 54    Resp 16      General:    Patient is in no apparent distress.  Extremities:   No edema.    IMPRESSION & PLAN (per verbal order from Dr. Velasquez):  1. Hypertension--controlled.  --Continue bisoprolol 10 MG PO QD.  --Continue all other current medications as prescribed.    DISPOSITION:    Follow up in February 2019 for annual with Dr. Velasquez.  Patient agrees to call the clinic when he would like medications reordered through [facility].

## 2024-06-28 NOTE — ASSESSMENT & PLAN NOTE
Lab Results   Component Value Date    HGBA1C 7.4 (A) 05/17/2024   -managed by Endo. HgbA1c much improved  -On Glargine, Humalog, Metformin and Jardiance. Unable to continue Ozempic due to cost.  -avoid/limit refined carbohydrates

## 2024-07-09 ENCOUNTER — OFFICE VISIT (OUTPATIENT)
Dept: FAMILY MEDICINE CLINIC | Facility: HOME HEALTHCARE | Age: 59
End: 2024-07-09
Payer: MEDICARE

## 2024-07-09 VITALS
HEIGHT: 64 IN | HEART RATE: 102 BPM | DIASTOLIC BLOOD PRESSURE: 72 MMHG | SYSTOLIC BLOOD PRESSURE: 132 MMHG | WEIGHT: 272 LBS | TEMPERATURE: 97 F | OXYGEN SATURATION: 97 % | BODY MASS INDEX: 46.44 KG/M2

## 2024-07-09 DIAGNOSIS — R19.09 UPPER ABDOMINAL MASS: Primary | ICD-10-CM

## 2024-07-09 PROBLEM — E11.22 TYPE 2 DIABETES MELLITUS WITH DIABETIC CHRONIC KIDNEY DISEASE (HCC): Status: ACTIVE | Noted: 2023-07-13

## 2024-07-09 PROBLEM — E87.1 HYPO-OSMOLALITY AND HYPONATREMIA: Chronic | Status: ACTIVE | Noted: 2023-11-10

## 2024-07-09 PROCEDURE — 99213 OFFICE O/P EST LOW 20 MIN: CPT | Performed by: PHYSICIAN ASSISTANT

## 2024-07-09 NOTE — PROGRESS NOTES
Ambulatory Visit  Name: Luis F Velazquez      : 1965      MRN: [id number]  Encounter Provider: Chuck Holman PA-C  Encounter Date: 2024   Encounter department: [facility]    Assessment & Plan   1. Upper abdominal mass  -     US abdominal wall; Future; Expected date: 2024    - Palpable mass over the left anterior rib cage/upper abdomen.  No abnormalities noted on prior CXR or CTA.  Will check US for further evaluation.     History of Present Illness   {Disappearing Hyperlinks I Encounters * My Last Note * Since Last Visit * History :89648}  Luis F is a 58-year-old male with history of COPD, chronic respiratory failure, MARSHA, tobacco use, hypertension, hyperlipidemia, hypothyroidism, type 2 diabetes, CKD, neuropathy, depression and anxiety, presenting with concern for a rib mass.    Patient reports noticing a mass on his left ribs a few months ago.  Initially this was the size of his thumb but has continued to grow and is now painful if he leans against something.        Review of Systems   Constitutional:  Negative for chills, diaphoresis and fever.   Respiratory:  Negative for cough, chest tightness, shortness of breath and wheezing.    Cardiovascular:  Negative for chest pain, palpitations and leg swelling.   Gastrointestinal:  Negative for abdominal pain, constipation, diarrhea, nausea and vomiting.   Skin:  Negative for rash and wound.   Neurological:  Negative for dizziness, syncope, weakness, light-headedness and headaches.     Medical History Reviewed by provider this encounter:  Tobacco  Allergies  Meds  Problems  Med Hx  Surg Hx  Fam Hx       Current Outpatient Medications on File Prior to Visit   Medication Sig Dispense Refill   • albuterol (2.5 mg/3 mL) 0.083 % nebulizer solution Take 3 mL (2.5 mg total) by nebulization every 6 (six) hours as needed for wheezing or shortness of breath 180 mL 0   • Ascorbic Acid (VITAMIN C PO) Take 1 tablet by mouth  "daily     • aspirin 81 mg chewable tablet Take one tablet by mouth daily     • cholecalciferol (VITAMIN D3) 400 units tablet Take 1 tablet by mouth daily     • clotrimazole (LOTRIMIN) 1 % cream Apply topically 2 (two) times a day 60 g 2   • cyclobenzaprine (FLEXERIL) 5 mg tablet Take 1 tablet by mouth three times daily as needed for muscle spasm 90 tablet 0   • DULoxetine (CYMBALTA) 60 mg delayed release capsule Take 1 capsule (60 mg total) by mouth daily 90 capsule 1   • EPINEPHrine (EPIPEN) 0.3 mg/0.3 mL SOAJ INJECT 0.3MG INTO A MUSCLE ONCE FOR 1 DOSE 2 each 0   • ferrous gluconate (FERGON) 324 mg tablet Take 324 mg by mouth     • gabapentin (NEURONTIN) 100 mg capsule TAKE 1 CAPSULE BY MOUTH THREE TIMES DAILY 90 capsule 5   • HumaLOG KwikPen 100 units/mL injection pen INJECT 20 UNITS UNDER THE SKIN THREE TIMES DAILY WITH MEALS (Patient taking differently: Inject 20 Units under the skin 3 (three) times a day with meals) 15 mL 0   • hydroCHLOROthiazide 25 mg tablet Take 1 tablet by mouth twice daily 180 tablet 1   • Insulin Glargine Solostar (Lantus SoloStar) 100 UNIT/ML SOPN Inject 0.24 mL (24 Units total) under the skin daily 30 mL 1   • Insulin Pen Needle (Pen Needles 3/16\") 31G X 5 MM MISC Use 4 (four) times a day 300 each 3   • ipratropium (ATROVENT) 0.02 % nebulizer solution Take 2.5 mL (0.5 mg total) by nebulization every 6 (six) hours as needed for wheezing or shortness of breath 150 mL 0   • ipratropium-albuterol (DUO-NEB) 0.5-2.5 mg/3 mL nebulizer solution Take 3 mL by nebulization every 6 (six) hours as needed for wheezing or shortness of breath 180 mL 0   • Jardiance 10 MG TABS tablet Take 10 mg by mouth every morning     • ketoconazole (NIZORAL) 2 % shampoo Apply 1 Application topically 2 (two) times a week To feet in shower 120 mL 2   • levothyroxine 175 mcg tablet Take 1 tablet by mouth once daily 90 tablet 1   • loratadine (CLARITIN) 10 mg tablet Take 10 mg by mouth daily     • metFORMIN (GLUCOPHAGE) " 1000 MG tablet Take 1 tablet (1,000 mg total) by mouth daily with breakfast 90 tablet 1   • Omega-3 Fatty Acids (fish oil) 1,000 mg Take 1,000 mg by mouth 2 (two) times a day     • pregabalin (LYRICA) 50 mg capsule Take 1 capsule (50 mg total) by mouth 2 (two) times a day 60 capsule 2   • rosuvastatin (CRESTOR) 40 MG tablet Take 1 tablet by mouth once daily 90 tablet 3   • sodium chloride (OCEAN) 0.65 % nasal spray 1 spray into each nostril as needed for rhinitis 30 mL 1   • tamsulosin (FLOMAX) 0.4 mg Take 1 capsule (0.4 mg total) by mouth daily with dinner 90 capsule 3   • Thiamine HCl (vitamin B-1) 250 MG tablet Take 250 mg by mouth daily     • traZODone (DESYREL) 150 mg tablet Take 1 tablet (150 mg total) by mouth daily at bedtime 90 tablet 1   • valsartan (DIOVAN) 160 mg tablet Take 160 mg by mouth daily     • Ventolin  (90 Base) MCG/ACT inhaler INHALE 2 PUFFS BY MOUTH EVERY 4 HOURS AS NEEDED FOR WHEEZING FOR SHORTNESS OF BREATH 18 g 0   • vitamin E, tocopherol, 400 units capsule Take 400 Units by mouth daily     • [DISCONTINUED] atorvastatin (LIPITOR) 20 mg tablet Take 1 tablet by mouth once daily 90 tablet 3     No current facility-administered medications on file prior to visit.      Social History     Tobacco Use   • Smoking status: Former     Current packs/day: 0.00     Types: Cigarettes     Quit date: 10/21/2022     Years since quittin.7   • Smokeless tobacco: Never   • Tobacco comments:     states read to quit prior to admit   Vaping Use   • Vaping status: Never Used   Substance and Sexual Activity   • Alcohol use: Not Currently     Alcohol/week: 3.0 standard drinks of alcohol     Types: 2 Cans of beer, 1 Shots of liquor per week     Comment: No alcohol since    • Drug use: No   • Sexual activity: Not Currently     Objective   {Disappearing Hyperlinks   Review Vitals * Enter New Vitals * Results Review * Labs * Imaging * Cardiology * Procedures * Lung Cancer Screening :25750}  /72    "Pulse 102   Temp (!) 97 °F (36.1 °C)   Ht 5' 4\" (1.626 m)   Wt 123 kg (272 lb)   SpO2 97%   BMI 46.69 kg/m²     Physical Exam  Vitals and nursing note reviewed.   Constitutional:       General: He is not in acute distress.     Appearance: Normal appearance. He is obese.      Comments: Ambulating with a cane   HENT:      Head: Normocephalic and atraumatic.   Eyes:      Extraocular Movements: Extraocular movements intact.      Conjunctiva/sclera: Conjunctivae normal.      Pupils: Pupils are equal, round, and reactive to light.   Cardiovascular:      Rate and Rhythm: Normal rate and regular rhythm.      Heart sounds: Normal heart sounds. No murmur heard.  Pulmonary:      Effort: Pulmonary effort is normal. No respiratory distress.      Breath sounds: Normal breath sounds. No wheezing.   Abdominal:          Comments: LUQ/anterior rib cage palpable mass.  Minimal tenderness.  No overlying skin changes.   Skin:     General: Skin is warm and dry.   Neurological:      General: No focal deficit present.      Mental Status: He is alert and oriented to person, place, and time.      Cranial Nerves: No cranial nerve deficit.   Psychiatric:         Mood and Affect: Mood normal.         Behavior: Behavior normal.       Administrative Statements {Disappearing Hyperlinks I  Level of Service [facility]}          "

## 2024-07-10 ENCOUNTER — HOSPITAL ENCOUNTER (OUTPATIENT)
Dept: ULTRASOUND IMAGING | Facility: HOSPITAL | Age: 59
Discharge: HOME | End: 2024-07-10
Payer: MEDICARE

## 2024-07-10 DIAGNOSIS — R19.09 UPPER ABDOMINAL MASS: ICD-10-CM

## 2024-07-10 PROCEDURE — 76705 ECHO EXAM OF ABDOMEN: CPT

## 2024-07-11 ENCOUNTER — OFFICE VISIT (OUTPATIENT)
Dept: PULMONOLOGY | Facility: CLINIC | Age: 59
End: 2024-07-11
Payer: MEDICARE

## 2024-07-11 VITALS
HEIGHT: 64 IN | SYSTOLIC BLOOD PRESSURE: 100 MMHG | OXYGEN SATURATION: 96 % | BODY MASS INDEX: 46.26 KG/M2 | HEART RATE: 106 BPM | WEIGHT: 271 LBS | TEMPERATURE: 98.2 F | DIASTOLIC BLOOD PRESSURE: 66 MMHG

## 2024-07-11 DIAGNOSIS — E66.9 RESTRICTIVE LUNG DISEASE SECONDARY TO OBESITY: Primary | ICD-10-CM

## 2024-07-11 DIAGNOSIS — J96.11 CHRONIC RESPIRATORY FAILURE WITH HYPOXIA, ON HOME OXYGEN THERAPY  (HCC): ICD-10-CM

## 2024-07-11 DIAGNOSIS — E66.01 MORBID OBESITY WITH BMI OF 45.0-49.9, ADULT (HCC): ICD-10-CM

## 2024-07-11 DIAGNOSIS — J98.4 RESTRICTIVE LUNG DISEASE SECONDARY TO OBESITY: Primary | ICD-10-CM

## 2024-07-11 DIAGNOSIS — Z99.81 CHRONIC RESPIRATORY FAILURE WITH HYPOXIA, ON HOME OXYGEN THERAPY  (HCC): ICD-10-CM

## 2024-07-11 DIAGNOSIS — G47.33 OSA TREATED WITH BIPAP: ICD-10-CM

## 2024-07-11 DIAGNOSIS — F17.211 NICOTINE DEPENDENCE, CIGARETTES, IN REMISSION: ICD-10-CM

## 2024-07-11 DIAGNOSIS — R06.09 DOE (DYSPNEA ON EXERTION): ICD-10-CM

## 2024-07-11 DIAGNOSIS — F17.201 TOBACCO ABUSE, IN REMISSION: ICD-10-CM

## 2024-07-11 PROBLEM — J44.1 COPD WITH ACUTE EXACERBATION (HCC): Status: RESOLVED | Noted: 2017-02-07 | Resolved: 2024-07-11

## 2024-07-11 PROBLEM — Z72.0 TOBACCO USE: Status: RESOLVED | Noted: 2021-04-07 | Resolved: 2024-07-11

## 2024-07-11 PROBLEM — J44.9 OSA AND COPD OVERLAP SYNDROME (HCC): Status: RESOLVED | Noted: 2021-04-07 | Resolved: 2024-07-11

## 2024-07-11 PROBLEM — J96.02 ACUTE RESPIRATORY FAILURE WITH HYPOXIA AND HYPERCAPNIA (HCC): Status: RESOLVED | Noted: 2021-04-07 | Resolved: 2024-07-11

## 2024-07-11 PROBLEM — J96.01 ACUTE RESPIRATORY FAILURE WITH HYPOXIA AND HYPERCAPNIA (HCC): Status: RESOLVED | Noted: 2021-04-07 | Resolved: 2024-07-11

## 2024-07-11 PROBLEM — J90 PLEURAL EFFUSION, LEFT: Status: RESOLVED | Noted: 2023-08-29 | Resolved: 2024-07-11

## 2024-07-11 PROCEDURE — 94618 PULMONARY STRESS TESTING: CPT | Performed by: PHYSICIAN ASSISTANT

## 2024-07-11 PROCEDURE — 99214 OFFICE O/P EST MOD 30 MIN: CPT

## 2024-07-11 NOTE — PROGRESS NOTES
Pulmonary Follow Up Note  Luis F Velazquez 58 y.o. male MRN: [id number]  7/11/2024    Assessment:    Restrictive lung disease secondary to obesity  -Moderate restriction with normal diffusion capacity on PFTs 6 months ago.  Secondary to morbid obesity  -Patient states his breathing has improved since his last office visit, he is working with weight management to reduce weight  -Patient has albuterol that he may continue to use if needed for shortness of breath or wheezing    Chronic respiratory failure with hypoxia, on home oxygen therapy   -Likely secondary to obesity hypoventilation.  Mostly using oxygen only at night with BiPAP  -Walk test performed in the office today, only able to walk for 2 minutes and had to stop due to leg and back pain. However, there was no desaturation during the 2 minutes of ambulation  -Due to lack of adequate testing to determine if patient would experience ambulatory desaturations, will hold off on discontinuing home oxygen  -Once patient's ability to ambulate improves, repeat walk test in the future.  He will continue wear 3 L nightly with his BiPAP and use 2L during the daytime as needed to maintain SPO2 above 88%      MARSHA treated with BiPAP  -Reviewed compliance report. Luis F has had excellent compliance with his BiPAP over the past 60 days with an average nightly use of 7 hours and 2 minutes.  Residual AHI 2.3.  Patient is sleeping well and is getting more used to his mask  -Patient will continue wearing BiPAP nightly with 3 L oxygen bled in    Tobacco abuse, in remission  -Extensive smoking history in remission since 10/2022  -Patient due for CT lung cancer screening next month  -Risks vs benefits of LDCT screening was discussed with patient including risks vs benefits where risks including false positive test, radiation exposure, risk of overdiagnosis and benefits included early detection in potentially malignant lesions therefore improving the rate of intervention and therefore  improving overall survival/mortality.    -Patient agreeable to continue screening, orders placed    Morbid obesity with BMI of 45.0-49.9, adult (HCC)  -Encouraged patient to continue his weight loss efforts and follow-up with weight management as directed    LAMB (dyspnea on exertion)  -Likely secondary to morbid obesity and deconditioning.  Previous workup did not reveal any underlying lung disease or cardiac etiology  -Improving since his last visit  -Encouraged patient to increase activity to improve stamina and conditioning.  Continue working with weight management to achieve weight loss goals      Plan:    Diagnoses and all orders for this visit:    Restrictive lung disease secondary to obesity    Chronic respiratory failure with hypoxia, on home oxygen therapy  ([facility])  -     POCT Oxygen Titration    Morbid obesity with BMI of 45.0-49.9, adult ([facility])    MARSHA treated with BiPAP    Tobacco abuse, in remission  -     CT lung screening program; Future    Nicotine dependence, cigarettes, in remission  -     CT lung screening program; Future    LAMB (dyspnea on exertion)          Return in about 6 months (around 1/11/2025).      History of Present Illness     Chief Complaint: No chief complaint on file.      Patient ID: Luis F is a 58 y.o. y.o. male has a past medical history of Acquired hypothyroidism (1/2/2015), Angioedema, Diabetes mellitus ([facility]), Disease of thyroid gland, Hyperlipidemia, Hypertension, Mixed hyperlipidemia (2/4/2015), Morbid obesity ([facility]), Morbid obesity with BMI of 45.0-49.9, adult (HCC) (1/2/2015), MARSHA (obstructive sleep apnea), and Primary hypertension (1/2/2015).      7/11/2024  HPI: Luis F Velazquez is a 58 y.o. male who presents to the office today for a follow-up visit.   Patient has a past medical history positive for insulin-dependent diabetes mellitus type 2, hyperlipidemia, emphysema, morbid obesity, MARSHA on BiPAP, and former smoker. He was previously seen in the office 6 months ago.  He was  maintained on 2L continuous oxygen, BiPAP nightly and Albuterol PRN. Prior PFTs with restriction and normal diffusion capacity.  He has been working with weight management to reduce his weight, recently restarted on metformin.    Patient states he feels his LAMB has improved since his last office visit.  Has not used albuterol inhaler in approximately 2 months up until yesterday for episode of shortness of breath.  Not really wearing his oxygen during the day, mostly at night with his BiPAP.  Does report nightly BiPAP compliance and admits that he is now getting used to wearing his current mask.  Denies any issues with this.  Denies any chest pain or lower extremity swelling.  No cough, mucus, or wheezing.      Review of Systems   Constitutional:  Negative for activity change, chills, fever and unexpected weight change.   HENT:  Negative for congestion, postnasal drip, rhinorrhea, sore throat and trouble swallowing.    Respiratory:  Positive for shortness of breath. Negative for cough, chest tightness and wheezing.    Cardiovascular:  Negative for chest pain, palpitations and leg swelling.   Allergic/Immunologic: Negative.        Historical Information   Past Medical History:   Diagnosis Date    Acquired hypothyroidism 1/2/2015    Angioedema     Diabetes mellitus (HCC)     Disease of thyroid gland     Hyperlipidemia     Hypertension     Mixed hyperlipidemia 2/4/2015    Morbid obesity (HCC)     Morbid obesity with BMI of 45.0-49.9, adult (HCC) 1/2/2015    MARSHA (obstructive sleep apnea)     Primary hypertension 1/2/2015    Transitioned From: Benign essential hypertension     Past Surgical History:   Procedure Laterality Date    KNEE SURGERY      SINUS SURGERY       Family History   Problem Relation Age of Onset    Diabetes Mother     Diabetes type II Mother     Esophageal cancer Father     Diabetes Brother     Kidney cancer Brother     Diabetes type II Brother     Diabetes Maternal Grandmother     Diabetes type II  Maternal Grandmother     Diabetes Paternal Grandmother     Heart disease Neg Hx        Smoking history: He reports that he quit smoking about 20 months ago. His smoking use included cigarettes. He has never used smokeless tobacco.    Occupational History:     Immunization History   Administered Date(s) Administered    COVID-19 MODERNA VACC 0.5 ML IM 05/18/2021, 06/15/2021    Hep A / Hep B 05/04/2024, 06/04/2024    Hepatitis B 05/04/2024    INFLUENZA 09/16/2011, 10/29/2013    Pneumococcal Conjugate Vaccine 20-valent (Pcv20), Polysace 06/08/2022    Pneumococcal Polysaccharide PPV23 09/16/2011, 02/27/2019    Td (adult), Unspecified 09/11/2011    Tdap 09/11/2011, 06/16/2023    Tuberculin Skin Test-PPD Intradermal 04/15/2021    Zoster Vaccine Recombinant 06/16/2023, 09/23/2023       Meds/Allergies     Current Outpatient Medications:     albuterol (2.5 mg/3 mL) 0.083 % nebulizer solution, Take 3 mL (2.5 mg total) by nebulization every 6 (six) hours as needed for wheezing or shortness of breath, Disp: 180 mL, Rfl: 0    Ascorbic Acid (VITAMIN C PO), Take 1 tablet by mouth daily, Disp: , Rfl:     aspirin 81 mg chewable tablet, Take one tablet by mouth daily, Disp: , Rfl:     cholecalciferol (VITAMIN D3) 400 units tablet, Take 1 tablet by mouth daily, Disp: , Rfl:     clotrimazole (LOTRIMIN) 1 % cream, Apply topically 2 (two) times a day, Disp: 60 g, Rfl: 2    cyclobenzaprine (FLEXERIL) 5 mg tablet, Take 1 tablet by mouth three times daily as needed for muscle spasm, Disp: 90 tablet, Rfl: 0    DULoxetine (CYMBALTA) 60 mg delayed release capsule, Take 1 capsule (60 mg total) by mouth daily, Disp: 90 capsule, Rfl: 1    EPINEPHrine (EPIPEN) 0.3 mg/0.3 mL SOAJ, INJECT 0.3MG INTO A MUSCLE ONCE FOR 1 DOSE, Disp: 2 each, Rfl: 0    ferrous gluconate (FERGON) 324 mg tablet, Take 324 mg by mouth, Disp: , Rfl:     gabapentin (NEURONTIN) 100 mg capsule, TAKE 1 CAPSULE BY MOUTH THREE TIMES DAILY, Disp: 90 capsule, Rfl: 5    HumaLOG  "KwikPen 100 units/mL injection pen, INJECT 20 UNITS UNDER THE SKIN THREE TIMES DAILY WITH MEALS (Patient taking differently: Inject 20 Units under the skin 3 (three) times a day with meals), Disp: 15 mL, Rfl: 0    hydroCHLOROthiazide 25 mg tablet, Take 1 tablet by mouth twice daily, Disp: 180 tablet, Rfl: 1    Insulin Glargine Solostar (Lantus SoloStar) 100 UNIT/ML SOPN, Inject 0.24 mL (24 Units total) under the skin daily, Disp: 30 mL, Rfl: 1    Insulin Pen Needle (Pen Needles 3/16\") 31G X 5 MM MISC, Use 4 (four) times a day, Disp: 300 each, Rfl: 3    Jardiance 10 MG TABS tablet, Take 10 mg by mouth every morning, Disp: , Rfl:     ketoconazole (NIZORAL) 2 % shampoo, Apply 1 Application topically 2 (two) times a week To feet in shower, Disp: 120 mL, Rfl: 2    levothyroxine 175 mcg tablet, Take 1 tablet by mouth once daily, Disp: 90 tablet, Rfl: 1    loratadine (CLARITIN) 10 mg tablet, Take 10 mg by mouth daily, Disp: , Rfl:     metFORMIN (GLUCOPHAGE) 1000 MG tablet, Take 1 tablet (1,000 mg total) by mouth daily with breakfast, Disp: 90 tablet, Rfl: 1    Omega-3 Fatty Acids (fish oil) 1,000 mg, Take 1,000 mg by mouth 2 (two) times a day, Disp: , Rfl:     pregabalin (LYRICA) 50 mg capsule, Take 1 capsule (50 mg total) by mouth 2 (two) times a day, Disp: 60 capsule, Rfl: 2    rosuvastatin (CRESTOR) 40 MG tablet, Take 1 tablet by mouth once daily, Disp: 90 tablet, Rfl: 3    sodium chloride (OCEAN) 0.65 % nasal spray, 1 spray into each nostril as needed for rhinitis, Disp: 30 mL, Rfl: 1    tamsulosin (FLOMAX) 0.4 mg, Take 1 capsule (0.4 mg total) by mouth daily with dinner, Disp: 90 capsule, Rfl: 3    Thiamine HCl (vitamin B-1) 250 MG tablet, Take 250 mg by mouth daily, Disp: , Rfl:     traZODone (DESYREL) 150 mg tablet, Take 1 tablet (150 mg total) by mouth daily at bedtime, Disp: 90 tablet, Rfl: 1    valsartan (DIOVAN) 160 mg tablet, Take 160 mg by mouth daily, Disp: , Rfl:     Ventolin  (90 Base) MCG/ACT " "inhaler, INHALE 2 PUFFS BY MOUTH EVERY 4 HOURS AS NEEDED FOR WHEEZING FOR SHORTNESS OF BREATH, Disp: 18 g, Rfl: 0    vitamin E, tocopherol, 400 units capsule, Take 400 Units by mouth daily, Disp: , Rfl:   Allergies:   Allergies   Allergen Reactions    Ace Inhibitors Swelling     Angioedema    PT STATES THIS IS NOT AN ALLERGY    Other Anaphylaxis     Pt believes that he is allergic to peanut butter.    Also, seasonal allergies    Zinc Tongue Swelling     PT STATES THIS IS NOT AN ALLERGY    Clindamycin Edema     Had angioedema episode approx 5 hr after IM administration of clindamycin. Unclear if there is definitive causal relationship.  PT STATES THIS IS NOT AN ALLERGY         Vitals:  Vitals:    07/11/24 1307 07/11/24 1311   BP: 100/66    BP Location: Left arm    Patient Position: Sitting    Cuff Size: Standard    Pulse: (!) 106    Temp: 98.2 °F (36.8 °C)    TempSrc: Temporal    SpO2: 95% 96%   Weight: 123 kg (271 lb)    Height: 5' 4\" (1.626 m)      Oxygen Therapy  SpO2: 96 %  Oxygen Therapy: None (Room air)  .  Wt Readings from Last 3 Encounters:   07/11/24 123 kg (271 lb)   07/09/24 123 kg (272 lb)   06/26/24 122 kg (270 lb)     Body mass index is 46.52 kg/m².    Physical Exam  Vitals and nursing note reviewed.   Constitutional:       General: He is not in acute distress.     Appearance: Normal appearance. He is well-developed. He is morbidly obese.   Cardiovascular:      Rate and Rhythm: Regular rhythm. Tachycardia present.      Heart sounds: Normal heart sounds. No murmur heard.  Pulmonary:      Effort: Pulmonary effort is normal. No respiratory distress.      Breath sounds: Normal breath sounds. No decreased breath sounds, wheezing, rhonchi or rales.   Musculoskeletal:         General: No swelling.      Right lower leg: No edema.      Left lower leg: No edema.   Psychiatric:         Mood and Affect: Mood and affect normal.         Behavior: Behavior normal. Behavior is cooperative.           Labs: I have " personally reviewed pertinent lab results.  Lab Results   Component Value Date    WBC 11.53 (H) 09/02/2023    HGB 9.0 (L) 09/02/2023    HCT 30.7 (L) 09/02/2023    MCV 83 09/02/2023     (H) 09/02/2023     Lab Results   Component Value Date    GLUCOSE 130 01/29/2015    CALCIUM 9.8 02/09/2024     01/29/2015    K 4.0 02/09/2024    CO2 34 (H) 02/09/2024    CL 89 (L) 02/09/2024    BUN 20 02/09/2024    CREATININE 1.26 02/09/2024     Lab Results   Component Value Date     (H) 02/12/2018     Lab Results   Component Value Date    ALT 21 02/09/2024    AST 18 02/09/2024    ALKPHOS 44 02/09/2024    BILITOT 0.3 01/29/2015       Imaging and other studies: I have personally reviewed pertinent reports and I have personally reviewed pertinent films in PACS     Chest x-ray 11/25/2023  No acute cardiopulmonary disease    CT abdomen and pelvis with contrast 8/29/2023  Lungs-no pulmonary embolism.  Emphysema.  Small to moderate size left pleural effusion of uncertain etiology.    Echo 8/30/2023    Left Ventricle: Left ventricular cavity size is normal. Wall thickness is normal. The left ventricular ejection fraction is 60%. Systolic function is normal. Wall motion is normal. Diastolic function is mildly abnormal, consistent with grade I (abnormal) relaxation.    Right Ventricle: Right ventricular cavity size is normal. Systolic function is normal.    Aortic Valve: There is aortic valve sclerosis.    Tricuspid Valve: The right ventricular systolic pressure is mildly elevated. The estimated right ventricular systolic pressure is 41.00 mmHg.    Pulmonary function testing:     Pulmonary Functions Testing Results: 12/8/2023    FEV1/FVC ratio 73%    FEV1 55% predicted  FVC 61% predicted  (-) response to bronchodilators  TLC 77 % predicted  % predicted  DLCO 80% predicted    Impression: Moderate restrictive airflow defect on spirometry.  No bronchodilator response.  Mildly reduced TLC.  Normal diffusion capacity.   Normal flow volume loops.    6-minute walk test 12/8/2023  Patient ambulated 183 m, kandace SpO2 87%.  Placed on 2 L supplemental oxygen and ambulated 24 m with SpO2 95%

## 2024-07-11 NOTE — ASSESSMENT & PLAN NOTE
-Likely secondary to morbid obesity and deconditioning.  Previous workup did not reveal any underlying lung disease or cardiac etiology  -Improving since his last visit  -Encouraged patient to increase activity to improve stamina and conditioning.  Continue working with weight management to achieve weight loss goals

## 2024-07-11 NOTE — ASSESSMENT & PLAN NOTE
-Extensive smoking history in remission since 10/2022  -Patient due for CT lung cancer screening next month  -Risks vs benefits of LDCT screening was discussed with patient including risks vs benefits where risks including false positive test, radiation exposure, risk of overdiagnosis and benefits included early detection in potentially malignant lesions therefore improving the rate of intervention and therefore improving overall survival/mortality.    -Patient agreeable to continue screening, orders placed

## 2024-07-11 NOTE — ASSESSMENT & PLAN NOTE
-Reviewed compliance report. Luis F has had excellent compliance with his BiPAP over the past 60 days with an average nightly use of 7 hours and 2 minutes.  Residual AHI 2.3.  Patient is sleeping well and is getting more used to his mask  -Patient will continue wearing BiPAP nightly with 3 L oxygen bled in

## 2024-07-11 NOTE — ASSESSMENT & PLAN NOTE
-Moderate restriction with normal diffusion capacity on PFTs 6 months ago.  Secondary to morbid obesity  -Patient states his breathing has improved since his last office visit, he is working with weight management to reduce weight  -Patient has albuterol that he may continue to use if needed for shortness of breath or wheezing

## 2024-07-11 NOTE — ASSESSMENT & PLAN NOTE
-Encouraged patient to continue his weight loss efforts and follow-up with weight management as directed

## 2024-07-11 NOTE — ASSESSMENT & PLAN NOTE
-Likely secondary to obesity hypoventilation.  Mostly using oxygen only at night with BiPAP  -Walk test performed in the office today, only able to walk for 2 minutes and had to stop due to leg and back pain. However, there was no desaturation during the 2 minutes of ambulation  -Due to lack of adequate testing to determine if patient would experience ambulatory desaturations, will hold off on discontinuing home oxygen  -Once patient's ability to ambulate improves, repeat walk test in the future.  He will continue wear 3 L nightly with his BiPAP and use 2L during the daytime as needed to maintain SPO2 above 88%

## 2024-07-13 DIAGNOSIS — F41.8 DEPRESSION WITH ANXIETY: ICD-10-CM

## 2024-07-15 RX ORDER — TRAZODONE HYDROCHLORIDE 150 MG/1
150 TABLET ORAL
Qty: 90 TABLET | Refills: 0 | Status: SHIPPED | OUTPATIENT
Start: 2024-07-15

## 2024-07-19 ENCOUNTER — TELEPHONE (OUTPATIENT)
Dept: FAMILY MEDICINE CLINIC | Facility: CLINIC | Age: 59
End: 2024-07-19

## 2024-07-19 NOTE — TELEPHONE ENCOUNTER
Patient called and was made aware about u/s results. He would like to know what he should do now in regards to the lump?

## 2024-07-22 NOTE — TELEPHONE ENCOUNTER
Contacted patient and reviewed results of normal US.  Patient is not having any pain currently.  Will continue to monitor for any changes.

## 2024-07-25 ENCOUNTER — OFFICE VISIT (OUTPATIENT)
Dept: OBGYN CLINIC | Facility: CLINIC | Age: 59
End: 2024-07-25
Payer: MEDICARE

## 2024-07-25 VITALS
HEIGHT: 64 IN | OXYGEN SATURATION: 98 % | BODY MASS INDEX: 47.12 KG/M2 | TEMPERATURE: 98.4 F | HEART RATE: 102 BPM | WEIGHT: 276 LBS | SYSTOLIC BLOOD PRESSURE: 151 MMHG | DIASTOLIC BLOOD PRESSURE: 80 MMHG

## 2024-07-25 DIAGNOSIS — M17.0 PRIMARY OSTEOARTHRITIS OF BOTH KNEES: Primary | ICD-10-CM

## 2024-07-25 PROCEDURE — 99213 OFFICE O/P EST LOW 20 MIN: CPT | Performed by: ORTHOPAEDIC SURGERY

## 2024-07-25 PROCEDURE — 20610 DRAIN/INJ JOINT/BURSA W/O US: CPT | Performed by: ORTHOPAEDIC SURGERY

## 2024-07-25 RX ORDER — BUPIVACAINE HYDROCHLORIDE 2.5 MG/ML
4 INJECTION, SOLUTION INFILTRATION; PERINEURAL
Status: COMPLETED | OUTPATIENT
Start: 2024-07-25 | End: 2024-07-25

## 2024-07-25 RX ORDER — TRIAMCINOLONE ACETONIDE 40 MG/ML
80 INJECTION, SUSPENSION INTRA-ARTICULAR; INTRAMUSCULAR
Status: COMPLETED | OUTPATIENT
Start: 2024-07-25 | End: 2024-07-25

## 2024-07-25 RX ADMIN — TRIAMCINOLONE ACETONIDE 80 MG: 40 INJECTION, SUSPENSION INTRA-ARTICULAR; INTRAMUSCULAR at 10:45

## 2024-07-25 RX ADMIN — BUPIVACAINE HYDROCHLORIDE 4 ML: 2.5 INJECTION, SOLUTION INFILTRATION; PERINEURAL at 10:45

## 2024-07-25 NOTE — PROGRESS NOTES
ASSESSMENT/PLAN:    Diagnoses and all orders for this visit:    Primary osteoarthritis of both knees  -     Ambulatory Referral to Physical Therapy; Future  -     Injection Procedure Prior Authorization; Future    Other orders  -     Large joint arthrocentesis        Both of the patient's knees were injected with Kenalog and Marcaine.  He tolerated the injections quite well. Patient has reported improvements from previous visco injections. Pain is rated at 3/10.  He notes improvement of ADLs since Visco.  He will follow-up with our office once we have approval for Euflexxa.  The patient is acceptable to this plan.    Return in about 3 months (around 10/25/2024).    The patient has degenerative joint disease of his bilateral knees.  Under aseptic technique, both knees were injected with Kenalog and Marcaine.  He tolerated procedure quite well.  Return back anytime after October 11 to restart viscosupplementation      _____________________________________________________  CHIEF COMPLAINT:  Chief Complaint   Patient presents with    Left Knee - Follow-up    Right Knee - Follow-up         SUBJECTIVE:  Luis F Velazquez is a 58 y.o. male who presents to our office for a follow-up visit.  The patient has a history of primary osteoarthritis of both knees.  He is complaining of bilateral knee pain today, albeit improved since viscosupplementation.  Denies any numbness or tingling.  Denies any fever or chills.    The following portions of the patient's history were reviewed and updated as appropriate: allergies, current medications, past family history, past medical history, past social history, past surgical history and problem list.    PAST MEDICAL HISTORY:  Past Medical History:   Diagnosis Date    Acquired hypothyroidism 1/2/2015    Angioedema     Diabetes mellitus (HCC)     Disease of thyroid gland     Hyperlipidemia     Hypertension     Mixed hyperlipidemia 2/4/2015    Morbid obesity (HCC)     Morbid obesity with BMI of  45.0-49.9, adult ([facility]) 2015    MARSHA (obstructive sleep apnea)     Primary hypertension 2015    Transitioned From: Benign essential hypertension       PAST SURGICAL HISTORY:  Past Surgical History:   Procedure Laterality Date    KNEE SURGERY      SINUS SURGERY         FAMILY HISTORY:  Family History   Problem Relation Age of Onset    Diabetes Mother     Diabetes type II Mother     Esophageal cancer Father     Diabetes Brother     Kidney cancer Brother     Diabetes type II Brother     Diabetes Maternal Grandmother     Diabetes type II Maternal Grandmother     Diabetes Paternal Grandmother     Heart disease Neg Hx        SOCIAL HISTORY:  Social History     Tobacco Use    Smoking status: Former     Current packs/day: 0.00     Types: Cigarettes     Quit date: 10/21/2022     Years since quittin.7    Smokeless tobacco: Never    Tobacco comments:     states read to quit prior to admit   Vaping Use    Vaping status: Never Used   Substance Use Topics    Alcohol use: Not Currently     Alcohol/week: 3.0 standard drinks of alcohol     Types: 2 Cans of beer, 1 Shots of liquor per week     Comment: No alcohol since     Drug use: No       MEDICATIONS:    Current Outpatient Medications:     albuterol (2.5 mg/3 mL) 0.083 % nebulizer solution, Take 3 mL (2.5 mg total) by nebulization every 6 (six) hours as needed for wheezing or shortness of breath, Disp: 180 mL, Rfl: 0    Ascorbic Acid (VITAMIN C PO), Take 1 tablet by mouth daily, Disp: , Rfl:     aspirin 81 mg chewable tablet, Take one tablet by mouth daily, Disp: , Rfl:     cholecalciferol (VITAMIN D3) 400 units tablet, Take 1 tablet by mouth daily, Disp: , Rfl:     clotrimazole (LOTRIMIN) 1 % cream, Apply topically 2 (two) times a day, Disp: 60 g, Rfl: 2    cyclobenzaprine (FLEXERIL) 5 mg tablet, Take 1 tablet by mouth three times daily as needed for muscle spasm, Disp: 90 tablet, Rfl: 0    DULoxetine (CYMBALTA) 60 mg delayed release capsule, Take 1 capsule (60 mg  "total) by mouth daily, Disp: 90 capsule, Rfl: 1    EPINEPHrine (EPIPEN) 0.3 mg/0.3 mL SOAJ, INJECT 0.3MG INTO A MUSCLE ONCE FOR 1 DOSE, Disp: 2 each, Rfl: 0    ferrous gluconate (FERGON) 324 mg tablet, Take 324 mg by mouth, Disp: , Rfl:     gabapentin (NEURONTIN) 100 mg capsule, TAKE 1 CAPSULE BY MOUTH THREE TIMES DAILY, Disp: 90 capsule, Rfl: 5    HumaLOG KwikPen 100 units/mL injection pen, INJECT 20 UNITS UNDER THE SKIN THREE TIMES DAILY WITH MEALS (Patient taking differently: Inject 20 Units under the skin 3 (three) times a day with meals), Disp: 15 mL, Rfl: 0    hydroCHLOROthiazide 25 mg tablet, Take 1 tablet by mouth twice daily, Disp: 180 tablet, Rfl: 1    Insulin Glargine Solostar (Lantus SoloStar) 100 UNIT/ML SOPN, Inject 0.24 mL (24 Units total) under the skin daily, Disp: 30 mL, Rfl: 1    Insulin Pen Needle (Pen Needles 3/16\") 31G X 5 MM MISC, Use 4 (four) times a day, Disp: 300 each, Rfl: 3    Jardiance 10 MG TABS tablet, Take 10 mg by mouth every morning, Disp: , Rfl:     ketoconazole (NIZORAL) 2 % shampoo, Apply 1 Application topically 2 (two) times a week To feet in shower, Disp: 120 mL, Rfl: 2    levothyroxine 175 mcg tablet, Take 1 tablet by mouth once daily, Disp: 90 tablet, Rfl: 1    loratadine (CLARITIN) 10 mg tablet, Take 10 mg by mouth daily, Disp: , Rfl:     metFORMIN (GLUCOPHAGE) 1000 MG tablet, Take 1 tablet (1,000 mg total) by mouth daily with breakfast, Disp: 90 tablet, Rfl: 1    Omega-3 Fatty Acids (fish oil) 1,000 mg, Take 1,000 mg by mouth 2 (two) times a day, Disp: , Rfl:     pregabalin (LYRICA) 50 mg capsule, Take 1 capsule (50 mg total) by mouth 2 (two) times a day, Disp: 60 capsule, Rfl: 2    rosuvastatin (CRESTOR) 40 MG tablet, Take 1 tablet by mouth once daily, Disp: 90 tablet, Rfl: 3    sodium chloride (OCEAN) 0.65 % nasal spray, 1 spray into each nostril as needed for rhinitis, Disp: 30 mL, Rfl: 1    tamsulosin (FLOMAX) 0.4 mg, Take 1 capsule (0.4 mg total) by mouth daily with " "dinner, Disp: 90 capsule, Rfl: 3    Thiamine HCl (vitamin B-1) 250 MG tablet, Take 250 mg by mouth daily, Disp: , Rfl:     traZODone (DESYREL) 150 mg tablet, TAKE 1 TABLET BY MOUTH ONCE DAILY AT BEDTIME, Disp: 90 tablet, Rfl: 0    valsartan (DIOVAN) 160 mg tablet, Take 160 mg by mouth daily, Disp: , Rfl:     Ventolin  (90 Base) MCG/ACT inhaler, INHALE 2 PUFFS BY MOUTH EVERY 4 HOURS AS NEEDED FOR WHEEZING FOR SHORTNESS OF BREATH, Disp: 18 g, Rfl: 0    vitamin E, tocopherol, 400 units capsule, Take 400 Units by mouth daily, Disp: , Rfl:     ALLERGIES:  Allergies   Allergen Reactions    Ace Inhibitors Swelling     Angioedema    PT STATES THIS IS NOT AN ALLERGY    Other Anaphylaxis     Pt believes that he is allergic to peanut butter.    Also, seasonal allergies    Zinc Tongue Swelling     PT STATES THIS IS NOT AN ALLERGY    Clindamycin Edema     Had angioedema episode approx 5 hr after IM administration of clindamycin. Unclear if there is definitive causal relationship.  PT STATES THIS IS NOT AN ALLERGY       ROS:  Review of Systems     Constitutional: Negative for fatigue, fever or loss of appetite.   HENT: Negative.    Respiratory: Negative for shortness of breath, dyspnea.    Cardiovascular: Negative for chest pain/tightness.   Gastrointestinal: Negative for abdominal pain, N/V.   Endocrine: Negative for cold/heat intolerance, unexplained weight loss/gain.   Genitourinary: Negative for flank pain, dysuria, hematuria.   Musculoskeletal: Positive for arthralgia   Skin: Negative for rash.    Neurological: Negative for numbness or tingling  Psychiatric/Behavioral: Negative for agitation.  _____________________________________________________  PHYSICAL EXAMINATION:    Blood pressure 151/80, pulse 102, temperature 98.4 °F (36.9 °C), temperature source Temporal, height 5' 4\" (1.626 m), weight 125 kg (276 lb), SpO2 98%.    Constitutional: Oriented to person, place, and time. Appears well-developed and well-nourished. " "No distress.   HENT:   Head: Normocephalic.   Eyes: Conjunctivae are normal. Right eye exhibits no discharge. Left eye exhibits no discharge. No scleral icterus.   Cardiovascular: Normal rate.    Pulmonary/Chest: Effort normal.   Neurological: Alert and oriented to person, place, and time.   Skin: Skin is warm and dry. No rash noted. Not diaphoretic. No erythema. No pallor.   Psychiatric: Normal mood and affect. Behavior is normal. Judgment and thought content normal.      MUSCULOSKELETAL EXAMINATION:   Physical Exam  Ortho Exam    Bilateral lower extremities are neurovascularly intact  Toes are pink and mobile   Compartments are soft  No warmth, erythema or ecchymosis  ROM of knees are from 5-115 degrees  Negative Lachman, drawer or pivot shift  No medial instability  Medial joint line tenderness, slight lateral joint line tenderness  Patellofemoral crepitation   Objective:  BP Readings from Last 1 Encounters:   07/25/24 151/80      Wt Readings from Last 1 Encounters:   07/25/24 125 kg (276 lb)        BMI:   Estimated body mass index is 47.38 kg/m² as calculated from the following:    Height as of this encounter: 5' 4\" (1.626 m).    Weight as of this encounter: 125 kg (276 lb).      PROCEDURES PERFORMED:  Large joint arthrocentesis: bilateral knee  Universal Protocol:  Consent: Verbal consent obtained.  Risks and benefits: risks, benefits and alternatives were discussed  Consent given by: patient  Patient understanding: patient states understanding of the procedure being performed  Site marked: the operative site was marked  Supporting Documentation  Indications: pain   Procedure Details  Location: knee - bilateral knee  Preparation: Patient was prepped and draped in the usual sterile fashion  Needle size: 22 G  Ultrasound guidance: no  Approach: lateral    Medications (Right): 4 mL bupivacaine 0.25 %; 80 mg triamcinolone acetonide 40 mg/mLMedications (Left): 4 mL bupivacaine 0.25 %; 80 mg triamcinolone acetonide " 40 mg/mL   Patient tolerance: patient tolerated the procedure well with no immediate complications  Dressing:  Sterile dressing applied            Scribe Attestation      I,:  Tye Stringer PA-C am acting as a scribe while in the presence of the attending physician.:       I,:  Manny Patel DO personally performed the services described in this documentation    as scribed in my presence.:

## 2024-07-29 DIAGNOSIS — E03.9 HYPOTHYROIDISM, UNSPECIFIED TYPE: ICD-10-CM

## 2024-07-29 RX ORDER — LEVOTHYROXINE SODIUM 175 UG/1
TABLET ORAL
Qty: 90 TABLET | Refills: 0 | Status: SHIPPED | OUTPATIENT
Start: 2024-07-29

## 2024-08-01 ENCOUNTER — OFFICE VISIT (OUTPATIENT)
Dept: PODIATRY | Facility: CLINIC | Age: 59
End: 2024-08-01
Payer: MEDICARE

## 2024-08-01 VITALS
BODY MASS INDEX: 46.26 KG/M2 | HEIGHT: 64 IN | DIASTOLIC BLOOD PRESSURE: 85 MMHG | RESPIRATION RATE: 16 BRPM | TEMPERATURE: 98.3 F | OXYGEN SATURATION: 96 % | WEIGHT: 271 LBS | SYSTOLIC BLOOD PRESSURE: 148 MMHG | HEART RATE: 92 BPM

## 2024-08-01 DIAGNOSIS — M79.672 PAIN IN BOTH FEET: ICD-10-CM

## 2024-08-01 DIAGNOSIS — E11.42 DIABETIC POLYNEUROPATHY ASSOCIATED WITH TYPE 2 DIABETES MELLITUS (HCC): ICD-10-CM

## 2024-08-01 DIAGNOSIS — M19.072 ARTHRITIS OF MIDTARSAL JOINT OF LEFT FOOT: ICD-10-CM

## 2024-08-01 DIAGNOSIS — M79.671 PAIN IN BOTH FEET: ICD-10-CM

## 2024-08-01 DIAGNOSIS — Z79.4 TYPE 2 DIABETES MELLITUS WITH HYPERGLYCEMIA, WITH LONG-TERM CURRENT USE OF INSULIN (HCC): Primary | ICD-10-CM

## 2024-08-01 DIAGNOSIS — E11.65 TYPE 2 DIABETES MELLITUS WITH HYPERGLYCEMIA, WITH LONG-TERM CURRENT USE OF INSULIN (HCC): Primary | ICD-10-CM

## 2024-08-01 DIAGNOSIS — B35.1 ONYCHOMYCOSIS: ICD-10-CM

## 2024-08-01 PROCEDURE — 11721 DEBRIDE NAIL 6 OR MORE: CPT | Performed by: STUDENT IN AN ORGANIZED HEALTH CARE EDUCATION/TRAINING PROGRAM

## 2024-08-01 PROCEDURE — 99213 OFFICE O/P EST LOW 20 MIN: CPT | Performed by: STUDENT IN AN ORGANIZED HEALTH CARE EDUCATION/TRAINING PROGRAM

## 2024-08-01 NOTE — PROGRESS NOTES
Assessment/Plan:     Diagnoses and all orders for this visit:    Type 2 diabetes mellitus with hyperglycemia, with long-term current use of insulin (HCC)    Diabetic polyneuropathy associated with type 2 diabetes mellitus (HCC)    Onychomycosis    Arthritis of midtarsal joint of left foot  -     Diclofenac Sodium (VOLTAREN) 1 %; Apply 2 g topically 4 (four) times a day To feet/ankle    Pain in both feet  -     Diclofenac Sodium (VOLTAREN) 1 %; Apply 2 g topically 4 (four) times a day To feet/ankle            Prior Imaging   XR right foot WB 3v: No acute osseous abnormalities noted. HAV, 1st MTPJ arthritic changes, shortened 1st metatarsal. Accessory navicular. Talar bunion  XR left foot WB 3v 5/2/24: tibiotalar, NCJ, lisfranc and midtarsal joint arthritic changes. There does appear to be lateral deformity of metatarsal 2-5 on cuneiforms.        IMPRESSION:  DM w/ PN, A1c 7.5% 2/9/24  B/L foot pain (arch and metatarsals heads). Ddx plantar fasciitis, neuropathy, metatarsalgia.   Left tibiotalar, midtarsal, NCJ arthritis  Back pain with sciatica      PLAN:  Toenails x10 debrided in length and thickness with nail nipper and cristóbal removing devitalized subungual debris (Q9)  B/L foot pain without improvement. Likely 2/2 PN. Lef tis with arthritic changes. Topical voltarin gel rx'd. Consider EMG in future.   F/u DM shoes and inserts w/ metatarsal pads ( or Garry shoes)  C/w pain mngmt lyrica/gabapentin for nerve pain. F/u for PN  C/w topical antifungal cream and wash for tinea pedis prn  LEADs 2022: WNL   F/u Q9-10wks for RFC    Subjective:      Patient ID: Luis F Velazquez is a 58 y.o. male.    Luis F presents to clinic today concerning f/u B/L heel/arch pain, plantar metatarsal head present for weeks. Notes he has tingling and burning to feet, worse with WB. Here for nail care as well. Does see pain mnmgt. Notes pain to plantar feet at night. Did not get new shoes or inserts yet        The following portions of the  "patient's history were reviewed and updated as appropriate: allergies, current medications, past family history, past medical history, past social history, past surgical history, and problem list.    Review of Systems   Constitutional:  Negative for activity change, chills and fever.   HENT: Negative.     Respiratory:  Positive for shortness of breath. Negative for cough and chest tightness.    Cardiovascular:  Positive for leg swelling. Negative for chest pain.   Endocrine: Negative.    Genitourinary: Negative.    Musculoskeletal:  Positive for arthralgias and gait problem.   Skin:  Negative for wound.   Neurological:         PN   Psychiatric/Behavioral: Negative.  Negative for agitation and behavioral problems.          Objective:      /85   Pulse 92   Temp 98.3 °F (36.8 °C) (Temporal)   Resp 16   Ht 5' 4\" (1.626 m)   Wt 123 kg (271 lb)   SpO2 96%   BMI 46.52 kg/m²          Physical Exam  Constitutional:       General: He is not in acute distress.     Appearance: Normal appearance. He is obese. He is ill-appearing.   Cardiovascular:      Pulses:           Dorsalis pedis pulses are 1+ on the right side and 1+ on the left side.        Posterior tibial pulses are 1+ on the right side and 1+ on the left side.      Comments: Pedal hair is absent. Legs to toes warm to cool. Varicosities with moderate LE edema noted.  Pulmonary:      Effort: No respiratory distress.   Musculoskeletal:         General: Tenderness (B/L heels, arch, metatarsal heads) and deformity (equinus. left midfoot arthritic prominence.) present. Normal range of motion.   Feet:      Right foot:      Skin integrity: Dry skin present. No ulcer, skin breakdown, erythema, warmth or callus.      Left foot:      Skin integrity: Dry skin present. No ulcer, skin breakdown, erythema, warmth or callus.   Skin:     Capillary Refill: Capillary refill takes less than 2 seconds.      Findings: No erythema.      Comments: B/L LE skin is atrophic - thin, " dry and shiny in appearance. No open wounds noted.   Toenails x10 are elongated, dystrophic, discolored with thickening and subungual debris.  Dry, scaling skin to B/L plantar feet in circumferential manner.    Neurological:      General: No focal deficit present.      Mental Status: He is alert and oriented to person, place, and time.      Comments: Gross sensation to feet intact. Patient endorses numbness, tingling and burning to B/L feet.    Psychiatric:         Mood and Affect: Mood normal.         Behavior: Behavior normal.

## 2024-08-06 ENCOUNTER — TELEPHONE (OUTPATIENT)
Dept: PAIN MEDICINE | Facility: CLINIC | Age: 59
End: 2024-08-06

## 2024-08-09 ENCOUNTER — EVALUATION (OUTPATIENT)
Dept: PHYSICAL THERAPY | Facility: HOME HEALTHCARE | Age: 59
End: 2024-08-09
Payer: MEDICARE

## 2024-08-09 DIAGNOSIS — M17.0 PRIMARY OSTEOARTHRITIS OF BOTH KNEES: ICD-10-CM

## 2024-08-09 PROCEDURE — 97110 THERAPEUTIC EXERCISES: CPT | Performed by: PHYSICAL THERAPIST

## 2024-08-09 PROCEDURE — 97163 PT EVAL HIGH COMPLEX 45 MIN: CPT | Performed by: PHYSICAL THERAPIST

## 2024-08-09 NOTE — PROGRESS NOTES
"PT Evaluation     Today's date: 2024  Patient name: Luis F Velazquez  : 1965  MRN: [id number]  Referring provider: Tye Stringer,*  Dx:   Encounter Diagnosis     ICD-10-CM    1. Primary osteoarthritis of both knees  M17.0 Ambulatory Referral to Physical Therapy                     Assessment  Impairments: abnormal gait, abnormal or restricted ROM, activity intolerance, impaired balance, impaired physical strength, lacks appropriate home exercise program, pain with function, safety issue, weight-bearing intolerance, activity limitations and endurance    Assessment details: Pt Luis F Velazquez is a 58 y.o. who presents to OPPT with s/s consistent with B knee OA. Pt presenting with limited knee mobility, decreased B LE strength, gait/balance dysfunction, joint inflammation, and increased pain with Wb'ing activities. Pt with slowed TUG score and overall decline in endurance. He would benefit from skilled therapy services to address outlined impairments, work towards goals, and restore pts PLOF. Thank you!    Understanding of Dx/Px/POC: good     Prognosis: good    Goals  STGs to be achieved in 4 weeks:  -Pt to demonstrate reduced subjective pain rating \"at worst\" by at least 2-3 points from Initial Eval to allow for reduced pain with ADLs and improved functional activity tolerance.   -Pt to demonstrate B knee ROM improved by 5-10* in order to maximize joint mobility and function and allow for progression of exercise program and achievement of goals.   -Pt to demonstrate increased MMT of B LE by at least 1/2 grade in order to improve safety and stability with ADLs and functional mobility.     LTGs to be achieved upon discharge:   -Pt will be I with HEP in order to continue to improve quality of life and independence and reduce risk for re-injury.   -Pt to demonstrate return to activities of daily living without limiations or restrictions.   -Pt will return to ambulation > 1 hour to help facilitate return to " community activities independently   -Pt to demonstrate improved function as noted by achieving or exceeding predicted score on FOTO outcomes assessment tool.          Plan  Patient would benefit from: skilled physical therapy  Planned modality interventions: thermotherapy: hydrocollator packs and cryotherapy    Planned therapy interventions: manual therapy, neuromuscular re-education, home exercise program, functional ROM exercises, gait training, flexibility, therapeutic exercise, therapeutic activities, patient education and balance    Frequency: 2x week  Duration in weeks: 12  Plan of Care beginning date: 2024  Plan of Care expiration date: 2024  Treatment plan discussed with: patient and referring physician        Subjective Evaluation    History of Present Illness  Mechanism of injury: Pt reporting that he is following with Orthopedics for his knees and recently got steroid injections which have helped his pain some.  He is scheduled for the lubricating injections 10/31/24.  Pt requesting therapy due to continued pain and feeling of weakness in both legs.   Quality of life: good    Patient Goals  Patient goals for therapy: decreased edema, decreased pain, improved balance, increased motion, increased strength and independence with ADLs/IADLs    Pain  At best pain ratin  At worst pain ratin  Quality: dull ache and tight  Relieving factors: rest  Aggravating factors: standing, stair climbing and walking    Treatments  Previous treatment: injection treatment and physical therapy  Current treatment: physical therapy        Objective     Observations   Left Knee   Positive for effusion.     Right Knee   Positive for effusion.     Neurological Testing     Sensation     Knee   Left Knee   Intact: Light touch    Right Knee   Intact: light touch     Active Range of Motion   Left Knee   Flexion: 95 degrees   Extension: 0 degrees     Right Knee   Flexion: 100 degrees   Extension: 0 degrees      Strength/Myotome Testing     Left Hip   Planes of Motion   Flexion: 4-  Abduction: 4  Adduction: 4    Right Hip   Planes of Motion   Flexion: 4-  Abduction: 4  Adduction: 4    Left Knee   Flexion: 4  Extension: 4    Right Knee   Flexion: 4  Extension: 4    Ambulation     Ambulation: Level Surfaces     Additional Level Surfaces Ambulation Details  Tolerance x 15-20  minutes     Ambulation: Stairs   Pattern: reciprocal  Railings: two rails  Pattern: non-reciprocal  Railings: one rail    Observational Gait   Gait: antalgic   Decreased walking speed and stride length.     Comments   TUG without AD: 20 seconds       Flowsheet Rows      Flowsheet Row Most Recent Value   PT/OT G-Codes    Current Score 48   Projected Score 59               Re-eval Date: 9/9/2     Precautions: HTN; DM2: restrictive lung disease        Manuals 8/9                                       Neuro Re-Ed         Bosu lunges         Balance as appropriate                                                 Ther Ex        NuStep  L2 10 minutes        HR/TR        Standing hip flex/abd/ext        Squats        LAQ        Hip add        Hip abd        SLR        S/L SLR        Clamshells        Bridges         Self calf stretch        Self HS stretch                 Ther Activity        Step-ups  Fwd/Lat        Step-down                        Gait Training                        Modalities

## 2024-08-14 ENCOUNTER — OFFICE VISIT (OUTPATIENT)
Dept: PHYSICAL THERAPY | Facility: HOME HEALTHCARE | Age: 59
End: 2024-08-14
Payer: MEDICARE

## 2024-08-14 DIAGNOSIS — Z79.4 TYPE 2 DIABETES MELLITUS WITH HYPERGLYCEMIA, WITH LONG-TERM CURRENT USE OF INSULIN (HCC): ICD-10-CM

## 2024-08-14 DIAGNOSIS — M17.0 PRIMARY OSTEOARTHRITIS OF BOTH KNEES: Primary | ICD-10-CM

## 2024-08-14 DIAGNOSIS — E11.65 TYPE 2 DIABETES MELLITUS WITH HYPERGLYCEMIA, WITH LONG-TERM CURRENT USE OF INSULIN (HCC): ICD-10-CM

## 2024-08-14 PROCEDURE — 97110 THERAPEUTIC EXERCISES: CPT

## 2024-08-14 RX ORDER — INSULIN LISPRO 100 [IU]/ML
20 INJECTION, SOLUTION INTRAVENOUS; SUBCUTANEOUS
Qty: 75 ML | Refills: 1 | Status: SHIPPED | OUTPATIENT
Start: 2024-08-14

## 2024-08-14 NOTE — TELEPHONE ENCOUNTER
Reason for call:   [x] Refill   [] Prior Auth  [] Other:     Office:   [] PCP/Provider -   [x] Specialty/Provider - MARLEEN Davila     Medication:   HumaLOG KwikPen 100 units/mL injection pen     Dose/Frequency: INJECT 20 UNITS UNDER THE SKIN THREE TIMES DAILY WITH MEALS     Quantity: 75 ml    Pharmacy: [address], [address]     Does the patient have enough for 3 days?   [x] Yes   [] No - Send as HP to POD

## 2024-08-14 NOTE — PROGRESS NOTES
"Daily Note     Today's date: 2024  Patient name: Luis F Velazquez  : 1965  MRN: [id number]  Referring provider: Tye Stringer,*  Dx:   Encounter Diagnosis     ICD-10-CM    1. Primary osteoarthritis of both knees  M17.0                      Subjective: Pt reports he as pain in his L knee and R hip.       Objective: See treatment diary below      Assessment: Tolerated treatment fair. Verbal cues needed for correct form with exercises. Fatigue noted with exercises. Pt with no c/o pain Jamie knees t/o session or at end of session.  Patient would benefit from continued PT      Plan: Continue per plan of care.      Re-eval Date:      Precautions: HTN; DM2: restrictive lung disease        Manuals                                       Neuro Re-Ed         Matthewu lunges         Balance as appropriate                                                 Ther Ex        NuStep  L2 10 minutes  L2 10 min       HR/TR  1x15 ea       Standing hip flex/abd/ext  1x10 ea Jamie       Squats        LAQ  5\" 1x15 Jamie      Hip add  5\"x10      Hip abd  Red 1x10      SLR  1x10 Jamie       S/L SLR        Clamshells        Bridges         Self calf stretch  20\"x3 Jamie   with strap       Self HS stretch   20\"x3 Jamie   With strap              Ther Activity        Step-ups  Fwd/Lat  C Fwd   10 Jamie       Step-down                        Gait Training                        Modalities                                "

## 2024-08-16 ENCOUNTER — OFFICE VISIT (OUTPATIENT)
Dept: PHYSICAL THERAPY | Facility: HOME HEALTHCARE | Age: 59
End: 2024-08-16
Payer: MEDICARE

## 2024-08-16 DIAGNOSIS — M17.0 PRIMARY OSTEOARTHRITIS OF BOTH KNEES: Primary | ICD-10-CM

## 2024-08-16 PROCEDURE — 97110 THERAPEUTIC EXERCISES: CPT | Performed by: PHYSICAL THERAPIST

## 2024-08-16 NOTE — PROGRESS NOTES
"Daily Note     Today's date: 2024  Patient name: Luis F Velazquez  : 1965  MRN: [id number]  Referring provider: Tye Stringer,*  Dx:   Encounter Diagnosis     ICD-10-CM    1. Primary osteoarthritis of both knees  M17.0                      Subjective: Pt reporting that his feet have been swelling at the end of the day.       Objective: See treatment diary below      Assessment: Pt with fair tolerance to program. He was able to complete minimal increase in reps but then was visibly fatigued to  end session. Pt requesting to be done before completing self stretching; will work on further progression upcoming as pt is tolerable.     Plan: Continue per plan of care.      Re-eval Date:      Precautions: HTN; DM2: restrictive lung disease        Manuals                                      Neuro Re-Ed         Bosu lunges         Balance as appropriate                                                 Ther Ex        NuStep  L2 10 minutes  L2 10 min  L2 10 min      HR/TR  1x15 ea  X 15      Standing hip flex/abd/ext  1x10 ea Jamie  X 10      Squats        LAQ  5\" 1x15 Jamie 5\" x 15      Hip add  5\"x10 5\" x 15      Hip abd  Red 1x10 Red x 15      SLR  1x10 Jamie  X 10 jamie      S/L SLR        Clamshells        Bridges         Self calf stretch  20\"x3 Jamie   with strap       Self HS stretch   20\"x3 Jamie   With strap              Ther Activity        Step-ups  Fwd/Lat  C Fwd   10 Jamie  C fwd   X 10 jamie      Step-down                        Gait Training                        Modalities                                  "

## 2024-08-19 ENCOUNTER — OFFICE VISIT (OUTPATIENT)
Dept: PHYSICAL THERAPY | Facility: HOME HEALTHCARE | Age: 59
End: 2024-08-19
Payer: MEDICARE

## 2024-08-19 DIAGNOSIS — M17.0 PRIMARY OSTEOARTHRITIS OF BOTH KNEES: Primary | ICD-10-CM

## 2024-08-19 PROCEDURE — 97110 THERAPEUTIC EXERCISES: CPT

## 2024-08-19 NOTE — PROGRESS NOTES
"Daily Note     Today's date: 2024  Patient name: Luis F Velazquez  : 1965  MRN: [id number]  Referring provider: Tye Stringer,*  Dx:   Encounter Diagnosis     ICD-10-CM    1. Primary osteoarthritis of both knees  M17.0                  Subjective: Pt reports 5/10 pain Jamie knees.       Objective: See treatment diary below      Assessment: Tolerated treatment fair. Verbal cues needed for correct form with exercises. Progressed to bridges today. Fatigue noted with exercise. No increased pain reported t/o session or at end of session. Patient would benefit from continued PT      Plan: Continue per plan of care.      Re-eval Date:      Precautions: HTN; DM2: restrictive lung disease        Manuals                                     Neuro Re-Ed         Bosu lunges         Balance as appropriate                                                 Ther Ex        NuStep  L2 10 minutes  L2 10 min  L2 10 min  L2 10 min    HR/TR  1x15 ea  X 15  X 15 ea     Standing hip flex/abd/ext  1x10 ea Jamie  X 10  1x10 ea Jamie     Squats        LAQ  5\" 1x15 Jamie 5\" x 15  5\"x 15 Jamie    Hip add  5\"x10 5\" x 15  5\"x15    Hip abd  Red 1x10 Red x 15  Red x 15     SLR  1x10 Jamie  X 10 jamie  X 15 Jamie     S/L SLR        Clamshells        Bridges     1x10     Self calf stretch  20\"x3 Jamie   with strap   20\" x 3 Jamie   with strap     Self HS stretch   20\"x3 Jamie   With strap  20\"x 3 Jamie   With strap            Ther Activity        Step-ups  Fwd/Lat  C Fwd   10 Jamie  C fwd   X 10 jamie  C Fwd   X 10 Jamie     Step-down                        Gait Training                        Modalities                                    "

## 2024-08-21 ENCOUNTER — OFFICE VISIT (OUTPATIENT)
Dept: PHYSICAL THERAPY | Facility: HOME HEALTHCARE | Age: 59
End: 2024-08-21
Payer: MEDICARE

## 2024-08-21 DIAGNOSIS — M17.0 PRIMARY OSTEOARTHRITIS OF BOTH KNEES: Primary | ICD-10-CM

## 2024-08-21 PROCEDURE — 97110 THERAPEUTIC EXERCISES: CPT

## 2024-08-21 NOTE — PROGRESS NOTES
"Daily Note     Today's date: 2024  Patient name: Luis F Velazquez  : 1965  MRN: [id number]  Referring provider: Tye Stringer,*  Dx:   Encounter Diagnosis     ICD-10-CM    1. Primary osteoarthritis of both knees  M17.0                  Subjective: Pt reports he has pain in his L knee.       Objective: See treatment diary below      Assessment: Tolerated treatment fair. Verbal cues needed for correct form with exercises.  Progressed to squats and increased reps with hip add/abd this session. Pt reported pain L knee with step ups. Fatigue evident with exercise. Patient would benefit from continued PT      Plan: Continue per plan of care.      Re-eval Date:      Precautions: HTN; DM2: restrictive lung disease        Manuals                                    Neuro Re-Ed         Bosu lunges         Balance as appropriate                                                 Ther Ex        NuStep  L2 10 minutes  L2 10 min  L2 10 min  L2 10 min L2 10 min    HR/TR  1x15 ea  X 15  X 15 ea  X 15 ea    Standing hip flex/abd/ext  1x10 ea Jamie  X 10  1x10 ea Jamie  1x10 ea Jamie    Squats     1x10    LAQ  5\" 1x15 Jamie 5\" x 15  5\"x 15 Jamie 5\"x15 Jamie   Hip add  5\"x10 5\" x 15  5\"x15 5\"x20   Hip abd  Red 1x10 Red x 15  Red x 15  Red x 20   SLR  1x10 Jamie  X 10 jamie  X 15 Jamie  X 15 Jamie    S/L SLR        Clamshells        Bridges     1x10  1x10    Self calf stretch  20\"x3 Jamie   with strap   20\" x 3 Jamie   with strap  20\"x 3 Jamie with strap    Self HS stretch   20\"x3 Jamie   With strap  20\"x 3 Jamie   With strap 20\"x 3 Jamie  With strap           Ther Activity        Step-ups  Fwd/Lat  C Fwd   10 Jamie  C fwd   X 10 jamie  C Fwd   X 10 Jamie  C fwd  X 10 R    Step-down                        Gait Training                        Modalities                                      "

## 2024-08-23 ENCOUNTER — APPOINTMENT (OUTPATIENT)
Dept: PHYSICAL THERAPY | Facility: HOME HEALTHCARE | Age: 59
End: 2024-08-23
Payer: MEDICARE

## 2024-08-26 ENCOUNTER — OFFICE VISIT (OUTPATIENT)
Dept: PHYSICAL THERAPY | Facility: HOME HEALTHCARE | Age: 59
End: 2024-08-26
Payer: MEDICARE

## 2024-08-26 DIAGNOSIS — F32.9 REACTIVE DEPRESSION: ICD-10-CM

## 2024-08-26 DIAGNOSIS — M17.0 PRIMARY OSTEOARTHRITIS OF BOTH KNEES: Primary | ICD-10-CM

## 2024-08-26 PROCEDURE — 97110 THERAPEUTIC EXERCISES: CPT

## 2024-08-26 RX ORDER — DULOXETIN HYDROCHLORIDE 60 MG/1
60 CAPSULE, DELAYED RELEASE ORAL DAILY
Qty: 90 CAPSULE | Refills: 0 | Status: SHIPPED | OUTPATIENT
Start: 2024-08-26

## 2024-08-26 NOTE — TELEPHONE ENCOUNTER
Returned patients phone call and left message to recall us because we can not make out what medication needs to be refilled from the message he left on our phones.

## 2024-08-26 NOTE — PROGRESS NOTES
"Daily Note     Today's date: 2024  Patient name: Luis F Velazquez  : 1965  MRN: [id number]  Referring provider: Tye Stringer,*  Dx: No diagnosis found.    Start Time: 1115          Subjective: R knee pain 5/10.  L knee pain 7/10.  They both grind.  Lately my feet feel swollen on the bottom. I am suppose to get shoes with a lift in the L 2* L LE shortness.     Objective: See treatment diary below    Assessment: Pt with fair edmond to program but with report of B knee pain which increased with a few ex. (Steps and wt bearing). Pt with good understanding of modification as able. Pt with short rests needed 2* SOB. Patient would benefit from continued PT    Plan: Continue per plan of care.      Re-eval Date:      Precautions: HTN; DM2: restrictive lung disease        Manuals                                    Neuro Re-Ed         Bosu lunges         Balance as appropriate                                                 Ther Ex        NuStep  L2 10 minutes  L2 10 min  L2 10 min  L2 10 min L2 10 min    HR/TR X15 ea 1x15 ea  X 15  X 15 ea  X 15 ea    Standing hip flex/abd/ext 1x10 ea irving 1x10 ea Irving  X 10  1x10 ea Irving  1x10 ea Irving    Squats     1x10    LAQ 5\" x15 irving 5\" 1x15 Irving 5\" x 15  5\"x 15 Irving 5\"x15 Irving   Hip add Seated 5\" x20 5\"x10 5\" x 15  5\"x15 5\"x20   Hip abd Red x20 Red 1x10 Red x 15  Red x 15  Red x 20   SLR x15 1x10 Irving  X 10 irving  X 15 Irving  X 15 Irving    S/L SLR        Clamshells        Bridges  1x15   1x10  1x10    Self calf stretch 20\" x3   w/strap 20\"x3 Irving   with strap   20\" x 3 Irving   with strap  20\"x 3 Irving with strap    Self HS stretch  20\" x3  W/strap 20\"x3 Irving   With strap  20\"x 3 Irving   With strap 20\"x 3 Irving  With strap           Ther Activity        Step-ups  Fwd/Lat C fwd  X10  C Fwd   10 Irving  C fwd   X 10 irving  C Fwd   X 10 Irving  C fwd  X 10 R    Step-down                        Gait Training                        Modalities                         "

## 2024-08-27 DIAGNOSIS — I10 ESSENTIAL HYPERTENSION: ICD-10-CM

## 2024-08-27 RX ORDER — HYDROCHLOROTHIAZIDE 25 MG/1
25 TABLET ORAL 2 TIMES DAILY
Qty: 180 TABLET | Refills: 0 | Status: SHIPPED | OUTPATIENT
Start: 2024-08-27

## 2024-08-28 ENCOUNTER — OFFICE VISIT (OUTPATIENT)
Dept: PHYSICAL THERAPY | Facility: HOME HEALTHCARE | Age: 59
End: 2024-08-28
Payer: MEDICARE

## 2024-08-28 DIAGNOSIS — M17.0 PRIMARY OSTEOARTHRITIS OF BOTH KNEES: Primary | ICD-10-CM

## 2024-08-28 PROCEDURE — 97110 THERAPEUTIC EXERCISES: CPT

## 2024-08-28 NOTE — PROGRESS NOTES
"Daily Note     Today's date: 2024  Patient name: Luis F Velazquez  : 1965  MRN: [id number]  Referring provider: Tye Stringer,*  Dx: No diagnosis found.               Subjective: Pt stated that his R knee was bothering him today, 7/10, and stated that he \"will see how long I can last until the grinding in my knee stops me.\"  Tomorrow I am suppose to get my new shoes with the lift in the L.     Objective: See treatment diary below    Assessment: Pt with good edmond to session but did decline step up ex 2* grinding at B L>R knees. Pain of 7/10 at L knee and 3/10 at R with wt bearing. Pt progressing well with TE and able to self modify/progress as daily ability allows.      Plan: Continue per plan of care.      Re-eval Date:      Precautions: HTN; DM2: restrictive lung disease        Manuals                                    Neuro Re-Ed        Bosu lunges         Balance as appropriate                                                 Ther Ex       NuStep  L2 10 minutes  L2 10' L2 10 min  L2 10 min L2 10 min    HR/TR X15 ea X15 each X 15  X 15 ea  X 15 ea    Standing hip flex/abd/ext 1x10 ea irving 1x10 each b/l X 10  1x10 ea Irving  1x10 ea Irving    Squats     1x10    LAQ 5\" x15 irving 5\" x 15 b/l 5\" x 15  5\"x 15 Irving 5\"x15 Irving   Hip add Seated 5\" x20 Seated 5\" x20 5\" x 15  5\"x15 5\"x20   Hip abd Red x20 Red x20 Red x 15  Red x 15  Red x 20   SLR x15 X15 irving X 10 irving  X 15 Irving  X 15 Irving    S/L SLR        Clamshells        Bridges  1x15 1x15  1x10  1x10    Self calf stretch 20\" x3   w/strap 20\" x3  W/strap  20\" x 3 Irving   with strap  20\"x 3 Irving with strap    Self HS stretch  20\" x3  W/strap 20\" x3  W/strap  20\"x 3 Irving   With strap 20\"x 3 Irving  With strap           Ther Activity        Step-ups  Fwd/Lat C fwd  X10  NP - requested not to perform C fwd   X 10 irving  C Fwd   X 10 Irving  C fwd  X 10 R    Step-down  NP - requested not to perform                      Gait " Training                        Modalities

## 2024-08-30 LAB
LEFT EYE DIABETIC RETINOPATHY: NORMAL
RIGHT EYE DIABETIC RETINOPATHY: NORMAL

## 2024-09-04 ENCOUNTER — OFFICE VISIT (OUTPATIENT)
Dept: PHYSICAL THERAPY | Facility: HOME HEALTHCARE | Age: 59
End: 2024-09-04
Payer: MEDICARE

## 2024-09-04 DIAGNOSIS — M17.0 PRIMARY OSTEOARTHRITIS OF BOTH KNEES: Primary | ICD-10-CM

## 2024-09-04 PROCEDURE — 97110 THERAPEUTIC EXERCISES: CPT

## 2024-09-04 NOTE — PROGRESS NOTES
"Daily Note     Today's date: 2024  Patient name: Luis F Velazquez  : 1965  MRN: [id number]  Referring provider: Tye Stringer,*  Dx: No diagnosis found.    Start Time: 1128          Subjective: I finally got my new shoes on Friday and they feel great. I am able to get around and walk better. My knees are sore but not to bad and my hip feels a little better. My LB hurts the most. LBP of 10.     Objective: See treatment diary below    Assessment: Pt edmond session well with main c/o LBP and some Irving knee discomfort. Added seated t-band HS to progress pt with strengthening. Pt able to complete most ex without vc's needed. Patient would benefit from continued PT    Plan: Continue per plan of care.      Re-eval Date:      Precautions: HTN; DM2: restrictive lung disease        Manuals  9-4                    Neuro Re-Ed   9-4     Bosu lunges         Balance as appropriate                         Ther Ex  9-4     NuStep  L2 10 minutes  L2 10' L2 10 min  L2 10 min L2 10 min    HR/TR X15 ea X15 each X 15  X 15 ea  X 15 ea    Standing hip flex/abd/ext 1x10 ea irving 1x10 each b/l X 10  1x10 ea Irving  1x10 ea Irving    Squats     1x10    LAQ 5\" x15 irving 5\" x 15 b/l 5\" x 20 irving  5\"x 15 Irving 5\"x15 Irving   T-band HS   Red 1x10 irving     Hip add Seated 5\" x20 Seated 5\" x20 Supine 5\" x 20 5\"x15 5\"x20   Hip abd Red x20 Red x20 Red x 15  Red x 15  Red x 20   SLR x15 X15 irving X 10 irving  X 15 Irving  X 15 Irving    S/L SLR        Clamshells        Bridges  1x15 1x15 1x15 1x10  1x10    Self calf stretch 20\" x3   w/strap 20\" x3  W/strap 20\" x3  W/strap 20\" x 3 Irving   with strap  20\"x 3 Irving with strap    Self HS stretch  20\" x3  W/strap 20\" x3  W/strap 20\" x3  W/strap 20\"x 3 Irving   With strap 20\"x 3 Irving  With strap           Ther Activity        Step-ups  Fwd/Lat C fwd  X10  NP - requested not to perform NP C Fwd   X 10 Irving  C fwd  X 10 R    Step-down  NP - requested not to perform NP                  "    Gait Training                        Modalities

## 2024-09-06 ENCOUNTER — OFFICE VISIT (OUTPATIENT)
Dept: PHYSICAL THERAPY | Facility: HOME HEALTHCARE | Age: 59
End: 2024-09-06
Payer: MEDICARE

## 2024-09-06 DIAGNOSIS — M17.0 PRIMARY OSTEOARTHRITIS OF BOTH KNEES: Primary | ICD-10-CM

## 2024-09-06 PROCEDURE — 97110 THERAPEUTIC EXERCISES: CPT

## 2024-09-06 NOTE — PROGRESS NOTES
"Daily Note     Today's date: 2024  Patient name: Luis F Velazquez  : 1965  MRN: [id number]  Referring provider: Tye Stringer,*  Dx:   Encounter Diagnosis     ICD-10-CM    1. Primary osteoarthritis of both knees  M17.0           Start Time: 915  Stop Time: 1000  Total time in clinic (min): 45 minutes    Subjective: Pt reports is having knee pain L>R that is about 6/10 and has R hip and LBP about 7-8/10. Notes he is frustrated with not having much relief with his symptoms; however he has noticed some improvement with his strength. States he is trying to stay active but it is hard with his knee symptoms to keep moving.       Objective: See treatment diary below    Assessment: Pt tolerated session fair. Patient able to complete program despite subjective above with some progression in reps today. Pt demonstrates adequate technique, benefits form cues to breath on exertion during TE's to avoid valsalva maneuver.  Pt c/o of pain to thoracic ribs on back R side during bridges and became fatigued during supine activities- declined in reps. Patient would benefit from further PT to address impairments and improve functional mobility.          Plan: Continue per plan of care.      Re-eval Date:      Precautions: HTN; DM2: restrictive lung disease        Manuals  9-                    Neuro Re-Ed   9-    Bosu lunges         Balance as appropriate                         Ther Ex  9-    NuStep  L2 10 minutes  L2 10' L2 10 min  L2 10 min    HR/TR X15 ea X15 each X 15  x15    Standing hip flex/abd/ext 1x10 ea irving 1x10 each b/l X 10  x10    Squats        LAQ 5\" x15 irving 5\" x 15 b/l 5\" x 20 irving  5\" x20 irving    T-band HS   Red 1x10 irving Red 1x15 irving    Hip add Seated 5\" x20 Seated 5\" x20 Supine 5\" x 20 Seated 5\"x 8    Hip abd Red x20 Red x20 Red x 15  Red x15    SLR x15 X15 irving X 10 irving  X 15 irving    S/L SLR        Clamshells        Bridges  1x15 1x15 1x15 1x3 (!)     Self " "calf stretch 20\" x3   w/strap 20\" x3  W/strap 20\" x3  W/strap 20\" x3  W/strap    Self HS stretch  20\" x3  W/strap 20\" x3  W/strap 20\" x3  W/strap 20\" x3  W/strap            Ther Activity  8/28      Step-ups  Fwd/Lat C fwd  X10  NP - requested not to perform NP NP-     Step-down  NP - requested not to perform NP NP                    Gait Training                        Modalities                                            "

## 2024-09-09 ENCOUNTER — OFFICE VISIT (OUTPATIENT)
Dept: PHYSICAL THERAPY | Facility: HOME HEALTHCARE | Age: 59
End: 2024-09-09
Payer: MEDICARE

## 2024-09-09 DIAGNOSIS — M17.0 PRIMARY OSTEOARTHRITIS OF BOTH KNEES: Primary | ICD-10-CM

## 2024-09-09 PROCEDURE — 97110 THERAPEUTIC EXERCISES: CPT

## 2024-09-09 NOTE — PROGRESS NOTES
"Daily Note     Today's date: 2024  Patient name: Luis F Velazquez  : 1965  MRN: [id number]  Referring provider: Tye Stringer,*  Dx: No diagnosis found.    Start Time: 1115          Subjective: My R LB , R hip R knee 4/10   L knee about 7/10. I feel better after my PT visits.     Objective: See treatment diary below    Assessment:  Pt with good edmond to session. Pt reports improved overall flexibility and strength noted with PT ex and ADL's.  Pt reports increased discomfort during standing SLR when wt bearing on the L but does note improvement since SOC.  Patient would benefit from continued PT    Plan: Continue per plan of care.      Re-eval Date:      Precautions: HTN; DM2: restrictive lung disease        Manuals -                   Neuro Re-Ed     Bosu lunges         Balance as appropriate                         Ther Ex -   NuStep  L2 10 minutes  L2 10' L2 10 min  L2 10 min L2  10'   HR/TR X15 ea X15 each X 15  x15 x15   Standing hip flex/abd/ext 1x10 ea irving 1x10 each b/l X 10  x10 X10 ea irving   Squats        LAQ 5\" x15 irving 5\" x 15 b/l 5\" x 20 irving  5\" x20 irving 5\" x20 irving   T-band HS   Red 1x10 irving Red 1x15 irving Red 1x15 irving   Hip add Seated 5\" x20 Seated 5\" x20 Supine 5\" x 20 Seated 5\"x 8 Supine 5\" x20   Hip abd Red x20 Red x20 Red x 15  Red x15 Red x15   SLR x15 X15 irving X 10 irving  X 15 irving X10 irving   S/L SLR        Clamshells        Bridges  1x15 1x15 1x15 1x3 (!)     Self calf stretch 20\" x3   w/strap 20\" x3  W/strap 20\" x3  W/strap 20\" x3  W/strap 20\" x3  W/strap   Self HS stretch  20\" x3  W/strap 20\" x3  W/strap 20\" x3  W/strap 20\" x3  W/strap 20\" x3w/strap           Ther Activity        Step-ups  Fwd/Lat C fwd  X10  NP - requested not to perform NP NP-  NP   Step-down  NP - requested not to perform NP NP NP                   Gait Training                        Modalities                                              "

## 2024-09-11 ENCOUNTER — OFFICE VISIT (OUTPATIENT)
Dept: PHYSICAL THERAPY | Facility: HOME HEALTHCARE | Age: 59
End: 2024-09-11
Payer: MEDICARE

## 2024-09-11 DIAGNOSIS — M17.0 PRIMARY OSTEOARTHRITIS OF BOTH KNEES: Primary | ICD-10-CM

## 2024-09-11 PROCEDURE — 97110 THERAPEUTIC EXERCISES: CPT

## 2024-09-11 NOTE — PROGRESS NOTES
"Daily Note     Today's date: 2024  Patient name: Luis F Velazquez  : 1965  MRN: [id number]  Referring provider: Tye Stringer,*  Dx:   Encounter Diagnosis     ICD-10-CM    1. Primary osteoarthritis of both knees  M17.0                  Subjective: Pt reports he has pain in his L knee greater than R knee, L foot and LB.       Objective: See treatment diary below      Assessment: Tolerated treatment fair. Verbal cues needed for correct form with exercises. See re-eval today. Patient would benefit from continued PT      Plan: Continue per plan of care.      Re-eval Date:      Precautions: HTN; DM2: restrictive lung disease        Manuals --                   Neuro Re-Ed      Bosu lunges         Balance as appropriate                         Ther Ex  --   NuStep  L2 10 minutes  L2 10' L2 10 min  L2 10 min L2  10'   HR/TR X15 ea X15 each X 15  x15 x15   Standing hip flex/abd/ext 1x10 ea jamie 1x10 each b/l X 10  x10 X10 ea jamie   Squats        LAQ 5\" x20 jamie 5\" x 15 b/l 5\" x 20 jamie  5\" x20 jamie 5\" x20 jamie   T-band HS Red 1x15 Jamie  Red 1x10 jamie Red 1x15 jamie Red 1x15 jamie   Hip add Supine  5\" x20 Seated 5\" x20 Supine 5\" x 20 Seated 5\"x 8 Supine 5\" x20   Hip abd Red x15 Red x20 Red x 15  Red x15 Red x15   SLR X10 Jamie  X15 jamie X 10 jamie  X 15 jamie X10 jamie   S/L SLR        Clamshells        Bridges  1x10  1x15 1x15 1x3 (!)     Self calf stretch 20\" x3   w/strap 20\" x3  W/strap 20\" x3  W/strap 20\" x3  W/strap 20\" x3  W/strap   Self HS stretch  20\" x3  W/strap 20\" x3  W/strap 20\" x3  W/strap 20\" x3  W/strap 20\" x3w/strap           Ther Activity        Step-ups  Fwd/Lat  NP - requested not to perform NP NP-  NP   Step-down  NP - requested not to perform NP NP NP                   Gait Training                        Modalities                                                "

## 2024-09-12 NOTE — PROGRESS NOTES
"PT Re-Evaluation     Today's date: 2024  Patient name: Luis F Velazquez  : 1965  MRN: [id number]  Referring provider: Tye Stringer,*  Dx:   Encounter Diagnosis     ICD-10-CM    1. Primary osteoarthritis of both knees  M17.0           Start Time: 1128  Stop Time: 1210  Total time in clinic (min): 42 minutes    Assessment  Impairments: abnormal gait, abnormal or restricted ROM, activity intolerance, impaired balance, impaired physical strength, lacks appropriate home exercise program, pain with function, safety issue, weight-bearing intolerance, activity limitations and endurance    Assessment details: UPDATE:  Pt continues to have same c/o pain and presents with overall decline in mobility. However he is self reporting and demonstrating objectively an increase in strength.  He would benefit from continued skilled therapy for further progression of strength and endurance to address pain.     Pt Luis F Velazquez is a 58 y.o. who presents to OPPT with s/s consistent with B knee OA. Pt presenting with limited knee mobility, decreased B LE strength, gait/balance dysfunction, joint inflammation, and increased pain with Wb'ing activities. Pt with slowed TUG score and overall decline in endurance. He would benefit from skilled therapy services to address outlined impairments, work towards goals, and restore pts PLOF. Thank you!    Understanding of Dx/Px/POC: good     Prognosis: good    Goals  STGs to be achieved in 4 weeks:  -Pt to demonstrate reduced subjective pain rating \"at worst\" by at least 2-3 points from Initial Eval to allow for reduced pain with ADLs and improved functional activity tolerance. - Ongoing   -Pt to demonstrate B knee ROM improved by 5-10* in order to maximize joint mobility and function and allow for progression of exercise program and achievement of goals. - Ongoing   -Pt to demonstrate increased MMT of B LE by at least 1/2 grade in order to improve safety and stability with ADLs and " functional mobility. - progressing / partially met    LTGs to be achieved upon discharge:   -Pt will be I with HEP in order to continue to improve quality of life and independence and reduce risk for re-injury.   -Pt to demonstrate return to activities of daily living without limiations or restrictions.   -Pt will return to ambulation > 1 hour to help facilitate return to community activities independently   -Pt to demonstrate improved function as noted by achieving or exceeding predicted score on FOTO outcomes assessment tool.          Plan  Patient would benefit from: skilled physical therapy  Planned modality interventions: thermotherapy: hydrocollator packs and cryotherapy    Planned therapy interventions: manual therapy, neuromuscular re-education, home exercise program, functional ROM exercises, gait training, flexibility, therapeutic exercise, therapeutic activities, patient education and balance    Frequency: 2x week  Duration in weeks: 12  Plan of Care beginning date: 2024  Plan of Care expiration date: 2024  Treatment plan discussed with: patient and referring physician        Subjective Evaluation    History of Present Illness  Mechanism of injury: UPDATE:  Pt reporting that he still has a lot of pain but feels that therapy is making him stronger.     Pt reporting that he is following with Orthopedics for his knees and recently got steroid injections which have helped his pain some.  He is scheduled for the lubricating injections 10/31/24.  Pt requesting therapy due to continued pain and feeling of weakness in both legs.   Quality of life: good    Patient Goals  Patient goals for therapy: decreased edema, decreased pain, improved balance, increased motion, increased strength and independence with ADLs/IADLs    Pain  At best pain ratin  At worst pain ratin  Quality: dull ache and tight  Relieving factors: rest  Aggravating factors: standing, stair climbing and  walking    Treatments  Previous treatment: injection treatment and physical therapy  Current treatment: physical therapy        Objective     Observations   Left Knee   Positive for effusion.     Right Knee   Positive for effusion.     Neurological Testing     Sensation     Knee   Left Knee   Intact: Light touch    Right Knee   Intact: light touch     Active Range of Motion   Left Knee   Flexion: 95 degrees   Extension: 0 degrees     Right Knee   Flexion: 100 degrees   Extension: 0 degrees     Strength/Myotome Testing     Left Hip   Planes of Motion   Flexion: 4-  Abduction: 4  Adduction: 4    Right Hip   Planes of Motion   Flexion: 4-  Abduction: 4  Adduction: 4    Left Knee   Flexion: 4  Extension: 4    Right Knee   Flexion: 4  Extension: 4    Ambulation     Ambulation: Level Surfaces     Additional Level Surfaces Ambulation Details  Tolerance x 15-20  minutes     Ambulation: Stairs   Pattern: reciprocal  Railings: two rails  Pattern: non-reciprocal  Railings: one rail    Observational Gait   Gait: antalgic   Decreased walking speed and stride length.     Comments   TUG without AD: 20 seconds       Flowsheet Rows      Flowsheet Row Most Recent Value   PT/OT G-Codes    Current Score 48   Projected Score 59

## 2024-09-13 ENCOUNTER — OFFICE VISIT (OUTPATIENT)
Dept: PHYSICAL THERAPY | Facility: HOME HEALTHCARE | Age: 59
End: 2024-09-13
Payer: MEDICARE

## 2024-09-13 DIAGNOSIS — M17.0 PRIMARY OSTEOARTHRITIS OF BOTH KNEES: Primary | ICD-10-CM

## 2024-09-13 PROCEDURE — 97110 THERAPEUTIC EXERCISES: CPT

## 2024-09-13 NOTE — PROGRESS NOTES
"Daily Note     Today's date: 2024  Patient name: Luis F Velazquez  : 1965  MRN: [id number]  Referring provider: Tye Stringer,*  Dx: No diagnosis found.    Start Time: 1115          Subjective: I have a lot of pain at my L knee and R hip today    Objective: See treatment diary below    Assessment: Pt with fair edmond to session and was able to complete ex as per flow sheet but with c/o R hip and L knee pain. Pt with good form with ex and self stretch in available ROM. Pt decline t-band hip abd today.  Patient would benefit from continued PT    Plan: Continue per plan of care.      Re-eval Date: 24     Precautions: HTN; DM2: restrictive lung disease         Manuals                                Neuro Re-Ed       Bosu lunges              Balance as appropriate                                          Ther Ex      NuStep  L2 10 minutes  L2 10' L2 10 min  L2 10 min L2  10'   HR/TR X15 ea X15 each X 15  x15 x15   Standing hip flex/abd/ext 1x10 ea irving 1x10 each b/l X 10  x10 X10 ea irving   Squats             LAQ 5\" x20 irving 5\" x 15 b/l 5\" x 20 irving  5\" x20 irving 5\" x20 irving   T-band HS Red 1x15 Irving  Red x15 irving Red 1x10 irving Red 1x15 irving Red 1x15 irving   Hip add Supine  5\" x20 Seated 5\" x20 Supine 5\" x 20 Seated 5\"x 8 Supine 5\" x20   Hip abd Red x15 Declined Red x 15  Red x15 Red x15   SLR X10 Irving  X10  irving X 10 irving  X 15 irving X10 irving   S/L SLR             Clamshells             Bridges  1x10  1x15 1x15 1x3 (!)      Self calf stretch 20\" x3   w/strap 20\" x3  W/strap 20\" x3  W/strap 20\" x3  W/strap 20\" x3  W/strap   Self HS stretch  20\" x3  W/strap 20\" x3  W/strap 20\" x3  W/strap 20\" x3  W/strap 20\" x3w/strap                 Ther Activity   9-13         Step-ups  Fwd/Lat   Declined NP NP-  NP   Step-down   Declined NP NP NP                               Gait Training                                         Modalities                                "

## 2024-09-16 ENCOUNTER — APPOINTMENT (OUTPATIENT)
Dept: PHYSICAL THERAPY | Facility: HOME HEALTHCARE | Age: 59
End: 2024-09-16
Payer: MEDICARE

## 2024-09-18 ENCOUNTER — OFFICE VISIT (OUTPATIENT)
Dept: PHYSICAL THERAPY | Facility: HOME HEALTHCARE | Age: 59
End: 2024-09-18
Payer: MEDICARE

## 2024-09-18 DIAGNOSIS — M17.0 PRIMARY OSTEOARTHRITIS OF BOTH KNEES: Primary | ICD-10-CM

## 2024-09-18 PROCEDURE — 97110 THERAPEUTIC EXERCISES: CPT

## 2024-09-18 NOTE — PROGRESS NOTES
"Daily Note     Today's date: 2024  Patient name: Luis F Velazquez  : 1965  MRN: [id number]  Referring provider: Tye Stringer,*  Dx: No diagnosis found.               Subjective: I can't do the stretches today. I had a lot of calf/HS soreness and pain after last visit. I have pain everywhere today from the weather.     Objective: See treatment diary below    Assessment:  Modified session to pt edmond 2* c/o overall joint pain today and to avoid exacerbation of s/s. . Held a few ex and self stretch as per pt request  Pt reports clicking at LB during lowering phase of supine SLR flex. Patient would benefit from continued PT    Plan: Continue per plan of care.      Re-eval Date: 24     Precautions: HTN; DM2: restrictive lung disease         Manuals                                Neuro Re-Ed       Bosu lunges              Balance as appropriate                                          Ther Ex      NuStep  L2 10 minutes  L2 10' L2 10' L2 10 min L2  10'   HR/TR X15 ea X15 each Pt declined x15 x15   Standing hip flex/abd/ext 1x10 ea irving 1x10 each b/l Pt decliend x10 X10 ea irving   Squats             LAQ 5\" x20 irving 5\" x 15 b/l 5\" x 20 irving  5\" x20 irving 5\" x20 irving   T-band HS Red 1x15 Irving  Red x15 irving Red x15 irving Red 1x15 irving Red 1x15 irving   Hip add Supine  5\" x20 Seated 5\" x20 Supine 5\" x 10 Seated 5\"x 8 Supine 5\" x20   Hip abd Red x15 Declined Red x 15  Red x15 Red x15   SLR X10 Irving  X10  irving X 10 irving  X 15 irving X10 irving   S/L SLR             Clamshells             Bridges  1x10  1x15 1x10 1x3 (!)      Self calf stretch 20\" x3   w/strap 20\" x3  W/strap declined 20\" x3  W/strap 20\" x3  W/strap   Self HS stretch  20\" x3  W/strap 20\" x3  W/strap declined 20\" x3  W/strap 20\" x3w/strap                 Ther Activity   9-13         Step-ups  Fwd/Lat   Declined NP NP-  NP   Step-down   Declined NP NP NP                               Gait Training                  "                        Modalities

## 2024-09-20 ENCOUNTER — OFFICE VISIT (OUTPATIENT)
Dept: PHYSICAL THERAPY | Facility: HOME HEALTHCARE | Age: 59
End: 2024-09-20
Payer: MEDICARE

## 2024-09-20 DIAGNOSIS — M17.0 PRIMARY OSTEOARTHRITIS OF BOTH KNEES: Primary | ICD-10-CM

## 2024-09-20 PROCEDURE — 97110 THERAPEUTIC EXERCISES: CPT

## 2024-09-20 NOTE — PROGRESS NOTES
"Daily Note     Today's date: 2024  Patient name: Luis F Velazquez  : 1965  MRN: [id number]  Referring provider: Tye Stringer,*  Dx:   Encounter Diagnosis     ICD-10-CM    1. Primary osteoarthritis of both knees  M17.0                      Subjective: Pt reports his knees and R LB hurt today.       Objective: See treatment diary below      Assessment: Tolerated treatment fair. Verbal cues needed for correct form with exercises. Held self stretches and TR/HR today per Pt request. Pt reports soreness t/o exercise but no increased pain.  Patient would benefit from continued PT      Plan: Continue per plan of care.      Re-eval Date: 24     Precautions: HTN; DM2: restrictive lung disease         Manuals                                Neuro Re-Ed       Bosu lunges              Balance as appropriate                                          Ther Ex      NuStep  L2 10 minutes  L2 10' L2 10' L2 10 min L2  10'   HR/TR X15 ea X15 each Pt declined Pt declined  x15   Standing hip flex/abd/ext 1x10 ea jamie 1x10 each b/l Pt decliend X10 ea Jamie  X10 ea jamie   Squats             LAQ 5\" x20 jamie 5\" x 15 b/l 5\" x 20 jamie  5\" x20 jamie 5\" x20 jamie   T-band HS Red 1x15 Jamie  Red x15 jamie Red x15 jamie Red 1x15 jamei Red 1x15 jamie   Hip add Supine  5\" x20 Seated 5\" x20 Supine 5\" x 10 Supine 5\"x 10 Supine 5\" x20   Hip abd Red x15 Declined Red x 15  Red x15 Red x15   SLR X10 Jamie  X10  jamie X 10 jamie  X 10 jamie X10 jamie   S/L SLR             Clamshells             Bridges  1x10  1x15 1x10 1x 10     Self calf stretch 20\" x3   w/strap 20\" x3  W/strap declined Pt declined  20\" x3  W/strap   Self HS stretch  20\" x3  W/strap 20\" x3  W/strap declined Pt declined  20\" x3w/strap                 Ther Activity            Step-ups  Fwd/Lat   Declined NP NP NP   Step-down   Declined NP NP NP                               Gait Training                                         Modalities         "

## 2024-09-23 ENCOUNTER — OFFICE VISIT (OUTPATIENT)
Dept: PHYSICAL THERAPY | Facility: HOME HEALTHCARE | Age: 59
End: 2024-09-23
Payer: MEDICARE

## 2024-09-23 DIAGNOSIS — M17.0 PRIMARY OSTEOARTHRITIS OF BOTH KNEES: Primary | ICD-10-CM

## 2024-09-23 PROCEDURE — 97110 THERAPEUTIC EXERCISES: CPT

## 2024-09-23 NOTE — PROGRESS NOTES
"Daily Note     Today's date: 2024  Patient name: Luis F Velazquez  : 1965  MRN: [id number]  Referring provider: Tye Stringer,*  Dx: No diagnosis found.    Start Time: 1120          Subjective: I have a lot of overall joint pain and R LB and hip pain today.     Objective: See treatment diary below    Assessment: Pt with fair edmond to modified TE as per flow sheet. Pt completed NuStep, wt bearing and a few seated ex and then requested to hold further ex 2* pain at various areas. Pt with average pain of 8-9/10 at R LB and R hip regions t/o ex. Pt states he will attend next sched PT visit. Patient would benefit from continued PT    Plan: Continue per plan of care.      Re-eval Date: 24     Precautions: HTN; DM2: restrictive lung disease         Manuals                                Neuro Re-Ed       Bosu lunges              Balance as appropriate                                          Ther Ex      NuStep  L2 10 minutes  L2 10' L2 10' L2 10 min L2  10'   HR/TR X15 ea X15 each Pt declined Pt declined  x15   Standing hip flex/abd/ext 1x10 ea irving 1x10 each b/l Pt decliend X10 ea Irving  X10 ea irving   Squats             LAQ 5\" x20 irving 5\" x 15 b/l 5\" x 20 irving  5\" x20 irving 5\" x20 irving   T-band HS Red 1x15 Irving  Red x15 irving Red x15 irving Red 1x15 irving declined   Hip add Supine  5\" x20 Seated 5\" x20 Supine 5\" x 10 Supine 5\"x 10 Supine 5\" x20   Hip abd Red x15 Declined Red x 15  Red x15 declined   SLR X10 Irving  X10  irving X 10 irving  X 10 irving declined   S/L SLR             Clamshells             Bridges  1x10  1x15 1x10 1x 10  declined   Self calf stretch 20\" x3   w/strap 20\" x3  W/strap declined Pt declined  declined   Self HS stretch  20\" x3  W/strap 20\" x3  W/strap declined Pt declined  declined                 Ther Activity            Step-ups  Fwd/Lat   Declined NP NP NP   Step-down   Declined NP NP NP                               Gait Training              "                            Modalities

## 2024-09-25 ENCOUNTER — OFFICE VISIT (OUTPATIENT)
Dept: PHYSICAL THERAPY | Facility: HOME HEALTHCARE | Age: 59
End: 2024-09-25
Payer: MEDICARE

## 2024-09-25 DIAGNOSIS — M17.0 PRIMARY OSTEOARTHRITIS OF BOTH KNEES: Primary | ICD-10-CM

## 2024-09-25 PROCEDURE — 97110 THERAPEUTIC EXERCISES: CPT

## 2024-09-25 NOTE — PROGRESS NOTES
"Daily Note     Today's date: 2024  Patient name: Luis F Velazquez  : 1965  MRN: [id number]  Referring provider: Tye Stringer,*  Dx:   Encounter Diagnosis     ICD-10-CM    1. Primary osteoarthritis of both knees  M17.0                      Subjective: Pt reports he has a lot of pain in his knees today.       Objective: See treatment diary below      Assessment: Tolerated treatment fair. Held some exercises today per Pt request to hold due to pain. Verbal cues needed for correct form with exercises. Pt reports pain in his knees t/o exercise.  Patient would benefit from continued PT      Plan: Continue per plan of care.      Re-eval Date: 24     Precautions: HTN; DM2: restrictive lung disease         Manuals                                Neuro Re-Ed       Bosu lunges              Balance as appropriate                                          Ther Ex      NuStep  L2 10 minutes  L2 10' L2 10' L2 10 min L2  10'   HR/TR Pt declined  X15 each Pt declined Pt declined  x15   Standing hip flex/abd/ext 1x10 ea jamie 1x10 each b/l Pt decliend X10 ea Jamie  X10 ea jamie   Squats             LAQ 5\" x20 jamie 5\" x 15 b/l 5\" x 20 jamie  5\" x20 jamie 5\" x20 jamie   T-band HS Red 1x15 Jamie  Red x15 jamie Red x15 jamie Red 1x15 jamie declined   Hip add Supine  5\" x20 Seated 5\" x20 Supine 5\" x 10 Supine 5\"x 10 Supine 5\" x20   Hip abd Pt declined  Declined Red x 15  Red x15 declined   SLR X10 Jamie  X10  jamie X 10 jamie  X 10 jamie declined   S/L SLR             Clamshells             Bridges  1x10  1x15 1x10 1x 10  declined   Self calf stretch Pt declined  20\" x3  W/strap declined Pt declined  declined   Self HS stretch  Pt declined  20\" x3  W/strap declined Pt declined  declined                 Ther Activity            Step-ups  Fwd/Lat   Declined NP NP NP   Step-down   Declined NP NP NP                               Gait Training                                         Modalities "

## 2024-09-27 ENCOUNTER — OFFICE VISIT (OUTPATIENT)
Dept: PHYSICAL THERAPY | Facility: HOME HEALTHCARE | Age: 59
End: 2024-09-27
Payer: MEDICARE

## 2024-09-27 DIAGNOSIS — M17.0 PRIMARY OSTEOARTHRITIS OF BOTH KNEES: Primary | ICD-10-CM

## 2024-09-27 PROCEDURE — 97110 THERAPEUTIC EXERCISES: CPT

## 2024-10-07 DIAGNOSIS — F41.8 DEPRESSION WITH ANXIETY: ICD-10-CM

## 2024-10-07 RX ORDER — TRAZODONE HYDROCHLORIDE 150 MG/1
150 TABLET ORAL
Qty: 90 TABLET | Refills: 0 | Status: SHIPPED | OUTPATIENT
Start: 2024-10-07

## 2024-10-09 ENCOUNTER — OFFICE VISIT (OUTPATIENT)
Dept: PHYSICAL THERAPY | Facility: HOME HEALTHCARE | Age: 59
End: 2024-10-09
Payer: MEDICARE

## 2024-10-09 DIAGNOSIS — E11.42 DIABETIC POLYNEUROPATHY ASSOCIATED WITH TYPE 2 DIABETES MELLITUS (HCC): ICD-10-CM

## 2024-10-09 DIAGNOSIS — M17.0 PRIMARY OSTEOARTHRITIS OF BOTH KNEES: Primary | ICD-10-CM

## 2024-10-09 PROCEDURE — 97110 THERAPEUTIC EXERCISES: CPT

## 2024-10-09 RX ORDER — PREGABALIN 50 MG/1
50 CAPSULE ORAL 2 TIMES DAILY
Qty: 60 CAPSULE | Refills: 1 | Status: SHIPPED | OUTPATIENT
Start: 2024-10-09

## 2024-10-09 NOTE — TELEPHONE ENCOUNTER
09/04/2024 06/26/2024 Pregabalin (Capsule) 60.0 30 50 MG NA [facility]  Medicare 2 / 2 PA   1 [id number] 08/05/2024 06/26/2024 Pregabalin (Capsule) 60.0 30 50 MG NA [facility]  Medicare 1 / 2 PA   1 [id number] 06/27/2024 06/26/2024 Pregabalin (Capsule) 60.0 30 50 MG NA [facility]  Medicare 0 / 2 PA   1 [id number] 06/12/2024 06/12/2024 Pregabalin (Capsule) 60.0 30 25 MG NA [facility]  Medicare 0 / 2 PA   1 [id number] 05/15/2024 05/15/2024 Pregabalin (Capsule) 60.0 30 25 MG NA [facility]INE [facility]  Medicare 0 / 0 PA   1 [id number] 04/17/2024 02/05/2024 Pregabalin (Capsule) 60.0 30 25 MG NA [facility]  Medicare 2 / 2 PA   1 [id number] 03/13/2024 02/05/2024 Pregabalin (Capsule) 60.0 30 25 MG NA [facility]  Medicare 1 / 2 PA   1 [id number] 02/06/2024 02/05/2024 Pregabalin (Capsule) 60.0 30 25 MG NA [facility]  Medicare 0 / 2

## 2024-10-09 NOTE — PROGRESS NOTES
"Daily Note     Today's date: 10/9/2024  Patient name: Luis F Velazquez  : 1965  MRN: [id number]  Referring provider: Tye Stringer,*  Dx:   Encounter Diagnosis     ICD-10-CM    1. Primary osteoarthritis of both knees  M17.0                  Subjective: Pt reports he has pain in his R hip and LB.       Objective: See treatment diary below      Assessment: Tolerated treatment fair. Verbal cues needed for correct form with exercises. Pt reports pain R knee t/o exercise. Held some exercises today per Pt request to hold due to pain. Patient would benefit from continued PT      Plan: Continue per plan of care.      Re-eval Date: 24     Precautions: HTN; DM2: restrictive lung disease         Manuals 9/25 9/27 10/9 9/20 9-23                               Neuro Re-Ed          Bosu lunges              Balance as appropriate                                          Ther Ex         NuStep  L2 10 minutes  L2 8' L2 10' L2 10 min L2  10'   HR/TR Pt declined  Pt declined  1x10 ea  Pt declined  x15   Standing hip flex/abd/ext 1x10 ea jamie 1x10 each b/l 1x10 ea Jamie  X10 ea Jamie  X10 ea jamie   Squats             LAQ 5\" x20 jamie 5\" x 20 b/l 5\" x 20 jamie  5\" x20 jamie 5\" x20 jamie   T-band HS Red 1x15 Jamie NP Red x15 jamie Red 1x15 jamie declined   Hip add Supine  5\" x20 Seated 5\" x20 Seated 5\" x 20 Supine 5\"x 10 Supine 5\" x20   Hip abd Pt declined  Declined  Red x15 declined   SLR X10 Jamie  X10  jamie X 10 jamie  X 10 jamie declined   S/L SLR             Clamshells             Bridges  1x10  1x10 Pt declined  1x 10  declined   Self calf stretch Pt declined  Pt declined  declined Pt declined  declined   Self HS stretch  Pt declined  Pt declined  declined Pt declined  declined                 Ther Activity            Step-ups  Fwd/Lat     NP NP   Step-down     NP NP                               Gait Training                                         Modalities                                            "

## 2024-10-11 ENCOUNTER — OFFICE VISIT (OUTPATIENT)
Dept: PHYSICAL THERAPY | Facility: HOME HEALTHCARE | Age: 59
End: 2024-10-11
Payer: MEDICARE

## 2024-10-11 DIAGNOSIS — M17.0 PRIMARY OSTEOARTHRITIS OF BOTH KNEES: Primary | ICD-10-CM

## 2024-10-14 ENCOUNTER — OFFICE VISIT (OUTPATIENT)
Dept: PODIATRY | Facility: CLINIC | Age: 59
End: 2024-10-14
Payer: MEDICARE

## 2024-10-14 VITALS
OXYGEN SATURATION: 98 % | RESPIRATION RATE: 16 BRPM | HEIGHT: 64 IN | SYSTOLIC BLOOD PRESSURE: 139 MMHG | HEART RATE: 116 BPM | BODY MASS INDEX: 46.26 KG/M2 | TEMPERATURE: 98 F | DIASTOLIC BLOOD PRESSURE: 87 MMHG | WEIGHT: 271 LBS

## 2024-10-14 DIAGNOSIS — L85.3 XEROSIS OF SKIN: ICD-10-CM

## 2024-10-14 DIAGNOSIS — B35.3 TINEA PEDIS OF BOTH FEET: ICD-10-CM

## 2024-10-14 DIAGNOSIS — B35.1 ONYCHOMYCOSIS: Primary | ICD-10-CM

## 2024-10-14 DIAGNOSIS — E11.65 TYPE 2 DIABETES MELLITUS WITH HYPERGLYCEMIA, WITH LONG-TERM CURRENT USE OF INSULIN (HCC): ICD-10-CM

## 2024-10-14 DIAGNOSIS — I73.9 PERIPHERAL VASCULAR DISEASE (HCC): ICD-10-CM

## 2024-10-14 DIAGNOSIS — Z79.4 TYPE 2 DIABETES MELLITUS WITH HYPERGLYCEMIA, WITH LONG-TERM CURRENT USE OF INSULIN (HCC): ICD-10-CM

## 2024-10-14 PROCEDURE — 11721 DEBRIDE NAIL 6 OR MORE: CPT | Performed by: PODIATRIST

## 2024-10-14 PROCEDURE — 99213 OFFICE O/P EST LOW 20 MIN: CPT | Performed by: PODIATRIST

## 2024-10-14 RX ORDER — TERBINAFINE HYDROCHLORIDE 250 MG/1
250 TABLET ORAL DAILY
Qty: 30 TABLET | Refills: 2 | Status: SHIPPED | OUTPATIENT
Start: 2024-10-14 | End: 2025-01-12

## 2024-10-14 NOTE — PROGRESS NOTES
Ambulatory Visit  Name: Luis F Velazquez      : 1965      MRN: [id number]  Encounter Provider: oDn Walker DPM  Encounter Date: 10/14/2024   Encounter department: [facility] [place]    Assessment & Plan  Onychomycosis    Orders:    terbinafine (LamISIL) 250 mg tablet; Take 1 tablet (250 mg total) by mouth daily  Debride mycotic nails and thin the nail plates x 10 with the use of a nail nipper manually and an electric Dremel bur was used to reduce the thickness of the nail beds and smoothed the distal aspect of the nails.   Type 2 diabetes mellitus with hyperglycemia, with long-term current use of insulin ([facility])    Lab Results   Component Value Date    HGBA1C 7.4 (A) 2024            Xerosis of skin       Pt was instructed to use lotion once a day on both feet such as cerave, Cetaphil or similar thick style of lotion.    Tinea pedis of both feet       Explained to the patient that the athlete's foot and the mycotic nails are the same fungus.  He is going to be on the Lamisil for the mycotic nails for 3 months and that should also improve the tinea pedis on both feet.  Peripheral vascular disease (HCC)           History of Present Illness     Luis F Velazquez is a 59 y.o. male who presents with chief complaint of painful thick nails on both feet and peeling skin present on both feet is also itchy.  He is a diabetic who sugar is elevated.    History obtained from : patient  Review of Systems  Medical History Reviewed by provider this encounter:       Current Outpatient Medications on File Prior to Visit   Medication Sig Dispense Refill    albuterol (2.5 mg/3 mL) 0.083 % nebulizer solution Take 3 mL (2.5 mg total) by nebulization every 6 (six) hours as needed for wheezing or shortness of breath 180 mL 0    Ascorbic Acid (VITAMIN C PO) Take 1 tablet by mouth daily      aspirin 81 mg chewable tablet Take one tablet by mouth daily      cholecalciferol (VITAMIN D3) 400 units tablet Take 1 tablet by  "mouth daily      cyclobenzaprine (FLEXERIL) 5 mg tablet Take 1 tablet by mouth three times daily as needed for muscle spasm 90 tablet 0    Diclofenac Sodium (VOLTAREN) 1 % Apply 2 g topically 4 (four) times a day To feet/ankle 150 g 1    DULoxetine (CYMBALTA) 60 mg delayed release capsule Take 1 capsule by mouth once daily 90 capsule 0    EPINEPHrine (EPIPEN) 0.3 mg/0.3 mL SOAJ INJECT 0.3MG INTO A MUSCLE ONCE FOR 1 DOSE 2 each 0    ferrous gluconate (FERGON) 324 mg tablet Take 324 mg by mouth      HumaLOG KwikPen 100 units/mL injection pen Inject 20 Units under the skin 3 (three) times a day with meals 75 mL 1    hydroCHLOROthiazide 25 mg tablet Take 1 tablet by mouth twice daily 180 tablet 0    Insulin Pen Needle (Pen Needles 3/16\") 31G X 5 MM MISC Use 4 (four) times a day 300 each 3    Jardiance 10 MG TABS tablet Take 10 mg by mouth every morning      ketoconazole (NIZORAL) 2 % shampoo Apply 1 Application topically 2 (two) times a week To feet in shower 120 mL 2    levothyroxine 175 mcg tablet Take 1 tablet by mouth once daily 90 tablet 0    loratadine (CLARITIN) 10 mg tablet Take 10 mg by mouth daily      metFORMIN (GLUCOPHAGE) 1000 MG tablet Take 1 tablet (1,000 mg total) by mouth daily with breakfast 90 tablet 1    Omega-3 Fatty Acids (fish oil) 1,000 mg Take 1,000 mg by mouth 2 (two) times a day      pregabalin (LYRICA) 50 mg capsule Take 1 capsule by mouth twice daily 60 capsule 1    rosuvastatin (CRESTOR) 40 MG tablet Take 1 tablet by mouth once daily 90 tablet 3    tamsulosin (FLOMAX) 0.4 mg Take 1 capsule (0.4 mg total) by mouth daily with dinner 90 capsule 3    Thiamine HCl (vitamin B-1) 250 MG tablet Take 250 mg by mouth daily      traZODone (DESYREL) 150 mg tablet TAKE 1 TABLET BY MOUTH ONCE DAILY AT BEDTIME 90 tablet 0    valsartan (DIOVAN) 160 mg tablet Take 160 mg by mouth daily      Ventolin  (90 Base) MCG/ACT inhaler INHALE 2 PUFFS BY MOUTH EVERY 4 HOURS AS NEEDED FOR WHEEZING FOR SHORTNESS " "OF BREATH 18 g 0    vitamin E, tocopherol, 400 units capsule Take 400 Units by mouth daily      clotrimazole (LOTRIMIN) 1 % cream Apply topically 2 (two) times a day (Patient not taking: Reported on 2024) 60 g 2    Insulin Glargine Solostar (Lantus SoloStar) 100 UNIT/ML SOPN Inject 0.24 mL (24 Units total) under the skin daily 30 mL 1    sodium chloride (OCEAN) 0.65 % nasal spray 1 spray into each nostril as needed for rhinitis (Patient not taking: Reported on 2024) 30 mL 1    [DISCONTINUED] atorvastatin (LIPITOR) 20 mg tablet Take 1 tablet by mouth once daily 90 tablet 3     No current facility-administered medications on file prior to visit.      Social History     Tobacco Use    Smoking status: Former     Current packs/day: 0.00     Types: Cigarettes     Quit date: 10/21/2022     Years since quittin.9    Smokeless tobacco: Never    Tobacco comments:     states read to quit prior to admit   Vaping Use    Vaping status: Never Used   Substance and Sexual Activity    Alcohol use: Not Currently     Alcohol/week: 3.0 standard drinks of alcohol     Types: 2 Cans of beer, 1 Shots of liquor per week     Comment: No alcohol since     Drug use: No    Sexual activity: Not Currently         Objective     /87   Pulse (!) 116   Temp 98 °F (36.7 °C)   Resp 16   Ht 5' 4\" (1.626 m)   Wt 123 kg (271 lb)   SpO2 98%   BMI 46.52 kg/m²     Physical Exam  Vascular status is a faint 1/4 DP 0/4 PT negative digital hair normal distal cooling immediate capillary.  There is edema present in the lower extremity of +1 pitting and the capillary refill is approximately 2 to 3 seconds.    Derm nails are brittle elongated hypertrophic yellow-brown discoloration with subungual debris x 10.  There is an increased thickness and the nails are approximately 2 to 4 mm with the hallux nails being the worst.  There is white flaky tissue present on the plantar aspects of both feet up to the demarcation of the dorsal and plantar " aspects of skin.  There is small area of erythema but in the same general area as well.    Ortho pes planus is noted bilaterally and hammertoe deformities are present on the fifth digits bilaterally.    Neuro is intact and equal bilaterally and the 0.5 monofilament test that was performed was intact on both extremities.  The sites that were tested were the plantar aspect of the hallux, plantar aspect of the third and fourth toes, submet 3, and the plantar aspect of the arch.        Administrative Statements   I have spent a total time of 15 minutes in caring for this patient on the day of the visit/encounter including Risks and benefits of tx options, Instructions for management, Patient and family education, Importance of tx compliance, Counseling / Coordination of care, Documenting in the medical record, Reviewing / ordering tests, medicine, procedures  , and Obtaining or reviewing history  .

## 2024-10-14 NOTE — PROGRESS NOTES
"PT Discharge    Today's date: 10/14/2024  Patient name: Luis F Velazquez  : 1965  MRN: [id number]  Referring provider: Tye Stringer,*  Dx:   Encounter Diagnosis     ICD-10-CM    1. Primary osteoarthritis of both knees  M17.0                      Assessment  Impairments: abnormal gait, abnormal or restricted ROM, activity intolerance, impaired balance, impaired physical strength, lacks appropriate home exercise program, pain with function, safety issue, weight-bearing intolerance, activity limitations and endurance    Assessment details: UPDATE:  Pt reporting that he is getting worse over time.  The knees and back pain are really limiting his ability to get around and he has even started to use SPC intermittently to help get around.  Pt feels he would like to be DC from OPPT at this time as he is not seeing progress and will return to MD to consult on continued symptoms. DC from OPPT. Thank you for this referral.     Pt Luis F Velazquez is a 58 y.o. who presents to OPPT with s/s consistent with B knee OA. Pt presenting with limited knee mobility, decreased B LE strength, gait/balance dysfunction, joint inflammation, and increased pain with Wb'ing activities. Pt with slowed TUG score and overall decline in endurance. He would benefit from skilled therapy services to address outlined impairments, work towards goals, and restore pts PLOF. Thank you!    Understanding of Dx/Px/POC: good     Prognosis: good    Goals  STGs to be achieved in 4 weeks: - NOT MET   -Pt to demonstrate reduced subjective pain rating \"at worst\" by at least 2-3 points from Initial Eval to allow for reduced pain with ADLs and improved functional activity tolerance.   -Pt to demonstrate B knee ROM improved by 5-10* in order to maximize joint mobility and function and allow for progression of exercise program and achievement of goals.   -Pt to demonstrate increased MMT of B LE by at least 1/2 grade in order to improve safety and stability " with ADLs and functional mobility.     LTGs to be achieved upon discharge:  - NOT MET   -Pt will be I with HEP in order to continue to improve quality of life and independence and reduce risk for re-injury.   -Pt to demonstrate return to activities of daily living without limiations or restrictions.   -Pt will return to ambulation > 1 hour to help facilitate return to community activities independently   -Pt to demonstrate improved function as noted by achieving or exceeding predicted score on FOTO outcomes assessment tool.          Plan    Treatment plan discussed with: patient and referring physician  Plan details: DC from OPPT         Subjective Evaluation    History of Present Illness  Mechanism of injury: Pt reporting that he is following with Orthopedics for his knees and recently got steroid injections which have helped his pain some.  He is scheduled for the lubricating injections 10/31/24.  Pt requesting therapy due to continued pain and feeling of weakness in both legs.   Quality of life: good    Patient Goals  Patient goals for therapy: decreased edema, decreased pain, improved balance, increased motion, increased strength and independence with ADLs/IADLs    Pain  At best pain ratin  At worst pain ratin  Quality: dull ache and tight  Relieving factors: rest  Aggravating factors: standing, stair climbing and walking    Treatments  Previous treatment: injection treatment and physical therapy  Current treatment: physical therapy        Objective     Observations   Left Knee   Positive for effusion.     Right Knee   Positive for effusion.     Neurological Testing     Sensation     Knee   Left Knee   Intact: Light touch    Right Knee   Intact: light touch     Active Range of Motion   Left Knee   Flexion: 95 degrees   Extension: 0 degrees     Right Knee   Flexion: 100 degrees   Extension: 0 degrees     Strength/Myotome Testing     Left Hip   Planes of Motion   Flexion: 4-  Abduction: 4  Adduction:  4    Right Hip   Planes of Motion   Flexion: 4-  Abduction: 4  Adduction: 4    Left Knee   Flexion: 4  Extension: 4    Right Knee   Flexion: 4  Extension: 4    Ambulation     Ambulation: Level Surfaces     Additional Level Surfaces Ambulation Details  Tolerance x 15-20  minutes     Ambulation: Stairs   Pattern: reciprocal  Railings: two rails  Pattern: non-reciprocal  Railings: one rail    Observational Gait   Gait: antalgic   Decreased walking speed and stride length.     Comments   TUG without AD: 20 seconds

## 2024-10-23 ENCOUNTER — TELEPHONE (OUTPATIENT)
Dept: FAMILY MEDICINE CLINIC | Facility: CLINIC | Age: 59
End: 2024-10-23

## 2024-10-30 ENCOUNTER — TELEPHONE (OUTPATIENT)
Dept: PULMONOLOGY | Facility: CLINIC | Age: 59
End: 2024-10-30

## 2024-10-31 ENCOUNTER — PROCEDURE VISIT (OUTPATIENT)
Dept: OBGYN CLINIC | Facility: CLINIC | Age: 59
End: 2024-10-31
Payer: MEDICARE

## 2024-10-31 VITALS
SYSTOLIC BLOOD PRESSURE: 107 MMHG | TEMPERATURE: 98.5 F | HEIGHT: 64 IN | OXYGEN SATURATION: 97 % | DIASTOLIC BLOOD PRESSURE: 61 MMHG | HEART RATE: 103 BPM | WEIGHT: 273 LBS | BODY MASS INDEX: 46.61 KG/M2

## 2024-10-31 DIAGNOSIS — M54.50 ACUTE RIGHT-SIDED LOW BACK PAIN WITHOUT SCIATICA: ICD-10-CM

## 2024-10-31 DIAGNOSIS — M17.0 PRIMARY OSTEOARTHRITIS OF BOTH KNEES: Primary | ICD-10-CM

## 2024-10-31 PROCEDURE — 20610 DRAIN/INJ JOINT/BURSA W/O US: CPT | Performed by: ORTHOPAEDIC SURGERY

## 2024-10-31 PROCEDURE — 99213 OFFICE O/P EST LOW 20 MIN: CPT | Performed by: ORTHOPAEDIC SURGERY

## 2024-10-31 NOTE — PROGRESS NOTES
Assessment/Plan:   Diagnoses and all orders for this visit:    Primary osteoarthritis of both knees  -     Large joint arthrocentesis    Acute right-sided low back pain without sciatica  -     Ambulatory referral to Spine [facility] Pain Management; Future       The patient has bilateral knee osteoarthritis. She was offered and accepted Orthovisc visco supplementation injection series. An injection of Orthovisc was performed to his Bilateral knee(s) for symptomatic relief. He tolerated the procedure well.  Ice and post injection protocol was advised. Weightbearing activities, as tolerated. He will return for his second Orthovisc injection in 1 week time. Patient expresses understanding and is in agreement with this treatment plan. The patient was given the opportunity to ask questions or present concerns.     The patient has degenerative joint disease of his bilateral knees.  Under aseptic technique, both knees were injected with his first set of Orthovisc.  He tolerated procedures quite well.  Return back next week for second set      Subjective:   Patient ID: Luis F Velazquez  1965     HPI  Patient is a 59 y.o. male who presents for follow up re-evaluation and first of Orthovisc visco supplementation injection series for treatment of primary osteoarthritis of the Bilateral knee(s).  Patient was last seen in regards to this issue on 7/25/2024, at which time he received bilateral cortisone injection. Previous treatments have included previous injections or cortisone and visco supplementation,  without significant impact. On today's presentation he reports having experienced mild interval improvement. He states that their pain returned approximately 2 weeks ago. He states that their pain is present along the medial aspect of the knee, which worsens with weightbearing activity. He rates his pain level at 7/10. Patient denies any adverse reaction to previous injections including fever, chills, headache, nausea, dizziness, or  malaise.     The following portions of the patient's history were reviewed and updated as appropriate:  Past medical history, past surgical history, Family history, social history, current medications and allergies    Past Medical History:   Diagnosis Date    Acquired hypothyroidism 2015    Angioedema     Diabetes mellitus (HCC)     Disease of thyroid gland     Hyperlipidemia     Hypertension     Mixed hyperlipidemia 2015    Morbid obesity (HCC)     Morbid obesity with BMI of 45.0-49.9, adult (HCC) 2015    MARSHA (obstructive sleep apnea)     Primary hypertension 2015    Transitioned From: Benign essential hypertension       Past Surgical History:   Procedure Laterality Date    KNEE SURGERY      SINUS SURGERY         Family History   Problem Relation Age of Onset    Diabetes Mother     Diabetes type II Mother     Esophageal cancer Father     Diabetes Brother     Kidney cancer Brother     Diabetes type II Brother     Diabetes Maternal Grandmother     Diabetes type II Maternal Grandmother     Diabetes Paternal Grandmother     Heart disease Neg Hx        Social History     Socioeconomic History    Marital status: Single     Spouse name: None    Number of children: None    Years of education: None    Highest education level: None   Occupational History    None   Tobacco Use    Smoking status: Former     Current packs/day: 0.00     Types: Cigarettes     Quit date: 10/21/2022     Years since quittin.0    Smokeless tobacco: Never    Tobacco comments:     states read to quit prior to admit   Vaping Use    Vaping status: Never Used   Substance and Sexual Activity    Alcohol use: Not Currently     Alcohol/week: 3.0 standard drinks of alcohol     Types: 2 Cans of beer, 1 Shots of liquor per week     Comment: No alcohol since     Drug use: No    Sexual activity: Not Currently   Other Topics Concern    None   Social History Narrative    None     Social Determinants of Health     Financial Resource Strain:  High Risk (11/15/2023)    Overall Financial Resource Strain (CARDIA)     Difficulty of Paying Living Expenses: Very hard   Food Insecurity: No Food Insecurity (8/30/2023)    Hunger Vital Sign     Worried About Running Out of Food in the Last Year: Never true     Ran Out of Food in the Last Year: Never true   Transportation Needs: No Transportation Needs (11/15/2023)    PRAPARE - Transportation     Lack of Transportation (Medical): No     Lack of Transportation (Non-Medical): No   Physical Activity: Not on file   Stress: Not on file   Social Connections: Unknown (6/18/2024)    Received from [facility]     How often do you feel lonely or isolated from those around you? (Adult - for ages 18 years and over): Not on file   Intimate Partner Violence: Not on file   Housing Stability: Low Risk  (8/30/2023)    Housing Stability Vital Sign     Unable to Pay for Housing in the Last Year: No     Number of Times Moved in the Last Year: 1     Homeless in the Last Year: No         Current Outpatient Medications:     albuterol (2.5 mg/3 mL) 0.083 % nebulizer solution, Take 3 mL (2.5 mg total) by nebulization every 6 (six) hours as needed for wheezing or shortness of breath, Disp: 180 mL, Rfl: 0    Ascorbic Acid (VITAMIN C PO), Take 1 tablet by mouth daily, Disp: , Rfl:     aspirin 81 mg chewable tablet, Take one tablet by mouth daily, Disp: , Rfl:     cholecalciferol (VITAMIN D3) 400 units tablet, Take 1 tablet by mouth daily, Disp: , Rfl:     cyclobenzaprine (FLEXERIL) 5 mg tablet, Take 1 tablet by mouth three times daily as needed for muscle spasm, Disp: 90 tablet, Rfl: 0    Diclofenac Sodium (VOLTAREN) 1 %, Apply 2 g topically 4 (four) times a day To feet/ankle, Disp: 150 g, Rfl: 1    DULoxetine (CYMBALTA) 60 mg delayed release capsule, Take 1 capsule by mouth once daily, Disp: 90 capsule, Rfl: 0    EPINEPHrine (EPIPEN) 0.3 mg/0.3 mL SOAJ, INJECT 0.3MG INTO A MUSCLE ONCE FOR 1 DOSE, Disp: 2 each, Rfl: 0     "ferrous gluconate (FERGON) 324 mg tablet, Take 324 mg by mouth, Disp: , Rfl:     HumaLOG KwikPen 100 units/mL injection pen, Inject 20 Units under the skin 3 (three) times a day with meals, Disp: 75 mL, Rfl: 1    hydroCHLOROthiazide 25 mg tablet, Take 1 tablet by mouth twice daily, Disp: 180 tablet, Rfl: 0    Insulin Pen Needle (Pen Needles 3/16\") 31G X 5 MM MISC, Use 4 (four) times a day, Disp: 300 each, Rfl: 3    Jardiance 10 MG TABS tablet, Take 10 mg by mouth every morning, Disp: , Rfl:     ketoconazole (NIZORAL) 2 % shampoo, Apply 1 Application topically 2 (two) times a week To feet in shower, Disp: 120 mL, Rfl: 2    levothyroxine 175 mcg tablet, Take 1 tablet by mouth once daily, Disp: 90 tablet, Rfl: 0    loratadine (CLARITIN) 10 mg tablet, Take 10 mg by mouth daily, Disp: , Rfl:     metFORMIN (GLUCOPHAGE) 1000 MG tablet, Take 1 tablet (1,000 mg total) by mouth daily with breakfast, Disp: 90 tablet, Rfl: 1    Omega-3 Fatty Acids (fish oil) 1,000 mg, Take 1,000 mg by mouth 2 (two) times a day, Disp: , Rfl:     pregabalin (LYRICA) 50 mg capsule, Take 1 capsule by mouth twice daily, Disp: 60 capsule, Rfl: 1    rosuvastatin (CRESTOR) 40 MG tablet, Take 1 tablet by mouth once daily, Disp: 90 tablet, Rfl: 3    tamsulosin (FLOMAX) 0.4 mg, Take 1 capsule (0.4 mg total) by mouth daily with dinner, Disp: 90 capsule, Rfl: 3    terbinafine (LamISIL) 250 mg tablet, Take 1 tablet (250 mg total) by mouth daily, Disp: 30 tablet, Rfl: 2    Thiamine HCl (vitamin B-1) 250 MG tablet, Take 250 mg by mouth daily, Disp: , Rfl:     traZODone (DESYREL) 150 mg tablet, TAKE 1 TABLET BY MOUTH ONCE DAILY AT BEDTIME, Disp: 90 tablet, Rfl: 0    valsartan (DIOVAN) 160 mg tablet, Take 160 mg by mouth daily, Disp: , Rfl:     Ventolin  (90 Base) MCG/ACT inhaler, INHALE 2 PUFFS BY MOUTH EVERY 4 HOURS AS NEEDED FOR WHEEZING FOR SHORTNESS OF BREATH, Disp: 18 g, Rfl: 0    vitamin E, tocopherol, 400 units capsule, Take 400 Units by mouth " "daily, Disp: , Rfl:     clotrimazole (LOTRIMIN) 1 % cream, Apply topically 2 (two) times a day (Patient not taking: Reported on 8/1/2024), Disp: 60 g, Rfl: 2    Insulin Glargine Solostar (Lantus SoloStar) 100 UNIT/ML SOPN, Inject 0.24 mL (24 Units total) under the skin daily, Disp: 30 mL, Rfl: 1    sodium chloride (OCEAN) 0.65 % nasal spray, 1 spray into each nostril as needed for rhinitis (Patient not taking: Reported on 8/1/2024), Disp: 30 mL, Rfl: 1    Allergies   Allergen Reactions    Ace Inhibitors Swelling     Angioedema    PT STATES THIS IS NOT AN ALLERGY    Other Anaphylaxis     Pt believes that he is allergic to peanut butter.    Also, seasonal allergies    Zinc Tongue Swelling     PT STATES THIS IS NOT AN ALLERGY    Clindamycin Edema     Had angioedema episode approx 5 hr after IM administration of clindamycin. Unclear if there is definitive causal relationship.  PT STATES THIS IS NOT AN ALLERGY       Review of Systems   Constitutional:  Negative for chills and fever.   HENT:  Negative for ear pain and sore throat.    Eyes:  Negative for pain and visual disturbance.   Respiratory:  Negative for cough and shortness of breath.    Cardiovascular:  Negative for chest pain and palpitations.   Gastrointestinal:  Negative for abdominal pain and vomiting.   Genitourinary:  Negative for dysuria and hematuria.   Musculoskeletal:  Negative for arthralgias and back pain.   Skin:  Negative for color change and rash.   Neurological:  Negative for seizures and syncope.   All other systems reviewed and are negative.       Objective:  /61   Pulse 103   Temp 98.5 °F (36.9 °C) (Temporal)   Ht 5' 4\" (1.626 m)   Wt 124 kg (273 lb)   SpO2 97%   BMI 46.86 kg/m²     Ortho Exam  bilateral knee(s) -   Patient ambulates with steady gait pattern  Uses No assistive device  No anatomical deformity  Skin is warm and dry to touch with no signs of erythema, ecchymosis, or infection   No soft tissue swelling or effusion " noted  ROM (0° - 115°)   TTP over medial joint line, TTP over lateral joint line  Flexor and extensor mechanisms are intact   Knee is  stable to varus and valgus stress  Patella tracks centrally with palpable crepitus  Calf compartments are soft and supple  - Herman's sign  2+ DP and PT pulses with brisk capillary refill to the toes  Sural, saphenous, tibial, superficial, and deep peroneal motor and sensory distributions intact  Sensation light touch intact distally      Physical Exam  Vitals and nursing note reviewed.   Constitutional:       General: He is not in acute distress.     Appearance: He is well-developed.   HENT:      Head: Normocephalic and atraumatic.   Eyes:      Conjunctiva/sclera: Conjunctivae normal.   Cardiovascular:      Rate and Rhythm: Normal rate and regular rhythm.      Heart sounds: No murmur heard.  Pulmonary:      Effort: Pulmonary effort is normal. No respiratory distress.      Breath sounds: Normal breath sounds.   Abdominal:      Palpations: Abdomen is soft.      Tenderness: There is no abdominal tenderness.   Musculoskeletal:         General: No swelling.      Cervical back: Neck supple.   Skin:     General: Skin is warm and dry.      Capillary Refill: Capillary refill takes less than 2 seconds.   Neurological:      Mental Status: He is alert.   Psychiatric:         Mood and Affect: Mood normal.        Diagnostic Test Review:  No new images to review    Large joint arthrocentesis: bilateral knee  Universal Protocol:  procedure performed by consultantConsent: Verbal consent obtained.  Risks and benefits: risks, benefits and alternatives were discussed  Consent given by: patient  Timeout called at: 10/31/2024 10:58 AM.  Patient understanding: patient states understanding of the procedure being performed  Patient consent: the patient's understanding of the procedure matches consent given  Site marked: the operative site was marked  Radiology Images displayed and confirmed. If images not  available, report reviewed: imaging studies available  Patient identity confirmed: verbally with patient  Supporting Documentation  Indications: pain and joint swelling   Procedure Details  Location: knee - bilateral knee  Ultrasound guidance: no  Approach: anterolateral    Medications (Right): 30 mg sodium hyaluronate 30 mg/2 mLMedications (Left): 30 mg sodium hyaluronate 30 mg/2 mL   Patient tolerance: patient tolerated the procedure well with no immediate complications  Dressing:  Sterile dressing applied          Scribe Attestation      I,:  Lalita Tang am acting as a scribe while in the presence of the attending physician.:       I,:  Manny Patel DO personally performed the services described in this documentation    as scribed in my presence.:

## 2024-10-31 NOTE — PROGRESS NOTES
Ambulatory Visit  Name: Luis F Velazquez      : 1965      MRN: [id number]  Encounter Provider: MARLEEN Davila  Encounter Date: 2024   Encounter department: [facility] [place]    Assessment & Plan  Type 2 diabetes mellitus with hyperglycemia, with long-term current use of insulin (HCC)  Uncontrolled.  Patient attributes this to poor dietary choices and chronic pain.  He was doing very well on Ozempic and Jardiance however, these were cost prohibitive.  Upon further conversation, patient would like to try Mounjaro to see if he can lose some weight which will not only help with his diabetes but also his pain.  Start 2.5 mg of Mounjaro once weekly. If patient tolerates and can afford the medication, will titrate him up to 10 mg as quickly as he can manage without s/e. No history of pancreatitis, MTC, or MEN-2. Patient will benefit from increased glycemic control, weight loss, and reduction of exogenous insulin needs. Continue Lantus 40 units nightly and Humalog 30 units TID before meals. Patient is aware that he needs to change his diet. He needs to follow more closely with pain management so that he is able to be more physically active. Demonstrates 100% compliance with CGM use. Continue. Patient knows to notify me with persistent hyperglycemia or episodes of hypoglycemia.  F/U in 3 months.   Lab Results   Component Value Date    HGBA1C 9.1 (A) 2024       Orders:    POCT hemoglobin A1c    Tirzepatide (Mounjaro) 2.5 MG/0.5ML SOAJ; Inject 2.5 mg once weekly for 4 weeks.    Type 2 diabetes mellitus with stage 2 chronic kidney disease, with long-term current use of insulin (HCC)  Patient has stopped Jardiance due to cost. Continue to monitor GFR.   Lab Results   Component Value Date    HGBA1C 9.1 (A) 2024            Essential (primary) hypertension  BP well controlled at 128/68. Continue chlorothiazide 25 mg daily.         Mixed  hyperlipidemia  Continue 40 mg of rosuvastatin nightly.        Acquired hypothyroidism  Reminded patient to complete his labs prior to follow up. Continue 175 mcg of levothyroxine daily.          History of Present Illness     Luis F Velazquez is a 59 y.o. male with Type 2 diabetes with long term use of insulin and hyopthyroidism presenting today for follow up.    Patient did not complete labs prior to today's appointment.    At patient's last appointment, metformin was resumed. Unfortunately, Ozempic was cost prohibitive. Patient has also stopped his Jardiance due to cost of medication.     Current regimen:    Lantus 40 units  Humalog 30 units three times daily  Jardiance 10 mg daily (not taking?)  Metformin 1000 mg daily with breakfast    Luis F Velazquez   Device used  Home use     Indication   Type 2 Diabetes    More than 72 hours of data was reviewed. Report to be scanned to chart.     Date Range: October 19, 2024-November 1, 2024    Analysis of data:   Average Glucose: 231 mg/dL  Coefficient of Variation: 30.2%  GMI: 8.8%  Time in Target Range: 26%  Time Above Range: 2% 181 to 250 mg/dL; 34% greater than 250 mg/dL  Time Below Range: 0%    Interpretation of data: Patient is uncontrolled with persistent hyperglycemia.        Review of Systems   Constitutional:  Positive for fatigue. Negative for activity change, appetite change and unexpected weight change.   HENT:  Negative for dental problem, sore throat, trouble swallowing and voice change.    Eyes:  Negative for visual disturbance.   Respiratory:  Positive for shortness of breath. Negative for cough and chest tightness.    Cardiovascular:  Negative for chest pain, palpitations and leg swelling.   Gastrointestinal:  Negative for constipation, diarrhea, nausea and vomiting.   Endocrine: Negative for polydipsia, polyphagia and polyuria.   Genitourinary:  Negative for frequency.   Musculoskeletal:  Positive for arthralgias, back pain and gait problem. Negative for  "myalgias.   Skin:  Negative for wound.   Allergic/Immunologic: Positive for environmental allergies. Negative for food allergies.   Neurological:  Positive for numbness. Negative for dizziness, weakness, light-headedness and headaches.   Psychiatric/Behavioral:  Positive for dysphoric mood and sleep disturbance. Negative for decreased concentration. The patient is not nervous/anxious.            Objective     /68 (BP Location: Right arm, Patient Position: Sitting, Cuff Size: Standard)   Pulse 88   Temp 98.5 °F (36.9 °C) (Temporal)   Resp 19   Ht 5' 4\" (1.626 m) Comment: verbal  Wt 125 kg (274 lb 12.8 oz)   SpO2 93%   BMI 47.17 kg/m²     Physical Exam  Vitals reviewed.   Constitutional:       General: He is not in acute distress.     Appearance: He is well-developed. He is obese. He is not ill-appearing.   HENT:      Head: Normocephalic and atraumatic.   Eyes:      Conjunctiva/sclera: Conjunctivae normal.   Cardiovascular:      Rate and Rhythm: Normal rate.      Pulses: Normal pulses.   Pulmonary:      Effort: Pulmonary effort is normal. No respiratory distress.   Abdominal:      Palpations: Abdomen is soft.      Tenderness: There is no abdominal tenderness.   Musculoskeletal:      Cervical back: Normal range of motion.      Right lower leg: No edema.      Left lower leg: No edema.   Skin:     General: Skin is warm and dry.      Capillary Refill: Capillary refill takes less than 2 seconds.   Neurological:      Mental Status: He is alert.   Psychiatric:         Mood and Affect: Mood normal.         "

## 2024-11-01 ENCOUNTER — TELEPHONE (OUTPATIENT)
Dept: PULMONOLOGY | Facility: CLINIC | Age: 59
End: 2024-11-01

## 2024-11-01 ENCOUNTER — OFFICE VISIT (OUTPATIENT)
Dept: ENDOCRINOLOGY | Facility: CLINIC | Age: 59
End: 2024-11-01
Payer: MEDICARE

## 2024-11-01 VITALS
HEART RATE: 88 BPM | OXYGEN SATURATION: 93 % | SYSTOLIC BLOOD PRESSURE: 128 MMHG | BODY MASS INDEX: 46.92 KG/M2 | WEIGHT: 274.8 LBS | DIASTOLIC BLOOD PRESSURE: 68 MMHG | RESPIRATION RATE: 19 BRPM | HEIGHT: 64 IN | TEMPERATURE: 98.5 F

## 2024-11-01 DIAGNOSIS — E11.22 TYPE 2 DIABETES MELLITUS WITH STAGE 2 CHRONIC KIDNEY DISEASE, WITH LONG-TERM CURRENT USE OF INSULIN (HCC): ICD-10-CM

## 2024-11-01 DIAGNOSIS — Z79.4 TYPE 2 DIABETES MELLITUS WITH HYPERGLYCEMIA, WITH LONG-TERM CURRENT USE OF INSULIN (HCC): Primary | ICD-10-CM

## 2024-11-01 DIAGNOSIS — E11.65 TYPE 2 DIABETES MELLITUS WITH HYPERGLYCEMIA, WITH LONG-TERM CURRENT USE OF INSULIN (HCC): Primary | ICD-10-CM

## 2024-11-01 DIAGNOSIS — I10 ESSENTIAL (PRIMARY) HYPERTENSION: Chronic | ICD-10-CM

## 2024-11-01 DIAGNOSIS — Z79.4 TYPE 2 DIABETES MELLITUS WITH STAGE 2 CHRONIC KIDNEY DISEASE, WITH LONG-TERM CURRENT USE OF INSULIN (HCC): ICD-10-CM

## 2024-11-01 DIAGNOSIS — E78.2 MIXED HYPERLIPIDEMIA: ICD-10-CM

## 2024-11-01 DIAGNOSIS — E03.9 ACQUIRED HYPOTHYROIDISM: Chronic | ICD-10-CM

## 2024-11-01 DIAGNOSIS — N18.2 TYPE 2 DIABETES MELLITUS WITH STAGE 2 CHRONIC KIDNEY DISEASE, WITH LONG-TERM CURRENT USE OF INSULIN (HCC): ICD-10-CM

## 2024-11-01 LAB — SL AMB POCT HEMOGLOBIN AIC: 9.1 (ref ?–6.5)

## 2024-11-01 PROCEDURE — 99214 OFFICE O/P EST MOD 30 MIN: CPT | Performed by: NURSE PRACTITIONER

## 2024-11-01 PROCEDURE — 95251 CONT GLUC MNTR ANALYSIS I&R: CPT | Performed by: NURSE PRACTITIONER

## 2024-11-01 PROCEDURE — 83036 HEMOGLOBIN GLYCOSYLATED A1C: CPT | Performed by: NURSE PRACTITIONER

## 2024-11-01 RX ORDER — TIRZEPATIDE 2.5 MG/.5ML
INJECTION, SOLUTION SUBCUTANEOUS
Qty: 2 ML | Refills: 0 | Status: SHIPPED | OUTPATIENT
Start: 2024-11-01

## 2024-11-01 NOTE — ASSESSMENT & PLAN NOTE
Uncontrolled.  Patient attributes this to poor dietary choices and chronic pain.  He was doing very well on Ozempic and Jardiance however, these were cost prohibitive.  Upon further conversation, patient would like to try Mounjaro to see if he can lose some weight which will not only help with his diabetes but also his pain.  Start 2.5 mg of Mounjaro once weekly. If patient tolerates and can afford the medication, will titrate him up to 10 mg as quickly as he can manage without s/e. No history of pancreatitis, MTC, or MEN-2. Patient will benefit from increased glycemic control, weight loss, and reduction of exogenous insulin needs. Continue Lantus 40 units nightly and Humalog 30 units TID before meals. Patient is aware that he needs to change his diet. He needs to follow more closely with pain management so that he is able to be more physically active. Demonstrates 100% compliance with CGM use. Continue. Patient knows to notify me with persistent hyperglycemia or episodes of hypoglycemia.  F/U in 3 months.   Lab Results   Component Value Date    HGBA1C 9.1 (A) 11/01/2024       Orders:    POCT hemoglobin A1c    Tirzepatide (Mounjaro) 2.5 MG/0.5ML SOAJ; Inject 2.5 mg once weekly for 4 weeks.

## 2024-11-01 NOTE — ASSESSMENT & PLAN NOTE
Reminded patient to complete his labs prior to follow up. Continue 175 mcg of levothyroxine daily.

## 2024-11-01 NOTE — ASSESSMENT & PLAN NOTE
Patient has stopped Jardiance due to cost. Continue to monitor GFR.   Lab Results   Component Value Date    HGBA1C 9.1 (A) 11/01/2024

## 2024-11-01 NOTE — ASSESSMENT & PLAN NOTE
Uncontrolled.  Patient attributes this to poor dietary choices and chronic pain.  He was doing very well on Ozempic and Jardiance however, these were cost prohibitive.  Upon further conversation, patient would like to try Mounjaro to see if he can lose some weight which will not only help with his diabetes but also his pain.  Start 2.5 mg of Mounjaro once weekly. If patient tolerates and can afford the medication, will titrate him up to 10 mg as quickly as he can manage without s/e. No history of pancreatitis, MTC, or MEN-2. Patient will benefit from increased glycemic control, weight loss, and reduction of exogenous insulin needs. Continue Lantus 40 units nightly and Humalog 30 units TID before meals. Patient is aware that he needs to change his diet. He needs to follow more closely with pain management so that he is able to be more physically active. Demonstrates 100% compliance with CGM use. Continue. Patient knows to notify me with persistent hyperglycemia or episodes of hypoglycemia.  F/U in 3 months.   Lab Results   Component Value Date    HGBA1C 9.1 (A) 11/01/2024

## 2024-11-05 DIAGNOSIS — E03.9 HYPOTHYROIDISM, UNSPECIFIED TYPE: ICD-10-CM

## 2024-11-06 ENCOUNTER — TELEPHONE (OUTPATIENT)
Dept: OBGYN CLINIC | Facility: CLINIC | Age: 59
End: 2024-11-06

## 2024-11-06 RX ORDER — LEVOTHYROXINE SODIUM 175 UG/1
TABLET ORAL
Qty: 90 TABLET | Refills: 0 | Status: SHIPPED | OUTPATIENT
Start: 2024-11-06

## 2024-11-07 ENCOUNTER — PROCEDURE VISIT (OUTPATIENT)
Dept: OBGYN CLINIC | Facility: CLINIC | Age: 59
End: 2024-11-07
Payer: MEDICARE

## 2024-11-07 VITALS
WEIGHT: 277 LBS | HEART RATE: 116 BPM | DIASTOLIC BLOOD PRESSURE: 53 MMHG | BODY MASS INDEX: 47.29 KG/M2 | SYSTOLIC BLOOD PRESSURE: 144 MMHG | HEIGHT: 64 IN | OXYGEN SATURATION: 95 % | TEMPERATURE: 98.3 F

## 2024-11-07 DIAGNOSIS — M17.0 PRIMARY OSTEOARTHRITIS OF BOTH KNEES: Primary | ICD-10-CM

## 2024-11-07 PROCEDURE — 20610 DRAIN/INJ JOINT/BURSA W/O US: CPT | Performed by: ORTHOPAEDIC SURGERY

## 2024-11-07 NOTE — PROGRESS NOTES
ASSESSMENT/PLAN:    Diagnoses and all orders for this visit:    Primary osteoarthritis of both knees    Other orders  -     Large joint arthrocentesis        Both of the patient's knees were injected with his second set of Orthovisc.  He tolerated the injections quite well.  Will follow-up with our office next week for his third set.  The patient is acceptable to this plan.    Return in about 1 week (around 11/14/2024).    The patient has osteoarthritis of his bilateral knees.  Under aseptic technique, both knees were injected with his second set of Orthovisc.  He tolerated the procedures quite well.  Return back next week for his final set of injections      _____________________________________________________  CHIEF COMPLAINT:  Chief Complaint   Patient presents with    Left Knee - Follow-up     Orthovisc #2 Buy and Bill      Right Knee - Follow-up     Orthovisc #2 Buy and Bill         SUBJECTIVE:  Luis F Velazquez is a 59 y.o. male who presents to our office for viscosupplementation of both knees.  He is here today for second set of Orthovisc.  He tolerated his previous injections without issue.  He denies any numbness or tingling.  He denies any fever or chills.    The following portions of the patient's history were reviewed and updated as appropriate: allergies, current medications, past family history, past medical history, past social history, past surgical history and problem list.    PAST MEDICAL HISTORY:  Past Medical History:   Diagnosis Date    Acquired hypothyroidism 1/2/2015    Angioedema     Diabetes mellitus (HCC)     Disease of thyroid gland     Hyperlipidemia     Hypertension     Mixed hyperlipidemia 2/4/2015    Morbid obesity (HCC)     Morbid obesity with BMI of 45.0-49.9, adult (HCC) 1/2/2015    MARSHA (obstructive sleep apnea)     Primary hypertension 1/2/2015    Transitioned From: Benign essential hypertension       PAST SURGICAL HISTORY:  Past Surgical History:   Procedure Laterality Date    KNEE  SURGERY      SINUS SURGERY         FAMILY HISTORY:  Family History   Problem Relation Age of Onset    Diabetes Mother     Diabetes type II Mother     Esophageal cancer Father     Diabetes Brother     Kidney cancer Brother     Diabetes type II Brother     Diabetes Maternal Grandmother     Diabetes type II Maternal Grandmother     Diabetes Paternal Grandmother     Heart disease Neg Hx        SOCIAL HISTORY:  Social History     Tobacco Use    Smoking status: Former     Current packs/day: 0.00     Types: Cigarettes     Quit date: 10/21/2022     Years since quittin.0     Passive exposure: Past    Smokeless tobacco: Never    Tobacco comments:     states read to quit prior to admit   Vaping Use    Vaping status: Never Used   Substance Use Topics    Alcohol use: Not Currently     Alcohol/week: 3.0 standard drinks of alcohol     Types: 2 Cans of beer, 1 Shots of liquor per week     Comment: No alcohol since     Drug use: No       MEDICATIONS:    Current Outpatient Medications:     albuterol (2.5 mg/3 mL) 0.083 % nebulizer solution, Take 3 mL (2.5 mg total) by nebulization every 6 (six) hours as needed for wheezing or shortness of breath, Disp: 180 mL, Rfl: 0    Ascorbic Acid (VITAMIN C PO), Take 1 tablet by mouth daily, Disp: , Rfl:     aspirin 81 mg chewable tablet, Take one tablet by mouth daily, Disp: , Rfl:     cholecalciferol (VITAMIN D3) 400 units tablet, Take 1 tablet by mouth daily, Disp: , Rfl:     clotrimazole (LOTRIMIN) 1 % cream, Apply topically 2 (two) times a day, Disp: 60 g, Rfl: 2    Diclofenac Sodium (VOLTAREN) 1 %, Apply 2 g topically 4 (four) times a day To feet/ankle, Disp: 150 g, Rfl: 1    DULoxetine (CYMBALTA) 60 mg delayed release capsule, Take 1 capsule by mouth once daily, Disp: 90 capsule, Rfl: 0    EPINEPHrine (EPIPEN) 0.3 mg/0.3 mL SOAJ, INJECT 0.3MG INTO A MUSCLE ONCE FOR 1 DOSE, Disp: 2 each, Rfl: 0    ferrous gluconate (FERGON) 324 mg tablet, Take 324 mg by mouth, Disp: , Rfl:      "HumaLOG KwikPen 100 units/mL injection pen, Inject 20 Units under the skin 3 (three) times a day with meals (Patient taking differently: Inject 20 Units under the skin 3 (three) times a day with meals Injecting 30 units 3 times a day with meals), Disp: 75 mL, Rfl: 1    hydroCHLOROthiazide 25 mg tablet, Take 1 tablet by mouth twice daily, Disp: 180 tablet, Rfl: 0    Insulin Pen Needle (Pen Needles 3/16\") 31G X 5 MM MISC, Use 4 (four) times a day, Disp: 300 each, Rfl: 3    ketoconazole (NIZORAL) 2 % shampoo, Apply 1 Application topically 2 (two) times a week To feet in shower, Disp: 120 mL, Rfl: 2    levothyroxine 175 mcg tablet, Take 1 tablet by mouth once daily, Disp: 90 tablet, Rfl: 0    loratadine (CLARITIN) 10 mg tablet, Take 10 mg by mouth daily, Disp: , Rfl:     metFORMIN (GLUCOPHAGE) 1000 MG tablet, Take 1 tablet (1,000 mg total) by mouth daily with breakfast, Disp: 90 tablet, Rfl: 1    Omega-3 Fatty Acids (fish oil) 1,000 mg, Take 1,000 mg by mouth 2 (two) times a day, Disp: , Rfl:     pregabalin (LYRICA) 50 mg capsule, Take 1 capsule by mouth twice daily, Disp: 60 capsule, Rfl: 1    rosuvastatin (CRESTOR) 40 MG tablet, Take 1 tablet by mouth once daily, Disp: 90 tablet, Rfl: 3    sodium chloride (OCEAN) 0.65 % nasal spray, 1 spray into each nostril as needed for rhinitis, Disp: 30 mL, Rfl: 1    tamsulosin (FLOMAX) 0.4 mg, Take 1 capsule (0.4 mg total) by mouth daily with dinner, Disp: 90 capsule, Rfl: 3    terbinafine (LamISIL) 250 mg tablet, Take 1 tablet (250 mg total) by mouth daily, Disp: 30 tablet, Rfl: 2    Thiamine HCl (vitamin B-1) 250 MG tablet, Take 250 mg by mouth daily, Disp: , Rfl:     Tirzepatide (Mounjaro) 2.5 MG/0.5ML SOAJ, Inject 2.5 mg once weekly for 4 weeks., Disp: 2 mL, Rfl: 0    traZODone (DESYREL) 150 mg tablet, TAKE 1 TABLET BY MOUTH ONCE DAILY AT BEDTIME, Disp: 90 tablet, Rfl: 0    valsartan (DIOVAN) 160 mg tablet, Take 160 mg by mouth daily, Disp: , Rfl:     Ventolin  (90 " "Base) MCG/ACT inhaler, INHALE 2 PUFFS BY MOUTH EVERY 4 HOURS AS NEEDED FOR WHEEZING FOR SHORTNESS OF BREATH, Disp: 18 g, Rfl: 0    cyclobenzaprine (FLEXERIL) 5 mg tablet, Take 1 tablet by mouth three times daily as needed for muscle spasm (Patient not taking: Reported on 11/1/2024), Disp: 90 tablet, Rfl: 0    Insulin Glargine Solostar (Lantus SoloStar) 100 UNIT/ML SOPN, Inject 0.24 mL (24 Units total) under the skin daily (Patient taking differently: Inject 24 Units under the skin daily Injecting 40 units before bed), Disp: 30 mL, Rfl: 1    vitamin E, tocopherol, 400 units capsule, Take 400 Units by mouth daily, Disp: , Rfl:     ALLERGIES:  Allergies   Allergen Reactions    Ace Inhibitors Swelling     Angioedema    PT STATES THIS IS NOT AN ALLERGY    Other Anaphylaxis     Pt believes that he is allergic to peanut butter.    Also, seasonal allergies    Zinc Tongue Swelling     PT STATES THIS IS NOT AN ALLERGY    Clindamycin Edema     Had angioedema episode approx 5 hr after IM administration of clindamycin. Unclear if there is definitive causal relationship.  PT STATES THIS IS NOT AN ALLERGY       ROS:  Review of Systems     Constitutional: Negative for fatigue, fever or loss of appetite.   HENT: Negative.    Respiratory: Negative for shortness of breath, dyspnea.    Cardiovascular: Negative for chest pain/tightness.   Gastrointestinal: Negative for abdominal pain, N/V.   Endocrine: Negative for cold/heat intolerance, unexplained weight loss/gain.   Genitourinary: Negative for flank pain, dysuria, hematuria.   Musculoskeletal: Positive for arthralgia   Skin: Negative for rash.    Neurological: Negative for numbness or tingling  Psychiatric/Behavioral: Negative for agitation.  _____________________________________________________  PHYSICAL EXAMINATION:    Blood pressure 144/53, pulse (!) 116, temperature 98.3 °F (36.8 °C), temperature source Temporal, height 5' 4\" (1.626 m), weight 126 kg (277 lb), SpO2 " "95%.    Constitutional: Oriented to person, place, and time. Appears well-developed and well-nourished. No distress.   HENT:   Head: Normocephalic.   Eyes: Conjunctivae are normal. Right eye exhibits no discharge. Left eye exhibits no discharge. No scleral icterus.   Cardiovascular: Normal rate.    Pulmonary/Chest: Effort normal.   Neurological: Alert and oriented to person, place, and time.   Skin: Skin is warm and dry. No rash noted. Not diaphoretic. No erythema. No pallor.   Psychiatric: Normal mood and affect. Behavior is normal. Judgment and thought content normal.      MUSCULOSKELETAL EXAMINATION:   Physical Exam  Ortho Exam    Bilateral lower extremities are neurovascularly intact  Toes are pink and mobile   Compartments are soft  No warmth, erythema or ecchymosis  ROM of knees are from 5-115 degrees  Negative Lachman, drawer or pivot shift  No medial instability  Medial joint line tenderness, slight lateral joint line tenderness  Patellofemoral crepitation   Objective:  BP Readings from Last 1 Encounters:   11/07/24 144/53      Wt Readings from Last 1 Encounters:   11/07/24 126 kg (277 lb)        BMI:   Estimated body mass index is 47.55 kg/m² as calculated from the following:    Height as of this encounter: 5' 4\" (1.626 m).    Weight as of this encounter: 126 kg (277 lb).      PROCEDURES PERFORMED:  Large joint arthrocentesis: bilateral knee  Universal Protocol:  Risks and benefits: risks, benefits and alternatives were discussed  Consent given by: patient  Patient understanding: patient states understanding of the procedure being performed  Site marked: the operative site was marked  Supporting Documentation  Indications: pain   Procedure Details  Location: knee - bilateral knee  Preparation: Patient was prepped and draped in the usual sterile fashion  Needle size: 22 G  Ultrasound guidance: no  Approach: lateral    Medications (Right): 30 mg sodium hyaluronate 30 mg/2 mLMedications (Left): 30 mg sodium " hyaluronate 30 mg/2 mL   Patient tolerance: patient tolerated the procedure well with no immediate complications  Dressing:  Sterile dressing applied            Scribe Attestation      I,:  Tye Stringer PA-C am acting as a scribe while in the presence of the attending physician.:       I,:  Manny Patel DO personally performed the services described in this documentation    as scribed in my presence.:

## 2024-11-14 ENCOUNTER — PROCEDURE VISIT (OUTPATIENT)
Dept: OBGYN CLINIC | Facility: CLINIC | Age: 59
End: 2024-11-14
Payer: MEDICARE

## 2024-11-14 VITALS
BODY MASS INDEX: 46.95 KG/M2 | SYSTOLIC BLOOD PRESSURE: 119 MMHG | WEIGHT: 275 LBS | TEMPERATURE: 99 F | DIASTOLIC BLOOD PRESSURE: 63 MMHG | HEART RATE: 125 BPM | HEIGHT: 64 IN | OXYGEN SATURATION: 97 %

## 2024-11-14 DIAGNOSIS — M17.0 PRIMARY OSTEOARTHRITIS OF BOTH KNEES: Primary | ICD-10-CM

## 2024-11-14 PROCEDURE — 20610 DRAIN/INJ JOINT/BURSA W/O US: CPT | Performed by: ORTHOPAEDIC SURGERY

## 2024-11-14 NOTE — PROGRESS NOTES
Assessment/Plan:  Assessment & Plan   Diagnoses and all orders for this visit:    Primary osteoarthritis of both knees        The patient has osteoarthritis of his bilateral knees.  Under aseptic technique, both knees were injected with his final set of Orthovisc.  He tolerated procedures quite well.  Return back 3 months for evaluation for possible corticosteroid injection    Subjective:   Patient ID: Luis F Velazquez is a 59 y.o. male.    HPI    The patient has a history of degenerative joint disease of his bilateral knees.  He presents for his final set of Orthovisc.  He states the pain is starting to lessen.  He denies any numbness or tingling.  He denies any fever or chills    The following portions of the patient's history were reviewed and updated as appropriate: allergies, current medications, past family history, past medical history, past social history, past surgical history, and problem list.    Review of Systems   Constitutional:  Negative for chills, fever and unexpected weight change.   HENT:  Negative for hearing loss, nosebleeds and sore throat.    Eyes:  Negative for pain, redness and visual disturbance.   Respiratory:  Negative for cough, shortness of breath and wheezing.    Cardiovascular:  Negative for chest pain, palpitations and leg swelling.   Gastrointestinal:  Negative for abdominal pain, nausea and vomiting.   Endocrine: Negative for polydipsia and polyuria.   Genitourinary:  Negative for dysuria and hematuria.   Musculoskeletal:  Positive for myalgias. Negative for arthralgias, back pain, gait problem, joint swelling, neck pain and neck stiffness.        As noted in HPI   Skin:  Negative for rash and wound.   Neurological:  Negative for dizziness, numbness and headaches.   Psychiatric/Behavioral:  Negative for decreased concentration and suicidal ideas. The patient is not nervous/anxious.        Objective:  Ortho Exam    Bilateral lower extremities are neurovascularly intact.  Toes are pink  "and mobile.  Compartments are soft.  Range of motion both his knees are from 5 to 115 degrees.  There is a negative Lachman, drawer, pivot shift test.  There is medial joint tenderness, lateral joint tenderness, and patellofemoral crepitation.  Negative Homans    Large joint arthrocentesis: bilateral knee  Universal Protocol:  procedure performed by consultantConsent: Verbal consent obtained. Written consent not obtained.  Risks and benefits: risks, benefits and alternatives were discussed  Consent given by: patient  Time out: Immediately prior to procedure a \"time out\" was called to verify the correct patient, procedure, equipment, support staff and site/side marked as required.  Patient understanding: patient states understanding of the procedure being performed  Test results: test results available and properly labeled  Site marked: the operative site was marked  Radiology Images displayed and confirmed. If images not available, report reviewed: imaging studies available  Patient identity confirmed: verbally with patient  Supporting Documentation  Indications: pain   Procedure Details  Location: knee - bilateral knee  Needle size: 22 G  Ultrasound guidance: no  Approach: lateral    Medications (Right): 30 mg sodium hyaluronate 30 mg/2 mLMedications (Left): 30 mg sodium hyaluronate 30 mg/2 mL   Patient tolerance: patient tolerated the procedure well with no immediate complications  Dressing:  Sterile dressing applied                          "

## 2024-11-18 ENCOUNTER — OFFICE VISIT (OUTPATIENT)
Dept: PAIN MEDICINE | Facility: CLINIC | Age: 59
End: 2024-11-18
Payer: MEDICARE

## 2024-11-18 VITALS
OXYGEN SATURATION: 95 % | DIASTOLIC BLOOD PRESSURE: 64 MMHG | TEMPERATURE: 98.4 F | WEIGHT: 277.1 LBS | RESPIRATION RATE: 20 BRPM | HEART RATE: 100 BPM | SYSTOLIC BLOOD PRESSURE: 126 MMHG | HEIGHT: 64 IN | BODY MASS INDEX: 47.31 KG/M2

## 2024-11-18 DIAGNOSIS — G89.4 CHRONIC PAIN SYNDROME: ICD-10-CM

## 2024-11-18 DIAGNOSIS — M51.16 LUMBAR DISC DISEASE WITH RADICULOPATHY: Primary | ICD-10-CM

## 2024-11-18 DIAGNOSIS — M47.816 LUMBAR SPONDYLOSIS: ICD-10-CM

## 2024-11-18 DIAGNOSIS — M79.18 MYOFASCIAL PAIN SYNDROME: ICD-10-CM

## 2024-11-18 PROCEDURE — 99214 OFFICE O/P EST MOD 30 MIN: CPT | Performed by: ANESTHESIOLOGY

## 2024-11-18 NOTE — PATIENT INSTRUCTIONS
Patient Education     Core Strengthening Exercises on Back or on Hands and Knees   About this topic   Your core muscles are in your chest, back, buttock, and stomach area. They are your abdominal, back, and pelvis muscles. These muscles help keep your body stable when using your arms or legs. They also help with balance and posture. There are many exercises you can do to keep these muscles strong.  If you have back problems like a compression fracture or a ruptured disc, doing some of these exercises could make your problem worse. Some of these exercises may cause lower back pain.  General   Before starting with a program, ask your doctor if you are healthy enough to do these exercises. Your doctor may have you work with a , chiropractor, or physical therapist to make a safe exercise program to meet your needs.  Strengthening Exercises   Strengthening exercises keep your muscles firm and strong. Start by repeating each exercise 2 to 3 times. Work up to doing each exercise 10 times. Try to do the exercises 2 to 3 times each day. Hold each exercise for 3 to 5 seconds. Do all exercises slowly.  Hip lifts ? Lie on your back with your knees bent and feet flat on the floor. Tighten your stomach muscles and push your heels into the floor to lift your buttocks off the floor. Relax.  Pelvic tilts ? Lie on your back with your knees bent and feet flat on the floor. Tighten your stomach muscles and press your lower back down to the floor. Relax.  Straight leg raises lying down ? Lie on your back with one leg straight. Bend your other knee so the foot is flat on the bed. Keeping your leg straight, lift the leg up to the level of your other knee. Lower it back down. Repeat with the other leg.  Knee flex lying down ? Lie on your back with both knees bent and your feet flat on the floor. Tighten your belly muscles. Raise one leg up and back down as if you are marching in slow motion. Keep belly muscles tight while you move  your leg. Switch legs. To make this exercise harder, raise both arms straight up in the air. Tighten your belly muscles. When you raise one leg up, reach the opposite arm over your head. Switch, moving the opposite arm and leg until you have done 10 repetitions on each side.  Abdominal crunches ? Lie on your back with both knees bent. Keep your feet flat on the floor. Place your hands in one of these positions. Try starting with the first position since it is the easiest. As you get better, use the other positions to make it harder.  Crunches with arms at sides.  Crunches with arms across chest.  Crunches with arms behind head. Be careful not to interlock your fingers behind your neck or head while doing crunches. This may add tension to your neck and cause strain.  Look at the ceiling. Tighten your belly muscles and lift your shoulders and upper back off the floor. Breathe out while you are doing this. Lower your shoulders to the floor. Breathe in while you are doing this. Relax your belly muscles all the way before starting another crunch.  Arm and leg lifts on hands and knees ? Start on your hands and knees. With all of these exercises, keep your back as level as possible. If you are having trouble with this, you may want to put a small object on your back such as a book. If it falls off, you are not keeping your back level enough during the exercise.  Lift one arm up to shoulder level and hold. Lower it back down. Now, lift up the other arm and hold.  Lift one leg up and kick it straight out until it is in line with your back and hold. Lower it back down. Now, lift up the other leg and hold.  Lift one arm and the OPPOSITE leg up at the same time and hold. Lower them down. Now, repeat using the other arm and leg. This is a very hard exercise. It may take time to be able to do this.               What will the results be?   Stronger core  Better balance  More toned belly and back muscles  Easier to do daily  activities  Better sports performance  Less low back pain  Helpful tips   Stay active and work out to keep your muscles strong and flexible.  Keep a healthy weight to avoid putting too much stress on your spine. Eat a healthy diet to keep your muscles healthy.  Be sure you do not hold your breath when exercising. This can raise your blood pressure. If you tend to hold your breath, try counting out loud when exercising. If any exercise bothers you, stop right away.  Try walking or cycling at an easy pace for a few minutes to warm up your muscles. Do this again after exercising.  Exercise may be slightly uncomfortable, but you should not have sharp pains. If you do get sharp pains, stop what you are doing. If the sharp pains continue, call your doctor.  Last Reviewed Date   2021-03-18  Consumer Information Use and Disclaimer   This generalized information is a limited summary of diagnosis, treatment, and/or medication information. It is not meant to be comprehensive and should be used as a tool to help the user understand and/or assess potential diagnostic and treatment options. It does NOT include all information about conditions, treatments, medications, side effects, or risks that may apply to a specific patient. It is not intended to be medical advice or a substitute for the medical advice, diagnosis, or treatment of a health care provider based on the health care provider's examination and assessment of a patient’s specific and unique circumstances. Patients must speak with a health care provider for complete information about their health, medical questions, and treatment options, including any risks or benefits regarding use of medications. This information does not endorse any treatments or medications as safe, effective, or approved for treating a specific patient. [employer] and its affiliates disclaim any warranty or liability relating to this information or the use thereof. The use of this information  is governed by the Terms of Use, available at https://www.[employer]Ve[employer]auwer.com/en/know/clinical-effectiveness-terms   Copyright   Copyright © 2024 [employer] and its affiliates and/or licensors. All rights reserved.

## 2024-11-18 NOTE — PROGRESS NOTES
Assessment:  1. Lumbar disc disease with radiculopathy    2. Lumbar spondylosis    3. Myofascial pain syndrome    4. Chronic pain syndrome        Plan:  Patient is a 59-year-old male with complaints of low back pain and bilateral buttock pain with chronic pain some secondary to lumbar spondylosis, lumbar degenerative disc disease complicated by DISH presents to office for follow-up visit.  Patient has had a genicular nerve block which provided no relief, right sacroiliac joint steroid injection providing relief.  At this time feels pertinent to obtain diagnostic imaging.  1.  We will order an x-ray of the lumbar spine to better assess the degenerative change and correlate patient current presentation.  2.  We will order an MRI of the lumbar spine to better assess the discogenic pathology and patient's low back pain  3.  Follow-up in 3 weeks at which we will then review diagnostic imaging and plan interventional management    History of Present Illness:  The patient is a 59 y.o. male who presents for a follow up office visit in regards to Back Pain.   The patient’s current symptoms include 9 out of 10 constant sharp, pressure-like pain worse in morning, evening, nighttime.    Current pain medications includes:  cymbalta 60mg.  The patient reports that this regimen is providing 0% pain relief.  The patient is reporting drowsy from this pain medication regimen.    I have personally reviewed and/or updated the patient's past medical history, past surgical history, family history, social history, current medications, allergies, and vital signs today.         Review of Systems  Review of Systems   Respiratory:  Positive for shortness of breath.    Gastrointestinal:  Positive for constipation.   Musculoskeletal:  Positive for back pain and gait problem.        Decreased ROM  Joint stiffness     Neurological:  Positive for dizziness.   All other systems reviewed and are negative.          Past Medical History:   Diagnosis  Date    Acquired hypothyroidism 2015    Angioedema     Diabetes mellitus (HCC)     Disease of thyroid gland     Hyperlipidemia     Hypertension     Mixed hyperlipidemia 2015    Morbid obesity (HCC)     Morbid obesity with BMI of 45.0-49.9, adult (HCC) 2015    MARSHA (obstructive sleep apnea)     Primary hypertension 2015    Transitioned From: Benign essential hypertension       Past Surgical History:   Procedure Laterality Date    KNEE SURGERY      SINUS SURGERY         Family History   Problem Relation Age of Onset    Diabetes Mother     Diabetes type II Mother     Esophageal cancer Father     Diabetes Brother     Kidney cancer Brother     Diabetes type II Brother     Diabetes Maternal Grandmother     Diabetes type II Maternal Grandmother     Diabetes Paternal Grandmother     Heart disease Neg Hx        Social History     Occupational History    Not on file   Tobacco Use    Smoking status: Former     Current packs/day: 0.00     Types: Cigarettes     Quit date: 10/21/2022     Years since quittin.0     Passive exposure: Past    Smokeless tobacco: Never    Tobacco comments:     states read to quit prior to admit   Vaping Use    Vaping status: Never Used   Substance and Sexual Activity    Alcohol use: Not Currently     Alcohol/week: 3.0 standard drinks of alcohol     Types: 2 Cans of beer, 1 Shots of liquor per week     Comment: No alcohol since     Drug use: No    Sexual activity: Not Currently         Current Outpatient Medications:     albuterol (2.5 mg/3 mL) 0.083 % nebulizer solution, Take 3 mL (2.5 mg total) by nebulization every 6 (six) hours as needed for wheezing or shortness of breath, Disp: 180 mL, Rfl: 0    Ascorbic Acid (VITAMIN C PO), Take 1 tablet by mouth daily, Disp: , Rfl:     aspirin 81 mg chewable tablet, Take one tablet by mouth daily, Disp: , Rfl:     cholecalciferol (VITAMIN D3) 400 units tablet, Take 1 tablet by mouth daily, Disp: , Rfl:     clotrimazole (LOTRIMIN) 1 %  "cream, Apply topically 2 (two) times a day, Disp: 60 g, Rfl: 2    Diclofenac Sodium (VOLTAREN) 1 %, Apply 2 g topically 4 (four) times a day To feet/ankle, Disp: 150 g, Rfl: 1    DULoxetine (CYMBALTA) 60 mg delayed release capsule, Take 1 capsule by mouth once daily, Disp: 90 capsule, Rfl: 0    EPINEPHrine (EPIPEN) 0.3 mg/0.3 mL SOAJ, INJECT 0.3MG INTO A MUSCLE ONCE FOR 1 DOSE, Disp: 2 each, Rfl: 0    ferrous gluconate (FERGON) 324 mg tablet, Take 324 mg by mouth, Disp: , Rfl:     HumaLOG KwikPen 100 units/mL injection pen, Inject 20 Units under the skin 3 (three) times a day with meals (Patient taking differently: Inject 20 Units under the skin 3 (three) times a day with meals Injecting 30 units 3 times a day with meals), Disp: 75 mL, Rfl: 1    hydroCHLOROthiazide 25 mg tablet, Take 1 tablet by mouth twice daily, Disp: 180 tablet, Rfl: 0    Insulin Glargine Solostar (Lantus SoloStar) 100 UNIT/ML SOPN, Inject 0.24 mL (24 Units total) under the skin daily (Patient taking differently: Inject 24 Units under the skin daily Injecting 40 units before bed), Disp: 30 mL, Rfl: 1    Insulin Pen Needle (Pen Needles 3/16\") 31G X 5 MM MISC, Use 4 (four) times a day, Disp: 300 each, Rfl: 3    ketoconazole (NIZORAL) 2 % shampoo, Apply 1 Application topically 2 (two) times a week To feet in shower, Disp: 120 mL, Rfl: 2    levothyroxine 175 mcg tablet, Take 1 tablet by mouth once daily, Disp: 90 tablet, Rfl: 0    loratadine (CLARITIN) 10 mg tablet, Take 10 mg by mouth daily, Disp: , Rfl:     metFORMIN (GLUCOPHAGE) 1000 MG tablet, Take 1 tablet (1,000 mg total) by mouth daily with breakfast, Disp: 90 tablet, Rfl: 1    Omega-3 Fatty Acids (fish oil) 1,000 mg, Take 1,000 mg by mouth 2 (two) times a day, Disp: , Rfl:     pregabalin (LYRICA) 50 mg capsule, Take 1 capsule by mouth twice daily, Disp: 60 capsule, Rfl: 1    rosuvastatin (CRESTOR) 40 MG tablet, Take 1 tablet by mouth once daily, Disp: 90 tablet, Rfl: 3    sodium chloride " "(OCEAN) 0.65 % nasal spray, 1 spray into each nostril as needed for rhinitis, Disp: 30 mL, Rfl: 1    tamsulosin (FLOMAX) 0.4 mg, Take 1 capsule (0.4 mg total) by mouth daily with dinner, Disp: 90 capsule, Rfl: 3    terbinafine (LamISIL) 250 mg tablet, Take 1 tablet (250 mg total) by mouth daily, Disp: 30 tablet, Rfl: 2    Thiamine HCl (vitamin B-1) 250 MG tablet, Take 250 mg by mouth daily, Disp: , Rfl:     Tirzepatide (Mounjaro) 2.5 MG/0.5ML SOAJ, Inject 2.5 mg once weekly for 4 weeks., Disp: 2 mL, Rfl: 0    traZODone (DESYREL) 150 mg tablet, TAKE 1 TABLET BY MOUTH ONCE DAILY AT BEDTIME, Disp: 90 tablet, Rfl: 0    valsartan (DIOVAN) 160 mg tablet, Take 160 mg by mouth daily, Disp: , Rfl:     Ventolin  (90 Base) MCG/ACT inhaler, INHALE 2 PUFFS BY MOUTH EVERY 4 HOURS AS NEEDED FOR WHEEZING FOR SHORTNESS OF BREATH, Disp: 18 g, Rfl: 0    vitamin E, tocopherol, 400 units capsule, Take 400 Units by mouth daily, Disp: , Rfl:     cyclobenzaprine (FLEXERIL) 5 mg tablet, Take 1 tablet by mouth three times daily as needed for muscle spasm (Patient not taking: Reported on 11/1/2024), Disp: 90 tablet, Rfl: 0    Allergies   Allergen Reactions    Ace Inhibitors Swelling     Angioedema    PT STATES THIS IS NOT AN ALLERGY    Other Anaphylaxis     Pt believes that he is allergic to peanut butter.    Also, seasonal allergies    Zinc Tongue Swelling     PT STATES THIS IS NOT AN ALLERGY    Clindamycin Edema     Had angioedema episode approx 5 hr after IM administration of clindamycin. Unclear if there is definitive causal relationship.  PT STATES THIS IS NOT AN ALLERGY       Physical Exam:    /64   Pulse 100   Temp 98.4 °F (36.9 °C)   Resp 20   Ht 5' 4\" (1.626 m)   Wt 126 kg (277 lb 1.6 oz)   SpO2 95%   BMI 47.56 kg/m²     Constitutional:normal, well developed, well nourished, alert, in no distress and non-toxic and no overt pain behavior. and obese  Eyes:anicteric  HEENT:grossly intact  Neck:supple, symmetric, " trachea midline and no masses   Pulmonary:even and unlabored  Cardiovascular:No edema or pitting edema present  Skin:Normal without rashes or lesions and well hydrated  Psychiatric:Mood and affect appropriate  Neurologic:Cranial Nerves II-XII grossly intact  Musculoskeletal:antalgic  Lumbar/Sacral Spine examination demonstrates.  Full range of motion lumbar spine with pain upon: flexion, lateral rotation to the left/right, and bending to the left/right.  Bilateral lumbar paraspinals tender to palpation. Muscle spasms noted in the lumbar area bilaterally. 4/5 lower extremity strength in all muscle groups bilaterally. Positive seated straight leg raise for bilateral lower extremities.  Sensitivity to light touch intact bilateral lower extremities. 2+ reflexes in the patella and Achilles.  No ankle clonus      Imaging    Study Result    Narrative & Impression   LUMBAR SPINE     INDICATION:   M54.50: Low back pain, unspecified  G89.29: Other chronic pain.     COMPARISON:  None     VIEWS:  XR SPINE LUMBAR MINIMUM 4 VIEWS NON INJURY        FINDINGS:     There are 5 non rib bearing lumbar vertebral bodies.      There is no evidence of acute fracture or destructive osseous lesion.     Alignment is unremarkable.      Multilevel vertebral endplate spondylosis with relative preservation of disc spaces, consistent with diffuse idiopathic skeletal hyperostosis (DISH).       The pedicles appear intact.     There are atherosclerotic calcifications. Soft tissues are otherwise unremarkable.     IMPRESSION:     Findings compatible with DISH.        Workstation performed: [id number]              No orders to display       No orders of the defined types were placed in this encounter.

## 2024-11-20 DIAGNOSIS — I10 ESSENTIAL HYPERTENSION: ICD-10-CM

## 2024-11-20 DIAGNOSIS — F32.9 REACTIVE DEPRESSION: ICD-10-CM

## 2024-11-20 RX ORDER — DULOXETIN HYDROCHLORIDE 60 MG/1
60 CAPSULE, DELAYED RELEASE ORAL DAILY
Qty: 90 CAPSULE | Refills: 0 | Status: SHIPPED | OUTPATIENT
Start: 2024-11-20

## 2024-11-20 RX ORDER — HYDROCHLOROTHIAZIDE 25 MG/1
25 TABLET ORAL 2 TIMES DAILY
Qty: 180 TABLET | Refills: 0 | Status: SHIPPED | OUTPATIENT
Start: 2024-11-20

## 2024-11-22 ENCOUNTER — HOSPITAL ENCOUNTER (OUTPATIENT)
Dept: MRI IMAGING | Facility: HOSPITAL | Age: 59
Discharge: HOME | End: 2024-11-22
Attending: ANESTHESIOLOGY
Payer: MEDICARE

## 2024-11-22 ENCOUNTER — HOSPITAL ENCOUNTER (OUTPATIENT)
Dept: RADIOLOGY | Facility: HOSPITAL | Age: 59
Discharge: HOME | End: 2024-11-22
Attending: ANESTHESIOLOGY
Payer: MEDICARE

## 2024-11-22 DIAGNOSIS — M51.16 LUMBAR DISC DISEASE WITH RADICULOPATHY: ICD-10-CM

## 2024-11-22 DIAGNOSIS — G89.4 CHRONIC PAIN SYNDROME: ICD-10-CM

## 2024-11-22 DIAGNOSIS — M47.816 LUMBAR SPONDYLOSIS: ICD-10-CM

## 2024-11-22 DIAGNOSIS — M79.18 MYOFASCIAL PAIN SYNDROME: ICD-10-CM

## 2024-11-22 PROCEDURE — 72148 MRI LUMBAR SPINE W/O DYE: CPT

## 2024-11-22 PROCEDURE — 72110 X-RAY EXAM L-2 SPINE 4/>VWS: CPT

## 2024-11-28 DIAGNOSIS — Z79.4 TYPE 2 DIABETES MELLITUS WITH COMPLICATION, WITH LONG-TERM CURRENT USE OF INSULIN (HCC): ICD-10-CM

## 2024-11-28 DIAGNOSIS — E11.8 TYPE 2 DIABETES MELLITUS WITH COMPLICATION, WITH LONG-TERM CURRENT USE OF INSULIN (HCC): ICD-10-CM

## 2024-12-05 DIAGNOSIS — E11.42 DIABETIC POLYNEUROPATHY ASSOCIATED WITH TYPE 2 DIABETES MELLITUS (HCC): ICD-10-CM

## 2024-12-06 RX ORDER — PREGABALIN 50 MG/1
50 CAPSULE ORAL 2 TIMES DAILY
Qty: 60 CAPSULE | Refills: 2 | Status: SHIPPED | OUTPATIENT
Start: 2024-12-06

## 2024-12-27 ENCOUNTER — OFFICE VISIT (OUTPATIENT)
Dept: PODIATRY | Facility: CLINIC | Age: 59
End: 2024-12-27
Payer: MEDICARE

## 2024-12-27 VITALS
HEART RATE: 80 BPM | BODY MASS INDEX: 47.29 KG/M2 | WEIGHT: 277 LBS | RESPIRATION RATE: 98 BRPM | TEMPERATURE: 98.6 F | HEIGHT: 64 IN

## 2024-12-27 DIAGNOSIS — M79.674 PAIN IN TOES OF BOTH FEET: ICD-10-CM

## 2024-12-27 DIAGNOSIS — Z79.4 TYPE 2 DIABETES MELLITUS WITH HYPERGLYCEMIA, WITH LONG-TERM CURRENT USE OF INSULIN (HCC): ICD-10-CM

## 2024-12-27 DIAGNOSIS — B35.1 ONYCHOMYCOSIS: Primary | ICD-10-CM

## 2024-12-27 DIAGNOSIS — I73.9 PERIPHERAL VASCULAR DISEASE (HCC): ICD-10-CM

## 2024-12-27 DIAGNOSIS — M79.675 PAIN IN TOES OF BOTH FEET: ICD-10-CM

## 2024-12-27 DIAGNOSIS — E11.65 TYPE 2 DIABETES MELLITUS WITH HYPERGLYCEMIA, WITH LONG-TERM CURRENT USE OF INSULIN (HCC): ICD-10-CM

## 2024-12-27 DIAGNOSIS — L60.0 INGROWN TOENAIL: ICD-10-CM

## 2024-12-27 DIAGNOSIS — E11.42 DIABETIC POLYNEUROPATHY ASSOCIATED WITH TYPE 2 DIABETES MELLITUS (HCC): ICD-10-CM

## 2024-12-27 PROCEDURE — RECHECK: Performed by: PODIATRIST

## 2024-12-27 PROCEDURE — 11721 DEBRIDE NAIL 6 OR MORE: CPT | Performed by: PODIATRIST

## 2024-12-27 NOTE — PROGRESS NOTES
Name: Luis F Velazquez      : 1965      MRN: [id number]  Encounter Provider: Don Walker DPM  Encounter Date: 2024   Encounter department: [facility] [place]  :  Assessment & Plan  Onychomycosis       Debride mycotic nails and thin the nail plates x 10 with the use of a nail nipper manually and an electric Dremel bur was used to reduce the thickness of the nail beds and smoothed the distal aspect of the nails.   Type 2 diabetes mellitus with hyperglycemia, with long-term current use of insulin (HCC)    Lab Results   Component Value Date    HGBA1C 9.1 (A) 2024            Peripheral vascular disease (HCC)         Diabetic polyneuropathy associated with type 2 diabetes mellitus (HCC)    Lab Results   Component Value Date    HGBA1C 9.1 (A) 2024            Pain in toes of both feet         Ingrown toenail       A formal timeout including patient identification, laterality and existing allergies using [facility] protocol was conducted. I recommend a partial temporary nail avulsion and informed consent obtained.     After cleansing skin with alcohol sprayed etoh chloride on the medial and lateral borders on both the right and left hallux nails.  I carefully did a partially avulsion of the offending nail border with a small straight nail nipper and flushed with sterile saline. I dressed the site with bacitracin ointment and a DSD to be left intact x 24 hours. The patient may then remove the dressing, shower, and redress with antibiotic ointment and a band-aid daily.      Explained the permanent procedure to the patient and he stated he will think about it for the future    Discussed proper shoe gear, daily inspections of feet, and general foot health with patient. Patient has Q9  findings and is recommended for at risk foot care every 9-10 weeks.    Patients most recent complete clinical foot exam was on: 2024      Return in about 10 weeks (around 3/7/2025).     History of Present  Illness   HPI  Luis F Velazquez is a 59 y.o. male who presents with chief complaint of painful thick nails on both feet and painful ingrown nails on both big toes.  He is a diabetic who states that has been all over the place especially over the holidays.Patient presents for at-risk foot care.  Patient has no acute concerns today.  Patient has significant lower extremity risk due to neuropathy, parasthesia, edema, and trophic skin changes to the lower extremity.   History obtained from: patient    Review of Systems  Medical History Reviewed by provider this encounter:     .  Current Outpatient Medications on File Prior to Visit   Medication Sig Dispense Refill    albuterol (2.5 mg/3 mL) 0.083 % nebulizer solution Take 3 mL (2.5 mg total) by nebulization every 6 (six) hours as needed for wheezing or shortness of breath 180 mL 0    Ascorbic Acid (VITAMIN C PO) Take 1 tablet by mouth daily      aspirin 81 mg chewable tablet Take one tablet by mouth daily      cholecalciferol (VITAMIN D3) 400 units tablet Take 1 tablet by mouth daily      clotrimazole (LOTRIMIN) 1 % cream Apply topically 2 (two) times a day 60 g 2    cyclobenzaprine (FLEXERIL) 5 mg tablet Take 1 tablet by mouth three times daily as needed for muscle spasm 90 tablet 0    Diclofenac Sodium (VOLTAREN) 1 % Apply 2 g topically 4 (four) times a day To feet/ankle 150 g 1    DULoxetine (CYMBALTA) 60 mg delayed release capsule Take 1 capsule by mouth once daily 90 capsule 0    EPINEPHrine (EPIPEN) 0.3 mg/0.3 mL SOAJ INJECT 0.3MG INTO A MUSCLE ONCE FOR 1 DOSE 2 each 0    ferrous gluconate (FERGON) 324 mg tablet Take 324 mg by mouth      HumaLOG KwikPen 100 units/mL injection pen Inject 20 Units under the skin 3 (three) times a day with meals 75 mL 1    hydroCHLOROthiazide 25 mg tablet Take 1 tablet by mouth twice daily 180 tablet 0    Insulin Glargine Solostar (Lantus SoloStar) 100 UNIT/ML SOPN Inject 0.24 mL (24 Units total) under the skin daily 30 mL 1    Insulin  "Pen Needle (Pen Needles 3/16\") 31G X 5 MM MISC Use 4 (four) times a day 300 each 3    ketoconazole (NIZORAL) 2 % shampoo Apply 1 Application topically 2 (two) times a week To feet in shower 120 mL 2    levothyroxine 175 mcg tablet Take 1 tablet by mouth once daily 90 tablet 0    loratadine (CLARITIN) 10 mg tablet Take 10 mg by mouth daily      metFORMIN (GLUCOPHAGE) 1000 MG tablet Take 1 tablet by mouth once daily with breakfast 90 tablet 1    Omega-3 Fatty Acids (fish oil) 1,000 mg Take 1,000 mg by mouth 2 (two) times a day      pregabalin (LYRICA) 50 mg capsule Take 1 capsule by mouth twice daily 60 capsule 2    rosuvastatin (CRESTOR) 40 MG tablet Take 1 tablet by mouth once daily 90 tablet 3    sodium chloride (OCEAN) 0.65 % nasal spray 1 spray into each nostril as needed for rhinitis 30 mL 1    tamsulosin (FLOMAX) 0.4 mg Take 1 capsule (0.4 mg total) by mouth daily with dinner 90 capsule 3    terbinafine (LamISIL) 250 mg tablet Take 1 tablet (250 mg total) by mouth daily 30 tablet 2    Thiamine HCl (vitamin B-1) 250 MG tablet Take 250 mg by mouth daily      Tirzepatide (Mounjaro) 2.5 MG/0.5ML SOAJ Inject 2.5 mg once weekly for 4 weeks. 2 mL 0    traZODone (DESYREL) 150 mg tablet TAKE 1 TABLET BY MOUTH ONCE DAILY AT BEDTIME 90 tablet 0    valsartan (DIOVAN) 160 mg tablet Take 160 mg by mouth daily      Ventolin  (90 Base) MCG/ACT inhaler INHALE 2 PUFFS BY MOUTH EVERY 4 HOURS AS NEEDED FOR WHEEZING FOR SHORTNESS OF BREATH 18 g 0    vitamin E, tocopherol, 400 units capsule Take 400 Units by mouth daily      [DISCONTINUED] atorvastatin (LIPITOR) 20 mg tablet Take 1 tablet by mouth once daily 90 tablet 3     No current facility-administered medications on file prior to visit.      Social History     Tobacco Use    Smoking status: Former     Current packs/day: 0.00     Types: Cigarettes     Quit date: 10/21/2022     Years since quittin.1     Passive exposure: Past    Smokeless tobacco: Never    Tobacco " "comments:     states read to quit prior to admit   Vaping Use    Vaping status: Never Used   Substance and Sexual Activity    Alcohol use: Not Currently     Alcohol/week: 3.0 standard drinks of alcohol     Types: 2 Cans of beer, 1 Shots of liquor per week     Comment: No alcohol since 2015    Drug use: No    Sexual activity: Not Currently        Objective   Pulse 80   Temp 98.6 °F (37 °C) (Temporal)   Resp (!) 98   Ht 5' 4\" (1.626 m)   Wt 126 kg (277 lb)   BMI 47.55 kg/m²      Physical Exam  Vascular status is a faint 1/4 DP PT negative digital hair normal distal cooling slightly delayed capillary there is edema present bilaterally also.  The capillary refill is approximately 2 to 3 seconds and the edema is +1 pitting.    Nails are brittle elongated-white-yellow discoloration with subungual debris x 10.  There is an increased thickness of approximately 2 mm.  Right and left hallux nails are incurvated on both borders with hypertrophic tissue present in the nail grooves no signs of any infection and no erythema.  There is pain upon palpation of both of these nails.  Yellow-white flaky tissue is present on the plantar aspect of both feet with no signs of any blood present.  There is slight loss of turgor noted bilaterally.      Ortho and neuro visits are unchanged from 10/14/2024.  Administrative Statements   I have spent a total time of 15 minutes in caring for this patient on the day of the visit/encounter including Risks and benefits of tx options, Instructions for management, Patient and family education, Importance of tx compliance, Counseling / Coordination of care, Documenting in the medical record, and Obtaining or reviewing history  .   "

## 2024-12-30 ENCOUNTER — OFFICE VISIT (OUTPATIENT)
Dept: PAIN MEDICINE | Facility: CLINIC | Age: 59
End: 2024-12-30
Payer: MEDICARE

## 2024-12-30 VITALS
HEIGHT: 64 IN | HEART RATE: 95 BPM | TEMPERATURE: 97.4 F | BODY MASS INDEX: 48.65 KG/M2 | WEIGHT: 285 LBS | RESPIRATION RATE: 16 BRPM | OXYGEN SATURATION: 97 %

## 2024-12-30 DIAGNOSIS — M47.816 LUMBAR SPONDYLOSIS: Primary | ICD-10-CM

## 2024-12-30 DIAGNOSIS — M79.18 MYOFASCIAL PAIN SYNDROME: ICD-10-CM

## 2024-12-30 DIAGNOSIS — G89.4 CHRONIC PAIN SYNDROME: ICD-10-CM

## 2024-12-30 PROCEDURE — G2211 COMPLEX E/M VISIT ADD ON: HCPCS | Performed by: ANESTHESIOLOGY

## 2024-12-30 PROCEDURE — 99214 OFFICE O/P EST MOD 30 MIN: CPT | Performed by: ANESTHESIOLOGY

## 2024-12-30 RX ORDER — CARISOPRODOL 350 MG/1
350 TABLET ORAL 3 TIMES DAILY
Qty: 90 TABLET | Refills: 1 | Status: SHIPPED | OUTPATIENT
Start: 2024-12-30 | End: 2025-01-29

## 2024-12-30 NOTE — PROGRESS NOTES
Assessment:  1. Lumbar spondylosis    2. Chronic pain syndrome    3. Myofascial pain syndrome            Plan:  Patient is a 59-year-old male who complains of low back pain with chronic pacing secondary to lumbar spondylosis, lumbar degenerative disease, colitis presents to office for follow-up visit.  MRI lumbar spine is reviewed which does show marked facet arthropathy at C3-S1 with extraforaminal abutting of the right and left nerve roots.  At this time we will address patient's facet agenic low back pain which is very concerning this time.  Is poor reports difficulty rotating or bending backwards reports secondary to sharp, stabbing pain.  1.  We will schedule patient for bilateral L4-L5 and 5 S1 medial branch block #1  2.  Follow-up 1 month after injection        Complete risks and benefits including bleeding, infection, tissue reaction, nerve injury and allergic reaction were discussed. The approach was demonstrated using models and literature was provided. Verbal and written consent was obtained.          History of Present Illness:  The patient is a 59 y.o. male who presents for a follow up office visit in regards to Back Pain (Follow up for back pain.).   The patient’s current symptoms include 8 out of 10 constant sharp pain worse in morning, evening, nighttime.    Current pain medications includes: none    I have personally reviewed and/or updated the patient's past medical history, past surgical history, family history, social history, current medications, allergies, and vital signs today.         Review of Systems  Review of Systems   Constitutional: Negative.    HENT: Negative.     Eyes: Negative.    Respiratory: Negative.     Cardiovascular: Negative.    Gastrointestinal: Negative.    Endocrine: Negative.    Genitourinary: Negative.    Musculoskeletal:  Positive for back pain.   Skin: Negative.    Allergic/Immunologic: Negative.    Neurological: Negative.    Hematological: Negative.     Psychiatric/Behavioral: Negative.             Past Medical History:   Diagnosis Date    Acquired hypothyroidism 2015    Angioedema     Diabetes mellitus (HCC)     Disease of thyroid gland     Hyperlipidemia     Hypertension     Mixed hyperlipidemia 2015    Morbid obesity (HCC)     Morbid obesity with BMI of 45.0-49.9, adult (HCC) 2015    MARSHA (obstructive sleep apnea)     Primary hypertension 2015    Transitioned From: Benign essential hypertension       Past Surgical History:   Procedure Laterality Date    KNEE SURGERY      SINUS SURGERY         Family History   Problem Relation Age of Onset    Diabetes Mother     Diabetes type II Mother     Esophageal cancer Father     Diabetes Brother     Kidney cancer Brother     Diabetes type II Brother     Diabetes Maternal Grandmother     Diabetes type II Maternal Grandmother     Diabetes Paternal Grandmother     Heart disease Neg Hx        Social History     Occupational History    Not on file   Tobacco Use    Smoking status: Former     Current packs/day: 0.00     Types: Cigarettes     Quit date: 10/21/2022     Years since quittin.1     Passive exposure: Past    Smokeless tobacco: Never    Tobacco comments:     states read to quit prior to admit   Vaping Use    Vaping status: Never Used   Substance and Sexual Activity    Alcohol use: Not Currently     Alcohol/week: 3.0 standard drinks of alcohol     Types: 2 Cans of beer, 1 Shots of liquor per week     Comment: No alcohol since     Drug use: No    Sexual activity: Not Currently         Current Outpatient Medications:     albuterol (2.5 mg/3 mL) 0.083 % nebulizer solution, Take 3 mL (2.5 mg total) by nebulization every 6 (six) hours as needed for wheezing or shortness of breath, Disp: 180 mL, Rfl: 0    Ascorbic Acid (VITAMIN C PO), Take 1 tablet by mouth daily, Disp: , Rfl:     aspirin 81 mg chewable tablet, Take one tablet by mouth daily, Disp: , Rfl:     cholecalciferol (VITAMIN D3) 400 units  "tablet, Take 1 tablet by mouth daily, Disp: , Rfl:     clotrimazole (LOTRIMIN) 1 % cream, Apply topically 2 (two) times a day, Disp: 60 g, Rfl: 2    cyclobenzaprine (FLEXERIL) 5 mg tablet, Take 1 tablet by mouth three times daily as needed for muscle spasm, Disp: 90 tablet, Rfl: 0    Diclofenac Sodium (VOLTAREN) 1 %, Apply 2 g topically 4 (four) times a day To feet/ankle, Disp: 150 g, Rfl: 1    DULoxetine (CYMBALTA) 60 mg delayed release capsule, Take 1 capsule by mouth once daily, Disp: 90 capsule, Rfl: 0    EPINEPHrine (EPIPEN) 0.3 mg/0.3 mL SOAJ, INJECT 0.3MG INTO A MUSCLE ONCE FOR 1 DOSE, Disp: 2 each, Rfl: 0    ferrous gluconate (FERGON) 324 mg tablet, Take 324 mg by mouth, Disp: , Rfl:     HumaLOG KwikPen 100 units/mL injection pen, Inject 20 Units under the skin 3 (three) times a day with meals, Disp: 75 mL, Rfl: 1    hydroCHLOROthiazide 25 mg tablet, Take 1 tablet by mouth twice daily, Disp: 180 tablet, Rfl: 0    Insulin Glargine Solostar (Lantus SoloStar) 100 UNIT/ML SOPN, Inject 0.24 mL (24 Units total) under the skin daily, Disp: 30 mL, Rfl: 1    Insulin Pen Needle (Pen Needles 3/16\") 31G X 5 MM MISC, Use 4 (four) times a day, Disp: 300 each, Rfl: 3    ketoconazole (NIZORAL) 2 % shampoo, Apply 1 Application topically 2 (two) times a week To feet in shower, Disp: 120 mL, Rfl: 2    levothyroxine 175 mcg tablet, Take 1 tablet by mouth once daily, Disp: 90 tablet, Rfl: 0    loratadine (CLARITIN) 10 mg tablet, Take 10 mg by mouth daily, Disp: , Rfl:     metFORMIN (GLUCOPHAGE) 1000 MG tablet, Take 1 tablet by mouth once daily with breakfast, Disp: 90 tablet, Rfl: 1    Omega-3 Fatty Acids (fish oil) 1,000 mg, Take 1,000 mg by mouth 2 (two) times a day, Disp: , Rfl:     pregabalin (LYRICA) 50 mg capsule, Take 1 capsule by mouth twice daily, Disp: 60 capsule, Rfl: 2    rosuvastatin (CRESTOR) 40 MG tablet, Take 1 tablet by mouth once daily, Disp: 90 tablet, Rfl: 3    sodium chloride (OCEAN) 0.65 % nasal spray, 1 " "spray into each nostril as needed for rhinitis, Disp: 30 mL, Rfl: 1    tamsulosin (FLOMAX) 0.4 mg, Take 1 capsule (0.4 mg total) by mouth daily with dinner, Disp: 90 capsule, Rfl: 3    terbinafine (LamISIL) 250 mg tablet, Take 1 tablet (250 mg total) by mouth daily, Disp: 30 tablet, Rfl: 2    Thiamine HCl (vitamin B-1) 250 MG tablet, Take 250 mg by mouth daily, Disp: , Rfl:     Tirzepatide (Mounjaro) 2.5 MG/0.5ML SOAJ, Inject 2.5 mg once weekly for 4 weeks., Disp: 2 mL, Rfl: 0    traZODone (DESYREL) 150 mg tablet, TAKE 1 TABLET BY MOUTH ONCE DAILY AT BEDTIME, Disp: 90 tablet, Rfl: 0    valsartan (DIOVAN) 160 mg tablet, Take 160 mg by mouth daily, Disp: , Rfl:     Ventolin  (90 Base) MCG/ACT inhaler, INHALE 2 PUFFS BY MOUTH EVERY 4 HOURS AS NEEDED FOR WHEEZING FOR SHORTNESS OF BREATH, Disp: 18 g, Rfl: 0    vitamin E, tocopherol, 400 units capsule, Take 400 Units by mouth daily, Disp: , Rfl:     Allergies   Allergen Reactions    Ace Inhibitors Swelling     Angioedema    PT STATES THIS IS NOT AN ALLERGY    Other Anaphylaxis     Pt believes that he is allergic to peanut butter.    Also, seasonal allergies    Zinc Tongue Swelling     PT STATES THIS IS NOT AN ALLERGY    Clindamycin Edema     Had angioedema episode approx 5 hr after IM administration of clindamycin. Unclear if there is definitive causal relationship.  PT STATES THIS IS NOT AN ALLERGY       Physical Exam:    Pulse 95   Temp (!) 97.4 °F (36.3 °C) (Temporal)   Resp 16   Ht 5' 4\" (1.626 m)   Wt 129 kg (285 lb)   SpO2 97%   BMI 48.92 kg/m²     Constitutional:normal, well developed, well nourished, alert, in no distress and non-toxic and no overt pain behavior. and obese  Eyes:anicteric  HEENT:grossly intact  Neck:supple, symmetric, trachea midline and no masses   Pulmonary:even and unlabored  Cardiovascular:No edema or pitting edema present  Skin:Normal without rashes or lesions and well hydrated  Psychiatric:Mood and affect " appropriate  Neurologic:Cranial Nerves II-XII grossly intact  Musculoskeletal:antalgic and ambulates with cane    Lumbar/Sacral Spine examination demonstrates.  Decreased range of motion lumbar spine with pain upon: flexion, lateral rotation to the left/right, and bending to the left/right.  Bilateral lumbar paraspinals tender to palpation. Muscle spasms noted in the lumbar area bilaterally. 4/5 lower extremity strength in all muscle groups bilaterally. Positive seated straight leg raise for bilateral lower extremities.  Sensitivity to light touch intact bilateral lower extremities. 2+ reflexes in the patella and Achilles.  No ankle clonus    Imaging  No orders to display       No orders of the defined types were placed in this encounter.      LUMBAR SPINE     INDICATION:   Intervertebral disc disorders with radiculopathy, lumbar region. Spondylosis without myelopathy or radiculopathy, lumbar region. Myalgia, other site. Chronic pain syndrome.      COMPARISON: 12/27/2022     VIEWS:  XR SPINE LUMBAR MINIMUM 4 VIEWS NON INJURY  Images: 4     FINDINGS:     There are 5 non rib bearing lumbar vertebral bodies.      There is no evidence of acute fracture or destructive osseous lesion.     Trace anterolisthesis L3-4.     Stable multilevel vertebral body endplate spurring with mild disc height loss L5-S1 with multilevel facet arthropathy.     The pedicles appear intact.     There are atherosclerotic calcifications. Soft tissues are otherwise unremarkable.     IMPRESSION:        No acute osseous abnormality.       Degenerative changes as described.    Narrative & Impression   MRI LUMBAR SPINE WITHOUT CONTRAST     INDICATION: M51.16: Intervertebral disc disorders with radiculopathy, lumbar region  M47.816: Spondylosis without myelopathy or radiculopathy, lumbar region  M79.18: Myalgia, other site  G89.4: Chronic pain syndrome.     COMPARISON: Lumbar spine radiographs 11/22/2024.     TECHNIQUE:  Multiplanar, multisequence  imaging of the lumbar spine was performed. .        IMAGE QUALITY: Motion-degraded, which reduces diagnostic sensitivity.     FINDINGS:     VERTEBRAL BODIES: Normal alignment. No acute fracture.     Multilevel superior and inferior endplate Schmorl's nodes.     SACRUM: No acute abnormality.     DISTAL CORD AND CONUS:  Normal size and signal within the distal cord and conus.     PARASPINAL SOFT TISSUES: No acute abnormality.     LOWER THORACIC DISC SPACES: No significant spondylotic changes.     LUMBAR DISC SPACES: Multilevel mild degenerative disc disease.     L1-L2: No significant neuroforaminal narrowing or spinal canal stenosis.     L2-L3: No significant neuroforaminal narrowing or spinal canal stenosis.     L3-L4: Disc bulge. Moderate-marked facet arthropathy. Mild bilateral neuroforaminal narrowing. No significant spinal canal stenosis.     L4-L5: Disc bulge. Moderate facet arthropathy. No significant neuroforaminal narrowing. No significant spinal canal stenosis.     L5-S1: Disc bulge. Moderate-marked facet arthropathy. There is extraforaminal abutment of the exiting right L5 nerve root (series 4, image 18). To a lesser degree, there is also extraforaminal abutment of the exiting left L5 nerve root (series 4, image   2). No significant spinal canal stenosis.     IMPRESSION:     Multilevel lumbar spondylotic changes, as described above, noting probable extraforaminal abutment of the exiting right greater than left L5 nerve roots at L5-S1. Correlate for radiculopathy in these distributions to assess the clinical significance of   this finding.     The study was marked in EPIC for significant notification.

## 2025-01-02 DIAGNOSIS — F41.8 DEPRESSION WITH ANXIETY: ICD-10-CM

## 2025-01-02 RX ORDER — TRAZODONE HYDROCHLORIDE 150 MG/1
150 TABLET ORAL
Qty: 90 TABLET | Refills: 0 | Status: SHIPPED | OUTPATIENT
Start: 2025-01-02

## 2025-01-03 ENCOUNTER — TELEPHONE (OUTPATIENT)
Age: 60
End: 2025-01-03

## 2025-01-03 NOTE — TELEPHONE ENCOUNTER
Caller: eulogio    Doctor: mehran    Reason for call: pt would like to get scheduled for his procedure.    Call back#: [phone]

## 2025-01-08 ENCOUNTER — TELEPHONE (OUTPATIENT)
Age: 60
End: 2025-01-08

## 2025-01-10 ENCOUNTER — TELEPHONE (OUTPATIENT)
Age: 60
End: 2025-01-10

## 2025-01-10 NOTE — TELEPHONE ENCOUNTER
Patient was recently prescribed Soma by pain management- this was after the refill for Lyrica, so, no, I was not aware. Patient should discuss with pain management who can take over his Lyrica prescription if needed. Thank you.

## 2025-01-10 NOTE — TELEPHONE ENCOUNTER
Celine from [facility] pharmacy calling in questioning if MARLEEN Galo was aware that Patient was on both Soma 350mg and Lyrica 50mg BID at the same time- she states there is a drug interaction and taking both with cause drowsiness/sleepiness. Celine stating they need a call back from the office ASAP to verify, because they will not fill script of Lyrica 50mg BID until they hear back from office. Please advise. Celine can be reached at #:[phone]

## 2025-01-10 NOTE — TELEPHONE ENCOUNTER
Called patient and left voicemail requesting a call back if he need anything from us.  Stated that he needed to follow up with pain management regarding Lyrica and Soma.    Called the pharmacies back and let them know.  They are not filling the Lyrica right now until they hear from pain

## 2025-01-13 ENCOUNTER — TELEPHONE (OUTPATIENT)
Dept: PODIATRY | Facility: CLINIC | Age: 60
End: 2025-01-13

## 2025-01-13 NOTE — TELEPHONE ENCOUNTER
Patient is requesting a refill on terbinafine (LamISIL) 250 mg tablet   [facility] [address] [place] [address], [address]

## 2025-01-16 ENCOUNTER — OFFICE VISIT (OUTPATIENT)
Dept: PULMONOLOGY | Facility: CLINIC | Age: 60
End: 2025-01-16
Payer: MEDICARE

## 2025-01-16 VITALS
SYSTOLIC BLOOD PRESSURE: 150 MMHG | HEART RATE: 106 BPM | BODY MASS INDEX: 47.12 KG/M2 | HEIGHT: 64 IN | OXYGEN SATURATION: 97 % | TEMPERATURE: 97.3 F | WEIGHT: 276 LBS | DIASTOLIC BLOOD PRESSURE: 78 MMHG

## 2025-01-16 DIAGNOSIS — G47.33 OSA TREATED WITH BIPAP: ICD-10-CM

## 2025-01-16 DIAGNOSIS — J43.9 PULMONARY EMPHYSEMA, UNSPECIFIED EMPHYSEMA TYPE (HCC): ICD-10-CM

## 2025-01-16 DIAGNOSIS — J96.11 CHRONIC RESPIRATORY FAILURE WITH HYPOXIA, ON HOME OXYGEN THERAPY  (HCC): ICD-10-CM

## 2025-01-16 DIAGNOSIS — J98.4 RESTRICTIVE LUNG DISEASE SECONDARY TO OBESITY: Primary | ICD-10-CM

## 2025-01-16 DIAGNOSIS — E66.01 MORBID OBESITY WITH BMI OF 45.0-49.9, ADULT (HCC): ICD-10-CM

## 2025-01-16 DIAGNOSIS — Z99.81 CHRONIC RESPIRATORY FAILURE WITH HYPOXIA, ON HOME OXYGEN THERAPY  (HCC): ICD-10-CM

## 2025-01-16 DIAGNOSIS — E66.9 RESTRICTIVE LUNG DISEASE SECONDARY TO OBESITY: Primary | ICD-10-CM

## 2025-01-16 DIAGNOSIS — F17.211 NICOTINE DEPENDENCE, CIGARETTES, IN REMISSION: ICD-10-CM

## 2025-01-16 PROCEDURE — 99214 OFFICE O/P EST MOD 30 MIN: CPT

## 2025-01-16 RX ORDER — ALBUTEROL SULFATE 90 UG/1
2 AEROSOL, METERED RESPIRATORY (INHALATION) EVERY 6 HOURS PRN
Qty: 18 G | Refills: 5 | Status: SHIPPED | OUTPATIENT
Start: 2025-01-16

## 2025-01-16 NOTE — ASSESSMENT & PLAN NOTE
-Patient has history of moderate restriction with normal diffusion capacity on PFTs.  Secondary to morbid obesity  -Currently denies any issues with his breathing  -Previously followed with weight management, but did not return.  Does not want bariatric surgery and injectable weight loss medications are too expensive out-of-pocket  -Encourage patient to return to weight management to see if there is any alternate options or medication assistance programs

## 2025-01-16 NOTE — ASSESSMENT & PLAN NOTE
-BMI 47.3. Patient would benefit from significant weight loss.  Encourage patient to return to weight management

## 2025-01-16 NOTE — PROGRESS NOTES
Name: Luis F Velazquez      : 1965      MRN: [id number]  Encounter Provider: MARLEEN Mix  Encounter Date: 2025   Encounter department: [facility] PULMONARY [facility]  :  Assessment & Plan  Restrictive lung disease secondary to obesity  -Patient has history of moderate restriction with normal diffusion capacity on PFTs.  Secondary to morbid obesity  -Currently denies any issues with his breathing  -Previously followed with weight management, but did not return.  Does not want bariatric surgery and injectable weight loss medications are too expensive out-of-pocket  -Encourage patient to return to weight management to see if there is any alternate options or medication assistance programs       Chronic respiratory failure with hypoxia, on home oxygen therapy  (HCC)  -Using 3 L nasal cannula with BiPAP at night.  Not using during the daytime.  Offered to repeat walk test to determine continued daytime need, however he declined.  He wants to keep his current oxygen supplies         Morbid obesity with BMI of 45.0-49.9, adult (HCC)  -BMI 47.3. Patient would benefit from significant weight loss.  Encourage patient to return to weight management       MARSHA treated with BiPAP  -Reviewed compliance data over the past 30 days.  He has been 100% compliant with an average nightly use of 6 hours and 59 minutes.  Residual AHI 1.7.  No significant mask leakage  -Patient does not like his current mask, thinks is too big and pushes up into his eyes  -Will order patient smaller mask.  If still not working, recommend mask fitting appointment  -He will continue wearing BiPAP during all hours of sleep with 3 L oxygen bled in  -Follow-up in 1 year or sooner if needed    Orders:    PAP DME Resupply/Reorder    Nicotine dependence, cigarettes, in remission  -Extensive smoking history, approximately 140-pack-year history quit 10/2022  -Patient was ordered CT lung cancer screening previously, but did not  "complete.  He is not interested in the screening.  Orders in the system should he change his mind.       Pulmonary emphysema, unspecified emphysema type (HCC)  -Mild paraseptal emphysema on CT imaging  -Secondary to extensive smoking history  -Prior PFTs with no obstruction on spirometry  -No significant daily pulmonary symptoms  -He may continue to use albuterol HFA every 6 hours as needed for wheezing or shortness of breath    Orders:    Ventolin  (90 Base) MCG/ACT inhaler; Inhale 2 puffs every 6 (six) hours as needed for wheezing or shortness of breath        History of Present Illness   Luis F Velazquez is a 59 y.o. male with a past medical history of DM2, hyperlipidemia, emphysema, morbid obesity, MARSHA on CPAP, and former smoker.  He is here today for a follow-up visit.  Last seen in the office 6 months ago.  Maintained on BiPAP nightly with 3 L supplemental oxygen, 2 L supplemental oxygen as needed during the daytime, and albuterol as needed.  He was ordered CT lung cancer screening to be completed in August 2024, however he did not complete and is not interested in the screening.    Patient returns today stating his breathing is \"not bad \".  Gets cough with mucus only in the mornings.  No wheezing, chest pain, or chest tightness.  He has an albuterol inhaler, but rarely uses this.  He has been wearing his BiPAP nightly.  Thinks the mask he has currently is too big, goes up into his eyes and feels like it is leaking.  He feels rested in the mornings, no significant daytime sleepiness.      Review of Systems   Constitutional:  Negative for activity change, chills, fever and unexpected weight change.   HENT:  Negative for congestion, postnasal drip, rhinorrhea, sore throat and trouble swallowing.    Respiratory:  Negative for cough, chest tightness, shortness of breath and wheezing.    Cardiovascular:  Negative for chest pain, palpitations and leg swelling.   Allergic/Immunologic: Negative.      Current " "Outpatient Medications on File Prior to Visit   Medication Sig Dispense Refill    albuterol (2.5 mg/3 mL) 0.083 % nebulizer solution Take 3 mL (2.5 mg total) by nebulization every 6 (six) hours as needed for wheezing or shortness of breath 180 mL 0    Ascorbic Acid (VITAMIN C PO) Take 1 tablet by mouth daily      aspirin 81 mg chewable tablet Take one tablet by mouth daily      carisoprodol (SOMA) 350 mg tablet Take 1 tablet (350 mg total) by mouth 3 (three) times a day 90 tablet 1    cholecalciferol (VITAMIN D3) 400 units tablet Take 1 tablet by mouth daily      clotrimazole (LOTRIMIN) 1 % cream Apply topically 2 (two) times a day 60 g 2    cyclobenzaprine (FLEXERIL) 5 mg tablet Take 1 tablet by mouth three times daily as needed for muscle spasm 90 tablet 0    Diclofenac Sodium (VOLTAREN) 1 % Apply 2 g topically 4 (four) times a day To feet/ankle 150 g 1    DULoxetine (CYMBALTA) 60 mg delayed release capsule Take 1 capsule by mouth once daily 90 capsule 0    EPINEPHrine (EPIPEN) 0.3 mg/0.3 mL SOAJ INJECT 0.3MG INTO A MUSCLE ONCE FOR 1 DOSE 2 each 0    ferrous gluconate (FERGON) 324 mg tablet Take 324 mg by mouth      HumaLOG KwikPen 100 units/mL injection pen Inject 20 Units under the skin 3 (three) times a day with meals 75 mL 1    hydroCHLOROthiazide 25 mg tablet Take 1 tablet by mouth twice daily 180 tablet 0    Insulin Glargine Solostar (Lantus SoloStar) 100 UNIT/ML SOPN Inject 0.24 mL (24 Units total) under the skin daily 30 mL 1    Insulin Pen Needle (Pen Needles 3/16\") 31G X 5 MM MISC Use 4 (four) times a day 300 each 3    ketoconazole (NIZORAL) 2 % shampoo Apply 1 Application topically 2 (two) times a week To feet in shower 120 mL 2    levothyroxine 175 mcg tablet Take 1 tablet by mouth once daily 90 tablet 0    loratadine (CLARITIN) 10 mg tablet Take 10 mg by mouth daily      metFORMIN (GLUCOPHAGE) 1000 MG tablet Take 1 tablet by mouth once daily with breakfast 90 tablet 1    Omega-3 Fatty Acids (fish oil) " "1,000 mg Take 1,000 mg by mouth 2 (two) times a day      pregabalin (LYRICA) 50 mg capsule Take 1 capsule by mouth twice daily 60 capsule 2    rosuvastatin (CRESTOR) 40 MG tablet Take 1 tablet by mouth once daily 90 tablet 3    sodium chloride (OCEAN) 0.65 % nasal spray 1 spray into each nostril as needed for rhinitis 30 mL 1    tamsulosin (FLOMAX) 0.4 mg Take 1 capsule (0.4 mg total) by mouth daily with dinner 90 capsule 3    Thiamine HCl (vitamin B-1) 250 MG tablet Take 250 mg by mouth daily      Tirzepatide (Mounjaro) 2.5 MG/0.5ML SOAJ Inject 2.5 mg once weekly for 4 weeks. 2 mL 0    traZODone (DESYREL) 150 mg tablet TAKE 1 TABLET BY MOUTH ONCE DAILY AT BEDTIME 90 tablet 0    valsartan (DIOVAN) 160 mg tablet Take 160 mg by mouth daily      vitamin E, tocopherol, 400 units capsule Take 400 Units by mouth daily      [DISCONTINUED] atorvastatin (LIPITOR) 20 mg tablet Take 1 tablet by mouth once daily 90 tablet 3    [DISCONTINUED] Ventolin  (90 Base) MCG/ACT inhaler INHALE 2 PUFFS BY MOUTH EVERY 4 HOURS AS NEEDED FOR WHEEZING FOR SHORTNESS OF BREATH 18 g 0     No current facility-administered medications on file prior to visit.         Historical Information   Tobacco History: 140 PYH quit 2022  Occupational History:     Objective   /78 (BP Location: Left arm, Patient Position: Sitting, Cuff Size: Standard)   Pulse (!) 106   Temp (!) 97.3 °F (36.3 °C) (Temporal)   Ht 5' 4\" (1.626 m)   Wt 125 kg (276 lb)   SpO2 97%   BMI 47.38 kg/m²      Physical Exam  Vitals and nursing note reviewed.   Constitutional:       General: He is not in acute distress.     Appearance: Normal appearance. He is well-developed. He is morbidly obese.   Cardiovascular:      Rate and Rhythm: Normal rate and regular rhythm.      Heart sounds: Normal heart sounds. No murmur heard.  Pulmonary:      Effort: Pulmonary effort is normal. No respiratory distress.      Breath sounds: Normal breath sounds. No decreased breath sounds, " wheezing, rhonchi or rales.   Musculoskeletal:         General: No swelling.      Right lower leg: No edema.      Left lower leg: No edema.   Psychiatric:         Mood and Affect: Mood and affect normal.         Behavior: Behavior normal. Behavior is cooperative.         Lab Results: I have reviewed pertinent labs.    Radiology Results Review : No pertinent imaging studies reviewed.  Other Study Results: Other Study Results Review : No additional pertinent studies reviewed.  PFT Results Reviewed: reviewed

## 2025-01-16 NOTE — ASSESSMENT & PLAN NOTE
-Reviewed compliance data over the past 30 days.  He has been 100% compliant with an average nightly use of 6 hours and 59 minutes.  Residual AHI 1.7.  No significant mask leakage  -Patient does not like his current mask, thinks is too big and pushes up into his eyes  -Will order patient smaller mask.  If still not working, recommend mask fitting appointment  -He will continue wearing BiPAP during all hours of sleep with 3 L oxygen bled in  -Follow-up in 1 year or sooner if needed    Orders:    PAP DME Resupply/Reorder

## 2025-01-16 NOTE — ASSESSMENT & PLAN NOTE
-Using 3 L nasal cannula with BiPAP at night.  Not using during the daytime.  Offered to repeat walk test to determine continued daytime need, however he declined.  He wants to keep his current oxygen supplies

## 2025-01-22 NOTE — TELEPHONE ENCOUNTER
Patient called a week ago asking for a refill for the lamisil. He is asking for a status and requested I send another message to Dr Walker.

## 2025-01-30 DIAGNOSIS — E03.9 HYPOTHYROIDISM, UNSPECIFIED TYPE: ICD-10-CM

## 2025-01-30 RX ORDER — LEVOTHYROXINE SODIUM 175 UG/1
175 TABLET ORAL DAILY
Qty: 90 TABLET | Refills: 0 | Status: SHIPPED | OUTPATIENT
Start: 2025-01-30

## 2025-02-04 ENCOUNTER — HOSPITAL ENCOUNTER (OUTPATIENT)
Dept: RADIOLOGY | Facility: HOSPITAL | Age: 60
Discharge: HOME | End: 2025-02-04
Payer: MEDICARE

## 2025-02-04 VITALS
HEART RATE: 99 BPM | TEMPERATURE: 97.1 F | DIASTOLIC BLOOD PRESSURE: 79 MMHG | SYSTOLIC BLOOD PRESSURE: 169 MMHG | OXYGEN SATURATION: 95 % | RESPIRATION RATE: 20 BRPM

## 2025-02-04 DIAGNOSIS — M47.816 LUMBAR SPONDYLOSIS: ICD-10-CM

## 2025-02-04 RX ORDER — LIDOCAINE HYDROCHLORIDE 10 MG/ML
5 INJECTION, SOLUTION EPIDURAL; INFILTRATION; INTRACAUDAL; PERINEURAL ONCE
Status: COMPLETED | OUTPATIENT
Start: 2025-02-04 | End: 2025-02-04

## 2025-02-04 RX ADMIN — LIDOCAINE HYDROCHLORIDE 5 ML: 10 INJECTION, SOLUTION EPIDURAL; INFILTRATION; INTRACAUDAL; PERINEURAL at 11:02

## 2025-02-04 RX ADMIN — Medication 3 ML: at 11:07

## 2025-02-04 NOTE — DISCHARGE INSTR - LAB

## 2025-02-04 NOTE — H&P
Assessment:  1. Lumbar spondylosis  FL spine and pain procedure    FL spine and pain procedure          Plan:  Luis F Velazquez is a 59 y.o. male with complaints of low back pain presents to surgical center for procedure.  We will perform a bilateral L4-L5 and L5-S1 medial branch block #1  2. Follow-up 1 month after injection    Complete risks and benefits including bleeding, infection, tissue reaction, nerve injury and allergic reaction were discussed. The approach was demonstrated using models and literature was provided. Verbal and written consent was obtained.    My impressions and treatment recommendations were discussed in detail with the patient who verbalized understanding and had no further questions.  Discharge instructions were provided. I personally saw and examined the patient and I agree with the above discussed plan of care.    Orders Placed This Encounter   Procedures    FL spine and pain procedure     Standing Status:   Standing     Number of Occurrences:   1     Reason for Exam::   B/L L4-5 and L5-S1 MBB#1     Anticoagulant hold needed?:   no     New Medications Ordered This Visit   Medications    lidocaine (PF) (XYLOCAINE-MPF) 2 % injection 3 mL       History of Present Illness:  Luis F Velazquez is a 59 y.o. male who presents for a follow up office visit in regards to low back pain.   The patient’s current symptoms include 8 out of 10 sharp to stabbing throbbing pain time particular time pattern.      I have personally reviewed and/or updated the patient's past medical history, past surgical history, family history, social history, current medications, allergies, and vital signs today.     Review of Systems   Musculoskeletal:  Positive for arthralgias and back pain.   All other systems reviewed and are negative.      Patient Active Problem List   Diagnosis    Allergic rhinitis    Arthritis    Chronic back pain    Chronic sinusitis    Depression with anxiety    Essential (primary) hypertension     Acquired hypothyroidism    Morbid obesity with BMI of 45.0-49.9, adult (HCC)    Diabetic polyneuropathy associated with type 2 diabetes mellitus (HCC)    Vitamin D deficiency    Bilateral lower leg cellulitis    Elevated LFTs    Elevated lactic acid level    Tobacco abuse, in remission    Noncompliance with diabetes treatment    Epistaxis    Muscle twitching    Type 2 diabetes mellitus with hyperglycemia, with long-term current use of insulin (HCC)    Atelectasis    Urinary retention    Constipation    Depression, recurrent (HCC)    Peripheral vascular disease (HCC)    Primary osteoarthritis of both knees    Hyperlipidemia    Bright red rectal bleeding    ABLA (acute blood loss anemia)    Microcytic anemia    Fatty liver disease, nonalcoholic    Umbilical hernia without obstruction and without gangrene    Restrictive lung disease secondary to obesity    Diabetic nephropathy associated with type 2 diabetes mellitus (HCC)    Proteinuria    Chronic kidney disease, stage 2 (mild)    Neuropathy    Chronic respiratory failure with hypoxia, on home oxygen therapy  ([facility])    Urinary hesitancy due to benign prostatic hyperplasia    Hypo-osmolality and hyponatremia    LAMB (dyspnea on exertion)    MARSHA treated with BiPAP    Chronic right sacroiliac joint pain    Pain in both feet    Positive screening for depression on 9-item Patient Health Questionnaire (PHQ-9)    Social problem    Type 2 diabetes mellitus with diabetic chronic kidney disease (HCC)    Lumbar spondylosis    Chronic pain syndrome    Myofascial pain syndrome       Past Medical History:   Diagnosis Date    Acquired hypothyroidism 1/2/2015    Angioedema     Diabetes mellitus (HCC)     Disease of thyroid gland     Hyperlipidemia     Hypertension     Mixed hyperlipidemia 2/4/2015    Morbid obesity (HCC)     Morbid obesity with BMI of 45.0-49.9, adult ([facility]) 1/2/2015    MARSHA (obstructive sleep apnea)     Primary hypertension 1/2/2015    Transitioned From: Benign  essential hypertension       Past Surgical History:   Procedure Laterality Date    KNEE SURGERY      SINUS SURGERY         Family History   Problem Relation Age of Onset    Diabetes Mother     Diabetes type II Mother     Esophageal cancer Father     Diabetes Brother     Kidney cancer Brother     Diabetes type II Brother     Diabetes Maternal Grandmother     Diabetes type II Maternal Grandmother     Diabetes Paternal Grandmother     Heart disease Neg Hx        Social History     Occupational History    Not on file   Tobacco Use    Smoking status: Former     Current packs/day: 0.00     Types: Cigarettes     Quit date: 10/21/2022     Years since quittin.2     Passive exposure: Past    Smokeless tobacco: Never    Tobacco comments:     states read to quit prior to admit   Vaping Use    Vaping status: Never Used   Substance and Sexual Activity    Alcohol use: Not Currently     Alcohol/week: 3.0 standard drinks of alcohol     Types: 2 Cans of beer, 1 Shots of liquor per week     Comment: No alcohol since     Drug use: No    Sexual activity: Not Currently       Current Outpatient Medications on File Prior to Encounter   Medication Sig    albuterol (2.5 mg/3 mL) 0.083 % nebulizer solution Take 3 mL (2.5 mg total) by nebulization every 6 (six) hours as needed for wheezing or shortness of breath    Ascorbic Acid (VITAMIN C PO) Take 1 tablet by mouth daily    aspirin 81 mg chewable tablet Take one tablet by mouth daily    carisoprodol (SOMA) 350 mg tablet Take 1 tablet (350 mg total) by mouth 3 (three) times a day    cholecalciferol (VITAMIN D3) 400 units tablet Take 1 tablet by mouth daily    clotrimazole (LOTRIMIN) 1 % cream Apply topically 2 (two) times a day    cyclobenzaprine (FLEXERIL) 5 mg tablet Take 1 tablet by mouth three times daily as needed for muscle spasm    Diclofenac Sodium (VOLTAREN) 1 % Apply 2 g topically 4 (four) times a day To feet/ankle    DULoxetine (CYMBALTA) 60 mg delayed release capsule Take 1  "capsule by mouth once daily    EPINEPHrine (EPIPEN) 0.3 mg/0.3 mL SOAJ INJECT 0.3MG INTO A MUSCLE ONCE FOR 1 DOSE    ferrous gluconate (FERGON) 324 mg tablet Take 324 mg by mouth    HumaLOG KwikPen 100 units/mL injection pen Inject 20 Units under the skin 3 (three) times a day with meals    hydroCHLOROthiazide 25 mg tablet Take 1 tablet by mouth twice daily    Insulin Glargine Solostar (Lantus SoloStar) 100 UNIT/ML SOPN Inject 0.24 mL (24 Units total) under the skin daily    Insulin Pen Needle (Pen Needles 3/16\") 31G X 5 MM MISC Use 4 (four) times a day    ketoconazole (NIZORAL) 2 % shampoo Apply 1 Application topically 2 (two) times a week To feet in shower    levothyroxine 175 mcg tablet Take 1 tablet by mouth once daily    loratadine (CLARITIN) 10 mg tablet Take 10 mg by mouth daily    metFORMIN (GLUCOPHAGE) 1000 MG tablet Take 1 tablet by mouth once daily with breakfast    Omega-3 Fatty Acids (fish oil) 1,000 mg Take 1,000 mg by mouth 2 (two) times a day    pregabalin (LYRICA) 50 mg capsule Take 1 capsule by mouth twice daily    rosuvastatin (CRESTOR) 40 MG tablet Take 1 tablet by mouth once daily    sodium chloride (OCEAN) 0.65 % nasal spray 1 spray into each nostril as needed for rhinitis    tamsulosin (FLOMAX) 0.4 mg Take 1 capsule (0.4 mg total) by mouth daily with dinner    Thiamine HCl (vitamin B-1) 250 MG tablet Take 250 mg by mouth daily    Tirzepatide (Mounjaro) 2.5 MG/0.5ML SOAJ Inject 2.5 mg once weekly for 4 weeks.    traZODone (DESYREL) 150 mg tablet TAKE 1 TABLET BY MOUTH ONCE DAILY AT BEDTIME    valsartan (DIOVAN) 160 mg tablet Take 160 mg by mouth daily    Ventolin  (90 Base) MCG/ACT inhaler Inhale 2 puffs every 6 (six) hours as needed for wheezing or shortness of breath    vitamin E, tocopherol, 400 units capsule Take 400 Units by mouth daily    [DISCONTINUED] atorvastatin (LIPITOR) 20 mg tablet Take 1 tablet by mouth once daily     No current facility-administered medications on file " prior to encounter.       Allergies   Allergen Reactions    Ace Inhibitors Swelling     Angioedema    PT STATES THIS IS NOT AN ALLERGY    Other Anaphylaxis     Pt believes that he is allergic to peanut butter.    Also, seasonal allergies    Zinc Tongue Swelling     PT STATES THIS IS NOT AN ALLERGY    Clindamycin Edema     Had angioedema episode approx 5 hr after IM administration of clindamycin. Unclear if there is definitive causal relationship.  PT STATES THIS IS NOT AN ALLERGY       Physical Exam:    /83   Pulse 77   Temp (!) 97.1 °F (36.2 °C) (Tympanic)   Resp 20   SpO2 95%     Constitutional:normal, well developed, well nourished, alert, in no distress and non-toxic and no overt pain behavior. and obese  Eyes:anicteric  HEENT:grossly intact  Neck:supple, symmetric, trachea midline and no masses   Pulmonary:even and unlabored  Cardiovascular:No edema or pitting edema present  Skin:Normal without rashes or lesions and well hydrated  Psychiatric:Mood and affect appropriate  Neurologic:Cranial Nerves II-XII grossly intact  Musculoskeletal:normal

## 2025-02-05 ENCOUNTER — TELEPHONE (OUTPATIENT)
Age: 60
End: 2025-02-05

## 2025-02-05 DIAGNOSIS — Z79.4 TYPE 2 DIABETES MELLITUS WITH HYPERGLYCEMIA, WITH LONG-TERM CURRENT USE OF INSULIN (HCC): ICD-10-CM

## 2025-02-05 DIAGNOSIS — E11.65 TYPE 2 DIABETES MELLITUS WITH HYPERGLYCEMIA, WITH LONG-TERM CURRENT USE OF INSULIN (HCC): ICD-10-CM

## 2025-02-05 NOTE — TELEPHONE ENCOUNTER
Patient calling in requesting new scripts for both Insulin Glargine(Lantus) and Humalog to be sent to [facility] in [place]. When verifying orders for both Lantus and Humalog, patient reports taking them differently than what is listed on medication list. Patient reports taking:  Lantus- 40u at bedtime, and Humalog- 30u TID w/meals. He states he is now out of Humalog and the pharmacy wont fill it because it would be refill too soon- Patient states this is because he is taking more than what the order is written for. He is asking that updated scripts for both Humalog and Lantus be sent in with how he is currently taking medications. Please advise, patient is requesting a call back. Thank you

## 2025-02-06 DIAGNOSIS — E78.5 DYSLIPIDEMIA: ICD-10-CM

## 2025-02-06 RX ORDER — INSULIN LISPRO 100 [IU]/ML
30 INJECTION, SOLUTION INTRAVENOUS; SUBCUTANEOUS
Qty: 81 ML | Refills: 1 | Status: SHIPPED | OUTPATIENT
Start: 2025-02-06

## 2025-02-06 RX ORDER — INSULIN GLARGINE 300 U/ML
INJECTION, SOLUTION SUBCUTANEOUS
Qty: 13.5 ML | Refills: 1 | Status: SHIPPED | OUTPATIENT
Start: 2025-02-06

## 2025-02-06 NOTE — TELEPHONE ENCOUNTER
Please let patient know that his formulary has changed in 2025.  Insurance no longer prefers Lantus but rather Toujeo.  This is also  Long-acting insulin.  He is to take in the same fashion as he takes his Lantus.  Inject 40 units nightly.  He may actually find that this insulin works better as it is concentrated.  [facility].

## 2025-02-07 ENCOUNTER — TELEPHONE (OUTPATIENT)
Dept: ENDOCRINOLOGY | Facility: CLINIC | Age: 60
End: 2025-02-07

## 2025-02-07 RX ORDER — ROSUVASTATIN CALCIUM 40 MG/1
40 TABLET, COATED ORAL DAILY
Qty: 90 TABLET | Refills: 0 | Status: SHIPPED | OUTPATIENT
Start: 2025-02-07

## 2025-02-07 NOTE — TELEPHONE ENCOUNTER
Patient called requesting refill for Toujeo and Humalog Kwikpen. Patient made aware medication was refilled on 2/6/25 for both medications  with 1 refills to [id number] pharmacy. Patient instructed to contact the pharmacy to obtain refills of medication. Patient verbalized understanding.

## 2025-02-18 ENCOUNTER — OFFICE VISIT (OUTPATIENT)
Dept: UROLOGY | Facility: CLINIC | Age: 60
End: 2025-02-18
Payer: MEDICARE

## 2025-02-18 VITALS
TEMPERATURE: 97.5 F | HEIGHT: 64 IN | OXYGEN SATURATION: 91 % | WEIGHT: 278 LBS | SYSTOLIC BLOOD PRESSURE: 130 MMHG | BODY MASS INDEX: 47.46 KG/M2 | DIASTOLIC BLOOD PRESSURE: 66 MMHG | HEART RATE: 116 BPM

## 2025-02-18 DIAGNOSIS — R39.12 BENIGN PROSTATIC HYPERPLASIA WITH WEAK URINARY STREAM: Primary | ICD-10-CM

## 2025-02-18 DIAGNOSIS — R39.12 WEAK URINARY STREAM: ICD-10-CM

## 2025-02-18 DIAGNOSIS — N40.1 BENIGN PROSTATIC HYPERPLASIA WITH WEAK URINARY STREAM: Primary | ICD-10-CM

## 2025-02-18 DIAGNOSIS — Z12.5 PROSTATE CANCER SCREENING: ICD-10-CM

## 2025-02-18 PROCEDURE — 99213 OFFICE O/P EST LOW 20 MIN: CPT

## 2025-02-18 RX ORDER — TAMSULOSIN HYDROCHLORIDE 0.4 MG/1
0.4 CAPSULE ORAL 2 TIMES DAILY
Qty: 180 CAPSULE | Refills: 3 | Status: SHIPPED | OUTPATIENT
Start: 2025-02-18

## 2025-02-18 RX ORDER — FINASTERIDE 5 MG/1
5 TABLET, FILM COATED ORAL DAILY
Qty: 90 TABLET | Refills: 3 | Status: SHIPPED | OUTPATIENT
Start: 2025-02-18

## 2025-02-18 NOTE — PROGRESS NOTES
Name: Luis F Velazquez      : 1965      MRN: [id number]  Encounter Provider: MARLEEN Mcfarlane  Encounter Date: 2025   Encounter department: [facility] [place]    :  Assessment & Plan  Benign prostatic hyperplasia with weak urinary stream  He reports worsening symptoms over the past several months.  According to him he has been taking Flomax twice daily despite previous prescription having been written for only daily.  He states he has been taking it twice daily for about 1 year now.  I am recommending adding finasteride 5 mg daily in addition to Flomax 0.4 mg twice daily.  I did update his Flomax prescription to reflect twice daily dosage.  We also discussed consideration for possible outlet obstructive surgery however his A1c is currently 9.1 as of 2024.  His A1c would need to be improved before he could be considered for outlet obstructive surgery due to infection risk.  We did however discuss options to schedule a cystoscopy for potential future planning.  He has elected to decline cystoscopy evaluation for now.  I cannot entirely exclude the possibility of his poorly controlled diabetes also playing a potential role in his urinary symptoms resulting in possible neurogenic bladder.  I am also recommending a baseline renal ultrasound which we will assist with scheduling and I will call him with those results.  Follow-up in 6 months.    Orders:    finasteride (PROSCAR) 5 mg tablet; Take 1 tablet (5 mg total) by mouth daily    US kidney and bladder with pvr; Future    Prostate cancer screening  PSA 0.48 (2024)  Orders placed for updated PSA and I will call him with those results.    Orders:    PSA, Total Screen; Future          Interval HPI:    He presents today reporting worsening of his urinary stream over the past month or so.  He feels his urinary stream is much weaker with increased frequency.  He still feels he is emptying well.  He does have to strain from time to  time.  He states that he has been taking Flomax 0.4 mg twice daily despite his prescription only being written for daily.    He had been on Ozempic which did improve his A1c but he stopped this because he could not afford it and he was not losing weight with it.      History of Present Illness     Established patient with history of BPH and prostate cancer screening.  He initially establish care with me in October 2023 complaining of worsening straining with urination as well as postvoid dribbling, nocturia 2-3 times per night and weak urinary stream. He also reported sensation of incomplete bladder emptying. Random bladder scan was 12 mL with last voided about 45 minutes prior to arrival.  He was started on Flomax 0.4 mg daily and reported significant improvement in his urinary symptoms.  He also has history of poorly controlled diabetes with current A1c of 9.1 as of 11/1/2024 but has been as high as 11.8 in 2023.    Prostate cancer screening: Patient denies any family history of prostate cancer.  PSA 0.48 as of 2/9/2024 previously 0.4 in February 2018.      Objective   There were no vitals taken for this visit.    Review of Systems   Constitutional:  Negative for chills and fever.   HENT:  Negative for congestion and sore throat.    Respiratory:  Negative for cough and shortness of breath.    Cardiovascular:  Negative for chest pain and leg swelling.   Gastrointestinal:  Negative for abdominal pain, constipation and diarrhea.   Genitourinary:  Negative for difficulty urinating, dysuria, frequency, hematuria and urgency.        Weak urinary stream, straining to urinate, increased frequency   Musculoskeletal:  Negative for back pain and gait problem.   Skin:  Negative for wound.   Allergic/Immunologic: Negative for immunocompromised state.       Physical Exam  Vitals and nursing note reviewed.   Constitutional:       General: He is not in acute distress.     Appearance: He is well-developed.   HENT:      Head:  Normocephalic and atraumatic.   Eyes:      Conjunctiva/sclera: Conjunctivae normal.   Cardiovascular:      Rate and Rhythm: Normal rate and regular rhythm.      Heart sounds: No murmur heard.  Pulmonary:      Effort: Pulmonary effort is normal. No respiratory distress.      Breath sounds: Normal breath sounds.   Abdominal:      Palpations: Abdomen is soft.      Tenderness: There is no abdominal tenderness.   Musculoskeletal:         General: No swelling.      Cervical back: Neck supple.   Skin:     General: Skin is warm and dry.      Capillary Refill: Capillary refill takes less than 2 seconds.   Neurological:      Mental Status: He is alert.   Psychiatric:         Mood and Affect: Mood normal.           Imaging:          Please Note:  Voice dictation software has been used to create this document. There may be inadvertent transcriptions errors.     MARLEEN Mcfarlane 02/18/25

## 2025-02-19 ENCOUNTER — TELEPHONE (OUTPATIENT)
Dept: RADIOLOGY | Facility: HOSPITAL | Age: 60
End: 2025-02-19

## 2025-02-20 ENCOUNTER — OFFICE VISIT (OUTPATIENT)
Dept: FAMILY MEDICINE CLINIC | Facility: HOME HEALTHCARE | Age: 60
End: 2025-02-20
Payer: MEDICARE

## 2025-02-20 VITALS
SYSTOLIC BLOOD PRESSURE: 142 MMHG | HEART RATE: 129 BPM | DIASTOLIC BLOOD PRESSURE: 76 MMHG | WEIGHT: 278 LBS | RESPIRATION RATE: 18 BRPM | BODY MASS INDEX: 47.46 KG/M2 | OXYGEN SATURATION: 93 % | TEMPERATURE: 99 F | HEIGHT: 64 IN

## 2025-02-20 DIAGNOSIS — E55.9 VITAMIN D DEFICIENCY: ICD-10-CM

## 2025-02-20 DIAGNOSIS — E78.5 HYPERLIPIDEMIA, UNSPECIFIED HYPERLIPIDEMIA TYPE: ICD-10-CM

## 2025-02-20 DIAGNOSIS — Z79.4 TYPE 2 DIABETES MELLITUS WITH COMPLICATION, WITH LONG-TERM CURRENT USE OF INSULIN (HCC): ICD-10-CM

## 2025-02-20 DIAGNOSIS — E11.65 TYPE 2 DIABETES MELLITUS WITH HYPERGLYCEMIA, WITH LONG-TERM CURRENT USE OF INSULIN (HCC): ICD-10-CM

## 2025-02-20 DIAGNOSIS — E11.8 TYPE 2 DIABETES MELLITUS WITH COMPLICATION, WITH LONG-TERM CURRENT USE OF INSULIN (HCC): ICD-10-CM

## 2025-02-20 DIAGNOSIS — K76.0 NAFLD (NONALCOHOLIC FATTY LIVER DISEASE): ICD-10-CM

## 2025-02-20 DIAGNOSIS — F32.2 MODERATELY SEVERE MAJOR DEPRESSION (HCC): ICD-10-CM

## 2025-02-20 DIAGNOSIS — Z00.00 MEDICARE ANNUAL WELLNESS VISIT, SUBSEQUENT: Primary | ICD-10-CM

## 2025-02-20 DIAGNOSIS — Z12.11 COLON CANCER SCREENING: ICD-10-CM

## 2025-02-20 DIAGNOSIS — Z79.4 TYPE 2 DIABETES MELLITUS WITH HYPERGLYCEMIA, WITH LONG-TERM CURRENT USE OF INSULIN (HCC): ICD-10-CM

## 2025-02-20 LAB — SL AMB POCT HEMOGLOBIN AIC: 11.1 (ref ?–6.5)

## 2025-02-20 PROCEDURE — 83036 HEMOGLOBIN GLYCOSYLATED A1C: CPT | Performed by: FAMILY MEDICINE

## 2025-02-20 PROCEDURE — G0439 PPPS, SUBSEQ VISIT: HCPCS

## 2025-02-20 PROCEDURE — 99214 OFFICE O/P EST MOD 30 MIN: CPT

## 2025-02-20 RX ORDER — INSULIN GLARGINE 300 U/ML
INJECTION, SOLUTION SUBCUTANEOUS
Start: 2025-02-20

## 2025-02-20 NOTE — PROGRESS NOTES
Name: Luis F Velazquez      : 1965      MRN: [id number]  Encounter Provider: Zana Aaron MD  Encounter Date: 2025   Encounter department: [facility]      Patient's weight and diabetes are linked, also dietary issues due to depression and MSK pains leading to sedentary high-calorie lifestyle. Semaglutide/ozempic in past controlled glycemic parameters with at most modest effects on weight, but this is desirable compared ot poorly controlled hyperglycemia. He tolerated the medication previously. Suspect he qualifies for [employer] patient assistance program, we filled out paperwork together and will fax it to the company.  In the meantime he has agreed to increase metformin from once->twice daily and increase basal insulin from 40U nightly->60 U nightly.  Suspect he has a significant degree of insulin resistance at this point.  Follow-up 3 months A1c recheck.  Moderately severe major depression, continue duloxetine but recommend he brings the pill bottle & box to us next visit to see if it has the lot number known to have been recalled due to nitrosamine contamination which he is concerned about. Wishes to follow up in near future about depression. No SI/HI not in crisis. Declines psychiatry, therapy, silver cloud initiation.  Discussed need to get his CT lung cancer screening which was previously ordered, as well as US elastography, US bladder, colon cancer screening for which he accepts cologuard.   Assessment & Plan  Medicare annual wellness visit, subsequent    Orders:    Comprehensive metabolic panel; Future    Lipid panel; Future    Type 2 diabetes mellitus with complication, with long-term current use of insulin (HCC)    Lab Results   Component Value Date    HGBA1C 11.1 (A) 2025       Orders:    POCT hemoglobin A1c    TSH, 3rd generation; Future    Comprehensive metabolic panel; Future    metFORMIN (GLUCOPHAGE) 1000 MG tablet; Take 1 tablet (1,000 mg total)  by mouth 2 (two) times a day with meals    Ambulatory Referral to Nutrition Services; Future    Albumin / creatinine urine ratio; Future    Moderately severe major depression (HCC)    Orders:    Vitamin D 25 hydroxy; Future    BMI 45.0-49.9, adult (HCC)    Orders:    TSH, 3rd generation; Future    Lipid panel; Future    Ambulatory Referral to Nutrition Services; Future    Colon cancer screening    Orders:    Cologuard    Type 2 diabetes mellitus with hyperglycemia, with long-term current use of insulin (HCC)    Lab Results   Component Value Date    HGBA1C 11.1 (A) 02/20/2025       Orders:    insulin glargine (Toujeo SoloStar) 300 units/mL CONCENTRATED U-300 injection pen (1-unit dial); Inject 60 units nightly.    NAFLD (nonalcoholic fatty liver disease)    Orders:    Comprehensive metabolic panel; Future    Vitamin D deficiency    Orders:    Vitamin D 25 hydroxy; Future    Hyperlipidemia, unspecified hyperlipidemia type    Orders:    Lipid panel; Future      Depression Screening and Follow-up Plan: Patient's depression screening was positive with a PHQ-9 score of 18.   Patient declines further evaluation by mental health professional and/or medications. Brief counseling provided. Will re-evaluate at next office visit. Patient with underlying depression and was advised to continue current medications as prescribed. Patient advised to follow-up with PCP for further management.       Preventive health issues were discussed with patient, and age appropriate screening tests were ordered as noted in patient's After Visit Summary. Personalized health advice and appropriate referrals for health education or preventive services given if needed, as noted in patient's After Visit Summary.    History of Present Illness     HPI   CC: annual  Pt w/T2DM. Last A1c increased to 11.1% today. Takes metformin, lantus 40U qhs, humalog 30U TID w/meals. States he takes all 3 medicines every day. No hypo/hyperglycemia sxs. Checks home  sugars: on CGM freestyle matthew, states his sugar is 400 and he has not eaten anything today, took his humalog today because earlier he saw his sugars were 300. Is on ACEI/ARB and statin given age & comorbidities. Albumin:creatine urine ratio needs to obtain this year, DM foot exam states up to date over past 12 months, DM eye exam done yearly via external ophthalmologist in [place]. Sees endocrinology  PHQ-2/9 Depression Screening    Little interest or pleasure in doing things: 3 - nearly every day  Feeling down, depressed, or hopeless: 3 - nearly every day  Trouble falling or staying asleep, or sleeping too much: 3 - nearly every day  Feeling tired or having little energy: 3 - nearly every day  Poor appetite or overeatin - several days  Feeling bad about yourself - or that you are a failure or have let yourself or your family down: 2 - more than half the days  Trouble concentrating on things, such as reading the newspaper or watching television: 3 - nearly every day  Moving or speaking so slowly that other people could have noticed. Or the opposite - being so fidgety or restless that you have been moving around a lot more than usual: 0 - not at all  Thoughts that you would be better off dead, or of hurting yourself in some way: 0 - not at all  PHQ-9 Score: 18  PHQ-9 Interpretation: Moderately severe depression       No SI/HI today, declines therapy/psych or silver cloud services. He is concerned nitrosamines may be in his duloxetine based on the recent recall notice for some formulations of it.    Hx: HTN, BMI >45 sees bariatrics not on ozempic due to costs, MARSHA on BiPAP on aspirin statin & ARB, PAD, chronic pain syndrome seeing pain management, T2DM on insulin sees endo, BPH, hypothyroidism, CAMILLE, treatment nonadherence, NAFLD (has not yet schedule US elastography from GI), restrictive lung disease sees pulm, has seen cardiology but no major cardiovascular events  so far, former tobacco use quit 2 years  ago >100 pack years    Patient Care Team:  Chuck Holman PA-C as PCP - General (Family Medicine)  MARLEEN Davila as PCP - Endocrinology (Endocrinology)  MARLEEN Davila as Nurse Practitioner (Endocrinology)  Emmie Carroll PA-C (Physician Assistant)  Carla Price DPM (Podiatry)    Review of Systems   Constitutional:  Negative for chills and fever.   Eyes:  Negative for pain and visual disturbance.   Respiratory:  Negative for cough and shortness of breath.    Cardiovascular:  Negative for chest pain and palpitations.   Gastrointestinal:  Negative for abdominal pain, diarrhea, nausea and vomiting.   Endocrine: Negative for polydipsia, polyphagia and polyuria.   Musculoskeletal:  Negative for arthralgias and back pain.   Skin:  Negative for color change and rash.   Neurological:  Negative for dizziness, seizures, syncope, light-headedness and headaches.   Psychiatric/Behavioral:  Positive for dysphoric mood. Negative for agitation, behavioral problems, confusion, sleep disturbance and suicidal ideas.    All other systems reviewed and are negative.    Medical History Reviewed by provider this encounter:  Tobacco  Soc Hx      Annual Wellness Visit Questionnaire   Luis F is here for his Subsequent Wellness visit. Last Medicare Wellness visit information reviewed, patient interviewed and updates made to the record today.      Health Risk Assessment:   Patient rates overall health as poor. Patient feels that their physical health rating is much worse. Patient is dissatisfied with their life. Eyesight was rated as slightly worse. Hearing was rated as slightly worse. Patient feels that their emotional and mental health rating is slightly worse. Patients states they are never, rarely angry. Patient states they are always unusually tired/fatigued. Pain experienced in the last 7 days has been a lot. Patient's pain rating has been 9/10. Patient states that he has experienced weight loss or gain in  last 6 months.     Depression Screening:   PHQ-9 Score: 18      Fall Risk Screening:   In the past year, patient has experienced: no history of falling in past year      Home Safety:  Patient has trouble with stairs inside or outside of their home. Patient has working smoke alarms and has no working carbon monoxide detector. Home safety hazards include: none. Has 21 steps in home    Nutrition:   Current diet is Unhealthy.     Medications:   Patient is currently taking over-the-counter supplements. OTC medications include: see medication list. Patient is able to manage medications.     Activities of Daily Living (ADLs)/Instrumental Activities of Daily Living (IADLs):   Walk and transfer into and out of bed and chair?: Yes  Dress and groom yourself?: Yes    Bathe or shower yourself?: Yes    Feed yourself? Yes  Do your laundry/housekeeping?: Yes  Manage your money, pay your bills and track your expenses?: Yes  Make your own meals?: Yes    Do your own shopping?: Yes    Previous Hospitalizations:   Any hospitalizations or ED visits within the last 12 months?: No      Advance Care Planning:   Living will: No      Cognitive Screening:   Provider or family/friend/caregiver concerned regarding cognition?: No    PREVENTIVE SCREENINGS      Cardiovascular Screening:    General: History Lipid Disorder and Screening Not Indicated      Diabetes Screening:     General: History Diabetes and Screening Not Indicated      Prostate Cancer Screening:    General: Risks and Benefits Discussed      Osteoporosis Screening:    General: Screening Not Indicated      Abdominal Aortic Aneurysm (AAA) Screening:    Risk factors include: tobacco use        Lung Cancer Screening:     General: Screening Not Indicated      Hepatitis C Screening:    General: Screening Current    Screening, Brief Intervention, and Referral to Treatment (SBIRT)     Screening  Typical number of drinks in a day: 0  Typical number of drinks in a week: 0  Interpretation: Low  "risk drinking behavior.    Single Item Drug Screening:  How often have you used an illegal drug (including marijuana) or a prescription medication for non-medical reasons in the past year? never    Single Item Drug Screen Score: 0  Interpretation: Negative screen for possible drug use disorder    Social Drivers of Health     Financial Resource Strain: High Risk (11/15/2023)    Overall Financial Resource Strain (CARDIA)     Difficulty of Paying Living Expenses: Very hard   Food Insecurity: Food Insecurity Present (2/20/2025)    Hunger Vital Sign     Worried About Running Out of Food in the Last Year: Often true     Ran Out of Food in the Last Year: Often true   Transportation Needs: No Transportation Needs (2/20/2025)    PRAPARE - Transportation     Lack of Transportation (Medical): No     Lack of Transportation (Non-Medical): No   Housing Stability: Unknown (2/20/2025)    Housing Stability Vital Sign     Unable to Pay for Housing in the Last Year: No     Homeless in the Last Year: No   Utilities: Not At Risk (2/20/2025)    [facility] Utilities     Threatened with loss of utilities: No     No results found.    Objective   /76 (BP Location: Right arm, Patient Position: Sitting, Cuff Size: Large)   Pulse (!) 129   Temp 99 °F (37.2 °C) (Tympanic)   Resp 18   Ht 5' 4\" (1.626 m)   Wt 126 kg (278 lb)   SpO2 93%   BMI 47.72 kg/m²     Physical Exam  Vitals and nursing note reviewed.   Constitutional:       General: He is not in acute distress.     Appearance: He is well-developed.   HENT:      Head: Normocephalic and atraumatic.      Nose: No rhinorrhea.   Eyes:      General: No scleral icterus.        Right eye: No discharge.         Left eye: No discharge.      Conjunctiva/sclera: Conjunctivae normal.   Cardiovascular:      Rate and Rhythm: Regular rhythm. Tachycardia present.      Pulses: Normal pulses.      Heart sounds: Normal heart sounds. No murmur heard.     No friction rub. No gallop.      Comments: " Tachycardia patient nervous in doctors office  Pulmonary:      Effort: Pulmonary effort is normal. No respiratory distress.      Breath sounds: Normal breath sounds. No stridor. No wheezing, rhonchi or rales.   Abdominal:      General: Bowel sounds are normal. There is no distension.      Palpations: Abdomen is soft. There is no mass.      Tenderness: There is no abdominal tenderness. There is no guarding or rebound.      Hernia: A hernia (Umbilical) is present.   Musculoskeletal:         General: No swelling.      Cervical back: Neck supple.   Skin:     General: Skin is warm and dry.      Coloration: Skin is not jaundiced or pale.   Neurological:      Mental Status: He is alert.      Comments: No facial droop, slurred speech or gross focal deficits noted

## 2025-02-20 NOTE — PATIENT INSTRUCTIONS
Call hospital near you to schedule your CT lung cancer screening test, your ultrasound elastography which is to assess your fatty liver disease for signs of damage.  Also call the hospital to schedule your ultrasound of the kidneys and bladder.  Be aware of when the Cologuard stool colon cancer testing kit comes to your house and follow the instructions to provide a stool sample.  These get your blood work when you have a chance, preferably when you are fasting having not eaten or drank anything with creamer or sugar over the past 8 to 12 hours  Instead of taking your insulin 40 units at night Lantus, take 60 units at night and take your metformin twice a day with meals    Medicare Preventive Visit Patient Instructions  Thank you for completing your Welcome to Medicare Visit or Medicare Annual Wellness Visit today. Your next wellness visit will be due in one year (2/21/2026).  The screening/preventive services that you may require over the next 5-10 years are detailed below. Some tests may not apply to you based off risk factors and/or age. Screening tests ordered at today's visit but not completed yet may show as past due. Also, please note that scanned in results may not display below.  Preventive Screenings:  Service Recommendations Previous Testing/Comments   Colorectal Cancer Screening  Colonoscopy    Fecal Occult Blood Test (FOBT)/Fecal Immunochemical Test (FIT)  Fecal DNA/Cologuard Test  Flexible Sigmoidoscopy Age: 45-75 years old   Colonoscopy: every 10 years (May be performed more frequently if at higher risk)  OR  FOBT/FIT: every 1 year  OR  Cologuard: every 3 years  OR  Sigmoidoscopy: every 5 years  Screening may be recommended earlier than age 45 if at higher risk for colorectal cancer. Also, an individualized decision between you and your healthcare provider will decide whether screening between the ages of 76-85 would be appropriate. Colonoscopy: Not on file  FOBT/FIT: Not on file  Cologuard: Not on  file  Sigmoidoscopy: Not on file          Prostate Cancer Screening Individualized decision between patient and health care provider in men between ages of 55-69   Medicare will cover every 12 months beginning on the day after your 50th birthday PSA: 0.48 ng/mL           Hepatitis C Screening Once for adults born between 1945 and 1965  More frequently in patients at high risk for Hepatitis C Hep C Antibody: 03/03/2018        Diabetes Screening 1-2 times per year if you're at risk for diabetes or have pre-diabetes Fasting glucose: 134 mg/dL (2/9/2024)  A1C: 9.1 (11/1/2024)      Cholesterol Screening Once every 5 years if you don't have a lipid disorder. May order more often based on risk factors. Lipid panel: 02/09/2024         Other Preventive Screenings Covered by Medicare:  Abdominal Aortic Aneurysm (AAA) Screening: covered once if your at risk. You're considered to be at risk if you have a family history of AAA or a male between the age of 65-75 who smoking at least 100 cigarettes in your lifetime.  Lung Cancer Screening: covers low dose CT scan once per year if you meet all of the following conditions: (1) Age 55-77; (2) No signs or symptoms of lung cancer; (3) Current smoker or have quit smoking within the last 15 years; (4) You have a tobacco smoking history of at least 20 pack years (packs per day x number of years you smoked); (5) You get a written order from a healthcare provider.  Glaucoma Screening: covered annually if you're considered high risk: (1) You have diabetes OR (2) Family history of glaucoma OR (3)  aged 50 and older OR (4)  American aged 65 and older  Osteoporosis Screening: covered every 2 years if you meet one of the following conditions: (1) Have a vertebral abnormality; (2) On glucocorticoid therapy for more than 3 months; (3) Have primary hyperparathyroidism; (4) On osteoporosis medications and need to assess response to drug therapy.  HIV Screening: covered  annually if you're between the age of 15-65. Also covered annually if you are younger than 15 and older than 65 with risk factors for HIV infection. For pregnant patients, it is covered up to 3 times per pregnancy.    Immunizations:  Immunization Recommendations   Influenza Vaccine Annual influenza vaccination during flu season is recommended for all persons aged >= 6 months who do not have contraindications   Pneumococcal Vaccine   * Pneumococcal conjugate vaccine = PCV13 (Prevnar 13), PCV15 (Vaxneuvance), PCV20 (Prevnar 20)  * Pneumococcal polysaccharide vaccine = PPSV23 (Pneumovax) Adults 19-65 yo with certain risk factors or if 65+ yo  If never received any pneumonia vaccine: recommend Prevnar 20 (PCV20)  Give PCV20 if previously received 1 dose of PCV13 or PPSV23   Hepatitis B Vaccine 3 dose series if at intermediate or high risk (ex: diabetes, end stage renal disease, liver disease)   Respiratory syncytial virus (RSV) Vaccine - COVERED BY MEDICARE PART D  * RSVPreF3 (Arexvy) CDC recommends that adults 60 years of age and older may receive a single dose of RSV vaccine using shared clinical decision-making (SCDM)   Tetanus (Td) Vaccine - COST NOT COVERED BY MEDICARE PART B Following completion of primary series, a booster dose should be given every 10 years to maintain immunity against tetanus. Td may also be given as tetanus wound prophylaxis.   Tdap Vaccine - COST NOT COVERED BY MEDICARE PART B Recommended at least once for all adults. For pregnant patients, recommended with each pregnancy.   Shingles Vaccine (Shingrix) - COST NOT COVERED BY MEDICARE PART B  2 shot series recommended in those 19 years and older who have or will have weakened immune systems or those 50 years and older     Health Maintenance Due:      Topic Date Due    Colorectal Cancer Screening  Never done    HIV Screening  Completed    Hepatitis C Screening  Completed    Lung Cancer Screening  Discontinued     Immunizations Due:      Topic  Date Due    Influenza Vaccine (1) 09/01/2024    COVID-19 Vaccine (3 - 2024-25 season) 09/01/2024    Hepatitis A Vaccine (3 of 3 - Hep A Twinrix risk 3-dose series) 11/04/2024     Advance Directives   What are advance directives?  Advance directives are legal documents that state your wishes and plans for medical care. These plans are made ahead of time in case you lose your ability to make decisions for yourself. Advance directives can apply to any medical decision, such as the treatments you want, and if you want to donate organs.   What are the types of advance directives?  There are many types of advance directives, and each state has rules about how to use them. You may choose a combination of any of the following:  Living will:  This is a written record of the treatment you want. You can also choose which treatments you do not want, which to limit, and which to stop at a certain time. This includes surgery, medicine, IV fluid, and tube feedings.   Durable power of  for healthcare (DPAHC):  This is a written record that states who you want to make healthcare choices for you when you are unable to make them for yourself. This person, called a proxy, is usually a family member or a friend. You may choose more than 1 proxy.  Do not resuscitate (DNR) order:  A DNR order is used in case your heart stops beating or you stop breathing. It is a request not to have certain forms of treatment, such as CPR. A DNR order may be included in other types of advance directives.  Medical directive:  This covers the care that you want if you are in a coma, near death, or unable to make decisions for yourself. You can list the treatments you want for each condition. Treatment may include pain medicine, surgery, blood transfusions, dialysis, IV or tube feedings, and a ventilator (breathing machine).  Values history:  This document has questions about your views, beliefs, and how you feel and think about life. This  information can help others choose the care that you would choose.  Why are advance directives important?  An advance directive helps you control your care. Although spoken wishes may be used, it is better to have your wishes written down. Spoken wishes can be misunderstood, or not followed. Treatments may be given even if you do not want them. An advance directive may make it easier for your family to make difficult choices about your care.   Weight Management   Why it is important to manage your weight:  Being overweight increases your risk of health conditions such as heart disease, high blood pressure, type 2 diabetes, and certain types of cancer. It can also increase your risk for osteoarthritis, sleep apnea, and other respiratory problems. Aim for a slow, steady weight loss. Even a small amount of weight loss can lower your risk of health problems.  How to lose weight safely:  A safe and healthy way to lose weight is to eat fewer calories and get regular exercise. You can lose up about 1 pound a week by decreasing the number of calories you eat by 500 calories each day.   Healthy meal plan for weight management:  A healthy meal plan includes a variety of foods, contains fewer calories, and helps you stay healthy. A healthy meal plan includes the following:  Eat whole-grain foods more often.  A healthy meal plan should contain fiber. Fiber is the part of grains, fruits, and vegetables that is not broken down by your body. Whole-grain foods are healthy and provide extra fiber in your diet. Some examples of whole-grain foods are whole-wheat breads and pastas, oatmeal, brown rice, and bulgur.  Eat a variety of vegetables every day.  Include dark, leafy greens such as spinach, kale, terrance greens, and mustard greens. Eat yellow and orange vegetables such as carrots, sweet potatoes, and winter squash.   Eat a variety of fruits every day.  Choose fresh or canned fruit (canned in its own juice or light syrup) instead  of juice. Fruit juice has very little or no fiber.  Eat low-fat dairy foods.  Drink fat-free (skim) milk or 1% milk. Eat fat-free yogurt and low-fat cottage cheese. Try low-fat cheeses such as mozzarella and other reduced-fat cheeses.  Choose meat and other protein foods that are low in fat.  Choose beans or other legumes such as split peas or lentils. Choose fish, skinless poultry (chicken or turkey), or lean cuts of red meat (beef or pork). Before you cook meat or poultry, cut off any visible fat.   Use less fat and oil.  Try baking foods instead of frying them. Add less fat, such as margarine, sour cream, regular salad dressing and mayonnaise to foods. Eat fewer high-fat foods. Some examples of high-fat foods include french fries, doughnuts, ice cream, and cakes.  Eat fewer sweets.  Limit foods and drinks that are high in sugar. This includes candy, cookies, regular soda, and sweetened drinks.  Exercise:  Exercise at least 30 minutes per day on most days of the week. Some examples of exercise include walking, biking, dancing, and swimming. You can also fit in more physical activity by taking the stairs instead of the elevator or parking farther away from stores. Ask your healthcare provider about the best exercise plan for you.      © Copyright [employer] 2018 Information is for End User's use only and may not be sold, redistributed or otherwise used for commercial purposes. All illustrations and images included in CareNotes® are the copyrighted property of [employer] or [employer]

## 2025-02-20 NOTE — PROGRESS NOTES
"Adult Annual Physical  Name: Luis F Velazquez      : 1965      MRN: [id number]  Encounter Provider: Zana Aaron MD  Encounter Date: 2025   Encounter department: [facility]    Assessment & Plan    Immunizations and preventive care screenings were discussed with patient today. Appropriate education was printed on patient's after visit summary.    {Prostate cancer screening - consider in males between age of 40-75 depending on age, race, and risk factors. There is difference in the guidelines in regards to the optimal age for screening.  USPSTF states to consider periodic screening in males between the age of 50 to 69. This text is informational and does not need to be selected but if you wish, you can insert standard documentation in your progress note by using F2 (Optional):52048}    Counseling:  {Annual Physical; Counselin}         History of Present Illness   {?Quick Links Encounters * My Last Note * Last Note in Specialty * Snapshot * Since Last Visit * History :31848}  CC: annual  ***  Hx: HTN, BMI >45 sees bariatrics not on ozempic due to costs, MARSHA *** on PAP on aspirin statin & ARB, PAD, chronic pain syndrome seeing pain management, T2DM on insulin sees endo, BPH, hypothyroidism, CAMILLE, treatment nonadherence, NAFLD (*** schedule US elastography from GI), restrictive lung disease sees pulm, has seen cardiology but no major cardiovascular events  so far, former tobacco use quit 2 years ago    Adult Annual Physical  Review of Systems  {Select to Display PMH (Optional):11163}    Objective {?Quick Links Trend Vitals * Enter New Vitals * Results Review * Timeline (Adult) * Labs * Imaging * Cardiology * Procedures * Lung Cancer Screening * Surgical eConsent :99784}  /76 (BP Location: Right arm, Patient Position: Sitting, Cuff Size: Large)   Pulse (!) 129   Temp 99 °F (37.2 °C) (Tympanic)   Resp 18   Ht 5' 4\" (1.626 m)   Wt 126 kg (278 lb)   SpO2 93%  "  BMI 47.72 kg/m²     Physical Exam  {Administrative / Billing Section (Optional):25147}

## 2025-02-20 NOTE — ASSESSMENT & PLAN NOTE
Lab Results   Component Value Date    HGBA1C 11.1 (A) 02/20/2025       Orders:    insulin glargine (Toujeo SoloStar) 300 units/mL CONCENTRATED U-300 injection pen (1-unit dial); Inject 60 units nightly.

## 2025-02-21 ENCOUNTER — HOSPITAL ENCOUNTER (OUTPATIENT)
Dept: RADIOLOGY | Facility: HOSPITAL | Age: 60
End: 2025-02-21
Payer: MEDICARE

## 2025-02-21 ENCOUNTER — APPOINTMENT (OUTPATIENT)
Dept: LAB | Facility: CLINIC | Age: 60
End: 2025-02-21
Payer: MEDICARE

## 2025-02-21 VITALS
SYSTOLIC BLOOD PRESSURE: 150 MMHG | DIASTOLIC BLOOD PRESSURE: 78 MMHG | OXYGEN SATURATION: 93 % | HEART RATE: 105 BPM | TEMPERATURE: 97.4 F | RESPIRATION RATE: 20 BRPM

## 2025-02-21 DIAGNOSIS — F32.2 MODERATELY SEVERE MAJOR DEPRESSION (HCC): ICD-10-CM

## 2025-02-21 DIAGNOSIS — Z00.00 MEDICARE ANNUAL WELLNESS VISIT, SUBSEQUENT: ICD-10-CM

## 2025-02-21 DIAGNOSIS — E11.8 TYPE 2 DIABETES MELLITUS WITH COMPLICATION, WITH LONG-TERM CURRENT USE OF INSULIN (HCC): ICD-10-CM

## 2025-02-21 DIAGNOSIS — E03.9 ACQUIRED HYPOTHYROIDISM: Chronic | ICD-10-CM

## 2025-02-21 DIAGNOSIS — E55.9 VITAMIN D DEFICIENCY: ICD-10-CM

## 2025-02-21 DIAGNOSIS — Z12.5 PROSTATE CANCER SCREENING: ICD-10-CM

## 2025-02-21 DIAGNOSIS — E78.5 HYPERLIPIDEMIA, UNSPECIFIED HYPERLIPIDEMIA TYPE: ICD-10-CM

## 2025-02-21 DIAGNOSIS — Z79.4 TYPE 2 DIABETES MELLITUS WITH COMPLICATION, WITH LONG-TERM CURRENT USE OF INSULIN (HCC): ICD-10-CM

## 2025-02-21 DIAGNOSIS — M47.816 LUMBAR SPONDYLOSIS: ICD-10-CM

## 2025-02-21 DIAGNOSIS — K76.0 NAFLD (NONALCOHOLIC FATTY LIVER DISEASE): ICD-10-CM

## 2025-02-21 LAB
25(OH)D3 SERPL-MCNC: 50.2 NG/ML (ref 30–100)
ALBUMIN SERPL BCG-MCNC: 4.1 G/DL (ref 3.5–5)
ALP SERPL-CCNC: 68 U/L (ref 34–104)
ALT SERPL W P-5'-P-CCNC: 34 U/L (ref 7–52)
ANION GAP SERPL CALCULATED.3IONS-SCNC: 13 MMOL/L (ref 4–13)
AST SERPL W P-5'-P-CCNC: 26 U/L (ref 13–39)
BILIRUB SERPL-MCNC: 0.41 MG/DL (ref 0.2–1)
BUN SERPL-MCNC: 20 MG/DL (ref 5–25)
CALCIUM SERPL-MCNC: 9.5 MG/DL (ref 8.4–10.2)
CHLORIDE SERPL-SCNC: 92 MMOL/L (ref 96–108)
CHOLEST SERPL-MCNC: 103 MG/DL (ref ?–200)
CO2 SERPL-SCNC: 30 MMOL/L (ref 21–32)
CREAT SERPL-MCNC: 1.45 MG/DL (ref 0.6–1.3)
CREAT UR-MCNC: 61.7 MG/DL
EST. AVERAGE GLUCOSE BLD GHB EST-MCNC: 255 MG/DL
GFR SERPL CREATININE-BSD FRML MDRD: 51 ML/MIN/1.73SQ M
GLUCOSE P FAST SERPL-MCNC: 266 MG/DL (ref 65–99)
HBA1C MFR BLD: 10.5 %
HDLC SERPL-MCNC: 35 MG/DL
LDLC SERPL CALC-MCNC: 38 MG/DL (ref 0–100)
MICROALBUMIN UR-MCNC: 349.2 MG/L
MICROALBUMIN/CREAT 24H UR: 566 MG/G CREATININE (ref 0–30)
NONHDLC SERPL-MCNC: 68 MG/DL
POTASSIUM SERPL-SCNC: 3.9 MMOL/L (ref 3.5–5.3)
PROT SERPL-MCNC: 7.4 G/DL (ref 6.4–8.4)
PSA SERPL-MCNC: 0.45 NG/ML (ref 0–4)
SODIUM SERPL-SCNC: 135 MMOL/L (ref 135–147)
T4 FREE SERPL-MCNC: 0.94 NG/DL (ref 0.61–1.12)
TRIGL SERPL-MCNC: 151 MG/DL (ref ?–150)
TSH SERPL DL<=0.05 MIU/L-ACNC: 1.37 UIU/ML (ref 0.45–4.5)

## 2025-02-21 PROCEDURE — 83036 HEMOGLOBIN GLYCOSYLATED A1C: CPT

## 2025-02-21 PROCEDURE — 82570 ASSAY OF URINE CREATININE: CPT

## 2025-02-21 PROCEDURE — 84443 ASSAY THYROID STIM HORMONE: CPT

## 2025-02-21 PROCEDURE — 64494 INJ PARAVERT F JNT L/S 2 LEV: CPT | Performed by: ANESTHESIOLOGY

## 2025-02-21 PROCEDURE — 64493 INJ PARAVERT F JNT L/S 1 LEV: CPT | Performed by: ANESTHESIOLOGY

## 2025-02-21 PROCEDURE — 82306 VITAMIN D 25 HYDROXY: CPT

## 2025-02-21 PROCEDURE — 36415 COLL VENOUS BLD VENIPUNCTURE: CPT

## 2025-02-21 PROCEDURE — 80053 COMPREHEN METABOLIC PANEL: CPT

## 2025-02-21 PROCEDURE — 80061 LIPID PANEL: CPT

## 2025-02-21 PROCEDURE — G0103 PSA SCREENING: HCPCS

## 2025-02-21 PROCEDURE — 84439 ASSAY OF FREE THYROXINE: CPT

## 2025-02-21 PROCEDURE — 82043 UR ALBUMIN QUANTITATIVE: CPT

## 2025-02-21 RX ORDER — BUPIVACAINE HYDROCHLORIDE 5 MG/ML
3 INJECTION, SOLUTION EPIDURAL; INTRACAUDAL ONCE
Status: COMPLETED | OUTPATIENT
Start: 2025-02-21 | End: 2025-02-21

## 2025-02-21 RX ORDER — LIDOCAINE HYDROCHLORIDE 10 MG/ML
3 INJECTION, SOLUTION EPIDURAL; INFILTRATION; INTRACAUDAL; PERINEURAL ONCE
Status: COMPLETED | OUTPATIENT
Start: 2025-02-21 | End: 2025-02-21

## 2025-02-21 RX ADMIN — BUPIVACAINE HYDROCHLORIDE 3 ML: 5 INJECTION, SOLUTION EPIDURAL; INTRACAUDAL at 10:16

## 2025-02-21 RX ADMIN — LIDOCAINE HYDROCHLORIDE 3 ML: 10 INJECTION, SOLUTION EPIDURAL; INFILTRATION; INTRACAUDAL; PERINEURAL at 10:11

## 2025-02-21 NOTE — DISCHARGE INSTR - LAB

## 2025-02-21 NOTE — H&P
Assessment:  1. Lumbar spondylosis  FL spine and pain procedure    FL spine and pain procedure          Plan:  Luis F Velazquez is a 59 y.o. male with complaints of low back pain presents to surgical center for procedure.  We will perform a bilateral for L5 and L5-S1 medial branch block #2  Follow-up 1 month after injection    Complete risks and benefits including bleeding, infection, tissue reaction, nerve injury and allergic reaction were discussed. The approach was demonstrated using models and literature was provided. Verbal and written consent was obtained.    My impressions and treatment recommendations were discussed in detail with the patient who verbalized understanding and had no further questions.  Discharge instructions were provided. I personally saw and examined the patient and I agree with the above discussed plan of care.    Orders Placed This Encounter   Procedures    FL spine and pain procedure     Standing Status:   Standing     Number of Occurrences:   1     Reason for Exam::   B/L L4-5 and L5-S1 MBB2     Anticoagulant hold needed?:   no     New Medications Ordered This Visit   Medications    bupivacaine (PF) (MARCAINE) 0.5 % injection 3 mL    lidocaine (PF) (XYLOCAINE-MPF) 1 % injection 3 mL       History of Present Illness:  Luis F Velazquez is a 59 y.o. male who presents for a follow up office visit in regards to low back pain.   The patient’s current symptoms include intermittent sharp stabbing throbbing pain without particular pattern.      I have personally reviewed and/or updated the patient's past medical history, past surgical history, family history, social history, current medications, allergies, and vital signs today.     Review of Systems   Musculoskeletal:  Positive for arthralgias and back pain.   All other systems reviewed and are negative.      Patient Active Problem List   Diagnosis    Allergic rhinitis    Arthritis    Chronic back pain    Chronic sinusitis    Depression with anxiety     Essential (primary) hypertension    Acquired hypothyroidism    Morbid obesity with BMI of 45.0-49.9, adult ([facility])    Diabetic polyneuropathy associated with type 2 diabetes mellitus ([facility])    Vitamin D deficiency    Bilateral lower leg cellulitis    Elevated LFTs    Elevated lactic acid level    Tobacco abuse, in remission    Noncompliance with diabetes treatment    Epistaxis    Muscle twitching    Type 2 diabetes mellitus with hyperglycemia, with long-term current use of insulin (HCC)    Atelectasis    Urinary retention    Constipation    Depression, recurrent (HCC)    Peripheral vascular disease ([facility])    Primary osteoarthritis of both knees    Hyperlipidemia    Bright red rectal bleeding    ABLA (acute blood loss anemia)    Microcytic anemia    Fatty liver disease, nonalcoholic    Umbilical hernia without obstruction and without gangrene    Restrictive lung disease secondary to obesity    Diabetic nephropathy associated with type 2 diabetes mellitus ([facility])    Proteinuria    Chronic kidney disease, stage 2 (mild)    Neuropathy    Chronic respiratory failure with hypoxia, on home oxygen therapy  ([facility])    Urinary hesitancy due to benign prostatic hyperplasia    Hypo-osmolality and hyponatremia    LAMB (dyspnea on exertion)    MARSHA treated with BiPAP    Chronic right sacroiliac joint pain    Pain in both feet    Positive screening for depression on 9-item Patient Health Questionnaire (PHQ-9)    Social problem    Type 2 diabetes mellitus with diabetic chronic kidney disease (HCC)    Lumbar spondylosis    Chronic pain syndrome    Myofascial pain syndrome       Past Medical History:   Diagnosis Date    Acquired hypothyroidism 1/2/2015    Angioedema     Diabetes mellitus (HCC)     Disease of thyroid gland     Hyperlipidemia     Hypertension     Mixed hyperlipidemia 2/4/2015    Morbid obesity ([facility])     Morbid obesity with BMI of 45.0-49.9, adult ([facility]) 1/2/2015    MARSHA (obstructive sleep apnea)     Primary hypertension  2015    Transitioned From: Benign essential hypertension       Past Surgical History:   Procedure Laterality Date    KNEE SURGERY      SINUS SURGERY         Family History   Problem Relation Age of Onset    Diabetes Mother     Diabetes type II Mother     Esophageal cancer Father     Diabetes Brother     Kidney cancer Brother     Diabetes type II Brother     Diabetes Maternal Grandmother     Diabetes type II Maternal Grandmother     Diabetes Paternal Grandmother     Heart disease Neg Hx        Social History     Occupational History    Not on file   Tobacco Use    Smoking status: Former     Current packs/day: 0.00     Average packs/day: 3.0 packs/day for 40.0 years (120.0 ttl pk-yrs)     Types: Cigarettes     Start date: 10/21/1982     Quit date: 10/21/2022     Years since quittin.3     Passive exposure: Past    Smokeless tobacco: Never    Tobacco comments:     states read to quit prior to admit   Vaping Use    Vaping status: Never Used   Substance and Sexual Activity    Alcohol use: Not Currently     Alcohol/week: 3.0 standard drinks of alcohol     Types: 2 Cans of beer, 1 Shots of liquor per week     Comment: No alcohol since     Drug use: No    Sexual activity: Not Currently       Current Outpatient Medications on File Prior to Encounter   Medication Sig    albuterol (2.5 mg/3 mL) 0.083 % nebulizer solution Take 3 mL (2.5 mg total) by nebulization every 6 (six) hours as needed for wheezing or shortness of breath    Ascorbic Acid (VITAMIN C PO) Take 1 tablet by mouth daily    aspirin 81 mg chewable tablet Take one tablet by mouth daily    carisoprodol (SOMA) 350 mg tablet Take 1 tablet (350 mg total) by mouth 3 (three) times a day    cholecalciferol (VITAMIN D3) 400 units tablet Take 1 tablet by mouth daily    clotrimazole (LOTRIMIN) 1 % cream Apply topically 2 (two) times a day    Diclofenac Sodium (VOLTAREN) 1 % Apply 2 g topically 4 (four) times a day To feet/ankle    DULoxetine (CYMBALTA) 60 mg  "delayed release capsule Take 1 capsule by mouth once daily    EPINEPHrine (EPIPEN) 0.3 mg/0.3 mL SOAJ INJECT 0.3MG INTO A MUSCLE ONCE FOR 1 DOSE    ferrous gluconate (FERGON) 324 mg tablet Take 324 mg by mouth    finasteride (PROSCAR) 5 mg tablet Take 1 tablet (5 mg total) by mouth daily    HumaLOG KwikPen 100 units/mL injection pen Inject 30 Units under the skin 3 (three) times a day with meals    hydroCHLOROthiazide 25 mg tablet Take 1 tablet by mouth twice daily    insulin glargine (Toujeo SoloStar) 300 units/mL CONCENTRATED U-300 injection pen (1-unit dial) Inject 60 units nightly.    Insulin Pen Needle (Pen Needles 3/16\") 31G X 5 MM MISC Use 4 (four) times a day    ketoconazole (NIZORAL) 2 % shampoo Apply 1 Application topically 2 (two) times a week To feet in shower    levothyroxine 175 mcg tablet Take 1 tablet by mouth once daily    loratadine (CLARITIN) 10 mg tablet Take 10 mg by mouth daily    metFORMIN (GLUCOPHAGE) 1000 MG tablet Take 1 tablet (1,000 mg total) by mouth 2 (two) times a day with meals    Omega-3 Fatty Acids (fish oil) 1,000 mg Take 1,000 mg by mouth 2 (two) times a day    pregabalin (LYRICA) 50 mg capsule Take 1 capsule by mouth twice daily    rosuvastatin (CRESTOR) 40 MG tablet Take 1 tablet by mouth once daily    sodium chloride (OCEAN) 0.65 % nasal spray 1 spray into each nostril as needed for rhinitis    tamsulosin (FLOMAX) 0.4 mg Take 1 capsule (0.4 mg total) by mouth 2 (two) times a day    Thiamine HCl (vitamin B-1) 250 MG tablet Take 250 mg by mouth daily    traZODone (DESYREL) 150 mg tablet TAKE 1 TABLET BY MOUTH ONCE DAILY AT BEDTIME    valsartan (DIOVAN) 160 mg tablet Take 160 mg by mouth daily    Ventolin  (90 Base) MCG/ACT inhaler Inhale 2 puffs every 6 (six) hours as needed for wheezing or shortness of breath    vitamin E, tocopherol, 400 units capsule Take 400 Units by mouth daily    [DISCONTINUED] atorvastatin (LIPITOR) 20 mg tablet Take 1 tablet by mouth once daily "     No current facility-administered medications on file prior to encounter.       Allergies   Allergen Reactions    Ace Inhibitors Swelling     Angioedema    PT STATES THIS IS NOT AN ALLERGY    Other Anaphylaxis     Pt believes that he is allergic to peanut butter.    Also, seasonal allergies    Zinc Tongue Swelling     PT STATES THIS IS NOT AN ALLERGY    Clindamycin Edema     Had angioedema episode approx 5 hr after IM administration of clindamycin. Unclear if there is definitive causal relationship.  PT STATES THIS IS NOT AN ALLERGY       Physical Exam:    /79   Pulse 103   Temp (!) 97.4 °F (36.3 °C) (Temporal)   Resp 18   SpO2 97%     Constitutional:normal, well developed, well nourished, alert, in no distress and non-toxic and no overt pain behavior. and obese  Eyes:anicteric  HEENT:grossly intact  Neck:supple, symmetric, trachea midline and no masses   Pulmonary:even and unlabored  Cardiovascular:No edema or pitting edema present  Skin:Normal without rashes or lesions and well hydrated  Psychiatric:Mood and affect appropriate  Neurologic:Cranial Nerves II-XII grossly intact  Musculoskeletal:normal

## 2025-02-24 ENCOUNTER — RESULTS FOLLOW-UP (OUTPATIENT)
Dept: UROLOGY | Facility: CLINIC | Age: 60
End: 2025-02-24

## 2025-02-24 ENCOUNTER — RESULTS FOLLOW-UP (OUTPATIENT)
Dept: FAMILY MEDICINE CLINIC | Facility: CLINIC | Age: 60
End: 2025-02-24

## 2025-02-24 NOTE — TELEPHONE ENCOUNTER
Called patient who identified himself, discussed his labs, discussed worsened renal function likely 2/2 diabetes as well as how we filled out his ozempic patient assistance program application for [facility].  -Zana Aaron MD

## 2025-02-24 NOTE — TELEPHONE ENCOUNTER
----- Message from MARLEEN Ramos sent at 2/24/2025  7:35 AM EST -----  Please let patient know that his PSA is normal.

## 2025-02-27 ENCOUNTER — OFFICE VISIT (OUTPATIENT)
Dept: OBGYN CLINIC | Facility: CLINIC | Age: 60
End: 2025-02-27
Payer: MEDICARE

## 2025-02-27 ENCOUNTER — TELEPHONE (OUTPATIENT)
Age: 60
End: 2025-02-27

## 2025-02-27 VITALS — BODY MASS INDEX: 47.46 KG/M2 | HEIGHT: 64 IN | WEIGHT: 278 LBS

## 2025-02-27 DIAGNOSIS — M17.0 PRIMARY OSTEOARTHRITIS OF BOTH KNEES: Primary | ICD-10-CM

## 2025-02-27 PROCEDURE — 20610 DRAIN/INJ JOINT/BURSA W/O US: CPT | Performed by: ORTHOPAEDIC SURGERY

## 2025-02-27 PROCEDURE — 99213 OFFICE O/P EST LOW 20 MIN: CPT | Performed by: ORTHOPAEDIC SURGERY

## 2025-02-27 RX ORDER — TRIAMCINOLONE ACETONIDE 40 MG/ML
80 INJECTION, SUSPENSION INTRA-ARTICULAR; INTRAMUSCULAR
Status: COMPLETED | OUTPATIENT
Start: 2025-02-27 | End: 2025-02-27

## 2025-02-27 RX ORDER — BUPIVACAINE HYDROCHLORIDE 2.5 MG/ML
4 INJECTION, SOLUTION INFILTRATION; PERINEURAL
Status: COMPLETED | OUTPATIENT
Start: 2025-02-27 | End: 2025-02-27

## 2025-02-27 RX ADMIN — TRIAMCINOLONE ACETONIDE 80 MG: 40 INJECTION, SUSPENSION INTRA-ARTICULAR; INTRAMUSCULAR at 10:00

## 2025-02-27 RX ADMIN — BUPIVACAINE HYDROCHLORIDE 4 ML: 2.5 INJECTION, SOLUTION INFILTRATION; PERINEURAL at 10:00

## 2025-02-27 NOTE — PROGRESS NOTES
Assessment/Plan:  Assessment & Plan   Diagnoses and all orders for this visit:    Primary osteoarthritis of both knees        The patient has degenerative joint disease of his bilateral knees.  Under aseptic technique, both knees were injected with Kenalog and Marcaine.  He tolerated the procedures well.  Concert viscosupplementation anytime after May 14    Subjective:   Patient ID: Luis F Velazquez is a 59 y.o. male.    HPI    The patient has a history of degenerative joint disease of his bilateral knees.  He wants corticosteroid injections.  He states the viscosupplementation lasted approxi-3 months.  He does desire however to restart them when he is eligible    The following portions of the patient's history were reviewed and updated as appropriate: allergies, current medications, past family history, past medical history, past social history, past surgical history, and problem list.    Review of Systems   Constitutional:  Negative for chills, fever and unexpected weight change.   HENT:  Negative for hearing loss, nosebleeds and sore throat.    Eyes:  Negative for pain, redness and visual disturbance.   Respiratory:  Negative for cough, shortness of breath and wheezing.    Cardiovascular:  Negative for chest pain, palpitations and leg swelling.   Gastrointestinal:  Negative for abdominal pain, nausea and vomiting.   Endocrine: Negative for polydipsia and polyuria.   Genitourinary:  Negative for dysuria and hematuria.   Musculoskeletal:  Positive for arthralgias, gait problem and myalgias. Negative for back pain, joint swelling, neck pain and neck stiffness.        As noted in HPI   Skin:  Negative for rash and wound.   Neurological:  Negative for dizziness, numbness and headaches.   Psychiatric/Behavioral:  Negative for decreased concentration and suicidal ideas. The patient is not nervous/anxious.        Objective:  Ortho Exam    Bilateral lower extremities are neuro vascularly intact.  Toes are pink and mobile.   "Compartments are soft.  Range of motion both his knees are from 5 to 115 degrees.  There is diffuse medial joint tenderness, lateral joint tenderness, and patellofemoral crepitation.  There is a painful arc of motion throughout both knees.  Negative Homans    Large joint arthrocentesis: bilateral knee  Universal Protocol:  procedure performed by consultantConsent: Verbal consent obtained. Written consent not obtained.  Risks and benefits: risks, benefits and alternatives were discussed  Time out: Immediately prior to procedure a \"time out\" was called to verify the correct patient, procedure, equipment, support staff and site/side marked as required.  Patient understanding: patient states understanding of the procedure being performed  Test results: test results available and properly labeled  Site marked: the operative site was marked  Radiology Images displayed and confirmed. If images not available, report reviewed: imaging studies available  Patient identity confirmed: verbally with patient  Supporting Documentation  Indications: pain   Procedure Details  Location: knee - bilateral knee  Needle size: 22 G  Ultrasound guidance: no  Approach: lateral    Medications (Right): 4 mL bupivacaine 0.25 %; 80 mg triamcinolone acetonide 40 mg/mLMedications (Left): 4 mL bupivacaine 0.25 %; 80 mg triamcinolone acetonide 40 mg/mL   Patient tolerance: patient tolerated the procedure well with no immediate complications  Dressing:  Sterile dressing applied                          "

## 2025-02-28 ENCOUNTER — TELEPHONE (OUTPATIENT)
Dept: PAIN MEDICINE | Facility: CLINIC | Age: 60
End: 2025-02-28

## 2025-03-04 ENCOUNTER — HOSPITAL ENCOUNTER (OUTPATIENT)
Dept: ULTRASOUND IMAGING | Facility: HOSPITAL | Age: 60
Discharge: HOME | End: 2025-03-04
Payer: MEDICARE

## 2025-03-04 DIAGNOSIS — R39.12 WEAK URINARY STREAM: ICD-10-CM

## 2025-03-04 DIAGNOSIS — R39.12 BENIGN PROSTATIC HYPERPLASIA WITH WEAK URINARY STREAM: ICD-10-CM

## 2025-03-04 DIAGNOSIS — N40.1 BENIGN PROSTATIC HYPERPLASIA WITH WEAK URINARY STREAM: ICD-10-CM

## 2025-03-04 PROCEDURE — 76770 US EXAM ABDO BACK WALL COMP: CPT

## 2025-03-05 ENCOUNTER — OFFICE VISIT (OUTPATIENT)
Dept: ENDOCRINOLOGY | Facility: CLINIC | Age: 60
End: 2025-03-05
Payer: MEDICARE

## 2025-03-05 VITALS
SYSTOLIC BLOOD PRESSURE: 114 MMHG | BODY MASS INDEX: 48.49 KG/M2 | TEMPERATURE: 98.2 F | WEIGHT: 284 LBS | DIASTOLIC BLOOD PRESSURE: 60 MMHG | HEART RATE: 122 BPM | RESPIRATION RATE: 20 BRPM | OXYGEN SATURATION: 95 % | HEIGHT: 64 IN

## 2025-03-05 DIAGNOSIS — E11.42 DIABETIC POLYNEUROPATHY ASSOCIATED WITH TYPE 2 DIABETES MELLITUS (HCC): ICD-10-CM

## 2025-03-05 DIAGNOSIS — Z79.4 TYPE 2 DIABETES MELLITUS WITH HYPERGLYCEMIA, WITH LONG-TERM CURRENT USE OF INSULIN (HCC): Primary | ICD-10-CM

## 2025-03-05 DIAGNOSIS — E11.22 TYPE 2 DIABETES MELLITUS WITH STAGE 3 CHRONIC KIDNEY DISEASE, WITH LONG-TERM CURRENT USE OF INSULIN, UNSPECIFIED WHETHER STAGE 3A OR 3B CKD (HCC): ICD-10-CM

## 2025-03-05 DIAGNOSIS — Z79.4 TYPE 2 DIABETES MELLITUS WITH STAGE 3 CHRONIC KIDNEY DISEASE, WITH LONG-TERM CURRENT USE OF INSULIN, UNSPECIFIED WHETHER STAGE 3A OR 3B CKD (HCC): ICD-10-CM

## 2025-03-05 DIAGNOSIS — E03.9 ACQUIRED HYPOTHYROIDISM: Chronic | ICD-10-CM

## 2025-03-05 DIAGNOSIS — N18.30 TYPE 2 DIABETES MELLITUS WITH STAGE 3 CHRONIC KIDNEY DISEASE, WITH LONG-TERM CURRENT USE OF INSULIN, UNSPECIFIED WHETHER STAGE 3A OR 3B CKD (HCC): ICD-10-CM

## 2025-03-05 DIAGNOSIS — E11.65 TYPE 2 DIABETES MELLITUS WITH HYPERGLYCEMIA, WITH LONG-TERM CURRENT USE OF INSULIN (HCC): Primary | ICD-10-CM

## 2025-03-05 PROCEDURE — 95251 CONT GLUC MNTR ANALYSIS I&R: CPT | Performed by: NURSE PRACTITIONER

## 2025-03-05 PROCEDURE — 99214 OFFICE O/P EST MOD 30 MIN: CPT | Performed by: NURSE PRACTITIONER

## 2025-03-05 RX ORDER — INSULIN GLARGINE 300 U/ML
INJECTION, SOLUTION SUBCUTANEOUS
Qty: 22.5 ML | Refills: 1 | Status: SHIPPED | OUTPATIENT
Start: 2025-03-05

## 2025-03-05 RX ORDER — PEN NEEDLE, DIABETIC 31 GX3/16"
NEEDLE, DISPOSABLE MISCELLANEOUS 4 TIMES DAILY
Qty: 400 EACH | Refills: 1 | Status: SHIPPED | OUTPATIENT
Start: 2025-03-05

## 2025-03-05 NOTE — ASSESSMENT & PLAN NOTE
Patient is uncontrolled with persistent hyperglycemia. Currently awaiting response from [employer] regarding financial assistance for Ozempic, which would greatly benefit the patient. In the meantime, increase Toujeo to 72 units nightly. Continue Humalog 30 units three times daily. Again offered referral for MNT. Patient declines stating he knows what to do but struggles with avoiding binge eating unhealthy  foods to treat his pain and his depression. Thus, the appetite suppression afforded by GLP-1 class would be very beneficial and did help with glycemic control and reduction of high doses of insulin in the past. Paperwork currently completed by PCP. Will continue to work closely with that office to assure patient gets what he needs to achieve better blood sugar control. Patient demonstrates 100% compliance with CGM use. Continue. Patient knows to notify me with persistent hyperglycemia or episodes of hypoglycemia.  F/U in 3 months.   Lab Results   Component Value Date    HGBA1C 10.5 (H) 02/21/2025       Orders:    insulin glargine (Toujeo SoloStar) 300 units/mL CONCENTRATED U-300 injection pen (1-unit dial); Inject 72 units nightly.    Insulin Pen Needle (1st Tier Unifine Pentips Plus) 31G X 5 MM MISC; Use 4 (four) times a day    Hemoglobin A1C; Future    Albumin / creatinine urine ratio; Future    Basic metabolic panel; Future

## 2025-03-05 NOTE — ASSESSMENT & PLAN NOTE
Decline in kidney function/worsening albuminuria s/t uncontrolled diabetes. Ozempic has been proven to provide renal protection. Awaiting response from [employer] about this.   Lab Results   Component Value Date    HGBA1C 10.5 (H) 02/21/2025

## 2025-03-05 NOTE — ASSESSMENT & PLAN NOTE
Lab Results   Component Value Date    HGBA1C 10.5 (H) 02/21/2025       Orders:    insulin glargine (Toujeo SoloStar) 300 units/mL CONCENTRATED U-300 injection pen (1-unit dial); Inject 72 units nightly.    Insulin Pen Needle (1st Tier Unifine Pentips Plus) 31G X 5 MM MISC; Use 4 (four) times a day    Hemoglobin A1C; Future    Albumin / creatinine urine ratio; Future    Basic metabolic panel; Future

## 2025-03-05 NOTE — ASSESSMENT & PLAN NOTE
Stable. Continue 175 mcg of levothyroxine daily. Repeat TFT prior to follow up in 3 months.   Orders:    TSH, 3rd generation; Future    T4, free; Future

## 2025-03-05 NOTE — ASSESSMENT & PLAN NOTE
Working with pain management. Continue Lyrica 50 mg  twice daily.   Lab Results   Component Value Date    HGBA1C 10.5 (H) 02/21/2025

## 2025-03-05 NOTE — PROGRESS NOTES
Name: Luis F Velazquez      : 1965      MRN: [id number]  Encounter Provider: MARLEEN Davila  Encounter Date: 3/5/2025   Encounter department: [facility] [place]  :  Assessment & Plan  Type 2 diabetes mellitus with hyperglycemia, with long-term current use of insulin (HCC)  Patient is uncontrolled with persistent hyperglycemia. Currently awaiting response from [employer] regarding financial assistance for Ozempic, which would greatly benefit the patient. In the meantime, increase Toujeo to 72 units nightly. Continue Humalog 30 units three times daily. Again offered referral for MNT. Patient declines stating he knows what to do but struggles with avoiding binge eating unhealthy  foods to treat his pain and his depression. Thus, the appetite suppression afforded by GLP-1 class would be very beneficial and did help with glycemic control and reduction of high doses of insulin in the past. Paperwork currently completed by PCP. Will continue to work closely with that office to assure patient gets what he needs to achieve better blood sugar control. Patient demonstrates 100% compliance with CGM use. Continue. Patient knows to notify me with persistent hyperglycemia or episodes of hypoglycemia.  F/U in 3 months.   Lab Results   Component Value Date    HGBA1C 10.5 (H) 2025       Orders:    insulin glargine (Toujeo SoloStar) 300 units/mL CONCENTRATED U-300 injection pen (1-unit dial); Inject 72 units nightly.    Insulin Pen Needle (1st Tier Unifine Pentips Plus) 31G X 5 MM MISC; Use 4 (four) times a day    Hemoglobin A1C; Future    Albumin / creatinine urine ratio; Future    Basic metabolic panel; Future    Diabetic polyneuropathy associated with type 2 diabetes mellitus (HCC)  Working with pain management. Continue Lyrica 50 mg  twice daily.   Lab Results   Component Value Date    HGBA1C 10.5 (H) 2025            Type 2 diabetes mellitus with stage 3  chronic kidney disease, with long-term current use of insulin, unspecified whether stage 3a or 3b CKD (HCC)  Decline in kidney function/worsening albuminuria s/t uncontrolled diabetes. Ozempic has been proven to provide renal protection. Awaiting response from [employer] about this.   Lab Results   Component Value Date    HGBA1C 10.5 (H) 02/21/2025            Acquired hypothyroidism  Stable. Continue 175 mcg of levothyroxine daily. Repeat TFT prior to follow up in 3 months.   Orders:    TSH, 3rd generation; Future    T4, free; Future        History of Present Illness   HPI  Luis F Velazquez is a 59 y.o. male with uncontrolled type 2 diabetes with insulin use presenting today for late follow-up.      Patient was last seen on 11/1/2024.  At that time, A1c was uncontrolled at 9.1%.  Patient had previously done very well on GLP-1 class.  However, these became cost prohibitive.  Since his last appointment, his PCP assisted him with financial assistance paperwork so that he may resume use.    Metformin was also increased to twice daily.  Basal insulin was increased to 60 units nightly.    Clinical picture complicated by depression and chronic pain.     Hemoglobin A1C   Latest Ref Rng Normal 4.0-5.6%; PreDiabetic 5.7-6.4%; Diabetic >=6.5%; Glycemic control for adults with diabetes <7.0% %   11/1/2024 9.1 !    2/20/2025 11.1 !    2/21/2025 10.5 (H)       eAG, EST AVG Glucose   Latest Ref Rng mg/dl   11/1/2024 2/20/2025 2/21/2025 255       Legend:  ! Abnormal  (H) High    Regimen:    Metformin 1000 mg twice daily  Toujeo 60 units nightly  Humalog 30 units 3 times daily prior to meals      Given albuminuria, patient would also benefit from SGLT2. However, this is also cost prohibitive.     Luis F Velazquez   Device used Freestyle matthew 3  Home use       Indication   Type 2 Diabetes    More than 72 hours of data was reviewed. Report to be scanned to chart.     Date Range: 2/24/25-3/9/25    Analysis of data:   Average  "Glucose: 278 mg/dL  Coefficient of Variation: 27%   GMI: 10%   Time in Target Range: 12%   Time Above Range: 24% 181-250 mg/dL; 64%> 250 mg/dL   Time Below Range: 0%     Interpretation of data:   Uncontrolled with persistent hyperglycemia.         Review of Systems   Constitutional:  Positive for fatigue. Negative for activity change, appetite change and unexpected weight change.   HENT:  Negative for dental problem, sore throat, trouble swallowing and voice change.    Eyes:  Negative for visual disturbance.   Respiratory:  Positive for shortness of breath. Negative for cough and chest tightness.    Cardiovascular:  Negative for chest pain, palpitations and leg swelling.   Gastrointestinal:  Negative for constipation, diarrhea, nausea and vomiting.   Endocrine: Negative for cold intolerance, heat intolerance, polydipsia, polyphagia and polyuria.   Genitourinary:  Negative for frequency.   Musculoskeletal:  Positive for arthralgias, back pain and myalgias. Negative for gait problem.   Skin:  Negative for wound.   Allergic/Immunologic: Negative for environmental allergies and food allergies.   Neurological:  Negative for dizziness, weakness, light-headedness, numbness and headaches.   Psychiatric/Behavioral:  Positive for dysphoric mood and sleep disturbance. Negative for decreased concentration. The patient is not nervous/anxious.           Objective   /60 (BP Location: Left arm, Patient Position: Sitting, Cuff Size: Large)   Pulse (!) 122   Temp 98.2 °F (36.8 °C) (Temporal)   Resp 20   Ht 5' 4\" (1.626 m)   Wt 129 kg (284 lb)   SpO2 95%   BMI 48.75 kg/m²      Physical Exam  Vitals reviewed.   Constitutional:       General: He is not in acute distress.     Appearance: He is well-developed. He is obese. He is not ill-appearing.   HENT:      Head: Normocephalic and atraumatic.   Eyes:      Conjunctiva/sclera: Conjunctivae normal.   Cardiovascular:      Rate and Rhythm: Normal rate and regular rhythm.      " Pulses: Normal pulses.      Heart sounds: Normal heart sounds. No murmur heard.  Pulmonary:      Effort: Pulmonary effort is normal. No respiratory distress.      Breath sounds: Wheezing present.   Abdominal:      Palpations: Abdomen is soft.      Tenderness: There is no abdominal tenderness.   Musculoskeletal:         General: No swelling.      Cervical back: Normal range of motion and neck supple.   Skin:     General: Skin is warm and dry.      Capillary Refill: Capillary refill takes less than 2 seconds.   Neurological:      Mental Status: He is alert.   Psychiatric:         Mood and Affect: Mood normal.

## 2025-03-06 ENCOUNTER — TELEPHONE (OUTPATIENT)
Dept: FAMILY MEDICINE CLINIC | Facility: CLINIC | Age: 60
End: 2025-03-06

## 2025-03-06 DIAGNOSIS — M79.18 MYOFASCIAL PAIN SYNDROME: ICD-10-CM

## 2025-03-06 DIAGNOSIS — M47.816 LUMBAR SPONDYLOSIS: ICD-10-CM

## 2025-03-06 DIAGNOSIS — G89.4 CHRONIC PAIN SYNDROME: ICD-10-CM

## 2025-03-06 DIAGNOSIS — I10 ESSENTIAL HYPERTENSION: ICD-10-CM

## 2025-03-06 NOTE — TELEPHONE ENCOUNTER
----- Message from Amarilis Wilson DO sent at 3/5/2025  6:30 PM EST -----  Regarding: Letter  Please call the patient and ask him to bring in the letter he received about his [employer]p [employer] financial assist. He mentioned to the endocrine office he potentially needed a prescription for something, but it seems like he isn't quite sure. I would like to see the letter so we can figure out what needs to be ordered. Thank you     -Amarilis

## 2025-03-07 RX ORDER — HYDROCHLOROTHIAZIDE 25 MG/1
25 TABLET ORAL 2 TIMES DAILY
Qty: 180 TABLET | Refills: 0 | Status: SHIPPED | OUTPATIENT
Start: 2025-03-07

## 2025-03-07 RX ORDER — CARISOPRODOL 350 MG/1
350 TABLET ORAL 3 TIMES DAILY
Qty: 90 TABLET | Refills: 5 | Status: SHIPPED | OUTPATIENT
Start: 2025-03-07

## 2025-03-07 NOTE — TELEPHONE ENCOUNTER
Patient called to request a refill for their carisoprodol (SOMA) advised a refill was requested on 12/30 and is pending approval. Patient verbalized understanding and is in agreement.     Does the patient have enough for 3 days?   [] Yes   [x] No - Send as HP to POD

## 2025-03-12 ENCOUNTER — TELEPHONE (OUTPATIENT)
Dept: FAMILY MEDICINE CLINIC | Facility: CLINIC | Age: 60
End: 2025-03-12

## 2025-03-12 ENCOUNTER — OFFICE VISIT (OUTPATIENT)
Dept: PAIN MEDICINE | Facility: CLINIC | Age: 60
End: 2025-03-12
Payer: MEDICARE

## 2025-03-12 VITALS
WEIGHT: 284 LBS | RESPIRATION RATE: 20 BRPM | BODY MASS INDEX: 48.49 KG/M2 | OXYGEN SATURATION: 94 % | HEART RATE: 75 BPM | TEMPERATURE: 98.4 F | HEIGHT: 64 IN

## 2025-03-12 DIAGNOSIS — M47.816 LUMBAR SPONDYLOSIS: Primary | ICD-10-CM

## 2025-03-12 DIAGNOSIS — G89.4 CHRONIC PAIN SYNDROME: ICD-10-CM

## 2025-03-12 DIAGNOSIS — Z12.11 COLON CANCER SCREENING: Primary | ICD-10-CM

## 2025-03-12 PROCEDURE — 99214 OFFICE O/P EST MOD 30 MIN: CPT | Performed by: ANESTHESIOLOGY

## 2025-03-12 PROCEDURE — G2211 COMPLEX E/M VISIT ADD ON: HCPCS | Performed by: ANESTHESIOLOGY

## 2025-03-12 NOTE — PROGRESS NOTES
Assessment:  1. Lumbar spondylosis    2. Chronic pain syndrome        Plan:  Patient is a 59-year-old male who complains of low back pain with chronic pacing secondary to lumbar spondylosis, lumbar degenerative disease, colitis presents to office for follow-up visit.  Patient is status post bilateral L4-5 and L5-S1 medial branch block #1 and #2 to which she reported greater than 80% relief lasting more than 6 hours from medial branch blocks.  Patient continues to feel constant sharp, stabbing pain most likely secondary to facet agenic low back pain.  1.  We will schedule patient for a right L4-L5 and L5-S1 radiofrequency ablation  2.  Will schedule patient for a left L4-L5 and L5-S1 radiofrequency ablation  3.  Follow-up 1 month after bilateral radiofrequency ablations were done        Complete risks and benefits including bleeding, infection, tissue reaction, nerve injury and allergic reaction were discussed. The approach was demonstrated using models and literature was provided. Verbal and written consent was obtained.  \    History of Present Illness:  The patient is a 59 y.o. male who presents for a follow up office visit in regards to Back Pain.   The patient’s current symptoms include 9 out of 10 constant sharp pain without a particular time pattern.    Current pain medications includes: None.     I have personally reviewed and/or updated the patient's past medical history, past surgical history, family history, social history, current medications, allergies, and vital signs today.         Review of Systems  Review of Systems   Respiratory:  Positive for shortness of breath.    Musculoskeletal:  Positive for back pain and gait problem.        Decreased ROM     Neurological:  Positive for dizziness.   All other systems reviewed and are negative.          Past Medical History:   Diagnosis Date    Acquired hypothyroidism 1/2/2015    Angioedema     Diabetes mellitus (HCC)     Disease of thyroid gland      Hyperlipidemia     Hypertension     Mixed hyperlipidemia 2015    Morbid obesity (HCC)     Morbid obesity with BMI of 45.0-49.9, adult (HCC) 2015    MARSHA (obstructive sleep apnea)     Primary hypertension 2015    Transitioned From: Benign essential hypertension       Past Surgical History:   Procedure Laterality Date    KNEE SURGERY      SINUS SURGERY         Family History   Problem Relation Age of Onset    Diabetes Mother     Diabetes type II Mother     Esophageal cancer Father     Diabetes Brother     Kidney cancer Brother     Diabetes type II Brother     Diabetes Maternal Grandmother     Diabetes type II Maternal Grandmother     Diabetes Paternal Grandmother     Heart disease Neg Hx        Social History     Occupational History    Not on file   Tobacco Use    Smoking status: Former     Current packs/day: 0.00     Average packs/day: 3.0 packs/day for 40.0 years (120.0 ttl pk-yrs)     Types: Cigarettes     Start date: 10/21/1982     Quit date: 10/21/2022     Years since quittin.3     Passive exposure: Past    Smokeless tobacco: Never    Tobacco comments:     states read to quit prior to admit   Vaping Use    Vaping status: Never Used   Substance and Sexual Activity    Alcohol use: Not Currently     Alcohol/week: 3.0 standard drinks of alcohol     Types: 2 Cans of beer, 1 Shots of liquor per week     Comment: No alcohol since     Drug use: No    Sexual activity: Not Currently         Current Outpatient Medications:     albuterol (2.5 mg/3 mL) 0.083 % nebulizer solution, Take 3 mL (2.5 mg total) by nebulization every 6 (six) hours as needed for wheezing or shortness of breath, Disp: 180 mL, Rfl: 0    Ascorbic Acid (VITAMIN C PO), Take 1 tablet by mouth daily, Disp: , Rfl:     aspirin 81 mg chewable tablet, Take one tablet by mouth daily, Disp: , Rfl:     carisoprodol (SOMA) 350 mg tablet, TAKE 1 TABLET BY MOUTH THREE TIMES DAILY, Disp: 90 tablet, Rfl: 5    cholecalciferol (VITAMIN D3) 400 units  tablet, Take 1 tablet by mouth daily, Disp: , Rfl:     clotrimazole (LOTRIMIN) 1 % cream, Apply topically 2 (two) times a day, Disp: 60 g, Rfl: 2    Diclofenac Sodium (VOLTAREN) 1 %, Apply 2 g topically 4 (four) times a day To feet/ankle, Disp: 150 g, Rfl: 1    DULoxetine (CYMBALTA) 60 mg delayed release capsule, Take 1 capsule by mouth once daily, Disp: 90 capsule, Rfl: 0    EPINEPHrine (EPIPEN) 0.3 mg/0.3 mL SOAJ, INJECT 0.3MG INTO A MUSCLE ONCE FOR 1 DOSE, Disp: 2 each, Rfl: 0    ferrous gluconate (FERGON) 324 mg tablet, Take 324 mg by mouth, Disp: , Rfl:     finasteride (PROSCAR) 5 mg tablet, Take 1 tablet (5 mg total) by mouth daily, Disp: 90 tablet, Rfl: 3    HumaLOG KwikPen 100 units/mL injection pen, Inject 30 Units under the skin 3 (three) times a day with meals, Disp: 81 mL, Rfl: 1    hydroCHLOROthiazide 25 mg tablet, Take 1 tablet by mouth twice daily, Disp: 180 tablet, Rfl: 0    insulin glargine (Toujeo SoloStar) 300 units/mL CONCENTRATED U-300 injection pen (1-unit dial), Inject 72 units nightly., Disp: 22.5 mL, Rfl: 1    Insulin Pen Needle (1st Tier Unifine Pentips Plus) 31G X 5 MM MISC, Use 4 (four) times a day, Disp: 400 each, Rfl: 1    ketoconazole (NIZORAL) 2 % shampoo, Apply 1 Application topically 2 (two) times a week To feet in shower, Disp: 120 mL, Rfl: 2    levothyroxine 175 mcg tablet, Take 1 tablet by mouth once daily, Disp: 90 tablet, Rfl: 0    loratadine (CLARITIN) 10 mg tablet, Take 10 mg by mouth daily, Disp: , Rfl:     metFORMIN (GLUCOPHAGE) 1000 MG tablet, Take 1 tablet (1,000 mg total) by mouth 2 (two) times a day with meals, Disp: 180 tablet, Rfl: 1    Omega-3 Fatty Acids (fish oil) 1,000 mg, Take 1,000 mg by mouth 2 (two) times a day, Disp: , Rfl:     pregabalin (LYRICA) 50 mg capsule, Take 1 capsule by mouth twice daily, Disp: 60 capsule, Rfl: 2    rosuvastatin (CRESTOR) 40 MG tablet, Take 1 tablet by mouth once daily, Disp: 90 tablet, Rfl: 0    sodium chloride (OCEAN) 0.65 % nasal  "spray, 1 spray into each nostril as needed for rhinitis, Disp: 30 mL, Rfl: 1    tamsulosin (FLOMAX) 0.4 mg, Take 1 capsule (0.4 mg total) by mouth 2 (two) times a day, Disp: 180 capsule, Rfl: 3    Thiamine HCl (vitamin B-1) 250 MG tablet, Take 250 mg by mouth daily, Disp: , Rfl:     traZODone (DESYREL) 150 mg tablet, TAKE 1 TABLET BY MOUTH ONCE DAILY AT BEDTIME, Disp: 90 tablet, Rfl: 0    valsartan (DIOVAN) 160 mg tablet, Take 160 mg by mouth daily, Disp: , Rfl:     Ventolin  (90 Base) MCG/ACT inhaler, Inhale 2 puffs every 6 (six) hours as needed for wheezing or shortness of breath, Disp: 18 g, Rfl: 5    vitamin E, tocopherol, 400 units capsule, Take 400 Units by mouth daily, Disp: , Rfl:     Allergies   Allergen Reactions    Ace Inhibitors Swelling     Angioedema    PT STATES THIS IS NOT AN ALLERGY    Other Anaphylaxis     Pt believes that he is allergic to peanut butter.    Also, seasonal allergies    Zinc Tongue Swelling     PT STATES THIS IS NOT AN ALLERGY    Clindamycin Edema     Had angioedema episode approx 5 hr after IM administration of clindamycin. Unclear if there is definitive causal relationship.  PT STATES THIS IS NOT AN ALLERGY       Physical Exam:    Pulse 75   Temp 98.4 °F (36.9 °C)   Resp 20   Ht 5' 4\" (1.626 m)   Wt 129 kg (284 lb)   SpO2 94%   BMI 48.75 kg/m²     Constitutional:normal, well developed, well nourished, alert, in no distress and non-toxic and no overt pain behavior. and obese  Eyes:anicteric  HEENT:grossly intact  Neck:supple, symmetric, trachea midline and no masses   Pulmonary:even and unlabored  Cardiovascular:No edema or pitting edema present  Skin:Normal without rashes or lesions and well hydrated  Psychiatric:Mood and affect appropriate  Neurologic:Cranial Nerves II-XII grossly intact  Musculoskeletal:antalgic    Lumbar/Sacral Spine examination demonstrates.  Decreased range of motion lumbar spine with pain upon: flexion, lateral rotation to the left/right, and " bending to the left/right.  Bilateral lumbar paraspinals tender to palpation. Muscle spasms noted in the lumbar area bilaterally. 4/5 lower extremity strength in all muscle groups bilaterally. Positive seated straight leg raise for bilateral lower extremities.  Sensitivity to light touch intact bilateral lower extremities. 2+ reflexes in the patella and Achilles.  No ankle clonus    Imaging      Study Result    Narrative & Impression   MRI LUMBAR SPINE WITHOUT CONTRAST     INDICATION: M51.16: Intervertebral disc disorders with radiculopathy, lumbar region  M47.816: Spondylosis without myelopathy or radiculopathy, lumbar region  M79.18: Myalgia, other site  G89.4: Chronic pain syndrome.     COMPARISON: Lumbar spine radiographs 11/22/2024.     TECHNIQUE:  Multiplanar, multisequence imaging of the lumbar spine was performed. .        IMAGE QUALITY: Motion-degraded, which reduces diagnostic sensitivity.     FINDINGS:     VERTEBRAL BODIES: Normal alignment. No acute fracture.     Multilevel superior and inferior endplate Schmorl's nodes.     SACRUM: No acute abnormality.     DISTAL CORD AND CONUS:  Normal size and signal within the distal cord and conus.     PARASPINAL SOFT TISSUES: No acute abnormality.     LOWER THORACIC DISC SPACES: No significant spondylotic changes.     LUMBAR DISC SPACES: Multilevel mild degenerative disc disease.     L1-L2: No significant neuroforaminal narrowing or spinal canal stenosis.     L2-L3: No significant neuroforaminal narrowing or spinal canal stenosis.     L3-L4: Disc bulge. Moderate-marked facet arthropathy. Mild bilateral neuroforaminal narrowing. No significant spinal canal stenosis.     L4-L5: Disc bulge. Moderate facet arthropathy. No significant neuroforaminal narrowing. No significant spinal canal stenosis.     L5-S1: Disc bulge. Moderate-marked facet arthropathy. There is extraforaminal abutment of the exiting right L5 nerve root (series 4, image 18). To a lesser degree, there  is also extraforaminal abutment of the exiting left L5 nerve root (series 4, image   2). No significant spinal canal stenosis.     IMPRESSION:     Multilevel lumbar spondylotic changes, as described above, noting probable extraforaminal abutment of the exiting right greater than left L5 nerve roots at L5-S1. Correlate for radiculopathy in these distributions to assess the clinical significance of   this finding.     The study was marked in EPIC for significant notification.        Workstation performed: [id number]             No orders of the defined types were placed in this encounter.

## 2025-03-12 NOTE — TELEPHONE ENCOUNTER
Attempted to contact patient X2 and no answer. Left message to call back office regarding [employer] application.     ----- Message from Zana Aaron MD sent at 3/12/2025  1:20 PM EDT -----  Regarding: [employer] Applicant  This is the patient we discussed. He must write his household size number somewhere on either his income tax returns or social security award letter (if applicable. Either will do). Then he must fax these to us to help get his ozempic application completed.  -Zana Aarno MD

## 2025-03-20 ENCOUNTER — OFFICE VISIT (OUTPATIENT)
Dept: PODIATRY | Facility: CLINIC | Age: 60
End: 2025-03-20
Payer: MEDICARE

## 2025-03-20 VITALS
TEMPERATURE: 98.7 F | RESPIRATION RATE: 16 BRPM | HEART RATE: 111 BPM | WEIGHT: 284 LBS | BODY MASS INDEX: 48.49 KG/M2 | HEIGHT: 64 IN

## 2025-03-20 DIAGNOSIS — E11.42 DIABETIC POLYNEUROPATHY ASSOCIATED WITH TYPE 2 DIABETES MELLITUS (HCC): ICD-10-CM

## 2025-03-20 DIAGNOSIS — B35.1 ONYCHOMYCOSIS: Primary | ICD-10-CM

## 2025-03-20 DIAGNOSIS — M79.675 PAIN IN TOES OF BOTH FEET: ICD-10-CM

## 2025-03-20 DIAGNOSIS — Z79.4 TYPE 2 DIABETES MELLITUS WITH HYPERGLYCEMIA, WITH LONG-TERM CURRENT USE OF INSULIN (HCC): ICD-10-CM

## 2025-03-20 DIAGNOSIS — M79.674 PAIN IN TOES OF BOTH FEET: ICD-10-CM

## 2025-03-20 DIAGNOSIS — I73.9 PERIPHERAL VASCULAR DISEASE (HCC): ICD-10-CM

## 2025-03-20 DIAGNOSIS — E11.65 TYPE 2 DIABETES MELLITUS WITH HYPERGLYCEMIA, WITH LONG-TERM CURRENT USE OF INSULIN (HCC): ICD-10-CM

## 2025-03-20 PROCEDURE — 11721 DEBRIDE NAIL 6 OR MORE: CPT | Performed by: PODIATRIST

## 2025-03-20 PROCEDURE — RECHECK: Performed by: PODIATRIST

## 2025-03-20 RX ORDER — PREGABALIN 50 MG/1
50 CAPSULE ORAL 2 TIMES DAILY
Qty: 180 CAPSULE | Refills: 0 | Status: SHIPPED | OUTPATIENT
Start: 2025-03-20

## 2025-03-27 ENCOUNTER — OFFICE VISIT (OUTPATIENT)
Dept: PODIATRY | Facility: CLINIC | Age: 60
End: 2025-03-27
Payer: MEDICARE

## 2025-03-27 VITALS
HEIGHT: 64 IN | WEIGHT: 284 LBS | RESPIRATION RATE: 16 BRPM | BODY MASS INDEX: 48.49 KG/M2 | OXYGEN SATURATION: 99 % | HEART RATE: 74 BPM | TEMPERATURE: 98 F

## 2025-03-27 DIAGNOSIS — M79.675 PAIN OF TOE OF LEFT FOOT: ICD-10-CM

## 2025-03-27 DIAGNOSIS — E11.65 TYPE 2 DIABETES MELLITUS WITH HYPERGLYCEMIA, WITH LONG-TERM CURRENT USE OF INSULIN (HCC): Primary | ICD-10-CM

## 2025-03-27 DIAGNOSIS — E11.42 DIABETIC POLYNEUROPATHY ASSOCIATED WITH TYPE 2 DIABETES MELLITUS (HCC): ICD-10-CM

## 2025-03-27 DIAGNOSIS — L60.0 INGROWN TOENAIL: ICD-10-CM

## 2025-03-27 DIAGNOSIS — Z79.4 TYPE 2 DIABETES MELLITUS WITH HYPERGLYCEMIA, WITH LONG-TERM CURRENT USE OF INSULIN (HCC): Primary | ICD-10-CM

## 2025-03-27 DIAGNOSIS — I73.9 PERIPHERAL VASCULAR DISEASE (HCC): ICD-10-CM

## 2025-03-27 PROCEDURE — RECHECK: Performed by: PODIATRIST

## 2025-03-27 PROCEDURE — 11750 EXCISION NAIL&NAIL MATRIX: CPT | Performed by: PODIATRIST

## 2025-03-27 NOTE — PROGRESS NOTES
"Name: Luis F Velazquez      : 1965      MRN: [id number]  Encounter Provider: Don Walker DPM  Encounter Date: 3/27/2025   Encounter department: [facility] PODIAT[facility] [place]  :  Assessment & Plan  Type 2 diabetes mellitus with hyperglycemia, with long-term current use of insulin (HCC)    Lab Results   Component Value Date    HGBA1C 10.5 (H) 2025            Peripheral vascular disease (HCC)         Diabetic polyneuropathy associated with type 2 diabetes mellitus (HCC)    Lab Results   Component Value Date    HGBA1C 10.5 (H) 2025            Ingrown toenail         Pain of toe of left foot         Nail removal    Date/Time: 3/27/2025 11:15 AM    Performed by: Don Walker DPM  Authorized by: Don Walker DPM    Patient location:  Clinic  Indications / Diagnosis:  Ingrown nail  Universal Protocol:  procedure performed by consultantConsent: Verbal consent obtained.  Risks and benefits: risks, benefits and alternatives were discussed  Consent given by: patient  Time out: Immediately prior to procedure a \"time out\" was called to verify the correct patient, procedure, equipment, support staff and site/side marked as required.  Patient understanding: patient states understanding of the procedure being performed  Patient consent: the patient's understanding of the procedure matches consent given  Patient identity confirmed: verbally with patient    Location:     Foot:  L big toe  Pre-procedure details:     Skin preparation:  Betadine    Preparation: Patient was prepped and draped in the usual sterile fashion    Anesthesia (see MAR for exact dosages):     Anesthesia method:  Nerve block    Block needle gauge:  25 G    Block anesthetic:  Lidocaine 2% w/o epi    Block technique:  Digital Block    Block outcome:  Anesthesia achieved  Nail Removal:     Nail removed:  Partial    Nail removed location: medial and lateral borders.    Nail bed sutured: no    Ingrown nail:     Wedge excision of " skin: no      Nail matrix removed or ablated:  Partial  Post-procedure details:     Dressing:  4x4 sterile gauze, antibiotic ointment and gauze roll    Patient tolerance of procedure:  Tolerated well, no immediate complications  Comments:      Discussion with patient was completed today regarding diagnosis and potential etiologies as well as treatment options for this ingrown nail diagnosis.  Discussed how to avoid recurrence and possible treatment options if recurrence does occur.    Antibiotic ointment applied to border with bandage dressing  Pt instructed to keep dressing intact for 24 hours.  After this time use triple antibiotic ointment to the area and a dry dressing changed daily  Return to clinic in about 2 weeks for reevaluation.  If notice any redness, swelling, drainage, or excessive pain or signs of infection to notify the office sooner.  Procedure completed without incident.  Do not soak foot.    Procedure in detail: Once the toe was anesthetized, the area was scrubbed and prepped with sterile technique.  A Tourni-Cot was used to the level on the toe.  A hemostat was used to lift up the involved border of the great toe.  Care was taken to protect the underlying nail bed.  An English anvil was used to cut back corner to the base of the nail.  A hemostat was then used to remove the nail that was ingrown.  This was then checked that the entire ingrown portion was removed. A currette was used to remove the nail matrix from the base of the nail at the proximal corner, three applications of 90% phenol were applied to the border with cotton swabs with alcohol wash inbetween.   Hemostasis was achieved and dressings were applied.    He was given his postop  instructions and understood them     Return in about 2 weeks (around 4/10/2025).     History of Present Illness   HPI  Luis F Velazquez is a 59 y.o. male who presents with chief complaint of a painful ingrown nail on his left big toe.  Patient relates that both  sides are problematic.  He is a diabetic with peripheral neuropathy and peripheral vascular disease.  History obtained from: patient    Review of Systems  Medical History Reviewed by provider this encounter:     .  Current Outpatient Medications on File Prior to Visit   Medication Sig Dispense Refill    albuterol (2.5 mg/3 mL) 0.083 % nebulizer solution Take 3 mL (2.5 mg total) by nebulization every 6 (six) hours as needed for wheezing or shortness of breath 180 mL 0    Ascorbic Acid (VITAMIN C PO) Take 1 tablet by mouth daily      aspirin 81 mg chewable tablet Take one tablet by mouth daily      carisoprodol (SOMA) 350 mg tablet TAKE 1 TABLET BY MOUTH THREE TIMES DAILY 90 tablet 5    cholecalciferol (VITAMIN D3) 400 units tablet Take 1 tablet by mouth daily      clotrimazole (LOTRIMIN) 1 % cream Apply topically 2 (two) times a day 60 g 2    Diclofenac Sodium (VOLTAREN) 1 % Apply 2 g topically 4 (four) times a day To feet/ankle 150 g 1    DULoxetine (CYMBALTA) 60 mg delayed release capsule Take 1 capsule by mouth once daily 90 capsule 0    EPINEPHrine (EPIPEN) 0.3 mg/0.3 mL SOAJ INJECT 0.3MG INTO A MUSCLE ONCE FOR 1 DOSE 2 each 0    finasteride (PROSCAR) 5 mg tablet Take 1 tablet (5 mg total) by mouth daily 90 tablet 3    HumaLOG KwikPen 100 units/mL injection pen Inject 30 Units under the skin 3 (three) times a day with meals 81 mL 1    hydroCHLOROthiazide 25 mg tablet Take 1 tablet by mouth twice daily 180 tablet 0    insulin glargine (Toujeo SoloStar) 300 units/mL CONCENTRATED U-300 injection pen (1-unit dial) Inject 72 units nightly. 22.5 mL 1    Insulin Pen Needle (1st Tier Unifine Pentips Plus) 31G X 5 MM MISC Use 4 (four) times a day 400 each 1    ketoconazole (NIZORAL) 2 % shampoo Apply 1 Application topically 2 (two) times a week To feet in shower 120 mL 2    levothyroxine 175 mcg tablet Take 1 tablet by mouth once daily 90 tablet 0    loratadine (CLARITIN) 10 mg tablet Take 10 mg by mouth daily       metFORMIN (GLUCOPHAGE) 1000 MG tablet Take 1 tablet (1,000 mg total) by mouth 2 (two) times a day with meals 180 tablet 1    Omega-3 Fatty Acids (fish oil) 1,000 mg Take 1,000 mg by mouth 2 (two) times a day      pregabalin (LYRICA) 50 mg capsule Take 1 capsule by mouth twice daily 180 capsule 0    rosuvastatin (CRESTOR) 40 MG tablet Take 1 tablet by mouth once daily 90 tablet 0    sodium chloride (OCEAN) 0.65 % nasal spray 1 spray into each nostril as needed for rhinitis 30 mL 1    tamsulosin (FLOMAX) 0.4 mg Take 1 capsule (0.4 mg total) by mouth 2 (two) times a day 180 capsule 3    Thiamine HCl (vitamin B-1) 250 MG tablet Take 250 mg by mouth daily      traZODone (DESYREL) 150 mg tablet TAKE 1 TABLET BY MOUTH ONCE DAILY AT BEDTIME 90 tablet 0    valsartan (DIOVAN) 160 mg tablet Take 160 mg by mouth daily      Ventolin  (90 Base) MCG/ACT inhaler Inhale 2 puffs every 6 (six) hours as needed for wheezing or shortness of breath 18 g 5    vitamin E, tocopherol, 400 units capsule Take 400 Units by mouth daily      ferrous gluconate (FERGON) 324 mg tablet Take 324 mg by mouth      [DISCONTINUED] atorvastatin (LIPITOR) 20 mg tablet Take 1 tablet by mouth once daily 90 tablet 3     No current facility-administered medications on file prior to visit.      Social History     Tobacco Use    Smoking status: Former     Current packs/day: 0.00     Average packs/day: 3.0 packs/day for 40.0 years (120.0 ttl pk-yrs)     Types: Cigarettes     Start date: 10/21/1982     Quit date: 10/21/2022     Years since quittin.4     Passive exposure: Past    Smokeless tobacco: Never    Tobacco comments:     states read to quit prior to admit   Vaping Use    Vaping status: Never Used   Substance and Sexual Activity    Alcohol use: Not Currently     Alcohol/week: 3.0 standard drinks of alcohol     Types: 2 Cans of beer, 1 Shots of liquor per week     Comment: No alcohol since     Drug use: No    Sexual activity: Not Currently       "  Objective   Pulse 74   Temp 98 °F (36.7 °C) (Temporal)   Resp 16   Ht 5' 4\" (1.626 m)   Wt 129 kg (284 lb)   SpO2 99%   BMI 48.75 kg/m²      Physical Exam  Vascular status is unchanged from his visit on 3/20/2025.    Derm the left hallux nail is incurvated on both borders with erythema and pain upon palpation.  There are medial and lateral bulges present and blanching to the distal aspect of both grooves.  There is hypertrophic tissue present in both grooves and negative sign of any infection.  There is dried blood present on the medial distal border hallux nail.    Administrative Statements   I have spent a total time of 15 minutes in caring for this patient on the day of the visit/encounter including Risks and benefits of tx options, Instructions for management, Patient and family education, Importance of tx compliance, Risk factor reductions, Counseling / Coordination of care, Documenting in the medical record, and Obtaining or reviewing history  .  "

## 2025-03-28 LAB — COLOGUARD RESULT REPORTABLE: POSITIVE

## 2025-03-31 DIAGNOSIS — F41.8 DEPRESSION WITH ANXIETY: ICD-10-CM

## 2025-03-31 RX ORDER — TRAZODONE HYDROCHLORIDE 150 MG/1
150 TABLET ORAL
Qty: 90 TABLET | Refills: 0 | Status: SHIPPED | OUTPATIENT
Start: 2025-03-31

## 2025-04-08 ENCOUNTER — TELEPHONE (OUTPATIENT)
Age: 60
End: 2025-04-08

## 2025-04-11 ENCOUNTER — HOSPITAL ENCOUNTER (OUTPATIENT)
Dept: RADIOLOGY | Facility: HOSPITAL | Age: 60
End: 2025-04-11
Payer: MEDICARE

## 2025-04-11 ENCOUNTER — TELEPHONE (OUTPATIENT)
Dept: PAIN MEDICINE | Facility: CLINIC | Age: 60
End: 2025-04-11

## 2025-04-11 VITALS
DIASTOLIC BLOOD PRESSURE: 80 MMHG | OXYGEN SATURATION: 94 % | HEART RATE: 56 BPM | RESPIRATION RATE: 18 BRPM | SYSTOLIC BLOOD PRESSURE: 151 MMHG | TEMPERATURE: 96.8 F

## 2025-04-11 DIAGNOSIS — M47.816 LUMBAR SPONDYLOSIS: ICD-10-CM

## 2025-04-11 PROCEDURE — 64636 DESTROY L/S FACET JNT ADDL: CPT | Performed by: ANESTHESIOLOGY

## 2025-04-11 PROCEDURE — 64635 DESTROY LUMB/SAC FACET JNT: CPT | Performed by: ANESTHESIOLOGY

## 2025-04-11 RX ORDER — LIDOCAINE HYDROCHLORIDE 10 MG/ML
5 INJECTION, SOLUTION EPIDURAL; INFILTRATION; INTRACAUDAL; PERINEURAL ONCE
Status: COMPLETED | OUTPATIENT
Start: 2025-04-11 | End: 2025-04-11

## 2025-04-11 RX ADMIN — Medication 4 ML: at 10:18

## 2025-04-11 RX ADMIN — LIDOCAINE HYDROCHLORIDE 5 ML: 10 INJECTION, SOLUTION EPIDURAL; INFILTRATION; INTRACAUDAL; PERINEURAL at 10:14

## 2025-04-11 NOTE — DISCHARGE INSTR - LAB

## 2025-04-11 NOTE — H&P
Assessment:  1. Lumbar spondylosis  FL spine and pain procedure    FL spine and pain procedure          Plan:  Luis F Velazquez is a 59 y.o. male with complaints of low back pain presents to surgical center for procedure.  We will perform a right L4-L5 and L5-S1 Refixia ablation  2. Follow-up 1 month after injection    Complete risks and benefits including bleeding, infection, tissue reaction, nerve injury and allergic reaction were discussed. The approach was demonstrated using models and literature was provided. Verbal and written consent was obtained.    My impressions and treatment recommendations were discussed in detail with the patient who verbalized understanding and had no further questions.  Discharge instructions were provided. I personally saw and examined the patient and I agree with the above discussed plan of care.    No orders of the defined types were placed in this encounter.    New Medications Ordered This Visit   Medications    lidocaine (PF) (XYLOCAINE-MPF) 1 % injection 5 mL    lidocaine (PF) (XYLOCAINE-MPF) 2 % injection 4 mL       History of Present Illness:  Luis F Velazquez is a 59 y.o. male who presents for a follow up office visit in regards to low back pain.   The patient’s current symptoms include 7 out of 10 constant sharp constant, throbbing pain through particular time pattern.      I have personally reviewed and/or updated the patient's past medical history, past surgical history, family history, social history, current medications, allergies, and vital signs today.     Review of Systems   Musculoskeletal:  Positive for arthralgias and back pain.   All other systems reviewed and are negative.      Patient Active Problem List   Diagnosis    Allergic rhinitis    Arthritis    Chronic back pain    Chronic sinusitis    Depression with anxiety    Essential (primary) hypertension    Acquired hypothyroidism    Morbid obesity with BMI of 45.0-49.9, adult (HCC)    Diabetic polyneuropathy associated  with type 2 diabetes mellitus (HCC)    Vitamin D deficiency    Bilateral lower leg cellulitis    Elevated LFTs    Elevated lactic acid level    Tobacco abuse, in remission    Noncompliance with diabetes treatment    Epistaxis    Muscle twitching    Type 2 diabetes mellitus with hyperglycemia, with long-term current use of insulin ([facility])    Atelectasis    Urinary retention    Constipation    Depression, recurrent (HCC)    Peripheral vascular disease (HCC)    Primary osteoarthritis of both knees    Hyperlipidemia    Bright red rectal bleeding    ABLA (acute blood loss anemia)    Microcytic anemia    Fatty liver disease, nonalcoholic    Umbilical hernia without obstruction and without gangrene    Restrictive lung disease secondary to obesity    Diabetic nephropathy associated with type 2 diabetes mellitus ([facility])    Proteinuria    Chronic kidney disease, stage 2 (mild)    Neuropathy    Chronic respiratory failure with hypoxia, on home oxygen therapy  ([facility])    Urinary hesitancy due to benign prostatic hyperplasia    Hypo-osmolality and hyponatremia    LAMB (dyspnea on exertion)    MARSHA treated with BiPAP    Chronic right sacroiliac joint pain    Pain in both feet    Positive screening for depression on 9-item Patient Health Questionnaire (PHQ-9)    Social problem    Type 2 diabetes mellitus with stage 3 chronic kidney disease, with long-term current use of insulin, unspecified whether stage 3a or 3b CKD ([facility])    Lumbar spondylosis    Chronic pain syndrome    Myofascial pain syndrome       Past Medical History:   Diagnosis Date    Acquired hypothyroidism 1/2/2015    Angioedema     Diabetes mellitus ([facility])     Disease of thyroid gland     Hyperlipidemia     Hypertension     Mixed hyperlipidemia 2/4/2015    Morbid obesity ([facility])     Morbid obesity with BMI of 45.0-49.9, adult ([facility]) 1/2/2015    MARSHA (obstructive sleep apnea)     Primary hypertension 1/2/2015    Transitioned From: Benign essential hypertension       Past Surgical  History:   Procedure Laterality Date    KNEE SURGERY      SINUS SURGERY         Family History   Problem Relation Age of Onset    Diabetes Mother     Diabetes type II Mother     Esophageal cancer Father     Diabetes Brother     Kidney cancer Brother     Diabetes type II Brother     Diabetes Maternal Grandmother     Diabetes type II Maternal Grandmother     Diabetes Paternal Grandmother     Heart disease Neg Hx        Social History     Occupational History    Not on file   Tobacco Use    Smoking status: Former     Current packs/day: 0.00     Average packs/day: 3.0 packs/day for 40.0 years (120.0 ttl pk-yrs)     Types: Cigarettes     Start date: 10/21/1982     Quit date: 10/21/2022     Years since quittin.4     Passive exposure: Past    Smokeless tobacco: Never    Tobacco comments:     states read to quit prior to admit   Vaping Use    Vaping status: Never Used   Substance and Sexual Activity    Alcohol use: Not Currently     Alcohol/week: 3.0 standard drinks of alcohol     Types: 2 Cans of beer, 1 Shots of liquor per week     Comment: No alcohol since     Drug use: No    Sexual activity: Not Currently       Current Outpatient Medications on File Prior to Encounter   Medication Sig    albuterol (2.5 mg/3 mL) 0.083 % nebulizer solution Take 3 mL (2.5 mg total) by nebulization every 6 (six) hours as needed for wheezing or shortness of breath    Ascorbic Acid (VITAMIN C PO) Take 1 tablet by mouth daily    aspirin 81 mg chewable tablet Take one tablet by mouth daily    carisoprodol (SOMA) 350 mg tablet TAKE 1 TABLET BY MOUTH THREE TIMES DAILY    cholecalciferol (VITAMIN D3) 400 units tablet Take 1 tablet by mouth daily    clotrimazole (LOTRIMIN) 1 % cream Apply topically 2 (two) times a day    Diclofenac Sodium (VOLTAREN) 1 % Apply 2 g topically 4 (four) times a day To feet/ankle    DULoxetine (CYMBALTA) 60 mg delayed release capsule Take 1 capsule by mouth once daily    EPINEPHrine (EPIPEN) 0.3 mg/0.3 mL SOAJ  INJECT 0.3MG INTO A MUSCLE ONCE FOR 1 DOSE    ferrous gluconate (FERGON) 324 mg tablet Take 324 mg by mouth    finasteride (PROSCAR) 5 mg tablet Take 1 tablet (5 mg total) by mouth daily    HumaLOG KwikPen 100 units/mL injection pen Inject 30 Units under the skin 3 (three) times a day with meals    hydroCHLOROthiazide 25 mg tablet Take 1 tablet by mouth twice daily    insulin glargine (Toujeo SoloStar) 300 units/mL CONCENTRATED U-300 injection pen (1-unit dial) Inject 72 units nightly.    Insulin Pen Needle (1st Tier Unifine Pentips Plus) 31G X 5 MM MISC Use 4 (four) times a day    ketoconazole (NIZORAL) 2 % shampoo Apply 1 Application topically 2 (two) times a week To feet in shower    levothyroxine 175 mcg tablet Take 1 tablet by mouth once daily    loratadine (CLARITIN) 10 mg tablet Take 10 mg by mouth daily    metFORMIN (GLUCOPHAGE) 1000 MG tablet Take 1 tablet (1,000 mg total) by mouth 2 (two) times a day with meals    Omega-3 Fatty Acids (fish oil) 1,000 mg Take 1,000 mg by mouth 2 (two) times a day    pregabalin (LYRICA) 50 mg capsule Take 1 capsule by mouth twice daily    rosuvastatin (CRESTOR) 40 MG tablet Take 1 tablet by mouth once daily    sodium chloride (OCEAN) 0.65 % nasal spray 1 spray into each nostril as needed for rhinitis    tamsulosin (FLOMAX) 0.4 mg Take 1 capsule (0.4 mg total) by mouth 2 (two) times a day    Thiamine HCl (vitamin B-1) 250 MG tablet Take 250 mg by mouth daily    traZODone (DESYREL) 150 mg tablet TAKE 1 TABLET BY MOUTH ONCE DAILY AT BEDTIME    valsartan (DIOVAN) 160 mg tablet Take 160 mg by mouth daily    Ventolin  (90 Base) MCG/ACT inhaler Inhale 2 puffs every 6 (six) hours as needed for wheezing or shortness of breath    vitamin E, tocopherol, 400 units capsule Take 400 Units by mouth daily    [DISCONTINUED] atorvastatin (LIPITOR) 20 mg tablet Take 1 tablet by mouth once daily     No current facility-administered medications on file prior to encounter.       Allergies    Allergen Reactions    Ace Inhibitors Swelling     Angioedema    PT STATES THIS IS NOT AN ALLERGY    Other Anaphylaxis     Pt believes that he is allergic to peanut butter.    Also, seasonal allergies    Zinc Tongue Swelling     PT STATES THIS IS NOT AN ALLERGY    Clindamycin Edema     Had angioedema episode approx 5 hr after IM administration of clindamycin. Unclear if there is definitive causal relationship.  PT STATES THIS IS NOT AN ALLERGY       Physical Exam:    /81   Pulse 101   Temp (!) 96.8 °F (36 °C) (Temporal)   Resp 18   SpO2 98%     Constitutional:normal, well developed, well nourished, alert, in no distress and non-toxic and no overt pain behavior.  Eyes:anicteric  HEENT:grossly intact  Neck:supple, symmetric, trachea midline and no masses   Pulmonary:even and unlabored  Cardiovascular:No edema or pitting edema present  Skin:Normal without rashes or lesions and well hydrated  Psychiatric:Mood and affect appropriate  Neurologic:Cranial Nerves II-XII grossly intact  Musculoskeletal:normal

## 2025-04-14 ENCOUNTER — OFFICE VISIT (OUTPATIENT)
Dept: PODIATRY | Facility: CLINIC | Age: 60
End: 2025-04-14
Payer: MEDICARE

## 2025-04-14 ENCOUNTER — TELEPHONE (OUTPATIENT)
Dept: FAMILY MEDICINE CLINIC | Facility: CLINIC | Age: 60
End: 2025-04-14

## 2025-04-14 VITALS
RESPIRATION RATE: 16 BRPM | HEART RATE: 97 BPM | WEIGHT: 284 LBS | OXYGEN SATURATION: 91 % | HEIGHT: 64 IN | TEMPERATURE: 98 F | BODY MASS INDEX: 48.49 KG/M2

## 2025-04-14 DIAGNOSIS — L60.0 INGROWN TOENAIL: ICD-10-CM

## 2025-04-14 DIAGNOSIS — I73.9 PERIPHERAL VASCULAR DISEASE (HCC): ICD-10-CM

## 2025-04-14 DIAGNOSIS — E11.65 TYPE 2 DIABETES MELLITUS WITH HYPERGLYCEMIA, WITH LONG-TERM CURRENT USE OF INSULIN (HCC): Primary | ICD-10-CM

## 2025-04-14 DIAGNOSIS — E11.42 DIABETIC POLYNEUROPATHY ASSOCIATED WITH TYPE 2 DIABETES MELLITUS (HCC): ICD-10-CM

## 2025-04-14 DIAGNOSIS — Z79.4 TYPE 2 DIABETES MELLITUS WITH HYPERGLYCEMIA, WITH LONG-TERM CURRENT USE OF INSULIN (HCC): Primary | ICD-10-CM

## 2025-04-14 DIAGNOSIS — Z59.9 FINANCIAL DIFFICULTIES: Primary | ICD-10-CM

## 2025-04-14 DIAGNOSIS — M79.675 PAIN OF TOE OF LEFT FOOT: ICD-10-CM

## 2025-04-14 PROCEDURE — 99212 OFFICE O/P EST SF 10 MIN: CPT | Performed by: PODIATRIST

## 2025-04-14 SDOH — ECONOMIC STABILITY - INCOME SECURITY: PROBLEM RELATED TO HOUSING AND ECONOMIC CIRCUMSTANCES, UNSPECIFIED: Z59.9

## 2025-04-14 NOTE — PROGRESS NOTES
Name: Luis F Velazquez      : 1965      MRN: [id number]  Encounter Provider: Don Walker DPM  Encounter Date: 2025   Encounter department: [facility] PODIATRY [facility]  :  Assessment & Plan  Type 2 diabetes mellitus with hyperglycemia, with long-term current use of insulin (HCC)    Lab Results   Component Value Date    HGBA1C 10.5 (H) 2025            Peripheral vascular disease (HCC)         Ingrown toenail       With the use of a curette the eschar was removed and fibrotic tissue present under the eschar was removed in total.  Triple biotic and a Band-Aid was applied and he was told to continue doing the antibiotic ointment and Band-Aid for the remainder of the week.  Diabetic polyneuropathy associated with type 2 diabetes mellitus (HCC)    Lab Results   Component Value Date    HGBA1C 10.5 (H) 2025            Pain of toe of left foot         Recommended to the patient that he use the hydrocortisone cream on the tips of his toes to help control the itching that was present today.    Return in about 8 weeks (around 2025).     History of Present Illness   HPI  Luis F Velazquez is a 59 y.o. male who presents status post permanent nail avulsion left hallux both borders.  Patient has had no problems with the postop course and says he has been using antibiotic ointment as needed he has also been putting peroxide on the toe.  He has a secondary complaint today of itching underneath the distal tips of all of the toenails.  He is a diabetic his last A1c was 10.5 on 2025.  History obtained from: patient    Review of Systems  Medical History Reviewed by provider this encounter:     .  Current Outpatient Medications on File Prior to Visit   Medication Sig Dispense Refill    albuterol (2.5 mg/3 mL) 0.083 % nebulizer solution Take 3 mL (2.5 mg total) by nebulization every 6 (six) hours as needed for wheezing or shortness of breath 180 mL 0    Ascorbic Acid (VITAMIN C PO) Take 1 tablet by mouth  daily      aspirin 81 mg chewable tablet Take one tablet by mouth daily      carisoprodol (SOMA) 350 mg tablet TAKE 1 TABLET BY MOUTH THREE TIMES DAILY 90 tablet 5    cholecalciferol (VITAMIN D3) 400 units tablet Take 1 tablet by mouth daily      clotrimazole (LOTRIMIN) 1 % cream Apply topically 2 (two) times a day 60 g 2    Diclofenac Sodium (VOLTAREN) 1 % Apply 2 g topically 4 (four) times a day To feet/ankle 150 g 1    DULoxetine (CYMBALTA) 60 mg delayed release capsule Take 1 capsule by mouth once daily 90 capsule 0    EPINEPHrine (EPIPEN) 0.3 mg/0.3 mL SOAJ INJECT 0.3MG INTO A MUSCLE ONCE FOR 1 DOSE 2 each 0    finasteride (PROSCAR) 5 mg tablet Take 1 tablet (5 mg total) by mouth daily 90 tablet 3    HumaLOG KwikPen 100 units/mL injection pen Inject 30 Units under the skin 3 (three) times a day with meals 81 mL 1    hydroCHLOROthiazide 25 mg tablet Take 1 tablet by mouth twice daily 180 tablet 0    insulin glargine (Toujeo SoloStar) 300 units/mL CONCENTRATED U-300 injection pen (1-unit dial) Inject 72 units nightly. 22.5 mL 1    Insulin Pen Needle (1st Tier Unifine Pentips Plus) 31G X 5 MM MISC Use 4 (four) times a day 400 each 1    ketoconazole (NIZORAL) 2 % shampoo Apply 1 Application topically 2 (two) times a week To feet in shower 120 mL 2    levothyroxine 175 mcg tablet Take 1 tablet by mouth once daily 90 tablet 0    loratadine (CLARITIN) 10 mg tablet Take 10 mg by mouth daily      metFORMIN (GLUCOPHAGE) 1000 MG tablet Take 1 tablet (1,000 mg total) by mouth 2 (two) times a day with meals 180 tablet 1    Omega-3 Fatty Acids (fish oil) 1,000 mg Take 1,000 mg by mouth 2 (two) times a day      pregabalin (LYRICA) 50 mg capsule Take 1 capsule by mouth twice daily 180 capsule 0    rosuvastatin (CRESTOR) 40 MG tablet Take 1 tablet by mouth once daily 90 tablet 0    sodium chloride (OCEAN) 0.65 % nasal spray 1 spray into each nostril as needed for rhinitis 30 mL 1    tamsulosin (FLOMAX) 0.4 mg Take 1 capsule  "(0.4 mg total) by mouth 2 (two) times a day 180 capsule 3    Thiamine HCl (vitamin B-1) 250 MG tablet Take 250 mg by mouth daily      traZODone (DESYREL) 150 mg tablet TAKE 1 TABLET BY MOUTH ONCE DAILY AT BEDTIME 90 tablet 0    valsartan (DIOVAN) 160 mg tablet Take 160 mg by mouth daily      Ventolin  (90 Base) MCG/ACT inhaler Inhale 2 puffs every 6 (six) hours as needed for wheezing or shortness of breath 18 g 5    vitamin E, tocopherol, 400 units capsule Take 400 Units by mouth daily      ferrous gluconate (FERGON) 324 mg tablet Take 324 mg by mouth      [DISCONTINUED] atorvastatin (LIPITOR) 20 mg tablet Take 1 tablet by mouth once daily 90 tablet 3     No current facility-administered medications on file prior to visit.         Objective   Pulse 97   Temp 98 °F (36.7 °C)   Resp 16   Ht 5' 4\" (1.626 m)   Wt 129 kg (284 lb)   SpO2 91%   BMI 48.75 kg/m²      Physical Exam  Vascular status is unchanged from his visit 2 weeks ago.    Derm the medial and lateral borders of the left hallux nail have dry eschar present the borders are coapting nicely.  Beneath the eschar on the medial border there is a small amount of fibrotic tissue with no drainage noted.  There is slight pain with palpation.  Granular tissue present on past visits is completely resolved.  There is no active tinea pedis on the distal aspect of all the toes but there is fungus present in all of the nails.    Administrative Statements   I have spent a total time of 15 minutes in caring for this patient on the day of the visit/encounter including Risks and benefits of tx options, Instructions for management, Patient and family education, Importance of tx compliance, Risk factor reductions, Counseling / Coordination of care, and Documenting in the medical record.  "

## 2025-04-14 NOTE — TELEPHONE ENCOUNTER
"Spoke with patient about his [employer] application, states he has been getting \"paperwork on paperwork.\" As a result has despaired of completing it and likely needs help with the process. Is willing to speak with .  -Zana Aaron MD  "

## 2025-04-14 NOTE — TELEPHONE ENCOUNTER
Prashanth..    S/w pt s/p Rt L4-S1 RFA on 4/11/25 RM. Pt stated needle sites look good, denies S/S of infect, denies fever, denies soreness and denies sun burn like sensation. Pt denies any discernable pain on Rt side at this time. Advised pt if they have any further pain to take OTC/prescribed meds and/or use ice/heat and that it can take 4-6wks to see full effect. Confirmed nxt appt w/ pt Lt RFA 5/6/25 RM @ 10am GH. Pt verbalized understanding and asked who he could see for hip pain. RN advised pt that if he is already following with Dr. Patel he can see him to discuss hip pain. RN advised that if Dr. Patel feels this is something we can see him for we can make a NP appt to discuss w/ RM further. Pt verbalized understanding.

## 2025-04-15 ENCOUNTER — PATIENT OUTREACH (OUTPATIENT)
Dept: CASE MANAGEMENT | Facility: OTHER | Age: 60
End: 2025-04-15

## 2025-04-15 NOTE — PROGRESS NOTES
OP CM rcvd SDOH referral.  Reviewed chart and pt is listed as having Medicare and no secondary.  Called to pt and left message.

## 2025-04-16 ENCOUNTER — PATIENT OUTREACH (OUTPATIENT)
Dept: CASE MANAGEMENT | Facility: OTHER | Age: 60
End: 2025-04-16

## 2025-04-16 NOTE — LETTER
Hello!    I am a  for [facility] outpatient care management team.  Providers contact us to reach out to patients in regards to  such as: transportation, home health aides, insurance concerns, medication cost,  food insecurities, and outpatient mental health services.   I can be reached Monday to Thursday from 730am to 5pm via phone or email.       Take Vidal,     FUAD Mathew, LSW  Outpatient Care Management  [facility]  [phone]  [email or web address]

## 2025-04-16 NOTE — PROGRESS NOTES
OP CM called back again and LM for pt in regards to referral that pt has no secondary.  Pt does not have email so sent MyChart letter as well.

## 2025-05-06 ENCOUNTER — HOSPITAL ENCOUNTER (OUTPATIENT)
Dept: RADIOLOGY | Facility: HOSPITAL | Age: 60
Discharge: HOME | End: 2025-05-06
Payer: MEDICARE

## 2025-05-06 ENCOUNTER — TELEPHONE (OUTPATIENT)
Dept: PAIN MEDICINE | Facility: CLINIC | Age: 60
End: 2025-05-06

## 2025-05-06 VITALS
DIASTOLIC BLOOD PRESSURE: 83 MMHG | SYSTOLIC BLOOD PRESSURE: 153 MMHG | HEART RATE: 99 BPM | TEMPERATURE: 97.3 F | RESPIRATION RATE: 20 BRPM | OXYGEN SATURATION: 94 %

## 2025-05-06 DIAGNOSIS — M47.816 LUMBAR SPONDYLOSIS: ICD-10-CM

## 2025-05-06 PROCEDURE — 64636 DESTROY L/S FACET JNT ADDL: CPT | Performed by: ANESTHESIOLOGY

## 2025-05-06 PROCEDURE — 64635 DESTROY LUMB/SAC FACET JNT: CPT | Performed by: ANESTHESIOLOGY

## 2025-05-06 RX ADMIN — Medication 4 ML: at 10:10

## 2025-05-06 NOTE — H&P
Assessment:  1. Lumbar spondylosis  FL spine and pain procedure    FL spine and pain procedure          Plan:  Luis F Velazquez is a 59 y.o. male with complaints of low back pain presents to surgical center for procedure.  We will perform a left L4-L5 and L5-S1 Refixia ablation  2. Follow-up 1 month after injection    Complete risks and benefits including bleeding, infection, tissue reaction, nerve injury and allergic reaction were discussed. The approach was demonstrated using models and literature was provided. Verbal and written consent was obtained.    My impressions and treatment recommendations were discussed in detail with the patient who verbalized understanding and had no further questions.  Discharge instructions were provided. I personally saw and examined the patient and I agree with the above discussed plan of care.    Orders Placed This Encounter   Procedures    FL spine and pain procedure     Standing Status:   Standing     Number of Occurrences:   1     Reason for Exam::   Left L4-5 and L5-S1 RFA     Anticoagulant hold needed?:   no     New Medications Ordered This Visit   Medications    lidocaine (PF) (XYLOCAINE-MPF) 2 % injection 4 mL       History of Present Illness:  Luis F Velazquez is a 59 y.o. male who presents for a follow up office visit in regards to low back pain.   The patient’s current symptoms include 8 out of 10 sharp stabbing throbbing pain without particular time pattern.      I have personally reviewed and/or updated the patient's past medical history, past surgical history, family history, social history, current medications, allergies, and vital signs today.     Review of Systems   Musculoskeletal:  Positive for arthralgias and back pain.   All other systems reviewed and are negative.      Patient Active Problem List   Diagnosis    Allergic rhinitis    Arthritis    Chronic back pain    Chronic sinusitis    Depression with anxiety    Essential (primary) hypertension    Acquired  hypothyroidism    Morbid obesity with BMI of 45.0-49.9, adult ([facility])    Diabetic polyneuropathy associated with type 2 diabetes mellitus ([facility])    Vitamin D deficiency    Bilateral lower leg cellulitis    Elevated LFTs    Elevated lactic acid level    Tobacco abuse, in remission    Noncompliance with diabetes treatment    Epistaxis    Muscle twitching    Type 2 diabetes mellitus with hyperglycemia, with long-term current use of insulin ([facility])    Atelectasis    Urinary retention    Constipation    Depression, recurrent (HCC)    Peripheral vascular disease ([facility])    Primary osteoarthritis of both knees    Hyperlipidemia    Bright red rectal bleeding    ABLA (acute blood loss anemia)    Microcytic anemia    Fatty liver disease, nonalcoholic    Umbilical hernia without obstruction and without gangrene    Restrictive lung disease secondary to obesity    Diabetic nephropathy associated with type 2 diabetes mellitus ([facility])    Proteinuria    Chronic kidney disease, stage 2 (mild)    Neuropathy    Chronic respiratory failure with hypoxia, on home oxygen therapy  ([facility])    Urinary hesitancy due to benign prostatic hyperplasia    Hypo-osmolality and hyponatremia    LAMB (dyspnea on exertion)    MARSHA treated with BiPAP    Chronic right sacroiliac joint pain    Pain in both feet    Positive screening for depression on 9-item Patient Health Questionnaire (PHQ-9)    Social problem    Type 2 diabetes mellitus with stage 3 chronic kidney disease, with long-term current use of insulin, unspecified whether stage 3a or 3b CKD ([facility])    Lumbar spondylosis    Chronic pain syndrome    Myofascial pain syndrome       Past Medical History:   Diagnosis Date    Acquired hypothyroidism 1/2/2015    Angioedema     Diabetes mellitus ([facility])     Disease of thyroid gland     Hyperlipidemia     Hypertension     Mixed hyperlipidemia 2/4/2015    Morbid obesity (HCC)     Morbid obesity with BMI of 45.0-49.9, adult ([facility]) 1/2/2015    MARSHA (obstructive sleep apnea)      Primary hypertension 2015    Transitioned From: Benign essential hypertension       Past Surgical History:   Procedure Laterality Date    KNEE SURGERY      SINUS SURGERY         Family History   Problem Relation Age of Onset    Diabetes Mother     Diabetes type II Mother     Esophageal cancer Father     Diabetes Brother     Kidney cancer Brother     Diabetes type II Brother     Diabetes Maternal Grandmother     Diabetes type II Maternal Grandmother     Diabetes Paternal Grandmother     Heart disease Neg Hx        Social History     Occupational History    Not on file   Tobacco Use    Smoking status: Former     Current packs/day: 0.00     Average packs/day: 3.0 packs/day for 40.0 years (120.0 ttl pk-yrs)     Types: Cigarettes     Start date: 10/21/1982     Quit date: 10/21/2022     Years since quittin.5     Passive exposure: Past    Smokeless tobacco: Never    Tobacco comments:     states read to quit prior to admit   Vaping Use    Vaping status: Never Used   Substance and Sexual Activity    Alcohol use: Not Currently     Alcohol/week: 3.0 standard drinks of alcohol     Types: 2 Cans of beer, 1 Shots of liquor per week     Comment: No alcohol since     Drug use: No    Sexual activity: Not Currently       Current Outpatient Medications on File Prior to Encounter   Medication Sig    albuterol (2.5 mg/3 mL) 0.083 % nebulizer solution Take 3 mL (2.5 mg total) by nebulization every 6 (six) hours as needed for wheezing or shortness of breath    Ascorbic Acid (VITAMIN C PO) Take 1 tablet by mouth daily    aspirin 81 mg chewable tablet Take one tablet by mouth daily    carisoprodol (SOMA) 350 mg tablet TAKE 1 TABLET BY MOUTH THREE TIMES DAILY    cholecalciferol (VITAMIN D3) 400 units tablet Take 1 tablet by mouth daily    clotrimazole (LOTRIMIN) 1 % cream Apply topically 2 (two) times a day    Diclofenac Sodium (VOLTAREN) 1 % Apply 2 g topically 4 (four) times a day To feet/ankle    DULoxetine (CYMBALTA) 60 mg  delayed release capsule Take 1 capsule by mouth once daily    EPINEPHrine (EPIPEN) 0.3 mg/0.3 mL SOAJ INJECT 0.3MG INTO A MUSCLE ONCE FOR 1 DOSE    ferrous gluconate (FERGON) 324 mg tablet Take 324 mg by mouth    finasteride (PROSCAR) 5 mg tablet Take 1 tablet (5 mg total) by mouth daily    HumaLOG KwikPen 100 units/mL injection pen Inject 30 Units under the skin 3 (three) times a day with meals    hydroCHLOROthiazide 25 mg tablet Take 1 tablet by mouth twice daily    insulin glargine (Toujeo SoloStar) 300 units/mL CONCENTRATED U-300 injection pen (1-unit dial) Inject 72 units nightly.    Insulin Pen Needle (1st Tier Unifine Pentips Plus) 31G X 5 MM MISC Use 4 (four) times a day    ketoconazole (NIZORAL) 2 % shampoo Apply 1 Application topically 2 (two) times a week To feet in shower    levothyroxine 175 mcg tablet Take 1 tablet by mouth once daily    loratadine (CLARITIN) 10 mg tablet Take 10 mg by mouth daily    metFORMIN (GLUCOPHAGE) 1000 MG tablet Take 1 tablet (1,000 mg total) by mouth 2 (two) times a day with meals    Omega-3 Fatty Acids (fish oil) 1,000 mg Take 1,000 mg by mouth 2 (two) times a day    pregabalin (LYRICA) 50 mg capsule Take 1 capsule by mouth twice daily    rosuvastatin (CRESTOR) 40 MG tablet Take 1 tablet by mouth once daily    sodium chloride (OCEAN) 0.65 % nasal spray 1 spray into each nostril as needed for rhinitis    tamsulosin (FLOMAX) 0.4 mg Take 1 capsule (0.4 mg total) by mouth 2 (two) times a day    Thiamine HCl (vitamin B-1) 250 MG tablet Take 250 mg by mouth daily    traZODone (DESYREL) 150 mg tablet TAKE 1 TABLET BY MOUTH ONCE DAILY AT BEDTIME    valsartan (DIOVAN) 160 mg tablet Take 160 mg by mouth daily    Ventolin  (90 Base) MCG/ACT inhaler Inhale 2 puffs every 6 (six) hours as needed for wheezing or shortness of breath    vitamin E, tocopherol, 400 units capsule Take 400 Units by mouth daily    [DISCONTINUED] atorvastatin (LIPITOR) 20 mg tablet Take 1 tablet by mouth  once daily     No current facility-administered medications on file prior to encounter.       Allergies   Allergen Reactions    Ace Inhibitors Swelling     Angioedema    PT STATES THIS IS NOT AN ALLERGY    Other Anaphylaxis     Pt believes that he is allergic to peanut butter.    Also, seasonal allergies    Zinc Tongue Swelling     PT STATES THIS IS NOT AN ALLERGY    Clindamycin Edema     Had angioedema episode approx 5 hr after IM administration of clindamycin. Unclear if there is definitive causal relationship.  PT STATES THIS IS NOT AN ALLERGY       Physical Exam:    /83 (BP Location: Left arm)   Pulse 99   Temp (!) 97.3 °F (36.3 °C) (Temporal)   Resp 20   SpO2 94%     Constitutional:normal, well developed, well nourished, alert, in no distress and non-toxic and no overt pain behavior. and obese  Eyes:anicteric  HEENT:grossly intact  Neck:supple, symmetric, trachea midline and no masses   Pulmonary:even and unlabored  Cardiovascular:No edema or pitting edema present  Skin:Normal without rashes or lesions and well hydrated  Psychiatric:Mood and affect appropriate  Neurologic:Cranial Nerves II-XII grossly intact  Musculoskeletal:normal

## 2025-05-06 NOTE — DISCHARGE INSTR - LAB

## 2025-05-07 DIAGNOSIS — E78.5 DYSLIPIDEMIA: ICD-10-CM

## 2025-05-08 RX ORDER — ROSUVASTATIN CALCIUM 40 MG/1
40 TABLET, COATED ORAL DAILY
Qty: 90 TABLET | Refills: 0 | Status: SHIPPED | OUTPATIENT
Start: 2025-05-08

## 2025-05-09 NOTE — TELEPHONE ENCOUNTER
S/w pt s/p RFA Tuesday, pt doing well today no issues or s/s of infection. Pt instructed to call with any issues before his f/u on 6/6.

## 2025-05-17 DIAGNOSIS — E11.42 DIABETIC POLYNEUROPATHY ASSOCIATED WITH TYPE 2 DIABETES MELLITUS (HCC): ICD-10-CM

## 2025-05-19 ENCOUNTER — RA CDI HCC (OUTPATIENT)
Dept: OTHER | Facility: HOSPITAL | Age: 60
End: 2025-05-19

## 2025-05-19 DIAGNOSIS — E11.42 DIABETIC POLYNEUROPATHY ASSOCIATED WITH TYPE 2 DIABETES MELLITUS (HCC): ICD-10-CM

## 2025-05-19 DIAGNOSIS — E03.9 HYPOTHYROIDISM, UNSPECIFIED TYPE: ICD-10-CM

## 2025-05-19 RX ORDER — PREGABALIN 50 MG/1
50 CAPSULE ORAL 2 TIMES DAILY
Qty: 180 CAPSULE | Refills: 0 | OUTPATIENT
Start: 2025-05-19

## 2025-05-19 RX ORDER — LEVOTHYROXINE SODIUM 175 UG/1
175 TABLET ORAL DAILY
Qty: 90 TABLET | Refills: 0 | Status: SHIPPED | OUTPATIENT
Start: 2025-05-19

## 2025-05-19 NOTE — TELEPHONE ENCOUNTER
Patient should be taking only two capsule's per day. He would not be due for a refill until June.

## 2025-05-20 ENCOUNTER — OFFICE VISIT (OUTPATIENT)
Dept: FAMILY MEDICINE CLINIC | Facility: HOME HEALTHCARE | Age: 60
End: 2025-05-20
Payer: MEDICARE

## 2025-05-20 VITALS
WEIGHT: 290.8 LBS | BODY MASS INDEX: 49.64 KG/M2 | HEIGHT: 64 IN | SYSTOLIC BLOOD PRESSURE: 124 MMHG | OXYGEN SATURATION: 97 % | HEART RATE: 106 BPM | TEMPERATURE: 98.5 F | DIASTOLIC BLOOD PRESSURE: 62 MMHG | RESPIRATION RATE: 20 BRPM

## 2025-05-20 DIAGNOSIS — Z79.4 TYPE 2 DIABETES MELLITUS WITH COMPLICATION, WITH LONG-TERM CURRENT USE OF INSULIN (HCC): Primary | ICD-10-CM

## 2025-05-20 DIAGNOSIS — E11.8 TYPE 2 DIABETES MELLITUS WITH COMPLICATION, WITH LONG-TERM CURRENT USE OF INSULIN (HCC): Primary | ICD-10-CM

## 2025-05-20 DIAGNOSIS — F41.9 ANXIETY: ICD-10-CM

## 2025-05-20 LAB — SL AMB POCT HEMOGLOBIN AIC: 9.6 (ref ?–6.5)

## 2025-05-20 PROCEDURE — 99214 OFFICE O/P EST MOD 30 MIN: CPT | Performed by: PHYSICIAN ASSISTANT

## 2025-05-20 PROCEDURE — 83036 HEMOGLOBIN GLYCOSYLATED A1C: CPT | Performed by: FAMILY MEDICINE

## 2025-05-20 RX ORDER — PREGABALIN 50 MG/1
50 CAPSULE ORAL 2 TIMES DAILY
Qty: 180 CAPSULE | Refills: 0 | Status: CANCELLED | OUTPATIENT
Start: 2025-05-20

## 2025-05-20 NOTE — PROGRESS NOTES
Name: Luis F Velazquez      : 1965      MRN: [id number]  Encounter Provider: Chuck Holman PA-C  Encounter Date: 2025   Encounter department: [facility]  :  Assessment & Plan  Type 2 diabetes mellitus with complication, with long-term current use of insulin (HCC)  Continue toujeo, humalog, and metformin as per endocrinology. Follow up as scheduled.  Lab Results   Component Value Date    HGBA1C 9.6 (A) 2025       Orders:  •  POCT hemoglobin A1c    Anxiety  Continue trazodone 150 mg daily.  Will trial Buspar 5 mg TID for anxiety.  Orders:  •  busPIRone (BUSPAR) 5 mg tablet; Take 1 tablet (5 mg total) by mouth 3 (three) times a day           History of Present Illness   Luis F is a 59-year-old male with history of COPD, chronic respiratory failure, MARSHA, tobacco use, hypertension, hyperlipidemia, hypothyroidism, type 2 diabetes, CKD, neuropathy, depression and anxiety, presenting for follow up.    HTN is managed with valsartan 160 mg daily and HCTZ 25 mg BID.  /62 today.  Patient denies chest pain, palpitations, or LE edema today.     HLD is managed with crestor 40 mg daily.  Lipid panel from 2025 with total cholesterol 103 and LDL 38.     Hypothyroidism is managed with levothyroxine 175 mcg daily.  TSH from 2025 WNL.     Type 2 DM is managed per endocrinology with metformin 1000 mg BID, Toujeo 72 units, Humalog 30 units TID.  A1c 10.5 in 2025, down to 9.6 today.  Eye exam UTD.     Neuropathy is managed with lyrica 50 mg BID and cymbalta 60 mg daily.     Anxiety and depression are managed with cymbalta 60 mg and trazodone 150 mg daily.  Patient does not feel that his anxiety is well controlled with this regimen.  He has stopped taking Cymbalta in the past and did not feel any difference.  He finds that he is clenching his jaw frequently due to anxiety.      Review of Systems   Constitutional:  Negative for chills, diaphoresis and fever.   Respiratory:   "Positive for shortness of breath. Negative for cough, chest tightness and wheezing.    Cardiovascular:  Negative for chest pain, palpitations and leg swelling.   Gastrointestinal:  Negative for abdominal pain, constipation, diarrhea, nausea and vomiting.   Musculoskeletal:  Positive for back pain.   Skin:  Negative for rash and wound.   Neurological:  Positive for dizziness. Negative for syncope, weakness, light-headedness and headaches.   Psychiatric/Behavioral:  Positive for dysphoric mood and sleep disturbance. Negative for self-injury and suicidal ideas. The patient is nervous/anxious.        Objective   /62 (BP Location: Right arm, Patient Position: Sitting, Cuff Size: Large)   Pulse (!) 106   Temp 98.5 °F (36.9 °C) (Tympanic)   Resp 20   Ht 5' 4\" (1.626 m)   Wt 132 kg (290 lb 12.8 oz)   SpO2 97%   BMI 49.92 kg/m²      Physical Exam  Vitals and nursing note reviewed.   Constitutional:       General: He is not in acute distress.     Appearance: Normal appearance. He is obese.   HENT:      Head: Normocephalic and atraumatic.     Eyes:      Extraocular Movements: Extraocular movements intact.      Conjunctiva/sclera: Conjunctivae normal.      Pupils: Pupils are equal, round, and reactive to light.       Cardiovascular:      Rate and Rhythm: Normal rate and regular rhythm.      Heart sounds: Normal heart sounds. No murmur heard.  Pulmonary:      Effort: Pulmonary effort is normal. No respiratory distress.      Breath sounds: Normal breath sounds. No wheezing.     Skin:     General: Skin is warm and dry.     Neurological:      General: No focal deficit present.      Mental Status: He is alert and oriented to person, place, and time.     Psychiatric:         Mood and Affect: Mood normal.         Behavior: Behavior normal.         "

## 2025-05-22 ENCOUNTER — PROCEDURE VISIT (OUTPATIENT)
Dept: OBGYN CLINIC | Facility: CLINIC | Age: 60
End: 2025-05-22

## 2025-05-22 VITALS
HEIGHT: 64 IN | WEIGHT: 292.2 LBS | TEMPERATURE: 98.3 F | RESPIRATION RATE: 20 BRPM | BODY MASS INDEX: 49.89 KG/M2 | OXYGEN SATURATION: 95 % | HEART RATE: 102 BPM

## 2025-05-22 DIAGNOSIS — M17.0 PRIMARY OSTEOARTHRITIS OF BOTH KNEES: Primary | ICD-10-CM

## 2025-05-22 NOTE — ASSESSMENT & PLAN NOTE
Both the patient's knees were injected with his first set of Orthovisc.  He tolerated the injections quite well.  To follow-up with our office next week for second set.  The patient is acceptable to this plan.

## 2025-05-22 NOTE — PROGRESS NOTES
Assessment & Plan  Primary osteoarthritis of both knees  Both the patient's knees were injected with his first set of Orthovisc.  He tolerated the injections quite well.  To follow-up with our office next week for second set.  The patient is acceptable to this plan.             The patient has degenerative joint disease of his bilateral knees.  Under aseptic technique, both knees were injected with his first set of Orthovisc.  He tolerated the procedure well.  Return back next week for second set of injections    Return in about 1 week (around 5/29/2025).      _____________________________________________________  CHIEF COMPLAINT:  Chief Complaint   Patient presents with    Left Knee - Pain    Right Knee - Pain         SUBJECTIVE:  Luis F Velazquez is a 59 y.o. male who presents to our office for viscosupplementation both knees.  He is here today for his first set of Orthovisc. Patient has reported improvements from previous visco injections. Pain is rated at 8/10.  He denies any numbness or tingling.  He denies any fever or chills.    The following portions of the patient's history were reviewed and updated as appropriate: allergies, current medications, past family history, past medical history, past social history, past surgical history and problem list.    PAST MEDICAL HISTORY:  Past Medical History[1]    PAST SURGICAL HISTORY:  Past Surgical History[2]    FAMILY HISTORY:  Family History[3]    SOCIAL HISTORY:  Social History[4]    MEDICATIONS:  Current Medications[5]    ALLERGIES:  Allergies[6]    ROS:  Review of Systems     Constitutional: Negative for fatigue, fever or loss of appetite.   HENT: Negative.    Respiratory: Negative for shortness of breath, dyspnea.    Cardiovascular: Negative for chest pain/tightness.   Gastrointestinal: Negative for abdominal pain, N/V.   Endocrine: Negative for cold/heat intolerance, unexplained weight loss/gain.   Genitourinary: Negative for flank pain, dysuria, hematuria.  "  Musculoskeletal: Positive for arthralgia   Skin: Negative for rash.    Neurological: Negative for numbness or tingling  Psychiatric/Behavioral: Negative for agitation.  _____________________________________________________  PHYSICAL EXAMINATION:    Pulse 102, temperature 98.3 °F (36.8 °C), resp. rate 20, height 5' 4\" (1.626 m), weight 133 kg (292 lb 3.2 oz), SpO2 95%.    Constitutional: Oriented to person, place, and time. Appears well-developed and well-nourished. No distress.   HENT:   Head: Normocephalic.   Eyes: Conjunctivae are normal. Right eye exhibits no discharge. Left eye exhibits no discharge. No scleral icterus.   Cardiovascular: Normal rate.    Pulmonary/Chest: Effort normal.   Neurological: Alert and oriented to person, place, and time.   Skin: Skin is warm and dry. No rash noted. Not diaphoretic. No erythema. No pallor.   Psychiatric: Normal mood and affect. Behavior is normal. Judgment and thought content normal.      MUSCULOSKELETAL EXAMINATION:   Physical Exam  Ortho Exam    Bilateral lower extremities are neurovascularly intact  Toes are pink and mobile   Compartments are soft  No warmth, erythema or ecchymosis  ROM of knees are from 5-115 degrees  Negative Lachman, drawer or pivot shift  No medial instability  Medial joint line tenderness, slight lateral joint line tenderness  Patellofemoral crepitation   Objective:  BP Readings from Last 1 Encounters:   05/20/25 124/62      Wt Readings from Last 1 Encounters:   05/22/25 133 kg (292 lb 3.2 oz)        BMI:   Estimated body mass index is 50.16 kg/m² as calculated from the following:    Height as of this encounter: 5' 4\" (1.626 m).    Weight as of this encounter: 133 kg (292 lb 3.2 oz).      PROCEDURES PERFORMED:  Large joint arthrocentesis: bilateral knee    Performed by: Manny Patel DO  Authorized by: Manny Patel DO    Universal Protocol:  Risks and benefits: risks, benefits and alternatives were discussed  Consent given by: " patient  Patient understanding: patient states understanding of the procedure being performed  Site marked: the operative site was marked  Supporting Documentation  Indications: pain     Is this a Visco injection? Yes  Non-Pharmacologic Treatments Attempted: Home Exercise  Pharmacologic Treatments Attempted: Corticosteroid injection  Pain Score: 8Procedure Details  Location: knee - bilateral knee  Preparation: Patient was prepped and draped in the usual sterile fashion  Needle size: 22 G  Ultrasound guidance: no  Approach: lateral    Medications (Right): 30 mg sodium hyaluronate 30 mg/2 mLMedications (Left): 30 mg sodium hyaluronate 30 mg/2 mL   Patient tolerance: patient tolerated the procedure well with no immediate complications  Dressing:  Sterile dressing applied            Scribe Attestation      I,:  Tye Stringer PA-C am acting as a scribe while in the presence of the attending physician.:       I,:  Manny Patel DO personally performed the services described in this documentation    as scribed in my presence.:                   [1]   Past Medical History:  Diagnosis Date    Acquired hypothyroidism 1/2/2015    Angioedema     Diabetes mellitus (HCC)     Disease of thyroid gland     Hyperlipidemia     Hypertension     Mixed hyperlipidemia 2/4/2015    Morbid obesity (HCC)     Morbid obesity with BMI of 45.0-49.9, adult (HCC) 1/2/2015    MARSHA (obstructive sleep apnea)     Primary hypertension 1/2/2015    Transitioned From: Benign essential hypertension   [2]   Past Surgical History:  Procedure Laterality Date    KNEE SURGERY      SINUS SURGERY     [3]   Family History  Problem Relation Name Age of Onset    Diabetes Mother      Diabetes type II Mother      Esophageal cancer Father      Diabetes Brother      Kidney cancer Brother      Diabetes type II Brother      Diabetes Maternal Grandmother      Diabetes type II Maternal Grandmother      Diabetes Paternal Grandmother      Heart disease Neg Hx      [4]   Social History  Tobacco Use    Smoking status: Former     Current packs/day: 0.00     Average packs/day: 3.0 packs/day for 40.0 years (120.0 ttl pk-yrs)     Types: Cigarettes     Start date: 10/21/1982     Quit date: 10/21/2022     Years since quittin.5     Passive exposure: Past    Smokeless tobacco: Never    Tobacco comments:     states read to quit prior to admit   Vaping Use    Vaping status: Never Used   Substance Use Topics    Alcohol use: Not Currently     Alcohol/week: 3.0 standard drinks of alcohol     Types: 2 Cans of beer, 1 Shots of liquor per week     Comment: No alcohol since     Drug use: No   [5]   Current Outpatient Medications:     albuterol (2.5 mg/3 mL) 0.083 % nebulizer solution, Take 3 mL (2.5 mg total) by nebulization every 6 (six) hours as needed for wheezing or shortness of breath, Disp: 180 mL, Rfl: 0    Ascorbic Acid (VITAMIN C PO), Take 1 tablet by mouth in the morning., Disp: , Rfl:     aspirin 81 mg chewable tablet, , Disp: , Rfl:     carisoprodol (SOMA) 350 mg tablet, TAKE 1 TABLET BY MOUTH THREE TIMES DAILY, Disp: 90 tablet, Rfl: 5    cholecalciferol (VITAMIN D3) 400 units tablet, Take 1 tablet by mouth in the morning., Disp: , Rfl:     clotrimazole (LOTRIMIN) 1 % cream, Apply topically 2 (two) times a day, Disp: 60 g, Rfl: 2    Diclofenac Sodium (VOLTAREN) 1 %, Apply 2 g topically 4 (four) times a day To feet/ankle, Disp: 150 g, Rfl: 1    DULoxetine (CYMBALTA) 60 mg delayed release capsule, Take 1 capsule by mouth once daily, Disp: 90 capsule, Rfl: 0    EPINEPHrine (EPIPEN) 0.3 mg/0.3 mL SOAJ, INJECT 0.3MG INTO A MUSCLE ONCE FOR 1 DOSE, Disp: 2 each, Rfl: 0    ferrous gluconate (FERGON) 324 mg tablet, Take 324 mg by mouth, Disp: , Rfl:     finasteride (PROSCAR) 5 mg tablet, Take 1 tablet (5 mg total) by mouth daily, Disp: 90 tablet, Rfl: 3    HumaLOG KwikPen 100 units/mL injection pen, Inject 30 Units under the skin 3 (three) times a day with meals, Disp: 81 mL, Rfl:  1    hydroCHLOROthiazide 25 mg tablet, Take 1 tablet by mouth twice daily, Disp: 180 tablet, Rfl: 0    insulin glargine (Toujeo SoloStar) 300 units/mL CONCENTRATED U-300 injection pen (1-unit dial), Inject 72 units nightly., Disp: 22.5 mL, Rfl: 1    Insulin Pen Needle (1st Tier Unifine Pentips Plus) 31G X 5 MM MISC, Use 4 (four) times a day, Disp: 400 each, Rfl: 1    ketoconazole (NIZORAL) 2 % shampoo, Apply 1 Application topically 2 (two) times a week To feet in shower, Disp: 120 mL, Rfl: 2    levothyroxine 175 mcg tablet, Take 1 tablet by mouth once daily, Disp: 90 tablet, Rfl: 0    loratadine (CLARITIN) 10 mg tablet, Take 10 mg by mouth in the morning., Disp: , Rfl:     metFORMIN (GLUCOPHAGE) 1000 MG tablet, Take 1 tablet (1,000 mg total) by mouth 2 (two) times a day with meals, Disp: 180 tablet, Rfl: 1    Omega-3 Fatty Acids (fish oil) 1,000 mg, Take 1,000 mg by mouth in the morning and 1,000 mg in the evening., Disp: , Rfl:     pregabalin (LYRICA) 50 mg capsule, Take 1 capsule by mouth twice daily, Disp: 180 capsule, Rfl: 0    rosuvastatin (CRESTOR) 40 MG tablet, Take 1 tablet by mouth once daily, Disp: 90 tablet, Rfl: 0    sodium chloride (OCEAN) 0.65 % nasal spray, 1 spray into each nostril as needed for rhinitis, Disp: 30 mL, Rfl: 1    tamsulosin (FLOMAX) 0.4 mg, Take 1 capsule (0.4 mg total) by mouth 2 (two) times a day, Disp: 180 capsule, Rfl: 3    Thiamine HCl (vitamin B-1) 250 MG tablet, Take 250 mg by mouth in the morning., Disp: , Rfl:     traZODone (DESYREL) 150 mg tablet, TAKE 1 TABLET BY MOUTH ONCE DAILY AT BEDTIME, Disp: 90 tablet, Rfl: 0    valsartan (DIOVAN) 160 mg tablet, Take 160 mg by mouth in the morning., Disp: , Rfl:     Ventolin  (90 Base) MCG/ACT inhaler, Inhale 2 puffs every 6 (six) hours as needed for wheezing or shortness of breath, Disp: 18 g, Rfl: 5    vitamin E, tocopherol, 400 units capsule, Take 400 Units by mouth in the morning., Disp: , Rfl:   [6]   Allergies  Allergen  Reactions    Ace Inhibitors Swelling     Angioedema    PT STATES THIS IS NOT AN ALLERGY    Other Anaphylaxis     Pt believes that he is allergic to peanut butter.    Also, seasonal allergies    Zinc Tongue Swelling     PT STATES THIS IS NOT AN ALLERGY    Clindamycin Edema     Had angioedema episode approx 5 hr after IM administration of clindamycin. Unclear if there is definitive causal relationship.  PT STATES THIS IS NOT AN ALLERGY

## 2025-05-23 RX ORDER — BUSPIRONE HYDROCHLORIDE 5 MG/1
5 TABLET ORAL 3 TIMES DAILY
Qty: 90 TABLET | Refills: 0 | Status: SHIPPED | OUTPATIENT
Start: 2025-05-23

## 2025-05-29 ENCOUNTER — PROCEDURE VISIT (OUTPATIENT)
Dept: OBGYN CLINIC | Facility: CLINIC | Age: 60
End: 2025-05-29
Payer: MEDICARE

## 2025-05-29 VITALS
HEIGHT: 64 IN | OXYGEN SATURATION: 96 % | HEART RATE: 104 BPM | BODY MASS INDEX: 49.85 KG/M2 | WEIGHT: 292 LBS | TEMPERATURE: 98.3 F

## 2025-05-29 DIAGNOSIS — M17.0 PRIMARY OSTEOARTHRITIS OF BOTH KNEES: Primary | ICD-10-CM

## 2025-05-29 PROCEDURE — 20610 DRAIN/INJ JOINT/BURSA W/O US: CPT | Performed by: ORTHOPAEDIC SURGERY

## 2025-05-29 NOTE — ASSESSMENT & PLAN NOTE
Both of the patient's knees were injected with his second set of Orthovisc.  He tolerated the injections quite well.  Will follow-up with our office next week for his third and final set.  He is acceptable to this plan.

## 2025-05-29 NOTE — PROGRESS NOTES
Assessment & Plan  Primary osteoarthritis of both knees  Both of the patient's knees were injected with his second set of Orthovisc.  He tolerated the injections quite well.  Will follow-up with our office next week for his third and final set.  He is acceptable to this plan.             The patient has degenerative joint disease of his bilateral knees.  Under aseptic technique, both knees were injected with a second set of Orthovisc.  He tolerated procedures well.  Return back next week for his final set of injections    Return in about 1 week (around 6/5/2025).      _____________________________________________________  CHIEF COMPLAINT:  Chief Complaint   Patient presents with    Left Knee - Follow-up     Orthovisc #2 Buy and Bill    Right Knee - Follow-up     Orthovisc #2 Buy and Bill         SUBJECTIVE:  Luis F Velazquez is a 59 y.o. male who presents to our office for viscosupplementation of both knees.  Is here today for second set of Orthovisc.  He tolerated his previous injections without issue.  Denies any numbness or tingling.  He denies any fever or chills.    The following portions of the patient's history were reviewed and updated as appropriate: allergies, current medications, past family history, past medical history, past social history, past surgical history and problem list.    PAST MEDICAL HISTORY:  Past Medical History[1]    PAST SURGICAL HISTORY:  Past Surgical History[2]    FAMILY HISTORY:  Family History[3]    SOCIAL HISTORY:  Social History[4]    MEDICATIONS:  Current Medications[5]    ALLERGIES:  Allergies[6]    ROS:  Review of Systems     Constitutional: Negative for fatigue, fever or loss of appetite.   HENT: Negative.    Respiratory: Negative for shortness of breath, dyspnea.    Cardiovascular: Negative for chest pain/tightness.   Gastrointestinal: Negative for abdominal pain, N/V.   Endocrine: Negative for cold/heat intolerance, unexplained weight loss/gain.   Genitourinary: Negative for  "flank pain, dysuria, hematuria.   Musculoskeletal: Positive for arthralgia   Skin: Negative for rash.    Neurological: Negative for numbness or tingling  Psychiatric/Behavioral: Negative for agitation.  _____________________________________________________  PHYSICAL EXAMINATION:    Pulse 104, temperature 98.3 °F (36.8 °C), temperature source Temporal, height 5' 4\" (1.626 m), weight 132 kg (292 lb), SpO2 96%.    Constitutional: Oriented to person, place, and time. Appears well-developed and well-nourished. No distress.   HENT:   Head: Normocephalic.   Eyes: Conjunctivae are normal. Right eye exhibits no discharge. Left eye exhibits no discharge. No scleral icterus.   Cardiovascular: Normal rate.    Pulmonary/Chest: Effort normal.   Neurological: Alert and oriented to person, place, and time.   Skin: Skin is warm and dry. No rash noted. Not diaphoretic. No erythema. No pallor.   Psychiatric: Normal mood and affect. Behavior is normal. Judgment and thought content normal.      MUSCULOSKELETAL EXAMINATION:   Physical Exam  Ortho Exam    Bilateral lower extremities are neurovascularly intact  Toes are pink and mobile   Compartments are soft  No warmth, erythema or ecchymosis  ROM of knees are from 5-115 degrees  Negative Lachman, drawer or pivot shift  No medial instability  Medial joint line tenderness, slight lateral joint line tenderness  Patellofemoral crepitation   Objective:  BP Readings from Last 1 Encounters:   05/20/25 124/62      Wt Readings from Last 1 Encounters:   05/29/25 132 kg (292 lb)        BMI:   Estimated body mass index is 50.12 kg/m² as calculated from the following:    Height as of this encounter: 5' 4\" (1.626 m).    Weight as of this encounter: 132 kg (292 lb).      PROCEDURES PERFORMED:  Large joint arthrocentesis: bilateral knee    Performed by: Manny Patel DO  Authorized by: Manny Patel DO    Universal Protocol:  Risks and benefits: risks, benefits and alternatives were " discussed  Consent given by: patient  Patient understanding: patient states understanding of the procedure being performed  Site marked: the operative site was marked  Supporting Documentation  Indications: pain     Is this a Visco injection? Yes  Non-Pharmacologic Treatments Attempted: Home Exercise  Pharmacologic Treatments Attempted: Corticosteroid injection  Pain Score: 8Procedure Details  Location: knee - bilateral knee  Preparation: Patient was prepped and draped in the usual sterile fashion  Needle size: 22 G  Ultrasound guidance: no  Approach: lateral    Medications (Right): 30 mg sodium hyaluronate 30 mg/2 mLMedications (Left): 30 mg sodium hyaluronate 30 mg/2 mL   Patient tolerance: patient tolerated the procedure well with no immediate complications  Dressing:  Sterile dressing applied            Scribe Attestation      I,:  Tye Stringer PA-C am acting as a scribe while in the presence of the attending physician.:       I,:  Manny Patel DO personally performed the services described in this documentation    as scribed in my presence.:                   [1]   Past Medical History:  Diagnosis Date    Acquired hypothyroidism 1/2/2015    Angioedema     Diabetes mellitus (HCC)     Disease of thyroid gland     Hyperlipidemia     Hypertension     Mixed hyperlipidemia 2/4/2015    Morbid obesity (HCC)     Morbid obesity with BMI of 45.0-49.9, adult (HCC) 1/2/2015    MARSHA (obstructive sleep apnea)     Primary hypertension 1/2/2015    Transitioned From: Benign essential hypertension   [2]   Past Surgical History:  Procedure Laterality Date    KNEE SURGERY      SINUS SURGERY     [3]   Family History  Problem Relation Name Age of Onset    Diabetes Mother      Diabetes type II Mother      Esophageal cancer Father      Diabetes Brother      Kidney cancer Brother      Diabetes type II Brother      Diabetes Maternal Grandmother      Diabetes type II Maternal Grandmother      Diabetes Paternal Grandmother       Heart disease Neg Hx     [4]   Social History  Tobacco Use    Smoking status: Former     Current packs/day: 0.00     Average packs/day: 3.0 packs/day for 40.0 years (120.0 ttl pk-yrs)     Types: Cigarettes     Start date: 10/21/1982     Quit date: 10/21/2022     Years since quittin.6     Passive exposure: Past    Smokeless tobacco: Never    Tobacco comments:     states read to quit prior to admit   Vaping Use    Vaping status: Never Used   Substance Use Topics    Alcohol use: Not Currently     Alcohol/week: 3.0 standard drinks of alcohol     Types: 2 Cans of beer, 1 Shots of liquor per week     Comment: No alcohol since     Drug use: No   [5]   Current Outpatient Medications:     albuterol (2.5 mg/3 mL) 0.083 % nebulizer solution, Take 3 mL (2.5 mg total) by nebulization every 6 (six) hours as needed for wheezing or shortness of breath, Disp: 180 mL, Rfl: 0    Ascorbic Acid (VITAMIN C PO), Take 1 tablet by mouth in the morning., Disp: , Rfl:     aspirin 81 mg chewable tablet, , Disp: , Rfl:     busPIRone (BUSPAR) 5 mg tablet, Take 1 tablet (5 mg total) by mouth 3 (three) times a day, Disp: 90 tablet, Rfl: 0    carisoprodol (SOMA) 350 mg tablet, TAKE 1 TABLET BY MOUTH THREE TIMES DAILY, Disp: 90 tablet, Rfl: 5    cholecalciferol (VITAMIN D3) 400 units tablet, Take 1 tablet by mouth in the morning., Disp: , Rfl:     clotrimazole (LOTRIMIN) 1 % cream, Apply topically 2 (two) times a day, Disp: 60 g, Rfl: 2    Diclofenac Sodium (VOLTAREN) 1 %, Apply 2 g topically 4 (four) times a day To feet/ankle, Disp: 150 g, Rfl: 1    DULoxetine (CYMBALTA) 60 mg delayed release capsule, Take 1 capsule by mouth once daily, Disp: 90 capsule, Rfl: 0    EPINEPHrine (EPIPEN) 0.3 mg/0.3 mL SOAJ, INJECT 0.3MG INTO A MUSCLE ONCE FOR 1 DOSE, Disp: 2 each, Rfl: 0    finasteride (PROSCAR) 5 mg tablet, Take 1 tablet (5 mg total) by mouth daily, Disp: 90 tablet, Rfl: 3    HumaLOG KwikPen 100 units/mL injection pen, Inject 30 Units  under the skin 3 (three) times a day with meals, Disp: 81 mL, Rfl: 1    hydroCHLOROthiazide 25 mg tablet, Take 1 tablet by mouth twice daily, Disp: 180 tablet, Rfl: 0    insulin glargine (Toujeo SoloStar) 300 units/mL CONCENTRATED U-300 injection pen (1-unit dial), Inject 72 units nightly., Disp: 22.5 mL, Rfl: 1    Insulin Pen Needle (1st Tier Unifine Pentips Plus) 31G X 5 MM MISC, Use 4 (four) times a day, Disp: 400 each, Rfl: 1    ketoconazole (NIZORAL) 2 % shampoo, Apply 1 Application topically 2 (two) times a week To feet in shower, Disp: 120 mL, Rfl: 2    levothyroxine 175 mcg tablet, Take 1 tablet by mouth once daily, Disp: 90 tablet, Rfl: 0    loratadine (CLARITIN) 10 mg tablet, Take 10 mg by mouth in the morning., Disp: , Rfl:     metFORMIN (GLUCOPHAGE) 1000 MG tablet, Take 1 tablet (1,000 mg total) by mouth 2 (two) times a day with meals, Disp: 180 tablet, Rfl: 1    Omega-3 Fatty Acids (fish oil) 1,000 mg, Take 1,000 mg by mouth in the morning and 1,000 mg in the evening., Disp: , Rfl:     pregabalin (LYRICA) 50 mg capsule, Take 1 capsule by mouth twice daily, Disp: 180 capsule, Rfl: 0    rosuvastatin (CRESTOR) 40 MG tablet, Take 1 tablet by mouth once daily, Disp: 90 tablet, Rfl: 0    sodium chloride (OCEAN) 0.65 % nasal spray, 1 spray into each nostril as needed for rhinitis, Disp: 30 mL, Rfl: 1    tamsulosin (FLOMAX) 0.4 mg, Take 1 capsule (0.4 mg total) by mouth 2 (two) times a day, Disp: 180 capsule, Rfl: 3    Thiamine HCl (vitamin B-1) 250 MG tablet, Take 250 mg by mouth in the morning., Disp: , Rfl:     traZODone (DESYREL) 150 mg tablet, TAKE 1 TABLET BY MOUTH ONCE DAILY AT BEDTIME, Disp: 90 tablet, Rfl: 0    valsartan (DIOVAN) 160 mg tablet, Take 160 mg by mouth in the morning., Disp: , Rfl:     Ventolin  (90 Base) MCG/ACT inhaler, Inhale 2 puffs every 6 (six) hours as needed for wheezing or shortness of breath, Disp: 18 g, Rfl: 5    vitamin E, tocopherol, 400 units capsule, Take 400 Units by  mouth in the morning., Disp: , Rfl:     ferrous gluconate (FERGON) 324 mg tablet, Take 324 mg by mouth, Disp: , Rfl:   [6]   Allergies  Allergen Reactions    Ace Inhibitors Swelling     Angioedema    PT STATES THIS IS NOT AN ALLERGY    Other Anaphylaxis     Pt believes that he is allergic to peanut butter.    Also, seasonal allergies    Zinc Tongue Swelling     PT STATES THIS IS NOT AN ALLERGY    Clindamycin Edema     Had angioedema episode approx 5 hr after IM administration of clindamycin. Unclear if there is definitive causal relationship.  PT STATES THIS IS NOT AN ALLERGY

## 2025-05-30 ENCOUNTER — HOSPITAL ENCOUNTER (INPATIENT)
Facility: HOSPITAL | Age: 60
LOS: 2 days | Discharge: HOME | End: 2025-06-01
Attending: EMERGENCY MEDICINE | Admitting: INTERNAL MEDICINE
Payer: MEDICARE

## 2025-05-30 ENCOUNTER — OFFICE VISIT (OUTPATIENT)
Dept: URGENT CARE | Facility: CLINIC | Age: 60
End: 2025-05-30
Payer: MEDICARE

## 2025-05-30 ENCOUNTER — APPOINTMENT (EMERGENCY)
Dept: RADIOLOGY | Facility: HOSPITAL | Age: 60
End: 2025-05-30
Payer: MEDICARE

## 2025-05-30 VITALS
SYSTOLIC BLOOD PRESSURE: 138 MMHG | BODY MASS INDEX: 50.36 KG/M2 | TEMPERATURE: 97.9 F | DIASTOLIC BLOOD PRESSURE: 80 MMHG | RESPIRATION RATE: 24 BRPM | OXYGEN SATURATION: 87 % | WEIGHT: 293.4 LBS | HEART RATE: 120 BPM

## 2025-05-30 DIAGNOSIS — J44.9 COPD (CHRONIC OBSTRUCTIVE PULMONARY DISEASE) (HCC): ICD-10-CM

## 2025-05-30 DIAGNOSIS — A41.9 SEVERE SEPSIS (HCC): ICD-10-CM

## 2025-05-30 DIAGNOSIS — R06.02 SHORTNESS OF BREATH: Primary | ICD-10-CM

## 2025-05-30 DIAGNOSIS — R09.02 HYPOXIA: ICD-10-CM

## 2025-05-30 DIAGNOSIS — R65.20 SEVERE SEPSIS (HCC): ICD-10-CM

## 2025-05-30 DIAGNOSIS — A41.9 SEPSIS, DUE TO UNSPECIFIED ORGANISM, UNSPECIFIED WHETHER ACUTE ORGAN DYSFUNCTION PRESENT (HCC): Primary | ICD-10-CM

## 2025-05-30 DIAGNOSIS — J18.9 PNEUMONIA: ICD-10-CM

## 2025-05-30 PROBLEM — N17.9 AKI (ACUTE KIDNEY INJURY) (HCC): Status: ACTIVE | Noted: 2025-05-30

## 2025-05-30 LAB
ALBUMIN SERPL BCG-MCNC: 4 G/DL (ref 3.5–5)
ALP SERPL-CCNC: 59 U/L (ref 34–104)
ALT SERPL W P-5'-P-CCNC: 36 U/L (ref 7–52)
ANION GAP SERPL CALCULATED.3IONS-SCNC: 8 MMOL/L (ref 4–13)
APTT PPP: 23 SECONDS (ref 23–34)
AST SERPL W P-5'-P-CCNC: 27 U/L (ref 13–39)
BACTERIA UR QL AUTO: ABNORMAL /HPF
BASE EX.OXY STD BLDV CALC-SCNC: 87 % (ref 60–80)
BASE EXCESS BLDV CALC-SCNC: 4.6 MMOL/L
BASOPHILS # BLD AUTO: 0.04 THOUSANDS/ÂΜL (ref 0–0.1)
BASOPHILS NFR BLD AUTO: 1 % (ref 0–1)
BILIRUB SERPL-MCNC: 0.33 MG/DL (ref 0.2–1)
BILIRUB UR QL STRIP: NEGATIVE
BNP SERPL-MCNC: 17 PG/ML (ref 0–100)
BUN SERPL-MCNC: 17 MG/DL (ref 5–25)
CALCIUM SERPL-MCNC: 9.6 MG/DL (ref 8.4–10.2)
CARDIAC TROPONIN I PNL SERPL HS: 8 NG/L (ref ?–50)
CHLORIDE SERPL-SCNC: 93 MMOL/L (ref 96–108)
CLARITY UR: CLEAR
CO2 SERPL-SCNC: 33 MMOL/L (ref 21–32)
COLOR UR: YELLOW
CREAT SERPL-MCNC: 1.54 MG/DL (ref 0.6–1.3)
EOSINOPHIL # BLD AUTO: 0.1 THOUSAND/ÂΜL (ref 0–0.61)
EOSINOPHIL NFR BLD AUTO: 1 % (ref 0–6)
ERYTHROCYTE [DISTWIDTH] IN BLOOD BY AUTOMATED COUNT: 13.7 % (ref 11.6–15.1)
FLUAV RNA RESP QL NAA+PROBE: NEGATIVE
FLUBV RNA RESP QL NAA+PROBE: NEGATIVE
GFR SERPL CREATININE-BSD FRML MDRD: 48 ML/MIN/1.73SQ M
GLUCOSE SERPL-MCNC: 211 MG/DL (ref 65–140)
GLUCOSE SERPL-MCNC: 271 MG/DL (ref 65–140)
GLUCOSE SERPL-MCNC: 284 MG/DL (ref 65–140)
GLUCOSE SERPL-MCNC: 333 MG/DL (ref 65–140)
GLUCOSE UR STRIP-MCNC: ABNORMAL MG/DL
HCO3 BLDV-SCNC: 29.2 MMOL/L (ref 24–30)
HCT VFR BLD AUTO: 33.7 % (ref 36.5–49.3)
HGB BLD-MCNC: 11.2 G/DL (ref 12–17)
HGB UR QL STRIP.AUTO: ABNORMAL
IMM GRANULOCYTES # BLD AUTO: 0.03 THOUSAND/UL (ref 0–0.2)
IMM GRANULOCYTES NFR BLD AUTO: 0 % (ref 0–2)
INR PPP: 0.89 (ref 0.85–1.19)
KETONES UR STRIP-MCNC: NEGATIVE MG/DL
LACTATE SERPL-SCNC: 2.6 MMOL/L (ref 0.5–2)
LACTATE SERPL-SCNC: 2.9 MMOL/L (ref 0.5–2)
LACTATE SERPL-SCNC: 3 MMOL/L (ref 0.5–2)
LACTATE SERPL-SCNC: 3 MMOL/L (ref 0.5–2)
LEUKOCYTE ESTERASE UR QL STRIP: NEGATIVE
LYMPHOCYTES # BLD AUTO: 1.76 THOUSANDS/ÂΜL (ref 0.6–4.47)
LYMPHOCYTES NFR BLD AUTO: 25 % (ref 14–44)
MCH RBC QN AUTO: 28 PG (ref 26.8–34.3)
MCHC RBC AUTO-ENTMCNC: 33.2 G/DL (ref 31.4–37.4)
MCV RBC AUTO: 84 FL (ref 82–98)
MONOCYTES # BLD AUTO: 0.71 THOUSAND/ÂΜL (ref 0.17–1.22)
MONOCYTES NFR BLD AUTO: 10 % (ref 4–12)
NEUTROPHILS # BLD AUTO: 4.37 THOUSANDS/ÂΜL (ref 1.85–7.62)
NEUTS SEG NFR BLD AUTO: 63 % (ref 43–75)
NITRITE UR QL STRIP: NEGATIVE
NON-SQ EPI CELLS URNS QL MICRO: ABNORMAL /HPF
NRBC BLD AUTO-RTO: 0 /100 WBCS
O2 CT BLDV-SCNC: 14.7 ML/DL
PCO2 BLDV: 43.4 MM HG (ref 42–50)
PH BLDV: 7.45 [PH] (ref 7.3–7.4)
PH UR STRIP.AUTO: 6.5 [PH]
PLATELET # BLD AUTO: 218 THOUSANDS/UL (ref 149–390)
PMV BLD AUTO: 9.6 FL (ref 8.9–12.7)
PO2 BLDV: 54.3 MM HG (ref 35–45)
POTASSIUM SERPL-SCNC: 3.7 MMOL/L (ref 3.5–5.3)
PROCALCITONIN SERPL-MCNC: 0.15 NG/ML
PROT SERPL-MCNC: 6.9 G/DL (ref 6.4–8.4)
PROT UR STRIP-MCNC: ABNORMAL MG/DL
PROTHROMBIN TIME: 12.5 SECONDS (ref 12.3–15)
RBC # BLD AUTO: 4 MILLION/UL (ref 3.88–5.62)
RBC #/AREA URNS AUTO: ABNORMAL /HPF
RSV RNA RESP QL NAA+PROBE: NEGATIVE
SARS-COV-2 RNA RESP QL NAA+PROBE: NEGATIVE
SODIUM SERPL-SCNC: 134 MMOL/L (ref 135–147)
SP GR UR STRIP.AUTO: 1.01
UROBILINOGEN UR QL STRIP.AUTO: 0.2 E.U./DL
WBC # BLD AUTO: 7.01 THOUSAND/UL (ref 4.31–10.16)
WBC #/AREA URNS AUTO: ABNORMAL /HPF

## 2025-05-30 PROCEDURE — 85025 COMPLETE CBC W/AUTO DIFF WBC: CPT

## 2025-05-30 PROCEDURE — 94760 N-INVAS EAR/PLS OXIMETRY 1: CPT

## 2025-05-30 PROCEDURE — 99213 OFFICE O/P EST LOW 20 MIN: CPT | Performed by: PHYSICIAN ASSISTANT

## 2025-05-30 PROCEDURE — 83880 ASSAY OF NATRIURETIC PEPTIDE: CPT | Performed by: EMERGENCY MEDICINE

## 2025-05-30 PROCEDURE — 83605 ASSAY OF LACTIC ACID: CPT | Performed by: INTERNAL MEDICINE

## 2025-05-30 PROCEDURE — 96374 THER/PROPH/DIAG INJ IV PUSH: CPT

## 2025-05-30 PROCEDURE — 82948 REAGENT STRIP/BLOOD GLUCOSE: CPT

## 2025-05-30 PROCEDURE — G0463 HOSPITAL OUTPT CLINIC VISIT: HCPCS | Performed by: PHYSICIAN ASSISTANT

## 2025-05-30 PROCEDURE — 84145 PROCALCITONIN (PCT): CPT

## 2025-05-30 PROCEDURE — 80053 COMPREHEN METABOLIC PANEL: CPT

## 2025-05-30 PROCEDURE — 83605 ASSAY OF LACTIC ACID: CPT

## 2025-05-30 PROCEDURE — 36415 COLL VENOUS BLD VENIPUNCTURE: CPT

## 2025-05-30 PROCEDURE — 99223 1ST HOSP IP/OBS HIGH 75: CPT | Performed by: INTERNAL MEDICINE

## 2025-05-30 PROCEDURE — 93005 ELECTROCARDIOGRAM TRACING: CPT

## 2025-05-30 PROCEDURE — 0241U HB NFCT DS VIR RESP RNA 4 TRGT: CPT | Performed by: EMERGENCY MEDICINE

## 2025-05-30 PROCEDURE — 94640 AIRWAY INHALATION TREATMENT: CPT

## 2025-05-30 PROCEDURE — 71045 X-RAY EXAM CHEST 1 VIEW: CPT

## 2025-05-30 PROCEDURE — 99285 EMERGENCY DEPT VISIT HI MDM: CPT

## 2025-05-30 PROCEDURE — 99291 CRITICAL CARE FIRST HOUR: CPT | Performed by: EMERGENCY MEDICINE

## 2025-05-30 PROCEDURE — 87040 BLOOD CULTURE FOR BACTERIA: CPT

## 2025-05-30 PROCEDURE — 85730 THROMBOPLASTIN TIME PARTIAL: CPT

## 2025-05-30 PROCEDURE — 82805 BLOOD GASES W/O2 SATURATION: CPT | Performed by: EMERGENCY MEDICINE

## 2025-05-30 PROCEDURE — 85610 PROTHROMBIN TIME: CPT

## 2025-05-30 PROCEDURE — 81001 URINALYSIS AUTO W/SCOPE: CPT | Performed by: INTERNAL MEDICINE

## 2025-05-30 PROCEDURE — 84484 ASSAY OF TROPONIN QUANT: CPT

## 2025-05-30 RX ORDER — ASPIRIN 81 MG/1
81 TABLET, CHEWABLE ORAL DAILY
Status: DISCONTINUED | OUTPATIENT
Start: 2025-05-31 | End: 2025-06-01 | Stop reason: HOSPADM

## 2025-05-30 RX ORDER — ALBUTEROL SULFATE 90 UG/1
2 INHALANT RESPIRATORY (INHALATION) EVERY 6 HOURS PRN
Status: DISCONTINUED | OUTPATIENT
Start: 2025-05-30 | End: 2025-06-01 | Stop reason: HOSPADM

## 2025-05-30 RX ORDER — ATORVASTATIN CALCIUM 80 MG/1
80 TABLET, FILM COATED ORAL
Status: DISCONTINUED | OUTPATIENT
Start: 2025-05-30 | End: 2025-06-01 | Stop reason: HOSPADM

## 2025-05-30 RX ORDER — INSULIN LISPRO 100 [IU]/ML
2-12 INJECTION, SOLUTION INTRAVENOUS; SUBCUTANEOUS
Status: DISCONTINUED | OUTPATIENT
Start: 2025-05-30 | End: 2025-06-01 | Stop reason: HOSPADM

## 2025-05-30 RX ORDER — PREGABALIN 50 MG/1
50 CAPSULE ORAL 2 TIMES DAILY
Status: DISCONTINUED | OUTPATIENT
Start: 2025-05-30 | End: 2025-06-01 | Stop reason: HOSPADM

## 2025-05-30 RX ORDER — HEPARIN SODIUM 5000 [USP'U]/ML
5000 INJECTION, SOLUTION INTRAVENOUS; SUBCUTANEOUS EVERY 8 HOURS SCHEDULED
Status: DISCONTINUED | OUTPATIENT
Start: 2025-05-30 | End: 2025-06-01 | Stop reason: HOSPADM

## 2025-05-30 RX ORDER — HYDRALAZINE HYDROCHLORIDE 20 MG/ML
5 INJECTION INTRAMUSCULAR; INTRAVENOUS EVERY 6 HOURS PRN
Status: DISCONTINUED | OUTPATIENT
Start: 2025-05-30 | End: 2025-06-01 | Stop reason: HOSPADM

## 2025-05-30 RX ORDER — DEXAMETHASONE SODIUM PHOSPHATE 10 MG/ML
8 INJECTION, SOLUTION INTRAMUSCULAR; INTRAVENOUS ONCE
Status: COMPLETED | OUTPATIENT
Start: 2025-05-30 | End: 2025-05-30

## 2025-05-30 RX ORDER — FINASTERIDE 5 MG/1
5 TABLET, FILM COATED ORAL DAILY
Status: DISCONTINUED | OUTPATIENT
Start: 2025-05-31 | End: 2025-06-01 | Stop reason: HOSPADM

## 2025-05-30 RX ORDER — ALBUTEROL SULFATE 0.83 MG/ML
2.5 SOLUTION RESPIRATORY (INHALATION) ONCE
Status: DISCONTINUED | OUTPATIENT
Start: 2025-05-30 | End: 2025-06-01 | Stop reason: HOSPADM

## 2025-05-30 RX ORDER — ALBUTEROL SULFATE 2.5 MG/3ML
1 SOLUTION RESPIRATORY (INHALATION) ONCE
Status: COMPLETED | OUTPATIENT
Start: 2025-05-30 | End: 2025-05-30

## 2025-05-30 RX ORDER — DULOXETIN HYDROCHLORIDE 60 MG/1
60 CAPSULE, DELAYED RELEASE ORAL DAILY
Status: DISCONTINUED | OUTPATIENT
Start: 2025-05-31 | End: 2025-06-01 | Stop reason: HOSPADM

## 2025-05-30 RX ORDER — SODIUM CHLORIDE 9 MG/ML
75 INJECTION, SOLUTION INTRAVENOUS CONTINUOUS
Status: DISCONTINUED | OUTPATIENT
Start: 2025-05-30 | End: 2025-05-31

## 2025-05-30 RX ORDER — TAMSULOSIN HYDROCHLORIDE 0.4 MG/1
0.4 CAPSULE ORAL
Status: DISCONTINUED | OUTPATIENT
Start: 2025-05-30 | End: 2025-06-01 | Stop reason: HOSPADM

## 2025-05-30 RX ORDER — TRAZODONE HYDROCHLORIDE 150 MG/1
150 TABLET ORAL
Status: DISCONTINUED | OUTPATIENT
Start: 2025-05-30 | End: 2025-06-01 | Stop reason: HOSPADM

## 2025-05-30 RX ORDER — ACETAMINOPHEN 325 MG/1
650 TABLET ORAL EVERY 6 HOURS PRN
Status: DISCONTINUED | OUTPATIENT
Start: 2025-05-30 | End: 2025-06-01 | Stop reason: HOSPADM

## 2025-05-30 RX ORDER — BUSPIRONE HYDROCHLORIDE 5 MG/1
5 TABLET ORAL 3 TIMES DAILY
Status: DISCONTINUED | OUTPATIENT
Start: 2025-05-30 | End: 2025-06-01 | Stop reason: HOSPADM

## 2025-05-30 RX ORDER — CARISOPRODOL 350 MG/1
350 TABLET ORAL 3 TIMES DAILY
Status: DISCONTINUED | OUTPATIENT
Start: 2025-05-30 | End: 2025-06-01 | Stop reason: HOSPADM

## 2025-05-30 RX ORDER — BENZONATATE 100 MG/1
100 CAPSULE ORAL 3 TIMES DAILY PRN
Status: DISCONTINUED | OUTPATIENT
Start: 2025-05-30 | End: 2025-06-01 | Stop reason: HOSPADM

## 2025-05-30 RX ORDER — INSULIN LISPRO 100 [IU]/ML
30 INJECTION, SOLUTION INTRAVENOUS; SUBCUTANEOUS
Status: DISCONTINUED | OUTPATIENT
Start: 2025-05-30 | End: 2025-06-01 | Stop reason: HOSPADM

## 2025-05-30 RX ORDER — LEVALBUTEROL INHALATION SOLUTION 1.25 MG/3ML
1.25 SOLUTION RESPIRATORY (INHALATION)
Status: DISCONTINUED | OUTPATIENT
Start: 2025-05-30 | End: 2025-06-01 | Stop reason: HOSPADM

## 2025-05-30 RX ORDER — IPRATROPIUM BROMIDE AND ALBUTEROL SULFATE 2.5; .5 MG/3ML; MG/3ML
3 SOLUTION RESPIRATORY (INHALATION) ONCE
Status: COMPLETED | OUTPATIENT
Start: 2025-05-30 | End: 2025-05-30

## 2025-05-30 RX ORDER — ONDANSETRON 2 MG/ML
4 INJECTION INTRAMUSCULAR; INTRAVENOUS EVERY 6 HOURS PRN
Status: DISCONTINUED | OUTPATIENT
Start: 2025-05-30 | End: 2025-06-01 | Stop reason: HOSPADM

## 2025-05-30 RX ORDER — METHYLPREDNISOLONE SODIUM SUCCINATE 40 MG/ML
40 INJECTION, POWDER, LYOPHILIZED, FOR SOLUTION INTRAMUSCULAR; INTRAVENOUS EVERY 8 HOURS SCHEDULED
Status: DISCONTINUED | OUTPATIENT
Start: 2025-05-30 | End: 2025-06-01 | Stop reason: HOSPADM

## 2025-05-30 RX ORDER — INSULIN GLARGINE 100 [IU]/ML
70 INJECTION, SOLUTION SUBCUTANEOUS
Status: DISCONTINUED | OUTPATIENT
Start: 2025-05-30 | End: 2025-06-01 | Stop reason: HOSPADM

## 2025-05-30 RX ADMIN — AZITHROMYCIN MONOHYDRATE 500 MG: 500 INJECTION, POWDER, LYOPHILIZED, FOR SOLUTION INTRAVENOUS at 17:51

## 2025-05-30 RX ADMIN — DEXAMETHASONE SODIUM PHOSPHATE 8 MG: 10 INJECTION, SOLUTION INTRAMUSCULAR; INTRAVENOUS at 14:56

## 2025-05-30 RX ADMIN — METHYLPREDNISOLONE SODIUM SUCCINATE 40 MG: 40 INJECTION, POWDER, FOR SOLUTION INTRAMUSCULAR; INTRAVENOUS at 21:59

## 2025-05-30 RX ADMIN — ALBUTEROL SULFATE 2 PUFF: 90 AEROSOL, METERED RESPIRATORY (INHALATION) at 23:12

## 2025-05-30 RX ADMIN — BUSPIRONE HYDROCHLORIDE 5 MG: 5 TABLET ORAL at 17:51

## 2025-05-30 RX ADMIN — IPRATROPIUM BROMIDE AND ALBUTEROL SULFATE 3 ML: 2.5; .5 SOLUTION RESPIRATORY (INHALATION) at 14:56

## 2025-05-30 RX ADMIN — INSULIN LISPRO 8 UNITS: 100 INJECTION, SOLUTION INTRAVENOUS; SUBCUTANEOUS at 22:01

## 2025-05-30 RX ADMIN — HEPARIN SODIUM 5000 UNITS: 5000 INJECTION INTRAVENOUS; SUBCUTANEOUS at 17:51

## 2025-05-30 RX ADMIN — METHYLPREDNISOLONE SODIUM SUCCINATE 40 MG: 40 INJECTION, POWDER, FOR SOLUTION INTRAMUSCULAR; INTRAVENOUS at 17:51

## 2025-05-30 RX ADMIN — TAMSULOSIN HYDROCHLORIDE 0.4 MG: 0.4 CAPSULE ORAL at 17:51

## 2025-05-30 RX ADMIN — SODIUM CHLORIDE 75 ML/HR: 0.9 INJECTION, SOLUTION INTRAVENOUS at 17:53

## 2025-05-30 RX ADMIN — PREGABALIN 50 MG: 50 CAPSULE ORAL at 17:51

## 2025-05-30 RX ADMIN — CEFTRIAXONE SODIUM 1000 MG: 1 INJECTION, POWDER, FOR SOLUTION INTRAMUSCULAR; INTRAVENOUS at 16:28

## 2025-05-30 RX ADMIN — CARISOPRODOL 350 MG: 350 TABLET ORAL at 21:59

## 2025-05-30 RX ADMIN — HEPARIN SODIUM 5000 UNITS: 5000 INJECTION INTRAVENOUS; SUBCUTANEOUS at 21:58

## 2025-05-30 RX ADMIN — TRAZODONE HYDROCHLORIDE 150 MG: 150 TABLET ORAL at 21:59

## 2025-05-30 RX ADMIN — INSULIN GLARGINE 70 UNITS: 100 INJECTION, SOLUTION SUBCUTANEOUS at 21:59

## 2025-05-30 RX ADMIN — ATORVASTATIN CALCIUM 80 MG: 80 TABLET, FILM COATED ORAL at 17:51

## 2025-05-30 RX ADMIN — INSULIN LISPRO 4 UNITS: 100 INJECTION, SOLUTION INTRAVENOUS; SUBCUTANEOUS at 17:54

## 2025-05-30 RX ADMIN — INSULIN LISPRO 30 UNITS: 100 INJECTION, SOLUTION INTRAVENOUS; SUBCUTANEOUS at 17:53

## 2025-05-30 RX ADMIN — LEVALBUTEROL HYDROCHLORIDE 1.25 MG: 1.25 SOLUTION RESPIRATORY (INHALATION) at 19:40

## 2025-05-30 RX ADMIN — CARISOPRODOL 350 MG: 350 TABLET ORAL at 17:51

## 2025-05-30 RX ADMIN — BUSPIRONE HYDROCHLORIDE 5 MG: 5 TABLET ORAL at 21:59

## 2025-05-30 RX ADMIN — IPRATROPIUM BROMIDE 0.5 MG: 0.5 SOLUTION RESPIRATORY (INHALATION) at 19:40

## 2025-05-30 NOTE — ASSESSMENT & PLAN NOTE
Present on admission as evidenced by tachycardia, tachypnea  Possibly secondary to pneumonia as possible infiltrate noted in left lower lobe  SIRS criteria may also be met in the setting of DuoNebs in the ED  Afebrile and without leukocytosis and procalcitonin within normal limits  Ceftriaxone 2000 mg IV daily  Follow-up official read of chest x-ray  Blood cultures pending  De-escalate antibiotics as appropriate  Trend fever curve/WBC count/procalcitonin/lactate until cleared  Gentle IV fluid hydration

## 2025-05-30 NOTE — ASSESSMENT & PLAN NOTE
Lab Results   Component Value Date    HGBA1C 9.6 (A) 05/20/2025       Recent Labs     05/30/25  1408   POCGLU 271*       Blood Sugar Average: Last 72 hrs:    Continue home Lantus 70 units daily at bedtime and lispro 30 units 3 times daily with meals  Sliding scale insulin with Accu-Cheks  Diabetic diet  Target blood glucose 140-180

## 2025-05-30 NOTE — ASSESSMENT & PLAN NOTE
Baseline creatinine 1.1-1.2  Arrival creatinine 1.54  Likely prerenal azotemia in the setting of volume depletion/sepsis  IV fluid hydration with normal saline solution at 100 cc/h  Trend BMP  Avoid nephrotoxic agents  Strict intake and output  Daily standing weights  KDIGO protocol

## 2025-05-30 NOTE — ASSESSMENT & PLAN NOTE
Present on admission as evidenced by BMI greater than 50  Encourage lifestyle modifications and healthy weight loss

## 2025-05-30 NOTE — PROGRESS NOTES
[facility]  Name: Luis F Velazquez      : 1965      MRN: [id number]  Encounter Provider: Michelle Behler, PA-C  Encounter Date: 2025   Encounter department: [facility] [place]  :  Assessment & Plan  Shortness of breath             Patient Instructions    Patient Instructions   Ambulance will transport patient to ED for evaluation.    Follow up with PCP in 3-5 days.  Proceed to ER if symptoms worsen.    If tests were performed at your visit today, our office will contact you only if changes need to made to the care plan discussed with you at the visit. You can review your full results on [facility].    Chief Complaint:   Chief Complaint   Patient presents with    Shortness of Breath     Increased SOB with exertion past 3 days.  Oxygen via nasal cannula at 2 liters applied with oxygen saturation increasing from 87%-94%.  Blood sugar check 241.  911 called to transfer to ER for further evaluation.  Oxygen saturation gradual increase to 95%-96%     History of Present Illness   59-year-old male here with complaint of shortness of breath.  When brought back into the room patient's pulse ox was noted to be 88%.  He was placed on O2 at 2 L via nasal cannula and his pulse ox increased to around 9495%.  He has noted that he has had nasal congestion for the last 3 to 4 days.  His symptoms have been getting progressively worse.  He states that when he lays down and uses his CPAP machine he cannot breathe.  He denies fever.  He does have a history of respiratory failure.  He has oxygen at home but states that it is not working right.  He does not wear it all the time but only when he needs it.          Review of Systems   Constitutional:  Positive for fatigue. Negative for chills and fever.   HENT:  Positive for congestion, postnasal drip and sinus pressure. Negative for ear pain and sore throat.    Respiratory:  Positive for cough, chest tightness, shortness of breath and wheezing.     Neurological:  Negative for headaches.   All other systems reviewed and are negative.    Past Medical History   Past Medical History[1]  Past Surgical History[2]  Family History[3]  he reports that he quit smoking about 2 years ago. His smoking use included cigarettes. He started smoking about 42 years ago. He has a 120 pack-year smoking history. He has been exposed to tobacco smoke. He has never used smokeless tobacco. He reports that he does not currently use alcohol after a past usage of about 3.0 standard drinks of alcohol per week. He reports that he does not use drugs.  Current Outpatient Medications   Medication Instructions    albuterol 2.5 mg, Nebulization, Every 6 hours PRN    Ascorbic Acid (VITAMIN C PO) 1 tablet, Daily    aspirin 81 mg chewable tablet     busPIRone (BUSPAR) 5 mg, Oral, 3 times daily    carisoprodol (SOMA) 350 mg, Oral, 3 times daily    cholecalciferol (VITAMIN D3) 400 units tablet 1 tablet, Daily    clotrimazole (LOTRIMIN) 1 % cream Topical, 2 times daily    Diclofenac Sodium (VOLTAREN) 2 g, Topical, 4 times daily, To feet/ankle    DULoxetine (CYMBALTA) 60 mg, Oral, Daily    ferrous gluconate (FERGON) 324 mg    finasteride (PROSCAR) 5 mg, Oral, Daily    fish oil 1,000 mg, 2 times daily    HumaLOG KwikPen 30 Units, Subcutaneous, 3 times daily with meals    hydroCHLOROthiazide 25 mg, Oral, 2 times daily    insulin glargine (Toujeo SoloStar) 300 units/mL CONCENTRATED U-300 injection pen (1-unit dial) Inject 72 units nightly.    Insulin Pen Needle (1st Tier Unifine Pentips Plus) 31G X 5 MM MISC Other, 4 times daily    ketoconazole (NIZORAL) 2 % shampoo 1 Application, Topical, 2 times weekly, To feet in shower    levothyroxine 175 mcg, Oral, Daily    loratadine (CLARITIN) 10 mg, Daily    metFORMIN (GLUCOPHAGE) 1,000 mg, Oral, 2 times daily with meals    pregabalin (LYRICA) 50 mg, Oral, 2 times daily    rosuvastatin (CRESTOR) 40 mg, Oral, Daily    sodium chloride (OCEAN) 0.65 % nasal spray 1  "spray, Nasal, As needed    tamsulosin (FLOMAX) 0.4 mg, Oral, 2 times daily    traZODone (DESYREL) 150 mg, Oral, Daily at bedtime    valsartan (DIOVAN) 160 mg, Daily    Ventolin  (90 Base) MCG/ACT inhaler 2 puffs, Inhalation, Every 6 hours PRN    vitamin B-1 250 mg, Daily    vitamin E (tocopherol) 400 Units, Daily   Allergies[4]     Objective   /80   Pulse (!) 120   Temp 97.9 °F (36.6 °C)   Resp (!) 24   Wt 133 kg (293 lb 6.4 oz)   SpO2 (!) 87%   BMI 50.36 kg/m²      Physical Exam  Vitals and nursing note reviewed.   Constitutional:       General: He is not in acute distress.     Appearance: Normal appearance.   HENT:      Head: Normocephalic and atraumatic.      Right Ear: Tympanic membrane normal.      Left Ear: Tympanic membrane normal.      Nose: Nose normal. No congestion.      Mouth/Throat:      Mouth: Mucous membranes are moist.      Pharynx: No posterior oropharyngeal erythema.     Cardiovascular:      Rate and Rhythm: Regular rhythm. Tachycardia present.      Pulses: Normal pulses.      Heart sounds: No murmur heard.  Pulmonary:      Effort: Tachypnea present.      Breath sounds: Wheezing and rales present.     Musculoskeletal:      Cervical back: Normal range of motion.   Lymphadenopathy:      Cervical: No cervical adenopathy.     Neurological:      Mental Status: He is alert.         Portions of the record may have been created with voice recognition software.  Occasional wrong word or \"sound a like\" substitutions may have occurred due to the inherent limitations of voice recognition software.  Read the chart carefully and recognize, using context, where substitutions have occurred.       [1]   Past Medical History:  Diagnosis Date    Acquired hypothyroidism 1/2/2015    Angioedema     Diabetes mellitus (HCC)     Disease of thyroid gland     Hyperlipidemia     Hypertension     Mixed hyperlipidemia 2/4/2015    Morbid obesity (HCC)     Morbid obesity with BMI of 45.0-49.9, adult (HCC) " 1/2/2015    MARSHA (obstructive sleep apnea)     Primary hypertension 1/2/2015    Transitioned From: Benign essential hypertension   [2]   Past Surgical History:  Procedure Laterality Date    KNEE SURGERY      SINUS SURGERY     [3]   Family History  Problem Relation Name Age of Onset    Diabetes Mother      Diabetes type II Mother      Esophageal cancer Father      Diabetes Brother      Kidney cancer Brother      Diabetes type II Brother      Diabetes Maternal Grandmother      Diabetes type II Maternal Grandmother      Diabetes Paternal Grandmother      Heart disease Neg Hx     [4]   Allergies  Allergen Reactions    Ace Inhibitors Swelling     Angioedema    PT STATES THIS IS NOT AN ALLERGY    Other Anaphylaxis     Pt believes that he is allergic to peanut butter.    Also, seasonal allergies    Zinc Tongue Swelling     PT STATES THIS IS NOT AN ALLERGY    Clindamycin Edema     Had angioedema episode approx 5 hr after IM administration of clindamycin. Unclear if there is definitive causal relationship.  PT STATES THIS IS NOT AN ALLERGY

## 2025-05-30 NOTE — ASSESSMENT & PLAN NOTE
Patient presents with shortness of breath and was found to be in respiratory distress at urgent care  Does have a remote history of tobacco abuse  Physical exam remarkable for diffuse wheezes in all lung fields  Complicated by acute hypoxic respiratory failure  Likely COPD with acute exacerbation possibly secondary to pneumonia  Solu-Medrol 40 mg IV every 8 hours  Atrovent and Xopenex  Respiratory protocol  Continue home albuterol 2 puff every 6 hours PRN shortness of breath

## 2025-05-30 NOTE — ASSESSMENT & PLAN NOTE
Blood pressures slightly elevated on presentation  Hold home thiazide diuretic and ARB in the setting of LASHAWN  Monitor blood pressures  Hydralazine 5 mg IV every 6 hours PRN SBP > 160 mmHg

## 2025-05-30 NOTE — H&P
H&P - Hospitalist   Name: Luis F Velazquez 59 y.o. male I MRN: [id number]  Unit/Bed#: TR 03 I Date of Admission: 5/30/2025   Date of Service: 5/30/2025 I Hospital Day: 0     Assessment & Plan  Sepsis (HCC)  Present on admission as evidenced by tachycardia, tachypnea  Possibly secondary to pneumonia as possible infiltrate noted in left lower lobe  SIRS criteria may also be met in the setting of DuoNebs in the ED  Afebrile and without leukocytosis and procalcitonin within normal limits  Ceftriaxone 2000 mg IV daily  Follow-up official read of chest x-ray  Blood cultures pending  De-escalate antibiotics as appropriate  Trend fever curve/WBC count/procalcitonin/lactate until cleared  Gentle IV fluid hydration  COPD with acute exacerbation (HCC)  Patient presents with shortness of breath and was found to be in respiratory distress at urgent care  Does have a remote history of tobacco abuse  Physical exam remarkable for diffuse wheezes in all lung fields  Complicated by acute hypoxic respiratory failure  Likely COPD with acute exacerbation possibly secondary to pneumonia  Solu-Medrol 40 mg IV every 8 hours  Atrovent and Xopenex  Respiratory protocol  Continue home albuterol 2 puff every 6 hours PRN shortness of breath  LASHAWN (acute kidney injury) (HCC)  Baseline creatinine 1.1-1.2  Arrival creatinine 1.54  Likely prerenal azotemia in the setting of volume depletion/sepsis  IV fluid hydration with normal saline solution at 100 cc/h  Trend BMP  Avoid nephrotoxic agents  Strict intake and output  Daily standing weights  KDIGO protocol  Acute respiratory failure with hypoxia (HCC)  Patient does not wear oxygen in the outpatient setting  Found to be requiring 4 L/min NC O2 upon arrival  Likely secondary to COPD exacerbation and possible pneumonia  Wean O2 as tolerated  Respiratory and airway clearance protocols  Goal SpO2 > 88%  Type 2 diabetes mellitus with hyperglycemia, with long-term current use of insulin (HCC)  Lab Results    Component Value Date    HGBA1C 9.6 (A) 05/20/2025       Recent Labs     05/30/25  1408   POCGLU 271*       Blood Sugar Average: Last 72 hrs:    Continue home Lantus 70 units daily at bedtime and lispro 30 units 3 times daily with meals  Sliding scale insulin with Accu-Cheks  Diabetic diet  Target blood glucose 140-180  Essential (primary) hypertension  Blood pressures slightly elevated on presentation  Hold home thiazide diuretic and ARB in the setting of LASHAWN  Monitor blood pressures  Hydralazine 5 mg IV every 6 hours PRN SBP > 160 mmHg  Hyperlipidemia  Continue home statin therapy  Depression with anxiety  Mood appears appropriate  Continue home buspirone and duloxetine  Acquired hypothyroidism  Continue home levothyroxine 170 mcg oral daily  Morbid obesity with BMI of 45.0-49.9, adult (HCC)  Present on admission as evidenced by BMI greater than 50  Encourage lifestyle modifications and healthy weight loss      VTE Pharmacologic Prophylaxis: VTE Score: 8 High Risk (Score >/= 5) - Pharmacological DVT Prophylaxis Ordered: heparin. Sequential Compression Devices Ordered.  Code Status: Level 1 - Full Code  Discussion with family: Patient declined call to .     Anticipated Length of Stay: Patient will be admitted on an inpatient basis with an anticipated length of stay of greater than 2 midnights secondary to sepsis secondary to pneumonia and COPD with acute exacerbation requiring IV antibiotics and IV corticosteroids.    History of Present Illness   Chief Complaint: Shortness of breath    Luis F Velazquez is a 59 y.o. male with a PMH of COPD, insulin-dependent type 2 diabetes mellitus, hypertension, hyperlipidemia, morbid obesity, depression with anxiety, hypothyroidism who presents with shortness of breath.  The patient states that over the course of the past 3 days he has been experiencing worsening shortness of breath and dyspnea on exertion.  The patient was evaluated at an urgent care and was found to  be hypoxic to the 80s.  In the ED, the patient was found to be requiring 4 L/min NC O2.  The patient met SIRS criteria in the setting of tachycardia and tachypnea.  Possible source of infection pneumonia as chest x-ray with possible lingular infiltrate.  Afebrile and without leukocytosis.  Procalcitonin within normal limits.    Review of Systems   Constitutional:  Negative for chills and fever.   HENT:  Negative for ear pain and sore throat.    Eyes:  Negative for pain and visual disturbance.   Respiratory:  Positive for shortness of breath and wheezing. Negative for cough.    Cardiovascular:  Negative for chest pain and palpitations.   Gastrointestinal:  Negative for abdominal pain and vomiting.   Genitourinary:  Negative for dysuria and hematuria.   Musculoskeletal:  Negative for arthralgias and back pain.   Skin:  Negative for color change and rash.   Neurological:  Negative for seizures and syncope.   All other systems reviewed and are negative.      Historical Information   Past Medical History[1]  Past Surgical History[2]  Social History[3]  E-Cigarette/Vaping    E-Cigarette Use Never User     Cartridges/Day 0     Comments 0      E-Cigarette/Vaping Substances    Nicotine No     THC No     CBD No     Flavoring No     Other No     Unknown No      Meds/Allergies   I have reviewed home medications using recent Epic encounter.  Prior to Admission medications    Medication Sig Start Date End Date Taking? Authorizing Provider   albuterol (2.5 mg/3 mL) 0.083 % nebulizer solution Take 3 mL (2.5 mg total) by nebulization every 6 (six) hours as needed for wheezing or shortness of breath 2/23/23   Chuck Holman PA-C   Ascorbic Acid (VITAMIN C PO) Take 1 tablet by mouth in the morning.    Historical Provider, MD   aspirin 81 mg chewable tablet  3/14/06   Historical Provider, MD   busPIRone (BUSPAR) 5 mg tablet Take 1 tablet (5 mg total) by mouth 3 (three) times a day 5/23/25   Chuck Holman PA-C   carisoprodol (SOMA)  350 mg tablet TAKE 1 TABLET BY MOUTH THREE TIMES DAILY 3/7/25   Jose Arroyo MD   cholecalciferol (VITAMIN D3) 400 units tablet Take 1 tablet by mouth in the morning.    Historical Provider, MD   clotrimazole (LOTRIMIN) 1 % cream Apply topically 2 (two) times a day 5/2/24   Carla Price DPM   Diclofenac Sodium (VOLTAREN) 1 % Apply 2 g topically 4 (four) times a day To feet/ankle 8/1/24   Carla Price DPM   DULoxetine (CYMBALTA) 60 mg delayed release capsule Take 1 capsule by mouth once daily 4/18/25   Chuck Holman PA-C   EPINEPHrine (EPIPEN) 0.3 mg/0.3 mL SOAJ INJECT 0.3MG INTO A MUSCLE ONCE FOR 1 DOSE 5/8/23   Héctro Romero PA-C   ferrous gluconate (FERGON) 324 mg tablet Take 324 mg by mouth 11/10/23 5/22/25  Historical Provider, MD   finasteride (PROSCAR) 5 mg tablet Take 1 tablet (5 mg total) by mouth daily 2/18/25   MARLEEN Mcfarlane   HumaLOG KwikPen 100 units/mL injection pen Inject 30 Units under the skin 3 (three) times a day with meals 2/6/25   MARLEEN Davila   hydroCHLOROthiazide 25 mg tablet Take 1 tablet by mouth twice daily 3/7/25   Chuck Holman PA-C   insulin glargine (Toujeo SoloStar) 300 units/mL CONCENTRATED U-300 injection pen (1-unit dial) Inject 72 units nightly. 3/5/25   MARLEEN Davila   Insulin Pen Needle (1st Tier Unifine Pentips Plus) 31G X 5 MM MISC Use 4 (four) times a day 3/5/25   MARLEEN Davila   ketoconazole (NIZORAL) 2 % shampoo Apply 1 Application topically 2 (two) times a week To feet in shower 5/2/24   Carla Price DPM   levothyroxine 175 mcg tablet Take 1 tablet by mouth once daily 5/19/25   Chuck Holman PA-C   loratadine (CLARITIN) 10 mg tablet Take 10 mg by mouth in the morning.    Historical Provider, MD   metFORMIN (GLUCOPHAGE) 1000 MG tablet Take 1 tablet (1,000 mg total) by mouth 2 (two) times a day with meals 2/20/25   Zana Aaron MD   Omega-3 Fatty Acids (fish oil) 1,000 mg Take 1,000 mg by mouth in  the morning and 1,000 mg in the evening.    Historical Provider, MD   pregabalin (LYRICA) 50 mg capsule Take 1 capsule by mouth twice daily 3/20/25   MARLEEN Davila   rosuvastatin (CRESTOR) 40 MG tablet Take 1 tablet by mouth once daily 5/8/25   Asia Galarza DO   sodium chloride (OCEAN) 0.65 % nasal spray 1 spray into each nostril as needed for rhinitis 9/19/19   MARLEEN Lan   tamsulosin (FLOMAX) 0.4 mg Take 1 capsule (0.4 mg total) by mouth 2 (two) times a day 2/18/25   MARLEEN Mcfarlane   Thiamine HCl (vitamin B-1) 250 MG tablet Take 250 mg by mouth in the morning.    Historical Provider, MD   traZODone (DESYREL) 150 mg tablet TAKE 1 TABLET BY MOUTH ONCE DAILY AT BEDTIME 3/31/25   Chuck Holman PA-C   valsartan (DIOVAN) 160 mg tablet Take 160 mg by mouth in the morning. 11/10/23   Historical Provider, MD Burton  (90 Base) MCG/ACT inhaler Inhale 2 puffs every 6 (six) hours as needed for wheezing or shortness of breath 1/16/25   MARLEEN Mix   vitamin E, tocopherol, 400 units capsule Take 400 Units by mouth in the morning.    Historical Provider, MD   atorvastatin (LIPITOR) 20 mg tablet Take 1 tablet by mouth once daily 4/29/22 11/15/22  Héctor Romero PA-C     Allergies   Allergen Reactions    Ace Inhibitors Swelling     Angioedema    PT STATES THIS IS NOT AN ALLERGY    Other Anaphylaxis     Pt believes that he is allergic to peanut butter.    Also, seasonal allergies    Zinc Tongue Swelling     PT STATES THIS IS NOT AN ALLERGY    Clindamycin Edema     Had angioedema episode approx 5 hr after IM administration of clindamycin. Unclear if there is definitive causal relationship.  PT STATES THIS IS NOT AN ALLERGY       Objective :  Temp:  [97.9 °F (36.6 °C)-98.3 °F (36.8 °C)] 98.3 °F (36.8 °C)  HR:  [115-120] 116  BP: (138-166)/(70-80) 166/70  Resp:  [22-24] 22  SpO2:  [87 %-95 %] 93 %  O2 Device: Nasal cannula  Nasal Cannula O2 Flow Rate (L/min):  [4 L/min] 4  L/min    Physical Exam  Vitals and nursing note reviewed.   Constitutional:       General: He is not in acute distress.     Appearance: He is well-developed. He is obese.   HENT:      Head: Normocephalic and atraumatic.     Eyes:      Conjunctiva/sclera: Conjunctivae normal.       Cardiovascular:      Rate and Rhythm: Normal rate and regular rhythm.      Heart sounds: No murmur heard.  Pulmonary:      Effort: Respiratory distress present.      Breath sounds: Wheezing present.   Abdominal:      Palpations: Abdomen is soft.      Tenderness: There is no abdominal tenderness.     Musculoskeletal:         General: No swelling.      Cervical back: Neck supple.     Skin:     General: Skin is warm and dry.      Capillary Refill: Capillary refill takes less than 2 seconds.     Neurological:      Mental Status: He is alert.     Psychiatric:         Mood and Affect: Mood normal.         Lab Results: I have reviewed the following results:  Results from last 7 days   Lab Units 05/30/25  1458   WBC Thousand/uL 7.01   HEMOGLOBIN g/dL 11.2*   HEMATOCRIT % 33.7*   PLATELETS Thousands/uL 218   SEGS PCT % 63   LYMPHO PCT % 25   MONO PCT % 10   EOS PCT % 1     Results from last 7 days   Lab Units 05/30/25  1458   SODIUM mmol/L 134*   POTASSIUM mmol/L 3.7   CHLORIDE mmol/L 93*   CO2 mmol/L 33*   BUN mg/dL 17   CREATININE mg/dL 1.54*   ANION GAP mmol/L 8   CALCIUM mg/dL 9.6   ALBUMIN g/dL 4.0   TOTAL BILIRUBIN mg/dL 0.33   ALK PHOS U/L 59   ALT U/L 36   AST U/L 27   GLUCOSE RANDOM mg/dL 284*     Results from last 7 days   Lab Units 05/30/25  1458   INR  0.89     Results from last 7 days   Lab Units 05/30/25  1408   POC GLUCOSE mg/dl 271*     Lab Results   Component Value Date    HGBA1C 9.6 (A) 05/20/2025    HGBA1C 10.5 (H) 02/21/2025    HGBA1C 11.1 (A) 02/20/2025     Results from last 7 days   Lab Units 05/30/25  1458   LACTIC ACID mmol/L 3.0*   PROCALCITONIN ng/ml 0.15       Imaging Results Review: I reviewed radiology reports from this  admission including: chest xray.  Other Study Results Review: No additional pertinent studies reviewed.    Administrative Statements   I have spent a total time of 65 minutes in caring for this patient on the day of the visit/encounter including Diagnostic results, Prognosis, Risks and benefits of tx options, Instructions for management, Patient and family education, Importance of tx compliance, Risk factor reductions, Impressions, Counseling / Coordination of care, Documenting in the medical record, Reviewing/placing orders in the medical record (including tests, medications, and/or procedures), Obtaining or reviewing history  , and Communicating with other healthcare professionals .    ** Please Note: This note has been constructed using a voice recognition system. **         [1]   Past Medical History:  Diagnosis Date    Acquired hypothyroidism 2015    Angioedema     Diabetes mellitus (HCC)     Disease of thyroid gland     Hyperlipidemia     Hypertension     Mixed hyperlipidemia 2015    Morbid obesity (HCC)     Morbid obesity with BMI of 45.0-49.9, adult (HCC) 2015    MARSHA (obstructive sleep apnea)     Primary hypertension 2015    Transitioned From: Benign essential hypertension   [2]   Past Surgical History:  Procedure Laterality Date    KNEE SURGERY      SINUS SURGERY     [3]   Social History  Tobacco Use    Smoking status: Former     Current packs/day: 0.00     Average packs/day: 3.0 packs/day for 40.0 years (120.0 ttl pk-yrs)     Types: Cigarettes     Start date: 10/21/1982     Quit date: 10/21/2022     Years since quittin.6     Passive exposure: Past    Smokeless tobacco: Never    Tobacco comments:     states read to quit prior to admit   Vaping Use    Vaping status: Never Used   Substance and Sexual Activity    Alcohol use: Not Currently     Alcohol/week: 3.0 standard drinks of alcohol     Types: 2 Cans of beer, 1 Shots of liquor per week     Comment: No alcohol since     Drug use: No     Sexual activity: Not Currently

## 2025-05-30 NOTE — ASSESSMENT & PLAN NOTE
Patient does not wear oxygen in the outpatient setting  Found to be requiring 4 L/min NC O2 upon arrival  Likely secondary to COPD exacerbation and possible pneumonia  Wean O2 as tolerated  Respiratory and airway clearance protocols  Goal SpO2 > 88%

## 2025-05-30 NOTE — ED PROVIDER NOTES
Time reflects when diagnosis was documented in both MDM as applicable and the Disposition within this note       Time User Action Codes Description Comment    5/30/2025  3:49 PM Johnson Go Add [R09.02] Hypoxia     5/30/2025  3:49 PM Johnson Go Add [J44.9] COPD (chronic obstructive pulmonary disease) (HCC)     5/30/2025  3:49 PM BruLb ramírezony Add [J18.9] Pneumonia     5/30/2025  3:50 PM Johnson Go Add [A41.9,  R65.20] Severe sepsis (HCC)     5/30/2025  4:29 PM Apolinar Velasquez Modify [R09.02] Hypoxia     5/30/2025  4:29 PM Apolinar Velasquez Add [A41.9] Sepsis, due to unspecified organism, unspecified whether acute organ dysfunction present (HCC)           ED Disposition       ED Disposition   Admit    Condition   Stable    Date/Time   Fri May 30, 2025  4:18 PM    Comment   Case was discussed with Dr. Miller and the patient's admission status was agreed to be Admission Status: inpatient status to the service of Dr. Miller.               Assessment & Plan       Medical Decision Making  59-year-old male presents emergency room complaining of acute shortness of breath and arrived via ambulance from the urgent care hypoxic with an oxygen saturation in the high 80s.  The patient was dyspneic on arrival after receiving a neb treatment in [facility].  The patient has a history of COPD but admits to no fever or chills or chest pain.  The patient has a cough with that is nonproductive, and is no longer smoking.  Differential diagnosis is acute congestive heart failure, COPD, pneumonia, pneumothorax, or potentially viral syndrome.  Patient received IV steroids and nebulizer treatment while in the emergency department with some improvement.  X-ray shows blunting of the left heart border which may be indicative of a lingular infiltrate or possibly a left lower lobe pneumonia.  Patient has SIRS criteria due to heart rate and respiratory rate and lactate was found to be in the low threes, flying him for severe sepsis.   Sepsis alert was called and patient was given IV Rocephin as well as Zithromax.  Patient will be admitted for further treatment to the medicine service.    Amount and/or Complexity of Data Reviewed  Labs: ordered. Decision-making details documented in ED Course.  Radiology: independent interpretation performed.    Risk  Prescription drug management.  Decision regarding hospitalization.        ED Course as of 05/30/25 1632   Fri May 30, 2025   1538 LACTIC ACID(!): 3.0   1620 Critical Care Time Statement: Upon my evaluation, this patient had a high probability of imminent or life-threatening deterioration due to acute respiratory distress, which required my direct attention, intervention, and personal management.  I spent a total of 50 minutes directly providing critical care services, including evaluating for the presence of life-threatening injuries or illnesses, management of organ system failure(s) , and complex medical decision making (to support/prevent further life-threatening deterioration).. This time is exclusive of procedures, teaching, treating other patients, family meetings, and any prior time recorded by providers other than myself.          Medications   ceftriaxone (ROCEPHIN) 1 g/50 mL in dextrose IVPB (1,000 mg Intravenous New Bag 5/30/25 1628)   azithromycin (ZITHROMAX) 500 mg in sodium chloride 0.9 % 250 mL IVPB (has no administration in time range)   albuterol inhalation solution 2.5 mg (has no administration in time range)   albuterol (PROVENTIL HFA,VENTOLIN HFA) inhaler 2 puff (has no administration in time range)   aspirin chewable tablet 81 mg (has no administration in time range)   busPIRone (BUSPAR) tablet 5 mg (has no administration in time range)   carisoprodol (SOMA) tablet 350 mg (has no administration in time range)   DULoxetine (CYMBALTA) delayed release capsule 60 mg (has no administration in time range)   finasteride (PROSCAR) tablet 5 mg (has no administration in time range)    insulin lispro (HumALOG/ADMELOG) 100 units/mL subcutaneous injection 30 Units (has no administration in time range)   insulin glargine (LANTUS) subcutaneous injection 70 Units 0.7 mL (has no administration in time range)   levothyroxine tablet 175 mcg (has no administration in time range)   pregabalin (LYRICA) capsule 50 mg (has no administration in time range)   atorvastatin (LIPITOR) tablet 80 mg (has no administration in time range)   tamsulosin (FLOMAX) capsule 0.4 mg (has no administration in time range)   traZODone (DESYREL) tablet 150 mg (has no administration in time range)   sodium chloride 0.9 % infusion (has no administration in time range)   acetaminophen (TYLENOL) tablet 650 mg (has no administration in time range)   ondansetron (ZOFRAN) injection 4 mg (has no administration in time range)   heparin (porcine) subcutaneous injection 5,000 Units (has no administration in time range)   methylPREDNISolone sodium succinate (Solu-MEDROL) injection 40 mg (has no administration in time range)   ipratropium (ATROVENT) 0.02 % inhalation solution 0.5 mg (has no administration in time range)   levalbuterol (XOPENEX) inhalation solution 1.25 mg (has no administration in time range)   insulin lispro (HumALOG/ADMELOG) 100 units/mL subcutaneous injection 2-12 Units (has no administration in time range)   insulin lispro (HumALOG/ADMELOG) 100 units/mL subcutaneous injection 2-12 Units (has no administration in time range)   ceftriaxone (ROCEPHIN) 2 g/50 mL in dextrose IVPB (has no administration in time range)   ipratropium-albuterol (DUO-NEB) 0.5-2.5 mg/3 mL inhalation solution 3 mL (3 mL Nebulization Given 5/30/25 1456)   dexamethasone (PF) (DECADRON) injection 8 mg (8 mg Intravenous Given 5/30/25 1456)   albuterol (FOR EMS ONLY) (2.5 mg/3 mL) 0.083 % inhalation solution 2.5 mg (0 mg Does not apply Given to EMS 5/30/25 1454)       ED Risk Strat Scores                    No data recorded        SBIRT 22yo+       Flowsheet Row Most Recent Value   Initial Alcohol Screen: US AUDIT-C     1. How often do you have a drink containing alcohol? 0 Filed at: 05/30/2025 1450   3a. Male UNDER 65: How often do you have five or more drinks on one occasion? 0 Filed at: 05/30/2025 1450   Audit-C Score 0 Filed at: 05/30/2025 1450   SHIVANI: How many times in the past year have you...    Used an illegal drug or used a prescription medication for non-medical reasons? Never Filed at: 05/30/2025 1450                            History of Present Illness       Chief Complaint   Patient presents with    Shortness of Breath     Patient sent in to ED from urgent care for low room air SpO2 at 87%.  Patient transported via EMS on 4L NC and given an albuterol treatment enroute. Blood sugar noted to be in the 300's at urgent care. Hx of COPD       Past Medical History[1]   Past Surgical History[2]   Family History[3]   Social History[4]   E-Cigarette/Vaping    E-Cigarette Use Never User     Cartridges/Day 0     Comments 0       E-Cigarette/Vaping Substances    Nicotine No     THC No     CBD No     Flavoring No     Other No     Unknown No       I have reviewed and agree with the history as documented.     59-year-old male presents emergency department complaining of gradual onset of shortness of breath that has been ongoing for the last 3 days with cough and wheezing.  Patient denies fever or chills and does have a history of COPD and notes he stopped smoking a long time ago.  Patient went to the urgent care was promptly sent to the ER.  The patient had a nebulizer treatment with some improvement.  Patient notes he is coughing without significant mucus production.  Patient without fever or chills.  Patient denies any other complaint at this time.          Review of Systems   Constitutional:  Positive for activity change. Negative for chills and fever.   HENT:  Negative for ear pain and sore throat.    Eyes:  Negative for pain and visual disturbance.    Respiratory:  Positive for cough and shortness of breath.    Cardiovascular:  Negative for chest pain and palpitations.   Gastrointestinal:  Negative for abdominal pain and vomiting.   Genitourinary:  Negative for dysuria and hematuria.   Musculoskeletal:  Negative for arthralgias and back pain.   Skin:  Negative for color change and rash.   Neurological:  Negative for seizures and syncope.   All other systems reviewed and are negative.          Objective       ED Triage Vitals [05/30/25 1443]   Temperature Pulse Blood Pressure Respirations SpO2 Patient Position - Orthostatic VS   98.3 °F (36.8 °C) (!) 115 166/70 (!) 24 95 % Sitting      Temp src Heart Rate Source BP Location FiO2 (%) Pain Score    -- Monitor Left arm -- --      Vitals      Date and Time Temp Pulse SpO2 Resp BP Pain Score FACES Pain Rating User   05/30/25 1445 -- 116 93 % 22 166/70 -- -- MC   05/30/25 1443 98.3 °F (36.8 °C) 115 95 % 24 166/70 -- -- AP            Physical Exam  Constitutional:       General: He is not in acute distress.     Appearance: Normal appearance. He is normal weight. He is ill-appearing.   HENT:      Head: Normocephalic and atraumatic.      Right Ear: External ear normal.      Left Ear: External ear normal.      Nose: Nose normal.      Mouth/Throat:      Mouth: Mucous membranes are moist.     Eyes:      Conjunctiva/sclera: Conjunctivae normal.       Cardiovascular:      Rate and Rhythm: Regular rhythm. Tachycardia present.      Pulses: Normal pulses.      Heart sounds: Normal heart sounds.      Comments: Heart rate 112  Pulmonary:      Effort: Accessory muscle usage present.      Breath sounds: Wheezing and rhonchi present. No rales.   Chest:      Chest wall: No tenderness.   Abdominal:      General: Abdomen is flat. There is no distension.      Palpations: Abdomen is soft. There is no mass.      Tenderness: There is no guarding or rebound.      Comments: Nontender reducible umbilical hernia is appreciated.      Musculoskeletal:         General: No swelling, tenderness or deformity. Normal range of motion.      Cervical back: Normal range of motion.      Right lower leg: Edema present.      Left lower leg: Edema present.      Comments: +2 lower extremity edema bilaterally.  Appears chronic with skin changes overlying.     Skin:     General: Skin is warm and dry.      Capillary Refill: Capillary refill takes 2 to 3 seconds.      Coloration: Skin is not pale.      Findings: No rash.      Nails: There is no clubbing.     Neurological:      General: No focal deficit present.      Mental Status: He is alert and oriented to person, place, and time. Mental status is at baseline.     Psychiatric:         Mood and Affect: Mood normal.         Results Reviewed       Procedure Component Value Units Date/Time    Urinalysis with microscopic [[id number]]     Lab Status: No result Specimen: Urine, Clean Catch     Platelet count [[id number]]     Lab Status: No result Specimen: Blood     HS Troponin I 4hr [[id number]]     Lab Status: No result Specimen: Blood     Blood gas, venous [[id number]]  (Abnormal) Collected: 05/30/25 1547    Lab Status: Final result Specimen: Blood from Arm, Right Updated: 05/30/25 1600     pH, Kwesi 7.446     pCO2, Kwesi 43.4 mm Hg      pO2, Kwesi 54.3 mm Hg      HCO3, Kwesi 29.2 mmol/L      Base Excess, Kwesi 4.6 mmol/L      O2 Content, Kwesi 14.7 ml/dL      O2 HGB, VENOUS 87.0 %     FLU/RSV/COVID - if FLU/RSV clinically relevant (2hr TAT) [[id number]]  (Normal) Collected: 05/30/25 1458    Lab Status: Final result Specimen: Nares from Nose Updated: 05/30/25 1547     SARS-CoV-2 Negative     INFLUENZA A PCR Negative     INFLUENZA B PCR Negative     RSV PCR Negative    Narrative:      This test has been performed using the CoV-2/Flu/RSV plus assay on the [employer] GeneXpert platform. This test has been validated by the  and verified by the performing laboratory.     This test is designed to amplify and detect the  following: nucleocapsid (N), envelope (E), and RNA-dependent RNA polymerase (RdRP) genes of the SARS-CoV-2 genome; matrix (M), basic polymerase (PB2), and acidic protein (PA) segments of the influenza A genome; matrix (M) and non-structural protein (NS) segments of the influenza B genome, and the nucleocapsid genes of RSV A and RSV B.     Positive results are indicative of the presence of Flu A, Flu B, RSV, and/or SARS-CoV-2 RNA. Positive results for SARS-CoV-2 or suspected novel influenza should be reported to state, local, or federal health departments according to local reporting requirements.      All results should be assessed in conjunction with clinical presentation and other laboratory markers for clinical management.     FOR PEDIATRIC PATIENTS - copy/paste COVID Guidelines URL to browser: [email or web address]       B-Type Natriuretic Peptide(BNP) [[id number]]  (Normal) Collected: 05/30/25 1458    Lab Status: Final result Specimen: Blood from Arm, Left Updated: 05/30/25 1542     BNP 17 pg/mL     Procalcitonin [[id number]]  (Normal) Collected: 05/30/25 1458    Lab Status: Final result Specimen: Blood from Arm, Right Updated: 05/30/25 1541     Procalcitonin 0.15 ng/ml     HS Troponin 0hr (reflex protocol) [[id number]]  (Normal) Collected: 05/30/25 1458    Lab Status: Final result Specimen: Blood from Arm, Right Updated: 05/30/25 1532     hs TnI 0hr 8 ng/L     HS Troponin I 2hr [[id number]]     Lab Status: No result Specimen: Blood     APTT [[id number]]  (Normal) Collected: 05/30/25 1458    Lab Status: Final result Specimen: Blood from Arm, Right Updated: 05/30/25 1527     PTT 23 seconds     Protime-INR [[id number]]  (Normal) Collected: 05/30/25 1458    Lab Status: Final result Specimen: Blood from Arm, Right Updated: 05/30/25 1527     Protime 12.5 seconds      INR 0.89    Narrative:      INR Therapeutic Range    Indication                                              INR Range      Atrial Fibrillation                                               2.0-3.0  Hypercoagulable State                                    2.0.2.3  Left Ventricular Asist Device                            2.0-3.0  Mechanical Heart Valve                                  -    Aortic(with afib, MI, embolism, HF, LA enlargement,    and/or coagulopathy)                                     2.0-3.0 (2.5-3.5)     Mitral                                                             2.5-3.5  Prosthetic/Bioprosthetic Heart Valve               2.0-3.0  Venous thromboembolism (VTE: VT, PE        2.0-3.0    Lactic acid, plasma (w/reflex if result > 2.0) [[id number]]  (Abnormal) Collected: 05/30/25 1458    Lab Status: Final result Specimen: Blood from Arm, Left Updated: 05/30/25 1525     LACTIC ACID 3.0 mmol/L     Narrative:      Result may be elevated if tourniquet was used during collection.    Lactic acid 2 Hours [[id number]]     Lab Status: No result Specimen: Blood     Comprehensive metabolic panel [[id number]]  (Abnormal) Collected: 05/30/25 1458    Lab Status: Final result Specimen: Blood from Arm, Right Updated: 05/30/25 1525     Sodium 134 mmol/L      Potassium 3.7 mmol/L      Chloride 93 mmol/L      CO2 33 mmol/L      ANION GAP 8 mmol/L      BUN 17 mg/dL      Creatinine 1.54 mg/dL      Glucose 284 mg/dL      Calcium 9.6 mg/dL      AST 27 U/L      ALT 36 U/L      Alkaline Phosphatase 59 U/L      Total Protein 6.9 g/dL      Albumin 4.0 g/dL      Total Bilirubin 0.33 mg/dL      eGFR 48 ml/min/1.73sq m     Narrative:      National Kidney Disease Foundation guidelines for Chronic Kidney Disease (CKD):     Stage 1 with normal or high GFR (GFR > 90 mL/min/1.73 square meters)    Stage 2 Mild CKD (GFR = 60-89 mL/min/1.73 square meters)    Stage 3A Moderate CKD (GFR = 45-59 mL/min/1.73 square meters)    Stage 3B Moderate CKD (GFR = 30-44 mL/min/1.73 square meters)    Stage 4 Severe CKD (GFR = 15-29 mL/min/1.73 square  meters)    Stage 5 End Stage CKD (GFR <15 mL/min/1.73 square meters)  Note: GFR calculation is accurate only with a steady state creatinine    CBC and differential [[id number]]  (Abnormal) Collected: 05/30/25 1458    Lab Status: Final result Specimen: Blood from Arm, Left Updated: 05/30/25 1516     WBC 7.01 Thousand/uL      RBC 4.00 Million/uL      Hemoglobin 11.2 g/dL      Hematocrit 33.7 %      MCV 84 fL      MCH 28.0 pg      MCHC 33.2 g/dL      RDW 13.7 %      MPV 9.6 fL      Platelets 218 Thousands/uL      nRBC 0 /100 WBCs      Segmented % 63 %      Immature Grans % 0 %      Lymphocytes % 25 %      Monocytes % 10 %      Eosinophils Relative 1 %      Basophils Relative 1 %      Absolute Neutrophils 4.37 Thousands/µL      Absolute Immature Grans 0.03 Thousand/uL      Absolute Lymphocytes 1.76 Thousands/µL      Absolute Monocytes 0.71 Thousand/µL      Eosinophils Absolute 0.10 Thousand/µL      Basophils Absolute 0.04 Thousands/µL     Blood culture #2 [[id number]] Collected: 05/30/25 1458    Lab Status: In process Specimen: Blood from Arm, Right Updated: 05/30/25 1506    Blood culture #1 [[id number]] Collected: 05/30/25 1458    Lab Status: In process Specimen: Blood from Arm, Left Updated: 05/30/25 1506    UA w Reflex to Microscopic w Reflex to Culture [[id number]]     Lab Status: No result Specimen: Urine, Clean Catch             XR chest 1 view portable   ED Interpretation by Johnson Go Jr., DO (05/30 153)   Questionable left basilar infiltrate or left lingular infiltrate          ECG 12 Lead Documentation Only    Date/Time: 5/30/2025 2:57 PM    Performed by: Johnson Go Jr., DO  Authorized by: Johnson Go Jr., DO    ECG reviewed by me, the ED Provider: yes    Patient location:  ED  Comments:      EKG shows a sinus tachycardia 112 beats a minute with a normal axis.  There is nonspecific T wave flattening in the anterior septal and lateral precordial leads.      ED Medication and Procedure  Management   Prior to Admission Medications   Prescriptions Last Dose Informant Patient Reported? Taking?   Ascorbic Acid (VITAMIN C PO)  Self Yes No   Sig: Take 1 tablet by mouth in the morning.   DULoxetine (CYMBALTA) 60 mg delayed release capsule  Self No No   Sig: Take 1 capsule by mouth once daily   Diclofenac Sodium (VOLTAREN) 1 %  Self No No   Sig: Apply 2 g topically 4 (four) times a day To feet/ankle   EPINEPHrine (EPIPEN) 0.3 mg/0.3 mL SOAJ  Self No No   Sig: INJECT 0.3MG INTO A MUSCLE ONCE FOR 1 DOSE   HumaLOG KwikPen 100 units/mL injection pen  Self No No   Sig: Inject 30 Units under the skin 3 (three) times a day with meals   Insulin Pen Needle (1st Tier Unifine Pentips Plus) 31G X 5 MM MISC  Self No No   Sig: Use 4 (four) times a day   Omega-3 Fatty Acids (fish oil) 1,000 mg  Self Yes No   Sig: Take 1,000 mg by mouth in the morning and 1,000 mg in the evening.   Thiamine HCl (vitamin B-1) 250 MG tablet  Self Yes No   Sig: Take 250 mg by mouth in the morning.   Ventolin  (90 Base) MCG/ACT inhaler  Self No No   Sig: Inhale 2 puffs every 6 (six) hours as needed for wheezing or shortness of breath   albuterol (2.5 mg/3 mL) 0.083 % nebulizer solution  Self No No   Sig: Take 3 mL (2.5 mg total) by nebulization every 6 (six) hours as needed for wheezing or shortness of breath   aspirin 81 mg chewable tablet  Self Yes No   busPIRone (BUSPAR) 5 mg tablet   No No   Sig: Take 1 tablet (5 mg total) by mouth 3 (three) times a day   carisoprodol (SOMA) 350 mg tablet  Self No No   Sig: TAKE 1 TABLET BY MOUTH THREE TIMES DAILY   cholecalciferol (VITAMIN D3) 400 units tablet  Self Yes No   Sig: Take 1 tablet by mouth in the morning.   clotrimazole (LOTRIMIN) 1 % cream  Self No No   Sig: Apply topically 2 (two) times a day   ferrous gluconate (FERGON) 324 mg tablet  Self Yes No   Sig: Take 324 mg by mouth   finasteride (PROSCAR) 5 mg tablet  Self No No   Sig: Take 1 tablet (5 mg total) by mouth daily    hydroCHLOROthiazide 25 mg tablet  Self No No   Sig: Take 1 tablet by mouth twice daily   insulin glargine (Toujeo SoloStar) 300 units/mL CONCENTRATED U-300 injection pen (1-unit dial)  Self No No   Sig: Inject 72 units nightly.   ketoconazole (NIZORAL) 2 % shampoo  Self No No   Sig: Apply 1 Application topically 2 (two) times a week To feet in shower   levothyroxine 175 mcg tablet  Self No No   Sig: Take 1 tablet by mouth once daily   loratadine (CLARITIN) 10 mg tablet  Self Yes No   Sig: Take 10 mg by mouth in the morning.   metFORMIN (GLUCOPHAGE) 1000 MG tablet  Self No No   Sig: Take 1 tablet (1,000 mg total) by mouth 2 (two) times a day with meals   pregabalin (LYRICA) 50 mg capsule  Self No No   Sig: Take 1 capsule by mouth twice daily   rosuvastatin (CRESTOR) 40 MG tablet  Self No No   Sig: Take 1 tablet by mouth once daily   sodium chloride (OCEAN) 0.65 % nasal spray  Self No No   Si spray into each nostril as needed for rhinitis   tamsulosin (FLOMAX) 0.4 mg  Self No No   Sig: Take 1 capsule (0.4 mg total) by mouth 2 (two) times a day   traZODone (DESYREL) 150 mg tablet  Self No No   Sig: TAKE 1 TABLET BY MOUTH ONCE DAILY AT BEDTIME   valsartan (DIOVAN) 160 mg tablet  Self Yes No   Sig: Take 160 mg by mouth in the morning.   vitamin E, tocopherol, 400 units capsule  Self Yes No   Sig: Take 400 Units by mouth in the morning.      Facility-Administered Medications: None     Patient's Medications   Discharge Prescriptions    No medications on file     No discharge procedures on file.  ED SEPSIS DOCUMENTATION   Time reflects when diagnosis was documented in both MDM as applicable and the Disposition within this note       Time User Action Codes Description Comment    2025  3:49 PM Johnson Go [R09.02] Hypoxia     2025  3:49 PM Johnson Go [J44.9] COPD (chronic obstructive pulmonary disease) (HCC)     2025  3:49 PM Johnson Go [J18.9] Pneumonia     2025  3:50 PM  Johnson Go Add [A41.9,  R65.20] Severe sepsis (HCC)     2025  4:29 PM Apolinar Velasquez Modify [R09.02] Hypoxia     2025  4:29 PM Apolinar Velasquez Add [A41.9] Sepsis, due to unspecified organism, unspecified whether acute organ dysfunction present (HCC)                    [1]   Past Medical History:  Diagnosis Date    Acquired hypothyroidism 2015    Angioedema     Diabetes mellitus (HCC)     Disease of thyroid gland     Hyperlipidemia     Hypertension     Mixed hyperlipidemia 2015    Morbid obesity (HCC)     Morbid obesity with BMI of 45.0-49.9, adult (HCC) 2015    MARSHA (obstructive sleep apnea)     Primary hypertension 2015    Transitioned From: Benign essential hypertension   [2]   Past Surgical History:  Procedure Laterality Date    KNEE SURGERY      SINUS SURGERY     [3]   Family History  Problem Relation Name Age of Onset    Diabetes Mother      Diabetes type II Mother      Esophageal cancer Father      Diabetes Brother      Kidney cancer Brother      Diabetes type II Brother      Diabetes Maternal Grandmother      Diabetes type II Maternal Grandmother      Diabetes Paternal Grandmother      Heart disease Neg Hx     [4]   Social History  Tobacco Use    Smoking status: Former     Current packs/day: 0.00     Average packs/day: 3.0 packs/day for 40.0 years (120.0 ttl pk-yrs)     Types: Cigarettes     Start date: 10/21/1982     Quit date: 10/21/2022     Years since quittin.6     Passive exposure: Past    Smokeless tobacco: Never    Tobacco comments:     states read to quit prior to admit   Vaping Use    Vaping status: Never Used   Substance Use Topics    Alcohol use: Not Currently     Alcohol/week: 3.0 standard drinks of alcohol     Types: 2 Cans of beer, 1 Shots of liquor per week     Comment: No alcohol since     Drug use: No        Johnson Go Jr., DO  25 1636

## 2025-05-30 NOTE — RESPIRATORY THERAPY NOTE
"RT Protocol Note  Luis F Velazquez 59 y.o. male MRN: [id number]  Unit/Bed#: -01 Encounter: [id number]    Assessment    Principal Problem:    Sepsis (HCC)  Active Problems:    COPD with acute exacerbation (HCC)    Depression with anxiety    Essential (primary) hypertension    Acquired hypothyroidism    Morbid obesity with BMI of 45.0-49.9, adult ([facility])    Acute respiratory failure with hypoxia ([facility])    Type 2 diabetes mellitus with hyperglycemia, with long-term current use of insulin (HCC)    Hyperlipidemia    LASHAWN (acute kidney injury) ([facility])      Home Pulmonary Medications:  Prn MDI, prn nebs  Home Devices/Therapy: Home O2, BiPAP/CPAP    Past Medical History[1]  Social History[2]    Subjective         Objective    Physical Exam:   Assessment Type: Assess only  General Appearance: Awake, Alert  Respiratory Pattern: Dyspnea at rest, Dyspnea with exertion  Chest Assessment: Chest expansion symmetrical  Bilateral Breath Sounds: Diminished, Expiratory wheezes  Cough: Non-productive, Moist  O2 Device: nc    Vitals:  Blood pressure 150/80, pulse 105, temperature 98.1 °F (36.7 °C), temperature source Oral, resp. rate 22, height 5' 4\" (1.626 m), weight 131 kg (288 lb 5.8 oz), SpO2 94%.          Imaging and other studies: Results Review Statement: No pertinent imaging studies reviewed.    O2 Device: nc     Plan    Respiratory Plan: Moderate/Severe Distress pathway        Resp Comments: Pt admitted for exac. COPD. Pt wears 2L O2. He also has hx of MARSHA and has a bipap for HS. Pt uses prn meds at home. Currently with exp. wheezing and conversational dyspnea. Will continue udn tid at this time        [1]   Past Medical History:  Diagnosis Date    Acquired hypothyroidism 1/2/2015    Angioedema     Diabetes mellitus (HCC)     Disease of thyroid gland     Hyperlipidemia     Hypertension     Mixed hyperlipidemia 2/4/2015    Morbid obesity (HCC)     Morbid obesity with BMI of 45.0-49.9, adult ([facility]) 1/2/2015    MARSHA (obstructive sleep " apnea)     Primary hypertension 2015    Transitioned From: Benign essential hypertension   [2]   Social History  Socioeconomic History    Marital status: Single   Tobacco Use    Smoking status: Former     Current packs/day: 0.00     Average packs/day: 3.0 packs/day for 40.0 years (120.0 ttl pk-yrs)     Types: Cigarettes     Start date: 10/21/1982     Quit date: 10/21/2022     Years since quittin.6     Passive exposure: Past    Smokeless tobacco: Never    Tobacco comments:     states read to quit prior to admit   Vaping Use    Vaping status: Never Used   Substance and Sexual Activity    Alcohol use: Not Currently     Alcohol/week: 3.0 standard drinks of alcohol     Types: 2 Cans of beer, 1 Shots of liquor per week     Comment: No alcohol since     Drug use: No    Sexual activity: Not Currently     Social Drivers of Health     Financial Resource Strain: High Risk (2025)    Overall Financial Resource Strain (CARDIA)     Difficulty of Paying Living Expenses: Hard   Food Insecurity: Food Insecurity Present (2025)    Nursing - Inadequate Food Risk Classification     Worried About Running Out of Food in the Last Year: Often true     Ran Out of Food in the Last Year: Often true   Transportation Needs: No Transportation Needs (2025)    PRAPARE - Transportation     Lack of Transportation (Medical): No     Lack of Transportation (Non-Medical): No    Received from [employer]    [employer]   Housing Stability: Unknown (2025)    Housing Stability Vital Sign     Unable to Pay for Housing in the Last Year: No     Homeless in the Last Year: No

## 2025-05-31 LAB
ANION GAP SERPL CALCULATED.3IONS-SCNC: 9 MMOL/L (ref 4–13)
BASOPHILS # BLD AUTO: 0.01 THOUSANDS/ÂΜL (ref 0–0.1)
BASOPHILS NFR BLD AUTO: 0 % (ref 0–1)
BUN SERPL-MCNC: 20 MG/DL (ref 5–25)
CALCIUM SERPL-MCNC: 9.3 MG/DL (ref 8.4–10.2)
CHLORIDE SERPL-SCNC: 93 MMOL/L (ref 96–108)
CO2 SERPL-SCNC: 30 MMOL/L (ref 21–32)
CREAT SERPL-MCNC: 1.2 MG/DL (ref 0.6–1.3)
EOSINOPHIL # BLD AUTO: 0.01 THOUSAND/ÂΜL (ref 0–0.61)
EOSINOPHIL NFR BLD AUTO: 0 % (ref 0–6)
ERYTHROCYTE [DISTWIDTH] IN BLOOD BY AUTOMATED COUNT: 13.6 % (ref 11.6–15.1)
GFR SERPL CREATININE-BSD FRML MDRD: 65 ML/MIN/1.73SQ M
GLUCOSE SERPL-MCNC: 301 MG/DL (ref 65–140)
GLUCOSE SERPL-MCNC: 305 MG/DL (ref 65–140)
GLUCOSE SERPL-MCNC: 325 MG/DL (ref 65–140)
GLUCOSE SERPL-MCNC: 331 MG/DL (ref 65–140)
GLUCOSE SERPL-MCNC: 333 MG/DL (ref 65–140)
HCT VFR BLD AUTO: 34.5 % (ref 36.5–49.3)
HGB BLD-MCNC: 11.1 G/DL (ref 12–17)
IMM GRANULOCYTES # BLD AUTO: 0.08 THOUSAND/UL (ref 0–0.2)
IMM GRANULOCYTES NFR BLD AUTO: 1 % (ref 0–2)
LYMPHOCYTES # BLD AUTO: 1.11 THOUSANDS/ÂΜL (ref 0.6–4.47)
LYMPHOCYTES NFR BLD AUTO: 11 % (ref 14–44)
MAGNESIUM SERPL-MCNC: 2 MG/DL (ref 1.9–2.7)
MCH RBC QN AUTO: 27.1 PG (ref 26.8–34.3)
MCHC RBC AUTO-ENTMCNC: 32.2 G/DL (ref 31.4–37.4)
MCV RBC AUTO: 84 FL (ref 82–98)
MONOCYTES # BLD AUTO: 0.68 THOUSAND/ÂΜL (ref 0.17–1.22)
MONOCYTES NFR BLD AUTO: 7 % (ref 4–12)
NEUTROPHILS # BLD AUTO: 7.89 THOUSANDS/ÂΜL (ref 1.85–7.62)
NEUTS SEG NFR BLD AUTO: 81 % (ref 43–75)
NRBC BLD AUTO-RTO: 0 /100 WBCS
PHOSPHATE SERPL-MCNC: 3.4 MG/DL (ref 2.3–4.1)
PLATELET # BLD AUTO: 234 THOUSANDS/UL (ref 149–390)
PMV BLD AUTO: 9.2 FL (ref 8.9–12.7)
POTASSIUM SERPL-SCNC: 4.2 MMOL/L (ref 3.5–5.3)
PROCALCITONIN SERPL-MCNC: 0.1 NG/ML
RBC # BLD AUTO: 4.1 MILLION/UL (ref 3.88–5.62)
SODIUM SERPL-SCNC: 132 MMOL/L (ref 135–147)
WBC # BLD AUTO: 9.78 THOUSAND/UL (ref 4.31–10.16)

## 2025-05-31 PROCEDURE — 82948 REAGENT STRIP/BLOOD GLUCOSE: CPT

## 2025-05-31 PROCEDURE — 84145 PROCALCITONIN (PCT): CPT | Performed by: INTERNAL MEDICINE

## 2025-05-31 PROCEDURE — 94664 DEMO&/EVAL PT USE INHALER: CPT

## 2025-05-31 PROCEDURE — 85025 COMPLETE CBC W/AUTO DIFF WBC: CPT | Performed by: INTERNAL MEDICINE

## 2025-05-31 PROCEDURE — 83735 ASSAY OF MAGNESIUM: CPT | Performed by: INTERNAL MEDICINE

## 2025-05-31 PROCEDURE — 94640 AIRWAY INHALATION TREATMENT: CPT

## 2025-05-31 PROCEDURE — 80048 BASIC METABOLIC PNL TOTAL CA: CPT | Performed by: INTERNAL MEDICINE

## 2025-05-31 PROCEDURE — 5A09357 ASSISTANCE WITH RESPIRATORY VENTILATION, LESS THAN 24 CONSECUTIVE HOURS, CONTINUOUS POSITIVE AIRWAY PRESSURE: ICD-10-PCS | Performed by: INTERNAL MEDICINE

## 2025-05-31 PROCEDURE — 84100 ASSAY OF PHOSPHORUS: CPT | Performed by: INTERNAL MEDICINE

## 2025-05-31 PROCEDURE — 94760 N-INVAS EAR/PLS OXIMETRY 1: CPT

## 2025-05-31 PROCEDURE — 99232 SBSQ HOSP IP/OBS MODERATE 35: CPT | Performed by: INTERNAL MEDICINE

## 2025-05-31 PROCEDURE — 94002 VENT MGMT INPAT INIT DAY: CPT

## 2025-05-31 RX ORDER — ECHINACEA PURPUREA EXTRACT 125 MG
2 TABLET ORAL
Status: DISCONTINUED | OUTPATIENT
Start: 2025-05-31 | End: 2025-06-01 | Stop reason: HOSPADM

## 2025-05-31 RX ORDER — LORAZEPAM 2 MG/ML
0.5 INJECTION INTRAMUSCULAR EVERY 6 HOURS PRN
Status: DISCONTINUED | OUTPATIENT
Start: 2025-05-31 | End: 2025-06-01 | Stop reason: HOSPADM

## 2025-05-31 RX ADMIN — INSULIN LISPRO 8 UNITS: 100 INJECTION, SOLUTION INTRAVENOUS; SUBCUTANEOUS at 07:41

## 2025-05-31 RX ADMIN — CEFTRIAXONE SODIUM 2000 MG: 10 INJECTION, POWDER, FOR SOLUTION INTRAVENOUS at 17:19

## 2025-05-31 RX ADMIN — LEVALBUTEROL HYDROCHLORIDE 1.25 MG: 1.25 SOLUTION RESPIRATORY (INHALATION) at 13:08

## 2025-05-31 RX ADMIN — INSULIN LISPRO 8 UNITS: 100 INJECTION, SOLUTION INTRAVENOUS; SUBCUTANEOUS at 11:25

## 2025-05-31 RX ADMIN — BUSPIRONE HYDROCHLORIDE 5 MG: 5 TABLET ORAL at 17:18

## 2025-05-31 RX ADMIN — PREGABALIN 50 MG: 50 CAPSULE ORAL at 17:18

## 2025-05-31 RX ADMIN — INSULIN GLARGINE 70 UNITS: 100 INJECTION, SOLUTION SUBCUTANEOUS at 21:07

## 2025-05-31 RX ADMIN — TRAZODONE HYDROCHLORIDE 150 MG: 150 TABLET ORAL at 21:07

## 2025-05-31 RX ADMIN — IPRATROPIUM BROMIDE 0.5 MG: 0.5 SOLUTION RESPIRATORY (INHALATION) at 13:08

## 2025-05-31 RX ADMIN — INSULIN LISPRO 30 UNITS: 100 INJECTION, SOLUTION INTRAVENOUS; SUBCUTANEOUS at 11:26

## 2025-05-31 RX ADMIN — INSULIN LISPRO 8 UNITS: 100 INJECTION, SOLUTION INTRAVENOUS; SUBCUTANEOUS at 17:18

## 2025-05-31 RX ADMIN — LORAZEPAM 0.5 MG: 2 INJECTION INTRAMUSCULAR; INTRAVENOUS at 07:41

## 2025-05-31 RX ADMIN — BENZONATATE 100 MG: 100 CAPSULE ORAL at 01:18

## 2025-05-31 RX ADMIN — INSULIN LISPRO 8 UNITS: 100 INJECTION, SOLUTION INTRAVENOUS; SUBCUTANEOUS at 21:07

## 2025-05-31 RX ADMIN — IPRATROPIUM BROMIDE 0.5 MG: 0.5 SOLUTION RESPIRATORY (INHALATION) at 07:14

## 2025-05-31 RX ADMIN — BUSPIRONE HYDROCHLORIDE 5 MG: 5 TABLET ORAL at 21:07

## 2025-05-31 RX ADMIN — METHYLPREDNISOLONE SODIUM SUCCINATE 40 MG: 40 INJECTION, POWDER, FOR SOLUTION INTRAMUSCULAR; INTRAVENOUS at 06:25

## 2025-05-31 RX ADMIN — BENZONATATE 100 MG: 100 CAPSULE ORAL at 21:50

## 2025-05-31 RX ADMIN — ATORVASTATIN CALCIUM 80 MG: 80 TABLET, FILM COATED ORAL at 17:18

## 2025-05-31 RX ADMIN — METHYLPREDNISOLONE SODIUM SUCCINATE 40 MG: 40 INJECTION, POWDER, FOR SOLUTION INTRAMUSCULAR; INTRAVENOUS at 21:07

## 2025-05-31 RX ADMIN — CARISOPRODOL 350 MG: 350 TABLET ORAL at 08:45

## 2025-05-31 RX ADMIN — ALBUTEROL SULFATE 2 PUFF: 90 AEROSOL, METERED RESPIRATORY (INHALATION) at 06:28

## 2025-05-31 RX ADMIN — BUSPIRONE HYDROCHLORIDE 5 MG: 5 TABLET ORAL at 08:43

## 2025-05-31 RX ADMIN — CARISOPRODOL 350 MG: 350 TABLET ORAL at 21:10

## 2025-05-31 RX ADMIN — CARISOPRODOL 350 MG: 350 TABLET ORAL at 17:18

## 2025-05-31 RX ADMIN — HEPARIN SODIUM 5000 UNITS: 5000 INJECTION INTRAVENOUS; SUBCUTANEOUS at 06:25

## 2025-05-31 RX ADMIN — INSULIN LISPRO 30 UNITS: 100 INJECTION, SOLUTION INTRAVENOUS; SUBCUTANEOUS at 07:42

## 2025-05-31 RX ADMIN — PREGABALIN 50 MG: 50 CAPSULE ORAL at 08:43

## 2025-05-31 RX ADMIN — TAMSULOSIN HYDROCHLORIDE 0.4 MG: 0.4 CAPSULE ORAL at 17:18

## 2025-05-31 RX ADMIN — FINASTERIDE 5 MG: 5 TABLET, FILM COATED ORAL at 08:43

## 2025-05-31 RX ADMIN — DULOXETINE HYDROCHLORIDE 60 MG: 60 CAPSULE, DELAYED RELEASE ORAL at 08:43

## 2025-05-31 RX ADMIN — LORAZEPAM 0.5 MG: 2 INJECTION INTRAMUSCULAR; INTRAVENOUS at 21:50

## 2025-05-31 RX ADMIN — LEVOTHYROXINE SODIUM 175 MCG: 100 TABLET ORAL at 06:25

## 2025-05-31 RX ADMIN — LEVALBUTEROL HYDROCHLORIDE 1.25 MG: 1.25 SOLUTION RESPIRATORY (INHALATION) at 07:14

## 2025-05-31 RX ADMIN — HEPARIN SODIUM 5000 UNITS: 5000 INJECTION INTRAVENOUS; SUBCUTANEOUS at 14:02

## 2025-05-31 RX ADMIN — LEVALBUTEROL HYDROCHLORIDE 1.25 MG: 1.25 SOLUTION RESPIRATORY (INHALATION) at 19:45

## 2025-05-31 RX ADMIN — ASPIRIN 81 MG: 81 TABLET, CHEWABLE ORAL at 08:43

## 2025-05-31 RX ADMIN — IPRATROPIUM BROMIDE 0.5 MG: 0.5 SOLUTION RESPIRATORY (INHALATION) at 19:45

## 2025-05-31 RX ADMIN — METHYLPREDNISOLONE SODIUM SUCCINATE 40 MG: 40 INJECTION, POWDER, FOR SOLUTION INTRAMUSCULAR; INTRAVENOUS at 14:02

## 2025-05-31 RX ADMIN — HEPARIN SODIUM 5000 UNITS: 5000 INJECTION INTRAVENOUS; SUBCUTANEOUS at 21:07

## 2025-05-31 RX ADMIN — INSULIN LISPRO 30 UNITS: 100 INJECTION, SOLUTION INTRAVENOUS; SUBCUTANEOUS at 17:19

## 2025-05-31 NOTE — PLAN OF CARE
Problem: PAIN - ADULT  Goal: Verbalizes/displays adequate comfort level or baseline comfort level  Description: Interventions:  - Encourage patient to monitor pain and request assistance  - Assess pain using appropriate pain scale  - Administer analgesics as ordered based on type and severity of pain and evaluate response  - Implement non-pharmacological measures as appropriate and evaluate response  - Consider cultural and social influences on pain and pain management  - Notify physician/advanced practitioner if interventions unsuccessful or patient reports new pain  - Educate patient/family on pain management process including their role and importance of  reporting pain   - Provide non-pharmacologic/complimentary pain relief interventions  Outcome: Progressing     Problem: INFECTION - ADULT  Goal: Absence or prevention of progression during hospitalization  Description: INTERVENTIONS:  - Assess and monitor for signs and symptoms of infection  - Monitor lab/diagnostic results  - Monitor all insertion sites, i.e. indwelling lines, tubes, and drains  - Monitor endotracheal if appropriate and nasal secretions for changes in amount and color  - [place] appropriate cooling/warming therapies per order  - Administer medications as ordered  - Instruct and encourage patient and family to use good hand hygiene technique  - Identify and instruct in appropriate isolation precautions for identified infection/condition  Outcome: Progressing  Goal: Absence of fever/infection during neutropenic period  Description: INTERVENTIONS:  - Monitor WBC  - Perform strict hand hygiene  - Limit to healthy visitors only  - No plants, dried, fresh or silk flowers with conklin in patient room  Outcome: Progressing     Problem: SAFETY ADULT  Goal: Patient will remain free of falls  Description: INTERVENTIONS:  - Educate patient/family on patient safety including physical limitations  - Instruct patient to call for assistance with activity   -  Consider consulting OT/PT to assist with strengthening/mobility based on AM PAC & JH-HLM score  - Consult OT/PT to assist with strengthening/mobility   - Keep Call bell within reach  - Keep bed low and locked with side rails adjusted as appropriate  - Keep care items and personal belongings within reach  - Initiate and maintain comfort rounds  - Make Fall Risk Sign visible to staff  - Offer Toileting every 2 Hours, in advance of need  - Initiate/Maintain bed alarm  - Obtain necessary fall risk management equipment: nonskid socks  - Apply yellow socks and bracelet for high fall risk patients  - Consider moving patient to room near nurses station  Outcome: Progressing  Goal: Maintain or return to baseline ADL function  Description: INTERVENTIONS:  -  Assess patient's ability to carry out ADLs; assess patient's baseline for ADL function and identify physical deficits which impact ability to perform ADLs (bathing, care of mouth/teeth, toileting, grooming, dressing, etc.)  - Assess/evaluate cause of self-care deficits   - Assess range of motion  - Assess patient's mobility; develop plan if impaired  - Assess patient's need for assistive devices and provide as appropriate  - Encourage maximum independence but intervene and supervise when necessary  - Involve family in performance of ADLs  - Assess for home care needs following discharge   - Consider OT consult to assist with ADL evaluation and planning for discharge  - Provide patient education as appropriate  - Monitor functional capacity and physical performance, use of AM PAC & JH-HLM   - Monitor gait, balance and fatigue with ambulation    Outcome: Progressing  Goal: Maintains/Returns to pre admission functional level  Description: INTERVENTIONS:  - Perform AM-PAC 6 Click Basic Mobility/ Daily Activity assessment daily.  - Set and communicate daily mobility goal to care team and patient/family/caregiver.   - Collaborate with rehabilitation services on mobility goals  if consulted  - Perform Range of Motion 3 times a day.  - Reposition patient every 2 hours.  - Dangle patient 3 times a day  - Stand patient 3 times a day  - Ambulate patient 3 times a day  - Out of bed to chair 3 times a day   - Out of bed for meals 3 times a day  - Out of bed for toileting  - Record patient progress and toleration of activity level   Outcome: Progressing     Problem: DISCHARGE PLANNING  Goal: Discharge to home or other facility with appropriate resources  Description: INTERVENTIONS:  - Identify barriers to discharge w/patient and caregiver  - Arrange for needed discharge resources and transportation as appropriate  - Identify discharge learning needs (meds, wound care, etc.)  - Arrange for interpretive services to assist at discharge as needed  - Refer to Case Management Department for coordinating discharge planning if the patient needs post-hospital services based on physician/advanced practitioner order or complex needs related to functional status, cognitive ability, or social support system  Outcome: Progressing     Problem: Knowledge Deficit  Goal: Patient/family/caregiver demonstrates understanding of disease process, treatment plan, medications, and discharge instructions  Description: Complete learning assessment and assess knowledge base.  Interventions:  - Provide teaching at level of understanding  - Provide teaching via preferred learning methods  Outcome: Progressing

## 2025-05-31 NOTE — ASSESSMENT & PLAN NOTE
Present on admission as evidenced by tachycardia, tachypnea  Possibly secondary to pneumonia as possible infiltrate noted in left lower lobe  SIRS criteria may also be met in the setting of DuoNebs in the ED  Afebrile and without leukocytosis and procalcitonin within normal limits  Ceftriaxone 2000 mg IV daily  Chest x-ray with hazy opacity projecting over the left lower lung possibly pneumonia  Blood cultures pending  De-escalate antibiotics as appropriate  Trend fever curve/WBC count/procalcitonin/lactate until cleared  Sepsis parameters resolved

## 2025-05-31 NOTE — ASSESSMENT & PLAN NOTE
Baseline creatinine 1.1-1.2  Arrival creatinine 1.54  Likely prerenal azotemia in the setting of volume depletion/sepsis  IV fluid hydration with normal saline solution at 100 cc/h  Trend BMP  Avoid nephrotoxic agents  Strict intake and output  Daily standing weights  KDIGO protocol  Resolved with IV fluid hydration

## 2025-05-31 NOTE — ASSESSMENT & PLAN NOTE
Patient does not wear oxygen in the outpatient setting  Found to be requiring 4 L/min NC O2 upon arrival  Likely secondary to COPD exacerbation and possible pneumonia  Wean O2 as tolerated  Respiratory and airway clearance protocols  Goal SpO2 > 88%  Patient now requiring 2 L/min NC O2  Home O2 evaluation prior to discharge

## 2025-05-31 NOTE — PROGRESS NOTES
Progress Note - Hospitalist   Name: Luis F Velazquez 59 y.o. male I MRN: [id number]  Unit/Bed#: -01 I Date of Admission: 5/30/2025   Date of Service: 5/31/2025 I Hospital Day: 1     Assessment & Plan  Sepsis (HCC)  Present on admission as evidenced by tachycardia, tachypnea  Possibly secondary to pneumonia as possible infiltrate noted in left lower lobe  SIRS criteria may also be met in the setting of DuoNebs in the ED  Afebrile and without leukocytosis and procalcitonin within normal limits  Ceftriaxone 2000 mg IV daily  Chest x-ray with hazy opacity projecting over the left lower lung possibly pneumonia  Blood cultures pending  De-escalate antibiotics as appropriate  Trend fever curve/WBC count/procalcitonin/lactate until cleared  Sepsis parameters resolved  COPD with acute exacerbation (HCC)  Patient presents with shortness of breath and was found to be in respiratory distress at urgent care  Does have a remote history of tobacco abuse  Physical exam remarkable for diffuse wheezes in all lung fields  Complicated by acute hypoxic respiratory failure  Likely COPD with acute exacerbation possibly secondary to pneumonia  Solu-Medrol 40 mg IV every 8 hours  Atrovent and Xopenex  Respiratory protocol  Continue home albuterol 2 puff every 6 hours PRN shortness of breath  Home O2 evaluation prior to discharge  LASHAWN (acute kidney injury) (HCC)  Baseline creatinine 1.1-1.2  Arrival creatinine 1.54  Likely prerenal azotemia in the setting of volume depletion/sepsis  IV fluid hydration with normal saline solution at 100 cc/h  Trend BMP  Avoid nephrotoxic agents  Strict intake and output  Daily standing weights  KDIGO protocol  Resolved with IV fluid hydration  Acute respiratory failure with hypoxia (HCC)  Patient does not wear oxygen in the outpatient setting  Found to be requiring 4 L/min NC O2 upon arrival  Likely secondary to COPD exacerbation and possible pneumonia  Wean O2 as tolerated  Respiratory and airway  clearance protocols  Goal SpO2 > 88%  Patient now requiring 2 L/min NC O2  Home O2 evaluation prior to discharge  Type 2 diabetes mellitus with hyperglycemia, with long-term current use of insulin ([facility])  Lab Results   Component Value Date    HGBA1C 9.6 (A) 05/20/2025       Recent Labs     05/30/25  1408 05/30/25  1725 05/30/25  2125 05/31/25  0726   POCGLU 271* 211* 333* 301*       Blood Sugar Average: Last 72 hrs:  (P) [id number]  Continue home Lantus 70 units daily at bedtime and lispro 30 units 3 times daily with meals  Sliding scale insulin with Accu-Cheks  Diabetic diet  Target blood glucose 140-180  Essential (primary) hypertension  Blood pressures slightly elevated on presentation  Hold home thiazide diuretic and ARB in the setting of LASHAWN  Monitor blood pressures  Hydralazine 5 mg IV every 6 hours PRN SBP > 160 mmHg  Hyperlipidemia  Continue home statin therapy  Depression with anxiety  Mood appears appropriate  Continue home buspirone and duloxetine  Acquired hypothyroidism  Continue home levothyroxine 170 mcg oral daily  Morbid obesity with BMI of 45.0-49.9, adult (HCC)  Present on admission as evidenced by BMI greater than 50  Encourage lifestyle modifications and healthy weight loss    VTE Pharmacologic Prophylaxis: VTE Score: 8 High Risk (Score >/= 5) - Pharmacological DVT Prophylaxis Ordered: heparin. Sequential Compression Devices Ordered.    Mobility:   Basic Mobility Inpatient Raw Score: 18  JH-HLM Goal: 6: Walk 10 steps or more  JH-HLM Achieved: 4: Move to chair/commode  JH-HLM Goal NOT achieved. Continue with multidisciplinary rounding and encourage appropriate mobility to improve upon JH-HLM goals.    Patient Centered Rounds: I performed bedside rounds with nursing staff today.     Discussions with Specialists or Other Care Team Provider: Nursing    Education and Discussions with Family / Patient: Patient declined call to .     Current Length of Stay: 1 day(s)  Current  Patient Status: Inpatient   Certification Statement: The patient will continue to require additional inpatient hospital stay due to COPD with acute exacerbation on IV corticosteroids and sepsis secondary to pneumonia on IV antibiotics  Discharge Plan: Anticipate discharge later today or tomorrow to home.    Code Status: Level 1 - Full Code    Subjective   Patient seen and examined at bedside.  No acute events overnight.  Respiratory status improving and requiring 2 L/min NC O2.  Home O2 evaluation prior to discharge.    Objective :  Temp:  [97.7 °F (36.5 °C)-98.3 °F (36.8 °C)] 97.7 °F (36.5 °C)  HR:  [] 87  BP: (138-175)/(70-94) 147/83  Resp:  [19-24] 19  SpO2:  [87 %-97 %] 97 %  O2 Device: Nasal cannula  Nasal Cannula O2 Flow Rate (L/min):  [2 L/min-4 L/min] 2 L/min    Body mass index is 49.42 kg/m².     Input and Output Summary (last 24 hours):     Intake/Output Summary (Last 24 hours) at 5/31/2025 0836  Last data filed at 5/31/2025 0729  Gross per 24 hour   Intake 1080 ml   Output 2300 ml   Net -1220 ml       Physical Exam  Vitals and nursing note reviewed.   Constitutional:       General: He is not in acute distress.     Appearance: He is well-developed.   HENT:      Head: Normocephalic and atraumatic.     Eyes:      Conjunctiva/sclera: Conjunctivae normal.       Cardiovascular:      Rate and Rhythm: Normal rate and regular rhythm.      Heart sounds: No murmur heard.  Pulmonary:      Effort: Pulmonary effort is normal. No respiratory distress.      Breath sounds: Normal breath sounds.   Abdominal:      Palpations: Abdomen is soft.      Tenderness: There is no abdominal tenderness.     Musculoskeletal:         General: No swelling.      Cervical back: Neck supple.     Skin:     General: Skin is warm and dry.      Capillary Refill: Capillary refill takes less than 2 seconds.     Neurological:      Mental Status: He is alert.     Psychiatric:         Mood and Affect: Mood normal.         Lab Results: I have  reviewed the following results:   Results from last 7 days   Lab Units 05/31/25  0457   WBC Thousand/uL 9.78   HEMOGLOBIN g/dL 11.1*   HEMATOCRIT % 34.5*   PLATELETS Thousands/uL 234   SEGS PCT % 81*   LYMPHO PCT % 11*   MONO PCT % 7   EOS PCT % 0     Results from last 7 days   Lab Units 05/31/25  0457 05/30/25  1458   SODIUM mmol/L 132* 134*   POTASSIUM mmol/L 4.2 3.7   CHLORIDE mmol/L 93* 93*   CO2 mmol/L 30 33*   BUN mg/dL 20 17   CREATININE mg/dL 1.20 1.54*   ANION GAP mmol/L 9 8   CALCIUM mg/dL 9.3 9.6   ALBUMIN g/dL  --  4.0   TOTAL BILIRUBIN mg/dL  --  0.33   ALK PHOS U/L  --  59   ALT U/L  --  36   AST U/L  --  27   GLUCOSE RANDOM mg/dL 325* 284*     Results from last 7 days   Lab Units 05/30/25  1458   INR  0.89     Results from last 7 days   Lab Units 05/31/25  0726 05/30/25  2125 05/30/25  1725 05/30/25  1408   POC GLUCOSE mg/dl 301* 333* 211* 271*         Results from last 7 days   Lab Units 05/31/25  0457 05/30/25  2215 05/30/25 2012 05/30/25  1749 05/30/25  1458   LACTIC ACID mmol/L  --  2.6* 2.9* 3.0* 3.0*   PROCALCITONIN ng/ml 0.10  --   --   --  0.15       Recent Cultures (last 7 days):   Results from last 7 days   Lab Units 05/30/25  1458   BLOOD CULTURE  Received in Microbiology Lab. Culture in Progress.  Received in Microbiology Lab. Culture in Progress.       Imaging Results Review: I reviewed radiology reports from this admission including: chest xray.  Other Study Results Review: No additional pertinent studies reviewed.    Last 24 Hours Medication List:     Current Facility-Administered Medications:     acetaminophen (TYLENOL) tablet 650 mg, Q6H PRN    albuterol (PROVENTIL HFA,VENTOLIN HFA) inhaler 2 puff, Q6H PRN    albuterol inhalation solution 2.5 mg, Once    aspirin chewable tablet 81 mg, Daily    atorvastatin (LIPITOR) tablet 80 mg, Daily With Dinner    benzonatate (TESSALON PERLES) capsule 100 mg, TID PRN    busPIRone (BUSPAR) tablet 5 mg, TID    carisoprodol (SOMA) tablet 350 mg,  TID    ceftriaxone (ROCEPHIN) 2 g/50 mL in dextrose IVPB, Q24H    DULoxetine (CYMBALTA) delayed release capsule 60 mg, Daily    finasteride (PROSCAR) tablet 5 mg, Daily    heparin (porcine) subcutaneous injection 5,000 Units, Q8H AURE **AND** [CANCELED] Platelet count, Once    hydrALAZINE (APRESOLINE) injection 5 mg, Q6H PRN    insulin glargine (LANTUS) subcutaneous injection 70 Units 0.7 mL, HS    insulin lispro (HumALOG/ADMELOG) 100 units/mL subcutaneous injection 2-12 Units, TID AC **AND** Fingerstick Glucose (POCT), TID AC    insulin lispro (HumALOG/ADMELOG) 100 units/mL subcutaneous injection 2-12 Units, HS    insulin lispro (HumALOG/ADMELOG) 100 units/mL subcutaneous injection 30 Units, TID With Meals    ipratropium (ATROVENT) 0.02 % inhalation solution 0.5 mg, TID    levalbuterol (XOPENEX) inhalation solution 1.25 mg, TID    levothyroxine tablet 175 mcg, Early Morning    LORazepam (ATIVAN) injection 0.5 mg, Q6H PRN    methylPREDNISolone sodium succinate (Solu-MEDROL) injection 40 mg, Q8H AURE    ondansetron (ZOFRAN) injection 4 mg, Q6H PRN    pregabalin (LYRICA) capsule 50 mg, BID    tamsulosin (FLOMAX) capsule 0.4 mg, Daily With Dinner    traZODone (DESYREL) tablet 150 mg, HS    Administrative Statements   Today, Patient Was Seen By: Apolinar Velasquez, DO  I have spent a total time of 35 minutes in caring for this patient on the day of the visit/encounter including Diagnostic results, Prognosis, Risks and benefits of tx options, Instructions for management, Patient and family education, Importance of tx compliance, Risk factor reductions, Impressions, Counseling / Coordination of care, Documenting in the medical record, Reviewing/placing orders in the medical record (including tests, medications, and/or procedures), Obtaining or reviewing history  , and Communicating with other healthcare professionals .    **Please Note: This note may have been constructed using a voice recognition system.**

## 2025-05-31 NOTE — CASE MANAGEMENT
Case Management Assessment & Discharge Planning Note    Patient name Luis F Velazquez  Location /-01 MRN [id number]  : 1965 Date 2025       Current Admission Date: 2025  Current Admission Diagnosis:Sepsis ([facility])   Patient Active Problem List    Diagnosis Date Noted    Sepsis ([facility]) 2025    LASHAWN (acute kidney injury) ([facility]) 2025    Lumbar spondylosis 2024    Chronic pain syndrome 2024    Myofascial pain syndrome 2024    Positive screening for depression on 9-item Patient Health Questionnaire (PHQ-9) 2024    Social problem 2024    Pain in both feet 2024    Chronic right sacroiliac joint pain 2024    LAMB (dyspnea on exertion) 2023    MARSHA treated with BiPAP 2023    Hypo-osmolality and hyponatremia 11/10/2023    Chronic respiratory failure with hypoxia, on home oxygen therapy  ([facility]) 10/06/2023    Neuropathy 2023    Restrictive lung disease secondary to obesity 2023    Bright red rectal bleeding 2023    ABLA (acute blood loss anemia) 2023    Microcytic anemia 2023    Fatty liver disease, nonalcoholic 2023    Umbilical hernia without obstruction and without gangrene 2023    Urinary hesitancy due to benign prostatic hyperplasia 2023    Proteinuria 2023    Chronic kidney disease, stage 2 (mild) 2023    Type 2 diabetes mellitus with stage 3 chronic kidney disease, with long-term current use of insulin, unspecified whether stage 3a or 3b CKD ([facility]) 2023    Diabetic nephropathy associated with type 2 diabetes mellitus ([facility]) 2023    Primary osteoarthritis of both knees     Peripheral vascular disease ([facility]) 2022    Depression, recurrent ([facility]) 2021    Hyperlipidemia 04/15/2021    Constipation 2021    Urinary retention 2021    Acute respiratory failure with hypoxia ([facility]) 2021    Epistaxis 2021    Muscle twitching 2021    Type 2  diabetes mellitus with hyperglycemia, with long-term current use of insulin ([facility]) 04/07/2021    Atelectasis 04/07/2021    Noncompliance with diabetes treatment 04/30/2020    Bilateral lower leg cellulitis 03/02/2018    Elevated LFTs 03/02/2018    Elevated lactic acid level 03/02/2018    Tobacco abuse, in remission 03/02/2018    COPD with acute exacerbation ([facility]) 02/07/2017    Chronic back pain 06/24/2015    Chronic sinusitis 03/09/2015    Allergic rhinitis 01/02/2015    Arthritis 01/02/2015    Depression with anxiety 01/02/2015    Essential (primary) hypertension 01/02/2015    Morbid obesity with BMI of 45.0-49.9, adult ([facility]) 01/02/2015    Diabetic polyneuropathy associated with type 2 diabetes mellitus ([facility]) 01/02/2015    Vitamin D deficiency 07/16/2013    Acquired hypothyroidism 09/16/2011      LOS (days): 1  Geometric Mean LOS (GMLOS) (days):   Days to GMLOS:     OBJECTIVE:    Risk of Unplanned Readmission Score: 22     Current admission status: Inpatient     Preferred Pharmacy:   [facility] [address] [place], [place] [address], [address], [address]  [facility], [address]  [facility] [address]  Phone: [phone] Fax: [phone]    Primary Care Provider: Chuck Holman PA-C    Primary Insurance: MEDICARE  Secondary Insurance:     ASSESSMENT:  Active Health Care Proxies    There are no active Health Care Proxies on file.       Readmission Root Cause  30 Day Readmission: No    Patient Information  Admitted from:: Home  Mental Status: Alert  During Assessment patient was accompanied by: Not accompanied during assessment  Assessment information provided by:: Patient  Primary Caregiver: Self  Support Systems: Family members  County of Residence: [place]  What city do you live in?: [place]  Home entry access options. Select all that apply.: Stairs  Number of steps to enter home.:  ([address])  Do the steps have railings?: Yes  Type of Current Residence: Apartment  Floor Level: 2  Upon entering residence, is there a  bedroom on the main floor (no further steps)?: Yes  Upon entering residence, is there a bathroom on the main floor (no further steps)?: Yes  Living Arrangements: Lives Alone  Is patient a ?: No    Activities of Daily Living Prior to Admission  Functional Status: Independent  Completes ADLs independently?: Yes  Ambulates independently?: Yes  Does patient use assisted devices?: Yes  Assisted Devices (DME) used: Straight Cane, BiPAP, Portable Oxygen tanks, Home Oxygen concentrator  DME Company Name (respiratory supplies): [employer]  O2 Rate(s): 3L  Does patient currently own DME?: Yes  What DME does the patient currently own?: Straight Cane, Walker, Wheelchair, Portable Oxygen tanks, Home Oxygen concentrator, Other (Comment) ([employer]mi freestyle glucomter)  Does patient have a history of Outpatient Therapy (PT/OT)?: Yes ([facility])  Does the patient have a history of Short-Term Rehab?: No  Does patient have a history of HHC?: No  Does patient currently have HHC?: No    Patient Information Continued  Income Source: SSI/SSD  Does patient have prescription coverage?: Yes ([facility])  Can the patient afford their medications and any related supplies (such as glucometers or test strips)?: Yes  Does patient receive dialysis treatments?: No  Does patient have a history of substance abuse?: No  Does patient have a history of Mental Health Diagnosis?: No    Means of Transportation  Means of Transport to Appts:: Drives Self    DISCHARGE DETAILS:    Discharge planning discussed with:: Pt    Contacts  Patient Contacts: Walter Velazquez brother  Relationship to Patient:: Family  Contact Method: Phone  Phone Number: [phone]  Reason/Outcome: Emergency Contact    Requested Home Health Care         Is the patient interested in HHC at discharge?: No    DME Referral Provided  Referral made for DME?: No    Treatment Team Recommendation: Home  Discharge Destination Plan:: Home  Pt states he lives in an apartment alone above a pizza  shop.  Pt has 21 [address].  Pt states he is IPA and drives.  Takes his laundry to a laundromat.  PT landlord or tenets carry his laundry up to his apartment.  Pt wears 4L of oxygen at baseline. Pt brother will transport home and bring in one of his portable oxygen tanks to go home.  Do not anticipate any care needs upon DC.

## 2025-05-31 NOTE — ASSESSMENT & PLAN NOTE
Patient presents with shortness of breath and was found to be in respiratory distress at urgent care  Does have a remote history of tobacco abuse  Physical exam remarkable for diffuse wheezes in all lung fields  Complicated by acute hypoxic respiratory failure  Likely COPD with acute exacerbation possibly secondary to pneumonia  Solu-Medrol 40 mg IV every 8 hours  Atrovent and Xopenex  Respiratory protocol  Continue home albuterol 2 puff every 6 hours PRN shortness of breath  Home O2 evaluation prior to discharge

## 2025-05-31 NOTE — ASSESSMENT & PLAN NOTE
Lab Results   Component Value Date    HGBA1C 9.6 (A) 05/20/2025       Recent Labs     05/30/25  1408 05/30/25  1725 05/30/25 2125 05/31/25  0726   POCGLU 271* 211* 333* 301*       Blood Sugar Average: Last 72 hrs:  (P) 281.8012991250023195  Continue home Lantus 70 units daily at bedtime and lispro 30 units 3 times daily with meals  Sliding scale insulin with Accu-Cheks  Diabetic diet  Target blood glucose 140-180

## 2025-05-31 NOTE — PLAN OF CARE
Problem: PAIN - ADULT  Goal: Verbalizes/displays adequate comfort level or baseline comfort level  Description: Interventions:  - Encourage patient to monitor pain and request assistance  - Assess pain using appropriate pain scale  - Administer analgesics as ordered based on type and severity of pain and evaluate response  - Implement non-pharmacological measures as appropriate and evaluate response  - Consider cultural and social influences on pain and pain management  - Notify physician/advanced practitioner if interventions unsuccessful or patient reports new pain  - Educate patient/family on pain management process including their role and importance of  reporting pain   - Provide non-pharmacologic/complimentary pain relief interventions  Outcome: Progressing     Problem: INFECTION - ADULT  Goal: Absence or prevention of progression during hospitalization  Description: INTERVENTIONS:  - Assess and monitor for signs and symptoms of infection  - Monitor lab/diagnostic results  - Monitor all insertion sites, i.e. indwelling lines, tubes, and drains  - Monitor endotracheal if appropriate and nasal secretions for changes in amount and color  - [place] appropriate cooling/warming therapies per order  - Administer medications as ordered  - Instruct and encourage patient and family to use good hand hygiene technique  - Identify and instruct in appropriate isolation precautions for identified infection/condition  Outcome: Progressing  Goal: Absence of fever/infection during neutropenic period  Description: INTERVENTIONS:  - Monitor WBC  - Perform strict hand hygiene  - Limit to healthy visitors only  - No plants, dried, fresh or silk flowers with conklin in patient room  Outcome: Progressing     Problem: SAFETY ADULT  Goal: Patient will remain free of falls  Description: INTERVENTIONS:  - Educate patient/family on patient safety including physical limitations  - Instruct patient to call for assistance with activity   -  Consider consulting OT/PT to assist with strengthening/mobility based on AM PAC & JH-HLM score  - Consult OT/PT to assist with strengthening/mobility   - Keep Call bell within reach  - Keep bed low and locked with side rails adjusted as appropriate  - Keep care items and personal belongings within reach  - Initiate and maintain comfort rounds  - Make Fall Risk Sign visible to staff  - Offer Toileting every 2 Hours, in advance of need  - Initiate/Maintain bedalarm  - Obtain necessary fall risk management equipment: non slip socks   - Apply yellow socks and bracelet for high fall risk patients  - Consider moving patient to room near nurses station  Outcome: Progressing  Goal: Maintain or return to baseline ADL function  Description: INTERVENTIONS:  -  Assess patient's ability to carry out ADLs; assess patient's baseline for ADL function and identify physical deficits which impact ability to perform ADLs (bathing, care of mouth/teeth, toileting, grooming, dressing, etc.)  - Assess/evaluate cause of self-care deficits   - Assess range of motion  - Assess patient's mobility; develop plan if impaired  - Assess patient's need for assistive devices and provide as appropriate  - Encourage maximum independence but intervene and supervise when necessary  - Involve family in performance of ADLs  - Assess for home care needs following discharge   - Consider OT consult to assist with ADL evaluation and planning for discharge  - Provide patient education as appropriate  - Monitor functional capacity and physical performance, use of AM PAC & JH-HLM   - Monitor gait, balance and fatigue with ambulation    Outcome: Progressing  Goal: Maintains/Returns to pre admission functional level  Description: INTERVENTIONS:  - Perform AM-PAC 6 Click Basic Mobility/ Daily Activity assessment daily.  - Set and communicate daily mobility goal to care team and patient/family/caregiver.   - Collaborate with rehabilitation services on mobility goals  if consulted  - Perform Range of Motion 3 times a day.  - Reposition patient every 2 hours.  - Dangle patient 3 times a day  - Stand patient 3 times a day  - Ambulate patient 3 times a day  - Out of bed to chair 3 times a day   - Out of bed for meals 3 times a day  - Out of bed for toileting  - Record patient progress and toleration of activity level   Outcome: Progressing     Problem: DISCHARGE PLANNING  Goal: Discharge to home or other facility with appropriate resources  Description: INTERVENTIONS:  - Identify barriers to discharge w/patient and caregiver  - Arrange for needed discharge resources and transportation as appropriate  - Identify discharge learning needs (meds, wound care, etc.)  - Arrange for interpretive services to assist at discharge as needed  - Refer to Case Management Department for coordinating discharge planning if the patient needs post-hospital services based on physician/advanced practitioner order or complex needs related to functional status, cognitive ability, or social support system  Outcome: Progressing     Problem: Knowledge Deficit  Goal: Patient/family/caregiver demonstrates understanding of disease process, treatment plan, medications, and discharge instructions  Description: Complete learning assessment and assess knowledge base.  Interventions:  - Provide teaching at level of understanding  - Provide teaching via preferred learning methods  Outcome: Progressing

## 2025-06-01 VITALS
OXYGEN SATURATION: 91 % | SYSTOLIC BLOOD PRESSURE: 166 MMHG | WEIGHT: 285.5 LBS | DIASTOLIC BLOOD PRESSURE: 102 MMHG | HEART RATE: 89 BPM | BODY MASS INDEX: 48.74 KG/M2 | TEMPERATURE: 97.6 F | HEIGHT: 64 IN | RESPIRATION RATE: 19 BRPM

## 2025-06-01 LAB
ANION GAP SERPL CALCULATED.3IONS-SCNC: 8 MMOL/L (ref 4–13)
BASOPHILS # BLD AUTO: 0.06 THOUSANDS/ÂΜL (ref 0–0.1)
BASOPHILS NFR BLD AUTO: 1 % (ref 0–1)
BUN SERPL-MCNC: 20 MG/DL (ref 5–25)
CALCIUM SERPL-MCNC: 9.2 MG/DL (ref 8.4–10.2)
CHLORIDE SERPL-SCNC: 95 MMOL/L (ref 96–108)
CO2 SERPL-SCNC: 32 MMOL/L (ref 21–32)
CREAT SERPL-MCNC: 1.25 MG/DL (ref 0.6–1.3)
EOSINOPHIL # BLD AUTO: 0.03 THOUSAND/ÂΜL (ref 0–0.61)
EOSINOPHIL NFR BLD AUTO: 0 % (ref 0–6)
ERYTHROCYTE [DISTWIDTH] IN BLOOD BY AUTOMATED COUNT: 13.9 % (ref 11.6–15.1)
GFR SERPL CREATININE-BSD FRML MDRD: 62 ML/MIN/1.73SQ M
GLUCOSE SERPL-MCNC: 106 MG/DL (ref 65–140)
GLUCOSE SERPL-MCNC: 146 MG/DL (ref 65–140)
GLUCOSE SERPL-MCNC: 164 MG/DL (ref 65–140)
GLUCOSE SERPL-MCNC: 62 MG/DL (ref 65–140)
GLUCOSE SERPL-MCNC: 73 MG/DL (ref 65–140)
HCT VFR BLD AUTO: 34.8 % (ref 36.5–49.3)
HGB BLD-MCNC: 11.3 G/DL (ref 12–17)
IMM GRANULOCYTES # BLD AUTO: 0.11 THOUSAND/UL (ref 0–0.2)
IMM GRANULOCYTES NFR BLD AUTO: 1 % (ref 0–2)
LYMPHOCYTES # BLD AUTO: 2.69 THOUSANDS/ÂΜL (ref 0.6–4.47)
LYMPHOCYTES NFR BLD AUTO: 23 % (ref 14–44)
MAGNESIUM SERPL-MCNC: 2.1 MG/DL (ref 1.9–2.7)
MCH RBC QN AUTO: 27.3 PG (ref 26.8–34.3)
MCHC RBC AUTO-ENTMCNC: 32.5 G/DL (ref 31.4–37.4)
MCV RBC AUTO: 84 FL (ref 82–98)
MONOCYTES # BLD AUTO: 1.14 THOUSAND/ÂΜL (ref 0.17–1.22)
MONOCYTES NFR BLD AUTO: 10 % (ref 4–12)
NEUTROPHILS # BLD AUTO: 7.48 THOUSANDS/ÂΜL (ref 1.85–7.62)
NEUTS SEG NFR BLD AUTO: 65 % (ref 43–75)
NRBC BLD AUTO-RTO: 0 /100 WBCS
PHOSPHATE SERPL-MCNC: 4.4 MG/DL (ref 2.3–4.1)
PLATELET # BLD AUTO: 237 THOUSANDS/UL (ref 149–390)
PMV BLD AUTO: 9.2 FL (ref 8.9–12.7)
POTASSIUM SERPL-SCNC: 3.7 MMOL/L (ref 3.5–5.3)
PROCALCITONIN SERPL-MCNC: 0.1 NG/ML
RBC # BLD AUTO: 4.14 MILLION/UL (ref 3.88–5.62)
SODIUM SERPL-SCNC: 135 MMOL/L (ref 135–147)
WBC # BLD AUTO: 11.51 THOUSAND/UL (ref 4.31–10.16)

## 2025-06-01 PROCEDURE — 94664 DEMO&/EVAL PT USE INHALER: CPT

## 2025-06-01 PROCEDURE — 84145 PROCALCITONIN (PCT): CPT | Performed by: INTERNAL MEDICINE

## 2025-06-01 PROCEDURE — 85025 COMPLETE CBC W/AUTO DIFF WBC: CPT | Performed by: INTERNAL MEDICINE

## 2025-06-01 PROCEDURE — 94760 N-INVAS EAR/PLS OXIMETRY 1: CPT

## 2025-06-01 PROCEDURE — 99239 HOSP IP/OBS DSCHRG MGMT >30: CPT | Performed by: INTERNAL MEDICINE

## 2025-06-01 PROCEDURE — 94640 AIRWAY INHALATION TREATMENT: CPT

## 2025-06-01 PROCEDURE — 82948 REAGENT STRIP/BLOOD GLUCOSE: CPT

## 2025-06-01 PROCEDURE — 83735 ASSAY OF MAGNESIUM: CPT | Performed by: INTERNAL MEDICINE

## 2025-06-01 PROCEDURE — 84100 ASSAY OF PHOSPHORUS: CPT | Performed by: INTERNAL MEDICINE

## 2025-06-01 PROCEDURE — 80048 BASIC METABOLIC PNL TOTAL CA: CPT | Performed by: INTERNAL MEDICINE

## 2025-06-01 RX ORDER — PREDNISONE 20 MG/1
40 TABLET ORAL DAILY
Qty: 10 TABLET | Refills: 0 | Status: SHIPPED | OUTPATIENT
Start: 2025-06-01 | End: 2025-06-06

## 2025-06-01 RX ORDER — ECHINACEA PURPUREA EXTRACT 125 MG
TABLET ORAL
Status: DISPENSED
Start: 2025-06-01 | End: 2025-06-01

## 2025-06-01 RX ORDER — INSULIN LISPRO 100 [IU]/ML
15 INJECTION, SOLUTION INTRAVENOUS; SUBCUTANEOUS ONCE
Status: COMPLETED | OUTPATIENT
Start: 2025-06-01 | End: 2025-06-01

## 2025-06-01 RX ADMIN — HEPARIN SODIUM 5000 UNITS: 5000 INJECTION INTRAVENOUS; SUBCUTANEOUS at 05:06

## 2025-06-01 RX ADMIN — LEVOTHYROXINE SODIUM 175 MCG: 100 TABLET ORAL at 05:07

## 2025-06-01 RX ADMIN — INSULIN LISPRO 15 UNITS: 100 INJECTION, SOLUTION INTRAVENOUS; SUBCUTANEOUS at 08:23

## 2025-06-01 RX ADMIN — LEVALBUTEROL HYDROCHLORIDE 1.25 MG: 1.25 SOLUTION RESPIRATORY (INHALATION) at 07:14

## 2025-06-01 RX ADMIN — ASPIRIN 81 MG: 81 TABLET, CHEWABLE ORAL at 08:24

## 2025-06-01 RX ADMIN — CARISOPRODOL 350 MG: 350 TABLET ORAL at 08:24

## 2025-06-01 RX ADMIN — METHYLPREDNISOLONE SODIUM SUCCINATE 40 MG: 40 INJECTION, POWDER, FOR SOLUTION INTRAMUSCULAR; INTRAVENOUS at 05:06

## 2025-06-01 RX ADMIN — LORAZEPAM 0.5 MG: 2 INJECTION INTRAMUSCULAR; INTRAVENOUS at 08:24

## 2025-06-01 RX ADMIN — IPRATROPIUM BROMIDE 0.5 MG: 0.5 SOLUTION RESPIRATORY (INHALATION) at 07:14

## 2025-06-01 RX ADMIN — FINASTERIDE 5 MG: 5 TABLET, FILM COATED ORAL at 08:24

## 2025-06-01 RX ADMIN — SALINE NASAL SPRAY 2 SPRAY: 1.5 SOLUTION NASAL at 08:24

## 2025-06-01 RX ADMIN — PREGABALIN 50 MG: 50 CAPSULE ORAL at 08:24

## 2025-06-01 RX ADMIN — BUSPIRONE HYDROCHLORIDE 5 MG: 5 TABLET ORAL at 08:24

## 2025-06-01 RX ADMIN — DULOXETINE HYDROCHLORIDE 60 MG: 60 CAPSULE, DELAYED RELEASE ORAL at 08:24

## 2025-06-01 RX ADMIN — SALINE NASAL SPRAY 2 SPRAY: 1.5 SOLUTION NASAL at 00:19

## 2025-06-01 NOTE — ASSESSMENT & PLAN NOTE
Present on admission as evidenced by tachycardia, tachypnea  Possibly secondary to pneumonia as possible infiltrate noted in left lower lobe  SIRS criteria may also be met in the setting of DuoNebs in the ED  Afebrile and without leukocytosis and procalcitonin within normal limits  Ceftriaxone 2000 mg IV daily  Chest x-ray with hazy opacity projecting over the left lower lung possibly pneumonia  Blood cultures pending  De-escalate antibiotics as appropriate  Trend fever curve/WBC count/procalcitonin/lactate until cleared  Sepsis parameters resolved  Plan for discharge home on a 7-day course of Augmentin

## 2025-06-01 NOTE — DISCHARGE SUMMARY
Discharge Summary - Hospitalist   Name: Luis F Velazquez 59 y.o. male I MRN: [id number]  Unit/Bed#: -01 I Date of Admission: 5/30/2025   Date of Service: 6/1/2025 I Hospital Day: 2     Assessment & Plan  Sepsis ([facility])  Present on admission as evidenced by tachycardia, tachypnea  Possibly secondary to pneumonia as possible infiltrate noted in left lower lobe  SIRS criteria may also be met in the setting of DuoNebs in the ED  Afebrile and without leukocytosis and procalcitonin within normal limits  Ceftriaxone 2000 mg IV daily  Chest x-ray with hazy opacity projecting over the left lower lung possibly pneumonia  Blood cultures pending  De-escalate antibiotics as appropriate  Trend fever curve/WBC count/procalcitonin/lactate until cleared  Sepsis parameters resolved  Plan for discharge home on a 7-day course of Augmentin  COPD with acute exacerbation (HCC)  Patient presents with shortness of breath and was found to be in respiratory distress at urgent care  Does have a remote history of tobacco abuse  Physical exam remarkable for diffuse wheezes in all lung fields  Complicated by acute hypoxic respiratory failure  Likely COPD with acute exacerbation possibly secondary to pneumonia  Solu-Medrol 40 mg IV every 8 hours  Atrovent and Xopenex  Respiratory protocol  Continue home albuterol 2 puff every 6 hours PRN shortness of breath  Patient saturating well on room air  Discharged home on 5-day course of prednisone  LASHAWN (acute kidney injury) (HCC)  Baseline creatinine 1.1-1.2  Arrival creatinine 1.54  Likely prerenal azotemia in the setting of volume depletion/sepsis  IV fluid hydration with normal saline solution at 100 cc/h  Trend BMP  Avoid nephrotoxic agents  Strict intake and output  Daily standing weights  KDIGO protocol  Resolved with IV fluid hydration  Acute respiratory failure with hypoxia (HCC)  Patient does not wear oxygen in the outpatient setting  Found to be requiring 4 L/min NC O2 upon arrival  Likely  secondary to COPD exacerbation and possible pneumonia  Wean O2 as tolerated  Respiratory and airway clearance protocols  Goal SpO2 > 88%  Patient now requiring 2 L/min NC O2  Home O2 evaluation prior to discharge  Type 2 diabetes mellitus with hyperglycemia, with long-term current use of insulin (HCC)  Lab Results   Component Value Date    HGBA1C 9.6 (A) 05/20/2025       Recent Labs     06/01/25  0141 06/01/25  0232 06/01/25  0342 06/01/25  0715   POCGLU 62* 106 73 146*       Blood Sugar Average: Last 72 hrs:  (P) 220.1  Continue home Lantus 70 units daily at bedtime and lispro 30 units 3 times daily with meals  Sliding scale insulin with Accu-Cheks  Diabetic diet  Target blood glucose 140-180  Essential (primary) hypertension  Blood pressures slightly elevated on presentation  Hold home thiazide diuretic and ARB in the setting of LASHAWN  Monitor blood pressures  Hydralazine 5 mg IV every 6 hours PRN SBP > 160 mmHg  Hyperlipidemia  Continue home statin therapy  Depression with anxiety  Mood appears appropriate  Continue home buspirone and duloxetine  Acquired hypothyroidism  Continue home levothyroxine 170 mcg oral daily  Morbid obesity with BMI of 45.0-49.9, adult (HCC)  Present on admission as evidenced by BMI greater than 50  Encourage lifestyle modifications and healthy weight loss     Medical Problems       Resolved Problems  Date Reviewed: 5/23/2025   None       Discharging Physician / Practitioner: Apolinar Velasquez DO  PCP: Chuck Holman PA-C  Admission Date:   Admission Orders (From admission, onward)       Ordered        05/30/25 1618  INPATIENT ADMISSION  Once                          Discharge Date: 06/01/25    Next Steps for Physician/AP Assuming Care:  Follow-up with PCP    Test Results Pending at Discharge (will require follow up):  Not applicable    Medication Changes for Discharge & Rationale:  Augmentin x 7 days  Prednisone x 5 days  See after visit summary for reconciled discharge medications  provided to patient and/or family.     Consultations During Hospital Stay:  Not applicable    Procedures Performed:   Not applicable    Significant Findings / Test Results:   Not applicable    Incidental Findings:   Not applicable    Hospital Course:   Luis F Velazquez is a 59 y.o. male with a PMH of COPD, insulin-dependent type 2 diabetes mellitus, hypertension, hyperlipidemia, morbid obesity, depression with anxiety, hypothyroidism who presents with shortness of breath.  The patient states that over the course of the past 3 days he has been experiencing worsening shortness of breath and dyspnea on exertion.  The patient was evaluated at an urgent care and was found to be hypoxic to the 80s.  In the ED, the patient was found to be requiring 4 L/min NC O2.  The patient met SIRS criteria in the setting of tachycardia and tachypnea.  Possible source of infection pneumonia as chest x-ray with possible lingular infiltrate.  Afebrile and without leukocytosis.  Procalcitonin within normal limits.     The patient is now hemodynamically stable and saturating well on room air.  Afebrile and without leukocytosis.  LASHAWN has since resolved with IV fluid hydration.  The patient states he feels much improved and would like to be discharged in the hospital.  The patient will be discharged on a 7-day course of Augmentin in the setting of pneumonia as well as a 5-day course of prednisone for possible COPD exacerbation.  The patient was strongly encouraged to resume all PTA medications and follow-up with his PCP and pulmonologist within 7-14 days.  The brother's nephew will provide transportation home and will bring the patient's oxygen concentrator for transportation.  The patient is saturating well on room air however does use oxygen as needed in the outpatient setting.  This serves as a brief discharge summary.  Please see full medical record for more details.  All questions and concerns were answered prior to departure and the  "patient endorses understanding and agreement with the plan.  The patient states he does not want me to call family at this time and states he will call his family for transportation home.    Discharge Day Visit / Exam:   Subjective:  Patient seen and examined at bedside. No acute events overnight. Denies chest pain, SOB, diaphoresis, nausea/vomiting/diarrhea, fevers/chills.     Vitals: Blood Pressure: (!) 166/102 (06/01/25 0735)  Pulse: 89 (06/01/25 0735)  Temperature: 97.6 °F (36.4 °C) (06/01/25 0735)  Temp Source: Oral (05/31/25 2008)  Respirations: 19 (06/01/25 0735)  Height: 5' 4\" (162.6 cm) (05/30/25 1712)  Weight - Scale: 129 kg (285 lb 7.9 oz) (06/01/25 0541)  SpO2: 94 % (06/01/25 0735)    Physical Exam  Vitals and nursing note reviewed.   Constitutional:       General: He is not in acute distress.     Appearance: He is well-developed.   HENT:      Head: Normocephalic and atraumatic.     Eyes:      Conjunctiva/sclera: Conjunctivae normal.       Cardiovascular:      Rate and Rhythm: Normal rate and regular rhythm.      Heart sounds: No murmur heard.  Pulmonary:      Effort: Pulmonary effort is normal. No respiratory distress.      Breath sounds: Normal breath sounds.   Abdominal:      Palpations: Abdomen is soft.      Tenderness: There is no abdominal tenderness.     Musculoskeletal:         General: No swelling.      Cervical back: Neck supple.     Skin:     General: Skin is warm and dry.      Capillary Refill: Capillary refill takes less than 2 seconds.     Neurological:      Mental Status: He is alert.     Psychiatric:         Mood and Affect: Mood normal.       Discussion with Family: Patient declined call to .     Discharge instructions/Information to patient and family:   See after visit summary for information provided to patient and family.      Provisions for Follow-Up Care:  See after visit summary for information related to follow-up care and any pertinent home health orders.  "     Mobility at time of Discharge:   Basic Mobility Inpatient Raw Score: 18  JH-HLM Goal: 6: Walk 10 steps or more  JH-HLM Achieved: 6: Walk 10 steps or more  HLM Goal achieved. Continue to encourage appropriate mobility.     Disposition:   Home    Planned Readmission: Not applicable    Administrative Statements   Discharge Statement:  I have spent a total time of 65 minutes in caring for this patient on the day of the visit/encounter. >30 minutes of time was spent on: Diagnostic results, Prognosis, Risks and benefits of tx options, Instructions for management, Patient and family education, Importance of tx compliance, Risk factor reductions, Impressions, Counseling / Coordination of care, Documenting in the medical record, Reviewing / ordering tests, medicine, procedures  , and Communicating with other healthcare professionals .    **Please Note: This note may have been constructed using a voice recognition system**

## 2025-06-01 NOTE — ASSESSMENT & PLAN NOTE
Lab Results   Component Value Date    HGBA1C 9.6 (A) 05/20/2025       Recent Labs     06/01/25  0141 06/01/25  0232 06/01/25  0342 06/01/25  0715   POCGLU 62* 106 73 146*       Blood Sugar Average: Last 72 hrs:  (P) 220.1  Continue home Lantus 70 units daily at bedtime and lispro 30 units 3 times daily with meals  Sliding scale insulin with Accu-Cheks  Diabetic diet  Target blood glucose 140-180

## 2025-06-01 NOTE — PLAN OF CARE
Problem: PAIN - ADULT  Goal: Verbalizes/displays adequate comfort level or baseline comfort level  Description: Interventions:  - Encourage patient to monitor pain and request assistance  - Assess pain using appropriate pain scale  - Administer analgesics as ordered based on type and severity of pain and evaluate response  - Implement non-pharmacological measures as appropriate and evaluate response  - Consider cultural and social influences on pain and pain management  - Notify physician/advanced practitioner if interventions unsuccessful or patient reports new pain  - Educate patient/family on pain management process including their role and importance of  reporting pain   - Provide non-pharmacologic/complimentary pain relief interventions  Outcome: Progressing     Problem: INFECTION - ADULT  Goal: Absence or prevention of progression during hospitalization  Description: INTERVENTIONS:  - Assess and monitor for signs and symptoms of infection  - Monitor lab/diagnostic results  - Monitor all insertion sites, i.e. indwelling lines, tubes, and drains  - Monitor endotracheal if appropriate and nasal secretions for changes in amount and color  - Reynolds appropriate cooling/warming therapies per order  - Administer medications as ordered  - Instruct and encourage patient and family to use good hand hygiene technique  - Identify and instruct in appropriate isolation precautions for identified infection/condition  Outcome: Progressing  Goal: Absence of fever/infection during neutropenic period  Description: INTERVENTIONS:  - Monitor WBC  - Perform strict hand hygiene  - Limit to healthy visitors only  - No plants, dried, fresh or silk flowers with conklin in patient room  Outcome: Progressing     Problem: SAFETY ADULT  Goal: Patient will remain free of falls  Description: INTERVENTIONS:  - Educate patient/family on patient safety including physical limitations  - Instruct patient to call for assistance with activity   -  Consider consulting OT/PT to assist with strengthening/mobility based on AM PAC & JH-HLM score  - Consult OT/PT to assist with strengthening/mobility   - Keep Call bell within reach  - Keep bed low and locked with side rails adjusted as appropriate  - Keep care items and personal belongings within reach  - Initiate and maintain comfort rounds  - Make Fall Risk Sign visible to staff  - Offer Toileting every 2 Hours, in advance of need  - Initiate/Maintain bed alarm  - Obtain necessary fall risk management equipment: nonskid socks  - Apply yellow socks and bracelet for high fall risk patients  - Consider moving patient to room near nurses station  Outcome: Progressing  Goal: Maintain or return to baseline ADL function  Description: INTERVENTIONS:  -  Assess patient's ability to carry out ADLs; assess patient's baseline for ADL function and identify physical deficits which impact ability to perform ADLs (bathing, care of mouth/teeth, toileting, grooming, dressing, etc.)  - Assess/evaluate cause of self-care deficits   - Assess range of motion  - Assess patient's mobility; develop plan if impaired  - Assess patient's need for assistive devices and provide as appropriate  - Encourage maximum independence but intervene and supervise when necessary  - Involve family in performance of ADLs  - Assess for home care needs following discharge   - Consider OT consult to assist with ADL evaluation and planning for discharge  - Provide patient education as appropriate  - Monitor functional capacity and physical performance, use of AM PAC & JH-HLM   - Monitor gait, balance and fatigue with ambulation    Outcome: Progressing  Goal: Maintains/Returns to pre admission functional level  Description: INTERVENTIONS:  - Perform AM-PAC 6 Click Basic Mobility/ Daily Activity assessment daily.  - Set and communicate daily mobility goal to care team and patient/family/caregiver.   - Collaborate with rehabilitation services on mobility goals  if consulted  - Perform Range of Motion 3 times a day.  - Reposition patient every 2 hours.  - Dangle patient 3 times a day  - Stand patient 3 times a day  - Ambulate patient 3 times a day  - Out of bed to chair 3 times a day   - Out of bed for meals 3 times a day  - Out of bed for toileting  - Record patient progress and toleration of activity level   Outcome: Progressing     Problem: DISCHARGE PLANNING  Goal: Discharge to home or other facility with appropriate resources  Description: INTERVENTIONS:  - Identify barriers to discharge w/patient and caregiver  - Arrange for needed discharge resources and transportation as appropriate  - Identify discharge learning needs (meds, wound care, etc.)  - Arrange for interpretive services to assist at discharge as needed  - Refer to Case Management Department for coordinating discharge planning if the patient needs post-hospital services based on physician/advanced practitioner order or complex needs related to functional status, cognitive ability, or social support system  Outcome: Progressing     Problem: Knowledge Deficit  Goal: Patient/family/caregiver demonstrates understanding of disease process, treatment plan, medications, and discharge instructions  Description: Complete learning assessment and assess knowledge base.  Interventions:  - Provide teaching at level of understanding  - Provide teaching via preferred learning methods  Outcome: Progressing

## 2025-06-01 NOTE — ASSESSMENT & PLAN NOTE
Patient presents with shortness of breath and was found to be in respiratory distress at urgent care  Does have a remote history of tobacco abuse  Physical exam remarkable for diffuse wheezes in all lung fields  Complicated by acute hypoxic respiratory failure  Likely COPD with acute exacerbation possibly secondary to pneumonia  Solu-Medrol 40 mg IV every 8 hours  Atrovent and Xopenex  Respiratory protocol  Continue home albuterol 2 puff every 6 hours PRN shortness of breath  Patient saturating well on room air  Discharged home on 5-day course of prednisone

## 2025-06-02 ENCOUNTER — PATIENT OUTREACH (OUTPATIENT)
Dept: CASE MANAGEMENT | Facility: OTHER | Age: 60
End: 2025-06-02

## 2025-06-02 LAB
ATRIAL RATE: 112 BPM
P AXIS: 23 DEGREES
PR INTERVAL: 146 MS
QRS AXIS: 52 DEGREES
QRSD INTERVAL: 88 MS
QT INTERVAL: 336 MS
QTC INTERVAL: 458 MS
T WAVE AXIS: 64 DEGREES
VENTRICULAR RATE: 112 BPM

## 2025-06-02 PROCEDURE — 93010 ELECTROCARDIOGRAM REPORT: CPT | Performed by: INTERNAL MEDICINE

## 2025-06-04 ENCOUNTER — PATIENT OUTREACH (OUTPATIENT)
Dept: CASE MANAGEMENT | Facility: OTHER | Age: 60
End: 2025-06-04

## 2025-06-05 ENCOUNTER — PROCEDURE VISIT (OUTPATIENT)
Dept: OBGYN CLINIC | Facility: CLINIC | Age: 60
End: 2025-06-05
Payer: MEDICARE

## 2025-06-05 VITALS
TEMPERATURE: 97.7 F | BODY MASS INDEX: 48.65 KG/M2 | OXYGEN SATURATION: 99 % | WEIGHT: 285 LBS | HEART RATE: 99 BPM | HEIGHT: 64 IN

## 2025-06-05 DIAGNOSIS — M17.0 PRIMARY OSTEOARTHRITIS OF BOTH KNEES: Primary | ICD-10-CM

## 2025-06-05 LAB
BACTERIA BLD CULT: NORMAL
BACTERIA BLD CULT: NORMAL

## 2025-06-05 PROCEDURE — 20610 DRAIN/INJ JOINT/BURSA W/O US: CPT | Performed by: ORTHOPAEDIC SURGERY

## 2025-06-05 NOTE — PROGRESS NOTES
Assessment & Plan  Primary osteoarthritis of both knees  Both of the patient's knees were injected with his third set of Orthovisc.  He tolerated the injections quite well.  Follow-up with our office in 6 months for viscosupplementation again.  He is acceptable to this plan.  Orders:    Injection Procedure Prior Authorization; Future          The patient has osteoarthritis of his bilateral knees.  Under aseptic technique, both knees were injected with his final set of Orthovisc.  He tolerated the procedures well.  Return back 6 months for reevaluation.  If his condition changes, he would not hesitate to let us know    Return in about 6 months (around 12/5/2025).      _____________________________________________________  CHIEF COMPLAINT:  Chief Complaint   Patient presents with    Left Knee - Follow-up     Orthovisc #3 Buy and Bill    Right Knee - Follow-up     Orthovisc #3 Buy and Bill         SUBJECTIVE:  Luis F Velazquez is a 59 y.o. male who presents to our office for viscosupplementation of both knees.  He is here today for his third and final set of Orthovisc.  He tolerated his previous injections without issue.  Denies any numbness or tingling.  He denies any fever or chills. Patient has reported improvements from previous visco injections. Pain is rated at 8/10.      The following portions of the patient's history were reviewed and updated as appropriate: allergies, current medications, past family history, past medical history, past social history, past surgical history and problem list.    PAST MEDICAL HISTORY:  Past Medical History[1]    PAST SURGICAL HISTORY:  Past Surgical History[2]    FAMILY HISTORY:  Family History[3]    SOCIAL HISTORY:  Social History[4]    MEDICATIONS:  Current Medications[5]    ALLERGIES:  Allergies[6]    ROS:  Review of Systems     Constitutional: Negative for fatigue, fever or loss of appetite.   HENT: Negative.    Respiratory: Negative for shortness of breath, dyspnea.   "  Cardiovascular: Negative for chest pain/tightness.   Gastrointestinal: Negative for abdominal pain, N/V.   Endocrine: Negative for cold/heat intolerance, unexplained weight loss/gain.   Genitourinary: Negative for flank pain, dysuria, hematuria.   Musculoskeletal: Positive for arthralgia   Skin: Negative for rash.    Neurological: Negative for numbness or tingling  Psychiatric/Behavioral: Negative for agitation.  _____________________________________________________  PHYSICAL EXAMINATION:    Pulse 99, temperature 97.7 °F (36.5 °C), temperature source Temporal, height 5' 4\" (1.626 m), weight 129 kg (285 lb), SpO2 99%.    Constitutional: Oriented to person, place, and time. Appears well-developed and well-nourished. No distress.   HENT:   Head: Normocephalic.   Eyes: Conjunctivae are normal. Right eye exhibits no discharge. Left eye exhibits no discharge. No scleral icterus.   Cardiovascular: Normal rate.    Pulmonary/Chest: Effort normal.   Neurological: Alert and oriented to person, place, and time.   Skin: Skin is warm and dry. No rash noted. Not diaphoretic. No erythema. No pallor.   Psychiatric: Normal mood and affect. Behavior is normal. Judgment and thought content normal.      MUSCULOSKELETAL EXAMINATION:   Physical Exam  Ortho Exam    Bilateral lower extremities are neurovascularly intact  Toes are pink and mobile   Compartments are soft  No warmth, erythema or ecchymosis  ROM of knees are from 5-115 degrees  Negative Lachman, drawer or pivot shift  No medial instability  Medial joint line tenderness, slight lateral joint line tenderness  Patellofemoral crepitation   Objective:  BP Readings from Last 1 Encounters:   06/01/25 (!) 166/102      Wt Readings from Last 1 Encounters:   06/05/25 129 kg (285 lb)        BMI:   Estimated body mass index is 48.92 kg/m² as calculated from the following:    Height as of this encounter: 5' 4\" (1.626 m).    Weight as of this encounter: 129 kg (285 lb).      PROCEDURES " PERFORMED:  Large joint arthrocentesis: bilateral knee    Performed by: Manny Patel DO  Authorized by: Manny Patel DO    Universal Protocol:  Risks and benefits: risks, benefits and alternatives were discussed  Consent given by: patient  Patient understanding: patient states understanding of the procedure being performed  Site marked: the operative site was marked  Supporting Documentation  Indications: pain     Is this a Visco injection? Yes  Non-Pharmacologic Treatments Attempted: Home Exercise  Pharmacologic Treatments Attempted: Corticosteroid injection  Pain Score: 8Procedure Details  Location: knee - bilateral knee  Preparation: Patient was prepped and draped in the usual sterile fashion  Needle size: 22 G  Ultrasound guidance: no  Approach: lateral    Medications (Right): 30 mg sodium hyaluronate 30 mg/2 mLMedications (Left): 30 mg sodium hyaluronate 30 mg/2 mL   Patient tolerance: patient tolerated the procedure well with no immediate complications  Dressing:  Sterile dressing applied            Scribe Attestation      I,:  Tye Stringer PA-C am acting as a scribe while in the presence of the attending physician.:       I,:  Manny Patel DO personally performed the services described in this documentation    as scribed in my presence.:                   [1]   Past Medical History:  Diagnosis Date    Acquired hypothyroidism 1/2/2015    Angioedema     Diabetes mellitus (HCC)     Disease of thyroid gland     Hyperlipidemia     Hypertension     Mixed hyperlipidemia 2/4/2015    Morbid obesity (HCC)     Morbid obesity with BMI of 45.0-49.9, adult (HCC) 1/2/2015    MARSHA (obstructive sleep apnea)     Primary hypertension 1/2/2015    Transitioned From: Benign essential hypertension   [2]   Past Surgical History:  Procedure Laterality Date    KNEE SURGERY      SINUS SURGERY     [3]   Family History  Problem Relation Name Age of Onset    Diabetes Mother      Diabetes type II Mother       Esophageal cancer Father      Diabetes Brother      Kidney cancer Brother      Diabetes type II Brother      Diabetes Maternal Grandmother      Diabetes type II Maternal Grandmother      Diabetes Paternal Grandmother      Heart disease Neg Hx     [4]   Social History  Tobacco Use    Smoking status: Former     Current packs/day: 0.00     Average packs/day: 3.0 packs/day for 40.0 years (120.0 ttl pk-yrs)     Types: Cigarettes     Start date: 10/21/1982     Quit date: 10/21/2022     Years since quittin.6     Passive exposure: Past    Smokeless tobacco: Never    Tobacco comments:     states read to quit prior to admit   Vaping Use    Vaping status: Never Used   Substance Use Topics    Alcohol use: Not Currently     Alcohol/week: 3.0 standard drinks of alcohol     Types: 2 Cans of beer, 1 Shots of liquor per week     Comment: No alcohol since     Drug use: No   [5]   Current Outpatient Medications:     albuterol (2.5 mg/3 mL) 0.083 % nebulizer solution, Take 3 mL (2.5 mg total) by nebulization every 6 (six) hours as needed for wheezing or shortness of breath, Disp: 180 mL, Rfl: 0    amoxicillin-clavulanate (AUGMENTIN) 875-125 mg per tablet, Take 1 tablet by mouth every 12 (twelve) hours for 7 days, Disp: 14 tablet, Rfl: 0    Ascorbic Acid (VITAMIN C PO), Take 1 tablet by mouth in the morning., Disp: , Rfl:     aspirin 81 mg chewable tablet, , Disp: , Rfl:     busPIRone (BUSPAR) 5 mg tablet, Take 1 tablet (5 mg total) by mouth 3 (three) times a day, Disp: 90 tablet, Rfl: 0    carisoprodol (SOMA) 350 mg tablet, TAKE 1 TABLET BY MOUTH THREE TIMES DAILY, Disp: 90 tablet, Rfl: 5    cholecalciferol (VITAMIN D3) 400 units tablet, Take 1 tablet by mouth in the morning., Disp: , Rfl:     clotrimazole (LOTRIMIN) 1 % cream, Apply topically 2 (two) times a day, Disp: 60 g, Rfl: 2    Diclofenac Sodium (VOLTAREN) 1 %, Apply 2 g topically 4 (four) times a day To feet/ankle, Disp: 150 g, Rfl: 1    DULoxetine (CYMBALTA) 60 mg  delayed release capsule, Take 1 capsule by mouth once daily, Disp: 90 capsule, Rfl: 0    finasteride (PROSCAR) 5 mg tablet, Take 1 tablet (5 mg total) by mouth daily, Disp: 90 tablet, Rfl: 3    HumaLOG KwikPen 100 units/mL injection pen, Inject 30 Units under the skin 3 (three) times a day with meals, Disp: 81 mL, Rfl: 1    hydroCHLOROthiazide 25 mg tablet, Take 1 tablet by mouth twice daily, Disp: 180 tablet, Rfl: 0    insulin glargine (Toujeo SoloStar) 300 units/mL CONCENTRATED U-300 injection pen (1-unit dial), Inject 72 units nightly. (Patient taking differently: 60 Units Takes 60 units hs), Disp: 22.5 mL, Rfl: 1    Insulin Pen Needle (1st Tier Unifine Pentips Plus) 31G X 5 MM MISC, Use 4 (four) times a day, Disp: 400 each, Rfl: 1    ketoconazole (NIZORAL) 2 % shampoo, Apply 1 Application topically 2 (two) times a week To feet in shower, Disp: 120 mL, Rfl: 2    levothyroxine 175 mcg tablet, Take 1 tablet by mouth once daily, Disp: 90 tablet, Rfl: 0    loratadine (CLARITIN) 10 mg tablet, Take 10 mg by mouth in the morning., Disp: , Rfl:     metFORMIN (GLUCOPHAGE) 1000 MG tablet, Take 1 tablet (1,000 mg total) by mouth 2 (two) times a day with meals, Disp: 180 tablet, Rfl: 1    Omega-3 Fatty Acids (fish oil) 1,000 mg, Take 1,000 mg by mouth in the morning and 1,000 mg in the evening., Disp: , Rfl:     predniSONE 20 mg tablet, Take 2 tablets (40 mg total) by mouth daily for 5 days, Disp: 10 tablet, Rfl: 0    pregabalin (LYRICA) 50 mg capsule, Take 1 capsule by mouth twice daily, Disp: 180 capsule, Rfl: 0    rosuvastatin (CRESTOR) 40 MG tablet, Take 1 tablet by mouth once daily, Disp: 90 tablet, Rfl: 0    sodium chloride (OCEAN) 0.65 % nasal spray, 1 spray into each nostril as needed for rhinitis, Disp: 30 mL, Rfl: 1    tamsulosin (FLOMAX) 0.4 mg, Take 1 capsule (0.4 mg total) by mouth 2 (two) times a day (Patient taking differently: Take 0.4 mg by mouth daily with dinner), Disp: 180 capsule, Rfl: 3    Thiamine HCl  (vitamin B-1) 250 MG tablet, Take 250 mg by mouth in the morning., Disp: , Rfl:     traZODone (DESYREL) 150 mg tablet, TAKE 1 TABLET BY MOUTH ONCE DAILY AT BEDTIME, Disp: 90 tablet, Rfl: 0    valsartan (DIOVAN) 160 mg tablet, Take 160 mg by mouth in the morning., Disp: , Rfl:     Ventolin  (90 Base) MCG/ACT inhaler, Inhale 2 puffs every 6 (six) hours as needed for wheezing or shortness of breath, Disp: 18 g, Rfl: 5    vitamin E, tocopherol, 400 units capsule, Take 400 Units by mouth in the morning., Disp: , Rfl:   [6]   Allergies  Allergen Reactions    Ace Inhibitors Swelling     Angioedema    PT STATES THIS IS NOT AN ALLERGY    Other Anaphylaxis     Pt believes that he is allergic to peanut butter.    Also, seasonal allergies    Zinc Tongue Swelling     PT STATES THIS IS NOT AN ALLERGY    Clindamycin Edema     Had angioedema episode approx 5 hr after IM administration of clindamycin. Unclear if there is definitive causal relationship.  PT STATES THIS IS NOT AN ALLERGY

## 2025-06-05 NOTE — ASSESSMENT & PLAN NOTE
Both of the patient's knees were injected with his third set of Orthovisc.  He tolerated the injections quite well.  Follow-up with our office in 6 months for viscosupplementation again.  He is acceptable to this plan.  Orders:    Injection Procedure Prior Authorization; Future

## 2025-06-06 ENCOUNTER — OFFICE VISIT (OUTPATIENT)
Dept: PAIN MEDICINE | Facility: CLINIC | Age: 60
End: 2025-06-06
Payer: MEDICARE

## 2025-06-06 ENCOUNTER — PATIENT OUTREACH (OUTPATIENT)
Dept: CASE MANAGEMENT | Facility: OTHER | Age: 60
End: 2025-06-06

## 2025-06-06 VITALS
WEIGHT: 290 LBS | HEIGHT: 64 IN | OXYGEN SATURATION: 98 % | HEART RATE: 100 BPM | TEMPERATURE: 97.7 F | RESPIRATION RATE: 16 BRPM | BODY MASS INDEX: 49.51 KG/M2

## 2025-06-06 DIAGNOSIS — G89.4 CHRONIC PAIN SYNDROME: Primary | ICD-10-CM

## 2025-06-06 DIAGNOSIS — M47.816 LUMBAR SPONDYLOSIS: ICD-10-CM

## 2025-06-06 DIAGNOSIS — M54.50 CHRONIC BILATERAL LOW BACK PAIN WITHOUT SCIATICA: ICD-10-CM

## 2025-06-06 DIAGNOSIS — M79.18 BILATERAL BUTTOCK PAIN: ICD-10-CM

## 2025-06-06 DIAGNOSIS — G89.29 CHRONIC BILATERAL LOW BACK PAIN WITHOUT SCIATICA: ICD-10-CM

## 2025-06-06 DIAGNOSIS — M79.18 MYOFASCIAL PAIN SYNDROME: ICD-10-CM

## 2025-06-06 PROCEDURE — 99214 OFFICE O/P EST MOD 30 MIN: CPT | Performed by: NURSE PRACTITIONER

## 2025-06-06 NOTE — LETTER
Date: 06/06/25    Dear Luis F Velazquez,   My name is Lesvia Jaxon; I am a registered nurse care manager working with [employer] [address] [id number] [phone]. I have not been able to reach you and would like to set a time that I can talk with you over the phone.  My work is to help patients that have complex medical conditions get the care they need. This includes patients who may have been in the hospital or emergency room.    Please call me with any questions you may have. I look forward to speaking with you.  Sincerely,  Lesvia Coates  [phone]  Outpatient Care Manager

## 2025-06-06 NOTE — PROGRESS NOTES
Left message with my contact information for patient to return my call  Unable to reach letter sent  Will close from care management at this time

## 2025-06-06 NOTE — ASSESSMENT & PLAN NOTE
Orders:    Ambulatory Referral to Physical Therapy; Future    While the patient was in the office today, I did have a thorough conversation regarding their chronic pain syndrome, medication management, and treatment plan options.  Patient is being seen for a follow-up visit.  He underwent bilateral L4-5 and L5-S1 radiofrequency ablations.  Right side was performed on 4/11/2025.  The left side was performed on 5/6/2025.  Overall, his typical low back pain has improved by about 80%.  His biggest complaint now is bilateral buttock pain.  On exam, he demonstrates findings consistent with muscle spasms involving bilateral gluteal minimus, medius, tae muscles.    Recommend starting physical therapy.    Schedule ultrasound-guided bilateral gluteal muscle trigger point injections.    Continue Soma 350 mg 3 times daily.  He did not require refill of this medication during today's visit.    Follow-up 1 month after trigger point injections.

## 2025-06-06 NOTE — PROGRESS NOTES
Name: Luis F Velazquez      : 1965      MRN: [id number]  Encounter Provider: Barbara Kocher, CRNP  Encounter Date: 2025   Encounter department: [facility] [place]  :  Assessment & Plan  Chronic pain syndrome  Chronic bilateral low back pain without sciatica  Lumbar spondylosis  Bilateral buttock pain  Myofascial pain syndrome    Orders:    Ambulatory Referral to Physical Therapy; Future    While the patient was in the office today, I did have a thorough conversation regarding their chronic pain syndrome, medication management, and treatment plan options.  Patient is being seen for a follow-up visit.  He underwent bilateral L4-5 and L5-S1 radiofrequency ablations.  Right side was performed on 2025.  The left side was performed on 2025.  Overall, his typical low back pain has improved by about 80%.  His biggest complaint now is bilateral buttock pain.  On exam, he demonstrates findings consistent with muscle spasms involving bilateral gluteal minimus, medius, tae muscles.    Recommend starting physical therapy.    Schedule ultrasound-guided bilateral gluteal muscle trigger point injections.    Continue Soma 350 mg 3 times daily.  He did not require refill of this medication during today's visit.    Follow-up 1 month after trigger point injections.      My impressions and treatment recommendations were discussed in detail with the patient who verbalized understanding and had no further questions.  Discharge instructions were provided. I personally saw and examined the patient and I agree with the above discussed plan of care.    History of Present Illness     Luis F Velazquez is a 59 y.o. male who presents for a follow up office visit in regards to Back Pain. The patient’s current symptoms include complaints of pain in bilateral buttocks.  Current pain level is an 8/10.  Quality of pain is described as sharp.      Review of Systems   Musculoskeletal:  Positive for back pain and gait  "problem.   Neurological:  Positive for dizziness.   All other systems reviewed and are negative.      Medical History Reviewed by provider this encounter:  Tobacco  Allergies  Meds  Problems  Med Hx  Surg Hx  Fam Hx     .  Medications Ordered Prior to Encounter[1]      Objective   Pulse 100   Temp 97.7 °F (36.5 °C) (Temporal)   Resp 16   Ht 5' 4\" (1.626 m)   Wt 132 kg (290 lb)   SpO2 98%   BMI 49.78 kg/m²      Pain Score:   8  Physical Exam  Constitutional: normal, well developed, well nourished, alert, in no distress and non-toxic and no overt pain behavior.  Eyes: anicteric  HEENT: grossly intact  Neck: supple, symmetric, trachea midline and no masses   Pulmonary: even and unlabored  Cardiovascular: No edema or pitting edema present  Skin: Normal without rashes or lesions and well hydrated  Psychiatric: Mood and affect appropriate  Neurologic: Cranial Nerves II-XII grossly intact  Musculoskeletal: Trigger point tenderness over bilateral gluteal muscles.      LUMBAR SPINE     INDICATION:   Intervertebral disc disorders with radiculopathy, lumbar region. Spondylosis without myelopathy or radiculopathy, lumbar region. Myalgia, other site. Chronic pain syndrome.      COMPARISON: 12/27/2022     VIEWS:  XR SPINE LUMBAR MINIMUM 4 VIEWS NON INJURY  Images: 4     FINDINGS:     There are 5 non rib bearing lumbar vertebral bodies.      There is no evidence of acute fracture or destructive osseous lesion.     Trace anterolisthesis L3-4.     Stable multilevel vertebral body endplate spurring with mild disc height loss L5-S1 with multilevel facet arthropathy.     The pedicles appear intact.     There are atherosclerotic calcifications. Soft tissues are otherwise unremarkable.     IMPRESSION:        No acute osseous abnormality.       Degenerative changes as described.     Electronically signed: 11/22/2024 09:44 AM Singh Moran, MPH,MD         [1]   Current Outpatient Medications on File Prior to Visit   Medication " Sig Dispense Refill    albuterol (2.5 mg/3 mL) 0.083 % nebulizer solution Take 3 mL (2.5 mg total) by nebulization every 6 (six) hours as needed for wheezing or shortness of breath 180 mL 0    amoxicillin-clavulanate (AUGMENTIN) 875-125 mg per tablet Take 1 tablet by mouth every 12 (twelve) hours for 7 days 14 tablet 0    Ascorbic Acid (VITAMIN C PO) Take 1 tablet by mouth in the morning.      aspirin 81 mg chewable tablet       busPIRone (BUSPAR) 5 mg tablet Take 1 tablet (5 mg total) by mouth 3 (three) times a day 90 tablet 0    carisoprodol (SOMA) 350 mg tablet TAKE 1 TABLET BY MOUTH THREE TIMES DAILY 90 tablet 5    cholecalciferol (VITAMIN D3) 400 units tablet Take 1 tablet by mouth in the morning.      clotrimazole (LOTRIMIN) 1 % cream Apply topically 2 (two) times a day 60 g 2    Diclofenac Sodium (VOLTAREN) 1 % Apply 2 g topically 4 (four) times a day To feet/ankle 150 g 1    DULoxetine (CYMBALTA) 60 mg delayed release capsule Take 1 capsule by mouth once daily 90 capsule 0    finasteride (PROSCAR) 5 mg tablet Take 1 tablet (5 mg total) by mouth daily 90 tablet 3    HumaLOG KwikPen 100 units/mL injection pen Inject 30 Units under the skin 3 (three) times a day with meals 81 mL 1    hydroCHLOROthiazide 25 mg tablet Take 1 tablet by mouth twice daily 180 tablet 0    insulin glargine (Toujeo SoloStar) 300 units/mL CONCENTRATED U-300 injection pen (1-unit dial) Inject 72 units nightly. 22.5 mL 1    Insulin Pen Needle (1st Tier Unifine Pentips Plus) 31G X 5 MM MISC Use 4 (four) times a day 400 each 1    ketoconazole (NIZORAL) 2 % shampoo Apply 1 Application topically 2 (two) times a week To feet in shower 120 mL 2    levothyroxine 175 mcg tablet Take 1 tablet by mouth once daily 90 tablet 0    loratadine (CLARITIN) 10 mg tablet Take 10 mg by mouth in the morning.      metFORMIN (GLUCOPHAGE) 1000 MG tablet Take 1 tablet (1,000 mg total) by mouth 2 (two) times a day with meals 180 tablet 1    Omega-3 Fatty Acids  (fish oil) 1,000 mg Take 1,000 mg by mouth in the morning and 1,000 mg in the evening.      predniSONE 20 mg tablet Take 2 tablets (40 mg total) by mouth daily for 5 days 10 tablet 0    pregabalin (LYRICA) 50 mg capsule Take 1 capsule by mouth twice daily 180 capsule 0    rosuvastatin (CRESTOR) 40 MG tablet Take 1 tablet by mouth once daily 90 tablet 0    sodium chloride (OCEAN) 0.65 % nasal spray 1 spray into each nostril as needed for rhinitis 30 mL 1    tamsulosin (FLOMAX) 0.4 mg Take 1 capsule (0.4 mg total) by mouth 2 (two) times a day (Patient taking differently: Take 0.4 mg by mouth daily with dinner) 180 capsule 3    Thiamine HCl (vitamin B-1) 250 MG tablet Take 250 mg by mouth in the morning.      traZODone (DESYREL) 150 mg tablet TAKE 1 TABLET BY MOUTH ONCE DAILY AT BEDTIME 90 tablet 0    valsartan (DIOVAN) 160 mg tablet Take 160 mg by mouth in the morning.      Ventolin  (90 Base) MCG/ACT inhaler Inhale 2 puffs every 6 (six) hours as needed for wheezing or shortness of breath 18 g 5    vitamin E, tocopherol, 400 units capsule Take 400 Units by mouth in the morning.      [DISCONTINUED] atorvastatin (LIPITOR) 20 mg tablet Take 1 tablet by mouth once daily 90 tablet 3     Current Facility-Administered Medications on File Prior to Visit   Medication Dose Route Frequency Provider Last Rate Last Admin    [COMPLETED] sodium hyaluronate (ORTHOVISC) injection SOSY 30 mg  30 mg Intra-articular One-time injection    30 mg at 06/05/25 1130    [COMPLETED] sodium hyaluronate (ORTHOVISC) injection SOSY 30 mg  30 mg Intra-articular One-time injection    30 mg at 06/05/25 1130

## 2025-06-07 DIAGNOSIS — I10 ESSENTIAL HYPERTENSION: ICD-10-CM

## 2025-06-09 DIAGNOSIS — I10 ESSENTIAL HYPERTENSION: ICD-10-CM

## 2025-06-09 RX ORDER — HYDROCHLOROTHIAZIDE 25 MG/1
25 TABLET ORAL 2 TIMES DAILY
Qty: 180 TABLET | Refills: 0 | Status: SHIPPED | OUTPATIENT
Start: 2025-06-09

## 2025-06-10 RX ORDER — HYDROCHLOROTHIAZIDE 25 MG/1
25 TABLET ORAL 2 TIMES DAILY
Qty: 180 TABLET | Refills: 0 | OUTPATIENT
Start: 2025-06-10

## 2025-06-16 ENCOUNTER — PROCEDURE VISIT (OUTPATIENT)
Dept: PODIATRY | Facility: CLINIC | Age: 60
End: 2025-06-16
Payer: MEDICARE

## 2025-06-16 VITALS
WEIGHT: 290 LBS | BODY MASS INDEX: 49.51 KG/M2 | HEIGHT: 64 IN | TEMPERATURE: 98.2 F | HEART RATE: 84 BPM | OXYGEN SATURATION: 96 % | RESPIRATION RATE: 16 BRPM

## 2025-06-16 DIAGNOSIS — B35.1 ONYCHOMYCOSIS: ICD-10-CM

## 2025-06-16 DIAGNOSIS — I73.9 PERIPHERAL VASCULAR DISEASE (HCC): ICD-10-CM

## 2025-06-16 DIAGNOSIS — E11.65 TYPE 2 DIABETES MELLITUS WITH HYPERGLYCEMIA, WITH LONG-TERM CURRENT USE OF INSULIN (HCC): ICD-10-CM

## 2025-06-16 DIAGNOSIS — M79.675 PAIN OF TOE OF LEFT FOOT: ICD-10-CM

## 2025-06-16 DIAGNOSIS — F41.9 ANXIETY: ICD-10-CM

## 2025-06-16 DIAGNOSIS — E11.42 DIABETIC POLYNEUROPATHY ASSOCIATED WITH TYPE 2 DIABETES MELLITUS (HCC): ICD-10-CM

## 2025-06-16 DIAGNOSIS — Z79.4 TYPE 2 DIABETES MELLITUS WITH HYPERGLYCEMIA, WITH LONG-TERM CURRENT USE OF INSULIN (HCC): ICD-10-CM

## 2025-06-16 DIAGNOSIS — L03.116 CELLULITIS OF LEFT ANTERIOR LOWER LEG: Primary | ICD-10-CM

## 2025-06-16 PROCEDURE — 99213 OFFICE O/P EST LOW 20 MIN: CPT | Performed by: PODIATRIST

## 2025-06-16 PROCEDURE — 11721 DEBRIDE NAIL 6 OR MORE: CPT | Performed by: PODIATRIST

## 2025-06-16 RX ORDER — BUSPIRONE HYDROCHLORIDE 5 MG/1
5 TABLET ORAL 3 TIMES DAILY
Qty: 90 TABLET | Refills: 0 | OUTPATIENT
Start: 2025-06-16

## 2025-06-16 RX ORDER — CEPHALEXIN 500 MG/1
500 CAPSULE ORAL EVERY 8 HOURS SCHEDULED
Qty: 21 CAPSULE | Refills: 0 | Status: SHIPPED | OUTPATIENT
Start: 2025-06-16 | End: 2025-06-23

## 2025-06-16 NOTE — ASSESSMENT & PLAN NOTE
Lab Results   Component Value Date    HGBA1C 9.6 (A) 05/20/2025       Orders:    cephalexin (KEFLEX) 500 mg capsule; Take 1 capsule (500 mg total) by mouth every 8 (eight) hours for 7 days  The wound was covered with triple antibiotic and a bordered island dressing total of 4 x 4 and a gauze part was 2 x 2.  Patient is to keep the area covered with some form of antibiotic and gauze.

## 2025-06-16 NOTE — PROGRESS NOTES
Name: Luis F Velazquez      : 1965      MRN: [id number]  Encounter Provider: Don Walker DPM  Encounter Date: 2025   Encounter department: [facility] PODIATRY [place]  :  Assessment & Plan  Onychomycosis       Debride mycotic nails and thin the nail plates x 10 with the use of a nail nipper manually and an electric Dremel bur was used to reduce the thickness of the nail beds and smoothed the distal aspect of the nails.   Cellulitis of left anterior lower leg    Orders:    cephalexin (KEFLEX) 500 mg capsule; Take 1 capsule (500 mg total) by mouth every 8 (eight) hours for 7 days    Diabetic polyneuropathy associated with type 2 diabetes mellitus (HCC)    Lab Results   Component Value Date    HGBA1C 9.6 (A) 2025       Orders:    cephalexin (KEFLEX) 500 mg capsule; Take 1 capsule (500 mg total) by mouth every 8 (eight) hours for 7 days  The wound was covered with triple antibiotic and a bordered island dressing total of 4 x 4 and a gauze part was 2 x 2.  Patient is to keep the area covered with some form of antibiotic and gauze.  Peripheral vascular disease (HCC)    Orders:    cephalexin (KEFLEX) 500 mg capsule; Take 1 capsule (500 mg total) by mouth every 8 (eight) hours for 7 days    Type 2 diabetes mellitus with hyperglycemia, with long-term current use of insulin (HCC)    Lab Results   Component Value Date    HGBA1C 9.6 (A) 2025            Pain of toe of left foot         Reviewed his last endocrinology appointment from 3/5/2025    Discussed proper shoe gear, daily inspections of feet, and general foot health with patient. Patient has Q8  findings and is recommended for at risk foot care every 9-10 weeks.    Patients most recent complete clinical foot exam was on: 2025    Return in about 10 weeks (around 2025).   Return in 1 week for ulcer follow-up on the left leg.    History of Present Illness   HPI  Luis F Velazquez is a 59 y.o. male who presents with chief complaint of  "painful thick nails on both feet and is concerned over a skin tear that turned into an ulcer on his left leg.  Patient states that he has been trying to keep the area clean and dry.  He relates that it is also tender to touch.  He is a diabetic whose last A1c was 9.6 drawn on 5/20/2025.  Patient presents for at-risk foot care.  Patient has no acute concerns today.  Patient has significant lower extremity risk due to neuropathy, parasthesia, edema, and trophic skin changes to the lower extremity.   History obtained from: patient    Review of Systems  Medical History Reviewed by provider this encounter:     .  Medications Ordered Prior to Encounter[1]   Social History[2]     Objective   Pulse 84   Temp 98.2 °F (36.8 °C) (Temporal)   Resp 16   Ht 5' 4\" (1.626 m)   Wt 132 kg (290 lb)   SpO2 96%   BMI 49.78 kg/m²      Physical Exam  Vascular status is a faint 1/4 DP PT negative digital hair normal distal cooling immediate capillary refill with edema present bilaterally.  Capillary refill is approximately 2 to 3 seconds and edema is +1 to +2 bilaterally.     Derm nails are brittle elongated hypertrophic white-yellow discoloration with subungual debris x 10.  There is an increased thickness in the nails of approximately 2 to 3 mm.   No signs of any infection where the ingrown nail is removed from on the right hallux..  There is white flaky tissue present on the plantar aspect of both feet with no signs of dried blood or fissures noted.  There is loss of turgor noted bilaterally as well as thinning to the skin.  There is a superficial ulcer present on the anterior aspect of the left leg measuring approximately 1 cm at its widest part and approximately 4 cm across.  It is superficial just into the epidermis with a yellowish-red dried base.  There appears to be improving healing tissue present on the lateral aspect of the wound.  There is erythema surrounding the area and it is painful to touch.  The base is just into " the epidermis and no tunneling or undermining is noted.    Ortho and neuro exam are unchanged from his last visit.       [1]   Current Outpatient Medications on File Prior to Visit   Medication Sig Dispense Refill    albuterol (2.5 mg/3 mL) 0.083 % nebulizer solution Take 3 mL (2.5 mg total) by nebulization every 6 (six) hours as needed for wheezing or shortness of breath 180 mL 0    Ascorbic Acid (VITAMIN C PO) Take 1 tablet by mouth in the morning.      aspirin 81 mg chewable tablet       busPIRone (BUSPAR) 5 mg tablet Take 1 tablet (5 mg total) by mouth 3 (three) times a day 90 tablet 0    carisoprodol (SOMA) 350 mg tablet TAKE 1 TABLET BY MOUTH THREE TIMES DAILY 90 tablet 5    cholecalciferol (VITAMIN D3) 400 units tablet Take 1 tablet by mouth in the morning.      clotrimazole (LOTRIMIN) 1 % cream Apply topically 2 (two) times a day 60 g 2    Diclofenac Sodium (VOLTAREN) 1 % Apply 2 g topically 4 (four) times a day To feet/ankle 150 g 1    DULoxetine (CYMBALTA) 60 mg delayed release capsule Take 1 capsule by mouth once daily 90 capsule 0    finasteride (PROSCAR) 5 mg tablet Take 1 tablet (5 mg total) by mouth daily 90 tablet 3    HumaLOG KwikPen 100 units/mL injection pen Inject 30 Units under the skin 3 (three) times a day with meals 81 mL 1    hydroCHLOROthiazide 25 mg tablet Take 1 tablet (25 mg total) by mouth 2 (two) times a day 180 tablet 0    insulin glargine (Toujeo SoloStar) 300 units/mL CONCENTRATED U-300 injection pen (1-unit dial) Inject 72 units nightly. 22.5 mL 1    Insulin Pen Needle (1st Tier Unifine Pentips Plus) 31G X 5 MM MISC Use 4 (four) times a day 400 each 1    ketoconazole (NIZORAL) 2 % shampoo Apply 1 Application topically 2 (two) times a week To feet in shower 120 mL 2    levothyroxine 175 mcg tablet Take 1 tablet by mouth once daily 90 tablet 0    loratadine (CLARITIN) 10 mg tablet Take 10 mg by mouth in the morning.      metFORMIN (GLUCOPHAGE) 1000 MG tablet Take 1 tablet (1,000 mg  total) by mouth 2 (two) times a day with meals 180 tablet 1    Omega-3 Fatty Acids (fish oil) 1,000 mg Take 1,000 mg by mouth in the morning and 1,000 mg in the evening.      pregabalin (LYRICA) 50 mg capsule Take 1 capsule by mouth twice daily 180 capsule 0    rosuvastatin (CRESTOR) 40 MG tablet Take 1 tablet by mouth once daily 90 tablet 0    sodium chloride (OCEAN) 0.65 % nasal spray 1 spray into each nostril as needed for rhinitis 30 mL 1    tamsulosin (FLOMAX) 0.4 mg Take 1 capsule (0.4 mg total) by mouth 2 (two) times a day (Patient taking differently: Take 0.4 mg by mouth daily with dinner) 180 capsule 3    Thiamine HCl (vitamin B-1) 250 MG tablet Take 250 mg by mouth in the morning.      traZODone (DESYREL) 150 mg tablet TAKE 1 TABLET BY MOUTH ONCE DAILY AT BEDTIME 90 tablet 0    valsartan (DIOVAN) 160 mg tablet Take 160 mg by mouth in the morning.      Ventolin  (90 Base) MCG/ACT inhaler Inhale 2 puffs every 6 (six) hours as needed for wheezing or shortness of breath 18 g 5    vitamin E, tocopherol, 400 units capsule Take 400 Units by mouth in the morning.      [DISCONTINUED] atorvastatin (LIPITOR) 20 mg tablet Take 1 tablet by mouth once daily 90 tablet 3     No current facility-administered medications on file prior to visit.   [2]   Social History  Tobacco Use    Smoking status: Former     Current packs/day: 0.00     Average packs/day: 3.0 packs/day for 40.0 years (120.0 ttl pk-yrs)     Types: Cigarettes     Start date: 10/21/1982     Quit date: 10/21/2022     Years since quittin.6     Passive exposure: Past    Smokeless tobacco: Never    Tobacco comments:     states read to quit prior to admit   Vaping Use    Vaping status: Never Used   Substance and Sexual Activity    Alcohol use: Not Currently     Alcohol/week: 3.0 standard drinks of alcohol     Types: 2 Cans of beer, 1 Shots of liquor per week     Comment: No alcohol since     Drug use: No    Sexual activity: Not Currently

## 2025-06-16 NOTE — ASSESSMENT & PLAN NOTE
Orders:    cephalexin (KEFLEX) 500 mg capsule; Take 1 capsule (500 mg total) by mouth every 8 (eight) hours for 7 days

## 2025-06-20 ENCOUNTER — TELEPHONE (OUTPATIENT)
Dept: FAMILY MEDICINE CLINIC | Facility: CLINIC | Age: 60
End: 2025-06-20

## 2025-06-23 ENCOUNTER — OFFICE VISIT (OUTPATIENT)
Dept: PODIATRY | Facility: CLINIC | Age: 60
End: 2025-06-23
Payer: MEDICARE

## 2025-06-23 VITALS
HEIGHT: 64 IN | BODY MASS INDEX: 49.51 KG/M2 | WEIGHT: 290 LBS | TEMPERATURE: 98.2 F | OXYGEN SATURATION: 93 % | HEART RATE: 120 BPM

## 2025-06-23 DIAGNOSIS — B37.2 YEAST INFECTION OF THE SKIN: Primary | ICD-10-CM

## 2025-06-23 DIAGNOSIS — E11.42 DIABETIC POLYNEUROPATHY ASSOCIATED WITH TYPE 2 DIABETES MELLITUS (HCC): ICD-10-CM

## 2025-06-23 DIAGNOSIS — I73.9 PERIPHERAL VASCULAR DISEASE (HCC): ICD-10-CM

## 2025-06-23 DIAGNOSIS — L97.921 CHRONIC ULCER OF LEFT LEG, LIMITED TO BREAKDOWN OF SKIN (HCC): ICD-10-CM

## 2025-06-23 DIAGNOSIS — F41.9 ANXIETY: ICD-10-CM

## 2025-06-23 DIAGNOSIS — L03.116 CELLULITIS OF LEFT ANTERIOR LOWER LEG: ICD-10-CM

## 2025-06-23 PROCEDURE — 99213 OFFICE O/P EST LOW 20 MIN: CPT | Performed by: PODIATRIST

## 2025-06-23 RX ORDER — PREGABALIN 50 MG/1
50 CAPSULE ORAL 2 TIMES DAILY
Qty: 180 CAPSULE | Refills: 0 | Status: SHIPPED | OUTPATIENT
Start: 2025-06-23

## 2025-06-23 RX ORDER — BUSPIRONE HYDROCHLORIDE 5 MG/1
5 TABLET ORAL 3 TIMES DAILY
Qty: 90 TABLET | Refills: 0 | Status: SHIPPED | OUTPATIENT
Start: 2025-06-23

## 2025-06-23 RX ORDER — FLUCONAZOLE 150 MG/1
150 TABLET ORAL ONCE
Qty: 1 TABLET | Refills: 0 | Status: SHIPPED | OUTPATIENT
Start: 2025-06-23 | End: 2025-06-23

## 2025-06-23 NOTE — PROGRESS NOTES
"Name: Luis F Velazquez      : 1965      MRN: [id number]  Encounter Provider: Don Walker DPM  Encounter Date: 2025   Encounter department: [facility] PODIATRY [place]  :  Assessment & Plan  Yeast infection of the skin    Orders:    fluconazole (DIFLUCAN) 150 mg tablet; Take 1 tablet (150 mg total) by mouth once for 1 dose    Cellulitis of left anterior lower leg         Diabetic polyneuropathy associated with type 2 diabetes mellitus (HCC)    Lab Results   Component Value Date    HGBA1C 9.6 (A) 2025            Peripheral vascular disease (HCC)         Chronic ulcer of left leg, limited to breakdown of skin (HCC)       He is to continue doing home treatments with light debridement at home with soap and water, rinse pat the area dry, apply Neosporin or some form of antibiotic ointment daily, and cover with nonadherent gauze pad.  Return in about 3 weeks (around 2025).     History of Present Illness   HPI  Luis F Velazquez is a 59 y.o. male who presents for follow-up for cellulitis and an ulcer present on the lower part of his left leg.  Patient states that he finished the antibiotic ointment but had developed a yeast infection and would like a prescription for an NT fungal to treat the infection.  Patient relates that he is currently washing the ulcer with soap and water and scrubbing it slightly to remove any flaky yellowish tissue that is present and keeping it covered with a nonadherent gauze pad and triple antibiotic ointment.  History obtained from: patient    Review of Systems  Medical History Reviewed by provider this encounter:     .  Medications Ordered Prior to Encounter[1]   Social History[2]     Objective   Pulse (!) 120   Temp 98.2 °F (36.8 °C) (Temporal)   Ht 5' 4\" (1.626 m)   Wt 132 kg (290 lb)   SpO2 93%   BMI 49.78 kg/m²      Physical Exam  Vascular status is unchanged from his visit 1 week ago.    Derm the ulcer is present on the anterior aspect of the left leg " measures 2 cm in length by 0.5 cm in width.  It has decreased in size since his last visit.  There is fibrotic tissue present on the base which is extremely painful to try and debride off.  The fibrotic tissue is well adhered to the base of the wound.  No signs of any infection odor or drainage.  The erythema that was present at last visit was completely resolved.  No odor was seen in the area.       [1]   Current Outpatient Medications on File Prior to Visit   Medication Sig Dispense Refill    albuterol (2.5 mg/3 mL) 0.083 % nebulizer solution Take 3 mL (2.5 mg total) by nebulization every 6 (six) hours as needed for wheezing or shortness of breath 180 mL 0    Ascorbic Acid (VITAMIN C PO) Take 1 tablet by mouth in the morning.      aspirin 81 mg chewable tablet       busPIRone (BUSPAR) 5 mg tablet Take 1 tablet (5 mg total) by mouth 3 (three) times a day 90 tablet 0    carisoprodol (SOMA) 350 mg tablet TAKE 1 TABLET BY MOUTH THREE TIMES DAILY 90 tablet 5    cephalexin (KEFLEX) 500 mg capsule Take 1 capsule (500 mg total) by mouth every 8 (eight) hours for 7 days 21 capsule 0    cholecalciferol (VITAMIN D3) 400 units tablet Take 1 tablet by mouth in the morning.      clotrimazole (LOTRIMIN) 1 % cream Apply topically 2 (two) times a day 60 g 2    Diclofenac Sodium (VOLTAREN) 1 % Apply 2 g topically 4 (four) times a day To feet/ankle 150 g 1    DULoxetine (CYMBALTA) 60 mg delayed release capsule Take 1 capsule by mouth once daily 90 capsule 0    finasteride (PROSCAR) 5 mg tablet Take 1 tablet (5 mg total) by mouth daily 90 tablet 3    HumaLOG KwikPen 100 units/mL injection pen Inject 30 Units under the skin 3 (three) times a day with meals 81 mL 1    hydroCHLOROthiazide 25 mg tablet Take 1 tablet (25 mg total) by mouth 2 (two) times a day 180 tablet 0    insulin glargine (Toujeo SoloStar) 300 units/mL CONCENTRATED U-300 injection pen (1-unit dial) Inject 72 units nightly. 22.5 mL 1    Insulin Pen Needle (1st Tier  Unifine Pentips Plus) 31G X 5 MM MISC Use 4 (four) times a day 400 each 1    ketoconazole (NIZORAL) 2 % shampoo Apply 1 Application topically 2 (two) times a week To feet in shower 120 mL 2    levothyroxine 175 mcg tablet Take 1 tablet by mouth once daily 90 tablet 0    loratadine (CLARITIN) 10 mg tablet Take 10 mg by mouth in the morning.      metFORMIN (GLUCOPHAGE) 1000 MG tablet Take 1 tablet (1,000 mg total) by mouth 2 (two) times a day with meals 180 tablet 1    Omega-3 Fatty Acids (fish oil) 1,000 mg Take 1,000 mg by mouth in the morning and 1,000 mg in the evening.      pregabalin (LYRICA) 50 mg capsule Take 1 capsule by mouth twice daily 180 capsule 0    rosuvastatin (CRESTOR) 40 MG tablet Take 1 tablet by mouth once daily 90 tablet 0    sodium chloride (OCEAN) 0.65 % nasal spray 1 spray into each nostril as needed for rhinitis 30 mL 1    tamsulosin (FLOMAX) 0.4 mg Take 1 capsule (0.4 mg total) by mouth 2 (two) times a day 180 capsule 3    Thiamine HCl (vitamin B-1) 250 MG tablet Take 250 mg by mouth in the morning.      traZODone (DESYREL) 150 mg tablet TAKE 1 TABLET BY MOUTH ONCE DAILY AT BEDTIME 90 tablet 0    valsartan (DIOVAN) 160 mg tablet Take 160 mg by mouth in the morning.      Ventolin  (90 Base) MCG/ACT inhaler Inhale 2 puffs every 6 (six) hours as needed for wheezing or shortness of breath 18 g 5    vitamin E, tocopherol, 400 units capsule Take 400 Units by mouth in the morning.      [DISCONTINUED] atorvastatin (LIPITOR) 20 mg tablet Take 1 tablet by mouth once daily 90 tablet 3     No current facility-administered medications on file prior to visit.   [2]   Social History  Tobacco Use    Smoking status: Former     Current packs/day: 0.00     Average packs/day: 3.0 packs/day for 40.0 years (120.0 ttl pk-yrs)     Types: Cigarettes     Start date: 10/21/1982     Quit date: 10/21/2022     Years since quittin.6     Passive exposure: Past    Smokeless tobacco: Never    Tobacco comments:      states read to quit prior to admit   Vaping Use    Vaping status: Never Used   Substance and Sexual Activity    Alcohol use: Not Currently     Alcohol/week: 3.0 standard drinks of alcohol     Types: 2 Cans of beer, 1 Shots of liquor per week     Comment: No alcohol since 2015    Drug use: No    Sexual activity: Not Currently

## 2025-06-24 DIAGNOSIS — F41.8 DEPRESSION WITH ANXIETY: ICD-10-CM

## 2025-06-24 RX ORDER — TRAZODONE HYDROCHLORIDE 150 MG/1
150 TABLET ORAL
Qty: 90 TABLET | Refills: 0 | Status: SHIPPED | OUTPATIENT
Start: 2025-06-24

## 2025-06-29 PROBLEM — A41.9 SEPSIS (HCC): Status: RESOLVED | Noted: 2025-05-30 | Resolved: 2025-06-29

## 2025-07-01 NOTE — TELEPHONE ENCOUNTER
Spoke with patient regarding [employer] application. Informed patient household size and proof of income are needed in order to complete application, per Dr. Aaron. He verbalized understanding. He states he will drop forms with the requested information at the [facility] office either today or tomorrow. Once received, can these be scanned under media tab to attach to the application form? Additionally, patient is inquiring about Cologuard order. Patient states he has not received the Cologuard kit yet, I see this was ordered in February. Dr. Aaron, could you please look into this? Thanks!    Writer spoke with Monica (Dian) regarding her rescheduled right total hip arthroplasty, posterior scheduled 7-25 at [facility] with Dr. Matt Fried.  Per ordering physician, preprocedure COVID testing not required at this time.  Writer discussed self parking,  parking way finding and possible PACU surge. Dian will be dropped off by her .Dian is scheduled for extended recovery.  The patient has a tentative arrival time of 5 AM as a status board is showing a 7 AM surgical time.Dian has not heard from her surgeon regarding an hour arrival time.  She has a message out for the .  The patient was instructed not to have any solid food after 10 PM and can continue with clear liquids until 4 AM. Dian confirmed her last dose of Mounjaro was on 6/22/2025.  The patient is holding NSAIDs as instructed.  The morning of surgery, Dian will take sertraline. Per anesthesia, patient was instructed not to apply make up, lipstick, nail polish, lotions or jewelry or contact lenses or hair extensions after her showers for the pending surgery/procedure. The patient was instructed to bring a small overnight bag with a photo ID and her insurance cards, few personal toiletries.  She will also bring a walker. Dian verbalized understanding.

## 2025-07-14 ENCOUNTER — OFFICE VISIT (OUTPATIENT)
Dept: PODIATRY | Facility: CLINIC | Age: 60
End: 2025-07-14
Payer: MEDICARE

## 2025-07-14 VITALS
BODY MASS INDEX: 49.51 KG/M2 | HEIGHT: 64 IN | RESPIRATION RATE: 16 BRPM | WEIGHT: 290 LBS | TEMPERATURE: 98 F | HEART RATE: 116 BPM | OXYGEN SATURATION: 98 %

## 2025-07-14 DIAGNOSIS — M79.662 PAIN IN LEFT LOWER LEG: ICD-10-CM

## 2025-07-14 DIAGNOSIS — E11.42 DIABETIC POLYNEUROPATHY ASSOCIATED WITH TYPE 2 DIABETES MELLITUS (HCC): ICD-10-CM

## 2025-07-14 DIAGNOSIS — Z79.01 LONG TERM CURRENT USE OF ANTICOAGULANT: ICD-10-CM

## 2025-07-14 DIAGNOSIS — L97.921 CHRONIC ULCER OF LEFT LEG, LIMITED TO BREAKDOWN OF SKIN (HCC): ICD-10-CM

## 2025-07-14 DIAGNOSIS — L03.116 CELLULITIS OF LEFT ANTERIOR LOWER LEG: Primary | ICD-10-CM

## 2025-07-14 PROCEDURE — 99212 OFFICE O/P EST SF 10 MIN: CPT | Performed by: PODIATRIST

## 2025-07-14 NOTE — PROGRESS NOTES
"Name: Luis F Velazquez      : 1965      MRN: [id number]  Encounter Provider: Don Walker DPM  Encounter Date: 2025   Encounter department: [facility] [place]  :  Assessment & Plan  Cellulitis of left anterior lower leg         Diabetic polyneuropathy associated with type 2 diabetes mellitus (HCC)    Lab Results   Component Value Date    HGBA1C 9.6 (A) 2025            Chronic ulcer of left leg, limited to breakdown of skin (HCC)       Patient is to continue using triple antibiotic or some form of antibiotic ointment on the superficial ulcers that are present on the front part of his left leg.    Patient is to decrease and stop using steroid cream inside the wounds.  Long term current use of anticoagulant         Pain in left lower leg         Return if symptoms worsen or fail to improve.     History of Present Illness   HPI  Luis F Velazquez is a 59 y.o. male who presents for follow-up of an open lesion in the middle part of his left lower leg.  He has been keeping it covered with antibiotic ointment and a bandage since his last visit approximately 3 weeks ago.  Patient had no problems with the antibiotic.  History obtained from: patient    Review of Systems  Medical History Reviewed by provider this encounter:     .  Medications Ordered Prior to Encounter[1]      Objective   Pulse (!) 116   Temp 98 °F (36.7 °C)   Resp 16   Ht 5' 4\" (1.626 m)   Wt 132 kg (290 lb)   SpO2 98%   BMI 49.78 kg/m²      Physical Exam  Vascular status is unchanged from his visit approximately 3 weeks ago.    Derm the ulcer that was located on the proximal portion of the lower extremity is about 90% healed.  The depth has decreased and the base of it has granular tissue no signs of any erythema drainage or odor is noted.  There is a newly formed blister/ulcer present on the distal third of the same leg.  It measures approximately 0.5 cm in width by approximately 2 cm in length.  Has a fibrotic base with " pain upon palpation.  Currently no signs of any infection present.  Hyperpigmentation is noted on the entire lower extremity of the left leg.  The cellulitis that was present at last visit has resolved currently.       [1]   Current Outpatient Medications on File Prior to Visit   Medication Sig Dispense Refill    albuterol (2.5 mg/3 mL) 0.083 % nebulizer solution Take 3 mL (2.5 mg total) by nebulization every 6 (six) hours as needed for wheezing or shortness of breath 180 mL 0    Ascorbic Acid (VITAMIN C PO) Take 1 tablet by mouth in the morning.      aspirin 81 mg chewable tablet       busPIRone (BUSPAR) 5 mg tablet Take 1 tablet (5 mg total) by mouth 3 (three) times a day 90 tablet 0    carisoprodol (SOMA) 350 mg tablet TAKE 1 TABLET BY MOUTH THREE TIMES DAILY 90 tablet 5    cholecalciferol (VITAMIN D3) 400 units tablet Take 1 tablet by mouth in the morning.      clotrimazole (LOTRIMIN) 1 % cream Apply topically 2 (two) times a day 60 g 2    Diclofenac Sodium (VOLTAREN) 1 % Apply 2 g topically 4 (four) times a day To feet/ankle 150 g 1    DULoxetine (CYMBALTA) 60 mg delayed release capsule Take 1 capsule by mouth once daily 90 capsule 0    finasteride (PROSCAR) 5 mg tablet Take 1 tablet (5 mg total) by mouth daily 90 tablet 3    HumaLOG KwikPen 100 units/mL injection pen Inject 30 Units under the skin 3 (three) times a day with meals 81 mL 1    hydroCHLOROthiazide 25 mg tablet Take 1 tablet (25 mg total) by mouth 2 (two) times a day 180 tablet 0    insulin glargine (Toujeo SoloStar) 300 units/mL CONCENTRATED U-300 injection pen (1-unit dial) Inject 72 units nightly. 22.5 mL 1    Insulin Pen Needle (1st Tier Unifine Pentips Plus) 31G X 5 MM MISC Use 4 (four) times a day 400 each 1    ketoconazole (NIZORAL) 2 % shampoo Apply 1 Application topically 2 (two) times a week To feet in shower 120 mL 2    levothyroxine 175 mcg tablet Take 1 tablet by mouth once daily 90 tablet 0    loratadine (CLARITIN) 10 mg tablet Take  10 mg by mouth in the morning.      metFORMIN (GLUCOPHAGE) 1000 MG tablet Take 1 tablet (1,000 mg total) by mouth 2 (two) times a day with meals 180 tablet 1    Omega-3 Fatty Acids (fish oil) 1,000 mg Take 1,000 mg by mouth in the morning and 1,000 mg in the evening.      pregabalin (LYRICA) 50 mg capsule Take 1 capsule (50 mg total) by mouth 2 (two) times a day 180 capsule 0    rosuvastatin (CRESTOR) 40 MG tablet Take 1 tablet by mouth once daily 90 tablet 0    sodium chloride (OCEAN) 0.65 % nasal spray 1 spray into each nostril as needed for rhinitis 30 mL 1    tamsulosin (FLOMAX) 0.4 mg Take 1 capsule (0.4 mg total) by mouth 2 (two) times a day 180 capsule 3    Thiamine HCl (vitamin B-1) 250 MG tablet Take 250 mg by mouth in the morning.      traZODone (DESYREL) 150 mg tablet TAKE 1 TABLET BY MOUTH ONCE DAILY AT BEDTIME 90 tablet 0    valsartan (DIOVAN) 160 mg tablet Take 160 mg by mouth in the morning.      Ventolin  (90 Base) MCG/ACT inhaler Inhale 2 puffs every 6 (six) hours as needed for wheezing or shortness of breath 18 g 5    vitamin E, tocopherol, 400 units capsule Take 400 Units by mouth in the morning.      [DISCONTINUED] atorvastatin (LIPITOR) 20 mg tablet Take 1 tablet by mouth once daily 90 tablet 3     No current facility-administered medications on file prior to visit.

## 2025-07-17 DIAGNOSIS — F41.9 ANXIETY: ICD-10-CM

## 2025-07-17 RX ORDER — BUSPIRONE HYDROCHLORIDE 5 MG/1
5 TABLET ORAL 3 TIMES DAILY
Qty: 90 TABLET | Refills: 2 | Status: SHIPPED | OUTPATIENT
Start: 2025-07-17

## 2025-07-18 NOTE — ASSESSMENT & PLAN NOTE
Patient is uncontrolled with persistent hyperglycemia. Increase in A1C since May is likely s/t to need for steroid therapy and illness. We have helped him complete financial assistance forms for novonoDisruptor Beam and provided him with two sample pens of Ozempic. Inject 0.25 mg once weekly for 4 weeks then increase to 0.5 mg once weekly. Ideally, once he is approved, will start 1 mg dose. Increase Toujeo U300 to 84 units nightly. Increase Humalog to 38 units three times daily prior to meals. Continue metformin 1,000 mg twice daily with meals. Continue to work towards achieving a healthier diet. Patient demonstrates 100% compliance with CGM use. Continue. Patient knows to notify me with persistent hyperglycemia or episodes of hypoglycemia.  F/U in 3 months.   Lab Results   Component Value Date    HGBA1C 12.3 (A) 07/22/2025       Orders:    POCT hemoglobin A1c    Hemoglobin A1C; Future    Basic metabolic panel; Future    Albumin / creatinine urine ratio; Future    HumaLOG KwikPen 100 units/mL injection pen; Inject 38 units three times daily before meals.    insulin glargine (Toujeo SoloStar) 300 units/mL CONCENTRATED U-300 injection pen (1-unit dial); Inject 84 units nightly.

## 2025-07-18 NOTE — PROGRESS NOTES
Name: Luis F Velazquez      : 1965      MRN: [id number]  Encounter Provider: MARLEEN Davila  Encounter Date: 2025   Encounter department: [facility] [place]  :  Assessment & Plan  Type 2 diabetes mellitus with hyperglycemia, with long-term current use of insulin (HCC)  Patient is uncontrolled with persistent hyperglycemia. Increase in A1C since May is likely s/t to need for steroid therapy and illness. We have helped him complete financial assistance forms for novonordisk and provided him with two sample pens of Ozempic. Inject 0.25 mg once weekly for 4 weeks then increase to 0.5 mg once weekly. Ideally, once he is approved, will start 1 mg dose. Increase Toujeo U300 to 84 units nightly. Increase Humalog to 38 units three times daily prior to meals. Continue metformin 1,000 mg twice daily with meals. Continue to work towards achieving a healthier diet. Patient demonstrates 100% compliance with CGM use. Continue. Patient knows to notify me with persistent hyperglycemia or episodes of hypoglycemia.  F/U in 3 months.   Lab Results   Component Value Date    HGBA1C 12.3 (A) 2025       Orders:    POCT hemoglobin A1c    Hemoglobin A1C; Future    Basic metabolic panel; Future    Albumin / creatinine urine ratio; Future    HumaLOG KwikPen 100 units/mL injection pen; Inject 38 units three times daily before meals.    insulin glargine (Toujeo SoloStar) 300 units/mL CONCENTRATED U-300 injection pen (1-unit dial); Inject 84 units nightly.    Mixed hyperlipidemia  Continue 40 mg of rosuvastatin nightly.       Acquired hypothyroidism  Reminded patient to complete thyroid function test.  Continue 175 mcg of levothyroxine daily.  Orders:    TSH, 3rd generation; Future    T4, free; Future    Essential (primary) hypertension  /72.  Patient reports he is out of valsartan.  I have reordered this for him.  Orders:    valsartan (DIOVAN) 160 mg tablet; Take 1  tablet (160 mg total) by mouth in the morning.    Balanitis  After receiving antibiotics for pneumonia, patient reports he continues to have symptoms of a yeast infection.  Take 150 mg of fluconazole once.  Orders:    fluconazole (DIFLUCAN) 150 mg tablet; Take 1 tablet (150 mg total) by mouth once for 1 dose    Morbid obesity with BMI of 45.0-49.9, adult ([facility])  Reviewed the importance of making healthy food choices. Will resume Ozempic.           Assessment & Plan        History of Present Illness   History of Present Illness  Luis F Velazquez is a 59 y.o. male with type 2 diabetes with long-term use of insulin and hypothyroidism presenting to the office today for follow-up.    Past medical history significant for hypertension, hyperlipidemia, morbid obesity, COPD, MARSHA, nonalcoholic fatty liver disease, neuropathy, chronic pain, and depression and anxiety.    At patient's last appointment on 3/5/2025, A1C was uncontrolled. Insulin was increased. Referral was offered for MNT, which was declined. Patient had reported PCP office was applying for financial assist through [employer] but nothing came of that.      Current regimen:    Metformin 1000 mg twice daily  Toujeo 72 units nightly  Humalog 30 units 3 times daily     Hemoglobin A1C   Latest Ref Rng <=6.5    2/20/2025 11.1 !    2/21/2025 10.5 (H)    5/20/2025 9.6 !       Legend:  ! Abnormal  (H) High    POC HgA1C today is 12.3%    Luis F Velazquez   Device used Freestyle matthew 3  Home use     Indication   Type 2 Diabetes    More than 72 hours of data was reviewed. Report to be scanned to chart.     Date Range: July 9, 2025-July 22, 2025    Analysis of data:   Average Glucose: 268 mg/dL  Coefficient of Variation: 20%  GMI: 9.7%  Time in Target Range: 5%  Time Above Range: 33% 181 to 250 mg/dL; 62% greater than 250 mg/dL  Time Below Range: 0%    Interpretation of data:   Uncontrolled with persistent hyperglycemia.      For hypothyroidism, he is taking 175 mcg of  "levothyroxine daily.   He did not complete ordered labs for today.    He had pneumonia 4 weeks ago and was treated with steroids and antibiotics. His A1c has increased from 9.6 in May to 12.3 currently, which may be attributed to the steroid treatment.    He was prescribed a different antibiotic by his podiatrist, which led to a yeast infection. His penis remains sore and raw at the top. He was given fluconazole 3 weeks ago, but it did not alleviate his symptoms. He maintains good hygiene, but the area remains sore.          Review of Systems   Constitutional:  Positive for fatigue. Negative for activity change, appetite change and unexpected weight change.   HENT:  Negative for dental problem, sore throat, trouble swallowing and voice change.    Eyes:  Negative for visual disturbance.   Respiratory:  Positive for cough and shortness of breath. Negative for chest tightness.    Cardiovascular:  Negative for chest pain, palpitations and leg swelling.   Gastrointestinal:  Negative for constipation, diarrhea, nausea and vomiting.   Endocrine: Negative for cold intolerance, heat intolerance, polydipsia, polyphagia and polyuria.   Genitourinary:  Negative for frequency.   Musculoskeletal:  Positive for arthralgias and myalgias. Negative for back pain and gait problem.   Skin:  Positive for wound.   Allergic/Immunologic: Positive for environmental allergies. Negative for food allergies.   Neurological:  Positive for weakness and numbness. Negative for dizziness, light-headedness and headaches.   Psychiatric/Behavioral:  Negative for decreased concentration, dysphoric mood and sleep disturbance. The patient is not nervous/anxious.           Objective   /72   Pulse 104   Temp 97.9 °F (36.6 °C)   Resp 16   Ht 5' 4\" (1.626 m)   Wt 131 kg (289 lb 12.8 oz)   SpO2 98%   BMI 49.74 kg/m²      Physical Exam  Vitals reviewed.   Constitutional:       General: He is not in acute distress.     Appearance: He is " well-developed. He is obese. He is not ill-appearing.   HENT:      Head: Normocephalic and atraumatic.     Eyes:      Conjunctiva/sclera: Conjunctivae normal.       Cardiovascular:      Rate and Rhythm: Normal rate and regular rhythm.      Heart sounds: No murmur heard.  Pulmonary:      Effort: Pulmonary effort is normal. No respiratory distress.      Breath sounds: Rhonchi (L side only) present. No wheezing or rales.   Abdominal:      Palpations: Abdomen is soft.      Tenderness: There is no abdominal tenderness.     Musculoskeletal:         General: No swelling.      Cervical back: Neck supple.      Right lower leg: No edema.      Left lower leg: No edema.     Skin:     General: Skin is warm and dry.      Capillary Refill: Capillary refill takes less than 2 seconds.     Neurological:      Mental Status: He is alert.     Psychiatric:         Mood and Affect: Mood normal.       Physical Exam  Skin: Skin on the leg is thin and healed from previous injury.  Extremities: No edema or pitting noted in the lower extremities. Left calf appears smaller than the right calf.  Genital Exam: Penis is sore and raw, consistent with yeast infection.    Results  Labs: A1c is 12.3.

## 2025-07-22 ENCOUNTER — OFFICE VISIT (OUTPATIENT)
Dept: ENDOCRINOLOGY | Facility: CLINIC | Age: 60
End: 2025-07-22
Payer: MEDICARE

## 2025-07-22 VITALS
BODY MASS INDEX: 49.47 KG/M2 | RESPIRATION RATE: 16 BRPM | DIASTOLIC BLOOD PRESSURE: 72 MMHG | SYSTOLIC BLOOD PRESSURE: 144 MMHG | OXYGEN SATURATION: 98 % | WEIGHT: 289.8 LBS | HEART RATE: 104 BPM | HEIGHT: 64 IN | TEMPERATURE: 97.9 F

## 2025-07-22 DIAGNOSIS — E66.01 MORBID OBESITY WITH BMI OF 45.0-49.9, ADULT (HCC): Chronic | ICD-10-CM

## 2025-07-22 DIAGNOSIS — E03.9 ACQUIRED HYPOTHYROIDISM: Chronic | ICD-10-CM

## 2025-07-22 DIAGNOSIS — N48.1 BALANITIS: ICD-10-CM

## 2025-07-22 DIAGNOSIS — Z79.4 TYPE 2 DIABETES MELLITUS WITH HYPERGLYCEMIA, WITH LONG-TERM CURRENT USE OF INSULIN (HCC): Primary | ICD-10-CM

## 2025-07-22 DIAGNOSIS — E78.2 MIXED HYPERLIPIDEMIA: ICD-10-CM

## 2025-07-22 DIAGNOSIS — E11.65 TYPE 2 DIABETES MELLITUS WITH HYPERGLYCEMIA, WITH LONG-TERM CURRENT USE OF INSULIN (HCC): Primary | ICD-10-CM

## 2025-07-22 DIAGNOSIS — I10 ESSENTIAL (PRIMARY) HYPERTENSION: Chronic | ICD-10-CM

## 2025-07-22 LAB — SL AMB POCT HEMOGLOBIN AIC: 12.3 (ref ?–6.5)

## 2025-07-22 PROCEDURE — 99214 OFFICE O/P EST MOD 30 MIN: CPT | Performed by: NURSE PRACTITIONER

## 2025-07-22 PROCEDURE — 83036 HEMOGLOBIN GLYCOSYLATED A1C: CPT | Performed by: NURSE PRACTITIONER

## 2025-07-22 PROCEDURE — 95251 CONT GLUC MNTR ANALYSIS I&R: CPT | Performed by: NURSE PRACTITIONER

## 2025-07-22 RX ORDER — VALSARTAN 160 MG/1
160 TABLET ORAL DAILY
Qty: 90 TABLET | Refills: 1 | Status: SHIPPED | OUTPATIENT
Start: 2025-07-22

## 2025-07-22 RX ORDER — INSULIN GLARGINE 300 U/ML
INJECTION, SOLUTION SUBCUTANEOUS
Qty: 25.5 ML | Refills: 1 | Status: SHIPPED | OUTPATIENT
Start: 2025-07-22

## 2025-07-22 RX ORDER — FLUCONAZOLE 150 MG/1
150 TABLET ORAL ONCE
Qty: 1 TABLET | Refills: 0 | Status: SHIPPED | OUTPATIENT
Start: 2025-07-22 | End: 2025-07-22

## 2025-07-22 RX ORDER — INSULIN LISPRO 100 [IU]/ML
INJECTION, SOLUTION INTRAVENOUS; SUBCUTANEOUS
Qty: 105 ML | Refills: 1 | Status: SHIPPED | OUTPATIENT
Start: 2025-07-22

## 2025-07-22 NOTE — PATIENT INSTRUCTIONS
Increase Toujeo to 84 units nightly  Increase Humalog to 38 units three times daily prior to meals  Continue metformin 1,000 mg twice daily  Start Ozempic 0.25 mg under the skin once weekly for 4 weeks then increase to 0.5 mg under the skin once weekly  Complete paperwork for NovoNordisk- let's try to get you back on Ozempic full time.

## 2025-07-22 NOTE — ASSESSMENT & PLAN NOTE
Reminded patient to complete thyroid function test.  Continue 175 mcg of levothyroxine daily.  Orders:    TSH, 3rd generation; Future    T4, free; Future

## 2025-07-22 NOTE — ASSESSMENT & PLAN NOTE
/72.  Patient reports he is out of valsartan.  I have reordered this for him.  Orders:    valsartan (DIOVAN) 160 mg tablet; Take 1 tablet (160 mg total) by mouth in the morning.

## 2025-07-31 DIAGNOSIS — E78.5 DYSLIPIDEMIA: ICD-10-CM

## 2025-07-31 RX ORDER — ROSUVASTATIN CALCIUM 40 MG/1
40 TABLET, COATED ORAL DAILY
Qty: 90 TABLET | Refills: 0 | Status: SHIPPED | OUTPATIENT
Start: 2025-07-31

## 2025-08-06 ENCOUNTER — TELEPHONE (OUTPATIENT)
Dept: ENDOCRINOLOGY | Facility: CLINIC | Age: 60
End: 2025-08-06

## 2025-08-08 ENCOUNTER — TELEPHONE (OUTPATIENT)
Age: 60
End: 2025-08-08

## 2025-08-09 ENCOUNTER — HOSPITAL ENCOUNTER (EMERGENCY)
Facility: HOSPITAL | Age: 60
Discharge: HOME | End: 2025-08-09
Attending: EMERGENCY MEDICINE | Admitting: EMERGENCY MEDICINE
Payer: MEDICARE

## 2025-08-09 ENCOUNTER — APPOINTMENT (EMERGENCY)
Dept: RADIOLOGY | Facility: HOSPITAL | Age: 60
End: 2025-08-09
Payer: MEDICARE

## 2025-08-20 ENCOUNTER — PROCEDURE VISIT (OUTPATIENT)
Dept: PAIN MEDICINE | Facility: CLINIC | Age: 60
End: 2025-08-20
Payer: MEDICARE

## 2025-08-20 ENCOUNTER — OFFICE VISIT (OUTPATIENT)
Age: 60
End: 2025-08-20
Payer: MEDICARE

## 2025-08-20 VITALS
HEART RATE: 103 BPM | HEIGHT: 64 IN | TEMPERATURE: 97.6 F | DIASTOLIC BLOOD PRESSURE: 82 MMHG | OXYGEN SATURATION: 95 % | WEIGHT: 289 LBS | BODY MASS INDEX: 49.34 KG/M2 | SYSTOLIC BLOOD PRESSURE: 136 MMHG

## 2025-08-20 VITALS
RESPIRATION RATE: 16 BRPM | OXYGEN SATURATION: 99 % | BODY MASS INDEX: 49.34 KG/M2 | WEIGHT: 289 LBS | HEART RATE: 87 BPM | TEMPERATURE: 97.3 F | HEIGHT: 64 IN

## 2025-08-20 DIAGNOSIS — Z12.5 PROSTATE CANCER SCREENING: ICD-10-CM

## 2025-08-20 DIAGNOSIS — M70.61 GREATER TROCHANTERIC BURSITIS OF BOTH HIPS: Primary | ICD-10-CM

## 2025-08-20 DIAGNOSIS — R35.0 URINARY FREQUENCY: Primary | ICD-10-CM

## 2025-08-20 DIAGNOSIS — M70.62 GREATER TROCHANTERIC BURSITIS OF BOTH HIPS: Primary | ICD-10-CM

## 2025-08-20 DIAGNOSIS — E03.9 HYPOTHYROIDISM, UNSPECIFIED TYPE: ICD-10-CM

## 2025-08-20 LAB — POST-VOID RESIDUAL VOLUME, ML POC: 7 ML

## 2025-08-20 PROCEDURE — 20611 DRAIN/INJ JOINT/BURSA W/US: CPT | Performed by: ANESTHESIOLOGY

## 2025-08-20 PROCEDURE — 99213 OFFICE O/P EST LOW 20 MIN: CPT

## 2025-08-20 PROCEDURE — 51798 US URINE CAPACITY MEASURE: CPT

## 2025-08-20 RX ORDER — LEVOTHYROXINE SODIUM 175 UG/1
175 TABLET ORAL DAILY
Qty: 90 TABLET | Refills: 0 | Status: SHIPPED | OUTPATIENT
Start: 2025-08-20

## 2025-08-20 RX ORDER — TRIAMCINOLONE ACETONIDE 40 MG/ML
40 INJECTION, SUSPENSION INTRA-ARTICULAR; INTRAMUSCULAR
Status: COMPLETED | OUTPATIENT
Start: 2025-08-20 | End: 2025-08-20

## 2025-08-20 RX ORDER — BUPIVACAINE HYDROCHLORIDE 2.5 MG/ML
2 INJECTION, SOLUTION INFILTRATION; PERINEURAL
Status: COMPLETED | OUTPATIENT
Start: 2025-08-20 | End: 2025-08-20

## 2025-08-20 RX ADMIN — BUPIVACAINE HYDROCHLORIDE 2 ML: 2.5 INJECTION, SOLUTION INFILTRATION; PERINEURAL at 14:00

## 2025-08-20 RX ADMIN — TRIAMCINOLONE ACETONIDE 40 MG: 40 INJECTION, SUSPENSION INTRA-ARTICULAR; INTRAMUSCULAR at 14:00

## 2025-08-21 ENCOUNTER — CLINICAL SUPPORT (OUTPATIENT)
Age: 60
End: 2025-08-21

## 2025-08-21 ENCOUNTER — OFFICE VISIT (OUTPATIENT)
Age: 60
End: 2025-08-21

## 2025-08-21 VITALS
TEMPERATURE: 98.6 F | HEART RATE: 98 BPM | DIASTOLIC BLOOD PRESSURE: 72 MMHG | BODY MASS INDEX: 49.44 KG/M2 | SYSTOLIC BLOOD PRESSURE: 108 MMHG | WEIGHT: 288 LBS | OXYGEN SATURATION: 99 % | RESPIRATION RATE: 18 BRPM

## 2025-08-21 DIAGNOSIS — E78.2 MIXED HYPERLIPIDEMIA: ICD-10-CM

## 2025-08-21 DIAGNOSIS — E11.42 DIABETIC POLYNEUROPATHY ASSOCIATED WITH TYPE 2 DIABETES MELLITUS (HCC): ICD-10-CM

## 2025-08-21 DIAGNOSIS — G60.9 IDIOPATHIC PERIPHERAL NEUROPATHY: ICD-10-CM

## 2025-08-21 DIAGNOSIS — Z79.4 TYPE 2 DIABETES MELLITUS WITH HYPERGLYCEMIA, WITH LONG-TERM CURRENT USE OF INSULIN (HCC): ICD-10-CM

## 2025-08-21 DIAGNOSIS — D50.9 IRON DEFICIENCY ANEMIA, UNSPECIFIED IRON DEFICIENCY ANEMIA TYPE: ICD-10-CM

## 2025-08-21 DIAGNOSIS — E11.65 TYPE 2 DIABETES MELLITUS WITH HYPERGLYCEMIA, WITH LONG-TERM CURRENT USE OF INSULIN (HCC): Primary | ICD-10-CM

## 2025-08-21 DIAGNOSIS — I25.10 CORONARY ARTERY DISEASE INVOLVING NATIVE HEART WITHOUT ANGINA PECTORIS, UNSPECIFIED VESSEL OR LESION TYPE: ICD-10-CM

## 2025-08-21 DIAGNOSIS — R06.09 DOE (DYSPNEA ON EXERTION): ICD-10-CM

## 2025-08-21 DIAGNOSIS — J43.9 PULMONARY EMPHYSEMA, UNSPECIFIED EMPHYSEMA TYPE (HCC): Primary | ICD-10-CM

## 2025-08-21 DIAGNOSIS — N18.30 TYPE 2 DIABETES MELLITUS WITH STAGE 3 CHRONIC KIDNEY DISEASE, WITH LONG-TERM CURRENT USE OF INSULIN, UNSPECIFIED WHETHER STAGE 3A OR 3B CKD (HCC): ICD-10-CM

## 2025-08-21 DIAGNOSIS — Z79.4 TYPE 2 DIABETES MELLITUS WITH STAGE 3 CHRONIC KIDNEY DISEASE, WITH LONG-TERM CURRENT USE OF INSULIN, UNSPECIFIED WHETHER STAGE 3A OR 3B CKD (HCC): ICD-10-CM

## 2025-08-21 DIAGNOSIS — E87.1 HYPONATREMIA: ICD-10-CM

## 2025-08-21 DIAGNOSIS — Z87.891 HISTORY OF CIGARETTE SMOKING: ICD-10-CM

## 2025-08-21 DIAGNOSIS — Z59.9 FINANCIAL DIFFICULTIES: ICD-10-CM

## 2025-08-21 DIAGNOSIS — E66.01 MORBID OBESITY WITH BMI OF 45.0-49.9, ADULT (HCC): Chronic | ICD-10-CM

## 2025-08-21 DIAGNOSIS — E11.65 TYPE 2 DIABETES MELLITUS WITH HYPERGLYCEMIA, WITH LONG-TERM CURRENT USE OF INSULIN (HCC): ICD-10-CM

## 2025-08-21 DIAGNOSIS — E87.1 ACUTE HYPONATREMIA: ICD-10-CM

## 2025-08-21 DIAGNOSIS — E11.22 TYPE 2 DIABETES MELLITUS WITH STAGE 3 CHRONIC KIDNEY DISEASE, WITH LONG-TERM CURRENT USE OF INSULIN, UNSPECIFIED WHETHER STAGE 3A OR 3B CKD (HCC): ICD-10-CM

## 2025-08-21 DIAGNOSIS — Z79.4 TYPE 2 DIABETES MELLITUS WITH HYPERGLYCEMIA, WITH LONG-TERM CURRENT USE OF INSULIN (HCC): Primary | ICD-10-CM

## 2025-08-21 LAB
ALBUMIN SERPL BCG-MCNC: 4 G/DL (ref 3.5–5)
ALP SERPL-CCNC: 61 U/L (ref 34–104)
ALT SERPL W P-5'-P-CCNC: 38 U/L (ref 7–52)
ANION GAP SERPL CALCULATED.3IONS-SCNC: 8 MMOL/L (ref 4–13)
AST SERPL W P-5'-P-CCNC: 25 U/L (ref 13–39)
BILIRUB SERPL-MCNC: 0.32 MG/DL (ref 0.2–1)
BUN SERPL-MCNC: 19 MG/DL (ref 5–25)
CALCIUM SERPL-MCNC: 9.3 MG/DL (ref 8.4–10.2)
CHLORIDE SERPL-SCNC: 92 MMOL/L (ref 96–108)
CHOLEST SERPL-MCNC: 111 MG/DL (ref ?–200)
CO2 SERPL-SCNC: 32 MMOL/L (ref 21–32)
CREAT SERPL-MCNC: 1.29 MG/DL (ref 0.6–1.3)
GFR SERPL CREATININE-BSD FRML MDRD: 59 ML/MIN/1.73SQ M
GLUCOSE P FAST SERPL-MCNC: 190 MG/DL (ref 65–99)
HDLC SERPL-MCNC: 35 MG/DL
LDLC SERPL CALC-MCNC: 24 MG/DL (ref 0–100)
NONHDLC SERPL-MCNC: 76 MG/DL
OSMOLALITY UR/SERPL-RTO: 289 MMOL/KG (ref 282–298)
OSMOLALITY UR: 339 MMOL/KG (ref 250–900)
POTASSIUM SERPL-SCNC: 3.9 MMOL/L (ref 3.5–5.3)
PROT SERPL-MCNC: 7.2 G/DL (ref 6.4–8.4)
SODIUM SERPL-SCNC: 132 MMOL/L (ref 135–147)
SODIUM UR-SCNC: 63 MMOL/L
TRIGL SERPL-MCNC: 262 MG/DL (ref ?–150)
TSH SERPL DL<=0.05 MIU/L-ACNC: 2.05 UIU/ML (ref 0.45–4.5)
VIT B12 SERPL-MCNC: 2354 PG/ML (ref 180–914)

## 2025-08-21 RX ORDER — FLUTICASONE FUROATE, UMECLIDINIUM BROMIDE AND VILANTEROL TRIFENATATE 100; 62.5; 25 UG/1; UG/1; UG/1
1 POWDER RESPIRATORY (INHALATION) DAILY
Qty: 3 EACH | Refills: 1 | Status: SHIPPED | OUTPATIENT
Start: 2025-08-21

## 2025-08-21 RX ORDER — TIRZEPATIDE 2.5 MG/.5ML
2.5 INJECTION, SOLUTION SUBCUTANEOUS WEEKLY
Qty: 2 ML | Refills: 0 | Status: SHIPPED | OUTPATIENT
Start: 2025-08-21 | End: 2025-09-18

## 2025-08-21 SDOH — ECONOMIC STABILITY - INCOME SECURITY: PROBLEM RELATED TO HOUSING AND ECONOMIC CIRCUMSTANCES, UNSPECIFIED: Z59.9

## 2025-08-22 ENCOUNTER — PATIENT OUTREACH (OUTPATIENT)
Age: 60
End: 2025-08-22